# Patient Record
Sex: FEMALE | Race: WHITE | NOT HISPANIC OR LATINO | Employment: PART TIME | URBAN - METROPOLITAN AREA
[De-identification: names, ages, dates, MRNs, and addresses within clinical notes are randomized per-mention and may not be internally consistent; named-entity substitution may affect disease eponyms.]

---

## 2017-01-02 ENCOUNTER — TELEPHONE (OUTPATIENT)
Dept: FAMILY MEDICINE | Facility: CLINIC | Age: 67
End: 2017-01-02

## 2017-01-05 ENCOUNTER — OFFICE VISIT (OUTPATIENT)
Dept: SLEEP MEDICINE | Facility: CLINIC | Age: 67
End: 2017-01-05
Attending: INTERNAL MEDICINE
Payer: COMMERCIAL

## 2017-01-05 VITALS
HEIGHT: 64 IN | BODY MASS INDEX: 42.85 KG/M2 | OXYGEN SATURATION: 96 % | DIASTOLIC BLOOD PRESSURE: 49 MMHG | HEART RATE: 76 BPM | SYSTOLIC BLOOD PRESSURE: 156 MMHG | WEIGHT: 251 LBS

## 2017-01-05 DIAGNOSIS — G47.34 NOCTURNAL HYPOXEMIA: ICD-10-CM

## 2017-01-05 DIAGNOSIS — G47.33 SEVERE OBSTRUCTIVE SLEEP APNEA: Primary | ICD-10-CM

## 2017-01-05 PROCEDURE — 99211 OFF/OP EST MAY X REQ PHY/QHP: CPT | Mod: ZF

## 2017-01-05 NOTE — PROGRESS NOTES
DATE OF SERVICE:  01/05/2017      CHIEF COMPLAINT:  Followup nocturnal hypoxemia, sleep apnea.      HISTORY OF PRESENT ILLNESS:  Pinky Page is a 66-year-old female who is on supplemental oxygen for the treatment of hypoxemia.  She has a history of obstructive sleep apnea that is completely intolerant.  She reports today that she has had no new medical problems.  She sleeps on a flat bed with multiple pillows at the [facility].  She is awoken 2 times at night to get up to go to the bathroom, otherwise she will have nocturia and enuresis.  She has also awoken in the morning for medications and Accu-Chek.  She usually goes to bed around 10:00 to 10:30 and is out of bed by 6:30 or 7.  if she has difficulty falling asleep, she may be tired the next day, may take a nap.  She does note that on the days she takes naps it might be a little harder to fall asleep.  She otherwise does not complain about daytime sleepiness.  [place] is about 8.  She uses humidification for the oxygen, gets the supplies from [facility].  Her weight has been stable.  She has not yet been able to lose weight.  Last overnight oximetry performed 02/04/2015 on supplemental oxygen 2 liters per minute with no significant hypoxemia.  Time below 88% of less than 1 minute.  She does complain of some dry nose despite using humidified oxygen.        She has had some changes in her medications since the last visit.  She is now on Geodon and off quetiapine.  Medications were reviewed.      SOCIAL HISTORY:  She is a nonsmoker.  There is no smoking around her.  No candles are being used and her room is labeled with a significant that says oxygen in use.        PHYSICAL EXAMINATION:   GENERAL:  Pleasant obese female in no distress.   VITAL SIGNS:  Blood pressure 156/49, pulse is 76.  O2 saturation is 96% on room air.  Weight is 251 pounds.  Body mass index of 43.      ASSESSMENT:  A 66-year-old female with history of CPAP intolerance,  hypoxemia, with sleep apnea.  She has been on oxygen now for years.  Most recent oximetry was done on supplemental oxygen at 2 liters per minute and was adequate.  She remains obese and hypertensive.      PLAN:   1.  The patient is going to follow up with her primary regarding hypertension.   2.  I encouraged her to increase exercise as tolerated and continue to watch her diet.   3.  We will do overnight oximetry on room air 1 night to assess oxygen needs.  If she does not have significant hypoxemia will consider a trial of discontinuing the oxygen.       Please see patient instructions for further details of counseling provided.      Fifteen minutes spent with patient, greater than 50% spent in counseling and care.         VIDYA SIMON MD             D: 2017 14:10   T: 2017 15:59   MT: RADHA      Name:     BRUCE TAMEZ   MRN:      [id number]        Account:      [id number]   :      1950           Visit Date:   2017      Document: [id number]

## 2017-01-05 NOTE — PATIENT INSTRUCTIONS
Lets check your oxygen saturation off oxygen one night. Please log the times that you are up that night to go to bathroom, take meds, etc.  Continue oxygen at 2 l /m otherwise until you get results from me-usually a letter.  Keep working with Dr Kelley at [facility] on improving blood pressure control    Your BMI is Body mass index is 43.06 kg/(m^2).  Weight management is a personal decision.  If you are interested in exploring weight loss strategies, the following discussion covers the approaches that may be successful. Body mass index (BMI) is one way to tell whether you are at a healthy weight, overweight, or obese. It measures your weight in relation to your height.  A BMI of 18.5 to 24.9 is in the healthy range. A person with a BMI of 25 to 29.9 is considered overweight, and someone with a BMI of 30 or greater is considered obese. More than two-thirds of American adults are considered overweight or obese.  Being overweight or obese increases the risk for further weight gain. Excess weight may lead to heart disease and diabetes.  Creating and following plans for healthy eating and physical activity may help you improve your health.  Weight control is part of healthy lifestyle and includes exercise, emotional health, and healthy eating habits. Careful eating habits lifelong are the mainstay of weight control. Though there are significant health benefits from weight loss, long-term weight loss with diet alone may be very difficult to achieve- studies show long-term success with dietary management in less than 10% of people. Attaining a healthy weight may be especially difficult to achieve in those with severe obesity. In some cases, medications, devices and surgical management might be considered.  What can you do?  If you are overweight or obese and are interested in methods for weight loss, you should discuss this with your provider.     Consider reducing daily calorie intake by 500 calories.     Keep a food  journal.     Avoiding skipping meals, consider cutting portions instead.    Diet combined with exercise helps maintain muscle while optimizing fat loss. Strength training is particularly important for building and maintaining muscle mass. Exercise helps reduce stress, increase energy, and improves fitness. Increasing exercise without diet control, however, may not burn enough calories to loose weight.       Start walking three days a week 10-20 minutes at a time    Work towards walking thirty minutes five days a week     Eventually, increase the speed of your walking for 1-2 minutes at time    In addition, we recommend that you review healthy lifestyles and methods for weight loss available through the National Institutes of Health patient information sites:  http://win.niddk.nih.gov/publications/index.htm    And look into health and wellness programs that may be available through your health insurance provider, employer, local community center, or wally club.    Weight management plan: Patient was referred to their PCP to discuss a diet and exercise plan.

## 2017-01-10 NOTE — TELEPHONE ENCOUNTER
Called patient to schedule Pharm D appointment. Spoke with residence staff who advised day nurses schedule appointments and they are gone for the day. Left message to have day nurse return call and schedule appointment for patient. Close encounter.    Reason: Comprehensive Medical Review, Polypharmacy >8-10 medications and is trying to lose weight.   Urgency of Appointment: Next Available  Is this for a one time consult or how many independent pharmacy visits should the patient have before having a follow up provider appointment? Number of Independent Pharmacy Visits=1-3

## 2017-01-18 DIAGNOSIS — Z79.4 TYPE 2 DIABETES MELLITUS WITH MICROALBUMINURIA, WITH LONG-TERM CURRENT USE OF INSULIN (H): Primary | ICD-10-CM

## 2017-01-18 DIAGNOSIS — E11.8 TYPE 2 DIABETES MELLITUS WITH COMPLICATION (H): ICD-10-CM

## 2017-01-18 DIAGNOSIS — R80.9 TYPE 2 DIABETES MELLITUS WITH MICROALBUMINURIA, WITH LONG-TERM CURRENT USE OF INSULIN (H): Primary | ICD-10-CM

## 2017-01-18 DIAGNOSIS — E11.29 TYPE 2 DIABETES MELLITUS WITH MICROALBUMINURIA, WITH LONG-TERM CURRENT USE OF INSULIN (H): Primary | ICD-10-CM

## 2017-01-18 RX ORDER — INSULIN GLARGINE 100 [IU]/ML
INJECTION, SOLUTION SUBCUTANEOUS
Qty: 10 ML | Refills: 11 | Status: CANCELLED | OUTPATIENT
Start: 2017-01-18

## 2017-01-23 ENCOUNTER — OFFICE VISIT (OUTPATIENT)
Dept: SLEEP MEDICINE | Facility: CLINIC | Age: 67
End: 2017-01-23
Attending: INTERNAL MEDICINE
Payer: COMMERCIAL

## 2017-01-23 DIAGNOSIS — G47.34 NOCTURNAL HYPOXEMIA: ICD-10-CM

## 2017-01-23 PROCEDURE — 94762 N-INVAS EAR/PLS OXIMTRY CONT: CPT | Mod: ZF

## 2017-01-23 NOTE — PROGRESS NOTES
Patient was mailed an overnight Oximetry. Patient was instructed use over the phone. Patient verbalized understanding and will be returning device after completing the test tonight via postal mail.     Patient was given sleep logs and written instructions for use    JONNATHAN Benedict  Clinic Coordinator   Registered Medical Assistant   [facility]

## 2017-01-25 ENCOUNTER — OFFICE VISIT (OUTPATIENT)
Dept: PSYCHOLOGY | Facility: CLINIC | Age: 67
End: 2017-01-25

## 2017-01-25 VITALS — WEIGHT: 246.8 LBS | BODY MASS INDEX: 42.34 KG/M2

## 2017-01-25 DIAGNOSIS — F33.1 MAJOR DEPRESSIVE DISORDER, RECURRENT EPISODE, MODERATE (H): Primary | ICD-10-CM

## 2017-01-25 DIAGNOSIS — F54 PSYCHOLOGICAL FACTORS AFFECTING MEDICAL CONDITION: ICD-10-CM

## 2017-01-25 DIAGNOSIS — F41.1 GENERALIZED ANXIETY DISORDER: ICD-10-CM

## 2017-01-25 NOTE — PROGRESS NOTES
Health Psychology                  Clinic    Department of Medicine  Madelaine Ingram, Ph.D., L.P. [phone]                          [facility] Miryam Woods, Ph.D.,  L.P. [phone]                 3rd Floor, Clinic 3A  [place] Mail Code 954   Damon Gr, Ph.D., LUCY.[email or web address].[email or web address].P., L.P. [phone]     [address] Kimber Morales, Ph.D., L.P. [phone]  [place] [address]  [place] [address]    Health Psychology Follow-Up Note    Ms. Page is a pleasant 66-year old woman with chronic depressive illness, who returns to clinic for supportive and behavioral psychotherapy for moderate recurrent depression and for help losing weight given her morbid obesity.   Depression is below (3) baseline (4)  mild.  She lost 3 lbs and is reinforced for the change.  She reports some extra walking.  I gave her something to remind her about exercise and eating healthily.   Wt Readings from Last 4 Encounters:   01/25/17 111.948 kg (246 lb 12.8 oz)   01/05/17 113.853 kg (251 lb)   12/28/16 113.581 kg (250 lb 6.4 oz)   12/12/16 113.309 kg (249 lb 12.8 oz)     At [employer], she continues to serve on the [employer].  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  She is working 1 day/week. Her roommate is doing better and she is enjoying her more over time.  She reports that another resident ate leaves of her plants.  She met with a nutritionist who gave her pointers for eating less.  She is doing more social walking.  She is willing to try  The bike.  She is wiling to speak to the staff about exercises before biking.  Pinky participated fully and appeared to derive benefit.  Rapport was excellent. Extended session due to complexity of case and length of interval.    IMPRESSION Distress Disability Risk    Low High Low     Time In: 12:42  Time Out:   1:36  Diagnosis:   Major Depression, recurrent moderate (F33.1)   Psychological Factors Affecting Medical Condition: Morbid Obesity (F54)      Plan: She will return on 2/27 @ 2:00  for behavioral and supportive psychotherapy.   Tx plan due 2/9/17;  Damon Gr, Ph.D., A.B.P.P., L.P.

## 2017-01-27 ENCOUNTER — TELEPHONE (OUTPATIENT)
Dept: SLEEP MEDICINE | Facility: CLINIC | Age: 67
End: 2017-01-27

## 2017-01-27 NOTE — TELEPHONE ENCOUNTER
Order faxed to [facility] for overnight oximeter.  [facility] contacted and told to disregard request.    Pt was sent an overnight oximeter to home by Clinic Coordinator 1/23/17.    JONNATHAN Mark

## 2017-01-31 ENCOUNTER — OFFICE VISIT (OUTPATIENT)
Dept: FAMILY MEDICINE | Facility: CLINIC | Age: 67
End: 2017-01-31

## 2017-01-31 VITALS
DIASTOLIC BLOOD PRESSURE: 76 MMHG | OXYGEN SATURATION: 96 % | RESPIRATION RATE: 20 BRPM | TEMPERATURE: 98.1 F | SYSTOLIC BLOOD PRESSURE: 125 MMHG | BODY MASS INDEX: 42.31 KG/M2 | WEIGHT: 246.6 LBS | HEART RATE: 71 BPM

## 2017-01-31 DIAGNOSIS — I10 HYPERTENSION, ESSENTIAL: Primary | ICD-10-CM

## 2017-01-31 DIAGNOSIS — L60.9 NAIL COMPLAINT: ICD-10-CM

## 2017-01-31 NOTE — MR AVS SNAPSHOT
After Visit Summary   1/31/2017    Pinky Page    MRN: [id number]           Patient Information     Date Of Birth          1950        Visit Information        Provider Department      1/31/2017 1:40 PM Shamika Dia MD [facility]        Today's Diagnoses     Hypertension, essential    -  1       Care Instructions    Here is the plan from today's visit    1. Hypertension, essential  - you are doing great. No changes today  - will check your blood pressure once a week at [facility].     I will look further into your nails    Fungus on skin. Continue nystatin to the skin as needed.     Please call or return to clinic if your symptoms don't go away.    Follow up plan  Please make a clinic appointment for follow up with me (SHAMIKA DIA) in 1  month for labs and BP recheck.    Thank you for coming to [facility] today.  Lab Testing:  **If you had lab testing today and your results are reassuring or normal they will be mailed to you or sent through [employer]rise within 7 days.   **If the lab tests need quick action we will call you with the results.  The phone number we will call with results is # [phone] (home) [phone] (work). If this is not the best number please call our clinic and change the number.  Medication Refills:  If you need any refills please call your pharmacy and they will contact us.   If you need to  your refill at a new pharmacy, please contact the new pharmacy directly. The new pharmacy will help you get your medications transferred faster.   Scheduling:  If you have any concerns about today's visit or wish to schedule another appointment please call our office during normal business hours [phone] (8-5:00 M-F)  If a referral was made to a [facility] and you don't get a call from central scheduling please call [phone].  If a Mammogram was ordered for you at [facility] call  [phone] to schedule or change your appointment.  If you had an XRay/CT/Ultrasound/MRI ordered the number is [phone] to schedule or change your radiology appointment.   Medical Concerns:  If you have urgent medical concerns please call [phone] at any time of the day.  If you have a medical emergency please call 911.          Follow-ups after your visit        Your next 10 appointments already scheduled     Feb 10, 2017  3:00 PM   Adult Med Follow UP with Divine Jack MD   Psychiatry Clinic ([facility])    [place] [id number]  [address] [facility] [address] [phone]            Feb 20, 2017 10:45 AM   (Arrive by 10:30 AM)   Return Visit with Marcos Magdaleno MD   [facility] Medical Weight Management ([facility])    [address] [place] [address]  4th [facility] [address] [phone]            Feb 27, 2017  2:00 PM   (Arrive by 1:45 PM)   Return Visit with Damon Gr, PhD [facility] ([facility])    [address] [place] [address]  3rd [facility] [address] [phone]            Jun 21, 2017 10:30 AM   Lab with  LAB   [facility] Lab ([facility])    [address] [place] [address]  1st [facility] [address]   [phone]            Jun 21, 2017 11:35 AM   (Arrive by 11:20 AM)   Return Visit with Gaby Holliday MD   [facility] Nephrology ([facility])    [address] [place] [address]  3rd [facility] [id number] [phone]            Nov 14, 2017  1:30 PM   NEW GENERAL with Michael Lopez MD   Eye Clinic ([facility])    [facility]  [address]  [address] [address]  [facility] [address] [phone]              Who to contact     Please call your clinic at [phone] to:    Ask questions about your health    Make or cancel appointments    Discuss your  medicines    Learn about your test results    Speak to your doctor   If you have compliments or concerns about an experience at your clinic, or if you wish to file a complaint, please contact [facility] Patient Relations at [phone] or email us at [email or web address]         Additional Information About Your Visit        Limbohart Information     Yododot is an electronic gateway that provides easy, online access to your medical records. With [facility], you can request a clinic appointment, read your test results, renew a prescription or communicate with your care team.     To sign up for Yododot visit the website at [email or web address]   You will be asked to enter the access code listed below, as well as some personal information. Please follow the directions to create your username and password.     Your access code is: [email or web address]  Expires: 2017  2:22 PM     Your access code will  in 90 days. If you need help or a new code, please contact your [facility] or call [phone] for assistance.        Care EveryWhere ID     This is your Care EveryWhere ID. This could be used by other organizations to access your [place] medical records  [id number]        Your Vitals Were     Pulse Temperature Respirations Pulse Oximetry Breastfeeding?       71 98.1  F (36.7  C) (Oral) 20 96% No        Blood Pressure from Last 3 Encounters:   17 125/76   17 156/49   16 149/83    Weight from Last 3 Encounters:   17 246 lb 9.6 oz (111.857 kg)   17 246 lb 12.8 oz (111.948 kg)   17 251 lb (113.853 kg)              Today, you had the following     No orders found for display         Today's Medication Changes          These changes are accurate as of: 17  2:22 PM.  If you have any questions, ask your nurse or doctor.               These medicines have changed or have updated prescriptions.         Dose/Directions    metFORMIN 500 MG 24 hr tablet   Commonly known as:  GLUCOPHAGE-XR   This may have changed:  how much to take   Used for:  Diabetes mellitus, type 2 (H)        Dose:  1000 mg   Take 2 tablets (1,000 mg) by mouth 2 times daily (with meals)   Quantity:  90 tablet   Refills:  3                Primary Care Provider Office Phone # Fax #    Shamika Ileana Kelley [phone]       [facility] [address]  [facility] [address]        Thank you!     Thank you for choosing [facility]  for your care. Our goal is always to provide you with excellent care. Hearing back from our patients is one way we can continue to improve our services. Please take a few minutes to complete the written survey that you may receive in the mail after your visit with us. Thank you!             Your Updated Medication List - Protect others around you: Learn how to safely use, store and throw away your medicines at www.disposemymeds.org.          This list is accurate as of: 1/31/17  2:22 PM.  Always use your most recent med list.                   Brand Name Dispense Instructions for use    amLODIPine 5 MG tablet    NORVASC    30 tablet    Take 2 tablets (10 mg) by mouth daily       ARTIFICIAL TEARS OP      Apply 1 drop to eye 4 times daily.       aspirin 81 MG tablet     100    Take 1 tablet by mouth daily. ENTERIC COATED.       atorvastatin 40 MG tablet    LIPITOR    90 tablet    Take 1 tablet (40 mg) by mouth daily       blood glucose monitoring test strip    no brand specified    100 each    Use to test blood sugar 3 times daily or as directed.       calcium polycarbophil 625 MG tablet    FIBERCON     Take 2 tablets by mouth daily       calcium-vitamin D 250-125 MG-UNIT Tabs      Take 1 tablet by mouth 2 times daily. Calcium 250 mg/Vit D 125 IU       CLARITIN 10 MG tablet   Generic drug:  loratadine     30    1 TAB PO QD (Once per day) as needed for ALLERGY SYMPTOMS        clotrimazole 1 % cream    LOTRIMIN    50 g    Apply topically 2 times daily as needed       eucerin cream      Apply  topically as needed. Apply to thigh PRN dry skin       exenatide 10 MCG/0.04ML injection    BYETTA    1 Syringe    Inject 10 mcg Subcutaneous 2 times daily (before meals)       FLUoxetine 40 MG capsule    PROzac    30 capsule    Take 1 capsule (40 mg) by mouth daily       fluticasone 50 MCG/ACT spray    FLONASE    16 g    Spray 1 spray into both nostrils daily       furosemide 20 MG tablet    LASIX    60 tablet    Take 1 tablet (20 mg) by mouth 2 times daily       GEL-SASHA DENTINBLOC DT      Apply  to affected area. GEL. Apply to 2nd molar on bottom right daily at bedtime.       GLUCAGON EMERGENCY 1 MG kit   Generic drug:  glucagon      Inject  as directed. 1 mL as needed for signs of hypoglycemia. May repeat as needed in 15 minutes.       HYDROcodone-acetaminophen 5-325 MG per tablet    NORCO     Take 1 tablet by mouth every 6 hours as needed 1-2 tabs       Incontinence Brief Large Misc     60 Units    2 Units 2 times daily       insulin glargine 100 UNIT/ML injection    LANTUS    8 mL    Inject 24 Units Subcutaneous At Bedtime       * LACRI-LUBE OP      Apply  to eye. Place 0.5 inches into both eyes at bedtime       * artificial tears Oint ophthalmic ointment      At Bedtime       LACTAID 3000 UNIT tablet   Generic drug:  lactase      Take 4 tabs daily with meals.       levothyroxine 175 MCG tablet    SYNTHROID/LEVOTHROID    30 tablet    Take 1 tablet (175 mcg) by mouth daily Give on empty stomach       LORazepam 1 MG tablet    ATIVAN    35 tablet    Take 1 tablet (1 mg) by mouth At Bedtime .  May take additional 1mg daily PRN for agitation.       losartan 100 MG tablet    COZAAR    90 tablet    Take 1 tablet by mouth daily.       metFORMIN 500 MG 24 hr tablet    GLUCOPHAGE-XR    90 tablet    Take 2 tablets (1,000 mg) by mouth 2 times daily (with meals)       metroNIDAZOLE 500 MG tablet    FLAGYL     14 tablet    Take 1 tablet (500 mg) by mouth 2 times daily       MILK OF MAGNESIA PO      Take  by mouth. Take 30 mL as needed for constipation.       NEURONTIN PO      Take 900 mg by mouth 3 times daily.       nitrofurantoin (macrocrystal-monohydrate) 100 MG capsule    MACROBID    14 capsule    Take 1 capsule (100 mg) by mouth 2 times daily       nystatin [id number] UNIT/GM Powd    MYCOSTATIN    60 g    Apply topically 3 times daily as needed       order for DME     1 each    Continue Nocturnal Oxygen at 4L/NC.       polyethylene glycol powder    MIRALAX/GLYCOLAX     Take 1 capful by mouth 2 times daily. 17 GM PO BID       * PREVIDENT 5000 PLUS 1.1 % Crea   Generic drug:  Sodium Fluoride      Apply  to affected area every evening.       * PREVIDENT MT      Take  by mouth. Use as dental rinse at bedtime.       saline 0.9 % Soln      Spray 2 sprays in nostril as needed.       SENNA S 8.6-50 MG per tablet   Generic drug:  senna-docusate      Take 2 tablets by mouth At Bedtime.       solifenacin 10 MG tablet    VESICARE    30 tablet    Take 1 tablet (10 mg) by mouth daily       * TYLENOL 325 MG tablet   Generic drug:  acetaminophen      Take 1-2 tablets by mouth every 6 hours as needed.       * acetaminophen 500 MG tablet    TYLENOL    1 Bottle    Take 2 tablets (1,000 mg) by mouth every 6 hours as needed       ziprasidone 40 MG capsule    GEODON    60 capsule    Take 1 capsule (40 mg) by mouth 2 times daily (with meals)       * Notice:  This list has 6 medication(s) that are the same as other medications prescribed for you. Read the directions carefully, and ask your doctor or other care provider to review them with you.

## 2017-01-31 NOTE — PROGRESS NOTES
Preceptor Attestation:   Patient seen and discussed with the resident. Assessment and plan reviewed with resident and agreed upon.   Supervising Physician:  Mona Nava MD  [facility]

## 2017-01-31 NOTE — PROGRESS NOTES
HPI:       Pinky Page is a 66 year old who presents for the following  Patient presents with:  RECHECK: bp  Finger: growth under nails has had for a few months non painful     Hypertension Follow-up      Outpatient blood pressures are being checked at home.  Results are 138-174/60-76.    Chest Pain? :No     Low Salt Diet: no added salt    Daily NSAID Use?No     Did patient take their HTN pills today/last night as usual?  Yes       Adherence and Exercise  Medication side effects: no  How often is a medication missed? Never  Are you able to follow the treatment plan? Yes  Exercise:walking  6-7 days/week for an average of 30-45 minutes     Patient reports brittle nails with vertical ridges on her hands and feet for last several months which are not painful.      Problem, Medication and Allergy Lists were   reviewed and are current.     Patient Active Problem List    Diagnosis Date Noted     Solitary kidney, congenital 06/07/2013     Priority: High     CKD (chronic kidney disease) stage 2, GFR 60-89 ml/min 03/01/2013     Priority: High     Lives in group home 02/27/2013     Priority: High     [place] residence, [place] >20 years.  Mantoux done in 9/2013 was negative.        Hypercholesteremia 10/05/2012     Priority: High     ASCVD  Risk Calculator (pooled cohort)   10 CV risk 17.5%   Recommended Therapy:High Intensity Statin (atorvastatin 40*-80 mg or rosuvastatin 20-40mg)               Morbid obesity due to excess calories (H) 10/02/2012     Priority: High     Follows with Dr. Marcos Magdaleno Q 2months for weight management.       Personal history of breast cancer s/p L masectomy 10/02/2012     Priority: High     May 24, 2013 S/p left mastectomy 1996; follows with Dr. Avila, last mammogram 5/2012 was benign. Rec annual mammogram.       Diabetes mellitus type 2, insulin dependent (H) 10/02/2012     Priority: High     July 27, 2013   Insulin dependent since 4/2006  Without history of retinopathy        Hypertension, essential      Priority: High     Hypothyroidism      Priority: High     On replacement.       Severe obstructive sleep apnea      Priority: High     August 28, 2013   Follows with Dr. Mc.   On 4L O2 at night given CPAP intolerant.  PSG (10/21/07): AHI 39 with oxygen saturations to 71%.        Hx of psychiatric care 07/14/2016     Priority: Medium       PAST PSYCH MED TRIALS     sertaline   amitriptyline   lithium   risperidone   olanzapine   trazodone   clonazepam  Quetiapine (weight gain)       Psychological factors affecting medical condition 04/18/2016     Priority: Medium     Major depressive disorder, recurrent episode, moderate (H) 11/16/2015     Priority: Medium     Health Care Home 10/01/2015     Priority: Medium     Schizoaffective disorder, depressive type (H) 06/08/2015     Priority: Medium     Carpal tunnel syndrome of left wrist 05/28/2015     Priority: Medium     Generalized anxiety disorder 12/04/2014     Priority: Medium     Diagnosis updated by automated process. Provider to review and confirm.       Candidiasis of skin 11/04/2014     Priority: Medium     Chronic candidiasis of groin and labia        Hypertriglyceridemia 09/10/2014     Priority: Medium     Impingement syndrome of right shoulder 06/10/2014     Priority: Medium     S/P hysterectomy 04/20/2014     Priority: Medium     Extrapyramidal and movement disorder 02/27/2013     Priority: Medium     Cardiomegaly      Priority: Medium     Chronic constipation      Priority: Medium     Dry eye syndrome      Priority: Medium     Esophageal reflux      Priority: Medium     Exposure keratoconjunctivitis      Priority: Medium     DM ophthalmopathy (H)      Priority: Medium     Senile cataract      Priority: Medium     Postmenopausal atrophic vaginitis      Priority: Medium     Restless leg syndrome      Priority: Medium     Squamous blepharitis      Priority: Medium     Urge incontinence 04/19/2011     Priority: Medium      Allergic rhinitis due to pollen      Priority: Medium     seasonal allergies      ,     Current Outpatient Prescriptions   Medication Sig Dispense Refill     blood glucose monitoring (NO BRAND SPECIFIED) test strip Use to test blood sugar 3 times daily or as directed. 100 each 3     exenatide (BYETTA) 10 MCG/0.04ML injection Inject 10 mcg Subcutaneous 2 times daily (before meals) 1 Syringe 11     insulin glargine (LANTUS) 100 UNIT/ML injection Inject 24 Units Subcutaneous At Bedtime 8 mL 11     FLUoxetine (PROZAC) 40 MG capsule Take 1 capsule (40 mg) by mouth daily 30 capsule 2     ziprasidone (GEODON) 40 MG capsule Take 1 capsule (40 mg) by mouth 2 times daily (with meals) 60 capsule 2     LORazepam (ATIVAN) 1 MG tablet Take 1 tablet (1 mg) by mouth At Bedtime .  May take additional 1mg daily PRN for agitation. 35 tablet 1     metroNIDAZOLE (FLAGYL) 500 MG tablet Take 1 tablet (500 mg) by mouth 2 times daily 14 tablet 0     nitrofurantoin, macrocrystal-monohydrate, (MACROBID) 100 MG capsule Take 1 capsule (100 mg) by mouth 2 times daily 14 capsule 0     amLODIPine (NORVASC) 5 MG tablet Take 2 tablets (10 mg) by mouth daily 30 tablet 3     nystatin (MYCOSTATIN) 834098 UNIT/GM POWD Apply topically 3 times daily as needed 60 g 1     clotrimazole (LOTRIMIN) 1 % cream Apply topically 2 times daily as needed 50 g 0     fluticasone (FLONASE) 50 MCG/ACT nasal spray Spray 1 spray into both nostrils daily 16 g 3     furosemide (LASIX) 20 MG tablet Take 1 tablet (20 mg) by mouth 2 times daily 60 tablet 3     acetaminophen (TYLENOL) 500 MG tablet Take 2 tablets (1,000 mg) by mouth every 6 hours as needed 1 Bottle 2     solifenacin (VESICARE) 10 MG tablet Take 1 tablet (10 mg) by mouth daily 30 tablet 6     HYDROcodone-acetaminophen (NORCO) 5-325 MG per tablet Take 1 tablet by mouth every 6 hours as needed 1-2 tabs       atorvastatin (LIPITOR) 40 MG tablet Take 1 tablet (40 mg) by mouth daily 90 tablet 1     calcium  polycarbophil (FIBERCON) 625 MG tablet Take 2 tablets by mouth daily       levothyroxine (SYNTHROID, LEVOTHROID) 175 MCG tablet Take 1 tablet (175 mcg) by mouth daily Give on empty stomach 30 tablet 4     metFORMIN (GLUCOPHAGE-XR) 500 MG 24 hr tablet Take 2 tablets (1,000 mg) by mouth 2 times daily (with meals) (Patient taking differently: Take 2,000 mg by mouth 2 times daily (with meals) ) 90 tablet 3     Incontinence Supply Disposable (INCONTINENCE BRIEF LARGE) MISC 2 Units 2 times daily 60 Units 11     Artificial Tear Ointment (ARTIFICIAL TEARS) OINT ophthalmic ointment At Bedtime       losartan (COZAAR) 100 MG tablet Take 1 tablet by mouth daily. 90 tablet 1     Sodium Fluoride (PREVIDENT 5000 PLUS) 1.1 % CREA Apply  to affected area every evening.       Hypromellose (ARTIFICIAL TEARS OP) Apply 1 drop to eye 4 times daily.       ORDER FOR DME Continue Nocturnal Oxygen at 4L/NC. 1 each 0     Gabapentin (NEURONTIN PO) Take 900 mg by mouth 3 times daily.       senna-docusate (SENNA S) 8.6-50 MG per tablet Take 2 tablets by mouth At Bedtime.       polyethylene glycol (MIRALAX/GLYCOLAX) powder Take 1 capful by mouth 2 times daily. 17 GM PO BID       acetaminophen (TYLENOL) 325 MG tablet Take 1-2 tablets by mouth every 6 hours as needed.       Artificial Tear Ointment (LACRI-LUBE OP) Apply  to eye. Place 0.5 inches into both eyes at bedtime       Skin Protectants, Misc. (EUCERIN) cream Apply  topically as needed. Apply to thigh PRN dry skin        Saline 0.9 % SOLN Spray 2 sprays in nostril as needed.       Calcium Carbonate-Vitamin D (CALCIUM-VITAMIN D) 250-125 MG-UNIT TABS Take 1 tablet by mouth 2 times daily. Calcium 250 mg/Vit D 125 IU       Sod Fluor-Solo Fluor-Hydrfl Ac (GEL-SASHA DENTINBLOC DT) Apply  to affected area. GEL. Apply to 2nd molar on bottom right daily at bedtime.       lactase (LACTAID) 3000 UNIT tablet Take 4 tabs daily with meals.       Magnesium Hydroxide (MILK OF MAGNESIA PO) Take  by mouth. Take  30 mL as needed for constipation.       Sodium Fluoride, [id number], (PREVIDENT MT) Take  by mouth. Use as dental rinse at bedtime.       CLARITIN 10 MG OR TABS 1 TAB PO QD (Once per day) as needed for ALLERGY SYMPTOMS 30 11     ASPIRIN 81 MG OR TABS Take 1 tablet by mouth daily. ENTERIC COATED. 100 3     GLUCAGON EMERGENCY 1 MG IJ KIT Inject  as directed. 1 mL as needed for signs of hypoglycemia. May repeat as needed in 15 minutes.  0   ,     Allergies   Allergen Reactions     Chlordiazepoxide Hcl      Dimetapp Dm Cold-Cough      Cold/Congetion TABS     Haldol      Ibuprofen      TABS     Lactose Intolerance [Beta-Galactosidase]      CAPS     Milk Products      Propofol      EMUL     Patient is an established patient of this clinic.         Review of Systems:   Review of Systems   Constitutional: Negative for fever, chills, appetite change and fatigue.   HENT: Negative for congestion, sinus pressure and sore throat.    Eyes: Negative for visual disturbance.   Respiratory: Negative for cough, shortness of breath and wheezing.    Cardiovascular: Negative for chest pain and leg swelling.   Gastrointestinal: Negative for nausea, vomiting, abdominal pain, diarrhea, constipation, blood in stool and abdominal distention.   Genitourinary: Negative for dysuria and hematuria.   Musculoskeletal: Negative for myalgias and arthralgias.   Skin: Negative for color change and rash.   Neurological: Negative for weakness and headaches.             Physical Exam:   Patient Vitals for the past 24 hrs:   BP Temp Temp src Pulse Resp SpO2 Weight   01/31/17 1344 125/76 mmHg 98.1  F (36.7  C) Oral 71 20 96 % 246 lb 9.6 oz (111.857 kg)     Body mass index is 42.31 kg/(m^2).  Vitals were reviewed and were normal     Physical Exam   Constitutional: She is oriented to person, place, and time. She appears well-developed and well-nourished. No distress.   HENT:   Head: Normocephalic and atraumatic.   Nose: Nose normal.   Mouth/Throat: Oropharynx  is clear and moist. No oropharyngeal exudate.   Eyes: Conjunctivae are normal. Pupils are equal, round, and reactive to light. Right eye exhibits no discharge. Left eye exhibits no discharge. No scleral icterus.   Neck: Normal range of motion. Neck supple. No thyromegaly present.   Cardiovascular: Normal rate, regular rhythm and normal heart sounds.  Exam reveals no gallop and no friction rub.    No murmur heard.  Pulmonary/Chest: Effort normal and breath sounds normal. No respiratory distress. She has no wheezes. She has no rales.   Abdominal: Soft. Bowel sounds are normal. She exhibits no mass. There is no tenderness.   Musculoskeletal: Normal range of motion. She exhibits no edema or tenderness.   Lymphadenopathy:     She has no cervical adenopathy.   Neurological: She is alert and oriented to person, place, and time.   Skin: She is not diaphoretic.        Pannus skin fold with no erythema, resolved.    Psychiatric: She has a normal mood and affect.         Results:     Results for orders placed or performed in visit on 12/08/16   Hemoglobin A1c ([facility])   Result Value Ref Range    Hemoglobin A1C 7.0 (H) 4.1 - 5.7 %       Assessment and Plan     Pinky Page is a 67 yo female with a PMH of HTN, CKD, DM2, hypothyroid, s/p breast CA with left masectomy 1996, and significant psych history who currently lives in group home who presents to the clinic for HTN follow up.     1. Hypertension, essential, continues to be at goal when in clinic. BP elevated at [facility].   - No changes to medication today  - continue to check blood pressure once a week at [facility] with largest BP cuff.     2. Nail abnormality, vertical nail ridges  - no fungus on exam  - will continue to monitor.      - Patient's yeast infection in the left pannus fold has resolved. Discussed that patient should use the nystatin cream as needed.     There are no discontinued medications.  Options for treatment and follow-up care were  reviewed with the patient. Pinky Page  engaged in the decision making process and verbalized understanding of the options discussed and agreed with the final plan.    Shamika Kelley MD  [facility]  [phone]

## 2017-01-31 NOTE — PATIENT INSTRUCTIONS
Here is the plan from today's visit    1. Hypertension, essential  - you are doing great. No changes today  - will check your blood pressure once a week at [facility].     I will look further into your nails     Fungus on skin. Continue nystatin to the skin as needed.     Please call or return to clinic if your symptoms don't go away.    Follow up plan  Please make a clinic appointment for follow up with me (GALA DIA) in 1  month for labs and BP recheck.    Thank you for coming to [facility] today.  Lab Testing:  **If you had lab testing today and your results are reassuring or normal they will be mailed to you or sent through [employer] within 7 days.   **If the lab tests need quick action we will call you with the results.  The phone number we will call with results is # [phone] (home) [phone] (work). If this is not the best number please call our clinic and change the number.  Medication Refills:  If you need any refills please call your pharmacy and they will contact us.   If you need to  your refill at a new pharmacy, please contact the new pharmacy directly. The new pharmacy will help you get your medications transferred faster.   Scheduling:  If you have any concerns about today's visit or wish to schedule another appointment please call our office during normal business hours [phone] (8-5:00 M-F)  If a referral was made to a [facility] and you don't get a call from central scheduling please call [phone].  If a Mammogram was ordered for you at [facility] call [phone] to schedule or change your appointment.  If you had an XRay/CT/Ultrasound/MRI ordered the number is [phone] to schedule or change your radiology appointment.   Medical Concerns:  If you have urgent medical concerns please call [phone] at any time of the day.  If you have a medical emergency please call [phone].

## 2017-02-03 PROBLEM — L60.9 NAIL COMPLAINT: Status: ACTIVE | Noted: 2017-02-03

## 2017-02-03 ASSESSMENT — ENCOUNTER SYMPTOMS
SHORTNESS OF BREATH: 0
HEADACHES: 0
APPETITE CHANGE: 0
ABDOMINAL DISTENTION: 0
BLOOD IN STOOL: 0
FEVER: 0
COLOR CHANGE: 0
CONSTIPATION: 0
HEMATURIA: 0
NAUSEA: 0
CHILLS: 0
COUGH: 0
SORE THROAT: 0
FATIGUE: 0
MYALGIAS: 0
SINUS PRESSURE: 0
DYSURIA: 0
VOMITING: 0
WEAKNESS: 0
ABDOMINAL PAIN: 0
ARTHRALGIAS: 0
WHEEZING: 0
DIARRHEA: 0

## 2017-02-03 NOTE — PROGRESS NOTES
Results received from [employer] for overnight oximetry that was mailed on 1/23/2017.     Results labeled and scanned into chart.     Copy given to provider for review. Encounter also forwarded to provider.     Recording Date: 02/03/2017    Duration: 7:32:16    Note: Completed on Room Air

## 2017-02-08 NOTE — PROCEDURES
I reviewed the overnight oximetry testing results from 2/3/2017 done on room air and with head of bed elevated.  Analysis time:  7 Hours 32 Minutes.  Time at or below 88% oxygen saturation 50 minutes.  Lowest oxygen sat 82%.  Pattern consistent with REM versus positional hypoxemia.  I recommend weight control, continue sleeping with head of bed elevated, and supplemental oxygen at 2L/m minute with sleep.   Brianda Reddy M.D.  Pulmonary/Critical Care/Sleep Medicine

## 2017-02-10 ENCOUNTER — OFFICE VISIT (OUTPATIENT)
Dept: PSYCHIATRY | Facility: CLINIC | Age: 67
End: 2017-02-10
Attending: PSYCHIATRY & NEUROLOGY
Payer: COMMERCIAL

## 2017-02-10 VITALS
HEART RATE: 76 BPM | BODY MASS INDEX: 42.38 KG/M2 | SYSTOLIC BLOOD PRESSURE: 130 MMHG | DIASTOLIC BLOOD PRESSURE: 75 MMHG | WEIGHT: 247 LBS

## 2017-02-10 DIAGNOSIS — F41.1 GENERALIZED ANXIETY DISORDER: ICD-10-CM

## 2017-02-10 DIAGNOSIS — F25.1 SCHIZOAFFECTIVE DISORDER, DEPRESSIVE TYPE (H): Primary | ICD-10-CM

## 2017-02-10 PROCEDURE — 99212 OFFICE O/P EST SF 10 MIN: CPT | Mod: ZF

## 2017-02-10 RX ORDER — FLUOXETINE 40 MG/1
40 CAPSULE ORAL DAILY
Qty: 30 CAPSULE | Refills: 2 | Status: SHIPPED | OUTPATIENT
Start: 2017-02-10 | End: 2017-04-25

## 2017-02-10 RX ORDER — LORAZEPAM 1 MG/1
1 TABLET ORAL AT BEDTIME
Qty: 35 TABLET | Refills: 1 | Status: SHIPPED | OUTPATIENT
Start: 2017-02-27 | End: 2017-04-25

## 2017-02-10 RX ORDER — ZIPRASIDONE HYDROCHLORIDE 40 MG/1
40 CAPSULE ORAL 2 TIMES DAILY WITH MEALS
Qty: 60 CAPSULE | Refills: 2 | Status: SHIPPED | OUTPATIENT
Start: 2017-02-10 | End: 2017-04-25

## 2017-02-10 NOTE — MR AVS SNAPSHOT
After Visit Summary   2/10/2017    Pinky Page    MRN: [id number]           Patient Information     Date Of Birth          1950        Visit Information        Provider Department      2/10/2017 3:00 PM Divine Jack MD [facility]        Today's Diagnoses     Schizoaffective disorder, depressive type (H)    -  1     Generalized anxiety disorder            Follow-ups after your visit        Your next 10 appointments already scheduled     Feb 20, 2017 10:45 AM   (Arrive by 10:30 AM)   Return Visit with Marcos Magdaleno MD   [facility] ([facility])    [address] [place] [address]  [address] [facility] [address]   [phone]            Feb 27, 2017  2:00 PM   (Arrive by 1:45 PM)   Return Visit with Damon Gr, PhD [facility] ([facility])    [address] [place] [address]  [address] [facility] [address]   [phone]            Mar 10, 2017  3:00 PM   Adult Med Follow UP with Divine Jack MD   Psychiatry Clinic ([facility])    [place] [id number]  [address] [facility] [address]   [phone]            Mar 21, 2017 11:00 AM   Return Visit with Shamika Kelley MD   [facility] ([facility])    [address] [place] [address],  [address]  [facility] [address]   [phone]            Jun 21, 2017 10:30 AM   Lab with  LAB   [facility] ([facility])    [address] [place] [address]  [address] [facility] [address]   [phone]            Jun 21, 2017 11:35 AM   (Arrive by 11:20 AM)   Return Visit with Gaby Holliday MD   [facility] Nephrology ([facility])    [address] [place] [address]  [address] [facility] [address]   [phone]            Nov 14, 2017  1:30 PM   NEW GENERAL with Michael Lopez MD   Eye Clinic ([facility])    [place]  [address]  [address] [facility]  [address] Clin [address]  [facility] [address]   [phone]              Who to contact     Please call your clinic at [phone] to:    Ask questions about your health    Make or cancel appointments    Discuss your medicines    Learn about your test results    Speak to your doctor   If you have compliments or concerns about an experience at your clinic, or if you wish to file a complaint, please contact [facility] Patient Relations at [phone] or email us at [email or web address]         Additional Information About Your Visit        [employer] Information     Image [employer]cher is an electronic gateway that provides easy, online access to your medical records. With Image Searcher, you can request a clinic appointment, read your test results, renew a prescription or communicate with your care team.     To sign up for Image Searcher visit the website at [email or web address]   You will be asked to enter the access code listed below, as well as some personal information. Please follow the directions to create your username and password.     Your access code is: [email or web address]  Expires: 2017  2:22 PM     Your access code will  in 90 days. If you need help or a new code, please contact your [facility] or call [phone] for assistance.        Care EveryWhere ID     This is your Care EveryWhere ID. This could be used by other organizations to access your [place] medical records  [id number]        Your Vitals Were     Pulse                   76            Blood Pressure from Last 3 Encounters:   02/10/17 130/75   17 125/76   17 156/49    Weight from Last 3 Encounters:   02/10/17 112.038 kg (247 lb)   17 111.857 kg (246 lb 9.6 oz)   17 111.948 kg (246 lb 12.8 oz)              Today, you had the following     No orders found for display         Today's Medication Changes          These changes are accurate as of:  2/10/17  3:36 PM.  If you have any questions, ask your nurse or doctor.               These medicines have changed or have updated prescriptions.        Dose/Directions    metFORMIN 500 MG 24 hr tablet   Commonly known as:  GLUCOPHAGE-XR   This may have changed:  how much to take   Used for:  Diabetes mellitus, type 2 (H)        Dose:  1000 mg   Take 2 tablets (1,000 mg) by mouth 2 times daily (with meals)   Quantity:  90 tablet   Refills:  3            Where to get your medicines      These medications were sent to [place], [place] - [address] [place] [address]  [address] [place] [address]     Phone:  [phone]    - FLUoxetine 40 MG capsule  - ziprasidone 40 MG capsule      Some of these will need a paper prescription and others can be bought over the counter.  Ask your nurse if you have questions.     Bring a paper prescription for each of these medications    - LORazepam 1 MG tablet             Primary Care Provider Office Phone # Fax #    Shamika Kelley [phone]       [facility] [address] [facility] [address]        Thank you!     Thank you for choosing PSYCHIATRY CLINIC  for your care. Our goal is always to provide you with excellent care. Hearing back from our patients is one way we can continue to improve our services. Please take a few minutes to complete the written survey that you may receive in the mail after your visit with us. Thank you!             Your Updated Medication List - Protect others around you: Learn how to safely use, store and throw away your medicines at www.disposemymeds.org.          This list is accurate as of: 2/10/17  3:36 PM.  Always use your most recent med list.                   Brand Name Dispense Instructions for use    amLODIPine 5 MG tablet    NORVASC    30 tablet    Take 2 tablets (10 mg) by mouth daily       ARTIFICIAL TEARS OP      Apply 1 drop to eye 4 times daily.       aspirin 81 MG tablet      100    Take 1 tablet by mouth daily. ENTERIC COATED.       atorvastatin 40 MG tablet    LIPITOR    90 tablet    Take 1 tablet (40 mg) by mouth daily       blood glucose monitoring test strip    no brand specified    100 each    Use to test blood sugar 3 times daily or as directed.       calcium polycarbophil 625 MG tablet    FIBERCON     Take 2 tablets by mouth daily       calcium-vitamin D 250-125 MG-UNIT Tabs      Take 1 tablet by mouth 2 times daily. Calcium 250 mg/Vit D 125 IU       CLARITIN 10 MG tablet   Generic drug:  loratadine     30    1 TAB PO QD (Once per day) as needed for ALLERGY SYMPTOMS       clotrimazole 1 % cream    LOTRIMIN    50 g    Apply topically 2 times daily as needed       eucerin cream      Apply  topically as needed. Apply to thigh PRN dry skin       exenatide 10 MCG/0.04ML injection    BYETTA    1 Syringe    Inject 10 mcg Subcutaneous 2 times daily (before meals)       FLUoxetine 40 MG capsule    PROzac    30 capsule    Take 1 capsule (40 mg) by mouth daily       fluticasone 50 MCG/ACT spray    FLONASE    16 g    Spray 1 spray into both nostrils daily       furosemide 20 MG tablet    LASIX    60 tablet    Take 1 tablet (20 mg) by mouth 2 times daily       GEL-SASHA DENTINBLOC DT      Apply  to affected area. GEL. Apply to 2nd molar on bottom right daily at bedtime.       GLUCAGON EMERGENCY 1 MG kit   Generic drug:  glucagon      Inject  as directed. 1 mL as needed for signs of hypoglycemia. May repeat as needed in 15 minutes.       HYDROcodone-acetaminophen 5-325 MG per tablet    NORCO     Take 1 tablet by mouth every 6 hours as needed 1-2 tabs       Incontinence Brief Large Misc     60 Units    2 Units 2 times daily       insulin glargine 100 UNIT/ML injection    LANTUS    8 mL    Inject 24 Units Subcutaneous At Bedtime       * LACRI-LUBE OP      Apply  to eye. Place 0.5 inches into both eyes at bedtime       * artificial tears Oint ophthalmic ointment      At Bedtime       LACTAID  3000 UNIT tablet   Generic drug:  lactase      Take 4 tabs daily with meals.       levothyroxine 175 MCG tablet    SYNTHROID/LEVOTHROID    30 tablet    Take 1 tablet (175 mcg) by mouth daily Give on empty stomach       LORazepam 1 MG tablet   Start taking on:  2/27/2017    ATIVAN    35 tablet    Take 1 tablet (1 mg) by mouth At Bedtime .  May take additional 1mg daily PRN for agitation.       losartan 100 MG tablet    COZAAR    90 tablet    Take 1 tablet by mouth daily.       metFORMIN 500 MG 24 hr tablet    GLUCOPHAGE-XR    90 tablet    Take 2 tablets (1,000 mg) by mouth 2 times daily (with meals)       metroNIDAZOLE 500 MG tablet    FLAGYL    14 tablet    Take 1 tablet (500 mg) by mouth 2 times daily       MILK OF MAGNESIA PO      Take  by mouth. Take 30 mL as needed for constipation.       NEURONTIN PO      Take 900 mg by mouth 3 times daily.       nitrofurantoin (macrocrystal-monohydrate) 100 MG capsule    MACROBID    14 capsule    Take 1 capsule (100 mg) by mouth 2 times daily       nystatin 009246 UNIT/GM Powd    MYCOSTATIN    60 g    Apply topically 3 times daily as needed       order for DME     1 each    Continue Nocturnal Oxygen at 4L/NC.       polyethylene glycol powder    MIRALAX/GLYCOLAX     Take 1 capful by mouth 2 times daily. 17 GM PO BID       * PREVIDENT 5000 PLUS 1.1 % Crea   Generic drug:  Sodium Fluoride      Apply  to affected area every evening.       * PREVIDENT MT      Take  by mouth. Use as dental rinse at bedtime.       saline 0.9 % Soln      Spray 2 sprays in nostril as needed.       SENNA S 8.6-50 MG per tablet   Generic drug:  senna-docusate      Take 2 tablets by mouth At Bedtime.       solifenacin 10 MG tablet    VESICARE    30 tablet    Take 1 tablet (10 mg) by mouth daily       * TYLENOL 325 MG tablet   Generic drug:  acetaminophen      Take 1-2 tablets by mouth every 6 hours as needed.       * acetaminophen 500 MG tablet    TYLENOL    1 Bottle    Take 2 tablets (1,000 mg) by mouth  every 6 hours as needed       ziprasidone 40 MG capsule    GEODON    60 capsule    Take 1 capsule (40 mg) by mouth 2 times daily (with meals)       * Notice:  This list has 6 medication(s) that are the same as other medications prescribed for you. Read the directions carefully, and ask your doctor or other care provider to review them with you.
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
## 2017-02-11 ASSESSMENT — PATIENT HEALTH QUESTIONNAIRE - PHQ9: SUM OF ALL RESPONSES TO PHQ QUESTIONS 1-9: 7

## 2017-02-13 ENCOUNTER — TELEPHONE (OUTPATIENT)
Dept: PSYCHIATRY | Facility: CLINIC | Age: 67
End: 2017-02-13

## 2017-02-13 NOTE — TELEPHONE ENCOUNTER
A prescription ordered by Dr. Jack  and signed by Dr. Rubio for Ativan was successfully faxed to [facility] at [phone] on 2/10/2017  Original placed back in provider's folder (Dr. Jack).Aidee Berger/RYNE

## 2017-02-20 ENCOUNTER — OFFICE VISIT (OUTPATIENT)
Dept: ENDOCRINOLOGY | Facility: CLINIC | Age: 67
End: 2017-02-20

## 2017-02-20 VITALS
BODY MASS INDEX: 41.93 KG/M2 | DIASTOLIC BLOOD PRESSURE: 70 MMHG | WEIGHT: 245.6 LBS | OXYGEN SATURATION: 98 % | HEART RATE: 62 BPM | HEIGHT: 64 IN | SYSTOLIC BLOOD PRESSURE: 131 MMHG

## 2017-02-20 DIAGNOSIS — E66.01 MORBID OBESITY DUE TO EXCESS CALORIES (H): Primary | ICD-10-CM

## 2017-02-20 ASSESSMENT — PAIN SCALES - GENERAL: PAINLEVEL: NO PAIN (0)

## 2017-02-20 NOTE — MR AVS SNAPSHOT
After Visit Summary   2/20/2017    Pinky Page    MRN: [id number]           Patient Information     Date Of Birth          1950        Visit Information        Provider Department      2/20/2017 10:45 AM Marcos Magdaleno MD [facility] Medical Weight Management         Follow-ups after your visit        Your next 10 appointments already scheduled     Feb 27, 2017  2:00 PM CST   (Arrive by 1:45 PM)   Return Visit with Damon Gr, PhD [facility] ([facility])    [address] [place] [address]  3rd [facility] [address]   [phone]            Mar 02, 2017  1:00 PM CST   Return Sleep Patient with Brianda Reddy MD   [facility] [place], Sleep Study ([facility])    [address] [place] [address]   [phone]            Mar 10, 2017  3:00 PM CST   Adult Med Follow UP with Divine Jack MD   Psychiatry Clinic ([facility])    [place] [id number]  [address] [place] [address]   [phone]            Mar 21, 2017 11:00 AM CDT   Return Visit with Shamika Kelley MD   [facility] ([facility])    [address] [place] [address],  [facility] [address]  [facility] [address]   [phone]            May 22, 2017 10:30 AM CDT   (Arrive by 10:15 AM)   Return Visit with Marcos Magdaleno MD   [facility] Medical Weight Management ([facility])    [address] [place] [address]  4th Floor  [facility] [address]   [phone]            Jun 21, 2017 10:30 AM CDT   Lab with  LAB    Health Lab ([facility])    [address] [place] [address]  1st [facility] [address]   [phone]            Jun 21, 2017 11:35 AM CDT   (Arrive by 11:20 AM)   Return Visit with Gaby Holliday MD   [facility] Nephrology ([facility])    [address] [place] [address]  "Floor  [facility] [address] [phone]            2017  1:30 PM [facility] GENERAL with Michael Lopez MD   Eye Clinic ([facility])    [address]  [address] [facility]  9th Fl Clin 9a  [facility] [address] [phone]              Who to contact     Please call your clinic at [phone] to:    Ask questions about your health    Make or cancel appointments    Discuss your medicines    Learn about your test results    Speak to your doctor   If you have compliments or concerns about an experience at your clinic, or if you wish to file a complaint, please contact [facility] Patient Relations at [phone] or email us at [email or web address]         Additional Information About Your Visit        Admazely Information     Admazely is an electronic gateway that provides easy, online access to your medical records. With Admazely, you can request a clinic appointment, read your test results, renew a prescription or communicate with your care team.     To sign up for Admazely visit the website at [email or web address]   You will be asked to enter the access code listed below, as well as some personal information. Please follow the directions to create your username and password.     Your access code is: [email or web address]  Expires: 2017  2:22 PM     Your access code will  in 90 days. If you need help or a new code, please contact your [facility] or call [phone] for assistance.        Care EveryWhere ID     This is your Care EveryWhere ID. This could be used by other organizations to access your [facility] medical records  [id number]        Your Vitals Were     Pulse Height Pulse Oximetry BMI (Body Mass Index)          62 5' 4\" 98% 42.16 kg/m2         Blood Pressure from Last 3 Encounters:   17 131/70   17 125/76   17 156/49    Weight from Last 3 Encounters:   17 245 lb 9.6 oz "   01/31/17 246 lb 9.6 oz   01/05/17 251 lb              Today, you had the following     No orders found for display         Today's Medication Changes          These changes are accurate as of: 2/20/17 11:17 AM.  If you have any questions, ask your nurse or doctor.               These medicines have changed or have updated prescriptions.        Dose/Directions    metFORMIN 500 MG 24 hr tablet   Commonly known as:  GLUCOPHAGE-XR   This may have changed:  how much to take   Used for:  Diabetes mellitus, type 2 (H)        Dose:  1000 mg   Take 2 tablets (1,000 mg) by mouth 2 times daily (with meals)   Quantity:  90 tablet   Refills:  3                Primary Care Provider Office Phone # Fax #    Shamika Kelley [phone]       [facility] [address] [facility] [address]        Thank you!     Thank you for choosing [facility] WEIGHT MANAGEMENT  for your care. Our goal is always to provide you with excellent care. Hearing back from our patients is one way we can continue to improve our services. Please take a few minutes to complete the written survey that you may receive in the mail after your visit with us. Thank you!             Your Updated Medication List - Protect others around you: Learn how to safely use, store and throw away your medicines at www.disposemymeds.org.          This list is accurate as of: 2/20/17 11:17 AM.  Always use your most recent med list.                   Brand Name Dispense Instructions for use    amLODIPine 5 MG tablet    NORVASC    30 tablet    Take 2 tablets (10 mg) by mouth daily       ARTIFICIAL TEARS OP      Apply 1 drop to eye 4 times daily.       aspirin 81 MG tablet     100    Take 1 tablet by mouth daily. ENTERIC COATED.       atorvastatin 40 MG tablet    LIPITOR    90 tablet    Take 1 tablet (40 mg) by mouth daily       blood glucose monitoring test strip    no brand specified    100 each    Use to test blood sugar 3 times daily  or as directed.       calcium polycarbophil 625 MG tablet    FIBERCON     Take 2 tablets by mouth daily       calcium-vitamin D 250-125 MG-UNIT Tabs      Take 1 tablet by mouth 2 times daily. Calcium 250 mg/Vit D 125 IU       CLARITIN 10 MG tablet   Generic drug:  loratadine     30    1 TAB PO QD (Once per day) as needed for ALLERGY SYMPTOMS       clotrimazole 1 % cream    LOTRIMIN    50 g    Apply topically 2 times daily as needed       eucerin cream      Apply  topically as needed. Apply to thigh PRN dry skin       exenatide 10 MCG/0.04ML injection    BYETTA    1 Syringe    Inject 10 mcg Subcutaneous 2 times daily (before meals)       FLUoxetine 40 MG capsule    PROzac    30 capsule    Take 1 capsule (40 mg) by mouth daily       fluticasone 50 MCG/ACT spray    FLONASE    16 g    Spray 1 spray into both nostrils daily       furosemide 20 MG tablet    LASIX    60 tablet    Take 1 tablet (20 mg) by mouth 2 times daily       GEL-SASHA DENTINBLOC DT      Apply  to affected area. GEL. Apply to 2nd molar on bottom right daily at bedtime.       GLUCAGON EMERGENCY 1 MG kit   Generic drug:  glucagon      Inject  as directed. 1 mL as needed for signs of hypoglycemia. May repeat as needed in 15 minutes.       HYDROcodone-acetaminophen 5-325 MG per tablet    NORCO     Take 1 tablet by mouth every 6 hours as needed 1-2 tabs       Incontinence Brief Large Misc     60 Units    2 Units 2 times daily       insulin glargine 100 UNIT/ML injection    LANTUS    8 mL    Inject 24 Units Subcutaneous At Bedtime       * LACRI-LUBE OP      Apply  to eye. Place 0.5 inches into both eyes at bedtime       * artificial tears Oint ophthalmic ointment      At Bedtime       LACTAID 3000 UNIT tablet   Generic drug:  lactase      Take 4 tabs daily with meals.       levothyroxine 175 MCG tablet    SYNTHROID/LEVOTHROID    30 tablet    Take 1 tablet (175 mcg) by mouth daily Give on empty stomach       LORazepam 1 MG tablet   Start taking on:  2/27/2017     ATIVAN    35 tablet    Take 1 tablet (1 mg) by mouth At Bedtime .  May take additional 1mg daily PRN for agitation.       losartan 100 MG tablet    COZAAR    90 tablet    Take 1 tablet by mouth daily.       metFORMIN 500 MG 24 hr tablet    GLUCOPHAGE-XR    90 tablet    Take 2 tablets (1,000 mg) by mouth 2 times daily (with meals)       metroNIDAZOLE 500 MG tablet    FLAGYL    14 tablet    Take 1 tablet (500 mg) by mouth 2 times daily       MILK OF MAGNESIA PO      Take  by mouth. Take 30 mL as needed for constipation.       NEURONTIN PO      Take 900 mg by mouth 3 times daily.       nitrofurantoin (macrocrystal-monohydrate) 100 MG capsule    MACROBID    14 capsule    Take 1 capsule (100 mg) by mouth 2 times daily       nystatin 020558 UNIT/GM Powd    MYCOSTATIN    60 g    Apply topically 3 times daily as needed       order for DME     1 each    Continue Nocturnal Oxygen at 4L/NC.       polyethylene glycol powder    MIRALAX/GLYCOLAX     Take 1 capful by mouth 2 times daily. 17 GM PO BID       * PREVIDENT 5000 PLUS 1.1 % Crea   Generic drug:  Sodium Fluoride      Apply  to affected area every evening.       * PREVIDENT MT      Take  by mouth. Use as dental rinse at bedtime.       saline 0.9 % Soln      Spray 2 sprays in nostril as needed.       SENNA S 8.6-50 MG per tablet   Generic drug:  senna-docusate      Take 2 tablets by mouth At Bedtime.       solifenacin 10 MG tablet    VESICARE    30 tablet    Take 1 tablet (10 mg) by mouth daily       * TYLENOL 325 MG tablet   Generic drug:  acetaminophen      Take 1-2 tablets by mouth every 6 hours as needed.       * acetaminophen 500 MG tablet    TYLENOL    1 Bottle    Take 2 tablets (1,000 mg) by mouth every 6 hours as needed       ziprasidone 40 MG capsule    GEODON    60 capsule    Take 1 capsule (40 mg) by mouth 2 times daily (with meals)       * Notice:  This list has 6 medication(s) that are the same as other medications prescribed for you. Read the directions  carefully, and ask your doctor or other care provider to review them with you.

## 2017-02-20 NOTE — PROGRESS NOTES
"    Return Medical Weight Management Note     Pinky Page  MRN:  [id number]  :  1950  CORINNE:  2016    Dear Dr. Ann,     I had the pleasure of seeing your patient Pinky Page.  She is a 65 year old female who I am continuing to see for treatment of obesity related to: DM-2 and Hypertension.    CURRENT WEIGHT:   245 lbs 9.6 oz    Wt Readings from Last 4 Encounters:   17 111.4 kg (245 lb 9.6 oz)   17 111.9 kg (246 lb 9.6 oz)   17 113.9 kg (251 lb)   16 113.4 kg (250 lb)     Height:  5' 4\"  Body Mass Index:  Body mass index is 42.16 kg/(m^2).  Vitals:  B/P: 163/75, P: 68    Initial consult weight was 283 on .  Weight change since last seen on 2016 is down 2 pounds.   Total loss is 37 pounds.    INTERVAL HISTORY:  Patient has been working on the following dietary changes: Still eating from the diet line at her group home (1500 miguel, no concentrated sweets, some fruit snacks).   She also continues to be on Byetta 10 cg BID for her DM and also to help her lose weight and as a appetite suppressant as well.  Her DM is being managed by us as well as the [facility] ( PCP).  Patient has been working on the following activity changes: Very regular walking at least 40 minutes / day.    Diet and Activity Changes Since Last Visit Reviewed With Patient 2017   I have made the following changes to my diet since my last visit: -   With regards to my diet, I am still struggling with: wi   For breakfast, I typically eat: fui   For lunch, I typically eat: meat veggies salad milk g   For supper, I typically eat: meat veggies salad milk water   For snack(s), I typically eat: fruit crackersdietcoku   I have made the following changes to my activity/exercise since my last visit: walking more   With regards to my activity/exercise, I am still struggling with: doing more everyday       MEDICATIONS:   Current Outpatient Prescriptions   Medication     FLUoxetine (PROZAC) 40 MG capsule "     ziprasidone (GEODON) 40 MG capsule     [START ON 2/27/2017] LORazepam (ATIVAN) 1 MG tablet     blood glucose monitoring (NO BRAND SPECIFIED) test strip     exenatide (BYETTA) 10 MCG/0.04ML injection     insulin glargine (LANTUS) 100 UNIT/ML injection     metroNIDAZOLE (FLAGYL) 500 MG tablet     nitrofurantoin, macrocrystal-monohydrate, (MACROBID) 100 MG capsule     amLODIPine (NORVASC) 5 MG tablet     nystatin (MYCOSTATIN) 225604 UNIT/GM POWD     clotrimazole (LOTRIMIN) 1 % cream     fluticasone (FLONASE) 50 MCG/ACT nasal spray     furosemide (LASIX) 20 MG tablet     acetaminophen (TYLENOL) 500 MG tablet     solifenacin (VESICARE) 10 MG tablet     HYDROcodone-acetaminophen (NORCO) 5-325 MG per tablet     atorvastatin (LIPITOR) 40 MG tablet     calcium polycarbophil (FIBERCON) 625 MG tablet     levothyroxine (SYNTHROID, LEVOTHROID) 175 MCG tablet     metFORMIN (GLUCOPHAGE-XR) 500 MG 24 hr tablet     Incontinence Supply Disposable (INCONTINENCE BRIEF LARGE) MISC     Artificial Tear Ointment (ARTIFICIAL TEARS) OINT ophthalmic ointment     losartan (COZAAR) 100 MG tablet     Sodium Fluoride (PREVIDENT 5000 PLUS) 1.1 % CREA     Hypromellose (ARTIFICIAL TEARS OP)     ORDER FOR DME     Gabapentin (NEURONTIN PO)     senna-docusate (SENNA S) 8.6-50 MG per tablet     polyethylene glycol (MIRALAX/GLYCOLAX) powder     acetaminophen (TYLENOL) 325 MG tablet     Artificial Tear Ointment (LACRI-LUBE OP)     Skin Protectants, Misc. (EUCERIN) cream     Saline 0.9 % SOLN     Calcium Carbonate-Vitamin D (CALCIUM-VITAMIN D) 250-125 MG-UNIT TABS     Sod Fluor-Solo Fluor-Hydrfl Ac (GEL-SASHA DENTINBLOC DT)     lactase (LACTAID) 3000 UNIT tablet     Magnesium Hydroxide (MILK OF MAGNESIA PO)     Sodium Fluoride, [id number], (PREVIDENT MT)     CLARITIN 10 MG OR TABS     ASPIRIN 81 MG OR TABS     GLUCAGON EMERGENCY 1 MG IJ KIT     No current facility-administered medications for this visit.        Weight Loss Medication History Reviewed  With Patient 2/20/2017   Which weight loss medications are you currently taking on a regular basis?  Byetta (exentatide)   Are you having any side effects from the weight loss medication that we have prescribed you? No   If you are having side effects please describe: -     ASSESSMENT:   Continues to do well with weight loss maintenance.    FOLLOW-UP:    12 weeks.  10/15 minutes spent on counseling and education    Sincerely,    Marcos Magdaleno MD

## 2017-02-20 NOTE — NURSING NOTE
"Chief Complaint   Patient presents with     Weight Problem     RMWM       Vitals:    02/20/17 1044   BP: 131/70   BP Location: Right arm   Pulse: 62   SpO2: 98%   Weight: 245 lb 9.6 oz   Height: 5' 4\"       Body mass index is 42.16 kg/(m^2).    Suresh Mccormack CMA                          "

## 2017-02-20 NOTE — LETTER
"2017       RE: Pinky Page  [address]  [facility] [address]     Dear Colleague,    Thank you for referring your patient, Pinky Page, to the [facility] MEDICAL WEIGHT MANAGEMENT at [facility]. Please see a copy of my visit note below.        Return Medical Weight Management Note     Pinky Page  MRN:  [id number]  :  1950  CORINNE:  2016    Dear Dr. Ann,     I had the pleasure of seeing your patient Pinky Page.  She is a 65 year old female who I am continuing to see for treatment of obesity related to: DM-2 and Hypertension.    CURRENT WEIGHT:   245 lbs 9.6 oz    Wt Readings from Last 4 Encounters:   17 111.4 kg (245 lb 9.6 oz)   17 111.9 kg (246 lb 9.6 oz)   17 113.9 kg (251 lb)   16 113.4 kg (250 lb)     Height:  5' 4\"  Body Mass Index:  Body mass index is 42.16 kg/(m^2).  Vitals:  B/P: 163/75, P: 68    Initial consult weight was 283 on .  Weight change since last seen on 2016 is down 2 pounds.   Total loss is 37 pounds.    INTERVAL HISTORY:  Patient has been working on the following dietary changes: Still eating from the diet line at her group home (1500 miguel, no concentrated sweets, some fruit snacks).   She also continues to be on Byetta 10 cg BID for her DM and also to help her lose weight and as a appetite suppressant as well.  Her DM is being managed by us as well as the [facility] ( PCP).  Patient has been working on the following activity changes: Very regular walking at least 40 minutes / day.    Diet and Activity Changes Since Last Visit Reviewed With Patient 2017   I have made the following changes to my diet since my last visit: -   With regards to my diet, I am still struggling with: wi   For breakfast, I typically eat: fui   For lunch, I typically eat: meat veggies salad milk g   For supper, I typically eat: meat veggies salad milk water   For snack(s), I " typically eat: fruit crackersdietcoku   I have made the following changes to my activity/exercise since my last visit: walking more   With regards to my activity/exercise, I am still struggling with: doing more everyday       MEDICATIONS:   Current Outpatient Prescriptions   Medication     FLUoxetine (PROZAC) 40 MG capsule     ziprasidone (GEODON) 40 MG capsule     [START ON 2/27/2017] LORazepam (ATIVAN) 1 MG tablet     blood glucose monitoring (NO BRAND SPECIFIED) test strip     exenatide (BYETTA) 10 MCG/0.04ML injection     insulin glargine (LANTUS) 100 UNIT/ML injection     metroNIDAZOLE (FLAGYL) 500 MG tablet     nitrofurantoin, macrocrystal-monohydrate, (MACROBID) 100 MG capsule     amLODIPine (NORVASC) 5 MG tablet     nystatin (MYCOSTATIN) 380424 UNIT/GM POWD     clotrimazole (LOTRIMIN) 1 % cream     fluticasone (FLONASE) 50 MCG/ACT nasal spray     furosemide (LASIX) 20 MG tablet     acetaminophen (TYLENOL) 500 MG tablet     solifenacin (VESICARE) 10 MG tablet     HYDROcodone-acetaminophen (NORCO) 5-325 MG per tablet     atorvastatin (LIPITOR) 40 MG tablet     calcium polycarbophil (FIBERCON) 625 MG tablet     levothyroxine (SYNTHROID, LEVOTHROID) 175 MCG tablet     metFORMIN (GLUCOPHAGE-XR) 500 MG 24 hr tablet     Incontinence Supply Disposable (INCONTINENCE BRIEF LARGE) MISC     Artificial Tear Ointment (ARTIFICIAL TEARS) OINT ophthalmic ointment     losartan (COZAAR) 100 MG tablet     Sodium Fluoride (PREVIDENT 5000 PLUS) 1.1 % CREA     Hypromellose (ARTIFICIAL TEARS OP)     ORDER FOR DME     Gabapentin (NEURONTIN PO)     senna-docusate (SENNA S) 8.6-50 MG per tablet     polyethylene glycol (MIRALAX/GLYCOLAX) powder     acetaminophen (TYLENOL) 325 MG tablet     Artificial Tear Ointment (LACRI-LUBE OP)     Skin Protectants, Misc. (EUCERIN) cream     Saline 0.9 % SOLN     Calcium Carbonate-Vitamin D (CALCIUM-VITAMIN D) 250-125 MG-UNIT TABS     Sod Fluor-Solo Fluor-Hydrfl Ac (GEL-SASHA DENTINBLOC DT)     lactase  (LACTAID) 3000 UNIT tablet     Magnesium Hydroxide (MILK OF MAGNESIA PO)     Sodium Fluoride, [id number], (PREVIDENT MT)     CLARITIN 10 MG OR TABS     ASPIRIN 81 MG OR TABS     GLUCAGON EMERGENCY 1 MG IJ KIT     No current facility-administered medications for this visit.        Weight Loss Medication History Reviewed With Patient 2/20/2017   Which weight loss medications are you currently taking on a regular basis?  Byetta (exentatide)   Are you having any side effects from the weight loss medication that we have prescribed you? No   If you are having side effects please describe: -     ASSESSMENT:   Continues to do well with weight loss maintenance.    FOLLOW-UP:    12 weeks.  10/15 minutes spent on counseling and education    Sincerely,    Marcos Magdaleno MD

## 2017-02-27 ENCOUNTER — OFFICE VISIT (OUTPATIENT)
Dept: PSYCHOLOGY | Facility: CLINIC | Age: 67
End: 2017-02-27

## 2017-02-27 VITALS — WEIGHT: 248.3 LBS | BODY MASS INDEX: 42.62 KG/M2

## 2017-02-27 DIAGNOSIS — F41.1 GENERALIZED ANXIETY DISORDER: ICD-10-CM

## 2017-02-27 DIAGNOSIS — F33.1 MAJOR DEPRESSIVE DISORDER, RECURRENT EPISODE, MODERATE (H): Primary | ICD-10-CM

## 2017-02-27 DIAGNOSIS — E66.9 OBESITY, UNSPECIFIED OBESITY SEVERITY, UNSPECIFIED OBESITY TYPE: ICD-10-CM

## 2017-02-27 DIAGNOSIS — F54 PSYCHOLOGICAL FACTORS AFFECTING MEDICAL CONDITION: ICD-10-CM

## 2017-02-27 NOTE — TELEPHONE ENCOUNTER
Allie Denis Michelle, RN        Phone Number: [phone] (Call me)                     ALLEN Banks at [facility], is caller. They requested a script for Ativan on 2/22 and have not heard back yet. The pt is down to 9 pills.     Pharmacy: [facility]         Last appt: 2/10/17    Per review of EMR, provider faxed Rx to [facility] on 2/10/17.  Of note, Ativan Rx had a start date of 2/27/17.    Called [facility] and was informed that Rx has been processed and is to be delivered at 5pm with next shipment.     Called [facility] with update.

## 2017-02-27 NOTE — MR AVS SNAPSHOT
After Visit Summary   2/27/2017    Pinky Page    MRN: [id number]           Patient Information     Date Of Birth          1950        Visit Information        Provider Department      2/27/2017 2:00 PM Damon Gr, PhD [facility]        Today's Diagnoses     Major depressive disorder, recurrent episode, moderate (H)    -  1    Generalized anxiety disorder        Psychological factors affecting medical condition        Obesity, unspecified obesity severity, unspecified obesity type           Follow-ups after your visit        Your next 10 appointments already scheduled     Mar 02, 2017  1:00 PM CST   Return Sleep Patient with Brianda Reddy MD   [facility] [place], Sleep Study ([facility], [facility])    [address] [place] [address]   [phone]            Mar 10, 2017  3:00 PM CST   Adult Med Follow UP with Divine Jack MD   Psychiatry Clinic ([facility])    [place] [id number]  [address] [place] [address]   [phone]            Mar 21, 2017 11:00 AM CDT   Return Visit with Shamika Kelley MD   [facility])    [address] [place] [address],  [address]  [facility] [address]   [phone]            Mar 28, 2017  1:00 PM CDT   (Arrive by 12:45 PM)   Return Visit with Damon Gr, PhD [facility] ([facility])    [address]  3rd Floor  [facility] [address] [phone]            May 22, 2017 10:30 AM CDT   (Arrive by 10:15 AM)   Return Visit with Marcos Magdaleno MD   [facility] ([facility])    [address]  4th Floor  [facility] [address] [phone]            Jun 21, 2017 10:30 AM CDT   Lab with  LAB    Health Lab ([facility])    [address] [place] [address]  1st  Floor  [facility] [address]   [phone]            2017 11:35 AM CDT   (Arrive by 11:20 AM)   Return Visit with Gaby Holliday MD   [facility]ph[facility] ([facility])    [address]  3rd [facility] [address]   [phone]            2017  1:30 PM CST   NEW GENERAL with Michael Lopez MD   Eye Clinic ([facility])    [address]  9th Fl Clin 9a  [facility] [address] [phone]              Who to contact     Please call your clinic at [phone] to:    Ask questions about your health    Make or cancel appointments    Discuss your medicines    Learn about your test results    Speak to your doctor   If you have compliments or concerns about an experience at your clinic, or if you wish to file a complaint, please contact [facility] Patient Relations at [phone] or email us at [email or web address]         Additional Information About Your Visit        [employer] Information     [employer] is an electronic gateway that provides easy, online access to your medical records. With [employer]ine, you can request a clinic appointment, read your test results, renew a prescription or communicate with your care team.     To sign up for [employer] visit the website at [email or web address]   You will be asked to enter the access code listed below, as well as some personal information. Please follow the directions to create your username and password.     Your access code is: [email or web address]  Expires: 2017  2:22 PM     Your access code will  in 90 days. If you need help or a new code, please contact your [facility] or call [phone] for assistance.        Care EveryWhere ID     This is your Care EveryWhere ID. This could be used by other organizations to access your [place] medical records  [id number]        Your Vitals Were      BMI (Body Mass Index)                   42.62 kg/m2            Blood Pressure from Last 3 Encounters:   02/20/17 131/70   02/10/17 130/75   01/31/17 125/76    Weight from Last 3 Encounters:   02/27/17 112.6 kg (248 lb 4.8 oz)   02/20/17 111.4 kg (245 lb 9.6 oz)   02/10/17 112 kg (247 lb)              Today, you had the following     No orders found for display         Today's Medication Changes          These changes are accurate as of: 2/27/17  2:57 PM.  If you have any questions, ask your nurse or doctor.               These medicines have changed or have updated prescriptions.        Dose/Directions    metFORMIN 500 MG 24 hr tablet   Commonly known as:  GLUCOPHAGE-XR   This may have changed:  how much to take   Used for:  Diabetes mellitus, type 2 (H)        Dose:  1000 mg   Take 2 tablets (1,000 mg) by mouth 2 times daily (with meals)   Quantity:  90 tablet   Refills:  3                Primary Care Provider Office Phone # Fax #    Shamika Ileana Kelley [phone]       [facility] [address] [facility] [address]        Thank you!     Thank you for choosing [facility]  for your care. Our goal is always to provide you with excellent care. Hearing back from our patients is one way we can continue to improve our services. Please take a few minutes to complete the written survey that you may receive in the mail after your visit with us. Thank you!             Your Updated Medication List - Protect others around you: Learn how to safely use, store and throw away your medicines at www.disposemymeds.org.          This list is accurate as of: 2/27/17  2:57 PM.  Always use your most recent med list.                   Brand Name Dispense Instructions for use    amLODIPine 5 MG tablet    NORVASC    30 tablet    Take 2 tablets (10 mg) by mouth daily       ARTIFICIAL TEARS OP      Apply 1 drop to eye 4 times daily.       aspirin 81 MG tablet     100    Take 1 tablet by  mouth daily. ENTERIC COATED.       atorvastatin 40 MG tablet    LIPITOR    90 tablet    Take 1 tablet (40 mg) by mouth daily       blood glucose monitoring test strip    no brand specified    100 each    Use to test blood sugar 3 times daily or as directed.       calcium polycarbophil 625 MG tablet    FIBERCON     Take 2 tablets by mouth daily       calcium-vitamin D 250-125 MG-UNIT Tabs      Take 1 tablet by mouth 2 times daily. Calcium 250 mg/Vit D 125 IU       CLARITIN 10 MG tablet   Generic drug:  loratadine     30    1 TAB PO QD (Once per day) as needed for ALLERGY SYMPTOMS       clotrimazole 1 % cream    LOTRIMIN    50 g    Apply topically 2 times daily as needed       eucerin cream      Apply  topically as needed. Apply to thigh PRN dry skin       exenatide 10 MCG/0.04ML injection    BYETTA    1 Syringe    Inject 10 mcg Subcutaneous 2 times daily (before meals)       FLUoxetine 40 MG capsule    PROzac    30 capsule    Take 1 capsule (40 mg) by mouth daily       fluticasone 50 MCG/ACT spray    FLONASE    16 g    Spray 1 spray into both nostrils daily       furosemide 20 MG tablet    LASIX    60 tablet    Take 1 tablet (20 mg) by mouth 2 times daily       GEL-SASHA DENTINBLOC DT      Apply  to affected area. GEL. Apply to 2nd molar on bottom right daily at bedtime.       GLUCAGON EMERGENCY 1 MG kit   Generic drug:  glucagon      Inject  as directed. 1 mL as needed for signs of hypoglycemia. May repeat as needed in 15 minutes.       HYDROcodone-acetaminophen 5-325 MG per tablet    NORCO     Take 1 tablet by mouth every 6 hours as needed 1-2 tabs       Incontinence Brief Large Misc     60 Units    2 Units 2 times daily       insulin glargine 100 UNIT/ML injection    LANTUS    8 mL    Inject 24 Units Subcutaneous At Bedtime       * LACRI-LUBE OP      Apply  to eye. Place 0.5 inches into both eyes at bedtime       * artificial tears Oint ophthalmic ointment      At Bedtime       LACTAID 3000 UNIT tablet   Generic  drug:  lactase      Take 4 tabs daily with meals.       levothyroxine 175 MCG tablet    SYNTHROID/LEVOTHROID    30 tablet    Take 1 tablet (175 mcg) by mouth daily Give on empty stomach       LORazepam 1 MG tablet    ATIVAN    35 tablet    Take 1 tablet (1 mg) by mouth At Bedtime .  May take additional 1mg daily PRN for agitation.       losartan 100 MG tablet    COZAAR    90 tablet    Take 1 tablet by mouth daily.       metFORMIN 500 MG 24 hr tablet    GLUCOPHAGE-XR    90 tablet    Take 2 tablets (1,000 mg) by mouth 2 times daily (with meals)       metroNIDAZOLE 500 MG tablet    FLAGYL    14 tablet    Take 1 tablet (500 mg) by mouth 2 times daily       MILK OF MAGNESIA PO      Take  by mouth. Take 30 mL as needed for constipation.       NEURONTIN PO      Take 900 mg by mouth 3 times daily.       nitrofurantoin (macrocrystal-monohydrate) 100 MG capsule    MACROBID    14 capsule    Take 1 capsule (100 mg) by mouth 2 times daily       nystatin 548400 UNIT/GM Powd    MYCOSTATIN    60 g    Apply topically 3 times daily as needed       order for DME     1 each    Continue Nocturnal Oxygen at 4L/NC.       polyethylene glycol powder    MIRALAX/GLYCOLAX     Take 1 capful by mouth 2 times daily. 17 GM PO BID       * PREVIDENT 5000 PLUS 1.1 % Crea   Generic drug:  Sodium Fluoride      Apply  to affected area every evening.       * PREVIDENT MT      Take  by mouth. Use as dental rinse at bedtime.       saline 0.9 % Soln      Spray 2 sprays in nostril as needed.       SENNA S 8.6-50 MG per tablet   Generic drug:  senna-docusate      Take 2 tablets by mouth At Bedtime.       solifenacin 10 MG tablet    VESICARE    30 tablet    Take 1 tablet (10 mg) by mouth daily       * TYLENOL 325 MG tablet   Generic drug:  acetaminophen      Take 1-2 tablets by mouth every 6 hours as needed.       * acetaminophen 500 MG tablet    TYLENOL    1 Bottle    Take 2 tablets (1,000 mg) by mouth every 6 hours as needed       ziprasidone 40 MG capsule     GEODON    60 capsule    Take 1 capsule (40 mg) by mouth 2 times daily (with meals)       * Notice:  This list has 6 medication(s) that are the same as other medications prescribed for you. Read the directions carefully, and ask your doctor or other care provider to review them with you.

## 2017-02-27 NOTE — PROGRESS NOTES
Health Psychology                  Clinic    Department of Medicine  Madelaine Ingram, Ph.D., L.P. [phone]                          [facility] Miryam Woods, Ph.D.,  L.P. [phone]                 3rd Floor, Clinic 3A  [place] Mail Code 780   Damon Gr, Ph.D., A.DAISY.P.P., L.P. [phone]     [address] Kimber Morales, Ph.D., L.P. [phone]  [place] [id number]  [place] [address]    Health Psychology Follow-Up Note    Ms. Page is a pleasant 66-year old woman with chronic depressive illness, who returns to clinic for supportive and behavioral psychotherapy for moderate recurrent depression and for help losing weight given her morbid obesity.   Depression is at baseline (4)  mild.  She gained weight since last seen. She reports some stopping walking because she had been ill.  She heard from her sleep physician that her insurance is discontinuing coverage for her nocturnal oxygen.  She will see her this week and explore options.  Wt Readings from Last 4 Encounters:   02/27/17 112.6 kg (248 lb 4.8 oz)   02/20/17 111.4 kg (245 lb 9.6 oz)   02/10/17 112 kg (247 lb)   01/31/17 111.9 kg (246 lb 9.6 oz)     At [employer], she continues to serve on the [employer].  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  She is working 1 day/week.. Her roommate is doing better and she is enjoying her more over time.  She reports that another resident ate leaves off her plants.  We discussed strategies for addressing it.  She met with a nutritionist who gave her pointers for eating less.  She is doing more social walking.  She is willing to try the bike.  She is wiling to speak to the staff about exercises before biking.  Pinky participated fully and appeared to derive benefit. We walked for about 25 minutes (first time of year).  She  appeared to do well.  Rapport was excellent. Extended session due to complexity of case and length of interval.    IMPRESSION Distress Disability Risk    Low High Low     Time In: 2:00  Time Out: 2:53  Diagnosis:   Major Depression, recurrent moderate (F33.1)   Psychological Factors Affecting Medical Condition: Morbid Obesity (F54)      Plan: She will return on 3/28 @ 1:00  for behavioral and supportive psychotherapy.   Tx plan due 2/9/17;  Damon Gr, Ph.D., A.B.P.P., L.P.

## 2017-03-02 ENCOUNTER — OFFICE VISIT (OUTPATIENT)
Dept: SLEEP MEDICINE | Facility: CLINIC | Age: 67
End: 2017-03-02
Attending: INTERNAL MEDICINE
Payer: COMMERCIAL

## 2017-03-02 VITALS
HEART RATE: 77 BPM | OXYGEN SATURATION: 95 % | BODY MASS INDEX: 42.51 KG/M2 | HEIGHT: 64 IN | RESPIRATION RATE: 19 BRPM | SYSTOLIC BLOOD PRESSURE: 150 MMHG | WEIGHT: 249 LBS | DIASTOLIC BLOOD PRESSURE: 55 MMHG

## 2017-03-02 DIAGNOSIS — E66.01 MORBID OBESITY DUE TO EXCESS CALORIES (H): ICD-10-CM

## 2017-03-02 DIAGNOSIS — G47.33 SEVERE OBSTRUCTIVE SLEEP APNEA: Primary | ICD-10-CM

## 2017-03-02 PROCEDURE — 99211 OFF/OP EST MAY X REQ PHY/QHP: CPT | Mod: ZF

## 2017-03-02 NOTE — PATIENT INSTRUCTIONS
"Your blood pressure was checked while you were in clinic today.  Please read the guidelines below about what these numbers mean and what you should do about them.  Your systolic blood pressure is the top number.  This is the pressure when the heart is pumping.  Your diastolic blood pressure is the bottom number.  This is the pressure in between beats.  If your systolic blood pressure is less than 120 and your diastolic blood pressure is less than 80, then your blood pressure is normal. There is nothing more that you need to do about it  If your systolic blood pressure is 120-139 or your diastolic blood pressure is 80-89, your blood pressure may be higher than it should be.  You should have your blood pressure re-checked within a year by a primary care provider.  If your systolic blood pressure is 140 or greater or your diastolic blood pressure is 90 or greater, you may have high blood pressure.  High blood pressure is treatable, but if left untreated over time it can put you at risk for heart attack, stroke, or kidney failure.  You should have your blood pressure re-checked by a primary care provider within the next four weeks.        MY TREATMENT INFORMATION FOR SLEEP APNEA-  Pinky Page    DOCTOR : Brianda Reddy  SLEEP CENTER :      MY CONTACT NUMBER:       If I haven't had a sleep study yet, what can I expect?  A personal story from [place]  https://[email or web address]ube.com/watch?v=AxPLmlRpnCs    Am I having a home sleep study?  Here is a video in case you get home and want to make sure you have done it correctly  https://[email or web address]ube.com/watch?v=MDF0N2gDin2&feature=youtu.be    Frequently asked questions:  1. What is Obstructive Sleep Apnea (ANUJ)? ANUJ is the most common type of sleep apnea. Apnea literally means, \"without breath.\" It is characterized by repetitive pauses in breathing, despite continued effort to breathe, and is usually associated with a reduction in blood oxygen saturation. Apneas can last 10 to " over 60 seconds. It is caused by narrowing or collapse of the upper airway as muscles relax during sleep. Severity of sleep apnea is determined by frequency of breathing events and their effect on your sleep and oxygen levels determined during sleep testing.   2. What are the consequences of ANUJ? Symptoms include: daytime sleepiness- possibly increasing the risk of falling asleep while driving, unrefreshing/restless sleep, snoring, insomnia, waking frequently to urinate, waking with heartburn or reflux, reduced concentration and memory, and morning headaches. Other health consequences may include development of high blood pressure and other cardiovascular disease in persons who are susceptible. Untreated ANUJ  can contribute to heart disease, stroke and diabetes.   3. What are the treatment options? In most situations, sleep apnea is a lifelong disease that must be managed with daily therapy. Medications are not effective for sleep apnea and surgery is generally not performed until other therapies have been tried. Therapy is usually tailored to the individual patient based on many factors including your wishes as well as severity of sleep apnea and severity of obesity. Continuous Positive Airway (CPAP) is the most reliable treatment. An oral device to hold your jaw forward is usually the next most reliable option. Other options include postioning devices (to keep you off your back), weight loss, and surgery including a tongue pacing device. There is more detail about some of these options below.            1. CPAP-  WHAT DOES IT DO AND HOW CAN I LEARN TO WEAR IT?                               BEFORE I START, CAN I WATCH A MOVIE TO GET A PLAN ON HOW TO USE CPAP?  https://www.[employer].com/watch?n=g4E60so010B      Continuous positive airway pressure, or CPAP, is the most effective treatment for obstructive sleep apnea. It works by blowing room air, through a mask, to hold your throat open. A decision to use CPAP is a  "major step forward in the pursuit of a healthier life. The successful use of CPAP will help you breathe easier, sleep better and live healthier. You can choose CPAP equipment from any durable medical equipment provider that meets your needs.  Using CPAP can be a positive experience if you keep these pan points in mind:  1. Commitment  CPAP is not a quick fix for your problem. It involves a long-term commitment to improve your sleep and your health.    2. Communication  Stay in close communication with both your sleep doctor and your CPAP supplier. Ask lots of questions and seek help when you need it.    3. Consistency  Use CPAP all night, every night and for every nap. You will receive the maximum health benefits from CPAP when you use it every time that you sleep. This will also make it easier for your body to adjust to the treatment.    4. Correction  The first machine and mask that you try may not be the best ones for you. Work with your sleep doctor and your CPAP supplier to make corrections to your equipment selection. Ask about trying a different type of machine or mask if you have ongoing problems. Make sure that your mask is a good fit and learn to use your equipment properly.    5. Challenge  Tell a family member or close friend to ask you each morning if you used your CPAP the previous night. Have someone to challenge you to give it your best effort.    6. Connection   Your adjustment to CPAP will be easier if you are able to connect with others who use the same treatment. Ask your sleep doctor if there is a support group in your area for people who have sleep apnea, or look for one on the Internet.  7. Comfort   Increase your level of comfort by using a saline spray, decongestant or heated humidifier if CPAP irritates your nose, mouth or throat. Use your unit's \"ramp\" setting to slowly get used to the air pressure level. There may be soft pads you can buy that will fit over your mask straps. Look on " www.CPAP.com for accessories that can help make CPAP use more comfortable.  8. Cleaning   Clean your mask, tubing and headgear on a regular basis. Put this time in your schedule so that you don't forget to do it. Check and replace the filters for your CPAP unit and humidifier.    9. Completion   Although you are never finished with CPAP therapy, you should reward yourself by celebrating the completion of your first month of treatment. Expect this first month to be your hardest period of adjustment. It will involve some trial and error as you find the machine, mask and pressure settings that are right for you.    10. Continuation  After your first month of treatment, continue to make a daily commitment to use your CPAP all night, every night and for every nap.    CPAP-Tips to starting with success:  Begin using your CPAP for short periods of time during the day while you watch TV or read.    Use CPAP every night and for every nap. Using it less often reduces the health benefits and makes it harder for your body to get used to it.    Make small adjustments to your mask, tubing, straps and headgear until you get the right fit. Tightening the mask may actually worsen the leak.  If it leaves significant marks on your face or irritates the bridge of your nose, it may not be the best mask for you.  Speak with the person who supplied the mask and consider trying other masks. Insurances will allow you to try different masks during the first month of starting CPAP.  Insurance also covers a new mask, hose and filter about every 6 months.    Use a saline nasal spray to ease mild nasal congestion. Neti-Pot or saline nasal rinses may also help. Nasal gel sprays can help reduce nasal dryness.  Biotene mouthwash can be helpful to protect your teeth if you experience frequent dry mouth.  Dry mouth may be a sign of air escaping out of your mouth or out of the mask in the case of a full face mask.  Speak with your provider if you  expect that is the case.     Take a nasal decongestant to relieve more severe nasal or sinus congestion.  Do not use Afrin (oxymetazoline) nasal spray more than 3 days in a row.  Speak with your sleep doctor if your nasal congestion is chronic.    Use a heated humidifier that fits your CPAP model to enhance your breathing comfort. Adjust the heat setting up if you get a dry nose or throat, down if you get condensation in the hose or mask.  Position the CPAP lower than you so that any condensation in the hose drains back into the machine rather than towards the mask.    Try a system that uses nasal pillows if traditional masks give you problems.    Clean your mask, tubing and headgear once a week. Make sure the equipment dries fully.    Regularly check and replace the filters for your CPAP unit and humidifier.    Work closely with your sleep provider and your CPAP supplier to make sure that you have the machine, mask and air pressure setting that works best for you. It is better to stop using it and call your provider to solve problems than to lay awake all night frustrated with the device.    BESIDES CPAP, WHAT OTHER THERAPIES ARE THERE?      Positioning Device  Positioning devices are generally used when sleep apnea is mild and only occurs on your back.This example shows a pillow that straps around the waist. It may be appropriate for those whose sleep study shows milder sleep apnea that occurs primarily when lying flat on one's back. Preliminary studies have shown benefit but effectiveness at home may need to be verified by a home sleep test. These devices are generally not covered by medical insurance.                      Oral Appliance  What is oral appliance therapy?  An oral appliance is a small acrylic device that fits over the upper and lower teeth or tongue (similar to an orthodontic retainer or a mouth guard). This device slightly advances the lower jaw or tongue, which moves the base of the tongue  forward, opens the airway, improves breathing and can effectively treat snoring and obstructive sleep apnea sleep apnea. The appliance is fabricated and customized by a qualified dentist with experience in treating snoring and sleep apnea. Oral appliances are usually well tolerated and have relatively high compliance by patients1, 2, 3.  When is an oral appliance indicated?  Oral appliance therapy is recommended as a first-line treatment for patients with primary snoring, mild sleep apnea, and for patients with moderate sleep apnea who prefer appliance therapy to use of CPAP4, 5. Severity of sleep apnea is determined by sleep testing and is based on the number of respiratory events per hour of sleep.   How successful is oral appliance therapy?  The success rate of oral appliance therapy in patients with mild sleep apnea is 75-80% while in patients with moderate sleep apnea it is 50-70%. The chance of success in patients with severe sleep apnea is 40-50%. The research also shows that oral appliances have a beneficial effect on the cardiovascular health of ANUJ patients at the same magnitude as CPAP therapy7.  Oral appliances should be a second-line treatment in cases of severe sleep apnea, but if not completely successful then a combination therapy utilizing CPAP plus oral appliance therapy may be effective. Oral appliances tend to be effective in a broad range of patients although studies show that the patients who have the highest success are females, younger patients, those with milder disease, and less severe obesity. 3, 6.   The chances of success are lower in patients who have more severe ANUJ, are older, and those who are morbidly obese.     Example of an oral appliance   Finding a dentist that practices dental sleep medicine  Specific training is available through the American Academy of Dental Sleep Medicine for dentists interested in working in the field of sleep. To find a dentist who is educated in the  field of sleep and the use of oral appliances, near you, visit the Web site of the American Academy of Dental Sleep Medicine; also see   http://www.accpstorage.org/newOrganization/patients/oralAppliances.pdf  To search for a dentist certified in these practices:  Http://aadsm.org/FindADentist.aspx?1  1. Cee, et al. Objectively measured vs self-reported compliance during oral appliance therapy for sleep-disordered breathing. Chest 2013; 144(5): 1564-4083.  2. Evelina et al. Objective measurement of compliance during oral appliance therapy for sleep-disordered breathing. Thorax 2013; 68(1): 91-96.  3. Lissa et al. Mandibular advancement devices in 620 men and women with ANUJ and snoring: tolerability and predictors of treatment success. Chest 2004; 125: 1287-1584.  4. Fermín, et al. Oral appliances for snoring and ANUJ: a review. Sleep 2006; 29: 244-262.  5. Jeanne et al. Oral appliance treatment for ANUJ: an update. J Clin Sleep Med 2014; 10(2): 215-227.  6. Sabino, et al. Predictors of OSAH treatment outcome. J Dent Res 2007; 86: 4610-6168.      Weight Loss:    Weight management is a personal decision.  If you are interested in exploring weight loss strategies, the following discussion covers the impact on weight loss on sleep apnea and the approaches that may be successful.    Weight loss decreases severity of sleep apnea in most people with obesity. For those with mild obesity who have developed snoring with weight gain, even 15-30 pound weight loss can improve and occasionally eliminate sleep apnea.  Structured and life-long dietary and health habits are necessary to lose weight and keep healthier weight levels.     Though there may be significant health benefits from weight loss, long-term weight loss is very difficult to achieve- studies show success with dietary management in less than 10% of people. In addition, substantial weight loss may require years of dietary control and may be  difficult if patients have severe obesity. In these cases, surgical management may be considered.  Finally, older individuals who have tolerated obesity without health complications may be less likely to benefit from weight loss strategies.    Your BMI is Body mass index is 42.74 kg/(m^2).  Body mass index (BMI) is one way to tell whether you are at a healthy weight, overweight, or obese. It measures your weight in relation to your height.  A BMI of 18.5 to 24.9 is in the healthy range. A person with a BMI of 25 to 29.9 is considered overweight, and someone with a BMI of 30 or greater is considered obese. More than two-thirds of American adults are considered overweight or obese.  Being overweight or obese increases the risk for further weight gain. Excess weight may lead to heart disease and diabetes.  Creating and following plans for healthy eating and physical activity may help you improve your health.  Weight control is part of healthy lifestyle and includes exercise, emotional health, and healthy eating habits. Careful eating habits lifelong are the mainstay of weight control. Though there are significant health benefits from weight loss, long-term weight loss with diet alone may be very difficult to achieve- studies show long-term success with dietary management in less than 10% of people. Attaining a healthy weight may be especially difficult to achieve in those with severe obesity. In some cases, medications, devices and surgical management might be considered.  What can you do?  If you are overweight or obese and are interested in methods for weight loss, you should discuss this with your provider.     Consider reducing daily calorie intake by 500 calories.     Keep a food journal.     Avoiding skipping meals, consider cutting portions instead.    Diet combined with exercise helps maintain muscle while optimizing fat loss. Strength training is particularly important for building and maintaining muscle mass.  Exercise helps reduce stress, increase energy, and improves fitness. Increasing exercise without diet control, however, may not burn enough calories to loose weight.       Start walking three days a week 10-20 minutes at a time    Work towards walking thirty minutes five days a week     Eventually, increase the speed of your walking for 1-2 minutes at time    In addition, we recommend that you review healthy lifestyles and methods for weight loss available through the National Institutes of Health patient information sites:  http://win.niddk.nih.gov/publications/index.htm    And look into health and wellness programs that may be available through your health insurance provider, employer, local community center, or wally club.    Weight management plan: Patient was referred to their PCP to discuss a diet and exercise plan.    Surgery:    Upper Airway Surgery for ANUJ  Surgery for ANUJ is a second-line treatment option in the management of sleep apnea.  Surgery should be considered for patients who are having a difficult time tolerating CPAP.    Surgery for ANUJ is directed at areas that are responsible for narrowing or complete obstruction of the airway during sleep.  There are a wide range of procedures available to enlarge and/or stabilize the airway to prevent blockage of breathing in the three major areas where it can occur: the palate, tongue, and nasal regions.  Successful surgical treatment depends on the accurate identification of the factors responsible for obstructive sleep apnea in each person.  A personalized approach is required because there is no single treatment that works well for everyone.  Because of anatomic variation, consultation with an examination by a sleep surgeon is a critical first step in determining what surgical options are best for each patient.  In some cases, examination during sedation may be recommended in order to guide the selection of procedures.  Patients will be counseled about  risks and benefits as well as the typical recovery course after surgery. Surgery is typically not a cure for a person s ANUJ.  However, surgery will often significantly improve one s ANUJ severity (termed  success rate ).  Even in the absence of a cure, surgery will decrease the cardiovascular risk associated with OSA7; improve overall quality of life8 (sleepiness, functionality, sleep quality, etc).          Palate Procedures:  Patients with ANUJ often have narrowing of their airway in the region of their tonsils and uvula.  The goals of palate procedures are to widen the airway in this region as well as to help the tissues resist collapse.  Modern palate procedure techniques focus on tissue conservation and soft tissue rearrangement, rather than tissue removal.  Often the uvula is preserved in this procedure. Residual sleep apnea is common in patient after pharyngoplasty with an average reduction in sleep apnea events of 33%2.      Tongue Procedures:  While patients are awake, the muscles that surround the throat are active and keep this region open for breathing. These muscles relax during sleep, allowing the tongue and other structures to collapse and block breathing.  There are several different tongue procedures available.  Selection of a tongue base procedure depends on characteristics seen on physical exam.  Generally, procedures are aimed at removing bulky tissues in this area or preventing the back of the tongue from falling back during sleep.  Success rates for tongue surgery range from 50-62%3.    Hypoglossal Nerve Stimulation:  Hypoglossal nerve stimulation has recently received approval from the [place] Food and Drug Administration for the treatment of obstructive sleep apnea.  This is based on research showing that the system was safe and effective in treating sleep apnea6.  Results showed that the median AHI score decreased 68%, from 29.3 to 9.0. This therapy uses an implant system that senses  breathing patterns and delivers mild stimulation to airway muscles, which keeps the airway open during sleep.  The system consists of three fully implanted components: a small generator (similar in size to a pacemaker), a breathing sensor, and a stimulation lead.  Using a small handheld remote, a patient turns the therapy on before bed and off upon awakening.    Candidates for this device must be greater than 22 years of age, have moderate to severe ANUJ (AHI between 20-65), BMI less than 32, have tried CPAP/oral appliance without success, and have appropriate upper airway anatomy (determined by a sleep endoscopy performed by Dr. Hoffman).    Hypoglossal Nerve Stimulation Pathway:    The sleep surgeon s office will work with the patient through the insurance prior-authorization process (including communications and appeals).    Nasal Procedures:  Nasal obstruction can interfere with nasal breathing during the day and night.  Studies have shown that relief of nasal obstruction can improve the ability of some patients to tolerate positive airway pressure therapy for obstructive sleep apnea1.  Treatment options include medications such as nasal saline, topical corticosteroid and antihistamine sprays, and oral medications such as antihistamines or decongestants. Non-surgical treatments can include external nasal dilators for selected patients. If these are not successful by themselves, surgery can improve the nasal airway either alone or in combination with these other options.      Combination Procedures:  Combination of surgical procedures and other treatments may be recommended, particularly if patients have more than one area of narrowing or persistent positional disease.  The success rate of combination surgery ranges from 66-80%2,3.      1. Calista MUÑOZ. The Role of the Nose in Snoring and Obstructive Sleep Apnoea: An Update.  Eur Arch Otorhinolaryngol. 2011; 268: 1365-73.  2.  Jocelyn BREWER; Shruthi HOLM; Brett CEDENO;  Pallanch JF; Elisa MB; Debra SG; Junaid ANN. Surgical modifications of the upper airway for obstructive sleep apnea in adults: a systematic review and meta-analysis. SLEEP 2010;33(10):1256-5565. Aryan LEMON. Hypopharyngeal surgery in obstructive sleep apnea: an evidence-based medicine review.  Arch Otolaryngol Head Neck Surg. 2006 Feb;132(2):206-13.  3. Yemi YH1, Jessie Y, Vick CHRISTI. The efficacy of anatomically based multilevel surgery for obstructive sleep apnea. Otolaryngol Head Neck Surg. 2003 Oct;129(4):327-35.  4. Aryan LEMON, Goldberg A. Hypopharyngeal Surgery in Obstructive Sleep Apnea: An Evidence-Based Medicine Review. Arch Otolaryngol Head Neck Surg. 2006 Feb;132(2):206-13.  5. Hannah PJ et al. Upper-Airway Stimulation for Obstructive Sleep Apnea.  N Engl J Med. 2014 Jan 9;370(2):139-49.  6. Chuy Y et al. Increased Incidence of Cardiovascular Disease in Middle-aged Men with Obstructive Sleep Apnea. Am J Respir Crit Care Med; 2002 166: 159-165  7. Patino EM et al. Studying Life Effects and Effectiveness of Palatopharyngoplasty (SLEEP) study: Subjective Outcomes of Isolated Uvulopalatopharyngoplasty. Otolaryngol Head Neck Surg. 2011; 144: 623-631.

## 2017-03-02 NOTE — PROGRESS NOTES
DATE OF SERVICE:  03/02/2017      CHIEF COMPLAINT:  Followup of overnight oximetry.      HISTORY OF PRESENT ILLNESS:  Pinky Page is a 66-year-old female with history of hypertension, diabetes, obesity and severe obstructive sleep apnea.  She was diagnosed in 2007 at a weight of 276 pounds.  She had an apnea-hypopnea index of 39.  Lowest oxygen saturation was to 71%.  CPAP trial was initiated, but she was intolerant to CPAP.  She has been on supplemental oxygen at night. She sleeps with 2 pillows, and overnight oximetry was done on room air to reassess oxygen requirement.  Those results were reviewed with her in detail today:     It was of good quality recording done on room air at her usual head-of-the-bed position.  Analysis time was 7-1/2 hours.  Lowest oxygen saturation was 82%.  She had a discrete area where oxygen saturations were below 88.  Total time was about 50 minutes.  Pattern could be consistent with REM-related versus positional hypoxemia.      Patient is here because she is told that her insurance company will no longer cover supplemental oxygen, likely because of her previous diagnosis of obstructive sleep apnea.  The patient states that she had a rough time sleeping last night due to anticipation of this appointment, but usually she does not have much trouble sleeping.  She lives in a group home.  She requires assistance with administration of her insulin medications in the evening and in the morning.  Otherwise, she does not require a lot of assistance at night.  They do, however, wake her up 2 times a night to go to the bathroom.  She denies dyspnea during the daytime.  She has had a little bit of weight loss since her sleep study, about 20 or so pounds.      PAST MEDICAL HISTORY, ALLERGIES, MEDICATIONS:  Reviewed.      PHYSICAL EXAMINATION:   GENERAL:  Pleasant obese female, no distress.   VITAL SIGNS:  Blood pressure elevated at 150/55, pulse of 19, O2 sat is 95%.  Weight is 249 pounds for a  body mass index of 42.      ASSESSMENT:  A 66-year-old female with severe obstructive sleep apnea, history of CPAP intolerance, had been using supplemental oxygen, but this is no longer covered.  Sleeps with the head of the bed elevated.  There may be some supine predominance to her sleep apnea.      PLAN:  We discussed options including position restriction empirically followed by overnight oximetry testing versus reassessing the degree of her sleep apnea and reconsidering a trial of PAP therapy.  The patient is interested in proceeding with this.  Overnight polysomnography was ordered, split-night protocol.  I did  her that sometimes a 2nd study is needed to find optimal titration settings.  The patient will need assistance from staff at her group home with administration of insulin the night of the sleep study and then again in the morning.  Our staff should be able to help her with middle-of-the-night awakenings to prevent incontinence.  The patient will follow up with me for results of the sleep study, and further recommendations will be made at that time.  She is urged to continue to work efforts at weight control.  She has a followup already with her primary care physician regarding blood pressure control and was urged to keep that appointment.      Over 25 minutes spent with the patient, greater than 50% spent in counseling and coordinating care.         VIDYA SIMON MD             D: 2017 14:58   T: 2017 15:18   MT: [email or web address]      Name:     BRUCE TAMEZ   MRN:      [id number]        Account:      [id number]   :      1950           Visit Date:   2017      Document: [id number]

## 2017-03-02 NOTE — MR AVS SNAPSHOT
After Visit Summary   3/2/2017    Pinky Pgae    MRN: [id number]           Patient Information     Date Of Birth          1950        Visit Information        Provider Department      3/2/2017 1:00 PM Brianda Reddy MD [facility], [place], Sleep Study        Today's Diagnoses     Severe obstructive sleep apnea    -  1    Morbid obesity due to excess calories (H)          Care Instructions    Your blood pressure was checked while you were in clinic today.  Please read the guidelines below about what these numbers mean and what you should do about them.  Your systolic blood pressure is the top number.  This is the pressure when the heart is pumping.  Your diastolic blood pressure is the bottom number.  This is the pressure in between beats.  If your systolic blood pressure is less than 120 and your diastolic blood pressure is less than 80, then your blood pressure is normal. There is nothing more that you need to do about it  If your systolic blood pressure is 120-139 or your diastolic blood pressure is 80-89, your blood pressure may be higher than it should be.  You should have your blood pressure re-checked within a year by a primary care provider.  If your systolic blood pressure is 140 or greater or your diastolic blood pressure is 90 or greater, you may have high blood pressure.  High blood pressure is treatable, but if left untreated over time it can put you at risk for heart attack, stroke, or kidney failure.  You should have your blood pressure re-checked by a primary care provider within the next four weeks.        MY TREATMENT INFORMATION FOR SLEEP APNEA-  Pinky Sanderser    DOCTOR : Brianda Reddy  SLEEP CENTER :      MY CONTACT NUMBER:       If I haven't had a sleep study yet, what can I expect?  A personal story from [employer]  https://www.[employer].com/watch?v=AxPLmlRpnCs    Am I having a home sleep study?  Here is a video in case you get home and want to make sure you have done it  "correctly  https://www.youtpaymio.com/watch?v=UKU0S1lFvb2&feature=youtu.be    Frequently asked questions:  1. What is Obstructive Sleep Apnea (ANUJ)? ANUJ is the most common type of sleep apnea. Apnea literally means, \"without breath.\" It is characterized by repetitive pauses in breathing, despite continued effort to breathe, and is usually associated with a reduction in blood oxygen saturation. Apneas can last 10 to over 60 seconds. It is caused by narrowing or collapse of the upper airway as muscles relax during sleep. Severity of sleep apnea is determined by frequency of breathing events and their effect on your sleep and oxygen levels determined during sleep testing.   2. What are the consequences of ANUJ? Symptoms include: daytime sleepiness- possibly increasing the risk of falling asleep while driving, unrefreshing/restless sleep, snoring, insomnia, waking frequently to urinate, waking with heartburn or reflux, reduced concentration and memory, and morning headaches. Other health consequences may include development of high blood pressure and other cardiovascular disease in persons who are susceptible. Untreated ANUJ  can contribute to heart disease, stroke and diabetes.   3. What are the treatment options? In most situations, sleep apnea is a lifelong disease that must be managed with daily therapy. Medications are not effective for sleep apnea and surgery is generally not performed until other therapies have been tried. Therapy is usually tailored to the individual patient based on many factors including your wishes as well as severity of sleep apnea and severity of obesity. Continuous Positive Airway (CPAP) is the most reliable treatment. An oral device to hold your jaw forward is usually the next most reliable option. Other options include postioning devices (to keep you off your back), weight loss, and surgery including a tongue pacing device. There is more detail about some of these options below.            1. " CPAP-  WHAT DOES IT DO AND HOW CAN I LEARN TO WEAR IT?                               BEFORE I START, CAN I WATCH A MOVIE TO GET A PLAN ON HOW TO USE CPAP?  https://www.[employer].com/watch?z=q9G82vi772Q      Continuous positive airway pressure, or CPAP, is the most effective treatment for obstructive sleep apnea. It works by blowing room air, through a mask, to hold your throat open. A decision to use CPAP is a major step forward in the pursuit of a healthier life. The successful use of CPAP will help you breathe easier, sleep better and live healthier. You can choose CPAP equipment from any durable medical equipment provider that meets your needs.  Using CPAP can be a positive experience if you keep these pan points in mind:  1. Commitment  CPAP is not a quick fix for your problem. It involves a long-term commitment to improve your sleep and your health.    2. Communication  Stay in close communication with both your sleep doctor and your CPAP supplier. Ask lots of questions and seek help when you need it.    3. Consistency  Use CPAP all night, every night and for every nap. You will receive the maximum health benefits from CPAP when you use it every time that you sleep. This will also make it easier for your body to adjust to the treatment.    4. Correction  The first machine and mask that you try may not be the best ones for you. Work with your sleep doctor and your CPAP supplier to make corrections to your equipment selection. Ask about trying a different type of machine or mask if you have ongoing problems. Make sure that your mask is a good fit and learn to use your equipment properly.    5. Challenge  Tell a family member or close friend to ask you each morning if you used your CPAP the previous night. Have someone to challenge you to give it your best effort.    6. Connection   Your adjustment to CPAP will be easier if you are able to connect with others who use the same treatment. Ask your sleep doctor if  "there is a support group in your area for people who have sleep apnea, or look for one on the Internet.  7. Comfort   Increase your level of comfort by using a saline spray, decongestant or heated humidifier if CPAP irritates your nose, mouth or throat. Use your unit's \"ramp\" setting to slowly get used to the air pressure level. There may be soft pads you can buy that will fit over your mask straps. Look on www.CPAP.com for accessories that can help make CPAP use more comfortable.  8. Cleaning   Clean your mask, tubing and headgear on a regular basis. Put this time in your schedule so that you don't forget to do it. Check and replace the filters for your CPAP unit and humidifier.    9. Completion   Although you are never finished with CPAP therapy, you should reward yourself by celebrating the completion of your first month of treatment. Expect this first month to be your hardest period of adjustment. It will involve some trial and error as you find the machine, mask and pressure settings that are right for you.    10. Continuation  After your first month of treatment, continue to make a daily commitment to use your CPAP all night, every night and for every nap.    CPAP-Tips to starting with success:  Begin using your CPAP for short periods of time during the day while you watch TV or read.    Use CPAP every night and for every nap. Using it less often reduces the health benefits and makes it harder for your body to get used to it.    Make small adjustments to your mask, tubing, straps and headgear until you get the right fit. Tightening the mask may actually worsen the leak.  If it leaves significant marks on your face or irritates the bridge of your nose, it may not be the best mask for you.  Speak with the person who supplied the mask and consider trying other masks. Insurances will allow you to try different masks during the first month of starting CPAP.  Insurance also covers a new mask, hose and filter about " every 6 months.    Use a saline nasal spray to ease mild nasal congestion. Neti-Pot or saline nasal rinses may also help. Nasal gel sprays can help reduce nasal dryness.  Biotene mouthwash can be helpful to protect your teeth if you experience frequent dry mouth.  Dry mouth may be a sign of air escaping out of your mouth or out of the mask in the case of a full face mask.  Speak with your provider if you expect that is the case.     Take a nasal decongestant to relieve more severe nasal or sinus congestion.  Do not use Afrin (oxymetazoline) nasal spray more than 3 days in a row.  Speak with your sleep doctor if your nasal congestion is chronic.    Use a heated humidifier that fits your CPAP model to enhance your breathing comfort. Adjust the heat setting up if you get a dry nose or throat, down if you get condensation in the hose or mask.  Position the CPAP lower than you so that any condensation in the hose drains back into the machine rather than towards the mask.    Try a system that uses nasal pillows if traditional masks give you problems.    Clean your mask, tubing and headgear once a week. Make sure the equipment dries fully.    Regularly check and replace the filters for your CPAP unit and humidifier.    Work closely with your sleep provider and your CPAP supplier to make sure that you have the machine, mask and air pressure setting that works best for you. It is better to stop using it and call your provider to solve problems than to lay awake all night frustrated with the device.    BESIDES CPAP, WHAT OTHER THERAPIES ARE THERE?      Positioning Device  Positioning devices are generally used when sleep apnea is mild and only occurs on your back.This example shows a pillow that straps around the waist. It may be appropriate for those whose sleep study shows milder sleep apnea that occurs primarily when lying flat on one's back. Preliminary studies have shown benefit but effectiveness at home may need to be  verified by a home sleep test. These devices are generally not covered by medical insurance.                      Oral Appliance  What is oral appliance therapy?  An oral appliance is a small acrylic device that fits over the upper and lower teeth or tongue (similar to an orthodontic retainer or a mouth guard). This device slightly advances the lower jaw or tongue, which moves the base of the tongue forward, opens the airway, improves breathing and can effectively treat snoring and obstructive sleep apnea sleep apnea. The appliance is fabricated and customized by a qualified dentist with experience in treating snoring and sleep apnea. Oral appliances are usually well tolerated and have relatively high compliance by patients1, 2, 3.  When is an oral appliance indicated?  Oral appliance therapy is recommended as a first-line treatment for patients with primary snoring, mild sleep apnea, and for patients with moderate sleep apnea who prefer appliance therapy to use of CPAP4, 5. Severity of sleep apnea is determined by sleep testing and is based on the number of respiratory events per hour of sleep.   How successful is oral appliance therapy?  The success rate of oral appliance therapy in patients with mild sleep apnea is 75-80% while in patients with moderate sleep apnea it is 50-70%. The chance of success in patients with severe sleep apnea is 40-50%. The research also shows that oral appliances have a beneficial effect on the cardiovascular health of ANUJ patients at the same magnitude as CPAP therapy7.  Oral appliances should be a second-line treatment in cases of severe sleep apnea, but if not completely successful then a combination therapy utilizing CPAP plus oral appliance therapy may be effective. Oral appliances tend to be effective in a broad range of patients although studies show that the patients who have the highest success are females, younger patients, those with milder disease, and less severe obesity.  3, 6.   The chances of success are lower in patients who have more severe ANUJ, are older, and those who are morbidly obese.     Example of an oral appliance   Finding a dentist that practices dental sleep medicine  Specific training is available through the American Academy of Dental Sleep Medicine for dentists interested in working in the field of sleep. To find a dentist who is educated in the field of sleep and the use of oral appliances, near you, visit the Web site of the American Academy of Dental Sleep Medicine; also see   http://www.accpstorage.org/newOrganization/patients/oralAppliances.pdf  To search for a dentist certified in these practices:  Http://aadsm.org/FindADentist.aspx?1  1. Cee et al. Objectively measured vs self-reported compliance during oral appliance therapy for sleep-disordered breathing. Chest 2013; 144(5): 8109-6089.  2. Evelina, et al. Objective measurement of compliance during oral appliance therapy for sleep-disordered breathing. Thorax 2013; 68(1): 91-96.  3. Lissa et al. Mandibular advancement devices in 620 men and women with ANUJ and snoring: tolerability and predictors of treatment success. Chest 2004; 125: 7211-9109.  4. Fermín, et al. Oral appliances for snoring and ANUJ: a review. Sleep 2006; 29: 244-262.  5. Jeanne et al. Oral appliance treatment for ANUJ: an update. J Clin Sleep Med 2014; 10(2): 215-227.  6. Sabino et al. Predictors of OSAH treatment outcome. J Dent Res 2007; 86: 8144-4884.      Weight Loss:    Weight management is a personal decision.  If you are interested in exploring weight loss strategies, the following discussion covers the impact on weight loss on sleep apnea and the approaches that may be successful.    Weight loss decreases severity of sleep apnea in most people with obesity. For those with mild obesity who have developed snoring with weight gain, even 15-30 pound weight loss can improve and occasionally eliminate sleep apnea.   Structured and life-long dietary and health habits are necessary to lose weight and keep healthier weight levels.     Though there may be significant health benefits from weight loss, long-term weight loss is very difficult to achieve- studies show success with dietary management in less than 10% of people. In addition, substantial weight loss may require years of dietary control and may be difficult if patients have severe obesity. In these cases, surgical management may be considered.  Finally, older individuals who have tolerated obesity without health complications may be less likely to benefit from weight loss strategies.    Your BMI is Body mass index is 42.74 kg/(m^2).  Body mass index (BMI) is one way to tell whether you are at a healthy weight, overweight, or obese. It measures your weight in relation to your height.  A BMI of 18.5 to 24.9 is in the healthy range. A person with a BMI of 25 to 29.9 is considered overweight, and someone with a BMI of 30 or greater is considered obese. More than two-thirds of American adults are considered overweight or obese.  Being overweight or obese increases the risk for further weight gain. Excess weight may lead to heart disease and diabetes.  Creating and following plans for healthy eating and physical activity may help you improve your health.  Weight control is part of healthy lifestyle and includes exercise, emotional health, and healthy eating habits. Careful eating habits lifelong are the mainstay of weight control. Though there are significant health benefits from weight loss, long-term weight loss with diet alone may be very difficult to achieve- studies show long-term success with dietary management in less than 10% of people. Attaining a healthy weight may be especially difficult to achieve in those with severe obesity. In some cases, medications, devices and surgical management might be considered.  What can you do?  If you are overweight or obese and are  interested in methods for weight loss, you should discuss this with your provider.     Consider reducing daily calorie intake by 500 calories.     Keep a food journal.     Avoiding skipping meals, consider cutting portions instead.    Diet combined with exercise helps maintain muscle while optimizing fat loss. Strength training is particularly important for building and maintaining muscle mass. Exercise helps reduce stress, increase energy, and improves fitness. Increasing exercise without diet control, however, may not burn enough calories to loose weight.       Start walking three days a week 10-20 minutes at a time    Work towards walking thirty minutes five days a week     Eventually, increase the speed of your walking for 1-2 minutes at time    In addition, we recommend that you review healthy lifestyles and methods for weight loss available through the National Institutes of Health patient information sites:  http://win.niddk.nih.gov/publications/index.htm    And look into health and wellness programs that may be available through your health insurance provider, employer, local community center, or wally club.    Weight management plan: Patient was referred to their PCP to discuss a diet and exercise plan.    Surgery:    Upper Airway Surgery for ANUJ  Surgery for ANUJ is a second-line treatment option in the management of sleep apnea.  Surgery should be considered for patients who are having a difficult time tolerating CPAP.    Surgery for ANUJ is directed at areas that are responsible for narrowing or complete obstruction of the airway during sleep.  There are a wide range of procedures available to enlarge and/or stabilize the airway to prevent blockage of breathing in the three major areas where it can occur: the palate, tongue, and nasal regions.  Successful surgical treatment depends on the accurate identification of the factors responsible for obstructive sleep apnea in each person.  A personalized approach  is required because there is no single treatment that works well for everyone.  Because of anatomic variation, consultation with an examination by a sleep surgeon is a critical first step in determining what surgical options are best for each patient.  In some cases, examination during sedation may be recommended in order to guide the selection of procedures.  Patients will be counseled about risks and benefits as well as the typical recovery course after surgery. Surgery is typically not a cure for a person s ANUJ.  However, surgery will often significantly improve one s ANUJ severity (termed  success rate ).  Even in the absence of a cure, surgery will decrease the cardiovascular risk associated with OSA7; improve overall quality of life8 (sleepiness, functionality, sleep quality, etc).          Palate Procedures:  Patients with ANUJ often have narrowing of their airway in the region of their tonsils and uvula.  The goals of palate procedures are to widen the airway in this region as well as to help the tissues resist collapse.  Modern palate procedure techniques focus on tissue conservation and soft tissue rearrangement, rather than tissue removal.  Often the uvula is preserved in this procedure. Residual sleep apnea is common in patient after pharyngoplasty with an average reduction in sleep apnea events of 33%2.      Tongue Procedures:  While patients are awake, the muscles that surround the throat are active and keep this region open for breathing. These muscles relax during sleep, allowing the tongue and other structures to collapse and block breathing.  There are several different tongue procedures available.  Selection of a tongue base procedure depends on characteristics seen on physical exam.  Generally, procedures are aimed at removing bulky tissues in this area or preventing the back of the tongue from falling back during sleep.  Success rates for tongue surgery range from 50-62%3.    Hypoglossal Nerve  Stimulation:  Hypoglossal nerve stimulation has recently received approval from the [place] Food and Drug Administration for the treatment of obstructive sleep apnea.  This is based on research showing that the system was safe and effective in treating sleep apnea6.  Results showed that the median AHI score decreased 68%, from 29.3 to 9.0. This therapy uses an implant system that senses breathing patterns and delivers mild stimulation to airway muscles, which keeps the airway open during sleep.  The system consists of three fully implanted components: a small generator (similar in size to a pacemaker), a breathing sensor, and a stimulation lead.  Using a small handheld remote, a patient turns the therapy on before bed and off upon awakening.    Candidates for this device must be greater than 22 years of age, have moderate to severe ANUJ (AHI between 20-65), BMI less than 32, have tried CPAP/oral appliance without success, and have appropriate upper airway anatomy (determined by a sleep endoscopy performed by Dr. Hoffman).    Hypoglossal Nerve Stimulation Pathway:    The sleep surgeon s office will work with the patient through the insurance prior-authorization process (including communications and appeals).    Nasal Procedures:  Nasal obstruction can interfere with nasal breathing during the day and night.  Studies have shown that relief of nasal obstruction can improve the ability of some patients to tolerate positive airway pressure therapy for obstructive sleep apnea1.  Treatment options include medications such as nasal saline, topical corticosteroid and antihistamine sprays, and oral medications such as antihistamines or decongestants. Non-surgical treatments can include external nasal dilators for selected patients. If these are not successful by themselves, surgery can improve the nasal airway either alone or in combination with these other options.      Combination Procedures:  Combination of surgical  procedures and other treatments may be recommended, particularly if patients have more than one area of narrowing or persistent positional disease.  The success rate of combination surgery ranges from 66-80%2,3.      1. Calista MUÑOZ. The Role of the Nose in Snoring and Obstructive Sleep Apnoea: An Update.  Eur Arch Otorhinolaryngol. 2011; 268: 1365-73.  2.  Jocelyn SM; Shruthi JA; Brett JR; Pallanch JF; Elisa MB; Debra SG; Junaid ANN. Surgical modifications of the upper airway for obstructive sleep apnea in adults: a systematic review and meta-analysis. SLEEP 2010;33(10):3756-2819. Aryan LEMON. Hypopharyngeal surgery in obstructive sleep apnea: an evidence-based medicine review.  Arch Otolaryngol Head Neck Surg. 2006 Feb;132(2):206-13.  3. Yemi YH1, Jessie Y, Vick CHRISTI. The efficacy of anatomically based multilevel surgery for obstructive sleep apnea. Otolaryngol Head Neck Surg. 2003 Oct;129(4):327-35.  4. Kezirian E, Goldberg A. Hypopharyngeal Surgery in Obstructive Sleep Apnea: An Evidence-Based Medicine Review. Arch Otolaryngol Head Neck Surg. 2006 Feb;132(2):206-13.  5. Hannah BLUNT et al. Upper-Airway Stimulation for Obstructive Sleep Apnea.  N Engl J Med. 2014 Jan 9;370(2):139-49.  6. Petomas Y et al. Increased Incidence of Cardiovascular Disease in Middle-aged Men with Obstructive Sleep Apnea. Am J Respir Crit Care Med; 2002 166: 159-165  7. Patino EM et al. Studying Life Effects and Effectiveness of Palatopharyngoplasty (SLEEP) study: Subjective Outcomes of Isolated Uvulopalatopharyngoplasty. Otolaryngol Head Neck Surg. 2011; 144: 623-631.                      Follow-ups after your visit        Your next 10 appointments already scheduled     Mar 10, 2017  3:00 PM [facility]   Adult Med Follow UP with Divine Jack MD   Psychiatry Clinic ([facility])    [address]  [address] [facility] [id number] [phone]            Mar 13, 2017  8:00 PM CDT   Psg Split W/Tcm with  SLEEP STUDY  4   [facility] [place], Sleep Study ([facility])    [address] [place] [id number]   [phone]            Mar 21, 2017 11:00 AM CDT   Return Visit with Shamika Kelley MD   [facility] ([facility])    [address],  [address]  [facility] [address]   [phone]            Mar 28, 2017  1:00 PM CDT   (Arrive by 12:45 PM)   Return Visit with Damon Gr, PhD [facility] ([facility])    [address]  3rd Floor  [facility] [address] [phone]            Mar 30, 2017 11:00 AM CDT   Return Sleep Patient with Brianda Reddy MD   [facility] [place], Sleep Study ([facility])    [address] [place] [address] [phone]            May 22, 2017 10:30 AM CDT   (Arrive by 10:15 AM)   Return Visit with Marcos Magdaleno MD   [facility] Medical Weight Management ([facility])    [address]  4th Floor  [facility] [id number] [phone]            Jun 21, 2017 10:30 AM CDT   Lab with  LAB    Health Lab ([facility])    [address]  1st Floor  [facility] [address]   [phone]            Jun 21, 2017 11:35 AM CDT   (Arrive by 11:20 AM)   Return Visit with Gaby Holliday MD   [facility] Nephrology ([facility])    [address]  3rd Floor  [facility] [address]   [phone]            Nov 14, 2017  1:30 PM CST   NEW GENERAL with Michael Lopez MD   Eye Clinic ([facility])    [address]  [address]  9th [address] Clin 9a  [facility] [address]   [phone]              Future tests that were ordered for you today     Open Future Orders        Priority Expected Expires Ordered    ABG-Blood Gas Arterial ([facility] /  "[facility]) Routine  2017 3/2/2017    Comprehensive Sleep Study Routine  2017 3/2/2017            Who to contact     If you have questions or need follow up information about today's clinic visit or your schedule please [facility] directly at [phone].  Normal or non-critical lab and imaging results will be communicated to you by MyChart, letter or phone within 4 business days after the clinic has received the results. If you do not hear from us within 7 days, please contact the clinic through [employer] or phone. If you have a critical or abnormal lab result, we will notify you by phone as soon as possible.  Submit refill requests through [employer] or call your pharmacy and they will forward the refill request to us. Please allow 3 business days for your refill to be completed.          Additional Information About Your Visit        XencorharC[employer] Jian Information     [employer] lets you send messages to your doctor, view your test results, renew your prescriptions, schedule appointments and more. To sign up, go to [email or web address] . Click on \"Log in\" on the left side of the screen, which will take you to the Welcome page. Then click on \"Sign up Now\" on the right side of the page.     You will be asked to enter the access code listed below, as well as some personal information. Please follow the directions to create your username and password.     Your access code is: [email or web address]  Expires: 2017  2:22 PM     Your access code will  in 90 days. If you need help or a new code, please call your [place] clinic or [phone].        Care EveryWhere ID     This is your Care EveryWhere ID. This could be used by other organizations to access your [facility] medical records  [id number]        Your Vitals Were     Pulse Respirations Height Pulse Oximetry BMI (Body Mass Index)       77 19 1.626 m (5' 4\") 95% 42.74 kg/m2        Blood Pressure from Last 3 Encounters:   17 " 150/55   02/20/17 131/70   01/31/17 125/76    Weight from Last 3 Encounters:   03/02/17 112.9 kg (249 lb)   02/20/17 111.4 kg (245 lb 9.6 oz)   01/31/17 111.9 kg (246 lb 9.6 oz)                 Today's Medication Changes          These changes are accurate as of: 3/2/17  4:28 PM.  If you have any questions, ask your nurse or doctor.               These medicines have changed or have updated prescriptions.        Dose/Directions    metFORMIN 500 MG 24 hr tablet   Commonly known as:  GLUCOPHAGE-XR   This may have changed:  how much to take   Used for:  Diabetes mellitus, type 2 (H)        Dose:  1000 mg   Take 2 tablets (1,000 mg) by mouth 2 times daily (with meals)   Quantity:  90 tablet   Refills:  3         Stop taking these medicines if you haven't already. Please contact your care team if you have questions.     order for DME   Stopped by:  Brianda Reddy MD                    Primary Care Provider Office Phone # Fax #    Shamika Ileana Kelley [phone]       [facility] [address] [facility] [address]        Thank you!     Thank you for choosing [facility], SLEEP STUDY  for your care. Our goal is always to provide you with excellent care. Hearing back from our patients is one way we can continue to improve our services. Please take a few minutes to complete the written survey that you may receive in the mail after your visit with us. Thank you!             Your Updated Medication List - Protect others around you: Learn how to safely use, store and throw away your medicines at www.disposemymeds.org.          This list is accurate as of: 3/2/17  4:28 PM.  Always use your most recent med list.                   Brand Name Dispense Instructions for use    amLODIPine 5 MG tablet    NORVASC    30 tablet    Take 2 tablets (10 mg) by mouth daily       ARTIFICIAL TEARS OP      Apply 1 drop to eye 4 times daily.       aspirin 81 MG tablet     100    Take 1 tablet by mouth  daily. ENTERIC COATED.       atorvastatin 40 MG tablet    LIPITOR    90 tablet    Take 1 tablet (40 mg) by mouth daily       blood glucose monitoring test strip    no brand specified    100 each    Use to test blood sugar 3 times daily or as directed.       calcium polycarbophil 625 MG tablet    FIBERCON     Take 2 tablets by mouth daily       calcium-vitamin D 250-125 MG-UNIT Tabs      Take 1 tablet by mouth 2 times daily. Calcium 250 mg/Vit D 125 IU       CLARITIN 10 MG tablet   Generic drug:  loratadine     30    1 TAB PO QD (Once per day) as needed for ALLERGY SYMPTOMS       clotrimazole 1 % cream    LOTRIMIN    50 g    Apply topically 2 times daily as needed       eucerin cream      Apply  topically as needed. Apply to thigh PRN dry skin       exenatide 10 MCG/0.04ML injection    BYETTA    1 Syringe    Inject 10 mcg Subcutaneous 2 times daily (before meals)       FLUoxetine 40 MG capsule    PROzac    30 capsule    Take 1 capsule (40 mg) by mouth daily       fluticasone 50 MCG/ACT spray    FLONASE    16 g    Spray 1 spray into both nostrils daily       furosemide 20 MG tablet    LASIX    60 tablet    Take 1 tablet (20 mg) by mouth 2 times daily       GEL-SASHA DENTINBLOC DT      Apply  to affected area. GEL. Apply to 2nd molar on bottom right daily at bedtime.       GLUCAGON EMERGENCY 1 MG kit   Generic drug:  glucagon      Inject  as directed. 1 mL as needed for signs of hypoglycemia. May repeat as needed in 15 minutes.       HYDROcodone-acetaminophen 5-325 MG per tablet    NORCO     Take 1 tablet by mouth every 6 hours as needed 1-2 tabs       Incontinence Brief Large Misc     60 Units    2 Units 2 times daily       insulin glargine 100 UNIT/ML injection    LANTUS    8 mL    Inject 24 Units Subcutaneous At Bedtime       * LACRI-LUBE OP      Apply  to eye. Place 0.5 inches into both eyes at bedtime       * artificial tears Oint ophthalmic ointment      At Bedtime       LACTAID 3000 UNIT tablet   Generic drug:   lactase      Take 4 tabs daily with meals.       levothyroxine 175 MCG tablet    SYNTHROID/LEVOTHROID    30 tablet    Take 1 tablet (175 mcg) by mouth daily Give on empty stomach       LORazepam 1 MG tablet    ATIVAN    35 tablet    Take 1 tablet (1 mg) by mouth At Bedtime .  May take additional 1mg daily PRN for agitation.       losartan 100 MG tablet    COZAAR    90 tablet    Take 1 tablet by mouth daily.       metFORMIN 500 MG 24 hr tablet    GLUCOPHAGE-XR    90 tablet    Take 2 tablets (1,000 mg) by mouth 2 times daily (with meals)       metroNIDAZOLE 500 MG tablet    FLAGYL    14 tablet    Take 1 tablet (500 mg) by mouth 2 times daily       MILK OF MAGNESIA PO      Take  by mouth. Take 30 mL as needed for constipation.       NEURONTIN PO      Take 900 mg by mouth 3 times daily.       nitrofurantoin (macrocrystal-monohydrate) 100 MG capsule    MACROBID    14 capsule    Take 1 capsule (100 mg) by mouth 2 times daily       nystatin 252162 UNIT/GM Powd    MYCOSTATIN    60 g    Apply topically 3 times daily as needed       polyethylene glycol powder    MIRALAX/GLYCOLAX     Take 1 capful by mouth 2 times daily. 17 GM PO BID       * PREVIDENT 5000 PLUS 1.1 % Crea   Generic drug:  Sodium Fluoride      Apply  to affected area every evening.       * PREVIDENT MT      Take  by mouth. Use as dental rinse at bedtime.       saline 0.9 % Soln      Spray 2 sprays in nostril as needed.       SENNA S 8.6-50 MG per tablet   Generic drug:  senna-docusate      Take 2 tablets by mouth At Bedtime.       solifenacin 10 MG tablet    VESICARE    30 tablet    Take 1 tablet (10 mg) by mouth daily       * TYLENOL 325 MG tablet   Generic drug:  acetaminophen      Take 1-2 tablets by mouth every 6 hours as needed.       * acetaminophen 500 MG tablet    TYLENOL    1 Bottle    Take 2 tablets (1,000 mg) by mouth every 6 hours as needed       ziprasidone 40 MG capsule    GEODON    60 capsule    Take 1 capsule (40 mg) by mouth 2 times daily (with  meals)       * Notice:  This list has 6 medication(s) that are the same as other medications prescribed for you. Read the directions carefully, and ask your doctor or other care provider to review them with you.

## 2017-03-10 ENCOUNTER — OFFICE VISIT (OUTPATIENT)
Dept: FAMILY MEDICINE | Facility: CLINIC | Age: 67
End: 2017-03-10

## 2017-03-10 VITALS
TEMPERATURE: 98.2 F | OXYGEN SATURATION: 96 % | SYSTOLIC BLOOD PRESSURE: 139 MMHG | WEIGHT: 242.4 LBS | HEART RATE: 76 BPM | DIASTOLIC BLOOD PRESSURE: 78 MMHG | BODY MASS INDEX: 41.61 KG/M2

## 2017-03-10 DIAGNOSIS — J06.9 VIRAL URI WITH COUGH: Primary | ICD-10-CM

## 2017-03-10 NOTE — MR AVS SNAPSHOT
After Visit Summary   3/10/2017    Pinky Page    MRN: [id number]           Patient Information     Date Of Birth          1950        Visit Information        Provider Department      3/10/2017 11:20 AM Erika Acosta MD [facility]        Today's Diagnoses     Viral URI with cough    -  1       Follow-ups after your visit        Your next 10 appointments already scheduled     Mar 28, 2017  1:00 PM CDT   (Arrive by 12:45 PM)   Return Visit with Damon Gr, PhD [facility] ([facility])    [address]  3rd Floor  [facility] [id number] [phone]            Mar 30, 2017 11:00 AM CDT   Return Sleep Patient with Brianda Reddy MD   [facility] [place], Sleep Study ([facility])    [address] [place] [id number] [phone]            Apr 03, 2017  2:15 PM CDT   MA SCREENING DIGITAL BILATERAL with URBCMA1   [facility] ([facility])    [address] [place] [address], [address]  [facility] [id number] [phone]           Do not use any powder, lotion or deodorant under your arms or on your breast. If you do, we will ask you to remove it before your exam.  Wear comfortable, two-piece clothing.  If you have any allergies, tell your care team.  Bring any previous mammograms from other facilities or have them mailed to the breast center.            Apr 10, 2017  1:40 PM CDT   Return Visit with MD [facility] ([facility])    [address] [place],  [address]  [facility] [address]   [phone]            Apr 17, 2017  9:30 AM CDT   Adult Med Follow UP with Divine Jack MD   Psychiatry Clinic ([facility])    [address]  [address] [facility] [id number] [phone]            May 22, 2017 10:30 AM CDT   (Arrive by 10:15 AM)    Return Visit with Marcos Magdaleno MD   [facility] Medical Weight Management ([facility])    [address]  4th Floor  [facility] [address]   [phone]            Jun 21, 2017 10:30 AM CDT   Lab with  LAB   [facility] Lab ([facility])    [address]  1st Floor  [facility] [address]   [phone]            Jun 21, 2017 11:35 AM CDT   (Arrive by 11:20 AM)   Return Visit with Gaby Holliday MD   [facility] Nephrology ([facility])    [address]  3rd Floor  [facility] [address]   [phone]            Nov 14, 2017  1:30 PM CST   NEW GENERAL with Michael Lopez MD   Eye Clinic ([facility])    [address]  9th Fl Clin 9a  [facility] [address] [phone]              Who to contact     Please call your clinic at [phone] to:    Ask questions about your health    Make or cancel appointments    Discuss your medicines    Learn about your test results    Speak to your doctor   If you have compliments or concerns about an experience at your clinic, or if you wish to file a complaint, please contact [facility] Patient Relations at [phone] or email us at [email or web address]         Additional Information About Your Visit        MyChart Information     Prizeot is an electronic gateway that provides easy, online access to your medical records. With [employer], you can request a clinic appointment, read your test results, renew a prescription or communicate with your care team.     To sign up for Prizeot visit the website at [email or web address]   You will be asked to enter the access code listed below, as well as some personal information. Please follow the directions to create your username and password.     Your access code is: [email or web address]  Expires: 5/1/2017  3:22 PM     Your access code will   in 90 days. If you need help or a new code, please contact your [facility] or call [phone] for assistance.        Care EveryWhere ID     This is your Care EveryWhere ID. This could be used by other organizations to access your [place] medical records  [id number]        Your Vitals Were     Pulse Temperature Pulse Oximetry BMI (Body Mass Index)          76 98.2  F (36.8  C) (Oral) 96% 41.61 kg/m2         Blood Pressure from Last 3 Encounters:   17 131/83   03/10/17 139/78   17 150/55    Weight from Last 3 Encounters:   17 245 lb 12.8 oz (111.5 kg)   03/10/17 242 lb 6.4 oz (110 kg)   17 249 lb (112.9 kg)              Today, you had the following     No orders found for display         Today's Medication Changes          These changes are accurate as of: 3/10/17 11:59 PM.  If you have any questions, ask your nurse or doctor.               These medicines have changed or have updated prescriptions.        Dose/Directions    metFORMIN 500 MG 24 hr tablet   Commonly known as:  GLUCOPHAGE-XR   This may have changed:  how much to take   Used for:  Diabetes mellitus, type 2 (H)        Dose:  1000 mg   Take 2 tablets (1,000 mg) by mouth 2 times daily (with meals)   Quantity:  90 tablet   Refills:  3         Stop taking these medicines if you haven't already. Please contact your care team if you have questions.     nitrofurantoin (macrocrystal-monohydrate) 100 MG capsule   Commonly known as:  MACROBID   Stopped by:  Erika Acosta MD                    Primary Care Provider Office Phone # Fax #    Shamika Kelley [phone]       [facility] [address]  [facility] [address]        Thank you!     Thank you for choosing \[facility]\"" FAMILY MEDICINE [facility]  for your care. Our goal is always to provide you with excellent care. Hearing back from our patients is one way we can continue to improve our services. Please take a  few minutes to complete the written survey that you may receive in the mail after your visit with us. Thank you!             Your Updated Medication List - Protect others around you: Learn how to safely use, store and throw away your medicines at www.disposemymeds.org.          This list is accurate as of: 3/10/17 11:59 PM.  Always use your most recent med list.                   Brand Name Dispense Instructions for use    amLODIPine 5 MG tablet    NORVASC    30 tablet    Take 2 tablets (10 mg) by mouth daily       ARTIFICIAL TEARS OP      Apply 1 drop to eye 4 times daily.       aspirin 81 MG tablet     100    Take 1 tablet by mouth daily. ENTERIC COATED.       atorvastatin 40 MG tablet    LIPITOR    90 tablet    Take 1 tablet (40 mg) by mouth daily       blood glucose monitoring test strip    no brand specified    100 each    Use to test blood sugar 3 times daily or as directed.       calcium polycarbophil 625 MG tablet    FIBERCON     Take 2 tablets by mouth daily       calcium-vitamin D 250-125 MG-UNIT Tabs      Take 1 tablet by mouth 2 times daily. Calcium 250 mg/Vit D 125 IU       CLARITIN 10 MG tablet   Generic drug:  loratadine     30    1 TAB PO QD (Once per day) as needed for ALLERGY SYMPTOMS       clotrimazole 1 % cream    LOTRIMIN    50 g    Apply topically 2 times daily as needed       eucerin cream      Apply  topically as needed. Apply to thigh PRN dry skin       exenatide 10 MCG/0.04ML injection    BYETTA    1 Syringe    Inject 10 mcg Subcutaneous 2 times daily (before meals)       FLUoxetine 40 MG capsule    PROzac    30 capsule    Take 1 capsule (40 mg) by mouth daily       fluticasone 50 MCG/ACT spray    FLONASE    16 g    Spray 1 spray into both nostrils daily       furosemide 20 MG tablet    LASIX    60 tablet    Take 1 tablet (20 mg) by mouth 2 times daily       GEL-SASHA DENTINBLOC DT      Apply  to affected area. GEL. Apply to 2nd molar on bottom right daily at bedtime.       GLUCAGON EMERGENCY  1 MG kit   Generic drug:  glucagon      Inject  as directed. 1 mL as needed for signs of hypoglycemia. May repeat as needed in 15 minutes.       HYDROcodone-acetaminophen 5-325 MG per tablet    NORCO     Take 1 tablet by mouth every 6 hours as needed 1-2 tabs       Incontinence Brief Large Misc     60 Units    2 Units 2 times daily       insulin glargine 100 UNIT/ML injection    LANTUS    8 mL    Inject 24 Units Subcutaneous At Bedtime       * LACRI-LUBE OP      Apply  to eye. Place 0.5 inches into both eyes at bedtime       * artificial tears Oint ophthalmic ointment      At Bedtime       LACTAID 3000 UNIT tablet   Generic drug:  lactase      Take 4 tabs daily with meals.       levothyroxine 175 MCG tablet    SYNTHROID/LEVOTHROID    30 tablet    Take 1 tablet (175 mcg) by mouth daily Give on empty stomach       LORazepam 1 MG tablet    ATIVAN    35 tablet    Take 1 tablet (1 mg) by mouth At Bedtime .  May take additional 1mg daily PRN for agitation.       losartan 100 MG tablet    COZAAR    90 tablet    Take 1 tablet by mouth daily.       metFORMIN 500 MG 24 hr tablet    GLUCOPHAGE-XR    90 tablet    Take 2 tablets (1,000 mg) by mouth 2 times daily (with meals)       metroNIDAZOLE 500 MG tablet    FLAGYL    14 tablet    Take 1 tablet (500 mg) by mouth 2 times daily       MILK OF MAGNESIA PO      Take  by mouth. Take 30 mL as needed for constipation.       NEURONTIN PO      Take 900 mg by mouth 3 times daily.       nystatin [id number] UNIT/GM Powd    MYCOSTATIN    60 g    Apply topically 3 times daily as needed       polyethylene glycol powder    MIRALAX/GLYCOLAX     Take 1 capful by mouth 2 times daily. 17 GM PO BID       * PREVIDENT 5000 PLUS 1.1 % Crea   Generic drug:  Sodium Fluoride      Apply  to affected area every evening.       * PREVIDENT MT      Take  by mouth. Use as dental rinse at bedtime.       saline 0.9 % Soln      Spray 2 sprays in nostril as needed.       SENNA S 8.6-50 MG per tablet   Generic drug:   senna-docusate      Take 2 tablets by mouth At Bedtime.       solifenacin 10 MG tablet    VESICARE    30 tablet    Take 1 tablet (10 mg) by mouth daily       * TYLENOL 325 MG tablet   Generic drug:  acetaminophen      Take 1-2 tablets by mouth every 6 hours as needed.       * acetaminophen 500 MG tablet    TYLENOL    1 Bottle    Take 2 tablets (1,000 mg) by mouth every 6 hours as needed       ziprasidone 40 MG capsule    GEODON    60 capsule    Take 1 capsule (40 mg) by mouth 2 times daily (with meals)       * Notice:  This list has 6 medication(s) that are the same as other medications prescribed for you. Read the directions carefully, and ask your doctor or other care provider to review them with you.

## 2017-03-10 NOTE — PROGRESS NOTES
Preceptor Attestation:   Patient seen and discussed with the resident. Assessment and plan reviewed with resident and agreed upon.   Supervising Physician:  Galo Choe MD  [facility] Medicine

## 2017-03-10 NOTE — PROGRESS NOTES
HPI       Pinky Page is a 66 year old  female  who presents with sore throat, cough, nasal congestion for 3 days. Right ear pain.   No fevers or chills. Tylenol appears to help.           Patient Active Problem List    Diagnosis Date Noted     Solitary kidney, congenital 06/07/2013     Priority: High     CKD (chronic kidney disease) stage 2, GFR 60-89 ml/min 03/01/2013     Priority: High     Lives in group home 02/27/2013     Priority: High     [place] residence, [place] >20 years.  Mantoux done in 9/2013 was negative.        Hypercholesteremia 10/05/2012     Priority: High     ASCVD  Risk Calculator (pooled cohort)   10 CV risk 17.5%   Recommended Therapy:High Intensity Statin (atorvastatin 40*-80 mg or rosuvastatin 20-40mg)               Morbid obesity due to excess calories (H) 10/02/2012     Priority: High     Follows with Dr. Marcos Magdaleno Q 2months for weight management.       Personal history of breast cancer s/p L masectomy 10/02/2012     Priority: High     May 24, 2013 S/p left mastectomy 1996; follows with Dr. Avila, last mammogram 5/2012 was benign. Rec annual mammogram.       Diabetes mellitus type 2, insulin dependent (H) 10/02/2012     Priority: High     July 27, 2013   Insulin dependent since 4/2006  Without history of retinopathy       Hypertension, essential      Priority: High     Hypothyroidism      Priority: High     On replacement.       Severe obstructive sleep apnea      Priority: High     August 28, 2013   Follows with Dr. Mc.   On 4L O2 at night given CPAP intolerant.  PSG (10/21/07): AHI 39 with oxygen saturations to 71%.        Nail complaint 02/03/2017     Priority: Medium     vertical nail ridges       Hx of psychiatric care 07/14/2016     Priority: Medium       PAST PSYCH MED TRIALS     sertaline   amitriptyline   lithium   risperidone   olanzapine   trazodone   clonazepam  Quetiapine (weight gain)       Psychological factors affecting medical condition  04/18/2016     Priority: Medium     Major depressive disorder, recurrent episode, moderate (H) 11/16/2015     Priority: Medium     Health Care Home 10/01/2015     Priority: Medium     Schizoaffective disorder, depressive type (H) 06/08/2015     Priority: Medium     Carpal tunnel syndrome of left wrist 05/28/2015     Priority: Medium     Generalized anxiety disorder 12/04/2014     Priority: Medium     Diagnosis updated by automated process. Provider to review and confirm.       Candidiasis of skin 11/04/2014     Priority: Medium     Chronic candidiasis of groin and labia        Hypertriglyceridemia 09/10/2014     Priority: Medium     Impingement syndrome of right shoulder 06/10/2014     Priority: Medium     S/P hysterectomy 04/20/2014     Priority: Medium     Extrapyramidal and movement disorder 02/27/2013     Priority: Medium     Cardiomegaly      Priority: Medium     Chronic constipation      Priority: Medium     Dry eye syndrome      Priority: Medium     Esophageal reflux      Priority: Medium     Exposure keratoconjunctivitis      Priority: Medium     DM ophthalmopathy (H)      Priority: Medium     Senile cataract      Priority: Medium     Postmenopausal atrophic vaginitis      Priority: Medium     Restless leg syndrome      Priority: Medium     Squamous blepharitis      Priority: Medium     Urge incontinence 04/19/2011     Priority: Medium     Allergic rhinitis due to pollen      Priority: Medium     seasonal allergies          Current Outpatient Prescriptions   Medication Sig Dispense Refill     FLUoxetine (PROZAC) 40 MG capsule Take 1 capsule (40 mg) by mouth daily 30 capsule 2     ziprasidone (GEODON) 40 MG capsule Take 1 capsule (40 mg) by mouth 2 times daily (with meals) 60 capsule 2     LORazepam (ATIVAN) 1 MG tablet Take 1 tablet (1 mg) by mouth At Bedtime .  May take additional 1mg daily PRN for agitation. 35 tablet 1     blood glucose monitoring (NO BRAND SPECIFIED) test strip Use to test blood sugar 3  times daily or as directed. 100 each 3     exenatide (BYETTA) 10 MCG/0.04ML injection Inject 10 mcg Subcutaneous 2 times daily (before meals) 1 Syringe 11     insulin glargine (LANTUS) 100 UNIT/ML injection Inject 24 Units Subcutaneous At Bedtime 8 mL 11     metroNIDAZOLE (FLAGYL) 500 MG tablet Take 1 tablet (500 mg) by mouth 2 times daily 14 tablet 0     nitrofurantoin, macrocrystal-monohydrate, (MACROBID) 100 MG capsule Take 1 capsule (100 mg) by mouth 2 times daily 14 capsule 0     amLODIPine (NORVASC) 5 MG tablet Take 2 tablets (10 mg) by mouth daily 30 tablet 3     nystatin (MYCOSTATIN) 536807 UNIT/GM POWD Apply topically 3 times daily as needed 60 g 1     clotrimazole (LOTRIMIN) 1 % cream Apply topically 2 times daily as needed 50 g 0     fluticasone (FLONASE) 50 MCG/ACT nasal spray Spray 1 spray into both nostrils daily 16 g 3     furosemide (LASIX) 20 MG tablet Take 1 tablet (20 mg) by mouth 2 times daily 60 tablet 3     acetaminophen (TYLENOL) 500 MG tablet Take 2 tablets (1,000 mg) by mouth every 6 hours as needed 1 Bottle 2     solifenacin (VESICARE) 10 MG tablet Take 1 tablet (10 mg) by mouth daily 30 tablet 6     HYDROcodone-acetaminophen (NORCO) 5-325 MG per tablet Take 1 tablet by mouth every 6 hours as needed 1-2 tabs       atorvastatin (LIPITOR) 40 MG tablet Take 1 tablet (40 mg) by mouth daily 90 tablet 1     calcium polycarbophil (FIBERCON) 625 MG tablet Take 2 tablets by mouth daily       levothyroxine (SYNTHROID, LEVOTHROID) 175 MCG tablet Take 1 tablet (175 mcg) by mouth daily Give on empty stomach 30 tablet 4     metFORMIN (GLUCOPHAGE-XR) 500 MG 24 hr tablet Take 2 tablets (1,000 mg) by mouth 2 times daily (with meals) (Patient taking differently: Take 2,000 mg by mouth 2 times daily (with meals) ) 90 tablet 3     Incontinence Supply Disposable (INCONTINENCE BRIEF LARGE) MISC 2 Units 2 times daily 60 Units 11     Artificial Tear Ointment (ARTIFICIAL TEARS) OINT ophthalmic ointment At Bedtime        losartan (COZAAR) 100 MG tablet Take 1 tablet by mouth daily. 90 tablet 1     Sodium Fluoride (PREVIDENT 5000 PLUS) 1.1 % CREA Apply  to affected area every evening.       Hypromellose (ARTIFICIAL TEARS OP) Apply 1 drop to eye 4 times daily.       Gabapentin (NEURONTIN PO) Take 900 mg by mouth 3 times daily.       senna-docusate (SENNA S) 8.6-50 MG per tablet Take 2 tablets by mouth At Bedtime.       polyethylene glycol (MIRALAX/GLYCOLAX) powder Take 1 capful by mouth 2 times daily. 17 GM PO BID       acetaminophen (TYLENOL) 325 MG tablet Take 1-2 tablets by mouth every 6 hours as needed.       Artificial Tear Ointment (LACRI-LUBE OP) Apply  to eye. Place 0.5 inches into both eyes at bedtime       Skin Protectants, Misc. (EUCERIN) cream Apply  topically as needed. Apply to thigh PRN dry skin        Saline 0.9 % SOLN Spray 2 sprays in nostril as needed.       Calcium Carbonate-Vitamin D (CALCIUM-VITAMIN D) 250-125 MG-UNIT TABS Take 1 tablet by mouth 2 times daily. Calcium 250 mg/Vit D 125 IU       Sod Fluor-Solo Fluor-Hydrfl Ac (GEL-SASHA DENTINBLOC DT) Apply  to affected area. GEL. Apply to 2nd molar on bottom right daily at bedtime.       lactase (LACTAID) 3000 UNIT tablet Take 4 tabs daily with meals.       Magnesium Hydroxide (MILK OF MAGNESIA PO) Take  by mouth. Take 30 mL as needed for constipation.       Sodium Fluoride, [id number], (PREVIDENT MT) Take  by mouth. Use as dental rinse at bedtime.       CLARITIN 10 MG OR TABS 1 TAB PO QD (Once per day) as needed for ALLERGY SYMPTOMS 30 11     ASPIRIN 81 MG OR TABS Take 1 tablet by mouth daily. ENTERIC COATED. 100 3     GLUCAGON EMERGENCY 1 MG IJ KIT Inject  as directed. 1 mL as needed for signs of hypoglycemia. May repeat as needed in 15 minutes.  0          Allergies   Allergen Reactions     Chlordiazepoxide Hcl      Dimetapp Dm Cold-Cough      Cold/Congetion TABS     Haldol      Ibuprofen      TABS     Lactose Intolerance [Beta-Galactosidase]      CAPS      Milk Products      Propofol      EMUL             +++++++      Problem, Medication and Allergy Lists were reviewed and updated if needed..    Patient is an established patient of this clinic..         Review of Systems:   General: No distress  HEENT: + sore throat  Pulm: + cough  CVS: No chest pain, no palpitations  GI: No abdominal pain, nausea, vomiting or diarrhea  Skin: No new rashes  Neuro: No headache, no dizziness,                 Physical Exam:     Vitals:    03/10/17 1133   BP: 139/78   Pulse: 76   Temp: 98.2  F (36.8  C)   TempSrc: Oral   SpO2: 96%   Weight: 242 lb 6.4 oz (110 kg)     Body mass index is 41.61 kg/(m^2).    Constitutional: Awake, alert, cooperative, no apparent distress, and appears stated age  HEENT: MMM, no conjunctival pallor  Pulm: CTAB, No rales, No wheeze, no increased effort of breathing.  CVS: RRR, No murmurs   GI: S NT ND BS +ve  Hem/Onc: No cervical LN neymar  Skin : no  rash  Neuro:  No focal neurological deficit  Psych : Good eye contact, well groomed, very interactive and collaborative. Good insight. Thought process is linear and coherent. Associations are tight. Mood is good. Affect euthymic.         No results found for this or any previous visit (from the past 24 hour(s)).    Assessment and Plan      1. Viral URI with cough-  Normal physical exam today, reassured patient.  Tylenol for comfort.  Honey prn. Follow up if worsening or no improvement in 1-2 weeks.       There are no discontinued medications.    Options for treatment and follow-up care were reviewed with the patient. Pinky Page  engaged in the decision making process and verbalized understanding of the options discussed and agreed with the final plan.    Erika Acosta MD G3  [facility]  Family Medicine Resident  Pager [phone]

## 2017-03-16 ENCOUNTER — OFFICE VISIT (OUTPATIENT)
Dept: PSYCHIATRY | Facility: CLINIC | Age: 67
End: 2017-03-16
Attending: PSYCHIATRY & NEUROLOGY
Payer: COMMERCIAL

## 2017-03-16 VITALS
SYSTOLIC BLOOD PRESSURE: 149 MMHG | BODY MASS INDEX: 42.16 KG/M2 | HEART RATE: 81 BPM | WEIGHT: 245.6 LBS | DIASTOLIC BLOOD PRESSURE: 77 MMHG

## 2017-03-16 DIAGNOSIS — Z51.81 ENCOUNTER FOR THERAPEUTIC DRUG MONITORING: Primary | ICD-10-CM

## 2017-03-16 PROCEDURE — 99212 OFFICE O/P EST SF 10 MIN: CPT | Mod: ZF

## 2017-03-16 NOTE — MR AVS SNAPSHOT
After Visit Summary   3/16/2017    Pinky Page    MRN: [id number]           Patient Information     Date Of Birth          1950        Visit Information        Provider Department      3/16/2017 1:30 PM Divine Jack MD [facility]        Today's Diagnoses     Encounter for therapeutic drug monitoring    -  1       Follow-ups after your visit        Follow-up notes from your care team     Return in about 4 weeks (around 4/13/2017).      Your next 10 appointments already scheduled     Mar 21, 2017 11:00 AM CDT   Return Visit with Shamika Kelley MD   [facility]    [address] [place] [address],  [address]  [facility] [address]   [phone]            Mar 21, 2017  8:00 PM CDT   Psg Split W/Tcm with SLEEP STUDY RM 1   [facility], Sleep Study ([facility])    [address] [place] [address] [phone]            Mar 28, 2017  1:00 PM CDT   (Arrive by 12:45 PM)   Return Visit with Damon Gr, PhD [facility] ([facility] [place])    [address] [place] [address]  [address] [facility] [address] [phone]            Mar 30, 2017 11:00 AM CDT   Return Sleep Patient with Brianda Reddy MD   [facility], Sleep Study ([facility])    [address] [place] [address] [phone]            Apr 17, 2017  9:30 AM CDT   Adult Med Follow UP with Divine Jack MD   [facility] ([facility])    [address]  [address] [facility] [address] [phone]            May 22, 2017 10:30 AM CDT   (Arrive by 10:15 AM)   Return Visit with Marcos Magdaleno MD   [facility] Medical Weight Management ([facility])    [address] [place] [address]  [address] [facility] [address] [phone]            Jun 21,   10:30 AM CDT   Lab with  LAB   [facility] ([facility])    [address]  1st Floor  [facility] [id number] [phone]            2017 11:35 AM CDT   (Arrive by 11:20 AM)   Return Visit with Gaby Holliday MD   [facility] Nephrology ([facility])    [address]  3rd Floor  [facility] [id number] [phone]            2017  1:30 PM CST   NEW GENERAL with Michael Lopez MD   [facility] Clinic ([facility])    [address]  [address]  [facility] [address] [phone]              Who to contact     Please call your clinic at [phone] to:    Ask questions about your health    Make or cancel appointments    Discuss your medicines    Learn about your test results    Speak to your doctor   If you have compliments or concerns about an experience at your clinic, or if you wish to file a complaint, please contact [facility] Patient Relations at [phone] or email us at [email or web address]         Additional Information About Your Visit        [email or web address] Information     [employer] is an electronic gateway that provides easy, online access to your medical records. With [employer], you can request a clinic appointment, read your test results, renew a prescription or communicate with your care team.     To sign up for [employer] visit the website at [email or web address]   You will be asked to enter the access code listed below, as well as some personal information. Please follow the directions to create your username and password.     Your access code is: [email or web address]  Expires: 2017  3:22 PM     Your access code will  in 90 days. If you need help or a new code, please contact your [facility] or call [phone] for assistance.        Care EveryWhere ID     This is your Care EveryWhere ID. This  could be used by other organizations to access your [place] medical records  [id number]        Your Vitals Were     Pulse BMI (Body Mass Index)                81 42.16 kg/m2           Blood Pressure from Last 3 Encounters:   03/16/17 149/77   03/10/17 139/78   03/02/17 150/55    Weight from Last 3 Encounters:   03/16/17 111.4 kg (245 lb 9.6 oz)   03/10/17 110 kg (242 lb 6.4 oz)   03/02/17 112.9 kg (249 lb)                 Today's Medication Changes          These changes are accurate as of: 3/16/17 11:59 PM.  If you have any questions, ask your nurse or doctor.               These medicines have changed or have updated prescriptions.        Dose/Directions    metFORMIN 500 MG 24 hr tablet   Commonly known as:  GLUCOPHAGE-XR   This may have changed:  how much to take   Used for:  Diabetes mellitus, type 2 (H)        Dose:  1000 mg   Take 2 tablets (1,000 mg) by mouth 2 times daily (with meals)   Quantity:  90 tablet   Refills:  3                Primary Care Provider Office Phone # Fax #    Shamika Ileana Kelley [phone]       [facility] [address] [facility] [address]        Thank you!     Thank you for choosing PSYCHIATRY CLINIC  for your care. Our goal is always to provide you with excellent care. Hearing back from our patients is one way we can continue to improve our services. Please take a few minutes to complete the written survey that you may receive in the mail after your visit with us. Thank you!             Your Updated Medication List - Protect others around you: Learn how to safely use, store and throw away your medicines at www.disposemymeds.org.          This list is accurate as of: 3/16/17 11:59 PM.  Always use your most recent med list.                   Brand Name Dispense Instructions for use    amLODIPine 5 MG tablet    NORVASC    30 tablet    Take 2 tablets (10 mg) by mouth daily       ARTIFICIAL TEARS OP      Apply 1 drop to eye 4 times daily.        aspirin 81 MG tablet     100    Take 1 tablet by mouth daily. ENTERIC COATED.       atorvastatin 40 MG tablet    LIPITOR    90 tablet    Take 1 tablet (40 mg) by mouth daily       blood glucose monitoring test strip    no brand specified    100 each    Use to test blood sugar 3 times daily or as directed.       calcium polycarbophil 625 MG tablet    FIBERCON     Take 2 tablets by mouth daily       calcium-vitamin D 250-125 MG-UNIT Tabs      Take 1 tablet by mouth 2 times daily. Calcium 250 mg/Vit D 125 IU       CLARITIN 10 MG tablet   Generic drug:  loratadine     30    1 TAB PO QD (Once per day) as needed for ALLERGY SYMPTOMS       clotrimazole 1 % cream    LOTRIMIN    50 g    Apply topically 2 times daily as needed       eucerin cream      Apply  topically as needed. Apply to thigh PRN dry skin       exenatide 10 MCG/0.04ML injection    BYETTA    1 Syringe    Inject 10 mcg Subcutaneous 2 times daily (before meals)       FLUoxetine 40 MG capsule    PROzac    30 capsule    Take 1 capsule (40 mg) by mouth daily       fluticasone 50 MCG/ACT spray    FLONASE    16 g    Spray 1 spray into both nostrils daily       furosemide 20 MG tablet    LASIX    60 tablet    Take 1 tablet (20 mg) by mouth 2 times daily       GEL-SASHA DENTINBLOC DT      Apply  to affected area. GEL. Apply to 2nd molar on bottom right daily at bedtime.       GLUCAGON EMERGENCY 1 MG kit   Generic drug:  glucagon      Inject  as directed. 1 mL as needed for signs of hypoglycemia. May repeat as needed in 15 minutes.       HYDROcodone-acetaminophen 5-325 MG per tablet    NORCO     Take 1 tablet by mouth every 6 hours as needed 1-2 tabs       Incontinence Brief Large Misc     60 Units    2 Units 2 times daily       insulin glargine 100 UNIT/ML injection    LANTUS    8 mL    Inject 24 Units Subcutaneous At Bedtime       * LACRI-LUBE OP      Apply  to eye. Place 0.5 inches into both eyes at bedtime       * artificial tears Oint ophthalmic ointment      At  Bedtime       LACTAID 3000 UNIT tablet   Generic drug:  lactase      Take 4 tabs daily with meals.       levothyroxine 175 MCG tablet    SYNTHROID/LEVOTHROID    30 tablet    Take 1 tablet (175 mcg) by mouth daily Give on empty stomach       LORazepam 1 MG tablet    ATIVAN    35 tablet    Take 1 tablet (1 mg) by mouth At Bedtime .  May take additional 1mg daily PRN for agitation.       losartan 100 MG tablet    COZAAR    90 tablet    Take 1 tablet by mouth daily.       metFORMIN 500 MG 24 hr tablet    GLUCOPHAGE-XR    90 tablet    Take 2 tablets (1,000 mg) by mouth 2 times daily (with meals)       metroNIDAZOLE 500 MG tablet    FLAGYL    14 tablet    Take 1 tablet (500 mg) by mouth 2 times daily       MILK OF MAGNESIA PO      Take  by mouth. Take 30 mL as needed for constipation.       NEURONTIN PO      Take 900 mg by mouth 3 times daily.       nystatin 173566 UNIT/GM Powd    MYCOSTATIN    60 g    Apply topically 3 times daily as needed       polyethylene glycol powder    MIRALAX/GLYCOLAX     Take 1 capful by mouth 2 times daily. 17 GM PO BID       * PREVIDENT 5000 PLUS 1.1 % Crea   Generic drug:  Sodium Fluoride      Apply  to affected area every evening.       * PREVIDENT MT      Take  by mouth. Use as dental rinse at bedtime.       saline 0.9 % Soln      Spray 2 sprays in nostril as needed.       SENNA S 8.6-50 MG per tablet   Generic drug:  senna-docusate      Take 2 tablets by mouth At Bedtime.       solifenacin 10 MG tablet    VESICARE    30 tablet    Take 1 tablet (10 mg) by mouth daily       * TYLENOL 325 MG tablet   Generic drug:  acetaminophen      Take 1-2 tablets by mouth every 6 hours as needed.       * acetaminophen 500 MG tablet    TYLENOL    1 Bottle    Take 2 tablets (1,000 mg) by mouth every 6 hours as needed       ziprasidone 40 MG capsule    GEODON    60 capsule    Take 1 capsule (40 mg) by mouth 2 times daily (with meals)       * Notice:  This list has 6 medication(s) that are the same as other  medications prescribed for you. Read the directions carefully, and ask your doctor or other care provider to review them with you.

## 2017-03-16 NOTE — PROGRESS NOTES
PSYCHIATRY CLINIC PROGRESS NOTE   30 minute medication management     INTERIM HISTORY                                                 PSYCH CRITICAL ITEM HISTORY:  Mental health issues were first experienced in her 20's and she first received mental health care at that time.  Critical items include psychosis [sxs include unclear], ECT and psych hosp (3-5).     Pinky Page is a 66 year old female who was last seen in clinic on 2/10/2017 at which time no changes were made. The patient reports good treatment adherence.  History was provided by the patient who was a good historian.  Since the last visit:  - Has had a cold for over a week, this has made it difficult to do things like exercise, feeling somewhat down about this but is hopeful that she is now recovering  - Insurance company is going to stop her overnight oxygen.  Is following with her sleep medicine provider, will see her on 3/21.  May restart CPAP.  - Co resident is still picking leaves of the plants Pinky cares for.  This bothers her, but she is handling it well.  - Still has trouble falling asleep, has stopped watching TV before bedtime and only one soda per day and one coffee per day before 3 pm.  - Mood good, no evidence of psychosis    RECENT SYMPTOMS:   DEPRESSION:  depressed mood, anhedonia, insomnia , appetite change, low energy, poor concentration /memory and (degree of these symptoms is mild);  DENIES- psychomotor retardation/ agitation observed by others and suicidal ideation   PSYCHOSIS:  none;  DENIES- hallucinations and delusions    SUBSTANCE USE:     ALCOHOL-  never       TOBACCO- none        CAFFEINE- 1-2 sodas/coffees per day                  CANNABIS- none  OTHER ILLICIT DRUGS- none    Financial Support- social security disability     Living Situation- [facility]          Children- none     MEDICAL ROS:  Reports none.    Denies muscle twitches, excessive diaphoresis, restlessness, tremor and shiver and  akathisia, dystonia, apparent TD, muscle stiffness, tremor [action or resting] and polyphagia.    PSYCH and CD Critical Summary Points since July 2016           None    PAST PSYCH MED TRIALS   see EMR Problem List: Hx of psychiatric care    MEDICAL / SURGICAL HISTORY                                   CARE TEAM:          PCP- Dr. Kelley, Ilana                   Therapist- Dr. Gr (monthly)    Patient Active Problem List   Diagnosis     Allergic rhinitis due to pollen     Urge incontinence     Hypertension, essential     Cardiomegaly     Chronic constipation     Dry eye syndrome     Esophageal reflux     Exposure keratoconjunctivitis     DM ophthalmopathy (H)     Hypothyroidism     Senile cataract     Severe obstructive sleep apnea     Postmenopausal atrophic vaginitis     Restless leg syndrome     Squamous blepharitis     Morbid obesity due to excess calories (H)     Personal history of breast cancer s/p L masectomy     Diabetes mellitus type 2, insulin dependent (H)     Hypercholesteremia     Lives in group home     Extrapyramidal and movement disorder     CKD (chronic kidney disease) stage 2, GFR 60-89 ml/min     Solitary kidney, congenital     S/P hysterectomy     Impingement syndrome of right shoulder     Hypertriglyceridemia     Candidiasis of skin     Generalized anxiety disorder     Carpal tunnel syndrome of left wrist     Schizoaffective disorder, depressive type (H)     Health Care Home     Major depressive disorder, recurrent episode, moderate (H)     Psychological factors affecting medical condition     Hx of psychiatric care     Nail complaint       ALLERGY                                Chlordiazepoxide hcl; Dimetapp dm cold-cough; Haldol; Ibuprofen; Lactose intolerance [beta-galactosidase]; Milk products; and Propofol  MEDICATIONS                               Current Outpatient Prescriptions   Medication Sig Dispense Refill     FLUoxetine (PROZAC) 40 MG capsule Take 1 capsule (40 mg) by mouth  daily 30 capsule 2     ziprasidone (GEODON) 40 MG capsule Take 1 capsule (40 mg) by mouth 2 times daily (with meals) 60 capsule 2     LORazepam (ATIVAN) 1 MG tablet Take 1 tablet (1 mg) by mouth At Bedtime .  May take additional 1mg daily PRN for agitation. 35 tablet 1     blood glucose monitoring (NO BRAND SPECIFIED) test strip Use to test blood sugar 3 times daily or as directed. 100 each 3     exenatide (BYETTA) 10 MCG/0.04ML injection Inject 10 mcg Subcutaneous 2 times daily (before meals) 1 Syringe 11     insulin glargine (LANTUS) 100 UNIT/ML injection Inject 24 Units Subcutaneous At Bedtime 8 mL 11     metroNIDAZOLE (FLAGYL) 500 MG tablet Take 1 tablet (500 mg) by mouth 2 times daily 14 tablet 0     amLODIPine (NORVASC) 5 MG tablet Take 2 tablets (10 mg) by mouth daily 30 tablet 3     nystatin (MYCOSTATIN) 245684 UNIT/GM POWD Apply topically 3 times daily as needed 60 g 1     clotrimazole (LOTRIMIN) 1 % cream Apply topically 2 times daily as needed 50 g 0     fluticasone (FLONASE) 50 MCG/ACT nasal spray Spray 1 spray into both nostrils daily 16 g 3     furosemide (LASIX) 20 MG tablet Take 1 tablet (20 mg) by mouth 2 times daily 60 tablet 3     acetaminophen (TYLENOL) 500 MG tablet Take 2 tablets (1,000 mg) by mouth every 6 hours as needed 1 Bottle 2     solifenacin (VESICARE) 10 MG tablet Take 1 tablet (10 mg) by mouth daily 30 tablet 6     HYDROcodone-acetaminophen (NORCO) 5-325 MG per tablet Take 1 tablet by mouth every 6 hours as needed 1-2 tabs       atorvastatin (LIPITOR) 40 MG tablet Take 1 tablet (40 mg) by mouth daily 90 tablet 1     calcium polycarbophil (FIBERCON) 625 MG tablet Take 2 tablets by mouth daily       levothyroxine (SYNTHROID, LEVOTHROID) 175 MCG tablet Take 1 tablet (175 mcg) by mouth daily Give on empty stomach 30 tablet 4     metFORMIN (GLUCOPHAGE-XR) 500 MG 24 hr tablet Take 2 tablets (1,000 mg) by mouth 2 times daily (with meals) (Patient taking differently: Take 2,000 mg by mouth 2  times daily (with meals) ) 90 tablet 3     Incontinence Supply Disposable (INCONTINENCE BRIEF LARGE) MISC 2 Units 2 times daily 60 Units 11     Artificial Tear Ointment (ARTIFICIAL TEARS) OINT ophthalmic ointment At Bedtime       losartan (COZAAR) 100 MG tablet Take 1 tablet by mouth daily. 90 tablet 1     Sodium Fluoride (PREVIDENT 5000 PLUS) 1.1 % CREA Apply  to affected area every evening.       Hypromellose (ARTIFICIAL TEARS OP) Apply 1 drop to eye 4 times daily.       Gabapentin (NEURONTIN PO) Take 900 mg by mouth 3 times daily.       senna-docusate (SENNA S) 8.6-50 MG per tablet Take 2 tablets by mouth At Bedtime.       polyethylene glycol (MIRALAX/GLYCOLAX) powder Take 1 capful by mouth 2 times daily. 17 GM PO BID       acetaminophen (TYLENOL) 325 MG tablet Take 1-2 tablets by mouth every 6 hours as needed.       Artificial Tear Ointment (LACRI-LUBE OP) Apply  to eye. Place 0.5 inches into both eyes at bedtime       Skin Protectants, Misc. (EUCERIN) cream Apply  topically as needed. Apply to thigh PRN dry skin        Saline 0.9 % SOLN Spray 2 sprays in nostril as needed.       Calcium Carbonate-Vitamin D (CALCIUM-VITAMIN D) 250-125 MG-UNIT TABS Take 1 tablet by mouth 2 times daily. Calcium 250 mg/Vit D 125 IU       Sod Fluor-Solo Fluor-Hydrfl Ac (GEL-SASHA DENTINBLOC DT) Apply  to affected area. GEL. Apply to 2nd molar on bottom right daily at bedtime.       lactase (LACTAID) 3000 UNIT tablet Take 4 tabs daily with meals.       Magnesium Hydroxide (MILK OF MAGNESIA PO) Take  by mouth. Take 30 mL as needed for constipation.       Sodium Fluoride, [id number], (PREVIDENT MT) Take  by mouth. Use as dental rinse at bedtime.       CLARITIN 10 MG OR TABS 1 TAB PO QD (Once per day) as needed for ALLERGY SYMPTOMS 30 11     ASPIRIN 81 MG OR TABS Take 1 tablet by mouth daily. ENTERIC COATED. 100 3     GLUCAGON EMERGENCY 1 MG IJ KIT Inject  as directed. 1 mL as needed for signs of hypoglycemia. May repeat as needed in 15  minutes.  0       VITALS   /77  Pulse 81  Wt 111.4 kg (245 lb 9.6 oz)  BMI 42.16 kg/m2   MENTAL STATUS EXAM                                                             Alertness: alert  and oriented  Appearance: well groomed  Behavior/Demeanor: cooperative, pleasant and calm, with good  eye contact  Speech: normal  Language: intact  Psychomotor: normal or unremarkable  Mood:  description consistent with euthymia  Affect: full range; was congruent to mood; was congruent to content  Thought Process/Associations: unremarkable  Thought Content:  Denies suicidal ideation, violent ideation and psychotic thought  Perception:  Denies hallucinations  Insight: adequate  Judgment: good  Cognition:  does appear grossly intact; formal cognitive testing was not done    LABS and DATA     EKG  8/17/16: NSR,   AIMS 12/28/16: 0    ANTIPSYCHOTIC LABS   [glu, A1C, lipids (focus LDL), liver enzymes, WBC, ANEU, Hgb, plts]    q12 mo  Recent Labs   Lab Test  12/08/16   1405  08/16/16   1431  06/22/16   1035   GLC   --   245.9*  164*   A1C  7.0*  6.9*   --      Recent Labs   Lab Test  02/22/16   1028  01/22/15   1600   CHOL  162.0  163   TRIG  151.9*  252*   LDL  95  71   HDL  36.6*  42*     Recent Labs   Lab Test  01/22/15   1600  07/04/14   0705   AST  18  19   ALT  35  27   ALKPHOS  113  96     Recent Labs   Lab Test  06/22/16   1035  03/10/16   1340  07/04/14   0705   03/18/10   0535   WBC  9.9   --   7.7   < >  12.6*   ANEU   --    --   4.8   --   9.8*   HGB  13.5  13.2  13.3   < >  10.7*   PLT  266   --   223   < >  356    < > = values in this interval not displayed.       PHQ9 TODAY =6  PHQ-9 SCORE 11/21/2016 12/28/2016 2/10/2017   Total Score 8 7 7       PSYCHIATRIC DIAGNOSES                                                                                                 Schizoaffective disorder, depressed type, multiple episodes  Generalized anxiety disorder, well-managed    ASSESSMENT                                    Pertinent Background:   See most recent Transfer Evaluation as dated above.  Additionally, historically did have increasing psychotic experiences with AP taper.       TODAY: Pinky reports stability of symptoms with no active mood or psychosis symptoms.  There is some stress at her residence which is is managing well.  No medication change today.  Future considerations may include CBT-I (or discussing this with her current therapist).  Will plan to see one more time prior to my maternity leave, she would also like to be seen by covering resident while I am on leave.    Abnormal labs/vital: [facility] closely [facility] for comorbid medical problems.  Also hx of elevated triglycerides and DM II.  [facility] is following, will defer additional testing and management to them. Have ordered repeat AP labs to have completed prior to next visit.    CONTROLLED SUBSTANCE STATEMENT:  The use of  Lorazepam (Ativan) is indicated and appropriate.  This regimen is both beneficial and well tolerated with no adverse effects or tolerance.  There is no evidence of abuse of this medication or other substances.  Warnings related to abuse potential, street value, adverse effects, abrupt cessation, withdrawal and need for emergent care have been discussed and are understood by the patient.  The patient has verbalized clear understanding of safety issues as well as the need to control use.  Plan to continue use at this time.  Controlled Substance Contract was completed 8/17/16.    PSYCHOTROPIC DRUG INTERACTIONS:   ZIPRASIDONE and FLUOXETINE may result in increased risk of QT-interval prolongation and increased risk of serotonin syndrome.   ZIPRASIDONE, FLUOXETINE and VESICARE may result in increased risk of QT-interval prolongation.  ASPIRIN and SSRI may result in an increased risk of bleeding  LEVOTHYROXINE and FLUOXETINE may result in increased levothyroxine requirements.  MANAGEMENT:  Monitoring for adverse effects,  periodic EKGs and patient is aware of risks      PLAN                                                                                                       1) PSYCHOTROPIC MEDICATIONS:      - continue fluoxetine 40 mg qD      - continue ziprasidone 40 BID with meals       - continue lorazepam 1 mg qHS and 1 mg prn for agitation, no refill needed today    2) THERAPY:  Continue with Dr. Gr    3) LABS NEXT DUE: Repeat HgbA1c, fasting lipids, CMP and CBC, orders have been placed, pt prefers to have these labs done at \[facility]\""       RATING SCALES:    AIMS next in in June/July 2017    4) REFERRALS [CD, medical, other]: None    5) :  none    6) RTC: 4 weeks    7) CRISIS NUMBERS: Provided routinely in AVS      TREATMENT RISK STATEMENT:  The risks, benefits, alternatives and potential adverse effects have been discussed and are understood by the patient/ patient's guardian. The pt understands the risks of using street drugs or alcohol.  There are no medical contraindications, the pt agrees to treatment with the ability to do so.  The patient understands to call 911 or come to the nearest ED if life threatening or urgent symptoms present.     RESIDENT:   Divine Jack MD    Patient staffed in clinic with Dr. Rubio who will sign the note.  Supervisor is Dr. Valero.  I saw the patient with the resident, and participated in key portions of the service, including the mental status examination and developing the plan of care. I reviewed key portions of the history with the resident. I agree with the findings and plan as documented in this note.    Roxy Rubio

## 2017-03-16 NOTE — NURSING NOTE
Chief Complaint   Patient presents with     Recheck Medication     Schizoaffective disorder, depressive type    Reviewed allergies, smoking status, and pharmacy preference  Administered abuse screening questions   Obtained weight, blood pressure and heart rate

## 2017-03-17 ENCOUNTER — TELEPHONE (OUTPATIENT)
Dept: PSYCHIATRY | Facility: CLINIC | Age: 67
End: 2017-03-17

## 2017-03-17 NOTE — LETTER
March 17, 2017      RE: Pinky Page  [address] [address]         To Whom It May Concern:     Patient was seen by Dr. Jack yesterday, 3/16/17.     The following labs were ordered: CBC with platelets and differential                                                       Complete Metabolic Panel                                                       Lipid reflex to direct LDL panel                                                       Hemoglobin A1C    If you have any other questions please feel free to call [phone].                                    Sincerely,      Last Pablo RN with Dr. Jack

## 2017-03-17 NOTE — TELEPHONE ENCOUNTER
Last appt. 3/16/17    Per last office note:    LABS NEXT DUE: Repeat HgbA1c, fasting lipids, CMP and CBC    Letter drafted and faxed to Narendra Truong.

## 2017-03-17 NOTE — TELEPHONE ENCOUNTER
----- Message from Aliza Vargas RN sent at 3/17/2017  3:32 PM CDT -----  Regarding: FW: Labs  Contact: [phone]      ----- Message -----     From: Quiana Evans     Sent: 3/17/2017   2:02 PM       To: Aliza Vargas RN  Subject: Sylvester Abrams/Guero Anaya from [facility] called, she said Pinky was ordered to have some labs that they can run for her they just need to know which labs. She can be reached at [phone], and her fax number is [phone]

## 2017-03-18 ASSESSMENT — PATIENT HEALTH QUESTIONNAIRE - PHQ9: SUM OF ALL RESPONSES TO PHQ QUESTIONS 1-9: 6

## 2017-03-21 ENCOUNTER — THERAPY VISIT (OUTPATIENT)
Dept: SLEEP MEDICINE | Facility: CLINIC | Age: 67
End: 2017-03-21
Attending: INTERNAL MEDICINE
Payer: COMMERCIAL

## 2017-03-21 ENCOUNTER — OFFICE VISIT (OUTPATIENT)
Dept: FAMILY MEDICINE | Facility: CLINIC | Age: 67
End: 2017-03-21

## 2017-03-21 ENCOUNTER — TELEPHONE (OUTPATIENT)
Dept: PSYCHIATRY | Facility: CLINIC | Age: 67
End: 2017-03-21

## 2017-03-21 VITALS
TEMPERATURE: 97.7 F | RESPIRATION RATE: 18 BRPM | DIASTOLIC BLOOD PRESSURE: 83 MMHG | OXYGEN SATURATION: 97 % | WEIGHT: 245.8 LBS | BODY MASS INDEX: 42.19 KG/M2 | HEART RATE: 63 BPM | SYSTOLIC BLOOD PRESSURE: 131 MMHG

## 2017-03-21 DIAGNOSIS — G47.33 SEVERE OBSTRUCTIVE SLEEP APNEA: ICD-10-CM

## 2017-03-21 DIAGNOSIS — R80.9 TYPE 2 DIABETES MELLITUS WITH MICROALBUMINURIA, WITH LONG-TERM CURRENT USE OF INSULIN (H): ICD-10-CM

## 2017-03-21 DIAGNOSIS — E11.29 TYPE 2 DIABETES MELLITUS WITH MICROALBUMINURIA, WITH LONG-TERM CURRENT USE OF INSULIN (H): ICD-10-CM

## 2017-03-21 DIAGNOSIS — I10 HYPERTENSION, ESSENTIAL: Primary | ICD-10-CM

## 2017-03-21 DIAGNOSIS — R80.9 MICROALBUMINURIA DUE TO TYPE 2 DIABETES MELLITUS (H): ICD-10-CM

## 2017-03-21 DIAGNOSIS — E11.9 DIABETES MELLITUS, TYPE II, INSULIN DEPENDENT (H): ICD-10-CM

## 2017-03-21 DIAGNOSIS — F25.1 SCHIZOAFFECTIVE DISORDER, DEPRESSIVE TYPE (H): ICD-10-CM

## 2017-03-21 DIAGNOSIS — Z79.4 TYPE 2 DIABETES MELLITUS WITH MICROALBUMINURIA, WITH LONG-TERM CURRENT USE OF INSULIN (H): ICD-10-CM

## 2017-03-21 DIAGNOSIS — Z79.4 DIABETES MELLITUS, TYPE II, INSULIN DEPENDENT (H): ICD-10-CM

## 2017-03-21 DIAGNOSIS — Z51.81 ENCOUNTER FOR THERAPEUTIC DRUG MONITORING: ICD-10-CM

## 2017-03-21 DIAGNOSIS — E11.29 MICROALBUMINURIA DUE TO TYPE 2 DIABETES MELLITUS (H): ICD-10-CM

## 2017-03-21 LAB
ALBUMIN SERPL-MCNC: 3.4 G/DL (ref 3.4–5)
ALP SERPL-CCNC: 92 U/L (ref 40–150)
ALT SERPL W P-5'-P-CCNC: 20 U/L (ref 0–50)
ANION GAP SERPL CALCULATED.3IONS-SCNC: 9 MMOL/L (ref 3–14)
AST SERPL W P-5'-P-CCNC: 13 U/L (ref 0–45)
BASE EXCESS BLDA CALC-SCNC: 5.8 MMOL/L
BASOPHILS # BLD AUTO: 0 10E9/L (ref 0–0.2)
BASOPHILS NFR BLD AUTO: 0.3 %
BILIRUB SERPL-MCNC: 0.2 MG/DL (ref 0.2–1.3)
BUN SERPL-MCNC: 14 MG/DL (ref 7–30)
CALCIUM SERPL-MCNC: 9.4 MG/DL (ref 8.5–10.1)
CHLORIDE SERPL-SCNC: 101 MMOL/L (ref 94–109)
CHOLEST SERPL-MCNC: 142 MG/DL
CO2 SERPL-SCNC: 28 MMOL/L (ref 20–32)
CREAT SERPL-MCNC: 0.75 MG/DL (ref 0.52–1.04)
CREAT UR-MCNC: 54 MG/DL
DIFFERENTIAL METHOD BLD: ABNORMAL
EOSINOPHIL # BLD AUTO: 0.2 10E9/L (ref 0–0.7)
EOSINOPHIL NFR BLD AUTO: 1.4 %
ERYTHROCYTE [DISTWIDTH] IN BLOOD BY AUTOMATED COUNT: 13.9 % (ref 10–15)
GFR SERPL CREATININE-BSD FRML MDRD: 77 ML/MIN/1.7M2
GLUCOSE SERPL-MCNC: 118 MG/DL (ref 70–99)
HBA1C MFR BLD: 6.6 % (ref 4.3–6)
HCO3 BLD-SCNC: 30 MMOL/L (ref 21–28)
HCT VFR BLD AUTO: 40.2 % (ref 35–47)
HDLC SERPL-MCNC: 40 MG/DL
HGB BLD-MCNC: 13.4 G/DL (ref 11.7–15.7)
IMM GRANULOCYTES # BLD: 0 10E9/L (ref 0–0.4)
IMM GRANULOCYTES NFR BLD: 0.2 %
LDLC SERPL CALC-MCNC: 78 MG/DL
LYMPHOCYTES # BLD AUTO: 2.6 10E9/L (ref 0.8–5.3)
LYMPHOCYTES NFR BLD AUTO: 22.1 %
MCH RBC QN AUTO: 30.5 PG (ref 26.5–33)
MCHC RBC AUTO-ENTMCNC: 33.3 G/DL (ref 31.5–36.5)
MCV RBC AUTO: 92 FL (ref 78–100)
MICROALBUMIN UR-MCNC: 18 MG/L
MICROALBUMIN/CREAT UR: 32.47 MG/G CR (ref 0–25)
MONOCYTES # BLD AUTO: 0.6 10E9/L (ref 0–1.3)
MONOCYTES NFR BLD AUTO: 5 %
NEUTROPHILS # BLD AUTO: 8.3 10E9/L (ref 1.6–8.3)
NEUTROPHILS NFR BLD AUTO: 71 %
NONHDLC SERPL-MCNC: 102 MG/DL
NRBC # BLD AUTO: 0 10*3/UL
NRBC BLD AUTO-RTO: 0 /100
O2/TOTAL GAS SETTING VFR VENT: 21 %
OXYHGB MFR BLD: 91 % (ref 92–100)
PCO2 BLD: 44 MM HG (ref 35–45)
PH BLD: 7.45 PH (ref 7.35–7.45)
PLATELET # BLD AUTO: 266 10E9/L (ref 150–450)
PO2 BLD: 59 MM HG (ref 80–105)
POTASSIUM SERPL-SCNC: 4 MMOL/L (ref 3.4–5.3)
PROT SERPL-MCNC: 7.2 G/DL (ref 6.8–8.8)
RBC # BLD AUTO: 4.39 10E12/L (ref 3.8–5.2)
SODIUM SERPL-SCNC: 138 MMOL/L (ref 133–144)
TRIGL SERPL-MCNC: 121 MG/DL
WBC # BLD AUTO: 11.8 10E9/L (ref 4–11)

## 2017-03-21 PROCEDURE — 40000275 ZZH STATISTIC RCP TIME EA 10 MIN: Mod: ZF

## 2017-03-21 PROCEDURE — 95810 POLYSOM 6/> YRS 4/> PARAM: CPT | Mod: ZF

## 2017-03-21 PROCEDURE — 82805 BLOOD GASES W/O2 SATURATION: CPT | Performed by: INTERNAL MEDICINE

## 2017-03-21 NOTE — PATIENT INSTRUCTIONS
Here is the plan from today's visit    Hypertension  - will change timing of losartan to bedtime rather than morning  - come in 3 weeks to check your blood pressure  - continue to check your blood pressure once a week     Diabetes mellitus, type II, insulin dependent (H)  - Hemoglobin A1c  - Albumin Random Urine Quantitative    Schizoaffective disorder, depressive type (H)  - Lipid Profile    Encounter for therapeutic drug monitoring  - Comprehensive Metabolic Panel  - CBC with Platelets Differential ([facility])    Type 2 diabetes mellitus with microalbuminuria, with long-term current use of insulin (H)  - blood glucose monitoring (NO BRAND SPECIFIED) test strip; Use to test blood sugar 3 times daily or as directed.  Dispense: 100 each; Refill: 3    Follow up plan  Please make a clinic appointment for follow up with me (GALA DIA) in 3  weeks for blood pressure followup.    Thank you for coming to [facility] today.  Lab Testing:  **If you had lab testing today and your results are reassuring or normal they will be mailed to you or sent through [facility] within 7 days.   **If the lab tests need quick action we will call you with the results.  The phone number we will call with results is # [phone] (home) [phone] (work). If this is not the best number please call our clinic and change the number.  Medication Refills:  If you need any refills please call your pharmacy and they will contact us.   If you need to  your refill at a new pharmacy, please contact the new pharmacy directly. The new pharmacy will help you get your medications transferred faster.   Scheduling:  If you have any concerns about today's visit or wish to schedule another appointment please call our office during normal business hours [phone] (8-5:00 M-F)  If a referral was made to a [facility] and you don't get a call from central scheduling please call [phone].  If a Mammogram was  ordered for you at [facility] call [phone] to schedule or change your appointment.  If you had an XRay/CT/Ultrasound/MRI ordered the number is [phone] to schedule or change your radiology appointment.   Medical Concerns:  If you have urgent medical concerns please call [phone] at any time of the day.  If you have a medical emergency please call [phone].

## 2017-03-21 NOTE — PROGRESS NOTES
HPI:       Pinky Page is a 66 year old who presents for the following  Patient presents with:  RECHECK: F/U BP and breast exam before mammogram      Hypertension Follow-up      Outpatient blood pressures are being checked at home.  Results are 130//71.    Chest Pain? :No     Low Salt Diet: low salt    Daily NSAID Use? no    Did patient take their HTN pills today/last night as usual?  Yes       Adherence and Exercise  Medication side effects: no  How often is a medication missed? Never  Are you able to follow the treatment plan? Yes  Exercise:walking  and Walking 4-5 days/week for an average of 30-45 minutes     Diabetes Follow-up    Patient is checking blood sugars: twice daily.    Blood sugar testing frequency justification: blood sugar monitoring on insulin  Results are as follows:         am - low 100s to mid 100s         suppertime - low to mid 100s    Diabetic concerns: None     Symptoms of hypoglycemia (low blood sugar): none     Paresthesias (numbness or burning in feet) or sores: No     Date of last diabetic eye exam:        Amount of exercise or physical activity: 6-7 days/week for an average of 15-30 minutes    Problems taking medications regularly: No    Medication side effects: none    Diet: no added salt     Has mammogram scheduled for 4/3  Sleep study tonight 3/21.     Problem, Medication and Allergy Lists were reviewed and are current.  Patient is an established patient of this clinic.         Review of Systems:   Review of Systems   Constitutional: Negative for appetite change, chills, fatigue and fever.   HENT: Negative for congestion, sinus pressure and sore throat.    Eyes: Negative for visual disturbance.   Respiratory: Negative for cough, shortness of breath and wheezing.    Cardiovascular: Negative for chest pain and leg swelling.   Gastrointestinal: Negative for abdominal distention, abdominal pain, blood in stool, constipation, diarrhea, nausea and vomiting.   Genitourinary:  Negative for dysuria and hematuria.   Musculoskeletal: Negative for arthralgias and myalgias.   Skin: Negative for color change and rash.   Neurological: Negative for weakness and headaches.             Physical Exam:     Patient Vitals for the past 24 hrs:   BP Temp Temp src Pulse Resp SpO2 Weight   03/21/17 1044 131/83 97.7  F (36.5  C) Oral 63 18 97 % 245 lb 12.8 oz (111.5 kg)     Body mass index is 42.19 kg/(m^2).  Vitals were reviewed and were normal     Physical Exam   Constitutional: She is oriented to person, place, and time. She appears well-developed and well-nourished. No distress.   HENT:   Head: Normocephalic and atraumatic.   Nose: Nose normal.   Eyes: Conjunctivae are normal. No scleral icterus.   Neck: Normal range of motion. Neck supple.   Cardiovascular: Normal rate, regular rhythm and normal heart sounds.  Exam reveals no gallop and no friction rub.    No murmur heard.  Pulmonary/Chest: Effort normal and breath sounds normal. No respiratory distress. She has no wheezes. She exhibits no mass. Right breast exhibits no inverted nipple, no mass, no nipple discharge, no skin change and no tenderness.   Left mastectomy incision c/d/i and very well healed.   Abdominal: Soft. Bowel sounds are normal. There is no tenderness.   Musculoskeletal: She exhibits no edema.   Lymphadenopathy:     She has no cervical adenopathy.   Neurological: She is alert and oriented to person, place, and time.   Skin: Skin is warm. No rash noted. She is not diaphoretic.   Psychiatric: She has a normal mood and affect.         Results:     Results for orders placed or performed in visit on 03/21/17   Hemoglobin A1c   Result Value Ref Range    Hemoglobin A1C 6.6 (H) 4.3 - 6.0 %   Lipid Profile   Result Value Ref Range    Cholesterol 142 <200 mg/dL    Triglycerides 121 <150 mg/dL    HDL Cholesterol 40 (L) >49 mg/dL    LDL Cholesterol Calculated 78 <100 mg/dL    Non HDL Cholesterol 102 <130 mg/dL   Comprehensive Metabolic Panel    Result Value Ref Range    Sodium 138 133 - 144 mmol/L    Potassium 4.0 3.4 - 5.3 mmol/L    Chloride 101 94 - 109 mmol/L    Carbon Dioxide 28 20 - 32 mmol/L    Anion Gap 9 3 - 14 mmol/L    Glucose 118 (H) 70 - 99 mg/dL    Urea Nitrogen 14 7 - 30 mg/dL    Creatinine 0.75 0.52 - 1.04 mg/dL    GFR Estimate 77 >60 mL/min/1.7m2    GFR Estimate If Black >90   GFR Calc   >60 mL/min/1.7m2    Calcium 9.4 8.5 - 10.1 mg/dL    Bilirubin Total 0.2 0.2 - 1.3 mg/dL    Albumin 3.4 3.4 - 5.0 g/dL    Protein Total 7.2 6.8 - 8.8 g/dL    Alkaline Phosphatase 92 40 - 150 U/L    ALT 20 0 - 50 U/L    AST 13 0 - 45 U/L   CBC with Platelets Differential ([facility])   Result Value Ref Range    WBC 11.8 (H) 4.0 - 11.0 10e9/L    RBC Count 4.39 3.8 - 5.2 10e12/L    Hemoglobin 13.4 11.7 - 15.7 g/dL    Hematocrit 40.2 35.0 - 47.0 %    MCV 92 78 - 100 fl    MCH 30.5 26.5 - 33.0 pg    MCHC 33.3 31.5 - 36.5 g/dL    RDW 13.9 10.0 - 15.0 %    Platelet Count 266 150 - 450 10e9/L    Diff Method Automated Method     % Neutrophils 71.0 %    % Lymphocytes 22.1 %    % Monocytes 5.0 %    % Eosinophils 1.4 %    % Basophils 0.3 %    % Immature Granulocytes 0.2 %    Nucleated RBCs 0 0 /100    Absolute Neutrophil 8.3 1.6 - 8.3 10e9/L    Absolute Lymphocytes 2.6 0.8 - 5.3 10e9/L    Absolute Monocytes 0.6 0.0 - 1.3 10e9/L    Absolute Eosinophils 0.2 0.0 - 0.7 10e9/L    Absolute Basophils 0.0 0.0 - 0.2 10e9/L    Abs Immature Granulocytes 0.0 0 - 0.4 10e9/L    Absolute Nucleated RBC 0.0    Albumin Random Urine Quantitative   Result Value Ref Range    Creatinine Urine 54 mg/dL    Albumin Urine mg/L 18 mg/L    Albumin Urine mg/g Cr 32.47 (H) 0 - 25 mg/g Cr       Assessment and Plan     Pinky Page is a 67 yo female with a PMH of HTN, CKD, DM2, hypothyroid, s/p breast CA with left masectomy 1996, and significant psych history who currently lives in group home who presents to the clinic for HTN and DM follow up.     Diagnoses and all orders for this  visit:    Hypertension, has been well controlled at her previous office visits. Intermittently hypertensive at home, recorded at time patient is taking AM losartan. Suspect that patient's recorded blood pressure is higher due to timing of medication as she continues to have normal BP during her office visits.    - will change timing of losartan to bedtime rather than morning  - come in 3 weeks to check your blood pressure  - continue to check your blood pressure once a week     Diabetes mellitus, type II, insulin dependent (H)  -     Hemoglobin A1c - 6.6  -     Albumin Random Urine Quantitative - 32.47    Schizoaffective disorder, depressive type (H)  -     Lipid Profile    Encounter for therapeutic drug monitoring  -     Comprehensive Metabolic Panel - wnl  -     CBC with Platelets Differential ([facility]) - mild leukocytosis    Type 2 diabetes mellitus with microalbuminuria, with long-term current use of insulin (H)  -     blood glucose monitoring (NO BRAND SPECIFIED) test strip; Use to test blood sugar 3 times daily or as directed.    Microalbuminuria due to type 2 diabetes mellitus (H)  - will need to continue to have good blood sugar and blood pressure control     There are no discontinued medications.  Options for treatment and follow-up care were reviewed with the patient. Pinky Page  engaged in the decision making process and verbalized understanding of the options discussed and agreed with the final plan.    Shamika Kelley MD  [facility]  [phone]

## 2017-03-21 NOTE — MR AVS SNAPSHOT
After Visit Summary   3/21/2017    Pinky Page    MRN: [id number]           Patient Information     Date Of Birth          1950        Visit Information        Provider Department      3/21/2017 11:00 AM Shamika Dia MD [facility]        Today's Diagnoses     Diabetes mellitus, type II, insulin dependent (H)        Schizoaffective disorder, depressive type (H)        Encounter for therapeutic drug monitoring        Type 2 diabetes mellitus with microalbuminuria, with long-term current use of insulin (H)          Care Instructions    Here is the plan from today's visit    Hypertension  - will change timing of losartan to bedtime rather than morning  - come in 3 weeks to check your blood pressure  - continue to check your blood pressure once a week     Diabetes mellitus, type II, insulin dependent (H)  - Hemoglobin A1c  - Albumin Random Urine Quantitative    Schizoaffective disorder, depressive type (H)  - Lipid Profile    Encounter for therapeutic drug monitoring  - Comprehensive Metabolic Panel  - CBC with Platelets Differential ([facility])    Type 2 diabetes mellitus with microalbuminuria, with long-term current use of insulin (H)  - blood glucose monitoring (NO BRAND SPECIFIED) test strip; Use to test blood sugar 3 times daily or as directed.  Dispense: 100 each; Refill: 3    Follow up plan  Please make a clinic appointment for follow up with me (SHAMIKA DIA) in 3  weeks for blood pressure followup.    Thank you for coming to [facility] today.  Lab Testing:  **If you had lab testing today and your results are reassuring or normal they will be mailed to you or sent through [employer] within 7 days.   **If the lab tests need quick action we will call you with the results.  The phone number we will call with results is # [phone] (home) [phone] (work). If this is not the best number please call our clinic and change the number.  Medication  Refills:  If you need any refills please call your pharmacy and they will contact us.   If you need to  your refill at a new pharmacy, please contact the new pharmacy directly. The new pharmacy will help you get your medications transferred faster.   Scheduling:  If you have any concerns about today's visit or wish to schedule another appointment please call our office during normal business hours [phone] (8-5:00 M-F)  If a referral was made to a [facility] and you don't get a call from central scheduling please call [phone].  If a Mammogram was ordered for you at [facility] call [phone] to schedule or change your appointment.  If you had an XRay/CT/Ultrasound/MRI ordered the number is [phone] to schedule or change your radiology appointment.   Medical Concerns:  If you have urgent medical concerns please call [phone] at any time of the day.  If you have a medical emergency please call 911.          Follow-ups after your visit        Your next 10 appointments already scheduled     Mar 21, 2017  8:00 PM CDT   Psg Split W/Tcm with SLEEP STUDY RM 1   [facility] [place], Sleep Study ([facility])    [address] [place] [id number] [phone]            Mar 28, 2017  1:00 PM CDT   (Arrive by 12:45 PM)   Return Visit with Damon Gr, PhD [facility] ([facility])    [address] [place] [address]  3rd Floor  [facility] [id number] [phone]            Mar 30, 2017 11:00 AM CDT   Return Sleep Patient with Brianda Reddy MD   [facility] [place], Sleep Study ([facility])    [address] [place] [id number] [phone]            Apr 03, 2017  2:15 PM CDT   MA SCREENING DIGITAL BILATERAL with URBCMA1   [facility] ([facility])    [address] [place] [address], [address]  [facility]  [address]   [phone]           Do not use any powder, lotion or deodorant under your arms or on your breast. If you do, we will ask you to remove it before your exam.  Wear comfortable, two-piece clothing.  If you have any allergies, tell your care team.  Bring any previous mammograms from other facilities or have them mailed to the breast center.            Apr 17, 2017  9:30 AM CDT   Adult Med Follow UP with Divine Jack MD   Psychiatry Clinic ([facility])    [place] [id number]  [address] [facility] [address]   [phone]            May 22, 2017 10:30 AM CDT   (Arrive by 10:15 AM)   Return Visit with Marcos Magdaleno MD   [facility] Medical Weight Management ([facility])    [address] [place] [address]  [address] [facility] [id number] [phone]            Jun 21, 2017 10:30 AM CDT   Lab with  LAB   [facility] Lab ([facility])    [address] [place] [address]  [facility] [facility] [address] [phone]            Jun 21, 2017 11:35 AM CDT   (Arrive by 11:20 AM)   Return Visit with Gaby Holliday MD   [facility] Nephrology ([facility])    [address] [place] [address]  [address] [facility] [address] [phone]            Nov 14, 2017  1:30 PM CST   [facility] GENERAL with Michael Lopez MD   Eye Clinic ([facility])    [address] [facility]  [facility] [address] [phone]              Who to contact     Please call your clinic at [phone] to:    Ask questions about your health    Make or cancel appointments    Discuss your medicines    Learn about your test results    Speak to your doctor   If you have compliments or concerns about an experience at your clinic, or if you wish to file a complaint, please contact [facility] Patient Relations at [phone] or email us at  [email or web address]         Additional Information About Your Visit        [employer] Information     [employer] is an electronic gateway that provides easy, online access to your medical records. With [employer], you can request a clinic appointment, read your test results, renew a prescription or communicate with your care team.     To sign up for [employer] visit the website at [email or web address]   You will be asked to enter the access code listed below, as well as some personal information. Please follow the directions to create your username and password.     Your access code is: [email or web address]  Expires: 2017  3:22 PM     Your access code will  in 90 days. If you need help or a new code, please contact your [facility] or call [phone] for assistance.        Care EveryWhere ID     This is your Care EveryWhere ID. This could be used by other organizations to access your [facility] medical records  [id number]        Your Vitals Were     Pulse Temperature Respirations Pulse Oximetry BMI (Body Mass Index)       63 97.7  F (36.5  C) (Oral) 18 97% 42.19 kg/m2        Blood Pressure from Last 3 Encounters:   17 131/83   03/10/17 139/78   17 150/55    Weight from Last 3 Encounters:   17 245 lb 12.8 oz (111.5 kg)   03/10/17 242 lb 6.4 oz (110 kg)   17 249 lb (112.9 kg)              We Performed the Following     Albumin Random Urine Quantitative     CBC with Platelets Differential ([facility])     Comprehensive Metabolic Panel     Hemoglobin A1c     Lipid Profile          Today's Medication Changes          These changes are accurate as of: 3/21/17 11:43 AM.  If you have any questions, ask your nurse or doctor.               These medicines have changed or have updated prescriptions.        Dose/Directions    metFORMIN 500 MG 24 hr tablet   Commonly known as:  GLUCOPHAGE-XR   This may have changed:  how much to take   Used for:  Diabetes mellitus,  type 2 (H)        Dose:  1000 mg   Take 2 tablets (1,000 mg) by mouth 2 times daily (with meals)   Quantity:  90 tablet   Refills:  3                Primary Care Provider Office Phone # Fax #    Shamika Ileana Kelley [phone]       [facility] [address]  [facility] [address]        Thank you!     Thank you for choosing [facility]  for your care. Our goal is always to provide you with excellent care. Hearing back from our patients is one way we can continue to improve our services. Please take a few minutes to complete the written survey that you may receive in the mail after your visit with us. Thank you!             Your Updated Medication List - Protect others around you: Learn how to safely use, store and throw away your medicines at www.disposemymeds.org.          This list is accurate as of: 3/21/17 11:43 AM.  Always use your most recent med list.                   Brand Name Dispense Instructions for use    amLODIPine 5 MG tablet    NORVASC    30 tablet    Take 2 tablets (10 mg) by mouth daily       ARTIFICIAL TEARS OP      Apply 1 drop to eye 4 times daily.       aspirin 81 MG tablet     100    Take 1 tablet by mouth daily. ENTERIC COATED.       atorvastatin 40 MG tablet    LIPITOR    90 tablet    Take 1 tablet (40 mg) by mouth daily       blood glucose monitoring test strip    no brand specified    100 each    Use to test blood sugar 3 times daily or as directed.       calcium polycarbophil 625 MG tablet    FIBERCON     Take 2 tablets by mouth daily       calcium-vitamin D 250-125 MG-UNIT Tabs      Take 1 tablet by mouth 2 times daily. Calcium 250 mg/Vit D 125 IU       CLARITIN 10 MG tablet   Generic drug:  loratadine     30    1 TAB PO QD (Once per day) as needed for ALLERGY SYMPTOMS       clotrimazole 1 % cream    LOTRIMIN    50 g    Apply topically 2 times daily as needed       eucerin cream      Apply  topically as needed. Apply to thigh PRN dry  skin       exenatide 10 MCG/0.04ML injection    BYETTA    1 Syringe    Inject 10 mcg Subcutaneous 2 times daily (before meals)       FLUoxetine 40 MG capsule    PROzac    30 capsule    Take 1 capsule (40 mg) by mouth daily       fluticasone 50 MCG/ACT spray    FLONASE    16 g    Spray 1 spray into both nostrils daily       furosemide 20 MG tablet    LASIX    60 tablet    Take 1 tablet (20 mg) by mouth 2 times daily       GEL-SASHA DENTINBLOC DT      Apply  to affected area. GEL. Apply to 2nd molar on bottom right daily at bedtime.       GLUCAGON EMERGENCY 1 MG kit   Generic drug:  glucagon      Inject  as directed. 1 mL as needed for signs of hypoglycemia. May repeat as needed in 15 minutes.       HYDROcodone-acetaminophen 5-325 MG per tablet    NORCO     Take 1 tablet by mouth every 6 hours as needed 1-2 tabs       Incontinence Brief Large Misc     60 Units    2 Units 2 times daily       insulin glargine 100 UNIT/ML injection    LANTUS    8 mL    Inject 24 Units Subcutaneous At Bedtime       * LACRI-LUBE OP      Apply  to eye. Place 0.5 inches into both eyes at bedtime       * artificial tears Oint ophthalmic ointment      At Bedtime       LACTAID 3000 UNIT tablet   Generic drug:  lactase      Take 4 tabs daily with meals.       levothyroxine 175 MCG tablet    SYNTHROID/LEVOTHROID    30 tablet    Take 1 tablet (175 mcg) by mouth daily Give on empty stomach       LORazepam 1 MG tablet    ATIVAN    35 tablet    Take 1 tablet (1 mg) by mouth At Bedtime .  May take additional 1mg daily PRN for agitation.       losartan 100 MG tablet    COZAAR    90 tablet    Take 1 tablet by mouth daily.       metFORMIN 500 MG 24 hr tablet    GLUCOPHAGE-XR    90 tablet    Take 2 tablets (1,000 mg) by mouth 2 times daily (with meals)       metroNIDAZOLE 500 MG tablet    FLAGYL    14 tablet    Take 1 tablet (500 mg) by mouth 2 times daily       MILK OF MAGNESIA PO      Take  by mouth. Take 30 mL as needed for constipation.       NEURONTIN  PO      Take 900 mg by mouth 3 times daily.       nystatin 088702 UNIT/GM Powd    MYCOSTATIN    60 g    Apply topically 3 times daily as needed       polyethylene glycol powder    MIRALAX/GLYCOLAX     Take 1 capful by mouth 2 times daily. 17 GM PO BID       * PREVIDENT 5000 PLUS 1.1 % Crea   Generic drug:  Sodium Fluoride      Apply  to affected area every evening.       * PREVIDENT MT      Take  by mouth. Use as dental rinse at bedtime.       saline 0.9 % Soln      Spray 2 sprays in nostril as needed.       SENNA S 8.6-50 MG per tablet   Generic drug:  senna-docusate      Take 2 tablets by mouth At Bedtime.       solifenacin 10 MG tablet    VESICARE    30 tablet    Take 1 tablet (10 mg) by mouth daily       * TYLENOL 325 MG tablet   Generic drug:  acetaminophen      Take 1-2 tablets by mouth every 6 hours as needed.       * acetaminophen 500 MG tablet    TYLENOL    1 Bottle    Take 2 tablets (1,000 mg) by mouth every 6 hours as needed       ziprasidone 40 MG capsule    GEODON    60 capsule    Take 1 capsule (40 mg) by mouth 2 times daily (with meals)       * Notice:  This list has 6 medication(s) that are the same as other medications prescribed for you. Read the directions carefully, and ask your doctor or other care provider to review them with you.

## 2017-03-21 NOTE — PROGRESS NOTES
Preceptor Attestation:   Patient seen and discussed with the resident. Assessment and plan reviewed with resident and agreed upon.   Supervising Physician:  Jose Chaudhary MD  [facility]

## 2017-03-21 NOTE — TELEPHONE ENCOUNTER
Allie Denis Michelle, RN      Phone Number: [phone] (Call me)                 Martha from [facility] is calling. States she received an order for labs for this pt and isn't sure if the labs have already been done or not. The number above is her direct line.        Patient had labs drawn at [facility] this a.m. Martha at [facility] notified.

## 2017-03-21 NOTE — MR AVS SNAPSHOT
After Visit Summary   3/21/2017    Pinky Page    MRN: [id number]           Patient Information     Date Of Birth          1950        Visit Information        Provider Department      3/21/2017 8:00 PM SLEEP STUDY RM 1 [facility], Sleep Study        Today's Diagnoses     Severe obstructive sleep apnea          Care Instructions    [facility]    1. Your sleep study will be reviewed by a sleep physician within the next few days.     2. Please follow up in the sleep clinic as scheduled, or, make an appointment with your sleep provider to be seen within two weeks to discuss the results of the sleep study.    3. If you have any questions or problems with your treatment plan, please contact your sleep clinic provider at [phone] to further manage your condition.    4. Please review your attached medication list, and, at your follow-up appointment advise your sleep clinic provider about any changes.    5. Go to [email or web address] for more information about your sleep problems.    YESSENIA Velasco  March 22, 2017              Follow-ups after your visit        Your next 10 appointments already scheduled     Mar 28, 2017  1:00 PM CDT   (Arrive by 12:45 PM)   Return Visit with Damon rG, PhD [facility] ([facility])    [address]  3rd Floor  [facility] [address] [phone]            Mar 30, 2017 11:00 AM CDT   Return Sleep Patient with Brianda Reddy MD   [facility], Sleep Study ([facility])    [address] [place] [id number] [phone]            Apr 03, 2017  2:15 PM CDT   MA SCREENING DIGITAL BILATERAL with URBCMA1   [facility] ([facility])    [address] [place] [address], [address]  [facility] [address] [phone]           Do not use any powder, lotion or deodorant under your arms or on  your breast. If you do, we will ask you to remove it before your exam.  Wear comfortable, two-piece clothing.  If you have any allergies, tell your care team.  Bring any previous mammograms from other facilities or have them mailed to the breast center.            Apr 10, 2017  1:40 PM CDT   Return Visit with MD [facility] ([facility])    [address],  [address]  [facility] [address]   [phone]            Apr 17, 2017  9:30 AM CDT   Adult Med Follow UP with Divine Jack MD   Psychiatry Clinic ([facility])    [address]  [address] [facility] [id number] [phone]            May 22, 2017 10:30 AM CDT   (Arrive by 10:15 AM)   Return Visit with Marcos Magdaleno MD   [facility] Medical Weight Management ([facility])    [address]  4th Floor  [facility] [id number] [phone]            Jun 21, 2017 10:30 AM CDT   Lab with  LAB   [facility] Lab ([facility])    [address]  1st Floor  [facility] [id number] [phone]            Jun 21, 2017 11:35 AM CDT   (Arrive by 11:20 AM)   Return Visit with Gaby Holliday MD   [facility] Nephrology ([facility])    [address]  3rd Floor  [facility] [id number] [phone]            Nov 14, 2017  1:30 PM CST   NEW GENERAL with Michael Lopez MD   Eye Clinic ([facility])    [address]  [address]  9th [address]  [facility] [id number] [phone]              Who to contact     If you have questions or need follow up information about today's clinic visit or your schedule please contact [facility] directly at [phone].  Normal or non-critical lab and imaging results will be communicated to you by MyChart, letter or phone within 4 business days after the clinic has  "received the results. If you do not hear from us within 7 days, please contact the clinic through [employer] or phone. If you have a critical or abnormal lab result, we will notify you by phone as soon as possible.  Submit refill requests through [employer] or call your pharmacy and they will forward the refill request to us. Please allow 3 business days for your refill to be completed.          Additional Information About Your Visit        Lead[employer] Information     [employer] lets you send messages to your doctor, view your test results, renew your prescriptions, schedule appointments and more. To sign up, go to [email or web address] . Click on \"Log in\" on the left side of the screen, which will take you to the Welcome page. Then click on \"Sign up Now\" on the right side of the page.     You will be asked to enter the access code listed below, as well as some personal information. Please follow the directions to create your username and password.     Your access code is: [email or web address]  Expires: 2017  3:22 PM     Your access code will  in 90 days. If you need help or a new code, please call your [facility] clinic or [phone].        Care EveryWhere ID     This is your Care EveryWhere ID. This could be used by other organizations to access your [facility] medical records  [id number]         Blood Pressure from Last 3 Encounters:   17 131/83   03/10/17 139/78   17 150/55    Weight from Last 3 Encounters:   17 111.5 kg (245 lb 12.8 oz)   03/10/17 110 kg (242 lb 6.4 oz)   17 112.9 kg (249 lb)              We Performed the Following     Blood gas arterial and oxyhgb     Comprehensive Sleep Study          Today's Medication Changes          These changes are accurate as of: 3/21/17 11:59 PM.  If you have any questions, ask your nurse or doctor.               These medicines have changed or have updated prescriptions.        Dose/Directions    metFORMIN 500 MG 24 hr tablet   Commonly known as:  " GLUCOPHAGE-XR   This may have changed:  how much to take   Used for:  Diabetes mellitus, type 2 (H)        Dose:  1000 mg   Take 2 tablets (1,000 mg) by mouth 2 times daily (with meals)   Quantity:  90 tablet   Refills:  3                Primary Care Provider Office Phone # Fax Jack    Shamika Ileana Kelley [phone]       [facility] [address]  [facility] [address]        Thank you!     Thank you for choosing [facility], SLEEP STUDY  for your care. Our goal is always to provide you with excellent care. Hearing back from our patients is one way we can continue to improve our services. Please take a few minutes to complete the written survey that you may receive in the mail after your visit with us. Thank you!             Your Updated Medication List - Protect others around you: Learn how to safely use, store and throw away your medicines at www.disposemymeds.org.          This list is accurate as of: 3/21/17 11:59 PM.  Always use your most recent med list.                   Brand Name Dispense Instructions for use    amLODIPine 5 MG tablet    NORVASC    30 tablet    Take 2 tablets (10 mg) by mouth daily       ARTIFICIAL TEARS OP      Apply 1 drop to eye 4 times daily.       aspirin 81 MG tablet     100    Take 1 tablet by mouth daily. ENTERIC COATED.       atorvastatin 40 MG tablet    LIPITOR    90 tablet    Take 1 tablet (40 mg) by mouth daily       blood glucose monitoring test strip    no brand specified    100 each    Use to test blood sugar 3 times daily or as directed.       calcium polycarbophil 625 MG tablet    FIBERCON     Take 2 tablets by mouth daily       calcium-vitamin D 250-125 MG-UNIT Tabs      Take 1 tablet by mouth 2 times daily. Calcium 250 mg/Vit D 125 IU       CLARITIN 10 MG tablet   Generic drug:  loratadine     30    1 TAB PO QD (Once per day) as needed for ALLERGY SYMPTOMS       clotrimazole 1 % cream    LOTRIMIN    50 g    Apply topically 2 times daily  as needed       eucerin cream      Apply  topically as needed. Apply to thigh PRN dry skin       exenatide 10 MCG/0.04ML injection    BYETTA    1 Syringe    Inject 10 mcg Subcutaneous 2 times daily (before meals)       FLUoxetine 40 MG capsule    PROzac    30 capsule    Take 1 capsule (40 mg) by mouth daily       fluticasone 50 MCG/ACT spray    FLONASE    16 g    Spray 1 spray into both nostrils daily       furosemide 20 MG tablet    LASIX    60 tablet    Take 1 tablet (20 mg) by mouth 2 times daily       GEL-SASHA DENTINBLOC DT      Apply  to affected area. GEL. Apply to 2nd molar on bottom right daily at bedtime.       GLUCAGON EMERGENCY 1 MG kit   Generic drug:  glucagon      Inject  as directed. 1 mL as needed for signs of hypoglycemia. May repeat as needed in 15 minutes.       HYDROcodone-acetaminophen 5-325 MG per tablet    NORCO     Take 1 tablet by mouth every 6 hours as needed 1-2 tabs       Incontinence Brief Large Misc     60 Units    2 Units 2 times daily       insulin glargine 100 UNIT/ML injection    LANTUS    8 mL    Inject 24 Units Subcutaneous At Bedtime       * LACRI-LUBE OP      Apply  to eye. Place 0.5 inches into both eyes at bedtime       * artificial tears Oint ophthalmic ointment      At Bedtime       LACTAID 3000 UNIT tablet   Generic drug:  lactase      Take 4 tabs daily with meals.       levothyroxine 175 MCG tablet    SYNTHROID/LEVOTHROID    30 tablet    Take 1 tablet (175 mcg) by mouth daily Give on empty stomach       LORazepam 1 MG tablet    ATIVAN    35 tablet    Take 1 tablet (1 mg) by mouth At Bedtime .  May take additional 1mg daily PRN for agitation.       losartan 100 MG tablet    COZAAR    90 tablet    Take 1 tablet by mouth daily.       metFORMIN 500 MG 24 hr tablet    GLUCOPHAGE-XR    90 tablet    Take 2 tablets (1,000 mg) by mouth 2 times daily (with meals)       metroNIDAZOLE 500 MG tablet    FLAGYL    14 tablet    Take 1 tablet (500 mg) by mouth 2 times daily       MILK OF  MAGNESIA PO      Take  by mouth. Take 30 mL as needed for constipation.       NEURONTIN PO      Take 900 mg by mouth 3 times daily.       nystatin 352036 UNIT/GM Powd    MYCOSTATIN    60 g    Apply topically 3 times daily as needed       polyethylene glycol powder    MIRALAX/GLYCOLAX     Take 1 capful by mouth 2 times daily. 17 GM PO BID       * PREVIDENT 5000 PLUS 1.1 % Crea   Generic drug:  Sodium Fluoride      Apply  to affected area every evening.       * PREVIDENT MT      Take  by mouth. Use as dental rinse at bedtime.       saline 0.9 % Soln      Spray 2 sprays in nostril as needed.       SENNA S 8.6-50 MG per tablet   Generic drug:  senna-docusate      Take 2 tablets by mouth At Bedtime.       solifenacin 10 MG tablet    VESICARE    30 tablet    Take 1 tablet (10 mg) by mouth daily       * TYLENOL 325 MG tablet   Generic drug:  acetaminophen      Take 1-2 tablets by mouth every 6 hours as needed.       * acetaminophen 500 MG tablet    TYLENOL    1 Bottle    Take 2 tablets (1,000 mg) by mouth every 6 hours as needed       ziprasidone 40 MG capsule    GEODON    60 capsule    Take 1 capsule (40 mg) by mouth 2 times daily (with meals)       * Notice:  This list has 6 medication(s) that are the same as other medications prescribed for you. Read the directions carefully, and ask your doctor or other care provider to review them with you.

## 2017-03-21 NOTE — LETTER
March 24, 2017      Pinky VELOZ Kaylee  [address] [address]        Dear Pinky,    Thank you for getting your care at [facility]. Please see below for your test results.    Resulted Orders   Hemoglobin A1c   Result Value Ref Range    Hemoglobin A1C 6.6 (H) 4.3 - 6.0 %   Albumin Random Urine Quantitative   Result Value Ref Range    Creatinine Urine 54 mg/dL    Albumin Urine mg/L 18 mg/L    Albumin Urine mg/g Cr 32.47 (H) 0 - 25 mg/g Cr     Your A1c is at goal, which means your blood sugar is well controlled. Your microalbumin does show that you have some kidney injury due to your diabetes. We will need to continue to control your blood sugars and blood pressure.     If you have any concerns about these results please call and leave a message for me or send a DEQ message to the clinic.    Sincerely,    Shamika Kelley MD

## 2017-03-22 NOTE — PATIENT INSTRUCTIONS
[facility]    1. Your sleep study will be reviewed by a sleep physician within the next few days.     2. Please follow up in the sleep clinic as scheduled, or, make an appointment with your sleep provider to be seen within two weeks to discuss the results of the sleep study.    3. If you have any questions or problems with your treatment plan, please contact your sleep clinic provider at [phone] to further manage your condition.    4. Please review your attached medication list, and, at your follow-up appointment advise your sleep clinic provider about any changes.    5. Go to [email or web address] for more information about your sleep problems.    YESSENIA Velasco  March 22, 2017

## 2017-03-24 PROBLEM — R80.9 TYPE 2 DIABETES MELLITUS WITH MICROALBUMINURIA, WITH LONG-TERM CURRENT USE OF INSULIN (H): Status: ACTIVE | Noted: 2017-03-24

## 2017-03-24 PROBLEM — E11.29 TYPE 2 DIABETES MELLITUS WITH MICROALBUMINURIA, WITH LONG-TERM CURRENT USE OF INSULIN (H): Status: ACTIVE | Noted: 2017-03-24

## 2017-03-24 PROBLEM — Z79.4 TYPE 2 DIABETES MELLITUS WITH MICROALBUMINURIA, WITH LONG-TERM CURRENT USE OF INSULIN (H): Status: ACTIVE | Noted: 2017-03-24

## 2017-03-24 PROBLEM — E11.9 DIABETES MELLITUS, TYPE II, INSULIN DEPENDENT (H): Status: ACTIVE | Noted: 2017-03-24

## 2017-03-24 PROBLEM — Z79.4 DIABETES MELLITUS, TYPE II, INSULIN DEPENDENT (H): Status: ACTIVE | Noted: 2017-03-24

## 2017-03-24 ASSESSMENT — ENCOUNTER SYMPTOMS
HEADACHES: 0
CONSTIPATION: 0
SHORTNESS OF BREATH: 0
APPETITE CHANGE: 0
MYALGIAS: 0
WEAKNESS: 0
CHILLS: 0
COUGH: 0
SINUS PRESSURE: 0
ABDOMINAL PAIN: 0
FATIGUE: 0
BLOOD IN STOOL: 0
ABDOMINAL DISTENTION: 0
NAUSEA: 0
WHEEZING: 0
DIARRHEA: 0
ARTHRALGIAS: 0
VOMITING: 0
DYSURIA: 0
SORE THROAT: 0
COLOR CHANGE: 0
HEMATURIA: 0
FEVER: 0

## 2017-03-29 ENCOUNTER — DOCUMENTATION ONLY (OUTPATIENT)
Dept: SLEEP MEDICINE | Facility: CLINIC | Age: 67
End: 2017-03-29

## 2017-03-29 NOTE — PROCEDURES
"SLEEP STUDY INTERPRETATION  POLYSOMNOGRAPHY REPORT      Patient: Pinky Page  Date of Birth: [birth year]  Study Date: 3/21/17  MRN: [id number]  Referring Provider: Shamika Kelley MD  Ordering Provider: Brianda Reddy MD    Indications for Polysomnography: The patient is a 66 y old Female who is 5' 4\" and weighs 249.0 lbs.  Her BMI is 43.0, Cavalier sleepiness scale 10.0 and neck size is 47cm.  Relevant medical history includes hypertension, diabetes, obesity, severe obstructive sleep apnea. A diagnostic polysomnogram was performed to evaluate for sleep apnea/hypoventilation/hypoxemia.    Polysomnogram Data:  A full night polysomnogram recorded the standard physiologic parameters including EEG, EOG, EMG, ECG, nasal and oral airflow.  Respiratory parameters of chest and abdominal movements were recorded with respiratory inductance plethysmography.  Oxygen saturation was recorded by pulse oximetry.      Sleep Architecture: Decreased sleep efficiency due to  prolonged wake after sleep onset. All stages of sleep observed with significantly increased both stages N3 and REM sleep.    The total recording time of the polysomnogram was 480.9 minutes.  The total sleep time was 360.0 minutes.  Sleep latency was increased at 29.9 minutes without the use of a sleep aid.  REM latency was 105.5 minutes.  Arousal index was normal at 11.5 arousals per hour.  Sleep efficiency was decreased at 74.9%.  Wake after sleep onset was 91.0 minutes.  The patient spent 8.5% of total sleep time in Stage N1, 16.9% in Stage N2, 35.8% in Stages N3, and 38.8% in REM.  Time in REM supine was 139.5 minutes.    Respiration: Moderate obstructive sleep apnea.    Events - The polysomnogram revealed a presence of 5 obstructive, 0 central, and 0 mixed apneas resulting in an apnea index of 0.8 events per hour.  There were 88 hypopneas resulting in a hypopnea index of 14.7 events per hour.  The combined apnea/hypopnea index was 15.5 events per " hour.  The REM AHI was 14.6 events per hour.  The supine AHI was 15.5 events per hour.  The RERA index was 0.2 events per hour.   The RDI was 15.7 events per hour.    Snoring - was reported as soft to loud and intermittent.    Respiratory rate and pattern - was notable for normal respiratory rate and pattern.    Sustained Sleep Associated Hypoventilation - Transcutaneous carbon dioxide monitoring was used; however significant hypoventilation was not present with a range of 41 mmHg to 47 mmHg.    Pre sleep study Arterial Blood Gas - pH 7.45 pCO2 44 pO2 59    Sleep Associated Hypoxemia - (Greater than 5 minutes O2 sat below 89%) was not present.  Baseline oxygen saturation was 93.5%. Lowest oxygen saturation was 83.0%.  Time spent less than or equal to 88% was 1.1 minutes.  Time spent less than or equal to 89% was 2.1 minutes.         Movement Activity: No significant PLMs or other abnormal movement activity.    Periodic Limb Activity - There were 19 PLMs during the entire study. The PLM index was 3.2 movements per hour.  The PLM Arousal Index was 0 per hour.    REM EMG Activity - Excessive transient / sustained muscle activity was not present.    Nocturnal Behavior - Abnormal sleep related behaviors were not noted.    Bruxism - None apparent.    Cardiac Summary: Normal sinus rhythm with frequent PVCs.  The average pulse rate was 64.0 bpm.  The minimum pulse rate was 50.8 bpm while the maximum pulse rate was 84.0 bpm. The rhythm is normal sinus. Frequent PVCs were noted.      Assessment:     Moderate obstructive sleep apnea.    Sleep architecture was remarkable for decreased sleep efficiency due to prolonged wake after sleep onset. All stages of sleep observed with significantly increased both stages N3 and REM sleep.      Normal sinus rhythm was noted with frequent PVCs.    Recommendations:    Suggest repeat polysomnography with full night titration of positive airway pressure therapy for the control of sleep  disordered breathing.    Based on the presence of mild/moderate obstructive sleep apnea and excessive daytime sleepiness, treatment could be empirically initiated with Auto-titrating PAP therapy with a range of 5 - 15 cmH2O. Recommend clinical follow up with sleep management team.    Patient may be a candidate for dental appliance through referral to Sleep Dentistry for the treatment of obstructive sleep apnea and/or socially disruptive snoring.    If devices are not acceptable or effective, patient may benefit from evaluation of possible surgical options.  If she is interested, would recommend referral to specialized ENT-Sleep provider.    Due to frequent PVCs, suggest Cardiology evaluation for further work up and management    Advise regarding the risks of drowsy driving.    Suggest optimizing sleep schedule and avoiding sleep deprivation.    Weight management (BMI > 30).    Diagnostic Codes:     Obstructive Sleep Apnea G47.33       Gautam Coburn DO  Clinical Sleep Medicine Fellow    Attending MD:  PSG was personally reviewed in detail with the Sleep Medicine Fellow.  The above interpretation reflects our joint assessment and recommendations.                                                                                                                           __________________________________   (Spring Pollard MD), 3/28/2017

## 2017-03-30 ENCOUNTER — OFFICE VISIT (OUTPATIENT)
Dept: SLEEP MEDICINE | Facility: CLINIC | Age: 67
End: 2017-03-30
Attending: INTERNAL MEDICINE
Payer: COMMERCIAL

## 2017-03-30 VITALS
WEIGHT: 244 LBS | BODY MASS INDEX: 41.66 KG/M2 | DIASTOLIC BLOOD PRESSURE: 62 MMHG | SYSTOLIC BLOOD PRESSURE: 159 MMHG | OXYGEN SATURATION: 95 % | HEART RATE: 70 BPM | HEIGHT: 64 IN | RESPIRATION RATE: 18 BRPM

## 2017-03-30 DIAGNOSIS — G47.33 SEVERE OBSTRUCTIVE SLEEP APNEA: Primary | ICD-10-CM

## 2017-03-30 PROCEDURE — 99211 OFF/OP EST MAY X REQ PHY/QHP: CPT | Mod: ZF

## 2017-03-30 NOTE — PATIENT INSTRUCTIONS

## 2017-03-30 NOTE — PROGRESS NOTES
SLEEP MEDICINE FOLLOWUP       DATE OF SERVICE:  03/30/2017       CHIEF COMPLAINT:  Followup of sleep study.      HISTORY OF PRESENT ILLNESS:  Pinky Page is a 66-year-old female who lives in a group home, history of severe obstructive sleep apnea, poor CPAP intolerance in the past, has been treated with supplemental oxygen and head of bed elevation.  She recently underwent reevaluation for her oxygen needs and had oximetry done on room air.  Time at or below saturations 88% for about 50 minutes.  Pattern was consistent with REM versus positional hypoxemia and possible sleep apnea.  She underwent reevaluation with all-night polysomnography and she is open to considering treating sleep apnea if indeed still present after weight loss.  Sleep study was performed on 03/21/2017.  The results were reviewed with her in detail today.  She used lorazepam the night of the study.  Total sleep time was 368 minutes.  REM latency was about 106 minutes.  Arousal index was normal.  Sleep efficiency was reduced at about 75%.  Overall, there were moderate obstructive sleep apnea with an AHI of 18 per hour.  She slept entirely supine.  Arterial blood gas testing and transcutaneous CO2 monitoring did not show significant hypoventilation and in fact there was no significant hypoxemia.  Lowest oxygen saturation was 83%.  Time at or below 88% was 1.1 minutes.  There were occasional limb movements, most of which were not associated with arousals.  Heart rates were normal for sleep.  There were some PVCs.      The patient denies the development of any new medical problems.  She is open to repeat trial of CPAP, as she continues to describe some problems with fatigue and sleepiness.  [place] is about 10, also with hypertension again today in clinic.      PHYSICAL EXAMINATION:   GENERAL:  Pleasant female in no distress, obese.   VITAL SIGNS:  Blood pressure 159/62, pulse is 70, respirations 18.  O2 saturation is 95%.  Weight is 244 pounds.   Body mass index 41.8.      ASSESSMENT:  A 66-year-old female with history of moderate obstructive sleep apnea on recent sleep study, improved from previous study.  Also, with obesity, hypertension, major depression.      PLAN:  The patient is open to a trial of CPAP again.  Recommended in-lab titration due to her history of CPAP intolerance.  She should get coaching during the night.  Once the study is finalized and I obtain those results, I will write orders for her to initiate a CPAP trial.  If she does not find the CPAP tolerable despite 3 or 4 month of coaching and support will go ahead and discontinue.  Continue with sleeping with the head of bed elevated.  The patient is agreeable with this plan.      Over 15 minutes spent with the patient, greater than 50% spent in counseling and coordinating care.         VIDYA SIMON MD             D: 2017 12:00   T: 2017 12:22   MT: [id number]      Name:     BRUCE TAMEZ   MRN:      [id number]        Account:      [id number]   :      1950           Visit Date:   2017      Document: [id number]

## 2017-03-30 NOTE — MR AVS SNAPSHOT
After Visit Summary   3/30/2017    Pinky Page    MRN: [id number]           Patient Information     Date Of Birth          1950        Visit Information        Provider Department      3/30/2017 11:00 AM Brianda Reddy MD [facility] [place], Sleep Study        Today's Diagnoses      Moderate obstructive sleep apnea    -  1      Care Instructions      Your BMI is Body mass index is 41.88 kg/(m^2).  Weight management is a personal decision.  If you are interested in exploring weight loss strategies, the following discussion covers the approaches that may be successful. Body mass index (BMI) is one way to tell whether you are at a healthy weight, overweight, or obese. It measures your weight in relation to your height.  A BMI of 18.5 to 24.9 is in the healthy range. A person with a BMI of 25 to 29.9 is considered overweight, and someone with a BMI of 30 or greater is considered obese. More than two-thirds of American adults are considered overweight or obese.  Being overweight or obese increases the risk for further weight gain. Excess weight may lead to heart disease and diabetes.  Creating and following plans for healthy eating and physical activity may help you improve your health.  Weight control is part of healthy lifestyle and includes exercise, emotional health, and healthy eating habits. Careful eating habits lifelong are the mainstay of weight control. Though there are significant health benefits from weight loss, long-term weight loss with diet alone may be very difficult to achieve- studies show long-term success with dietary management in less than 10% of people. Attaining a healthy weight may be especially difficult to achieve in those with severe obesity. In some cases, medications, devices and surgical management might be considered.  What can you do?  If you are overweight or obese and are interested in methods for weight loss, you should discuss this with your provider.      Consider reducing daily calorie intake by 500 calories.     Keep a food journal.     Avoiding skipping meals, consider cutting portions instead.    Diet combined with exercise helps maintain muscle while optimizing fat loss. Strength training is particularly important for building and maintaining muscle mass. Exercise helps reduce stress, increase energy, and improves fitness. Increasing exercise without diet control, however, may not burn enough calories to loose weight.       Start walking three days a week 10-20 minutes at a time    Work towards walking thirty minutes five days a week     Eventually, increase the speed of your walking for 1-2 minutes at time    In addition, we recommend that you review healthy lifestyles and methods for weight loss available through the National Institutes of Health patient information sites:  http://win.niddk.nih.gov/publications/index.htm    And look into health and wellness programs that may be available through your health insurance provider, employer, local community center, or wally club.    Weight management plan: Patient was referred to their PCP to discuss a diet and exercise plan.            Follow-ups after your visit        Your next 10 appointments already scheduled     Apr 05, 2017  8:00 PM CDT   PSG Titration with SLEEP STUDY  4   [facility] [place], Sleep Study ([facility])    [address] [place] [id number] [phone]            Apr 10, 2017  1:40 PM CDT   Return Visit with Shamika Kelley MD   [facility] ([facility])    [address],  [address]  [facility] [address]   [phone]            Apr 11, 2017  2:00 PM CDT   (Arrive by 1:45 PM)   Return Visit with Damon Gr, PhD [facility] ([facility])    [address]  3rd Floor  [facility] [id number] [phone]            Apr 17, 2017  9:30  AM CDT   Adult Med Follow UP with Divine Jack MD   Psychiatry Clinic ([facility])    [address]  [address] [facility] [address] [phone]            May 11, 2017 11:30 AM CDT   Return Sleep Patient with Brianda Reddy MD   [facility] ([facility])    [address] [id number] [phone]            May 22, 2017 10:30 AM CDT   (Arrive by 10:15 AM)   Return Visit with Marcos Magdaleno MD   [facility] Medical Weight Management ([facility])    [address]  4th Floor  [facility] [id number] [phone]            Jun 21, 2017 10:30 AM CDT   Lab with  LAB   [facility] Lab ([facility])    [address]  1st Floor  [facility] [id number] [phone]            Jun 21, 2017 11:35 AM CDT   (Arrive by 11:20 AM)   Return Visit with Gaby Holliday MD   [facility] Nephrology ([facility])    [address]  3rd Floor  [facility] [id number] [phone]            Nov 14, 2017  1:30 PM CST   NEW GENERAL with Michael Lopez MD   Eye Clinic ([facility])    [address] [address]  [facility] [address] [phone]              Who to contact     If you have questions or need follow up information about today's clinic visit or your schedule please contact [facility] directly at [phone].  Normal or non-critical lab and imaging results will be communicated to you by MyChart, letter or phone within 4 business days after the clinic has received the results. If you do not hear from us within 7 days, please contact the clinic through MyChart or phone. If you have a critical or abnormal lab result, we will notify you by phone as soon as possible.  Submit refill requests through [employer] or call  "your pharmacy and they will forward the refill request to us. Please allow 3 business days for your refill to be completed.          Additional Information About Your Visit        MyChart Information     The [employer] lets you send messages to your doctor, view your test results, renew your prescriptions, schedule appointments and more. To sign up, go to [email or web address] . Click on \"Log in\" on the left side of the screen, which will take you to the Welcome page. Then click on \"Sign up Now\" on the right side of the page.     You will be asked to enter the access code listed below, as well as some personal information. Please follow the directions to create your username and password.     Your access code is: [email or web address]  Expires: 2017  3:22 PM     Your access code will  in 90 days. If you need help or a new code, please call your [facility] clinic or [phone].        Care EveryWhere ID     This is your Care EveryWhere ID. This could be used by other organizations to access your [facility] medical records  [id number]        Your Vitals Were     Pulse Respirations Height Pulse Oximetry BMI (Body Mass Index)       70 18 1.626 m (5' 4\") 95% 41.88 kg/m2        Blood Pressure from Last 3 Encounters:   17 159/62   17 131/83   03/10/17 139/78    Weight from Last 3 Encounters:   17 110.7 kg (244 lb)   17 111.5 kg (245 lb 12.8 oz)   03/10/17 110 kg (242 lb 6.4 oz)                 Today's Medication Changes          These changes are accurate as of: 3/30/17 11:59 PM.  If you have any questions, ask your nurse or doctor.               Start taking these medicines.        Dose/Directions    order for DME   Started by:  Brianda Reddy MD        Discontinue  Oxygen ([facility])   Quantity:  1 Device   Refills:  0         These medicines have changed or have updated prescriptions.        Dose/Directions    metFORMIN 500 MG 24 hr tablet   Commonly known as:  GLUCOPHAGE-XR   This may " have changed:  how much to take   Used for:  Diabetes mellitus, type 2 (H)        Dose:  1000 mg   Take 2 tablets (1,000 mg) by mouth 2 times daily (with meals)   Quantity:  90 tablet   Refills:  3            Where to get your medicines      Information about where to get these medications is not yet available     ! Ask your nurse or doctor about these medications     order for DME                Primary Care Provider Office Phone # Fax #    Shamika Ileana Kelley [phone]       [facility] [address]  [facility] [address]        Thank you!     Thank you for choosing [facility] [place], SLEEP STUDY  for your care. Our goal is always to provide you with excellent care. Hearing back from our patients is one way we can continue to improve our services. Please take a few minutes to complete the written survey that you may receive in the mail after your visit with us. Thank you!             Your Updated Medication List - Protect others around you: Learn how to safely use, store and throw away your medicines at www.disposemymeds.org.          This list is accurate as of: 3/30/17 11:59 PM.  Always use your most recent med list.                   Brand Name Dispense Instructions for use    amLODIPine 5 MG tablet    NORVASC    30 tablet    Take 2 tablets (10 mg) by mouth daily       ARTIFICIAL TEARS OP      Apply 1 drop to eye 4 times daily.       aspirin 81 MG tablet     100    Take 1 tablet by mouth daily. ENTERIC COATED.       atorvastatin 40 MG tablet    LIPITOR    90 tablet    Take 1 tablet (40 mg) by mouth daily       blood glucose monitoring test strip    no brand specified    100 each    Use to test blood sugar 3 times daily or as directed.       calcium polycarbophil 625 MG tablet    FIBERCON     Take 2 tablets by mouth daily       calcium-vitamin D 250-125 MG-UNIT Tabs      Take 1 tablet by mouth 2 times daily. Calcium 250 mg/Vit D 125 IU       CLARITIN 10 MG tablet   Generic drug:   loratadine     30    1 TAB PO QD (Once per day) as needed for ALLERGY SYMPTOMS       clotrimazole 1 % cream    LOTRIMIN    50 g    Apply topically 2 times daily as needed       eucerin cream      Apply  topically as needed. Apply to thigh PRN dry skin       exenatide 10 MCG/0.04ML injection    BYETTA    1 Syringe    Inject 10 mcg Subcutaneous 2 times daily (before meals)       FLUoxetine 40 MG capsule    PROzac    30 capsule    Take 1 capsule (40 mg) by mouth daily       fluticasone 50 MCG/ACT spray    FLONASE    16 g    Spray 1 spray into both nostrils daily       furosemide 20 MG tablet    LASIX    60 tablet    Take 1 tablet (20 mg) by mouth 2 times daily       GEL-SASHA DENTINBLOC DT      Apply  to affected area. GEL. Apply to 2nd molar on bottom right daily at bedtime.       GLUCAGON EMERGENCY 1 MG kit   Generic drug:  glucagon      Inject  as directed. 1 mL as needed for signs of hypoglycemia. May repeat as needed in 15 minutes.       HYDROcodone-acetaminophen 5-325 MG per tablet    NORCO     Take 1 tablet by mouth every 6 hours as needed 1-2 tabs       Incontinence Brief Large Misc     60 Units    2 Units 2 times daily       insulin glargine 100 UNIT/ML injection    LANTUS    8 mL    Inject 24 Units Subcutaneous At Bedtime       * LACRI-LUBE OP      Apply  to eye. Place 0.5 inches into both eyes at bedtime       * artificial tears Oint ophthalmic ointment      At Bedtime       LACTAID 3000 UNIT tablet   Generic drug:  lactase      Take 4 tabs daily with meals.       levothyroxine 175 MCG tablet    SYNTHROID/LEVOTHROID    30 tablet    Take 1 tablet (175 mcg) by mouth daily Give on empty stomach       LORazepam 1 MG tablet    ATIVAN    35 tablet    Take 1 tablet (1 mg) by mouth At Bedtime .  May take additional 1mg daily PRN for agitation.       losartan 100 MG tablet    COZAAR    90 tablet    Take 1 tablet by mouth daily.       metFORMIN 500 MG 24 hr tablet    GLUCOPHAGE-XR    90 tablet    Take 2 tablets (1,000  mg) by mouth 2 times daily (with meals)       metroNIDAZOLE 500 MG tablet    FLAGYL    14 tablet    Take 1 tablet (500 mg) by mouth 2 times daily       MILK OF MAGNESIA PO      Take  by mouth. Take 30 mL as needed for constipation.       NEURONTIN PO      Take 900 mg by mouth 3 times daily.       nystatin 074418 UNIT/GM Powd    MYCOSTATIN    60 g    Apply topically 3 times daily as needed       order for DME     1 Device    Discontinue  Oxygen ([facility])       polyethylene glycol powder    MIRALAX/GLYCOLAX     Take 1 capful by mouth 2 times daily. 17 GM PO BID       * PREVIDENT 5000 PLUS 1.1 % Crea   Generic drug:  Sodium Fluoride      Apply  to affected area every evening.       * PREVIDENT MT      Take  by mouth. Use as dental rinse at bedtime.       saline 0.9 % Soln      Spray 2 sprays in nostril as needed.       SENNA S 8.6-50 MG per tablet   Generic drug:  senna-docusate      Take 2 tablets by mouth At Bedtime.       solifenacin 10 MG tablet    VESICARE    30 tablet    Take 1 tablet (10 mg) by mouth daily       * TYLENOL 325 MG tablet   Generic drug:  acetaminophen      Take 1-2 tablets by mouth every 6 hours as needed.       * acetaminophen 500 MG tablet    TYLENOL    1 Bottle    Take 2 tablets (1,000 mg) by mouth every 6 hours as needed       ziprasidone 40 MG capsule    GEODON    60 capsule    Take 1 capsule (40 mg) by mouth 2 times daily (with meals)       * Notice:  This list has 6 medication(s) that are the same as other medications prescribed for you. Read the directions carefully, and ask your doctor or other care provider to review them with you.

## 2017-03-30 NOTE — NURSING NOTE
"Chief Complaint   Patient presents with     RECHECK     Follow up PSG results       Initial Ht 1.626 m (5' 4\")  Wt 110.7 kg (244 lb)  BMI 41.88 kg/m2 Estimated body mass index is 41.88 kg/(m^2) as calculated from the following:    Height as of this encounter: 1.626 m (5' 4\").    Weight as of this encounter: 110.7 kg (244 lb).  Medication Reconciliation: complete     JONNATHAN Mark        "

## 2017-04-03 ENCOUNTER — RADIANT APPOINTMENT (OUTPATIENT)
Dept: MAMMOGRAPHY | Facility: CLINIC | Age: 67
End: 2017-04-03
Attending: FAMILY MEDICINE
Payer: COMMERCIAL

## 2017-04-03 DIAGNOSIS — Z12.31 VISIT FOR SCREENING MAMMOGRAM: ICD-10-CM

## 2017-04-03 PROCEDURE — G0202 SCR MAMMO BI INCL CAD: HCPCS | Mod: 52

## 2017-04-05 ENCOUNTER — THERAPY VISIT (OUTPATIENT)
Dept: SLEEP MEDICINE | Facility: CLINIC | Age: 67
End: 2017-04-05
Attending: INTERNAL MEDICINE
Payer: COMMERCIAL

## 2017-04-05 DIAGNOSIS — G47.33 SEVERE OBSTRUCTIVE SLEEP APNEA: ICD-10-CM

## 2017-04-05 PROCEDURE — 95811 POLYSOM 6/>YRS CPAP 4/> PARM: CPT | Mod: ZF | Performed by: INTERNAL MEDICINE

## 2017-04-05 NOTE — MR AVS SNAPSHOT
After Visit Summary   4/5/2017    Pinky Page    MRN: [id number]           Patient Information     Date Of Birth          1950        Visit Information        Provider Department      4/5/2017 8:00 PM SLEEP STUDY RM 4 [facility], [place], Sleep Study        Today's Diagnoses      Moderate obstructive sleep apnea          Care Instructions    [facility]    1. Your sleep study will be reviewed by a sleep physician within the next few days.     2. Please follow up in the sleep clinic as scheduled, or, make an appointment with your sleep provider to be seen within two weeks to discuss the results of the sleep study.    3. If you have any questions or problems with your treatment plan, please contact your sleep clinic provider at [phone] to further manage your condition.    4. Please review your attached medication list, and, at your follow-up appointment advise your sleep clinic provider about any changes.    5. Go to [email or web address] for more information about your sleep problems.    SUYAPA Langley  April 5, 2017              Follow-ups after your visit        Your next 10 appointments already scheduled     Apr 10, 2017  1:40 PM CDT   Return Visit with Shamika Kelley MD   [facility] ([facility])    [address],  [address]  [facility] [address]   [phone]            Apr 11, 2017  2:00 PM CDT   (Arrive by 1:45 PM)   Return Visit with Damon Gr, PhD [facility] ([facility])    [address]  3rd Floor  [place] [id number] [phone]            Apr 17, 2017  9:30 AM CDT   Adult Med Follow UP with Divine Jack MD   Psychiatry Clinic ([facility])    [address]  [address] [facility] [id number] [phone]            May 11, 2017 11:30 AM CDT   Return Sleep Patient with Brianda Bailey  MD Barry   [facility] ([facility])    [address] [place] [id number] [phone]            May 22, 2017 10:30 AM CDT   (Arrive by 10:15 AM)   Return Visit with Marcos Magdaleno MD   [facility] ([facility])    [address]  4th [address]  [facility] [address]   [phone]            Jun 21, 2017 10:30 AM CDT   Lab with  LAB   [facility] ([facility])    [address]  1st Floor  [facility] [address]   [phone]            Jun 21, 2017 11:35 AM CDT   (Arrive by 11:20 AM)   Return Visit with Gaby Holliday MD   [facility] Nephrology ([facility])    [address] [place] [address]  [address]rd [facility] [address]   [phone]            Nov 14, 2017  1:30 PM CST   [facility] with Michael Lopez MD   Eye Clinic ([facility])    [address]  [address]  [address] [address]  [facility] [address] [phone]              Who to contact     If you have questions or need follow up information about today's clinic visit or your schedule please contact [facility] directly at [phone].  Normal or non-critical lab and imaging results will be communicated to you by [employer], letter or phone within 4 business days after the clinic has received the results. If you do not hear from us within 7 days, please contact the clinic through [employer] or phone. If you have a critical or abnormal lab result, we will notify you by phone as soon as possible.  Submit refill requests through [employer] or call your pharmacy and they will forward the refill request to us. Please allow 3 business days for your refill to be completed.          Additional Information About Your Visit        [employer] Information     [employer] lets you send messages to your doctor, view your test results,  "renew your prescriptions, schedule appointments and more. To sign up, go to [email or web address] . Click on \"Log in\" on the left side of the screen, which will take you to the Welcome page. Then click on \"Sign up Now\" on the right side of the page.     You will be asked to enter the access code listed below, as well as some personal information. Please follow the directions to create your username and password.     Your access code is: [email or web address]  Expires: 2017  3:22 PM     Your access code will  in 90 days. If you need help or a new code, please call your [place] clinic or [phone].        Care EveryWhere ID     This is your Care EveryWhere ID. This could be used by other organizations to access your [place] medical records  [id number]         Blood Pressure from Last 3 Encounters:   17 159/62   17 131/83   03/10/17 139/78    Weight from Last 3 Encounters:   17 110.7 kg (244 lb)   17 111.5 kg (245 lb 12.8 oz)   03/10/17 110 kg (242 lb 6.4 oz)              We Performed the Following     Comprehensive Sleep Study          Today's Medication Changes          These changes are accurate as of: 17  9:20 PM.  If you have any questions, ask your nurse or doctor.               These medicines have changed or have updated prescriptions.        Dose/Directions    metFORMIN 500 MG 24 hr tablet   Commonly known as:  GLUCOPHAGE-XR   This may have changed:  how much to take   Used for:  Diabetes mellitus, type 2 (H)        Dose:  1000 mg   Take 2 tablets (1,000 mg) by mouth 2 times daily (with meals)   Quantity:  90 tablet   Refills:  3                Primary Care Provider Office Phone # Fax #    Shamika Kelley [phone]       [facility] [address]  [facility] [address]        Thank you!     Thank you for choosing [facility], SLEEP STUDY  for your care. Our goal is always to provide you with excellent care. Hearing back from our " patients is one way we can continue to improve our services. Please take a few minutes to complete the written survey that you may receive in the mail after your visit with us. Thank you!             Your Updated Medication List - Protect others around you: Learn how to safely use, store and throw away your medicines at www.disposemymeds.org.          This list is accurate as of: 4/5/17  9:20 PM.  Always use your most recent med list.                   Brand Name Dispense Instructions for use    amLODIPine 5 MG tablet    NORVASC    30 tablet    Take 2 tablets (10 mg) by mouth daily       ARTIFICIAL TEARS OP      Apply 1 drop to eye 4 times daily.       aspirin 81 MG tablet     100    Take 1 tablet by mouth daily. ENTERIC COATED.       atorvastatin 40 MG tablet    LIPITOR    90 tablet    Take 1 tablet (40 mg) by mouth daily       blood glucose monitoring test strip    no brand specified    100 each    Use to test blood sugar 3 times daily or as directed.       calcium polycarbophil 625 MG tablet    FIBERCON     Take 2 tablets by mouth daily       calcium-vitamin D 250-125 MG-UNIT Tabs      Take 1 tablet by mouth 2 times daily. Calcium 250 mg/Vit D 125 IU       CLARITIN 10 MG tablet   Generic drug:  loratadine     30    1 TAB PO QD (Once per day) as needed for ALLERGY SYMPTOMS       clotrimazole 1 % cream    LOTRIMIN    50 g    Apply topically 2 times daily as needed       eucerin cream      Apply  topically as needed. Apply to thigh PRN dry skin       exenatide 10 MCG/0.04ML injection    BYETTA    1 Syringe    Inject 10 mcg Subcutaneous 2 times daily (before meals)       FLUoxetine 40 MG capsule    PROzac    30 capsule    Take 1 capsule (40 mg) by mouth daily       fluticasone 50 MCG/ACT spray    FLONASE    16 g    Spray 1 spray into both nostrils daily       furosemide 20 MG tablet    LASIX    60 tablet    Take 1 tablet (20 mg) by mouth 2 times daily       GEL-SASHA DENTINBLOC DT      Apply  to affected area. GEL.  Apply to 2nd molar on bottom right daily at bedtime.       GLUCAGON EMERGENCY 1 MG kit   Generic drug:  glucagon      Inject  as directed. 1 mL as needed for signs of hypoglycemia. May repeat as needed in 15 minutes.       HYDROcodone-acetaminophen 5-325 MG per tablet    NORCO     Take 1 tablet by mouth every 6 hours as needed 1-2 tabs       Incontinence Brief Large Misc     60 Units    2 Units 2 times daily       insulin glargine 100 UNIT/ML injection    LANTUS    8 mL    Inject 24 Units Subcutaneous At Bedtime       * LACRI-LUBE OP      Apply  to eye. Place 0.5 inches into both eyes at bedtime       * artificial tears Oint ophthalmic ointment      At Bedtime       LACTAID 3000 UNIT tablet   Generic drug:  lactase      Take 4 tabs daily with meals.       levothyroxine 175 MCG tablet    SYNTHROID/LEVOTHROID    30 tablet    Take 1 tablet (175 mcg) by mouth daily Give on empty stomach       LORazepam 1 MG tablet    ATIVAN    35 tablet    Take 1 tablet (1 mg) by mouth At Bedtime .  May take additional 1mg daily PRN for agitation.       losartan 100 MG tablet    COZAAR    90 tablet    Take 1 tablet by mouth daily.       metFORMIN 500 MG 24 hr tablet    GLUCOPHAGE-XR    90 tablet    Take 2 tablets (1,000 mg) by mouth 2 times daily (with meals)       metroNIDAZOLE 500 MG tablet    FLAGYL    14 tablet    Take 1 tablet (500 mg) by mouth 2 times daily       MILK OF MAGNESIA PO      Take  by mouth. Take 30 mL as needed for constipation.       NEURONTIN PO      Take 900 mg by mouth 3 times daily.       nystatin 504511 UNIT/GM Powd    MYCOSTATIN    60 g    Apply topically 3 times daily as needed       order for DME     1 Device    Discontinue  Oxygen ([facility])       polyethylene glycol powder    MIRALAX/GLYCOLAX     Take 1 capful by mouth 2 times daily. 17 GM PO BID       * PREVIDENT 5000 PLUS 1.1 % Crea   Generic drug:  Sodium Fluoride      Apply  to affected area every evening.       * PREVIDENT MT      Take  by mouth.  Use as dental rinse at bedtime.       saline 0.9 % Soln      Spray 2 sprays in nostril as needed.       SENNA S 8.6-50 MG per tablet   Generic drug:  senna-docusate      Take 2 tablets by mouth At Bedtime.       solifenacin 10 MG tablet    VESICARE    30 tablet    Take 1 tablet (10 mg) by mouth daily       * TYLENOL 325 MG tablet   Generic drug:  acetaminophen      Take 1-2 tablets by mouth every 6 hours as needed.       * acetaminophen 500 MG tablet    TYLENOL    1 Bottle    Take 2 tablets (1,000 mg) by mouth every 6 hours as needed       ziprasidone 40 MG capsule    GEODON    60 capsule    Take 1 capsule (40 mg) by mouth 2 times daily (with meals)       * Notice:  This list has 6 medication(s) that are the same as other medications prescribed for you. Read the directions carefully, and ask your doctor or other care provider to review them with you.

## 2017-04-06 NOTE — NURSING NOTE
Completed a all night titration PSG per provider order.    Preliminary AHI 15.  A final therapeutic PAP pressure was achieved.    Supine REM was seen on therapeutic pressure.    Patient reports feeling refreshed in AM.

## 2017-04-06 NOTE — PATIENT INSTRUCTIONS
[facility]    1. Your sleep study will be reviewed by a sleep physician within the next few days.     2. Please follow up in the sleep clinic as scheduled, or, make an appointment with your sleep provider to be seen within two weeks to discuss the results of the sleep study.    3. If you have any questions or problems with your treatment plan, please contact your sleep clinic provider at [phone] to further manage your condition.    4. Please review your attached medication list, and, at your follow-up appointment advise your sleep clinic provider about any changes.    5. Go to [email or web address] for more information about your sleep problems.    Diaz Sanchez, RPSGT  April 5, 2017

## 2017-04-10 ENCOUNTER — OFFICE VISIT (OUTPATIENT)
Dept: FAMILY MEDICINE | Facility: CLINIC | Age: 67
End: 2017-04-10

## 2017-04-10 VITALS
SYSTOLIC BLOOD PRESSURE: 134 MMHG | DIASTOLIC BLOOD PRESSURE: 78 MMHG | OXYGEN SATURATION: 95 % | TEMPERATURE: 98.3 F | RESPIRATION RATE: 18 BRPM | WEIGHT: 243.6 LBS | BODY MASS INDEX: 41.81 KG/M2 | HEART RATE: 69 BPM

## 2017-04-10 DIAGNOSIS — R80.9 TYPE 2 DIABETES MELLITUS WITH MICROALBUMINURIA, WITH LONG-TERM CURRENT USE OF INSULIN (H): ICD-10-CM

## 2017-04-10 DIAGNOSIS — E11.9 DIABETES MELLITUS TYPE 2, INSULIN DEPENDENT (H): ICD-10-CM

## 2017-04-10 DIAGNOSIS — Z79.4 TYPE 2 DIABETES MELLITUS WITH MICROALBUMINURIA, WITH LONG-TERM CURRENT USE OF INSULIN (H): ICD-10-CM

## 2017-04-10 DIAGNOSIS — G47.33 SEVERE OBSTRUCTIVE SLEEP APNEA: ICD-10-CM

## 2017-04-10 DIAGNOSIS — E11.29 TYPE 2 DIABETES MELLITUS WITH MICROALBUMINURIA, WITH LONG-TERM CURRENT USE OF INSULIN (H): ICD-10-CM

## 2017-04-10 DIAGNOSIS — Z79.4 DIABETES MELLITUS TYPE 2, INSULIN DEPENDENT (H): ICD-10-CM

## 2017-04-10 DIAGNOSIS — I10 BENIGN ESSENTIAL HYPERTENSION: Primary | ICD-10-CM

## 2017-04-10 NOTE — PATIENT INSTRUCTIONS
Here is the plan from today's visit    1. Benign essential hypertension  - 24 Hour Blood Pressure Monitor - Adult; Future    2. Severe obstructive sleep apnea  - if you don't get your CPAP machine, please let me know    3. Diabetes mellitus type 2, insulin dependent (H)  4. Type 2 diabetes mellitus with microalbuminuria, with long-term current use of insulin (H)  - we won't change any of your diabetes medications today.     Follow up plan  Please make a clinic appointment for follow up with me (GALA DIA) in 3-4  weeks for BP follow up.    Thank you for coming to [facility] today.  Lab Testing:  **If you had lab testing today and your results are reassuring or normal they will be mailed to you or sent through [employer] within 7 days.   **If the lab tests need quick action we will call you with the results.  The phone number we will call with results is # [phone] (home) [phone] (work). If this is not the best number please call our clinic and change the number.  Medication Refills:  If you need any refills please call your pharmacy and they will contact us.   If you need to  your refill at a new pharmacy, please contact the new pharmacy directly. The new pharmacy will help you get your medications transferred faster.   Scheduling:  If you have any concerns about today's visit or wish to schedule another appointment please call our office during normal business hours [phone] (8-5:00 M-F)  If a referral was made to a [facility] and you don't get a call from central scheduling please call [phone].  If a Mammogram was ordered for you at [facility] call [phone] to schedule or change your appointment.  If you had an XRay/CT/Ultrasound/MRI ordered the number is [phone] to schedule or change your radiology appointment.   Medical Concerns:  If you have urgent medical concerns please call [phone] at any time of the day.  If you have a  medical emergency please call 911.      24 hour BP Monitor    4/19/17 @ 1:30p.m

## 2017-04-10 NOTE — PROGRESS NOTES
Clinical Pharmacy Note     PharmD conducted medication reconciliation with paperwork from [facility]. A few duplicate medications were noted during reconciliation and were updated in the EPIC medication list:    Acetaminophen     Artifical tear ointment    Prevident     Metronidazole was removed from the medication list, as this was a short-term medication in September 2016.    Ben Vasquez.D.

## 2017-04-10 NOTE — PROGRESS NOTES
HPI:       Pinky Page is a 66 year old who presents for the following  Patient presents with:  RECHECK: B/P, foot exam, referral paper work    Diabetes Follow-up    Patient is checking blood sugars: twice daily.    Blood sugar testing frequency justification: Patient modifying lifestyle changes (diet, exercise) with blood sugars  Results are as follows:         am - 88-120s         bedtime - <200         -Last A1C was   Lab Results   Component Value Date    A1C 6.6 03/21/2017    A1C 7.0 12/08/2016    A1C 6.9 08/16/2016    A1C 7.1 05/05/2016    A1C 7.3 01/21/2016        Diabetic concerns: None    Chest Pain or exercise related calf pain (claudication):no     Symptoms of hypoglycemia (low blood sugar): none     Paresthesias (numbness or burning in feet) or sores: No   Diabetic eye exam within the last year?: Yes    Hypertension Follow-up      Outpatient blood pressures are being checked at home.  Results are 150-160s/70-90s.    Chest Pain? :No     Low Salt Diet: no added salt    Daily NSAID Use?No     Did patient take their HTN pills today/last night as usual?  Yes       Adherence and Exercise  Medication side effects: no  How often is a medication missed? Never  Are you able to follow the treatment plan? Yes  Exercise:walking  6-7 days/week for an average of 15-30 minutes      Problem, Medication and Allergy Lists were   reviewed and are current.     Patient Active Problem List    Diagnosis Date Noted     Solitary kidney, congenital 06/07/2013     Priority: High     CKD (chronic kidney disease) stage 2, GFR 60-89 ml/min 03/01/2013     Priority: High     Lives in group home 02/27/2013     Priority: High     [facility]  Mantoux done in 9/2013 was negative.        Hypercholesteremia 10/05/2012     Priority: High     ASCVD  Risk Calculator (pooled cohort)   10 CV risk 17.5%   Recommended Therapy:High Intensity Statin (atorvastatin 40*-80 mg or rosuvastatin 20-40mg)                Morbid obesity due to excess calories (H) 10/02/2012     Priority: High     Follows with Dr. Marcos Magdaleno Q 2months for weight management.       Personal history of breast cancer s/p L masectomy 10/02/2012     Priority: High     May 24, 2013 S/p left mastectomy 1996; follows with Dr. Avila, last mammogram 5/2012 was benign. Rec annual mammogram.       Diabetes mellitus type 2, insulin dependent (H) 10/02/2012     Priority: High     July 27, 2013   Insulin dependent since 4/2006  Without history of retinopathy       Hypertension, essential      Priority: High     Hypothyroidism      Priority: High     On replacement.       Severe obstructive sleep apnea      Priority: High     August 28, 2013   Follows with Dr. Mc.   On 4L O2 at night given CPAP intolerant.  PSG (10/21/07): AHI 39 with oxygen saturations to 71%.        Microalbuminuria due to type 2 diabetes mellitus (H) 03/24/2017     Priority: Medium     3/21/17  Creatinine Urine 54   Albumin Urine mg/L 18   Albumin Urine mg/g Cr 32.47 (H)          Type 2 diabetes mellitus with microalbuminuria, with long-term current use of insulin (H) 03/24/2017     Priority: Medium     Diabetes mellitus, type II, insulin dependent (H) 03/24/2017     Priority: Medium     Nail complaint 02/03/2017     Priority: Medium     vertical nail ridges       Hx of psychiatric care 07/14/2016     Priority: Medium       PAST PSYCH MED TRIALS     sertaline   amitriptyline   lithium   risperidone   olanzapine   trazodone   clonazepam  Quetiapine (weight gain)       Psychological factors affecting medical condition 04/18/2016     Priority: Medium     Major depressive disorder, recurrent episode, moderate (H) 11/16/2015     Priority: Medium     Health Care Home 10/01/2015     Priority: Medium     Schizoaffective disorder, depressive type (H) 06/08/2015     Priority: Medium     Carpal tunnel syndrome of left wrist 05/28/2015     Priority: Medium     Generalized anxiety disorder  12/04/2014     Priority: Medium     Diagnosis updated by automated process. Provider to review and confirm.       Candidiasis of skin 11/04/2014     Priority: Medium     Chronic candidiasis of groin and labia        Hypertriglyceridemia 09/10/2014     Priority: Medium     Impingement syndrome of right shoulder 06/10/2014     Priority: Medium     S/P hysterectomy 04/20/2014     Priority: Medium     Extrapyramidal and movement disorder 02/27/2013     Priority: Medium     Cardiomegaly      Priority: Medium     Chronic constipation      Priority: Medium     Dry eye syndrome      Priority: Medium     Esophageal reflux      Priority: Medium     Exposure keratoconjunctivitis      Priority: Medium     DM ophthalmopathy (H)      Priority: Medium     Senile cataract      Priority: Medium     Postmenopausal atrophic vaginitis      Priority: Medium     Restless leg syndrome      Priority: Medium     Squamous blepharitis      Priority: Medium     Urge incontinence 04/19/2011     Priority: Medium     Allergic rhinitis due to pollen      Priority: Medium     seasonal allergies      ,     Current Outpatient Prescriptions   Medication Sig Dispense Refill     blood glucose monitoring (NO BRAND SPECIFIED) test strip Use to test blood sugar 3 times daily or as directed. 100 each 3     order for DME Discontinue  Oxygen ([facility]) 1 Device 0     FLUoxetine (PROZAC) 40 MG capsule Take 1 capsule (40 mg) by mouth daily 30 capsule 2     ziprasidone (GEODON) 40 MG capsule Take 1 capsule (40 mg) by mouth 2 times daily (with meals) 60 capsule 2     LORazepam (ATIVAN) 1 MG tablet Take 1 tablet (1 mg) by mouth At Bedtime .  May take additional 1mg daily PRN for agitation. 35 tablet 1     exenatide (BYETTA) 10 MCG/0.04ML injection Inject 10 mcg Subcutaneous 2 times daily (before meals) 1 Syringe 11     insulin glargine (LANTUS) 100 UNIT/ML injection Inject 24 Units Subcutaneous At Bedtime 8 mL 11     metroNIDAZOLE (FLAGYL) 500 MG tablet Take  1 tablet (500 mg) by mouth 2 times daily 14 tablet 0     amLODIPine (NORVASC) 5 MG tablet Take 2 tablets (10 mg) by mouth daily 30 tablet 3     nystatin (MYCOSTATIN) 641272 UNIT/GM POWD Apply topically 3 times daily as needed 60 g 1     clotrimazole (LOTRIMIN) 1 % cream Apply topically 2 times daily as needed 50 g 0     fluticasone (FLONASE) 50 MCG/ACT nasal spray Spray 1 spray into both nostrils daily 16 g 3     furosemide (LASIX) 20 MG tablet Take 1 tablet (20 mg) by mouth 2 times daily 60 tablet 3     acetaminophen (TYLENOL) 500 MG tablet Take 2 tablets (1,000 mg) by mouth every 6 hours as needed 1 Bottle 2     solifenacin (VESICARE) 10 MG tablet Take 1 tablet (10 mg) by mouth daily 30 tablet 6     HYDROcodone-acetaminophen (NORCO) 5-325 MG per tablet Take 1 tablet by mouth every 6 hours as needed 1-2 tabs       atorvastatin (LIPITOR) 40 MG tablet Take 1 tablet (40 mg) by mouth daily 90 tablet 1     calcium polycarbophil (FIBERCON) 625 MG tablet Take 2 tablets by mouth daily       levothyroxine (SYNTHROID, LEVOTHROID) 175 MCG tablet Take 1 tablet (175 mcg) by mouth daily Give on empty stomach 30 tablet 4     metFORMIN (GLUCOPHAGE-XR) 500 MG 24 hr tablet Take 2 tablets (1,000 mg) by mouth 2 times daily (with meals) (Patient taking differently: Take 2,000 mg by mouth 2 times daily (with meals) ) 90 tablet 3     Incontinence Supply Disposable (INCONTINENCE BRIEF LARGE) MISC 2 Units 2 times daily 60 Units 11     Artificial Tear Ointment (ARTIFICIAL TEARS) OINT ophthalmic ointment At Bedtime       losartan (COZAAR) 100 MG tablet Take 1 tablet by mouth daily. 90 tablet 1     Sodium Fluoride (PREVIDENT 5000 PLUS) 1.1 % CREA Apply  to affected area every evening.       Hypromellose (ARTIFICIAL TEARS OP) Apply 1 drop to eye 4 times daily.       Gabapentin (NEURONTIN PO) Take 900 mg by mouth 3 times daily.       senna-docusate (SENNA S) 8.6-50 MG per tablet Take 2 tablets by mouth At Bedtime.       polyethylene glycol  (MIRALAX/GLYCOLAX) powder Take 1 capful by mouth 2 times daily. 17 GM PO BID       acetaminophen (TYLENOL) 325 MG tablet Take 1-2 tablets by mouth every 6 hours as needed.       Artificial Tear Ointment (LACRI-LUBE OP) Apply  to eye. Place 0.5 inches into both eyes at bedtime       Skin Protectants, Misc. (EUCERIN) cream Apply  topically as needed. Apply to thigh PRN dry skin        Saline 0.9 % SOLN Spray 2 sprays in nostril as needed.       Calcium Carbonate-Vitamin D (CALCIUM-VITAMIN D) 250-125 MG-UNIT TABS Take 1 tablet by mouth 2 times daily. Calcium 250 mg/Vit D 125 IU       Sod Fluor-Solo Fluor-Hydrfl Ac (GEL-SASHA DENTINBLOC DT) Apply  to affected area. GEL. Apply to 2nd molar on bottom right daily at bedtime.       lactase (LACTAID) 3000 UNIT tablet Take 4 tabs daily with meals.       Magnesium Hydroxide (MILK OF MAGNESIA PO) Take  by mouth. Take 30 mL as needed for constipation.       Sodium Fluoride, [id number], (PREVIDENT MT) Take  by mouth. Use as dental rinse at bedtime.       CLARITIN 10 MG OR TABS 1 TAB PO QD (Once per day) as needed for ALLERGY SYMPTOMS 30 11     ASPIRIN 81 MG OR TABS Take 1 tablet by mouth daily. ENTERIC COATED. 100 3     GLUCAGON EMERGENCY 1 MG IJ KIT Inject  as directed. 1 mL as needed for signs of hypoglycemia. May repeat as needed in 15 minutes.  0   ,     Allergies   Allergen Reactions     Chlordiazepoxide Hcl      Dimetapp Dm Cold-Cough      Cold/Congetion TABS     Haldol      Ibuprofen      TABS     Lactose Intolerance [Beta-Galactosidase]      CAPS     Milk Products      Propofol      EMUL     Patient is an established patient of this clinic.         Review of Systems:   Review of Systems   Constitutional: Negative for appetite change, chills, fatigue and fever.   HENT: Negative for congestion, sinus pressure and sore throat.    Eyes: Negative for visual disturbance.   Respiratory: Negative for cough, shortness of breath and wheezing.    Cardiovascular: Negative for chest  pain and leg swelling.   Gastrointestinal: Negative for abdominal distention, abdominal pain, blood in stool, constipation, diarrhea, nausea and vomiting.   Genitourinary: Negative for dysuria and hematuria.   Musculoskeletal: Negative for arthralgias and myalgias.   Skin: Negative for color change and rash.   Neurological: Negative for weakness and headaches.             Physical Exam:   Patient Vitals for the past 24 hrs:   BP Temp Temp src Pulse Resp SpO2 Weight   04/10/17 1403 134/78 - - 69 - - -   04/10/17 1402 147/79 98.3  F (36.8  C) Oral 72 18 95 % 243 lb 9.6 oz (110.5 kg)     Body mass index is 41.81 kg/(m^2).  Vitals were reviewed and were normal     Physical Exam   Constitutional: She is oriented to person, place, and time. She appears well-developed and well-nourished. No distress.   HENT:   Head: Normocephalic and atraumatic.   Nose: Nose normal.   Mouth/Throat: Oropharynx is clear and moist. No oropharyngeal exudate.   Eyes: Conjunctivae are normal. No scleral icterus.   Neck: Normal range of motion. Neck supple.   Cardiovascular: Normal rate, regular rhythm and normal heart sounds.  Exam reveals no gallop and no friction rub.    No murmur heard.  Pulmonary/Chest: Effort normal and breath sounds normal. No respiratory distress. She has no wheezes. She has no rales.   Abdominal: Soft. Bowel sounds are normal. There is no tenderness.   Musculoskeletal: She exhibits no edema.   Neurological: She is alert and oriented to person, place, and time.   Skin: Skin is warm. No rash noted. She is not diaphoretic.         Results:      Results from the last 24 hours  Results for orders placed or performed in visit on 04/05/17 (from the past 24 hour(s))   Comprehensive Sleep Study    Narrative    Brianda Reddy MD     4/10/2017 11:16 AM  SLEEP STUDY INTERPRETATION  TITRATION STUDY      Patient: Pinky Page  YOB: 1950  Study Date: 4/5/2017  MRN: [id number]  Referring Provider: Shamika Tejeda  "MD Warren  Ordering Provider: Brianda Reddy MD    Indications for Polysomnography: The patient is a 66 y old Female   who is 5' 4\" and weighs 249.0 lbs.  Her BMI is 43.0, [place]   sleepiness scale is 8.0 and neck size is 47.0.  Relevant medical   history includes diabetes, hypertension, and moderate obstructive   sleep apnea on recent PSG. A diagnostic polysomnogram PAP   titration was performed for find optimal CPAP treatment pressures   for sleep apnea.    Polysomnogram Data:  A full night polysomnogram recorded the   standard physiologic parameters including EEG, EOG, EMG, ECG,   nasal and oral airflow.  Respiratory parameters of chest and   abdominal movements were recorded with respiratory inductance   plethysmography.  Oxygen saturation was recorded by pulse   oximetry.      Treatment PSG:  Sleep Architecture: Poor sleep efficiency with normal arousal   index and delay to REM sleep.  The total recording time of the study was 509.2 minutes.  The   total sleep time was 385.5 minutes.  Sleep latency was prolonged   at 61.1 minutes with the use of a sleep aid (lorazepam.)  REM   latency was delayed at 163.5 minutes.  Arousal index was normal   at 13.4 arousals per hour.  Sleep efficiency was decreased at   75.7%.  Wake after sleep onset was 62.0 minutes.   The patient   spent 9.7% of total sleep time in Stage N1, 51.2% in Stage N2,   7.4% in Stage N3 and 31.6% in REM.     Respiration: Obstructive events resolved on final PAP pressure 13   cmH2O including REM supine.  ? The patient was titrated at pressures ranging from 5 cmH2O up   to 13 cmH2O.  The optimal pressure achieved was 13 cmH2O with a   residual AHI of 0 events per hour.  Time in REM supine on final   pressure was 64.5 minutes.   ? This titration was considered optimal (residual AHI < 5 events   per hour and including REM-supine sleep at final pressure).   ? Snoring - was reported as absent.  ? Respiratory rate and pattern - was notable for normal "   respiratory rate and pattern.  ? Sustained Sleep Associated Hypoventilation - Transcutaneous   carbon dioxide monitoring was not used, however significant   hypoventilation was not suggested by oximetry.  ? Sleep Associated Hypoxemia - (Greater than 5 minutes O2 sat   below 89%) was borderline present.  Baseline oxygen saturation   was 93.5%. Lowest oxygen saturation was 80.2%.  Time spent less   than or equal to 88% was 3.3 minutes.  Time spent less than or   equal to 89% was 6.1 minutes.    Movement Activity: Frequent periodic limb movements most of which   were not associated with arousals.  ? Periodic Limb Activity - There were 126 PLMs during the entire   study. The PLM index was 19.6 movements per hour.  The PLM   Arousal Index was 1.7 per hour.  ? REM EMG Activity - Excessive transient / sustained muscle   activity was not present. (PLMs present)  ? Nocturnal Behavior - Abnormal sleep related behaviors were not   noted.  ? Bruxism - None apparent.    Cardiac Summary: Normal sinus rhythm and rates with frequent   early wide complex beats (PVCs.)  The average pulse rate was 63.7 bpm.  The minimum pulse rate was   41.6 bpm while the maximum pulse rate was 81.1 bpm. The rhythm is   normal sinus. Arrhythmias were not noted.    Assessment:   ? Moderately severe obstructive sleep apnea with obstructive   events resolved on final PAP pressure 13 cmH2O including REM   supine.  ? Poor sleep efficiency with normal arousal index and delay to   REM sleep.  ? Frequent periodic limb movements most of which were not   associated with arousals.  ? Normal sinus rhythm and rates with frequent early wide complex   beats (PVCs.)    Recommendations:  ? Treatment of ANUJ with CPAP at 13 cmH2O.    ? Recommend clinical follow up with sleep management team, for   coaching and review of effectiveness and compliance measures.  ? Advise regarding the risks of drowsy driving.  ? Suggest optimizing sleep schedule and avoiding sleep    deprivation.  ? Weight management (BMI > 30).  ? Pharmacologic therapy should be used for management of restless   legs syndrome only if present and clinically indicated and not   based on the presence of periodic limb movements alone.    Diagnostic Codes:  Obstructive Sleep Apnea G47.33        _____________________________________   Brianda Reddy MD 4/10/2017         Table of Oximetry Distribution    Range(%) Time in range (min) Time in range (%) Time in or below   range (min) Time in or below range (%)   0.0 - 88.0 3.3 0.6% 3.3 0.6%   0.0 - 89.0 6.1 1.2% 6.1 1.2%            Assessment and Plan     Pinky Page is a 65 yo female with a PMH of HTN, CKD, DM2, hypothyroid, s/p breast CA with left masectomy 1996, and significant psych history who currently lives in group home who presents to the clinic for HTN and DM follow up.    1. Benign essential hypertension. Continues to have have intermittently higher BP at home. Will get 24 hour BP monitoring to get a better picture of how her BP is controlled throughout the day. Discussed that her BP may improve once she is receives and uses her CPAP machine. She is at risk for polypharmacy and would like to avoid starting new medication at this time.   - 24 Hour Blood Pressure Monitor - Adult; Future  - pending results, will discuss further options.     2. Severe obstructive sleep apnea  - pending CPAP machine delivery.     3. Diabetes mellitus type 2, insulin dependent (H)  4. Type 2 diabetes mellitus with microalbuminuria, with long-term current use of insulin (H), very well controlled.   - no changes to medications today.     There are no discontinued medications.  Options for treatment and follow-up care were reviewed with the patient. Pinky Page  engaged in the decision making process and verbalized understanding of the options discussed and agreed with the final plan.    Shamika Kelley MD  [facility]  [phone]

## 2017-04-10 NOTE — MR AVS SNAPSHOT
After Visit Summary   4/10/2017    Pinky Page    MRN: [id number]           Patient Information     Date Of Birth          1950        Visit Information        Provider Department      4/10/2017 1:40 PM Shamika Dia MD [facility]        Today's Diagnoses     Benign essential hypertension    -  1    Severe obstructive sleep apnea        Diabetes mellitus type 2, insulin dependent (H)        Type 2 diabetes mellitus with microalbuminuria, with long-term current use of insulin (H)          Care Instructions    Here is the plan from today's visit    1. Benign essential hypertension  - 24 Hour Blood Pressure Monitor - Adult; Future    2. Severe obstructive sleep apnea  - if you don't get your CPAP machine, please let me know    3. Diabetes mellitus type 2, insulin dependent (H)  4. Type 2 diabetes mellitus with microalbuminuria, with long-term current use of insulin (H)  - we won't change any of your diabetes medications today.     Follow up plan  Please make a clinic appointment for follow up with me (SHAMIKA DIA) in 3-4  weeks for BP follow up.    Thank you for coming to [facility] today.  Lab Testing:  **If you had lab testing today and your results are reassuring or normal they will be mailed to you or sent through [employer] within 7 days.   **If the lab tests need quick action we will call you with the results.  The phone number we will call with results is # [phone] (home) [phone] (work). If this is not the best number please call our clinic and change the number.  Medication Refills:  If you need any refills please call your pharmacy and they will contact us.   If you need to  your refill at a new pharmacy, please contact the new pharmacy directly. The new pharmacy will help you get your medications transferred faster.   Scheduling:  If you have any concerns about today's visit or wish to schedule another appointment please call our  office during normal business hours [phone] (8-5:00 M-F)  If a referral was made to a [facility] and you don't get a call from central scheduling please call [phone].  If a Mammogram was ordered for you at [facility] call [phone] to schedule or change your appointment.  If you had an XRay/CT/Ultrasound/MRI ordered the number is [phone] to schedule or change your radiology appointment.   Medical Concerns:  If you have urgent medical concerns please call [phone] at any time of the day.  If you have a medical emergency please call 521.          Follow-ups after your visit        Your next 10 appointments already scheduled     Apr 11, 2017  2:00 PM CDT   (Arrive by 1:45 PM)   Return Visit with Damon Gr, PhD [facility] ([facility])    [address] [place] [address]  [address] [facility] [address] [phone]            Apr 17, 2017  9:30 AM CDT   Adult Med Follow UP with Divine Jack MD   Psychiatry Clinic ([facility])    [place] [id number]  [address] [place] [address] [phone]            May 11, 2017 11:30 AM CDT   Return Sleep Patient with Brianda Reddy MD   [facility] [place] Sleep [facility] ([facility])    [address] [place] [id number] [phone]            May 22, 2017 10:30 AM CDT   (Arrive by 10:15 AM)   Return Visit with Marcos Magdaleno MD   [facility] Weight Management ([facility])    [address] [place] [address]  [address] [facility] [address] [phone]            Jun 21, 2017 10:30 AM CDT   Lab with  LAB   [facility] Lab ([facility])    [address] [place] [address]  [address] [facility] [address] [phone]            Jun 21, 2017 11:35 AM CDT   (Arrive by 11:20 AM)   Return Visit with Gaby Holliday MD    [facility] Nephrology ([facility])    [address]  3rd Floor  [facility] [address]   [phone]            2017  1:30 PM CST   CYNDEE WEST with Michael Lopez MD   Eye Clinic ([facility])    [address]  [address]  [address]  [facility] [address]   [phone]              Future tests that were ordered for you today     Open Future Orders        Priority Expected Expires Ordered    24 Hour Blood Pressure Monitor - Adult Routine  2017 4/10/2017            Who to contact     Please call your clinic at [phone] to:    Ask questions about your health    Make or cancel appointments    Discuss your medicines    Learn about your test results    Speak to your doctor   If you have compliments or concerns about an experience at your clinic, or if you wish to file a complaint, please contact [facility] Patient Relations at [phone] or email us at [email or web address]         Additional Information About Your Visit        [employer] Information     [employer] is an electronic gateway that provides easy, online access to your medical records. With [employer], you can request a clinic appointment, read your test results, renew a prescription or communicate with your care team.     To sign up for [employer] visit the website at [email or web address]   You will be asked to enter the access code listed below, as well as some personal information. Please follow the directions to create your username and password.     Your access code is: [email or web address]  Expires: 2017  3:22 PM     Your access code will  in 90 days. If you need help or a new code, please contact your [facility] or call [phone] for assistance.        Care EveryWhere ID     This is your Care EveryWhere ID. This could be used by other organizations to access your [facility]  records  [id number]        Your Vitals Were     Pulse Temperature Respirations Pulse Oximetry BMI (Body Mass Index)       69 98.3  F (36.8  C) (Oral) 18 95% 41.81 kg/m2        Blood Pressure from Last 3 Encounters:   04/10/17 134/78   03/30/17 159/62   03/21/17 131/83    Weight from Last 3 Encounters:   04/10/17 243 lb 9.6 oz (110.5 kg)   03/30/17 244 lb (110.7 kg)   03/21/17 245 lb 12.8 oz (111.5 kg)                 Today's Medication Changes          These changes are accurate as of: 4/10/17  2:48 PM.  If you have any questions, ask your nurse or doctor.               These medicines have changed or have updated prescriptions.        Dose/Directions    metFORMIN 500 MG 24 hr tablet   Commonly known as:  GLUCOPHAGE-XR   This may have changed:  how much to take   Used for:  Diabetes mellitus, type 2 (H)        Dose:  1000 mg   Take 2 tablets (1,000 mg) by mouth 2 times daily (with meals)   Quantity:  90 tablet   Refills:  3                Primary Care Provider Office Phone # Fax #    Shamika Kelley [phone]       [facility] [address] [place] [id number]        Thank you!     Thank you for choosing [facility]  for your care. Our goal is always to provide you with excellent care. Hearing back from our patients is one way we can continue to improve our services. Please take a few minutes to complete the written survey that you may receive in the mail after your visit with us. Thank you!             Your Updated Medication List - Protect others around you: Learn how to safely use, store and throw away your medicines at www.disposemymeds.org.          This list is accurate as of: 4/10/17  2:48 PM.  Always use your most recent med list.                   Brand Name Dispense Instructions for use    acetaminophen 500 MG tablet    TYLENOL    1 Bottle    Take 2 tablets (1,000 mg) by mouth every 6 hours as needed       amLODIPine 5 MG tablet    NORVASC     30 tablet    Take 2 tablets (10 mg) by mouth daily       ARTIFICIAL TEARS OP      Apply 1 drop to eye 4 times daily.       aspirin 81 MG tablet     100    Take 1 tablet by mouth daily. ENTERIC COATED.       atorvastatin 40 MG tablet    LIPITOR    90 tablet    Take 1 tablet (40 mg) by mouth daily       blood glucose monitoring test strip    no brand specified    100 each    Use to test blood sugar 3 times daily or as directed.       calcium polycarbophil 625 MG tablet    FIBERCON     Take 2 tablets by mouth daily       calcium-vitamin D 250-125 MG-UNIT Tabs      Take 1 tablet by mouth 2 times daily. Calcium 250 mg/Vit D 125 IU       CLARITIN 10 MG tablet   Generic drug:  loratadine     30    1 TAB PO QD (Once per day) as needed for ALLERGY SYMPTOMS       clotrimazole 1 % cream    LOTRIMIN    50 g    Apply topically 2 times daily as needed       eucerin cream      Apply  topically as needed. Apply to thigh PRN dry skin       exenatide 10 MCG/0.04ML injection    BYETTA    1 Syringe    Inject 10 mcg Subcutaneous 2 times daily (before meals)       FLUoxetine 40 MG capsule    PROzac    30 capsule    Take 1 capsule (40 mg) by mouth daily       fluticasone 50 MCG/ACT spray    FLONASE    16 g    Spray 1 spray into both nostrils daily       furosemide 20 MG tablet    LASIX    60 tablet    Take 1 tablet (20 mg) by mouth 2 times daily       GEL-SASHA DENTINBLOC DT      Apply  to affected area. GEL. Apply to 2nd molar on bottom right daily at bedtime.       GLUCAGON EMERGENCY 1 MG kit   Generic drug:  glucagon      Inject  as directed. 1 mL as needed for signs of hypoglycemia. May repeat as needed in 15 minutes.       HYDROcodone-acetaminophen 5-325 MG per tablet    NORCO     Take 1 tablet by mouth every 6 hours as needed 1-2 tabs       Incontinence Brief Large Misc     60 Units    2 Units 2 times daily       insulin glargine 100 UNIT/ML injection    LANTUS    8 mL    Inject 24 Units Subcutaneous At Bedtime       LACRI-LUBE OP       Apply  to eye. Place 0.5 inches into both eyes at bedtime       LACTAID 3000 UNIT tablet   Generic drug:  lactase      Take 4 tabs daily with meals.       levothyroxine 175 MCG tablet    SYNTHROID/LEVOTHROID    30 tablet    Take 1 tablet (175 mcg) by mouth daily Give on empty stomach       LORazepam 1 MG tablet    ATIVAN    35 tablet    Take 1 tablet (1 mg) by mouth At Bedtime .  May take additional 1mg daily PRN for agitation.       losartan 100 MG tablet    COZAAR    90 tablet    Take 1 tablet by mouth daily.       metFORMIN 500 MG 24 hr tablet    GLUCOPHAGE-XR    90 tablet    Take 2 tablets (1,000 mg) by mouth 2 times daily (with meals)       MILK OF MAGNESIA PO      Take  by mouth. Take 30 mL as needed for constipation.       NEURONTIN PO      Take 900 mg by mouth 3 times daily.       nystatin 237480 UNIT/GM Powd    MYCOSTATIN    60 g    Apply topically 3 times daily as needed       order for DME     1 Device    Discontinue  Oxygen ([facility])       polyethylene glycol powder    MIRALAX/GLYCOLAX     Take 1 capful by mouth 2 times daily. 17 GM PO BID       PREVIDENT 5000 PLUS 1.1 % Crea   Generic drug:  Sodium Fluoride      Apply  to affected area every evening.       saline 0.9 % Soln      Spray 2 sprays in nostril as needed.       SENNA S 8.6-50 MG per tablet   Generic drug:  senna-docusate      Take 2 tablets by mouth At Bedtime.       solifenacin 10 MG tablet    VESICARE    30 tablet    Take 1 tablet (10 mg) by mouth daily       ziprasidone 40 MG capsule    GEODON    60 capsule    Take 1 capsule (40 mg) by mouth 2 times daily (with meals)

## 2017-04-10 NOTE — PROCEDURES
"SLEEP STUDY INTERPRETATION  TITRATION STUDY      Patient: Pinky Page  YOB: 1950  Study Date: 4/5/2017  MRN: [id number]  Referring Provider: Shamika Kelley MD  Ordering Provider: Brianda Reddy MD    Indications for Polysomnography: The patient is a 66 y old Female who is 5' 4\" and weighs 249.0 lbs.  Her BMI is 43.0, [place] sleepiness scale is 8.0 and neck size is 47.0.  Relevant medical history includes diabetes, hypertension, and moderate obstructive sleep apnea on recent PSG. A diagnostic polysomnogram PAP titration was performed for find optimal CPAP treatment pressures for sleep apnea.    Polysomnogram Data:  A full night polysomnogram recorded the standard physiologic parameters including EEG, EOG, EMG, ECG, nasal and oral airflow.  Respiratory parameters of chest and abdominal movements were recorded with respiratory inductance plethysmography.  Oxygen saturation was recorded by pulse oximetry.      Treatment PSG:  Sleep Architecture: Poor sleep efficiency with normal arousal index and delay to REM sleep.  The total recording time of the study was 509.2 minutes.  The total sleep time was 385.5 minutes.  Sleep latency was prolonged at 61.1 minutes with the use of a sleep aid (lorazepam.)  REM latency was delayed at 163.5 minutes.  Arousal index was normal at 13.4 arousals per hour.  Sleep efficiency was decreased at 75.7%.  Wake after sleep onset was 62.0 minutes.   The patient spent 9.7% of total sleep time in Stage N1, 51.2% in Stage N2, 7.4% in Stage N3 and 31.6% in REM.     Respiration: Obstructive events resolved on final PAP pressure 13 cmH2O including REM supine.    The patient was titrated at pressures ranging from 5 cmH2O up to 13 cmH2O.  The optimal pressure achieved was 13 cmH2O with a residual AHI of 0 events per hour.  Time in REM supine on final pressure was 64.5 minutes.     This titration was considered optimal (residual AHI < 5 events per hour and including REM-supine " sleep at final pressure).     Snoring - was reported as absent.    Respiratory rate and pattern - was notable for normal respiratory rate and pattern.    Sustained Sleep Associated Hypoventilation - Transcutaneous carbon dioxide monitoring was not used, however significant hypoventilation was not suggested by oximetry.    Sleep Associated Hypoxemia - (Greater than 5 minutes O2 sat below 89%) was borderline present.  Baseline oxygen saturation was 93.5%. Lowest oxygen saturation was 80.2%.  Time spent less than or equal to 88% was 3.3 minutes.  Time spent less than or equal to 89% was 6.1 minutes.    Movement Activity: Frequent periodic limb movements most of which were not associated with arousals.    Periodic Limb Activity - There were 126 PLMs during the entire study. The PLM index was 19.6 movements per hour.  The PLM Arousal Index was 1.7 per hour.    REM EMG Activity - Excessive transient / sustained muscle activity was not present. (PLMs present)    Nocturnal Behavior - Abnormal sleep related behaviors were not noted.    Bruxism - None apparent.    Cardiac Summary: Normal sinus rhythm and rates with frequent early wide complex beats (PVCs.)  The average pulse rate was 63.7 bpm.  The minimum pulse rate was 41.6 bpm while the maximum pulse rate was 81.1 bpm. The rhythm is normal sinus. Arrhythmias were not noted.    Assessment:     Moderately severe obstructive sleep apnea with obstructive events resolved on final PAP pressure 13 cmH2O including REM supine.    Poor sleep efficiency with normal arousal index and delay to REM sleep.    Frequent periodic limb movements most of which were not associated with arousals.    Normal sinus rhythm and rates with frequent early wide complex beats (PVCs.)    Recommendations:    Treatment of ANUJ with CPAP at 13 cmH2O.      Recommend clinical follow up with sleep management team, for coaching and review of effectiveness and compliance measures.    Advise regarding the risks of  drowsy driving.    Suggest optimizing sleep schedule and avoiding sleep deprivation.    Weight management (BMI > 30).    Pharmacologic therapy should be used for management of restless legs syndrome only if present and clinically indicated and not based on the presence of periodic limb movements alone.    Diagnostic Codes:  Obstructive Sleep Apnea G47.33        _____________________________________   Brianda Reddy MD 4/10/2017         Table of Oximetry Distribution    Range(%) Time in range (min) Time in range (%) Time in or below range (min) Time in or below range (%)   0.0 - 88.0 3.3 0.6% 3.3 0.6%   0.0 - 89.0 6.1 1.2% 6.1 1.2%

## 2017-04-11 ENCOUNTER — OFFICE VISIT (OUTPATIENT)
Dept: PSYCHOLOGY | Facility: CLINIC | Age: 67
End: 2017-04-11

## 2017-04-11 VITALS — BODY MASS INDEX: 42.09 KG/M2 | WEIGHT: 245.2 LBS

## 2017-04-11 DIAGNOSIS — F33.1 MAJOR DEPRESSIVE DISORDER, RECURRENT EPISODE, MODERATE (H): Primary | ICD-10-CM

## 2017-04-11 DIAGNOSIS — F54 PSYCHOLOGICAL FACTORS AFFECTING MEDICAL CONDITION: ICD-10-CM

## 2017-04-11 ASSESSMENT — ENCOUNTER SYMPTOMS
CONSTIPATION: 0
HEADACHES: 0
BLOOD IN STOOL: 0
CHILLS: 0
VOMITING: 0
SHORTNESS OF BREATH: 0
COUGH: 0
COLOR CHANGE: 0
NAUSEA: 0
MYALGIAS: 0
FEVER: 0
HEMATURIA: 0
WEAKNESS: 0
DIARRHEA: 0
DYSURIA: 0
ARTHRALGIAS: 0
SINUS PRESSURE: 0
ABDOMINAL DISTENTION: 0
FATIGUE: 0
WHEEZING: 0
ABDOMINAL PAIN: 0
SORE THROAT: 0
APPETITE CHANGE: 0

## 2017-04-11 NOTE — PROGRESS NOTES
Health Psychology                  Clinic    Department of Medicine  Madelaine Ingram, Ph.D., L.P. [phone]                          [facility] Miryam Woods, Ph.D.,  L.P. [phone]                 3rd Floor, Clinic 3[address]  [place] Mail Code 747   Damon Gr, Ph.D., A.DAISY.P.P., L.P. [phone]     [address] Kimber Morales, Ph.D., L.P. [phone]  [place] [id number]  [place] [address]    Health Psychology Follow-Up Note    Ms. Page is a pleasant 66-year old woman with chronic depressive illness, who returns to clinic for supportive and behavioral psychotherapy for moderate recurrent depression and for help losing weight given her morbid obesity.   Depression is at baseline (4)  mild.  She gained weight since last seen. She reports some stopping walking because she had been ill.  She heard from her sleep physician that her insurance is discontinuing coverage for her nocturnal oxygen.  She is anticipating starting to use a CPAP.  Wt Readings from Last 4 Encounters:   04/11/17 111.2 kg (245 lb 3.2 oz)   04/10/17 110.5 kg (243 lb 9.6 oz)   03/30/17 110.7 kg (244 lb)   03/21/17 111.5 kg (245 lb 12.8 oz)     At [employer], she continues to serve on the [employer].  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  She is working 1 day/week.. Her roommate is doing better and she is enjoying her more over time.  She reports that another resident ate leaves off her plants.  We discussed strategies for addressing it.  She is doing more social walking.  She is willing to try the bike.  She is wiling to speak to the staff about exercises before biking.  We walked for about half of the session.  We discussed another resident who  Is damaging plants.  She discussed eating less and continuing to exercise more than she had been.    We  dicussed cutting back by half on her snacks.  Pinky participated fully and appeared to derive benefit. We walked for about 25 minutes (first time of year).  She appeared to do well.  Rapport was excellent. Extended session due to complexity of case and length of interval.    IMPRESSION Distress Disability Risk    Low High Low     Time In: 2:10  Time Out: 3:01  Diagnosis:   Major Depression, recurrent moderate (F33.1)   Psychological Factors Affecting Medical Condition: Morbid Obesity (F54)      Plan: She will return on 5/15 @ 1:00  for behavioral and supportive psychotherapy.   Tx plan due 4/11/2018  Damon Gr, Ph.D., A.B.P.P., L.P.

## 2017-04-11 NOTE — MR AVS SNAPSHOT
After Visit Summary   4/11/2017    Pinky Page    MRN: [id number]           Patient Information     Date Of Birth          1950        Visit Information        Provider Department      4/11/2017 2:00 PM Damon Gr, PhD [facility]        Today's Diagnoses     Major depressive disorder, recurrent episode, moderate (H)    -  1    Psychological factors affecting medical condition           Follow-ups after your visit        Your next 10 appointments already scheduled     Apr 17, 2017  9:30 AM CDT   Adult Med Follow UP with Divine Jack MD   Psychiatry Clinic ([facility])    [address]  [address] [place] [address] [phone]            May 11, 2017 11:30 AM CDT   Return Sleep Patient with Brianda Reddy MD   [facility] [place] Sleep Study ([facility])    [address] [place] [id number] [phone]            May 22, 2017 10:30 AM CDT   (Arrive by 10:15 AM)   Return Visit with Marcos Magdaleno MD   [facility] ([facility])    [address] [facility] [address]  [address] [facility] [address] [phone]            Jun 21, 2017 10:30 AM CDT   Lab with  LAB   [facility] ([facility])    [address] [place] [address]  [address] [facility] [address]   [phone]            Jun 21, 2017 11:35 AM CDT   (Arrive by 11:20 AM)   Return Visit with Gaby Holliday MD   [facility] ([facility])    [address]  3rd Floor  [facility] [address]   [phone]            Nov 14, 2017  1:30 PM CST   NEW GENERAL with Michael Lopez MD   Eye Clinic ([facility])    [address] [facility]  9th [address] Clin [address]  [facility] [address]   [phone]              Future tests that were ordered for you today      Open Future Orders        Priority Expected Expires Ordered    24 Hour Blood Pressure Monitor - Adult Routine  2017 4/10/2017            Who to contact     Please call your clinic at [phone] to:    Ask questions about your health    Make or cancel appointments    Discuss your medicines    Learn about your test results    Speak to your doctor   If you have compliments or concerns about an experience at your clinic, or if you wish to file a complaint, please contact [facility] Patient Relations at [phone] or email us at [email or web address]         Additional Information About Your Visit        In[employer] Information     [employer] is an electronic gateway that provides easy, online access to your medical records. With [employer], you can request a clinic appointment, read your test results, renew a prescription or communicate with your care team.     To sign up for [employer] visit the website at [email or web address]   You will be asked to enter the access code listed below, as well as some personal information. Please follow the directions to create your username and password.     Your access code is: [email or web address]  Expires: 2017  3:22 PM     Your access code will  in 90 days. If you need help or a new code, please contact your [facility] or call [phone] for assistance.        Care EveryWhere ID     This is your Care EveryWhere ID. This could be used by other organizations to access your [facility] medical records  [id number]        Your Vitals Were     BMI (Body Mass Index)                   42.09 kg/m2            Blood Pressure from Last 3 Encounters:   04/10/17 134/78   17 159/62   17 131/83    Weight from Last 3 Encounters:   17 111.2 kg (245 lb 3.2 oz)   04/10/17 110.5 kg (243 lb 9.6 oz)   17 110.7 kg (244 lb)              Today, you had the following     No orders found for display          Today's Medication Changes          These changes are accurate as of: 4/11/17  3:03 PM.  If you have any questions, ask your nurse or doctor.               These medicines have changed or have updated prescriptions.        Dose/Directions    metFORMIN 500 MG 24 hr tablet   Commonly known as:  GLUCOPHAGE-XR   This may have changed:  how much to take   Used for:  Diabetes mellitus, type 2 (H)        Dose:  1000 mg   Take 2 tablets (1,000 mg) by mouth 2 times daily (with meals)   Quantity:  90 tablet   Refills:  3                Primary Care Provider Office Phone # Fax #    Shamika Ileana Kelley [phone]       [facility] [address] [facility] [address]        Thank you!     Thank you for choosing [facility]  for your care. Our goal is always to provide you with excellent care. Hearing back from our patients is one way we can continue to improve our services. Please take a few minutes to complete the written survey that you may receive in the mail after your visit with us. Thank you!             Your Updated Medication List - Protect others around you: Learn how to safely use, store and throw away your medicines at www.disposemymeds.org.          This list is accurate as of: 4/11/17  3:03 PM.  Always use your most recent med list.                   Brand Name Dispense Instructions for use    acetaminophen 500 MG tablet    TYLENOL    1 Bottle    Take 2 tablets (1,000 mg) by mouth every 6 hours as needed       amLODIPine 5 MG tablet    NORVASC    30 tablet    Take 2 tablets (10 mg) by mouth daily       ARTIFICIAL TEARS OP      Apply 1 drop to eye 4 times daily.       aspirin 81 MG tablet     100    Take 1 tablet by mouth daily. ENTERIC COATED.       atorvastatin 40 MG tablet    LIPITOR    90 tablet    Take 1 tablet (40 mg) by mouth daily       blood glucose monitoring test strip    no brand specified    100 each    Use to test blood sugar 3 times daily or as  directed.       calcium polycarbophil 625 MG tablet    FIBERCON     Take 2 tablets by mouth daily       calcium-vitamin D 250-125 MG-UNIT Tabs      Take 1 tablet by mouth 2 times daily. Calcium 250 mg/Vit D 125 IU       CLARITIN 10 MG tablet   Generic drug:  loratadine     30    1 TAB PO QD (Once per day) as needed for ALLERGY SYMPTOMS       clotrimazole 1 % cream    LOTRIMIN    50 g    Apply topically 2 times daily as needed       eucerin cream      Apply  topically as needed. Apply to thigh PRN dry skin       exenatide 10 MCG/0.04ML injection    BYETTA    1 Syringe    Inject 10 mcg Subcutaneous 2 times daily (before meals)       FLUoxetine 40 MG capsule    PROzac    30 capsule    Take 1 capsule (40 mg) by mouth daily       fluticasone 50 MCG/ACT spray    FLONASE    16 g    Spray 1 spray into both nostrils daily       furosemide 20 MG tablet    LASIX    60 tablet    Take 1 tablet (20 mg) by mouth 2 times daily       GEL-SASHA DENTINBLOC DT      Apply  to affected area. GEL. Apply to 2nd molar on bottom right daily at bedtime.       GLUCAGON EMERGENCY 1 MG kit   Generic drug:  glucagon      Inject  as directed. 1 mL as needed for signs of hypoglycemia. May repeat as needed in 15 minutes.       HYDROcodone-acetaminophen 5-325 MG per tablet    NORCO     Take 1 tablet by mouth every 6 hours as needed 1-2 tabs       Incontinence Brief Large Misc     60 Units    2 Units 2 times daily       insulin glargine 100 UNIT/ML injection    LANTUS    8 mL    Inject 24 Units Subcutaneous At Bedtime       LACRI-LUBE OP      Apply  to eye. Place 0.5 inches into both eyes at bedtime       LACTAID 3000 UNIT tablet   Generic drug:  lactase      Take 4 tabs daily with meals.       levothyroxine 175 MCG tablet    SYNTHROID/LEVOTHROID    30 tablet    Take 1 tablet (175 mcg) by mouth daily Give on empty stomach       LORazepam 1 MG tablet    ATIVAN    35 tablet    Take 1 tablet (1 mg) by mouth At Bedtime .  May take additional 1mg daily PRN  for agitation.       losartan 100 MG tablet    COZAAR    90 tablet    Take 1 tablet by mouth daily.       metFORMIN 500 MG 24 hr tablet    GLUCOPHAGE-XR    90 tablet    Take 2 tablets (1,000 mg) by mouth 2 times daily (with meals)       MILK OF MAGNESIA PO      Take  by mouth. Take 30 mL as needed for constipation.       NEURONTIN PO      Take 900 mg by mouth 3 times daily.       nystatin 877878 UNIT/GM Powd    MYCOSTATIN    60 g    Apply topically 3 times daily as needed       order for DME     1 Device    Discontinue  Oxygen ([facility])       polyethylene glycol powder    MIRALAX/GLYCOLAX     Take 1 capful by mouth 2 times daily. 17 GM PO BID       PREVIDENT 5000 PLUS 1.1 % Crea   Generic drug:  Sodium Fluoride      Apply  to affected area every evening.       saline 0.9 % Soln      Spray 2 sprays in nostril as needed.       SENNA S 8.6-50 MG per tablet   Generic drug:  senna-docusate      Take 2 tablets by mouth At Bedtime.       solifenacin 10 MG tablet    VESICARE    30 tablet    Take 1 tablet (10 mg) by mouth daily       ziprasidone 40 MG capsule    GEODON    60 capsule    Take 1 capsule (40 mg) by mouth 2 times daily (with meals)

## 2017-04-12 NOTE — PROCEDURES
" SLEEP STUDY INTERPRETATION  POLYSOMNOGRAPHY REPORT      Patient: Pinky Page  YOB: 1950  Study Date: 3/21/2017  MRN: [id number]  Referring Provider: Shamika Kelley MD  Ordering Provider: Brianda Reddy MD    Indications for Polysomnography: The patient is a 66 y old Female who is 5' 4\" and weighs 249.0 lbs.  Her BMI is 43.0, [place] sleepiness scale 10.0 and neck size is 47 cm.  Relevant medical history includes hypertension, diabetes, obesity, severe obstructive sleep apnea. A diagnostic polysomnogram was performed to evaluate for sleep apnea/hypoventilation/hypoxemia.    Polysomnogram Data:  A full night polysomnogram recorded the standard physiologic parameters including EEG, EOG, EMG, ECG, nasal and oral airflow.  Respiratory parameters of chest and abdominal movements were recorded with respiratory inductance plethysmography.  Oxygen saturation was recorded by pulse oximetry.      Sleep Architecture: Decreased sleep efficiency due to prolonged wake after sleep onset. All stages of sleep observed with significantly increased both stages N3 and REM sleep.    The total recording time of the polysomnogram was 481.1 minutes.  The total sleep time was 360.0 minutes.  Sleep latency was increased at 30.0 minutes without the use of a sleep aid.  REM latency was 105.5 minutes.  Arousal index was normal at 18.0 arousals per hour.  Sleep efficiency was decreased at 74.8%.  Wake after sleep onset was 91.0 minutes.  The patient spent 8.5% of total sleep time in Stage N1, 16.9% in Stage N2, 35.8% in Stages N3, and 38.8% in REM.  Time in REM supine was 139.5 minutes.    Respiration: Moderate obstructive sleep apnea.    Events - The polysomnogram revealed a presence of 4 obstructive, 0 central, and 0 mixed apneas resulting in an apnea index of 0.7 events per hour.  There were 104 hypopneas resulting in a hypopnea index of 17.3 events per hour.  The combined apnea/hypopnea index was 18.0 events " per hour.  The REM AHI was 21.1 events per hour.  The supine AHI was 18.0 events per hour.  The RERA index was 5.8 events per hour.   The RDI was 23.8 events per hour.    Snoring - was reported as soft to loud and intermittent.    Respiratory rate and pattern - was notable for normal respiratory rate and pattern.    Sustained Sleep Associated Hypoventilation - Transcutaneous carbon dioxide monitoring was used, however significant hypoventilation was not present with a range of 41 mmHg to 47 mmHg.    Sleep Associated Hypoxemia - (Greater than 5 minutes O2 sat below 89%) was not present.  Baseline oxygen saturation was 93.5%. Lowest oxygen saturation was 83.0%.  Time spent less than or equal to 88% was 1.1 minutes.  Time spent less than or equal to 89% was 2.1 minutes.    Movement Activity: No significant PLMs or other abnormal movement activity.    Periodic Limb Activity - There were 19 PLMs during the entire study. The PLM index was 3.2 movements per hour.  The PLM Arousal Index was 0 per hour.    REM EMG Activity - Excessive transient / sustained muscle activity was not present.    Nocturnal Behavior - Abnormal sleep related behaviors were not noted.    Bruxism - None apparent.    Cardiac Summary: Normal sinus rhythm with frequent PVCs.  The average pulse rate was 64.0 bpm.  The minimum pulse rate was 50.8 bpm while the maximum pulse rate was 84.0 bpm. The rhythm is normal sinus.  Frequent PVCs were noted.      Assessment:     Moderate obstructive sleep apnea.    Sleep architecture was remarkable for decreased sleep efficiency due to prolonged wake after sleep onset. All stages of sleep observed with significantly increased both stages N3 and REM sleep.      Normal sinus rhythm was noted with frequent PVCs.    No significant PLMs or other abnormal movement activity.      Recommendations:    Suggest repeat polysomnography with full night titration of positive airway pressure therapy for the control of sleep  disordered breathing.    Based on the presence of moderate obstructive sleep apnea and excessive daytime sleepiness, treatment could be empirically initiated with Auto-titrating PAP therapy with a range of 5 - 15 cmH2O. Recommend clinical follow up with sleep management team.    Patient may be a candidate for dental appliance through referral to Sleep Dentistry for the treatment of obstructive sleep apnea and/or socially disruptive snoring.    If devices are not acceptable or effective, patient may benefit from evaluation of possible surgical options.  If she is interested, would recommend referral to specialized ENT-Sleep provider.    Due to frequent PVCs, suggest Cardiology evaluation for further work up and management    Advise regarding the risks of drowsy driving.    Suggest optimizing sleep schedule and avoiding sleep deprivation.    Weight management (BMI > 30).    Diagnostic Codes:     Obstructive Sleep Apnea G47.33       Gautam Coburn DO  Clinical Sleep Medicine Fellow  4/7/2017    Attending MD:  PSG was personally reviewed in detail with the Sleep Medicine Fellow.  The above interpretation reflects our joint assessment and recommendations.                                                                                                                                               ________________________________   (Spring Pollard MD), 4/10/2017

## 2017-04-19 ENCOUNTER — HOSPITAL ENCOUNTER (OUTPATIENT)
Dept: CARDIOLOGY | Facility: CLINIC | Age: 67
Discharge: HOME | End: 2017-04-19
Attending: FAMILY MEDICINE | Admitting: FAMILY MEDICINE
Payer: COMMERCIAL

## 2017-04-19 DIAGNOSIS — I10 BENIGN ESSENTIAL HYPERTENSION: ICD-10-CM

## 2017-04-19 PROCEDURE — 93786 AMBL BP MNTR W/SW REC ONLY: CPT

## 2017-04-19 PROCEDURE — 93788 AMBL BP MNTR W/SW A/R: CPT

## 2017-04-25 ENCOUNTER — OFFICE VISIT (OUTPATIENT)
Dept: PSYCHIATRY | Facility: CLINIC | Age: 67
End: 2017-04-25
Attending: PSYCHIATRY & NEUROLOGY
Payer: COMMERCIAL

## 2017-04-25 ENCOUNTER — TELEPHONE (OUTPATIENT)
Dept: PSYCHIATRY | Facility: CLINIC | Age: 67
End: 2017-04-25

## 2017-04-25 VITALS
WEIGHT: 245 LBS | SYSTOLIC BLOOD PRESSURE: 139 MMHG | BODY MASS INDEX: 42.05 KG/M2 | DIASTOLIC BLOOD PRESSURE: 82 MMHG | HEART RATE: 66 BPM

## 2017-04-25 DIAGNOSIS — F41.1 GENERALIZED ANXIETY DISORDER: ICD-10-CM

## 2017-04-25 DIAGNOSIS — F25.1 SCHIZOAFFECTIVE DISORDER, DEPRESSIVE TYPE (H): ICD-10-CM

## 2017-04-25 PROCEDURE — 99212 OFFICE O/P EST SF 10 MIN: CPT | Mod: ZF

## 2017-04-25 RX ORDER — ZIPRASIDONE HYDROCHLORIDE 40 MG/1
40 CAPSULE ORAL 2 TIMES DAILY WITH MEALS
Qty: 60 CAPSULE | Refills: 2 | Status: SHIPPED | OUTPATIENT
Start: 2017-04-25 | End: 2017-05-25

## 2017-04-25 RX ORDER — LORAZEPAM 1 MG/1
1 TABLET ORAL AT BEDTIME
Qty: 30 TABLET | Refills: 2 | Status: SHIPPED | OUTPATIENT
Start: 2017-04-25 | End: 2017-04-26

## 2017-04-25 RX ORDER — FLUOXETINE 40 MG/1
40 CAPSULE ORAL DAILY
Qty: 30 CAPSULE | Refills: 2 | Status: SHIPPED | OUTPATIENT
Start: 2017-04-25 | End: 2017-05-25

## 2017-04-25 NOTE — PROGRESS NOTES
PSYCHIATRY CLINIC PROGRESS NOTE   30 minute medication management     INTERIM HISTORY                                                 PSYCH CRITICAL ITEM HISTORY:  Mental health issues were first experienced in her 20's and she first received mental health care at that time.  Critical items include psychosis [sxs include unclear], ECT and psych hosp (3-5).     Pinky Page is a 66 year old female who was last seen in clinic on 3/16/2017 at which time no changes were made. The patient reports good treatment adherence.  History was provided by the patient who was a good historian.  Since the last visit:  - Had a sore throat, some people in hre building with the flu, she didn't get it.  Is taking tamiflu fr precention for now.  - Recently completed a BP home monitoring protocol, no med changes came out of this yet.  - Had her sleep study completed, will switching from oxygen to CPAP not sure when this is going to happen.  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  - Continues to do her exercise routine, feels motivated to continue to work out, enjoying going outside  - [place] to do coloing which she enjoys  - [place] to work one day per week.  - Note from [facility] supports Pinky's hx that things are going generally well.    RECENT SYMPTOMS:   DEPRESSION:  depressed mood, anhedonia, insomnia , appetite change, low energy, poor concentration /memory and (degree of these symptoms is mild);  DENIES- psychomotor retardation/ agitation observed by others and suicidal ideation   PSYCHOSIS:  none;  DENIES- hallucinations and delusions    SUBSTANCE USE:     ALCOHOL-  never       TOBACCO- none        CAFFEINE- 1-2 sodas/coffees per day                  CANNABIS- none  OTHER ILLICIT DRUGS- none    Financial Support- social security disability     Living Situation- Lives at [facility]           Children- none     MEDICAL ROS:  Reports none.    Denies muscle twitches, excessive diaphoresis, restlessness, tremor and shiver and akathisia, dystonia, apparent TD, muscle stiffness, tremor [action or resting] and polyphagia.    PSYCH and CD Critical Summary Points since July 2016           None    PAST PSYCH MED TRIALS   see EMR Problem List: Hx of psychiatric care    MEDICAL / SURGICAL HISTORY                                   CARE TEAM:          PCP- Dr. Kelley, Ilana                   Therapist- Dr. Gr (monthly)    Patient Active Problem List   Diagnosis     Allergic rhinitis due to pollen     Urge incontinence     Hypertension, essential     Cardiomegaly     Chronic constipation     Dry eye syndrome     Esophageal reflux     Exposure keratoconjunctivitis     DM ophthalmopathy (H)     Hypothyroidism     Senile cataract     Severe obstructive sleep apnea     Postmenopausal atrophic vaginitis     Restless leg syndrome     Squamous blepharitis     Morbid obesity due to excess calories (H)     Personal history of breast cancer s/p L masectomy     Diabetes mellitus type 2, insulin dependent (H)     Hypercholesteremia     Lives in group home     Extrapyramidal and movement disorder     CKD (chronic kidney disease) stage 2, GFR 60-89 ml/min     Solitary kidney, congenital     S/P hysterectomy     Impingement syndrome of right shoulder     Hypertriglyceridemia     Candidiasis of skin     Generalized anxiety disorder     Carpal tunnel syndrome of left wrist     Schizoaffective disorder, depressive type (H)     Health Care Home     Major depressive disorder, recurrent episode, moderate (H)     Psychological factors affecting medical condition     Hx of psychiatric care     Nail complaint     Microalbuminuria due to type 2 diabetes mellitus (H)     Type 2 diabetes mellitus with microalbuminuria, with long-term current use of insulin (H)     Diabetes mellitus, type II, insulin dependent (H)       ALLERGY                                 Chlordiazepoxide hcl; Dimetapp dm cold-cough; Haldol; Ibuprofen; Lactose intolerance [beta-galactosidase]; Milk products; and Propofol  MEDICATIONS                               Current Outpatient Prescriptions   Medication Sig Dispense Refill     blood glucose monitoring (NO BRAND SPECIFIED) test strip Use to test blood sugar 3 times daily or as directed. 100 each 3     order for DME Discontinue  Oxygen ([facility]) 1 Device 0     FLUoxetine (PROZAC) 40 MG capsule Take 1 capsule (40 mg) by mouth daily 30 capsule 2     ziprasidone (GEODON) 40 MG capsule Take 1 capsule (40 mg) by mouth 2 times daily (with meals) 60 capsule 2     LORazepam (ATIVAN) 1 MG tablet Take 1 tablet (1 mg) by mouth At Bedtime .  May take additional 1mg daily PRN for agitation. 35 tablet 1     exenatide (BYETTA) 10 MCG/0.04ML injection Inject 10 mcg Subcutaneous 2 times daily (before meals) 1 Syringe 11     insulin glargine (LANTUS) 100 UNIT/ML injection Inject 24 Units Subcutaneous At Bedtime 8 mL 11     amLODIPine (NORVASC) 5 MG tablet Take 2 tablets (10 mg) by mouth daily 30 tablet 3     nystatin (MYCOSTATIN) 110479 UNIT/GM POWD Apply topically 3 times daily as needed 60 g 1     clotrimazole (LOTRIMIN) 1 % cream Apply topically 2 times daily as needed 50 g 0     fluticasone (FLONASE) 50 MCG/ACT nasal spray Spray 1 spray into both nostrils daily 16 g 3     furosemide (LASIX) 20 MG tablet Take 1 tablet (20 mg) by mouth 2 times daily 60 tablet 3     acetaminophen (TYLENOL) 500 MG tablet Take 2 tablets (1,000 mg) by mouth every 6 hours as needed 1 Bottle 2     solifenacin (VESICARE) 10 MG tablet Take 1 tablet (10 mg) by mouth daily 30 tablet 6     HYDROcodone-acetaminophen (NORCO) 5-325 MG per tablet Take 1 tablet by mouth every 6 hours as needed 1-2 tabs       atorvastatin (LIPITOR) 40 MG tablet Take 1 tablet (40 mg) by mouth daily 90 tablet 1     calcium polycarbophil (FIBERCON) 625 MG tablet Take 2 tablets by  mouth daily       levothyroxine (SYNTHROID, LEVOTHROID) 175 MCG tablet Take 1 tablet (175 mcg) by mouth daily Give on empty stomach 30 tablet 4     metFORMIN (GLUCOPHAGE-XR) 500 MG 24 hr tablet Take 2 tablets (1,000 mg) by mouth 2 times daily (with meals) (Patient taking differently: Take 2,000 mg by mouth 2 times daily (with meals) ) 90 tablet 3     Incontinence Supply Disposable (INCONTINENCE BRIEF LARGE) MISC 2 Units 2 times daily 60 Units 11     losartan (COZAAR) 100 MG tablet Take 1 tablet by mouth daily. 90 tablet 1     Sodium Fluoride (PREVIDENT 5000 PLUS) 1.1 % CREA Apply  to affected area every evening.       Hypromellose (ARTIFICIAL TEARS OP) Apply 1 drop to eye 4 times daily.       Gabapentin (NEURONTIN PO) Take 900 mg by mouth 3 times daily.       senna-docusate (SENNA S) 8.6-50 MG per tablet Take 2 tablets by mouth At Bedtime.       polyethylene glycol (MIRALAX/GLYCOLAX) powder Take 1 capful by mouth 2 times daily. 17 GM PO BID       Artificial Tear Ointment (LACRI-LUBE OP) Apply  to eye. Place 0.5 inches into both eyes at bedtime       Skin Protectants, Misc. (EUCERIN) cream Apply  topically as needed. Apply to thigh PRN dry skin        Saline 0.9 % SOLN Spray 2 sprays in nostril as needed.       Calcium Carbonate-Vitamin D (CALCIUM-VITAMIN D) 250-125 MG-UNIT TABS Take 1 tablet by mouth 2 times daily. Calcium 250 mg/Vit D 125 IU       Sod Fluor-Solo Fluor-Hydrfl Ac (GEL-SASHA DENTINBLOC DT) Apply  to affected area. GEL. Apply to 2nd molar on bottom right daily at bedtime.       lactase (LACTAID) 3000 UNIT tablet Take 4 tabs daily with meals.       Magnesium Hydroxide (MILK OF MAGNESIA PO) Take  by mouth. Take 30 mL as needed for constipation.       CLARITIN 10 MG OR TABS 1 TAB PO QD (Once per day) as needed for ALLERGY SYMPTOMS 30 11     ASPIRIN 81 MG OR TABS Take 1 tablet by mouth daily. ENTERIC COATED. 100 3     GLUCAGON EMERGENCY 1 MG IJ KIT Inject  as directed. 1 mL as needed for signs of  hypoglycemia. May repeat as needed in 15 minutes.  0       VITALS   /82  Pulse 66  Wt 111.1 kg (245 lb)  BMI 42.05 kg/m2   MENTAL STATUS EXAM                                                             Alertness: alert  and oriented  Appearance: well groomed  Behavior/Demeanor: cooperative, pleasant and calm, with good  eye contact  Speech: normal  Language: intact  Psychomotor: normal or unremarkable  Mood:  description consistent with euthymia  Affect: full range; was congruent to mood; was congruent to content  Thought Process/Associations: unremarkable  Thought Content:  Denies suicidal ideation, violent ideation and psychotic thought  Perception:  Denies hallucinations  Insight: adequate  Judgment: good  Cognition:  does appear grossly intact; formal cognitive testing was not done    LABS and DATA     EKG  8/17/16: NSR,   AIMS 12/28/16: 0    ANTIPSYCHOTIC LABS   [glu, A1C, lipids (focus LDL), liver enzymes, WBC, ANEU, Hgb, plts]    q12 mo  Recent Labs   Lab Test  03/21/17   1023  12/08/16   1405  08/16/16   1431   GLC  118*   --   245.9*   A1C  6.6*  7.0*  6.9*     Recent Labs   Lab Test  03/21/17   1023  02/22/16   1028   CHOL  142  162.0   TRIG  121  151.9*   LDL  78  95   HDL  40*  36.6*     Recent Labs   Lab Test  03/21/17   1023  01/22/15   1600   AST  13  18   ALT  20  35   ALKPHOS  92  113     Recent Labs   Lab Test  03/21/17   1023  06/22/16   1035   07/04/14   0705   WBC  11.8*  9.9   --   7.7   ANEU  8.3   --    --   4.8   HGB  13.4  13.5   < >  13.3   PLT  266  266   --   223    < > = values in this interval not displayed.       PHQ9 TODAY =6  PHQ-9 SCORE 12/28/2016 2/10/2017 3/16/2017   Total Score 7 7 6       PSYCHIATRIC DIAGNOSES                                                                                                 Schizoaffective disorder, depressed type, multiple episodes  Generalized anxiety disorder, well-managed    ASSESSMENT                                    Pertinent Background:   See most recent Transfer Evaluation as dated above.  Additionally, historically did have increasing psychotic experiences with AP taper.       TODAY: Pinky reports stability of symptoms with no active mood or psychosis symptoms.  There is some stress at her residence which is is managing well.  No medication change today. Pt will see Dr. Edwards while I am on maternity leave prior to transition of care to new resident.    Abnormal labs/vital: Followed closely by [facility] for comorbid medical problems.  Has completed repeat AP labs with improvements i lipids and HgbA1c, pt follows closely at [facility], will repeat in one year, sooner as determined by PCP.    CONTROLLED SUBSTANCE STATEMENT:  The use of  Lorazepam (Ativan) is indicated and appropriate.  This regimen is both beneficial and well tolerated with no adverse effects or tolerance.  There is no evidence of abuse of this medication or other substances.  Warnings related to abuse potential, street value, adverse effects, abrupt cessation, withdrawal and need for emergent care have been discussed and are understood by the patient.  The patient has verbalized clear understanding of safety issues as well as the need to control use.  Plan to continue use at this time.  Controlled Substance Contract was completed 8/17/16.    PSYCHOTROPIC DRUG INTERACTIONS:   ZIPRASIDONE and FLUOXETINE may result in increased risk of QT-interval prolongation and increased risk of serotonin syndrome.   ZIPRASIDONE, FLUOXETINE and VESICARE may result in increased risk of QT-interval prolongation.  ASPIRIN and SSRI may result in an increased risk of bleeding  LEVOTHYROXINE and FLUOXETINE may result in increased levothyroxine requirements.  MANAGEMENT:  Monitoring for adverse effects, periodic EKGs and patient is aware of risks      PLAN                                                                                                       1) PSYCHOTROPIC  MEDICATIONS:      - continue fluoxetine 40 mg qD      - continue ziprasidone 40 BID with meals       - continue lorazepam 1 mg qHS and 1 mg prn for agitation, 30 day supply with 2 refills faxed to pharmacy today    2) THERAPY:  Continue with Dr. Gr    3) LABS NEXT DUE: AP labs due next in March of 2018       RATING SCALES:    AIMS next in in June/July 2017    4) REFERRALS [CD, medical, other]: None    5) :  none    6) RTC: 4-6 weeks with Dr. Edwards for 30 MFU    7) CRISIS NUMBERS: Provided routinely in AVS      TREATMENT RISK STATEMENT:  The risks, benefits, alternatives and potential adverse effects have been discussed and are understood by the patient/ patient's guardian. The pt understands the risks of using street drugs or alcohol.  There are no medical contraindications, the pt agrees to treatment with the ability to do so.  The patient understands to call 911 or come to the nearest ED if life threatening or urgent symptoms present.     RESIDENT:   Divine Jack MD    Patient staffed in clinic with Dr. Rubio who will sign the note.  Supervisor is Dr. Valero.  I saw the patient with the resident, and participated in key portions of the service, including the mental status examination and developing the plan of care. I reviewed key portions of the history with the resident. I agree with the findings and plan as documented in this note.    Roxy Rubio

## 2017-04-25 NOTE — TELEPHONE ENCOUNTER
A prescription ordered by Dr. Jack  and  signed by Dr. Rubio for Ativan was successfully faxed to [facility] at [phone] on 4/25/2017  Original placed back in provider's folder (Dr. Jack).Aidee Berger/RYNE

## 2017-04-25 NOTE — MR AVS SNAPSHOT
After Visit Summary   4/25/2017    Pinky Page    MRN: [id number]           Patient Information     Date Of Birth          1950        Visit Information        Provider Department      4/25/2017 10:30 AM Divine Jack MD Psychiatry Clinic        Today's Diagnoses     Schizoaffective disorder, depressive type (H)        Generalized anxiety disorder           Follow-ups after your visit        Follow-up notes from your care team     Return in about 6 weeks (around 6/6/2017).      Your next 10 appointments already scheduled     May 11, 2017 11:30 AM CDT   Return Sleep Patient with Brianda Reddy MD   [facility] [place] ([facility])    [address] [place] [address]   [phone]            May 15, 2017  1:00 PM CDT   (Arrive by 12:45 PM)   Return Visit with Damon Gr, PhD [facility] ([facility])    [address] [place] [address]  [address]rd [facility] [address]   [phone]            May 22, 2017 10:30 AM CDT   (Arrive by 10:15 AM)   Return Visit with Marcos Magdaleno MD   [facility] ([facility])    [address] [place] [address]  [address]th [facility] [address]   [phone]            May 25, 2017  2:00 PM CDT   Adult Med Follow UP with Cliff Edwards MD   Psychiatry Clinic ([facility])    [place] [id number]  [address] [facility] [address]   [phone]            Jun 21, 2017 10:30 AM CDT   Lab with  LAB   [facility] ([facility])    [address] [place] [address]  1st [facility] [address]   [phone]            Jun 21, 2017 11:35 AM CDT   (Arrive by 11:20 AM)   Return Visit with Gaby Holliday MD   [facility] ([facility])    [address] [place] [address]  3rd [facility]  [address]   [phone]            2017  1:30 PM [facility] with Michael Lopez MD   Eye Clinic ([facility])    [address]  [address] [facility]  [address]  [facility] [address]   [phone]              Who to contact     Please call your clinic at [phone] to:    Ask questions about your health    Make or cancel appointments    Discuss your medicines    Learn about your test results    Speak to your doctor   If you have compliments or concerns about an experience at your clinic, or if you wish to file a complaint, please contact [facility] Patient Relations at [phone] or email us at [email or web address]         Additional Information About Your Visit        [employer] Information     Insight Plus is an electronic gateway that provides easy, online access to your medical records. With Insight Plus, you can request a clinic appointment, read your test results, renew a prescription or communicate with your care team.     To sign up for Insight Plus visit the website at [email or web address]   You will be asked to enter the access code listed below, as well as some personal information. Please follow the directions to create your username and password.     Your access code is: [email or web address]  Expires: 2017  3:22 PM     Your access code will  in 90 days. If you need help or a new code, please contact your [facility] or call [phone] for assistance.        Care EveryWhere ID     This is your Care EveryWhere ID. This could be used by other organizations to access your [facility] medical records  [id number]        Your Vitals Were     Pulse BMI (Body Mass Index)                66 42.05 kg/m2           Blood Pressure from Last 3 Encounters:   17 139/82   04/10/17 134/78   17 159/62    Weight from Last 3 Encounters:   17 111.1 kg (245 lb)   17 111.2 kg (245 lb 3.2 oz)    04/10/17 110.5 kg (243 lb 9.6 oz)              Today, you had the following     No orders found for display         Today's Medication Changes          These changes are accurate as of: 4/25/17 11:59 PM.  If you have any questions, ask your nurse or doctor.               These medicines have changed or have updated prescriptions.        Dose/Directions    metFORMIN 500 MG 24 hr tablet   Commonly known as:  GLUCOPHAGE-XR   This may have changed:  how much to take   Used for:  Diabetes mellitus, type 2 (H)        Dose:  1000 mg   Take 2 tablets (1,000 mg) by mouth 2 times daily (with meals)   Quantity:  90 tablet   Refills:  3            Where to get your medicines      These medications were sent to [facility] [address] [facility]  [address] [facility] [address]     Phone:  [phone]     FLUoxetine 40 MG capsule    ziprasidone 40 MG capsule         Some of these will need a paper prescription and others can be bought over the counter.  Ask your nurse if you have questions.     Bring a paper prescription for each of these medications     LORazepam 1 MG tablet                Primary Care Provider Office Phone # Fax #    Shamika Kelley [phone]       [facility] [address] [facility] [address]        Thank you!     Thank you for choosing PSYCHIATRY CLINIC  for your care. Our goal is always to provide you with excellent care. Hearing back from our patients is one way we can continue to improve our services. Please take a few minutes to complete the written survey that you may receive in the mail after your visit with us. Thank you!             Your Updated Medication List - Protect others around you: Learn how to safely use, store and throw away your medicines at www.disposemymeds.org.          This list is accurate as of: 4/25/17 11:59 PM.  Always use your most recent med list.                   Brand Name Dispense Instructions  for use    acetaminophen 500 MG tablet    TYLENOL    1 Bottle    Take 2 tablets (1,000 mg) by mouth every 6 hours as needed       amLODIPine 5 MG tablet    NORVASC    30 tablet    Take 2 tablets (10 mg) by mouth daily       ARTIFICIAL TEARS OP      Apply 1 drop to eye 4 times daily.       aspirin 81 MG tablet     100    Take 1 tablet by mouth daily. ENTERIC COATED.       atorvastatin 40 MG tablet    LIPITOR    90 tablet    Take 1 tablet (40 mg) by mouth daily       blood glucose monitoring test strip    no brand specified    100 each    Use to test blood sugar 3 times daily or as directed.       calcium polycarbophil 625 MG tablet    FIBERCON     Take 2 tablets by mouth daily       calcium-vitamin D 250-125 MG-UNIT Tabs      Take 1 tablet by mouth 2 times daily. Calcium 250 mg/Vit D 125 IU       CLARITIN 10 MG tablet   Generic drug:  loratadine     30    1 TAB PO QD (Once per day) as needed for ALLERGY SYMPTOMS       clotrimazole 1 % cream    LOTRIMIN    50 g    Apply topically 2 times daily as needed       eucerin cream      Apply  topically as needed. Apply to thigh PRN dry skin       exenatide 10 MCG/0.04ML injection    BYETTA    1 Syringe    Inject 10 mcg Subcutaneous 2 times daily (before meals)       FLUoxetine 40 MG capsule    PROzac    30 capsule    Take 1 capsule (40 mg) by mouth daily       fluticasone 50 MCG/ACT spray    FLONASE    16 g    Spray 1 spray into both nostrils daily       furosemide 20 MG tablet    LASIX    60 tablet    Take 1 tablet (20 mg) by mouth 2 times daily       GEL-SASHA DENTINBLOC DT      Apply  to affected area. GEL. Apply to 2nd molar on bottom right daily at bedtime.       GLUCAGON EMERGENCY 1 MG kit   Generic drug:  glucagon      Inject  as directed. 1 mL as needed for signs of hypoglycemia. May repeat as needed in 15 minutes.       HYDROcodone-acetaminophen 5-325 MG per tablet    NORCO     Take 1 tablet by mouth every 6 hours as needed 1-2 tabs       Incontinence Brief Large Misc      60 Units    2 Units 2 times daily       insulin glargine 100 UNIT/ML injection    LANTUS    8 mL    Inject 24 Units Subcutaneous At Bedtime       LACRI-LUBE OP      Apply  to eye. Place 0.5 inches into both eyes at bedtime       LACTAID 3000 UNIT tablet   Generic drug:  lactase      Take 4 tabs daily with meals.       levothyroxine 175 MCG tablet    SYNTHROID/LEVOTHROID    30 tablet    Take 1 tablet (175 mcg) by mouth daily Give on empty stomach       LORazepam 1 MG tablet    ATIVAN    30 tablet    Take 1 tablet (1 mg) by mouth At Bedtime .  May take additional 1mg daily PRN for agitation.       losartan 100 MG tablet    COZAAR    90 tablet    Take 1 tablet by mouth daily.       metFORMIN 500 MG 24 hr tablet    GLUCOPHAGE-XR    90 tablet    Take 2 tablets (1,000 mg) by mouth 2 times daily (with meals)       MILK OF MAGNESIA PO      Take  by mouth. Take 30 mL as needed for constipation.       NEURONTIN PO      Take 900 mg by mouth 3 times daily.       nystatin 629006 UNIT/GM Powd    MYCOSTATIN    60 g    Apply topically 3 times daily as needed       order for DME     1 Device    Discontinue  Oxygen ([facility])       polyethylene glycol powder    MIRALAX/GLYCOLAX     Take 1 capful by mouth 2 times daily. 17 GM PO BID       PREVIDENT 5000 PLUS 1.1 % Crea   Generic drug:  Sodium Fluoride      Apply  to affected area every evening.       saline 0.9 % Soln      Spray 2 sprays in nostril as needed.       SENNA S 8.6-50 MG per tablet   Generic drug:  senna-docusate      Take 2 tablets by mouth At Bedtime.       solifenacin 10 MG tablet    VESICARE    30 tablet    Take 1 tablet (10 mg) by mouth daily       ziprasidone 40 MG capsule    GEODON    60 capsule    Take 1 capsule (40 mg) by mouth 2 times daily (with meals)

## 2017-04-26 DIAGNOSIS — F41.1 GENERALIZED ANXIETY DISORDER: ICD-10-CM

## 2017-04-26 ASSESSMENT — PATIENT HEALTH QUESTIONNAIRE - PHQ9: SUM OF ALL RESPONSES TO PHQ QUESTIONS 1-9: 8

## 2017-04-26 NOTE — TELEPHONE ENCOUNTER
Date of last visit at clinic: 4-25-17    Please complete refill and CLOSE ENCOUNTER.  Closing the encounter signifies the refill is complete.

## 2017-05-01 RX ORDER — LORAZEPAM 1 MG/1
1 TABLET ORAL AT BEDTIME
Qty: 30 TABLET | Refills: 2 | Status: SHIPPED | OUTPATIENT
Start: 2017-05-01 | End: 2017-05-25

## 2017-05-01 NOTE — TELEPHONE ENCOUNTER
Script printed for preceptor, Dr. MAYKEL Tejeda, to sign. Script faxed to patient's preferred pharmacy. Fax successful.    Elizabeth Petty RN

## 2017-05-11 ENCOUNTER — OFFICE VISIT (OUTPATIENT)
Dept: SLEEP MEDICINE | Facility: CLINIC | Age: 67
End: 2017-05-11
Attending: INTERNAL MEDICINE
Payer: COMMERCIAL

## 2017-05-11 VITALS
HEART RATE: 67 BPM | DIASTOLIC BLOOD PRESSURE: 63 MMHG | BODY MASS INDEX: 41.83 KG/M2 | OXYGEN SATURATION: 97 % | RESPIRATION RATE: 18 BRPM | SYSTOLIC BLOOD PRESSURE: 160 MMHG | WEIGHT: 245 LBS | HEIGHT: 64 IN

## 2017-05-11 DIAGNOSIS — G47.33 OSA (OBSTRUCTIVE SLEEP APNEA): ICD-10-CM

## 2017-05-11 PROCEDURE — 99211 OFF/OP EST MAY X REQ PHY/QHP: CPT | Mod: ZF

## 2017-05-11 NOTE — MR AVS SNAPSHOT
After Visit Summary   5/11/2017    Pinky Page    MRN: [id number]           Patient Information     Date Of Birth          1950        Visit Information        Provider Department      5/11/2017 11:30 AM Brianda Reddy MD [facility], [place], Sleep Study        Today's Diagnoses     ANUJ (obstructive sleep apnea)          Care Instructions      Your BMI is Body mass index is 42.05 kg/(m^2).  Weight management is a personal decision.  If you are interested in exploring weight loss strategies, the following discussion covers the approaches that may be successful. Body mass index (BMI) is one way to tell whether you are at a healthy weight, overweight, or obese. It measures your weight in relation to your height.  A BMI of 18.5 to 24.9 is in the healthy range. A person with a BMI of 25 to 29.9 is considered overweight, and someone with a BMI of 30 or greater is considered obese. More than two-thirds of American adults are considered overweight or obese.  Being overweight or obese increases the risk for further weight gain. Excess weight may lead to heart disease and diabetes.  Creating and following plans for healthy eating and physical activity may help you improve your health.  Weight control is part of healthy lifestyle and includes exercise, emotional health, and healthy eating habits. Careful eating habits lifelong are the mainstay of weight control. Though there are significant health benefits from weight loss, long-term weight loss with diet alone may be very difficult to achieve- studies show long-term success with dietary management in less than 10% of people. Attaining a healthy weight may be especially difficult to achieve in those with severe obesity. In some cases, medications, devices and surgical management might be considered.  What can you do?  If you are overweight or obese and are interested in methods for weight loss, you should discuss this with your provider.     Consider  reducing daily calorie intake by 500 calories.     Keep a food journal.     Avoiding skipping meals, consider cutting portions instead.    Diet combined with exercise helps maintain muscle while optimizing fat loss. Strength training is particularly important for building and maintaining muscle mass. Exercise helps reduce stress, increase energy, and improves fitness. Increasing exercise without diet control, however, may not burn enough calories to loose weight.       Start walking three days a week 10-20 minutes at a time    Work towards walking thirty minutes five days a week     Eventually, increase the speed of your walking for 1-2 minutes at time    In addition, we recommend that you review healthy lifestyles and methods for weight loss available through the National Institutes of Health patient information sites:  http://win.niddk.nih.gov/publications/index.htm    And look into health and wellness programs that may be available through your health insurance provider, employer, local community center, or wally club.    Weight management plan: Patient was referred to their PCP to discuss a diet and exercise plan.     Your blood pressure was checked while you were in clinic today.  Please read the guidelines below about what these numbers mean and what you should do about them.  Your systolic blood pressure is the top number.  This is the pressure when the heart is pumping.  Your diastolic blood pressure is the bottom number.  This is the pressure in between beats.  If your systolic blood pressure is less than 120 and your diastolic blood pressure is less than 80, then your blood pressure is normal. There is nothing more that you need to do about it  If your systolic blood pressure is 120-139 or your diastolic blood pressure is 80-89, your blood pressure may be higher than it should be.  You should have your blood pressure re-checked within a year by a primary care provider.  If your systolic blood pressure is  140 or greater or your diastolic blood pressure is 90 or greater, you may have high blood pressure.  High blood pressure is treatable, but if left untreated over time it can put you at risk for heart attack, stroke, or kidney failure.  You should have your blood pressure re-checked by a primary care provider within the next four weeks.              Follow-ups after your visit        Follow-up notes from your care team     Return in about 7 weeks (around 6/29/2017).      Your next 10 appointments already scheduled     May 15, 2017  1:00 PM CDT   (Arrive by 12:45 PM)   Return Visit with Damon Gr, PhD [facility] ([facility])    [address] [place] [address]  [address] [facility] [address]   [phone]            May 22, 2017 10:30 AM CDT   (Arrive by 10:15 AM)   Return Visit with Marcos Magdaleno MD   [facility] ([facility])    [address] [place] [address]  4[address] [facility] [address]   [phone]            May 25, 2017  2:00 PM CDT   Adult Med Follow UP with Cliff Edwards MD   Psychiatry Clinic ([facility])    [place] [id number]  [address] [facility] [address]   [phone]            Jun 21, 2017 10:30 AM CDT   Lab with  LAB   [facility] ([facility])    [address] [place] [address]  1[address] [facility] [address]   [phone]            Jun 21, 2017 11:35 AM CDT   (Arrive by 11:20 AM)   Return Visit with Gaby Holliday MD   [facility] ([facility])    [address] [place] [address]  [address] [facility] [address]   [phone]            Jul 13, 2017 11:00 AM CDT   Return Sleep Patient with Brianda Reddy MD   [facility], [place], Sleep Study ([facility])    [address] [place] [address]   [phone]            Nov  "2017  1:30 PM [facility] with Michael Lopez MD   Eye Clinic ([facility])    [address]  [address] [facility]  9th Fl Clin [address] [place] [id number] [phone]              Who to contact     If you have questions or need follow up information about today's clinic visit or your schedule please contact [facility], [facility], SLEEP STUDY directly at [phone].  Normal or non-critical lab and imaging results will be communicated to you by SignNowhart, letter or phone within 4 business days after the clinic has received the results. If you do not hear from us within 7 days, please contact the clinic through SignNowhart or phone. If you have a critical or abnormal lab result, we will notify you by phone as soon as possible.  Submit refill requests through Geofeedia or call your pharmacy and they will forward the refill request to us. Please allow 3 business days for your refill to be completed.          Additional Information About Your Visit        Geofeedia Information     Geofeedia lets you send messages to your doctor, view your test results, renew your prescriptions, schedule appointments and more. To sign up, go to [email or web address] . Click on \"Log in\" on the left side of the screen, which will take you to the Welcome page. Then click on \"Sign up Now\" on the right side of the page.     You will be asked to enter the access code listed below, as well as some personal information. Please follow the directions to create your username and password.     Your access code is: [email or web address]  Expires: 2017  6:30 AM     Your access code will  in 90 days. If you need help or a new code, please call your [facility] clinic or [phone].        Care EveryWhere ID     This is your Care EveryWhere ID. This could be used by other organizations to access your [facility] medical records  [id number]        Your Vitals Were     Pulse Respirations Height Pulse Oximetry BMI (Body Mass Index)       " "67 18 1.626 m (5' 4\") 97% 42.05 kg/m2        Blood Pressure from Last 3 Encounters:   05/11/17 160/63   04/10/17 134/78   03/30/17 159/62    Weight from Last 3 Encounters:   05/11/17 111.1 kg (245 lb)   04/10/17 110.5 kg (243 lb 9.6 oz)   03/30/17 110.7 kg (244 lb)              We Performed the Following     Comprehensive DME          Today's Medication Changes          These changes are accurate as of: 5/11/17 11:55 AM.  If you have any questions, ask your nurse or doctor.               These medicines have changed or have updated prescriptions.        Dose/Directions    metFORMIN 500 MG 24 hr tablet   Commonly known as:  GLUCOPHAGE-XR   This may have changed:  how much to take   Used for:  Diabetes mellitus, type 2 (H)        Dose:  1000 mg   Take 2 tablets (1,000 mg) by mouth 2 times daily (with meals)   Quantity:  90 tablet   Refills:  3                Primary Care Provider Office Phone # Fax #    Shamika Kelley [phone]       [facility] [address] [facility] [address]        Thank you!     Thank you for choosing [facility], [place], SLEEP STUDY  for your care. Our goal is always to provide you with excellent care. Hearing back from our patients is one way we can continue to improve our services. Please take a few minutes to complete the written survey that you may receive in the mail after your visit with us. Thank you!             Your Updated Medication List - Protect others around you: Learn how to safely use, store and throw away your medicines at www.disposemymeds.org.          This list is accurate as of: 5/11/17 11:55 AM.  Always use your most recent med list.                   Brand Name Dispense Instructions for use    acetaminophen 500 MG tablet    TYLENOL    1 Bottle    Take 2 tablets (1,000 mg) by mouth every 6 hours as needed       amLODIPine 5 MG tablet    NORVASC    30 tablet    Take 2 tablets (10 mg) by mouth daily       ARTIFICIAL TEARS OP      Apply 1 " drop to eye 4 times daily.       aspirin 81 MG tablet     100    Take 1 tablet by mouth daily. ENTERIC COATED.       atorvastatin 40 MG tablet    LIPITOR    90 tablet    Take 1 tablet (40 mg) by mouth daily       blood glucose monitoring test strip    no brand specified    100 each    Use to test blood sugar 3 times daily or as directed.       calcium polycarbophil 625 MG tablet    FIBERCON     Take 2 tablets by mouth daily       calcium-vitamin D 250-125 MG-UNIT Tabs      Take 1 tablet by mouth 2 times daily. Calcium 250 mg/Vit D 125 IU       CLARITIN 10 MG tablet   Generic drug:  loratadine     30    1 TAB PO QD (Once per day) as needed for ALLERGY SYMPTOMS       clotrimazole 1 % cream    LOTRIMIN    50 g    Apply topically 2 times daily as needed       eucerin cream      Apply  topically as needed. Apply to thigh PRN dry skin       exenatide 10 MCG/0.04ML injection    BYETTA    1 Syringe    Inject 10 mcg Subcutaneous 2 times daily (before meals)       FLUoxetine 40 MG capsule    PROzac    30 capsule    Take 1 capsule (40 mg) by mouth daily       fluticasone 50 MCG/ACT spray    FLONASE    16 g    Spray 1 spray into both nostrils daily       furosemide 20 MG tablet    LASIX    60 tablet    Take 1 tablet (20 mg) by mouth 2 times daily       GEL-SASHA DENTINBLOC DT      Apply  to affected area. GEL. Apply to 2nd molar on bottom right daily at bedtime.       GLUCAGON EMERGENCY 1 MG kit   Generic drug:  glucagon      Inject  as directed. 1 mL as needed for signs of hypoglycemia. May repeat as needed in 15 minutes.       HYDROcodone-acetaminophen 5-325 MG per tablet    NORCO     Take 1 tablet by mouth every 6 hours as needed 1-2 tabs       Incontinence Brief Large Misc     60 Units    2 Units 2 times daily       insulin glargine 100 UNIT/ML injection    LANTUS    8 mL    Inject 24 Units Subcutaneous At Bedtime       LACRI-LUBE OP      Apply  to eye. Place 0.5 inches into both eyes at bedtime       LACTAID 3000 UNIT tablet    Generic drug:  lactase      Take 4 tabs daily with meals.       levothyroxine 175 MCG tablet    SYNTHROID/LEVOTHROID    30 tablet    Take 1 tablet (175 mcg) by mouth daily Give on empty stomach       LORazepam 1 MG tablet    ATIVAN    30 tablet    Take 1 tablet (1 mg) by mouth At Bedtime .  May take additional 1mg daily PRN for agitation.       losartan 100 MG tablet    COZAAR    90 tablet    Take 1 tablet by mouth daily.       metFORMIN 500 MG 24 hr tablet    GLUCOPHAGE-XR    90 tablet    Take 2 tablets (1,000 mg) by mouth 2 times daily (with meals)       MILK OF MAGNESIA PO      Take  by mouth. Take 30 mL as needed for constipation.       NEURONTIN PO      Take 900 mg by mouth 3 times daily.       nystatin 580035 UNIT/GM Powd    MYCOSTATIN    60 g    Apply topically 3 times daily as needed       polyethylene glycol powder    MIRALAX/GLYCOLAX     Take 1 capful by mouth 2 times daily. 17 GM PO BID       PREVIDENT 5000 PLUS 1.1 % Crea   Generic drug:  Sodium Fluoride      Apply  to affected area every evening.       saline 0.9 % Soln      Spray 2 sprays in nostril as needed.       SENNA S 8.6-50 MG per tablet   Generic drug:  senna-docusate      Take 2 tablets by mouth At Bedtime.       solifenacin 10 MG tablet    VESICARE    30 tablet    Take 1 tablet (10 mg) by mouth daily       ziprasidone 40 MG capsule    GEODON    60 capsule    Take 1 capsule (40 mg) by mouth 2 times daily (with meals)

## 2017-05-11 NOTE — NURSING NOTE
"Chief Complaint   Patient presents with     Results     Follow up to discuss PSG results       Initial /63  Pulse 67  Resp 18  Ht 1.626 m (5' 4\")  Wt 111.1 kg (245 lb)  SpO2 97%  BMI 42.05 kg/m2 Estimated body mass index is 42.05 kg/(m^2) as calculated from the following:    Height as of this encounter: 1.626 m (5' 4\").    Weight as of this encounter: 111.1 kg (245 lb).  Medication Reconciliation: complete     JONNATHAN Mark        "

## 2017-05-11 NOTE — PATIENT INSTRUCTIONS
Your BMI is Body mass index is 42.05 kg/(m^2).  Weight management is a personal decision.  If you are interested in exploring weight loss strategies, the following discussion covers the approaches that may be successful. Body mass index (BMI) is one way to tell whether you are at a healthy weight, overweight, or obese. It measures your weight in relation to your height.  A BMI of 18.5 to 24.9 is in the healthy range. A person with a BMI of 25 to 29.9 is considered overweight, and someone with a BMI of 30 or greater is considered obese. More than two-thirds of American adults are considered overweight or obese.  Being overweight or obese increases the risk for further weight gain. Excess weight may lead to heart disease and diabetes.  Creating and following plans for healthy eating and physical activity may help you improve your health.  Weight control is part of healthy lifestyle and includes exercise, emotional health, and healthy eating habits. Careful eating habits lifelong are the mainstay of weight control. Though there are significant health benefits from weight loss, long-term weight loss with diet alone may be very difficult to achieve- studies show long-term success with dietary management in less than 10% of people. Attaining a healthy weight may be especially difficult to achieve in those with severe obesity. In some cases, medications, devices and surgical management might be considered.  What can you do?  If you are overweight or obese and are interested in methods for weight loss, you should discuss this with your provider.     Consider reducing daily calorie intake by 500 calories.     Keep a food journal.     Avoiding skipping meals, consider cutting portions instead.    Diet combined with exercise helps maintain muscle while optimizing fat loss. Strength training is particularly important for building and maintaining muscle mass. Exercise helps reduce stress, increase energy, and improves fitness.  Increasing exercise without diet control, however, may not burn enough calories to loose weight.       Start walking three days a week 10-20 minutes at a time    Work towards walking thirty minutes five days a week     Eventually, increase the speed of your walking for 1-2 minutes at time    In addition, we recommend that you review healthy lifestyles and methods for weight loss available through the National Institutes of Health patient information sites:  http://win.niddk.nih.gov/publications/index.htm    And look into health and wellness programs that may be available through your health insurance provider, employer, local community center, or wally club.    Weight management plan: Patient was referred to their PCP to discuss a diet and exercise plan.     Your blood pressure was checked while you were in clinic today.  Please read the guidelines below about what these numbers mean and what you should do about them.  Your systolic blood pressure is the top number.  This is the pressure when the heart is pumping.  Your diastolic blood pressure is the bottom number.  This is the pressure in between beats.  If your systolic blood pressure is less than 120 and your diastolic blood pressure is less than 80, then your blood pressure is normal. There is nothing more that you need to do about it  If your systolic blood pressure is 120-139 or your diastolic blood pressure is 80-89, your blood pressure may be higher than it should be.  You should have your blood pressure re-checked within a year by a primary care provider.  If your systolic blood pressure is 140 or greater or your diastolic blood pressure is 90 or greater, you may have high blood pressure.  High blood pressure is treatable, but if left untreated over time it can put you at risk for heart attack, stroke, or kidney failure.  You should have your blood pressure re-checked by a primary care provider within the next four weeks.

## 2017-05-11 NOTE — PROGRESS NOTES
DATE OF SERVICE:  05/11/2017        CHIEF COMPLAINT:  Followup of titration study.      HISTORY OF PRESENT ILLNESS:  Pinky Page is a 66-year-old female who has history of diabetes, hypertension and recent reevaluation of obstructive sleep apnea.  She was found to have moderate obstructive sleep apnea.  Previously she had had a CPAP trial, was found to be CPAP intolerant and was oxygen.  She underwent reevaluation and subsequently a titration study.  We reviewed the results of the CPAP titration today in detail.  She was given a copy of the results and they are summarized as follows:      Study date 04/05/2017.  Total sleep time 385 minutes.  Sleep efficiency reduced at 76% with the use of lorazepam.  Sleep latency prolonged at 61 minutes.  Wake after sleep onset was increased at 62 minutes, REM latency was delayed.  Overall, arousal index was normal, however, at 13.4.  All sleep stages were noted including 31% REM sleep.  She was titrated on CPAP to a final pressure of 13, and 67 minutes of sleep was noted with 64 of that being on REM at that pressure when the AHI was 0.  In fact, that was REM supine.      The patient denies any new medical problems.  She continues to struggle with blood pressure control.  Blood sugar control has been reasonably good.  She typically goes to bed around 10:00 or 10:30 and is awoken around 6:00 for her diabetes medication, often falls back asleep afterwards for little bit.  She wakes up 2 times a night, typically to go the bathroom, once around midnight, once around 4:00 a.m.  She does take some naps during the day.  However, she rates an [place] at 7.  She is paying attention to her caffeine intake, trying to cut down drinking about 2-3 cups of coffee and 1 caffeinated soda.  If when she goes to bed she cannot fall asleep, she will get out of bed and start coloring.  Previously she would just lie in bed.  She does continue to take the lorazepam.      PAST MEDICAL HISTORY, ALLERGIES  AND MEDICATIONS:  Reviewed.      PHYSICAL EXAMINATION:   GENERAL:  Pleasant obese female, alert, in no distress.   VITAL SIGNS:  Blood pressure 160/63, pulse of 67, respirations 18.  Weight is 245 pounds, for a body mass index of 42.  O2 saturation is 97% on room air.      ASSESSMENT:  A 66-year-old female with moderate obstructive sleep apnea, history of CPAP intolerance but willing to try again, has concerns about dry mouth.  Goals of treatment for resuming CPAP trial will be to improve sleepiness and take less naps, possibly less awakenings at night to go to the bathroom; ideally also improved blood pressure control, perhaps less falling asleep after her 6:00 wake up for medications.      PLAN:  Orders generated for CPAP 13.  She will be set up through either [facility] or [facility].   She should get the sleep therapy management program coaching and a followup 7 weeks into therapy for compliance and benefit check.  She is agreeable with this plan.      Twenty-five minutes spent with the patient, greater than 50% spent in counseling and coordinating care.         VIDYA SIMON MD             D: 2017 11:55   T: 2017 12:55   MT: DORA      Name:     BRUCE TAMEZ   MRN:      [id number]        Account:      [id number]   :      1950           Visit Date:   2017      Document: [id number]

## 2017-05-15 ENCOUNTER — OFFICE VISIT (OUTPATIENT)
Dept: PSYCHOLOGY | Facility: CLINIC | Age: 67
End: 2017-05-15

## 2017-05-15 VITALS — BODY MASS INDEX: 41.99 KG/M2 | WEIGHT: 244.6 LBS

## 2017-05-15 DIAGNOSIS — E66.9 OBESITY, UNSPECIFIED OBESITY SEVERITY, UNSPECIFIED OBESITY TYPE: ICD-10-CM

## 2017-05-15 DIAGNOSIS — F41.1 GENERALIZED ANXIETY DISORDER: ICD-10-CM

## 2017-05-15 DIAGNOSIS — F54 PSYCHOLOGICAL FACTORS AFFECTING MEDICAL CONDITION: ICD-10-CM

## 2017-05-15 DIAGNOSIS — F33.1 MAJOR DEPRESSIVE DISORDER, RECURRENT EPISODE, MODERATE (H): Primary | ICD-10-CM

## 2017-05-15 NOTE — PROGRESS NOTES
Health Psychology                  Clinic    Department of Medicine  Madelaine Ingram, Ph.D., L.P. [phone]                          [facility] Miryam Woods, Ph.D.,  L.P. [phone]                 3rd Floor, Clinic [address]  [place] Mail [address]47   Damon Gr, Ph.D., [email or web address]., L.P. [phone]     [address] Kimber Morales, Ph.D., L.P. [phone]  [place] [id number]  [place] [address]    Health Psychology Follow-Up Note    Ms. Page is a pleasant 66-year old woman with chronic depressive illness, who returns to clinic for supportive and behavioral psychotherapy for moderate recurrent depression and for help losing weight given her morbid obesity.   Depression is at baseline (4)  mild.  She gained weight since last seen. She reports some stopping walking because she had been ill.  She heard from her sleep physician that her insurance is discontinuing coverage for her nocturnal oxygen.   She will be starting CPAP this week, hoping to have more energy.  Wt Readings from Last 4 Encounters:   05/15/17 110.9 kg (244 lb 9.6 oz)   05/11/17 111.1 kg (245 lb)   04/25/17 111.1 kg (245 lb)   04/11/17 111.2 kg (245 lb 3.2 oz)     At [employer], she continues to serve on the [employer].  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  She is working 1 day/week. Her roommate is doing better and she is enjoying her more over time.  She reports that another resident ate leaves off her plants.  We discussed strategies for addressing it.  She rates the depression as 4 on 10-point subjective scale.   She is doing more social walking.  She is willing to try the bike.  She is wiling to speak to the staff about exercises before biking.   We discussed another resident who  Is damaging plants. They got leafless plants, which that person is not  attacking.  Goal for exercise is currently 1 hour/day, with plan to go up to 1.5 hours/day as of Aleksandra 15.   She arrived later today, which is quite atypical for her. Pinky participated fully and appeared to derive benefit.  Rapport was excellent.     IMPRESSION Distress Disability Risk    Low High Low     Time In: 1:15  Time Out: 1:55  Diagnosis:   Major Depression, recurrent moderate (F33.1)   Psychological Factors Affecting Medical Condition: Morbid Obesity (F54)      Plan: She will return on 6/19 @ 1:00  for behavioral and supportive psychotherapy.   Tx plan due 4/11/2018  Damon Gr, Ph.D., A.B.P.P., L.P.

## 2017-05-15 NOTE — MR AVS SNAPSHOT
After Visit Summary   5/15/2017    Pinky Page    MRN: [id number]           Patient Information     Date Of Birth          1950        Visit Information        Provider Department      5/15/2017 1:00 PM Damon Gr, PhD [facility]        Today's Diagnoses     Major depressive disorder, recurrent episode, moderate (H)    -  1    Psychological factors affecting medical condition        Generalized anxiety disorder        Obesity, unspecified obesity severity, unspecified obesity type           Follow-ups after your visit        Your next 10 appointments already scheduled     May 22, 2017 10:30 AM CDT   (Arrive by 10:15 AM)   Return Visit with Marcos Magdaleno MD   [facility] Medical Weight Management ([facility])    [address]  4th Floor  [facility] [address]   [phone]            May 25, 2017  2:00 PM CDT   Adult Med Follow UP with Cliff Edwards MD   Psychiatry Clinic ([facility])    [place] [id number]  [address] [facility] [address]   [phone]            Jun 21, 2017 10:30 AM CDT   Lab with  LAB    Health Lab ([facility])    [address]  1st Floor  [facility] [address]   [phone]            Jun 21, 2017 11:35 AM CDT   (Arrive by 11:20 AM)   Return Visit with Gaby Holliday MD   [facility] Nephrology ([facility])    [address]  3rd Floor  [facility] [address]   [phone]            Jul 13, 2017 11:00 AM CDT   Return Sleep Patient with Brianda Reddy MD   [facility], Sleep Study ([facility])    [address] [place] [address]   [phone]            Nov 14, 2017  1:30 PM CST   NEW GENERAL with Michael Lopez MD   Eye Clinic ([facility])    [address] [place] [address]  [address]  [facility] [address]   [phone]              Who to contact     Please call your clinic at [phone] to:    Ask questions about your health    Make or cancel appointments    Discuss your medicines    Learn about your test results    Speak to your doctor   If you have compliments or concerns about an experience at your clinic, or if you wish to file a complaint, please contact [facility] Patient Relations at [phone] or email us at [email or web address]         Additional Information About Your Visit        PeerMe Information     PeerMe is an electronic gateway that provides easy, online access to your medical records. With PeerMe, you can request a clinic appointment, read your test results, renew a prescription or communicate with your care team.     To sign up for PeerMe visit the website at [email or web address]   You will be asked to enter the access code listed below, as well as some personal information. Please follow the directions to create your username and password.     Your access code is: [email or web address]  Expires: 2017  6:30 AM     Your access code will  in 90 days. If you need help or a new code, please contact your [facility] or call [phone] for assistance.        Care EveryWhere ID     This is your Care EveryWhere ID. This could be used by other organizations to access your [place] medical records  [id number]        Your Vitals Were     BMI (Body Mass Index)                   41.99 kg/m2            Blood Pressure from Last 3 Encounters:   17 160/63   17 139/82   04/10/17 134/78    Weight from Last 3 Encounters:   05/15/17 110.9 kg (244 lb 9.6 oz)   17 111.1 kg (245 lb)   17 111.1 kg (245 lb)              Today, you had the following     No orders found for display         Today's Medication Changes          These changes are accurate as of: 5/15/17  2:00 PM.   If you have any questions, ask your nurse or doctor.               These medicines have changed or have updated prescriptions.        Dose/Directions    metFORMIN 500 MG 24 hr tablet   Commonly known as:  GLUCOPHAGE-XR   This may have changed:  how much to take   Used for:  Diabetes mellitus, type 2 (H)        Dose:  1000 mg   Take 2 tablets (1,000 mg) by mouth 2 times daily (with meals)   Quantity:  90 tablet   Refills:  3                Primary Care Provider Office Phone # Fax #    Shamika Ileana Kelley [phone]       [facility] [address] [facility] [address]        Thank you!     Thank you for choosing [facility]  for your care. Our goal is always to provide you with excellent care. Hearing back from our patients is one way we can continue to improve our services. Please take a few minutes to complete the written survey that you may receive in the mail after your visit with us. Thank you!             Your Updated Medication List - Protect others around you: Learn how to safely use, store and throw away your medicines at www.disposemymeds.org.          This list is accurate as of: 5/15/17  2:00 PM.  Always use your most recent med list.                   Brand Name Dispense Instructions for use    acetaminophen 500 MG tablet    TYLENOL    1 Bottle    Take 2 tablets (1,000 mg) by mouth every 6 hours as needed       amLODIPine 5 MG tablet    NORVASC    30 tablet    Take 2 tablets (10 mg) by mouth daily       ARTIFICIAL TEARS OP      Apply 1 drop to eye 4 times daily.       aspirin 81 MG tablet     100    Take 1 tablet by mouth daily. ENTERIC COATED.       atorvastatin 40 MG tablet    LIPITOR    90 tablet    Take 1 tablet (40 mg) by mouth daily       blood glucose monitoring test strip    no brand specified    100 each    Use to test blood sugar 3 times daily or as directed.       calcium polycarbophil 625 MG tablet    FIBERCON     Take 2 tablets by mouth  daily       calcium-vitamin D 250-125 MG-UNIT Tabs      Take 1 tablet by mouth 2 times daily. Calcium 250 mg/Vit D 125 IU       CLARITIN 10 MG tablet   Generic drug:  loratadine     30    1 TAB PO QD (Once per day) as needed for ALLERGY SYMPTOMS       clotrimazole 1 % cream    LOTRIMIN    50 g    Apply topically 2 times daily as needed       eucerin cream      Apply  topically as needed. Apply to thigh PRN dry skin       exenatide 10 MCG/0.04ML injection    BYETTA    1 Syringe    Inject 10 mcg Subcutaneous 2 times daily (before meals)       FLUoxetine 40 MG capsule    PROzac    30 capsule    Take 1 capsule (40 mg) by mouth daily       fluticasone 50 MCG/ACT spray    FLONASE    16 g    Spray 1 spray into both nostrils daily       furosemide 20 MG tablet    LASIX    60 tablet    Take 1 tablet (20 mg) by mouth 2 times daily       GEL-SASHA DENTINBLOC DT      Apply  to affected area. GEL. Apply to 2nd molar on bottom right daily at bedtime.       GLUCAGON EMERGENCY 1 MG kit   Generic drug:  glucagon      Inject  as directed. 1 mL as needed for signs of hypoglycemia. May repeat as needed in 15 minutes.       HYDROcodone-acetaminophen 5-325 MG per tablet    NORCO     Take 1 tablet by mouth every 6 hours as needed 1-2 tabs       Incontinence Brief Large Misc     60 Units    2 Units 2 times daily       insulin glargine 100 UNIT/ML injection    LANTUS    8 mL    Inject 24 Units Subcutaneous At Bedtime       LACRI-LUBE OP      Apply  to eye. Place 0.5 inches into both eyes at bedtime       LACTAID 3000 UNIT tablet   Generic drug:  lactase      Take 4 tabs daily with meals.       levothyroxine 175 MCG tablet    SYNTHROID/LEVOTHROID    30 tablet    Take 1 tablet (175 mcg) by mouth daily Give on empty stomach       LORazepam 1 MG tablet    ATIVAN    30 tablet    Take 1 tablet (1 mg) by mouth At Bedtime .  May take additional 1mg daily PRN for agitation.       losartan 100 MG tablet    COZAAR    90 tablet    Take 1 tablet by mouth  daily.       metFORMIN 500 MG 24 hr tablet    GLUCOPHAGE-XR    90 tablet    Take 2 tablets (1,000 mg) by mouth 2 times daily (with meals)       MILK OF MAGNESIA PO      Take  by mouth. Take 30 mL as needed for constipation.       NEURONTIN PO      Take 900 mg by mouth 3 times daily.       nystatin 827806 UNIT/GM Powd    MYCOSTATIN    60 g    Apply topically 3 times daily as needed       polyethylene glycol powder    MIRALAX/GLYCOLAX     Take 1 capful by mouth 2 times daily. 17 GM PO BID       PREVIDENT 5000 PLUS 1.1 % Crea   Generic drug:  Sodium Fluoride      Apply  to affected area every evening.       saline 0.9 % Soln      Spray 2 sprays in nostril as needed.       SENNA S 8.6-50 MG per tablet   Generic drug:  senna-docusate      Take 2 tablets by mouth At Bedtime.       solifenacin 10 MG tablet    VESICARE    30 tablet    Take 1 tablet (10 mg) by mouth daily       ziprasidone 40 MG capsule    GEODON    60 capsule    Take 1 capsule (40 mg) by mouth 2 times daily (with meals)

## 2017-05-22 ENCOUNTER — OFFICE VISIT (OUTPATIENT)
Dept: ENDOCRINOLOGY | Facility: CLINIC | Age: 67
End: 2017-05-22

## 2017-05-22 VITALS
BODY MASS INDEX: 41.83 KG/M2 | OXYGEN SATURATION: 95 % | HEART RATE: 70 BPM | WEIGHT: 245 LBS | TEMPERATURE: 98.5 F | SYSTOLIC BLOOD PRESSURE: 142 MMHG | HEIGHT: 64 IN | DIASTOLIC BLOOD PRESSURE: 67 MMHG

## 2017-05-22 DIAGNOSIS — E66.01 MORBID OBESITY DUE TO EXCESS CALORIES (H): Primary | ICD-10-CM

## 2017-05-22 ASSESSMENT — PAIN SCALES - GENERAL: PAINLEVEL: NO PAIN (0)

## 2017-05-22 NOTE — PROGRESS NOTES
"    Return Medical Weight Management Note     Pinky Page  MRN:  [id number]  :  1950  CORINNE:  2017    Dear Dr. Ann,     I had the pleasure of seeing your patient Pinky Page.  She is a 65 year old female who I am continuing to see for treatment of obesity related to: DM-2 and Hypertension.    CURRENT WEIGHT:   245 lbs 0 oz    Wt Readings from Last 4 Encounters:   17 111.1 kg (245 lb)   17 111.1 kg (245 lb)   04/10/17 110.5 kg (243 lb 9.6 oz)   17 110.7 kg (244 lb)     Height:  5' 4\"  Body Mass Index:  Body mass index is 42.05 kg/(m^2).  Vitals:  B/P: 163/75, P: 68    Initial consult weight was 283 on .  Weight change since last seen on 2017 is down 0 pounds.   Total loss is 37 pounds.    INTERVAL HISTORY:  Patient has been working on the following dietary changes: Still eating from the diet line at her group home (1500 miguel, no concentrated sweets, some fruit snacks).   She also continues to be on Byetta 10 cg BID for her DM and also to help her lose weight and as a appetite suppressant as well.  Her DM is being managed by us as well as the [place] [facility] ( PCP).  Patient has been working on the following activity changes: Very regular walking at least 40 minutes / day.    Diet and Activity Changes Since Last Visit Reviewed With Patient 2017   I have made the following changes to my diet since my last visit: no   With regards to my diet, I am still struggling with: eating too much   For breakfast, I typically eat: ceefrdEa   For lunch, I typically eat: saal adsandwich   For supper, I typically eat: -   For snack(s), I typically eat: -   I have made the following changes to my activity/exercise since my last visit: -   With regards to my activity/exercise, I am still struggling with: -       MEDICATIONS:   Current Outpatient Prescriptions   Medication     LORazepam (ATIVAN) 1 MG tablet     FLUoxetine (PROZAC) 40 MG capsule     ziprasidone (GEODON) 40 MG capsule     " blood glucose monitoring (NO BRAND SPECIFIED) test strip     exenatide (BYETTA) 10 MCG/0.04ML injection     insulin glargine (LANTUS) 100 UNIT/ML injection     amLODIPine (NORVASC) 5 MG tablet     nystatin (MYCOSTATIN) 919950 UNIT/GM POWD     clotrimazole (LOTRIMIN) 1 % cream     fluticasone (FLONASE) 50 MCG/ACT nasal spray     furosemide (LASIX) 20 MG tablet     acetaminophen (TYLENOL) 500 MG tablet     solifenacin (VESICARE) 10 MG tablet     HYDROcodone-acetaminophen (NORCO) 5-325 MG per tablet     atorvastatin (LIPITOR) 40 MG tablet     calcium polycarbophil (FIBERCON) 625 MG tablet     levothyroxine (SYNTHROID, LEVOTHROID) 175 MCG tablet     metFORMIN (GLUCOPHAGE-XR) 500 MG 24 hr tablet     Incontinence Supply Disposable (INCONTINENCE BRIEF LARGE) MISC     losartan (COZAAR) 100 MG tablet     Sodium Fluoride (PREVIDENT 5000 PLUS) 1.1 % CREA     Hypromellose (ARTIFICIAL TEARS OP)     Gabapentin (NEURONTIN PO)     senna-docusate (SENNA S) 8.6-50 MG per tablet     polyethylene glycol (MIRALAX/GLYCOLAX) powder     Artificial Tear Ointment (LACRI-LUBE OP)     Skin Protectants, Misc. (EUCERIN) cream     Saline 0.9 % SOLN     Calcium Carbonate-Vitamin D (CALCIUM-VITAMIN D) 250-125 MG-UNIT TABS     Sod Fluor-Solo Fluor-Hydrfl Ac (GEL-SASHA DENTINBLOC DT)     lactase (LACTAID) 3000 UNIT tablet     Magnesium Hydroxide (MILK OF MAGNESIA PO)     CLARITIN 10 MG OR TABS     ASPIRIN 81 MG OR TABS     GLUCAGON EMERGENCY 1 MG IJ KIT     No current facility-administered medications for this visit.        Weight Loss Medication History Reviewed With Patient 2/20/2017   Which weight loss medications are you currently taking on a regular basis?  Byetta (exentatide)   Are you having any side effects from the weight loss medication that we have prescribed you? No   If you are having side effects please describe: -     ASSESSMENT:   Continues to do well with weight loss maintenance.    FOLLOW-UP:    12 weeks.  10/15 minutes spent on  counseling and education    Sincerely,    Marcos Magdaleno MD

## 2017-05-22 NOTE — MR AVS SNAPSHOT
After Visit Summary   5/22/2017    Pinky Page    MRN: [id number]           Patient Information     Date Of Birth          1950        Visit Information        Provider Department      5/22/2017 10:30 AM Marcos Magdaleno MD [facility] Medical Weight Management        Today's Diagnoses     Morbid obesity due to excess calories (H)    -  1       Follow-ups after your visit        Follow-up notes from your care team     Return in about 3 months (around 8/22/2017).      Your next 10 appointments already scheduled     May 22, 2017 10:30 AM CDT   (Arrive by 10:15 AM)   Return Visit with Marcos Magdaleno MD   [facility] Medical Weight Management ([facility])    [address]  4th Floor  [facility] [address]   [phone]            May 25, 2017  2:00 PM CDT   Adult Med Follow UP with Cliff Edwards MD   Psychiatry Clinic ([facility])    [place] [id number]  [address] [place] [id number] [phone]            Jun 19, 2017  1:00 PM CDT   (Arrive by 12:45 PM)   Return Visit with Damon Gr, PhD [facility] ([facility])    [address]  [address]rd [facility] [address] [phone]            Jun 21, 2017 10:30 AM CDT   Lab with  LAB   [facility] Lab ([facility])    [address] [place] [address]  [address] [facility] [address]   [phone]            Jun 21, 2017 11:35 AM CDT   (Arrive by 11:20 AM)   Return Visit with Gaby Holliday MD   [facility] Nephrology ([facility])    [address] [place] [address]  3rd Floor  [facility] [address]   [phone]            Jul 13, 2017 11:00 AM CDT   Return Sleep Patient with Brianda Reddy MD   [facility], Sleep Study ([facility])    [address] [place] [address]  "  [phone]            2017  1:30 PM [facility] with Michael Lopez MD   Eye Clinic ([facility])    [address]  9th Fl Clin [address]a  [facility] [address] [phone]              Who to contact     Please call your clinic at [phone] to:    Ask questions about your health    Make or cancel appointments    Discuss your medicines    Learn about your test results    Speak to your doctor   If you have compliments or concerns about an experience at your clinic, or if you wish to file a complaint, please contact [facility] Patient Relations at [phone] or email us at [email or web address]         Additional Information About Your Visit        [employer] Information     iFLYER is an electronic gateway that provides easy, online access to your medical records. With iFLYER, you can request a clinic appointment, read your test results, renew a prescription or communicate with your care team.     To sign up for iFLYER visit the website at [email or web address]   You will be asked to enter the access code listed below, as well as some personal information. Please follow the directions to create your username and password.     Your access code is: [email or web address]  Expires: 2017  6:30 AM     Your access code will  in 90 days. If you need help or a new code, please contact your [facility] or call [phone] for assistance.        Care EveryWhere ID     This is your Care EveryWhere ID. This could be used by other organizations to access your [place] medical records  [id number]        Your Vitals Were     Pulse Temperature Height Pulse Oximetry BMI (Body Mass Index)       70 98.5  F (36.9  C) 1.626 m (5' 4\") 95% 42.05 kg/m2        Blood Pressure from Last 3 Encounters:   17 142/67   17 160/63   04/10/17 134/78    Weight from Last 3 Encounters:   17 111.1 kg " (245 lb)   05/11/17 111.1 kg (245 lb)   04/10/17 110.5 kg (243 lb 9.6 oz)              Today, you had the following     No orders found for display         Today's Medication Changes          These changes are accurate as of: 5/22/17 10:26 AM.  If you have any questions, ask your nurse or doctor.               These medicines have changed or have updated prescriptions.        Dose/Directions    metFORMIN 500 MG 24 hr tablet   Commonly known as:  GLUCOPHAGE-XR   This may have changed:  how much to take   Used for:  Diabetes mellitus, type 2 (H)        Dose:  1000 mg   Take 2 tablets (1,000 mg) by mouth 2 times daily (with meals)   Quantity:  90 tablet   Refills:  3                Primary Care Provider Office Phone # Fax #    Shamika Kelley [phone]       [facility] [address] [facility] [address]        Thank you!     Thank you for choosing [facility] WEIGHT MANAGEMENT  for your care. Our goal is always to provide you with excellent care. Hearing back from our patients is one way we can continue to improve our services. Please take a few minutes to complete the written survey that you may receive in the mail after your visit with us. Thank you!             Your Updated Medication List - Protect others around you: Learn how to safely use, store and throw away your medicines at www.disposemymeds.org.          This list is accurate as of: 5/22/17 10:26 AM.  Always use your most recent med list.                   Brand Name Dispense Instructions for use    acetaminophen 500 MG tablet    TYLENOL    1 Bottle    Take 2 tablets (1,000 mg) by mouth every 6 hours as needed       amLODIPine 5 MG tablet    NORVASC    30 tablet    Take 2 tablets (10 mg) by mouth daily       ARTIFICIAL TEARS OP      Apply 1 drop to eye 4 times daily.       aspirin 81 MG tablet     100    Take 1 tablet by mouth daily. ENTERIC COATED.       atorvastatin 40 MG tablet    LIPITOR    90 tablet    Take  1 tablet (40 mg) by mouth daily       blood glucose monitoring test strip    no brand specified    100 each    Use to test blood sugar 3 times daily or as directed.       calcium polycarbophil 625 MG tablet    FIBERCON     Take 2 tablets by mouth daily       calcium-vitamin D 250-125 MG-UNIT Tabs      Take 1 tablet by mouth 2 times daily. Calcium 250 mg/Vit D 125 IU       CLARITIN 10 MG tablet   Generic drug:  loratadine     30    1 TAB PO QD (Once per day) as needed for ALLERGY SYMPTOMS       clotrimazole 1 % cream    LOTRIMIN    50 g    Apply topically 2 times daily as needed       eucerin cream      Apply  topically as needed. Apply to thigh PRN dry skin       exenatide 10 MCG/0.04ML injection    BYETTA    1 Syringe    Inject 10 mcg Subcutaneous 2 times daily (before meals)       FLUoxetine 40 MG capsule    PROzac    30 capsule    Take 1 capsule (40 mg) by mouth daily       fluticasone 50 MCG/ACT spray    FLONASE    16 g    Spray 1 spray into both nostrils daily       furosemide 20 MG tablet    LASIX    60 tablet    Take 1 tablet (20 mg) by mouth 2 times daily       GEL-SASHA DENTINBLOC DT      Apply  to affected area. GEL. Apply to 2nd molar on bottom right daily at bedtime.       GLUCAGON EMERGENCY 1 MG kit   Generic drug:  glucagon      Inject  as directed. 1 mL as needed for signs of hypoglycemia. May repeat as needed in 15 minutes.       HYDROcodone-acetaminophen 5-325 MG per tablet    NORCO     Take 1 tablet by mouth every 6 hours as needed 1-2 tabs       Incontinence Brief Large Misc     60 Units    2 Units 2 times daily       insulin glargine 100 UNIT/ML injection    LANTUS    8 mL    Inject 24 Units Subcutaneous At Bedtime       LACRI-LUBE OP      Apply  to eye. Place 0.5 inches into both eyes at bedtime       LACTAID 3000 UNIT tablet   Generic drug:  lactase      Take 4 tabs daily with meals.       levothyroxine 175 MCG tablet    SYNTHROID/LEVOTHROID    30 tablet    Take 1 tablet (175 mcg) by mouth daily  Give on empty stomach       LORazepam 1 MG tablet    ATIVAN    30 tablet    Take 1 tablet (1 mg) by mouth At Bedtime .  May take additional 1mg daily PRN for agitation.       losartan 100 MG tablet    COZAAR    90 tablet    Take 1 tablet by mouth daily.       metFORMIN 500 MG 24 hr tablet    GLUCOPHAGE-XR    90 tablet    Take 2 tablets (1,000 mg) by mouth 2 times daily (with meals)       MILK OF MAGNESIA PO      Take  by mouth. Take 30 mL as needed for constipation.       NEURONTIN PO      Take 900 mg by mouth 3 times daily.       nystatin 798319 UNIT/GM Powd    MYCOSTATIN    60 g    Apply topically 3 times daily as needed       polyethylene glycol powder    MIRALAX/GLYCOLAX     Take 1 capful by mouth 2 times daily. 17 GM PO BID       PREVIDENT 5000 PLUS 1.1 % Crea   Generic drug:  Sodium Fluoride      Apply  to affected area every evening.       saline 0.9 % Soln      Spray 2 sprays in nostril as needed.       SENNA S 8.6-50 MG per tablet   Generic drug:  senna-docusate      Take 2 tablets by mouth At Bedtime.       solifenacin 10 MG tablet    VESICARE    30 tablet    Take 1 tablet (10 mg) by mouth daily       ziprasidone 40 MG capsule    GEODON    60 capsule    Take 1 capsule (40 mg) by mouth 2 times daily (with meals)

## 2017-05-22 NOTE — NURSING NOTE
"Chief Complaint   Patient presents with     Weight Problem     RMWM       Vitals:    05/22/17 1007   BP: 142/67   BP Location: Left arm   Patient Position: Chair   Cuff Size: Adult Large   Pulse: 70   Temp: 98.5  F (36.9  C)   SpO2: 95%   Weight: 245 lb   Height: 5' 4\"       Body mass index is 42.05 kg/(m^2).    Sherry Davis                          "

## 2017-05-22 NOTE — LETTER
"2017       RE: Pinky Page  [address]  [facility] [address]     Dear Colleague,    Thank you for referring your patient, Pinky Page, to the [facility] MEDICAL WEIGHT MANAGEMENT at [facility]. Please see a copy of my visit note below.        Return Medical Weight Management Note     Pinky Page  MRN:  [id number]  :  1950  CORINNE:  2017    Dear Dr. Ann,     I had the pleasure of seeing your patient Pinky Page.  She is a 65 year old female who I am continuing to see for treatment of obesity related to: DM-2 and Hypertension.    CURRENT WEIGHT:   245 lbs 0 oz    Wt Readings from Last 4 Encounters:   17 111.1 kg (245 lb)   17 111.1 kg (245 lb)   04/10/17 110.5 kg (243 lb 9.6 oz)   17 110.7 kg (244 lb)     Height:  5' 4\"  Body Mass Index:  Body mass index is 42.05 kg/(m^2).  Vitals:  B/P: 163/75, P: 68    Initial consult weight was 283 on .  Weight change since last seen on 2017 is down 0 pounds.   Total loss is 37 pounds.    INTERVAL HISTORY:  Patient has been working on the following dietary changes: Still eating from the diet line at her group home (1500 miguel, no concentrated sweets, some fruit snacks).   She also continues to be on Byetta 10 cg BID for her DM and also to help her lose weight and as a appetite suppressant as well.  Her DM is being managed by us as well as the [place] [facility] ( PCP).  Patient has been working on the following activity changes: Very regular walking at least 40 minutes / day.    Diet and Activity Changes Since Last Visit Reviewed With Patient 2017   I have made the following changes to my diet since my last visit: no   With regards to my diet, I am still struggling with: eating too much   For breakfast, I typically eat: ceefrdEa   For lunch, I typically eat: saal adsandwich   For supper, I typically eat: -   For snack(s), I typically eat: -   I have made the " following changes to my activity/exercise since my last visit: -   With regards to my activity/exercise, I am still struggling with: -     MEDICATIONS:   Current Outpatient Prescriptions   Medication     LORazepam (ATIVAN) 1 MG tablet     FLUoxetine (PROZAC) 40 MG capsule     ziprasidone (GEODON) 40 MG capsule     blood glucose monitoring (NO BRAND SPECIFIED) test strip     exenatide (BYETTA) 10 MCG/0.04ML injection     insulin glargine (LANTUS) 100 UNIT/ML injection     amLODIPine (NORVASC) 5 MG tablet     nystatin (MYCOSTATIN) 271706 UNIT/GM POWD     clotrimazole (LOTRIMIN) 1 % cream     fluticasone (FLONASE) 50 MCG/ACT nasal spray     furosemide (LASIX) 20 MG tablet     acetaminophen (TYLENOL) 500 MG tablet     solifenacin (VESICARE) 10 MG tablet     HYDROcodone-acetaminophen (NORCO) 5-325 MG per tablet     atorvastatin (LIPITOR) 40 MG tablet     calcium polycarbophil (FIBERCON) 625 MG tablet     levothyroxine (SYNTHROID, LEVOTHROID) 175 MCG tablet     metFORMIN (GLUCOPHAGE-XR) 500 MG 24 hr tablet     Incontinence Supply Disposable (INCONTINENCE BRIEF LARGE) MISC     losartan (COZAAR) 100 MG tablet     Sodium Fluoride (PREVIDENT 5000 PLUS) 1.1 % CREA     Hypromellose (ARTIFICIAL TEARS OP)     Gabapentin (NEURONTIN PO)     senna-docusate (SENNA S) 8.6-50 MG per tablet     polyethylene glycol (MIRALAX/GLYCOLAX) powder     Artificial Tear Ointment (LACRI-LUBE OP)     Skin Protectants, Misc. (EUCERIN) cream     Saline 0.9 % SOLN     Calcium Carbonate-Vitamin D (CALCIUM-VITAMIN D) 250-125 MG-UNIT TABS     Sod Fluor-Solo Fluor-Hydrfl Ac (GEL-SASHA DENTINBLOC DT)     lactase (LACTAID) 3000 UNIT tablet     Magnesium Hydroxide (MILK OF MAGNESIA PO)     CLARITIN 10 MG OR TABS     ASPIRIN 81 MG OR TABS     GLUCAGON EMERGENCY 1 MG IJ KIT     No current facility-administered medications for this visit.        Weight Loss Medication History Reviewed With Patient 2/20/2017   Which weight loss medications are you currently taking  on a regular basis?  Byetta (exentatide)   Are you having any side effects from the weight loss medication that we have prescribed you? No   If you are having side effects please describe: -     ASSESSMENT:   Continues to do well with weight loss maintenance.    FOLLOW-UP:    12 weeks.  10/15 minutes spent on counseling and education    Sincerely,  Marcos Magdaleno MD

## 2017-05-24 ENCOUNTER — MEDICAL CORRESPONDENCE (OUTPATIENT)
Dept: HEALTH INFORMATION MANAGEMENT | Facility: CLINIC | Age: 67
End: 2017-05-24

## 2017-05-25 ENCOUNTER — OFFICE VISIT (OUTPATIENT)
Dept: PSYCHIATRY | Facility: CLINIC | Age: 67
End: 2017-05-25
Attending: PSYCHIATRY & NEUROLOGY
Payer: COMMERCIAL

## 2017-05-25 VITALS
WEIGHT: 246.6 LBS | HEART RATE: 71 BPM | SYSTOLIC BLOOD PRESSURE: 131 MMHG | DIASTOLIC BLOOD PRESSURE: 77 MMHG | BODY MASS INDEX: 42.33 KG/M2

## 2017-05-25 DIAGNOSIS — F41.1 GENERALIZED ANXIETY DISORDER: ICD-10-CM

## 2017-05-25 DIAGNOSIS — F25.1 SCHIZOAFFECTIVE DISORDER, DEPRESSIVE TYPE (H): ICD-10-CM

## 2017-05-25 PROCEDURE — 99212 OFFICE O/P EST SF 10 MIN: CPT | Mod: ZF

## 2017-05-25 RX ORDER — FLUOXETINE 40 MG/1
40 CAPSULE ORAL DAILY
Qty: 30 CAPSULE | Refills: 5 | Status: SHIPPED | OUTPATIENT
Start: 2017-05-25 | End: 2018-02-26

## 2017-05-25 RX ORDER — LORAZEPAM 1 MG/1
1 TABLET ORAL AT BEDTIME
Qty: 30 TABLET | Refills: 5 | Status: SHIPPED | OUTPATIENT
Start: 2017-05-25 | End: 2017-08-10

## 2017-05-25 RX ORDER — ZIPRASIDONE HYDROCHLORIDE 40 MG/1
40 CAPSULE ORAL 2 TIMES DAILY WITH MEALS
Qty: 60 CAPSULE | Refills: 5 | Status: SHIPPED | OUTPATIENT
Start: 2017-05-25 | End: 2018-02-26

## 2017-05-25 NOTE — NURSING NOTE
Chief Complaint   Patient presents with     Recheck Medication           Encounter for therapeutic drug monitoring     Reviewed allergies, smoking status, and pharmacy preferenc  Obtained weight, blood pressure and heart rate

## 2017-05-25 NOTE — MR AVS SNAPSHOT
After Visit Summary   5/25/2017    Pinky Page    MRN: [id number]           Patient Information     Date Of Birth          1950        Visit Information        Provider Department      5/25/2017 1:45 PM Cliff Edwards MD [facility] PSYCHIATRY      Today's Diagnoses     Schizoaffective disorder, depressive type (H)        Generalized anxiety disorder           Follow-ups after your visit        Your next 10 appointments already scheduled     Jun 19, 2017  1:00 PM CDT   (Arrive by 12:45 PM)   Return Visit with Damon Gr, PhD [facility] ([facility])    [address] [place] [address]  [address] [facility] [address]   [phone]            Jun 21, 2017 10:30 AM CDT   Lab with  LAB   [facility] ([facility])    [address] [place] [address]  [address] [facility] [address]   [phone]            Jun 21, 2017 11:35 AM CDT   (Arrive by 11:20 AM)   Return Visit with Gaby Holliday MD   [facility] Nephrology ([facility])    [address] [place] [address]  [address] [facility] [address]   [phone]            Jul 06, 2017  2:15 PM CDT   Adult Med Follow UP with Joana De Leon MD   Psychiatry Clinic ([facility])    [place] [id number]  [address] [facility] [address]   [phone]            Jul 13, 2017 11:00 AM CDT   Return Sleep Patient with Brianda Reddy MD   [facility] [place], Sleep Study ([facility])    [address] [place] [address]   [phone]            Aug 28, 2017 11:30 AM CDT   (Arrive by 11:15 AM)   Return Visit with Marcso Magdaleno MD   [facility] Medical Weight Management ([facility])    [address] [place] [address]  [address] [facility] [address]   [phone]            Nov 14, 2017  1:30 PM CST   NEW GENERAL with  Michael Lopez MD   Eye Clinic ([facility])    [address]  [address] [facility]  9th Fl Clin [address]  [facility] [id number] [phone]              Who to contact     Please call your clinic at [phone] to:    Ask questions about your health    Make or cancel appointments    Discuss your medicines    Learn about your test results    Speak to your doctor   If you have compliments or concerns about an experience at your clinic, or if you wish to file a complaint, please contact [facility] Patient Relations at [phone] or email us at [email or web address]         Additional Information About Your Visit        [employer] Information     [employer] is an electronic gateway that provides easy, online access to your medical records. With [employer], you can request a clinic appointment, read your test results, renew a prescription or communicate with your care team.     To sign up for [employer] visit the website at [email or web address]   You will be asked to enter the access code listed below, as well as some personal information. Please follow the directions to create your username and password.     Your access code is: [email or web address]  Expires: 2017  6:30 AM     Your access code will  in 90 days. If you need help or a new code, please contact your [facility] or call [phone] for assistance.        Care EveryWhere ID     This is your Care EveryWhere ID. This could be used by other organizations to access your [place] medical records  [id number]        Your Vitals Were     Pulse BMI (Body Mass Index)                71 42.33 kg/m2           Blood Pressure from Last 3 Encounters:   17 131/77   17 142/67   17 160/63    Weight from Last 3 Encounters:   17 111.9 kg (246 lb 9.6 oz)   17 111.1 kg (245 lb)   05/15/17 110.9 kg (244 lb 9.6 oz)              Today, you had the following     No  orders found for display         Today's Medication Changes          These changes are accurate as of: 5/25/17 11:59 PM.  If you have any questions, ask your nurse or doctor.               These medicines have changed or have updated prescriptions.        Dose/Directions    metFORMIN 500 MG 24 hr tablet   Commonly known as:  GLUCOPHAGE-XR   This may have changed:  how much to take   Used for:  Diabetes mellitus, type 2 (H)        Dose:  1000 mg   Take 2 tablets (1,000 mg) by mouth 2 times daily (with meals)   Quantity:  90 tablet   Refills:  3            Where to get your medicines      These medications were sent to [place], [place] - [address] [facility]  [address] [facility] [address]     Phone:  [phone]     FLUoxetine 40 MG capsule    ziprasidone 40 MG capsule         Some of these will need a paper prescription and others can be bought over the counter.  Ask your nurse if you have questions.     Bring a paper prescription for each of these medications     LORazepam 1 MG tablet                Primary Care Provider Office Phone # Fax #    Shamika Kelley [phone]       [facility] [address] [facility] [address]        Thank you!     Thank you for choosing PSYCHIATRY CLINIC  for your care. Our goal is always to provide you with excellent care. Hearing back from our patients is one way we can continue to improve our services. Please take a few minutes to complete the written survey that you may receive in the mail after your visit with us. Thank you!             Your Updated Medication List - Protect others around you: Learn how to safely use, store and throw away your medicines at www.disposemymeds.org.          This list is accurate as of: 5/25/17 11:59 PM.  Always use your most recent med list.                   Brand Name Dispense Instructions for use    acetaminophen 500 MG tablet    TYLENOL    1 Bottle    Take 2 tablets (1,000  mg) by mouth every 6 hours as needed       amLODIPine 5 MG tablet    NORVASC    30 tablet    Take 2 tablets (10 mg) by mouth daily       ARTIFICIAL TEARS OP      Apply 1 drop to eye 4 times daily.       aspirin 81 MG tablet     100    Take 1 tablet by mouth daily. ENTERIC COATED.       atorvastatin 40 MG tablet    LIPITOR    90 tablet    Take 1 tablet (40 mg) by mouth daily       blood glucose monitoring test strip    no brand specified    100 each    Use to test blood sugar 3 times daily or as directed.       calcium polycarbophil 625 MG tablet    FIBERCON     Take 2 tablets by mouth daily       calcium-vitamin D 250-125 MG-UNIT Tabs      Take 1 tablet by mouth 2 times daily. Calcium 250 mg/Vit D 125 IU       CLARITIN 10 MG tablet   Generic drug:  loratadine     30    1 TAB PO QD (Once per day) as needed for ALLERGY SYMPTOMS       clotrimazole 1 % cream    LOTRIMIN    50 g    Apply topically 2 times daily as needed       eucerin cream      Apply  topically as needed. Apply to thigh PRN dry skin       exenatide 10 MCG/0.04ML injection    BYETTA    1 Syringe    Inject 10 mcg Subcutaneous 2 times daily (before meals)       FLUoxetine 40 MG capsule    PROzac    30 capsule    Take 1 capsule (40 mg) by mouth daily       fluticasone 50 MCG/ACT spray    FLONASE    16 g    Spray 1 spray into both nostrils daily       furosemide 20 MG tablet    LASIX    60 tablet    Take 1 tablet (20 mg) by mouth 2 times daily       GEL-SASHA DENTINBLOC DT      Apply  to affected area. GEL. Apply to 2nd molar on bottom right daily at bedtime.       GLUCAGON EMERGENCY 1 MG kit   Generic drug:  glucagon      Inject  as directed. 1 mL as needed for signs of hypoglycemia. May repeat as needed in 15 minutes.       HYDROcodone-acetaminophen 5-325 MG per tablet    NORCO     Take 1 tablet by mouth every 6 hours as needed 1-2 tabs       Incontinence Brief Large Misc     60 Units    2 Units 2 times daily       insulin glargine 100 UNIT/ML injection     LANTUS    8 mL    Inject 24 Units Subcutaneous At Bedtime       LACRI-LUBE OP      Apply  to eye. Place 0.5 inches into both eyes at bedtime       LACTAID 3000 UNIT tablet   Generic drug:  lactase      Take 4 tabs daily with meals.       levothyroxine 175 MCG tablet    SYNTHROID/LEVOTHROID    30 tablet    Take 1 tablet (175 mcg) by mouth daily Give on empty stomach       LORazepam 1 MG tablet    ATIVAN    30 tablet    Take 1 tablet (1 mg) by mouth At Bedtime .  May take additional 1mg daily PRN for agitation.       losartan 100 MG tablet    COZAAR    90 tablet    Take 1 tablet by mouth daily.       metFORMIN 500 MG 24 hr tablet    GLUCOPHAGE-XR    90 tablet    Take 2 tablets (1,000 mg) by mouth 2 times daily (with meals)       MILK OF MAGNESIA PO      Take  by mouth. Take 30 mL as needed for constipation.       NEURONTIN PO      Take 900 mg by mouth 3 times daily.       nystatin 598899 UNIT/GM Powd    MYCOSTATIN    60 g    Apply topically 3 times daily as needed       polyethylene glycol powder    MIRALAX/GLYCOLAX     Take 1 capful by mouth 2 times daily. 17 GM PO BID       PREVIDENT 5000 PLUS 1.1 % Crea   Generic drug:  Sodium Fluoride      Apply  to affected area every evening.       saline 0.9 % Soln      Spray 2 sprays in nostril as needed.       SENNA S 8.6-50 MG per tablet   Generic drug:  senna-docusate      Take 2 tablets by mouth At Bedtime.       solifenacin 10 MG tablet    VESICARE    30 tablet    Take 1 tablet (10 mg) by mouth daily       ziprasidone 40 MG capsule    GEODON    60 capsule    Take 1 capsule (40 mg) by mouth 2 times daily (with meals)

## 2017-05-25 NOTE — PROGRESS NOTES
"PSYCHIATRY CLINIC PROGRESS NOTE   30 minute medication management     INTERIM HISTORY                                                 PSYCH CRITICAL ITEM HISTORY:  Mental health issues were first experienced in her 20's and she first received mental health care at that time.  Critical items include psychosis [sxs include unclear], ECT and psych hosp (3-5).     Pinky Page is a 66 year old female who was last seen in clinic on 4/25/2017 at which time no changes were made. The patient reports good treatment adherence.  History was provided by the patient who was a good historian.  Since the last visit:  - she is doing pretty well, got a CPAP machine, adjusting better to it daily.  - Likes living at the , been there 20 years - likes it. Going to more groups. Avoiding conflict with co-residents.  - Continues to do her exercise routine, feels motivated to continue to work out, enjoying going outside and walking.   - Contines to do coloring which she enjoys.  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  - Note from [facility] supports Pinky's hx that things are going generally well.    RECENT SYMPTOMS:   DEPRESSION:  insomnia , appetite change and this is improving with CPAP use;  DENIES- depressed mood, anhedonia, psychomotor retardation/ agitation observed by others, suicidal ideation  and overwhelmed  PSYCHOSIS:  none;  DENIES- percpetual disturbance [hallucinations   ] and delusions    SUBSTANCE USE:     ALCOHOL-  never       TOBACCO- none        CAFFEINE- 1-2 sodas/coffees per day                  CANNABIS- none  OTHER ILLICIT DRUGS- none    Financial Support- social security disability     Living Situation- Lives at [facility]          Children- none     MEDICAL ROS:  Reports none.    Denies muscle twitches, excessive diaphoresis, restlessness, tremor and shiver and akathisia, dystonia, apparent TD, muscle stiffness, tremor [action or resting] and " polyphagia.    PSYCH and CD Critical Summary Points since July 2016           None    PAST PSYCH MED TRIALS   see EMR Problem List: Hx of psychiatric care    MEDICAL / SURGICAL HISTORY                                   CARE TEAM:          PCP- Dr. Kelley, Ilana                   Therapist- Dr. Gr (monthly)    Patient Active Problem List   Diagnosis     Allergic rhinitis due to pollen     Urge incontinence     Hypertension, essential     Cardiomegaly     Chronic constipation     Dry eye syndrome     Esophageal reflux     Exposure keratoconjunctivitis     DM ophthalmopathy (H)     Hypothyroidism     Senile cataract     ANUJ (obstructive sleep apnea)     Postmenopausal atrophic vaginitis     Restless leg syndrome     Squamous blepharitis     Morbid obesity due to excess calories (H)     Personal history of breast cancer s/p L masectomy     Diabetes mellitus type 2, insulin dependent (H)     Hypercholesteremia     Lives in group home     Extrapyramidal and movement disorder     CKD (chronic kidney disease) stage 2, GFR 60-89 ml/min     Solitary kidney, congenital     S/P hysterectomy     Impingement syndrome of right shoulder     Hypertriglyceridemia     Candidiasis of skin     Generalized anxiety disorder     Carpal tunnel syndrome of left wrist     Schizoaffective disorder, depressive type (H)     Health Care Home     Major depressive disorder, recurrent episode, moderate (H)     Psychological factors affecting medical condition     Hx of psychiatric care     Nail complaint     Microalbuminuria due to type 2 diabetes mellitus (H)     Type 2 diabetes mellitus with microalbuminuria, with long-term current use of insulin (H)     Diabetes mellitus, type II, insulin dependent (H)       ALLERGY                                Chlordiazepoxide hcl; Dimetapp dm cold-cough; Haldol; Ibuprofen; Lactose intolerance [beta-galactosidase]; Milk products; and Propofol  MEDICATIONS                               Current Outpatient  Prescriptions   Medication Sig Dispense Refill     LORazepam (ATIVAN) 1 MG tablet Take 1 tablet (1 mg) by mouth At Bedtime .  May take additional 1mg daily PRN for agitation. 30 tablet 2     FLUoxetine (PROZAC) 40 MG capsule Take 1 capsule (40 mg) by mouth daily 30 capsule 2     ziprasidone (GEODON) 40 MG capsule Take 1 capsule (40 mg) by mouth 2 times daily (with meals) 60 capsule 2     blood glucose monitoring (NO BRAND SPECIFIED) test strip Use to test blood sugar 3 times daily or as directed. 100 each 3     exenatide (BYETTA) 10 MCG/0.04ML injection Inject 10 mcg Subcutaneous 2 times daily (before meals) 1 Syringe 11     insulin glargine (LANTUS) 100 UNIT/ML injection Inject 24 Units Subcutaneous At Bedtime 8 mL 11     amLODIPine (NORVASC) 5 MG tablet Take 2 tablets (10 mg) by mouth daily 30 tablet 3     nystatin (MYCOSTATIN) 096988 UNIT/GM POWD Apply topically 3 times daily as needed 60 g 1     clotrimazole (LOTRIMIN) 1 % cream Apply topically 2 times daily as needed 50 g 0     fluticasone (FLONASE) 50 MCG/ACT nasal spray Spray 1 spray into both nostrils daily 16 g 3     furosemide (LASIX) 20 MG tablet Take 1 tablet (20 mg) by mouth 2 times daily 60 tablet 3     acetaminophen (TYLENOL) 500 MG tablet Take 2 tablets (1,000 mg) by mouth every 6 hours as needed 1 Bottle 2     solifenacin (VESICARE) 10 MG tablet Take 1 tablet (10 mg) by mouth daily 30 tablet 6     HYDROcodone-acetaminophen (NORCO) 5-325 MG per tablet Take 1 tablet by mouth every 6 hours as needed 1-2 tabs       atorvastatin (LIPITOR) 40 MG tablet Take 1 tablet (40 mg) by mouth daily 90 tablet 1     calcium polycarbophil (FIBERCON) 625 MG tablet Take 2 tablets by mouth daily       levothyroxine (SYNTHROID, LEVOTHROID) 175 MCG tablet Take 1 tablet (175 mcg) by mouth daily Give on empty stomach 30 tablet 4     metFORMIN (GLUCOPHAGE-XR) 500 MG 24 hr tablet Take 2 tablets (1,000 mg) by mouth 2 times daily (with meals) (Patient taking differently: Take  2,000 mg by mouth 2 times daily (with meals) ) 90 tablet 3     Incontinence Supply Disposable (INCONTINENCE BRIEF LARGE) MISC 2 Units 2 times daily 60 Units 11     losartan (COZAAR) 100 MG tablet Take 1 tablet by mouth daily. 90 tablet 1     Sodium Fluoride (PREVIDENT 5000 PLUS) 1.1 % CREA Apply  to affected area every evening.       Hypromellose (ARTIFICIAL TEARS OP) Apply 1 drop to eye 4 times daily.       Gabapentin (NEURONTIN PO) Take 900 mg by mouth 3 times daily.       senna-docusate (SENNA S) 8.6-50 MG per tablet Take 2 tablets by mouth At Bedtime.       polyethylene glycol (MIRALAX/GLYCOLAX) powder Take 1 capful by mouth 2 times daily. 17 GM PO BID       Artificial Tear Ointment (LACRI-LUBE OP) Apply  to eye. Place 0.5 inches into both eyes at bedtime       Skin Protectants, Misc. (EUCERIN) cream Apply  topically as needed. Apply to thigh PRN dry skin        Saline 0.9 % SOLN Spray 2 sprays in nostril as needed.       Calcium Carbonate-Vitamin D (CALCIUM-VITAMIN D) 250-125 MG-UNIT TABS Take 1 tablet by mouth 2 times daily. Calcium 250 mg/Vit D 125 IU       Sod Fluor-Solo Fluor-Hydrfl Ac (GEL-SASHA DENTINBLOC DT) Apply  to affected area. GEL. Apply to 2nd molar on bottom right daily at bedtime.       lactase (LACTAID) 3000 UNIT tablet Take 4 tabs daily with meals.       Magnesium Hydroxide (MILK OF MAGNESIA PO) Take  by mouth. Take 30 mL as needed for constipation.       CLARITIN 10 MG OR TABS 1 TAB PO QD (Once per day) as needed for ALLERGY SYMPTOMS 30 11     ASPIRIN 81 MG OR TABS Take 1 tablet by mouth daily. ENTERIC COATED. 100 3     GLUCAGON EMERGENCY 1 MG IJ KIT Inject  as directed. 1 mL as needed for signs of hypoglycemia. May repeat as needed in 15 minutes.  0       VITALS   /77  Pulse 71  Wt 111.9 kg (246 lb 9.6 oz)  BMI 42.33 kg/m2   MENTAL STATUS EXAM                                                             Alertness: alert  and oriented  Appearance: well groomed  Behavior/Demeanor:  cooperative, pleasant and calm, with good  eye contact  Speech: normal  Language: intact  Psychomotor: normal or unremarkable  Mood:  description consistent with euthymia  Affect: full range; was congruent to mood; was congruent to content  Thought Process/Associations: unremarkable  Thought Content:  Denies suicidal ideation, violent ideation and psychotic thought  Perception:  Denies hallucinations  Insight: adequate  Judgment: good  Cognition:  does appear grossly intact; formal cognitive testing was not done    LABS and DATA     EKG  8/17/16: NSR,   AIMS 12/28/16: 0    ANTIPSYCHOTIC LABS   [glu, A1C, lipids (focus LDL), liver enzymes, WBC, ANEU, Hgb, plts]    q12 mo  Recent Labs   Lab Test  03/21/17   1023  12/08/16   1405  08/16/16   1431   GLC  118*   --   245.9*   A1C  6.6*  7.0*  6.9*     Recent Labs   Lab Test  03/21/17   1023  02/22/16   1028   CHOL  142  162.0   TRIG  121  151.9*   LDL  78  95   HDL  40*  36.6*     Recent Labs   Lab Test  03/21/17   1023  01/22/15   1600   AST  13  18   ALT  20  35   ALKPHOS  92  113     Recent Labs   Lab Test  03/21/17   1023  06/22/16   1035   07/04/14   0705   WBC  11.8*  9.9   --   7.7   ANEU  8.3   --    --   4.8   HGB  13.4  13.5   < >  13.3   PLT  266  266   --   223    < > = values in this interval not displayed.       PHQ9 TODAY = 4  PHQ-9 SCORE 2/10/2017 3/16/2017 4/25/2017   Total Score 7 6 8       PSYCHIATRIC DIAGNOSES                                                                                                 Schizoaffective disorder, depressed type, multiple episodes  Generalized anxiety disorder, well-managed    ASSESSMENT                                   Pertinent Background:   See most recent Transfer Evaluation as dated above.  Additionally, historically did have increasing psychotic experiences with AP taper.       TODAY: Pinky reports stability of symptoms with no active mood or psychosis symptoms.  She got the sleep study and is now using a  CPAP; believes sleep will improve with time. Avoiding conflict with co-resident at the home, staff is also helpful with this. Medications doing well, no change warranted. Discussed upcoming resident transition and offered the option of transition to a NP due to ongoing stability if desired, which pt declined.     Abnormal labs/vital: Followed closely by [facility] for comorbid medical problems.      CONTROLLED SUBSTANCE STATEMENT:  The use of  Lorazepam (Ativan) is indicated and appropriate.  This regimen is both beneficial and well tolerated with no adverse effects or tolerance.  There is no evidence of abuse of this medication or other substances.  Warnings related to abuse potential, street value, adverse effects, abrupt cessation, withdrawal and need for emergent care have been discussed and are understood by the patient.  The patient has verbalized clear understanding of safety issues as well as the need to control use.  Plan to continue use at this time.  Controlled Substance Contract was completed 8/17/16.    PSYCHOTROPIC DRUG INTERACTIONS:   ZIPRASIDONE and FLUOXETINE may result in increased risk of QT-interval prolongation and increased risk of serotonin syndrome.   ZIPRASIDONE, FLUOXETINE and VESICARE may result in increased risk of QT-interval prolongation.  ASPIRIN and SSRI may result in an increased risk of bleeding  LEVOTHYROXINE and FLUOXETINE may result in increased levothyroxine requirements.  MANAGEMENT:  Monitoring for adverse effects, periodic EKGs and patient is aware of risks      PLAN                                                                                                       1) PSYCHOTROPIC MEDICATIONS:      - continue fluoxetine 40 mg qD      - continue ziprasidone 40 BID with meals       - continue lorazepam 1 mg qHS and 1 mg prn for agitation    2) THERAPY:  Continue with Dr. Gr    3) LABS NEXT DUE: AP labs due next in March of 2018       RATING SCALES:    AIMS next in  in June/July 2017    4) REFERRALS [CD, medical, other]: None    5) :  none    6) RTC: 8 weeks with new resident    7) CRISIS NUMBERS: Provided routinely in AVS      TREATMENT RISK STATEMENT:  The risks, benefits, alternatives and potential adverse effects have been discussed and are understood by the patient/ patient's guardian. The pt understands the risks of using street drugs or alcohol.  There are no medical contraindications, the pt agrees to treatment with the ability to do so.  The patient understands to call 911 or come to the nearest ED if life threatening or urgent symptoms present.     RESIDENT:   Reshma Edwards MD    Patient staffed in clinic with Dr. Rubio who will sign the note.  Supervisor is Dr. Gordon.    I saw the patient with the resident, and participated in key portions of the service, including the mental status examination and developing the plan of care. I reviewed key portions of the history with the resident. I agree with the findings and plan as documented in this note.    Roxy Rubio

## 2017-05-26 ASSESSMENT — PATIENT HEALTH QUESTIONNAIRE - PHQ9: SUM OF ALL RESPONSES TO PHQ QUESTIONS 1-9: 7

## 2017-06-01 ENCOUNTER — DOCUMENTATION ONLY (OUTPATIENT)
Dept: FAMILY MEDICINE | Facility: CLINIC | Age: 67
End: 2017-06-01

## 2017-06-01 NOTE — PROGRESS NOTES
"When opening a documentation only encounter, be sure to enter in \"Chief Complaint\" Forms and in \" Comments\" Title of form, description if needed.    Pinky is a 66 year old  female  Form received via: Fax  Form now resides in: PCS Pending    Elizabeth Bianchi MA       Form has been completed by provider.     Form sent out via: Mailed to [facility]  Patient informed: No, Reason:via mail  Output date: June 1, 2017    Elizabeth Bainchi MA      **Please close the encounter**                      "

## 2017-06-14 ENCOUNTER — TELEPHONE (OUTPATIENT)
Dept: NEPHROLOGY | Facility: CLINIC | Age: 67
End: 2017-06-14

## 2017-06-14 NOTE — TELEPHONE ENCOUNTER
Hello, this is Miya's office from Medicine Specialty on the 3rd floor at [facility] located at [address] [place] [address]. We are reminding you of your upcoming Nephrology appointment on 6/21/2017 at 11:35 am. Please arrive an hour prior to your appointment time for labs. Also, please bring an updated medication list including dose of prescribed and over the counter medications you are currently taking or your labeled medication bottles with you to your appointment. If you have any questions or would like to cancel or reschedule your appointment, please call us at [phone].     If you are having labs draw same day you need a lab appointment scheduled 1 hour prior to your visit.    You are welcome to have your labs done 2-7 days before your appointment at any [facility] facility. If you have your labs completed before your appointment, please still come 30 minutes early for check-in.     Thank-You for allowing us to be a member of your Health Care Team,     Jaclyn Sanabria., CMA

## 2017-06-15 DIAGNOSIS — N18.2 CKD (CHRONIC KIDNEY DISEASE) STAGE 2, GFR 60-89 ML/MIN: Primary | ICD-10-CM

## 2017-06-19 ENCOUNTER — OFFICE VISIT (OUTPATIENT)
Dept: PSYCHOLOGY | Facility: CLINIC | Age: 67
End: 2017-06-19

## 2017-06-19 VITALS — BODY MASS INDEX: 42.33 KG/M2 | WEIGHT: 246.6 LBS

## 2017-06-19 DIAGNOSIS — F54 PSYCHOLOGICAL FACTORS AFFECTING MEDICAL CONDITION: ICD-10-CM

## 2017-06-19 DIAGNOSIS — F41.1 GENERALIZED ANXIETY DISORDER: ICD-10-CM

## 2017-06-19 DIAGNOSIS — E66.9 OBESITY, UNSPECIFIED OBESITY SEVERITY, UNSPECIFIED OBESITY TYPE: ICD-10-CM

## 2017-06-19 DIAGNOSIS — F33.1 MAJOR DEPRESSIVE DISORDER, RECURRENT EPISODE, MODERATE (H): Primary | ICD-10-CM

## 2017-06-19 NOTE — MR AVS SNAPSHOT
After Visit Summary   6/19/2017    Pinky Page    MRN: [id number]           Patient Information     Date Of Birth          1950        Visit Information        Provider Department      6/19/2017 1:00 PM Damon Gr, PhD [facility]        Today's Diagnoses     Major depressive disorder, recurrent episode, moderate (H)    -  1    Psychological factors affecting medical condition        Generalized anxiety disorder        Obesity, unspecified obesity severity, unspecified obesity type           Follow-ups after your visit        Your next 10 appointments already scheduled     Jun 21, 2017 10:30 AM CDT   Lab with  LAB    Health Lab ([facility])    [address] [place] [address]  [address] [facility] [address]   [phone]            Jun 21, 2017 11:35 AM CDT   (Arrive by 11:20 AM)   Return Visit with Gaby Holliday MD   [facility] Nephrology ([facility])    [address] [place] [address]  [address] [facility] [address]   [phone]            Jun 22, 2017  1:20 PM CDT   Return Visit with Shamika Kelley MD   [facility] ([facility])    [address] [place] [address],  [address]  [facility] [address]   [phone]            Jul 06, 2017  2:15 PM CDT   Adult Med Follow UP with Joana De Leon MD   Psychiatry Clinic ([facility])    [place] [id number]  [address] [place] [address]   [phone]            Jul 13, 2017 11:00 AM CDT   Return Sleep Patient with Brianda Reddy MD   [facility] [place], Sleep Study ([facility])    [address] [place] [address]   [phone]            Aug 28, 2017 11:30 AM CDT   (Arrive by 11:15 AM)   Return Visit with Marcos Magdaleno MD   [facility] Medical Weight Management ([facility])    [address] [place] [address]  Floor  [facility] [address]   [phone]            2017  1:30 PM [facility] GENERAL with Michael Lopez MD   Eye Clinic ([facility])    [address] [facility]  9th Fl Clin 9a  [facility] [address]   [phone]              Who to contact     Please call your clinic at [phone] to:    Ask questions about your health    Make or cancel appointments    Discuss your medicines    Learn about your test results    Speak to your doctor   If you have compliments or concerns about an experience at your clinic, or if you wish to file a complaint, please contact [facility] Patient Relations at [phone] or email us at [email or web address]         Additional Information About Your Visit        AppEnsure Information     AppEnsure is an electronic gateway that provides easy, online access to your medical records. With AppEnsure, you can request a clinic appointment, read your test results, renew a prescription or communicate with your care team.     To sign up for AppEnsure visit the website at [email or web address]   You will be asked to enter the access code listed below, as well as some personal information. Please follow the directions to create your username and password.     Your access code is: [email or web address]  Expires: 2017  6:30 AM     Your access code will  in 90 days. If you need help or a new code, please contact your [facility] or call [phone] for assistance.        Care EveryWhere ID     This is your Care EveryWhere ID. This could be used by other organizations to access your [facility] medical records  [id number]        Your Vitals Were     BMI (Body Mass Index)                   42.33 kg/m2            Blood Pressure from Last 3 Encounters:   17 131/77   17 142/67   17 160/63    Weight from Last 3 Encounters:   17 111.9 kg (246 lb 9.6 oz)   17 111.9  kg (246 lb 9.6 oz)   05/22/17 111.1 kg (245 lb)              Today, you had the following     No orders found for display         Today's Medication Changes          These changes are accurate as of: 6/19/17  1:52 PM.  If you have any questions, ask your nurse or doctor.               These medicines have changed or have updated prescriptions.        Dose/Directions    metFORMIN 500 MG 24 hr tablet   Commonly known as:  GLUCOPHAGE-XR   This may have changed:  how much to take   Used for:  Diabetes mellitus, type 2 (H)        Dose:  1000 mg   Take 2 tablets (1,000 mg) by mouth 2 times daily (with meals)   Quantity:  90 tablet   Refills:  3                Primary Care Provider Office Phone # Fax #    Shamika Kelley [phone]       [facility] [address] [facility] [address]        Thank you!     Thank you for choosing [facility]  for your care. Our goal is always to provide you with excellent care. Hearing back from our patients is one way we can continue to improve our services. Please take a few minutes to complete the written survey that you may receive in the mail after your visit with us. Thank you!             Your Updated Medication List - Protect others around you: Learn how to safely use, store and throw away your medicines at www.disposemymeds.org.          This list is accurate as of: 6/19/17  1:52 PM.  Always use your most recent med list.                   Brand Name Dispense Instructions for use    acetaminophen 500 MG tablet    TYLENOL    1 Bottle    Take 2 tablets (1,000 mg) by mouth every 6 hours as needed       amLODIPine 5 MG tablet    NORVASC    30 tablet    Take 2 tablets (10 mg) by mouth daily       ARTIFICIAL TEARS OP      Apply 1 drop to eye 4 times daily.       aspirin 81 MG tablet     100    Take 1 tablet by mouth daily. ENTERIC COATED.       atorvastatin 40 MG tablet    LIPITOR    90 tablet    Take 1 tablet (40 mg) by mouth daily        blood glucose monitoring test strip    no brand specified    100 each    Use to test blood sugar 3 times daily or as directed.       calcium polycarbophil 625 MG tablet    FIBERCON     Take 2 tablets by mouth daily       calcium-vitamin D 250-125 MG-UNIT Tabs      Take 1 tablet by mouth 2 times daily. Calcium 250 mg/Vit D 125 IU       CLARITIN 10 MG tablet   Generic drug:  loratadine     30    1 TAB PO QD (Once per day) as needed for ALLERGY SYMPTOMS       clotrimazole 1 % cream    LOTRIMIN    50 g    Apply topically 2 times daily as needed       eucerin cream      Apply  topically as needed. Apply to thigh PRN dry skin       exenatide 10 MCG/0.04ML injection    BYETTA    1 Syringe    Inject 10 mcg Subcutaneous 2 times daily (before meals)       FLUoxetine 40 MG capsule    PROzac    30 capsule    Take 1 capsule (40 mg) by mouth daily       fluticasone 50 MCG/ACT spray    FLONASE    16 g    Spray 1 spray into both nostrils daily       furosemide 20 MG tablet    LASIX    60 tablet    Take 1 tablet (20 mg) by mouth 2 times daily       GEL-SASHA DENTINBLOC DT      Apply  to affected area. GEL. Apply to 2nd molar on bottom right daily at bedtime.       GLUCAGON EMERGENCY 1 MG kit   Generic drug:  glucagon      Inject  as directed. 1 mL as needed for signs of hypoglycemia. May repeat as needed in 15 minutes.       HYDROcodone-acetaminophen 5-325 MG per tablet    NORCO     Take 1 tablet by mouth every 6 hours as needed 1-2 tabs       Incontinence Brief Large Misc     60 Units    2 Units 2 times daily       insulin glargine 100 UNIT/ML injection    LANTUS    8 mL    Inject 24 Units Subcutaneous At Bedtime       LACRI-LUBE OP      Apply  to eye. Place 0.5 inches into both eyes at bedtime       LACTAID 3000 UNIT tablet   Generic drug:  lactase      Take 4 tabs daily with meals.       levothyroxine 175 MCG tablet    SYNTHROID/LEVOTHROID    30 tablet    Take 1 tablet (175 mcg) by mouth daily Give on empty stomach        LORazepam 1 MG tablet    ATIVAN    30 tablet    Take 1 tablet (1 mg) by mouth At Bedtime .  May take additional 1mg daily PRN for agitation.       losartan 100 MG tablet    COZAAR    90 tablet    Take 1 tablet by mouth daily.       metFORMIN 500 MG 24 hr tablet    GLUCOPHAGE-XR    90 tablet    Take 2 tablets (1,000 mg) by mouth 2 times daily (with meals)       MILK OF MAGNESIA PO      Take  by mouth. Take 30 mL as needed for constipation.       NEURONTIN PO      Take 900 mg by mouth 3 times daily.       nystatin 222982 UNIT/GM Powd    MYCOSTATIN    60 g    Apply topically 3 times daily as needed       polyethylene glycol powder    MIRALAX/GLYCOLAX     Take 1 capful by mouth 2 times daily. 17 GM PO BID       PREVIDENT 5000 PLUS 1.1 % Crea   Generic drug:  Sodium Fluoride      Apply  to affected area every evening.       saline 0.9 % Soln      Spray 2 sprays in nostril as needed.       SENNA S 8.6-50 MG per tablet   Generic drug:  senna-docusate      Take 2 tablets by mouth At Bedtime.       solifenacin 10 MG tablet    VESICARE    30 tablet    Take 1 tablet (10 mg) by mouth daily       ziprasidone 40 MG capsule    GEODON    60 capsule    Take 1 capsule (40 mg) by mouth 2 times daily (with meals)

## 2017-06-19 NOTE — PROGRESS NOTES
Health Psychology                  Clinic    Department of Medicine  Madelaine Ingram, Ph.D., L.P. [phone]                          [facility], Ph.D.,  L.P. [phone]                 3rd Floor, Clinic 3A  [place] Mail [address]   Damon Gr, Ph.D., LUCY.RODOLFO., L.P. [phone]     [address] Kimber Morales, Ph.D., L.P. [phone]  [place] [id number]  [place] [address]    Health Psychology Follow-Up Note    Ms. Page is a pleasant 66-year old woman with chronic depressive illness, who returns to clinic for supportive and behavioral psychotherapy for moderate recurrent depression and for help losing weight given her morbid obesity.   Depression is at or below baseline (4)  mild.  She gained weight since last seen. She reports some stopping walking because she had been ill.   Wt Readings from Last 4 Encounters:   06/19/17 111.9 kg (246 lb 9.6 oz)   05/25/17 111.9 kg (246 lb 9.6 oz)   05/22/17 111.1 kg (245 lb)   05/15/17 110.9 kg (244 lb 9.6 oz)     At [employer], she continues to serve on the [employer].  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  She is working 1 day/week with her boss, [place], Friday mornings for 1.5 hour.  Her roommate is doing better and she is enjoying her more over time.    She rates the depression as 3-4 on 10-point subjective scale.   She is doing more social walking some of the time and had apparently cut back on her fitness center days.  She agrees to increase.  Goal for exercise is currently 1 hour/day, with plan to go up to 1.5 hours/day as of today, June 19.   She arrived early today, which is quite atypical for her. Pinky participated fully and appeared to derive benefit.  Rapport was excellent.     Extended session due to complexity of case and length of  interval.      IMPRESSION Distress Disability Risk    Low High Low     Time In: 12:52  Time Out:  1:49  Diagnosis:   Major Depression, recurrent moderate (F33.1)   Psychological Factors Affecting Medical Condition: Morbid Obesity (F54)      Plan: She will return on 7/24 @ 1:00  for behavioral and supportive psychotherapy.   Tx plan due 4/11/2018  Damon Gr, Ph.D., A.B.P.P., L.P.

## 2017-06-21 ENCOUNTER — OFFICE VISIT (OUTPATIENT)
Dept: NEPHROLOGY | Facility: CLINIC | Age: 67
End: 2017-06-21
Attending: INTERNAL MEDICINE
Payer: COMMERCIAL

## 2017-06-21 VITALS
DIASTOLIC BLOOD PRESSURE: 71 MMHG | SYSTOLIC BLOOD PRESSURE: 119 MMHG | OXYGEN SATURATION: 96 % | HEIGHT: 64 IN | BODY MASS INDEX: 41.28 KG/M2 | HEART RATE: 77 BPM | WEIGHT: 241.8 LBS

## 2017-06-21 DIAGNOSIS — Q60.0 SOLITARY KIDNEY, CONGENITAL: ICD-10-CM

## 2017-06-21 DIAGNOSIS — R80.9 MICROALBUMINURIA DUE TO TYPE 2 DIABETES MELLITUS (H): ICD-10-CM

## 2017-06-21 DIAGNOSIS — N18.1 CKD (CHRONIC KIDNEY DISEASE) STAGE 1, GFR 90 ML/MIN OR GREATER: Primary | ICD-10-CM

## 2017-06-21 DIAGNOSIS — I10 HYPERTENSION, ESSENTIAL: ICD-10-CM

## 2017-06-21 DIAGNOSIS — E11.29 MICROALBUMINURIA DUE TO TYPE 2 DIABETES MELLITUS (H): ICD-10-CM

## 2017-06-21 DIAGNOSIS — N18.2 CKD (CHRONIC KIDNEY DISEASE) STAGE 2, GFR 60-89 ML/MIN: ICD-10-CM

## 2017-06-21 LAB
ALBUMIN SERPL-MCNC: 3.3 G/DL (ref 3.4–5)
ANION GAP SERPL CALCULATED.3IONS-SCNC: 8 MMOL/L (ref 3–14)
BUN SERPL-MCNC: 17 MG/DL (ref 7–30)
CALCIUM SERPL-MCNC: 9.1 MG/DL (ref 8.5–10.1)
CHLORIDE SERPL-SCNC: 102 MMOL/L (ref 94–109)
CO2 SERPL-SCNC: 28 MMOL/L (ref 20–32)
CREAT SERPL-MCNC: 0.75 MG/DL (ref 0.52–1.04)
CREAT UR-MCNC: 25 MG/DL
DEPRECATED CALCIDIOL+CALCIFEROL SERPL-MC: 33 UG/L (ref 20–75)
FERRITIN SERPL-MCNC: 36 NG/ML (ref 8–252)
GFR SERPL CREATININE-BSD FRML MDRD: 77 ML/MIN/1.7M2
GLUCOSE SERPL-MCNC: 134 MG/DL (ref 70–99)
HGB BLD-MCNC: 13.8 G/DL (ref 11.7–15.7)
IRON SATN MFR SERPL: 15 % (ref 15–46)
IRON SERPL-MCNC: 45 UG/DL (ref 35–180)
PHOSPHATE SERPL-MCNC: 3.6 MG/DL (ref 2.5–4.5)
POTASSIUM SERPL-SCNC: 4 MMOL/L (ref 3.4–5.3)
PROT UR-MCNC: <0.05 G/L
PROT/CREAT 24H UR: NORMAL G/G CR (ref 0–0.2)
PTH-INTACT SERPL-MCNC: 34 PG/ML (ref 12–72)
SODIUM SERPL-SCNC: 138 MMOL/L (ref 133–144)
TIBC SERPL-MCNC: 302 UG/DL (ref 240–430)

## 2017-06-21 PROCEDURE — 83550 IRON BINDING TEST: CPT | Performed by: INTERNAL MEDICINE

## 2017-06-21 PROCEDURE — 36415 COLL VENOUS BLD VENIPUNCTURE: CPT | Performed by: INTERNAL MEDICINE

## 2017-06-21 PROCEDURE — 85018 HEMOGLOBIN: CPT | Performed by: INTERNAL MEDICINE

## 2017-06-21 PROCEDURE — 80069 RENAL FUNCTION PANEL: CPT | Performed by: INTERNAL MEDICINE

## 2017-06-21 PROCEDURE — 83970 ASSAY OF PARATHORMONE: CPT | Performed by: INTERNAL MEDICINE

## 2017-06-21 PROCEDURE — 84156 ASSAY OF PROTEIN URINE: CPT | Performed by: INTERNAL MEDICINE

## 2017-06-21 PROCEDURE — 82306 VITAMIN D 25 HYDROXY: CPT | Performed by: INTERNAL MEDICINE

## 2017-06-21 PROCEDURE — 99212 OFFICE O/P EST SF 10 MIN: CPT | Mod: ZF

## 2017-06-21 PROCEDURE — 83540 ASSAY OF IRON: CPT | Performed by: INTERNAL MEDICINE

## 2017-06-21 PROCEDURE — 82728 ASSAY OF FERRITIN: CPT | Performed by: INTERNAL MEDICINE

## 2017-06-21 ASSESSMENT — PAIN SCALES - GENERAL: PAINLEVEL: NO PAIN (0)

## 2017-06-21 NOTE — NURSING NOTE
"Chief Complaint   Patient presents with     RECHECK     Follow up for CKD stage 2        Initial /71  Pulse 77  Ht 1.626 m (5' 4\")  Wt 109.7 kg (241 lb 12.8 oz)  SpO2 96%  BMI 41.5 kg/m2 Estimated body mass index is 41.5 kg/(m^2) as calculated from the following:    Height as of this encounter: 1.626 m (5' 4\").    Weight as of this encounter: 109.7 kg (241 lb 12.8 oz).  Medication Reconciliation: complete   Nadege Alvarez CMA    "

## 2017-06-21 NOTE — LETTER
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 Her baseline Scr is 0.7mg/dL and is non proteinuric. She had creatinine up to 1.2 in setting of being on HCTZ in May 2016..    1. Renal function- appears stable at Scr 0.7mg/dL It was up to 1.2 mg in May in setting of HCTZ, not sure if volume depletion, interstitial nephritis, or other factor caused this. Her FP stopped HCTZ and her SCr returned to 0.75 mg/dL.  She continues to have no proteinuria. The fact that she has congenital solitary kidney does not necessarily increase her risk of ESRD.  Her diabetes, hypertension and weight should be controlled as well as possible to decrease the risk from these chronic diseases.  - continues to try to lose jaylyn, lost a few, would help BP  2. Hypertension- fairly well controlled,continue losartan 100mg, amlodipine 10mg, and on furosemide, which I increased to BID. -130s which is great. No changes today  3. Anemia- not anemic  - mild albuminuria.  4. Bone- check PTH if Scr worsens  5. Electrolytes- stable  6. Diabetes- well controlled, no history of retinopathy    I will see her in 2 years, or prn    Assessment and plan was discussed with patient and she voiced her understanding and agreement.      Reason for Visit:  Pinky Page is a 66 year old year old female with congenital solitary kidney, who presents for evaluation.    HPI:  She has history of diabetes since 1995, hypothyroidism, hypertension, depression since 1982, and morbid obesity. She has solitary (?horseshoe ) kidney that is 13.3 cm and baseline SCr 0.7mg/dL  She states she has off and on diarrhea and constipation, tinnitus, and sleep  apnea and now has CPAP machine.  She has history of breast cancer s/p left masectomy, appendectomy, hysterectomy, cholecystectomy  She has been aware of solitary kidney since the 1980s  There is no family history of kidney disease  She saw me in 2013 and in 2016. She was put on HCTZ but then noted with Scr up to 1.2mg/dL, thus HCTZ stopped. Renal function returned to baseline. For BP we started and increased furosemide and amlodipine and her blood pressure is great. Her SCr today is 0.75 mg/dL  She is walking daily for 45 minutes and has lost a couple pounds. She is well otherwise. She has started using CPAP but otherwise no medical issues      ROS:   A comprehensive review of systems was obtained and negative, except as noted in the HPI or PMH.    Active Medical Problems:  Patient Active Problem List   Diagnosis     Allergic rhinitis due to pollen     Urge incontinence     Hypertension, essential     Cardiomegaly     Chronic constipation     Dry eye syndrome     Esophageal reflux     Exposure keratoconjunctivitis     DM ophthalmopathy (H)     Hypothyroidism     Senile cataract     ANUJ (obstructive sleep apnea)     Postmenopausal atrophic vaginitis     Restless leg syndrome     Squamous blepharitis     Morbid obesity due to excess calories (H)     Personal history of breast cancer s/p L masectomy     Diabetes mellitus type 2, insulin dependent (H)     Hypercholesteremia     Lives in group home     Extrapyramidal and movement disorder     CKD (chronic kidney disease) stage 2, GFR 60-89 ml/min     Solitary kidney, congenital     S/P hysterectomy     Impingement syndrome of right shoulder     Hypertriglyceridemia     Candidiasis of skin     Generalized anxiety disorder     Carpal tunnel syndrome of left wrist     Schizoaffective disorder, depressive type (H)     Health Care Home     Major depressive disorder, recurrent episode, moderate (H)     Psychological factors affecting medical condition     Hx of psychiatric  care     Nail complaint     Microalbuminuria due to type 2 diabetes mellitus (H)     Type 2 diabetes mellitus with microalbuminuria, with long-term current use of insulin (H)     Diabetes mellitus, type II, insulin dependent (H)     PMH:   Medical record was reviewed and PMH was discussed with patient and noted below.  Past Medical History:   Diagnosis Date     Allergic rhinitis due to pollen     seasonal allergies      Anisometropia and aniseikonia      BMI greater than 40      Cardiomegaly      Chronic constipation      Congenital absence of one kidney      Cramp of limb      Dermatophytosis of the body      Dry eye syndrome      Esophageal reflux      Essential hypertension, benign      Gastro-oesophageal reflux disease      Hemorrhoids      Hypermetropia      Hypothyroidism      Incomplete Emptying of Bladder      Lactose intolerance      Major depressive disorder, recurrent episode, moderate (H)      Malignant neoplasm of breast (female), unspecified site     left mastectomy 1996      Mixed incontinence urge and stress (male)(female)      Moderate obstructive sleep apnea      Postmenopausal Atrophic Vaginitis      Presbyopia      Regular astigmatism      Restless leg syndrome      Senile nuclear sclerosis      Thyroid eye disease     mild     Tinnitus      Trigger finger (acquired)     right hand     Type II or unspecified type diabetes mellitus without mention of complication, not stated as uncontrolled     on insulin since April 2006      PSH:   Past Surgical History:   Procedure Laterality Date     APPENDECTOMY       C MASTECTOMY,SIMPLE  1996    Left mastectomy  [facility]. Had normal FU mammo 5/2007. Follow yearly.      C TOTAL ABDOM HYSTERECTOMY  7/2003    Dr. Castanon, for abnormal bleeding. Removal of both tubes with BSO for myoma w - - -     CHOLECYSTECTOMY       COLONOSCOPY  5/2005    Complete Colonoscopy-had one small polyp removed 5/2005.      COLONOSCOPY N/A 3/31/2016    Procedure:  COLONOSCOPY;  Surgeon: Cary Ring MD;  Location: [facility]     COLONOSCOPY N/A 7/7/2016    Procedure: COLONOSCOPY;  Surgeon: Cary Ring MD;  Location: [facility]     DILATION AND CURETTAGE       HYSTERECTOMY       MASTECTOMY      Left breast       Family Hx:   Family History   Problem Relation Age of Onset     HEART DISEASE Father      1968     Melanoma Mother      malignant melanoma     Glaucoma No family hx of      Macular Degeneration No family hx of      Personal Hx:   Social History     Social History     Marital status: Single     Spouse name: N/A     Number of children: N/A     Years of education: N/A     Occupational History     Not on file.     Social History Main Topics     Smoking status: Never Smoker     Smokeless tobacco: Never Used     Alcohol use No     Drug use: No     Sexual activity: No     Other Topics Concern     Not on file     Social History Narrative    Pinky Page is a [facility] at [facility].       Allergies:  Allergies   Allergen Reactions     Chlordiazepoxide Hcl      Dimetapp Dm Cold-Cough      Cold/Congetion TABS     Haldol      Ibuprofen      TABS     Lactose Intolerance [Beta-Galactosidase]      CAPS     Milk Products      Propofol      EMUL       Medications:  Prior to Admission medications    Medication Sig Start Date End Date Taking? Authorizing Provider   losartan (COZAAR) 25 MG tablet Take 3 tablets by mouth daily. 6/7/13  Yes Deacon Birmingham MD   NYSTATIN PO Take  by mouth.   Yes Reported, Patient   Sodium Fluoride (PREVIDENT 5000 PLUS) 1.1 % CREA Apply  to affected area every evening.   Yes Reported, Patient   Hypromellose (ARTIFICIAL TEARS OP) Apply 1 drop to eye 4 times daily.   Yes Reported, Patient   levothyroxine (SYNTHROID) 150 MCG tablet Take 1 tablet by mouth daily.   Yes Reported, Patient   Exenatide (BYETTA 10 MCG PEN) 10 MCG/0.04ML SOLN Inject 10 mcg Subcutaneous 2 times daily (before meals).   Yes Reported, Patient   clobetasol (TEMOVATE)  0.05 % ointment Apply  topically as needed.   Yes Reported, Patient   LORazepam (ATIVAN) 1 MG tablet Take 1 mg by mouth At Bedtime.   Yes Reported, Patient   ORDER FOR DME Continue Nocturnal Oxygen at 4L/NC. 8/21/12  Yes Elham Mc NP   Gabapentin (NEURONTIN PO) Take 900 mg by mouth 3 times daily.   Yes Reported, Patient   amLODIPine (NORVASC) 10 MG tablet Take 10 mg by mouth daily.   Yes Reported, Patient   senna-docusate (SENNA S) 8.6-50 MG per tablet Take 2 tablets by mouth At Bedtime.   Yes Reported, Patient   polyethylene glycol (MIRALAX/GLYCOLAX) powder Take 1 capful by mouth 2 times daily. 17 GM PO BID   Yes Reported, Patient   FLUoxetine (PROZAC) 40 MG capsule Take  by mouth daily.   Yes Reported, Patient   Fiber TABS Take 2 tablets by mouth daily.   Yes Reported, Patient   QUEtiapine (SEROQUEL) 400 MG tablet Take 400 mg by mouth At Bedtime.   Yes Reported, Patient   Insulin Glargine (LANTUS SC) Inject 22 Units Subcutaneous At Bedtime. Lantus Pen(Insulin Glargine, Recombinant) 22 units SQ HS   Yes Reported, Patient   acetaminophen (TYLENOL) 325 MG tablet Take 1-2 tablets by mouth every 6 hours as needed.   Yes Reported, Patient   Artificial Tear Ointment (LACRI-LUBE OP) Apply  to eye. Place 0.5 inches into both eyes at bedtime   Yes Reported, Patient   Skin Protectants, Misc. (EUCERIN) cream Apply  topically as needed. Apply to thigh PRN dry skin    Yes Reported, Patient   Blood Glucose Monitoring Suppl (ACCU-CHEK ACTIVE) Device use as directed   Yes Reported, Patient   solifenacin (VESICARE) 5 MG tablet Take  by mouth daily. Take medication at HS 11/1/11  Yes Gordon Bello NP   simvastatin (ZOCOR) 20 MG tablet Take  by mouth At Bedtime.   Yes Reported, Patient   Saline 0.9 % SOLN Spray 2 sprays in nostril as needed.   Yes Reported, Patient   Calcium Carbonate-Vitamin D (CALCIUM-VITAMIN D) 250-125 MG-UNIT TABS Take 1 tablet by mouth 2 times daily. Calcium 250 mg/Vit D 125 IU   Yes Reported,  "Patient   ORDER FOR DME (CONTINOUS POSITIVE AIRWAY PRESSURE) @@ 10 cm H2O   Yes Reported, Patient   Sod Fluor-Solo Fluor-Hydrfl Ac (GEL-SASHA DENTINBLOC DT) Apply  to affected area. GEL. Apply to 2nd molar on bottom right daily at bedtime.   Yes Reported, Patient   MetFORMIN HCl (GLUCOPHAGE XR PO) Take 2,000 mg by mouth daily. Daily at 6am.    Yes Reported, Patient   lactase (LACTAID) 3000 UNIT tablet Take 4 tabs daily with meals.   Yes Reported, Patient   Magnesium Hydroxide (MILK OF MAGNESIA PO) Take  by mouth. Take 30 mL as needed for constipation.   Yes Reported, Patient   Sodium Fluoride, 8073768235, (PREVIDENT MT) Take  by mouth. Use as dental rinse at bedtime.   Yes Reported, Patient   Vitamins A & D, 5841420507, (SWEEN) CREA Externally apply  topically. Apply to affected area(s) as directed.   Yes Reported, Patient   hydrOXYzine (VISTARIL) 25 MG capsule Take 1 capsule by mouth as needed.   Yes Reported, Patient   CLARITIN 10 MG OR TABS 1 TAB PO QD (Once per day) as needed for ALLERGY SYMPTOMS 7/11/06  Yes Aissatou Higgins MD   ASPIRIN 81 MG OR TABS Take 1 tablet by mouth daily. ENTERIC COATED. 7/11/06  Yes Aissatou Higgins MD   PERIDEX 0.12 % MT SOLN Take  by mouth. Rinse Mouth with 15 mL (1capful) for 30 seconds am and pm after toothbrushing. Expectorate after rinsing, do not swallow. 7/11/06  Yes Aissatou Higgins MD   GLUCAGON EMERGENCY 1 MG IJ KIT Inject  as directed. 1 mL as needed for signs of hypoglycemia. May repeat as needed in 15 minutes. 7/11/06  Yes Aissatou Higgins MD     Vitals:  /71  Pulse 77  Ht 1.626 m (5' 4\")  Wt 109.7 kg (241 lb 12.8 oz)  SpO2 96%  BMI 41.5 kg/m2    Exam:  GENERAL APPEARANCE: alert and no distress  HENT: mouth without ulcers or lesions  LYMPHATICS: no cervical or supraclavicular nodes  RESP: lungs clear to auscultation - no rales, rhonchi or wheezes  CV: regular rhythm, normal rate, no rub, + diastolic murmur at LSB  EDEMA: 1++ LE edema bilaterally  ABDOMEN:  soft, " nontender, no HSM or masses and bowel sounds normal  MS: extremities normal- no gross deformities noted, no evidence of inflammation in joints, no muscle tenderness  SKIN: no rash      Results:  Recent Results (from the past 336 hour(s))   Renal panel    Collection Time: 06/21/17 10:47 AM   Result Value Ref Range    Sodium 138 133 - 144 mmol/L    Potassium 4.0 3.4 - 5.3 mmol/L    Chloride 102 94 - 109 mmol/L    Carbon Dioxide 28 20 - 32 mmol/L    Anion Gap 8 3 - 14 mmol/L    Glucose 134 (H) 70 - 99 mg/dL    Urea Nitrogen 17 7 - 30 mg/dL    Creatinine 0.75 0.52 - 1.04 mg/dL    GFR Estimate 77 >60 mL/min/1.7m2    GFR Estimate If Black >90   GFR Calc   >60 mL/min/1.7m2    Calcium 9.1 8.5 - 10.1 mg/dL    Phosphorus 3.6 2.5 - 4.5 mg/dL    Albumin 3.3 (L) 3.4 - 5.0 g/dL   Hemoglobin    Collection Time: 06/21/17 10:47 AM   Result Value Ref Range    Hemoglobin 13.8 11.7 - 15.7 g/dL   IRON AND IRON BINDING CAPACITY    Collection Time: 06/21/17 10:47 AM   Result Value Ref Range    Iron 45 35 - 180 ug/dL    Iron Binding Cap 302 240 - 430 ug/dL    Iron Saturation Index 15 15 - 46 %   FERRITIN    Collection Time: 06/21/17 10:47 AM   Result Value Ref Range    Ferritin 36 8 - 252 ng/mL   Protein  random urine    Collection Time: 06/21/17 10:51 AM   Result Value Ref Range    Protein Random Urine <0.05 g/L    Protein Total Urine g/gr Creatinine Unable to calculate due to low value 0 - 0.2 g/g Cr   Creatinine urine calculation only    Collection Time: 06/21/17 10:51 AM   Result Value Ref Range    Creatinine Urine 25 mg/dL       Gaby Holliday MD

## 2017-06-21 NOTE — MR AVS SNAPSHOT
After Visit Summary   6/21/2017    Pinky Page    MRN: [id number]           Patient Information     Date Of Birth          1950        Visit Information        Provider Department      6/21/2017 11:35 AM Gaby Holliday MD [facility] Nephrology        Today's Diagnoses     CKD (chronic kidney disease) stage 1, GFR 90 ml/min or greater    -  1    Solitary kidney, congenital        Microalbuminuria due to type 2 diabetes mellitus (H)        Hypertension, essential           Follow-ups after your visit        Follow-up notes from your care team     Return in about 2 years (around 6/21/2019).      Your next 10 appointments already scheduled     Jun 22, 2017  1:20 PM CDT   Return Visit with Shamika Kelley MD   [facility] ([facility])    [address] [place] [address],  [address]  [facility] [address]   [phone]            Jul 06, 2017  2:15 PM CDT   Adult Med Follow UP with Joana De Leon MD   Psychiatry Clinic ([email or web address])    [place] [id number]  [address] [place] [id number] [phone]            Jul 13, 2017 11:00 AM CDT   Return Sleep Patient with Brianda Reddy MD   [facility] [place], Sleep Study ([facility])    [address] [place] [id number] [phone]            Jul 24, 2017  1:00 PM CDT   (Arrive by 12:45 PM)   Return Visit with Damon Gr, PhD [facility] ([facility])    [address] [place] [address]  3rd [facility] [address]   [phone]            Aug 28, 2017 11:30 AM CDT   (Arrive by 11:15 AM)   Return Visit with Marcos Magdaleno MD   [facility] ([facility])    [address] [place] [address]  4th [facility] [address]   [phone]            Nov 14, 2017  1:30 PM CST   NEW GENERAL with Michael Lopez MD   Eye  "Clinic ([facility])    [address] [facility]  [address] [facility]  9th [address] Clin 9a  [facility] [address] [phone]              Who to contact     If you have questions or need follow up information about today's clinic visit or your schedule please contact [facility] NEPHROLOGY directly at [phone].  Normal or non-critical lab and imaging results will be communicated to you by MyChart, letter or phone within 4 business days after the clinic has received the results. If you do not hear from us within 7 days, please contact the clinic through MyChart or phone. If you have a critical or abnormal lab result, we will notify you by phone as soon as possible.  Submit refill requests through Pixia or call your pharmacy and they will forward the refill request to us. Please allow 3 business days for your refill to be completed.          Additional Information About Your Visit        Pixia Information     Pixia lets you send messages to your doctor, view your test results, renew your prescriptions, schedule appointments and more. To sign up, go to [email or web address] . Click on \"Log in\" on the left side of the screen, which will take you to the Welcome page. Then click on \"Sign up Now\" on the right side of the page.     You will be asked to enter the access code listed below, as well as some personal information. Please follow the directions to create your username and password.     Your access code is: [email or web address]  Expires: 2017  6:30 AM     Your access code will  in 90 days. If you need help or a new code, please call your [place] clinic or [phone].        Care EveryWhere ID     This is your Care EveryWhere ID. This could be used by other organizations to access your [facility] medical records  [id number]        Your Vitals Were     Pulse Height Pulse Oximetry BMI (Body Mass Index)          77 1.626 m (5' 4\") 96% 41.5 kg/m2         Blood Pressure from Last 3 Encounters: "   06/21/17 119/71   05/22/17 142/67   05/11/17 160/63    Weight from Last 3 Encounters:   06/21/17 109.7 kg (241 lb 12.8 oz)   05/22/17 111.1 kg (245 lb)   05/11/17 111.1 kg (245 lb)              Today, you had the following     No orders found for display         Today's Medication Changes          These changes are accurate as of: 6/21/17 12:12 PM.  If you have any questions, ask your nurse or doctor.               These medicines have changed or have updated prescriptions.        Dose/Directions    metFORMIN 500 MG 24 hr tablet   Commonly known as:  GLUCOPHAGE-XR   This may have changed:  how much to take   Used for:  Diabetes mellitus, type 2 (H)        Dose:  1000 mg   Take 2 tablets (1,000 mg) by mouth 2 times daily (with meals)   Quantity:  90 tablet   Refills:  3                Primary Care Provider Office Phone # Fax #    Shamika Ileana Kelley [phone]       [facility] [address] [place] [address]        Equal Access to Services     [facility] AH: Hadii james elizalde hadasho Soomaali, waaxda luqadaha, qaybta kaalmada adeegyaursula, autumn caldwell . So [facility] [phone].    ATENCIÓN: Si habla español, tiene a deluca disposición servicios gratuitos de asistencia lingüística. Llame al [phone].    We comply with applicable federal civil rights laws and [place] laws. We do not discriminate on the basis of race, color, national origin, age, disability sex, sexual orientation or gender identity.            Thank you!     Thank you for choosing [facility] NEPHROLOGY  for your care. Our goal is always to provide you with excellent care. Hearing back from our patients is one way we can continue to improve our services. Please take a few minutes to complete the written survey that you may receive in the mail after your visit with us. Thank you!             Your Updated Medication List - Protect others around you: Learn how to safely use, store and throw away  your medicines at www.disposemymeds.org.          This list is accurate as of: 6/21/17 12:12 PM.  Always use your most recent med list.                   Brand Name Dispense Instructions for use Diagnosis    acetaminophen 500 MG tablet    TYLENOL    1 Bottle    Take 2 tablets (1,000 mg) by mouth every 6 hours as needed    Sprain of left ankle, unspecified ligament, initial encounter       amLODIPine 5 MG tablet    NORVASC    30 tablet    Take 2 tablets (10 mg) by mouth daily    Essential hypertension with goal blood pressure less than 130/85       ARTIFICIAL TEARS OP      Apply 1 drop to eye 4 times daily.        aspirin 81 MG tablet     100    Take 1 tablet by mouth daily. ENTERIC COATED.        atorvastatin 40 MG tablet    LIPITOR    90 tablet    Take 1 tablet (40 mg) by mouth daily    Hyperlipidemia LDL goal <100       blood glucose monitoring test strip    no brand specified    100 each    Use to test blood sugar 3 times daily or as directed.    Type 2 diabetes mellitus with microalbuminuria, with long-term current use of insulin (H)       calcium polycarbophil 625 MG tablet    FIBERCON     Take 2 tablets by mouth daily        calcium-vitamin D 250-125 MG-UNIT Tabs      Take 1 tablet by mouth 2 times daily. Calcium 250 mg/Vit D 125 IU        CLARITIN 10 MG tablet   Generic drug:  loratadine     30    1 TAB PO QD (Once per day) as needed for ALLERGY SYMPTOMS        clotrimazole 1 % cream    LOTRIMIN    50 g    Apply topically 2 times daily as needed    Dermatophytosis       eucerin cream      Apply  topically as needed. Apply to thigh PRN dry skin        exenatide 10 MCG/0.04ML injection    BYETTA    1 Syringe    Inject 10 mcg Subcutaneous 2 times daily (before meals)    Type 2 diabetes mellitus with microalbuminuria, with long-term current use of insulin (H)       FLUoxetine 40 MG capsule    PROzac    30 capsule    Take 1 capsule (40 mg) by mouth daily    Schizoaffective disorder, depressive type (H)        fluticasone 50 MCG/ACT spray    FLONASE    16 g    Spray 1 spray into both nostrils daily    Seasonal allergic rhinitis       furosemide 20 MG tablet    LASIX    60 tablet    Take 1 tablet (20 mg) by mouth 2 times daily    Lower leg edema       GEL-SASHA DENTINBLOC DT      Apply  to affected area. GEL. Apply to 2nd molar on bottom right daily at bedtime.        GLUCAGON EMERGENCY 1 MG kit   Generic drug:  glucagon      Inject  as directed. 1 mL as needed for signs of hypoglycemia. May repeat as needed in 15 minutes.        HYDROcodone-acetaminophen 5-325 MG per tablet    NORCO     Take 1 tablet by mouth every 6 hours as needed 1-2 tabs        Incontinence Brief Large Misc     60 Units    2 Units 2 times daily    Urge incontinence, Nocturnal enuresis       insulin glargine 100 UNIT/ML injection    LANTUS    8 mL    Inject 24 Units Subcutaneous At Bedtime    Type 2 diabetes mellitus with microalbuminuria, with long-term current use of insulin (H)       LACRI-LUBE OP      Apply  to eye. Place 0.5 inches into both eyes at bedtime        LACTAID 3000 UNIT tablet   Generic drug:  lactase      Take 4 tabs daily with meals.        levothyroxine 175 MCG tablet    SYNTHROID/LEVOTHROID    30 tablet    Take 1 tablet (175 mcg) by mouth daily Give on empty stomach    Other specified hypothyroidism       LORazepam 1 MG tablet    ATIVAN    30 tablet    Take 1 tablet (1 mg) by mouth At Bedtime .  May take additional 1mg daily PRN for agitation.    Generalized anxiety disorder       losartan 100 MG tablet    COZAAR    90 tablet    Take 1 tablet by mouth daily.    Hypertension goal BP (blood pressure) < 130/80, Diabetes mellitus type 2, insulin dependent (H), CKD (chronic kidney disease) stage 2, GFR 60-89 ml/min       metFORMIN 500 MG 24 hr tablet    GLUCOPHAGE-XR    90 tablet    Take 2 tablets (1,000 mg) by mouth 2 times daily (with meals)    Diabetes mellitus, type 2 (H)       MILK OF MAGNESIA PO      Take  by mouth. Take 30 mL as  needed for constipation.        NEURONTIN PO      Take 900 mg by mouth 3 times daily.        nystatin 605552 UNIT/GM Powd    MYCOSTATIN    60 g    Apply topically 3 times daily as needed    Candidiasis of skin       polyethylene glycol powder    MIRALAX/GLYCOLAX     Take 1 capful by mouth 2 times daily. 17 GM PO BID        PREVIDENT 5000 PLUS 1.1 % Crea   Generic drug:  Sodium Fluoride      Apply  to affected area every evening.        saline 0.9 % Soln      Spray 2 sprays in nostril as needed.        SENNA S 8.6-50 MG per tablet   Generic drug:  senna-docusate      Take 2 tablets by mouth At Bedtime.        solifenacin 10 MG tablet    VESICARE    30 tablet    Take 1 tablet (10 mg) by mouth daily    Urge incontinence       ziprasidone 40 MG capsule    GEODON    60 capsule    Take 1 capsule (40 mg) by mouth 2 times daily (with meals)    Schizoaffective disorder, depressive type (H)

## 2017-06-21 NOTE — PROGRESS NOTES
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 Her baseline Scr is 0.7mg/dL and is non proteinuric. She had creatinine up to 1.2 in setting of being on HCTZ in May 2016..    1. Renal function- appears stable at Scr 0.7mg/dL It was up to 1.2 mg in May in setting of HCTZ, not sure if volume depletion, interstitial nephritis, or other factor caused this. Her FP stopped HCTZ and her SCr returned to 0.75 mg/dL.  She continues to have no proteinuria. The fact that she has congenital solitary kidney does not necessarily increase her risk of ESRD.  Her diabetes, hypertension and weight should be controlled as well as possible to decrease the risk from these chronic diseases.  - continues to try to lose jaylyn, lost a few, would help BP  2. Hypertension- fairly well controlled,continue losartan 100mg, amlodipine 10mg, and on furosemide, which I increased to BID. -130s which is great. No changes today  3. Anemia- not anemic  - mild albuminuria.  4. Bone- check PTH if Scr worsens  5. Electrolytes- stable  6. Diabetes- well controlled, no history of retinopathy    I will see her in 2 years, or prn    Assessment and plan was discussed with patient and she voiced her understanding and agreement.      Reason for Visit:  Pinky Page is a 66 year old year old female with congenital solitary kidney, who presents for evaluation.    HPI:  She has history of diabetes since 1995, hypothyroidism, hypertension, depression since 1982, and morbid obesity. She has solitary (?horseshoe ) kidney that is 13.3 cm and baseline SCr 0.7mg/dL  She states she has off and on diarrhea and constipation, tinnitus, and sleep apnea and now has CPAP machine.  She has history of breast cancer s/p left masectomy, appendectomy,  hysterectomy, cholecystectomy  She has been aware of solitary kidney since the 1980s  There is no family history of kidney disease  She saw me in 2013 and in 2016. She was put on HCTZ but then noted with Scr up to 1.2mg/dL, thus HCTZ stopped. Renal function returned to baseline. For BP we started and increased furosemide and amlodipine and her blood pressure is great. Her SCr today is 0.75 mg/dL  She is walking daily for 45 minutes and has lost a couple pounds. She is well otherwise. She has started using CPAP but otherwise no medical issues      ROS:   A comprehensive review of systems was obtained and negative, except as noted in the HPI or PMH.    Active Medical Problems:  Patient Active Problem List   Diagnosis     Allergic rhinitis due to pollen     Urge incontinence     Hypertension, essential     Cardiomegaly     Chronic constipation     Dry eye syndrome     Esophageal reflux     Exposure keratoconjunctivitis     DM ophthalmopathy (H)     Hypothyroidism     Senile cataract     ANUJ (obstructive sleep apnea)     Postmenopausal atrophic vaginitis     Restless leg syndrome     Squamous blepharitis     Morbid obesity due to excess calories (H)     Personal history of breast cancer s/p L masectomy     Diabetes mellitus type 2, insulin dependent (H)     Hypercholesteremia     Lives in group home     Extrapyramidal and movement disorder     CKD (chronic kidney disease) stage 2, GFR 60-89 ml/min     Solitary kidney, congenital     S/P hysterectomy     Impingement syndrome of right shoulder     Hypertriglyceridemia     Candidiasis of skin     Generalized anxiety disorder     Carpal tunnel syndrome of left wrist     Schizoaffective disorder, depressive type (H)     Health Care Home     Major depressive disorder, recurrent episode, moderate (H)     Psychological factors affecting medical condition     Hx of psychiatric care     Nail complaint     Microalbuminuria due to type 2 diabetes mellitus (H)     Type 2 diabetes  mellitus with microalbuminuria, with long-term current use of insulin (H)     Diabetes mellitus, type II, insulin dependent (H)     PMH:   Medical record was reviewed and PMH was discussed with patient and noted below.  Past Medical History:   Diagnosis Date     Allergic rhinitis due to pollen     seasonal allergies      Anisometropia and aniseikonia      BMI greater than 40      Cardiomegaly      Chronic constipation      Congenital absence of one kidney      Cramp of limb      Dermatophytosis of the body      Dry eye syndrome      Esophageal reflux      Essential hypertension, benign      Gastro-oesophageal reflux disease      Hemorrhoids      Hypermetropia      Hypothyroidism      Incomplete Emptying of Bladder      Lactose intolerance      Major depressive disorder, recurrent episode, moderate (H)      Malignant neoplasm of breast (female), unspecified site     left mastectomy 1996      Mixed incontinence urge and stress (male)(female)      Moderate obstructive sleep apnea      Postmenopausal Atrophic Vaginitis      Presbyopia      Regular astigmatism      Restless leg syndrome      Senile nuclear sclerosis      Thyroid eye disease     mild     Tinnitus      Trigger finger (acquired)     right hand     Type II or unspecified type diabetes mellitus without mention of complication, not stated as uncontrolled     on insulin since April 2006      PSH:   Past Surgical History:   Procedure Laterality Date     APPENDECTOMY       C MASTECTOMY,SIMPLE  1996    Left mastectomy  [facility]. Had normal FU mammo 5/2007. Follow yearly.      C TOTAL ABDOM HYSTERECTOMY  7/2003    Dr. Castanon, for abnormal bleeding. Removal of both tubes with BSO for myoma w - - -     CHOLECYSTECTOMY       COLONOSCOPY  5/2005    Complete Colonoscopy-had one small polyp removed 5/2005.      COLONOSCOPY N/A 3/31/2016    Procedure: COLONOSCOPY;  Surgeon: Cary Ring MD;  Location:  GI     COLONOSCOPY N/A 7/7/2016     Procedure: COLONOSCOPY;  Surgeon: Cary Ring MD;  Location:  GI     DILATION AND CURETTAGE       HYSTERECTOMY       MASTECTOMY      Left breast       Family Hx:   Family History   Problem Relation Age of Onset     HEART DISEASE Father      1968     Melanoma Mother      malignant melanoma     Glaucoma No family hx of      Macular Degeneration No family hx of      Personal Hx:   Social History     Social History     Marital status: Single     Spouse name: N/A     Number of children: N/A     Years of education: N/A     Occupational History     Not on file.     Social History Main Topics     Smoking status: Never Smoker     Smokeless tobacco: Never Used     Alcohol use No     Drug use: No     Sexual activity: No     Other Topics Concern     Not on file     Social History Narrative    Pinky Page is a resident at [facility].       Allergies:  Allergies   Allergen Reactions     Chlordiazepoxide Hcl      Dimetapp Dm Cold-Cough      Cold/Congetion TABS     Haldol      Ibuprofen      TABS     Lactose Intolerance [Beta-Galactosidase]      CAPS     Milk Products      Propofol      EMUL       Medications:  Prior to Admission medications    Medication Sig Start Date End Date Taking? Authorizing Provider   losartan (COZAAR) 25 MG tablet Take 3 tablets by mouth daily. 6/7/13  Yes Deacon Birmingham MD   NYSTATIN PO Take  by mouth.   Yes Reported, Patient   Sodium Fluoride (PREVIDENT 5000 PLUS) 1.1 % CREA Apply  to affected area every evening.   Yes Reported, Patient   Hypromellose (ARTIFICIAL TEARS OP) Apply 1 drop to eye 4 times daily.   Yes Reported, Patient   levothyroxine (SYNTHROID) 150 MCG tablet Take 1 tablet by mouth daily.   Yes Reported, Patient   Exenatide (BYETTA 10 MCG PEN) 10 MCG/0.04ML SOLN Inject 10 mcg Subcutaneous 2 times daily (before meals).   Yes Reported, Patient   clobetasol (TEMOVATE) 0.05 % ointment Apply  topically as needed.   Yes Reported, Patient   LORazepam (ATIVAN) 1 MG tablet Take  1 mg by mouth At Bedtime.   Yes Reported, Patient   ORDER FOR DME Continue Nocturnal Oxygen at 4L/NC. 8/21/12  Yes Elham Mc NP   Gabapentin (NEURONTIN PO) Take 900 mg by mouth 3 times daily.   Yes Reported, Patient   amLODIPine (NORVASC) 10 MG tablet Take 10 mg by mouth daily.   Yes Reported, Patient   senna-docusate (SENNA S) 8.6-50 MG per tablet Take 2 tablets by mouth At Bedtime.   Yes Reported, Patient   polyethylene glycol (MIRALAX/GLYCOLAX) powder Take 1 capful by mouth 2 times daily. 17 GM PO BID   Yes Reported, Patient   FLUoxetine (PROZAC) 40 MG capsule Take  by mouth daily.   Yes Reported, Patient   Fiber TABS Take 2 tablets by mouth daily.   Yes Reported, Patient   QUEtiapine (SEROQUEL) 400 MG tablet Take 400 mg by mouth At Bedtime.   Yes Reported, Patient   Insulin Glargine (LANTUS SC) Inject 22 Units Subcutaneous At Bedtime. Lantus Pen(Insulin Glargine, Recombinant) 22 units SQ HS   Yes Reported, Patient   acetaminophen (TYLENOL) 325 MG tablet Take 1-2 tablets by mouth every 6 hours as needed.   Yes Reported, Patient   Artificial Tear Ointment (LACRI-LUBE OP) Apply  to eye. Place 0.5 inches into both eyes at bedtime   Yes Reported, Patient   Skin Protectants, Misc. (EUCERIN) cream Apply  topically as needed. Apply to thigh PRN dry skin    Yes Reported, Patient   Blood Glucose Monitoring Suppl (ACCU-CHEK ACTIVE) Device use as directed   Yes Reported, Patient   solifenacin (VESICARE) 5 MG tablet Take  by mouth daily. Take medication at HS 11/1/11  Yes Gordon Bello NP   simvastatin (ZOCOR) 20 MG tablet Take  by mouth At Bedtime.   Yes Reported, Patient   Saline 0.9 % SOLN Spray 2 sprays in nostril as needed.   Yes Reported, Patient   Calcium Carbonate-Vitamin D (CALCIUM-VITAMIN D) 250-125 MG-UNIT TABS Take 1 tablet by mouth 2 times daily. Calcium 250 mg/Vit D 125 IU   Yes Reported, Patient   ORDER FOR DME (CONTINOUS POSITIVE AIRWAY PRESSURE) @@ 10 cm H2O   Yes Reported, Patient   Sod  "Fluor-Solo Fluor-Hydrfl Ac (GEL-SASHA DENTINBLOC DT) Apply  to affected area. GEL. Apply to 2nd molar on bottom right daily at bedtime.   Yes Reported, Patient   MetFORMIN HCl (GLUCOPHAGE XR PO) Take 2,000 mg by mouth daily. Daily at 6am.    Yes Reported, Patient   lactase (LACTAID) 3000 UNIT tablet Take 4 tabs daily with meals.   Yes Reported, Patient   Magnesium Hydroxide (MILK OF MAGNESIA PO) Take  by mouth. Take 30 mL as needed for constipation.   Yes Reported, Patient   Sodium Fluoride, 8523480928, (PREVIDENT MT) Take  by mouth. Use as dental rinse at bedtime.   Yes Reported, Patient   Vitamins A & D, 6734113261, (SWEEN) CREA Externally apply  topically. Apply to affected area(s) as directed.   Yes Reported, Patient   hydrOXYzine (VISTARIL) 25 MG capsule Take 1 capsule by mouth as needed.   Yes Reported, Patient   CLARITIN 10 MG OR TABS 1 TAB PO QD (Once per day) as needed for ALLERGY SYMPTOMS 7/11/06  Yes Aissatou Higgins MD   ASPIRIN 81 MG OR TABS Take 1 tablet by mouth daily. ENTERIC COATED. 7/11/06  Yes Aissatou Higgins MD   PERIDEX 0.12 % MT SOLN Take  by mouth. Rinse Mouth with 15 mL (1capful) for 30 seconds am and pm after toothbrushing. Expectorate after rinsing, do not swallow. 7/11/06  Yes Aissatou Higgins MD   GLUCAGON EMERGENCY 1 MG IJ KIT Inject  as directed. 1 mL as needed for signs of hypoglycemia. May repeat as needed in 15 minutes. 7/11/06  Yes Aissatou Higgins MD     Vitals:  /71  Pulse 77  Ht 1.626 m (5' 4\")  Wt 109.7 kg (241 lb 12.8 oz)  SpO2 96%  BMI 41.5 kg/m2    Exam:  GENERAL APPEARANCE: alert and no distress  HENT: mouth without ulcers or lesions  LYMPHATICS: no cervical or supraclavicular nodes  RESP: lungs clear to auscultation - no rales, rhonchi or wheezes  CV: regular rhythm, normal rate, no rub, + diastolic murmur at LSB  EDEMA: 1++ LE edema bilaterally  ABDOMEN:  soft, nontender, no HSM or masses and bowel sounds normal  MS: extremities normal- no gross deformities " noted, no evidence of inflammation in joints, no muscle tenderness  SKIN: no rash      Results:  Recent Results (from the past 336 hour(s))   Renal panel    Collection Time: 06/21/17 10:47 AM   Result Value Ref Range    Sodium 138 133 - 144 mmol/L    Potassium 4.0 3.4 - 5.3 mmol/L    Chloride 102 94 - 109 mmol/L    Carbon Dioxide 28 20 - 32 mmol/L    Anion Gap 8 3 - 14 mmol/L    Glucose 134 (H) 70 - 99 mg/dL    Urea Nitrogen 17 7 - 30 mg/dL    Creatinine 0.75 0.52 - 1.04 mg/dL    GFR Estimate 77 >60 mL/min/1.7m2    GFR Estimate If Black >90   GFR Calc   >60 mL/min/1.7m2    Calcium 9.1 8.5 - 10.1 mg/dL    Phosphorus 3.6 2.5 - 4.5 mg/dL    Albumin 3.3 (L) 3.4 - 5.0 g/dL   Hemoglobin    Collection Time: 06/21/17 10:47 AM   Result Value Ref Range    Hemoglobin 13.8 11.7 - 15.7 g/dL   IRON AND IRON BINDING CAPACITY    Collection Time: 06/21/17 10:47 AM   Result Value Ref Range    Iron 45 35 - 180 ug/dL    Iron Binding Cap 302 240 - 430 ug/dL    Iron Saturation Index 15 15 - 46 %   FERRITIN    Collection Time: 06/21/17 10:47 AM   Result Value Ref Range    Ferritin 36 8 - 252 ng/mL   Protein  random urine    Collection Time: 06/21/17 10:51 AM   Result Value Ref Range    Protein Random Urine <0.05 g/L    Protein Total Urine g/gr Creatinine Unable to calculate due to low value 0 - 0.2 g/g Cr   Creatinine urine calculation only    Collection Time: 06/21/17 10:51 AM   Result Value Ref Range    Creatinine Urine 25 mg/dL

## 2017-06-22 ENCOUNTER — TELEPHONE (OUTPATIENT)
Dept: PSYCHIATRY | Facility: CLINIC | Age: 67
End: 2017-06-22

## 2017-06-22 ENCOUNTER — OFFICE VISIT (OUTPATIENT)
Dept: FAMILY MEDICINE | Facility: CLINIC | Age: 67
End: 2017-06-22

## 2017-06-22 ENCOUNTER — TELEPHONE (OUTPATIENT)
Dept: NEPHROLOGY | Facility: CLINIC | Age: 67
End: 2017-06-22

## 2017-06-22 VITALS
BODY MASS INDEX: 42 KG/M2 | DIASTOLIC BLOOD PRESSURE: 78 MMHG | WEIGHT: 246 LBS | OXYGEN SATURATION: 95 % | HEART RATE: 66 BPM | HEIGHT: 64 IN | SYSTOLIC BLOOD PRESSURE: 150 MMHG | TEMPERATURE: 98.4 F | RESPIRATION RATE: 14 BRPM

## 2017-06-22 DIAGNOSIS — I10 HYPERTENSION, ESSENTIAL: ICD-10-CM

## 2017-06-22 DIAGNOSIS — Z79.4 DIABETES MELLITUS TYPE 2, INSULIN DEPENDENT (H): Primary | ICD-10-CM

## 2017-06-22 DIAGNOSIS — E78.00 HYPERCHOLESTEREMIA: ICD-10-CM

## 2017-06-22 DIAGNOSIS — E03.8 OTHER SPECIFIED HYPOTHYROIDISM: ICD-10-CM

## 2017-06-22 DIAGNOSIS — E11.9 DIABETES MELLITUS TYPE 2, INSULIN DEPENDENT (H): Primary | ICD-10-CM

## 2017-06-22 LAB — HBA1C MFR BLD: 6.9 % (ref 4.1–5.7)

## 2017-06-22 ASSESSMENT — ENCOUNTER SYMPTOMS
ARTHRALGIAS: 0
HEADACHES: 0
CHILLS: 0
WHEEZING: 0
APPETITE CHANGE: 0
COLOR CHANGE: 0
MYALGIAS: 0
DYSURIA: 0
HEMATURIA: 0
SHORTNESS OF BREATH: 0
FATIGUE: 0
FEVER: 0
SINUS PRESSURE: 0
ABDOMINAL PAIN: 0
CONSTIPATION: 0
DIARRHEA: 0
BLOOD IN STOOL: 0
NAUSEA: 0
SORE THROAT: 0
VOMITING: 0
ABDOMINAL DISTENTION: 0
COUGH: 0
WEAKNESS: 0

## 2017-06-22 NOTE — PROGRESS NOTES
Preceptor Attestation:   Patient seen and discussed with the resident. Assessment and plan reviewed with resident and agreed upon.   Supervising Physician:  Madelaine Linares MD  [facility]

## 2017-06-22 NOTE — MR AVS SNAPSHOT
After Visit Summary   6/22/2017    Pinky Page    MRN: [id number]           Patient Information     Date Of Birth          1950        Visit Information        Provider Department      6/22/2017 1:20 PM Shamika Kelley MD [facility]        Today's Diagnoses     Diabetes mellitus type 2, insulin dependent (H)    -  1    Other specified hypothyroidism        Hypertension, essential        Hypercholesteremia          Care Instructions    Here is the plan from today's visit    1. Diabetes mellitus type 2, insulin dependent (H)  - Hemoglobin A1c ([facility]) - your A1c is at goal today (6.9)    2. Other specified hypothyroidism  - no medication changes today    3. Hypertension, essential  - will hold off on starting or changing any medications today.   - will give the CPAP more time and increase your physical activity.   - come back in 2 weeks for BP check   - for the dry mouth, use the biotene three times a day scheduled  - take sips of water when using the cpap (take it off for a moment and then put it back on)  - can suck on sugar free candy or chew gum to increase your saliva in your mouth.   - make sure to discuss possible humidifier piece for the CPAP machine to help with the dry mouth.     4. Hypercholesteremia  - no changes to your medications today.       Follow up plan  Please make a clinic appointment for follow up with one of the Doctors at [facility] in 2  weeks for BP follow up. After that, come back and see me (Dr. Kelley) in August.     Thank you for coming to [facility] today.  Lab Testing:  **If you had lab testing today and your results are reassuring or normal they will be mailed to you or sent through [place] within 7 days.   **If the lab tests need quick action we will call you with the results.  The phone number we will call with results is # [phone] (home) [phone] (work). If this is not the best number please call our clinic and change the  number.  Medication Refills:  If you need any refills please call your pharmacy and they will contact us.   If you need to  your refill at a new pharmacy, please contact the new pharmacy directly. The new pharmacy will help you get your medications transferred faster.   Scheduling:  If you have any concerns about today's visit or wish to schedule another appointment please call our office during normal business hours [phone] (8-5:00 M-F)  If a referral was made to a [facility] and you don't get a call from central scheduling please call [phone].  If a Mammogram was ordered for you at [facility] call [phone] to schedule or change your appointment.  If you had an XRay/CT/Ultrasound/MRI ordered the number is [phone] to schedule or change your radiology appointment.   Medical Concerns:  If you have urgent medical concerns please call [phone] at any time of the day.  If you have a medical emergency please call 911.            Follow-ups after your visit        Your next 10 appointments already scheduled     Jul 06, 2017  2:45 PM CDT   Adult Med Follow UP with Joana De Leon MD   Psychiatry Clinic ([facility])    [place] [id number]  [address] [address] [phone]            Jul 13, 2017 11:00 AM CDT   Return Sleep Patient with Brianda Reddy MD   [facility] [place], Sleep Study ([facility])    [address] [place] [id number] [phone]            Jul 24, 2017  1:00 PM CDT   (Arrive by 12:45 PM)   Return Visit with Damon Gr, PhD [facility] ([facility])    [address]  [address] [facility] [address] [phone]            Aug 28, 2017 11:30 AM CDT   (Arrive by 11:15 AM)   Return Visit with Marcos Magdaleno MD   [facility] ([facility]    [address]  [address] [facility] [address] [phone]            2017  1:30 PM CST   CYNDEE [facility]ST with Michael Lopez MD   Eye Clinic ([facility])    [address] [facility]  [address]  [facility] [id number] [phone]              Who to contact     Please call your clinic at [phone] to:    Ask questions about your health    Make or cancel appointments    Discuss your medicines    Learn about your test results    Speak to your doctor   If you have compliments or concerns about an experience at your clinic, or if you wish to file a complaint, please contact [facility] Patient Relations at [phone] or email us at [email or web address]         Additional Information About Your Visit        [employer] Information     [employer] is an electronic gateway that provides easy, online access to your medical records. With [employer], you can request a clinic appointment, read your test results, renew a prescription or communicate with your care team.     To sign up for [employer] visit the website at [email or web address]   You will be asked to enter the access code listed below, as well as some personal information. Please follow the directions to create your username and password.     Your access code is: [email or web address]  Expires: 2017  6:30 AM     Your access code will  in 90 days. If you need help or a new code, please contact your [facility] or call [phone] for assistance.        Care EveryWhere ID     This is your Care EveryWhere ID. This could be used by other organizations to access your [facility] medical records  [id number]        Your Vitals Were     Pulse Temperature Respirations Height Pulse Oximetry BMI (Body Mass Index)    66 98.4  F (36.9  C) (Oral) 14 5' 4\" (162.6 cm) 95% 42.23 kg/m2       Blood Pressure from Last 3 Encounters: "   06/22/17 150/78   06/21/17 119/71   05/25/17 131/77    Weight from Last 3 Encounters:   06/22/17 246 lb (111.6 kg)   06/21/17 241 lb 12.8 oz (109.7 kg)   06/19/17 246 lb 9.6 oz (111.9 kg)              We Performed the Following     Hemoglobin A1c ([facility])          Today's Medication Changes          These changes are accurate as of: 6/22/17  1:55 PM.  If you have any questions, ask your nurse or doctor.               These medicines have changed or have updated prescriptions.        Dose/Directions    metFORMIN 500 MG 24 hr tablet   Commonly known as:  GLUCOPHAGE-XR   This may have changed:  how much to take   Used for:  Diabetes mellitus, type 2 (H)        Dose:  1000 mg   Take 2 tablets (1,000 mg) by mouth 2 times daily (with meals)   Quantity:  90 tablet   Refills:  3                Primary Care Provider Office Phone # Fax #    Shamika Ileana Kelley [phone]       [facility] [address] [place] [address]        Equal Access to Services     JAYDEN STUART AH: Hadii james Dietrich, waaxda lukayce, qaybta kaalmaursula mittal, autumn caldwell . So [facility] [phone].    ATENCIÓN: Si habla español, tiene a deluca disposición servicios gratuitos de asistencia lingüística. Candidoame al [phone].    We comply with applicable federal civil rights laws and [place] laws. We do not discriminate on the basis of race, color, national origin, age, disability sex, sexual orientation or gender identity.            Thank you!     Thank you for choosing [facility]  for your care. Our goal is always to provide you with excellent care. Hearing back from our patients is one way we can continue to improve our services. Please take a few minutes to complete the written survey that you may receive in the mail after your visit with us. Thank you!             Your Updated Medication List - Protect others around you: Learn how to safely use, store  and throw away your medicines at www.disposemymeds.org.          This list is accurate as of: 6/22/17  1:55 PM.  Always use your most recent med list.                   Brand Name Dispense Instructions for use Diagnosis    acetaminophen 500 MG tablet    TYLENOL    1 Bottle    Take 2 tablets (1,000 mg) by mouth every 6 hours as needed    Sprain of left ankle, unspecified ligament, initial encounter       amLODIPine 5 MG tablet    NORVASC    30 tablet    Take 2 tablets (10 mg) by mouth daily    Essential hypertension with goal blood pressure less than 130/85       ARTIFICIAL TEARS OP      Apply 1 drop to eye 4 times daily.        aspirin 81 MG tablet     100    Take 1 tablet by mouth daily. ENTERIC COATED.        atorvastatin 40 MG tablet    LIPITOR    90 tablet    Take 1 tablet (40 mg) by mouth daily    Hyperlipidemia LDL goal <100       blood glucose monitoring test strip    no brand specified    100 each    Use to test blood sugar 3 times daily or as directed.    Type 2 diabetes mellitus with microalbuminuria, with long-term current use of insulin (H)       calcium polycarbophil 625 MG tablet    FIBERCON     Take 2 tablets by mouth daily        calcium-vitamin D 250-125 MG-UNIT Tabs      Take 1 tablet by mouth 2 times daily. Calcium 250 mg/Vit D 125 IU        CLARITIN 10 MG tablet   Generic drug:  loratadine     30    1 TAB PO QD (Once per day) as needed for ALLERGY SYMPTOMS        clotrimazole 1 % cream    LOTRIMIN    50 g    Apply topically 2 times daily as needed    Dermatophytosis       eucerin cream      Apply  topically as needed. Apply to thigh PRN dry skin        exenatide 10 MCG/0.04ML injection    BYETTA    1 Syringe    Inject 10 mcg Subcutaneous 2 times daily (before meals)    Type 2 diabetes mellitus with microalbuminuria, with long-term current use of insulin (H)       FLUoxetine 40 MG capsule    PROzac    30 capsule    Take 1 capsule (40 mg) by mouth daily    Schizoaffective disorder, depressive type  (H)       fluticasone 50 MCG/ACT spray    FLONASE    16 g    Spray 1 spray into both nostrils daily    Seasonal allergic rhinitis       furosemide 20 MG tablet    LASIX    60 tablet    Take 1 tablet (20 mg) by mouth 2 times daily    Lower leg edema       GEL-SASHA DENTINBLOC DT      Apply  to affected area. GEL. Apply to 2nd molar on bottom right daily at bedtime.        GLUCAGON EMERGENCY 1 MG kit   Generic drug:  glucagon      Inject  as directed. 1 mL as needed for signs of hypoglycemia. May repeat as needed in 15 minutes.        HYDROcodone-acetaminophen 5-325 MG per tablet    NORCO     Take 1 tablet by mouth every 6 hours as needed 1-2 tabs        Incontinence Brief Large Misc     60 Units    2 Units 2 times daily    Urge incontinence, Nocturnal enuresis       insulin glargine 100 UNIT/ML injection    LANTUS    8 mL    Inject 24 Units Subcutaneous At Bedtime    Type 2 diabetes mellitus with microalbuminuria, with long-term current use of insulin (H)       LACRI-LUBE OP      Apply  to eye. Place 0.5 inches into both eyes at bedtime        LACTAID 3000 UNIT tablet   Generic drug:  lactase      Take 4 tabs daily with meals.        levothyroxine 175 MCG tablet    SYNTHROID/LEVOTHROID    30 tablet    Take 1 tablet (175 mcg) by mouth daily Give on empty stomach    Other specified hypothyroidism       LORazepam 1 MG tablet    ATIVAN    30 tablet    Take 1 tablet (1 mg) by mouth At Bedtime .  May take additional 1mg daily PRN for agitation.    Generalized anxiety disorder       losartan 100 MG tablet    COZAAR    90 tablet    Take 1 tablet by mouth daily.    Hypertension goal BP (blood pressure) < 130/80, Diabetes mellitus type 2, insulin dependent (H), CKD (chronic kidney disease) stage 2, GFR 60-89 ml/min       metFORMIN 500 MG 24 hr tablet    GLUCOPHAGE-XR    90 tablet    Take 2 tablets (1,000 mg) by mouth 2 times daily (with meals)    Diabetes mellitus, type 2 (H)       MILK OF MAGNESIA PO      Take  by mouth. Take  30 mL as needed for constipation.        NEURONTIN PO      Take 900 mg by mouth 3 times daily.        nystatin 438361 UNIT/GM Powd    MYCOSTATIN    60 g    Apply topically 3 times daily as needed    Candidiasis of skin       polyethylene glycol powder    MIRALAX/GLYCOLAX     Take 1 capful by mouth 2 times daily. 17 GM PO BID        PREVIDENT 5000 PLUS 1.1 % Crea   Generic drug:  Sodium Fluoride      Apply  to affected area every evening.        saline 0.9 % Soln      Spray 2 sprays in nostril as needed.        SENNA S 8.6-50 MG per tablet   Generic drug:  senna-docusate      Take 2 tablets by mouth At Bedtime.        solifenacin 10 MG tablet    VESICARE    30 tablet    Take 1 tablet (10 mg) by mouth daily    Urge incontinence       ziprasidone 40 MG capsule    GEODON    60 capsule    Take 1 capsule (40 mg) by mouth 2 times daily (with meals)    Schizoaffective disorder, depressive type (H)

## 2017-06-22 NOTE — TELEPHONE ENCOUNTER
Spoke with Jaclyn from [facility]. Requested yesterday's lab results be faxed to them at [phone]. Printed and faxed.    Katarzyna Pineda RN

## 2017-06-22 NOTE — PROGRESS NOTES
HPI:       Pinky Page is a 66 year old who presents for the following  Patient presents with:  Hypertension: follow up   Sleep Apnea: Checking to see how CPAP is doing, feels that she has had dry mouth since starting  the CPAP would like to discuss  Diabetes: sugars this am at 65 took orange juice then went to eat breakfast     Pinky has been using her CPAP machine for last several weeks. Complaining of dry mouth associated with use. Has been seen by Endocrinology for weight, recommended at least 60 minutes of walking a day and work in nutrition. Saw Nephrologist yesterday, no concerns and now going to see them every 2 years since things are very stable. Blood pressures at [facility] have been better controlled. Her BP at the Dentist was in 170s SBP, had a lot of tooth pain as well. She would prefer not starting another medication if possible.     Diabetes Follow-up    Patient is checking blood sugars: twice daily.    Blood sugar testing frequency justification: Uncontrolled diabetes  Results are as follows:         am - as low as 66 this AM, usually 110s         suppertime - 120-140s          -Last A1C was   Lab Results   Component Value Date    A1C 6.6 03/21/2017    A1C 7.0 12/08/2016    A1C 6.9 08/16/2016    A1C 7.1 05/05/2016    A1C 7.3 01/21/2016        Diabetic concerns: low blood sugar several less than 70 in the past few weeks    Chest Pain or exercise related calf pain (claudication):no     Symptoms of hypoglycemia (low blood sugar): none     Paresthesias (numbness or burning in feet) or sores: No   Diabetic eye exam within the last year?: Yes    Hypertension Follow-up      Outpatient blood pressures are being checked at home.  Results are 120-150s/80-90s.    Chest Pain? :No     Low Salt Diet: no added salt    Daily NSAID Use?No     Did patient take their HTN pills today/last night as usual?  Yes     Hypothyroidism Follow-up      Since last visit, patient describes the following symptoms:  Weight stable, no hair loss, no skin changes, no constipation, no loose stools       Hyperlipidemia Follow-Up      Recommended Level of Therapy:High Intensity Statin (atorvastatin 40*-80 mg or rosuvastatin 20-40mg)          Rate your low fat/cholesterol diet?: good    Taking statin?  Yes, no muscle aches from statin    Other lipid medications/supplements?:  none Cholesterol   Date Value Ref Range Status   03/21/2017 142 <200 mg/dL Final   02/22/2016 162.0 0.0 - 200.0 mg/dL Final      HDL Cholesterol   Date Value Ref Range Status   03/21/2017 40 (L) >49 mg/dL Final   02/22/2016 36.6 (L) >40.0 mg/dL Final      LDL Cholesterol Calculated   Date Value Ref Range Status   03/21/2017 78 <100 mg/dL Final     Comment:     Desirable:       <100 mg/dl   02/22/2016 95 0 - 129 mg/dL Final      Triglycerides   Date Value Ref Range Status   03/21/2017 121 <150 mg/dL Final   02/22/2016 151.9 (H) 0.0 - 150.0 mg/dL Final      Cholesterol/HDL Ratio   Date Value Ref Range Status   02/22/2016 4.4 0.0 - 5.0 Final   01/22/2015 3.9 0.0 - 5.0 Final           Adherence and Exercise  Medication side effects: no  How often is a medication missed? Never  Are you able to follow the treatment plan? Yes  Exercise:walking  6-7 days/week for an average of 15-30 minutes     Problem, Medication and Allergy Lists were   reviewed and are current.     Patient Active Problem List    Diagnosis Date Noted     Solitary kidney, congenital 06/07/2013     Priority: High     CKD (chronic kidney disease) stage 2, GFR 60-89 ml/min 03/01/2013     Priority: High     Lives in group home 02/27/2013     Priority: High     [facility]  Mantoux done in 9/2013 was negative.        Hypercholesteremia 10/05/2012     Priority: High     ASCVD  Risk Calculator (pooled cohort)   10 CV risk 17.5%   Recommended Therapy:High Intensity Statin (atorvastatin 40*-80 mg or rosuvastatin 20-40mg)               Morbid obesity due to excess calories (H)  10/02/2012     Priority: High     Follows with Dr. Marcos Magdaleno Q 2months for weight management.       Personal history of breast cancer s/p L masectomy 10/02/2012     Priority: High     May 24, 2013 S/p left mastectomy 1996; follows with Dr. Avila, last mammogram 5/2012 was benign. Rec annual mammogram.       Diabetes mellitus type 2, insulin dependent (H) 10/02/2012     Priority: High     July 27, 2013   Insulin dependent since 4/2006  Without history of retinopathy       Hypertension, essential      Priority: High     Hypothyroidism      Priority: High     On replacement.       ANUJ (obstructive sleep apnea)      Priority: High     Re-eval 2017 Moderate ANUJ, CPAP trial at 13 August 28, 2013   Follows with Dr. Mc.   On 4L O2 at night given CPAP intolerant.  PSG (10/21/07): AHI 39 with oxygen saturations to 71%.        CKD (chronic kidney disease) stage 1, GFR 90 ml/min or greater 06/21/2017     Priority: Medium     Microalbuminuria due to type 2 diabetes mellitus (H) 03/24/2017     Priority: Medium     3/21/17  Creatinine Urine 54   Albumin Urine mg/L 18   Albumin Urine mg/g Cr 32.47 (H)          Type 2 diabetes mellitus with microalbuminuria, with long-term current use of insulin (H) 03/24/2017     Priority: Medium     Diabetes mellitus, type II, insulin dependent (H) 03/24/2017     Priority: Medium     Nail complaint 02/03/2017     Priority: Medium     vertical nail ridges       Hx of psychiatric care 07/14/2016     Priority: Medium       PAST PSYCH MED TRIALS     sertaline   amitriptyline   lithium   risperidone   olanzapine   trazodone   clonazepam  Quetiapine (weight gain)       Psychological factors affecting medical condition 04/18/2016     Priority: Medium     Major depressive disorder, recurrent episode, moderate (H) 11/16/2015     Priority: Medium     Health Care Home 10/01/2015     Priority: Medium     Schizoaffective disorder, depressive type (H) 06/08/2015     Priority: Medium     Carpal  tunnel syndrome of left wrist 05/28/2015     Priority: Medium     Generalized anxiety disorder 12/04/2014     Priority: Medium     Diagnosis updated by automated process. Provider to review and confirm.       Candidiasis of skin 11/04/2014     Priority: Medium     Chronic candidiasis of groin and labia        Hypertriglyceridemia 09/10/2014     Priority: Medium     Impingement syndrome of right shoulder 06/10/2014     Priority: Medium     S/P hysterectomy 04/20/2014     Priority: Medium     Extrapyramidal and movement disorder 02/27/2013     Priority: Medium     Cardiomegaly      Priority: Medium     Chronic constipation      Priority: Medium     Dry eye syndrome      Priority: Medium     Esophageal reflux      Priority: Medium     Exposure keratoconjunctivitis      Priority: Medium     DM ophthalmopathy (H)      Priority: Medium     Senile cataract      Priority: Medium     Postmenopausal atrophic vaginitis      Priority: Medium     Restless leg syndrome      Priority: Medium     Squamous blepharitis      Priority: Medium     Urge incontinence 04/19/2011     Priority: Medium     Allergic rhinitis due to pollen      Priority: Medium     seasonal allergies      ,     Current Outpatient Prescriptions   Medication Sig Dispense Refill     FLUoxetine (PROZAC) 40 MG capsule Take 1 capsule (40 mg) by mouth daily 30 capsule 5     ziprasidone (GEODON) 40 MG capsule Take 1 capsule (40 mg) by mouth 2 times daily (with meals) 60 capsule 5     LORazepam (ATIVAN) 1 MG tablet Take 1 tablet (1 mg) by mouth At Bedtime .  May take additional 1mg daily PRN for agitation. 30 tablet 5     blood glucose monitoring (NO BRAND SPECIFIED) test strip Use to test blood sugar 3 times daily or as directed. 100 each 3     exenatide (BYETTA) 10 MCG/0.04ML injection Inject 10 mcg Subcutaneous 2 times daily (before meals) 1 Syringe 11     insulin glargine (LANTUS) 100 UNIT/ML injection Inject 24 Units Subcutaneous At Bedtime 8 mL 11     amLODIPine  (NORVASC) 5 MG tablet Take 2 tablets (10 mg) by mouth daily 30 tablet 3     nystatin (MYCOSTATIN) 611375 UNIT/GM POWD Apply topically 3 times daily as needed 60 g 1     clotrimazole (LOTRIMIN) 1 % cream Apply topically 2 times daily as needed 50 g 0     fluticasone (FLONASE) 50 MCG/ACT nasal spray Spray 1 spray into both nostrils daily 16 g 3     furosemide (LASIX) 20 MG tablet Take 1 tablet (20 mg) by mouth 2 times daily 60 tablet 3     acetaminophen (TYLENOL) 500 MG tablet Take 2 tablets (1,000 mg) by mouth every 6 hours as needed 1 Bottle 2     solifenacin (VESICARE) 10 MG tablet Take 1 tablet (10 mg) by mouth daily 30 tablet 6     HYDROcodone-acetaminophen (NORCO) 5-325 MG per tablet Take 1 tablet by mouth every 6 hours as needed 1-2 tabs       atorvastatin (LIPITOR) 40 MG tablet Take 1 tablet (40 mg) by mouth daily 90 tablet 1     calcium polycarbophil (FIBERCON) 625 MG tablet Take 2 tablets by mouth daily       levothyroxine (SYNTHROID, LEVOTHROID) 175 MCG tablet Take 1 tablet (175 mcg) by mouth daily Give on empty stomach 30 tablet 4     metFORMIN (GLUCOPHAGE-XR) 500 MG 24 hr tablet Take 2 tablets (1,000 mg) by mouth 2 times daily (with meals) (Patient taking differently: Take 2,000 mg by mouth 2 times daily (with meals) ) 90 tablet 3     Incontinence Supply Disposable (INCONTINENCE BRIEF LARGE) MISC 2 Units 2 times daily 60 Units 11     losartan (COZAAR) 100 MG tablet Take 1 tablet by mouth daily. 90 tablet 1     Sodium Fluoride (PREVIDENT 5000 PLUS) 1.1 % CREA Apply  to affected area every evening.       Hypromellose (ARTIFICIAL TEARS OP) Apply 1 drop to eye 4 times daily.       Gabapentin (NEURONTIN PO) Take 900 mg by mouth 3 times daily.       senna-docusate (SENNA S) 8.6-50 MG per tablet Take 2 tablets by mouth At Bedtime.       polyethylene glycol (MIRALAX/GLYCOLAX) powder Take 1 capful by mouth 2 times daily. 17 GM PO BID       Artificial Tear Ointment (LACRI-LUBE OP) Apply  to eye. Place 0.5 inches  into both eyes at bedtime       Skin Protectants, Misc. (EUCERIN) cream Apply  topically as needed. Apply to thigh PRN dry skin        Saline 0.9 % SOLN Spray 2 sprays in nostril as needed.       Calcium Carbonate-Vitamin D (CALCIUM-VITAMIN D) 250-125 MG-UNIT TABS Take 1 tablet by mouth 2 times daily. Calcium 250 mg/Vit D 125 IU       Sod Fluor-Solo Fluor-Hydrfl Ac (GEL-SASHA DENTINBLOC DT) Apply  to affected area. GEL. Apply to 2nd molar on bottom right daily at bedtime.       lactase (LACTAID) 3000 UNIT tablet Take 4 tabs daily with meals.       Magnesium Hydroxide (MILK OF MAGNESIA PO) Take  by mouth. Take 30 mL as needed for constipation.       CLARITIN 10 MG OR TABS 1 TAB PO QD (Once per day) as needed for ALLERGY SYMPTOMS 30 11     ASPIRIN 81 MG OR TABS Take 1 tablet by mouth daily. ENTERIC COATED. 100 3     GLUCAGON EMERGENCY 1 MG IJ KIT Inject  as directed. 1 mL as needed for signs of hypoglycemia. May repeat as needed in 15 minutes.  0   ,     Allergies   Allergen Reactions     Chlordiazepoxide Hcl      Dimetapp Dm Cold-Cough      Cold/Congetion TABS     Haldol      Ibuprofen      TABS     Lactose Intolerance [Beta-Galactosidase]      CAPS     Milk Products      Propofol      EMUL     Patient is an established patient of this clinic.         Review of Systems:   Review of Systems   Constitutional: Negative for appetite change, chills, fatigue and fever.   HENT: Negative for congestion, sinus pressure and sore throat.    Eyes: Negative for visual disturbance.   Respiratory: Negative for cough, shortness of breath and wheezing.    Cardiovascular: Negative for chest pain and leg swelling.   Gastrointestinal: Negative for abdominal distention, abdominal pain, blood in stool, constipation, diarrhea, nausea and vomiting.   Genitourinary: Negative for dysuria and hematuria.   Musculoskeletal: Negative for arthralgias and myalgias.   Skin: Negative for color change and rash.   Neurological: Negative for weakness and  "headaches.             Physical Exam:     Patient Vitals for the past 24 hrs:   BP Temp Temp src Pulse Resp SpO2 Height Weight   06/22/17 1308 150/78 98.4  F (36.9  C) Oral 66 14 95 % 5' 4\" (162.6 cm) 246 lb (111.6 kg)     Body mass index is 42.23 kg/(m^2).  Vital signs normal except elevated BP     Physical Exam   Constitutional: She is oriented to person, place, and time. She appears well-developed and well-nourished. No distress.   HENT:   Head: Normocephalic and atraumatic.   Nose: Nose normal.   Mouth/Throat: Oropharynx is clear and moist. No oropharyngeal exudate.   Eyes: Conjunctivae are normal. No scleral icterus.   Neck: Normal range of motion. Neck supple.   Cardiovascular: Normal rate, regular rhythm and normal heart sounds.  Exam reveals no gallop and no friction rub.    No murmur heard.  Pulmonary/Chest: Effort normal and breath sounds normal. No respiratory distress. She has no wheezes. She has no rales.   Abdominal: Soft. Bowel sounds are normal. There is no tenderness.   Musculoskeletal: She exhibits no edema.   Neurological: She is alert and oriented to person, place, and time.   Skin: Skin is warm. No rash noted. She is not diaphoretic.       Results:     Results for orders placed or performed in visit on 06/22/17   Hemoglobin A1c ([facility])   Result Value Ref Range    Hemoglobin A1C 6.9 (H) 4.1 - 5.7 %       Assessment and Plan     Pinky Page is a 65 yo female with a PMH of HTN, CKD, DM2, hypothyroid, s/p breast CA with left masectomy 1996, and significant psych history who currently lives in group home who presents to the clinic for HTN and DM follow up.     1. Diabetes mellitus type 2, insulin dependent (H)  - Hemoglobin A1c ([facility]) - your A1c is at goal today (6.9)  - Pinky had episode of hypoglycemia today. It has been many months to years since she can remember the last time it was that low (65). Discussed that will hold off on changing any of her DM meds at this time since this was a " single episode. However, if patient continues to have hypoglycemia episodes, then will need to decrease insulin. Will recheck in 2 weeks at her follow up.     2. Other specified hypothyroidism  - no medication changes today    3. Hypertension, essential. 24 hour BP monitoring showed normal range BP.  Discussed with patient that since she is increasing her exercise to 60 minutes of walking a day, is now using CPAP regularly and continuing to work on her diet, that we can recheck her BP in 2 weeks.   - will hold off on starting or changing any medications today.   - will give the CPAP more time and increase physical activity.   - come back in 2 weeks for BP check   - for the dry mouth, use the biotene three times a day scheduled rather than PRN  - take sips of water when using the cpap (take it off for a moment and then put it back on)  - can suck on sugar free candy or chew gum to increase your saliva  - make sure to discuss possible humidifier piece for the CPAP machine to help with the dry mouth with the Sleep Medicine Team at next follow up visit.     4. Hypercholesteremia  - no changes to medications today.     There are no discontinued medications.  Options for treatment and follow-up care were reviewed with the patient. Pinky Page  engaged in the decision making process and verbalized understanding of the options discussed and agreed with the final plan.    Shamika Kelley MD  [facility]  [phone]

## 2017-06-22 NOTE — TELEPHONE ENCOUNTER
A prescription ordered by Dr. Edwards  and authorized by Dr. Rubio  for Ativan was  faxed to [facility] at [phone] on 5/26/2017 per fax cover sheet.  Original placed back in provider's folder (Dr. Edwards).Aidee Berger/RYNE

## 2017-06-22 NOTE — PATIENT INSTRUCTIONS
Here is the plan from today's visit    1. Diabetes mellitus type 2, insulin dependent (H)  - Hemoglobin A1c ([facility]) - your A1c is at goal today (6.9)    2. Other specified hypothyroidism  - no medication changes today    3. Hypertension, essential  - will hold off on starting or changing any medications today.   - will give the CPAP more time and increase your physical activity.   - come back in 2 weeks for BP check   - for the dry mouth, use the biotene three times a day scheduled  - take sips of water when using the cpap (take it off for a moment and then put it back on)  - can suck on sugar free candy or chew gum to increase your saliva in your mouth.   - make sure to discuss possible humidifier piece for the CPAP machine to help with the dry mouth.     4. Hypercholesteremia  - no changes to your medications today.       Follow up plan  Please make a clinic appointment for follow up with one of the Doctors at [facility] in 2  weeks for BP follow up. After that, come back and see me (Dr. Kelley) in August.     Thank you for coming to [facility] today.  Lab Testing:  **If you had lab testing today and your results are reassuring or normal they will be mailed to you or sent through [employer] within 7 days.   **If the lab tests need quick action we will call you with the results.  The phone number we will call with results is # [phone] (home) [phone] (work). If this is not the best number please call our clinic and change the number.  Medication Refills:  If you need any refills please call your pharmacy and they will contact us.   If you need to  your refill at a new pharmacy, please contact the new pharmacy directly. The new pharmacy will help you get your medications transferred faster.   Scheduling:  If you have any concerns about today's visit or wish to schedule another appointment please call our office during normal business hours [phone] (8-5:00 M-F)  If a referral was made to a  [facility] and you don't get a call from central scheduling please call [phone].  If a Mammogram was ordered for you at [facility] call [phone] to schedule or change your appointment.  If you had an XRay/CT/Ultrasound/MRI ordered the number is [phone] to schedule or change your radiology appointment.   Medical Concerns:  If you have urgent medical concerns please call [phone] at any time of the day.  If you have a medical emergency please call [phone].

## 2017-06-27 ENCOUNTER — TELEPHONE (OUTPATIENT)
Dept: FAMILY MEDICINE | Facility: CLINIC | Age: 67
End: 2017-06-27

## 2017-06-27 NOTE — TELEPHONE ENCOUNTER
Spoke with Jaclyn at [facility] regarding BS. Patient's BS in 80s for last several days, asymptomatic.     Discussed plan:  - if patient's BS consistently <80, decrease Lantus to 22U daily  - if BS continue to be <80 for next week, then further decrease Lantus to 20U daily.     Jaclyn took verbal order and will fax order to be signed to Harsh Kelley MD  [facility]  [phone]

## 2017-06-27 NOTE — TELEPHONE ENCOUNTER
[facility] phone call message-patient reporting a symptom:     Symptom: Blood Sugar Under 100    Same Day Visit Offered: Yes, declined.    Additional comments: Jaclyn calling from [facility].  States patient's blood sugar has been under 100 for a week in the mornings.  Jaclyn requests call to discuss.    OK to leave message on voice mail? Yes    Primary language: English      needed? No    Call taken on June 27, 2017 at 10:22 AM by Linh Gray

## 2017-06-27 NOTE — TELEPHONE ENCOUNTER
Message routed to PCP to advise guidelines patient/staff were given for fasting blood sugars. Please advise if any changes need to be made.    Elizabeth Petty RN

## 2017-07-06 ENCOUNTER — OFFICE VISIT (OUTPATIENT)
Dept: PSYCHIATRY | Facility: CLINIC | Age: 67
End: 2017-07-06
Attending: PSYCHIATRY & NEUROLOGY
Payer: COMMERCIAL

## 2017-07-06 VITALS
DIASTOLIC BLOOD PRESSURE: 79 MMHG | HEART RATE: 73 BPM | SYSTOLIC BLOOD PRESSURE: 154 MMHG | BODY MASS INDEX: 42.12 KG/M2 | WEIGHT: 245.4 LBS

## 2017-07-06 DIAGNOSIS — F25.1 SCHIZOAFFECTIVE DISORDER, DEPRESSIVE TYPE (H): Primary | ICD-10-CM

## 2017-07-06 PROCEDURE — 99212 OFFICE O/P EST SF 10 MIN: CPT | Mod: ZF

## 2017-07-06 RX ORDER — MAGNESIUM HYDROXIDE/ALUMINUM HYDROXICE/SIMETHICONE 120; 1200; 1200 MG/30ML; MG/30ML; MG/30ML
30 SUSPENSION ORAL DAILY PRN
COMMUNITY

## 2017-07-06 NOTE — NURSING NOTE
Chief Complaint   Patient presents with     Recheck Medication     Schizoaffective disorder, depressive type      Reviewed allergies, smoking status, and pharmacy preference  Obtained weight, blood pressure and heart rate

## 2017-07-06 NOTE — PATIENT INSTRUCTIONS
-continue current medications, no changes  -continue making healthy lifestyle modifications (increased exercise, healthy food choices) to help with weight management  -encourage follow up with primary care provider regarding elevated blood pressure  -return to clinic in 1 month

## 2017-07-06 NOTE — MR AVS SNAPSHOT
After Visit Summary   7/6/2017    Pinky Page    MRN: [id number]           Patient Information     Date Of Birth          1950        Visit Information        Provider Department      7/6/2017 2:45 PM Joana De Leon MD [facility]        Today's Diagnoses     Schizoaffective disorder, depressive type (H)    -  1      Care Instructions    -continue current medications, no changes  -continue making healthy lifestyle modifications (increased exercise, healthy food choices) to help with weight management  -encourage follow up with primary care provider regarding elevated blood pressure  -return to clinic in 1 month          Follow-ups after your visit        Follow-up notes from your care team     Return in about 4 weeks (around 8/3/2017) for 60 MFU.      Your next 10 appointments already scheduled     Jul 13, 2017 11:00 AM CDT   Return Sleep Patient with Brianda Reddy MD   [facility] ([facility])    [address] [place] [address]   [phone]            Jul 24, 2017  1:00 PM CDT   (Arrive by 12:45 PM)   Return Visit with Damon Gr, PhD [facility] ([facility])    [address] [facility] [address] [facility]  3rd Floor  [facility] [address] [phone]            Jul 26, 2017  2:40 PM CDT   Return Visit with Shamika Kelley MD   [facility] ([facility])    [address],  [address]  [facility] [address]   [phone]            Aug 03, 2017  1:45 PM CDT   Adult Med Follow UP with Joana De Leon MD   [facility] ([facility])    [address]  [address] [facility] [address]   [phone]            Aug 10, 2017 11:30 AM CDT   Return Sleep Patient with Brianda Reddy MD   [facility] ([facility])     [address] [place] [address]   [phone]            Aug 28, 2017 11:30 AM CDT   (Arrive by 11:15 AM)   Return Visit with Marcos Magdaleno MD   [facility] ([facility])    [address] [facility]  4th [facility] [address]   [phone]            2017  1:30 PM CST   [facility] GENERAL with Michael Lopez MD   Eye Clinic ([facility])    [address]  [address] [facility]  [address] [address]  [facility] [id number] [phone]              Who to contact     Please call your clinic at [phone] to:    Ask questions about your health    Make or cancel appointments    Discuss your medicines    Learn about your test results    Speak to your doctor   If you have compliments or concerns about an experience at your clinic, or if you wish to file a complaint, please contact [facility] Patient Relations at [phone] or email us at [email or web address]         Additional Information About Your Visit        J.G. inkharSNTMNT Information     Shompton is an electronic gateway that provides easy, online access to your medical records. With Shompton, you can request a clinic appointment, read your test results, renew a prescription or communicate with your care team.     To sign up for Shompton visit the website at [email or web address]   You will be asked to enter the access code listed below, as well as some personal information. Please follow the directions to create your username and password.     Your access code is: [email or web address]  Expires: 2017  6:30 AM     Your access code will  in 90 days. If you need help or a new code, please contact your [facility] or call [phone] for assistance.        Care EveryWhere ID     This is your Care EveryWhere ID. This could be used by other organizations to access your [facility] medical records  [id number]         Your Vitals Were     Pulse BMI (Body Mass Index)                73 42.12 kg/m2           Blood Pressure from Last 3 Encounters:   07/06/17 154/79   06/22/17 150/78   06/21/17 119/71    Weight from Last 3 Encounters:   07/06/17 111.3 kg (245 lb 6.4 oz)   06/22/17 111.6 kg (246 lb)   06/21/17 109.7 kg (241 lb 12.8 oz)              Today, you had the following     No orders found for display         Today's Medication Changes          These changes are accurate as of: 7/6/17 11:59 PM.  If you have any questions, ask your nurse or doctor.               These medicines have changed or have updated prescriptions.        Dose/Directions    * METFORMIN HCL PO   This may have changed:  Another medication with the same name was changed. Make sure you understand how and when to take each.   Changed by:  Joana De Leon MD        Dose:  1000 mg   Take 1,000 mg by mouth 2 times daily (with meals)   Refills:  0       * metFORMIN 500 MG 24 hr tablet   Commonly known as:  GLUCOPHAGE-XR   This may have changed:    - how much to take  - when to take this   Used for:  Diabetes mellitus, type 2 (H)   Changed by:  Matilde Ann MD        Dose:  1000 mg   Take 2 tablets (1,000 mg) by mouth 2 times daily (with meals)   Quantity:  90 tablet   Refills:  3       * Notice:  This list has 2 medication(s) that are the same as other medications prescribed for you. Read the directions carefully, and ask your doctor or other care provider to review them with you.             Primary Care Provider Office Phone # Fax #    Shamika Kelley [phone]       [facility] [address] [facility] [address]        Equal Access to Services     JAYDEN STUART AH: Hadsaira Dietrich, gifty del valle, autumn gama. So [facility] [phone].    ATENCIÓN: Si habla español, tiene a deluca disposición servicios gratuitos de asistencia lingüística. Llame al  [phone].    We comply with applicable federal civil rights laws and [place] laws. We do not discriminate on the basis of race, color, national origin, age, disability sex, sexual orientation or gender identity.            Thank you!     Thank you for choosing [facility]  for your care. Our goal is always to provide you with excellent care. Hearing back from our patients is one way we can continue to improve our services. Please take a few minutes to complete the written survey that you may receive in the mail after your visit with us. Thank you!             Your Updated Medication List - Protect others around you: Learn how to safely use, store and throw away your medicines at www.disposemymeds.org.          This list is accurate as of: 7/6/17 11:59 PM.  Always use your most recent med list.                   Brand Name Dispense Instructions for use Diagnosis    acetaminophen 500 MG tablet    TYLENOL    1 Bottle    Take 2 tablets (1,000 mg) by mouth every 6 hours as needed    Sprain of left ankle, unspecified ligament, initial encounter       alum & mag hydroxide-simethicone 200-200-20 MG/5ML Susp suspension    MYLANTA/MAALOX     Take 30 mLs by mouth daily as needed for indigestion        amLODIPine 5 MG tablet    NORVASC    30 tablet    Take 2 tablets (10 mg) by mouth daily    Essential hypertension with goal blood pressure less than 130/85       artificial saliva Aers spray      Take 1 spray by mouth 3 times daily as needed for dry mouth        ARTIFICIAL TEARS OP      Apply 1 drop to eye 4 times daily.        aspirin 81 MG tablet     100    Take 1 tablet by mouth daily. ENTERIC COATED.        atorvastatin 40 MG tablet    LIPITOR    90 tablet    Take 1 tablet (40 mg) by mouth daily    Hyperlipidemia LDL goal <100       blood glucose monitoring test strip    no brand specified    100 each    Use to test blood sugar 3 times daily or as directed.    Type 2 diabetes mellitus with microalbuminuria, with  long-term current use of insulin (H)       calcium polycarbophil 625 MG tablet    FIBERCON     Take 2 tablets by mouth daily        calcium-vitamin D 250-125 MG-UNIT Tabs      Take 1 tablet by mouth 2 times daily. Calcium 250 mg/Vit D 125 IU        CLARITIN 10 MG tablet   Generic drug:  loratadine     30    1 TAB PO QD (Once per day) as needed for ALLERGY SYMPTOMS        clotrimazole 1 % cream    LOTRIMIN    50 g    Apply topically 2 times daily as needed    Dermatophytosis       eucerin cream      Apply  topically as needed. Apply to thigh PRN dry skin        exenatide 10 MCG/0.04ML injection    BYETTA    1 Syringe    Inject 10 mcg Subcutaneous 2 times daily (before meals)    Type 2 diabetes mellitus with microalbuminuria, with long-term current use of insulin (H)       FLUoxetine 40 MG capsule    PROzac    30 capsule    Take 1 capsule (40 mg) by mouth daily    Schizoaffective disorder, depressive type (H)       fluticasone 50 MCG/ACT spray    FLONASE    16 g    Spray 1 spray into both nostrils daily    Seasonal allergic rhinitis       furosemide 20 MG tablet    LASIX    60 tablet    Take 1 tablet (20 mg) by mouth 2 times daily    Lower leg edema       GEL-SASHA DENTINBLOC DT      Apply  to affected area. GEL. Apply to 2nd molar on bottom right daily at bedtime.        GLUCAGON EMERGENCY 1 MG kit   Generic drug:  glucagon      Inject  as directed. 1 mL as needed for signs of hypoglycemia. May repeat as needed in 15 minutes.        HYDROcodone-acetaminophen 5-325 MG per tablet    NORCO     Take 1 tablet by mouth every 6 hours as needed 1-2 tabs        Incontinence Brief Large Misc     60 Units    2 Units 2 times daily    Urge incontinence, Nocturnal enuresis       insulin glargine 100 UNIT/ML injection    LANTUS    8 mL    Inject 24 Units Subcutaneous At Bedtime    Type 2 diabetes mellitus with microalbuminuria, with long-term current use of insulin (H)       LACRI-LUBE OP      Apply  to eye. Place 0.5 inches into  both eyes at bedtime        LACTAID 3000 UNIT tablet   Generic drug:  lactase      Take 4 tabs daily with meals.        levothyroxine 175 MCG tablet    SYNTHROID/LEVOTHROID    30 tablet    Take 1 tablet (175 mcg) by mouth daily Give on empty stomach    Other specified hypothyroidism       LORazepam 1 MG tablet    ATIVAN    30 tablet    Take 1 tablet (1 mg) by mouth At Bedtime .  May take additional 1mg daily PRN for agitation.    Generalized anxiety disorder       losartan 100 MG tablet    COZAAR    90 tablet    Take 1 tablet by mouth daily.    Hypertension goal BP (blood pressure) < 130/80, Diabetes mellitus type 2, insulin dependent (H), CKD (chronic kidney disease) stage 2, GFR 60-89 ml/min       * METFORMIN HCL PO      Take 1,000 mg by mouth 2 times daily (with meals)        * metFORMIN 500 MG 24 hr tablet    GLUCOPHAGE-XR    90 tablet    Take 2 tablets (1,000 mg) by mouth 2 times daily (with meals)    Diabetes mellitus, type 2 (H)       MILK OF MAGNESIA PO      Take  by mouth. Take 30 mL as needed for constipation.        NEURONTIN PO      Take 900 mg by mouth 3 times daily.        nystatin 139307 UNIT/GM Powd    MYCOSTATIN    60 g    Apply topically 3 times daily as needed    Candidiasis of skin       polyethylene glycol powder    MIRALAX/GLYCOLAX     Take 1 capful by mouth 2 times daily. 17 GM PO BID        PREVIDENT 5000 PLUS 1.1 % Crea   Generic drug:  Sodium Fluoride      Apply  to affected area every evening.        saline 0.9 % Soln      Spray 2 sprays in nostril as needed.        SENNA S 8.6-50 MG per tablet   Generic drug:  senna-docusate      Take 2 tablets by mouth At Bedtime.        solifenacin 10 MG tablet    VESICARE    30 tablet    Take 1 tablet (10 mg) by mouth daily    Urge incontinence       ziprasidone 40 MG capsule    GEODON    60 capsule    Take 1 capsule (40 mg) by mouth 2 times daily (with meals)    Schizoaffective disorder, depressive type (H)       * Notice:  This list has 2  medication(s) that are the same as other medications prescribed for you. Read the directions carefully, and ask your doctor or other care provider to review them with you.

## 2017-07-06 NOTE — PROGRESS NOTES
PSYCHIATRY CLINIC TRANSFER NOTE   The initial diagnostic evaluation was on 5/13/13 and the last transfer of care evaluation prior to today was 7/14/16.    Pertinent Background:  This patient first experienced mental health issues in her 20's and obtained care at this time and has received treatment for schizoaffective disorder and anxiety.  See transfer evaluation for detailed history.  Notably, has a history of having increasing psychotic experiences with antipsychotic taper.   Psych critical item history includes psychosis [sxs include disorganization and inability to care for self, ?hallucinations and paranoia], psych hosp (3-5) and ECT    INTERIM HISTORY                                                 Pinky Page is a 67 year old female who was last seen in clinic on 5/25/17 at which time no changes were made.  The patient reports good treatment adherence.  History was provided by patient and written summary of progress from Narendra Truong RN. Patient was a good historian.  Since the last visit:  -Pinky has been doing well in regard to her mental and physical health  -She denies having any safety concerns  -getting along well with roommate now, has had some disagreements in the past  -continues to have difficulty with sleep, she recently started using a CPAP and she is having some problems with initiating sleep, but once able to sleep (usually around 0130) she will sleep until she is woken up for her BG check at 6am. Most days she goes back to sleep until 0830.  -has needed to add biotine for dry mouth and plans to ask her sleep medicine doctor what else she can try, including possibly adjusting the humidifier settings on her machine   -continues to work on physical activity as she is trying to lose weight as encouraged by her therapist, she is still leading a nightly indoor walking group at her residence and will often take walks with staff, she aims to walk ~1hour/day and comes close most days  -has not  noticed large change in weight despite activity and attributes this to the good food they serve at her residence which includes 3 meals plus snacks  -when reviewing vitals she confirmed that her BP has been running high for a few weeks, this is being monitored at [facility] by nursing staff and she has appointment with PCP in near future  -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[employer], often cleaning, wiping tables, etc she has no plans to retire from this duty in the near future   -on reviewing some of her history she expressed some feelings of depression and sadness that 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, however she also expressed great gratitude for the care she has received from [employer] staff and her outpatient providers  -the note from [facility] staff support that things are going well for Pinky, she took a trip to see her sister in [place] which went well, she has not required her PRN lorazepam for almost 2 months    RECENT SYMPTOMS:   DEPRESSION:  reports-some negative thoughts wishing some things turned out differently, depressed mood, low energy and insomnia ;  DENIES- suicidal ideation , anhedonia, excessive guilt, feeling worthless, excessive crying and overwhelmed  MELVIN/HYPOMANIA:  reports-none;  DENIES- increased energy, decreased sleep need, increased activity and grandiosity  PSYCHOSIS:  reports-none;  DENIES- delusions, auditory hallucinations and visual hallucinations  DYSREGULATION:  reports-none;  DENIES- suicidal ideation, violent ideation and SIB  PANIC ATTACK:  none   ANXIETY:  some anxiety around travel and life events   TRAUMA RELATED:  none  COMPULSIVE:  none  SLEEP:  sleeping from 0130 to 0600, wakes up for meds, usually lays back down until 0830    EATING DISORDER:  none      RECENT SUBSTANCE USE:     ALCOHOL- none          TOBACCO- none          CAFFEINE- 1 cup regular coffee per day and 3 decaf; 1 can diet coke per day        OPIOIDS- none             NARCAN KIT- N/A    CANNABIS- none         OTHER ILLICIT DRUGS- none     CURRENT SOCIAL HISTORY:  FINANCIAL SUPPORT- social security disability.     CHILDREN- none.     LIVING SITUATION- Living at [facility].      SOCIAL/ SPIRITUAL SUPPORT- [facility] staff, other providers, other residents at [facility] and does attend [employer] occasionally.     FEELS SAFE AT HOME- Yes.    MEDICAL ROS:  Reports GI [nausea, diarrhea, constipation, GERD] and easy bruising .  Denies weight changes [increase, decrease,  ], headache, sedation, sexual dysfunction [ ], tremor, confusion, dizziness, falls, excessive diaphoresis, muscle twitches, restlessness, bruxism, shiver and short term memory and/or word finding difficulty, akathisia, dystonia, apparent TD, muscle stiffness, Parkinsonian-type symptoms [shuffling gait, masked facies, stooped posture, speech change, writing change] and sialorrhea and slowed reaction time and withdrawal symptoms.    SUBSTANCE USE HISTORY                                                                             Past Use- none  Treatment [#, most recent] - none  Medical Consequences [withdrawal, sz etc] - none  HIV/Hepatitis- none  Legal Consequences- none    PSYCHIATRIC HISTORY     SIB [method, most recent]- none  Suicidal Ideation Hx [passive, active]- none  Suicide Attempt [#, recent, method]:   #- N/A   Most Recent- N/A    Violence/Aggression Hx- none  Psychosis Hx- yes per chart experienced AH and then paranoia in 1982, specifics not known and pt unable to recall  Psych Hosp [ #, most recent, committed]- Hospitalized in [place] in 1986 (thinks it was for depression, but poor memory and as per chart review also psychosis) had ECT around that time.  1987 was hospitalized for the second time, ECT.  1995, hospitalized with depression and ECT, had breast cancer at that time, was hopitalized for 9 months. ECT maintenance 2005 through 2008 here.  ECT [#, most recent]- yes, most recently here in  2008    Eating Disorder: Reports in 1985-86, stopped eating but likely 2/2 depression, not diagnosed with A/N    Outpatient Programs [ DBT, Day Treatment, Eating Disorder Tx etc] : Day Tx in 1987     SOCIAL and FAMILY HISTORY                                          patient reported   Financial Support- social security disability  Living Situation/Family/Relationships- First went to [facility] in [place] in 1989, when hospitalized in 1995 lost bed but gained spot in August 1996;  Children- none  Trauma History (self-report)- none  Legal- none  Cultural/ Social/ Spiritual Support- support system includes [facility] staff (nurses, MH workers, SW, leisure staff), also is Religious and attends [employer] still but not as often as she wants to  Early History/Education-  Grew up in [place], with 7 siblings. Parents remained  and she reports a positive childhood. 4 sisters still living with many nieces and nephews. 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    Family Mental Health History-  None known    PAST PSYCH MED TRIALS   see EMR Problem List: Hx of psychiatric care    MEDICAL / SURGICAL HISTORY                                   CARE TEAM:          PCP- Dr. Kelley, at [facility]                   Therapist- Dr. Gr (monthly)      Patient Active Problem List   Diagnosis     Allergic rhinitis due to pollen     Urge incontinence     Hypertension, essential     Cardiomegaly     Chronic constipation     Dry eye syndrome     Esophageal reflux     Exposure keratoconjunctivitis     DM ophthalmopathy (H)     Hypothyroidism     Senile cataract     ANUJ (obstructive sleep apnea)     Postmenopausal atrophic vaginitis     Restless leg syndrome     Squamous blepharitis     Morbid obesity due to excess calories (H)     Personal history of breast cancer s/p L masectomy     Diabetes mellitus type 2, insulin dependent (H)      Hypercholesteremia     Lives in group home     Extrapyramidal and movement disorder     CKD (chronic kidney disease) stage 2, GFR 60-89 ml/min     Solitary kidney, congenital     S/P hysterectomy     Impingement syndrome of right shoulder     Hypertriglyceridemia     Candidiasis of skin     Generalized anxiety disorder     Carpal tunnel syndrome of left wrist     Schizoaffective disorder, depressive type (H)     Health Care Home     Major depressive disorder, recurrent episode, moderate (H)     Psychological factors affecting medical condition     Hx of psychiatric care     Nail complaint     Microalbuminuria due to type 2 diabetes mellitus (H)     Type 2 diabetes mellitus with microalbuminuria, with long-term current use of insulin (H)     Diabetes mellitus, type II, insulin dependent (H)     CKD (chronic kidney disease) stage 1, GFR 90 ml/min or greater       ALLERGY                                Chlordiazepoxide hcl; Dimetapp dm cold-cough; Haldol; Ibuprofen; Lactose intolerance [beta-galactosidase]; Milk products; and Propofol  MEDICATIONS                               Current Outpatient Prescriptions   Medication Sig Dispense Refill     FLUoxetine (PROZAC) 40 MG capsule Take 1 capsule (40 mg) by mouth daily 30 capsule 5     ziprasidone (GEODON) 40 MG capsule Take 1 capsule (40 mg) by mouth 2 times daily (with meals) 60 capsule 5     LORazepam (ATIVAN) 1 MG tablet Take 1 tablet (1 mg) by mouth At Bedtime .  May take additional 1mg daily PRN for agitation. 30 tablet 5     blood glucose monitoring (NO BRAND SPECIFIED) test strip Use to test blood sugar 3 times daily or as directed. 100 each 3     exenatide (BYETTA) 10 MCG/0.04ML injection Inject 10 mcg Subcutaneous 2 times daily (before meals) 1 Syringe 11     insulin glargine (LANTUS) 100 UNIT/ML injection Inject 24 Units Subcutaneous At Bedtime 8 mL 11     amLODIPine (NORVASC) 5 MG tablet Take 2 tablets (10 mg) by mouth daily 30 tablet 3     nystatin  (MYCOSTATIN) 920142 UNIT/GM POWD Apply topically 3 times daily as needed 60 g 1     clotrimazole (LOTRIMIN) 1 % cream Apply topically 2 times daily as needed 50 g 0     fluticasone (FLONASE) 50 MCG/ACT nasal spray Spray 1 spray into both nostrils daily 16 g 3     furosemide (LASIX) 20 MG tablet Take 1 tablet (20 mg) by mouth 2 times daily 60 tablet 3     acetaminophen (TYLENOL) 500 MG tablet Take 2 tablets (1,000 mg) by mouth every 6 hours as needed 1 Bottle 2     solifenacin (VESICARE) 10 MG tablet Take 1 tablet (10 mg) by mouth daily 30 tablet 6     HYDROcodone-acetaminophen (NORCO) 5-325 MG per tablet Take 1 tablet by mouth every 6 hours as needed 1-2 tabs       atorvastatin (LIPITOR) 40 MG tablet Take 1 tablet (40 mg) by mouth daily 90 tablet 1     calcium polycarbophil (FIBERCON) 625 MG tablet Take 2 tablets by mouth daily       levothyroxine (SYNTHROID, LEVOTHROID) 175 MCG tablet Take 1 tablet (175 mcg) by mouth daily Give on empty stomach 30 tablet 4     metFORMIN (GLUCOPHAGE-XR) 500 MG 24 hr tablet Take 2 tablets (1,000 mg) by mouth 2 times daily (with meals) (Patient taking differently: Take 2,000 mg by mouth 2 times daily (with meals) ) 90 tablet 3     Incontinence Supply Disposable (INCONTINENCE BRIEF LARGE) MISC 2 Units 2 times daily 60 Units 11     losartan (COZAAR) 100 MG tablet Take 1 tablet by mouth daily. 90 tablet 1     Sodium Fluoride (PREVIDENT 5000 PLUS) 1.1 % CREA Apply  to affected area every evening.       Hypromellose (ARTIFICIAL TEARS OP) Apply 1 drop to eye 4 times daily.       Gabapentin (NEURONTIN PO) Take 900 mg by mouth 3 times daily.       senna-docusate (SENNA S) 8.6-50 MG per tablet Take 2 tablets by mouth At Bedtime.       polyethylene glycol (MIRALAX/GLYCOLAX) powder Take 1 capful by mouth 2 times daily. 17 GM PO BID       Artificial Tear Ointment (LACRI-LUBE OP) Apply  to eye. Place 0.5 inches into both eyes at bedtime       Skin Protectants, Misc. (EUCERIN) cream Apply   topically as needed. Apply to thigh PRN dry skin        Saline 0.9 % SOLN Spray 2 sprays in nostril as needed.       Calcium Carbonate-Vitamin D (CALCIUM-VITAMIN D) 250-125 MG-UNIT TABS Take 1 tablet by mouth 2 times daily. Calcium 250 mg/Vit D 125 IU       Sod Fluor-Solo Fluor-Hydrfl Ac (GEL-SASHA DENTINBLOC DT) Apply  to affected area. GEL. Apply to 2nd molar on bottom right daily at bedtime.       lactase (LACTAID) 3000 UNIT tablet Take 4 tabs daily with meals.       Magnesium Hydroxide (MILK OF MAGNESIA PO) Take  by mouth. Take 30 mL as needed for constipation.       CLARITIN 10 MG OR TABS 1 TAB PO QD (Once per day) as needed for ALLERGY SYMPTOMS 30 11     ASPIRIN 81 MG OR TABS Take 1 tablet by mouth daily. ENTERIC COATED. 100 3     GLUCAGON EMERGENCY 1 MG IJ KIT Inject  as directed. 1 mL as needed for signs of hypoglycemia. May repeat as needed in 15 minutes.  0     VITALS   /79  Pulse 73  Wt 111.3 kg (245 lb 6.4 oz)  BMI 42.12 kg/m2   MENTAL STATUS EXAM                                                           Alertness: alert  and oriented  Appearance: well groomed  Behavior/Demeanor: cooperative and pleasant, with good  eye contact   Speech: normal and regular rate and rhythm  Language: intact  Psychomotor: normal or unremarkable  Mood: reports some mild depression when thinking about the past otherwise euthymic  Affect: full range; was congruent to mood; was congruent to content  Thought Process/Associations: unremarkable  Thought Content:  Reports none;  Denies suicidal ideation , violent ideation  and psychotic thoughts   Perception:  Reports none;  Denies auditory hallucinations and visual hallucinations  Insight: adequate  Judgment: good  Cognition: does  appear grossly intact; formal cognitive testing was not done    LABS and DATA     RATING SCALES:  AIMS :1-9-6-8-5-9-0-0-0-0-N/E-3-cx-no-no (see flowsheets)    PHQ9 TODAY = 7  PHQ-9 SCORE 3/16/2017 4/25/2017 5/25/2017   Total Score 6 8 7       PSYCHLABANTIPSYCHOTIC  Recent Labs   Lab Test  03/21/17   1023  02/22/16   1028  01/22/15   1600   CHOL  142  162.0  163   TRIG  121  151.9*  252*   LDL  78  95  71   HDL  40*  36.6*  42*     Recent Labs   Lab Test  06/22/17   1312  06/21/17   1047  03/21/17   1023  12/08/16   1405  08/16/16   1431   GLC   --   134*  118*   --   245.9*   A1C  6.9*   --   6.6*  7.0*  6.9*     Recent Labs   Lab Test  06/21/17   1047  03/21/17   1023  06/22/16   1035   07/04/14   0705   03/18/10   0535   WBC   --   11.8*  9.9   --   7.7   < >  12.6*   ANEU   --   8.3   --    --   4.8   --   9.8*   HGB  13.8  13.4  13.5   < >  13.3   < >  10.7*   PLT   --   266  266   --   223   < >  356    < > = values in this interval not displayed.       DIAGNOSIS     Schizoaffective disorder, depressed type, multiple episodes, in partial remission to mild re: mood, without psychotic features.  Generalized anxiety disorder, well-managed.     ASSESSMENT                                   TODAY Pinky reports continued stablity with her mental health. She has some mild symptoms of depression, which are espcecially prominent when rflecting on the past and the path her life has taken but she often does some positive re-framing with this. No safety concerns. Is future oriented and has a lot of support from her place of residence. Continues to work on adjusting to using her CPAP. She denies having any concerns regarding her medications and is agreeable to not making any changes today.              [place] PRESCRIPTION MONITORING PROGRAM [] was not checked today and revealed: N/A    PSYCHOTROPIC DRUG INTERACTIONS:   ZIPRASIDONE and FLUOXETINE may result in increased risk of QT-interval prolongation and increased risk of serotonin syndrome.   ZIPRASIDONE, FLUOXETINE and VESICARE may result in increased risk of QT-interval prolongation.  ASPIRIN and SSRI may result in an increased risk of bleeding  LEVOTHYROXINE and FLUOXETINE may result in increased levothyroxine  requirements.  MANAGEMENT:  Monitoring for adverse effects, periodic EKGs and patient is aware of risks     PLAN                                                                                                       1) PSYCHOTROPIC MEDICATIONS:   - continue fluoxetine 40mg daily   - continue ziprasidone 40mg BID with meals   - continue lorazepam 1mg QHS and 1mg PRN for agitation    2) THERAPY:  Continue with Dr. Gr monthly    3) LABS NEXT DUE:  AP labs due next in March 2018       RATING SCALES:  AIMS next due Jan 2018    4) REFERRALS [MICD, medical etc.]:  none    5) MTM REFERRAL [PharmD]:  none    6) :  none    7) FAMILY MEETING:  none    8) RTC: 4 weeks    9) CRISIS NUMBERS:   Provided routinely in AVS     TREATMENT RISK STATEMENT:  The risks, benefits, alternatives and potential adverse effects have been discussed and are understood by the patient. The pt understands the risks of using street drugs or alcohol.  There are no medical contraindications, the pt agrees to treatment with the ability to do so.  The patient understands to call 911 or come to the nearest ED if life threatening or urgent symptoms present.    WHODAS 2.0  07/06/17  total score = N/A; [a 12-item WHODAS 2.0 assessment has not been completed by the pt and/or recorded in EPIC].    Joana De Leon,     Patient staffed in clinic with Dr. Estrada who will sign the note.  Supervisor is Dr. Gordon.      Supervisor Attestation:  I saw the patient with the resident, and participated in key portions of the service, including the mental status examination and developing the plan of care. I reviewed key portions of the history with the resident. I agree with the findings and plan as documented in this note.  Cortez Estrada MD

## 2017-07-07 ASSESSMENT — ABNORMAL INVOLUNTARY MOVEMENT SCALE (AIMS)
AIMS_PATIENT_AWARENESS: NO AWARENESS
EDENTIA: NO
PATIENT_WEARS_DENTURES: NO
TONGUE: 0
CURRENT_PROBLEMS_TEETH_DENTURES: NO
AIMS_DISAPPEAR_SLEEPING: YES

## 2017-07-08 ASSESSMENT — PATIENT HEALTH QUESTIONNAIRE - PHQ9: SUM OF ALL RESPONSES TO PHQ QUESTIONS 1-9: 7

## 2017-07-24 ENCOUNTER — OFFICE VISIT (OUTPATIENT)
Dept: PSYCHOLOGY | Facility: CLINIC | Age: 67
End: 2017-07-24

## 2017-07-24 VITALS — WEIGHT: 242.8 LBS | BODY MASS INDEX: 41.68 KG/M2

## 2017-07-24 DIAGNOSIS — F33.1 MAJOR DEPRESSIVE DISORDER, RECURRENT EPISODE, MODERATE (H): Primary | ICD-10-CM

## 2017-07-24 DIAGNOSIS — F54 PSYCHOLOGICAL FACTORS AFFECTING MEDICAL CONDITION: ICD-10-CM

## 2017-07-24 DIAGNOSIS — F41.1 GENERALIZED ANXIETY DISORDER: ICD-10-CM

## 2017-07-24 NOTE — MR AVS SNAPSHOT
After Visit Summary   7/24/2017    Pinky Page    MRN: [id number]           Patient Information     Date Of Birth          1950        Visit Information        Provider Department      7/24/2017 1:00 PM Damon Gr, PhD [facility]        Today's Diagnoses     Major depressive disorder, recurrent episode, moderate (H)    -  1    Psychological factors affecting medical condition        Generalized anxiety disorder           Follow-ups after your visit        Your next 10 appointments already scheduled     Jul 26, 2017  2:40 PM CDT   Return Visit with [facility] ([facility])    [address],  [address]  [facility] [address]   [phone]            Aug 03, 2017  1:45 PM CDT   Adult Med Follow UP with Joana De Leon MD   Psychiatry Clinic ([facility])    [place] [id number]  [address] [facility] [address]   [phone]            Aug 10, 2017 11:30 AM CDT   Return Sleep Patient with Brianda Reddy MD   [facility] [place], Sleep Study ([facility])    [address] [place] [address]   [phone]            Aug 28, 2017 11:30 AM CDT   (Arrive by 11:15 AM)   Return Visit with Marcos Magdaleno MD   [facility] ([facility])    [address] [facility]  [address] [facility] [address] [phone]            Nov 14, 2017  1:30 PM CST   NEW GENERAL with Michael Lopez MD   Eye Clinic ([facility])    [address]  [address] [address]  [facility] [id number] [phone]              Who to contact     Please call your clinic at [phone] to:    Ask questions about your health    Make or cancel appointments    Discuss your medicines    Learn about your test results    Speak to your doctor   If you have  compliments or concerns about an experience at your clinic, or if you wish to file a complaint, please contact [facility] Patient Relations at [phone] or email us at [email or web address]         Additional Information About Your Visit        Spectraseishart Information     [employer] is an electronic gateway that provides easy, online access to your medical records. With [employer], you can request a clinic appointment, read your test results, renew a prescription or communicate with your care team.     To sign up for [employer] visit the website at [email or web address]   You will be asked to enter the access code listed below, as well as some personal information. Please follow the directions to create your username and password.     Your access code is: [email or web address]  Expires: 2017  6:30 AM     Your access code will  in 90 days. If you need help or a new code, please contact your [facility] or call [phone] for assistance.        Care EveryWhere ID     This is your Care EveryWhere ID. This could be used by other organizations to access your [facility] medical records  [id number]        Your Vitals Were     BMI (Body Mass Index)                   41.68 kg/m2            Blood Pressure from Last 3 Encounters:   17 154/79   17 150/78   17 119/71    Weight from Last 3 Encounters:   17 110.1 kg (242 lb 12.8 oz)   17 111.3 kg (245 lb 6.4 oz)   17 111.6 kg (246 lb)              Today, you had the following     No orders found for display         Today's Medication Changes          These changes are accurate as of: 17  2:05 PM.  If you have any questions, ask your nurse or doctor.               These medicines have changed or have updated prescriptions.        Dose/Directions    * METFORMIN HCL PO   This may have changed:  Another medication with the same name was changed. Make sure you understand how and  when to take each.        Dose:  1000 mg   Take 1,000 mg by mouth 2 times daily (with meals)   Refills:  0       * metFORMIN 500 MG 24 hr tablet   Commonly known as:  GLUCOPHAGE-XR   This may have changed:    - how much to take  - when to take this   Used for:  Diabetes mellitus, type 2 (H)        Dose:  1000 mg   Take 2 tablets (1,000 mg) by mouth 2 times daily (with meals)   Quantity:  90 tablet   Refills:  3       * Notice:  This list has 2 medication(s) that are the same as other medications prescribed for you. Read the directions carefully, and ask your doctor or other care provider to review them with you.             Primary Care Provider Office Phone # Fax #    Shamika Ileana Kelley [phone]       [facility] [address] [facility] [address]        Equal Access to Services     JAYDEN STUART : Tatiana Dietrich, waevangelina del valle, jaymie sterlingallavelle mittal, autumn josé. [facility] [phone].    ATENCIÓN: Si habla español, tiene a deluca disposición servicios gratuitos de asistencia lingüística. Link al [phone].    We comply with applicable federal civil rights laws and [place] laws. We do not discriminate on the basis of race, color, national origin, age, disability sex, sexual orientation or gender identity.            Thank you!     Thank you for choosing [facility]  for your care. Our goal is always to provide you with excellent care. Hearing back from our patients is one way we can continue to improve our services. Please take a few minutes to complete the written survey that you may receive in the mail after your visit with us. Thank you!             Your Updated Medication List - Protect others around you: Learn how to safely use, store and throw away your medicines at www.disposemymeds.org.          This list is accurate as of: 7/24/17  2:05 PM.  Always use your most recent med list.                    Brand Name Dispense Instructions for use Diagnosis    acetaminophen 500 MG tablet    TYLENOL    1 Bottle    Take 2 tablets (1,000 mg) by mouth every 6 hours as needed    Sprain of left ankle, unspecified ligament, initial encounter       alum & mag hydroxide-simethicone 200-200-20 MG/5ML Susp suspension    MYLANTA/MAALOX     Take 30 mLs by mouth daily as needed for indigestion        amLODIPine 5 MG tablet    NORVASC    30 tablet    Take 2 tablets (10 mg) by mouth daily    Essential hypertension with goal blood pressure less than 130/85       artificial saliva Aers spray      Take 1 spray by mouth 3 times daily as needed for dry mouth        ARTIFICIAL TEARS OP      Apply 1 drop to eye 4 times daily.        aspirin 81 MG tablet     100    Take 1 tablet by mouth daily. ENTERIC COATED.        atorvastatin 40 MG tablet    LIPITOR    90 tablet    Take 1 tablet (40 mg) by mouth daily    Hyperlipidemia LDL goal <100       blood glucose monitoring test strip    no brand specified    100 each    Use to test blood sugar 3 times daily or as directed.    Type 2 diabetes mellitus with microalbuminuria, with long-term current use of insulin (H)       calcium polycarbophil 625 MG tablet    FIBERCON     Take 2 tablets by mouth daily        calcium-vitamin D 250-125 MG-UNIT Tabs      Take 1 tablet by mouth 2 times daily. Calcium 250 mg/Vit D 125 IU        CLARITIN 10 MG tablet   Generic drug:  loratadine     30    1 TAB PO QD (Once per day) as needed for ALLERGY SYMPTOMS        clotrimazole 1 % cream    LOTRIMIN    50 g    Apply topically 2 times daily as needed    Dermatophytosis       eucerin cream      Apply  topically as needed. Apply to thigh PRN dry skin        exenatide 10 MCG/0.04ML injection    BYETTA    1 Syringe    Inject 10 mcg Subcutaneous 2 times daily (before meals)    Type 2 diabetes mellitus with microalbuminuria, with long-term current use of insulin (H)       FLUoxetine 40 MG capsule    PROzac    30 capsule     Take 1 capsule (40 mg) by mouth daily    Schizoaffective disorder, depressive type (H)       fluticasone 50 MCG/ACT spray    FLONASE    16 g    Spray 1 spray into both nostrils daily    Seasonal allergic rhinitis       furosemide 20 MG tablet    LASIX    60 tablet    Take 1 tablet (20 mg) by mouth 2 times daily    Lower leg edema       GEL-SASHA DENTINBLOC DT      Apply  to affected area. GEL. Apply to 2nd molar on bottom right daily at bedtime.        GLUCAGON EMERGENCY 1 MG kit   Generic drug:  glucagon      Inject  as directed. 1 mL as needed for signs of hypoglycemia. May repeat as needed in 15 minutes.        HYDROcodone-acetaminophen 5-325 MG per tablet    NORCO     Take 1 tablet by mouth every 6 hours as needed 1-2 tabs        Incontinence Brief Large Misc     60 Units    2 Units 2 times daily    Urge incontinence, Nocturnal enuresis       insulin glargine 100 UNIT/ML injection    LANTUS    8 mL    Inject 24 Units Subcutaneous At Bedtime    Type 2 diabetes mellitus with microalbuminuria, with long-term current use of insulin (H)       LACRI-LUBE OP      Apply  to eye. Place 0.5 inches into both eyes at bedtime        LACTAID 3000 UNIT tablet   Generic drug:  lactase      Take 4 tabs daily with meals.        levothyroxine 175 MCG tablet    SYNTHROID/LEVOTHROID    30 tablet    Take 1 tablet (175 mcg) by mouth daily Give on empty stomach    Other specified hypothyroidism       LORazepam 1 MG tablet    ATIVAN    30 tablet    Take 1 tablet (1 mg) by mouth At Bedtime .  May take additional 1mg daily PRN for agitation.    Generalized anxiety disorder       losartan 100 MG tablet    COZAAR    90 tablet    Take 1 tablet by mouth daily.    Hypertension goal BP (blood pressure) < 130/80, Diabetes mellitus type 2, insulin dependent (H), CKD (chronic kidney disease) stage 2, GFR 60-89 ml/min       * METFORMIN HCL PO      Take 1,000 mg by mouth 2 times daily (with meals)        * metFORMIN 500 MG 24 hr tablet     GLUCOPHAGE-XR    90 tablet    Take 2 tablets (1,000 mg) by mouth 2 times daily (with meals)    Diabetes mellitus, type 2 (H)       MILK OF MAGNESIA PO      Take  by mouth. Take 30 mL as needed for constipation.        NEURONTIN PO      Take 900 mg by mouth 3 times daily.        nystatin 343727 UNIT/GM Powd    MYCOSTATIN    60 g    Apply topically 3 times daily as needed    Candidiasis of skin       polyethylene glycol powder    MIRALAX/GLYCOLAX     Take 1 capful by mouth 2 times daily. 17 GM PO BID        PREVIDENT 5000 PLUS 1.1 % Crea   Generic drug:  Sodium Fluoride      Apply  to affected area every evening.        saline 0.9 % Soln      Spray 2 sprays in nostril as needed.        SENNA S 8.6-50 MG per tablet   Generic drug:  senna-docusate      Take 2 tablets by mouth At Bedtime.        solifenacin 10 MG tablet    VESICARE    30 tablet    Take 1 tablet (10 mg) by mouth daily    Urge incontinence       ziprasidone 40 MG capsule    GEODON    60 capsule    Take 1 capsule (40 mg) by mouth 2 times daily (with meals)    Schizoaffective disorder, depressive type (H)       * Notice:  This list has 2 medication(s) that are the same as other medications prescribed for you. Read the directions carefully, and ask your doctor or other care provider to review them with you.

## 2017-07-24 NOTE — PROGRESS NOTES
Health Psychology                  Clinic    Department of Medicine  Madelaine Ingram, Ph.D., L.P. [phone]                          [facility], Ph.D.,  L.P. [phone]                 3rd Floor, Clinic 3[address]  [place] Mail Code 005   Damon Gr, Ph.D., [email or web address]., L.P. [phone]     [address] Kimber Morales, Ph.D., L.P. [phone]  [place] [address]  [place] [address]    Health Psychology Follow-Up Note    Ms. Page is a pleasant 67-year old woman with chronic depressive illness, who returns to clinic for supportive and behavioral psychotherapy for moderate recurrent depression and for help losing weight given her morbid obesity.   Depression is at or below baseline (3-4)  mild.    She is 242.8  Today, down nearly 4 lbs since the last session.  Wt Readings from Last 4 Encounters:   07/24/17 110.1 kg (242 lb 12.8 oz)   07/06/17 111.3 kg (245 lb 6.4 oz)   06/22/17 111.6 kg (246 lb)   06/21/17 109.7 kg (241 lb 12.8 oz)     At [employer], she continues to serve on the [employer].  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  She is working 1 day/week with her boss, [place], Friday mornings for 1.5 hour.  Her roommate is doing better and she is enjoying her more over time.    She is upset today discussing a new, angry resident, who  Is profane, demands things be done his way (e.g., television; lights on).  She has appropriately discussed it with staff, but finds it very irritating.  She rates the depression as 3-4 on 10-point subjective scale.   She is doing more social walking some of the time and had apparently is gradually ramping up on her fitness center days.  She agrees to increase.  She is tolerating using the [employer] bicycle, and is up to 11 minutes.  Goal for exercise has been 1.5 hours/day as of Aleksandra  19.   She arrived early today, which is quite typical for her. Pinky participated fully and appeared to derive benefit.  Rapport was excellent.     Extended session due to complexity of case and length of interval.      IMPRESSION Distress Disability Risk    Low High Low     Time In: 1:08  Time Out:  2:01  Diagnosis:   Major Depression, recurrent moderate (F33.1)   Psychological Factors Affecting Medical Condition: Morbid Obesity (F54)      Plan: She will return on /24 @ 1:00  for behavioral and supportive psychotherapy.   Tx plan due 4/11/2018  Damon Gr, Ph.D., A.B.P.P., L.P.

## 2017-07-26 ENCOUNTER — OFFICE VISIT (OUTPATIENT)
Dept: FAMILY MEDICINE | Facility: CLINIC | Age: 67
End: 2017-07-26

## 2017-07-26 VITALS
RESPIRATION RATE: 18 BRPM | OXYGEN SATURATION: 95 % | WEIGHT: 239.6 LBS | DIASTOLIC BLOOD PRESSURE: 81 MMHG | HEART RATE: 71 BPM | SYSTOLIC BLOOD PRESSURE: 133 MMHG | BODY MASS INDEX: 41.13 KG/M2 | TEMPERATURE: 98.1 F

## 2017-07-26 DIAGNOSIS — N18.2 CKD (CHRONIC KIDNEY DISEASE) STAGE 2, GFR 60-89 ML/MIN: ICD-10-CM

## 2017-07-26 DIAGNOSIS — I10 HYPERTENSION, ESSENTIAL: ICD-10-CM

## 2017-07-26 DIAGNOSIS — G47.33 OSA (OBSTRUCTIVE SLEEP APNEA): Primary | ICD-10-CM

## 2017-07-26 DIAGNOSIS — E11.9 DIABETES MELLITUS TYPE 2, INSULIN DEPENDENT (H): ICD-10-CM

## 2017-07-26 DIAGNOSIS — E03.8 OTHER SPECIFIED HYPOTHYROIDISM: ICD-10-CM

## 2017-07-26 DIAGNOSIS — Z79.4 DIABETES MELLITUS TYPE 2, INSULIN DEPENDENT (H): ICD-10-CM

## 2017-07-26 NOTE — PROGRESS NOTES
Preceptor Attestation:   Patient seen and discussed with the resident. Assessment and plan reviewed with resident and agreed upon.   Supervising Physician:  Deacon Birmingham MD  [facility]

## 2017-07-26 NOTE — PROGRESS NOTES
HPI:       Pinky Page is a 67 year old who presents for the following  Patient presents with:  RECHECK: b/p  Forms: Rx    CKD Follow-up    CKD Stage 3 : associated with the following complications  None    Outpatient blood pressures are being checked at home.  Results are 130s/80s.    Low Salt Diet: no added salt    NSAID Use?no     Did patient take their HTN pills today?  Yes    Last Basic Metabolic Panel:  Lab Results   Component Value Date     06/21/2017      Lab Results   Component Value Date    POTASSIUM 4.0 06/21/2017   ,   Lab Results   Component Value Date    ASHLEY 9.1 06/21/2017     Lab Results   Component Value Date    CO2 28 06/21/2017   ,   Lab Results   Component Value Date    BUN 17 06/21/2017   ,   Lab Results   Component Value Date    CR 0.75 06/21/2017     Lab Results   Component Value Date     06/21/2017         Diabetes Follow-up    Patient is checking blood sugars: twice daily.    Blood sugar testing frequency justification: Risk of hypoglycemia with medication(s)  Results are as follows:       am -        suppertime -              -Last A1C was   Lab Results   Component Value Date    A1C 6.9 06/22/2017    A1C 6.6 03/21/2017    A1C 7.0 12/08/2016    A1C 6.9 08/16/2016    A1C 7.1 05/05/2016        Diabetic concerns: None    Chest Pain or exercise related calf pain (claudication):no     Symptoms of hypoglycemia (low blood sugar): none     Paresthesias (numbness or burning in feet) or sores: No   Diabetic eye exam within the last year?: Yes      Hypertension Follow-up      Outpatient blood pressures are being checked at home.  Results are 130-140/70-90.    Chest Pain? :No     Low Salt Diet: no added salt    Daily NSAID Use?No     Did patient take their HTN pills today/last night as usual?  Yes     She has been following the exercise and nutrition plan from her weight loss physicians. Feels like she has more energy and is able to walk more since starting the CPAP.  Overall, things are going well. No acute issues today. She brings request for [facility] that they would like an order for oxygen as needd with CPAP at night.     Adherence and Exercise  Medication side effects: no  How often is a medication missed? Never  Are you able to follow the treatment plan? Yes  Exercise:walking  6-7 days/week for an average of 45-60 minutes     Problem, Medication and Allergy Lists were   reviewed and are current.     Patient Active Problem List    Diagnosis Date Noted     Solitary kidney, congenital 06/07/2013     Priority: High     CKD (chronic kidney disease) stage 2, GFR 60-89 ml/min 03/01/2013     Priority: High     Lives in group home 02/27/2013     Priority: High     Critical access hospital, [place] >20 years.  Mantoux done in 9/2013 was negative.        Hypercholesteremia 10/05/2012     Priority: High     ASCVD  Risk Calculator (pooled cohort)   10 CV risk 17.5%   Recommended Therapy:High Intensity Statin (atorvastatin 40*-80 mg or rosuvastatin 20-40mg)               Morbid obesity due to excess calories (H) 10/02/2012     Priority: High     Follows with Dr. Marcos Magdaleno Q 2months for weight management.       Personal history of breast cancer s/p L masectomy 10/02/2012     Priority: High     May 24, 2013 S/p left mastectomy 1996; follows with Dr. Avila, last mammogram 5/2012 was benign. Rec annual mammogram.       Diabetes mellitus type 2, insulin dependent (H) 10/02/2012     Priority: High     July 27, 2013   Insulin dependent since 4/2006  Without history of retinopathy       Hypertension, essential      Priority: High     Hypothyroidism      Priority: High     On replacement.       ANUJ (obstructive sleep apnea)      Priority: High     Re-eval 2017 Moderate ANUJ, CPAP trial at 13 August 28, 2013   Follows with Dr. Mc.   On 4L O2 at night given CPAP intolerant.  PSG (10/21/07): AHI 39 with oxygen saturations to 71%.        CKD (chronic kidney disease) stage 1, GFR  90 ml/min or greater 06/21/2017     Priority: Medium     Microalbuminuria due to type 2 diabetes mellitus (H) 03/24/2017     Priority: Medium     3/21/17  Creatinine Urine 54   Albumin Urine mg/L 18   Albumin Urine mg/g Cr 32.47 (H)          Type 2 diabetes mellitus with microalbuminuria, with long-term current use of insulin (H) 03/24/2017     Priority: Medium     Diabetes mellitus, type II, insulin dependent (H) 03/24/2017     Priority: Medium     Nail complaint 02/03/2017     Priority: Medium     vertical nail ridges       Hx of psychiatric care 07/14/2016     Priority: Medium       PAST PSYCH MED TRIALS     sertaline   amitriptyline   lithium   risperidone   olanzapine   trazodone   clonazepam  Quetiapine (weight gain)       Psychological factors affecting medical condition 04/18/2016     Priority: Medium     Major depressive disorder, recurrent episode, moderate (H) 11/16/2015     Priority: Medium     Health Care Home 10/01/2015     Priority: Medium     Schizoaffective disorder, depressive type (H) 06/08/2015     Priority: Medium     Carpal tunnel syndrome of left wrist 05/28/2015     Priority: Medium     Generalized anxiety disorder 12/04/2014     Priority: Medium     Diagnosis updated by automated process. Provider to review and confirm.       Candidiasis of skin 11/04/2014     Priority: Medium     Chronic candidiasis of groin and labia        Hypertriglyceridemia 09/10/2014     Priority: Medium     Impingement syndrome of right shoulder 06/10/2014     Priority: Medium     S/P hysterectomy 04/20/2014     Priority: Medium     Extrapyramidal and movement disorder 02/27/2013     Priority: Medium     Cardiomegaly      Priority: Medium     Chronic constipation      Priority: Medium     Dry eye syndrome      Priority: Medium     Esophageal reflux      Priority: Medium     Exposure keratoconjunctivitis      Priority: Medium     DM ophthalmopathy (H)      Priority: Medium     Senile cataract      Priority: Medium      Postmenopausal atrophic vaginitis      Priority: Medium     Restless leg syndrome      Priority: Medium     Squamous blepharitis      Priority: Medium     Urge incontinence 04/19/2011     Priority: Medium     Allergic rhinitis due to pollen      Priority: Medium     seasonal allergies      ,     Current Outpatient Prescriptions   Medication Sig Dispense Refill     alum & mag hydroxide-simethicone (MYLANTA/MAALOX) 200-200-20 MG/5ML SUSP suspension Take 30 mLs by mouth daily as needed for indigestion       artificial saliva (BIOTENE MT) AERS spray Take 1 spray by mouth 3 times daily as needed for dry mouth       METFORMIN HCL PO Take 1,000 mg by mouth 2 times daily (with meals)       FLUoxetine (PROZAC) 40 MG capsule Take 1 capsule (40 mg) by mouth daily 30 capsule 5     ziprasidone (GEODON) 40 MG capsule Take 1 capsule (40 mg) by mouth 2 times daily (with meals) 60 capsule 5     LORazepam (ATIVAN) 1 MG tablet Take 1 tablet (1 mg) by mouth At Bedtime .  May take additional 1mg daily PRN for agitation. 30 tablet 5     blood glucose monitoring (NO BRAND SPECIFIED) test strip Use to test blood sugar 3 times daily or as directed. 100 each 3     exenatide (BYETTA) 10 MCG/0.04ML injection Inject 10 mcg Subcutaneous 2 times daily (before meals) 1 Syringe 11     insulin glargine (LANTUS) 100 UNIT/ML injection Inject 24 Units Subcutaneous At Bedtime 8 mL 11     amLODIPine (NORVASC) 5 MG tablet Take 2 tablets (10 mg) by mouth daily 30 tablet 3     nystatin (MYCOSTATIN) 358075 UNIT/GM POWD Apply topically 3 times daily as needed 60 g 1     clotrimazole (LOTRIMIN) 1 % cream Apply topically 2 times daily as needed 50 g 0     fluticasone (FLONASE) 50 MCG/ACT nasal spray Spray 1 spray into both nostrils daily 16 g 3     furosemide (LASIX) 20 MG tablet Take 1 tablet (20 mg) by mouth 2 times daily 60 tablet 3     acetaminophen (TYLENOL) 500 MG tablet Take 2 tablets (1,000 mg) by mouth every 6 hours as needed 1 Bottle 2     solifenacin  (VESICARE) 10 MG tablet Take 1 tablet (10 mg) by mouth daily 30 tablet 6     HYDROcodone-acetaminophen (NORCO) 5-325 MG per tablet Take 1 tablet by mouth every 6 hours as needed 1-2 tabs       atorvastatin (LIPITOR) 40 MG tablet Take 1 tablet (40 mg) by mouth daily 90 tablet 1     calcium polycarbophil (FIBERCON) 625 MG tablet Take 2 tablets by mouth daily       levothyroxine (SYNTHROID, LEVOTHROID) 175 MCG tablet Take 1 tablet (175 mcg) by mouth daily Give on empty stomach 30 tablet 4     metFORMIN (GLUCOPHAGE-XR) 500 MG 24 hr tablet Take 2 tablets (1,000 mg) by mouth 2 times daily (with meals) (Patient taking differently: Take 2,000 mg by mouth daily ) 90 tablet 3     Incontinence Supply Disposable (INCONTINENCE BRIEF LARGE) MISC 2 Units 2 times daily 60 Units 11     losartan (COZAAR) 100 MG tablet Take 1 tablet by mouth daily. 90 tablet 1     Sodium Fluoride (PREVIDENT 5000 PLUS) 1.1 % CREA Apply  to affected area every evening.       Hypromellose (ARTIFICIAL TEARS OP) Apply 1 drop to eye 4 times daily.       Gabapentin (NEURONTIN PO) Take 900 mg by mouth 3 times daily.       senna-docusate (SENNA S) 8.6-50 MG per tablet Take 2 tablets by mouth At Bedtime.       polyethylene glycol (MIRALAX/GLYCOLAX) powder Take 1 capful by mouth 2 times daily. 17 GM PO BID       Artificial Tear Ointment (LACRI-LUBE OP) Apply  to eye. Place 0.5 inches into both eyes at bedtime       Skin Protectants, Misc. (EUCERIN) cream Apply  topically as needed. Apply to thigh PRN dry skin        Saline 0.9 % SOLN Spray 2 sprays in nostril as needed.       Calcium Carbonate-Vitamin D (CALCIUM-VITAMIN D) 250-125 MG-UNIT TABS Take 1 tablet by mouth 2 times daily. Calcium 250 mg/Vit D 125 IU       Sod Fluor-Solo Fluor-Hydrfl Ac (GEL-SASHA DENTINBLOC DT) Apply  to affected area. GEL. Apply to 2nd molar on bottom right daily at bedtime.       lactase (LACTAID) 3000 UNIT tablet Take 4 tabs daily with meals.       Magnesium Hydroxide (MILK OF MAGNESIA  PO) Take  by mouth. Take 30 mL as needed for constipation.       CLARITIN 10 MG OR TABS 1 TAB PO QD (Once per day) as needed for ALLERGY SYMPTOMS 30 11     ASPIRIN 81 MG OR TABS Take 1 tablet by mouth daily. ENTERIC COATED. 100 3     GLUCAGON EMERGENCY 1 MG IJ KIT Inject  as directed. 1 mL as needed for signs of hypoglycemia. May repeat as needed in 15 minutes.  0   ,     Allergies   Allergen Reactions     Chlordiazepoxide Hcl      Dimetapp Dm Cold-Cough      Cold/Congetion TABS     Haldol      Ibuprofen      TABS     Lactose Intolerance [Beta-Galactosidase]      CAPS     Milk Products      Propofol      EMUL     Patient is an established patient of this clinic.         Review of Systems:   Review of Systems   Constitutional: Negative for appetite change, chills, fatigue and fever.   HENT: Negative for congestion, sinus pressure and sore throat.    Eyes: Negative for visual disturbance.   Respiratory: Negative for cough, shortness of breath and wheezing.    Cardiovascular: Negative for chest pain and leg swelling.   Gastrointestinal: Negative for abdominal distention, abdominal pain, blood in stool, constipation, diarrhea, nausea and vomiting.   Genitourinary: Negative for dysuria and hematuria.   Musculoskeletal: Negative for arthralgias and myalgias.   Skin: Negative for color change and rash.   Neurological: Negative for weakness and headaches.             Physical Exam:   Patient Vitals for the past 24 hrs:   BP Temp Temp src Pulse Resp SpO2 Weight   07/26/17 1447 133/81 - - - - - -   07/26/17 1444 145/75 98.1  F (36.7  C) Oral 71 18 95 % 239 lb 9.6 oz (108.7 kg)     Body mass index is 41.13 kg/(m^2).  Vitals were reviewed and were normal     Physical Exam   Constitutional: She is oriented to person, place, and time. She appears well-developed and well-nourished. No distress.   HENT:   Head: Normocephalic and atraumatic.   Nose: Nose normal.   Mouth/Throat: Oropharynx is clear and moist. No oropharyngeal  exudate.   Eyes: Conjunctivae are normal. No scleral icterus.   Neck: Normal range of motion. Neck supple.   Cardiovascular: Normal rate, regular rhythm and normal heart sounds.  Exam reveals no gallop and no friction rub.    No murmur heard.  Pulmonary/Chest: Effort normal and breath sounds normal. No respiratory distress. She has no wheezes. She has no rales.   Abdominal: Soft. Bowel sounds are normal. There is no tenderness.   Musculoskeletal: She exhibits no edema.   Neurological: She is alert and oriented to person, place, and time.   Skin: Skin is warm. No rash noted. She is not diaphoretic.         Results:     Results for orders placed or performed in visit on 06/22/17   Hemoglobin A1c ([facility])   Result Value Ref Range    Hemoglobin A1C 6.9 (H) 4.1 - 5.7 %       Assessment and Plan     Pinky Page is a 67 yo female with a PMH of HTN, CKD, DM2, hypothyroid, s/p breast CA with left masectomy 1996, and significant psych history who currently lives in group home who presents to the clinic for HTN and DM follow up.     1. ANUJ (obstructive sleep apnea)  - continue using the CPAP  - continue using the biotene for the dry mouth.   - discussed that the oxygen request should be discussed with her Sleep Physician as they may have to adjust the settings.     2. Other specified hypothyroidism  - TSH on 7/25/17 was 2.45  - No medication changes today.     3. Diabetes mellitus type 2, insulin dependent (H)  - Discussed with Pinky that if she continues to have lows in the 60s, then decrease  Lantus to 22U at bedtime. If she continues to have lows then advised to decrease further by 2U, and return to the clinic to review blood sugars and insulin regimen.     4. Hypertension, essential  - very well controlled. No changes to medications needed this visit.     5. CKD (chronic kidney disease) stage 2, GFR 60-89 ml/min  - we will continue to monitor this with labs in the next year.     Return in 6-8 weeks for routine follow up  or sooner if blood sugars continue to be low.     There are no discontinued medications.  Options for treatment and follow-up care were reviewed with the patient. Pinky Page  engaged in the decision making process and verbalized understanding of the options discussed and agreed with the final plan.    Shamika Kelley MD  [facility]  [phone]

## 2017-07-26 NOTE — MR AVS SNAPSHOT
After Visit Summary   7/26/2017    Pinky Page    MRN: [id number]           Patient Information     Date Of Birth          1950        Visit Information        Provider Department      7/26/2017 2:40 PM Shamika Dia MD [facility]        Today's Diagnoses     ANUJ (obstructive sleep apnea)    -  1    Other specified hypothyroidism        Diabetes mellitus type 2, insulin dependent (H)        Hypertension, essential        CKD (chronic kidney disease) stage 2, GFR 60-89 ml/min          Care Instructions    Here is the plan from today's visit    1. ANUJ (obstructive sleep apnea)  - continue using the CPAP  - continue using the biotene for the dry mouth.     2. Other specified hypothyroidism  - your TSH on 7/25/17 was normal. (2.45)    3. Diabetes mellitus type 2, insulin dependent (H)  - if you continue to have lows in the 60s, then we can decrease your Lantus to 22U at bedtime.     4. Hypertension, essential  - very well controlled. No changes to your medications this visit.     5. CKD (chronic kidney disease) stage 2, GFR 60-89 ml/min  - we will continue to monitor this with labs in the next year.     Please call or return to clinic if your symptoms don't go away.    Follow up plan  Please make a clinic appointment for follow up with me (SHAMIKA DIA) in 6-8  weeks for Routine follow up.    Thank you for coming to [facility] today.  Lab Testing:  **If you had lab testing today and your results are reassuring or normal they will be mailed to you or sent through [employer] within 7 days.   **If the lab tests need quick action we will call you with the results.  The phone number we will call with results is # [phone] (home) [phone] (work). If this is not the best number please call our clinic and change the number.  Medication Refills:  If you need any refills please call your pharmacy and they will contact us.   If you need to  your refill at  a new pharmacy, please contact the new pharmacy directly. The new pharmacy will help you get your medications transferred faster.   Scheduling:  If you have any concerns about today's visit or wish to schedule another appointment please call our office during normal business hours [phone] (8-5:00 M-F)  If a referral was made to a [facility] and you don't get a call from central scheduling please call [phone].  If a Mammogram was ordered for you at [facility] call [phone] to schedule or change your appointment.  If you had an XRay/CT/Ultrasound/MRI ordered the number is [phone] to schedule or change your radiology appointment.   Medical Concerns:  If you have urgent medical concerns please call [phone] at any time of the day.  If you have a medical emergency please call [phone].            Follow-ups after your visit        Your next 10 appointments already scheduled     Aug 03, 2017  1:45 PM CDT   Adult Med Follow UP with Joana De Leon MD   Psychiatry Clinic ([facility])    [place] [id number]  [address] [place] [id number] [phone]            Aug 10, 2017 11:30 AM CDT   Return Sleep Patient with Brianda Reddy MD   [facility] [place], Sleep Study ([facility])    [address] [place] [id number]   [phone]            Aug 28, 2017 11:30 AM CDT   (Arrive by 11:15 AM)   Return Visit with Macros Magdaleno MD   [facility] Weight Management ([facility])    [address] [place] [address]  4th [facility] [address]   [phone]            Aug 28, 2017  1:00 PM CDT   (Arrive by 12:45 PM)   Return Visit with Damon Gr, PhD [facility] ([facility])    [address] [place] [address]   [phone]            Nov 14, 2017  1:30 PM CST   NEW  GENERAL with Michael Lopez MD   Eye Clinic ([facility])    [facility]ingo [facility]  [address] [facility]  [address] Clin [address]  [facility] [id number] [phone]              Who to contact     Please call your clinic at [phone] to:    Ask questions about your health    Make or cancel appointments    Discuss your medicines    Learn about your test results    Speak to your doctor   If you have compliments or concerns about an experience at your clinic, or if you wish to file a complaint, please contact [facility] Patient Relations at [phone] or email us at [email or web address]         Additional Information About Your Visit        [employer] Information     [employer] is an electronic gateway that provides easy, online access to your medical records. With [employer], you can request a clinic appointment, read your test results, renew a prescription or communicate with your care team.     To sign up for [employer] visit the website at [email or web address]   You will be asked to enter the access code listed below, as well as some personal information. Please follow the directions to create your username and password.     Your access code is: [email or web address]  Expires: 2017  6:30 AM     Your access code will  in 90 days. If you need help or a new code, please contact your [facility] or call [phone] for assistance.        Care EveryWhere ID     This is your Care EveryWhere ID. This could be used by other organizations to access your [place] medical records  [id number]        Your Vitals Were     Pulse Temperature Respirations Pulse Oximetry BMI (Body Mass Index)       71 98.1  F (36.7  C) (Oral) 18 95% 41.13 kg/m2        Blood Pressure from Last 3 Encounters:   17 133/81   17 154/79   17 150/78    Weight from Last 3 Encounters:   17 239 lb 9.6 oz (108.7 kg)   17 242 lb 12.8 oz (110.1 kg)    07/06/17 245 lb 6.4 oz (111.3 kg)              Today, you had the following     No orders found for display         Today's Medication Changes          These changes are accurate as of: 7/26/17  3:25 PM.  If you have any questions, ask your nurse or doctor.               These medicines have changed or have updated prescriptions.        Dose/Directions    * METFORMIN HCL PO   This may have changed:  Another medication with the same name was changed. Make sure you understand how and when to take each.   Changed by:  Joana De Leon MD        Dose:  1000 mg   Take 1,000 mg by mouth 2 times daily (with meals)   Refills:  0       * metFORMIN 500 MG 24 hr tablet   Commonly known as:  GLUCOPHAGE-XR   This may have changed:    - how much to take  - when to take this   Used for:  Diabetes mellitus, type 2 (H)   Changed by:  Matilde Ann MD        Dose:  1000 mg   Take 2 tablets (1,000 mg) by mouth 2 times daily (with meals)   Quantity:  90 tablet   Refills:  3       * Notice:  This list has 2 medication(s) that are the same as other medications prescribed for you. Read the directions carefully, and ask your doctor or other care provider to review them with you.             Primary Care Provider Office Phone # Fax #    Shamika Ileana Kelley [phone]       [facility] [address] [place] [address]        Equal Access to Services     JAYDEN STUART AH: Hadii james elizalde hadasho Soemre, waaxda luqadaha, qaybta kaalmada adeegyada, autumn caldwell . So [facility] [phone].    ATENCIÓN: Si habla español, tiene a deluca disposición servicios gratuitos de asistencia lingüística. Llame al [phone].    We comply with applicable federal civil rights laws and [place] laws. We do not discriminate on the basis of race, color, national origin, age, disability sex, sexual orientation or gender identity.            Thank you!     Thank you for choosing [facility]  [facility]ICINE CLINIC  for your care. Our goal is always to provide you with excellent care. Hearing back from our patients is one way we can continue to improve our services. Please take a few minutes to complete the written survey that you may receive in the mail after your visit with us. Thank you!             Your Updated Medication List - Protect others around you: Learn how to safely use, store and throw away your medicines at www.disposemymeds.org.          This list is accurate as of: 7/26/17  3:25 PM.  Always use your most recent med list.                   Brand Name Dispense Instructions for use Diagnosis    acetaminophen 500 MG tablet    TYLENOL    1 Bottle    Take 2 tablets (1,000 mg) by mouth every 6 hours as needed    Sprain of left ankle, unspecified ligament, initial encounter       alum & mag hydroxide-simethicone 200-200-20 MG/5ML Susp suspension    MYLANTA/MAALOX     Take 30 mLs by mouth daily as needed for indigestion        amLODIPine 5 MG tablet    NORVASC    30 tablet    Take 2 tablets (10 mg) by mouth daily    Essential hypertension with goal blood pressure less than 130/85       artificial saliva Aers spray      Take 1 spray by mouth 3 times daily as needed for dry mouth        ARTIFICIAL TEARS OP      Apply 1 drop to eye 4 times daily.        aspirin 81 MG tablet     100    Take 1 tablet by mouth daily. ENTERIC COATED.        atorvastatin 40 MG tablet    LIPITOR    90 tablet    Take 1 tablet (40 mg) by mouth daily    Hyperlipidemia LDL goal <100       blood glucose monitoring test strip    no brand specified    100 each    Use to test blood sugar 3 times daily or as directed.    Type 2 diabetes mellitus with microalbuminuria, with long-term current use of insulin (H)       calcium polycarbophil 625 MG tablet    FIBERCON     Take 2 tablets by mouth daily        calcium-vitamin D 250-125 MG-UNIT Tabs      Take 1 tablet by mouth 2 times daily. Calcium 250 mg/Vit D 125 IU        CLARITIN 10 MG  tablet   Generic drug:  loratadine     30    1 TAB PO QD (Once per day) as needed for ALLERGY SYMPTOMS        clotrimazole 1 % cream    LOTRIMIN    50 g    Apply topically 2 times daily as needed    Dermatophytosis       eucerin cream      Apply  topically as needed. Apply to thigh PRN dry skin        exenatide 10 MCG/0.04ML injection    BYETTA    1 Syringe    Inject 10 mcg Subcutaneous 2 times daily (before meals)    Type 2 diabetes mellitus with microalbuminuria, with long-term current use of insulin (H)       FLUoxetine 40 MG capsule    PROzac    30 capsule    Take 1 capsule (40 mg) by mouth daily    Schizoaffective disorder, depressive type (H)       fluticasone 50 MCG/ACT spray    FLONASE    16 g    Spray 1 spray into both nostrils daily    Seasonal allergic rhinitis       furosemide 20 MG tablet    LASIX    60 tablet    Take 1 tablet (20 mg) by mouth 2 times daily    Lower leg edema       GEL-SASHA DENTINBLOC DT      Apply  to affected area. GEL. Apply to 2nd molar on bottom right daily at bedtime.        GLUCAGON EMERGENCY 1 MG kit   Generic drug:  glucagon      Inject  as directed. 1 mL as needed for signs of hypoglycemia. May repeat as needed in 15 minutes.        HYDROcodone-acetaminophen 5-325 MG per tablet    NORCO     Take 1 tablet by mouth every 6 hours as needed 1-2 tabs        Incontinence Brief Large Misc     60 Units    2 Units 2 times daily    Urge incontinence, Nocturnal enuresis       insulin glargine 100 UNIT/ML injection    LANTUS    8 mL    Inject 24 Units Subcutaneous At Bedtime    Type 2 diabetes mellitus with microalbuminuria, with long-term current use of insulin (H)       LACRI-LUBE OP      Apply  to eye. Place 0.5 inches into both eyes at bedtime        LACTAID 3000 UNIT tablet   Generic drug:  lactase      Take 4 tabs daily with meals.        levothyroxine 175 MCG tablet    SYNTHROID/LEVOTHROID    30 tablet    Take 1 tablet (175 mcg) by mouth daily Give on empty stomach    Other  specified hypothyroidism       LORazepam 1 MG tablet    ATIVAN    30 tablet    Take 1 tablet (1 mg) by mouth At Bedtime .  May take additional 1mg daily PRN for agitation.    Generalized anxiety disorder       losartan 100 MG tablet    COZAAR    90 tablet    Take 1 tablet by mouth daily.    Hypertension goal BP (blood pressure) < 130/80, Diabetes mellitus type 2, insulin dependent (H), CKD (chronic kidney disease) stage 2, GFR 60-89 ml/min       * METFORMIN HCL PO      Take 1,000 mg by mouth 2 times daily (with meals)        * metFORMIN 500 MG 24 hr tablet    GLUCOPHAGE-XR    90 tablet    Take 2 tablets (1,000 mg) by mouth 2 times daily (with meals)    Diabetes mellitus, type 2 (H)       MILK OF MAGNESIA PO      Take  by mouth. Take 30 mL as needed for constipation.        NEURONTIN PO      Take 900 mg by mouth 3 times daily.        nystatin 932906 UNIT/GM Powd    MYCOSTATIN    60 g    Apply topically 3 times daily as needed    Candidiasis of skin       polyethylene glycol powder    MIRALAX/GLYCOLAX     Take 1 capful by mouth 2 times daily. 17 GM PO BID        PREVIDENT 5000 PLUS 1.1 % Crea   Generic drug:  Sodium Fluoride      Apply  to affected area every evening.        saline 0.9 % Soln      Spray 2 sprays in nostril as needed.        SENNA S 8.6-50 MG per tablet   Generic drug:  senna-docusate      Take 2 tablets by mouth At Bedtime.        solifenacin 10 MG tablet    VESICARE    30 tablet    Take 1 tablet (10 mg) by mouth daily    Urge incontinence       ziprasidone 40 MG capsule    GEODON    60 capsule    Take 1 capsule (40 mg) by mouth 2 times daily (with meals)    Schizoaffective disorder, depressive type (H)       * Notice:  This list has 2 medication(s) that are the same as other medications prescribed for you. Read the directions carefully, and ask your doctor or other care provider to review them with you.

## 2017-07-26 NOTE — PATIENT INSTRUCTIONS
Here is the plan from today's visit    1. ANUJ (obstructive sleep apnea)  - continue using the CPAP  - continue using the biotene for the dry mouth.     2. Other specified hypothyroidism  - your TSH on 7/25/17 was normal. (2.45)    3. Diabetes mellitus type 2, insulin dependent (H)  - if you continue to have lows in the 60s, then we can decrease your Lantus to 22U at bedtime.     4. Hypertension, essential  - very well controlled. No changes to your medications this visit.     5. CKD (chronic kidney disease) stage 2, GFR 60-89 ml/min  - we will continue to monitor this with labs in the next year.     Please call or return to clinic if your symptoms don't go away.    Follow up plan  Please make a clinic appointment for follow up with me (GALA DIA) in 6-8  weeks for Routine follow up.    Thank you for coming to [facility] today.  Lab Testing:  **If you had lab testing today and your results are reassuring or normal they will be mailed to you or sent through [facility] within 7 days.   **If the lab tests need quick action we will call you with the results.  The phone number we will call with results is # [phone] (home) [phone] (work). If this is not the best number please call our clinic and change the number.  Medication Refills:  If you need any refills please call your pharmacy and they will contact us.   If you need to  your refill at a new pharmacy, please contact the new pharmacy directly. The new pharmacy will help you get your medications transferred faster.   Scheduling:  If you have any concerns about today's visit or wish to schedule another appointment please call our office during normal business hours [phone] (8-5:00 M-F)  If a referral was made to a [facility] and you don't get a call from central scheduling please call [phone].  If a Mammogram was ordered for you at [facility] call [phone] to schedule or change your  appointment.  If you had an XRay/CT/Ultrasound/MRI ordered the number is [phone] to schedule or change your radiology appointment.   Medical Concerns:  If you have urgent medical concerns please call [phone] at any time of the day.  If you have a medical emergency please call [phone].

## 2017-08-03 ENCOUNTER — OFFICE VISIT (OUTPATIENT)
Dept: PSYCHIATRY | Facility: CLINIC | Age: 67
End: 2017-08-03
Attending: PSYCHIATRY & NEUROLOGY
Payer: COMMERCIAL

## 2017-08-03 VITALS
SYSTOLIC BLOOD PRESSURE: 160 MMHG | HEART RATE: 64 BPM | BODY MASS INDEX: 41.33 KG/M2 | DIASTOLIC BLOOD PRESSURE: 65 MMHG | WEIGHT: 240.8 LBS

## 2017-08-03 DIAGNOSIS — F25.1 SCHIZOAFFECTIVE DISORDER, DEPRESSIVE TYPE (H): Primary | ICD-10-CM

## 2017-08-03 PROCEDURE — 99212 OFFICE O/P EST SF 10 MIN: CPT | Mod: ZF

## 2017-08-03 NOTE — NURSING NOTE
Chief Complaint   Patient presents with     Recheck Medication     MDD     Reviewed allergies, smoking status, and pharmacy preference  Obtained weight, blood pressure and heart rate

## 2017-08-03 NOTE — MR AVS SNAPSHOT
After Visit Summary   8/3/2017    Pinky Page    MRN: [id number]           Patient Information     Date Of Birth          1950        Visit Information        Provider Department      8/3/2017 1:45 PM Joana De Leon MD [facility]        Today's Diagnoses     Schizoaffective disorder, depressive type (H)    -  1      Care Instructions    -continue current medications, no changes  -return to clinic in 6 weeks or sooner if needed          Follow-ups after your visit        Follow-up notes from your care team     Return in about 6 weeks (around 9/14/2017) for 30 MFU.      Your next 10 appointments already scheduled     Aug 10, 2017 11:30 AM CDT   Return Sleep Patient with Brianda Reddy MD   [facility], [place], Sleep Study ([facility])    [address] [place] [id number]   [phone]            Aug 28, 2017 11:30 AM CDT   (Arrive by 11:15 AM)   Return Visit with Marcos Magdaleno MD   [facility] ([facility])    [address] [place] [address]  [address] [facility] [address]   [phone]            Aug 28, 2017  1:00 PM CDT   (Arrive by 12:45 PM)   Return Visit with Damon Gr, PhD [facility] ([facility])    [address] [place] [address]  [address] [facility] [address]   [phone]            Sep 12, 2017  1:40 PM CDT   Return Visit with [facility] ([facility])    [address] [place] [address],  [address]  [facility] [address]   [phone]            Sep 14, 2017  2:15 PM CDT   Adult Med Follow UP with Joana De Leon MD   Psychiatry Clinic ([facility])    [place] [id number]  [address] [place] [address]   [phone]            Nov 14, 2017  1:30 PM CST   NEW GENERAL with Michael Lopez MD   Eye Clinic ([facility])     [facility]  [address] [facility]  [address] Clin [address]  [facility] [address] [phone]              Who to contact     Please call your clinic at [phone] to:    Ask questions about your health    Make or cancel appointments    Discuss your medicines    Learn about your test results    Speak to your doctor   If you have compliments or concerns about an experience at your clinic, or if you wish to file a complaint, please contact [facility] Patient Relations at [phone] or email us at [email or web address]         Additional Information About Your Visit        [employer] Information     [employer] is an electronic gateway that provides easy, online access to your medical records. With [employer], you can request a clinic appointment, read your test results, renew a prescription or communicate with your care team.     To sign up for [employer] visit the website at [email or web address]   You will be asked to enter the access code listed below, as well as some personal information. Please follow the directions to create your username and password.     Your access code is: [email or web address]  Expires: 2017  7:15 PM     Your access code will  in 90 days. If you need help or a new code, please contact your [facility] or call [phone] for assistance.        Care EveryWhere ID     This is your Care EveryWhere ID. This could be used by other organizations to access your [place] medical records  [id number]        Your Vitals Were     Pulse BMI (Body Mass Index)                64 41.33 kg/m2           Blood Pressure from Last 3 Encounters:   17 160/65   17 133/81   17 154/79    Weight from Last 3 Encounters:   17 109.2 kg (240 lb 12.8 oz)   17 108.7 kg (239 lb 9.6 oz)   17 110.1 kg (242 lb 12.8 oz)              Today, you had the following     No orders found for display         Today's  Medication Changes          These changes are accurate as of: 8/3/17 11:59 PM.  If you have any questions, ask your nurse or doctor.               These medicines have changed or have updated prescriptions.        Dose/Directions    * METFORMIN HCL PO   This may have changed:  Another medication with the same name was changed. Make sure you understand how and when to take each.   Changed by:  Joana De Leon MD        Dose:  1000 mg   Take 1,000 mg by mouth 2 times daily (with meals)   Refills:  0       * metFORMIN 500 MG 24 hr tablet   Commonly known as:  GLUCOPHAGE-XR   This may have changed:    - how much to take  - when to take this   Used for:  Diabetes mellitus, type 2 (H)   Changed by:  Matilde Ann MD        Dose:  1000 mg   Take 2 tablets (1,000 mg) by mouth 2 times daily (with meals)   Quantity:  90 tablet   Refills:  3       * Notice:  This list has 2 medication(s) that are the same as other medications prescribed for you. Read the directions carefully, and ask your doctor or other care provider to review them with you.             Primary Care Provider Office Phone # Fax #    Shamika Ileana Kelley [phone]       [facility] [address] [place] [address]        Equal Access to Services     JAYDEN STUART AH: Tatiana Dietrich, wamelissada eligio, qaybta kaalmada daxa, autumn josé. So [facility] [phone].    ATENCIÓN: Si habla español, tiene a deluca disposición servicios gratuitos de asistencia lingüística. Link al [phone].    We comply with applicable federal civil rights laws and [place] laws. We do not discriminate on the basis of race, color, national origin, age, disability sex, sexual orientation or gender identity.            Thank you!     Thank you for choosing [facility]  for your care. Our goal is always to provide you with excellent care. Hearing back from our patients is one way we can  continue to improve our services. Please take a few minutes to complete the written survey that you may receive in the mail after your visit with us. Thank you!             Your Updated Medication List - Protect others around you: Learn how to safely use, store and throw away your medicines at www.disposemymeds.org.          This list is accurate as of: 8/3/17 11:59 PM.  Always use your most recent med list.                   Brand Name Dispense Instructions for use Diagnosis    acetaminophen 500 MG tablet    TYLENOL    1 Bottle    Take 2 tablets (1,000 mg) by mouth every 6 hours as needed    Sprain of left ankle, unspecified ligament, initial encounter       alum & mag hydroxide-simethicone 200-200-20 MG/5ML Susp suspension    MYLANTA/MAALOX     Take 30 mLs by mouth daily as needed for indigestion        amLODIPine 5 MG tablet    NORVASC    30 tablet    Take 2 tablets (10 mg) by mouth daily    Essential hypertension with goal blood pressure less than 130/85       artificial saliva Aers spray      Take 1 spray by mouth 3 times daily as needed for dry mouth        ARTIFICIAL TEARS OP      Apply 1 drop to eye 4 times daily.        aspirin 81 MG tablet     100    Take 1 tablet by mouth daily. ENTERIC COATED.        atorvastatin 40 MG tablet    LIPITOR    90 tablet    Take 1 tablet (40 mg) by mouth daily    Hyperlipidemia LDL goal <100       blood glucose monitoring test strip    no brand specified    100 each    Use to test blood sugar 3 times daily or as directed.    Type 2 diabetes mellitus with microalbuminuria, with long-term current use of insulin (H)       calcium polycarbophil 625 MG tablet    FIBERCON     Take 2 tablets by mouth daily        calcium-vitamin D 250-125 MG-UNIT Tabs      Take 1 tablet by mouth 2 times daily. Calcium 250 mg/Vit D 125 IU        CLARITIN 10 MG tablet   Generic drug:  loratadine     30    1 TAB PO QD (Once per day) as needed for ALLERGY SYMPTOMS        clotrimazole 1 % cream     LOTRIMIN    50 g    Apply topically 2 times daily as needed    Dermatophytosis       eucerin cream      Apply  topically as needed. Apply to thigh PRN dry skin        exenatide 10 MCG/0.04ML injection    BYETTA    1 Syringe    Inject 10 mcg Subcutaneous 2 times daily (before meals)    Type 2 diabetes mellitus with microalbuminuria, with long-term current use of insulin (H)       FLUoxetine 40 MG capsule    PROzac    30 capsule    Take 1 capsule (40 mg) by mouth daily    Schizoaffective disorder, depressive type (H)       fluticasone 50 MCG/ACT spray    FLONASE    16 g    Spray 1 spray into both nostrils daily    Seasonal allergic rhinitis       furosemide 20 MG tablet    LASIX    60 tablet    Take 1 tablet (20 mg) by mouth 2 times daily    Lower leg edema       GEL-SASHA DENTINBLOC DT      Apply  to affected area. GEL. Apply to 2nd molar on bottom right daily at bedtime.        GLUCAGON EMERGENCY 1 MG kit   Generic drug:  glucagon      Inject  as directed. 1 mL as needed for signs of hypoglycemia. May repeat as needed in 15 minutes.        HYDROcodone-acetaminophen 5-325 MG per tablet    NORCO     Take 1 tablet by mouth every 6 hours as needed 1-2 tabs        Incontinence Brief Large Misc     60 Units    2 Units 2 times daily    Urge incontinence, Nocturnal enuresis       insulin glargine 100 UNIT/ML injection    LANTUS    8 mL    Inject 24 Units Subcutaneous At Bedtime    Type 2 diabetes mellitus with microalbuminuria, with long-term current use of insulin (H)       LACRI-LUBE OP      Apply  to eye. Place 0.5 inches into both eyes at bedtime        LACTAID 3000 UNIT tablet   Generic drug:  lactase      Take 4 tabs daily with meals.        levothyroxine 175 MCG tablet    SYNTHROID/LEVOTHROID    30 tablet    Take 1 tablet (175 mcg) by mouth daily Give on empty stomach    Other specified hypothyroidism       LORazepam 1 MG tablet    ATIVAN    30 tablet    Take 1 tablet (1 mg) by mouth At Bedtime .  May take additional  1mg daily PRN for agitation.    Generalized anxiety disorder       losartan 100 MG tablet    COZAAR    90 tablet    Take 1 tablet by mouth daily.    Hypertension goal BP (blood pressure) < 130/80, Diabetes mellitus type 2, insulin dependent (H), CKD (chronic kidney disease) stage 2, GFR 60-89 ml/min       * METFORMIN HCL PO      Take 1,000 mg by mouth 2 times daily (with meals)        * metFORMIN 500 MG 24 hr tablet    GLUCOPHAGE-XR    90 tablet    Take 2 tablets (1,000 mg) by mouth 2 times daily (with meals)    Diabetes mellitus, type 2 (H)       MILK OF MAGNESIA PO      Take  by mouth. Take 30 mL as needed for constipation.        NEURONTIN PO      Take 900 mg by mouth 3 times daily.        nystatin 263766 UNIT/GM Powd    MYCOSTATIN    60 g    Apply topically 3 times daily as needed    Candidiasis of skin       polyethylene glycol powder    MIRALAX/GLYCOLAX     Take 1 capful by mouth 2 times daily. 17 GM PO BID        PREVIDENT 5000 PLUS 1.1 % Crea   Generic drug:  Sodium Fluoride      Apply  to affected area every evening.        saline 0.9 % Soln      Spray 2 sprays in nostril as needed.        SENNA S 8.6-50 MG per tablet   Generic drug:  senna-docusate      Take 2 tablets by mouth At Bedtime.        solifenacin 10 MG tablet    VESICARE    30 tablet    Take 1 tablet (10 mg) by mouth daily    Urge incontinence       ziprasidone 40 MG capsule    GEODON    60 capsule    Take 1 capsule (40 mg) by mouth 2 times daily (with meals)    Schizoaffective disorder, depressive type (H)       * Notice:  This list has 2 medication(s) that are the same as other medications prescribed for you. Read the directions carefully, and ask your doctor or other care provider to review them with you.

## 2017-08-03 NOTE — PROGRESS NOTES
"PSYCHIATRY CLINIC PROGRESS NOTE   The initial diagnostic evaluation was on 5/13/13 and the last transfer of care evaluation prior to today was 7/14/16.    Pertinent Background:  This patient first experienced mental health issues in her 20's and obtained care at this time and has received treatment for schizoaffective disorder and anxiety.  See transfer evaluation for detailed history.  Notably, has a history of having increasing psychotic experiences with antipsychotic taper.   Psych critical item history includes psychosis [sxs include disorganization and inability to care for self, ?hallucinations and paranoia], psych hosp (3-5) and ECT    INTERIM HISTORY                                                 Pinky Page is a 67 year old female who was last seen in clinic on 5/25/17 at which time no changes were made.  The patient reports good treatment adherence.  History was provided by patient and written summary of progress from Narendra Truong RN. Patient was a good historian.  Since the last visit:  -has been doing pretty well overall  -reports mood as \"okay\"  -has been having some episodic depressed moods in context of stressors   -exercise has been increasing, now riding stationary bike twice a week, still doing walking and walking group  -sleep continues to be difficult some nights likely due to her CPAP machine, has noticed if she stays up a little later and allows self to get more tired she can tolerate it better and fall asleep more quickly, therefore overall having some improvement in sleep (2330 to 0600)  -also practicing sleep hygiene techniques such as not watching tv a few hours before bed, has been coloring instead  -things are going well with roommate, they continue to get along better  -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 but she enjoys it  -volunteers working on the bulletin board at [place], really enjoys this  -went to [place] with her sister to go to mass " and dinner and a bazarre  -continues to have some anxiety around upcoming events, states she sometimes gets anxious for work, has not used any of her Ativan PRN this past month    RECENT SYMPTOMS:   DEPRESSION:  reports-some low moods in context of stressors, depressed mood, low energy and insomnia ;  DENIES- suicidal ideation  and psychomotor change observed by others (slowing)  MELVIN/HYPOMANIA:  reports-none;  DENIES- increased energy, decreased sleep need, increased activity and grandiosity  PSYCHOSIS:  reports-none;  DENIES- delusions, auditory hallucinations and visual hallucinations  DYSREGULATION:  reports-none;  DENIES- suicidal ideation, violent ideation and SIB  PANIC ATTACK:  none   ANXIETY:  some nervousness and excitement around events  TRAUMA RELATED:  none  COMPULSIVE:  none  SLEEP:  improving, as above    EATING DISORDER:  none      RECENT SUBSTANCE USE:     ALCOHOL- none          TOBACCO- none          CAFFEINE- 1 cup regular coffee per day and 3 decaf; 1 can diet coke per day        OPIOIDS- none            NARCAN KIT- N/A    CANNABIS- none         OTHER ILLICIT DRUGS- none     CURRENT SOCIAL HISTORY:  FINANCIAL SUPPORT- social security disability.     CHILDREN- none.     LIVING SITUATION- Living at [facility].      SOCIAL/ SPIRITUAL SUPPORT- [facility] staff, other providers, other residents at [facility] and does attend [employer] occasionally.     FEELS SAFE AT HOME- Yes.    MEDICAL ROS:  Reports GI sxs [GERD, constipation] and easy bruising .  Denies weight changes [increase, decrease,  ], headache, sedation, sexual dysfunction [ ], tremor, confusion, dizziness, falls, excessive diaphoresis, muscle twitches, restlessness, bruxism, shiver and short term memory and/or word finding difficulty, akathisia, dystonia, apparent TD, muscle stiffness, Parkinsonian-type symptoms [shuffling gait, masked facies, stooped posture, speech change, writing change] and sialorrhea and slowed reaction time and withdrawal  symptoms.    PSYCH and CD Critical Summary Points since July 2017           None    PAST PSYCH MED TRIALS   see EMR Problem List: Hx of psychiatric care    MEDICAL / SURGICAL HISTORY                                   CARE TEAM:          PCP- Dr. Kelley, at [facility]                   Therapist- Dr. Gr (monthly)      Patient Active Problem List   Diagnosis     Allergic rhinitis due to pollen     Urge incontinence     Hypertension, essential     Cardiomegaly     Chronic constipation     Dry eye syndrome     Esophageal reflux     Exposure keratoconjunctivitis     DM ophthalmopathy (H)     Hypothyroidism     Senile cataract     ANUJ (obstructive sleep apnea)     Postmenopausal atrophic vaginitis     Restless leg syndrome     Squamous blepharitis     Morbid obesity due to excess calories (H)     Personal history of breast cancer s/p L masectomy     Diabetes mellitus type 2, insulin dependent (H)     Hypercholesteremia     Lives in group home     Extrapyramidal and movement disorder     CKD (chronic kidney disease) stage 2, GFR 60-89 ml/min     Solitary kidney, congenital     S/P hysterectomy     Impingement syndrome of right shoulder     Hypertriglyceridemia     Candidiasis of skin     Generalized anxiety disorder     Carpal tunnel syndrome of left wrist     Schizoaffective disorder, depressive type (H)     Health Care Home     Major depressive disorder, recurrent episode, moderate (H)     Psychological factors affecting medical condition     Hx of psychiatric care     Nail complaint     Microalbuminuria due to type 2 diabetes mellitus (H)     Type 2 diabetes mellitus with microalbuminuria, with long-term current use of insulin (H)     Diabetes mellitus, type II, insulin dependent (H)     CKD (chronic kidney disease) stage 1, GFR 90 ml/min or greater       ALLERGY                                Chlordiazepoxide hcl; Dimetapp dm cold-cough; Haldol; Ibuprofen; Lactose intolerance [beta-galactosidase]; Milk  products; and Propofol  MEDICATIONS                               Current Outpatient Prescriptions   Medication Sig Dispense Refill     alum & mag hydroxide-simethicone (MYLANTA/MAALOX) 200-200-20 MG/5ML SUSP suspension Take 30 mLs by mouth daily as needed for indigestion       artificial saliva (BIOTENE MT) AERS spray Take 1 spray by mouth 3 times daily as needed for dry mouth       METFORMIN HCL PO Take 1,000 mg by mouth 2 times daily (with meals)       FLUoxetine (PROZAC) 40 MG capsule Take 1 capsule (40 mg) by mouth daily 30 capsule 5     ziprasidone (GEODON) 40 MG capsule Take 1 capsule (40 mg) by mouth 2 times daily (with meals) 60 capsule 5     LORazepam (ATIVAN) 1 MG tablet Take 1 tablet (1 mg) by mouth At Bedtime .  May take additional 1mg daily PRN for agitation. 30 tablet 5     blood glucose monitoring (NO BRAND SPECIFIED) test strip Use to test blood sugar 3 times daily or as directed. 100 each 3     exenatide (BYETTA) 10 MCG/0.04ML injection Inject 10 mcg Subcutaneous 2 times daily (before meals) 1 Syringe 11     insulin glargine (LANTUS) 100 UNIT/ML injection Inject 24 Units Subcutaneous At Bedtime 8 mL 11     amLODIPine (NORVASC) 5 MG tablet Take 2 tablets (10 mg) by mouth daily 30 tablet 3     nystatin (MYCOSTATIN) 095862 UNIT/GM POWD Apply topically 3 times daily as needed 60 g 1     clotrimazole (LOTRIMIN) 1 % cream Apply topically 2 times daily as needed 50 g 0     fluticasone (FLONASE) 50 MCG/ACT nasal spray Spray 1 spray into both nostrils daily 16 g 3     furosemide (LASIX) 20 MG tablet Take 1 tablet (20 mg) by mouth 2 times daily 60 tablet 3     acetaminophen (TYLENOL) 500 MG tablet Take 2 tablets (1,000 mg) by mouth every 6 hours as needed 1 Bottle 2     solifenacin (VESICARE) 10 MG tablet Take 1 tablet (10 mg) by mouth daily 30 tablet 6     HYDROcodone-acetaminophen (NORCO) 5-325 MG per tablet Take 1 tablet by mouth every 6 hours as needed 1-2 tabs       atorvastatin (LIPITOR) 40 MG tablet  Take 1 tablet (40 mg) by mouth daily 90 tablet 1     calcium polycarbophil (FIBERCON) 625 MG tablet Take 2 tablets by mouth daily       levothyroxine (SYNTHROID, LEVOTHROID) 175 MCG tablet Take 1 tablet (175 mcg) by mouth daily Give on empty stomach 30 tablet 4     metFORMIN (GLUCOPHAGE-XR) 500 MG 24 hr tablet Take 2 tablets (1,000 mg) by mouth 2 times daily (with meals) (Patient taking differently: Take 2,000 mg by mouth daily ) 90 tablet 3     Incontinence Supply Disposable (INCONTINENCE BRIEF LARGE) MISC 2 Units 2 times daily 60 Units 11     losartan (COZAAR) 100 MG tablet Take 1 tablet by mouth daily. 90 tablet 1     Sodium Fluoride (PREVIDENT 5000 PLUS) 1.1 % CREA Apply  to affected area every evening.       Hypromellose (ARTIFICIAL TEARS OP) Apply 1 drop to eye 4 times daily.       Gabapentin (NEURONTIN PO) Take 900 mg by mouth 3 times daily.       senna-docusate (SENNA S) 8.6-50 MG per tablet Take 2 tablets by mouth At Bedtime.       polyethylene glycol (MIRALAX/GLYCOLAX) powder Take 1 capful by mouth 2 times daily. 17 GM PO BID       Artificial Tear Ointment (LACRI-LUBE OP) Apply  to eye. Place 0.5 inches into both eyes at bedtime       Skin Protectants, Misc. (EUCERIN) cream Apply  topically as needed. Apply to thigh PRN dry skin        Saline 0.9 % SOLN Spray 2 sprays in nostril as needed.       Calcium Carbonate-Vitamin D (CALCIUM-VITAMIN D) 250-125 MG-UNIT TABS Take 1 tablet by mouth 2 times daily. Calcium 250 mg/Vit D 125 IU       Sod Fluor-Solo Fluor-Hydrfl Ac (GEL-SASHA DENTINBLOC DT) Apply  to affected area. GEL. Apply to 2nd molar on bottom right daily at bedtime.       lactase (LACTAID) 3000 UNIT tablet Take 4 tabs daily with meals.       Magnesium Hydroxide (MILK OF MAGNESIA PO) Take  by mouth. Take 30 mL as needed for constipation.       CLARITIN 10 MG OR TABS 1 TAB PO QD (Once per day) as needed for ALLERGY SYMPTOMS 30 11     ASPIRIN 81 MG OR TABS Take 1 tablet by mouth daily. ENTERIC COATED. 100  3     GLUCAGON EMERGENCY 1 MG IJ KIT Inject  as directed. 1 mL as needed for signs of hypoglycemia. May repeat as needed in 15 minutes.  0     VITALS   /65  Pulse 64  Wt 109.2 kg (240 lb 12.8 oz)  BMI 41.33 kg/m2   MENTAL STATUS EXAM                                                           Alertness: alert  and oriented  Appearance: well groomed  Behavior/Demeanor: cooperative and pleasant, with good  eye contact   Speech: normal  Language: intact  Psychomotor: normal or unremarkable  Mood: description consistent with euthymia  Affect: full range; was congruent to mood; was congruent to content  Thought Process/Associations: unremarkable  Thought Content:  Reports none;  Denies suicidal ideation , violent ideation  and psychotic thoughts   Perception:  Reports none;  Denies auditory hallucinations and visual hallucinations  Insight: good  Judgment: good  Cognition: does  appear grossly intact; formal cognitive testing was not done    LABS and DATA     RATING SCALES:  AIMS: done 7/6/17 with total score of 0  (see flowsheets)    PHQ9 TODAY = 7  PHQ-9 SCORE 4/25/2017 5/25/2017 7/6/2017   Total Score - - -   Total Score 8 7 7      PSYCHLABANTIPSYCHOTIC  Recent Labs   Lab Test  03/21/17   1023  02/22/16   1028  01/22/15   1600   CHOL  142  162.0  163   TRIG  121  151.9*  252*   LDL  78  95  71   HDL  40*  36.6*  42*     Recent Labs   Lab Test  06/22/17   1312  06/21/17   1047  03/21/17   1023  12/08/16   1405  08/16/16   1431   GLC   --   134*  118*   --   245.9*   A1C  6.9*   --   6.6*  7.0*  6.9*     Recent Labs   Lab Test  06/21/17   1047  03/21/17   1023  06/22/16   1035   07/04/14   0705   03/18/10   0535   WBC   --   11.8*  9.9   --   7.7   < >  12.6*   ANEU   --   8.3   --    --   4.8   --   9.8*   HGB  13.8  13.4  13.5   < >  13.3   < >  10.7*   PLT   --   266  266   --   223   < >  356    < > = values in this interval not displayed.       DIAGNOSIS     Schizoaffective disorder, depressed type, multiple  episodes, in partial remission to mild re: mood, without psychotic features.  Generalized anxiety disorder, well-managed     ASSESSMENT                                   TODAY Pinky continues to report stability with her mental health and mood appears to be euthymic with expected fluctuations in mood and anxiety in context of stressors. No safety concerns. Continues to work on adjusting to using her CPAP, will follow up with sleep medicine next week. She denies having any concerns regarding her medications and is agreeable to not making any changes today, may consider trial of reduced doses given long standing stability at future appointments with goal of simplifying medication regimen along with helping with any possible metabolic side effects given patient is working hard to lose weight and improve physical health. Also, her BP was elevated today, she is asymptomatic, this is being monitored by her PCP along with RN's at [facility].              [place] PRESCRIPTION MONITORING PROGRAM [] was checked today and revealed: no indications of abuse/misuse of medications, has had controlled substance prescribed by outside providers but has not filled multiple prescriptions for same medication within 30 day window    PSYCHOTROPIC DRUG INTERACTIONS:   ZIPRASIDONE and FLUOXETINE may result in increased risk of QT-interval prolongation and increased risk of serotonin syndrome.   ZIPRASIDONE, FLUOXETINE and VESICARE may result in increased risk of QT-interval prolongation.  ASPIRIN and SSRI may result in an increased risk of bleeding  LEVOTHYROXINE and FLUOXETINE may result in increased levothyroxine requirements.  MANAGEMENT:  Monitoring for adverse effects, periodic EKGs and patient is aware of risks     PLAN                                                                                                       1) PSYCHOTROPIC MEDICATIONS:   - continue fluoxetine 40mg daily   - continue ziprasidone 40mg BID with meals   -  continue lorazepam 1mg QHS and 1mg PRN for agitation     2) THERAPY:  Continue with Dr. Gr monthly  TREATMENT PLAN   Date revised  -  8/3/17  Date next due- 11/3/17    3) LABS NEXT DUE:  AP labs due next in March 2018       RATING SCALES:  AIMS next due Jan 2018    4) REFERRALS [MICD, medical etc.]:  none    5) MTM REFERRAL [PharmD]:  none    6) :  none    7) FAMILY MEETING:  none    8) RTC: 6 weeks    9) CRISIS NUMBERS:   Provided routinely in AVS   Especially emphasized:  [facility] [phone] (clinic)    [phone] (after hours)      PSYCHIATRY CLINIC INDIVIDUAL PSYCHOTHERAPY NOTE                                    16   Start time: 1400  End time: 1430  Subjective: This supportive psychotherapy session addressed issues related to goals of therapy.  Patient's reaction: Action in the context of mental status appropriate for ambulatory setting.  Progress: fair    Plan: RTC 6 weeks  Psychotherapy services during this visit included  myself and Pinky.   TREATMENT  PLAN          SYMPTOMS;PROBLEMS   MEASURABLE GOALS;    FUNCTIONAL IMPROVEMENT INTERVENTIONS;    GAINS MADE DISCHARGE CRITERIA   Depression: depressed mood   use physical activity to boost mood, wants goal of 45 mins of activity per day -go to fitness center appointments marked symptom improvement   Depression: insomnia    get 7-8 hours of restful sleep every night -practicing sleep hygiene, calming activities (coloring avoiding tv) marked symptom improvement   TREATMENT RISK STATEMENT:  The risks, benefits, alternatives and potential adverse effects have been discussed and are understood by the pt. The pt understands the risks of using street drugs or alcohol. There are no medical contraindications, the pt agrees to treatment with the ability to do so. The pt knows to call the clinic for any problems or to access emergency care if needed.  Medical and substance use concerns are documented above.  Psychotropic drug interaction  check was done, including changes made today.    RESIDENT:   Joana De Leon, DO    Patient staffed in clinic with Dr. Estrada who will sign the note.  Supervisor is Dr. Gordon.      Supervisor Attestation:  I saw the patient with the resident, and participated in key portions of the service, including the mental status examination and developing the plan of care. I reviewed key portions of the history with the resident. I agree with the findings and plan as documented in this note.  Cortez Estrada MD

## 2017-08-04 ASSESSMENT — PATIENT HEALTH QUESTIONNAIRE - PHQ9: SUM OF ALL RESPONSES TO PHQ QUESTIONS 1-9: 7

## 2017-08-06 ASSESSMENT — ENCOUNTER SYMPTOMS
SINUS PRESSURE: 0
HEMATURIA: 0
MYALGIAS: 0
DIARRHEA: 0
WEAKNESS: 0
HEADACHES: 0
ARTHRALGIAS: 0
COUGH: 0
WHEEZING: 0
COLOR CHANGE: 0
FATIGUE: 0
ABDOMINAL DISTENTION: 0
SORE THROAT: 0
VOMITING: 0
CONSTIPATION: 0
APPETITE CHANGE: 0
NAUSEA: 0
BLOOD IN STOOL: 0
DYSURIA: 0
SHORTNESS OF BREATH: 0
CHILLS: 0
ABDOMINAL PAIN: 0
FEVER: 0

## 2017-08-07 ENCOUNTER — TELEPHONE (OUTPATIENT)
Dept: PSYCHIATRY | Facility: CLINIC | Age: 67
End: 2017-08-07

## 2017-08-07 DIAGNOSIS — F41.1 GENERALIZED ANXIETY DISORDER: ICD-10-CM

## 2017-08-07 NOTE — TELEPHONE ENCOUNTER
Received RF request from staff at patient's LTC for:    LORazepam (ATIVAN) 1 MG tablet 30 tablet 5 5/25/2017  No   Sig: Take 1 tablet (1 mg) by mouth At Bedtime .  May take additional 1mg daily PRN for agitation.       Last RF:  Per [place] :  7/12, 6/10 for 15 d/s both times    Due for RF:  yes    Appointment History:  Last appt: 8/3/17  RTC: 6 weeks  Cancel: none  No-show: none  Next appt: 9/14/17    Plan from most recent clinical note:    PSYCHOTROPIC MEDICATIONS:                         - continue fluoxetine 40mg daily                         - continue ziprasidone 40mg BID with meals                         - continue lorazepam 1mg QHS and 1mg PRN for agitation       Will route to Dr. Poole for authorization of refill for a controlled medication.

## 2017-08-07 NOTE — TELEPHONE ENCOUNTER
----- Message from Naina Romo sent at 8/7/2017 10:46 AM CDT -----  Regarding: Refill Request  Contact: [phone]  Caller:  magda Anaya from [facility]  Medication:  Ativan 1 mg  Pharmacy and location:  [facility]  Have you contacted the pharmacy: Yes  How much of medication do you have left:  6 tabs   Pending appt: Yes  Okay to leave VM:  Yes    Thanks!

## 2017-08-07 NOTE — TELEPHONE ENCOUNTER
She last received 30 tablet prescription for 5 refills on 5/25/17. She told me she is not taking her PRN dose, so unless this is false information that should be clarified with [facility] nursing staff, then she should not need a refill until October. Can you please clarify and then we can decide how to refill? If she is using the PRN at times then would want to adjust the script to more than 30 tablets.   Thanks!  Joe

## 2017-08-08 NOTE — TELEPHONE ENCOUNTER
Called Narendra Truong to obtain more information regarding how the patient is using Ativan to determine the discrepancy between her report regarding how she uses it and the timing of the refill request.  Left  for nurse asking for a return call.

## 2017-08-08 NOTE — TELEPHONE ENCOUNTER
[facility] calling from [facility]rendra man to request written orders regarding Lantus (see Dr Kelley's note from 6/27/17). [facility] advised that they received a verbal order regarding the Lantus, but would like it in writing as well. Please fax Lantus orders to [phone].

## 2017-08-08 NOTE — TELEPHONE ENCOUNTER
Note from PCP printed, physically signed by PCP and faxed to facility at number provided.    Elizabeth Petty RN

## 2017-08-10 ENCOUNTER — OFFICE VISIT (OUTPATIENT)
Dept: SLEEP MEDICINE | Facility: CLINIC | Age: 67
End: 2017-08-10
Attending: INTERNAL MEDICINE
Payer: COMMERCIAL

## 2017-08-10 VITALS
OXYGEN SATURATION: 97 % | BODY MASS INDEX: 41.15 KG/M2 | WEIGHT: 241 LBS | HEIGHT: 64 IN | DIASTOLIC BLOOD PRESSURE: 60 MMHG | SYSTOLIC BLOOD PRESSURE: 169 MMHG | HEART RATE: 73 BPM | RESPIRATION RATE: 18 BRPM

## 2017-08-10 DIAGNOSIS — G47.33 OSA (OBSTRUCTIVE SLEEP APNEA): Primary | ICD-10-CM

## 2017-08-10 PROCEDURE — 99211 OFF/OP EST MAY X REQ PHY/QHP: CPT | Mod: ZF

## 2017-08-10 RX ORDER — LORAZEPAM 1 MG/1
1 TABLET ORAL AT BEDTIME
Qty: 30 TABLET | Refills: 2 | Status: CANCELLED | OUTPATIENT
Start: 2017-08-10

## 2017-08-10 RX ORDER — LORAZEPAM 1 MG/1
1 TABLET ORAL AT BEDTIME
Qty: 30 TABLET | Refills: 2 | Status: SHIPPED | OUTPATIENT
Start: 2017-08-10 | End: 2017-10-26

## 2017-08-10 NOTE — PROGRESS NOTES
"Chief complaint: Follow-up of obstructive sleep apnea, reinitiating treatment with CPAP    History of present illness: Pinky Page is a 67-year-old female with history of obstructive sleep apnea, diabetes, hypertension. In the past she had had a treatment trial and was CPAP intolerant . She was then treated with supplemental oxygen with sleep. She recently underwent repeat sleep study to evaluate obstructive sleep apnea after weight changes and was found to have persistent sleep apnea.(AHI 2017 of 15.2) She we decided to re-consider treatment therapy. She underwent CPAP titration and has resumed CPAP in the last couple months.    She uses CPAP nightly. She does state that since using it she's had a few more nights where she's had difficulty initiating sleep. She is not sure why. She denies pain or discomfort associated with the mask or pressures. She finds herself awake and thinking. She had tried some distracting activities which have been helpful. She also notes that the frequency of this is been decreasing. Once she falls asleep she feels her sleep quality is better than it had been before. She's waking up less to go to the bathroom. She continues to wake up regularly in the morning for medications at 6 AM. There has been a trend in her blood sugars downward in the morning. Jackson is normal at 7. She does occasionally fall asleep while resting with her legs up.    Download of data from her CPAP machine from June 12, 2017 to July 11, 2017 shows that she is meeting compliance with percent of days used greater than 4 hours of 73%  Average use 5 hours and 6 minutes  Current settings are for min pressure of 13, max pressure of 15  90th percentile for pressure delivered is 14.2  AHI is optimally treated at 3.3  There does appear to be significant leak issues however.    Past medical history, medications, allergies reviewed    Exam: Pleasant alert female, no distress  /60  Pulse 73  Resp 18  Ht 1.626 m (5' 4\") "  Wt 109.3 kg (241 lb)  SpO2 97%  BMI 41.37 kg/m2     Assessment: 67-year-old female with diabetes, hypertension, obstructive sleep apnea resuming treatment with CPAP. She is getting good clinical benefit and meeting compliance cold. She's had some sleep initiation insomnia that is slowly improving. No adjustments of her settings are required. She does need to address leak with her DME provider.    Plan: Patient was urged to try to minimize daytime naps, set an alarm for instance to make sure she she doesn't fall asleep at its for a short period of time less than 15 minutes. We'll ask her DME provider [facility] to work with her to minimize a leak. We also discussed some ways to improve sleep initiation. She is urged to not go to bed until sleepy, avoid watching TV before/in bed, she should do some relaxing activities such as coloring or reading. Patient is agreeable with this plan. She is recommended to follow-up with her primary care regarding blood pressure as well as her blood sugars. She'll follow up in this clinic in 9 months.    Please see patient's actions for further due to for counseling.  Twenty-five minutes spent with patient, >50% spent counseling and coordinating care.    Brianda Reddy M.D.  Pulmonary/Critical Care/Sleep Medicine    The above note was dictated using voice recognition software and may include typographical errors. Please contact the author for any clarifications.

## 2017-08-10 NOTE — PATIENT INSTRUCTIONS
Relaxing bedtime routing with coloring, reading, avoid TV  Don't go to bed until sleepy-11-11:30, keep regular wake up time.  Work with [facility] to fix mask leak fit  Avoid falling asleep during the day-use alarm to keep naps short  Your BMI is Body mass index is 41.37 kg/(m^2).  Weight management is a personal decision.  If you are interested in exploring weight loss strategies, the following discussion covers the approaches that may be successful. Body mass index (BMI) is one way to tell whether you are at a healthy weight, overweight, or obese. It measures your weight in relation to your height.  A BMI of 18.5 to 24.9 is in the healthy range. A person with a BMI of 25 to 29.9 is considered overweight, and someone with a BMI of 30 or greater is considered obese. More than two-thirds of American adults are considered overweight or obese.  Being overweight or obese increases the risk for further weight gain. Excess weight may lead to heart disease and diabetes.  Creating and following plans for healthy eating and physical activity may help you improve your health.  Weight control is part of healthy lifestyle and includes exercise, emotional health, and healthy eating habits. Careful eating habits lifelong are the mainstay of weight control. Though there are significant health benefits from weight loss, long-term weight loss with diet alone may be very difficult to achieve- studies show long-term success with dietary management in less than 10% of people. Attaining a healthy weight may be especially difficult to achieve in those with severe obesity. In some cases, medications, devices and surgical management might be considered.  What can you do?  If you are overweight or obese and are interested in methods for weight loss, you should discuss this with your provider.     Consider reducing daily calorie intake by 500 calories.     Keep a food journal.     Avoiding skipping meals, consider cutting portions  instead.    Diet combined with exercise helps maintain muscle while optimizing fat loss. Strength training is particularly important for building and maintaining muscle mass. Exercise helps reduce stress, increase energy, and improves fitness. Increasing exercise without diet control, however, may not burn enough calories to loose weight.       Start walking three days a week 10-20 minutes at a time    Work towards walking thirty minutes five days a week     Eventually, increase the speed of your walking for 1-2 minutes at time    In addition, we recommend that you review healthy lifestyles and methods for weight loss available through the National Institutes of Health patient information sites:  http://win.niddk.nih.gov/publications/index.htm    And look into health and wellness programs that may be available through your health insurance provider, employer, local community center, or wally club.    Weight management plan: Patient was referred to their PCP to discuss a diet and exercise plan.     Your blood pressure was checked while you were in clinic today.  Please read the guidelines below about what these numbers mean and what you should do about them.  Your systolic blood pressure is the top number.  This is the pressure when the heart is pumping.  Your diastolic blood pressure is the bottom number.  This is the pressure in between beats.  If your systolic blood pressure is less than 120 and your diastolic blood pressure is less than 80, then your blood pressure is normal. There is nothing more that you need to do about it  If your systolic blood pressure is 120-139 or your diastolic blood pressure is 80-89, your blood pressure may be higher than it should be.  You should have your blood pressure re-checked within a year by a primary care provider.  If your systolic blood pressure is 140 or greater or your diastolic blood pressure is 90 or greater, you may have high blood pressure.  High blood pressure is  treatable, but if left untreated over time it can put you at risk for heart attack, stroke, or kidney failure.  You should have your blood pressure re-checked by a primary care provider within the next four weeks.

## 2017-08-10 NOTE — TELEPHONE ENCOUNTER
Spoke with Jaclyn regarding patient's usage of Ativan.  She reported that patient takes it every night, but takes PRN very infrequently.  She has only taken it once this month.  Last refill received was 30 tablets on 7/12, and when Jaclyn called [facility], they told her that there are no refills remaining on file.   indicated that original order was dated 5/1, which is a discrepancy from med tab, which indicates 30 tabs with 5 refills ordered on 5/25.  That order was printed per med tab, and assumed to have been given to patient or her staff during the 5/25 appointment.      Called the pharmacy and spoke with Sunil, who reported that they did not receive a script from 5/25/17.    Will route to Dr. De Leon for recommendation.

## 2017-08-10 NOTE — TELEPHONE ENCOUNTER
Caller:  Jaclyn from [facility]   Medication:  Ativan 1mg at HS, PRN 1mg   Pharmacy and location:  [facility] (delivery pharmacy), [facility], fax: [phone]   Have you contacted the pharmacy:   How much of medication do you have left:  1 pill   Pending appt: 9/14   Okay to leave VM:  yes     Jaclyn was returning a phone call from you yesterday.

## 2017-08-10 NOTE — NURSING NOTE
"Chief Complaint   Patient presents with     CPAP Follow Up       Initial /60  Pulse 73  Resp 18  Ht 1.626 m (5' 4\")  Wt 109.3 kg (241 lb)  SpO2 97%  BMI 41.37 kg/m2 Estimated body mass index is 41.37 kg/(m^2) as calculated from the following:    Height as of this encounter: 1.626 m (5' 4\").    Weight as of this encounter: 109.3 kg (241 lb).  Medication Reconciliation: complete     JONNATHAN Mark        "

## 2017-08-11 NOTE — TELEPHONE ENCOUNTER
Prescription faxed on 8/10/17.  Called the pharmacy to confirm receipt.  Jenna reported that they received the faxed prescription and have filled it. She said that they don't need an actual hard copy for Ativan.    Marked the hard copy void and placed in the shredder bin.

## 2017-08-28 ENCOUNTER — OFFICE VISIT (OUTPATIENT)
Dept: ENDOCRINOLOGY | Facility: CLINIC | Age: 67
End: 2017-08-28

## 2017-08-28 ENCOUNTER — OFFICE VISIT (OUTPATIENT)
Dept: PSYCHOLOGY | Facility: CLINIC | Age: 67
End: 2017-08-28

## 2017-08-28 VITALS — WEIGHT: 241.3 LBS | BODY MASS INDEX: 41.42 KG/M2

## 2017-08-28 VITALS
HEART RATE: 65 BPM | WEIGHT: 242 LBS | BODY MASS INDEX: 41.32 KG/M2 | OXYGEN SATURATION: 97 % | DIASTOLIC BLOOD PRESSURE: 78 MMHG | SYSTOLIC BLOOD PRESSURE: 160 MMHG | HEIGHT: 64 IN

## 2017-08-28 DIAGNOSIS — F54 PSYCHOLOGICAL FACTORS AFFECTING MORBID OBESITY (H): ICD-10-CM

## 2017-08-28 DIAGNOSIS — F33.0 MAJOR DEPRESSIVE DISORDER, RECURRENT EPISODE, MILD (H): Primary | ICD-10-CM

## 2017-08-28 DIAGNOSIS — E66.01 MORBID OBESITY (H): Primary | ICD-10-CM

## 2017-08-28 DIAGNOSIS — E66.01 PSYCHOLOGICAL FACTORS AFFECTING MORBID OBESITY (H): ICD-10-CM

## 2017-08-28 ASSESSMENT — PAIN SCALES - GENERAL: PAINLEVEL: NO PAIN (0)

## 2017-08-28 ASSESSMENT — ENCOUNTER SYMPTOMS
POOR WOUND HEALING: 0
NAIL CHANGES: 0
SKIN CHANGES: 0

## 2017-08-28 NOTE — LETTER
"2017       RE: Pinky Page  [address]  [facility] [address]     Dear Colleague,    Thank you for referring your patient, Pinky Page, to the [facility] MEDICAL WEIGHT MANAGEMENT at [facility]. Please see a copy of my visit note below.        Return Medical Weight Management Note     Pinky Page  MRN:  [id number]  :  1950  CORINNE:  2017    Dear Dr. Ann,     I had the pleasure of seeing your patient Pinky Page.  She is a 65 year old female who I am continuing to see for treatment of obesity related to: DM-2 and Hypertension.    CURRENT WEIGHT:   242 lbs 0 oz    Wt Readings from Last 4 Encounters:   17 109.8 kg (242 lb)   08/10/17 109.3 kg (241 lb)   17 108.7 kg (239 lb 9.6 oz)   17 111.6 kg (246 lb)     Height:  5' 4\"  Body Mass Index:  Body mass index is 41.54 kg/(m^2).  Vitals:  B/P: 163/75, P: 68    Initial consult weight was 283 on .  Weight change since last seen on 2017 is down 3 pounds.   Total loss is 41 pounds.    INTERVAL HISTORY:  Patient has been working on the following dietary changes: Still eating from the diet line at her group home (1500 miguel, no concentrated sweets, some fruit snacks).   She also continues to be on Byetta 10 cg BID for her DM and also to help her lose weight and as a appetite suppressant as well.  Her DM is being managed by us as well as the [facility] ( PCP).  Patient has been working on the following activity changes: Very regular walking at least 40 minutes / day.    Diet and Activity Changes Since Last Visit Reviewed With Patient 2017   I have made the following changes to my diet since my last visit: drink more water   With regards to my diet, I am still struggling with: eating to much   For breakfast, I typically eat: cereal and milk   For lunch, I typically eat: salad or sandwich   For supper, I typically eat: meat and potatoes nd vegetablea "   For snack(s), I typically eat: pretzels and fruit   I have made the following changes to my activity/exercise since my last visit: walk more   With regards to my activity/exercise, I am still struggling with: doing it       MEDICATIONS:   Current Outpatient Prescriptions   Medication     LORazepam (ATIVAN) 1 MG tablet     alum & mag hydroxide-simethicone (MYLANTA/MAALOX) 200-200-20 MG/5ML SUSP suspension     artificial saliva (BIOTENE MT) AERS spray     FLUoxetine (PROZAC) 40 MG capsule     ziprasidone (GEODON) 40 MG capsule     blood glucose monitoring (NO BRAND SPECIFIED) test strip     exenatide (BYETTA) 10 MCG/0.04ML injection     insulin glargine (LANTUS) 100 UNIT/ML injection     nystatin (MYCOSTATIN) 307221 UNIT/GM POWD     fluticasone (FLONASE) 50 MCG/ACT nasal spray     furosemide (LASIX) 20 MG tablet     acetaminophen (TYLENOL) 500 MG tablet     solifenacin (VESICARE) 10 MG tablet     HYDROcodone-acetaminophen (NORCO) 5-325 MG per tablet     atorvastatin (LIPITOR) 40 MG tablet     calcium polycarbophil (FIBERCON) 625 MG tablet     levothyroxine (SYNTHROID, LEVOTHROID) 175 MCG tablet     metFORMIN (GLUCOPHAGE-XR) 500 MG 24 hr tablet     Incontinence Supply Disposable (INCONTINENCE BRIEF LARGE) MISC     losartan (COZAAR) 100 MG tablet     Sodium Fluoride (PREVIDENT 5000 PLUS) 1.1 % CREA     Hypromellose (ARTIFICIAL TEARS OP)     Gabapentin (NEURONTIN PO)     senna-docusate (SENNA S) 8.6-50 MG per tablet     polyethylene glycol (MIRALAX/GLYCOLAX) powder     Artificial Tear Ointment (LACRI-LUBE OP)     Skin Protectants, Misc. (EUCERIN) cream     Saline 0.9 % SOLN     Calcium Carbonate-Vitamin D (CALCIUM-VITAMIN D) 250-125 MG-UNIT TABS     Sod Fluor-Solo Fluor-Hydrfl Ac (GEL-SASHA DENTINBLOC DT)     lactase (LACTAID) 3000 UNIT tablet     Magnesium Hydroxide (MILK OF MAGNESIA PO)     CLARITIN 10 MG OR TABS     GLUCAGON EMERGENCY 1 MG IJ KIT     amLODIPine (NORVASC) 5 MG tablet     clotrimazole (LOTRIMIN) 1 %  cream     ASPIRIN 81 MG OR TABS     No current facility-administered medications for this visit.        Weight Loss Medication History Reviewed With Patient 8/28/2017   Which weight loss medications are you currently taking on a regular basis?  Byetta (exentatide)   Are you having any side effects from the weight loss medication that we have prescribed you? No   If you are having side effects please describe: -     ASSESSMENT:   Continues to do well with weight loss maintenance. HbA1c 6.9 on 6/22/17.    FOLLOW-UP:    12 weeks.  10/15 minutes spent on counseling and education    Sincerely,    Marcos Magdaleno MD

## 2017-08-28 NOTE — MR AVS SNAPSHOT
After Visit Summary   8/28/2017    Pinky Page    MRN: [id number]           Patient Information     Date Of Birth          1950        Visit Information        Provider Department      8/28/2017 11:30 AM Marcos Magdaleno MD [facility] Weight Management        Today's Diagnoses     Morbid obesity (H)    -  1       Follow-ups after your visit        Follow-up notes from your care team     Return in about 3 months (around 11/28/2017).      Your next 10 appointments already scheduled     Oct 10, 2017  1:00 PM CDT   (Arrive by 12:45 PM)   Return Visit with Damon Gr, PhD [facility] ([facility] [place])    [address]  3rd Floor  [facility] [address]   [phone]            Oct 16, 2017  2:20 PM CDT   PHYSICAL with Shamika Kelley MD   [facility] ([facility])    [facility] [address],  [address]  [facility] [address]   [phone]            Nov 13, 2017  2:15 PM CST   Adult Med Follow UP with Joana De Leon MD   Psychiatry Clinic ([facility])    [address]  [address] [place] [address] [phone]            Nov 14, 2017  1:30 PM CST   NEW GENERAL with Michael Lopez MD   Eye Clinic ([facility])    [address]  [address] [address]  [facility] [address]   [phone]            Jan 08, 2018 11:30 AM CST   (Arrive by 11:15 AM)   Return Visit with Marcos Magdaleno MD   [facility] Weight Management ([facility] [place])    [address]  4th Floor  [facility] [address]   [phone]            May 10, 2018 11:30 AM CDT   Return Sleep Patient with Brianda Reddy MD   [facility] [place] Sleep Study ([facility])    [address] [place] [id number] [phone]              Who to contact   "   Please call your clinic at [phone] to:    Ask questions about your health    Make or cancel appointments    Discuss your medicines    Learn about your test results    Speak to your doctor   If you have compliments or concerns about an experience at your clinic, or if you wish to file a complaint, please contact [facility] Patient Relations at [phone] or email us at [email or web address]         Additional Information About Your Visit        [employer] Information     WellRight is an electronic gateway that provides easy, online access to your medical records. With WellRight, you can request a clinic appointment, read your test results, renew a prescription or communicate with your care team.     To sign up for WellRight visit the website at [email or web address]   You will be asked to enter the access code listed below, as well as some personal information. Please follow the directions to create your username and password.     Your access code is: [email or web address]  Expires: 2017  7:15 PM     Your access code will  in 90 days. If you need help or a new code, please contact your [facility] or call [phone] for assistance.        Care EveryWhere ID     This is your Care EveryWhere ID. This could be used by other organizations to access your [place] medical records  [id number]        Your Vitals Were     Pulse Height Pulse Oximetry BMI (Body Mass Index)          65 1.626 m (5' 4\") 97% 41.54 kg/m2         Blood Pressure from Last 3 Encounters:   17 126/82   17 160/78   08/10/17 169/60    Weight from Last 3 Encounters:   17 111 kg (244 lb 12.8 oz)   17 109.8 kg (242 lb)   08/10/17 109.3 kg (241 lb)              Today, you had the following     No orders found for display         Today's Medication Changes          These changes are accurate as of: 17 11:59 PM.  If you have any questions, ask your nurse " or doctor.               These medicines have changed or have updated prescriptions.        Dose/Directions    metFORMIN 500 MG 24 hr tablet   Commonly known as:  GLUCOPHAGE-XR   This may have changed:    - how much to take  - when to take this   Used for:  Diabetes mellitus, type 2 (H)        Dose:  1000 mg   Take 2 tablets (1,000 mg) by mouth 2 times daily (with meals)   Quantity:  90 tablet   Refills:  3                Primary Care Provider Office Phone # Fax #    Shamika Ileana Kelley [phone]       [facility] [address] [facility] [address]        Equal Access to Services     [facility] AH: Hadii james elizalde hadasho Soomaali, waaxda luqadaha, qaybta kaalmada daxa, autumn caldwell . So [facility] [phone].    ATENCIÓN: Si habla español, tiene a deluca disposición servicios gratuitos de asistencia lingüística. [facility] [phone].    We comply with applicable federal civil rights laws and [place] laws. We do not discriminate on the basis of race, color, national origin, age, disability, sex, sexual orientation, or gender identity.            Thank you!     Thank you for choosing [facility] MEDICAL WEIGHT MANAGEMENT  for your care. Our goal is always to provide you with excellent care. Hearing back from our patients is one way we can continue to improve our services. Please take a few minutes to complete the written survey that you may receive in the mail after your visit with us. Thank you!             Your Updated Medication List - Protect others around you: Learn how to safely use, store and throw away your medicines at www.disposemymeds.org.          This list is accurate as of: 8/28/17 11:59 PM.  Always use your most recent med list.                   Brand Name Dispense Instructions for use Diagnosis    acetaminophen 500 MG tablet    TYLENOL    1 Bottle    Take 2 tablets (1,000 mg) by mouth every 6 hours as needed    Sprain of left ankle,  unspecified ligament, initial encounter       alum & mag hydroxide-simethicone 200-200-20 MG/5ML Susp suspension    MYLANTA/MAALOX     Take 30 mLs by mouth daily as needed for indigestion        amLODIPine 5 MG tablet    NORVASC    30 tablet    Take 2 tablets (10 mg) by mouth daily    Essential hypertension with goal blood pressure less than 130/85       artificial saliva Aers spray      Take 1 spray by mouth 3 times daily as needed for dry mouth        ARTIFICIAL TEARS OP      Apply 1 drop to eye 4 times daily.        aspirin 81 MG tablet     100    Take 1 tablet by mouth daily. ENTERIC COATED.        atorvastatin 40 MG tablet    LIPITOR    90 tablet    Take 1 tablet (40 mg) by mouth daily    Hyperlipidemia LDL goal <100       blood glucose monitoring test strip    no brand specified    100 each    Use to test blood sugar 3 times daily or as directed.    Type 2 diabetes mellitus with microalbuminuria, with long-term current use of insulin (H)       calcium polycarbophil 625 MG tablet    FIBERCON     Take 2 tablets by mouth daily        calcium-vitamin D 250-125 MG-UNIT Tabs      Take 1 tablet by mouth 2 times daily. Calcium 250 mg/Vit D 125 IU        CLARITIN 10 MG tablet   Generic drug:  loratadine     30    1 TAB PO QD (Once per day) as needed for ALLERGY SYMPTOMS        clotrimazole 1 % cream    LOTRIMIN    50 g    Apply topically 2 times daily as needed    Dermatophytosis       eucerin cream      Apply  topically as needed. Apply to thigh PRN dry skin        exenatide 10 MCG/0.04ML injection    BYETTA    1 Syringe    Inject 10 mcg Subcutaneous 2 times daily (before meals)    Type 2 diabetes mellitus with microalbuminuria, with long-term current use of insulin (H)       FLUoxetine 40 MG capsule    PROzac    30 capsule    Take 1 capsule (40 mg) by mouth daily    Schizoaffective disorder, depressive type (H)       fluticasone 50 MCG/ACT spray    FLONASE    16 g    Spray 1 spray into both nostrils daily    Seasonal  allergic rhinitis       furosemide 20 MG tablet    LASIX    60 tablet    Take 1 tablet (20 mg) by mouth 2 times daily    Lower leg edema       GEL-SASHA DENTINBLOC DT      Apply  to affected area. GEL. Apply to 2nd molar on bottom right daily at bedtime.        GLUCAGON EMERGENCY 1 MG kit   Generic drug:  glucagon      Inject  as directed. 1 mL as needed for signs of hypoglycemia. May repeat as needed in 15 minutes.        HYDROcodone-acetaminophen 5-325 MG per tablet    NORCO     Take 1 tablet by mouth every 6 hours as needed 1-2 tabs        Incontinence Brief Large Misc     60 Units    2 Units 2 times daily    Urge incontinence, Nocturnal enuresis       insulin glargine 100 UNIT/ML injection    LANTUS    8 mL    Inject 24 Units Subcutaneous At Bedtime    Type 2 diabetes mellitus with microalbuminuria, with long-term current use of insulin (H)       LACRI-LUBE OP      Apply  to eye. Place 0.5 inches into both eyes at bedtime        LACTAID 3000 UNIT tablet   Generic drug:  lactase      Take 4 tabs daily with meals.        levothyroxine 175 MCG tablet    SYNTHROID/LEVOTHROID    30 tablet    Take 1 tablet (175 mcg) by mouth daily Give on empty stomach    Other specified hypothyroidism       LORazepam 1 MG tablet    ATIVAN    30 tablet    Take 1 tablet (1 mg) by mouth At Bedtime .  May take additional 1mg daily PRN for agitation.    Generalized anxiety disorder       losartan 100 MG tablet    COZAAR    90 tablet    Take 1 tablet by mouth daily.    Hypertension goal BP (blood pressure) < 130/80, Diabetes mellitus type 2, insulin dependent (H), CKD (chronic kidney disease) stage 2, GFR 60-89 ml/min       metFORMIN 500 MG 24 hr tablet    GLUCOPHAGE-XR    90 tablet    Take 2 tablets (1,000 mg) by mouth 2 times daily (with meals)    Diabetes mellitus, type 2 (H)       MILK OF MAGNESIA PO      Take  by mouth. Take 30 mL as needed for constipation.        NEURONTIN PO      Take 900 mg by mouth 3 times daily.        nystatin  234578 UNIT/GM Powd    MYCOSTATIN    60 g    Apply topically 3 times daily as needed    Candidiasis of skin       polyethylene glycol powder    MIRALAX/GLYCOLAX     Take 1 capful by mouth 2 times daily. 17 GM PO BID        PREVIDENT 5000 PLUS 1.1 % Crea   Generic drug:  Sodium Fluoride      Apply  to affected area every evening.        saline 0.9 % Soln      Spray 2 sprays in nostril as needed.        SENNA S 8.6-50 MG per tablet   Generic drug:  senna-docusate      Take 2 tablets by mouth At Bedtime.        solifenacin 10 MG tablet    VESICARE    30 tablet    Take 1 tablet (10 mg) by mouth daily    Urge incontinence       ziprasidone 40 MG capsule    GEODON    60 capsule    Take 1 capsule (40 mg) by mouth 2 times daily (with meals)    Schizoaffective disorder, depressive type (H)

## 2017-08-28 NOTE — NURSING NOTE
"No chief complaint on file.    Weight: 109.8 kg (242 lb)  Height: 162.6 cm (5' 4\")  BMI (Calculated): 41.63  BP: 160/78  Pulse: 65  SpO2: 97 %      Patient Active Problem List   Diagnosis     Allergic rhinitis due to pollen     Urge incontinence     Hypertension, essential     Cardiomegaly     Chronic constipation     Dry eye syndrome     Esophageal reflux     Exposure keratoconjunctivitis     DM ophthalmopathy (H)     Hypothyroidism     Senile cataract     ANUJ (obstructive sleep apnea)     Postmenopausal atrophic vaginitis     Restless leg syndrome     Squamous blepharitis     Morbid obesity due to excess calories (H)     Personal history of breast cancer s/p L masectomy     Diabetes mellitus type 2, insulin dependent (H)     Hypercholesteremia     Lives in group home     Extrapyramidal and movement disorder     CKD (chronic kidney disease) stage 2, GFR 60-89 ml/min     Solitary kidney, congenital     S/P hysterectomy     Impingement syndrome of right shoulder     Hypertriglyceridemia     Candidiasis of skin     Generalized anxiety disorder     Carpal tunnel syndrome of left wrist     Schizoaffective disorder, depressive type (H)     Health Care Home     Major depressive disorder, recurrent episode, moderate (H)     Psychological factors affecting medical condition     Hx of psychiatric care     Nail complaint     Microalbuminuria due to type 2 diabetes mellitus (H)     Type 2 diabetes mellitus with microalbuminuria, with long-term current use of insulin (H)     Diabetes mellitus, type II, insulin dependent (H)     CKD (chronic kidney disease) stage 1, GFR 90 ml/min or greater     Current Outpatient Prescriptions   Medication Sig Dispense Refill     LORazepam (ATIVAN) 1 MG tablet Take 1 tablet (1 mg) by mouth At Bedtime .  May take additional 1mg daily PRN for agitation. 30 tablet 2     alum & mag hydroxide-simethicone (MYLANTA/MAALOX) 200-200-20 MG/5ML SUSP suspension Take 30 mLs by mouth daily as needed for " indigestion       artificial saliva (BIOTENE MT) AERS spray Take 1 spray by mouth 3 times daily as needed for dry mouth       FLUoxetine (PROZAC) 40 MG capsule Take 1 capsule (40 mg) by mouth daily 30 capsule 5     ziprasidone (GEODON) 40 MG capsule Take 1 capsule (40 mg) by mouth 2 times daily (with meals) 60 capsule 5     blood glucose monitoring (NO BRAND SPECIFIED) test strip Use to test blood sugar 3 times daily or as directed. 100 each 3     exenatide (BYETTA) 10 MCG/0.04ML injection Inject 10 mcg Subcutaneous 2 times daily (before meals) 1 Syringe 11     insulin glargine (LANTUS) 100 UNIT/ML injection Inject 24 Units Subcutaneous At Bedtime 8 mL 11     amLODIPine (NORVASC) 5 MG tablet Take 2 tablets (10 mg) by mouth daily 30 tablet 3     nystatin (MYCOSTATIN) 108456 UNIT/GM POWD Apply topically 3 times daily as needed 60 g 1     clotrimazole (LOTRIMIN) 1 % cream Apply topically 2 times daily as needed 50 g 0     fluticasone (FLONASE) 50 MCG/ACT nasal spray Spray 1 spray into both nostrils daily 16 g 3     furosemide (LASIX) 20 MG tablet Take 1 tablet (20 mg) by mouth 2 times daily 60 tablet 3     acetaminophen (TYLENOL) 500 MG tablet Take 2 tablets (1,000 mg) by mouth every 6 hours as needed 1 Bottle 2     solifenacin (VESICARE) 10 MG tablet Take 1 tablet (10 mg) by mouth daily 30 tablet 6     HYDROcodone-acetaminophen (NORCO) 5-325 MG per tablet Take 1 tablet by mouth every 6 hours as needed 1-2 tabs       atorvastatin (LIPITOR) 40 MG tablet Take 1 tablet (40 mg) by mouth daily 90 tablet 1     calcium polycarbophil (FIBERCON) 625 MG tablet Take 2 tablets by mouth daily       levothyroxine (SYNTHROID, LEVOTHROID) 175 MCG tablet Take 1 tablet (175 mcg) by mouth daily Give on empty stomach 30 tablet 4     metFORMIN (GLUCOPHAGE-XR) 500 MG 24 hr tablet Take 2 tablets (1,000 mg) by mouth 2 times daily (with meals) (Patient taking differently: Take 2,000 mg by mouth daily ) 90 tablet 3     Incontinence Supply  Disposable (INCONTINENCE BRIEF LARGE) MISC 2 Units 2 times daily 60 Units 11     losartan (COZAAR) 100 MG tablet Take 1 tablet by mouth daily. 90 tablet 1     Sodium Fluoride (PREVIDENT 5000 PLUS) 1.1 % CREA Apply  to affected area every evening.       Hypromellose (ARTIFICIAL TEARS OP) Apply 1 drop to eye 4 times daily.       Gabapentin (NEURONTIN PO) Take 900 mg by mouth 3 times daily.       senna-docusate (SENNA S) 8.6-50 MG per tablet Take 2 tablets by mouth At Bedtime.       polyethylene glycol (MIRALAX/GLYCOLAX) powder Take 1 capful by mouth 2 times daily. 17 GM PO BID       Artificial Tear Ointment (LACRI-LUBE OP) Apply  to eye. Place 0.5 inches into both eyes at bedtime       Skin Protectants, Misc. (EUCERIN) cream Apply  topically as needed. Apply to thigh PRN dry skin        Saline 0.9 % SOLN Spray 2 sprays in nostril as needed.       Calcium Carbonate-Vitamin D (CALCIUM-VITAMIN D) 250-125 MG-UNIT TABS Take 1 tablet by mouth 2 times daily. Calcium 250 mg/Vit D 125 IU       Sod Fluor-Solo Fluor-Hydrfl Ac (GEL-SASHA DENTINBLOC DT) Apply  to affected area. GEL. Apply to 2nd molar on bottom right daily at bedtime.       lactase (LACTAID) 3000 UNIT tablet Take 4 tabs daily with meals.       Magnesium Hydroxide (MILK OF MAGNESIA PO) Take  by mouth. Take 30 mL as needed for constipation.       CLARITIN 10 MG OR TABS 1 TAB PO QD (Once per day) as needed for ALLERGY SYMPTOMS 30 11     ASPIRIN 81 MG OR TABS Take 1 tablet by mouth daily. ENTERIC COATED. 100 3     GLUCAGON EMERGENCY 1 MG IJ KIT Inject  as directed. 1 mL as needed for signs of hypoglycemia. May repeat as needed in 15 minutes.  0     Diabetes Eval:  Pain Eval:  No Pain (0)           History   Smoking Status     Never Smoker   Smokeless Tobacco     Never Used       Signed By:  Elena Thomas; August 28, 2017; 11:28 AM

## 2017-08-28 NOTE — PROGRESS NOTES
"    Return Medical Weight Management Note     Pinky Page  MRN:  [id number]  :  1950  CORINNE:  2017    Dear Dr. Ann,     I had the pleasure of seeing your patient Pinky Page.  She is a 65 year old female who I am continuing to see for treatment of obesity related to: DM-2 and Hypertension.    CURRENT WEIGHT:   242 lbs 0 oz    Wt Readings from Last 4 Encounters:   17 109.8 kg (242 lb)   08/10/17 109.3 kg (241 lb)   17 108.7 kg (239 lb 9.6 oz)   17 111.6 kg (246 lb)     Height:  5' 4\"  Body Mass Index:  Body mass index is 41.54 kg/(m^2).  Vitals:  B/P: 163/75, P: 68    Initial consult weight was 283 on .  Weight change since last seen on 2017 is down 3 pounds.   Total loss is 41 pounds.    INTERVAL HISTORY:  Patient has been working on the following dietary changes: Still eating from the diet line at her group home (1500 miguel, no concentrated sweets, some fruit snacks).   She also continues to be on Byetta 10 cg BID for her DM and also to help her lose weight and as a appetite suppressant as well.  Her DM is being managed by us as well as the [place] [facility] ( PCP).  Patient has been working on the following activity changes: Very regular walking at least 40 minutes / day.    Diet and Activity Changes Since Last Visit Reviewed With Patient 2017   I have made the following changes to my diet since my last visit: drink more water   With regards to my diet, I am still struggling with: eating to much   For breakfast, I typically eat: cereal and milk   For lunch, I typically eat: salad or sandwich   For supper, I typically eat: meat and potatoes nd vegetablea   For snack(s), I typically eat: pretzels and fruit   I have made the following changes to my activity/exercise since my last visit: walk more   With regards to my activity/exercise, I am still struggling with: doing it       MEDICATIONS:   Current Outpatient Prescriptions   Medication     LORazepam (ATIVAN) 1 MG tablet "     alum & mag hydroxide-simethicone (MYLANTA/MAALOX) 200-200-20 MG/5ML SUSP suspension     artificial saliva (BIOTENE MT) AERS spray     FLUoxetine (PROZAC) 40 MG capsule     ziprasidone (GEODON) 40 MG capsule     blood glucose monitoring (NO BRAND SPECIFIED) test strip     exenatide (BYETTA) 10 MCG/0.04ML injection     insulin glargine (LANTUS) 100 UNIT/ML injection     nystatin (MYCOSTATIN) 296128 UNIT/GM POWD     fluticasone (FLONASE) 50 MCG/ACT nasal spray     furosemide (LASIX) 20 MG tablet     acetaminophen (TYLENOL) 500 MG tablet     solifenacin (VESICARE) 10 MG tablet     HYDROcodone-acetaminophen (NORCO) 5-325 MG per tablet     atorvastatin (LIPITOR) 40 MG tablet     calcium polycarbophil (FIBERCON) 625 MG tablet     levothyroxine (SYNTHROID, LEVOTHROID) 175 MCG tablet     metFORMIN (GLUCOPHAGE-XR) 500 MG 24 hr tablet     Incontinence Supply Disposable (INCONTINENCE BRIEF LARGE) MISC     losartan (COZAAR) 100 MG tablet     Sodium Fluoride (PREVIDENT 5000 PLUS) 1.1 % CREA     Hypromellose (ARTIFICIAL TEARS OP)     Gabapentin (NEURONTIN PO)     senna-docusate (SENNA S) 8.6-50 MG per tablet     polyethylene glycol (MIRALAX/GLYCOLAX) powder     Artificial Tear Ointment (LACRI-LUBE OP)     Skin Protectants, Misc. (EUCERIN) cream     Saline 0.9 % SOLN     Calcium Carbonate-Vitamin D (CALCIUM-VITAMIN D) 250-125 MG-UNIT TABS     Sod Fluor-Solo Fluor-Hydrfl Ac (GEL-SASHA DENTINBLOC DT)     lactase (LACTAID) 3000 UNIT tablet     Magnesium Hydroxide (MILK OF MAGNESIA PO)     CLARITIN 10 MG OR TABS     GLUCAGON EMERGENCY 1 MG IJ KIT     amLODIPine (NORVASC) 5 MG tablet     clotrimazole (LOTRIMIN) 1 % cream     ASPIRIN 81 MG OR TABS     No current facility-administered medications for this visit.        Weight Loss Medication History Reviewed With Patient 8/28/2017   Which weight loss medications are you currently taking on a regular basis?  Byetta (exentatide)   Are you having any side effects from the weight loss  medication that we have prescribed you? No   If you are having side effects please describe: -     ASSESSMENT:   Continues to do well with weight loss maintenance. HbA1c 6.9 on 6/22/17.    FOLLOW-UP:    12 weeks.  10/15 minutes spent on counseling and education    Sincerely,    Marcos Magdaleno MD

## 2017-08-28 NOTE — PROGRESS NOTES
Health Psychology                  Clinic    Department of Medicine  Madelaine Ingram, Ph.D., L.P. [phone]                          [facility] Miryam Woods, Ph.D.,  L.P. [phone]                 3rd Floor, Clinic [address]  [place] Mail Code 740   Damon Gr, Ph.D., LUCY.RODOLFO., L.P. [phone]     [address] Kimber Morales, Ph.D., L.P. [phone]  [place] [id number]  [place] [address]    Health Psychology Follow-Up Note    Ms. Page is a pleasant 67-year old woman with chronic depressive illness, who returns to clinic for supportive and behavioral psychotherapy for moderate recurrent depression and for help losing weight given her morbid obesity.     She is 241.3 today, down nearly 1.5  lbs since the last session.  She continues to lose weight and is reinforced for her efforts.  Wt Readings from Last 4 Encounters:   08/28/17 109.5 kg (241 lb 4.8 oz)   08/28/17 109.8 kg (242 lb)   08/10/17 109.3 kg (241 lb)   08/03/17 109.2 kg (240 lb 12.8 oz)     At [place], she continues to serve on the [employer].  She has been on the [employer] for 20 years, plus decorating the bulletin boards.  She is working 1 day/week with her boss, [employer], Friday mornings for 1.5 hour.  Her roommate is doing better and she is enjoying her more over time.    She is pleased that an antisocial resident left.  She is in a better mood than last time as a result.  She rates the depression as 3 on 10-point subjective scale.   She is doing more social walking some of the time and had apparently is gradually ramping up on her fitness center days.  She agrees to increase.  She is tolerating using the stationery bicycle, and is up to 12 minutes and willing to increase to 15.   We walked for the better part of the session.  Goal for exercise has been 1.5 hours/day as of June 19.   She arrived early today, which is quite typical for her. Pinky  participated fully and appeared to derive benefit.  Rapport was excellent.     Extended session due to complexity of case and length of interval.      IMPRESSION Distress Disability Risk    Low High Low     Time In: 1:05  Time Out:  2:00  Diagnosis:   Major Depression, recurrent mild (F33.0)   Psychological Factors Affecting Morbid Obesity (F54)      Plan: She will return on 10/10 @ 1:00  for behavioral and supportive psychotherapy.   Plan to be able to bicycle for 15 minutes by next session.  She will decrease clutter in 1 drawer.  Tx plan due 4/11/2018  Damon Gr, Ph.D., A.B.P.P., L.P.

## 2017-09-12 ENCOUNTER — OFFICE VISIT (OUTPATIENT)
Dept: FAMILY MEDICINE | Facility: CLINIC | Age: 67
End: 2017-09-12

## 2017-09-12 VITALS
HEART RATE: 70 BPM | WEIGHT: 244.8 LBS | BODY MASS INDEX: 41.79 KG/M2 | SYSTOLIC BLOOD PRESSURE: 126 MMHG | OXYGEN SATURATION: 98 % | HEIGHT: 64 IN | DIASTOLIC BLOOD PRESSURE: 82 MMHG | TEMPERATURE: 98 F

## 2017-09-12 DIAGNOSIS — E03.8 OTHER SPECIFIED HYPOTHYROIDISM: ICD-10-CM

## 2017-09-12 DIAGNOSIS — I10 HYPERTENSION, ESSENTIAL: Primary | ICD-10-CM

## 2017-09-12 DIAGNOSIS — Z79.4 DIABETES MELLITUS TYPE 2, INSULIN DEPENDENT (H): ICD-10-CM

## 2017-09-12 DIAGNOSIS — E11.9 DIABETES MELLITUS TYPE 2, INSULIN DEPENDENT (H): ICD-10-CM

## 2017-09-12 LAB
HBA1C MFR BLD: 6.3 % (ref 4.1–5.7)
TSH SERPL DL<=0.005 MIU/L-ACNC: 1.31 MU/L (ref 0.4–4)

## 2017-09-12 ASSESSMENT — ENCOUNTER SYMPTOMS
WHEEZING: 0
SORE THROAT: 0
NAUSEA: 0
DYSURIA: 0
CONSTIPATION: 0
ABDOMINAL PAIN: 0
SINUS PRESSURE: 0
VOMITING: 0
COUGH: 0
HEADACHES: 0
COLOR CHANGE: 0
ABDOMINAL DISTENTION: 0
APPETITE CHANGE: 0
ARTHRALGIAS: 0
CHILLS: 0
DIARRHEA: 0
BLOOD IN STOOL: 0
WEAKNESS: 0
MYALGIAS: 0
FATIGUE: 0
HEMATURIA: 0
SHORTNESS OF BREATH: 0
FEVER: 0

## 2017-09-12 NOTE — MR AVS SNAPSHOT
After Visit Summary   9/12/2017    Pinky Page    MRN: [id number]           Patient Information     Date Of Birth          1950        Visit Information        Provider Department      9/12/2017 1:40 PM Shamika Dia MD [facility]        Today's Diagnoses     Hypertension, essential    -  1    Other specified hypothyroidism        Diabetes mellitus type 2, insulin dependent (H)          Care Instructions    Here is the plan from today's visit    1. Hypertension, essential  - your blood pressure is at goal today and we will not change any of your blood pressure medications today.     2. Other specified hypothyroidism  - TSH with free T4 reflex    3. Diabetes mellitus type 2, insulin dependent (H)  - Hemoglobin A1c ([place])    - Come back to see me at the Clinic in 3 months for diabetes and hypertension follow up. You can see me sooner if there are any concerns.   - Continue to work on weight loss. You are doing a great job increasing your exercise!     Please call or return to clinic if your symptoms don't go away.    Follow up plan  Please make a clinic appointment for follow up with me (SHAMIKA DIA) in 3  months for HTN and DM follow up.    Thank you for coming to [facility] today.  Lab Testing:  **If you had lab testing today and your results are reassuring or normal they will be mailed to you or sent through [employer] within 7 days.   **If the lab tests need quick action we will call you with the results.  The phone number we will call with results is # [phone] (home) [phone] (work). If this is not the best number please call our clinic and change the number.  Medication Refills:  If you need any refills please call your pharmacy and they will contact us.   If you need to  your refill at a new pharmacy, please contact the new pharmacy directly. The new pharmacy will help you get your medications transferred faster.    Scheduling:  If you have any concerns about today's visit or wish to schedule another appointment please call our office during normal business hours [phone] (8-5:00 M-F)  If a referral was made to a [facility] and you don't get a call from central scheduling please call [phone].  If a Mammogram was ordered for you at [facility] call [phone] to schedule or change your appointment.  If you had an XRay/CT/Ultrasound/MRI ordered the number is [phone] to schedule or change your radiology appointment.   Medical Concerns:  If you have urgent medical concerns please call [phone] at any time of the day.  If you have a medical emergency please call [phone].          Follow-ups after your visit        Your next 10 appointments already scheduled     Sep 14, 2017  2:15 PM CDT   Adult Med Follow UP with Joana De Leon MD   Psychiatry Clinic ([facility])    [place] [id number]  [address] [facility] [id number] [phone]            Oct 10, 2017  1:00 PM CDT   (Arrive by 12:45 PM)   Return Visit with Damon Gr, PhD [facility] ([facility])    [address]  3rd [facility] [id number] [phone]            Nov 14, 2017  1:30 PM CST   NEW GENERAL with Michael Lopez MD   Eye Clinic ([facility])    [address]  [address]  [address] [address]  [facility] [address] [phone]            Jan 08, 2018 11:30 AM CST   (Arrive by 11:15 AM)   Return Visit with Marcos Magdaleno MD   [facility] ([facility])    [address]  4th Floor  [facility] [id number] [phone]            May 10, 2018 11:30 AM CDT   Return Sleep Patient with Brianda Reddy MD   [facility] [place] Sleep [facility] ([facility])    [address] [place] [address]  "[facility] [id number] [phone]              Who to contact     Please call your clinic at [phone] to:    Ask questions about your health    Make or cancel appointments    Discuss your medicines    Learn about your test results    Speak to your doctor   If you have compliments or concerns about an experience at your clinic, or if you wish to file a complaint, please contact [facility] Patient Relations at [phone] or email us at [email or web address]         Additional Information About Your Visit        amcure Information     [employer] is an electronic gateway that provides easy, online access to your medical records. With amcure, you can request a clinic appointment, read your test results, renew a prescription or communicate with your care team.     To sign up for amcure visit the website at [email or web address]   You will be asked to enter the access code listed below, as well as some personal information. Please follow the directions to create your username and password.     Your access code is: [email or web address]  Expires: 2017  7:15 PM     Your access code will  in 90 days. If you need help or a new code, please contact your [facility] or call [phone] for assistance.        Care EveryWhere ID     This is your Care EveryWhere ID. This could be used by other organizations to access your [place] medical records  [id number]        Your Vitals Were     Pulse Temperature Height Pulse Oximetry BMI (Body Mass Index)       70 98  F (36.7  C) (Oral) 5' 4\" (162.6 cm) 98% 42.02 kg/m2        Blood Pressure from Last 3 Encounters:   17 126/82   17 160/78   08/10/17 169/60    Weight from Last 3 Encounters:   17 244 lb 12.8 oz (111 kg)   17 241 lb 4.8 oz (109.5 kg)   17 242 lb (109.8 kg)              We Performed the Following     Hemoglobin A1c ([facility])     TSH with free T4 reflex        "   Today's Medication Changes          These changes are accurate as of: 9/12/17  1:59 PM.  If you have any questions, ask your nurse or doctor.               These medicines have changed or have updated prescriptions.        Dose/Directions    metFORMIN 500 MG 24 hr tablet   Commonly known as:  GLUCOPHAGE-XR   This may have changed:    - how much to take  - when to take this   Used for:  Diabetes mellitus, type 2 (H)        Dose:  1000 mg   Take 2 tablets (1,000 mg) by mouth 2 times daily (with meals)   Quantity:  90 tablet   Refills:  3                Primary Care Provider Office Phone # Fax #    Shamika Ileana Kelley [phone]       [facility] [address] [facility] [address]        Equal Access to Services     JAYDEN STUART : Tatiana Dietrich, waevangelina del valle, qaellen kaalmaursula mittal, autumn josé. So [facility] [phone].    ATENCIÓN: Si habla español, tiene a deluca disposición servicios gratuitos de asistencia lingüística. Ll[facility] [phone].    We comply with applicable federal civil rights laws and [place] laws. We do not discriminate on the basis of race, color, national origin, age, disability sex, sexual orientation or gender identity.            Thank you!     Thank you for choosing [facility]  for your care. Our goal is always to provide you with excellent care. Hearing back from our patients is one way we can continue to improve our services. Please take a few minutes to complete the written survey that you may receive in the mail after your visit with us. Thank you!             Your Updated Medication List - Protect others around you: Learn how to safely use, store and throw away your medicines at www.disposemymeds.org.          This list is accurate as of: 9/12/17  1:59 PM.  Always use your most recent med list.                   Brand Name Dispense Instructions for use Diagnosis    acetaminophen 500  MG tablet    TYLENOL    1 Bottle    Take 2 tablets (1,000 mg) by mouth every 6 hours as needed    Sprain of left ankle, unspecified ligament, initial encounter       alum & mag hydroxide-simethicone 200-200-20 MG/5ML Susp suspension    MYLANTA/MAALOX     Take 30 mLs by mouth daily as needed for indigestion        amLODIPine 5 MG tablet    NORVASC    30 tablet    Take 2 tablets (10 mg) by mouth daily    Essential hypertension with goal blood pressure less than 130/85       artificial saliva Aers spray      Take 1 spray by mouth 3 times daily as needed for dry mouth        ARTIFICIAL TEARS OP      Apply 1 drop to eye 4 times daily.        aspirin 81 MG tablet     100    Take 1 tablet by mouth daily. ENTERIC COATED.        atorvastatin 40 MG tablet    LIPITOR    90 tablet    Take 1 tablet (40 mg) by mouth daily    Hyperlipidemia LDL goal <100       blood glucose monitoring test strip    no brand specified    100 each    Use to test blood sugar 3 times daily or as directed.    Type 2 diabetes mellitus with microalbuminuria, with long-term current use of insulin (H)       calcium polycarbophil 625 MG tablet    FIBERCON     Take 2 tablets by mouth daily        calcium-vitamin D 250-125 MG-UNIT Tabs      Take 1 tablet by mouth 2 times daily. Calcium 250 mg/Vit D 125 IU        CLARITIN 10 MG tablet   Generic drug:  loratadine     30    1 TAB PO QD (Once per day) as needed for ALLERGY SYMPTOMS        clotrimazole 1 % cream    LOTRIMIN    50 g    Apply topically 2 times daily as needed    Dermatophytosis       eucerin cream      Apply  topically as needed. Apply to thigh PRN dry skin        exenatide 10 MCG/0.04ML injection    BYETTA    1 Syringe    Inject 10 mcg Subcutaneous 2 times daily (before meals)    Type 2 diabetes mellitus with microalbuminuria, with long-term current use of insulin (H)       FLUoxetine 40 MG capsule    PROzac    30 capsule    Take 1 capsule (40 mg) by mouth daily    Schizoaffective disorder,  depressive type (H)       fluticasone 50 MCG/ACT spray    FLONASE    16 g    Spray 1 spray into both nostrils daily    Seasonal allergic rhinitis       furosemide 20 MG tablet    LASIX    60 tablet    Take 1 tablet (20 mg) by mouth 2 times daily    Lower leg edema       GEL-SASHA DENTINBLOC DT      Apply  to affected area. GEL. Apply to 2nd molar on bottom right daily at bedtime.        GLUCAGON EMERGENCY 1 MG kit   Generic drug:  glucagon      Inject  as directed. 1 mL as needed for signs of hypoglycemia. May repeat as needed in 15 minutes.        HYDROcodone-acetaminophen 5-325 MG per tablet    NORCO     Take 1 tablet by mouth every 6 hours as needed 1-2 tabs        Incontinence Brief Large Misc     60 Units    2 Units 2 times daily    Urge incontinence, Nocturnal enuresis       insulin glargine 100 UNIT/ML injection    LANTUS    8 mL    Inject 24 Units Subcutaneous At Bedtime    Type 2 diabetes mellitus with microalbuminuria, with long-term current use of insulin (H)       LACRI-LUBE OP      Apply  to eye. Place 0.5 inches into both eyes at bedtime        LACTAID 3000 UNIT tablet   Generic drug:  lactase      Take 4 tabs daily with meals.        levothyroxine 175 MCG tablet    SYNTHROID/LEVOTHROID    30 tablet    Take 1 tablet (175 mcg) by mouth daily Give on empty stomach    Other specified hypothyroidism       LORazepam 1 MG tablet    ATIVAN    30 tablet    Take 1 tablet (1 mg) by mouth At Bedtime .  May take additional 1mg daily PRN for agitation.    Generalized anxiety disorder       losartan 100 MG tablet    COZAAR    90 tablet    Take 1 tablet by mouth daily.    Hypertension goal BP (blood pressure) < 130/80, Diabetes mellitus type 2, insulin dependent (H), CKD (chronic kidney disease) stage 2, GFR 60-89 ml/min       metFORMIN 500 MG 24 hr tablet    GLUCOPHAGE-XR    90 tablet    Take 2 tablets (1,000 mg) by mouth 2 times daily (with meals)    Diabetes mellitus, type 2 (H)       MILK OF MAGNESIA PO      Take   by mouth. Take 30 mL as needed for constipation.        NEURONTIN PO      Take 900 mg by mouth 3 times daily.        nystatin 309837 UNIT/GM Powd    MYCOSTATIN    60 g    Apply topically 3 times daily as needed    Candidiasis of skin       polyethylene glycol powder    MIRALAX/GLYCOLAX     Take 1 capful by mouth 2 times daily. 17 GM PO BID        PREVIDENT 5000 PLUS 1.1 % Crea   Generic drug:  Sodium Fluoride      Apply  to affected area every evening.        saline 0.9 % Soln      Spray 2 sprays in nostril as needed.        SENNA S 8.6-50 MG per tablet   Generic drug:  senna-docusate      Take 2 tablets by mouth At Bedtime.        solifenacin 10 MG tablet    VESICARE    30 tablet    Take 1 tablet (10 mg) by mouth daily    Urge incontinence       ziprasidone 40 MG capsule    GEODON    60 capsule    Take 1 capsule (40 mg) by mouth 2 times daily (with meals)    Schizoaffective disorder, depressive type (H)

## 2017-09-12 NOTE — LETTER
September 15, 2017      Pinky VELOZ Kaylee  [address] [address]        Dear Pinky,    Thank you for getting your care at [facility]. Please see below for your test results.    Resulted Orders   TSH with free T4 reflex   Result Value Ref Range    TSH 1.31 0.40 - 4.00 mU/L   Hemoglobin A1c ([facility])   Result Value Ref Range    Hemoglobin A1C 6.3 (H) 4.1 - 5.7 %     Your thyroid lab test was normal and your A1c is below goal!    If you have any concerns about these results please call and leave a message for me or send a Memorandom message to the clinic.    Sincerely,    Shamika eKlley MD

## 2017-09-12 NOTE — PATIENT INSTRUCTIONS
Here is the plan from today's visit    1. Hypertension, essential  - your blood pressure is at goal today and we will not change any of your blood pressure medications today.     2. Other specified hypothyroidism  - TSH with free T4 reflex    3. Diabetes mellitus type 2, insulin dependent (H)  - Hemoglobin A1c ([place])    - Come back to see me at the Clinic in 3 months for diabetes and hypertension follow up. You can see me sooner if there are any concerns.   - Continue to work on weight loss. You are doing a great job increasing your exercise!     Please call or return to clinic if your symptoms don't go away.    Follow up plan  Please make a clinic appointment for follow up with me (GALA DIA) in 3  months for HTN and DM follow up.    [facility]  Lab Testing:  **If you had lab testing today and your results are reassuring or normal they will be mailed to you or sent through [employer] within 7 days.   **If the lab tests need quick action we will call you with the results.  The phone number we will call with results is # [phone] (home) [phone] (work). If this is not the best number please call our clinic and change the number.  Medication Refills:  If you need any refills please call your pharmacy and they will contact us.   If you need to  your refill at a new pharmacy, please contact the new pharmacy directly. The new pharmacy will help you get your medications transferred faster.   Scheduling:  If you have any concerns about today's visit or wish to schedule another appointment please call our office during normal business hours [phone] (8-5:00 M-F)  If a referral was made to a [facility] and you don't get a call from central scheduling please call [phone].  If a Mammogram was ordered for you at [facility] call [phone] to schedule or change your appointment.  If you had an XRay/CT/Ultrasound/MRI ordered  the number is [phone] to schedule or change your radiology appointment.   Medical Concerns:  If you have urgent medical concerns please call [phone] at any time of the day.  If you have a medical emergency please call 100.

## 2017-09-12 NOTE — PROGRESS NOTES
HPI:       Pinky Page is a 67 year old who presents for the following  Patient presents with:  Hypertension: follow-up    Diabetes Follow-up    Patient is checking blood sugars: twice daily.    Blood sugar testing frequency justification: Patient modifying lifestyle changes (diet, exercise) with blood sugars  Results are as follows:       am -        bedtime - 100-220             -Last A1C was   Lab Results   Component Value Date    A1C 6.9 06/22/2017    A1C 6.6 03/21/2017    A1C 7.0 12/08/2016    A1C 6.9 08/16/2016    A1C 7.1 05/05/2016        Diabetic concerns: None    Chest Pain or exercise related calf pain (claudication):no     Symptoms of hypoglycemia (low blood sugar): none     Paresthesias (numbness or burning in feet) or sores: No    Diabetic eye exam within the last year?: Yes,     Hypertension Follow-up      Outpatient blood pressures are being checked at home.  Results are 120s-160s/70s-80s.    Chest Pain? :No     Low Salt Diet: no added salt    Daily NSAID Use?No     Did patient take their HTN pills today/last night as usual?  Yes      Was sent to PCP for elevated BP noted on check at the [facility]. (160s/90s). No headaches, chest pain, SOB, or new lower extremity swelling.     Adherence and Exercise  Medication side effects: no  How often is a medication missed? Never  Exercise:walking  and biking 6-7 days/week for an average of 45-60 minutes     Problem, Medication and Allergy Lists were reviewed and are current.     Patient Active Problem List    Diagnosis Date Noted     Solitary kidney, congenital 06/07/2013     Priority: High     CKD (chronic kidney disease) stage 2, GFR 60-89 ml/min 03/01/2013     Priority: High     Lives in group home 02/27/2013     Priority: High     [facility] >[facility] years.  Mantoux done in 9/2013 was negative.        Hypercholesteremia 10/05/2012     Priority: High     ASCVD  Risk Calculator (pooled cohort)   10 CV risk 17.5%   Recommended  Therapy:High Intensity Statin (atorvastatin 40*-80 mg or rosuvastatin 20-40mg)               Morbid obesity due to excess calories (H) 10/02/2012     Priority: High     Follows with Dr. Marcos Magdaleno Q 2months for weight management.       Personal history of breast cancer s/p L masectomy 10/02/2012     Priority: High     May 24, 2013 S/p left mastectomy 1996; follows with Dr. Avila, last mammogram 5/2012 was benign. Rec annual mammogram.       Diabetes mellitus type 2, insulin dependent (H) 10/02/2012     Priority: High     July 27, 2013   Insulin dependent since 4/2006  Without history of retinopathy       Hypertension, essential      Priority: High     Hypothyroidism      Priority: High     On replacement.       ANUJ (obstructive sleep apnea)      Priority: High     Re-eval 2017 Moderate ANUJ, CPAP trial at 13 August 28, 2013   Follows with Dr. Mc.   On 4L O2 at night given CPAP intolerant.  PSG (10/21/07): AHI 39 with oxygen saturations to 71%.        CKD (chronic kidney disease) stage 1, GFR 90 ml/min or greater 06/21/2017     Priority: Medium     Microalbuminuria due to type 2 diabetes mellitus (H) 03/24/2017     Priority: Medium     3/21/17  Creatinine Urine 54   Albumin Urine mg/L 18   Albumin Urine mg/g Cr 32.47 (H)          Type 2 diabetes mellitus with microalbuminuria, with long-term current use of insulin (H) 03/24/2017     Priority: Medium     Diabetes mellitus, type II, insulin dependent (H) 03/24/2017     Priority: Medium     Nail complaint 02/03/2017     Priority: Medium     vertical nail ridges       Hx of psychiatric care 07/14/2016     Priority: Medium       PAST PSYCH MED TRIALS     sertaline   amitriptyline   lithium   risperidone   olanzapine   trazodone   clonazepam  Quetiapine (weight gain)       Psychological factors affecting medical condition 04/18/2016     Priority: Medium     Major depressive disorder, recurrent episode, moderate (H) 11/16/2015     Priority: Medium     Health  Care Home 10/01/2015     Priority: Medium     Schizoaffective disorder, depressive type (H) 06/08/2015     Priority: Medium     Carpal tunnel syndrome of left wrist 05/28/2015     Priority: Medium     Generalized anxiety disorder 12/04/2014     Priority: Medium     Diagnosis updated by automated process. Provider to review and confirm.       Candidiasis of skin 11/04/2014     Priority: Medium     Chronic candidiasis of groin and labia        Hypertriglyceridemia 09/10/2014     Priority: Medium     Impingement syndrome of right shoulder 06/10/2014     Priority: Medium     S/P hysterectomy 04/20/2014     Priority: Medium     Extrapyramidal and movement disorder 02/27/2013     Priority: Medium     Cardiomegaly      Priority: Medium     Chronic constipation      Priority: Medium     Dry eye syndrome      Priority: Medium     Esophageal reflux      Priority: Medium     Exposure keratoconjunctivitis      Priority: Medium     DM ophthalmopathy (H)      Priority: Medium     Senile cataract      Priority: Medium     Postmenopausal atrophic vaginitis      Priority: Medium     Restless leg syndrome      Priority: Medium     Squamous blepharitis      Priority: Medium     Urge incontinence 04/19/2011     Priority: Medium     Allergic rhinitis due to pollen      Priority: Medium     seasonal allergies      ,     Current Outpatient Prescriptions   Medication Sig Dispense Refill     LORazepam (ATIVAN) 1 MG tablet Take 1 tablet (1 mg) by mouth At Bedtime .  May take additional 1mg daily PRN for agitation. 30 tablet 2     alum & mag hydroxide-simethicone (MYLANTA/MAALOX) 200-200-20 MG/5ML SUSP suspension Take 30 mLs by mouth daily as needed for indigestion       artificial saliva (BIOTENE MT) AERS spray Take 1 spray by mouth 3 times daily as needed for dry mouth       FLUoxetine (PROZAC) 40 MG capsule Take 1 capsule (40 mg) by mouth daily 30 capsule 5     ziprasidone (GEODON) 40 MG capsule Take 1 capsule (40 mg) by mouth 2 times  daily (with meals) 60 capsule 5     blood glucose monitoring (NO BRAND SPECIFIED) test strip Use to test blood sugar 3 times daily or as directed. 100 each 3     exenatide (BYETTA) 10 MCG/0.04ML injection Inject 10 mcg Subcutaneous 2 times daily (before meals) 1 Syringe 11     insulin glargine (LANTUS) 100 UNIT/ML injection Inject 24 Units Subcutaneous At Bedtime 8 mL 11     amLODIPine (NORVASC) 5 MG tablet Take 2 tablets (10 mg) by mouth daily 30 tablet 3     nystatin (MYCOSTATIN) 463605 UNIT/GM POWD Apply topically 3 times daily as needed 60 g 1     clotrimazole (LOTRIMIN) 1 % cream Apply topically 2 times daily as needed 50 g 0     fluticasone (FLONASE) 50 MCG/ACT nasal spray Spray 1 spray into both nostrils daily 16 g 3     furosemide (LASIX) 20 MG tablet Take 1 tablet (20 mg) by mouth 2 times daily 60 tablet 3     acetaminophen (TYLENOL) 500 MG tablet Take 2 tablets (1,000 mg) by mouth every 6 hours as needed 1 Bottle 2     solifenacin (VESICARE) 10 MG tablet Take 1 tablet (10 mg) by mouth daily 30 tablet 6     HYDROcodone-acetaminophen (NORCO) 5-325 MG per tablet Take 1 tablet by mouth every 6 hours as needed 1-2 tabs       atorvastatin (LIPITOR) 40 MG tablet Take 1 tablet (40 mg) by mouth daily 90 tablet 1     calcium polycarbophil (FIBERCON) 625 MG tablet Take 2 tablets by mouth daily       levothyroxine (SYNTHROID, LEVOTHROID) 175 MCG tablet Take 1 tablet (175 mcg) by mouth daily Give on empty stomach 30 tablet 4     metFORMIN (GLUCOPHAGE-XR) 500 MG 24 hr tablet Take 2 tablets (1,000 mg) by mouth 2 times daily (with meals) (Patient taking differently: Take 2,000 mg by mouth daily ) 90 tablet 3     Incontinence Supply Disposable (INCONTINENCE BRIEF LARGE) MISC 2 Units 2 times daily 60 Units 11     losartan (COZAAR) 100 MG tablet Take 1 tablet by mouth daily. 90 tablet 1     Sodium Fluoride (PREVIDENT 5000 PLUS) 1.1 % CREA Apply  to affected area every evening.       Hypromellose (ARTIFICIAL TEARS OP) Apply 1  drop to eye 4 times daily.       Gabapentin (NEURONTIN PO) Take 900 mg by mouth 3 times daily.       senna-docusate (SENNA S) 8.6-50 MG per tablet Take 2 tablets by mouth At Bedtime.       polyethylene glycol (MIRALAX/GLYCOLAX) powder Take 1 capful by mouth 2 times daily. 17 GM PO BID       Artificial Tear Ointment (LACRI-LUBE OP) Apply  to eye. Place 0.5 inches into both eyes at bedtime       Skin Protectants, Misc. (EUCERIN) cream Apply  topically as needed. Apply to thigh PRN dry skin        Saline 0.9 % SOLN Spray 2 sprays in nostril as needed.       Calcium Carbonate-Vitamin D (CALCIUM-VITAMIN D) 250-125 MG-UNIT TABS Take 1 tablet by mouth 2 times daily. Calcium 250 mg/Vit D 125 IU       Sod Fluor-Solo Fluor-Hydrfl Ac (GEL-SASHA DENTINBLOC DT) Apply  to affected area. GEL. Apply to 2nd molar on bottom right daily at bedtime.       lactase (LACTAID) 3000 UNIT tablet Take 4 tabs daily with meals.       Magnesium Hydroxide (MILK OF MAGNESIA PO) Take  by mouth. Take 30 mL as needed for constipation.       CLARITIN 10 MG OR TABS 1 TAB PO QD (Once per day) as needed for ALLERGY SYMPTOMS 30 11     ASPIRIN 81 MG OR TABS Take 1 tablet by mouth daily. ENTERIC COATED. 100 3     GLUCAGON EMERGENCY 1 MG IJ KIT Inject  as directed. 1 mL as needed for signs of hypoglycemia. May repeat as needed in 15 minutes.  0   ,     Allergies   Allergen Reactions     Chlordiazepoxide Hcl      Dimetapp Dm Cold-Cough      Cold/Congetion TABS     Haldol      Ibuprofen      TABS     Lactose Intolerance [Beta-Galactosidase]      CAPS     Milk Products      Propofol      EMUL     Patient is an established patient of this clinic.         Review of Systems:   Review of Systems   Constitutional: Negative for appetite change, chills, fatigue and fever.   HENT: Negative for congestion, sinus pressure and sore throat.    Eyes: Negative for visual disturbance.   Respiratory: Negative for cough, shortness of breath and wheezing.    Cardiovascular:  "Negative for chest pain and leg swelling.   Gastrointestinal: Negative for abdominal distention, abdominal pain, blood in stool, constipation, diarrhea, nausea and vomiting.   Genitourinary: Negative for dysuria and hematuria.   Musculoskeletal: Negative for arthralgias and myalgias.   Skin: Negative for color change and rash.   Neurological: Negative for weakness and headaches.             Physical Exam:   Patient Vitals for the past 24 hrs:   BP Temp Temp src Pulse SpO2 Height Weight   09/12/17 1338 126/82 98  F (36.7  C) Oral 70 98 % 5' 4\" (162.6 cm) 244 lb 12.8 oz (111 kg)     Body mass index is 42.02 kg/(m^2).  Vitals were reviewed and were normal     Physical Exam   Constitutional: She is oriented to person, place, and time. She appears well-developed and well-nourished. No distress.   HENT:   Head: Normocephalic and atraumatic.   Nose: Nose normal.   Mouth/Throat: Oropharynx is clear and moist. No oropharyngeal exudate.   Eyes: Conjunctivae are normal. No scleral icterus.   Neck: Normal range of motion. Neck supple.   Cardiovascular: Normal rate, regular rhythm and normal heart sounds.  Exam reveals no gallop and no friction rub.    No murmur heard.  Pulmonary/Chest: Effort normal and breath sounds normal. No respiratory distress. She has no wheezes. She has no rales.   Abdominal: Soft. Bowel sounds are normal. There is no tenderness.   Musculoskeletal: She exhibits no edema.   Neurological: She is alert and oriented to person, place, and time.   Skin: Skin is warm. No rash noted. She is not diaphoretic.         Results:     Results for orders placed or performed in visit on 09/12/17   Hemoglobin A1c ([facility]'s)   Result Value Ref Range    Hemoglobin A1C 6.3 (H) 4.1 - 5.7 %       Assessment and Plan     Pinky Page is a 65 yo female with a PMH of HTN, CKD, DM2, hypothyroid, s/p breast CA with left masectomy 1996, and significant psych history who currently lives in group home who presents to the clinic for HTN " and DM follow up.     1. Hypertension, essential  - blood pressure is at goal today. Will not change any meds at this time. Patient continues to have BPs at goal with intermittent elevated BPs. I advised patient to make sure she has her BP rechecked about 5-10 minutes after an elevated BP reading as she continues to have BP at goal here in the clinic.     2. Other specified hypothyroidism  - TSH with free T4 reflex    3. Diabetes mellitus type 2, insulin dependent (H)  - Hemoglobin A1c ([facility]) - 6.3    - Patient to return to clinic in next month for Medicare Wellness Visit.   - Advised patient to continue to work on weight loss. She is doing a great job increasing her exercise and working on her diet.     There are no discontinued medications.  Options for treatment and follow-up care were reviewed with the patient. Pinky Page  engaged in the decision making process and verbalized understanding of the options discussed and agreed with the final plan.    Shamika Kelley MD  [facility]  [phone]

## 2017-09-12 NOTE — PROGRESS NOTES
Preceptor Attestation:   Patient seen and discussed with the resident. Assessment and plan reviewed with resident and agreed upon.   Supervising Physician:  Mona Nava MD  [facility]

## 2017-09-14 ENCOUNTER — OFFICE VISIT (OUTPATIENT)
Dept: PSYCHIATRY | Facility: CLINIC | Age: 67
End: 2017-09-14
Attending: PSYCHIATRY & NEUROLOGY
Payer: COMMERCIAL

## 2017-09-14 VITALS
BODY MASS INDEX: 41.99 KG/M2 | WEIGHT: 244.6 LBS | HEART RATE: 78 BPM | DIASTOLIC BLOOD PRESSURE: 80 MMHG | SYSTOLIC BLOOD PRESSURE: 138 MMHG

## 2017-09-14 DIAGNOSIS — F25.1 SCHIZOAFFECTIVE DISORDER, DEPRESSIVE TYPE (H): Primary | ICD-10-CM

## 2017-09-14 PROCEDURE — 99212 OFFICE O/P EST SF 10 MIN: CPT | Mod: ZF

## 2017-09-14 ASSESSMENT — PATIENT HEALTH QUESTIONNAIRE - PHQ9: SUM OF ALL RESPONSES TO PHQ QUESTIONS 1-9: 6

## 2017-09-14 NOTE — PROGRESS NOTES
"PSYCHIATRY CLINIC PROGRESS NOTE   The initial diagnostic evaluation was on 5/13/13 and the last transfer of care evaluation prior to today was 7/14/16.    Pertinent Background:  This patient first experienced mental health issues in her 20's and obtained care at this time and has received treatment for schizoaffective disorder and anxiety.  See transfer evaluation for detailed history.  Notably, has a history of having increasing psychotic experiences with antipsychotic taper.   Psych critical item history includes psychosis [sxs include disorganization and inability to care for self, ?hallucinations and paranoia], psych hosp (3-5) and ECT    INTERIM HISTORY                                                 Pinky Page is a 67 year old female who was last seen in clinic on 8/3/17 at which time no changes were made.  The patient reports good treatment adherence.  History was provided by patient and written summary of progress from [facility] RN. Patient was a good historian.  Since the last visit:  -has been enjoying the summer, [place] with the staff at [facility], had a good time  -states she has been doing well, only has 1 problem, at times will feel that someone is behind her like someone is walking or standing there but then turns around and nobody is there, she has had this problem before and has found ways to manipulate her environment so nobody is behind her (sits with back to walls, etc.), this has come and gone in the past, has not required medication change, is not scared by this is able to recognize this happens time to time  -otherwise mood has been \"okay in general\" is sad in the context of regular emotional fluctuations, still has some sadness with the passing of her family member  -still continues to be a struggle, even with CPAP, did get a new mask because other mask is leaking air, this mask is better   -continues to practice sleep hygiene with using coloring when she " can't sleep instead of watching tv  -exercise as much as she can everyday, doing a lot of walking, continues to ride stationary bike, now up to 15minutes  -is trying to eat less but is finding this difficult   -continues to feel that medications are going well and are helpful, no noted side effects  -has not needed any PRN doses of ativan since last appointment, but is wanting to keep this medication available as it would increase her anxiety to not have a medication available should her anxiety increase  -continues to attend therapy with Dr. Gr, finds this very helpful     RECENT SYMPTOMS:   DEPRESSION:  reports-being hard on self, depressed mood, anhedonia, low energy and insomnia ;  DENIES- suicidal ideation , psychomotor change observed by others (slowing or fidgeting) and poor concentration /memory  MELVIN/HYPOMANIA:  reports-none;  DENIES- increased energy, decreased sleep need, increased activity and grandiosity  PSYCHOSIS:  reports-see HPI and none;  DENIES- delusions, auditory hallucinations and visual hallucinations  DYSREGULATION:  reports-none;  DENIES- suicidal ideation, violent ideation and SIB  PANIC ATTACK:  none   ANXIETY:  some nervousness and excitement around events  TRAUMA RELATED:  none  COMPULSIVE:  none  SLEEP:  as above    EATING DISORDER:  none    RECENT SUBSTANCE USE:     ALCOHOL- none          TOBACCO- none          CAFFEINE- 1 cup regular coffee per day and 3 decaf; 1 can diet coke per day        OPIOIDS- none            NARCAN KIT- N/A    CANNABIS- none         OTHER ILLICIT DRUGS- none     CURRENT SOCIAL HISTORY:  FINANCIAL SUPPORT- social security disability.     CHILDREN- none.     LIVING SITUATION- Living at [facility].      SOCIAL/ SPIRITUAL SUPPORT- [facility] staff, other providers, other residents at [facility] and does attend [employer] occasionally.     FEELS SAFE AT HOME- Yes.    MEDICAL ROS:  Reports GI sxs [GERD, constipation] and easy bruising .  Denies weight changes  [increase, decrease,  ], headache, sedation, sexual dysfunction [ ], tremor, confusion, dizziness, falls, excessive diaphoresis, muscle twitches, restlessness, bruxism, shiver and short term memory and/or word finding difficulty, akathisia, dystonia, apparent TD, muscle stiffness, Parkinsonian-type symptoms [shuffling gait, masked facies, stooped posture, speech change, writing change] and sialorrhea and slowed reaction time and withdrawal symptoms.    PSYCH and CD Critical Summary Points since July 2017           None    PAST PSYCH MED TRIALS   see EMR Problem List: Hx of psychiatric care    MEDICAL / SURGICAL HISTORY                                   CARE TEAM:          PCP- Dr. Kelley, at [facility]                   Therapist- Dr. Gr (monthly)      Patient Active Problem List   Diagnosis     Allergic rhinitis due to pollen     Urge incontinence     Hypertension, essential     Cardiomegaly     Chronic constipation     Dry eye syndrome     Esophageal reflux     Exposure keratoconjunctivitis     DM ophthalmopathy (H)     Hypothyroidism     Senile cataract     ANUJ (obstructive sleep apnea)     Postmenopausal atrophic vaginitis     Restless leg syndrome     Squamous blepharitis     Morbid obesity due to excess calories (H)     Personal history of breast cancer s/p L masectomy     Diabetes mellitus type 2, insulin dependent (H)     Hypercholesteremia     Lives in group home     Extrapyramidal and movement disorder     CKD (chronic kidney disease) stage 2, GFR 60-89 ml/min     Solitary kidney, congenital     S/P hysterectomy     Impingement syndrome of right shoulder     Hypertriglyceridemia     Candidiasis of skin     Generalized anxiety disorder     Carpal tunnel syndrome of left wrist     Schizoaffective disorder, depressive type (H)     Health Care Home     Major depressive disorder, recurrent episode, moderate (H)     Psychological factors affecting medical condition     Hx of psychiatric care     Nail  complaint     Microalbuminuria due to type 2 diabetes mellitus (H)     Type 2 diabetes mellitus with microalbuminuria, with long-term current use of insulin (H)     Diabetes mellitus, type II, insulin dependent (H)     CKD (chronic kidney disease) stage 1, GFR 90 ml/min or greater       ALLERGY                                Chlordiazepoxide hcl; Dimetapp dm cold-cough; Haldol; Ibuprofen; Lactose intolerance [beta-galactosidase]; Milk products; and Propofol  MEDICATIONS                               Current Outpatient Prescriptions   Medication Sig Dispense Refill     LORazepam (ATIVAN) 1 MG tablet Take 1 tablet (1 mg) by mouth At Bedtime .  May take additional 1mg daily PRN for agitation. 30 tablet 2     alum & mag hydroxide-simethicone (MYLANTA/MAALOX) 200-200-20 MG/5ML SUSP suspension Take 30 mLs by mouth daily as needed for indigestion       artificial saliva (BIOTENE MT) AERS spray Take 1 spray by mouth 3 times daily as needed for dry mouth       FLUoxetine (PROZAC) 40 MG capsule Take 1 capsule (40 mg) by mouth daily 30 capsule 5     ziprasidone (GEODON) 40 MG capsule Take 1 capsule (40 mg) by mouth 2 times daily (with meals) 60 capsule 5     blood glucose monitoring (NO BRAND SPECIFIED) test strip Use to test blood sugar 3 times daily or as directed. 100 each 3     exenatide (BYETTA) 10 MCG/0.04ML injection Inject 10 mcg Subcutaneous 2 times daily (before meals) 1 Syringe 11     insulin glargine (LANTUS) 100 UNIT/ML injection Inject 24 Units Subcutaneous At Bedtime 8 mL 11     amLODIPine (NORVASC) 5 MG tablet Take 2 tablets (10 mg) by mouth daily 30 tablet 3     nystatin (MYCOSTATIN) 589839 UNIT/GM POWD Apply topically 3 times daily as needed 60 g 1     clotrimazole (LOTRIMIN) 1 % cream Apply topically 2 times daily as needed 50 g 0     fluticasone (FLONASE) 50 MCG/ACT nasal spray Spray 1 spray into both nostrils daily 16 g 3     furosemide (LASIX) 20 MG tablet Take 1 tablet (20 mg) by mouth 2 times daily 60  tablet 3     acetaminophen (TYLENOL) 500 MG tablet Take 2 tablets (1,000 mg) by mouth every 6 hours as needed 1 Bottle 2     solifenacin (VESICARE) 10 MG tablet Take 1 tablet (10 mg) by mouth daily 30 tablet 6     HYDROcodone-acetaminophen (NORCO) 5-325 MG per tablet Take 1 tablet by mouth every 6 hours as needed 1-2 tabs       atorvastatin (LIPITOR) 40 MG tablet Take 1 tablet (40 mg) by mouth daily 90 tablet 1     calcium polycarbophil (FIBERCON) 625 MG tablet Take 2 tablets by mouth daily       levothyroxine (SYNTHROID, LEVOTHROID) 175 MCG tablet Take 1 tablet (175 mcg) by mouth daily Give on empty stomach 30 tablet 4     metFORMIN (GLUCOPHAGE-XR) 500 MG 24 hr tablet Take 2 tablets (1,000 mg) by mouth 2 times daily (with meals) (Patient taking differently: Take 2,000 mg by mouth daily ) 90 tablet 3     Incontinence Supply Disposable (INCONTINENCE BRIEF LARGE) MISC 2 Units 2 times daily 60 Units 11     losartan (COZAAR) 100 MG tablet Take 1 tablet by mouth daily. 90 tablet 1     Sodium Fluoride (PREVIDENT 5000 PLUS) 1.1 % CREA Apply  to affected area every evening.       Hypromellose (ARTIFICIAL TEARS OP) Apply 1 drop to eye 4 times daily.       Gabapentin (NEURONTIN PO) Take 900 mg by mouth 3 times daily.       senna-docusate (SENNA S) 8.6-50 MG per tablet Take 2 tablets by mouth At Bedtime.       polyethylene glycol (MIRALAX/GLYCOLAX) powder Take 1 capful by mouth 2 times daily. 17 GM PO BID       Artificial Tear Ointment (LACRI-LUBE OP) Apply  to eye. Place 0.5 inches into both eyes at bedtime       Skin Protectants, Misc. (EUCERIN) cream Apply  topically as needed. Apply to thigh PRN dry skin        Saline 0.9 % SOLN Spray 2 sprays in nostril as needed.       Calcium Carbonate-Vitamin D (CALCIUM-VITAMIN D) 250-125 MG-UNIT TABS Take 1 tablet by mouth 2 times daily. Calcium 250 mg/Vit D 125 IU       Sod Fluor-Solo Fluor-Hydrfl Ac (GEL-SASHA DENTINBLOC DT) Apply  to affected area. GEL. Apply to 2nd molar on bottom  right daily at bedtime.       lactase (LACTAID) 3000 UNIT tablet Take 4 tabs daily with meals.       Magnesium Hydroxide (MILK OF MAGNESIA PO) Take  by mouth. Take 30 mL as needed for constipation.       CLARITIN 10 MG OR TABS 1 TAB PO QD (Once per day) as needed for ALLERGY SYMPTOMS 30 11     ASPIRIN 81 MG OR TABS Take 1 tablet by mouth daily. ENTERIC COATED. 100 3     GLUCAGON EMERGENCY 1 MG IJ KIT Inject  as directed. 1 mL as needed for signs of hypoglycemia. May repeat as needed in 15 minutes.  0     VITALS   /80  Pulse 78  Wt 110.9 kg (244 lb 9.6 oz)  BMI 41.99 kg/m2   MENTAL STATUS EXAM                                                           Alertness: alert  and oriented  Appearance: well groomed  Behavior/Demeanor: cooperative and pleasant, with good  eye contact   Speech: normal  Language: intact  Psychomotor: normal or unremarkable  Mood: description consistent with euthymia  Affect: full range; was congruent to mood; was congruent to content  Thought Process/Associations: unremarkable  Thought Content:  Reports none;  Denies suicidal ideation , violent ideation  and psychotic thoughts   Perception:  Reports none;  Denies auditory hallucinations and visual hallucinations  Insight: good  Judgment: good  Cognition: does  appear grossly intact; formal cognitive testing was not done    LABS and DATA     RATING SCALES:  AIMS: done 7/6/17 with total score of 0  (see flowsheets)    PHQ9 TODAY = 6  PHQ-9 SCORE 5/25/2017 7/6/2017 8/3/2017   Total Score - - -   Total Score 7 7 7       ANTIPSYCHOTIC LABS   [glu, A1C, lipids (focus LDL), liver enzymes, WBC, ANEU, Hgb, plts]    q12 mo  Recent Labs   Lab Test  09/12/17   1404  06/22/17   1312  06/21/17   1047  03/21/17   1023   GLC   --    --   134*  118*   A1C  6.3*  6.9*   --   6.6*     Recent Labs   Lab Test  03/21/17   1023  02/22/16   1028   CHOL  142  162.0   TRIG  121  151.9*   LDL  78  95   HDL  40*  36.6*     Recent Labs   Lab Test  03/21/17   1023   01/22/15   1600   AST  13  18   ALT  20  35   ALKPHOS  92  113     Recent Labs   Lab Test  06/21/17   1047  03/21/17   1023  06/22/16   1035   07/04/14   0705   WBC   --   11.8*  9.9   --   7.7   ANEU   --   8.3   --    --   4.8   HGB  13.8  13.4  13.5   < >  13.3   PLT   --   266  266   --   223    < > = values in this interval not displayed.       DIAGNOSIS     Schizoaffective disorder, depressed type, multiple episodes, in partial remission to mild re: mood, without psychotic features.  Generalized anxiety disorder, well-managed     ASSESSMENT                                   TODAY Pinky continues to report stability with her mental health and mood appears to be euthymic with expected fluctuations in mood and anxiety in context of stressors. No safety concerns. Continues to work on adjusting to using her CPAP, recently this has improved with use of new mask. Discussed possibility of reducing medications, including PRN dose of lorazepam since she has not been requiring this medication on very rare occasions, however, given that she has experienced some changes with possible increase in paranoia (feeling there is somebody standing behind her) will not make any medication changes today. Pinky also requested that no medication adjustments be made today. As previously mentioned, will continue to consider trial of reduced doses given long standing stability at future appointments with goal of simplifying medication regimen along with helping with any possible metabolic side effects given patient is working hard to lose weight and improve physical health. Also, her BP continues to be slightly elevated, although improved from previous visits, she is asymptomatic, this is being monitored by her PCP along with RN's at [facility].              [place] PRESCRIPTION MONITORING PROGRAM [] was not checked today: controlled substances monitored by [facility] staff,  has been previously checked with no indications of  abuse/misuse of medications.    PSYCHOTROPIC DRUG INTERACTIONS:   ZIPRASIDONE and FLUOXETINE may result in increased risk of QT-interval prolongation and increased risk of serotonin syndrome.   ZIPRASIDONE, FLUOXETINE and VESICARE may result in increased risk of QT-interval prolongation.  ASPIRIN and SSRI may result in an increased risk of bleeding  LEVOTHYROXINE and FLUOXETINE may result in increased levothyroxine requirements.  MANAGEMENT:  Monitoring for adverse effects, periodic EKGs and patient is aware of risks     PLAN                                                                                                       1) PSYCHOTROPIC MEDICATIONS:   - continue fluoxetine 40mg daily   - continue ziprasidone 40mg BID with meals   - continue lorazepam 1mg QHS and 1mg PRN for agitation     2) THERAPY:  Continue with Dr. Gr monthly  TREATMENT PLAN   Date revised  -  8/3/17  Date next due- 11/3/17    3) LABS NEXT DUE:  AP labs due next in March 2018       RATING SCALES:  AIMS next due Jan 2018    4) REFERRALS [MICD, medical etc.]:  none    5) MTM REFERRAL [PharmD]:  none    6) :  none    7) FAMILY MEETING:  none    8) RTC: 6-8 weeks weeks    9) CRISIS NUMBERS:   Provided routinely in AVS   Especially emphasized:  Theresa Hernandez [phone] (clinic)    [phone] (after hours)      PSYCHIATRY CLINIC INDIVIDUAL PSYCHOTHERAPY NOTE                                    16   Start time: 1400  End time: 1420  Subjective: This supportive psychotherapy session addressed issues related to goals of therapy.  Patient's reaction: Action in the context of mental status appropriate for ambulatory setting.  Progress: fair    Plan: RTC 6-8 weeks  Psychotherapy services during this visit included myself and Pinky.   TREATMENT  PLAN          SYMPTOMS;PROBLEMS   MEASURABLE GOALS;    FUNCTIONAL IMPROVEMENT INTERVENTIONS;    GAINS MADE DISCHARGE CRITERIA   Depression: depressed mood   use physical activity to boost  mood, wants goal of 45 mins of activity per day -go to fitness center appointments, has not been making these consistently (60% attendance currently) marked symptom improvement   Depression: insomnia    get 7-8 hours of restful sleep every night -continues to work on practicing sleep hygiene, calming activities (coloring avoiding tv), now averaging about 6-7 hours per night marked symptom improvement     TREATMENT RISK STATEMENT:  The risks, benefits, alternatives and potential adverse effects have been discussed and are understood by the pt. The pt understands the risks of using street drugs or alcohol. There are no medical contraindications, the pt agrees to treatment with the ability to do so. The pt knows to call the clinic for any problems or to access emergency care if needed.  Medical and substance use concerns are documented above.  Psychotropic drug interaction check was done, including changes made today.    RESIDENT:   Joana De Leon, DO    Patient not staffed in clinic.  Note will be reviewed and signed by supervisor Dr. Gordon.    I did not see this patient directly. I have reviewed the documentation and I agree with the resident's plan of care.     Annie Gordon MD

## 2017-09-14 NOTE — NURSING NOTE
Chief Complaint   Patient presents with     Recheck Medication     Schizoaffective disorder, depressive type      Reviewed allergies, smoking status, and pharmacy preference  Administered abuse screening question  Obtained weight, blood pressure and heart rate

## 2017-09-14 NOTE — MR AVS SNAPSHOT
After Visit Summary   9/14/2017    Pinky Page    MRN: [id number]           Patient Information     Date Of Birth          1950        Visit Information        Provider Department      9/14/2017 2:15 PM Joana De Leon MD [facility]        Today's Diagnoses     Schizoaffective disorder, depressive type (H)    -  1      Care Instructions    -continue current medications, no changes  -return to clinic in 6-8 weeks or sooner if needed          Follow-ups after your visit        Follow-up notes from your care team     Return in about 2 months (around 11/14/2017) for 30 MFU.      Your next 10 appointments already scheduled     Oct 10, 2017  1:00 PM CDT   (Arrive by 12:45 PM)   Return Visit with Damon Gr, PhD [facility] ([facility])    [address]  3rd [facility] [address] [phone]            Oct 16, 2017  2:20 PM CDT   PHYSICAL with MD [facility] ([facility])    [facility] [address],  [address]  [facility] [address]   [phone]            Nov 13, 2017  2:15 PM CST   Adult Med Follow UP with Joana De Leon MD   Psychiatry Clinic ([facility])    [place] [id number]  [address] [facility] [address]   [phone]            Nov 14, 2017  1:30 PM CST   NEW GENERAL with Michael Lopez MD   Eye Clinic ([facility])    [address]  [address] [facility]  [address] [address]  [facility] [address]   [phone]            Jan 08, 2018 11:30 AM CST   (Arrive by 11:15 AM)   Return Visit with Marcos Magdaleno MD   [facility] ([facility])    [address]  4th Floor  [facility] [address] [phone]            May 10, 2018 11:30 AM CDT   Return Sleep Patient with Brianda Reddy MD   [facility] [place], Sleep Study ([place]  [facility]    [address] [place] [id number] [phone]              Who to contact     Please call your clinic at [phone] to:    Ask questions about your health    Make or cancel appointments    Discuss your medicines    Learn about your test results    Speak to your doctor   If you have compliments or concerns about an experience at your clinic, or if you wish to file a complaint, please contact [facility] Patient Relations at [phone] or email us at [email or web address]         Additional Information About Your Visit        Ship [employer] DuckharGuestMetrics Information     QuesComt is an electronic gateway that provides easy, online access to your medical records. With Loop App, you can request a clinic appointment, read your test results, renew a prescription or communicate with your care team.     To sign up for Loop App visit the website at [email or web address]   You will be asked to enter the access code listed below, as well as some personal information. Please follow the directions to create your username and password.     Your access code is: [email or web address]  Expires: 2017  7:15 PM     Your access code will  in 90 days. If you need help or a new code, please contact your [facility] or call [phone] for assistance.        Care EveryWhere ID     This is your Care EveryWhere ID. This could be used by other organizations to access your [place] medical records  [id number]        Your Vitals Were     Pulse BMI (Body Mass Index)                78 41.99 kg/m2           Blood Pressure from Last 3 Encounters:   17 138/80   17 126/82   17 160/78    Weight from Last 3 Encounters:   17 110.9 kg (244 lb 9.6 oz)   17 111 kg (244 lb 12.8 oz)   17 109.5 kg (241 lb 4.8 oz)              Today, you had the following     No orders found for display         Today's  Medication Changes          These changes are accurate as of: 9/14/17 11:59 PM.  If you have any questions, ask your nurse or doctor.               These medicines have changed or have updated prescriptions.        Dose/Directions    metFORMIN 500 MG 24 hr tablet   Commonly known as:  GLUCOPHAGE-XR   This may have changed:    - how much to take  - when to take this   Used for:  Diabetes mellitus, type 2 (H)        Dose:  1000 mg   Take 2 tablets (1,000 mg) by mouth 2 times daily (with meals)   Quantity:  90 tablet   Refills:  3                Primary Care Provider Office Phone # Fax #    Shamika Ileana Kelley [phone]       [facility] [address] [facility] [address]        Equal Access to Services     JAYDEN STUART : Tatiana Dietrich, gifty del valle, jaymie sterlingalmaursula mittal, autumn josé. So [facility] [phone].    ATENCIÓN: Si habla español, tiene a deluca disposición servicios gratuitos de asistencia lingüística. Candidoame al [phone].    We comply with applicable federal civil rights laws and [place] laws. We do not discriminate on the basis of race, color, national origin, age, disability sex, sexual orientation or gender identity.            Thank you!     Thank you for choosing PSYCHIATRY CLINIC  for your care. Our goal is always to provide you with excellent care. Hearing back from our patients is one way we can continue to improve our services. Please take a few minutes to complete the written survey that you may receive in the mail after your visit with us. Thank you!             Your Updated Medication List - Protect others around you: Learn how to safely use, store and throw away your medicines at www.disposemymeds.org.          This list is accurate as of: 9/14/17 11:59 PM.  Always use your most recent med list.                   Brand Name Dispense Instructions for use Diagnosis    acetaminophen 500 MG tablet    TYLENOL    1  Bottle    Take 2 tablets (1,000 mg) by mouth every 6 hours as needed    Sprain of left ankle, unspecified ligament, initial encounter       alum & mag hydroxide-simethicone 200-200-20 MG/5ML Susp suspension    MYLANTA/MAALOX     Take 30 mLs by mouth daily as needed for indigestion        amLODIPine 5 MG tablet    NORVASC    30 tablet    Take 2 tablets (10 mg) by mouth daily    Essential hypertension with goal blood pressure less than 130/85       artificial saliva Aers spray      Take 1 spray by mouth 3 times daily as needed for dry mouth        ARTIFICIAL TEARS OP      Apply 1 drop to eye 4 times daily.        aspirin 81 MG tablet     100    Take 1 tablet by mouth daily. ENTERIC COATED.        atorvastatin 40 MG tablet    LIPITOR    90 tablet    Take 1 tablet (40 mg) by mouth daily    Hyperlipidemia LDL goal <100       blood glucose monitoring test strip    no brand specified    100 each    Use to test blood sugar 3 times daily or as directed.    Type 2 diabetes mellitus with microalbuminuria, with long-term current use of insulin (H)       calcium polycarbophil 625 MG tablet    FIBERCON     Take 2 tablets by mouth daily        calcium-vitamin D 250-125 MG-UNIT Tabs      Take 1 tablet by mouth 2 times daily. Calcium 250 mg/Vit D 125 IU        CLARITIN 10 MG tablet   Generic drug:  loratadine     30    1 TAB PO QD (Once per day) as needed for ALLERGY SYMPTOMS        clotrimazole 1 % cream    LOTRIMIN    50 g    Apply topically 2 times daily as needed    Dermatophytosis       eucerin cream      Apply  topically as needed. Apply to thigh PRN dry skin        exenatide 10 MCG/0.04ML injection    BYETTA    1 Syringe    Inject 10 mcg Subcutaneous 2 times daily (before meals)    Type 2 diabetes mellitus with microalbuminuria, with long-term current use of insulin (H)       FLUoxetine 40 MG capsule    PROzac    30 capsule    Take 1 capsule (40 mg) by mouth daily    Schizoaffective disorder, depressive type (H)        fluticasone 50 MCG/ACT spray    FLONASE    16 g    Spray 1 spray into both nostrils daily    Seasonal allergic rhinitis       furosemide 20 MG tablet    LASIX    60 tablet    Take 1 tablet (20 mg) by mouth 2 times daily    Lower leg edema       GEL-SASHA DENTINBLOC DT      Apply  to affected area. GEL. Apply to 2nd molar on bottom right daily at bedtime.        GLUCAGON EMERGENCY 1 MG kit   Generic drug:  glucagon      Inject  as directed. 1 mL as needed for signs of hypoglycemia. May repeat as needed in 15 minutes.        HYDROcodone-acetaminophen 5-325 MG per tablet    NORCO     Take 1 tablet by mouth every 6 hours as needed 1-2 tabs        Incontinence Brief Large Misc     60 Units    2 Units 2 times daily    Urge incontinence, Nocturnal enuresis       insulin glargine 100 UNIT/ML injection    LANTUS    8 mL    Inject 24 Units Subcutaneous At Bedtime    Type 2 diabetes mellitus with microalbuminuria, with long-term current use of insulin (H)       LACRI-LUBE OP      Apply  to eye. Place 0.5 inches into both eyes at bedtime        LACTAID 3000 UNIT tablet   Generic drug:  lactase      Take 4 tabs daily with meals.        levothyroxine 175 MCG tablet    SYNTHROID/LEVOTHROID    30 tablet    Take 1 tablet (175 mcg) by mouth daily Give on empty stomach    Other specified hypothyroidism       LORazepam 1 MG tablet    ATIVAN    30 tablet    Take 1 tablet (1 mg) by mouth At Bedtime .  May take additional 1mg daily PRN for agitation.    Generalized anxiety disorder       losartan 100 MG tablet    COZAAR    90 tablet    Take 1 tablet by mouth daily.    Hypertension goal BP (blood pressure) < 130/80, Diabetes mellitus type 2, insulin dependent (H), CKD (chronic kidney disease) stage 2, GFR 60-89 ml/min       metFORMIN 500 MG 24 hr tablet    GLUCOPHAGE-XR    90 tablet    Take 2 tablets (1,000 mg) by mouth 2 times daily (with meals)    Diabetes mellitus, type 2 (H)       MILK OF MAGNESIA PO      Take  by mouth. Take 30 mL as  needed for constipation.        NEURONTIN PO      Take 900 mg by mouth 3 times daily.        nystatin 666371 UNIT/GM Powd    MYCOSTATIN    60 g    Apply topically 3 times daily as needed    Candidiasis of skin       polyethylene glycol powder    MIRALAX/GLYCOLAX     Take 1 capful by mouth 2 times daily. 17 GM PO BID        PREVIDENT 5000 PLUS 1.1 % Crea   Generic drug:  Sodium Fluoride      Apply  to affected area every evening.        saline 0.9 % Soln      Spray 2 sprays in nostril as needed.        SENNA S 8.6-50 MG per tablet   Generic drug:  senna-docusate      Take 2 tablets by mouth At Bedtime.        solifenacin 10 MG tablet    VESICARE    30 tablet    Take 1 tablet (10 mg) by mouth daily    Urge incontinence       ziprasidone 40 MG capsule    GEODON    60 capsule    Take 1 capsule (40 mg) by mouth 2 times daily (with meals)    Schizoaffective disorder, depressive type (H)

## 2017-10-10 ENCOUNTER — OFFICE VISIT (OUTPATIENT)
Dept: PSYCHOLOGY | Facility: CLINIC | Age: 67
End: 2017-10-10

## 2017-10-10 VITALS — BODY MASS INDEX: 42 KG/M2 | WEIGHT: 244.7 LBS

## 2017-10-10 DIAGNOSIS — E66.01 PSYCHOLOGICAL FACTORS AFFECTING MORBID OBESITY (H): ICD-10-CM

## 2017-10-10 DIAGNOSIS — F54 PSYCHOLOGICAL FACTORS AFFECTING MORBID OBESITY (H): ICD-10-CM

## 2017-10-10 DIAGNOSIS — F41.1 GENERALIZED ANXIETY DISORDER: ICD-10-CM

## 2017-10-10 DIAGNOSIS — F33.0 MAJOR DEPRESSIVE DISORDER, RECURRENT EPISODE, MILD (H): Primary | ICD-10-CM

## 2017-10-10 DIAGNOSIS — E66.9 OBESITY, UNSPECIFIED OBESITY SEVERITY, UNSPECIFIED OBESITY TYPE: ICD-10-CM

## 2017-10-10 NOTE — MR AVS SNAPSHOT
After Visit Summary   10/10/2017    Pinky Page    MRN: [id number]           Patient Information     Date Of Birth          1950        Visit Information        Provider Department      10/10/2017 1:00 PM Damon Gr, PhD [facility]        Today's Diagnoses     Major depressive disorder, recurrent episode, mild (H)    -  1    Psychological factors affecting morbid obesity (H)        Obesity, unspecified obesity severity, unspecified obesity type        Generalized anxiety disorder           Follow-ups after your visit        Your next 10 appointments already scheduled     Oct 16, 2017  2:20 PM CDT   PHYSICAL with Shamika Kelley MD   [facility] ([facility])    [address],  [address]  [facility] [address]   [phone]            Nov 13, 2017  2:15 PM CST   Adult Med Follow UP with Joana De Leon MD   Psychiatry Clinic ([facility])    [place] [id number]  [address] [facility] [phone]            Nov 14, 2017  1:30 PM CST   NEW GENERAL with Michael Lopez MD   Eye Clinic ([facility])    [address]  [address] [facility]  9th Fl Clin 9a  [facility] [address] [phone]            Jan 08, 2018 11:30 AM CST   (Arrive by 11:15 AM)   Return Visit with Marcos Magdaleno MD   [facility] ([facility])    [address]  4th Floor  [facility] [id number] [phone]            May 10, 2018 11:30 AM CDT   Return Sleep Patient with Brianda Reddy MD   [facility] [place], Sleep Study ([facility])    [address] [place] [id number] [phone]              Who to contact     Please call your clinic at [phone] to:    Ask questions about your health    Make or cancel appointments    Discuss your medicines    Learn  about your test results    Speak to your doctor   If you have compliments or concerns about an experience at your clinic, or if you wish to file a complaint, please contact [facility] Patient Relations at [phone] or email us at [email or web address]         Additional Information About Your Visit        Savvy ServicesharMoblyng Information     Careerflot is an electronic gateway that provides easy, online access to your medical records. With Mark Forged, you can request a clinic appointment, read your test results, renew a prescription or communicate with your care team.     To sign up for Mark Forged visit the website at [email or web address]   You will be asked to enter the access code listed below, as well as some personal information. Please follow the directions to create your username and password.     Your access code is: [email or web address]  Expires: 2017  7:15 PM     Your access code will  in 90 days. If you need help or a new code, please contact your [facility] or call [phone] for assistance.        Care EveryWhere ID     This is your Care EveryWhere ID. This could be used by other organizations to access your [place] medical records  [id number]        Your Vitals Were     BMI (Body Mass Index)                   42 kg/m2            Blood Pressure from Last 3 Encounters:   17 138/80   17 126/82   17 160/78    Weight from Last 3 Encounters:   10/10/17 111 kg (244 lb 11.2 oz)   17 110.9 kg (244 lb 9.6 oz)   17 111 kg (244 lb 12.8 oz)              Today, you had the following     No orders found for display         Today's Medication Changes          These changes are accurate as of: 10/10/17  2:01 PM.  If you have any questions, ask your nurse or doctor.               These medicines have changed or have updated prescriptions.        Dose/Directions    metFORMIN 500 MG 24 hr tablet   Commonly known as:  GLUCOPHAGE-XR    This may have changed:    - how much to take  - when to take this   Used for:  Diabetes mellitus, type 2 (H)        Dose:  1000 mg   Take 2 tablets (1,000 mg) by mouth 2 times daily (with meals)   Quantity:  90 tablet   Refills:  3                Primary Care Provider Office Phone # Fax #    Shamika Ileana Kelley [phone]       [facility] [address] [facility] [place] [address]        Equal Access to Services     JAYDEN STUART AH: Hadii aad ku hadasho Soomaali, waaxda luqadaha, qaybta kaalmada adeegyada, waxay idiin hayaan adeeg brigidaromankathy laanisa . So [facility] [phone].    ATENCIÓN: Si habla español, tiene a deluca disposición servicios gratuitos de asistencia lingüística. [facility] [phone].    We comply with applicable federal civil rights laws and [place] laws. We do not discriminate on the basis of race, color, national origin, age, disability, sex, sexual orientation, or gender identity.            Thank you!     Thank you for choosing [facility]IMARY [facility]ARE CLINIC  for your care. Our goal is always to provide you with excellent care. Hearing back from our patients is one way we can continue to improve our services. Please take a few minutes to complete the written survey that you may receive in the mail after your visit with us. Thank you!             Your Updated Medication List - Protect others around you: Learn how to safely use, store and throw away your medicines at www.disposemymeds.org.          This list is accurate as of: 10/10/17  2:01 PM.  Always use your most recent med list.                   Brand Name Dispense Instructions for use Diagnosis    acetaminophen 500 MG tablet    TYLENOL    1 Bottle    Take 2 tablets (1,000 mg) by mouth every 6 hours as needed    Sprain of left ankle, unspecified ligament, initial encounter       alum & mag hydroxide-simethicone 200-200-20 MG/5ML Susp suspension    MYLANTA/MAALOX     Take 30 mLs by mouth daily as needed for indigestion         amLODIPine 5 MG tablet    NORVASC    30 tablet    Take 2 tablets (10 mg) by mouth daily    Essential hypertension with goal blood pressure less than 130/85       artificial saliva Aers spray      Take 1 spray by mouth 3 times daily as needed for dry mouth        ARTIFICIAL TEARS OP      Apply 1 drop to eye 4 times daily.        aspirin 81 MG tablet     100    Take 1 tablet by mouth daily. ENTERIC COATED.        atorvastatin 40 MG tablet    LIPITOR    90 tablet    Take 1 tablet (40 mg) by mouth daily    Hyperlipidemia LDL goal <100       blood glucose monitoring test strip    no brand specified    100 each    Use to test blood sugar 3 times daily or as directed.    Type 2 diabetes mellitus with microalbuminuria, with long-term current use of insulin (H)       calcium polycarbophil 625 MG tablet    FIBERCON     Take 2 tablets by mouth daily        calcium-vitamin D 250-125 MG-UNIT Tabs      Take 1 tablet by mouth 2 times daily. Calcium 250 mg/Vit D 125 IU        CLARITIN 10 MG tablet   Generic drug:  loratadine     30    1 TAB PO QD (Once per day) as needed for ALLERGY SYMPTOMS        clotrimazole 1 % cream    LOTRIMIN    50 g    Apply topically 2 times daily as needed    Dermatophytosis       eucerin cream      Apply  topically as needed. Apply to thigh PRN dry skin        exenatide 10 MCG/0.04ML injection    BYETTA    1 Syringe    Inject 10 mcg Subcutaneous 2 times daily (before meals)    Type 2 diabetes mellitus with microalbuminuria, with long-term current use of insulin (H)       FLUoxetine 40 MG capsule    PROzac    30 capsule    Take 1 capsule (40 mg) by mouth daily    Schizoaffective disorder, depressive type (H)       fluticasone 50 MCG/ACT spray    FLONASE    16 g    Spray 1 spray into both nostrils daily    Seasonal allergic rhinitis       furosemide 20 MG tablet    LASIX    60 tablet    Take 1 tablet (20 mg) by mouth 2 times daily    Lower leg edema       GEL-SASHA DENTINBLOC DT      Apply  to affected  area. GEL. Apply to 2nd molar on bottom right daily at bedtime.        GLUCAGON EMERGENCY 1 MG kit   Generic drug:  glucagon      Inject  as directed. 1 mL as needed for signs of hypoglycemia. May repeat as needed in 15 minutes.        HYDROcodone-acetaminophen 5-325 MG per tablet    NORCO     Take 1 tablet by mouth every 6 hours as needed 1-2 tabs        Incontinence Brief Large Misc     60 Units    2 Units 2 times daily    Urge incontinence, Nocturnal enuresis       insulin glargine 100 UNIT/ML injection    LANTUS    8 mL    Inject 24 Units Subcutaneous At Bedtime    Type 2 diabetes mellitus with microalbuminuria, with long-term current use of insulin (H)       LACRI-LUBE OP      Apply  to eye. Place 0.5 inches into both eyes at bedtime        LACTAID 3000 UNIT tablet   Generic drug:  lactase      Take 4 tabs daily with meals.        levothyroxine 175 MCG tablet    SYNTHROID/LEVOTHROID    30 tablet    Take 1 tablet (175 mcg) by mouth daily Give on empty stomach    Other specified hypothyroidism       LORazepam 1 MG tablet    ATIVAN    30 tablet    Take 1 tablet (1 mg) by mouth At Bedtime .  May take additional 1mg daily PRN for agitation.    Generalized anxiety disorder       losartan 100 MG tablet    COZAAR    90 tablet    Take 1 tablet by mouth daily.    Hypertension goal BP (blood pressure) < 130/80, Diabetes mellitus type 2, insulin dependent (H), CKD (chronic kidney disease) stage 2, GFR 60-89 ml/min       metFORMIN 500 MG 24 hr tablet    GLUCOPHAGE-XR    90 tablet    Take 2 tablets (1,000 mg) by mouth 2 times daily (with meals)    Diabetes mellitus, type 2 (H)       MILK OF MAGNESIA PO      Take  by mouth. Take 30 mL as needed for constipation.        NEURONTIN PO      Take 900 mg by mouth 3 times daily.        nystatin 886507 UNIT/GM Powd    MYCOSTATIN    60 g    Apply topically 3 times daily as needed    Candidiasis of skin       polyethylene glycol powder    MIRALAX/GLYCOLAX     Take 1 capful by mouth 2  times daily. 17 GM PO BID        PREVIDENT 5000 PLUS 1.1 % Crea   Generic drug:  Sodium Fluoride      Apply  to affected area every evening.        saline 0.9 % Soln      Spray 2 sprays in nostril as needed.        SENNA S 8.6-50 MG per tablet   Generic drug:  senna-docusate      Take 2 tablets by mouth At Bedtime.        solifenacin 10 MG tablet    VESICARE    30 tablet    Take 1 tablet (10 mg) by mouth daily    Urge incontinence       ziprasidone 40 MG capsule    GEODON    60 capsule    Take 1 capsule (40 mg) by mouth 2 times daily (with meals)    Schizoaffective disorder, depressive type (H)

## 2017-10-10 NOTE — PROGRESS NOTES
Health Psychology                  Clinic    Department of Medicine  Madelaine Ingram, Ph.D., L.P. [phone]                          [facility] Miryam Woods, Ph.D.,  L.P. [phone]                 3rd Floor, Clinic 3A  [place] Mail Code 748   Damon Gr, Ph.D., A.DAISY.EWA.P., L.P. [phone]     [address] [facility]  [address] [facility] Kimber Morales, Ph.D., L.P. [phone]  [id number]  [place] [address]    Health Psychology Follow-Up Note    Ms. Page is a pleasant 67-year old woman with chronic depressive illness, who returns to clinic for supportive and behavioral psychotherapy for moderate recurrent depression and for help losing weight given her morbid obesity.     She is 244.# today, up from 241.3  last session.    Wt Readings from Last 4 Encounters:   10/10/17 111 kg (244 lb 11.2 oz)   09/14/17 110.9 kg (244 lb 9.6 oz)   09/12/17 111 kg (244 lb 12.8 oz)   08/28/17 109.5 kg (241 lb 4.8 oz)     At [employer], she continues to serve on the [employer].  She has been on the [employer] for 20 years, plus decorating the bulletin boards.  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    Pinky has been calling [employer].  There was an incident of another person taking over the calling.  It upset her because it was was not planned or discussed.  We  Problem solved about it most of the session.  She rates the depression as 3 on 10-point subjective scale.   She is doing more social walking some of the time and had apparently is gradually ramping up on her fitness center days.  She agrees to increase.  She had been  tolerating using the stationery bicycle  up to 12 minutes and was willing to increase to 15.   However, she is having some pain at 15 minutes.  WE discussed more frequent use of it, sometimes at lower lengths of time.  She is dong it x3/week.  We walked for the better part of  the session.  Goal for exercise has been 1.5 hours/day as of June 19.   She arrived early today.  Pinky participated fully and appeared to derive benefit.  Rapport was excellent.     Extended session due to complexity of case and length of interval.      IMPRESSION Distress Disability Risk    Low High Low     Time In: 1:00  Time Out:  1:00  Diagnosis:   Major Depression, recurrent mild (F33.0)   Psychological Factors Affecting Morbid Obesity (F54)      Plan: She will return on 11/21 @ 1:00 for behavioral and supportive psychotherapy.   Plan to to bicycle for 15 minutes some days, and less other days.  Tx plan due 4/11/2018  Damon Gr, Ph.D., A.B.P.P., L.P.

## 2017-10-16 ENCOUNTER — OFFICE VISIT (OUTPATIENT)
Dept: FAMILY MEDICINE | Facility: CLINIC | Age: 67
End: 2017-10-16

## 2017-10-16 VITALS
TEMPERATURE: 98.2 F | HEART RATE: 69 BPM | SYSTOLIC BLOOD PRESSURE: 132 MMHG | HEIGHT: 64 IN | DIASTOLIC BLOOD PRESSURE: 75 MMHG | RESPIRATION RATE: 18 BRPM | BODY MASS INDEX: 40.9 KG/M2 | OXYGEN SATURATION: 94 % | WEIGHT: 239.6 LBS

## 2017-10-16 DIAGNOSIS — G47.09 OTHER INSOMNIA: ICD-10-CM

## 2017-10-16 DIAGNOSIS — Z00.00 ROUTINE HEALTH MAINTENANCE: Primary | ICD-10-CM

## 2017-10-16 DIAGNOSIS — Z00.00 MEDICARE ANNUAL WELLNESS VISIT, INITIAL: ICD-10-CM

## 2017-10-16 RX ORDER — LANOLIN ALCOHOL/MO/W.PET/CERES
3 CREAM (GRAM) TOPICAL
Qty: 60 TABLET | Refills: 1 | Status: SHIPPED | OUTPATIENT
Start: 2017-10-16 | End: 2019-12-11

## 2017-10-16 ASSESSMENT — ANXIETY QUESTIONNAIRES
2. NOT BEING ABLE TO STOP OR CONTROL WORRYING: SEVERAL DAYS
7. FEELING AFRAID AS IF SOMETHING AWFUL MIGHT HAPPEN: SEVERAL DAYS
6. BECOMING EASILY ANNOYED OR IRRITABLE: SEVERAL DAYS
GAD7 TOTAL SCORE: 7
5. BEING SO RESTLESS THAT IT IS HARD TO SIT STILL: SEVERAL DAYS
3. WORRYING TOO MUCH ABOUT DIFFERENT THINGS: SEVERAL DAYS
1. FEELING NERVOUS, ANXIOUS, OR ON EDGE: SEVERAL DAYS
IF YOU CHECKED OFF ANY PROBLEMS ON THIS QUESTIONNAIRE, HOW DIFFICULT HAVE THESE PROBLEMS MADE IT FOR YOU TO DO YOUR WORK, TAKE CARE OF THINGS AT HOME, OR GET ALONG WITH OTHER PEOPLE: SOMEWHAT DIFFICULT

## 2017-10-16 ASSESSMENT — PATIENT HEALTH QUESTIONNAIRE - PHQ9
SUM OF ALL RESPONSES TO PHQ QUESTIONS 1-9: 8
5. POOR APPETITE OR OVEREATING: SEVERAL DAYS

## 2017-10-16 NOTE — PROGRESS NOTES
Preceptor Attestation:   Patient seen and discussed with the resident. Assessment and plan reviewed with resident and agreed upon.   Supervising Physician:  Gautam Tejeda MD  [facility] Medicine

## 2017-10-16 NOTE — NURSING NOTE
Diabetic Foot Screen:  Any complaints of increased pain or numbness ? No   Is there a foot ulcer now or a history of foot ulcer? No   Does the foot have an abnormal shape? No   Are the nails thick, too long or ingrown? No   Are there any redness or open areas? No            Sensation Testing done at all points on the diagram with monofilament     Right Foot: Sensation Normal at all points  Left Foot: Sensation Normal at all points     Risk Category: 0- No loss of protective sensation  Performed by Anaid Wood CMA

## 2017-10-16 NOTE — PROGRESS NOTES
Medicare Annual Wellness Visit         HPI     This 67 year old female presents as an established patient  Shamika Kelley who presents for an subsequent Medicare Wellness Exam.    Patient also reports Rash on left side of her pannus that has been there in the past. Currently using nystatin powder with ABD pads to keep area dry.     Patient Active Problem List   Diagnosis     Allergic rhinitis due to pollen     Urge incontinence     Hypertension, essential     Cardiomegaly     Chronic constipation     Dry eye syndrome     Esophageal reflux     Exposure keratoconjunctivitis     DM ophthalmopathy (H)     Hypothyroidism     Senile cataract     ANUJ (obstructive sleep apnea)     Postmenopausal atrophic vaginitis     Restless leg syndrome     Squamous blepharitis     Morbid obesity due to excess calories (H)     Personal history of breast cancer s/p L masectomy     Diabetes mellitus type 2, insulin dependent (H)     Hypercholesteremia     Lives in group home     Extrapyramidal and movement disorder     CKD (chronic kidney disease) stage 2, GFR 60-89 ml/min     Solitary kidney, congenital     S/P hysterectomy     Impingement syndrome of right shoulder     Hypertriglyceridemia     Candidiasis of skin     Generalized anxiety disorder     Carpal tunnel syndrome of left wrist     Schizoaffective disorder, depressive type (H)     Health Care Home     Major depressive disorder, recurrent episode, moderate (H)     Psychological factors affecting medical condition     Hx of psychiatric care     Nail complaint     Microalbuminuria due to type 2 diabetes mellitus (H)     Type 2 diabetes mellitus with microalbuminuria, with long-term current use of insulin (H)     Diabetes mellitus, type II, insulin dependent (H)     CKD (chronic kidney disease) stage 1, GFR 90 ml/min or greater       Past Medical History:   Diagnosis Date     Allergic rhinitis due to pollen     seasonal allergies      Anisometropia and aniseikonia      BMI  greater than 40      Cardiomegaly      Chronic constipation      Congenital absence of one kidney      Cramp of limb      Dermatophytosis of the body      Dry eye syndrome      Esophageal reflux      Essential hypertension, benign      Gastro-oesophageal reflux disease      Hemorrhoids      Hypermetropia      Hypothyroidism      Incomplete Emptying of Bladder      Lactose intolerance      Major depressive disorder, recurrent episode, moderate (H)      Malignant neoplasm of breast (female), unspecified site     left mastectomy 1996      Mixed incontinence urge and stress (male)(female)      Moderate obstructive sleep apnea      Postmenopausal Atrophic Vaginitis      Presbyopia      Regular astigmatism      Restless leg syndrome      Senile nuclear sclerosis      Thyroid eye disease     mild     Tinnitus      Trigger finger (acquired)     right hand     Type II or unspecified type diabetes mellitus without mention of complication, not stated as uncontrolled     on insulin since April 2006         Family History   Problem Relation Age of Onset     HEART DISEASE Father      1968     Melanoma Mother      malignant melanoma     Glaucoma No family hx of      Macular Degeneration No family hx of          Past Surgical History:   Procedure Laterality Date     APPENDECTOMY       C MASTECTOMY,SIMPLE  1996    Left mastectomy  [facility]. Had normal FU mammo 5/2007. Follow yearly.      C TOTAL ABDOM HYSTERECTOMY  7/2003    Dr. Castanon, for abnormal bleeding. Removal of both tubes with BSO for myoma w - - -     CHOLECYSTECTOMY       COLONOSCOPY  5/2005    Complete Colonoscopy-had one small polyp removed 5/2005.      COLONOSCOPY N/A 3/31/2016    Procedure: COLONOSCOPY;  Surgeon: Cary Ring MD;  Location: [facility]     COLONOSCOPY N/A 7/7/2016    Procedure: COLONOSCOPY;  Surgeon: Cary Ring MD;  Location: [facility]     DILATION AND CURETTAGE       HYSTERECTOMY       MASTECTOMY      Left  breast     Reviewed no other significant FH    Family History and past Medical History reviewed and it is unchanged/updated.       Review of Systems       Constitutional:   fevers, night sweats or unintentional weight change ?  NO      Eyes:   vision change, diplopia or red eyes?  NO      Ears, Nose, Mouth, Throat:   tinnitus or hearing change,  epistaxis or nasal discharge,  oral lesions, throat pain ?  NO      Neck:   stiffness?  NO           Cardiovascular:   chest pain, palpitations, or pain with walking, orthopnea or PND?  NO   Breasts:  Any bumps or unusual discharge?     NO         Respiratory:   dyspnea, cough, shortness of breath or wheezing?  NO         GI:   nausea, vomiting, diarrhea or constipation,  abdominal pain ?  NO         :   change in urine,  dysuria or hematuria,  sexual dysfunction ?  NO        Musculoskeletal:   joint or muscle pain or swelling?  NO            Skin:   concerning lesions or moles?  NO, chronic rash in left pannus area (unchanged).            Nervous System:   loss of strength or sensation,  numbness or tingling,  tremor,  dizziness,  headache?  NO   Endocrine/Homone:   polyuria or polydipsia,  temperature intolerance?  NO            Blood and Lymphnodes:   concerning bumps,  bleeding problems?  NO            Allergy:   environmental allergies?  NO            Mental Health:   depression or anxiety,  sleep problems?  NO               Medical Care     Have you been to an ER or a hospital in the last year? No  What other specialists or organizations are involved in your medical care?  Psychology, Psychiatry, Endocrinology (Weight Management), Nephrology, Sleep Medicine,   Current providers sharing in care for this patient include:  Patient Care Team:  Shamika Kelley MD as PCP - General (Student in organized health care education/training program)  Dr [facility] as Damon Abad, PhD  (Psychology)  Dedra Dior MD as MD (Urology)  Eleazar  ALLEN Beckford as Clinic Care Coordinator (Urology)  Matilde Ann MD as Referring Physician  Residence, Narendra Brown, Nany Rosenthal MD as MD (Ophthalmology)  Gaby Holliday MD as MD (Nephrology)  Marizol Valero MD as MD (Psychiatry)  Joana De Leon MD as Resident (Student in organized health care education/training program)         Social History     Social History   Substance Use Topics     Smoking status: Never Smoker     Smokeless tobacco: Never Used     Alcohol use No     Marital Status:Single  Who lives in your household? Lives in assisted living  Does your home have any of the following safety concerns? Loose rugs in the hallway, no grab bars in the bathroom, no handrails on the stairs or have poorly lit areas? No  Do you feel threatened or controlled by a partner, ex-partner or anyone in your life? No  Has anyone hurt you physically, for example by pushing, hitting, slapping or kicking you   or forcing you to have sex? No  Do you need help with the phone, transportation, shopping, preparing meals, housework, laundry, medications or managing money? No   Have you noticed any hearing difficulties? No      Risk Behaviors and Healthy Habits     How many servings of fruits and vegetables do you eat a day? 5  How often do you exercise and what do you do? 35 minutes 7 days a week, walks daily and rides the bike at least 15 minutes twice week.   Do you frequently ride without a seatbelt? No  Do you use tobacco?  No  Do you use any other drugs? No         Do you use alcohol?No    Today's PHQ-2 Score: 2    Sexual Health     Are you sexually active?  No   If yes, with men, women, or both? None  If yes, do you more than one current partner?No  If yes, are you using condoms?  No  Have you had any sexually transmitted infections? No   Any sexual concerns? No     FOR WOMEN  What year did you stop having periods? 2003  Any vaginal bleeding in the last year? No  Have you ever had an abnormal Pap  smear? No    FUNCTIONAL ABILITY/SAFETY SCREENING     Was the patient's timed Up & Go test (Get up from chair walk, 10 feet turn, return to chair and sit down) unsteady or longer than 30 seconds? Yes per provider, no concerns.     Hearing evaluation if done: No    EVALUATION OF COGNITIVE FUNCTION     Mood/affect:Normal  Appearance:Normal  Family member/caregiver input: Normal  Mini Cog Scoring   3 points   Clock Draw Test result:  Abnormal    SCREENING FOR PREVENTION and EARLY DETECTION     ECG (if done)not performed    Corrected Visual acuity: L 20/20   R 20/20  The 10-year ASCVD risk score (James EATON Jr, et al., 2013) is: 19.1%    Values used to calculate the score:      Age: 67 years      Sex: Female      Is Non- : No      Diabetic: Yes      Tobacco smoker: No      Systolic Blood Pressure: 132 mmHg      Is BP treated: Yes      HDL Cholesterol: 40 mg/dL      Total Cholesterol: 142 mg/dL  Breast CA Screenin-2 years 50-74years:  Testing not indicated , Dexa Scan (>65 yrs) (covered every 2 years):  Testing not indicated  and US for AAA (65-76 yo+ever smoked):  Testing not indicated     CV Risk based on Pooled Cohort Risk:  The 10-year ASCVD risk score (James EATON Jr, et al., 2013) is: 19.1%    Values used to calculate the score:      Age: 67 years      Sex: Female      Is Non- : No      Diabetic: Yes      Tobacco smoker: No      Systolic Blood Pressure: 132 mmHg      Is BP treated: Yes      HDL Cholesterol: 40 mg/dL      Total Cholesterol: 142 mg/dL    Advanced Directives: Discussed and patient desires to be full code.      Immunization History   Administered Date(s) Administered     Influenza (IIV3) 11/10/2004, 2006, 2007, 10/22/2008, 2009, 10/05/2010, 2012     Mantoux 2002, 2002, 2003, 2003, 2004, 2004, 2005, 2005, 2011, 2012, 2014, 2015     Pneumococcal 23 valent 10/14/2005,  "05/28/2015     TD (ADULT, 7+) 06/15/2004     TDAP Vaccine (Boostrix) 06/10/2014       Reviewed Immunization Record Today  Pneumoccocal Vaccine: Yes  Varicella Vaccine: Yes  TDaP:Yes         Physical Exam     Vitals: /75  Pulse 69  Temp 98.2  F (36.8  C) (Oral)  Resp 18  Ht 5' 4\" (162.6 cm)  Wt 239 lb 9.6 oz (108.7 kg)  SpO2 94%  Breastfeeding? No  BMI 41.13 kg/m2  BMI= Body mass index is 41.13 kg/(m^2).  GENERAL APPEARANCE: healthy, alert and no distress  EYES: Eyes grossly normal to inspection, PERRL and conjunctivae and sclerae normal  HENT: ear canals and TM's normal, nose and mouth without ulcers or lesions, oropharynx clear and oral mucous membranes moist  NECK: no adenopathy, no asymmetry, masses, or scars and thyroid normal to palpation  RESP: lungs clear to auscultation - no rales, rhonchi or wheezes  CV: regular rate and rhythm, normal S1 S2, no S3 or S4, no murmur, click or rub, no peripheral edema and peripheral pulses strong  ABDOMEN: soft, nontender, no hepatosplenomegaly, no masses and bowel sounds normal  MS: no musculoskeletal defects are noted and gait is age appropriate without ataxia  SKIN: no suspicious lesions. Has erythematous rash in pannus fold of the left lower abdomen, does not have odor, no discharge, non-tender.   NEURO: Normal strength and tone, sensory exam grossly normal, mentation intact and speech normal  PSYCH: mentation appears normal and affect normal/bright        Assessment and Plan   subsequent   Medicare Wellness Exam  1. Health Care Maintenance: Normal Physical Exam    Has not completed medical directive, but would like to name her sister as her health care proxy if she is unable to voice medical care needs. At this time would like to name sister to be health care proxy (Fariba Vincent [phone])    PLAN:  1. Reviewed [employer]'s Preventive Services form with patient and preventive service plan is as below. and Routine follow up in one year.   2. She is up to date " on all preventative health maintenance.   3. Will place El today, RN at [facility] to read test in 2 days. 10/2017 Quant was negative previously.   4. Completed POLST today, placed in bin to be scanned into chart.     Pinky was seen today for physical.    Diagnoses and all orders for this visit:    Routine health maintenance  -     ADMIN VACCINE, INITIAL  -     FLU VACCINE, INCREASED ANTIGEN, PRESV FREE      Options for treatment and follow-up care were reviewed with the Pinky Page and/or guardian engaged in the decision making process and verbalized understanding of the options discussed and agreed with the final plan.    Shamika Kelley MD  [facility]  [phone]

## 2017-10-16 NOTE — MR AVS SNAPSHOT
After Visit Summary   10/16/2017    Pinky Page    MRN: [id number]           Patient Information     Date Of Birth          1950        Visit Information        Provider Department      10/16/2017 2:20 PM Shamika Kelley MD [facility]        Today's Diagnoses     Routine health maintenance    -  1    Medicare annual wellness visit, initial        Other insomnia          Care Instructions      Preventive Health Recommendations    Female Ages 65 +    Yearly exam:     See your health care provider every year in order to  o Review health changes.   o Discuss preventive care.    o Review your medicines if your doctor has prescribed any.      You no longer need a yearly Pap test unless you've had an abnormal Pap test in the past 10 years. If you have vaginal symptoms, such as bleeding or discharge, be sure to talk with your provider about a Pap test.      Every 1 to 2 years, have a mammogram.  If you are over 69, talk with your health care provider about whether or not you want to continue having screening mammograms.      Every 10 years, have a colonoscopy. Or, have a yearly FIT test (stool test). These exams will check for colon cancer.       Have a cholesterol test every 5 years, or more often if your doctor advises it.       Have a diabetes test (fasting glucose) every three years. If you are at risk for diabetes, you should have this test more often.       At age 65, have a bone density scan (DEXA) to check for osteoporosis (brittle bone disease).    Shots:    Get a flu shot each year.    Get a tetanus shot every 10 years.    Talk to your doctor about your pneumonia vaccines. There are now two you should receive - Pneumovax (PPSV 23) and Prevnar (PCV 13).    Talk to your doctor about the shingles vaccine.    Talk to your doctor about the hepatitis B vaccine.    Nutrition:     Eat at least 5 servings of fruits and vegetables each day.      Eat whole-grain bread,  whole-wheat pasta and brown rice instead of white grains and rice.      Talk to your provider about Calcium and Vitamin D.     Lifestyle    Exercise at least 150 minutes a week (30 minutes a day, 5 days a week). This will help you control your weight and prevent disease.      Limit alcohol to one drink per day.      No smoking.       Wear sunscreen to prevent skin cancer.       See your dentist twice a year for an exam and cleaning.      See your eye doctor every 1 to 2 years to screen for conditions such as glaucoma, macular degeneration and cataracts.          Follow-ups after your visit        Your next 10 appointments already scheduled     Nov 13, 2017  2:15 PM CST   Adult Med Follow UP with Joana De Leon MD   Psychiatry Clinic ([facility])    [place] [id number]  [address] [facility] [id number] [phone]            Nov 14, 2017  1:30 PM CST   NEW GENERAL with Michael Lopez MD   Eye Clinic ([facility])    [address] [facility]  [address] [address]  [facility] [address] [phone]            Nov 21, 2017  1:00 PM CST   (Arrive by 12:45 PM)   Return Visit with Damon Gr, PhD [facility] ([facility])    [address]  [address] [facility] [id number] [phone]            Jan 08, 2018 11:30 AM CST   (Arrive by 11:15 AM)   Return Visit with Marcos Magdaleno MD   [facility] ([facility])    [address]  [address] [facility] [id number] [phone]            May 10, 2018 11:30 AM CDT   Return Sleep Patient with Brianda Reddy MD   [facility] [place] Sleep Study ([facility])    [address] [place] [id number] [phone]              Who to contact     Please call your clinic at [phone] to:    Ask questions about your  "health    Make or cancel appointments    Discuss your medicines    Learn about your test results    Speak to your doctor   If you have compliments or concerns about an experience at your clinic, or if you wish to file a complaint, please contact [facility] Patient Relations at [phone] or email us at [email or web address]         Additional Information About Your Visit        [employer] Information     [employer] is an electronic gateway that provides easy, online access to your medical records. With JobFlash, you can request a clinic appointment, read your test results, renew a prescription or communicate with your care team.     To sign up for JobFlash visit the website at [email or web address]   You will be asked to enter the access code listed below, as well as some personal information. Please follow the directions to create your username and password.     Your access code is: [email or web address]  Expires: 2017  7:15 PM     Your access code will  in 90 days. If you need help or a new code, please contact your [facility] or call [phone] for assistance.        Care EveryWhere ID     This is your Care EveryWhere ID. This could be used by other organizations to access your [facility] medical records  [id number]        Your Vitals Were     Pulse Temperature Respirations Height Pulse Oximetry Breastfeeding?    69 98.2  F (36.8  C) (Oral) 18 5' 4\" (162.6 cm) 94% No    BMI (Body Mass Index)                   41.13 kg/m2            Blood Pressure from Last 3 Encounters:   10/16/17 132/75   17 138/80   17 126/82    Weight from Last 3 Encounters:   10/16/17 239 lb 9.6 oz (108.7 kg)   10/10/17 244 lb 11.2 oz (111 kg)   17 244 lb 9.6 oz (110.9 kg)              We Performed the Following     ADMIN VACCINE, INITIAL     FLU VACCINE, INCREASED ANTIGEN, PRESV FREE     INJECTION INTRAMUSCULAR OR SUB-Q     tuberculin (Mantoux, PPD) 5 " UNIT/0.1ML ID injection (Charge)          Today's Medication Changes          These changes are accurate as of: 10/16/17  3:41 PM.  If you have any questions, ask your nurse or doctor.               Start taking these medicines.        Dose/Directions    melatonin 3 MG tablet   Used for:  Other insomnia   Started by:  Shamika Kelley MD        Dose:  3 mg   Take 1 tablet (3 mg) by mouth nightly as needed for sleep   Quantity:  60 tablet   Refills:  1         These medicines have changed or have updated prescriptions.        Dose/Directions    metFORMIN 500 MG 24 hr tablet   Commonly known as:  GLUCOPHAGE-XR   This may have changed:    - how much to take  - when to take this   Used for:  Diabetes mellitus, type 2 (H)        Dose:  1000 mg   Take 2 tablets (1,000 mg) by mouth 2 times daily (with meals)   Quantity:  90 tablet   Refills:  3            Where to get your medicines      These medications were sent to [place] [id number] [place] [address]  [address] [place] [address]     Phone:  [phone]     melatonin 3 MG tablet                Primary Care Provider Office Phone # Fax #    Shamika Kelley [phone]       [facility] [address] [facility] [address]        Equal Access to Services     JAYDEN STUART AH: Tatiana baugho Soemre, waaxda luqadaha, qaybta kaalmada adeegyada, autumn josé. So [facility] [phone].    ATENCIÓN: Si habla español, tiene a deluca disposición servicios gratuitos de asistencia lingüística. Llame al [phone].    We comply with applicable federal civil rights laws and [place] laws. We do not discriminate on the basis of race, color, national origin, age, disability, sex, sexual orientation, or gender identity.            Thank you!     Thank you for choosing [facility]  for your care. Our goal is always to provide you with excellent care. Hearing  back from our patients is one way we can continue to improve our services. Please take a few minutes to complete the written survey that you may receive in the mail after your visit with us. Thank you!             Your Updated Medication List - Protect others around you: Learn how to safely use, store and throw away your medicines at www.disposemymeds.org.          This list is accurate as of: 10/16/17  3:41 PM.  Always use your most recent med list.                   Brand Name Dispense Instructions for use Diagnosis    acetaminophen 500 MG tablet    TYLENOL    1 Bottle    Take 2 tablets (1,000 mg) by mouth every 6 hours as needed    Sprain of left ankle, unspecified ligament, initial encounter       alum & mag hydroxide-simethicone 200-200-20 MG/5ML Susp suspension    MYLANTA/MAALOX     Take 30 mLs by mouth daily as needed for indigestion        amLODIPine 5 MG tablet    NORVASC    30 tablet    Take 2 tablets (10 mg) by mouth daily    Essential hypertension with goal blood pressure less than 130/85       artificial saliva Aers spray      Take 1 spray by mouth 3 times daily as needed for dry mouth        ARTIFICIAL TEARS OP      Apply 1 drop to eye 4 times daily.        aspirin 81 MG tablet     100    Take 1 tablet by mouth daily. ENTERIC COATED.        atorvastatin 40 MG tablet    LIPITOR    90 tablet    Take 1 tablet (40 mg) by mouth daily    Hyperlipidemia LDL goal <100       blood glucose monitoring test strip    no brand specified    100 each    Use to test blood sugar 3 times daily or as directed.    Type 2 diabetes mellitus with microalbuminuria, with long-term current use of insulin (H)       calcium polycarbophil 625 MG tablet    FIBERCON     Take 2 tablets by mouth daily        calcium-vitamin D 250-125 MG-UNIT Tabs      Take 1 tablet by mouth 2 times daily. Calcium 250 mg/Vit D 125 IU        CLARITIN 10 MG tablet   Generic drug:  loratadine     30    1 TAB PO QD (Once per day) as needed for ALLERGY  SYMPTOMS        clotrimazole 1 % cream    LOTRIMIN    50 g    Apply topically 2 times daily as needed    Dermatophytosis       eucerin cream      Apply  topically as needed. Apply to thigh PRN dry skin        exenatide 10 MCG/0.04ML injection    BYETTA    1 Syringe    Inject 10 mcg Subcutaneous 2 times daily (before meals)    Type 2 diabetes mellitus with microalbuminuria, with long-term current use of insulin (H)       FLUoxetine 40 MG capsule    PROzac    30 capsule    Take 1 capsule (40 mg) by mouth daily    Schizoaffective disorder, depressive type (H)       fluticasone 50 MCG/ACT spray    FLONASE    16 g    Spray 1 spray into both nostrils daily    Seasonal allergic rhinitis       furosemide 20 MG tablet    LASIX    60 tablet    Take 1 tablet (20 mg) by mouth 2 times daily    Lower leg edema       GEL-SASHA DENTINBLOC DT      Apply  to affected area. GEL. Apply to 2nd molar on bottom right daily at bedtime.        GLUCAGON EMERGENCY 1 MG kit   Generic drug:  glucagon      Inject  as directed. 1 mL as needed for signs of hypoglycemia. May repeat as needed in 15 minutes.        HYDROcodone-acetaminophen 5-325 MG per tablet    NORCO     Take 1 tablet by mouth every 6 hours as needed 1-2 tabs        Incontinence Brief Large Misc     60 Units    2 Units 2 times daily    Urge incontinence, Nocturnal enuresis       insulin glargine 100 UNIT/ML injection    LANTUS    8 mL    Inject 24 Units Subcutaneous At Bedtime    Type 2 diabetes mellitus with microalbuminuria, with long-term current use of insulin (H)       LACRI-LUBE OP      Apply  to eye. Place 0.5 inches into both eyes at bedtime        LACTAID 3000 UNIT tablet   Generic drug:  lactase      Take 4 tabs daily with meals.        levothyroxine 175 MCG tablet    SYNTHROID/LEVOTHROID    30 tablet    Take 1 tablet (175 mcg) by mouth daily Give on empty stomach    Other specified hypothyroidism       LORazepam 1 MG tablet    ATIVAN    30 tablet    Take 1 tablet (1 mg) by  mouth At Bedtime .  May take additional 1mg daily PRN for agitation.    Generalized anxiety disorder       losartan 100 MG tablet    COZAAR    90 tablet    Take 1 tablet by mouth daily.    Hypertension goal BP (blood pressure) < 130/80, Diabetes mellitus type 2, insulin dependent (H), CKD (chronic kidney disease) stage 2, GFR 60-89 ml/min       melatonin 3 MG tablet     60 tablet    Take 1 tablet (3 mg) by mouth nightly as needed for sleep    Other insomnia       metFORMIN 500 MG 24 hr tablet    GLUCOPHAGE-XR    90 tablet    Take 2 tablets (1,000 mg) by mouth 2 times daily (with meals)    Diabetes mellitus, type 2 (H)       MILK OF MAGNESIA PO      Take  by mouth. Take 30 mL as needed for constipation.        NEURONTIN PO      Take 900 mg by mouth 3 times daily.        nystatin 883179 UNIT/GM Powd    MYCOSTATIN    60 g    Apply topically 3 times daily as needed    Candidiasis of skin       polyethylene glycol powder    MIRALAX/GLYCOLAX     Take 1 capful by mouth 2 times daily. 17 GM PO BID        PREVIDENT 5000 PLUS 1.1 % Crea   Generic drug:  Sodium Fluoride      Apply  to affected area every evening.        saline 0.9 % Soln      Spray 2 sprays in nostril as needed.        SENNA S 8.6-50 MG per tablet   Generic drug:  senna-docusate      Take 2 tablets by mouth At Bedtime.        solifenacin 10 MG tablet    VESICARE    30 tablet    Take 1 tablet (10 mg) by mouth daily    Urge incontinence       ziprasidone 40 MG capsule    GEODON    60 capsule    Take 1 capsule (40 mg) by mouth 2 times daily (with meals)    Schizoaffective disorder, depressive type (H)

## 2017-10-16 NOTE — NURSING NOTE
Mantoux/PPD screening questions    1. Have you had recent contact with a person with active Tuberculosis (TB)? NO  2. Have you had this type of test before? Yes. Have you ever had a skin reaction to the test? NO  Have you ever been told this test was positive? NO  3. Have you had a live vaccine (Smallpox, Flumist, MMR, Varicella, Oral Polio, or Yellow Fever) in the past 4 weeks? NO  4. Have you ever received the Bacillus Calmette-Nitin (BCG) vaccine for Tuberculosis? NO  5. Have you ever been treated for Tuberculosis before? NO    Patient is eligible for a PPD test.  I placed the PPD on the left forearm.  I advised patient to avoid scratching the area and to return to the clinic in 48-72 hours for interpretation. Dr. Tejeda was onsite at the time of placement.    Anaid Wood CMA

## 2017-10-17 ASSESSMENT — ANXIETY QUESTIONNAIRES: GAD7 TOTAL SCORE: 7

## 2017-10-19 ENCOUNTER — TELEPHONE (OUTPATIENT)
Dept: FAMILY MEDICINE | Facility: CLINIC | Age: 67
End: 2017-10-19

## 2017-10-19 DIAGNOSIS — G47.09 OTHER INSOMNIA: Primary | ICD-10-CM

## 2017-10-19 RX ORDER — ZOLPIDEM TARTRATE 6.25 MG/1
6.25 TABLET, FILM COATED, EXTENDED RELEASE ORAL
Qty: 30 TABLET | Refills: 0 | Status: CANCELLED | OUTPATIENT
Start: 2017-10-19

## 2017-10-19 NOTE — TELEPHONE ENCOUNTER
Dr. Kelley,    Zolpidem is on the BEERs list as a potentially inappropriate medication for older adults.  Zolpidem acts on the benzodiazepine receptor and has adverse events similar to those of benzodiazepines in older adults (delirium, falls, fractures) with minimal improvement in sleep latency and duration.  Consider the following as other potential causes of sleep disturbance:  What time of day is the patient taking fluoxetine? This SSRI can be CNS stimulating, recommend taking this medication in the AM if not already as this could be contributing to sleep disturbance.  What time of day is furosemide? Is the patient awakening in the night to urinate disturbing sleep?  Consider earlier in the day dosing.    I would recommend non-pharmacologic therapies (i.e. No electronic devices in the bedroom, low lighting, regular bedtime routine, not participating in stimulating activity such as reading or writing in the bedroom, etc.) and melatonin before ambien.  Please try for a prior-authorization process, the pharmacy will need to send the information to the clinic.  Otherwise, melatonin is available over the counter at relatively low cost ~$5 for 120 tablets.     Cris Chiu, BenD

## 2017-10-19 NOTE — TELEPHONE ENCOUNTER
[facility] phone call message- medication clarification/question:    Full Medication Name: melatonin 3 MG tablet       Question: Alta(nurse) called to let pcp know that Rx above is not covered by pt's insurance. She states if PCP can switch. Nurse doesn't know what is covered.      Pharmacy confirmed as Data Unavailable: Yes    OK to leave a message on voice mail? Yes    Primary language: English      needed? No    Call taken on October 19, 2017 at 10:52 AM by Amber Dodd

## 2017-10-19 NOTE — TELEPHONE ENCOUNTER
Will try a trial of ambien since melatonin is not covered. Please print and send script to patient's pharmacy.     Thank you,   Shamika Kelley MD  [facility]  [phone]

## 2017-10-20 NOTE — TELEPHONE ENCOUNTER
Will not prescribe Ambien per discussion with PharmD at this time. Will need to further discuss with patient using melatonin and possibly getting it OTC vs starting a PA for this.     Shamika Kelley MD  [facility]  [phone]

## 2017-10-26 ENCOUNTER — TELEPHONE (OUTPATIENT)
Dept: PSYCHIATRY | Facility: CLINIC | Age: 67
End: 2017-10-26

## 2017-10-26 DIAGNOSIS — F41.1 GENERALIZED ANXIETY DISORDER: ICD-10-CM

## 2017-10-26 RX ORDER — LORAZEPAM 1 MG/1
1 TABLET ORAL AT BEDTIME
Qty: 35 TABLET | Refills: 1
Start: 2017-10-26 | End: 2018-01-18

## 2017-10-26 NOTE — TELEPHONE ENCOUNTER
Received RF request from [facility] for:    LORazepam (ATIVAN) 1 MG tablet (Discontinued) 35 tablet 1 2/27/2017 4/25/2017 No   Sig: Take 1 tablet (1 mg) by mouth At Bedtime .  May take additional 1mg daily PRN for agitation.     Last RF:  Per pharmacy:  10/06/17  Per [place] :  10/06, 9/07, 8/10, 7/12    Due for RF:  yes    Appointment History:  Last appt: 9/14/17  RTC: 6 - 8 weeks  Cancel: none  No-show: none  Next appt: 11/13/17    Will route to Dr. De Leon for authorization to refill a controlled medication.

## 2017-10-31 NOTE — TELEPHONE ENCOUNTER
Will you please contact patient and inquire how her sleep has been? The melatonin is not covered by her insurance. She would be able to get this fairly inexpensively OTC. I would recommend 3-5mg about 1-2 hours prior to bedtime. If she is not able to afford this, please let me know.     Once this is done, please sign encounter.     Shamika Kelley MD  [facility]  [phone]

## 2017-11-08 ENCOUNTER — TELEPHONE (OUTPATIENT)
Dept: FAMILY MEDICINE | Facility: CLINIC | Age: 67
End: 2017-11-08

## 2017-11-08 DIAGNOSIS — H04.123 INSUFFICIENCY OF TEAR FILM OF BOTH EYES: Primary | ICD-10-CM

## 2017-11-08 NOTE — TELEPHONE ENCOUNTER
[facility] phone call message- medication clarification/question:    Full Medication Name: Refresh PM ointment for eye   Dose: did not have    Question: patient insurance no longer pays for Refresh PM eye ointment, would like an alternative.     Pharmacy confirmed as No Pharmacies Listed: Yes    OK to leave a message on voice mail? Yes    Call taken on November 8, 2017 at 10:58 AM by Alta Elizondo

## 2017-11-10 NOTE — TELEPHONE ENCOUNTER
Substitute drug sent to pharmacy via verbal orders.    Shamika Fischer  was the authorizing prescriber for this visit through the pharmacist collaborative practice agreement.    Hong Pérez Pharm.D.

## 2017-11-13 ENCOUNTER — OFFICE VISIT (OUTPATIENT)
Dept: PSYCHIATRY | Facility: CLINIC | Age: 67
End: 2017-11-13
Attending: PSYCHIATRY & NEUROLOGY
Payer: COMMERCIAL

## 2017-11-13 VITALS
HEART RATE: 80 BPM | WEIGHT: 241.4 LBS | BODY MASS INDEX: 41.44 KG/M2 | SYSTOLIC BLOOD PRESSURE: 133 MMHG | DIASTOLIC BLOOD PRESSURE: 74 MMHG

## 2017-11-13 DIAGNOSIS — F25.1 SCHIZOAFFECTIVE DISORDER, DEPRESSIVE TYPE (H): Primary | ICD-10-CM

## 2017-11-13 DIAGNOSIS — H25.9: Primary | ICD-10-CM

## 2017-11-13 PROCEDURE — 99212 OFFICE O/P EST SF 10 MIN: CPT | Mod: ZF

## 2017-11-13 ASSESSMENT — PATIENT HEALTH QUESTIONNAIRE - PHQ9: SUM OF ALL RESPONSES TO PHQ QUESTIONS 1-9: 7

## 2017-11-13 NOTE — PROGRESS NOTES
PSYCHIATRY CLINIC PROGRESS NOTE   The initial diagnostic evaluation was on 5/13/13 and the last transfer of care evaluation prior to today was 7/14/16.    Pertinent Background:  This patient first experienced mental health issues in her 20's and obtained care at this time and has received treatment for schizoaffective disorder and anxiety.  See transfer evaluation for detailed history.  Notably, has a history of having increasing psychotic experiences with antipsychotic taper.   Psych critical item history includes psychosis [sxs include disorganization and inability to care for self, ?hallucinations and paranoia], psych hosp (3-5) and ECT    INTERIM HISTORY                                                 Pinky Page is a 67 year old female who was last seen in clinic on 9/14/17 at which time no changes were made.  The patient reports good treatment adherence.  History was provided by patient and written summary of progress from [facility]. Patient was a good historian.  Since the last visit:  -has been having some dental problems, has been having work done and in some pain, planned to have extraction in the near future managing pain with acetaminophen   -this has been impacting her mood, feeling more irritable   -this is a tough time of the year for her, many anniversaries of deaths around this time of year  -coping by taking medications, not isolating in her room, going to groups and participating in groups   -spending holidays with family, thanksgiving at sister's in [place]  -sleep has been on and off due to continued difficulty with the CPAP and Dr. Kelley started her on melatonin PRN which helped her sleep so now this was scheduled starting on 10/31/17  -continues to have the sensation that people are behind her at times, will sit in the back of hte TV room with the wall behind her in order to help with this; this has not gotten worse or changed in many years  -has not needed any PRN Ativan  since last appointment   -therapy with Dr. Gr has been going well, going 1x per month finds this effective    RECENT SYMPTOMS:   DEPRESSION:  reports-depressed mood, anhedonia, low energy, insomnia , weight/ appetite change (increased overall, but has been relatively stable ), excessive guilt and poor concentration /memory;  DENIES- suicidal ideation  and psychomotor change observed by others (slowing or fidgeting)  MELVIN/HYPOMANIA:  reports-none;  DENIES- increased energy, decreased sleep need, increased activity and grandiosity  PSYCHOSIS:  reports-see HPI and none;  DENIES- delusions, auditory hallucinations and visual hallucinations  DYSREGULATION:  reports-none;  DENIES- suicidal ideation, violent ideation and SIB  PANIC ATTACK:  none   ANXIETY:  some nervousness and excitement around events  TRAUMA RELATED:  none  COMPULSIVE:  none  SLEEP:  as above    EATING DISORDER:  none    RECENT SUBSTANCE USE:     ALCOHOL- none          TOBACCO- none          CAFFEINE- 1 cup regular coffee per day and 3 decaf; 1 can diet coke per day        OPIOIDS- none            NARCAN KIT- N/A    CANNABIS- none         OTHER ILLICIT DRUGS- none     CURRENT SOCIAL HISTORY:  FINANCIAL SUPPORT- social security disability.     CHILDREN- none.     LIVING SITUATION- Living at [facility].      SOCIAL/ SPIRITUAL SUPPORT- [facility] staff, other providers, other residents at [facility] and does attend [employer] occasionally.     FEELS SAFE AT HOME- Yes.    MEDICAL ROS:  Reports GI sxs [GERD, constipation--chronic] and easy bruising .  Denies weight changes [increase, decrease,  ], headache, sedation, sexual dysfunction [ ], tremor, confusion, dizziness, falls, excessive diaphoresis, muscle twitches, restlessness, bruxism, shiver and short term memory and/or word finding difficulty, akathisia, dystonia, apparent TD, muscle stiffness, Parkinsonian-type symptoms [shuffling gait, masked facies, stooped posture, speech change, writing change] and  sialorrhea and slowed reaction time and withdrawal symptoms.    PSYCH and CD Critical Summary Points since July 2017           None    PAST PSYCH MED TRIALS   see EMR Problem List: Hx of psychiatric care    MEDICAL / SURGICAL HISTORY                                   CARE TEAM:          PCP- Dr. Kelley, at [facility]                   Therapist- Dr. Gr (monthly)    Patient Active Problem List   Diagnosis     Allergic rhinitis due to pollen     Urge incontinence     Hypertension, essential     Cardiomegaly     Chronic constipation     Dry eye syndrome     Esophageal reflux     Exposure keratoconjunctivitis     DM ophthalmopathy (H)     Hypothyroidism     Senile cataract     ANUJ (obstructive sleep apnea)     Postmenopausal atrophic vaginitis     Restless leg syndrome     Squamous blepharitis     Morbid obesity due to excess calories (H)     Personal history of breast cancer s/p L masectomy     Diabetes mellitus type 2, insulin dependent (H)     Hypercholesteremia     Lives in group home     Extrapyramidal and movement disorder     CKD (chronic kidney disease) stage 2, GFR 60-89 ml/min     Solitary kidney, congenital     S/P hysterectomy     Impingement syndrome of right shoulder     Hypertriglyceridemia     Candidiasis of skin     Generalized anxiety disorder     Carpal tunnel syndrome of left wrist     Schizoaffective disorder, depressive type (H)     Health Care Home     Major depressive disorder, recurrent episode, moderate (H)     Psychological factors affecting medical condition     Hx of psychiatric care     Nail complaint     Microalbuminuria due to type 2 diabetes mellitus (H)     Type 2 diabetes mellitus with microalbuminuria, with long-term current use of insulin (H)     Diabetes mellitus, type II, insulin dependent (H)     CKD (chronic kidney disease) stage 1, GFR 90 ml/min or greater       ALLERGY                                Chlordiazepoxide hcl; Dimetapp dm cold-cough; Haldol; Ibuprofen;  Lactose intolerance [beta-galactosidase]; Milk products; and Propofol  MEDICATIONS                               Current Outpatient Prescriptions   Medication Sig Dispense Refill     artificial tears (AKWA TEARS) OINT ophthalmic ointment Apply  to eye. Place 0.5 inches into both eyes at bedtime 1 Tube 11     LORazepam (ATIVAN) 1 MG tablet Take 1 tablet (1 mg) by mouth At Bedtime .  May take additional 1mg daily PRN for agitation. 35 tablet 1     melatonin 3 MG tablet Take 1 tablet (3 mg) by mouth nightly as needed for sleep 60 tablet 1     alum & mag hydroxide-simethicone (MYLANTA/MAALOX) 200-200-20 MG/5ML SUSP suspension Take 30 mLs by mouth daily as needed for indigestion       artificial saliva (BIOTENE MT) AERS spray Take 1 spray by mouth 3 times daily as needed for dry mouth       FLUoxetine (PROZAC) 40 MG capsule Take 1 capsule (40 mg) by mouth daily 30 capsule 5     ziprasidone (GEODON) 40 MG capsule Take 1 capsule (40 mg) by mouth 2 times daily (with meals) 60 capsule 5     blood glucose monitoring (NO BRAND SPECIFIED) test strip Use to test blood sugar 3 times daily or as directed. 100 each 3     exenatide (BYETTA) 10 MCG/0.04ML injection Inject 10 mcg Subcutaneous 2 times daily (before meals) 1 Syringe 11     insulin glargine (LANTUS) 100 UNIT/ML injection Inject 24 Units Subcutaneous At Bedtime 8 mL 11     amLODIPine (NORVASC) 5 MG tablet Take 2 tablets (10 mg) by mouth daily 30 tablet 3     nystatin (MYCOSTATIN) 467942 UNIT/GM POWD Apply topically 3 times daily as needed 60 g 1     clotrimazole (LOTRIMIN) 1 % cream Apply topically 2 times daily as needed 50 g 0     fluticasone (FLONASE) 50 MCG/ACT nasal spray Spray 1 spray into both nostrils daily 16 g 3     furosemide (LASIX) 20 MG tablet Take 1 tablet (20 mg) by mouth 2 times daily 60 tablet 3     acetaminophen (TYLENOL) 500 MG tablet Take 2 tablets (1,000 mg) by mouth every 6 hours as needed 1 Bottle 2     solifenacin (VESICARE) 10 MG tablet Take 1  tablet (10 mg) by mouth daily 30 tablet 6     HYDROcodone-acetaminophen (NORCO) 5-325 MG per tablet Take 1 tablet by mouth every 6 hours as needed 1-2 tabs       atorvastatin (LIPITOR) 40 MG tablet Take 1 tablet (40 mg) by mouth daily 90 tablet 1     calcium polycarbophil (FIBERCON) 625 MG tablet Take 2 tablets by mouth daily       levothyroxine (SYNTHROID, LEVOTHROID) 175 MCG tablet Take 1 tablet (175 mcg) by mouth daily Give on empty stomach 30 tablet 4     metFORMIN (GLUCOPHAGE-XR) 500 MG 24 hr tablet Take 2 tablets (1,000 mg) by mouth 2 times daily (with meals) (Patient taking differently: Take 2,000 mg by mouth daily ) 90 tablet 3     Incontinence Supply Disposable (INCONTINENCE BRIEF LARGE) MISC 2 Units 2 times daily 60 Units 11     losartan (COZAAR) 100 MG tablet Take 1 tablet by mouth daily. 90 tablet 1     Sodium Fluoride (PREVIDENT 5000 PLUS) 1.1 % CREA Apply  to affected area every evening.       Hypromellose (ARTIFICIAL TEARS OP) Apply 1 drop to eye 4 times daily.       Gabapentin (NEURONTIN PO) Take 900 mg by mouth 3 times daily.       senna-docusate (SENNA S) 8.6-50 MG per tablet Take 2 tablets by mouth At Bedtime.       polyethylene glycol (MIRALAX/GLYCOLAX) powder Take 1 capful by mouth 2 times daily. 17 GM PO BID       Skin Protectants, Misc. (EUCERIN) cream Apply  topically as needed. Apply to thigh PRN dry skin        Saline 0.9 % SOLN Spray 2 sprays in nostril as needed.       Calcium Carbonate-Vitamin D (CALCIUM-VITAMIN D) 250-125 MG-UNIT TABS Take 1 tablet by mouth 2 times daily. Calcium 250 mg/Vit D 125 IU       Sod Fluor-Solo Fluor-Hydrfl Ac (GEL-SASHA DENTINBLOC DT) Apply  to affected area. GEL. Apply to 2nd molar on bottom right daily at bedtime.       lactase (LACTAID) 3000 UNIT tablet Take 4 tabs daily with meals.       Magnesium Hydroxide (MILK OF MAGNESIA PO) Take  by mouth. Take 30 mL as needed for constipation.       CLARITIN 10 MG OR TABS 1 TAB PO QD (Once per day) as needed for  ALLERGY SYMPTOMS 30 11     ASPIRIN 81 MG OR TABS Take 1 tablet by mouth daily. ENTERIC COATED. 100 3     GLUCAGON EMERGENCY 1 MG IJ KIT Inject  as directed. 1 mL as needed for signs of hypoglycemia. May repeat as needed in 15 minutes.  0     VITALS   /74  Pulse 80  Wt 109.5 kg (241 lb 6.4 oz)  BMI 41.44 kg/m2   MENTAL STATUS EXAM                                                           Alertness: alert  and oriented  Appearance: well groomed  Behavior/Demeanor: cooperative and pleasant, with good  eye contact   Speech: normal  Language: intact  Psychomotor: normal or unremarkable  Mood: description consistent with euthymia  Affect: full range; was congruent to mood; was congruent to content  Thought Process/Associations: unremarkable  Thought Content:  Reports none;  Denies suicidal ideation , violent ideation  and psychotic thoughts   Perception:  Reports none;  Denies auditory hallucinations and visual hallucinations  Insight: good  Judgment: good  Cognition: does  appear grossly intact; formal cognitive testing was not done    LABS and DATA     RATING SCALES:  AIMS: done 7/6/17 with total score of 0  (see flowsheets)    PHQ9 TODAY = 7  PHQ-9 SCORE 8/3/2017 9/14/2017 10/16/2017   Total Score - - -   Total Score 7 6 8       ANTIPSYCHOTIC LABS   [glu, A1C, lipids (focus LDL), liver enzymes, WBC, ANEU, Hgb, plts]    q12 mo  Recent Labs   Lab Test  09/12/17   1404  06/22/17   1312  06/21/17   1047  03/21/17   1023   GLC   --    --   134*  118*   A1C  6.3*  6.9*   --   6.6*     Recent Labs   Lab Test  03/21/17   1023  02/22/16   1028   CHOL  142  162.0   TRIG  121  151.9*   LDL  78  95   HDL  40*  36.6*     Recent Labs   Lab Test  03/21/17   1023  01/22/15   1600   AST  13  18   ALT  20  35   ALKPHOS  92  113     Recent Labs   Lab Test  06/21/17   1047  03/21/17   1023  06/22/16   1035   07/04/14   0705   WBC   --   11.8*  9.9   --   7.7   ANEU   --   8.3   --    --   4.8   HGB  13.8  13.4  13.5   < >  13.3    PLT   --   266  266   --   223    < > = values in this interval not displayed.       DIAGNOSIS     Schizoaffective disorder, depressed type, multiple episodes, in partial remission to mild re: mood, with very minimal psychotic features.  Generalized anxiety disorder, well-managed     ASSESSMENT                                   TODAY Pinky continues to report stability with her mental health and mood appears to be euthymic with expected fluctuations in mood and anxiety in context of stressors and this time of year tends to be difficult for her due to anniversary of many losses. She easily identifies coping strategies to utilize which have been effective in previous years. No safety concerns. Continues to work on adjusting to using her CPAP, recently this has improved with use of new mask and addition of melatonin. Again discussed possibility of reducing medications, including PRN dose of lorazepam since she has not been requiring this medication on very rare occasions, however, given that this is usually a difficult time of year for her she advocates for no medication changes today, which is reasonable. As previously mentioned, will continue to consider trial of reduced doses given long standing stability at future appointments with goal of simplifying medication regimen along with helping with any possible metabolic side effects given patient is working hard to lose weight and improve physical health. She will return in 6-8 weeks or sooner if needed.              [place] PRESCRIPTION MONITORING PROGRAM [] was not checked today: controlled substances monitored by [facility] staff,  has been previously checked with no indications of abuse/misuse of medications.    PSYCHOTROPIC DRUG INTERACTIONS:   ZIPRASIDONE and FLUOXETINE may result in increased risk of QT-interval prolongation and increased risk of serotonin syndrome.   ZIPRASIDONE, FLUOXETINE and VESICARE may result in increased risk of QT-interval  prolongation.  ASPIRIN and SSRI may result in an increased risk of bleeding  LEVOTHYROXINE and FLUOXETINE may result in increased levothyroxine requirements.  MANAGEMENT:  Monitoring for adverse effects, periodic EKGs and patient is aware of risks     PLAN                                                                                                       1) PSYCHOTROPIC MEDICATIONS:   - continue fluoxetine 40mg daily   - continue ziprasidone 40mg BID with meals   - continue lorazepam 1mg QHS and 1mg PRN for agitation     2) THERAPY:  Continue with Dr. Gr monthly  TREATMENT PLAN   Date revised  -  11/13/17  Date next due- 2/13/17    3) LABS NEXT DUE:  AP labs due next in March 2018       RATING SCALES:  AIMS next due Jan 2018    4) REFERRALS [MICD, medical etc.]:  none    5) MTM REFERRAL [PharmD]:  none    6) :  none    7) FAMILY MEETING:  none    8) RTC: 6-8 weeks weeks    9) CRISIS NUMBERS:   Provided routinely in AVS   Especially emphasized:  [facility] [phone] (clinic)    [phone] (after hours)      PSYCHIATRY CLINIC INDIVIDUAL PSYCHOTHERAPY NOTE                                    16   Start time: 1425  End time: 1445  Subjective: This supportive psychotherapy session addressed issues related to goals of therapy.  Patient's reaction: Action in the context of mental status appropriate for ambulatory setting.  Progress: fair, but ongoing as goals not yet met and treatment plan renewed today  Plan: RTC 6-8 weeks  Psychotherapy services during this visit included myself and Pinky.   TREATMENT  PLAN          SYMPTOMS;PROBLEMS   MEASURABLE GOALS;    FUNCTIONAL IMPROVEMENT INTERVENTIONS;    GAINS MADE DISCHARGE CRITERIA   Depression: depressed mood   use physical activity to boost mood, wants goal of 45 mins of activity per day -go to fitness center appointments, has not been making these consistently (60% attendance currently)  Has been going about 30 minutes 2x/wk; also leading  walkers group 20-25 minutes marked symptom improvement   Depression: insomnia    get 7-8 hours of restful sleep every night -continues to work on practicing sleep hygiene, calming activities (coloring avoiding tv), now averaging about 6-7 hours per night but this has been improving, goal to continue  marked symptom improvement     TREATMENT RISK STATEMENT:  The risks, benefits, alternatives and potential adverse effects have been discussed and are understood by the pt. The pt understands the risks of using street drugs or alcohol. There are no medical contraindications, the pt agrees to treatment with the ability to do so. The pt knows to call the clinic for any problems or to access emergency care if needed.  Medical and substance use concerns are documented above.  Psychotropic drug interaction check was done, including changes made today.    RESIDENT:   Joana De Leon,     Patient staffed in clinic with Dr. Estrada who will sign the note.  Supervisor is Dr. Gordon.    Supervisor Attestation:  I saw the patient with the resident, and participated in key portions of the service, including the mental status examination and developing the plan of care. I reviewed key portions of the history with the resident. I agree with the findings and plan as documented in this note.  Cortez Estrada MD

## 2017-11-13 NOTE — NURSING NOTE
Chief Complaint   Patient presents with     Recheck Medication     Schizoaffective disorder     Reviewed allergies, smoking status, and pharmacy preference  Administered abuse screening questions   Obtained weight, blood pressure and heart rate

## 2017-11-13 NOTE — PATIENT INSTRUCTIONS
-continue current medications, no changes  -work on goals of physical activity and good sleep  -return to clinic in 6-8 weeks or sooner if needed

## 2017-11-13 NOTE — MR AVS SNAPSHOT
After Visit Summary   11/13/2017    Pinky Page    MRN: [id number]           Patient Information     Date Of Birth          1950        Visit Information        Provider Department      11/13/2017 2:15 PM Joana De Leon MD Psychiatry Clinic        Today's Diagnoses     Schizoaffective disorder, depressive type (H)    -  1      Care Instructions    -continue current medications, no changes  -work on goals of physical activity and good sleep  -return to clinic in 6-8 weeks or sooner if needed            Follow-ups after your visit        Follow-up notes from your care team     Return for 30 MFU.      Your next 10 appointments already scheduled     Jan 02, 2018  1:00 PM CST   (Arrive by 12:45 PM)   Return Visit with Damon Gr, PhD [facility] ([facility])    [address] [place] [address]  3rd [facility] [id number] [phone]            Jan 08, 2018 11:30 AM CST   (Arrive by 11:15 AM)   Return Visit with Marcos Magdaleno MD   [facility] Medical Weight Management ([facility])    [address] [place] [address]  4th [facility] [id number] [phone]            Jan 08, 2018  1:15 PM CST   Adult Med Follow UP with Joana De Leon MD   Psychiatry Clinic ([facility])    [place] [id number]  [address] [facility] [id number] [phone]            May 10, 2018 11:30 AM CDT   Return Sleep Patient with Brianda Reddy MD   [facility] [place], Sleep Study ([facility])    [address] [place] [id number] [phone]            Nov 15, 2018  1:30 PM CST   RETURN GENERAL with Michael Lopez MD   Eye Clinic ([facility])    [address] [facility]  [address] [id number] [phone]              Who to contact     Please call your clinic at [phone]  to:    Ask questions about your health    Make or cancel appointments    Discuss your medicines    Learn about your test results    Speak to your doctor   If you have compliments or concerns about an experience at your clinic, or if you wish to file a complaint, please contact [facility] Patient Relations at [phone] or email us at [email or web address]         Additional Information About Your Visit        365Scoreshart Information     Dining Secretaryt is an electronic gateway that provides easy, online access to your medical records. With Drillinginfo, you can request a clinic appointment, read your test results, renew a prescription or communicate with your care team.     To sign up for Drillinginfo visit the website at [email or web address]   You will be asked to enter the access code listed below, as well as some personal information. Please follow the directions to create your username and password.     Your access code is: [email or web address]  Expires: 2018  6:30 AM     Your access code will  in 90 days. If you need help or a new code, please contact your [facility] Clinic or call [phone] for assistance.        Care EveryWhere ID     This is your Care EveryWhere ID. This could be used by other organizations to access your [place] medical records  [id number]        Your Vitals Were     Pulse BMI (Body Mass Index)                80 41.44 kg/m2           Blood Pressure from Last 3 Encounters:   17 133/74   10/16/17 132/75   17 138/80    Weight from Last 3 Encounters:   17 109.5 kg (241 lb 6.4 oz)   17 109.5 kg (241 lb 6.4 oz)   10/16/17 108.7 kg (239 lb 9.6 oz)              Today, you had the following     No orders found for display         Today's Medication Changes          These changes are accurate as of: 17 11:59 PM.  If you have any questions, ask your nurse or doctor.               These medicines have changed or have  updated prescriptions.        Dose/Directions    metFORMIN 500 MG 24 hr tablet   Commonly known as:  GLUCOPHAGE-XR   This may have changed:    - how much to take  - when to take this   Used for:  Diabetes mellitus, type 2 (H)        Dose:  1000 mg   Take 2 tablets (1,000 mg) by mouth 2 times daily (with meals)   Quantity:  90 tablet   Refills:  3                Primary Care Provider Office Phone # Fax #    Shamika Ileana Kelley [phone]       [facility] [address] [facility] [address]        Equal Access to Services     [facility] : Hadii james elizalde hadasho Soomaali, waaxda luqadaha, qaybta kaalmada adeegyaursula, autumn caldwell . So [facility] [phone].    ATENCIÓN: Si habla español, tiene a deluca disposición servicios gratuitos de asistencia lingüística. [facility] [phone].    We comply with applicable federal civil rights laws and [place] laws. We do not discriminate on the basis of race, color, national origin, age, disability, sex, sexual orientation, or gender identity.            Thank you!     Thank you for choosing PSYCHIATRY CLINIC  for your care. Our goal is always to provide you with excellent care. Hearing back from our patients is one way we can continue to improve our services. Please take a few minutes to complete the written survey that you may receive in the mail after your visit with us. Thank you!             Your Updated Medication List - Protect others around you: Learn how to safely use, store and throw away your medicines at www.disposemymeds.org.          This list is accurate as of: 11/13/17 11:59 PM.  Always use your most recent med list.                   Brand Name Dispense Instructions for use Diagnosis    acetaminophen 500 MG tablet    TYLENOL    1 Bottle    Take 2 tablets (1,000 mg) by mouth every 6 hours as needed    Sprain of left ankle, unspecified ligament, initial encounter       alum & mag hydroxide-simethicone  200-200-20 MG/5ML Susp suspension    MYLANTA/MAALOX     Take 30 mLs by mouth daily as needed for indigestion        amLODIPine 5 MG tablet    NORVASC    30 tablet    Take 2 tablets (10 mg) by mouth daily    Essential hypertension with goal blood pressure less than 130/85       artificial saliva Aers spray      Take 1 spray by mouth 3 times daily as needed for dry mouth        artificial tears Oint ophthalmic ointment     1 Tube    Apply  to eye. Place 0.5 inches into both eyes at bedtime    Insufficiency of tear film of both eyes       ARTIFICIAL TEARS OP      Apply 1 drop to eye 4 times daily.        aspirin 81 MG tablet     100    Take 1 tablet by mouth daily. ENTERIC COATED.        atorvastatin 40 MG tablet    LIPITOR    90 tablet    Take 1 tablet (40 mg) by mouth daily    Hyperlipidemia LDL goal <100       blood glucose monitoring test strip    no brand specified    100 each    Use to test blood sugar 3 times daily or as directed.    Type 2 diabetes mellitus with microalbuminuria, with long-term current use of insulin (H)       calcium polycarbophil 625 MG tablet    FIBERCON     Take 2 tablets by mouth daily        calcium-vitamin D 250-125 MG-UNIT Tabs      Take 1 tablet by mouth 2 times daily. Calcium 250 mg/Vit D 125 IU        CLARITIN 10 MG tablet   Generic drug:  loratadine     30    1 TAB PO QD (Once per day) as needed for ALLERGY SYMPTOMS        clotrimazole 1 % cream    LOTRIMIN    50 g    Apply topically 2 times daily as needed    Dermatophytosis       eucerin cream      Apply  topically as needed. Apply to thigh PRN dry skin        exenatide 10 MCG/0.04ML injection    BYETTA    1 Syringe    Inject 10 mcg Subcutaneous 2 times daily (before meals)    Type 2 diabetes mellitus with microalbuminuria, with long-term current use of insulin (H)       FLUoxetine 40 MG capsule    PROzac    30 capsule    Take 1 capsule (40 mg) by mouth daily    Schizoaffective disorder, depressive type (H)       fluticasone 50  MCG/ACT spray    FLONASE    16 g    Spray 1 spray into both nostrils daily    Seasonal allergic rhinitis       furosemide 20 MG tablet    LASIX    60 tablet    Take 1 tablet (20 mg) by mouth 2 times daily    Lower leg edema       GEL-SASHA DENTINBLOC DT      Apply  to affected area. GEL. Apply to 2nd molar on bottom right daily at bedtime.        GLUCAGON EMERGENCY 1 MG kit   Generic drug:  glucagon      Inject  as directed. 1 mL as needed for signs of hypoglycemia. May repeat as needed in 15 minutes.        HYDROcodone-acetaminophen 5-325 MG per tablet    NORCO     Take 1 tablet by mouth every 6 hours as needed 1-2 tabs        Incontinence Brief Large Misc     60 Units    2 Units 2 times daily    Urge incontinence, Nocturnal enuresis       insulin glargine 100 UNIT/ML injection    LANTUS    8 mL    Inject 24 Units Subcutaneous At Bedtime    Type 2 diabetes mellitus with microalbuminuria, with long-term current use of insulin (H)       LACTAID 3000 UNIT tablet   Generic drug:  lactase      Take 4 tabs daily with meals.        levothyroxine 175 MCG tablet    SYNTHROID/LEVOTHROID    30 tablet    Take 1 tablet (175 mcg) by mouth daily Give on empty stomach    Other specified hypothyroidism       LORazepam 1 MG tablet    ATIVAN    35 tablet    Take 1 tablet (1 mg) by mouth At Bedtime .  May take additional 1mg daily PRN for agitation.    Generalized anxiety disorder       losartan 100 MG tablet    COZAAR    90 tablet    Take 1 tablet by mouth daily.    Hypertension goal BP (blood pressure) < 130/80, Diabetes mellitus type 2, insulin dependent (H), CKD (chronic kidney disease) stage 2, GFR 60-89 ml/min       melatonin 3 MG tablet     60 tablet    Take 1 tablet (3 mg) by mouth nightly as needed for sleep    Other insomnia       metFORMIN 500 MG 24 hr tablet    GLUCOPHAGE-XR    90 tablet    Take 2 tablets (1,000 mg) by mouth 2 times daily (with meals)    Diabetes mellitus, type 2 (H)       MILK OF MAGNESIA PO      Take  by  mouth. Take 30 mL as needed for constipation.        NEURONTIN PO      Take 900 mg by mouth 3 times daily.        nystatin 861562 UNIT/GM Powd    MYCOSTATIN    60 g    Apply topically 3 times daily as needed    Candidiasis of skin       polyethylene glycol powder    MIRALAX/GLYCOLAX     Take 1 capful by mouth 2 times daily. 17 GM PO BID        PREVIDENT 5000 PLUS 1.1 % Crea   Generic drug:  Sodium Fluoride      Apply  to affected area every evening.        saline 0.9 % Soln      Spray 2 sprays in nostril as needed.        SENNA S 8.6-50 MG per tablet   Generic drug:  senna-docusate      Take 2 tablets by mouth At Bedtime.        solifenacin 10 MG tablet    VESICARE    30 tablet    Take 1 tablet (10 mg) by mouth daily    Urge incontinence       ziprasidone 40 MG capsule    GEODON    60 capsule    Take 1 capsule (40 mg) by mouth 2 times daily (with meals)    Schizoaffective disorder, depressive type (H)

## 2017-11-14 ENCOUNTER — OFFICE VISIT (OUTPATIENT)
Dept: OPHTHALMOLOGY | Facility: CLINIC | Age: 67
End: 2017-11-14
Attending: OPHTHALMOLOGY
Payer: COMMERCIAL

## 2017-11-14 DIAGNOSIS — H43.813 PVD (POSTERIOR VITREOUS DETACHMENT), BILATERAL: ICD-10-CM

## 2017-11-14 DIAGNOSIS — H04.123 INSUFFICIENCY OF TEAR FILM OF BOTH EYES: ICD-10-CM

## 2017-11-14 DIAGNOSIS — E11.9 DIABETES TYPE 2, NO OCULAR INVOLVEMENT (H): Primary | ICD-10-CM

## 2017-11-14 DIAGNOSIS — H25.13 AGE-RELATED NUCLEAR CATARACT OF BOTH EYES: ICD-10-CM

## 2017-11-14 PROCEDURE — 99213 OFFICE O/P EST LOW 20 MIN: CPT | Mod: ZF

## 2017-11-14 ASSESSMENT — REFRACTION_MANIFEST
OS_AXIS: 060
OS_SPHERE: +1.75
OD_ADD: +2.25
OD_SPHERE: +1.50
OS_CYLINDER: +0.25
OD_CYLINDER: +0.50
OS_ADD: +2.25
OD_AXIS: 070

## 2017-11-14 ASSESSMENT — EXTERNAL EXAM - LEFT EYE: OS_EXAM: NORMAL

## 2017-11-14 ASSESSMENT — VISUAL ACUITY
METHOD: SNELLEN - LINEAR
OS_CC: 20/20
OD_CC+: -2
CORRECTION_TYPE: GLASSES
OD_CC: 20/20
OS_CC+: -2

## 2017-11-14 ASSESSMENT — CONF VISUAL FIELD
OS_NORMAL: 1
OD_NORMAL: 1

## 2017-11-14 ASSESSMENT — REFRACTION_WEARINGRX
OD_ADD: +2.75
SPECS_TYPE: BIFOCAL
OS_ADD: +2.75
OS_CYLINDER: +0.25
OD_AXIS: 100
OD_SPHERE: +1.50
OS_SPHERE: +1.50
OS_AXIS: 059
OD_CYLINDER: +0.25

## 2017-11-14 ASSESSMENT — TONOMETRY
OD_IOP_MMHG: 20
IOP_METHOD: ICARE
OS_IOP_MMHG: 17

## 2017-11-14 ASSESSMENT — SLIT LAMP EXAM - LIDS
COMMENTS: NORMAL
COMMENTS: NORMAL

## 2017-11-14 ASSESSMENT — CUP TO DISC RATIO
OS_RATIO: 0.2
OD_RATIO: 0.2

## 2017-11-14 ASSESSMENT — EXTERNAL EXAM - RIGHT EYE: OD_EXAM: NORMAL

## 2017-11-14 NOTE — NURSING NOTE
Chief Complaints and History of Present Illnesses   Patient presents with     Eye Problem     f/u      HPI    Symptoms:              Comments:  1.  DMII with no retinopathy                        - A1C/BP/Lipid control   2.  PVD, both eyes     3.  Cataract, both eyes     4.  JOLANTA, both eyes                        - using artifical tears qid. No compresses.     Wendi RETANA 1:20 PM November 14, 2017

## 2017-11-14 NOTE — PROGRESS NOTES
Subjective:  Pinky Page is a 67 year old female who presents today     Past Medical History: DMII dx ~20 years ago, on insulin, last Hb A1C 6.3, HTN  Social History: non smoker  Fam History: none  Past Ocular History: refractive error, PVD, NS   Ocular Medications: AT QID, AT ointment at night  Assessment & Plan      Pinky Page is a 67 year old female with the following diagnoses:   1. 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 Insufficiency of tear film of both eyes    3. Age-related nuclear cataract of both eyes    4. PVD (posterior vitreous detachment), bilateral         No retinopathy  Stressed good glycemic and hypertensive control  Cataracts not visually significant at this time  Rx given with option to fill  Artificial tears four times a day  And artificial tear ointment at bedtime recommended    Patient disposition:   Return in about 1 year (around 11/14/2018) for DFE.          Laya Steen MD  PGY-2 Ophthalmology  [phone]    Attending Physician Attestation:  Complete documentation of historical and exam elements from today's encounter can be found in the full encounter summary report (not reduplicated in this progress note).  I personally obtained the chief complaint(s) and history of present illness.  I confirmed and edited as necessary the review of systems, past medical/surgical history, family history, social history, and examination findings as documented by others; and I examined the patient myself.  I personally reviewed the relevant tests, images, and reports as documented above.  I formulated and edited as necessary the assessment and plan and discussed the findings and management plan with the patient and family. . - Michael Lopez MD

## 2017-11-14 NOTE — MR AVS SNAPSHOT
After Visit Summary   11/14/2017    Pinky Page    MRN: [id number]           Patient Information     Date Of Birth          1950        Visit Information        Provider Department      11/14/2017 1:30 PM Michael Lopez MD Eye Clinic        Today's Diagnoses     Diabetes type 2, no ocular involvement (H)    -  1    Insufficiency of tear film of both eyes        Age-related nuclear cataract of both eyes        PVD (posterior vitreous detachment), bilateral           Follow-ups after your visit        Follow-up notes from your care team     Return in about 1 year (around 11/14/2018) for DFE.      Your next 10 appointments already scheduled     Nov 21, 2017  1:00 PM CST   (Arrive by 12:45 PM)   Return Visit with Damon Gr, PhD [facility] ([place] [facility])    [address] [place] [address]  3rd [facility] [id number] [phone]            Jan 08, 2018 11:30 AM CST   (Arrive by 11:15 AM)   Return Visit with Marcos Magdaleno MD   [facility] Medical Weight Management ([place] [facility])    [address] [place] [address]  4th [facility] [id number] [phone]            Jan 08, 2018  1:15 PM CST   Adult Med Follow UP with Joana De Leon MD   Psychiatry Clinic ([facility])    [place] [id number]  [address] [place] [id number] [phone]            May 10, 2018 11:30 AM CDT   Return Sleep Patient with Brianda Reddy MD   [facility] [place], Sleep Study ([facility])    [address] [place] [id number] [phone]              Future tests that were ordered for you today     Open Future Orders        Priority Expected Expires Ordered    IOL Biometry w/ IOL calc OU (both eye) Routine  5/13/2019 11/13/2017            Who to contact     Please call your clinic at [phone] to:    Ask questions about your  health    Make or cancel appointments    Discuss your medicines    Learn about your test results    Speak to your doctor   If you have compliments or concerns about an experience at your clinic, or if you wish to file a complaint, please contact [facility] Patient Relations at [phone] or email us at [email or web address]         Additional Information About Your Visit        Hospicelinkhart Information     E-Car Clubt is an electronic gateway that provides easy, online access to your medical records. With Acronis, you can request a clinic appointment, read your test results, renew a prescription or communicate with your care team.     To sign up for E-Car Clubt visit the website at [email or web address]   You will be asked to enter the access code listed below, as well as some personal information. Please follow the directions to create your username and password.     Your access code is: [email or web address]  Expires: 2018  6:30 AM     Your access code will  in 90 days. If you need help or a new code, please contact your [facility] or call [phone] for assistance.        Care EveryWhere ID     This is your Care EveryWhere ID. This could be used by other organizations to access your [place] medical records  [id number]         Blood Pressure from Last 3 Encounters:   10/16/17 132/75   17 126/82   17 160/78    Weight from Last 3 Encounters:   10/16/17 108.7 kg (239 lb 9.6 oz)   17 111 kg (244 lb 12.8 oz)   17 109.8 kg (242 lb)              Today, you had the following     No orders found for display         Today's Medication Changes          These changes are accurate as of: 17  2:35 PM.  If you have any questions, ask your nurse or doctor.               These medicines have changed or have updated prescriptions.        Dose/Directions    metFORMIN 500 MG 24 hr tablet   Commonly known as:  GLUCOPHAGE-XR   This may  have changed:    - how much to take  - when to take this   Used for:  Diabetes mellitus, type 2 (H)        Dose:  1000 mg   Take 2 tablets (1,000 mg) by mouth 2 times daily (with meals)   Quantity:  90 tablet   Refills:  3         Stop taking these medicines if you haven't already. Please contact your care team if you have questions.     Incontinence Brief Large Misc   Stopped by:  Michael Lopez MD           PREVIDENT 5000 PLUS 1.1 % Crea   Generic drug:  Sodium Fluoride   Stopped by:  Michael Lopez MD                    Primary Care Provider Office Phone # Fax #    Shamika Ileana Kelley [phone]       [facility] [address] [facility] [address]        Equal Access to Services     JAYDEN STUART AH: Hadii james elizalde hadasho Soemre, waaxda luqadaha, qaybta kaalmada adeegyada, waxay lisa josé. So [facility] [phone].    ATENCIÓN: Si habla español, tiene a deluca disposición servicios gratuitos de asistencia lingüística. [facility] [phone].    We comply with applicable federal civil rights laws and [place] laws. We do not discriminate on the basis of race, color, national origin, age, disability, sex, sexual orientation, or gender identity.            Thank you!     Thank you for choosing EYE CLINIC  for your care. Our goal is always to provide you with excellent care. Hearing back from our patients is one way we can continue to improve our services. Please take a few minutes to complete the written survey that you may receive in the mail after your visit with us. Thank you!             Your Updated Medication List - Protect others around you: Learn how to safely use, store and throw away your medicines at www.disposemymeds.org.          This list is accurate as of: 11/14/17  2:35 PM.  Always use your most recent med list.                   Brand Name Dispense Instructions for use Diagnosis    acetaminophen 500 MG tablet    TYLENOL    1 Bottle     Take 2 tablets (1,000 mg) by mouth every 6 hours as needed    Sprain of left ankle, unspecified ligament, initial encounter       alum & mag hydroxide-simethicone 200-200-20 MG/5ML Susp suspension    MYLANTA/MAALOX     Take 30 mLs by mouth daily as needed for indigestion        amLODIPine 5 MG tablet    NORVASC    30 tablet    Take 2 tablets (10 mg) by mouth daily    Essential hypertension with goal blood pressure less than 130/85       artificial saliva Aers spray      Take 1 spray by mouth 3 times daily as needed for dry mouth        artificial tears Oint ophthalmic ointment     1 Tube    Apply  to eye. Place 0.5 inches into both eyes at bedtime    Insufficiency of tear film of both eyes       ARTIFICIAL TEARS OP      Apply 1 drop to eye 4 times daily.        aspirin 81 MG tablet     100    Take 1 tablet by mouth daily. ENTERIC COATED.        atorvastatin 40 MG tablet    LIPITOR    90 tablet    Take 1 tablet (40 mg) by mouth daily    Hyperlipidemia LDL goal <100       blood glucose monitoring test strip    no brand specified    100 each    Use to test blood sugar 3 times daily or as directed.    Type 2 diabetes mellitus with microalbuminuria, with long-term current use of insulin (H)       calcium polycarbophil 625 MG tablet    FIBERCON     Take 2 tablets by mouth daily        calcium-vitamin D 250-125 MG-UNIT Tabs      Take 1 tablet by mouth 2 times daily. Calcium 250 mg/Vit D 125 IU        CLARITIN 10 MG tablet   Generic drug:  loratadine     30    1 TAB PO QD (Once per day) as needed for ALLERGY SYMPTOMS        clotrimazole 1 % cream    LOTRIMIN    50 g    Apply topically 2 times daily as needed    Dermatophytosis       eucerin cream      Apply  topically as needed. Apply to thigh PRN dry skin        exenatide 10 MCG/0.04ML injection    BYETTA    1 Syringe    Inject 10 mcg Subcutaneous 2 times daily (before meals)    Type 2 diabetes mellitus with microalbuminuria, with long-term current use of insulin (H)        FLUoxetine 40 MG capsule    PROzac    30 capsule    Take 1 capsule (40 mg) by mouth daily    Schizoaffective disorder, depressive type (H)       fluticasone 50 MCG/ACT spray    FLONASE    16 g    Spray 1 spray into both nostrils daily    Seasonal allergic rhinitis       furosemide 20 MG tablet    LASIX    60 tablet    Take 1 tablet (20 mg) by mouth 2 times daily    Lower leg edema       GEL-SASHA DENTINBLOC DT      Apply  to affected area. GEL. Apply to 2nd molar on bottom right daily at bedtime.        GLUCAGON EMERGENCY 1 MG kit   Generic drug:  glucagon      Inject  as directed. 1 mL as needed for signs of hypoglycemia. May repeat as needed in 15 minutes.        HYDROcodone-acetaminophen 5-325 MG per tablet    NORCO     Take 1 tablet by mouth every 6 hours as needed 1-2 tabs        insulin glargine 100 UNIT/ML injection    LANTUS    8 mL    Inject 24 Units Subcutaneous At Bedtime    Type 2 diabetes mellitus with microalbuminuria, with long-term current use of insulin (H)       LACTAID 3000 UNIT tablet   Generic drug:  lactase      Take 4 tabs daily with meals.        levothyroxine 175 MCG tablet    SYNTHROID/LEVOTHROID    30 tablet    Take 1 tablet (175 mcg) by mouth daily Give on empty stomach    Other specified hypothyroidism       LORazepam 1 MG tablet    ATIVAN    35 tablet    Take 1 tablet (1 mg) by mouth At Bedtime .  May take additional 1mg daily PRN for agitation.    Generalized anxiety disorder       losartan 100 MG tablet    COZAAR    90 tablet    Take 1 tablet by mouth daily.    Hypertension goal BP (blood pressure) < 130/80, Diabetes mellitus type 2, insulin dependent (H), CKD (chronic kidney disease) stage 2, GFR 60-89 ml/min       melatonin 3 MG tablet     60 tablet    Take 1 tablet (3 mg) by mouth nightly as needed for sleep    Other insomnia       metFORMIN 500 MG 24 hr tablet    GLUCOPHAGE-XR    90 tablet    Take 2 tablets (1,000 mg) by mouth 2 times daily (with meals)    Diabetes mellitus, type 2  (H)       MILK OF MAGNESIA PO      Take  by mouth. Take 30 mL as needed for constipation.        NEURONTIN PO      Take 900 mg by mouth 3 times daily.        nystatin [id number] UNIT/GM Powd    MYCOSTATIN    60 g    Apply topically 3 times daily as needed    Candidiasis of skin       polyethylene glycol powder    MIRALAX/GLYCOLAX     Take 1 capful by mouth 2 times daily. 17 GM PO BID        saline 0.9 % Soln      Spray 2 sprays in nostril as needed.        SENNA S 8.6-50 MG per tablet   Generic drug:  senna-docusate      Take 2 tablets by mouth At Bedtime.        solifenacin 10 MG tablet    VESICARE    30 tablet    Take 1 tablet (10 mg) by mouth daily    Urge incontinence       ziprasidone 40 MG capsule    GEODON    60 capsule    Take 1 capsule (40 mg) by mouth 2 times daily (with meals)    Schizoaffective disorder, depressive type (H)

## 2017-11-21 ENCOUNTER — OFFICE VISIT (OUTPATIENT)
Dept: PSYCHOLOGY | Facility: CLINIC | Age: 67
End: 2017-11-21

## 2017-11-21 VITALS — WEIGHT: 241.4 LBS | BODY MASS INDEX: 41.44 KG/M2

## 2017-11-21 DIAGNOSIS — F54 PSYCHOLOGICAL FACTORS AFFECTING MORBID OBESITY (H): ICD-10-CM

## 2017-11-21 DIAGNOSIS — E66.01 PSYCHOLOGICAL FACTORS AFFECTING MORBID OBESITY (H): ICD-10-CM

## 2017-11-21 DIAGNOSIS — F33.0 MAJOR DEPRESSIVE DISORDER, RECURRENT EPISODE, MILD (H): Primary | ICD-10-CM

## 2017-11-21 DIAGNOSIS — E66.9 OBESITY, UNSPECIFIED OBESITY SEVERITY, UNSPECIFIED OBESITY TYPE: ICD-10-CM

## 2017-11-21 NOTE — MR AVS SNAPSHOT
After Visit Summary   11/21/2017    Pinky Page    MRN: [id number]           Patient Information     Date Of Birth          1950        Visit Information        Provider Department      11/21/2017 1:00 PM Damon Gr, PhD [facility]        Today's Diagnoses     Major depressive disorder, recurrent episode, mild (H)    -  1    Psychological factors affecting morbid obesity (H)        Obesity, unspecified obesity severity, unspecified obesity type           Follow-ups after your visit        Your next 10 appointments already scheduled     Jan 08, 2018 11:30 AM CST   (Arrive by 11:15 AM)   Return Visit with Marcos Magdaleno MD   [facility] ([facility])    [address]  4th Floor  [facility] [id number] [phone]            Jan 08, 2018  1:15 PM CST   Adult Med Follow UP with Joana De Leon MD   Psychiatry Clinic ([facility])    [place] [id number]  [address] [address] [phone]            May 10, 2018 11:30 AM CDT   Return Sleep Patient with Brianda Reddy MD   [facility] [place], Sleep Study ([facility])    [address] [place] [address] [phone]            Nov 15, 2018  1:30 PM CST   RETURN GENERAL with Michael Lopez MD   Eye Clinic ([facility])    [address]  [address]  9th [address] Clin 9a  [facility] [id number] [phone]              Who to contact     Please call your clinic at [phone] to:    Ask questions about your health    Make or cancel appointments    Discuss your medicines    Learn about your test results    Speak to your doctor   If you have compliments or concerns about an experience at your clinic, or if you wish to file a complaint, please contact [facility] Patient Relations at [phone]  or email us at [email or web address]         Additional Information About Your Visit        PS DEPT. Information     PS DEPT. is an electronic gateway that provides easy, online access to your medical records. With PS DEPT., you can request a clinic appointment, read your test results, renew a prescription or communicate with your care team.     To sign up for PS DEPT. visit the website at [email or web address]   You will be asked to enter the access code listed below, as well as some personal information. Please follow the directions to create your username and password.     Your access code is: [email or web address]  Expires: 2018  6:30 AM     Your access code will  in 90 days. If you need help or a new code, please contact your [facility] or call [phone] for assistance.        Care EveryWhere ID     This is your Care EveryWhere ID. This could be used by other organizations to access your [facility] medical records  [id number]        Your Vitals Were     BMI (Body Mass Index)                   41.44 kg/m2            Blood Pressure from Last 3 Encounters:   17 133/74   10/16/17 132/75   17 138/80    Weight from Last 3 Encounters:   17 109.5 kg (241 lb 6.4 oz)   17 109.5 kg (241 lb 6.4 oz)   10/16/17 108.7 kg (239 lb 9.6 oz)              Today, you had the following     No orders found for display         Today's Medication Changes          These changes are accurate as of: 17  2:05 PM.  If you have any questions, ask your nurse or doctor.               These medicines have changed or have updated prescriptions.        Dose/Directions    metFORMIN 500 MG 24 hr tablet   Commonly known as:  GLUCOPHAGE-XR   This may have changed:    - how much to take  - when to take this   Used for:  Diabetes mellitus, type 2 (H)        Dose:  1000 mg   Take 2 tablets (1,000 mg) by mouth 2 times daily (with meals)   Quantity:  90 tablet   Refills:  3                 Primary Care Provider Office Phone # Fax #    Shamika Ileana Kelley [phone]       [facility] [address] [facility] [address]        Equal Access to Services     JAYDEN STUART : Hadii james ku amaurio Soneriali, waaxda luqadaha, qaybta kaalmada adeegyada, autumn lee laYunidioni josé. So [facility] [phone].    ATENCIÓN: Si habla español, tiene a deluca disposición servicios gratuitos de asistencia lingüística. Llame al [phone].    We comply with applicable federal civil rights laws and [place] laws. We do not discriminate on the basis of race, color, national origin, age, disability, sex, sexual orientation, or gender identity.            Thank you!     Thank you for choosing [facility]  for your care. Our goal is always to provide you with excellent care. Hearing back from our patients is one way we can continue to improve our services. Please take a few minutes to complete the written survey that you may receive in the mail after your visit with us. Thank you!             Your Updated Medication List - Protect others around you: Learn how to safely use, store and throw away your medicines at www.disposemymeds.org.          This list is accurate as of: 11/21/17  2:05 PM.  Always use your most recent med list.                   Brand Name Dispense Instructions for use Diagnosis    acetaminophen 500 MG tablet    TYLENOL    1 Bottle    Take 2 tablets (1,000 mg) by mouth every 6 hours as needed    Sprain of left ankle, unspecified ligament, initial encounter       alum & mag hydroxide-simethicone 200-200-20 MG/5ML Susp suspension    MYLANTA/MAALOX     Take 30 mLs by mouth daily as needed for indigestion        amLODIPine 5 MG tablet    NORVASC    30 tablet    Take 2 tablets (10 mg) by mouth daily    Essential hypertension with goal blood pressure less than 130/85       artificial saliva Aers spray      Take 1 spray by mouth 3 times daily  as needed for dry mouth        artificial tears Oint ophthalmic ointment     1 Tube    Apply  to eye. Place 0.5 inches into both eyes at bedtime    Insufficiency of tear film of both eyes       ARTIFICIAL TEARS OP      Apply 1 drop to eye 4 times daily.        aspirin 81 MG tablet     100    Take 1 tablet by mouth daily. ENTERIC COATED.        atorvastatin 40 MG tablet    LIPITOR    90 tablet    Take 1 tablet (40 mg) by mouth daily    Hyperlipidemia LDL goal <100       blood glucose monitoring test strip    no brand specified    100 each    Use to test blood sugar 3 times daily or as directed.    Type 2 diabetes mellitus with microalbuminuria, with long-term current use of insulin (H)       calcium polycarbophil 625 MG tablet    FIBERCON     Take 2 tablets by mouth daily        calcium-vitamin D 250-125 MG-UNIT Tabs      Take 1 tablet by mouth 2 times daily. Calcium 250 mg/Vit D 125 IU        CLARITIN 10 MG tablet   Generic drug:  loratadine     30    1 TAB PO QD (Once per day) as needed for ALLERGY SYMPTOMS        clotrimazole 1 % cream    LOTRIMIN    50 g    Apply topically 2 times daily as needed    Dermatophytosis       eucerin cream      Apply  topically as needed. Apply to thigh PRN dry skin        exenatide 10 MCG/0.04ML injection    BYETTA    1 Syringe    Inject 10 mcg Subcutaneous 2 times daily (before meals)    Type 2 diabetes mellitus with microalbuminuria, with long-term current use of insulin (H)       FLUoxetine 40 MG capsule    PROzac    30 capsule    Take 1 capsule (40 mg) by mouth daily    Schizoaffective disorder, depressive type (H)       fluticasone 50 MCG/ACT spray    FLONASE    16 g    Spray 1 spray into both nostrils daily    Seasonal allergic rhinitis       furosemide 20 MG tablet    LASIX    60 tablet    Take 1 tablet (20 mg) by mouth 2 times daily    Lower leg edema       GEL-SASHA DENTINBLOC DT      Apply  to affected area. GEL. Apply to 2nd molar on bottom right daily at bedtime.         GLUCAGON EMERGENCY 1 MG kit   Generic drug:  glucagon      Inject  as directed. 1 mL as needed for signs of hypoglycemia. May repeat as needed in 15 minutes.        HYDROcodone-acetaminophen 5-325 MG per tablet    NORCO     Take 1 tablet by mouth every 6 hours as needed 1-2 tabs        insulin glargine 100 UNIT/ML injection    LANTUS    8 mL    Inject 24 Units Subcutaneous At Bedtime    Type 2 diabetes mellitus with microalbuminuria, with long-term current use of insulin (H)       LACTAID 3000 UNIT tablet   Generic drug:  lactase      Take 4 tabs daily with meals.        levothyroxine 175 MCG tablet    SYNTHROID/LEVOTHROID    30 tablet    Take 1 tablet (175 mcg) by mouth daily Give on empty stomach    Other specified hypothyroidism       LORazepam 1 MG tablet    ATIVAN    35 tablet    Take 1 tablet (1 mg) by mouth At Bedtime .  May take additional 1mg daily PRN for agitation.    Generalized anxiety disorder       losartan 100 MG tablet    COZAAR    90 tablet    Take 1 tablet by mouth daily.    Hypertension goal BP (blood pressure) < 130/80, Diabetes mellitus type 2, insulin dependent (H), CKD (chronic kidney disease) stage 2, GFR 60-89 ml/min       melatonin 3 MG tablet     60 tablet    Take 1 tablet (3 mg) by mouth nightly as needed for sleep    Other insomnia       metFORMIN 500 MG 24 hr tablet    GLUCOPHAGE-XR    90 tablet    Take 2 tablets (1,000 mg) by mouth 2 times daily (with meals)    Diabetes mellitus, type 2 (H)       MILK OF MAGNESIA PO      Take  by mouth. Take 30 mL as needed for constipation.        NEURONTIN PO      Take 900 mg by mouth 3 times daily.        nystatin 299576 UNIT/GM Powd    MYCOSTATIN    60 g    Apply topically 3 times daily as needed    Candidiasis of skin       polyethylene glycol powder    MIRALAX/GLYCOLAX     Take 1 capful by mouth 2 times daily. 17 GM PO BID        saline 0.9 % Soln      Spray 2 sprays in nostril as needed.        SENNA S 8.6-50 MG per tablet   Generic drug:   senna-docusate      Take 2 tablets by mouth At Bedtime.        solifenacin 10 MG tablet    VESICARE    30 tablet    Take 1 tablet (10 mg) by mouth daily    Urge incontinence       ziprasidone 40 MG capsule    GEODON    60 capsule    Take 1 capsule (40 mg) by mouth 2 times daily (with meals)    Schizoaffective disorder, depressive type (H)

## 2017-11-21 NOTE — PROGRESS NOTES
Health Psychology                  Clinic    Department of Medicine  Madelaine Ingram, Ph.D., L.P. [phone]                          [facility] Miryam Woods, Ph.D.,  L.P. [phone]                 3rd Floor, Clinic 3A  [place] Mail Code 742   Damon Gr, Ph.D., LUCY.RODOLFO., L.P. [phone]     [address] [facility]  [address] [facility] Kimber Morales, Ph.D., L.P. [phone]  [place] [id number]  [place] [address]    Health Psychology Follow-Up Note    Ms. Page is a pleasant 67-year old woman with chronic depressive illness, who returns to clinic for supportive and behavioral psychotherapy for moderate recurrent depression and for help losing weight given her morbid obesity.     She is 241.4# down from  244.#   last session.    Wt Readings from Last 4 Encounters:   11/13/17 109.5 kg (241 lb 6.4 oz)   10/16/17 108.7 kg (239 lb 9.6 oz)   10/10/17 111 kg (244 lb 11.2 oz)   09/14/17 110.9 kg (244 lb 9.6 oz)     At [employer], she continues to serve on the [employer].  She has been on the [employer] for 20 years, plus decorating the bulletin boards.  She is working 1 day/week with her boss, [employer], Friday mornings for 1.5 hour.    She rates the depression as 4 on 10-point subjective scale.   We discussed her plans for the holidays.  She is doing well, though cognizant of December being a difficult month due to the deaths of her father and brother.   She is doing more social walking some of the time and had apparently is gradually ramping up on her fitness center days.  She agrees to increase.  She had been  tolerating using the stationery bicycle  up to 20 minutes without pain and was willing to increase gradually.  She is having dental pain, so is eating less.  The consultatin with the oral surgeon is in December.  Goal for exercise has been 1.5 hours/day as of June 19 and 2 lbs. / month.   She arrived early today.  Pinky  participated fully and appeared to derive benefit.  Rapport was excellent.     Extended session due to complexity of case and length of interval.      IMPRESSION Distress Disability Risk    Low High Low     Time In: 1:05  Time Out:  1:58  Diagnosis:   Major Depression, recurrent mild (F33.0)   Psychological Factors Affecting Morbid Obesity (F54)      Plan: She will return on 1/2 @ 1:00 for behavioral and supportive psychotherapy.   Plan to to bicycle for > 20 minutes some days and eat less.  Tx plan due 4/11/2018  Damon Gr, Ph.D., A.B.P.P., L.P.

## 2017-12-14 ENCOUNTER — TELEPHONE (OUTPATIENT)
Dept: UROLOGY | Facility: CLINIC | Age: 67
End: 2017-12-14

## 2017-12-14 ENCOUNTER — OFFICE VISIT (OUTPATIENT)
Dept: FAMILY MEDICINE | Facility: CLINIC | Age: 67
End: 2017-12-14
Payer: COMMERCIAL

## 2017-12-14 VITALS
OXYGEN SATURATION: 99 % | SYSTOLIC BLOOD PRESSURE: 146 MMHG | TEMPERATURE: 97.7 F | HEART RATE: 64 BPM | DIASTOLIC BLOOD PRESSURE: 82 MMHG | WEIGHT: 238 LBS | BODY MASS INDEX: 40.85 KG/M2 | RESPIRATION RATE: 18 BRPM

## 2017-12-14 DIAGNOSIS — R80.9 TYPE 2 DIABETES MELLITUS WITH MICROALBUMINURIA, WITH LONG-TERM CURRENT USE OF INSULIN (H): Primary | ICD-10-CM

## 2017-12-14 DIAGNOSIS — I10 HYPERTENSION, ESSENTIAL: ICD-10-CM

## 2017-12-14 DIAGNOSIS — Z79.4 TYPE 2 DIABETES MELLITUS WITH MICROALBUMINURIA, WITH LONG-TERM CURRENT USE OF INSULIN (H): Primary | ICD-10-CM

## 2017-12-14 DIAGNOSIS — E11.29 TYPE 2 DIABETES MELLITUS WITH MICROALBUMINURIA, WITH LONG-TERM CURRENT USE OF INSULIN (H): Primary | ICD-10-CM

## 2017-12-14 DIAGNOSIS — E78.00 HYPERCHOLESTEREMIA: ICD-10-CM

## 2017-12-14 LAB — HBA1C MFR BLD: 6.4 % (ref 4.1–5.7)

## 2017-12-14 ASSESSMENT — PATIENT HEALTH QUESTIONNAIRE - PHQ9: SUM OF ALL RESPONSES TO PHQ QUESTIONS 1-9: 7

## 2017-12-14 NOTE — PATIENT INSTRUCTIONS
Here is the plan from today's visit    1. Diabetes mellitus type 2, insulin dependent (H)  - Hemoglobin A1c (LabDAQ) - your A1c is 6.4 today! You are very well controlled.   - glucagon (GLUCAGON EMERGENCY) 1 MG kit; Inject 1 mg into the muscle once for 1 dose  Dispense: 2 mg; Refill: 3    2. Type 2 diabetes mellitus with microalbuminuria, with long-term current use of insulin (H)  - insulin glargine (LANTUS) 100 UNIT/ML injection; Inject 22 Units Subcutaneous At Bedtime  Dispense: 8 mL; Refill: 11  - will decrease your Lantus to 22U at bedtime. If your blood sugars continue to be low in the mornings, we will decrease it even further to 20U at bedtime if needed.     You are doing great at your weight loss and increasing exercise! Keep up the great hard work you are doing!    Follow up plan  Please make a clinic appointment for follow up with me (GALA DIA) in 3  months for follow up.    Thank you for coming to [facility] today.  Lab Testing:  **If you had lab testing today and your results are reassuring or normal they will be mailed to you or sent through MAZ within 7 days.   **If the lab tests need quick action we will call you with the results.  The phone number we will call with results is # [phone] (home) [phone] (work). If this is not the best number please call our clinic and change the number.  Medication Refills:  If you need any refills please call your pharmacy and they will contact us.   If you need to  your refill at a new pharmacy, please contact the new pharmacy directly. The new pharmacy will help you get your medications transferred faster.   Scheduling:  If you have any concerns about today's visit or wish to schedule another appointment please call our office during normal business hours [phone] (8-5:00 M-F)  If a referral was made to a [facility] and you don't get a call from central scheduling please call [phone].  If a  Mammogram was ordered for you at [facility] call [phone] to schedule or change your appointment.  If you had an XRay/CT/Ultrasound/MRI ordered the number is [phone] to schedule or change your radiology appointment.   Medical Concerns:  If you have urgent medical concerns please call [phone] at any time of the day.

## 2017-12-14 NOTE — TELEPHONE ENCOUNTER
Central Prior Authorization Team   Phone: [phone]      PA Initiation    Medication: vesicare 10MG  Insurance Company: [employer] - Phone [phone] Fax [phone]  Pharmacy Filling the Rx: [facility] - [place], [place] - [address] [place] [address]  Filling Pharmacy Phone: [phone]  Filling Pharmacy Fax:    Start Date: 12/14/2017

## 2017-12-14 NOTE — PROGRESS NOTES
Preceptor Attestation:   Patient seen and discussed with the resident.   Assessment and plan reviewed with resident and agreed upon.   Supervising Physician:  Daniel Villalpando MD  [facility]

## 2017-12-14 NOTE — PROGRESS NOTES
HPI:       Pinky Page is a 67 year old who presents for the following  Patient presents with:  Diabetes: F/u    Diabetes Follow-up    Patient is checking blood sugars: twice daily.    Blood sugar testing frequency justification: Uncontrolled diabetes  Results are as follows:       am - some in the 60s       bedtime - unknown.              -Last A1C was   Lab Results   Component Value Date    A1C 6.4 12/14/2017    A1C 6.3 09/12/2017    A1C 6.9 06/22/2017    A1C 6.6 03/21/2017    A1C 7.0 12/08/2016        Diabetic concerns: low blood sugar several less than 70 in the past few weeks    Chest Pain or exercise related calf pain (claudication):no     Symptoms of hypoglycemia (low blood sugar): none     Paresthesias (numbness or burning in feet) or sores: No     Diabetic eye exam within the last year?: Yes     Hyperlipidemia Follow-Up      Recommended Level of Therapy:High Intensity Statin (atorvastatin 40*-80 mg or rosuvastatin 20-40mg)       The 10-year ASCVD risk score (Jamesmilli EATON Jr, et al., 2013) is: 22.9%    Values used to calculate the score:      Age: 67 years      Sex: Female      Is Non- : No      Diabetic: Yes      Tobacco smoker: No      Systolic Blood Pressure: 146 mmHg      Is BP treated: Yes      HDL Cholesterol: 40 mg/dL      Total Cholesterol: 142 mg/dL      Rate your low fat/cholesterol diet?: good    Taking statin?  Yes, no muscle aches from statin    Other lipid medications/supplements?:  none     Hypertension Follow-up      Outpatient blood pressures are being checked at home, Tuesday and Fridays.  Results are at goal.    Low Salt Diet: no added salt    Daily NSAID Use?no     Did patient take their HTN pills today?  No         Adherence and Exercise  Medication side effects: no  How often is a medication missed? Never  Exercise:walking  6-7 days/week for an average of 45-60 minutes     Problem, Medication and Allergy Lists were   reviewed and are current.     Patient  Active Problem List    Diagnosis Date Noted     Solitary kidney, congenital 06/07/2013     Priority: High     CKD (chronic kidney disease) stage 2, GFR 60-89 ml/min 03/01/2013     Priority: High     Lives in group home 02/27/2013     Priority: High     [place] residence, [place] >20 years.  Mantoux done in 9/2013 was negative.        Hypercholesteremia 10/05/2012     Priority: High     ASCVD  Risk Calculator (pooled cohort)   10 CV risk 17.5%   Recommended Therapy:High Intensity Statin (atorvastatin 40*-80 mg or rosuvastatin 20-40mg)               Morbid obesity due to excess calories (H) 10/02/2012     Priority: High     Follows with Dr. Marcos Magdaleno Q 2months for weight management.       Personal history of breast cancer s/p L masectomy 10/02/2012     Priority: High     May 24, 2013 S/p left mastectomy 1996; follows with Dr. Avila, last mammogram 5/2012 was benign. Rec annual mammogram.       Diabetes mellitus type 2, insulin dependent (H) 10/02/2012     Priority: High     July 27, 2013   Insulin dependent since 4/2006  Without history of retinopathy       Hypertension, essential      Priority: High     Hypothyroidism      Priority: High     On replacement.       ANUJ (obstructive sleep apnea)      Priority: High     Re-eval 2017 Moderate ANUJ, CPAP trial at 13 August 28, 2013   Follows with Dr. Mc.   On 4L O2 at night given CPAP intolerant.  PSG (10/21/07): AHI 39 with oxygen saturations to 71%.        CKD (chronic kidney disease) stage 1, GFR 90 ml/min or greater 06/21/2017     Priority: Medium     Microalbuminuria due to type 2 diabetes mellitus (H) 03/24/2017     Priority: Medium     3/21/17  Creatinine Urine 54   Albumin Urine mg/L 18   Albumin Urine mg/g Cr 32.47 (H)          Type 2 diabetes mellitus with microalbuminuria, with long-term current use of insulin (H) 03/24/2017     Priority: Medium     Diabetes mellitus, type II, insulin dependent (H) 03/24/2017     Priority: Medium     Nail  complaint 02/03/2017     Priority: Medium     vertical nail ridges       Hx of psychiatric care 07/14/2016     Priority: Medium       PAST PSYCH MED TRIALS     sertaline   amitriptyline   lithium   risperidone   olanzapine   trazodone   clonazepam  Quetiapine (weight gain)       Psychological factors affecting medical condition 04/18/2016     Priority: Medium     Major depressive disorder, recurrent episode, moderate (H) 11/16/2015     Priority: Medium     Health Care Home 10/01/2015     Priority: Medium     Schizoaffective disorder, depressive type (H) 06/08/2015     Priority: Medium     Carpal tunnel syndrome of left wrist 05/28/2015     Priority: Medium     Generalized anxiety disorder 12/04/2014     Priority: Medium     Diagnosis updated by automated process. Provider to review and confirm.       Candidiasis of skin 11/04/2014     Priority: Medium     Chronic candidiasis of groin and labia        Hypertriglyceridemia 09/10/2014     Priority: Medium     Impingement syndrome of right shoulder 06/10/2014     Priority: Medium     S/P hysterectomy 04/20/2014     Priority: Medium     Extrapyramidal and movement disorder 02/27/2013     Priority: Medium     Cardiomegaly      Priority: Medium     Chronic constipation      Priority: Medium     Dry eye syndrome      Priority: Medium     Esophageal reflux      Priority: Medium     Exposure keratoconjunctivitis      Priority: Medium     DM ophthalmopathy (H)      Priority: Medium     Senile cataract      Priority: Medium     Postmenopausal atrophic vaginitis      Priority: Medium     Restless leg syndrome      Priority: Medium     Squamous blepharitis      Priority: Medium     Urge incontinence 04/19/2011     Priority: Medium     Allergic rhinitis due to pollen      Priority: Medium     seasonal allergies      ,     Current Outpatient Prescriptions   Medication Sig Dispense Refill     artificial tears (AKWA TEARS) OINT ophthalmic ointment Apply  to eye. Place 0.5 inches into  both eyes at bedtime 1 Tube 11     LORazepam (ATIVAN) 1 MG tablet Take 1 tablet (1 mg) by mouth At Bedtime .  May take additional 1mg daily PRN for agitation. 35 tablet 1     melatonin 3 MG tablet Take 1 tablet (3 mg) by mouth nightly as needed for sleep 60 tablet 1     alum & mag hydroxide-simethicone (MYLANTA/MAALOX) 200-200-20 MG/5ML SUSP suspension Take 30 mLs by mouth daily as needed for indigestion       artificial saliva (BIOTENE MT) AERS spray Take 1 spray by mouth 3 times daily as needed for dry mouth       FLUoxetine (PROZAC) 40 MG capsule Take 1 capsule (40 mg) by mouth daily 30 capsule 5     ziprasidone (GEODON) 40 MG capsule Take 1 capsule (40 mg) by mouth 2 times daily (with meals) 60 capsule 5     blood glucose monitoring (NO BRAND SPECIFIED) test strip Use to test blood sugar 3 times daily or as directed. 100 each 3     exenatide (BYETTA) 10 MCG/0.04ML injection Inject 10 mcg Subcutaneous 2 times daily (before meals) 1 Syringe 11     insulin glargine (LANTUS) 100 UNIT/ML injection Inject 24 Units Subcutaneous At Bedtime 8 mL 11     amLODIPine (NORVASC) 5 MG tablet Take 2 tablets (10 mg) by mouth daily 30 tablet 3     nystatin (MYCOSTATIN) 244533 UNIT/GM POWD Apply topically 3 times daily as needed 60 g 1     clotrimazole (LOTRIMIN) 1 % cream Apply topically 2 times daily as needed 50 g 0     fluticasone (FLONASE) 50 MCG/ACT nasal spray Spray 1 spray into both nostrils daily 16 g 3     furosemide (LASIX) 20 MG tablet Take 1 tablet (20 mg) by mouth 2 times daily 60 tablet 3     acetaminophen (TYLENOL) 500 MG tablet Take 2 tablets (1,000 mg) by mouth every 6 hours as needed 1 Bottle 2     solifenacin (VESICARE) 10 MG tablet Take 1 tablet (10 mg) by mouth daily 30 tablet 6     HYDROcodone-acetaminophen (NORCO) 5-325 MG per tablet Take 1 tablet by mouth every 6 hours as needed 1-2 tabs       atorvastatin (LIPITOR) 40 MG tablet Take 1 tablet (40 mg) by mouth daily 90 tablet 1     calcium polycarbophil  (FIBERCON) 625 MG tablet Take 2 tablets by mouth daily       levothyroxine (SYNTHROID, LEVOTHROID) 175 MCG tablet Take 1 tablet (175 mcg) by mouth daily Give on empty stomach 30 tablet 4     metFORMIN (GLUCOPHAGE-XR) 500 MG 24 hr tablet Take 2 tablets (1,000 mg) by mouth 2 times daily (with meals) (Patient taking differently: Take 2,000 mg by mouth daily ) 90 tablet 3     losartan (COZAAR) 100 MG tablet Take 1 tablet by mouth daily. 90 tablet 1     Hypromellose (ARTIFICIAL TEARS OP) Apply 1 drop to eye 4 times daily.       Gabapentin (NEURONTIN PO) Take 900 mg by mouth 3 times daily.       senna-docusate (SENNA S) 8.6-50 MG per tablet Take 2 tablets by mouth At Bedtime.       polyethylene glycol (MIRALAX/GLYCOLAX) powder Take 1 capful by mouth 2 times daily. 17 GM PO BID       Skin Protectants, Misc. (EUCERIN) cream Apply  topically as needed. Apply to thigh PRN dry skin        Saline 0.9 % SOLN Spray 2 sprays in nostril as needed.       Calcium Carbonate-Vitamin D (CALCIUM-VITAMIN D) 250-125 MG-UNIT TABS Take 1 tablet by mouth 2 times daily. Calcium 250 mg/Vit D 125 IU       Sod Fluor-Solo Fluor-Hydrfl Ac (GEL-SASHA DENTINBLOC DT) Apply  to affected area. GEL. Apply to 2nd molar on bottom right daily at bedtime.       lactase (LACTAID) 3000 UNIT tablet Take 4 tabs daily with meals.       Magnesium Hydroxide (MILK OF MAGNESIA PO) Take  by mouth. Take 30 mL as needed for constipation.       CLARITIN 10 MG OR TABS 1 TAB PO QD (Once per day) as needed for ALLERGY SYMPTOMS 30 11     ASPIRIN 81 MG OR TABS Take 1 tablet by mouth daily. ENTERIC COATED. 100 3     GLUCAGON EMERGENCY 1 MG IJ KIT Inject  as directed. 1 mL as needed for signs of hypoglycemia. May repeat as needed in 15 minutes.  0   ,     Allergies   Allergen Reactions     Chlordiazepoxide Hcl      Dimetapp Dm Cold-Cough      Cold/Congetion TABS     Haldol      Ibuprofen      TABS     Lactose Intolerance [Beta-Galactosidase]      CAPS     Milk Products       Propofol      EMUL     Patient is an established patient of this clinic.         Review of Systems:   Review of Systems   Constitutional: Negative for appetite change, chills, fatigue and fever.   HENT: Negative for congestion, sinus pressure and sore throat.    Eyes: Negative for visual disturbance.   Respiratory: Negative for cough, shortness of breath and wheezing.    Cardiovascular: Negative for chest pain and leg swelling.   Gastrointestinal: Negative for abdominal distention, abdominal pain, blood in stool, constipation, diarrhea, nausea and vomiting.   Genitourinary: Negative for dysuria and hematuria.   Musculoskeletal: Negative for arthralgias and myalgias.   Skin: Negative for color change and rash.   Neurological: Negative for weakness and headaches.             Physical Exam:   Patient Vitals for the past 24 hrs:   BP Temp Temp src Pulse Resp SpO2 Weight   12/14/17 1013 146/82 - - - - - -   12/14/17 1009 167/72 97.7  F (36.5  C) Oral 64 18 99 % 238 lb (108 kg)     Body mass index is 40.85 kg/(m^2).  Vital signs normal except mildly elevated blood pressure today.      Physical Exam   Constitutional: She is oriented to person, place, and time. She appears well-developed and well-nourished. No distress.   HENT:   Head: Normocephalic and atraumatic.   Nose: Nose normal.   Mouth/Throat: Oropharynx is clear and moist.   Eyes: Conjunctivae are normal. Right eye exhibits no discharge. Left eye exhibits no discharge. No scleral icterus.   Neck: Normal range of motion. Neck supple. No thyromegaly present.   Cardiovascular: Normal rate, regular rhythm and normal heart sounds.  Exam reveals no gallop and no friction rub.    No murmur heard.  Pulmonary/Chest: Effort normal and breath sounds normal. No respiratory distress. She has no wheezes. She has no rales.   Abdominal: Soft. Bowel sounds are normal. There is no tenderness.   Musculoskeletal: Normal range of motion. She exhibits no edema or tenderness.    Lymphadenopathy:     She has no cervical adenopathy.   Neurological: She is alert and oriented to person, place, and time.   Skin: Skin is warm. No rash noted. She is not diaphoretic.         Results:     Results for orders placed or performed in visit on 12/14/17   Hemoglobin A1c ([facility])   Result Value Ref Range    Hemoglobin A1C 6.4 (H) 4.1 - 5.7 %     *Note: Due to a large number of results and/or encounters for the requested time period, some results have not been displayed. A complete set of results can be found in Results Review.         Assessment and Plan      Pinky Page is a 67 yo female with a PMH of HTN, CKD, DM2, hypothyroid, s/p breast CA with left masectomy 1996, and significant psych history who currently lives in group home who presents to the clinic for HTN and DM follow up.     Diabetes mellitus type 2, insulin dependent (H)  Type 2 diabetes mellitus with microalbuminuria, with long-term current use of insulin (H)  - Hemoglobin A1c ([facility]) - well below goal (<8)  - glucagon (GLUCAGON EMERGENCY) 1 MG kit; Inject 1 mg into the muscle once for 1 dose  Dispense: 2 mg; Refill: 3  - insulin glargine (LANTUS) 100 UNIT/ML injection; Inject 22 Units Subcutaneous At Bedtime  Dispense: 8 mL; Refill: 11  - discussed that we will decrease to 22U at bedtime. Previously had conditional changes if needed depending on low AM sugars (decrease to 22U if blood sugars low, otherwise 24U at bedtime)  - discussed that if blood sugars continue to be low (patient is asymptomatic), then will further decrease to 20U.     Benign Hypertension  - no changes to medications today.   - will continue to check blood pressures twice a week.     Hypercholesterolemia  - no changes to medications today  - discussed to continue diet modifications and exercise    There are no discontinued medications.  Options for treatment and follow-up care were reviewed with the patient. Pinky Page  engaged in the decision making process and  verbalized understanding of the options discussed and agreed with the final plan.    Shamika Kelley MD  [facility]  [phone]

## 2017-12-14 NOTE — MR AVS SNAPSHOT
After Visit Summary   12/14/2017    Pinky Page    MRN: [id number]           Patient Information     Date Of Birth          1950        Visit Information        Provider Department      12/14/2017 10:20 AM Shamika Dia MD [facility]        Today's Diagnoses     Diabetes mellitus type 2, insulin dependent (H)    -  1    Type 2 diabetes mellitus with microalbuminuria, with long-term current use of insulin (H)          Care Instructions    Here is the plan from today's visit    1. Diabetes mellitus type 2, insulin dependent (H)  - Hemoglobin A1c (LabDAQ) - your A1c is 6.4 today! You are very well controlled.   - glucagon (GLUCAGON EMERGENCY) 1 MG kit; Inject 1 mg into the muscle once for 1 dose  Dispense: 2 mg; Refill: 3    2. Type 2 diabetes mellitus with microalbuminuria, with long-term current use of insulin (H)  - insulin glargine (LANTUS) 100 UNIT/ML injection; Inject 22 Units Subcutaneous At Bedtime  Dispense: 8 mL; Refill: 11  - will decrease your Lantus to 22U at bedtime. If your blood sugars continue to be low in the mornings, we will decrease it even further to 20U at bedtime if needed.     You are doing great at your weight loss and increasing exercise! Keep up the great hard work you are doing!    Follow up plan  Please make a clinic appointment for follow up with me (SHAMIKA DIA) in 3  months for follow up.    Thank you for coming to [facility] today.  Lab Testing:  **If you had lab testing today and your results are reassuring or normal they will be mailed to you or sent through [employer] within 7 days.   **If the lab tests need quick action we will call you with the results.  The phone number we will call with results is # [phone] (home) [phone] (work). If this is not the best number please call our clinic and change the number.  Medication Refills:  If you need any refills please call your pharmacy and they will contact us.   If  you need to  your refill at a new pharmacy, please contact the new pharmacy directly. The new pharmacy will help you get your medications transferred faster.   Scheduling:  If you have any concerns about today's visit or wish to schedule another appointment please call our office during normal business hours [phone] (8-5:00 M-F)  If a referral was made to a [facility] and you don't get a call from central scheduling please call [phone].  If a Mammogram was ordered for you at [facility] call [phone] to schedule or change your appointment.  If you had an XRay/CT/Ultrasound/MRI ordered the number is [phone] to schedule or change your radiology appointment.   Medical Concerns:  If you have urgent medical concerns please call [phone] at any time of the day.            Follow-ups after your visit        Your next 10 appointments already scheduled     Jan 02, 2018  1:00 PM CST   (Arrive by 12:45 PM)   Return Visit with Damon Gr, PhD [facility] ([facility] [place])    [address]  [address] [facility] [id number] [phone]            Jan 08, 2018 11:30 AM CST   (Arrive by 11:15 AM)   Return Visit with Marcos Magdaleno MD   [facility] ([facility])    [address] [place] [address]  [address]th [facility] [address]   [phone]            Jan 08, 2018  1:15 PM CST   Adult Med Follow UP with Joana De Leon MD   Psychiatry Clinic ([facility])    [place] [id number]  [address] [facility] [address] [phone]            May 10, 2018 11:30 AM CDT   Return Sleep Patient with Brianda Reddy MD   [facility] [place] ([facility])    [address] [place] [id number] [phone]            Nov 15, 2018  1:30 PM CST   RETURN GENERAL with  Michael Lopez MD   Eye Clinic ([facility])    [address]  [address] [facility]  9th Fl Clin [address]  [facility] [id number] [phone]              Who to contact     Please call your clinic at [phone] to:    Ask questions about your health    Make or cancel appointments    Discuss your medicines    Learn about your test results    Speak to your doctor   If you have compliments or concerns about an experience at your clinic, or if you wish to file a complaint, please contact [facility] Patient Relations at [phone] or email us at [email or web address]         Additional Information About Your Visit        [employer] Information     [employer] is an electronic gateway that provides easy, online access to your medical records. With [employer], you can request a clinic appointment, read your test results, renew a prescription or communicate with your care team.     To sign up for [employer] visit the website at [email or web address]   You will be asked to enter the access code listed below, as well as some personal information. Please follow the directions to create your username and password.     Your access code is: [email or web address]  Expires: 2018  6:30 AM     Your access code will  in 90 days. If you need help or a new code, please contact your [facility] or call [phone] for assistance.        Care EveryWhere ID     This is your Care EveryWhere ID. This could be used by other organizations to access your [place] medical records  [id number]        Your Vitals Were     Pulse Temperature Respirations Pulse Oximetry Breastfeeding? BMI (Body Mass Index)    64 97.7  F (36.5  C) (Oral) 18 99% No 40.85 kg/m2       Blood Pressure from Last 3 Encounters:   17 146/82   17 133/74   10/16/17 132/75    Weight from Last 3 Encounters:   17 238 lb (108 kg)   17 241 lb 6.4 oz (109.5 kg)   17 241  lb 6.4 oz (109.5 kg)              We Performed the Following     Hemoglobin A1c (LabDAQ)          Today's Medication Changes          These changes are accurate as of: 12/14/17 10:42 AM.  If you have any questions, ask your nurse or doctor.               These medicines have changed or have updated prescriptions.        Dose/Directions    glucagon 1 MG kit   Commonly known as:  GLUCAGON EMERGENCY   This may have changed:  See the new instructions.   Used for:  Diabetes mellitus type 2, insulin dependent (H)   Changed by:  Shamika Kelley MD        Dose:  1 mg   Inject 1 mg into the muscle once for 1 dose   Quantity:  2 mg   Refills:  3       insulin glargine 100 UNIT/ML injection   Commonly known as:  LANTUS   This may have changed:  how much to take   Used for:  Type 2 diabetes mellitus with microalbuminuria, with long-term current use of insulin (H)   Changed by:  Shamika Kelley MD        Dose:  22 Units   Inject 22 Units Subcutaneous At Bedtime   Quantity:  8 mL   Refills:  11       metFORMIN 500 MG 24 hr tablet   Commonly known as:  GLUCOPHAGE-XR   This may have changed:    - how much to take  - when to take this   Used for:  Diabetes mellitus, type 2 (H)        Dose:  1000 mg   Take 2 tablets (1,000 mg) by mouth 2 times daily (with meals)   Quantity:  90 tablet   Refills:  3            Where to get your medicines      These medications were sent to [place], [place] - [facility] [place], [place] [address] [address] [place] [address]     Phone:  [phone]     glucagon 1 MG kit    insulin glargine 100 UNIT/ML injection                Primary Care Provider Office Phone # Fax #    Shamika Kelley [phone]       [facility] [address] [facility] [address]        Equal Access to Services     JAYDEN STUART AH: Tatiana Dietrich, gifty del valle, autumn gama. So [facility]  [phone].    ATENCIÓN: Si caden hurtado, tiene a deluca disposición servicios gratuitos de asistencia lingüística. Link maradiaga [phone].    We comply with applicable federal civil rights laws and [place] laws. We do not discriminate on the basis of race, color, national origin, age, disability, sex, sexual orientation, or gender identity.            Thank you!     Thank you for choosing [facility]  for your care. Our goal is always to provide you with excellent care. Hearing back from our patients is one way we can continue to improve our services. Please take a few minutes to complete the written survey that you may receive in the mail after your visit with us. Thank you!             Your Updated Medication List - Protect others around you: Learn how to safely use, store and throw away your medicines at www.disposemymeds.org.          This list is accurate as of: 12/14/17 10:42 AM.  Always use your most recent med list.                   Brand Name Dispense Instructions for use Diagnosis    acetaminophen 500 MG tablet    TYLENOL    1 Bottle    Take 2 tablets (1,000 mg) by mouth every 6 hours as needed    Sprain of left ankle, unspecified ligament, initial encounter       alum & mag hydroxide-simethicone 200-200-20 MG/5ML Susp suspension    MYLANTA/MAALOX     Take 30 mLs by mouth daily as needed for indigestion        amLODIPine 5 MG tablet    NORVASC    30 tablet    Take 2 tablets (10 mg) by mouth daily    Essential hypertension with goal blood pressure less than 130/85       artificial saliva Aers spray      Take 1 spray by mouth 3 times daily as needed for dry mouth        artificial tears Oint ophthalmic ointment     1 Tube    Apply  to eye. Place 0.5 inches into both eyes at bedtime    Insufficiency of tear film of both eyes       ARTIFICIAL TEARS OP      Apply 1 drop to eye 4 times daily.        aspirin 81 MG tablet     100    Take 1 tablet by mouth daily. ENTERIC COATED.         atorvastatin 40 MG tablet    LIPITOR    90 tablet    Take 1 tablet (40 mg) by mouth daily    Hyperlipidemia LDL goal <100       blood glucose monitoring test strip    no brand specified    100 each    Use to test blood sugar 3 times daily or as directed.    Type 2 diabetes mellitus with microalbuminuria, with long-term current use of insulin (H)       calcium polycarbophil 625 MG tablet    FIBERCON     Take 2 tablets by mouth daily        calcium-vitamin D 250-125 MG-UNIT Tabs      Take 1 tablet by mouth 2 times daily. Calcium 250 mg/Vit D 125 IU        CLARITIN 10 MG tablet   Generic drug:  loratadine     30    1 TAB PO QD (Once per day) as needed for ALLERGY SYMPTOMS        clotrimazole 1 % cream    LOTRIMIN    50 g    Apply topically 2 times daily as needed    Dermatophytosis       eucerin cream      Apply  topically as needed. Apply to thigh PRN dry skin        exenatide 10 MCG/0.04ML injection    BYETTA    1 Syringe    Inject 10 mcg Subcutaneous 2 times daily (before meals)    Type 2 diabetes mellitus with microalbuminuria, with long-term current use of insulin (H)       FLUoxetine 40 MG capsule    PROzac    30 capsule    Take 1 capsule (40 mg) by mouth daily    Schizoaffective disorder, depressive type (H)       fluticasone 50 MCG/ACT spray    FLONASE    16 g    Spray 1 spray into both nostrils daily    Seasonal allergic rhinitis       furosemide 20 MG tablet    LASIX    60 tablet    Take 1 tablet (20 mg) by mouth 2 times daily    Lower leg edema       GEL-SASHA DENTINBLOC DT      Apply  to affected area. GEL. Apply to 2nd molar on bottom right daily at bedtime.        glucagon 1 MG kit    GLUCAGON EMERGENCY    2 mg    Inject 1 mg into the muscle once for 1 dose    Diabetes mellitus type 2, insulin dependent (H)       HYDROcodone-acetaminophen 5-325 MG per tablet    NORCO     Take 1 tablet by mouth every 6 hours as needed 1-2 tabs        insulin glargine 100 UNIT/ML injection    LANTUS    8 mL    Inject 22 Units  Subcutaneous At Bedtime    Type 2 diabetes mellitus with microalbuminuria, with long-term current use of insulin (H)       LACTAID 3000 UNIT tablet   Generic drug:  lactase      Take 4 tabs daily with meals.        levothyroxine 175 MCG tablet    SYNTHROID/LEVOTHROID    30 tablet    Take 1 tablet (175 mcg) by mouth daily Give on empty stomach    Other specified hypothyroidism       LORazepam 1 MG tablet    ATIVAN    35 tablet    Take 1 tablet (1 mg) by mouth At Bedtime .  May take additional 1mg daily PRN for agitation.    Generalized anxiety disorder       losartan 100 MG tablet    COZAAR    90 tablet    Take 1 tablet by mouth daily.    Hypertension goal BP (blood pressure) < 130/80, Diabetes mellitus type 2, insulin dependent (H), CKD (chronic kidney disease) stage 2, GFR 60-89 ml/min       melatonin 3 MG tablet     60 tablet    Take 1 tablet (3 mg) by mouth nightly as needed for sleep    Other insomnia       metFORMIN 500 MG 24 hr tablet    GLUCOPHAGE-XR    90 tablet    Take 2 tablets (1,000 mg) by mouth 2 times daily (with meals)    Diabetes mellitus, type 2 (H)       MILK OF MAGNESIA PO      Take  by mouth. Take 30 mL as needed for constipation.        NEURONTIN PO      Take 900 mg by mouth 3 times daily.        nystatin 698758 UNIT/GM Powd    MYCOSTATIN    60 g    Apply topically 3 times daily as needed    Candidiasis of skin       polyethylene glycol powder    MIRALAX/GLYCOLAX     Take 1 capful by mouth 2 times daily. 17 GM PO BID        saline 0.9 % Soln      Spray 2 sprays in nostril as needed.        SENNA S 8.6-50 MG per tablet   Generic drug:  senna-docusate      Take 2 tablets by mouth At Bedtime.        solifenacin 10 MG tablet    VESICARE    30 tablet    Take 1 tablet (10 mg) by mouth daily    Urge incontinence       ziprasidone 40 MG capsule    GEODON    60 capsule    Take 1 capsule (40 mg) by mouth 2 times daily (with meals)    Schizoaffective disorder, depressive type (H)

## 2017-12-15 NOTE — TELEPHONE ENCOUNTER
Prior Authorization Approval    Authorization Effective Date: 11/14/2017  Authorization Expiration Date: 12/15/2018  Medication: vesicare 10MG  Approved Dose/Quantity:   Reference #:     Insurance Company: [employer] - Phone [phone] Fax [phone]  Expected CoPay: $0.00     CoPay Card Available:      Foundation Assistance Needed:    Which Pharmacy is filling the prescription (Not needed for infusion/clinic administered): [place], [place] - [address] [place] [address]  Pharmacy Notified: Yes  Patient Notified: Yes

## 2017-12-17 ASSESSMENT — ENCOUNTER SYMPTOMS
WHEEZING: 0
COLOR CHANGE: 0
ABDOMINAL DISTENTION: 0
ABDOMINAL PAIN: 0
WEAKNESS: 0
APPETITE CHANGE: 0
FEVER: 0
HEMATURIA: 0
COUGH: 0
SORE THROAT: 0
CHILLS: 0
HEADACHES: 0
SHORTNESS OF BREATH: 0
MYALGIAS: 0
BLOOD IN STOOL: 0
NAUSEA: 0
SINUS PRESSURE: 0
DIARRHEA: 0
CONSTIPATION: 0
FATIGUE: 0
VOMITING: 0
ARTHRALGIAS: 0
DYSURIA: 0

## 2017-12-21 ENCOUNTER — TELEPHONE (OUTPATIENT)
Dept: PSYCHIATRY | Facility: CLINIC | Age: 67
End: 2017-12-21

## 2017-12-21 NOTE — TELEPHONE ENCOUNTER
----- Message from Quiana Jaimes sent at 12/20/2017 11:11 AM CST -----  Regarding: AIMES Assessment FYI  Contact: [phone]  Mona Truong - RN  [phone] direct line and can lvm    Pt RN is calling bc they want to add an AIMES Assessment to the appt on 1/8/18.      We can call back the RN with the details.    Thanks

## 2017-12-21 NOTE — TELEPHONE ENCOUNTER
Discussed the RN's request for an AIMS assessment at patient's upcoming appointment on 1/08/18 with Dr. De Leon.  She agreed to do this at that appointment.      Called ALLEN Dunn ([phone]) and left VM confirming that the assessment will be done at the next appointment.

## 2018-01-02 ENCOUNTER — OFFICE VISIT (OUTPATIENT)
Dept: PSYCHOLOGY | Facility: CLINIC | Age: 68
End: 2018-01-02
Payer: COMMERCIAL

## 2018-01-02 VITALS — BODY MASS INDEX: 41.44 KG/M2 | WEIGHT: 241.4 LBS

## 2018-01-02 DIAGNOSIS — E66.01 PSYCHOLOGICAL FACTORS AFFECTING MORBID OBESITY (H): ICD-10-CM

## 2018-01-02 DIAGNOSIS — F33.0 MAJOR DEPRESSIVE DISORDER, RECURRENT EPISODE, MILD (H): Primary | ICD-10-CM

## 2018-01-02 DIAGNOSIS — F41.1 GENERALIZED ANXIETY DISORDER: ICD-10-CM

## 2018-01-02 DIAGNOSIS — F54 PSYCHOLOGICAL FACTORS AFFECTING MORBID OBESITY (H): ICD-10-CM

## 2018-01-02 NOTE — MR AVS SNAPSHOT
After Visit Summary   1/2/2018    Pinky Page    MRN: [id number]           Patient Information     Date Of Birth          1950        Visit Information        Provider Department      1/2/2018 1:00 PM Damon Gr, PhD [facility]        Today's Diagnoses     Major depressive disorder, recurrent episode, mild (H)    -  1    Psychological factors affecting morbid obesity (H)        Generalized anxiety disorder           Follow-ups after your visit        Your next 10 appointments already scheduled     Jan 08, 2018 11:30 AM CST   (Arrive by 11:15 AM)   Return Visit with Marcos Magdaleno MD   [facility] ([facility])    [address]  4th Floor  [facility] [address] [phone]            Jan 08, 2018  1:15 PM CST   Adult Med Follow UP with Joana De Leon MD   Psychiatry Clinic ([facility])    [place] [id number]  [address] [facility] [id number] [phone]            May 10, 2018 11:30 AM CDT   Return Sleep Patient with Brianda Reddy MD   [facility] [place], Sleep Study ([facility])    [address] [place] [id number] [phone]            Nov 15, 2018  1:30 PM CST   RETURN GENERAL with Michael Lopez MD   Eye Clinic ([facility])    [facility]  [address] [place] [address]  9th Fl Clin 9a  [facility] [address] [phone]              Who to contact     Please call your clinic at [phone] to:    Ask questions about your health    Make or cancel appointments    Discuss your medicines    Learn about your test results    Speak to your doctor   If you have compliments or concerns about an experience at your clinic, or if you wish to file a complaint, please contact [facility] Patient Relations at [phone] or email us at  [email or web address]         Additional Information About Your Visit        Cardbackhart Information     Oportunistat is an electronic gateway that provides easy, online access to your medical records. With Bearch, you can request a clinic appointment, read your test results, renew a prescription or communicate with your care team.     To sign up for Bearch visit the website at [email or web address]   You will be asked to enter the access code listed below, as well as some personal information. Please follow the directions to create your username and password.     Your access code is: [email or web address]  Expires: 2018  6:30 AM     Your access code will  in 90 days. If you need help or a new code, please contact your [facility] or call [phone] for assistance.        Care EveryWhere ID     This is your Care EveryWhere ID. This could be used by other organizations to access your [place] medical records  [id number]        Your Vitals Were     BMI (Body Mass Index)                   41.44 kg/m2            Blood Pressure from Last 3 Encounters:   17 146/82   17 133/74   10/16/17 132/75    Weight from Last 3 Encounters:   18 109.5 kg (241 lb 6.4 oz)   17 108 kg (238 lb)   17 109.5 kg (241 lb 6.4 oz)              Today, you had the following     No orders found for display         Today's Medication Changes          These changes are accurate as of: 18  1:57 PM.  If you have any questions, ask your nurse or doctor.               These medicines have changed or have updated prescriptions.        Dose/Directions    metFORMIN 500 MG 24 hr tablet   Commonly known as:  GLUCOPHAGE-XR   This may have changed:    - how much to take  - when to take this   Used for:  Diabetes mellitus, type 2 (H)        Dose:  1000 mg   Take 2 tablets (1,000 mg) by mouth 2 times daily (with meals)   Quantity:  90 tablet   Refills:  3                Primary Care  Provider Office Phone # Fax #    Shamika Ileana Kelley [phone]       [facility] [address] [facility] [address]        Equal Access to Services     JAYDEN STUART : Hadii james ku hadmarelyo Soomaali, waaxda luqadaha, qaybta kaalmada adeegyada, autumn lee laYunidioni josé. So [facility] [phone].    ATENCIÓN: Si habla español, tiene a deluca disposición servicios gratuitos de asistencia lingüística. Candidoame al [phone].    We comply with applicable federal civil rights laws and [place] laws. We do not discriminate on the basis of race, color, national origin, age, disability, sex, sexual orientation, or gender identity.            Thank you!     Thank you for choosing [facility]  for your care. Our goal is always to provide you with excellent care. Hearing back from our patients is one way we can continue to improve our services. Please take a few minutes to complete the written survey that you may receive in the mail after your visit with us. Thank you!             Your Updated Medication List - Protect others around you: Learn how to safely use, store and throw away your medicines at www.disposemymeds.org.          This list is accurate as of: 1/2/18  1:57 PM.  Always use your most recent med list.                   Brand Name Dispense Instructions for use Diagnosis    acetaminophen 500 MG tablet    TYLENOL    1 Bottle    Take 2 tablets (1,000 mg) by mouth every 6 hours as needed    Sprain of left ankle, unspecified ligament, initial encounter       alum & mag hydroxide-simethicone 200-200-20 MG/5ML Susp suspension    MYLANTA/MAALOX     Take 30 mLs by mouth daily as needed for indigestion        amLODIPine 5 MG tablet    NORVASC    30 tablet    Take 2 tablets (10 mg) by mouth daily    Essential hypertension with goal blood pressure less than 130/85       artificial saliva Aers spray      Take 1 spray by mouth 3 times daily as needed for dry mouth         artificial tears Oint ophthalmic ointment     1 Tube    Apply  to eye. Place 0.5 inches into both eyes at bedtime    Insufficiency of tear film of both eyes       ARTIFICIAL TEARS OP      Apply 1 drop to eye 4 times daily.        aspirin 81 MG tablet     100    Take 1 tablet by mouth daily. ENTERIC COATED.        atorvastatin 40 MG tablet    LIPITOR    90 tablet    Take 1 tablet (40 mg) by mouth daily    Hyperlipidemia LDL goal <100       blood glucose monitoring test strip    no brand specified    100 each    Use to test blood sugar 3 times daily or as directed.    Type 2 diabetes mellitus with microalbuminuria, with long-term current use of insulin (H)       calcium polycarbophil 625 MG tablet    FIBERCON     Take 2 tablets by mouth daily        calcium-vitamin D 250-125 MG-UNIT Tabs      Take 1 tablet by mouth 2 times daily. Calcium 250 mg/Vit D 125 IU        CLARITIN 10 MG tablet   Generic drug:  loratadine     30    1 TAB PO QD (Once per day) as needed for ALLERGY SYMPTOMS        clotrimazole 1 % cream    LOTRIMIN    50 g    Apply topically 2 times daily as needed    Dermatophytosis       eucerin cream      Apply  topically as needed. Apply to thigh PRN dry skin        exenatide 10 MCG/0.04ML injection    BYETTA    1 Syringe    Inject 10 mcg Subcutaneous 2 times daily (before meals)    Type 2 diabetes mellitus with microalbuminuria, with long-term current use of insulin (H)       FLUoxetine 40 MG capsule    PROzac    30 capsule    Take 1 capsule (40 mg) by mouth daily    Schizoaffective disorder, depressive type (H)       fluticasone 50 MCG/ACT spray    FLONASE    16 g    Spray 1 spray into both nostrils daily    Seasonal allergic rhinitis       furosemide 20 MG tablet    LASIX    60 tablet    Take 1 tablet (20 mg) by mouth 2 times daily    Lower leg edema       GEL-SASHA DENTINBLOC DT      Apply  to affected area. GEL. Apply to 2nd molar on bottom right daily at bedtime.        glucagon 1 MG kit    GLUCAGON  EMERGENCY    2 mg    Inject 1 mg into the muscle once for 1 dose    Type 2 diabetes mellitus with microalbuminuria, with long-term current use of insulin (H)       insulin glargine 100 UNIT/ML injection    LANTUS    8 mL    Inject 22 Units Subcutaneous At Bedtime    Type 2 diabetes mellitus with microalbuminuria, with long-term current use of insulin (H)       LACTAID 3000 UNIT tablet   Generic drug:  lactase      Take 4 tabs daily with meals.        levothyroxine 175 MCG tablet    SYNTHROID/LEVOTHROID    30 tablet    Take 1 tablet (175 mcg) by mouth daily Give on empty stomach    Other specified hypothyroidism       LORazepam 1 MG tablet    ATIVAN    35 tablet    Take 1 tablet (1 mg) by mouth At Bedtime .  May take additional 1mg daily PRN for agitation.    Generalized anxiety disorder       losartan 100 MG tablet    COZAAR    90 tablet    Take 1 tablet by mouth daily.    Hypertension goal BP (blood pressure) < 130/80, Diabetes mellitus type 2, insulin dependent (H), CKD (chronic kidney disease) stage 2, GFR 60-89 ml/min       melatonin 3 MG tablet     60 tablet    Take 1 tablet (3 mg) by mouth nightly as needed for sleep    Other insomnia       metFORMIN 500 MG 24 hr tablet    GLUCOPHAGE-XR    90 tablet    Take 2 tablets (1,000 mg) by mouth 2 times daily (with meals)    Diabetes mellitus, type 2 (H)       MILK OF MAGNESIA PO      Take  by mouth. Take 30 mL as needed for constipation.        NEURONTIN PO      Take 900 mg by mouth 3 times daily.        nystatin 227024 UNIT/GM Powd    MYCOSTATIN    60 g    Apply topically 3 times daily as needed    Candidiasis of skin       polyethylene glycol powder    MIRALAX/GLYCOLAX     Take 1 capful by mouth 2 times daily. 17 GM PO BID        saline 0.9 % Soln      Spray 2 sprays in nostril as needed.        SENNA S 8.6-50 MG per tablet   Generic drug:  senna-docusate      Take 2 tablets by mouth At Bedtime.        solifenacin 10 MG tablet    VESICARE    30 tablet    Take 1  tablet (10 mg) by mouth daily    Urge incontinence       ziprasidone 40 MG capsule    GEODON    60 capsule    Take 1 capsule (40 mg) by mouth 2 times daily (with meals)    Schizoaffective disorder, depressive type (H)

## 2018-01-02 NOTE — PROGRESS NOTES
Health Psychology                  Clinic    Department of Medicine  Madelaine Ingram, Ph.D., L.P. [phone]                          [facility] Miryam Woods, Ph.D.,  L.P. [phone]                 3rd Floor, Clinic 3A  [place] Mail Code 744   Damon Gr, Ph.D., [email or web address]., L.P. [phone]     [address] [facility]  [address] [facility] Kimber Morales, Ph.D., L.P. [phone]  [place] [id number]  [place] [address]    Health Psychology Follow-Up Note    Ms. Page is a pleasant 67-year old woman with chronic depressive illness, who returns to clinic for supportive and behavioral psychotherapy for moderate recurrent depression and for help losing weight given her morbid obesity.     She is 241.4# same as last session.    Wt Readings from Last 4 Encounters:   01/02/18 109.5 kg (241 lb 6.4 oz)   12/14/17 108 kg (238 lb)   11/21/17 109.5 kg (241 lb 6.4 oz)   11/13/17 109.5 kg (241 lb 6.4 oz)   At [employer] [facility] [employer] [facility], she continues to serve on the [employer].  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  She is working 1 day/week with her boss, [employer], Friday mornings for 1.5 hour.    She rates the depression as 3 on 10-point subjective scale, which is below baseline.   We discussed her plans for the holidays.  She is doing well.  She had excellent holidays with relatives.  She participates in activities at [place].  She anticipates having a tooth pulled 1/11.  She is doing more social walking some of the time and had apparently is gradually ramping up on her fitness center days.  She agrees to increase.  She had been  tolerating using the [employer] bicycle  up to 20 minutes without pain and was willing to increase gradually.  She is having dental pain, so is eating less.  Goal for exercise has been 1.5 hours/day as of June 19, 2018 and 2 lbs. / month.   We discussed increasing bicycling to 6 days per  week and increasing gradually from 20 minutes.    She arrived early today and was greeted in the waiting room.  Pinky participated fully and appeared to derive benefit.  Rapport was excellent.      Extended session due to complexity of case and length of interval.      IMPRESSION Distress Disability Risk    Low High Low     Time In: 12:58  Time Out:  1:58  Diagnosis:   Major Depression, recurrent mild (F33.0)   Psychological Factors Affecting Morbid Obesity (F54)      Plan: She will return on 2/13 @ 1:00 for behavioral and supportive psychotherapy.   Plan to to bicycle for > 20 minutes some days and eat less.  Tx plan due 4/11/2018  Damon Gr, Ph.D., A.B.P.P., L.P.

## 2018-01-08 ENCOUNTER — OFFICE VISIT (OUTPATIENT)
Dept: ENDOCRINOLOGY | Facility: CLINIC | Age: 68
End: 2018-01-08
Payer: COMMERCIAL

## 2018-01-08 ENCOUNTER — OFFICE VISIT (OUTPATIENT)
Dept: PSYCHIATRY | Facility: CLINIC | Age: 68
End: 2018-01-08
Attending: PSYCHIATRY & NEUROLOGY
Payer: COMMERCIAL

## 2018-01-08 VITALS
WEIGHT: 241 LBS | HEART RATE: 66 BPM | BODY MASS INDEX: 41.15 KG/M2 | OXYGEN SATURATION: 100 % | HEIGHT: 64 IN | SYSTOLIC BLOOD PRESSURE: 159 MMHG | DIASTOLIC BLOOD PRESSURE: 68 MMHG

## 2018-01-08 VITALS
HEART RATE: 66 BPM | DIASTOLIC BLOOD PRESSURE: 84 MMHG | SYSTOLIC BLOOD PRESSURE: 149 MMHG | WEIGHT: 240.8 LBS | BODY MASS INDEX: 41.33 KG/M2

## 2018-01-08 DIAGNOSIS — E66.01 MORBID OBESITY (H): Primary | ICD-10-CM

## 2018-01-08 DIAGNOSIS — F25.1 SCHIZOAFFECTIVE DISORDER, DEPRESSIVE TYPE (H): Primary | ICD-10-CM

## 2018-01-08 PROCEDURE — G0463 HOSPITAL OUTPT CLINIC VISIT: HCPCS | Mod: ZF

## 2018-01-08 ASSESSMENT — PAIN SCALES - GENERAL
PAINLEVEL: NO PAIN (0)
PAINLEVEL: NO PAIN (0)

## 2018-01-08 NOTE — NURSING NOTE
"(   Chief Complaint   Patient presents with     RECHECK     f/u     )    ( Weight: 241 lb )  ( Height: 5' 4\" )  ( BMI (Calculated): 41.45 )  (   )  (   )  (   )  (   )  (   )  (   )    ( BP: 159/68 )  (   )  (   )  (   )  ( Pulse: 66 )  (   )  ( SpO2: 100 % )    (   Patient Active Problem List   Diagnosis     Allergic rhinitis due to pollen     Urge incontinence     Hypertension, essential     Cardiomegaly     Chronic constipation     Dry eye syndrome     Esophageal reflux     Exposure keratoconjunctivitis     DM ophthalmopathy (H)     Hypothyroidism     Senile cataract     ANUJ (obstructive sleep apnea)     Postmenopausal atrophic vaginitis     Restless leg syndrome     Squamous blepharitis     Morbid obesity due to excess calories (H)     Personal history of breast cancer s/p L masectomy     Diabetes mellitus type 2, insulin dependent (H)     Hypercholesteremia     Lives in group home     Extrapyramidal and movement disorder     CKD (chronic kidney disease) stage 2, GFR 60-89 ml/min     Solitary kidney, congenital     S/P hysterectomy     Impingement syndrome of right shoulder     Hypertriglyceridemia     Candidiasis of skin     Generalized anxiety disorder     Carpal tunnel syndrome of left wrist     Schizoaffective disorder, depressive type (H)     Health Care Home     Major depressive disorder, recurrent episode, moderate (H)     Psychological factors affecting medical condition     Hx of psychiatric care     Nail complaint     Microalbuminuria due to type 2 diabetes mellitus (H)     Type 2 diabetes mellitus with microalbuminuria, with long-term current use of insulin (H)     Diabetes mellitus, type II, insulin dependent (H)     CKD (chronic kidney disease) stage 1, GFR 90 ml/min or greater    )  (   Current Outpatient Prescriptions   Medication Sig Dispense Refill     insulin glargine (LANTUS) 100 UNIT/ML injection Inject 22 Units Subcutaneous At Bedtime 8 mL 11     artificial tears (AKWA TEARS) OINT ophthalmic " ointment Apply  to eye. Place 0.5 inches into both eyes at bedtime 1 Tube 11     LORazepam (ATIVAN) 1 MG tablet Take 1 tablet (1 mg) by mouth At Bedtime .  May take additional 1mg daily PRN for agitation. 35 tablet 1     melatonin 3 MG tablet Take 1 tablet (3 mg) by mouth nightly as needed for sleep 60 tablet 1     alum & mag hydroxide-simethicone (MYLANTA/MAALOX) 200-200-20 MG/5ML SUSP suspension Take 30 mLs by mouth daily as needed for indigestion       artificial saliva (BIOTENE MT) AERS spray Take 1 spray by mouth 3 times daily as needed for dry mouth       FLUoxetine (PROZAC) 40 MG capsule Take 1 capsule (40 mg) by mouth daily 30 capsule 5     ziprasidone (GEODON) 40 MG capsule Take 1 capsule (40 mg) by mouth 2 times daily (with meals) 60 capsule 5     blood glucose monitoring (NO BRAND SPECIFIED) test strip Use to test blood sugar 3 times daily or as directed. 100 each 3     exenatide (BYETTA) 10 MCG/0.04ML injection Inject 10 mcg Subcutaneous 2 times daily (before meals) 1 Syringe 11     amLODIPine (NORVASC) 5 MG tablet Take 2 tablets (10 mg) by mouth daily 30 tablet 3     nystatin (MYCOSTATIN) 031963 UNIT/GM POWD Apply topically 3 times daily as needed 60 g 1     clotrimazole (LOTRIMIN) 1 % cream Apply topically 2 times daily as needed 50 g 0     fluticasone (FLONASE) 50 MCG/ACT nasal spray Spray 1 spray into both nostrils daily 16 g 3     furosemide (LASIX) 20 MG tablet Take 1 tablet (20 mg) by mouth 2 times daily 60 tablet 3     acetaminophen (TYLENOL) 500 MG tablet Take 2 tablets (1,000 mg) by mouth every 6 hours as needed 1 Bottle 2     solifenacin (VESICARE) 10 MG tablet Take 1 tablet (10 mg) by mouth daily 30 tablet 6     atorvastatin (LIPITOR) 40 MG tablet Take 1 tablet (40 mg) by mouth daily 90 tablet 1     calcium polycarbophil (FIBERCON) 625 MG tablet Take 2 tablets by mouth daily       levothyroxine (SYNTHROID, LEVOTHROID) 175 MCG tablet Take 1 tablet (175 mcg) by mouth daily Give on empty stomach  30 tablet 4     metFORMIN (GLUCOPHAGE-XR) 500 MG 24 hr tablet Take 2 tablets (1,000 mg) by mouth 2 times daily (with meals) (Patient taking differently: Take 2,000 mg by mouth daily ) 90 tablet 3     losartan (COZAAR) 100 MG tablet Take 1 tablet by mouth daily. 90 tablet 1     Hypromellose (ARTIFICIAL TEARS OP) Apply 1 drop to eye 4 times daily.       Gabapentin (NEURONTIN PO) Take 900 mg by mouth 3 times daily.       senna-docusate (SENNA S) 8.6-50 MG per tablet Take 2 tablets by mouth At Bedtime.       polyethylene glycol (MIRALAX/GLYCOLAX) powder Take 1 capful by mouth 2 times daily. 17 GM PO BID       Skin Protectants, Misc. (EUCERIN) cream Apply  topically as needed. Apply to thigh PRN dry skin        Saline 0.9 % SOLN Spray 2 sprays in nostril as needed.       Calcium Carbonate-Vitamin D (CALCIUM-VITAMIN D) 250-125 MG-UNIT TABS Take 1 tablet by mouth 2 times daily. Calcium 250 mg/Vit D 125 IU       Sod Fluor-Solo Fluor-Hydrfl Ac (GEL-SASHA DENTINBLOC DT) Apply  to affected area. GEL. Apply to 2nd molar on bottom right daily at bedtime.       lactase (LACTAID) 3000 UNIT tablet Take 4 tabs daily with meals.       Magnesium Hydroxide (MILK OF MAGNESIA PO) Take  by mouth. Take 30 mL as needed for constipation.       CLARITIN 10 MG OR TABS 1 TAB PO QD (Once per day) as needed for ALLERGY SYMPTOMS 30 11     ASPIRIN 81 MG OR TABS Take 1 tablet by mouth daily. ENTERIC COATED. 100 3     glucagon (GLUCAGON EMERGENCY) 1 MG kit Inject 1 mg into the muscle once for 1 dose 2 mg 3    )  ( Diabetes Eval:    )    ( Pain Eval:  No Pain (0) )    ( Wound Eval:       )    (   History   Smoking Status     Never Smoker   Smokeless Tobacco     Never Used    )    ( Signed By:  Roe Pedersen; January 8, 2018; 11:05 AM )

## 2018-01-08 NOTE — PROGRESS NOTES
"    Return Medical Weight Management Note     Pinky Page  MRN:  [id number]  :  1950  CORINNE:  2017    Dear Dr. Ann,     I had the pleasure of seeing your patient Pinky Page.  She is a 65 year old female who I am continuing to see for treatment of obesity related to: DM-2 and Hypertension.    CURRENT WEIGHT:   241 lbs 0 oz    Wt Readings from Last 4 Encounters:   18 109.3 kg (241 lb)   17 108 kg (238 lb)   10/16/17 108.7 kg (239 lb 9.6 oz)   17 111 kg (244 lb 12.8 oz)     Height:  5' 4\"  Body Mass Index:  Body mass index is 41.37 kg/(m^2).  Vitals:  B/P: 163/75, P: 68    Initial consult weight was 283 on .  Weight change since last seen on 2017 is down 1 pounds.   Total loss is 42 pounds.    INTERVAL HISTORY:  Patient has been working on the following dietary changes: Still eating from the diet line at her group home (1500 miguel, no concentrated sweets, some fruit snacks).   She also continues to be on Byetta 10 cg BID for her DM and also to help her lose weight and as a appetite suppressant as well.  Her DM is being managed by us as well as the [place] [facility] ( PCP).  Patient has been working on the following activity changes: Very regular walking at least 40 minutes / day.    Diet and Activity Changes Since Last Visit Reviewed With Patient 2018   I have made the following changes to my diet since my last visit: eat more fruit   With regards to my diet, I am still struggling with: oeating  to much   For breakfast, I typically eat: -   For lunch, I typically eat: -   For supper, I typically eat: -   For snack(s), I typically eat: -   I have made the following changes to my activity/exercise since my last visit: -   With regards to my activity/exercise, I am still struggling with: -       MEDICATIONS:   Current Outpatient Prescriptions   Medication     insulin glargine (LANTUS) 100 UNIT/ML injection     artificial tears (AKWA TEARS) OINT ophthalmic ointment     " LORazepam (ATIVAN) 1 MG tablet     melatonin 3 MG tablet     alum & mag hydroxide-simethicone (MYLANTA/MAALOX) 200-200-20 MG/5ML SUSP suspension     artificial saliva (BIOTENE MT) AERS spray     FLUoxetine (PROZAC) 40 MG capsule     ziprasidone (GEODON) 40 MG capsule     blood glucose monitoring (NO BRAND SPECIFIED) test strip     exenatide (BYETTA) 10 MCG/0.04ML injection     amLODIPine (NORVASC) 5 MG tablet     nystatin (MYCOSTATIN) 529237 UNIT/GM POWD     clotrimazole (LOTRIMIN) 1 % cream     fluticasone (FLONASE) 50 MCG/ACT nasal spray     furosemide (LASIX) 20 MG tablet     acetaminophen (TYLENOL) 500 MG tablet     solifenacin (VESICARE) 10 MG tablet     atorvastatin (LIPITOR) 40 MG tablet     calcium polycarbophil (FIBERCON) 625 MG tablet     levothyroxine (SYNTHROID, LEVOTHROID) 175 MCG tablet     metFORMIN (GLUCOPHAGE-XR) 500 MG 24 hr tablet     losartan (COZAAR) 100 MG tablet     Hypromellose (ARTIFICIAL TEARS OP)     Gabapentin (NEURONTIN PO)     senna-docusate (SENNA S) 8.6-50 MG per tablet     polyethylene glycol (MIRALAX/GLYCOLAX) powder     Skin Protectants, Misc. (EUCERIN) cream     Saline 0.9 % SOLN     Calcium Carbonate-Vitamin D (CALCIUM-VITAMIN D) 250-125 MG-UNIT TABS     Sod Fluor-Solo Fluor-Hydrfl Ac (GEL-SASHA DENTINBLOC DT)     lactase (LACTAID) 3000 UNIT tablet     Magnesium Hydroxide (MILK OF MAGNESIA PO)     CLARITIN 10 MG OR TABS     ASPIRIN 81 MG OR TABS     glucagon (GLUCAGON EMERGENCY) 1 MG kit     No current facility-administered medications for this visit.        Weight Loss Medication History Reviewed With Patient 8/28/2017   Which weight loss medications are you currently taking on a regular basis?  Byetta (exentatide)   Are you having any side effects from the weight loss medication that we have prescribed you? No   If you are having side effects please describe: -     ASSESSMENT:   Continues to do well with weight loss maintenance. HbA1c 6.4 on 12/14/17.    FOLLOW-UP:    12  weeks.  10/15 minutes spent on counseling and education    Sincerely,    Marcos Magdaleno MD

## 2018-01-08 NOTE — PROGRESS NOTES
"PSYCHIATRY CLINIC PROGRESS NOTE   The initial diagnostic evaluation was on 5/13/13 and the last transfer of care evaluation prior to today was 7/14/16.    Pertinent Background:  This patient first experienced mental health issues in her 20's and obtained care at this time and has received treatment for schizoaffective disorder and anxiety.  See transfer evaluation for detailed history.  Notably, has a history of having increasing psychotic experiences with antipsychotic taper.   Psych critical item history includes psychosis [sxs include disorganization and inability to care for self, ?hallucinations and paranoia], psych hosp (3-5) and ECT    INTERIM HISTORY                                                 Pinky Page is a 67 year old female who was last seen in clinic on 11/13/17 at which time no changes were made.  The patient reports good treatment adherence.  History was provided by patient and written [facility]. Patient was a good historian.  Since the last visit:  -enjoyed the holidays, spent it with family, this has been a difficult time for her historically given the amount of losses that have occurred around this time but she was still able to enjoy her holiday overall  -medications have been going well, no new side effects or changes since last visit  -mood has been \"pretty good\"  -no safety concerns  -continues to have the sensation that somebody is behind her but continues to cope with this by sitting with her back near walls, etc. This has not changed in many years  -going to the gym Tuesdays and Thursday for 30mins, 20mins of biking and 10mins for PT exercises  -continues to run a walking group every day   -has been sleeping well, still having some difficulty tolerating CPAP, will try it most night but has problems tolerating it throughout the whole night  -has not used any PRN Ativan, still declines discontinuing this medication   -scheduled to have more dental work " done which she is not looking forward to, otherwise she is feeling well physically   -illness has been going around her residence but she has managed to stay healthy   -continues to engage in therapy 1x per month which is helpful     RECENT SYMPTOMS:   DEPRESSION:  reports-depressed mood, anhedonia, low energy, insomnia , weight/ appetite change (increased overall, but has been relatively stable ), excessive guilt and poor concentration /memory;  DENIES- suicidal ideation  and psychomotor change observed by others (slowing or fidgeting)  MELVIN/HYPOMANIA:  reports-none;  DENIES- increased energy, decreased sleep need, increased activity and grandiosity  PSYCHOSIS:  reports-see HPI and none;  DENIES- delusions, auditory hallucinations and visual hallucinations  DYSREGULATION:  reports-none;  DENIES- suicidal ideation, violent ideation and SIB  PANIC ATTACK:  none   ANXIETY:  some nervousness and excitement around events  TRAUMA RELATED:  none  COMPULSIVE:  none  SLEEP:  as above    EATING DISORDER:  none    RECENT SUBSTANCE USE:     ALCOHOL- none          TOBACCO- none          CAFFEINE- 1 cup regular coffee per day and 3 decaf; 1 can diet coke per day        OPIOIDS- none            NARCAN KIT- N/A    CANNABIS- none         OTHER ILLICIT DRUGS- none     CURRENT SOCIAL HISTORY:  FINANCIAL SUPPORT- social security disability.     CHILDREN- none.     LIVING SITUATION- Living at [facility].      SOCIAL/ SPIRITUAL SUPPORT- [facility] staff, other providers, other residents at [facility] and does attend [employer] occasionally.     FEELS SAFE AT HOME- Yes.    MEDICAL ROS:  Reports GI sxs [GERD, constipation--chronic] and easy bruising .  Denies weight changes [increase, decrease,  ], headache, sedation, sexual dysfunction [ ], tremor, confusion, dizziness, falls, excessive diaphoresis, muscle twitches, restlessness, bruxism, shiver and short term memory and/or word finding difficulty, akathisia, dystonia, apparent TD, muscle  stiffness, Parkinsonian-type symptoms [shuffling gait, masked facies, stooped posture, speech change, writing change] and sialorrhea and slowed reaction time and withdrawal symptoms.    PSYCH and CD Critical Summary Points since July 2017           None    PAST PSYCH MED TRIALS   see EMR Problem List: Hx of psychiatric care    MEDICAL / SURGICAL HISTORY                                   CARE TEAM:          PCP- Dr. Kelley, at [facility]                   Therapist- Dr. Gr (monthly)    Patient Active Problem List   Diagnosis     Allergic rhinitis due to pollen     Urge incontinence     Hypertension, essential     Cardiomegaly     Chronic constipation     Dry eye syndrome     Esophageal reflux     Exposure keratoconjunctivitis     DM ophthalmopathy (H)     Hypothyroidism     Senile cataract     ANUJ (obstructive sleep apnea)     Postmenopausal atrophic vaginitis     Restless leg syndrome     Squamous blepharitis     Morbid obesity due to excess calories (H)     Personal history of breast cancer s/p L masectomy     Diabetes mellitus type 2, insulin dependent (H)     Hypercholesteremia     Lives in group home     Extrapyramidal and movement disorder     CKD (chronic kidney disease) stage 2, GFR 60-89 ml/min     Solitary kidney, congenital     S/P hysterectomy     Impingement syndrome of right shoulder     Hypertriglyceridemia     Candidiasis of skin     Generalized anxiety disorder     Carpal tunnel syndrome of left wrist     Schizoaffective disorder, depressive type (H)     Health Care Home     Major depressive disorder, recurrent episode, moderate (H)     Psychological factors affecting medical condition     Hx of psychiatric care     Nail complaint     Microalbuminuria due to type 2 diabetes mellitus (H)     Type 2 diabetes mellitus with microalbuminuria, with long-term current use of insulin (H)     Diabetes mellitus, type II, insulin dependent (H)     CKD (chronic kidney disease) stage 1, GFR 90 ml/min or  greater       ALLERGY                                Chlordiazepoxide hcl; Dimetapp dm cold-cough; Haldol; Ibuprofen; Lactose intolerance [beta-galactosidase]; Milk products; and Propofol  MEDICATIONS                               Current Outpatient Prescriptions   Medication Sig Dispense Refill     insulin glargine (LANTUS) 100 UNIT/ML injection Inject 22 Units Subcutaneous At Bedtime 8 mL 11     artificial tears (AKWA TEARS) OINT ophthalmic ointment Apply  to eye. Place 0.5 inches into both eyes at bedtime 1 Tube 11     LORazepam (ATIVAN) 1 MG tablet Take 1 tablet (1 mg) by mouth At Bedtime .  May take additional 1mg daily PRN for agitation. 35 tablet 1     melatonin 3 MG tablet Take 1 tablet (3 mg) by mouth nightly as needed for sleep 60 tablet 1     alum & mag hydroxide-simethicone (MYLANTA/MAALOX) 200-200-20 MG/5ML SUSP suspension Take 30 mLs by mouth daily as needed for indigestion       artificial saliva (BIOTENE MT) AERS spray Take 1 spray by mouth 3 times daily as needed for dry mouth       FLUoxetine (PROZAC) 40 MG capsule Take 1 capsule (40 mg) by mouth daily 30 capsule 5     blood glucose monitoring (NO BRAND SPECIFIED) test strip Use to test blood sugar 3 times daily or as directed. 100 each 3     exenatide (BYETTA) 10 MCG/0.04ML injection Inject 10 mcg Subcutaneous 2 times daily (before meals) 1 Syringe 11     amLODIPine (NORVASC) 5 MG tablet Take 2 tablets (10 mg) by mouth daily 30 tablet 3     nystatin (MYCOSTATIN) 901430 UNIT/GM POWD Apply topically 3 times daily as needed 60 g 1     clotrimazole (LOTRIMIN) 1 % cream Apply topically 2 times daily as needed 50 g 0     fluticasone (FLONASE) 50 MCG/ACT nasal spray Spray 1 spray into both nostrils daily 16 g 3     furosemide (LASIX) 20 MG tablet Take 1 tablet (20 mg) by mouth 2 times daily 60 tablet 3     acetaminophen (TYLENOL) 500 MG tablet Take 2 tablets (1,000 mg) by mouth every 6 hours as needed 1 Bottle 2     solifenacin (VESICARE) 10 MG tablet  Take 1 tablet (10 mg) by mouth daily 30 tablet 6     atorvastatin (LIPITOR) 40 MG tablet Take 1 tablet (40 mg) by mouth daily 90 tablet 1     calcium polycarbophil (FIBERCON) 625 MG tablet Take 2 tablets by mouth daily       levothyroxine (SYNTHROID, LEVOTHROID) 175 MCG tablet Take 1 tablet (175 mcg) by mouth daily Give on empty stomach 30 tablet 4     metFORMIN (GLUCOPHAGE-XR) 500 MG 24 hr tablet Take 2 tablets (1,000 mg) by mouth 2 times daily (with meals) (Patient taking differently: Take 2,000 mg by mouth daily ) 90 tablet 3     losartan (COZAAR) 100 MG tablet Take 1 tablet by mouth daily. 90 tablet 1     Hypromellose (ARTIFICIAL TEARS OP) Apply 1 drop to eye 4 times daily.       Gabapentin (NEURONTIN PO) Take 900 mg by mouth 3 times daily.       senna-docusate (SENNA S) 8.6-50 MG per tablet Take 2 tablets by mouth At Bedtime.       polyethylene glycol (MIRALAX/GLYCOLAX) powder Take 1 capful by mouth 2 times daily. 17 GM PO BID       Skin Protectants, Misc. (EUCERIN) cream Apply  topically as needed. Apply to thigh PRN dry skin        Saline 0.9 % SOLN Spray 2 sprays in nostril as needed.       Calcium Carbonate-Vitamin D (CALCIUM-VITAMIN D) 250-125 MG-UNIT TABS Take 1 tablet by mouth 2 times daily. Calcium 250 mg/Vit D 125 IU       Sod Fluor-Solo Fluor-Hydrfl Ac (GEL-SASHA DENTINBLOC DT) Apply  to affected area. GEL. Apply to 2nd molar on bottom right daily at bedtime.       lactase (LACTAID) 3000 UNIT tablet Take 4 tabs daily with meals.       Magnesium Hydroxide (MILK OF MAGNESIA PO) Take  by mouth. Take 30 mL as needed for constipation.       CLARITIN 10 MG OR TABS 1 TAB PO QD (Once per day) as needed for ALLERGY SYMPTOMS 30 11     ASPIRIN 81 MG OR TABS Take 1 tablet by mouth daily. ENTERIC COATED. 100 3     glucagon (GLUCAGON EMERGENCY) 1 MG kit Inject 1 mg into the muscle once for 1 dose 2 mg 3     ziprasidone (GEODON) 40 MG capsule Take 1 capsule (40 mg) by mouth 2 times daily (with meals) 60 capsule 5      VITALS   /84  Pulse 66  Wt 109.2 kg (240 lb 12.8 oz)  BMI 41.33 kg/m2   MENTAL STATUS EXAM                                                           Alertness: alert  and oriented  Appearance: well groomed  Behavior/Demeanor: cooperative and pleasant, with good  eye contact   Speech: normal  Language: intact  Psychomotor: some abnormal movements noted very intermittently in tongue going to cheek, also some involuntary movement of toes   Mood: description consistent with euthymia  Affect: full range; was congruent to mood; was congruent to content  Thought Process/Associations: unremarkable  Thought Content:  Reports none;  Denies suicidal ideation , violent ideation  and psychotic thoughts   Perception:  Reports none;  Denies auditory hallucinations and visual hallucinations  Insight: good  Judgment: good  Cognition: does  appear grossly intact; formal cognitive testing was not done    LABS and DATA     RATING SCALES:  AIMS: done 7/6/17 with total score of 0  (see flowsheets); 1/8/18 DISCUS done with score of 5: abnormal movements noted in tongue going to cheek and movement in bilateral big toes--movements have been present for years and not changing/worsening in intensity     PHQ9 TODAY = pt did not complete  PHQ-9 SCORE 10/16/2017 11/13/2017 12/14/2017   Total Score - - -   Total Score 8 7 7       ANTIPSYCHOTIC LABS   [glu, A1C, lipids (focus LDL), liver enzymes, WBC, ANEU, Hgb, plts]    q12 mo  Recent Labs   Lab Test  12/14/17   1000  09/12/17   1404   06/21/17   1047  03/21/17   1023   GLC   --    --    --   134*  118*   A1C  6.4*  6.3*   < >   --   6.6*    < > = values in this interval not displayed.     Recent Labs   Lab Test  03/21/17   1023  02/22/16   1028   CHOL  142  162.0   TRIG  121  151.9*   LDL  78  95   HDL  40*  36.6*     Recent Labs   Lab Test  03/21/17   1023  01/22/15   1600   AST  13  18   ALT  20  35   ALKPHOS  92  113     Recent Labs   Lab Test  06/21/17   1047  03/21/17   1023   06/22/16   1035   07/04/14   0705   WBC   --   11.8*  9.9   --   7.7   ANEU   --   8.3   --    --   4.8   HGB  13.8  13.4  13.5   < >  13.3   PLT   --   266  266   --   223    < > = values in this interval not displayed.       DIAGNOSIS     Schizoaffective disorder, depressed type, multiple episodes, in partial remission to mild re: mood, with very minimal psychotic features.  Generalized anxiety disorder, well-managed  Persistent TD- stable and unchanging     ASSESSMENT                                   TODAY Pinky continues to report stability with her mental health and mood appears to be euthymic with expected fluctuations in mood and anxiety in context of stressors and this time of year tends to be difficult for her due to anniversary of many losses and some recent physical health issues. She easily identifies coping strategies to utilize. No safety concerns. Continues to work on adjusting to using her CPAP, addition of melatonin and mask change has been helpful. Again discussed possibility of reducing medications, including PRN dose of lorazepam since she has not been requiring this medication on very rare occasions, however, given that this is usually a difficult time of year for her she advocates for no medication changes today, which is reasonable. Did perform DISCUS today at request of her residence, which showed some abnormal movements, rated at a 5 which is persistent TD. Movements not noted during previous AIMS exam but per pts report they have been evident, intermittent and unchanging for many years and she is not interested in adjustment medications due to these movements and is aware of the risks associated with her current medications and will alert provider should she desire a change or movements worsen. As previously mentioned, will continue to consider trial of reduced doses given long standing stability at future appointments with goal of simplifying medication regimen along with helping with any  possible metabolic side effects given patient is working hard to lose weight and improve physical health. She will return in 6 weeks or sooner if needed.              [place] PRESCRIPTION MONITORING PROGRAM [] was not checked today: controlled substances monitored by [facility] staff,  has been previously checked with no indications of abuse/misuse of medications.    PSYCHOTROPIC DRUG INTERACTIONS:   ZIPRASIDONE and FLUOXETINE may result in increased risk of QT-interval prolongation and increased risk of serotonin syndrome.   ZIPRASIDONE, FLUOXETINE and VESICARE may result in increased risk of QT-interval prolongation.  ASPIRIN and SSRI may result in an increased risk of bleeding  LEVOTHYROXINE and FLUOXETINE may result in increased levothyroxine requirements.  MANAGEMENT:  Monitoring for adverse effects, periodic EKGs and patient is aware of risks     PLAN                                                                                                       1) PSYCHOTROPIC MEDICATIONS:   - continue fluoxetine 40mg daily   - continue ziprasidone 40mg BID with meals   - continue lorazepam 1mg QHS and 1mg PRN for agitation     2) THERAPY:  Continue with Dr. Gr monthly  TREATMENT PLAN   Date revised  -  11/13/17  Date next due- 2/13/17    3) LABS NEXT DUE:  AP labs due next in March 2018       RATING SCALES:  DISCUS done 1/8/18 with score of 5    4) REFERRALS [MICD, medical etc.]:  none    5) MTM REFERRAL [PharmD]:  none    6) :  none    7) FAMILY MEETING:  none    8) RTC: 6 weeks    9) CRISIS NUMBERS:   Provided routinely in AVS   Especially emphasized:  Theresa Hernandez [phone] (clinic)    [phone] (after hours)      PSYCHIATRY CLINIC INDIVIDUAL PSYCHOTHERAPY NOTE                                    16   Start time: 1330  End time: 1350  Subjective: This supportive psychotherapy session addressed issues related to goals of therapy and recent stressors including some difficulty over  the holidays as well as some physical health concerns and stress of needing to have a tooth pulled in the near future.  Patient's reaction: Action in the context of mental status appropriate for ambulatory setting.  Progress: fair, but ongoing as goals not yet met   Plan: RTC 6 weeks  Psychotherapy services during this visit included myself and Pinky.   TREATMENT  PLAN          SYMPTOMS;PROBLEMS   MEASURABLE GOALS;    FUNCTIONAL IMPROVEMENT INTERVENTIONS;    GAINS MADE DISCHARGE CRITERIA   Depression: depressed mood   use physical activity to boost mood, wants goal of 45 mins of activity per day -go to fitness center appointments, has not been making these consistently (60% attendance currently)  Has been going about 30 minutes 2x/wk; also leading walkers group 20-25 minutes marked symptom improvement   Depression: insomnia    get 7-8 hours of restful sleep every night -continues to work on practicing sleep hygiene, calming activities (coloring avoiding tv), now averaging about 6-7 hours per night but this has been improving, goal to continue  marked symptom improvement     TREATMENT RISK STATEMENT:  The risks, benefits, alternatives and potential adverse effects have been discussed and are understood by the pt. The pt understands the risks of using street drugs or alcohol. There are no medical contraindications, the pt agrees to treatment with the ability to do so. The pt knows to call the clinic for any problems or to access emergency care if needed.  Medical and substance use concerns are documented above.  Psychotropic drug interaction check was done, including changes made today.    RESIDENT:   Joana De Leon,     Patient staffed in clinic with Dr. Estrada who will sign the note.  Supervisor is Dr. Gordon.    Supervisor Attestation:  I saw the patient with the resident, and participated in key portions of the service, including the mental status examination and developing the plan of care. I reviewed key  portions of the history with the resident. I agree with the findings and plan as documented in this note.  Cortez Estrada MD

## 2018-01-08 NOTE — MR AVS SNAPSHOT
After Visit Summary   1/8/2018    Pinky Page    MRN: [id number]           Patient Information     Date Of Birth          1950        Visit Information        Provider Department      1/8/2018 1:15 PM Joana De Leon MD [facility]        Today's Diagnoses     Schizoaffective disorder, depressive type (H)    -  1      Care Instructions    -continue current medications, no changes  -return to clinic in 6 weeks or sooner if needed          Follow-ups after your visit        Follow-up notes from your care team     Return in about 6 weeks (around 2/19/2018) for 30 MFU.      Your next 10 appointments already scheduled     Feb 13, 2018  1:00 PM CST   (Arrive by 12:45 PM)   Return Visit with Damon Gr, PhD [facility] ([facility])    [address] [facility]  3rd [facility] [id number] [phone]            Feb 26, 2018 12:45 PM CST   Adult Med Follow UP with Joana De Leon MD   Psychiatry Clinic ([facility])    [place] [address]  [address] [facility] [id number] [phone]            May 10, 2018 11:30 AM CDT   Return Sleep Patient with Brianda Reddy MD   [facility] [place], Sleep Study ([facility])    [address] [place] [id number] [phone]            Jul 09, 2018 11:30 AM CDT   (Arrive by 11:15 AM)   Return Visit with Marcos Magdaleno MD   [facility] Management ([facility])    [address] [place] [address]  4th [facility] [id number] [phone]            Nov 15, 2018  1:30 PM CST   RETURN GENERAL with Michael Lopez MD   Eye Clinic ([facility])    [address]  [address] [facility]  [address] [phone] [phone]              Who to contact     Please call your clinic at [phone] to:    Ask questions about your  health    Make or cancel appointments    Discuss your medicines    Learn about your test results    Speak to your doctor   If you have compliments or concerns about an experience at your clinic, or if you wish to file a complaint, please contact [facility] Patient Relations at [phone] or email us at [email or web address]         Additional Information About Your Visit        TandemLaunchhart Information     GetAFivet is an electronic gateway that provides easy, online access to your medical records. With Hyperfair, you can request a clinic appointment, read your test results, renew a prescription or communicate with your care team.     To sign up for GetAFivet visit the website at [email or web address]   You will be asked to enter the access code listed below, as well as some personal information. Please follow the directions to create your username and password.     Your access code is: [email or web address]  Expires: 2018  6:30 AM     Your access code will  in 90 days. If you need help or a new code, please contact your [facility] or call [phone] for assistance.        Care EveryWhere ID     This is your Care EveryWhere ID. This could be used by other organizations to access your [facility] medical records  [id number]        Your Vitals Were     Pulse BMI (Body Mass Index)                66 41.33 kg/m2           Blood Pressure from Last 3 Encounters:   18 149/84   18 159/68   17 146/82    Weight from Last 3 Encounters:   18 109.2 kg (240 lb 12.8 oz)   18 109.3 kg (241 lb)   18 109.5 kg (241 lb 6.4 oz)              Today, you had the following     No orders found for display         Today's Medication Changes          These changes are accurate as of: 18 11:59 PM.  If you have any questions, ask your nurse or doctor.               These medicines have changed or have updated prescriptions.         Dose/Directions    metFORMIN 500 MG 24 hr tablet   Commonly known as:  GLUCOPHAGE-XR   This may have changed:    - how much to take  - when to take this   Used for:  Diabetes mellitus, type 2 (H)        Dose:  1000 mg   Take 2 tablets (1,000 mg) by mouth 2 times daily (with meals)   Quantity:  90 tablet   Refills:  3                Primary Care Provider Office Phone # Fax #    Shamika Ileana Kelley [phone]       [facility] [address] [facility] [address]        Equal Access to Services     JAYDEN STUART : Hadii james elizalde hadasho Soomaali, waaxda luqadaha, qaybta kaalmada adeegyaursula, autumn caldwell . So [facility] [phone].    ATENCIÓN: Si habla español, tiene a deluca disposición servicios gratuitos de asistencia lingüística. Link al [phone].    We comply with applicable federal civil rights laws and [place] laws. We do not discriminate on the basis of race, color, national origin, age, disability, sex, sexual orientation, or gender identity.            Thank you!     Thank you for choosing PSY[facility]IATRY CLINIC  for your care. Our goal is always to provide you with excellent care. Hearing back from our patients is one way we can continue to improve our services. Please take a few minutes to complete the written survey that you may receive in the mail after your visit with us. Thank you!             Your Updated Medication List - Protect others around you: Learn how to safely use, store and throw away your medicines at www.disposemymeds.org.          This list is accurate as of: 1/8/18 11:59 PM.  Always use your most recent med list.                   Brand Name Dispense Instructions for use Diagnosis    acetaminophen 500 MG tablet    TYLENOL    1 Bottle    Take 2 tablets (1,000 mg) by mouth every 6 hours as needed    Sprain of left ankle, unspecified ligament, initial encounter       alum & mag hydroxide-simethicone 200-200-20 MG/5ML Susp suspension     MYLANTA/MAALOX     Take 30 mLs by mouth daily as needed for indigestion        amLODIPine 5 MG tablet    NORVASC    30 tablet    Take 2 tablets (10 mg) by mouth daily    Essential hypertension with goal blood pressure less than 130/85       artificial saliva Aers spray      Take 1 spray by mouth 3 times daily as needed for dry mouth        artificial tears Oint ophthalmic ointment     1 Tube    Apply  to eye. Place 0.5 inches into both eyes at bedtime    Insufficiency of tear film of both eyes       ARTIFICIAL TEARS OP      Apply 1 drop to eye 4 times daily.        aspirin 81 MG tablet     100    Take 1 tablet by mouth daily. ENTERIC COATED.        atorvastatin 40 MG tablet    LIPITOR    90 tablet    Take 1 tablet (40 mg) by mouth daily    Hyperlipidemia LDL goal <100       blood glucose monitoring test strip    no brand specified    100 each    Use to test blood sugar 3 times daily or as directed.    Type 2 diabetes mellitus with microalbuminuria, with long-term current use of insulin (H)       calcium polycarbophil 625 MG tablet    FIBERCON     Take 2 tablets by mouth daily        calcium-vitamin D 250-125 MG-UNIT Tabs      Take 1 tablet by mouth 2 times daily. Calcium 250 mg/Vit D 125 IU        CLARITIN 10 MG tablet   Generic drug:  loratadine     30    1 TAB PO QD (Once per day) as needed for ALLERGY SYMPTOMS        clotrimazole 1 % cream    LOTRIMIN    50 g    Apply topically 2 times daily as needed    Dermatophytosis       eucerin cream      Apply  topically as needed. Apply to thigh PRN dry skin        exenatide 10 MCG/0.04ML injection    BYETTA    1 Syringe    Inject 10 mcg Subcutaneous 2 times daily (before meals)    Type 2 diabetes mellitus with microalbuminuria, with long-term current use of insulin (H)       FLUoxetine 40 MG capsule    PROzac    30 capsule    Take 1 capsule (40 mg) by mouth daily    Schizoaffective disorder, depressive type (H)       fluticasone 50 MCG/ACT spray    FLONASE    16 g     Spray 1 spray into both nostrils daily    Seasonal allergic rhinitis       furosemide 20 MG tablet    LASIX    60 tablet    Take 1 tablet (20 mg) by mouth 2 times daily    Lower leg edema       GEL-SASHA DENTINBLOC DT      Apply  to affected area. GEL. Apply to 2nd molar on bottom right daily at bedtime.        glucagon 1 MG kit    GLUCAGON EMERGENCY    2 mg    Inject 1 mg into the muscle once for 1 dose    Type 2 diabetes mellitus with microalbuminuria, with long-term current use of insulin (H)       insulin glargine 100 UNIT/ML injection    LANTUS    8 mL    Inject 22 Units Subcutaneous At Bedtime    Type 2 diabetes mellitus with microalbuminuria, with long-term current use of insulin (H)       LACTAID 3000 UNIT tablet   Generic drug:  lactase      Take 4 tabs daily with meals.        levothyroxine 175 MCG tablet    SYNTHROID/LEVOTHROID    30 tablet    Take 1 tablet (175 mcg) by mouth daily Give on empty stomach    Other specified hypothyroidism       LORazepam 1 MG tablet    ATIVAN    35 tablet    Take 1 tablet (1 mg) by mouth At Bedtime .  May take additional 1mg daily PRN for agitation.    Generalized anxiety disorder       losartan 100 MG tablet    COZAAR    90 tablet    Take 1 tablet by mouth daily.    Hypertension goal BP (blood pressure) < 130/80, Diabetes mellitus type 2, insulin dependent (H), CKD (chronic kidney disease) stage 2, GFR 60-89 ml/min       melatonin 3 MG tablet     60 tablet    Take 1 tablet (3 mg) by mouth nightly as needed for sleep    Other insomnia       metFORMIN 500 MG 24 hr tablet    GLUCOPHAGE-XR    90 tablet    Take 2 tablets (1,000 mg) by mouth 2 times daily (with meals)    Diabetes mellitus, type 2 (H)       MILK OF MAGNESIA PO      Take  by mouth. Take 30 mL as needed for constipation.        NEURONTIN PO      Take 900 mg by mouth 3 times daily.        nystatin 460655 UNIT/GM Powd    MYCOSTATIN    60 g    Apply topically 3 times daily as needed    Candidiasis of skin        polyethylene glycol powder    MIRALAX/GLYCOLAX     Take 1 capful by mouth 2 times daily. 17 GM PO BID        saline 0.9 % Soln      Spray 2 sprays in nostril as needed.        SENNA S 8.6-50 MG per tablet   Generic drug:  senna-docusate      Take 2 tablets by mouth At Bedtime.        solifenacin 10 MG tablet    VESICARE    30 tablet    Take 1 tablet (10 mg) by mouth daily    Urge incontinence       ziprasidone 40 MG capsule    GEODON    60 capsule    Take 1 capsule (40 mg) by mouth 2 times daily (with meals)    Schizoaffective disorder, depressive type (H)

## 2018-01-08 NOTE — MR AVS SNAPSHOT
After Visit Summary   1/8/2018    Pinky Page    MRN: [id number]           Patient Information     Date Of Birth          1950        Visit Information        Provider Department      1/8/2018 11:30 AM Marcos Magdaleno MD [facility] Medical Weight Management        Today's Diagnoses     Morbid obesity (H)    -  1       Follow-ups after your visit        Follow-up notes from your care team     Return in about 6 months (around 7/8/2018).      Your next 10 appointments already scheduled     Jan 08, 2018 11:30 AM CST   (Arrive by 11:15 AM)   Return Visit with Marcos Magdaleno MD   [facility] Weight Management ([facility])    [address] [facility]  4th Floor  [facility] [id number] [phone]            Jan 08, 2018  1:15 PM CST   Adult Med Follow UP with Joana De Leon MD   Psychiatry Clinic ([facility])    [place] [address] [facility] [phone]            Feb 13, 2018  1:00 PM CST   (Arrive by 12:45 PM)   Return Visit with Damon Gr, PhD [facility] ([facility])    [address] [facility] [address] [facility]  3rd Floor  [facility] [phone] [phone]            May 10, 2018 11:30 AM CDT   Return Sleep Patient with Brianda Reddy MD   [facility] [place], Sleep Study ([facility])    [address] [place] [id number] [phone]            Nov 15, 2018  1:30 PM CST   RETURN GENERAL with Michael Lopez MD   Eye Clinic ([facility])    [address]  [address] [facility]  9th Fl Clin [address]a  [facility] [id number] [phone]              Who to contact     Please call your clinic at [phone] to:    Ask questions about your health    Make or cancel appointments    Discuss your medicines    Learn about your test results    Speak to your doctor   If you have  "compliments or concerns about an experience at your clinic, or if you wish to file a complaint, please contact [facility] Patient Relations at [phone] or email us at [email or web address]         Additional Information About Your Visit        Brootahart Information     Aconext is an electronic gateway that provides easy, online access to your medical records. With SMR SITE, you can request a clinic appointment, read your test results, renew a prescription or communicate with your care team.     To sign up for SMR SITE visit the website at [email or web address]   You will be asked to enter the access code listed below, as well as some personal information. Please follow the directions to create your username and password.     Your access code is: [email or web address]  Expires: 2018  6:30 AM     Your access code will  in 90 days. If you need help or a new code, please contact your [facility] or call [phone] for assistance.        Care EveryWhere ID     This is your Care EveryWhere ID. This could be used by other organizations to access your [facility] medical records  [id number]        Your Vitals Were     Pulse Height Pulse Oximetry BMI (Body Mass Index)          66 1.626 m (5' 4\") 100% 41.37 kg/m2         Blood Pressure from Last 3 Encounters:   18 159/68   17 146/82   10/16/17 132/75    Weight from Last 3 Encounters:   18 109.3 kg (241 lb)   17 108 kg (238 lb)   10/16/17 108.7 kg (239 lb 9.6 oz)              Today, you had the following     No orders found for display         Today's Medication Changes          These changes are accurate as of: 18 11:14 AM.  If you have any questions, ask your nurse or doctor.               These medicines have changed or have updated prescriptions.        Dose/Directions    metFORMIN 500 MG 24 hr tablet   Commonly known as:  GLUCOPHAGE-XR   This may have changed:    - how " much to take  - when to take this   Used for:  Diabetes mellitus, type 2 (H)        Dose:  1000 mg   Take 2 tablets (1,000 mg) by mouth 2 times daily (with meals)   Quantity:  90 tablet   Refills:  3                Primary Care Provider Office Phone # Fax #    Shamika Ileana Kelley [phone]       [facility] [address] [facility] [address]        Equal Access to Services     JAYDEN STUART : Hadii aad ku hadasho Soomaali, waaxda luqadaha, qaybta kaalmada adeegyada, waxay idiin hayaan adeeg jesus laanisa . So [facility] [phone].    ATENCIÓN: Si habla español, tiene a deluca disposición servicios gratuitos de asistencia lingüística. Link al [phone].    We comply with applicable federal civil rights laws and [place] laws. We do not discriminate on the basis of race, color, national origin, age, disability, sex, sexual orientation, or gender identity.            Thank you!     Thank you for choosing [facility] MEDICAL WEIGHT MANAGEMENT  for your care. Our goal is always to provide you with excellent care. Hearing back from our patients is one way we can continue to improve our services. Please take a few minutes to complete the written survey that you may receive in the mail after your visit with us. Thank you!             Your Updated Medication List - Protect others around you: Learn how to safely use, store and throw away your medicines at www.disposemymeds.org.          This list is accurate as of: 1/8/18 11:14 AM.  Always use your most recent med list.                   Brand Name Dispense Instructions for use Diagnosis    acetaminophen 500 MG tablet    TYLENOL    1 Bottle    Take 2 tablets (1,000 mg) by mouth every 6 hours as needed    Sprain of left ankle, unspecified ligament, initial encounter       alum & mag hydroxide-simethicone 200-200-20 MG/5ML Susp suspension    MYLANTA/MAALOX     Take 30 mLs by mouth daily as needed for indigestion        amLODIPine 5 MG tablet     NORVASC    30 tablet    Take 2 tablets (10 mg) by mouth daily    Essential hypertension with goal blood pressure less than 130/85       artificial saliva Aers spray      Take 1 spray by mouth 3 times daily as needed for dry mouth        artificial tears Oint ophthalmic ointment     1 Tube    Apply  to eye. Place 0.5 inches into both eyes at bedtime    Insufficiency of tear film of both eyes       ARTIFICIAL TEARS OP      Apply 1 drop to eye 4 times daily.        aspirin 81 MG tablet     100    Take 1 tablet by mouth daily. ENTERIC COATED.        atorvastatin 40 MG tablet    LIPITOR    90 tablet    Take 1 tablet (40 mg) by mouth daily    Hyperlipidemia LDL goal <100       blood glucose monitoring test strip    no brand specified    100 each    Use to test blood sugar 3 times daily or as directed.    Type 2 diabetes mellitus with microalbuminuria, with long-term current use of insulin (H)       calcium polycarbophil 625 MG tablet    FIBERCON     Take 2 tablets by mouth daily        calcium-vitamin D 250-125 MG-UNIT Tabs      Take 1 tablet by mouth 2 times daily. Calcium 250 mg/Vit D 125 IU        CLARITIN 10 MG tablet   Generic drug:  loratadine     30    1 TAB PO QD (Once per day) as needed for ALLERGY SYMPTOMS        clotrimazole 1 % cream    LOTRIMIN    50 g    Apply topically 2 times daily as needed    Dermatophytosis       eucerin cream      Apply  topically as needed. Apply to thigh PRN dry skin        exenatide 10 MCG/0.04ML injection    BYETTA    1 Syringe    Inject 10 mcg Subcutaneous 2 times daily (before meals)    Type 2 diabetes mellitus with microalbuminuria, with long-term current use of insulin (H)       FLUoxetine 40 MG capsule    PROzac    30 capsule    Take 1 capsule (40 mg) by mouth daily    Schizoaffective disorder, depressive type (H)       fluticasone 50 MCG/ACT spray    FLONASE    16 g    Spray 1 spray into both nostrils daily    Seasonal allergic rhinitis       furosemide 20 MG tablet    LASIX     60 tablet    Take 1 tablet (20 mg) by mouth 2 times daily    Lower leg edema       GEL-SASHA DENTINBLOC DT      Apply  to affected area. GEL. Apply to 2nd molar on bottom right daily at bedtime.        glucagon 1 MG kit    GLUCAGON EMERGENCY    2 mg    Inject 1 mg into the muscle once for 1 dose    Type 2 diabetes mellitus with microalbuminuria, with long-term current use of insulin (H)       insulin glargine 100 UNIT/ML injection    LANTUS    8 mL    Inject 22 Units Subcutaneous At Bedtime    Type 2 diabetes mellitus with microalbuminuria, with long-term current use of insulin (H)       LACTAID 3000 UNIT tablet   Generic drug:  lactase      Take 4 tabs daily with meals.        levothyroxine 175 MCG tablet    SYNTHROID/LEVOTHROID    30 tablet    Take 1 tablet (175 mcg) by mouth daily Give on empty stomach    Other specified hypothyroidism       LORazepam 1 MG tablet    ATIVAN    35 tablet    Take 1 tablet (1 mg) by mouth At Bedtime .  May take additional 1mg daily PRN for agitation.    Generalized anxiety disorder       losartan 100 MG tablet    COZAAR    90 tablet    Take 1 tablet by mouth daily.    Hypertension goal BP (blood pressure) < 130/80, Diabetes mellitus type 2, insulin dependent (H), CKD (chronic kidney disease) stage 2, GFR 60-89 ml/min       melatonin 3 MG tablet     60 tablet    Take 1 tablet (3 mg) by mouth nightly as needed for sleep    Other insomnia       metFORMIN 500 MG 24 hr tablet    GLUCOPHAGE-XR    90 tablet    Take 2 tablets (1,000 mg) by mouth 2 times daily (with meals)    Diabetes mellitus, type 2 (H)       MILK OF MAGNESIA PO      Take  by mouth. Take 30 mL as needed for constipation.        NEURONTIN PO      Take 900 mg by mouth 3 times daily.        nystatin 530457 UNIT/GM Powd    MYCOSTATIN    60 g    Apply topically 3 times daily as needed    Candidiasis of skin       polyethylene glycol powder    MIRALAX/GLYCOLAX     Take 1 capful by mouth 2 times daily. 17 GM PO BID        saline  0.9 % Soln      Spray 2 sprays in nostril as needed.        SENNA S 8.6-50 MG per tablet   Generic drug:  senna-docusate      Take 2 tablets by mouth At Bedtime.        solifenacin 10 MG tablet    VESICARE    30 tablet    Take 1 tablet (10 mg) by mouth daily    Urge incontinence       ziprasidone 40 MG capsule    GEODON    60 capsule    Take 1 capsule (40 mg) by mouth 2 times daily (with meals)    Schizoaffective disorder, depressive type (H)

## 2018-01-08 NOTE — LETTER
"2018       RE: Pinky Page  [address]  [facility] [address]     Dear Colleague,    Thank you for referring your patient, Pinky Page, to the [facility] MEDICAL WEIGHT MANAGEMENT at [facility]. Please see a copy of my visit note below.        Return Medical Weight Management Note     Pinky Page  MRN:  [id number]  :  1950  CORINNE:  2017    Dear Dr. Ann,     I had the pleasure of seeing your patient Pinky Page.  She is a 65 year old female who I am continuing to see for treatment of obesity related to: DM-2 and Hypertension.    CURRENT WEIGHT:   241 lbs 0 oz    Wt Readings from Last 4 Encounters:   18 109.3 kg (241 lb)   17 108 kg (238 lb)   10/16/17 108.7 kg (239 lb 9.6 oz)   17 111 kg (244 lb 12.8 oz)     Height:  5' 4\"  Body Mass Index:  Body mass index is 41.37 kg/(m^2).  Vitals:  B/P: 163/75, P: 68    Initial consult weight was 283 on .  Weight change since last seen on 2017 is down 1 pounds.   Total loss is 42 pounds.    INTERVAL HISTORY:  Patient has been working on the following dietary changes: Still eating from the diet line at her group home (1500 miguel, no concentrated sweets, some fruit snacks).   She also continues to be on Byetta 10 cg BID for her DM and also to help her lose weight and as a appetite suppressant as well.  Her DM is being managed by us as well as the [facility] ( PCP).  Patient has been working on the following activity changes: Very regular walking at least 40 minutes / day.    Diet and Activity Changes Since Last Visit Reviewed With Patient 2018   I have made the following changes to my diet since my last visit: eat more fruit   With regards to my diet, I am still struggling with: oeating  to much   For breakfast, I typically eat: -   For lunch, I typically eat: -   For supper, I typically eat: -   For snack(s), I typically eat: -   I have made the following " changes to my activity/exercise since my last visit: -   With regards to my activity/exercise, I am still struggling with: -       MEDICATIONS:   Current Outpatient Prescriptions   Medication     insulin glargine (LANTUS) 100 UNIT/ML injection     artificial tears (AKWA TEARS) OINT ophthalmic ointment     LORazepam (ATIVAN) 1 MG tablet     melatonin 3 MG tablet     alum & mag hydroxide-simethicone (MYLANTA/MAALOX) 200-200-20 MG/5ML SUSP suspension     artificial saliva (BIOTENE MT) AERS spray     FLUoxetine (PROZAC) 40 MG capsule     ziprasidone (GEODON) 40 MG capsule     blood glucose monitoring (NO BRAND SPECIFIED) test strip     exenatide (BYETTA) 10 MCG/0.04ML injection     amLODIPine (NORVASC) 5 MG tablet     nystatin (MYCOSTATIN) 303771 UNIT/GM POWD     clotrimazole (LOTRIMIN) 1 % cream     fluticasone (FLONASE) 50 MCG/ACT nasal spray     furosemide (LASIX) 20 MG tablet     acetaminophen (TYLENOL) 500 MG tablet     solifenacin (VESICARE) 10 MG tablet     atorvastatin (LIPITOR) 40 MG tablet     calcium polycarbophil (FIBERCON) 625 MG tablet     levothyroxine (SYNTHROID, LEVOTHROID) 175 MCG tablet     metFORMIN (GLUCOPHAGE-XR) 500 MG 24 hr tablet     losartan (COZAAR) 100 MG tablet     Hypromellose (ARTIFICIAL TEARS OP)     Gabapentin (NEURONTIN PO)     senna-docusate (SENNA S) 8.6-50 MG per tablet     polyethylene glycol (MIRALAX/GLYCOLAX) powder     Skin Protectants, Misc. (EUCERIN) cream     Saline 0.9 % SOLN     Calcium Carbonate-Vitamin D (CALCIUM-VITAMIN D) 250-125 MG-UNIT TABS     Sod Fluor-Solo Fluor-Hydrfl Ac (GEL-SASHA DENTINBLOC DT)     lactase (LACTAID) 3000 UNIT tablet     Magnesium Hydroxide (MILK OF MAGNESIA PO)     CLARITIN 10 MG OR TABS     ASPIRIN 81 MG OR TABS     glucagon (GLUCAGON EMERGENCY) 1 MG kit     No current facility-administered medications for this visit.        Weight Loss Medication History Reviewed With Patient 8/28/2017   Which weight loss medications are you currently taking on a  regular basis?  Byetta (exentatide)   Are you having any side effects from the weight loss medication that we have prescribed you? No   If you are having side effects please describe: -     ASSESSMENT:   Continues to do well with weight loss maintenance. HbA1c 6.4 on 12/14/17.  FOLLOW-UP:    12 weeks.  10/15 minutes spent on counseling and education    Marcos Magdaleno MD

## 2018-01-18 ENCOUNTER — TELEPHONE (OUTPATIENT)
Dept: PSYCHIATRY | Facility: CLINIC | Age: 68
End: 2018-01-18

## 2018-01-18 DIAGNOSIS — F41.1 GENERALIZED ANXIETY DISORDER: ICD-10-CM

## 2018-01-18 RX ORDER — LORAZEPAM 1 MG/1
1 TABLET ORAL AT BEDTIME
Qty: 35 TABLET | Refills: 1 | Status: SHIPPED | OUTPATIENT
Start: 2018-01-18 | End: 2018-03-29

## 2018-01-18 NOTE — TELEPHONE ENCOUNTER
Faxed Rx to [facility] at [phone].    Called Narendra Truong and left VM for the RN at [phone] advising that the refill has been sent to the pharmacy.

## 2018-01-18 NOTE — TELEPHONE ENCOUNTER
----- Message from Keri Sanchez sent at 1/18/2018  8:19 AM CST -----  Regarding: Refill Request  Contact: [phone]  Caller:  Chitra, staff member Narendra Truong  Medication:  Ativan 1 mg  Pharmacy and location:  [facility]  Have you contacted the pharmacy: yes - pharmacy says they have sent daily refill requests since Saturday  How much of medication do you have left:  Less than 3  Pending appt: 2/26/18  Okay to leave VM:  yes    Patient takes 1 mg scheduled + 1 mg PRN (takes most days).  Patient was last seen 1/8/18.

## 2018-01-18 NOTE — TELEPHONE ENCOUNTER
Received RF request from  staff for:    LORazepam (ATIVAN) 1 MG tablet 35 tablet 1 10/26/2017  No   Sig: Take 1 tablet (1 mg) by mouth At Bedtime .  May take additional 1mg daily PRN for agitation.       Last RF:  Per med tab:  10/26/17  Per [place] :  1/03 (#10, 5), 12/05 (# 30, 15), 10/26 (# 30, 15)    Due for RF:  yes    Appointment History:  Last appt: 1/08/18  RTC: 6 weeks  Cancel: none  No-show: none  Next appt: 2/26/18    Will refill per protocol.

## 2018-02-09 ENCOUNTER — TELEPHONE (OUTPATIENT)
Dept: PSYCHIATRY | Facility: CLINIC | Age: 68
End: 2018-02-09

## 2018-02-09 NOTE — TELEPHONE ENCOUNTER
Received a request for physician orders from [facility] via fax.  The orders are for:    Fluoxetine capsule 40 mg daily  Ziprasidone 40 mg capsule BID with meals    Lab orders:  Creatinine, Hgb A1C, TSH Q 12 months done at clinic due in August  FBS annually due in September    Fax back to [facility] [place]  [phone]    Will route to Dr. De Leon for signature.

## 2018-02-12 ENCOUNTER — TELEPHONE (OUTPATIENT)
Dept: PSYCHIATRY | Facility: CLINIC | Age: 68
End: 2018-02-12

## 2018-02-12 NOTE — TELEPHONE ENCOUNTER
"----- Message from Aliza Vargas RN sent at 2/12/2018  1:42 PM CST -----  Regarding: FW: Physicians Order Annual Renewal   Contact: [phone]  Haley pt    ----- Message -----     From: Nidia Strange     Sent: 2/12/2018   1:27 PM       To: Aliza Vargas RN  Subject: Physicians Order Annual Renewal                  Tia Anthony from [facility] is the caller. She said that a \"physicians order annual renewal,\" which they have been trying to obtain since March, still needs to be sent to them in order for the pt to continue to be able to receive her meds. They need this at the latest by next Monday. Please fax it to Attn: Yani Day at [phone].  Physicians order annual renewal. Needs to be returned in the next week. The attached number is Tia's, okay to leave a detailed message.      "

## 2018-02-13 ENCOUNTER — MEDICAL CORRESPONDENCE (OUTPATIENT)
Dept: HEALTH INFORMATION MANAGEMENT | Facility: CLINIC | Age: 68
End: 2018-02-13

## 2018-02-13 ENCOUNTER — OFFICE VISIT (OUTPATIENT)
Dept: PSYCHOLOGY | Facility: CLINIC | Age: 68
End: 2018-02-13
Payer: COMMERCIAL

## 2018-02-13 VITALS — WEIGHT: 233.2 LBS | BODY MASS INDEX: 40.03 KG/M2

## 2018-02-13 DIAGNOSIS — F41.1 GENERALIZED ANXIETY DISORDER: ICD-10-CM

## 2018-02-13 DIAGNOSIS — F54 PSYCHOLOGICAL FACTORS AFFECTING MORBID OBESITY (H): ICD-10-CM

## 2018-02-13 DIAGNOSIS — E66.01 PSYCHOLOGICAL FACTORS AFFECTING MORBID OBESITY (H): ICD-10-CM

## 2018-02-13 DIAGNOSIS — E66.9 OBESITY, UNSPECIFIED OBESITY SEVERITY, UNSPECIFIED OBESITY TYPE: ICD-10-CM

## 2018-02-13 DIAGNOSIS — F33.0 MAJOR DEPRESSIVE DISORDER, RECURRENT EPISODE, MILD (H): Primary | ICD-10-CM

## 2018-02-13 NOTE — PROGRESS NOTES
Health Psychology                  Clinic    Department of Medicine  Madelaine Ingram, Ph.D., L.P. [phone]                          [facility] Miryam Woods, Ph.D.,  L.P. [phone]                 3rd Floor, Clinic 3A  [place] Mail Code 747   Damon Gr, Ph.D., LUCY.RODOLFO., L.P. [phone]     [address] [facility]  [address] [facility] Kimber Morales, Ph.D., L.P. [phone]  [place] [address]  [place] [address]    Health Psychology Follow-Up Note    Ms. Page is a pleasant 67-year old woman with chronic depressive illness, who returns to clinic for supportive and behavioral psychotherapy for moderate recurrent depression and for help losing weight given her morbid obesity.     She is 233.2# down from  241.4# same as last session.   This is the lowest she has been since recorded in the EMR.  Wt Readings from Last 4 Encounters:   02/13/18 105.8 kg (233 lb 3.2 oz)   01/08/18 109.2 kg (240 lb 12.8 oz)   01/08/18 109.3 kg (241 lb)   01/02/18 109.5 kg (241 lb 6.4 oz)     At [facility] [employer], she continues to serve on the [email or web address].  She has been on the [employer] for 20 years, plus decorating the bulletin boards.  She is working 1 day/week with her boss, [employer], Friday mornings for 1.5 hour.    She rates the depression as 3 on 10-point subjective scale, which is below baseline.   We discussed her plans for the holidays.  She is doing well.  She participates in activities at [employer].  She tolerated her tooth pulled 1/11.  She is doing okay.  She is doing more social walking some of the time and had apparently is gradually ramping up on her fitness center days.  She agrees to increase.  She had been  tolerating using the [facility] bicycle  up to 22 minutes without pain and was willing to increase gradually to 30 minutes.  Goal for exercise has been 1.5 hours/day as of June 19, 2017 and 2 lbs. / month.   We discussed increasing bicycling  to 6 days per week and increasing gradually from 20 minutes to 30 minutes.    She arrived early today and was greeted in the waiting room.  Pinky participated fully and appeared to derive benefit.  Rapport was excellent.      Extended session due to complexity of case and length of interval.      IMPRESSION Distress Disability Risk    Low High Low     Time In: 1:06  Time Out:  1:58  Diagnosis:   Major Depression, recurrent mild (F33.0)   Psychological Factors Affecting Morbid Obesity (F54)      Plan: She will return on 3/19 @ 1:00 for behavioral and supportive psychotherapy.   Plan to to bicycle for > 20 minutes some days and eat less.  Tx plan due 4/11/2018  Damon Gr, Ph.D., A.B.P.P., L.P.

## 2018-02-13 NOTE — MR AVS SNAPSHOT
After Visit Summary   2/13/2018    Pinky Page    MRN: [id number]           Patient Information     Date Of Birth          1950        Visit Information        Provider Department      2/13/2018 1:00 PM Damon Gr, PhD [facility]        Today's Diagnoses     Major depressive disorder, recurrent episode, mild (H)    -  1    Psychological factors affecting morbid obesity (H)        Generalized anxiety disorder        Obesity, unspecified obesity severity, unspecified obesity type           Follow-ups after your visit        Your next 10 appointments already scheduled     Feb 14, 2018 10:20 AM CST   Return Visit with Shamika Kelley MD   [facility] ([facility])    [address],  [address]  [facility] [address]   [phone]            Feb 26, 2018 12:45 PM CST   Adult Med Follow UP with Joana De Leon MD   Psychiatry Clinic ([facility])    [place] [id number] [facility] [address] [phone]            May 10, 2018 11:30 AM CDT   Return Sleep Patient with Brianda Reddy MD   [facility] [place], Sleep Study ([facility])    [address] [place] [address] [phone]            Jul 09, 2018 11:30 AM CDT   (Arrive by 11:15 AM)   Return Visit with Marcos Magdaleno MD   [facility] Medical Weight Management ([facility])    [address] [facility]  4th Floor  [facility] [id number] [phone]            Nov 15, 2018  1:30 PM CST   RETURN GENERAL with Michael Lopez MD   Eye Clinic ([facility])    [address]  9[address] Clin [address]a  [facility] [id number] [phone]              Who to contact     Please call your clinic at [phone] to:    Ask questions about your health    Make or cancel appointments    Discuss your  medicines    Learn about your test results    Speak to your doctor            Additional Information About Your Visit        MyChart Information     [employer] is an electronic gateway that provides easy, online access to your medical records. With [employer]ine, you can request a clinic appointment, read your test results, renew a prescription or communicate with your care team.     To sign up for [employer]ine visit the website at [email or web address]   You will be asked to enter the access code listed below, as well as some personal information. Please follow the directions to create your username and password.     Your access code is: [email or web address]  Expires: 2018  2:00 PM     Your access code will  in 90 days. If you need help or a new code, please contact your [facility] or call [phone] for assistance.        Care EveryWhere ID     This is your Care EveryWhere ID. This could be used by other organizations to access your [facility] medical records  [id number]        Your Vitals Were     BMI (Body Mass Index)                   40.03 kg/m2            Blood Pressure from Last 3 Encounters:   18 149/84   18 159/68   17 146/82    Weight from Last 3 Encounters:   18 105.8 kg (233 lb 3.2 oz)   18 109.2 kg (240 lb 12.8 oz)   18 109.3 kg (241 lb)              Today, you had the following     No orders found for display         Today's Medication Changes          These changes are accurate as of 18  2:00 PM.  If you have any questions, ask your nurse or doctor.               These medicines have changed or have updated prescriptions.        Dose/Directions    metFORMIN 500 MG 24 hr tablet   Commonly known as:  GLUCOPHAGE-XR   This may have changed:    - how much to take  - when to take this   Used for:  Diabetes mellitus, type 2 (H)        Dose:  1000 mg   Take 2 tablets (1,000 mg) by mouth 2 times daily (with meals)   Quantity:  90 tablet    Refills:  3                Primary Care Provider Office Phone # Fax #    Shamika Ileana Kelley [phone]       [facility] [address] [facility] [address]        Equal Access to Services     JAYDEN STUART : Hadii james elizalde amaurio Soneriali, waaxda luqadaha, qaybta kaalmada adeegyada, waxjeff alvarezn shaquille lee paulette josé. So [facility] [phone].    ATENCIÓN: Si habla español, tiene a deluca disposición servicios gratuitos de asistencia lingüística. Llame al [phone].    We comply with applicable federal civil rights laws and [place] laws. We do not discriminate on the basis of race, color, national origin, age, disability, sex, sexual orientation, or gender identity.            Thank you!     Thank you for choosing [facility]  for your care. Our goal is always to provide you with excellent care. Hearing back from our patients is one way we can continue to improve our services. Please take a few minutes to complete the written survey that you may receive in the mail after your visit with us. Thank you!             Your Updated Medication List - Protect others around you: Learn how to safely use, store and throw away your medicines at www.disposemymeds.org.          This list is accurate as of 2/13/18  2:00 PM.  Always use your most recent med list.                   Brand Name Dispense Instructions for use Diagnosis    acetaminophen 500 MG tablet    TYLENOL    1 Bottle    Take 2 tablets (1,000 mg) by mouth every 6 hours as needed    Sprain of left ankle, unspecified ligament, initial encounter       alum & mag hydroxide-simethicone 200-200-20 MG/5ML Susp suspension    MYLANTA/MAALOX     Take 30 mLs by mouth daily as needed for indigestion        amLODIPine 5 MG tablet    NORVASC    30 tablet    Take 2 tablets (10 mg) by mouth daily    Essential hypertension with goal blood pressure less than 130/85       artificial saliva Aers spray      Take 1 spray by mouth  3 times daily as needed for dry mouth        artificial tears Oint ophthalmic ointment     1 Tube    Apply  to eye. Place 0.5 inches into both eyes at bedtime    Insufficiency of tear film of both eyes       ARTIFICIAL TEARS OP      Apply 1 drop to eye 4 times daily.        aspirin 81 MG tablet     100    Take 1 tablet by mouth daily. ENTERIC COATED.        atorvastatin 40 MG tablet    LIPITOR    90 tablet    Take 1 tablet (40 mg) by mouth daily    Hyperlipidemia LDL goal <100       blood glucose monitoring test strip    no brand specified    100 each    Use to test blood sugar 3 times daily or as directed.    Type 2 diabetes mellitus with microalbuminuria, with long-term current use of insulin (H)       calcium polycarbophil 625 MG tablet    FIBERCON     Take 2 tablets by mouth daily        calcium-vitamin D 250-125 MG-UNIT Tabs      Take 1 tablet by mouth 2 times daily. Calcium 250 mg/Vit D 125 IU        CLARITIN 10 MG tablet   Generic drug:  loratadine     30    1 TAB PO QD (Once per day) as needed for ALLERGY SYMPTOMS        clotrimazole 1 % cream    LOTRIMIN    50 g    Apply topically 2 times daily as needed    Dermatophytosis       eucerin cream      Apply  topically as needed. Apply to thigh PRN dry skin        exenatide 10 MCG/0.04ML injection    BYETTA    1 Syringe    Inject 10 mcg Subcutaneous 2 times daily (before meals)    Type 2 diabetes mellitus with microalbuminuria, with long-term current use of insulin (H)       FLUoxetine 40 MG capsule    PROzac    30 capsule    Take 1 capsule (40 mg) by mouth daily    Schizoaffective disorder, depressive type (H)       fluticasone 50 MCG/ACT spray    FLONASE    16 g    Spray 1 spray into both nostrils daily    Seasonal allergic rhinitis       furosemide 20 MG tablet    LASIX    60 tablet    Take 1 tablet (20 mg) by mouth 2 times daily    Lower leg edema       GEL-SASHA DENTINBLOC DT      Apply  to affected area. GEL. Apply to 2nd molar on bottom right daily at  bedtime.        glucagon 1 MG kit    GLUCAGON EMERGENCY    2 mg    Inject 1 mg into the muscle once for 1 dose    Type 2 diabetes mellitus with microalbuminuria, with long-term current use of insulin (H)       insulin glargine 100 UNIT/ML injection    LANTUS    8 mL    Inject 22 Units Subcutaneous At Bedtime    Type 2 diabetes mellitus with microalbuminuria, with long-term current use of insulin (H)       LACTAID 3000 UNIT tablet   Generic drug:  lactase      Take 4 tabs daily with meals.        levothyroxine 175 MCG tablet    SYNTHROID/LEVOTHROID    30 tablet    Take 1 tablet (175 mcg) by mouth daily Give on empty stomach    Other specified hypothyroidism       LORazepam 1 MG tablet    ATIVAN    35 tablet    Take 1 tablet (1 mg) by mouth At Bedtime .  May take additional 1mg daily PRN for agitation.    Generalized anxiety disorder       losartan 100 MG tablet    COZAAR    90 tablet    Take 1 tablet by mouth daily.    Hypertension goal BP (blood pressure) < 130/80, Diabetes mellitus type 2, insulin dependent (H), CKD (chronic kidney disease) stage 2, GFR 60-89 ml/min       melatonin 3 MG tablet     60 tablet    Take 1 tablet (3 mg) by mouth nightly as needed for sleep    Other insomnia       metFORMIN 500 MG 24 hr tablet    GLUCOPHAGE-XR    90 tablet    Take 2 tablets (1,000 mg) by mouth 2 times daily (with meals)    Diabetes mellitus, type 2 (H)       MILK OF MAGNESIA PO      Take  by mouth. Take 30 mL as needed for constipation.        NEURONTIN PO      Take 900 mg by mouth 3 times daily.        nystatin 111262 UNIT/GM Powd    MYCOSTATIN    60 g    Apply topically 3 times daily as needed    Candidiasis of skin       polyethylene glycol powder    MIRALAX/GLYCOLAX     Take 1 capful by mouth 2 times daily. 17 GM PO BID        saline 0.9 % Soln      Spray 2 sprays in nostril as needed.        SENNA S 8.6-50 MG per tablet   Generic drug:  senna-docusate      Take 2 tablets by mouth At Bedtime.        solifenacin 10 MG  tablet    VESICARE    30 tablet    Take 1 tablet (10 mg) by mouth daily    Urge incontinence       ziprasidone 40 MG capsule    GEODON    60 capsule    Take 1 capsule (40 mg) by mouth 2 times daily (with meals)    Schizoaffective disorder, depressive type (H)

## 2018-02-14 ENCOUNTER — OFFICE VISIT (OUTPATIENT)
Dept: FAMILY MEDICINE | Facility: CLINIC | Age: 68
End: 2018-02-14
Payer: COMMERCIAL

## 2018-02-14 ENCOUNTER — TELEPHONE (OUTPATIENT)
Dept: FAMILY MEDICINE | Facility: CLINIC | Age: 68
End: 2018-02-14

## 2018-02-14 VITALS
SYSTOLIC BLOOD PRESSURE: 151 MMHG | RESPIRATION RATE: 16 BRPM | OXYGEN SATURATION: 96 % | DIASTOLIC BLOOD PRESSURE: 78 MMHG | WEIGHT: 236 LBS | HEART RATE: 74 BPM | BODY MASS INDEX: 40.51 KG/M2 | TEMPERATURE: 97.8 F

## 2018-02-14 DIAGNOSIS — E66.01 MORBID OBESITY DUE TO EXCESS CALORIES (H): Primary | ICD-10-CM

## 2018-02-14 DIAGNOSIS — Z79.4 TYPE 2 DIABETES MELLITUS WITH MICROALBUMINURIA, WITH LONG-TERM CURRENT USE OF INSULIN (H): ICD-10-CM

## 2018-02-14 DIAGNOSIS — E03.8 OTHER SPECIFIED HYPOTHYROIDISM: ICD-10-CM

## 2018-02-14 DIAGNOSIS — R80.9 TYPE 2 DIABETES MELLITUS WITH MICROALBUMINURIA, WITH LONG-TERM CURRENT USE OF INSULIN (H): ICD-10-CM

## 2018-02-14 DIAGNOSIS — E11.29 TYPE 2 DIABETES MELLITUS WITH MICROALBUMINURIA, WITH LONG-TERM CURRENT USE OF INSULIN (H): ICD-10-CM

## 2018-02-14 DIAGNOSIS — I10 HYPERTENSION, ESSENTIAL: ICD-10-CM

## 2018-02-14 ASSESSMENT — ENCOUNTER SYMPTOMS
COUGH: 0
VOMITING: 0
FATIGUE: 0
DIARRHEA: 0
CHILLS: 0
FEVER: 0
CONSTIPATION: 0
HEMATURIA: 0
DYSURIA: 0
HEADACHES: 0
COLOR CHANGE: 0
MYALGIAS: 0
SINUS PRESSURE: 0
WHEEZING: 0
ABDOMINAL DISTENTION: 0
ABDOMINAL PAIN: 0
ARTHRALGIAS: 0
NAUSEA: 0
APPETITE CHANGE: 0
SHORTNESS OF BREATH: 0
SORE THROAT: 0
WEAKNESS: 0
BLOOD IN STOOL: 0

## 2018-02-14 ASSESSMENT — ANXIETY QUESTIONNAIRES
5. BEING SO RESTLESS THAT IT IS HARD TO SIT STILL: NOT AT ALL
IF YOU CHECKED OFF ANY PROBLEMS ON THIS QUESTIONNAIRE, HOW DIFFICULT HAVE THESE PROBLEMS MADE IT FOR YOU TO DO YOUR WORK, TAKE CARE OF THINGS AT HOME, OR GET ALONG WITH OTHER PEOPLE: SOMEWHAT DIFFICULT
1. FEELING NERVOUS, ANXIOUS, OR ON EDGE: SEVERAL DAYS
2. NOT BEING ABLE TO STOP OR CONTROL WORRYING: SEVERAL DAYS
6. BECOMING EASILY ANNOYED OR IRRITABLE: SEVERAL DAYS
3. WORRYING TOO MUCH ABOUT DIFFERENT THINGS: SEVERAL DAYS
7. FEELING AFRAID AS IF SOMETHING AWFUL MIGHT HAPPEN: SEVERAL DAYS
GAD7 TOTAL SCORE: 6

## 2018-02-14 ASSESSMENT — PATIENT HEALTH QUESTIONNAIRE - PHQ9: 5. POOR APPETITE OR OVEREATING: SEVERAL DAYS

## 2018-02-14 NOTE — PROGRESS NOTES
Preceptor Attestation:   Patient seen and discussed with the resident. Assessment and plan reviewed with resident and agreed upon.  Supervising Physician:  Madelaine Linares MD  [facility]

## 2018-02-14 NOTE — PROGRESS NOTES
HPI:       Pniky Page is a 67 year old who presents for the following  Patient presents with:  RECHECK    Had tooth pulled recently.     Diabetes Follow-up    Patient is checking blood sugars: twice daily.    Blood sugar testing frequency justification: On insulin, frequency appropriate   Results are as follows:         am -          suppertime - 110-193         -Last A1C was   Lab Results   Component Value Date    A1C 6.4 12/14/2017    A1C 6.3 09/12/2017    A1C 6.9 06/22/2017    A1C 6.6 03/21/2017    A1C 7.0 12/08/2016        Diabetic concerns: other - lower BS in AM as low as 78.     Chest Pain or exercise related calf pain (claudication):no     Symptoms of hypoglycemia (low blood sugar): none     Paresthesias (numbness or burning in feet) or sores: No     Diabetic eye exam within the last year?: Yes  ,   Hypertension Follow-up      Outpatient blood pressures are being checked at home.  Results are SBP 130s.    Chest Pain? :No     Low Salt Diet: no added salt    Daily NSAID Use?No     Did patient take their HTN pills today/last night as usual?  Yes     Hypothyroidism Follow-up      Since last visit, patient describes the following symptoms: Weight stable, no hair loss, no skin changes, no constipation, no loose stools      Adherence and Exercise  Medication side effects: no  How often is a medication missed? Never  Exercise:walking  and biking 6-7 days/week for an average of 45-60 minutes        Problem, Medication and Allergy Lists were   reviewed and are current.     Patient Active Problem List    Diagnosis Date Noted     Solitary kidney, congenital 06/07/2013     Priority: High     CKD (chronic kidney disease) stage 2, GFR 60-89 ml/min 03/01/2013     Priority: High     Lives in group home 02/27/2013     Priority: High     [place] [facility]  Mantoux done in 9/2013 was negative.        Hypercholesteremia 10/05/2012     Priority: High     ASCVD  Risk Calculator (pooled cohort)    10 CV risk 17.5%   Recommended Therapy:High Intensity Statin (atorvastatin 40*-80 mg or rosuvastatin 20-40mg)               Morbid obesity due to excess calories (H) 10/02/2012     Priority: High     Follows with Dr. Marcos Magdaleno Q 2months for weight management.       Personal history of breast cancer s/p L masectomy 10/02/2012     Priority: High     May 24, 2013 S/p left mastectomy 1996; follows with Dr. Avila, last mammogram 5/2012 was benign. Rec annual mammogram.       Diabetes mellitus type 2, insulin dependent (H) 10/02/2012     Priority: High     July 27, 2013   Insulin dependent since 4/2006  Without history of retinopathy       Hypertension, essential      Priority: High     Hypothyroidism      Priority: High     On replacement.       ANUJ (obstructive sleep apnea)      Priority: High     Re-eval 2017 Moderate ANUJ, CPAP trial at 13 August 28, 2013   Follows with Dr. Mc.   On 4L O2 at night given CPAP intolerant.  PSG (10/21/07): AHI 39 with oxygen saturations to 71%.        CKD (chronic kidney disease) stage 1, GFR 90 ml/min or greater 06/21/2017     Priority: Medium     Microalbuminuria due to type 2 diabetes mellitus (H) 03/24/2017     Priority: Medium     3/21/17  Creatinine Urine 54   Albumin Urine mg/L 18   Albumin Urine mg/g Cr 32.47 (H)          Type 2 diabetes mellitus with microalbuminuria, with long-term current use of insulin (H) 03/24/2017     Priority: Medium     Diabetes mellitus, type II, insulin dependent (H) 03/24/2017     Priority: Medium     Nail complaint 02/03/2017     Priority: Medium     vertical nail ridges       Hx of psychiatric care 07/14/2016     Priority: Medium       PAST PSYCH MED TRIALS     sertaline   amitriptyline   lithium   risperidone   olanzapine   trazodone   clonazepam  Quetiapine (weight gain)       Psychological factors affecting medical condition 04/18/2016     Priority: Medium     Major depressive disorder, recurrent episode, moderate (H)  11/16/2015     Priority: Medium     Health Care Home 10/01/2015     Priority: Medium     Schizoaffective disorder, depressive type (H) 06/08/2015     Priority: Medium     Carpal tunnel syndrome of left wrist 05/28/2015     Priority: Medium     Generalized anxiety disorder 12/04/2014     Priority: Medium     Diagnosis updated by automated process. Provider to review and confirm.       Candidiasis of skin 11/04/2014     Priority: Medium     Chronic candidiasis of groin and labia        Hypertriglyceridemia 09/10/2014     Priority: Medium     Impingement syndrome of right shoulder 06/10/2014     Priority: Medium     S/P hysterectomy 04/20/2014     Priority: Medium     Extrapyramidal and movement disorder 02/27/2013     Priority: Medium     Cardiomegaly      Priority: Medium     Chronic constipation      Priority: Medium     Dry eye syndrome      Priority: Medium     Esophageal reflux      Priority: Medium     Exposure keratoconjunctivitis      Priority: Medium     DM ophthalmopathy (H)      Priority: Medium     Senile cataract      Priority: Medium     Postmenopausal atrophic vaginitis      Priority: Medium     Restless leg syndrome      Priority: Medium     Squamous blepharitis      Priority: Medium     Urge incontinence 04/19/2011     Priority: Medium     Allergic rhinitis due to pollen      Priority: Medium     seasonal allergies      ,     Current Outpatient Prescriptions   Medication Sig Dispense Refill     insulin glargine (LANTUS) 100 UNIT/ML injection Inject 20 Units Subcutaneous At Bedtime 8 mL 11     LORazepam (ATIVAN) 1 MG tablet Take 1 tablet (1 mg) by mouth At Bedtime .  May take additional 1mg daily PRN for agitation. 35 tablet 1     artificial tears (AKWA TEARS) OINT ophthalmic ointment Apply  to eye. Place 0.5 inches into both eyes at bedtime 1 Tube 11     melatonin 3 MG tablet Take 1 tablet (3 mg) by mouth nightly as needed for sleep 60 tablet 1     alum & mag hydroxide-simethicone (MYLANTA/MAALOX)  200-200-20 MG/5ML SUSP suspension Take 30 mLs by mouth daily as needed for indigestion       artificial saliva (BIOTENE MT) AERS spray Take 1 spray by mouth 3 times daily as needed for dry mouth       FLUoxetine (PROZAC) 40 MG capsule Take 1 capsule (40 mg) by mouth daily 30 capsule 5     ziprasidone (GEODON) 40 MG capsule Take 1 capsule (40 mg) by mouth 2 times daily (with meals) 60 capsule 5     exenatide (BYETTA) 10 MCG/0.04ML injection Inject 10 mcg Subcutaneous 2 times daily (before meals) 1 Syringe 11     amLODIPine (NORVASC) 5 MG tablet Take 2 tablets (10 mg) by mouth daily 30 tablet 3     nystatin (MYCOSTATIN) 719046 UNIT/GM POWD Apply topically 3 times daily as needed 60 g 1     clotrimazole (LOTRIMIN) 1 % cream Apply topically 2 times daily as needed 50 g 0     fluticasone (FLONASE) 50 MCG/ACT nasal spray Spray 1 spray into both nostrils daily 16 g 3     furosemide (LASIX) 20 MG tablet Take 1 tablet (20 mg) by mouth 2 times daily 60 tablet 3     acetaminophen (TYLENOL) 500 MG tablet Take 2 tablets (1,000 mg) by mouth every 6 hours as needed 1 Bottle 2     solifenacin (VESICARE) 10 MG tablet Take 1 tablet (10 mg) by mouth daily 30 tablet 6     atorvastatin (LIPITOR) 40 MG tablet Take 1 tablet (40 mg) by mouth daily 90 tablet 1     calcium polycarbophil (FIBERCON) 625 MG tablet Take 2 tablets by mouth daily       levothyroxine (SYNTHROID, LEVOTHROID) 175 MCG tablet Take 1 tablet (175 mcg) by mouth daily Give on empty stomach 30 tablet 4     metFORMIN (GLUCOPHAGE-XR) 500 MG 24 hr tablet Take 2 tablets (1,000 mg) by mouth 2 times daily (with meals) (Patient taking differently: Take 2,000 mg by mouth daily ) 90 tablet 3     losartan (COZAAR) 100 MG tablet Take 1 tablet by mouth daily. 90 tablet 1     Hypromellose (ARTIFICIAL TEARS OP) Apply 1 drop to eye 4 times daily.       Gabapentin (NEURONTIN PO) Take 900 mg by mouth 3 times daily.       senna-docusate (SENNA S) 8.6-50 MG per tablet Take 2 tablets by mouth  At Bedtime.       polyethylene glycol (MIRALAX/GLYCOLAX) powder Take 1 capful by mouth 2 times daily. 17 GM PO BID       Skin Protectants, Misc. (EUCERIN) cream Apply  topically as needed. Apply to thigh PRN dry skin        Saline 0.9 % SOLN Spray 2 sprays in nostril as needed.       Calcium Carbonate-Vitamin D (CALCIUM-VITAMIN D) 250-125 MG-UNIT TABS Take 1 tablet by mouth 2 times daily. Calcium 250 mg/Vit D 125 IU       Sod Fluor-Solo Fluor-Hydrfl Ac (GEL-SASHA DENTINBLOC DT) Apply  to affected area. GEL. Apply to 2nd molar on bottom right daily at bedtime.       lactase (LACTAID) 3000 UNIT tablet Take 4 tabs daily with meals.       Magnesium Hydroxide (MILK OF MAGNESIA PO) Take  by mouth. Take 30 mL as needed for constipation.       CLARITIN 10 MG OR TABS 1 TAB PO QD (Once per day) as needed for ALLERGY SYMPTOMS 30 11     ASPIRIN 81 MG OR TABS Take 1 tablet by mouth daily. ENTERIC COATED. 100 3     glucagon (GLUCAGON EMERGENCY) 1 MG kit Inject 1 mg into the muscle once for 1 dose 2 mg 3     [DISCONTINUED] insulin glargine (LANTUS) 100 UNIT/ML injection Inject 22 Units Subcutaneous At Bedtime 8 mL 11     blood glucose monitoring (NO BRAND SPECIFIED) test strip Use to test blood sugar 3 times daily or as directed. 100 each 3   ,     Allergies   Allergen Reactions     Chlordiazepoxide Hcl      Dimetapp Dm Cold-Cough      Cold/Congetion TABS     Haldol      Ibuprofen      TABS     Lactose Intolerance [Beta-Galactosidase]      CAPS     Milk Products      Propofol      EMUL     Patient is an established patient of this clinic.         Review of Systems:   Review of Systems   Constitutional: Negative for appetite change, chills, fatigue and fever.   HENT: Negative for congestion, sinus pressure and sore throat.    Eyes: Negative for visual disturbance.   Respiratory: Negative for cough, shortness of breath and wheezing.    Cardiovascular: Negative for chest pain and leg swelling.   Gastrointestinal: Negative for abdominal  distention, abdominal pain, blood in stool, constipation, diarrhea, nausea and vomiting.   Genitourinary: Negative for dysuria and hematuria.   Musculoskeletal: Negative for arthralgias and myalgias.   Skin: Negative for color change and rash.   Neurological: Negative for weakness and headaches.             Physical Exam:     Patient Vitals for the past 24 hrs:   BP Temp Temp src Pulse Resp SpO2 Weight   02/14/18 0946 157/76 97.8  F (36.6  C) Oral 74 16 96 % 236 lb (107 kg)     Body mass index is 40.51 kg/(m^2).  Vital signs normal except elevated, including on repeat.     Physical Exam   Constitutional: She is oriented to person, place, and time. She appears well-developed and well-nourished. No distress.   HENT:   Head: Normocephalic and atraumatic.   Nose: Nose normal.   Mouth/Throat: Oropharynx is clear and moist.   Eyes: Conjunctivae are normal. Right eye exhibits no discharge. Left eye exhibits no discharge. No scleral icterus.   Neck: Normal range of motion. Neck supple. No thyromegaly present.   Cardiovascular: Normal rate, regular rhythm and normal heart sounds.    No murmur heard.  Pulmonary/Chest: Effort normal and breath sounds normal. No respiratory distress. She has no wheezes. She has no rales.   Musculoskeletal: She exhibits edema (trace bilateral).   Lymphadenopathy:     She has no cervical adenopathy.   Neurological: She is alert and oriented to person, place, and time.   Skin: Skin is warm. No rash noted. She is not diaphoretic.         Results:     No investigations this encounter    Assessment and Plan     Pinky Page is a 65 yo female with a PMH of HTN, CKD, DM2, hypothyroid, s/p breast CA with left masectomy 1996, and significant psych history who currently lives in group home who presents to the clinic for HTN, hypothyroid and DM follow up. She has been having some lower blood sugars in the morning. Is increasing her activity for weight loss and has lost ~7 pounds since 1/8/2018. She  continues to have elevated blood pressures in the clinic with normal blood pressures at home. Discussed that since she is continuing to loss weight, increasing her cardiovascular activity, and changing her diet, will not change her medications at this time. Will recheck her blood pressure in 3 months and review home blood pressures at next visit. At that time, will see if there it is necessary to change her BP meds. She is currently as max dose for amlodipine and losartan, also taking lasix 20mg BID. Could consider starting chlorthalidone 25mg daily and stopping lasix in the future.     1. Type 2 diabetes mellitus with microalbuminuria, with long-term current use of insulin (H)  - insulin glargine (LANTUS) 100 UNIT/ML injection; Inject 20 Units Subcutaneous At Bedtime  Dispense: 8 mL; Refill: 11  - will decrease lantus to 20U at bedtime and continue to check BS twice a day.   - advised [facility] staff to contact me if she continues to have AM lows.   - likely changing due to her weight loss and increasing activity.     2. Morbid obesity due to excess calories (H)  - continues to follow the weight loss clinic. Doing well.    3. Hypertension, essential  - continues to have elevated BP in the clinic  - will not change any medications at this time.   - follow up in 3 months, review home BP and then possibly change meds    4. Other specified hypothyroidism  - no acute issues  - recheck TSH in 9/2018.       Medications Discontinued During This Encounter   Medication Reason     insulin glargine (LANTUS) 100 UNIT/ML injection Reorder     Options for treatment and follow-up care were reviewed with the patient. Pinky Page  engaged in the decision making process and verbalized understanding of the options discussed and agreed with the final plan.    Shamika Kelley MD  [facility]  [phone]

## 2018-02-14 NOTE — TELEPHONE ENCOUNTER
The nurse called back from [facility] with a BP reading of 162/84 for the patient around 1230pm today.  This is just an FYI only need a call back if something needs to be done for the patient.

## 2018-02-14 NOTE — MR AVS SNAPSHOT
After Visit Summary   2/14/2018    Pinky Page    MRN: [id number]           Patient Information     Date Of Birth          1950        Visit Information        Provider Department      2/14/2018 10:20 AM Shamika Dia MD [facility]        Today's Diagnoses     Morbid obesity due to excess calories (H)    -  1    Type 2 diabetes mellitus with microalbuminuria, with long-term current use of insulin (H)        Hypertriglyceridemia        Hypertension, essential          Care Instructions    Here is the plan from today's visit    1. Type 2 diabetes mellitus with microalbuminuria, with long-term current use of insulin (H)  - insulin glargine (LANTUS) 100 UNIT/ML injection; Inject 20 Units Subcutaneous At Bedtime  Dispense: 8 mL; Refill: 11  - We will decrease your Lantus from 22U to 20U    2. Morbid obesity due to excess calories (H)  - You are doing a great job! You are down 7 pounds since 1/8/2018    3. Hypothyroidism  - no changes today. The next time we need to check your thyroid is 09/2018.     4. Hypertension, essential  - your repeat blood pressure is slightly elevated. Have the staff recheck your blood pressure today. If it continues to be >150/90, have the staff contact me. I will not change any medications at this time. As you increase your activity, your blood pressure should go down. Just remember to bring in the recorded blood pressures at your next visit.   - continue to have your blood pressure checked twice a week.     Please call or return to clinic if your symptoms don't go away.    Follow up plan  Please make a clinic appointment for follow up with me (SHAMIKA DIA) in 3  months for Blood pressure, diabetes follow up.    Thank you for coming to [facility] today.  Lab Testing:  **If you had lab testing today and your results are reassuring or normal they will be mailed to you or sent through [facility] within 7 days.   **If the lab tests need  quick action we will call you with the results.  The phone number we will call with results is # [phone] (home) [phone] (work). If this is not the best number please call our clinic and change the number.  Medication Refills:  If you need any refills please call your pharmacy and they will contact us.   If you need to  your refill at a new pharmacy, please contact the new pharmacy directly. The new pharmacy will help you get your medications transferred faster.   Scheduling:  If you have any concerns about today's visit or wish to schedule another appointment please call our office during normal business hours [phone] (8-5:00 M-F)  If a referral was made to a [facility] and you don't get a call from central scheduling please call [phone].  If a Mammogram was ordered for you at [facility] call [phone] to schedule or change your appointment.  If you had an XRay/CT/Ultrasound/MRI ordered the number is [phone] to schedule or change your radiology appointment.   Medical Concerns:  If you have urgent medical concerns please call [phone] at any time of the day.            Follow-ups after your visit        Follow-up notes from your care team     Return in about 3 months (around 5/14/2018).      Your next 10 appointments already scheduled     Feb 26, 2018 12:45 PM CST   Adult Med Follow UP with Joana De Leon MD   Psychiatry Clinic ([facility])    [address]  [address] [place] [id number] [phone]            Mar 19, 2018  1:00 PM CDT   (Arrive by 12:45 PM)   Return Visit with Damon Gr, PhD [facility] ([facility])    [address]  [address] [facility] [id number] [phone]            May 10, 2018 11:30 AM CDT   Return Sleep Patient with Brianda Reddy MD   [facility] Sleep Study ([facility])    [address] [place] [address]   [phone]            2018 11:30 AM CDT   (Arrive by 11:15 AM)   Return Visit with Marcos Magdaleno MD   [facility] ([facility])    [address]  [address] [facility] [address]   [phone]            Nov 15, 2018  1:30 PM CST   RETURN GENERAL with Michael Lopez MD   Eye Clinic ([facility])    [address]  [facility] [address] [phone]              Who to contact     Please call your clinic at [phone] to:    Ask questions about your health    Make or cancel appointments    Discuss your medicines    Learn about your test results    Speak to your doctor            Additional Information About Your Visit        MyChart Information     [employer] is an electronic gateway that provides easy, online access to your medical records. With [facility], you can request a clinic appointment, read your test results, renew a prescription or communicate with your care team.     To sign up for [employer] visit the website at [email or web address]   You will be asked to enter the access code listed below, as well as some personal information. Please follow the directions to create your username and password.     Your access code is: [email or web address]  Expires: 2018  2:00 PM     Your access code will  in 90 days. If you need help or a new code, please contact your [facility] or call [phone] for assistance.        Care EveryWhere ID     This is your Care EveryWhere ID. This could be used by other organizations to access your [facility] medical records  [id number]        Your Vitals Were     Pulse Temperature Respirations Pulse Oximetry BMI (Body Mass Index)       74 97.8  F (36.6  C) (Oral) 16 96% 40.51 kg/m2        Blood Pressure from Last 3 Encounters:    02/14/18 157/76   01/08/18 149/84   01/08/18 159/68    Weight from Last 3 Encounters:   02/14/18 236 lb (107 kg)   02/13/18 233 lb 3.2 oz (105.8 kg)   01/08/18 240 lb 12.8 oz (109.2 kg)              Today, you had the following     No orders found for display         Today's Medication Changes          These changes are accurate as of 2/14/18 10:28 AM.  If you have any questions, ask your nurse or doctor.               These medicines have changed or have updated prescriptions.        Dose/Directions    insulin glargine 100 UNIT/ML injection   Commonly known as:  LANTUS   This may have changed:  how much to take   Used for:  Type 2 diabetes mellitus with microalbuminuria, with long-term current use of insulin (H)   Changed by:  Shamika Kelley MD        Dose:  20 Units   Inject 20 Units Subcutaneous At Bedtime   Quantity:  8 mL   Refills:  11       metFORMIN 500 MG 24 hr tablet   Commonly known as:  GLUCOPHAGE-XR   This may have changed:    - how much to take  - when to take this   Used for:  Diabetes mellitus, type 2 (H)        Dose:  1000 mg   Take 2 tablets (1,000 mg) by mouth 2 times daily (with meals)   Quantity:  90 tablet   Refills:  3                Primary Care Provider Office Phone # Fax #    Shamika Kelley [phone]       [facility] [address] [facility] [address]        Equal Access to Services     JAYDEN STUART AH: Hadii james bermudez Soemre, waaxda luqadaha, qaybta kaalmada sungda, autumn caldwell . So [facility] [phone].    ATENCIÓN: Si habla español, tiene a deluca disposición servicios gratuitos de asistencia lingüística. Llame al [phone].    We comply with applicable federal civil rights laws and [place] laws. We do not discriminate on the basis of race, color, national origin, age, disability, sex, sexual orientation, or gender identity.            Thank you!     Thank you for choosing [facility]  [facility]  for your care. Our goal is always to provide you with excellent care. Hearing back from our patients is one way we can continue to improve our services. Please take a few minutes to complete the written survey that you may receive in the mail after your visit with us. Thank you!             Your Updated Medication List - Protect others around you: Learn how to safely use, store and throw away your medicines at www.disposemymeds.org.          This list is accurate as of 2/14/18 10:28 AM.  Always use your most recent med list.                   Brand Name Dispense Instructions for use Diagnosis    acetaminophen 500 MG tablet    TYLENOL    1 Bottle    Take 2 tablets (1,000 mg) by mouth every 6 hours as needed    Sprain of left ankle, unspecified ligament, initial encounter       alum & mag hydroxide-simethicone 200-200-20 MG/5ML Susp suspension    MYLANTA/MAALOX     Take 30 mLs by mouth daily as needed for indigestion        amLODIPine 5 MG tablet    NORVASC    30 tablet    Take 2 tablets (10 mg) by mouth daily    Essential hypertension with goal blood pressure less than 130/85       artificial saliva Aers spray      Take 1 spray by mouth 3 times daily as needed for dry mouth        artificial tears Oint ophthalmic ointment     1 Tube    Apply  to eye. Place 0.5 inches into both eyes at bedtime    Insufficiency of tear film of both eyes       ARTIFICIAL TEARS OP      Apply 1 drop to eye 4 times daily.        aspirin 81 MG tablet     100    Take 1 tablet by mouth daily. ENTERIC COATED.        atorvastatin 40 MG tablet    LIPITOR    90 tablet    Take 1 tablet (40 mg) by mouth daily    Hyperlipidemia LDL goal <100       blood glucose monitoring test strip    no brand specified    100 each    Use to test blood sugar 3 times daily or as directed.    Type 2 diabetes mellitus with microalbuminuria, with long-term current use of insulin (H)       calcium polycarbophil 625 MG tablet    FIBERCON     Take 2 tablets by  mouth daily        calcium-vitamin D 250-125 MG-UNIT Tabs      Take 1 tablet by mouth 2 times daily. Calcium 250 mg/Vit D 125 IU        CLARITIN 10 MG tablet   Generic drug:  loratadine     30    1 TAB PO QD (Once per day) as needed for ALLERGY SYMPTOMS        clotrimazole 1 % cream    LOTRIMIN    50 g    Apply topically 2 times daily as needed    Dermatophytosis       eucerin cream      Apply  topically as needed. Apply to thigh PRN dry skin        exenatide 10 MCG/0.04ML injection    BYETTA    1 Syringe    Inject 10 mcg Subcutaneous 2 times daily (before meals)    Type 2 diabetes mellitus with microalbuminuria, with long-term current use of insulin (H)       FLUoxetine 40 MG capsule    PROzac    30 capsule    Take 1 capsule (40 mg) by mouth daily    Schizoaffective disorder, depressive type (H)       fluticasone 50 MCG/ACT spray    FLONASE    16 g    Spray 1 spray into both nostrils daily    Seasonal allergic rhinitis       furosemide 20 MG tablet    LASIX    60 tablet    Take 1 tablet (20 mg) by mouth 2 times daily    Lower leg edema       GEL-SASHA DENTINBLOC DT      Apply  to affected area. GEL. Apply to 2nd molar on bottom right daily at bedtime.        glucagon 1 MG kit    GLUCAGON EMERGENCY    2 mg    Inject 1 mg into the muscle once for 1 dose    Type 2 diabetes mellitus with microalbuminuria, with long-term current use of insulin (H)       insulin glargine 100 UNIT/ML injection    LANTUS    8 mL    Inject 20 Units Subcutaneous At Bedtime    Type 2 diabetes mellitus with microalbuminuria, with long-term current use of insulin (H)       LACTAID 3000 UNIT tablet   Generic drug:  lactase      Take 4 tabs daily with meals.        levothyroxine 175 MCG tablet    SYNTHROID/LEVOTHROID    30 tablet    Take 1 tablet (175 mcg) by mouth daily Give on empty stomach    Other specified hypothyroidism       LORazepam 1 MG tablet    ATIVAN    35 tablet    Take 1 tablet (1 mg) by mouth At Bedtime .  May take additional 1mg  daily PRN for agitation.    Generalized anxiety disorder       losartan 100 MG tablet    COZAAR    90 tablet    Take 1 tablet by mouth daily.    Hypertension goal BP (blood pressure) < 130/80, Diabetes mellitus type 2, insulin dependent (H), CKD (chronic kidney disease) stage 2, GFR 60-89 ml/min       melatonin 3 MG tablet     60 tablet    Take 1 tablet (3 mg) by mouth nightly as needed for sleep    Other insomnia       metFORMIN 500 MG 24 hr tablet    GLUCOPHAGE-XR    90 tablet    Take 2 tablets (1,000 mg) by mouth 2 times daily (with meals)    Diabetes mellitus, type 2 (H)       MILK OF MAGNESIA PO      Take  by mouth. Take 30 mL as needed for constipation.        NEURONTIN PO      Take 900 mg by mouth 3 times daily.        nystatin 139250 UNIT/GM Powd    MYCOSTATIN    60 g    Apply topically 3 times daily as needed    Candidiasis of skin       polyethylene glycol powder    MIRALAX/GLYCOLAX     Take 1 capful by mouth 2 times daily. 17 GM PO BID        saline 0.9 % Soln      Spray 2 sprays in nostril as needed.        SENNA S 8.6-50 MG per tablet   Generic drug:  senna-docusate      Take 2 tablets by mouth At Bedtime.        solifenacin 10 MG tablet    VESICARE    30 tablet    Take 1 tablet (10 mg) by mouth daily    Urge incontinence       ziprasidone 40 MG capsule    GEODON    60 capsule    Take 1 capsule (40 mg) by mouth 2 times daily (with meals)    Schizoaffective disorder, depressive type (H)

## 2018-02-15 ASSESSMENT — ANXIETY QUESTIONNAIRES: GAD7 TOTAL SCORE: 6

## 2018-02-15 ASSESSMENT — PATIENT HEALTH QUESTIONNAIRE - PHQ9: SUM OF ALL RESPONSES TO PHQ QUESTIONS 1-9: 7

## 2018-02-16 NOTE — TELEPHONE ENCOUNTER
Received a call from Tia Anthony following up on the status of the physicians order form.  Advised her that it has been faxed.  Verified that the form that was faxed was what she needed, which she said it is.  She reported that this form was faxed back in March 2017 but has not been received.  They will be sending another form for this year sometime in the next two weeks.  Provided writer's direct line if she needs to call.

## 2018-02-16 NOTE — TELEPHONE ENCOUNTER
Late entry for 2/13/18:  Faxed physician orders form to [facility] pharmacy at [phone].    Sent to scanning.

## 2018-02-22 ENCOUNTER — CARE COORDINATION (OUTPATIENT)
Dept: PSYCHIATRY | Facility: CLINIC | Age: 68
End: 2018-02-22

## 2018-02-22 ENCOUNTER — MEDICAL CORRESPONDENCE (OUTPATIENT)
Dept: HEALTH INFORMATION MANAGEMENT | Facility: CLINIC | Age: 68
End: 2018-02-22

## 2018-02-26 ENCOUNTER — OFFICE VISIT (OUTPATIENT)
Dept: PSYCHIATRY | Facility: CLINIC | Age: 68
End: 2018-02-26
Attending: PSYCHIATRY & NEUROLOGY
Payer: COMMERCIAL

## 2018-02-26 VITALS
WEIGHT: 237 LBS | DIASTOLIC BLOOD PRESSURE: 78 MMHG | SYSTOLIC BLOOD PRESSURE: 144 MMHG | HEART RATE: 78 BPM | BODY MASS INDEX: 40.68 KG/M2

## 2018-02-26 DIAGNOSIS — F25.1 SCHIZOAFFECTIVE DISORDER, DEPRESSIVE TYPE (H): ICD-10-CM

## 2018-02-26 DIAGNOSIS — Z79.899 ENCOUNTER FOR LONG-TERM (CURRENT) USE OF MEDICATIONS: Primary | ICD-10-CM

## 2018-02-26 PROCEDURE — G0463 HOSPITAL OUTPT CLINIC VISIT: HCPCS | Mod: ZF

## 2018-02-26 RX ORDER — FLUOXETINE 40 MG/1
40 CAPSULE ORAL DAILY
Qty: 30 CAPSULE | Refills: 2 | Status: SHIPPED | OUTPATIENT
Start: 2018-02-26 | End: 2018-05-30

## 2018-02-26 RX ORDER — ZIPRASIDONE HYDROCHLORIDE 40 MG/1
40 CAPSULE ORAL 2 TIMES DAILY WITH MEALS
Qty: 60 CAPSULE | Refills: 2 | Status: SHIPPED | OUTPATIENT
Start: 2018-02-26 | End: 2018-05-30

## 2018-02-26 ASSESSMENT — PAIN SCALES - GENERAL: PAINLEVEL: NO PAIN (0)

## 2018-02-26 NOTE — PATIENT INSTRUCTIONS
-continue current medications, no changes  -complete lab work before next visit, this includes fasting lipid panel  -return to clinic in 6 weeks or sooner if needed

## 2018-02-26 NOTE — NURSING NOTE
Chief Complaint   Patient presents with     Recheck Medication     Schizoaffective disorder, depressive type      Reviewed allergies, medications, pharmacy and smoking status.  Administered abuse screening questions   Administered fall assessment  Obtained weight, pain level, blood pressure and heart rate

## 2018-02-26 NOTE — PROGRESS NOTES
"PSYCHIATRY CLINIC PROGRESS NOTE   The initial diagnostic evaluation was on 5/13/13 and the last transfer of care evaluation prior to today was 7/14/16.    Pertinent Background:  This patient first experienced mental health issues in her 20's and obtained care at this time and has received treatment for schizoaffective disorder and anxiety.  See transfer evaluation for detailed history.  Notably, has a history of having increasing psychotic experiences with antipsychotic taper.   Psych critical item history includes psychosis [sxs include disorganization and inability to care for self, ?hallucinations and paranoia], psych hosp (3-5) and ECT    INTERIM HISTORY                                                 Pinky Page is a 67 year old female who was last seen in clinic on 1/8/18 at which time no changes were made.  The patient reports good treatment adherence.  History was provided by patient and written summary of progress from [facility]. Patient was a good historian.  Since the last visit:  -has been tolerating the winter, no falls on the ice, happy she does not have to shovel the snow  -has been doing \"alright\" overall in regards to mood; will at times be crabby in the morning with other residents when they are making messes around medication time and not throwing away their waste but is working on this   -sleep continues to be difficult at times with her CPAP, 3-4 nights out of the week she will wake up and be awake for about 1 hour before able to fall back asleep and feels the next day is \"ruined\" due to being so sleepy and will often take longer naps which interferes with her schedule; did get a new nasal mask that she is hoping will be helpful as she is slowly getting adjusted to this   -getting ready to have her room be \"deep cleaned\" with shampooing her carpet, is working on organizing her room and is looking forward to this opportunity to get more organized  -going to the gym and biking 25 minutes " 2x per week and hoping to add another day to get up to 3 days per week, also doing PT exercises either in the gym or in her room; has not walked outside in the awhile due to the weather; reached her lowest weight in 12 years a few days ago, also working on making healthier food choices  -joined the choir at [facility] a few months ago, gave first concert on Feb 14th at a 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 And this has not changed in many years  -continues with monthly therapy that is helpful     RECENT SYMPTOMS:   DEPRESSION:  reports-depressed mood, anhedonia, low energy, insomnia , weight/ appetite change (increased overall, but has been relatively stable ), excessive guilt and poor concentration /memory;  DENIES- suicidal ideation  and psychomotor change observed by others (slowing or fidgeting)  MELVIN/HYPOMANIA:  reports-none;  DENIES- increased energy, decreased sleep need, increased activity and grandiosity  PSYCHOSIS:  reports-see HPI and none;  DENIES- delusions, auditory hallucinations and visual hallucinations  DYSREGULATION:  reports-none;  DENIES- suicidal ideation, violent ideation and SIB  PANIC ATTACK:  none   ANXIETY:  some nervousness and excitement around events  TRAUMA RELATED:  none  COMPULSIVE:  none  SLEEP:  as above    EATING DISORDER:  none    RECENT SUBSTANCE USE:     ALCOHOL- none          TOBACCO- none          CAFFEINE- 1 cup regular coffee per day and 3 decaf; 1 can diet coke per day        OPIOIDS- none            NARCAN KIT- N/A    CANNABIS- none         OTHER ILLICIT DRUGS- none     CURRENT SOCIAL HISTORY:  FINANCIAL SUPPORT- social security disability.     CHILDREN- none.     LIVING SITUATION- Living at [facility].      SOCIAL/ SPIRITUAL  SUPPORT- [facility] staff, other providers, other residents at [facility] and does attend [employer] occasionally.     FEELS SAFE AT HOME- Yes.    MEDICAL ROS:  Reports GI sxs [GERD, constipation--chronic] and easy bruising .  Denies weight changes [increase, decrease,  ], headache, sedation, sexual dysfunction [ ], tremor, confusion, dizziness, falls, excessive diaphoresis, muscle twitches, restlessness, bruxism, shiver and short term memory and/or word finding difficulty, akathisia, dystonia, apparent TD, muscle stiffness, Parkinsonian-type symptoms [shuffling gait, masked facies, stooped posture, speech change, writing change] and sialorrhea and slowed reaction time and withdrawal symptoms.    PSYCH and CD Critical Summary Points since July 2017           None    PAST PSYCH MED TRIALS   see EMR Problem List: Hx of psychiatric care    MEDICAL / SURGICAL HISTORY                                   CARE TEAM:          PCP- Dr. Kelley, at [facility]                   Therapist- Dr. Gr (monthly)    Patient Active Problem List   Diagnosis     Allergic rhinitis due to pollen     Urge incontinence     Hypertension, essential     Cardiomegaly     Chronic constipation     Dry eye syndrome     Esophageal reflux     Exposure keratoconjunctivitis     DM ophthalmopathy (H)     Hypothyroidism     Senile cataract     ANUJ (obstructive sleep apnea)     Postmenopausal atrophic vaginitis     Restless leg syndrome     Squamous blepharitis     Morbid obesity due to excess calories (H)     Personal history of breast cancer s/p L masectomy     Diabetes mellitus type 2, insulin dependent (H)     Hypercholesteremia     Lives in group home     Extrapyramidal and movement disorder     CKD (chronic kidney disease) stage 2, GFR 60-89 ml/min     Solitary kidney, congenital     S/P hysterectomy     Impingement syndrome of right shoulder     Hypertriglyceridemia     Candidiasis of skin     Generalized anxiety disorder     Carpal tunnel syndrome of  left wrist     Schizoaffective disorder, depressive type (H)     Health Care Home     Major depressive disorder, recurrent episode, moderate (H)     Psychological factors affecting medical condition     Hx of psychiatric care     Nail complaint     Microalbuminuria due to type 2 diabetes mellitus (H)     Type 2 diabetes mellitus with microalbuminuria, with long-term current use of insulin (H)     Diabetes mellitus, type II, insulin dependent (H)     CKD (chronic kidney disease) stage 1, GFR 90 ml/min or greater       ALLERGY                                Chlordiazepoxide hcl; Dimetapp dm cold-cough; Haldol; Ibuprofen; Lactose intolerance [beta-galactosidase]; Milk products; and Propofol  MEDICATIONS                               Current Outpatient Prescriptions   Medication Sig Dispense Refill     insulin glargine (LANTUS) 100 UNIT/ML injection Inject 20 Units Subcutaneous At Bedtime 8 mL 11     LORazepam (ATIVAN) 1 MG tablet Take 1 tablet (1 mg) by mouth At Bedtime .  May take additional 1mg daily PRN for agitation. 35 tablet 1     artificial tears (AKWA TEARS) OINT ophthalmic ointment Apply  to eye. Place 0.5 inches into both eyes at bedtime 1 Tube 11     melatonin 3 MG tablet Take 1 tablet (3 mg) by mouth nightly as needed for sleep 60 tablet 1     alum & mag hydroxide-simethicone (MYLANTA/MAALOX) 200-200-20 MG/5ML SUSP suspension Take 30 mLs by mouth daily as needed for indigestion       artificial saliva (BIOTENE MT) AERS spray Take 1 spray by mouth 3 times daily as needed for dry mouth       FLUoxetine (PROZAC) 40 MG capsule Take 1 capsule (40 mg) by mouth daily 30 capsule 5     ziprasidone (GEODON) 40 MG capsule Take 1 capsule (40 mg) by mouth 2 times daily (with meals) 60 capsule 5     blood glucose monitoring (NO BRAND SPECIFIED) test strip Use to test blood sugar 3 times daily or as directed. 100 each 3     exenatide (BYETTA) 10 MCG/0.04ML injection Inject 10 mcg Subcutaneous 2 times daily (before meals)  1 Syringe 11     amLODIPine (NORVASC) 5 MG tablet Take 2 tablets (10 mg) by mouth daily 30 tablet 3     nystatin (MYCOSTATIN) 281502 UNIT/GM POWD Apply topically 3 times daily as needed 60 g 1     clotrimazole (LOTRIMIN) 1 % cream Apply topically 2 times daily as needed 50 g 0     fluticasone (FLONASE) 50 MCG/ACT nasal spray Spray 1 spray into both nostrils daily 16 g 3     furosemide (LASIX) 20 MG tablet Take 1 tablet (20 mg) by mouth 2 times daily 60 tablet 3     acetaminophen (TYLENOL) 500 MG tablet Take 2 tablets (1,000 mg) by mouth every 6 hours as needed 1 Bottle 2     solifenacin (VESICARE) 10 MG tablet Take 1 tablet (10 mg) by mouth daily 30 tablet 6     atorvastatin (LIPITOR) 40 MG tablet Take 1 tablet (40 mg) by mouth daily 90 tablet 1     calcium polycarbophil (FIBERCON) 625 MG tablet Take 2 tablets by mouth daily       levothyroxine (SYNTHROID, LEVOTHROID) 175 MCG tablet Take 1 tablet (175 mcg) by mouth daily Give on empty stomach 30 tablet 4     metFORMIN (GLUCOPHAGE-XR) 500 MG 24 hr tablet Take 2 tablets (1,000 mg) by mouth 2 times daily (with meals) (Patient taking differently: Take 2,000 mg by mouth daily ) 90 tablet 3     losartan (COZAAR) 100 MG tablet Take 1 tablet by mouth daily. 90 tablet 1     Hypromellose (ARTIFICIAL TEARS OP) Apply 1 drop to eye 4 times daily.       Gabapentin (NEURONTIN PO) Take 900 mg by mouth 3 times daily.       senna-docusate (SENNA S) 8.6-50 MG per tablet Take 2 tablets by mouth At Bedtime.       polyethylene glycol (MIRALAX/GLYCOLAX) powder Take 1 capful by mouth 2 times daily. 17 GM PO BID       Skin Protectants, Misc. (EUCERIN) cream Apply  topically as needed. Apply to thigh PRN dry skin        Saline 0.9 % SOLN Spray 2 sprays in nostril as needed.       Calcium Carbonate-Vitamin D (CALCIUM-VITAMIN D) 250-125 MG-UNIT TABS Take 1 tablet by mouth 2 times daily. Calcium 250 mg/Vit D 125 IU       Sod Fluor-Solo Fluor-Hydrfl Ac (GEL-SASHA DENTINBLOC DT) Apply  to affected  area. GEL. Apply to 2nd molar on bottom right daily at bedtime.       lactase (LACTAID) 3000 UNIT tablet Take 4 tabs daily with meals.       Magnesium Hydroxide (MILK OF MAGNESIA PO) Take  by mouth. Take 30 mL as needed for constipation.       CLARITIN 10 MG OR TABS 1 TAB PO QD (Once per day) as needed for ALLERGY SYMPTOMS 30 11     ASPIRIN 81 MG OR TABS Take 1 tablet by mouth daily. ENTERIC COATED. 100 3     glucagon (GLUCAGON EMERGENCY) 1 MG kit Inject 1 mg into the muscle once for 1 dose 2 mg 3     VITALS   /78  Pulse 78  Wt 107.5 kg (237 lb)  BMI 40.68 kg/m2   MENTAL STATUS EXAM                                                           Alertness: alert  and oriented  Appearance: well groomed  Behavior/Demeanor: cooperative and pleasant, with good  eye contact   Speech: normal  Language: intact  Psychomotor: some abnormal movements noted very intermittently in tongue going to cheek, also some involuntary movement of toes   Mood: description consistent with euthymia  Affect: full range; was congruent to mood; was congruent to content  Thought Process/Associations: unremarkable  Thought Content:  Reports none;  Denies suicidal ideation , violent ideation  and psychotic thoughts   Perception:  Reports none;  Denies auditory hallucinations and visual hallucinations  Insight: good  Judgment: good  Cognition: does  appear grossly intact; formal cognitive testing was not done    LABS and DATA     RATING SCALES:  AIMS: done 7/6/17 with total score of 0  (see flowsheets); 1/8/18 DISCUS done with score of 5: abnormal movements noted in tongue going to cheek and movement in bilateral big toes--movements have been present for years and not changing/worsening in intensity     PHQ9 TODAY = 8  PHQ-9 SCORE 11/13/2017 12/14/2017 2/14/2018   Total Score - - -   Total Score 7 7 7       ANTIPSYCHOTIC LABS   [glu, A1C, lipids (focus LDL), liver enzymes, WBC, ANEU, Hgb, plts]    q12 mo  Recent Labs   Lab Test  12/14/17   1000   09/12/17   1404   06/21/17   1047  03/21/17   1023   GLC   --    --    --   134*  118*   A1C  6.4*  6.3*   < >   --   6.6*    < > = values in this interval not displayed.     Recent Labs   Lab Test  03/21/17   1023  02/22/16   1028   CHOL  142  162.0   TRIG  121  151.9*   LDL  78  95   HDL  40*  36.6*     Recent Labs   Lab Test  03/21/17   1023  01/22/15   1600   AST  13  18   ALT  20  35   ALKPHOS  92  113     Recent Labs   Lab Test  06/21/17   1047  03/21/17   1023  06/22/16   1035   07/04/14   0705   WBC   --   11.8*  9.9   --   7.7   ANEU   --   8.3   --    --   4.8   HGB  13.8  13.4  13.5   < >  13.3   PLT   --   266  266   --   223    < > = values in this interval not displayed.       DIAGNOSIS     Schizoaffective disorder, depressed type, multiple episodes, in partial remission to mild re: mood, with very minimal psychotic features.  Generalized anxiety disorder, well-managed  Persistent TD- stable and unchanging     ASSESSMENT                                   TODAY Pinky continues to report stability with her mental health and mood appears to be euthymic with expected fluctuations in mood and anxiety in context of stressors and she continues to easily identify coping strategies to utilize. No safety concerns. Continues to work on adjusting to using her CPAP, addition of melatonin and another mask change has been helpful. Again discussed possibility of reducing medications, including PRN dose of lorazepam since she has not been requiring this medication on very rare occasions, however, given that this is usually a difficult time of year for her she advocates for no medication changes today, which is reasonable. As previously mentioned, will continue to consider trial of reduced doses given long standing stability at future appointments with goal of simplifying medication regimen along with helping with any possible metabolic side effects given patient is working hard to lose weight and improve physical  health. Will order lab work to be completed in March. She will return in 6 weeks or sooner if needed.              [place] PRESCRIPTION MONITORING PROGRAM [] was not checked today: controlled substances monitored by [facility] staff,  has been previously checked with no indications of abuse/misuse of medications.    PSYCHOTROPIC DRUG INTERACTIONS:   ZIPRASIDONE and FLUOXETINE may result in increased risk of QT-interval prolongation and increased risk of serotonin syndrome.   ZIPRASIDONE, FLUOXETINE and VESICARE may result in increased risk of QT-interval prolongation.  ASPIRIN and SSRI may result in an increased risk of bleeding  LEVOTHYROXINE and FLUOXETINE may result in increased levothyroxine requirements.  MANAGEMENT:  Monitoring for adverse effects, periodic EKGs and patient is aware of risks     PLAN                                                                                                       1) PSYCHOTROPIC MEDICATIONS:   - continue fluoxetine 40mg daily   - continue ziprasidone 40mg BID with meals   - continue lorazepam 1mg QHS and 1mg PRN for agitation     2) THERAPY:  Continue with Dr. Gr monthly  TREATMENT PLAN   Date revised  -  11/13/17  Date next due- 11/13/18    3) LABS NEXT DUE:  AP labs due next in March 2018       RATING SCALES:  none needed    4) REFERRALS [MICD, medical etc.]:  none    5) MTM REFERRAL [PharmD]:  none    6) :  none    7) FAMILY MEETING:  none    8) RTC: 6 weeks    9) CRISIS NUMBERS:   Provided routinely in AVS   Especially emphasized:  [facility] [phone] (clinic)    [phone] (after hours)      PSYCHIATRY CLINIC INDIVIDUAL PSYCHOTHERAPY NOTE                                    16   Start time: 1250  End time: 1310  Subjective: This supportive psychotherapy session addressed issues related to goals of therapy and recent stressors including some recent conflicts with other residents. Patient's reaction: Action in the context of mental  status appropriate for ambulatory setting.  Progress: fair, but ongoing as goals not yet met   Plan: RTC 6 weeks  Psychotherapy services during this visit included myself and Pinky.   TREATMENT  PLAN          SYMPTOMS;PROBLEMS   MEASURABLE GOALS;    FUNCTIONAL IMPROVEMENT INTERVENTIONS;    GAINS MADE DISCHARGE CRITERIA   Depression: depressed mood   use physical activity to boost mood, wants goal of 45 mins of activity per day -go to fitness center appointments, has not been making these consistently (60% attendance currently)  Has been going about 30 minutes 2x/wk; also leading walkers group 20-25 minutes marked symptom improvement   Depression: insomnia    get 7-8 hours of restful sleep every night -continues to work on practicing sleep hygiene, calming activities (coloring avoiding tv), now averaging about 6-7 hours per night but this has been improving, goal to continue  marked symptom improvement     TREATMENT RISK STATEMENT:  The risks, benefits, alternatives and potential adverse effects have been discussed and are understood by the pt. The pt understands the risks of using street drugs or alcohol. There are no medical contraindications, the pt agrees to treatment with the ability to do so. The pt knows to call the clinic for any problems or to access emergency care if needed.  Medical and substance use concerns are documented above.  Psychotropic drug interaction check was done, including changes made today.    RESIDENT:   Joana De Leon DO    Patient staffed in clinic with Dr. Estrada who will sign the note.  Supervisor is Dr. Gordon.    Supervisor Attestation:  I saw the patient with the resident, and participated in key portions of the service, including the mental status examination and developing the plan of care. I reviewed key portions of the history with the resident. I agree with the findings and plan as documented in this note.  Cortez Estrada MD

## 2018-02-27 ASSESSMENT — PATIENT HEALTH QUESTIONNAIRE - PHQ9: SUM OF ALL RESPONSES TO PHQ QUESTIONS 1-9: 8

## 2018-03-01 ENCOUNTER — TELEPHONE (OUTPATIENT)
Dept: PSYCHIATRY | Facility: CLINIC | Age: 68
End: 2018-03-01

## 2018-03-01 NOTE — TELEPHONE ENCOUNTER
Joana DeL eon MD [place], Victoria, RN       Caller: Unspecified (Today,  2:46 PM)                     Yes, they can draw the labs at [facility]. I just ordered a fasting lipid panel as well. If they can take a verbal order for CBC with diff, CMP, HbA1c and fasting lipid then that is fine. If they need signed and faxed orders we can print those off tomorrow.   Thanks        Called Tia RN, to let her know that they can draw the labs.  She said that they do not need orders because they have a form that the patient takes to her visits which listed the following labs as having been ordered and the form is signed:    Fasting lipid panel  CBC with diff  CMP  HgbA1C    They will fax the results to [phone].    Will route to Dr. De Leon for FYI.

## 2018-03-01 NOTE — TELEPHONE ENCOUNTER
Labs ordered:  Hgb A1C  CBC with differential  CMP    Will route to Dr. De Leon for recommendation.

## 2018-03-19 ENCOUNTER — OFFICE VISIT (OUTPATIENT)
Dept: PSYCHOLOGY | Facility: CLINIC | Age: 68
End: 2018-03-19
Payer: COMMERCIAL

## 2018-03-19 VITALS — WEIGHT: 236 LBS | BODY MASS INDEX: 40.51 KG/M2

## 2018-03-19 DIAGNOSIS — F54 PSYCHOLOGICAL FACTORS AFFECTING MORBID OBESITY (H): ICD-10-CM

## 2018-03-19 DIAGNOSIS — E66.01 PSYCHOLOGICAL FACTORS AFFECTING MORBID OBESITY (H): ICD-10-CM

## 2018-03-19 DIAGNOSIS — F33.0 MAJOR DEPRESSIVE DISORDER, RECURRENT EPISODE, MILD (H): Primary | ICD-10-CM

## 2018-03-19 NOTE — PROGRESS NOTES
Health Psychology                  Clinic    Department of Medicine  Madelaine Ingram, Ph.D., L.P. [phone]                          [facility] Miryam Woods, Ph.D.,  L.P. [phone]                 3rd Floor, Clinic 3A  [place] Mail Code 744   Damon Gr, Ph.D., [email or web address]., L.P. [phone]     [address] Kimber Morales, Ph.D., L.P. [phone]  [place] [id number]  [place] [address]    Health Psychology Follow-Up Note    Ms. Page is a pleasant 67-year old woman with chronic depressive illness, who returns to clinic for supportive and behavioral psychotherapy for moderate recurrent depression and for help losing weight given her morbid obesity.     She is 236#, up from 233.2#  last session.    Wt Readings from Last 4 Encounters:   03/19/18 107 kg (236 lb)   02/26/18 107.5 kg (237 lb)   02/14/18 107 kg (236 lb)   02/13/18 105.8 kg (233 lb 3.2 oz)     At [facility], she continues to serve on the [employer].  She has been on the [employer] for 20 years, plus decorating the bulletin boards.  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    We discussed seeing if she could increase her shifts to 2/week.  She rates the depression as 4 on 10-point subjective scale, which is below baseline.  She is doing well.  She participates in activities at [place].  She is up to biking 28 minutes x2/week.  We discussed increasing to x3/week and gradulally increasing beyog a half hour..  She is doing more social walking some of the time and had apparently is gradually ramping up on her fitness center days.  She agrees to increase.  She is getting along with her roommate.  She will see her family on [facility].  Goal for exercise has been 1.5 hours/day as of June 19, 2017 and 2 lbs. / month.   We discussed increasing bicycling to 6 days per week and increasing gradually from 28 minutes to  30 minutes.    She arrived early today and was greeted in the waiting room.  Pinky participated fully and appeared to derive benefit.  Rapport was excellent.       IMPRESSION Distress Disability Risk    Low High Low     Time In: 1:04  Time Out:  1:46  Diagnosis:   Major Depression, recurrent mild (F33.0)   Psychological Factors Affecting Morbid Obesity (F54)      Plan: She will return on 4/10 @ 1:00 for behavioral and supportive psychotherapy.   Plan to to bicycle for > 20 minutes some days and eat less.  Tx plan due 4/11/2018  Damon Gr, Ph.D., A.B.P.P., L.P.

## 2018-03-19 NOTE — MR AVS SNAPSHOT
After Visit Summary   3/19/2018    Pinky Page    MRN: [id number]           Patient Information     Date Of Birth          1950        Visit Information        Provider Department      3/19/2018 1:00 PM Damon Gr, PhD [facility]        Today's Diagnoses     Major depressive disorder, recurrent episode, mild (H)    -  1    Psychological factors affecting morbid obesity (H)           Follow-ups after your visit        Your next 10 appointments already scheduled     Apr 05, 2018 10:30 AM CDT   (Arrive by 10:15 AM)   MA SCREENING DIGITAL BILATERAL with [id number]   [facility] ([facility])    [address] [place] [address], [address]  [facility] [id number] [phone]           Do not use any powder, lotion or deodorant under your arms or on your breast. If you do, we will ask you to remove it before your exam.  Wear comfortable, two-piece clothing.  If you have any allergies, tell your care team.  Bring any previous mammograms from other facilities or have them mailed to the breast center.            Apr 09, 2018 12:45 PM CDT   Adult Med Follow UP with Joana De Leon MD   Psych[facility]ry Clinic ([facility])    [address]  [address] [id number] [phone]            May 10, 2018 11:30 AM CDT   Return Sleep Patient with Brianda Reddy MD   [facility] [place], Sleep Study ([facility])    [address] [facility] [id number] [phone]            Jul 09, 2018 11:30 AM CDT   (Arrive by 11:15 AM)   Return Visit with Marcos Magdaleno MD   [facility] ([facility])    [address]  4th [address]  [facility] [id number] [phone]            Nov 15, 2018  1:30 PM CST   RETURN GENERAL with Michael Lopez MD   Eye Clinic ([facility])    Domingo Iqbal  [address]  [address]  [facility] [address]   [phone]              Who to contact     Please call your clinic at [phone] to:    Ask questions about your health    Make or cancel appointments    Discuss your medicines    Learn about your test results    Speak to your doctor            Additional Information About Your Visit        MyChart Information     [employer] is an electronic gateway that provides easy, online access to your medical records. With [employer], you can request a clinic appointment, read your test results, renew a prescription or communicate with your care team.     To sign up for [employer] visit the website at [email or web address]   You will be asked to enter the access code listed below, as well as some personal information. Please follow the directions to create your username and password.     Your access code is: [email or web address]  Expires: 2018  3:00 PM     Your access code will  in 90 days. If you need help or a new code, please contact your [facility] or call [phone] for assistance.        Care EveryWhere ID     This is your Care EveryWhere ID. This could be used by other organizations to access your [facility] medical records  [id number]        Your Vitals Were     BMI (Body Mass Index)                   40.51 kg/m2            Blood Pressure from Last 3 Encounters:   18 144/78   18 151/78   18 149/84    Weight from Last 3 Encounters:   18 107 kg (236 lb)   18 107.5 kg (237 lb)   18 107 kg (236 lb)              Today, you had the following     No orders found for display         Today's Medication Changes          These changes are accurate as of 3/19/18  1:47 PM.  If you have any questions, ask your nurse or doctor.               These medicines have changed or have updated prescriptions.        Dose/Directions    metFORMIN 500 MG 24 hr tablet   Commonly known as:   GLUCOPHAGE-XR   This may have changed:    - how much to take  - when to take this   Used for:  Diabetes mellitus, type 2 (H)        Dose:  1000 mg   Take 2 tablets (1,000 mg) by mouth 2 times daily (with meals)   Quantity:  90 tablet   Refills:  3                Primary Care Provider Office Phone # Fax #    Shamika Ileana Kelley [phone]       [facility] [address] [facility] [address]        Equal Access to Services     JAYDEN STUART AH: Hadii aad ku hadasho Soomaali, waaxda luqadaha, qaybta kaalmada adeegyada, waxay idiin hayaan adeeg brigidaarash laanisa . So [facility] [phone].    ATENCIÓN: Si habla español, tiene a deluca disposición servicios gratuitos de asistencia lingüística. [facility] [phone].    We comply with applicable federal civil rights laws and [place] laws. We do not discriminate on the basis of race, color, national origin, age, disability, sex, sexual orientation, or gender identity.            Thank you!     Thank you for choosing [facility]  for your care. Our goal is always to provide you with excellent care. Hearing back from our patients is one way we can continue to improve our services. Please take a few minutes to complete the written survey that you may receive in the mail after your visit with us. Thank you!             Your Updated Medication List - Protect others around you: Learn how to safely use, store and throw away your medicines at www.disposemymeds.org.          This list is accurate as of 3/19/18  1:47 PM.  Always use your most recent med list.                   Brand Name Dispense Instructions for use Diagnosis    acetaminophen 500 MG tablet    TYLENOL    1 Bottle    Take 2 tablets (1,000 mg) by mouth every 6 hours as needed    Sprain of left ankle, unspecified ligament, initial encounter       alum & mag hydroxide-simethicone 200-200-20 MG/5ML Susp suspension    MYLANTA/MAALOX     Take 30 mLs by mouth daily as needed for  indigestion        amLODIPine 5 MG tablet    NORVASC    30 tablet    Take 2 tablets (10 mg) by mouth daily    Essential hypertension with goal blood pressure less than 130/85       artificial saliva Aers spray      Take 1 spray by mouth 3 times daily as needed for dry mouth        artificial tears Oint ophthalmic ointment     1 Tube    Apply  to eye. Place 0.5 inches into both eyes at bedtime    Insufficiency of tear film of both eyes       ARTIFICIAL TEARS OP      Apply 1 drop to eye 4 times daily.        aspirin 81 MG tablet     100    Take 1 tablet by mouth daily. ENTERIC COATED.        atorvastatin 40 MG tablet    LIPITOR    90 tablet    Take 1 tablet (40 mg) by mouth daily    Hyperlipidemia LDL goal <100       blood glucose monitoring test strip    no brand specified    100 each    Use to test blood sugar 3 times daily or as directed.    Type 2 diabetes mellitus with microalbuminuria, with long-term current use of insulin (H)       calcium polycarbophil 625 MG tablet    FIBERCON     Take 2 tablets by mouth daily        calcium-vitamin D 250-125 MG-UNIT Tabs      Take 1 tablet by mouth 2 times daily. Calcium 250 mg/Vit D 125 IU        CLARITIN 10 MG tablet   Generic drug:  loratadine     30    1 TAB PO QD (Once per day) as needed for ALLERGY SYMPTOMS        clotrimazole 1 % cream    LOTRIMIN    50 g    Apply topically 2 times daily as needed    Dermatophytosis       eucerin cream      Apply  topically as needed. Apply to thigh PRN dry skin        exenatide 10 MCG/0.04ML injection    BYETTA    1 Syringe    Inject 10 mcg Subcutaneous 2 times daily (before meals)    Type 2 diabetes mellitus with microalbuminuria, with long-term current use of insulin (H)       FLUoxetine 40 MG capsule    PROzac    30 capsule    Take 1 capsule (40 mg) by mouth daily    Schizoaffective disorder, depressive type (H)       fluticasone 50 MCG/ACT spray    FLONASE    16 g    Spray 1 spray into both nostrils daily    Seasonal allergic  rhinitis       furosemide 20 MG tablet    LASIX    60 tablet    Take 1 tablet (20 mg) by mouth 2 times daily    Lower leg edema       GEL-SASHA DENTINBLOC DT      Apply  to affected area. GEL. Apply to 2nd molar on bottom right daily at bedtime.        glucagon 1 MG kit    GLUCAGON EMERGENCY    2 mg    Inject 1 mg into the muscle once for 1 dose    Type 2 diabetes mellitus with microalbuminuria, with long-term current use of insulin (H)       insulin glargine 100 UNIT/ML injection    LANTUS    8 mL    Inject 20 Units Subcutaneous At Bedtime    Type 2 diabetes mellitus with microalbuminuria, with long-term current use of insulin (H)       LACTAID 3000 UNIT tablet   Generic drug:  lactase      Take 4 tabs daily with meals.        levothyroxine 175 MCG tablet    SYNTHROID/LEVOTHROID    30 tablet    Take 1 tablet (175 mcg) by mouth daily Give on empty stomach    Other specified hypothyroidism       LORazepam 1 MG tablet    ATIVAN    35 tablet    Take 1 tablet (1 mg) by mouth At Bedtime .  May take additional 1mg daily PRN for agitation.    Generalized anxiety disorder       losartan 100 MG tablet    COZAAR    90 tablet    Take 1 tablet by mouth daily.    Hypertension goal BP (blood pressure) < 130/80, Diabetes mellitus type 2, insulin dependent (H), CKD (chronic kidney disease) stage 2, GFR 60-89 ml/min       melatonin 3 MG tablet     60 tablet    Take 1 tablet (3 mg) by mouth nightly as needed for sleep    Other insomnia       metFORMIN 500 MG 24 hr tablet    GLUCOPHAGE-XR    90 tablet    Take 2 tablets (1,000 mg) by mouth 2 times daily (with meals)    Diabetes mellitus, type 2 (H)       MILK OF MAGNESIA PO      Take  by mouth. Take 30 mL as needed for constipation.        NEURONTIN PO      Take 900 mg by mouth 3 times daily.        nystatin 745819 UNIT/GM Powd    MYCOSTATIN    60 g    Apply topically 3 times daily as needed    Candidiasis of skin       polyethylene glycol powder    MIRALAX/GLYCOLAX     Take 1 capful by  mouth 2 times daily. 17 GM PO BID        saline 0.9 % Soln      Spray 2 sprays in nostril as needed.        SENNA S 8.6-50 MG per tablet   Generic drug:  senna-docusate      Take 2 tablets by mouth At Bedtime.        solifenacin 10 MG tablet    VESICARE    30 tablet    Take 1 tablet (10 mg) by mouth daily    Urge incontinence       ziprasidone 40 MG capsule    GEODON    60 capsule    Take 1 capsule (40 mg) by mouth 2 times daily (with meals)    Schizoaffective disorder, depressive type (H)

## 2018-03-29 DIAGNOSIS — F41.1 GENERALIZED ANXIETY DISORDER: ICD-10-CM

## 2018-03-29 NOTE — PROGRESS NOTES
Received a medical communication fax from Mona Anthony RN (Director of Nursing Services) requesting a rationale for continued administration of PRN Ativan per CMS guidelines.  Form was completed by Dr. De Leon and faxed back to    Tia Anthony RN  F: [phone].    Sent to scanning.

## 2018-03-29 NOTE — TELEPHONE ENCOUNTER
Request for medication refill: Lorezapam 1mg    Date of last visit at clinic: 02/14/18    Please complete refill if appropriate and CLOSE ENCOUNTER.    Closing the encounter signifies the refill is complete.    If refill has been denied, please complete the smart phrase .smirefuse and route it to the [facility] RN TRIAGE pool to inform the patient and the pharmacy.    Mariam Petty, [facility]

## 2018-03-30 RX ORDER — LORAZEPAM 1 MG/1
1 TABLET ORAL AT BEDTIME
Qty: 35 TABLET | Refills: 1 | Status: SHIPPED | OUTPATIENT
Start: 2018-03-30 | End: 2018-05-25

## 2018-04-05 ENCOUNTER — RADIANT APPOINTMENT (OUTPATIENT)
Dept: MAMMOGRAPHY | Facility: CLINIC | Age: 68
End: 2018-04-05
Payer: COMMERCIAL

## 2018-04-05 DIAGNOSIS — Z12.31 VISIT FOR SCREENING MAMMOGRAM: ICD-10-CM

## 2018-04-05 PROCEDURE — 77067 SCR MAMMO BI INCL CAD: CPT | Mod: 52

## 2018-04-09 ENCOUNTER — OFFICE VISIT (OUTPATIENT)
Dept: PSYCHIATRY | Facility: CLINIC | Age: 68
End: 2018-04-09
Attending: PSYCHIATRY & NEUROLOGY
Payer: COMMERCIAL

## 2018-04-09 VITALS
DIASTOLIC BLOOD PRESSURE: 70 MMHG | SYSTOLIC BLOOD PRESSURE: 158 MMHG | BODY MASS INDEX: 40.72 KG/M2 | HEART RATE: 77 BPM | WEIGHT: 237.2 LBS

## 2018-04-09 DIAGNOSIS — F25.1 SCHIZOAFFECTIVE DISORDER, DEPRESSIVE TYPE (H): Primary | ICD-10-CM

## 2018-04-09 PROCEDURE — G0463 HOSPITAL OUTPT CLINIC VISIT: HCPCS | Mod: ZF

## 2018-04-09 ASSESSMENT — PAIN SCALES - GENERAL: PAINLEVEL: NO PAIN (0)

## 2018-04-09 NOTE — PROGRESS NOTES
PSYCHIATRY CLINIC PROGRESS NOTE   The initial diagnostic evaluation was on 5/13/13 and the last transfer of care evaluation prior to today was 7/14/16.    Pertinent Background:  This patient first experienced mental health issues in her 20's and obtained care at this time and has received treatment for schizoaffective disorder and anxiety.  See transfer evaluation for detailed history.  Notably, has a history of having increasing psychotic experiences with antipsychotic taper.   Psych critical item history includes psychosis [sxs include disorganization and inability to care for self, ?hallucinations and paranoia], psych hosp (3-5) and ECT    INTERIM HISTORY                                                 Pinky Page is a 67 year old female who was last seen in clinic on 2/26/18 at which time no changes were made.  The patient reports good treatment adherence.  History was provided by patient and written summary of progress from [facility]. Patient was a good historian.  Since the last visit:  -doing okay, struggling with the weather   -having some difficulty with sleeping (tries to go to bed around 10:30pm) and if not sleeping within an hour will get up and do some sort of activity, also will use the bathroom, then will try to go back to sleep after about 30 minutes, wakes up several times throughout the night to use the restroom, does get up at 6am for diabetes management and then goes back to sleep until about 8-9am  -has been using a charting system to help address sleep hygiene and caffeine use; identifies that caffeine may be a struggle for her to adjust, does have a diet coke around 1-2pm and does not have it if it is later than this; was also having decaf coffee at supper which she now realizes does have some caffeine in it  -she did use one dose of PRN lorazepam in the past few weeks due to being up at 4am and not having fallen asleep yet, she reports this was helpful but she has not required any  "additional doses  -continues to try and use her CPAP regularly   -may start a sleep group at her residence to help  -mood has been \"kind of down\" due to some staff changes at [facility], had a staff member leave after 30 years this has been an adjustment  -no safety concerns, including no suicidal thoughts  -continues to exercise, now biking 30 minutes 2x per week and is going to try and increase to 3x per week; also does walking group and has been doing some walks outside weather permitting  -weight on steady decline, staying stable, no big gains  -did have lab work completed at lab work, results have not been sent to clinic for review yet  -continues to have the sensation that somebody is behind her but continues to cope with this by sitting with her back near walls, etc. And this has not changed in many years  -continues to be engaged in therapy monthly which is helpful     RECENT SYMPTOMS:   DEPRESSION:  reports-depressed mood, anhedonia, low energy, insomnia , weight/ appetite change (increased overall, but has been relatively stable ), excessive guilt and poor concentration /memory;  DENIES- suicidal ideation  and psychomotor change observed by others (slowing or fidgeting)  MELVIN/HYPOMANIA:  reports-none;  DENIES- increased energy, decreased sleep need, increased activity and grandiosity  PSYCHOSIS:  reports-see HPI and none;  DENIES- delusions, auditory hallucinations and visual hallucinations  DYSREGULATION:  reports-none;  DENIES- suicidal ideation, violent ideation and SIB  PANIC ATTACK:  none   ANXIETY:  some nervousness and excitement around events  TRAUMA RELATED:  none  COMPULSIVE:  none  SLEEP:  as above    EATING DISORDER:  none    RECENT SUBSTANCE USE:     ALCOHOL- none          TOBACCO- none          CAFFEINE- 1 cup regular coffee per day and 3 decaf; 1 can diet coke per day        OPIOIDS- none            NARCAN KIT- N/A    CANNABIS- none         OTHER ILLICIT DRUGS- none     CURRENT SOCIAL " HISTORY:  FINANCIAL SUPPORT- social security disability.     CHILDREN- none.     LIVING SITUATION- Living at [facility].      SOCIAL/ SPIRITUAL SUPPORT- [facility] staff, other providers, other residents at [facility] and does attend [employer] occasionally.     FEELS SAFE AT HOME- Yes.    MEDICAL ROS:  Reports GI sxs [GERD, constipation--chronic] and easy bruising .  Denies weight changes [increase, decrease,  ], headache, sedation, sexual dysfunction [ ], tremor, confusion, dizziness, falls, excessive diaphoresis, muscle twitches, restlessness, bruxism, shiver and short term memory and/or word finding difficulty, akathisia, dystonia, apparent TD, muscle stiffness, Parkinsonian-type symptoms [shuffling gait, masked facies, stooped posture, speech change, writing change] and sialorrhea and slowed reaction time and withdrawal symptoms.    PSYCH and CD Critical Summary Points since July 2017           None    PAST PSYCH MED TRIALS   see EMR Problem List: Hx of psychiatric care    MEDICAL / SURGICAL HISTORY                                   CARE TEAM:          PCP- Dr. Kelley, at [facility]                   Therapist- Dr. Gr (monthly)    Patient Active Problem List   Diagnosis     Allergic rhinitis due to pollen     Urge incontinence     Hypertension, essential     Cardiomegaly     Chronic constipation     Dry eye syndrome     Esophageal reflux     Exposure keratoconjunctivitis     DM ophthalmopathy (H)     Hypothyroidism     Senile cataract     ANUJ (obstructive sleep apnea)     Postmenopausal atrophic vaginitis     Restless leg syndrome     Squamous blepharitis     Morbid obesity due to excess calories (H)     Personal history of breast cancer s/p L masectomy     Diabetes mellitus type 2, insulin dependent (H)     Hypercholesteremia     Lives in group home     Extrapyramidal and movement disorder     CKD (chronic kidney disease) stage 2, GFR 60-89 ml/min     Solitary kidney, congenital     S/P hysterectomy      Impingement syndrome of right shoulder     Hypertriglyceridemia     Candidiasis of skin     Generalized anxiety disorder     Carpal tunnel syndrome of left wrist     Schizoaffective disorder, depressive type (H)     Health Care Home     Major depressive disorder, recurrent episode, moderate (H)     Psychological factors affecting medical condition     Hx of psychiatric care     Nail complaint     Microalbuminuria due to type 2 diabetes mellitus (H)     Type 2 diabetes mellitus with microalbuminuria, with long-term current use of insulin (H)     Diabetes mellitus, type II, insulin dependent (H)     CKD (chronic kidney disease) stage 1, GFR 90 ml/min or greater       ALLERGY                                Chlordiazepoxide hcl; Dimetapp dm cold-cough; Haldol; Ibuprofen; Lactose intolerance [beta-galactosidase]; Milk products; and Propofol  MEDICATIONS                               Current Outpatient Prescriptions   Medication Sig Dispense Refill     LORazepam (ATIVAN) 1 MG tablet Take 1 tablet (1 mg) by mouth At Bedtime .  May take additional 1mg daily PRN for agitation. 35 tablet 1     FLUoxetine (PROZAC) 40 MG capsule Take 1 capsule (40 mg) by mouth daily 30 capsule 2     ziprasidone (GEODON) 40 MG capsule Take 1 capsule (40 mg) by mouth 2 times daily (with meals) 60 capsule 2     insulin glargine (LANTUS) 100 UNIT/ML injection Inject 20 Units Subcutaneous At Bedtime 8 mL 11     artificial tears (AKWA TEARS) OINT ophthalmic ointment Apply  to eye. Place 0.5 inches into both eyes at bedtime 1 Tube 11     melatonin 3 MG tablet Take 1 tablet (3 mg) by mouth nightly as needed for sleep 60 tablet 1     alum & mag hydroxide-simethicone (MYLANTA/MAALOX) 200-200-20 MG/5ML SUSP suspension Take 30 mLs by mouth daily as needed for indigestion       artificial saliva (BIOTENE MT) AERS spray Take 1 spray by mouth 3 times daily as needed for dry mouth       blood glucose monitoring (NO BRAND SPECIFIED) test strip Use to test blood  sugar 3 times daily or as directed. 100 each 3     exenatide (BYETTA) 10 MCG/0.04ML injection Inject 10 mcg Subcutaneous 2 times daily (before meals) 1 Syringe 11     amLODIPine (NORVASC) 5 MG tablet Take 2 tablets (10 mg) by mouth daily 30 tablet 3     nystatin (MYCOSTATIN) 915534 UNIT/GM POWD Apply topically 3 times daily as needed 60 g 1     clotrimazole (LOTRIMIN) 1 % cream Apply topically 2 times daily as needed 50 g 0     fluticasone (FLONASE) 50 MCG/ACT nasal spray Spray 1 spray into both nostrils daily 16 g 3     furosemide (LASIX) 20 MG tablet Take 1 tablet (20 mg) by mouth 2 times daily 60 tablet 3     acetaminophen (TYLENOL) 500 MG tablet Take 2 tablets (1,000 mg) by mouth every 6 hours as needed 1 Bottle 2     solifenacin (VESICARE) 10 MG tablet Take 1 tablet (10 mg) by mouth daily 30 tablet 6     atorvastatin (LIPITOR) 40 MG tablet Take 1 tablet (40 mg) by mouth daily 90 tablet 1     calcium polycarbophil (FIBERCON) 625 MG tablet Take 2 tablets by mouth daily       levothyroxine (SYNTHROID, LEVOTHROID) 175 MCG tablet Take 1 tablet (175 mcg) by mouth daily Give on empty stomach 30 tablet 4     metFORMIN (GLUCOPHAGE-XR) 500 MG 24 hr tablet Take 2 tablets (1,000 mg) by mouth 2 times daily (with meals) (Patient taking differently: Take 2,000 mg by mouth daily ) 90 tablet 3     losartan (COZAAR) 100 MG tablet Take 1 tablet by mouth daily. 90 tablet 1     Hypromellose (ARTIFICIAL TEARS OP) Apply 1 drop to eye 4 times daily.       Gabapentin (NEURONTIN PO) Take 900 mg by mouth 3 times daily.       senna-docusate (SENNA S) 8.6-50 MG per tablet Take 2 tablets by mouth At Bedtime.       polyethylene glycol (MIRALAX/GLYCOLAX) powder Take 1 capful by mouth 2 times daily. 17 GM PO BID       Skin Protectants, Misc. (EUCERIN) cream Apply  topically as needed. Apply to thigh PRN dry skin        Saline 0.9 % SOLN Spray 2 sprays in nostril as needed.       Calcium Carbonate-Vitamin D (CALCIUM-VITAMIN D) 250-125 MG-UNIT  TABS Take 1 tablet by mouth 2 times daily. Calcium 250 mg/Vit D 125 IU       Sod Fluor-Solo Fluor-Hydrfl Ac (GEL-SASHA DENTINBLOC DT) Apply  to affected area. GEL. Apply to 2nd molar on bottom right daily at bedtime.       lactase (LACTAID) 3000 UNIT tablet Take 4 tabs daily with meals.       Magnesium Hydroxide (MILK OF MAGNESIA PO) Take  by mouth. Take 30 mL as needed for constipation.       CLARITIN 10 MG OR TABS 1 TAB PO QD (Once per day) as needed for ALLERGY SYMPTOMS 30 11     ASPIRIN 81 MG OR TABS Take 1 tablet by mouth daily. ENTERIC COATED. 100 3     glucagon (GLUCAGON EMERGENCY) 1 MG kit Inject 1 mg into the muscle once for 1 dose 2 mg 3     VITALS   /70  Pulse 77  Wt 107.6 kg (237 lb 3.2 oz)  BMI 40.72 kg/m2   MENTAL STATUS EXAM                                                           Alertness: alert  and oriented  Appearance: well groomed  Behavior/Demeanor: cooperative and pleasant, with good  eye contact   Speech: normal  Language: intact  Psychomotor: some abnormal movements noted very intermittently in tongue going to cheek, also some involuntary movement of toes   Mood: description consistent with euthymia  Affect: full range; was congruent to mood; was congruent to content  Thought Process/Associations: unremarkable  Thought Content:  Reports none;  Denies suicidal ideation , violent ideation  and psychotic thoughts   Perception:  Reports none;  Denies auditory hallucinations and visual hallucinations  Insight: good  Judgment: good  Cognition: does  appear grossly intact; formal cognitive testing was not done    LABS and DATA     RATING SCALES:  1/8/18 DISCUS done with score of 5: abnormal movements noted in tongue going to cheek and movement in bilateral big toes--movements have been present for years and not changing/worsening in intensity     PHQ9 TODAY = 8  PHQ-9 SCORE 12/14/2017 2/14/2018 2/26/2018   Total Score - - -   Total Score 7 7 8       ANTIPSYCHOTIC LABS   [glu, A1C, lipids  (focus LDL), liver enzymes, WBC, ANEU, Hgb, plts]    q12 mo  Recent Labs   Lab Test  12/14/17   1000  09/12/17   1404   06/21/17   1047  03/21/17   1023   GLC   --    --    --   134*  118*   A1C  6.4*  6.3*   < >   --   6.6*    < > = values in this interval not displayed.     Recent Labs   Lab Test  03/21/17   1023  02/22/16   1028   CHOL  142  162.0   TRIG  121  151.9*   LDL  78  95   HDL  40*  36.6*     Recent Labs   Lab Test  03/21/17   1023  01/22/15   1600   AST  13  18   ALT  20  35   ALKPHOS  92  113     Recent Labs   Lab Test  06/21/17   1047  03/21/17   1023  06/22/16   1035   07/04/14   0705   WBC   --   11.8*  9.9   --   7.7   ANEU   --   8.3   --    --   4.8   HGB  13.8  13.4  13.5   < >  13.3   PLT   --   266  266   --   223    < > = values in this interval not displayed.       DIAGNOSIS     Schizoaffective disorder, depressed type, multiple episodes, in partial remission to mild re: mood, with very minimal psychotic features.  Generalized anxiety disorder, well-managed  Persistent TD- stable and unchanging     ASSESSMENT                                     TODAY Pinky continues to report stability with her mental health and mood appears to be euthymic with expected fluctuations in mood and anxiety in context of stressors and she continues to easily identify coping strategies to utilize. No safety concerns. Continues to work on adjusting to using her CPAP, addition of melatonin and another mask change have been helpful. Also started to keep a sleep chart to address sleep hygiene issues. Again discussed possibility of reducing medications, including PRN dose of lorazepam since she has not been requiring this medication on very rare occasions, however, given long standing stability and recent use of PRN dose lorazepam she requests no changes today. Will continue to consider trial of reduced doses given long standing stability at future appointments with goal of simplifying medication regimen along with  helping with any possible metabolic side effects given patient is working hard to lose weight and improve physical health. Lab work was completed in March but results have not been received for review, will request today. She will return in 6-8 weeks or sooner if needed.     Of note, BP slightly elevated today. She is on medications for this and is monitored at her residence. She denies having any symptoms of concern including chest pain, shortness of breath, palpitations, headache or vision changes.             [place] PRESCRIPTION MONITORING PROGRAM [] was not checked today: controlled substances monitored by [facility] staff,  has been previously checked with no indications of abuse/misuse of medications.    PSYCHOTROPIC DRUG INTERACTIONS:   ZIPRASIDONE and FLUOXETINE may result in increased risk of QT-interval prolongation and increased risk of serotonin syndrome.   ZIPRASIDONE, FLUOXETINE and VESICARE may result in increased risk of QT-interval prolongation.  ASPIRIN and SSRI may result in an increased risk of bleeding  LEVOTHYROXINE and FLUOXETINE may result in increased levothyroxine requirements.  MANAGEMENT:  Monitoring for adverse effects, periodic EKGs and patient is aware of risks     PLAN                                                                                                       1) PSYCHOTROPIC MEDICATIONS:   - continue fluoxetine 40mg daily   - continue ziprasidone 40mg BID with meals   - continue lorazepam 1mg QHS and 1mg PRN for agitation     2) THERAPY:  Continue with Dr. Gr monthly  TREATMENT PLAN   Date revised  -  11/13/17  Date next due- 11/13/18    3) LABS NEXT DUE:  AP labs were completed, results not yet received, will request today       RATING SCALES:  none needed    4) REFERRALS [MICD, medical etc.]:  none    5) MTM REFERRAL [PharmD]:  none    6) :  none    7) FAMILY MEETING:  none    8) RTC: 6-8 weeks    9) CRISIS NUMBERS:   Provided routinely in S    Especially emphasized:  [facility] [phone] (clinic)    [phone] (after hours)      PSYCHIATRY CLINIC INDIVIDUAL PSYCHOTHERAPY NOTE                                    16   Start time: 1240  End time: 1250  Subjective: This supportive psychotherapy session addressed issues related to goals of therapy and recent stressors including some long time workers at her residence leaving.  Patient's reaction: Action in the context of mental status appropriate for ambulatory setting.  Progress: fair, but ongoing as goals not yet met   Plan: RTC 6-8 weeks  Psychotherapy services during this visit included myself and Pinky.   TREATMENT  PLAN          SYMPTOMS;PROBLEMS   MEASURABLE GOALS;    FUNCTIONAL IMPROVEMENT INTERVENTIONS;    GAINS MADE DISCHARGE CRITERIA   Depression: depressed mood   use physical activity to boost mood, wants goal of 45 mins of activity per day -go to fitness center appointments, has not been making these consistently (60% attendance currently)  Has been going about 30-35 minutes 2x/wk; also leading walkers group 20-25 minutes marked symptom improvement   Depression: insomnia    get 7-8 hours of restful sleep every night -continues to work on practicing sleep hygiene, calming activities (coloring avoiding tv), now averaging about 6-7 hours per night improvement has stalled, now keeping sleep chart marked symptom improvement     TREATMENT RISK STATEMENT:  The risks, benefits, alternatives and potential adverse effects have been discussed and are understood by the pt. The pt understands the risks of using street drugs or alcohol. There are no medical contraindications, the pt agrees to treatment with the ability to do so. The pt knows to call the clinic for any problems or to access emergency care if needed.  Medical and substance use concerns are documented above.  Psychotropic drug interaction check was done, including changes made today.    RESIDENT:   Joana De Leon, DO    Patient  staffed in clinic with Dr. Estrada who will sign the note.  Supervisor is Dr. Gordon.    Supervisor Attestation:  I saw the patient with the resident, and participated in key portions of the service, including the mental status examination and developing the plan of care. I reviewed key portions of the history with the resident. I agree with the findings and plan as documented in this note.  Cortez Estrada MD

## 2018-04-09 NOTE — MR AVS SNAPSHOT
After Visit Summary   4/9/2018    Pinky Page    MRN: [id number]           Patient Information     Date Of Birth          1950        Visit Information        Provider Department      4/9/2018 12:45 PM Joana De Leon MD [facility]        Today's Diagnoses     Schizoaffective disorder, depressive type (H)    -  1      Care Instructions    -continue current medications, no changes  -will ask Narendra to fax lab results back to clinic to attn: of Dr. eD Leon  -return to clinic in 6-8 weeks or sooner if needed          Follow-ups after your visit        Follow-up notes from your care team     Return in about 6 weeks (around 5/21/2018) for 30 MFU.      Your next 10 appointments already scheduled     May 07, 2018  1:00 PM CDT   (Arrive by 12:45 PM)   Return Visit with Damon Gr, PhD [facility] ([facility])    [address] [facility]  3rd [facility] [address] [phone]            May 10, 2018 11:30 AM CDT   Return Sleep Patient with Brianda Reddy MD   [facility] [place], Sleep Study ([facility])    [address] [place] [address] [phone]            May 30, 2018  2:45 PM CDT   Adult Med Follow UP with Joana De Leon MD   Psychiatry Clinic ([facility])    [address]  [address] [address] [phone]            Jul 09, 2018 11:30 AM CDT   (Arrive by 11:15 AM)   Return Visit with Marcos Magdaleno MD   [facility] ([facility])    [address] [place] [address]  4th [facility] [address] [phone]            Nov 15, 2018  1:30 PM CST   RETURN GENERAL with Michael Lopez MD   Eye Clinic ([facility])    [address]  [address] [address]   [phone]               Who to contact     Please call your clinic at [phone] to:    Ask questions about your health    Make or cancel appointments    Discuss your medicines    Learn about your test results    Speak to your doctor            Additional Information About Your Visit        MyChart Information     [employer] is an electronic gateway that provides easy, online access to your medical records. With [employer], you can request a clinic appointment, read your test results, renew a prescription or communicate with your care team.     To sign up for [employer] visit the website at [email or web address]   You will be asked to enter the access code listed below, as well as some personal information. Please follow the directions to create your username and password.     Your access code is: [email or web address]  Expires: 2018  3:00 PM     Your access code will  in 90 days. If you need help or a new code, please contact your [facility] or call [phone] for assistance.        Care EveryWhere ID     This is your Care EveryWhere ID. This could be used by other organizations to access your [place] medical records  [id number]        Your Vitals Were     Pulse BMI (Body Mass Index)                77 40.72 kg/m2           Blood Pressure from Last 3 Encounters:   18 158/70   18 144/78   18 151/78    Weight from Last 3 Encounters:   04/10/18 107.4 kg (236 lb 11.2 oz)   18 107.6 kg (237 lb 3.2 oz)   18 107 kg (236 lb)              Today, you had the following     No orders found for display         Today's Medication Changes          These changes are accurate as of 18 11:59 PM.  If you have any questions, ask your nurse or doctor.               These medicines have changed or have updated prescriptions.        Dose/Directions    metFORMIN 500 MG 24 hr tablet   Commonly known as:  GLUCOPHAGE-XR   This may have changed:    - how much to take  - when to take this   Used  for:  Diabetes mellitus, type 2 (H)        Dose:  1000 mg   Take 2 tablets (1,000 mg) by mouth 2 times daily (with meals)   Quantity:  90 tablet   Refills:  3                Primary Care Provider Office Phone # Fax #    Shamika Kelley [phone]       [facility] [address] [facility] [address]        Equal Access to Services     JAYDEN STUART : Hadii aad ku hadasho Soomaali, waaxda luqadaha, qaybta kaalmada adeegyada, waxay idiin hayaan adeeg khromansh la'aan ah. So [facility] [phone].    ATENCIÓN: Si habla español, tiene a deluca disposición servicios gratuitos de asistencia lingüística. Link al [phone].    We comply with applicable federal civil rights laws and [place] laws. We do not discriminate on the basis of race, color, national origin, age, disability, sex, sexual orientation, or gender identity.            Thank you!     Thank you for choosing PSYCHIATRY CLINIC  for your care. Our goal is always to provide you with excellent care. Hearing back from our patients is one way we can continue to improve our services. Please take a few minutes to complete the written survey that you may receive in the mail after your visit with us. Thank you!             Your Updated Medication List - Protect others around you: Learn how to safely use, store and throw away your medicines at www.disposemymeds.org.          This list is accurate as of 4/9/18 11:59 PM.  Always use your most recent med list.                   Brand Name Dispense Instructions for use Diagnosis    acetaminophen 500 MG tablet    TYLENOL    1 Bottle    Take 2 tablets (1,000 mg) by mouth every 6 hours as needed    Sprain of left ankle, unspecified ligament, initial encounter       alum & mag hydroxide-simethicone 200-200-20 MG/5ML Susp suspension    MYLANTA/MAALOX     Take 30 mLs by mouth daily as needed for indigestion        amLODIPine 5 MG tablet    NORVASC    30 tablet    Take 2 tablets (10 mg) by mouth  daily    Essential hypertension with goal blood pressure less than 130/85       artificial saliva Aers spray      Take 1 spray by mouth 3 times daily as needed for dry mouth        artificial tears Oint ophthalmic ointment     1 Tube    Apply  to eye. Place 0.5 inches into both eyes at bedtime    Insufficiency of tear film of both eyes       ARTIFICIAL TEARS OP      Apply 1 drop to eye 4 times daily.        aspirin 81 MG tablet     100    Take 1 tablet by mouth daily. ENTERIC COATED.        atorvastatin 40 MG tablet    LIPITOR    90 tablet    Take 1 tablet (40 mg) by mouth daily    Hyperlipidemia LDL goal <100       blood glucose monitoring test strip    no brand specified    100 each    Use to test blood sugar 3 times daily or as directed.    Type 2 diabetes mellitus with microalbuminuria, with long-term current use of insulin (H)       calcium polycarbophil 625 MG tablet    FIBERCON     Take 2 tablets by mouth daily        calcium-vitamin D 250-125 MG-UNIT Tabs      Take 1 tablet by mouth 2 times daily. Calcium 250 mg/Vit D 125 IU        CLARITIN 10 MG tablet   Generic drug:  loratadine     30    1 TAB PO QD (Once per day) as needed for ALLERGY SYMPTOMS        clotrimazole 1 % cream    LOTRIMIN    50 g    Apply topically 2 times daily as needed    Dermatophytosis       eucerin cream      Apply  topically as needed. Apply to thigh PRN dry skin        exenatide 10 MCG/0.04ML injection    BYETTA    1 Syringe    Inject 10 mcg Subcutaneous 2 times daily (before meals)    Type 2 diabetes mellitus with microalbuminuria, with long-term current use of insulin (H)       FLUoxetine 40 MG capsule    PROzac    30 capsule    Take 1 capsule (40 mg) by mouth daily    Schizoaffective disorder, depressive type (H)       fluticasone 50 MCG/ACT spray    FLONASE    16 g    Spray 1 spray into both nostrils daily    Seasonal allergic rhinitis       furosemide 20 MG tablet    LASIX    60 tablet    Take 1 tablet (20 mg) by mouth 2 times  daily    Lower leg edema       GEL-SASHA DENTINBLOC DT      Apply  to affected area. GEL. Apply to 2nd molar on bottom right daily at bedtime.        glucagon 1 MG kit    GLUCAGON EMERGENCY    2 mg    Inject 1 mg into the muscle once for 1 dose    Type 2 diabetes mellitus with microalbuminuria, with long-term current use of insulin (H)       insulin glargine 100 UNIT/ML injection    LANTUS    8 mL    Inject 20 Units Subcutaneous At Bedtime    Type 2 diabetes mellitus with microalbuminuria, with long-term current use of insulin (H)       LACTAID 3000 UNIT tablet   Generic drug:  lactase      Take 4 tabs daily with meals.        levothyroxine 175 MCG tablet    SYNTHROID/LEVOTHROID    30 tablet    Take 1 tablet (175 mcg) by mouth daily Give on empty stomach    Other specified hypothyroidism       LORazepam 1 MG tablet    ATIVAN    35 tablet    Take 1 tablet (1 mg) by mouth At Bedtime .  May take additional 1mg daily PRN for agitation.    Generalized anxiety disorder       losartan 100 MG tablet    COZAAR    90 tablet    Take 1 tablet by mouth daily.    Hypertension goal BP (blood pressure) < 130/80, Diabetes mellitus type 2, insulin dependent (H), CKD (chronic kidney disease) stage 2, GFR 60-89 ml/min       melatonin 3 MG tablet     60 tablet    Take 1 tablet (3 mg) by mouth nightly as needed for sleep    Other insomnia       metFORMIN 500 MG 24 hr tablet    GLUCOPHAGE-XR    90 tablet    Take 2 tablets (1,000 mg) by mouth 2 times daily (with meals)    Diabetes mellitus, type 2 (H)       MILK OF MAGNESIA PO      Take  by mouth. Take 30 mL as needed for constipation.        NEURONTIN PO      Take 900 mg by mouth 3 times daily.        nystatin 123801 UNIT/GM Powd    MYCOSTATIN    60 g    Apply topically 3 times daily as needed    Candidiasis of skin       polyethylene glycol powder    MIRALAX/GLYCOLAX     Take 1 capful by mouth 2 times daily. 17 GM PO BID        saline 0.9 % Soln      Spray 2 sprays in nostril as needed.         SENNA S 8.6-50 MG per tablet   Generic drug:  senna-docusate      Take 2 tablets by mouth At Bedtime.        solifenacin 10 MG tablet    VESICARE    30 tablet    Take 1 tablet (10 mg) by mouth daily    Urge incontinence       ziprasidone 40 MG capsule    GEODON    60 capsule    Take 1 capsule (40 mg) by mouth 2 times daily (with meals)    Schizoaffective disorder, depressive type (H)

## 2018-04-09 NOTE — PATIENT INSTRUCTIONS
-continue current medications, no changes  -will ask Narendra to fax lab results back to clinic to attn: of Dr. De Leon  -return to clinic in 6-8 weeks or sooner if needed

## 2018-04-09 NOTE — NURSING NOTE
Chief Complaint   Patient presents with     Recheck Medication     Schizoaffective disorder, depressive type

## 2018-04-10 ENCOUNTER — OFFICE VISIT (OUTPATIENT)
Dept: PSYCHOLOGY | Facility: CLINIC | Age: 68
End: 2018-04-10
Payer: COMMERCIAL

## 2018-04-10 VITALS — WEIGHT: 236.7 LBS | BODY MASS INDEX: 40.63 KG/M2

## 2018-04-10 DIAGNOSIS — F33.0 MAJOR DEPRESSIVE DISORDER, RECURRENT EPISODE, MILD (H): Primary | ICD-10-CM

## 2018-04-10 DIAGNOSIS — F54 PSYCHOLOGICAL FACTORS AFFECTING MORBID OBESITY (H): ICD-10-CM

## 2018-04-10 DIAGNOSIS — F41.1 GENERALIZED ANXIETY DISORDER: ICD-10-CM

## 2018-04-10 DIAGNOSIS — E66.9 OBESITY, UNSPECIFIED OBESITY SEVERITY, UNSPECIFIED OBESITY TYPE: ICD-10-CM

## 2018-04-10 DIAGNOSIS — E66.01 PSYCHOLOGICAL FACTORS AFFECTING MORBID OBESITY (H): ICD-10-CM

## 2018-04-10 ASSESSMENT — PATIENT HEALTH QUESTIONNAIRE - PHQ9: SUM OF ALL RESPONSES TO PHQ QUESTIONS 1-9: 8

## 2018-04-10 NOTE — PROGRESS NOTES
Health Psychology                  Clinic    Department of Medicine  Madelaine Ingram, Ph.D., L.P. [phone]                          [facility] Miryam Woods, Ph.D.,  L.P. [phone]                 3rd Floor, Clinic 3A  [place] Mail Code [address]42   Damon Gr, Ph.D., A.B.EWA.P., L.P. [phone]     [address] [facility]  [address] [facility] Kimber Morales, Ph.D., L.P. [phone]  [place] [address]  [place] [address]    Health Psychology Follow-Up Note    Ms. Page is a pleasant 67-year old woman with chronic depressive illness, who returns to clinic for supportive and behavioral psychotherapy for moderate recurrent depression and for help losing weight given her morbid obesity.     She is 236.7#, up from 236#  last session.    Wt Readings from Last 4 Encounters:   04/10/18 107.4 kg (236 lb 11.2 oz)   04/09/18 107.6 kg (237 lb 3.2 oz)   03/19/18 107 kg (236 lb)   02/26/18 107.5 kg (237 lb)     At [facility], she continues to serve on the [employer].  She has been on the [employer] for 20 years, plus decorating the bulletin boards.  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    We discussed seeing if she could increase her shifts to 2/week.  We discussed her difficulty falling asleep.  We addressed cutting back on caffeine, currently 2-3 cups of coffee in am, 1 diet  Cola in the afternoon.  She is agreeable.   She rates the depression as 3- 4 on 10-point subjective scale, which is below baseline.  She is doing well.  She participates in activities at [place].  She is up to biking 28 minutes x2/week.  We discussed increasing to x3-5/week and gradulally increasing beyog a half hour.  She is doing more social walking some of the time and had apparently is gradually ramping up on her fitness center days.  She agrees to increase.  She is getting along with her roommate.  She saw her family on  [place].  Goal for exercise has been 1.5 hours/day as of June 19, 2017 and 2 lbs. / month.   We discussed increasing bicycling to 6 days per week and increasing gradually from 28 minutes to 30 minutes.    She arrived early today and was greeted in the waiting room.  Pinky participated fully and appeared to derive benefit.  Rapport was excellent.     IMPRESSION Distress Disability Risk    Low High Low     Time In: 1:12  Time Out:  1:53  Diagnosis:   Major Depression, recurrent mild (F33.0)   Psychological Factors Affecting Morbid Obesity (F54)      Plan: She will return on 5/7 @ 1:00 for behavioral and supportive psychotherapy.   Plan to to bicycle for > 20 minutes some days and eat less.  Tx plan due 4/11/2018  Damon Gr, Ph.D., A.B.P.P., L.P.

## 2018-04-10 NOTE — MR AVS SNAPSHOT
After Visit Summary   4/10/2018    Pinky Page    MRN: [id number]           Patient Information     Date Of Birth          1950        Visit Information        Provider Department      4/10/2018 1:00 PM Damon Gr, PhD [facility]        Today's Diagnoses     Major depressive disorder, recurrent episode, mild (H)    -  1    Psychological factors affecting morbid obesity (H)        Generalized anxiety disorder        Obesity, unspecified obesity severity, unspecified obesity type           Follow-ups after your visit        Your next 10 appointments already scheduled     May 10, 2018 11:30 AM CDT   Return Sleep Patient with Brianda Reddy MD   [facility] [place], Sleep Study ([facility])    [address] [id number] [phone]            May 30, 2018  2:45 PM CDT   Adult Med Follow UP with Joana De Leon MD   Psychiatry Clinic ([facility])    [place] [address]  [address] [address] [phone]            Jul 09, 2018 11:30 AM CDT   (Arrive by 11:15 AM)   Return Visit with Marcos Magdaleno MD   [facility] ([facility])    [address] [facility]  4th Floor  [facility] [address] [phone]            Nov 15, 2018  1:30 PM CST   RETURN GENERAL with Michael Lopez MD   Eye Clinic ([facility])    [address]  [address]  [facility] [id number] [phone]              Who to contact     Please call your clinic at [phone] to:    Ask questions about your health    Make or cancel appointments    Discuss your medicines    Learn about your test results    Speak to your doctor            Additional Information About Your Visit        MyChart Information     [employer] is an electronic gateway that provides easy, online access to  your medical records. With [employer], you can request a clinic appointment, read your test results, renew a prescription or communicate with your care team.     To sign up for [employer] visit the website at [email or web address]   You will be asked to enter the access code listed below, as well as some personal information. Please follow the directions to create your username and password.     Your access code is: [email or web address]  Expires: 2018  3:00 PM     Your access code will  in 90 days. If you need help or a new code, please contact your [facility] or call [phone] for assistance.        Care EveryWhere ID     This is your Care EveryWhere ID. This could be used by other organizations to access your [place] medical records  [id number]        Your Vitals Were     BMI (Body Mass Index)                   40.63 kg/m2            Blood Pressure from Last 3 Encounters:   18 158/70   18 144/78   18 151/78    Weight from Last 3 Encounters:   04/10/18 107.4 kg (236 lb 11.2 oz)   18 107.6 kg (237 lb 3.2 oz)   18 107 kg (236 lb)              Today, you had the following     No orders found for display         Today's Medication Changes          These changes are accurate as of 4/10/18  1:53 PM.  If you have any questions, ask your nurse or doctor.               These medicines have changed or have updated prescriptions.        Dose/Directions    metFORMIN 500 MG 24 hr tablet   Commonly known as:  GLUCOPHAGE-XR   This may have changed:    - how much to take  - when to take this   Used for:  Diabetes mellitus, type 2 (H)        Dose:  1000 mg   Take 2 tablets (1,000 mg) by mouth 2 times daily (with meals)   Quantity:  90 tablet   Refills:  3                Primary Care Provider Office Phone # Fax #    Shamika Kelley [phone]       [facility] [address] [facility] [address]        Equal Access to  Services     [facility]: Hadii james elizalde aidan Catalanali, waaxda luqadaha, qaybta kaalmada adeegvivianaursula, autumn caldwell . So [facility] [phone].    ATENCIÓN: Si habla bryant, tiene a deluca disposición servicios gratuitos de asistencia lingüística. Llame al [phone].    We comply with applicable federal civil rights laws and [place] laws. We do not discriminate on the basis of race, color, national origin, age, disability, sex, sexual orientation, or gender identity.            Thank you!     Thank you for choosing [facility]  for your care. Our goal is always to provide you with excellent care. Hearing back from our patients is one way we can continue to improve our services. Please take a few minutes to complete the written survey that you may receive in the mail after your visit with us. Thank you!             Your Updated Medication List - Protect others around you: Learn how to safely use, store and throw away your medicines at www.disposemymeds.org.          This list is accurate as of 4/10/18  1:53 PM.  Always use your most recent med list.                   Brand Name Dispense Instructions for use Diagnosis    acetaminophen 500 MG tablet    TYLENOL    1 Bottle    Take 2 tablets (1,000 mg) by mouth every 6 hours as needed    Sprain of left ankle, unspecified ligament, initial encounter       alum & mag hydroxide-simethicone 200-200-20 MG/5ML Susp suspension    MYLANTA/MAALOX     Take 30 mLs by mouth daily as needed for indigestion        amLODIPine 5 MG tablet    NORVASC    30 tablet    Take 2 tablets (10 mg) by mouth daily    Essential hypertension with goal blood pressure less than 130/85       artificial saliva Aers spray      Take 1 spray by mouth 3 times daily as needed for dry mouth        artificial tears Oint ophthalmic ointment     1 Tube    Apply  to eye. Place 0.5 inches into both eyes at bedtime    Insufficiency of tear film of both eyes       ARTIFICIAL  TEARS OP      Apply 1 drop to eye 4 times daily.        aspirin 81 MG tablet     100    Take 1 tablet by mouth daily. ENTERIC COATED.        atorvastatin 40 MG tablet    LIPITOR    90 tablet    Take 1 tablet (40 mg) by mouth daily    Hyperlipidemia LDL goal <100       blood glucose monitoring test strip    no brand specified    100 each    Use to test blood sugar 3 times daily or as directed.    Type 2 diabetes mellitus with microalbuminuria, with long-term current use of insulin (H)       calcium polycarbophil 625 MG tablet    FIBERCON     Take 2 tablets by mouth daily        calcium-vitamin D 250-125 MG-UNIT Tabs      Take 1 tablet by mouth 2 times daily. Calcium 250 mg/Vit D 125 IU        CLARITIN 10 MG tablet   Generic drug:  loratadine     30    1 TAB PO QD (Once per day) as needed for ALLERGY SYMPTOMS        clotrimazole 1 % cream    LOTRIMIN    50 g    Apply topically 2 times daily as needed    Dermatophytosis       eucerin cream      Apply  topically as needed. Apply to thigh PRN dry skin        exenatide 10 MCG/0.04ML injection    BYETTA    1 Syringe    Inject 10 mcg Subcutaneous 2 times daily (before meals)    Type 2 diabetes mellitus with microalbuminuria, with long-term current use of insulin (H)       FLUoxetine 40 MG capsule    PROzac    30 capsule    Take 1 capsule (40 mg) by mouth daily    Schizoaffective disorder, depressive type (H)       fluticasone 50 MCG/ACT spray    FLONASE    16 g    Spray 1 spray into both nostrils daily    Seasonal allergic rhinitis       furosemide 20 MG tablet    LASIX    60 tablet    Take 1 tablet (20 mg) by mouth 2 times daily    Lower leg edema       GEL-SASHA DENTINBLOC DT      Apply  to affected area. GEL. Apply to 2nd molar on bottom right daily at bedtime.        glucagon 1 MG kit    GLUCAGON EMERGENCY    2 mg    Inject 1 mg into the muscle once for 1 dose    Type 2 diabetes mellitus with microalbuminuria, with long-term current use of insulin (H)       insulin  glargine 100 UNIT/ML injection    LANTUS    8 mL    Inject 20 Units Subcutaneous At Bedtime    Type 2 diabetes mellitus with microalbuminuria, with long-term current use of insulin (H)       LACTAID 3000 UNIT tablet   Generic drug:  lactase      Take 4 tabs daily with meals.        levothyroxine 175 MCG tablet    SYNTHROID/LEVOTHROID    30 tablet    Take 1 tablet (175 mcg) by mouth daily Give on empty stomach    Other specified hypothyroidism       LORazepam 1 MG tablet    ATIVAN    35 tablet    Take 1 tablet (1 mg) by mouth At Bedtime .  May take additional 1mg daily PRN for agitation.    Generalized anxiety disorder       losartan 100 MG tablet    COZAAR    90 tablet    Take 1 tablet by mouth daily.    Hypertension goal BP (blood pressure) < 130/80, Diabetes mellitus type 2, insulin dependent (H), CKD (chronic kidney disease) stage 2, GFR 60-89 ml/min       melatonin 3 MG tablet     60 tablet    Take 1 tablet (3 mg) by mouth nightly as needed for sleep    Other insomnia       metFORMIN 500 MG 24 hr tablet    GLUCOPHAGE-XR    90 tablet    Take 2 tablets (1,000 mg) by mouth 2 times daily (with meals)    Diabetes mellitus, type 2 (H)       MILK OF MAGNESIA PO      Take  by mouth. Take 30 mL as needed for constipation.        NEURONTIN PO      Take 900 mg by mouth 3 times daily.        nystatin 209258 UNIT/GM Powd    MYCOSTATIN    60 g    Apply topically 3 times daily as needed    Candidiasis of skin       polyethylene glycol powder    MIRALAX/GLYCOLAX     Take 1 capful by mouth 2 times daily. 17 GM PO BID        saline 0.9 % Soln      Spray 2 sprays in nostril as needed.        SENNA S 8.6-50 MG per tablet   Generic drug:  senna-docusate      Take 2 tablets by mouth At Bedtime.        solifenacin 10 MG tablet    VESICARE    30 tablet    Take 1 tablet (10 mg) by mouth daily    Urge incontinence       ziprasidone 40 MG capsule    GEODON    60 capsule    Take 1 capsule (40 mg) by mouth 2 times daily (with meals)     Schizoaffective disorder, depressive type (H)

## 2018-05-03 ENCOUNTER — OFFICE VISIT (OUTPATIENT)
Dept: FAMILY MEDICINE | Facility: CLINIC | Age: 68
End: 2018-05-03
Payer: COMMERCIAL

## 2018-05-03 VITALS
OXYGEN SATURATION: 97 % | HEART RATE: 61 BPM | BODY MASS INDEX: 41.4 KG/M2 | TEMPERATURE: 98.2 F | SYSTOLIC BLOOD PRESSURE: 135 MMHG | DIASTOLIC BLOOD PRESSURE: 76 MMHG | WEIGHT: 241.2 LBS

## 2018-05-03 DIAGNOSIS — E78.00 HYPERCHOLESTEREMIA: ICD-10-CM

## 2018-05-03 DIAGNOSIS — R80.9 TYPE 2 DIABETES MELLITUS WITH MICROALBUMINURIA, WITH LONG-TERM CURRENT USE OF INSULIN (H): ICD-10-CM

## 2018-05-03 DIAGNOSIS — E11.9 DIABETES MELLITUS TYPE 2, INSULIN DEPENDENT (H): ICD-10-CM

## 2018-05-03 DIAGNOSIS — Z79.4 TYPE 2 DIABETES MELLITUS WITH MICROALBUMINURIA, WITH LONG-TERM CURRENT USE OF INSULIN (H): ICD-10-CM

## 2018-05-03 DIAGNOSIS — Z79.4 DIABETES MELLITUS TYPE 2, INSULIN DEPENDENT (H): ICD-10-CM

## 2018-05-03 DIAGNOSIS — I10 HYPERTENSION, ESSENTIAL: Primary | ICD-10-CM

## 2018-05-03 DIAGNOSIS — E11.29 TYPE 2 DIABETES MELLITUS WITH MICROALBUMINURIA, WITH LONG-TERM CURRENT USE OF INSULIN (H): ICD-10-CM

## 2018-05-03 LAB
BUN SERPL-MCNC: 17.4 MG/DL (ref 7–19)
CALCIUM SERPL-MCNC: 9.1 MG/DL (ref 8.5–10.1)
CHLORIDE SERPLBLD-SCNC: 96.7 MMOL/L (ref 98–110)
CHOLEST SERPL-MCNC: 126 MG/DL
CO2 SERPL-SCNC: 28.7 MMOL/L (ref 20–32)
CREAT SERPL-MCNC: 0.9 MG/DL (ref 0.5–1)
GFR SERPL CREATININE-BSD FRML MDRD: 66.2 ML/MIN/1.7 M2
GLUCOSE SERPL-MCNC: 192.6 MG'DL (ref 70–99)
HBA1C MFR BLD: 6.7 % (ref 4.1–5.7)
HDLC SERPL-MCNC: 37 MG/DL
LDLC SERPL CALC-MCNC: 60 MG/DL
NONHDLC SERPL-MCNC: 90 MG/DL
POTASSIUM SERPL-SCNC: 3.7 MMOL/DL (ref 3.3–4.5)
SODIUM SERPL-SCNC: 133.6 MMOL/L (ref 132.6–141.4)
TRIGL SERPL-MCNC: 148 MG/DL

## 2018-05-03 RX ORDER — BLOOD-GLUCOSE CONTROL, NORMAL
EACH MISCELLANEOUS
Qty: 1 EACH | Refills: 3 | Status: SHIPPED | OUTPATIENT
Start: 2018-05-03

## 2018-05-03 NOTE — MR AVS SNAPSHOT
After Visit Summary   5/3/2018    Pinky Page    MRN: [id number]           Patient Information     Date Of Birth          1950        Visit Information        Provider Department      5/3/2018 2:40 PM Shamika Dia MD [facility]        Today's Diagnoses     Hypertension, essential    -  1    Diabetes mellitus type 2, insulin dependent (H)        Hypercholesteremia        Type 2 diabetes mellitus with microalbuminuria, with long-term current use of insulin (H)          Care Instructions    Here is the plan from today's visit    1. Hypertension, essential  - Basic Metabolic Panel ([facility])    2. Diabetes mellitus type 2, insulin dependent (H)  - Hemoglobin A1c ([facility])    3. Hypercholesteremia  - Lipid panel reflex to direct LDL Fasting    4. Type 2 diabetes mellitus with microalbuminuria, with long-term current use of insulin (H)  - blood glucose monitoring (NO BRAND SPECIFIED) test strip; Use to test blood sugar 3 times daily or as directed.  Dispense: 100 each; Refill: 3  - blood glucose (NO BRAND SPECIFIED) lancets standard; Use to test blood sugar 2 times daily or as directed.  Dispense: 100 each; Refill: 11  - blood glucose monitoring (NO BRAND SPECIFIED) meter device kit; Check Blood sugars twice a day.  Dispense: 1 kit; Refill: 0      Please call or return to clinic if your symptoms don't go away.    Follow up plan  Please make a clinic appointment for follow up with me (SHAMIKA DIA) in 3  months for follow up.    Thank you for coming to [facility] today.  Lab Testing:  **If you had lab testing today and your results are reassuring or normal they will be mailed to you or sent through [employer] within 7 days.   **If the lab tests need quick action we will call you with the results.  The phone number we will call with results is # [phone] (home) [phone] (work). If this is not the best number please call our clinic and change the  number.  Medication Refills:  If you need any refills please call your pharmacy and they will contact us.   If you need to  your refill at a new pharmacy, please contact the new pharmacy directly. The new pharmacy will help you get your medications transferred faster.   Scheduling:  If you have any concerns about today's visit or wish to schedule another appointment please call our office during normal business hours [phone] (8-5:00 M-F)  If a referral was made to a [facility] and you don't get a call from central scheduling please call [phone].  If a Mammogram was ordered for you at [facility] call [phone] to schedule or change your appointment.  If you had an XRay/CT/Ultrasound/MRI ordered the number is [phone] to schedule or change your radiology appointment.   Medical Concerns:  If you have urgent medical concerns please call [phone] at any time of the day.    Shamika Kelley MD            Follow-ups after your visit        Your next 10 appointments already scheduled     May 07, 2018  1:00 PM CDT   (Arrive by 12:45 PM)   Return Visit with Damon Gr, PhD [facility] ([facility])    [address] [id number] [phone]            May 10, 2018 11:30 AM CDT   Return Sleep Patient with Brianda Reddy MD   [facility], Sleep Study ([facility])    [address] [id number] [phone]            May 30, 2018  2:45 PM CDT   Adult Med Follow UP with Joana De Leon MD   Psychiatry Clinic ([facility])    [address]  [address] [address] [phone]            Jul 09, 2018 11:30 AM CDT   (Arrive by 11:15 AM)   Return Visit with Marcos Magdaleno MD   [facility] ([facility]  [place])    [address]  4th Floor  [facility] [address] [phone]            Nov 15, 2018  1:30 PM CST   RETURN GENERAL with Michael Lopze MD   Eye Clinic ([facility])    [place] [address]  9[address] Clin [address]  [facility] [address] [phone]              Who to contact     Please call your clinic at [phone] to:    Ask questions about your health    Make or cancel appointments    Discuss your medicines    Learn about your test results    Speak to your doctor            Additional Information About Your Visit        MyChart Information     [email or web address] is an electronic gateway that provides easy, online access to your medical records. With [employer], you can request a clinic appointment, read your test results, renew a prescription or communicate with your care team.     To sign up for [email or web address] visit the website at [email or web address]   You will be asked to enter the access code listed below, as well as some personal information. Please follow the directions to create your username and password.     Your access code is: [email or web address]  Expires: 2018  3:00 PM     Your access code will  in 90 days. If you need help or a new code, please contact your [facility] or call [phone] for assistance.        Care EveryWhere ID     This is your Care EveryWhere ID. This could be used by other organizations to access your [place] medical records  [id number]        Your Vitals Were     Pulse Temperature Pulse Oximetry Breastfeeding? BMI (Body Mass Index)       61 98.2  F (36.8  C) (Oral) 97% No 41.4 kg/m2        Blood Pressure from Last 3 Encounters:   18 135/76   18 158/70   18 144/78    Weight from Last 3 Encounters:   18 241 lb 3.2 oz (109.4 kg)   04/10/18 236 lb 11.2 oz (107.4 kg)   18 237 lb 3.2 oz (107.6 kg)              We Performed the Following     Basic Metabolic Panel  ([facility])     Hemoglobin A1c ([facility])     Lipid panel reflex to direct LDL Fasting          Today's Medication Changes          These changes are accurate as of 5/3/18  3:11 PM.  If you have any questions, ask your nurse or doctor.               Start taking these medicines.        Dose/Directions    blood glucose lancets standard   Commonly known as:  no brand specified   Used for:  Type 2 diabetes mellitus with microalbuminuria, with long-term current use of insulin (H)        Use to test blood sugar 2 times daily or as directed.   Quantity:  100 each   Refills:  11       blood glucose monitoring meter device kit   Commonly known as:  no brand specified   Used for:  Type 2 diabetes mellitus with microalbuminuria, with long-term current use of insulin (H)        Check Blood sugars twice a day.   Quantity:  1 kit   Refills:  0         These medicines have changed or have updated prescriptions.        Dose/Directions    metFORMIN 500 MG 24 hr tablet   Commonly known as:  GLUCOPHAGE-XR   This may have changed:    - how much to take  - when to take this   Used for:  Diabetes mellitus, type 2 (H)        Dose:  1000 mg   Take 2 tablets (1,000 mg) by mouth 2 times daily (with meals)   Quantity:  90 tablet   Refills:  3            Where to get your medicines      These medications were sent to [facility] - [place], [place] - [address]  [address], [facility] [address]     Phone:  [phone]     blood glucose lancets standard    blood glucose monitoring meter device kit    blood glucose monitoring test strip                Primary Care Provider Office Phone # Fax #    Shamika Ileana Kelley [phone]       [facility] [address] [facility] [address]        Equal Access to Services     JAYDEN STUART AH: Tatiana Dietrich, gifty del valle, qaybautumn arroyo. So [facility] [phone].    ATENCIÓN: Mague negrete  español, tiene a deluca disposición servicios gratuitos de asistencia lingüística. Link maradiaga [phone].    We comply with applicable federal civil rights laws and [place] laws. We do not discriminate on the basis of race, color, national origin, age, disability, sex, sexual orientation, or gender identity.            Thank you!     Thank you for choosing [facility]  for your care. Our goal is always to provide you with excellent care. Hearing back from our patients is one way we can continue to improve our services. Please take a few minutes to complete the written survey that you may receive in the mail after your visit with us. Thank you!             Your Updated Medication List - Protect others around you: Learn how to safely use, store and throw away your medicines at www.disposemymeds.org.          This list is accurate as of 5/3/18  3:11 PM.  Always use your most recent med list.                   Brand Name Dispense Instructions for use Diagnosis    acetaminophen 500 MG tablet    TYLENOL    1 Bottle    Take 2 tablets (1,000 mg) by mouth every 6 hours as needed    Sprain of left ankle, unspecified ligament, initial encounter       alum & mag hydroxide-simethicone 200-200-20 MG/5ML Susp suspension    MYLANTA/MAALOX     Take 30 mLs by mouth daily as needed for indigestion        amLODIPine 5 MG tablet    NORVASC    30 tablet    Take 2 tablets (10 mg) by mouth daily    Essential hypertension with goal blood pressure less than 130/85       artificial saliva Aers spray      Take 1 spray by mouth 3 times daily as needed for dry mouth        artificial tears Oint ophthalmic ointment     1 Tube    Apply  to eye. Place 0.5 inches into both eyes at bedtime    Insufficiency of tear film of both eyes       ARTIFICIAL TEARS OP      Apply 1 drop to eye 4 times daily.        aspirin 81 MG tablet     100    Take 1 tablet by mouth daily. ENTERIC COATED.        atorvastatin 40 MG tablet    LIPITOR    90  tablet    Take 1 tablet (40 mg) by mouth daily    Hyperlipidemia LDL goal <100       blood glucose lancets standard    no brand specified    100 each    Use to test blood sugar 2 times daily or as directed.    Type 2 diabetes mellitus with microalbuminuria, with long-term current use of insulin (H)       blood glucose monitoring meter device kit    no brand specified    1 kit    Check Blood sugars twice a day.    Type 2 diabetes mellitus with microalbuminuria, with long-term current use of insulin (H)       blood glucose monitoring test strip    no brand specified    100 each    Use to test blood sugar 3 times daily or as directed.    Type 2 diabetes mellitus with microalbuminuria, with long-term current use of insulin (H)       calcium polycarbophil 625 MG tablet    FIBERCON     Take 2 tablets by mouth daily        calcium-vitamin D 250-125 MG-UNIT Tabs      Take 1 tablet by mouth 2 times daily. Calcium 250 mg/Vit D 125 IU        CLARITIN 10 MG tablet   Generic drug:  loratadine     30    1 TAB PO QD (Once per day) as needed for ALLERGY SYMPTOMS        clotrimazole 1 % cream    LOTRIMIN    50 g    Apply topically 2 times daily as needed    Dermatophytosis       eucerin cream      Apply  topically as needed. Apply to thigh PRN dry skin        exenatide 10 MCG/0.04ML injection    BYETTA    1 Syringe    Inject 10 mcg Subcutaneous 2 times daily (before meals)    Type 2 diabetes mellitus with microalbuminuria, with long-term current use of insulin (H)       FLUoxetine 40 MG capsule    PROzac    30 capsule    Take 1 capsule (40 mg) by mouth daily    Schizoaffective disorder, depressive type (H)       fluticasone 50 MCG/ACT spray    FLONASE    16 g    Spray 1 spray into both nostrils daily    Seasonal allergic rhinitis       furosemide 20 MG tablet    LASIX    60 tablet    Take 1 tablet (20 mg) by mouth 2 times daily    Lower leg edema       GEL-SASHA DENTINBLOC DT      Apply  to affected area. GEL. Apply to 2nd molar on  bottom right daily at bedtime.        glucagon 1 MG kit    GLUCAGON EMERGENCY    2 mg    Inject 1 mg into the muscle once for 1 dose    Type 2 diabetes mellitus with microalbuminuria, with long-term current use of insulin (H)       insulin glargine 100 UNIT/ML injection    LANTUS    8 mL    Inject 20 Units Subcutaneous At Bedtime    Type 2 diabetes mellitus with microalbuminuria, with long-term current use of insulin (H)       LACTAID 3000 UNIT tablet   Generic drug:  lactase      Take 4 tabs daily with meals.        levothyroxine 175 MCG tablet    SYNTHROID/LEVOTHROID    30 tablet    Take 1 tablet (175 mcg) by mouth daily Give on empty stomach    Other specified hypothyroidism       LORazepam 1 MG tablet    ATIVAN    35 tablet    Take 1 tablet (1 mg) by mouth At Bedtime .  May take additional 1mg daily PRN for agitation.    Generalized anxiety disorder       losartan 100 MG tablet    COZAAR    90 tablet    Take 1 tablet by mouth daily.    Hypertension goal BP (blood pressure) < 130/80, Diabetes mellitus type 2, insulin dependent (H), CKD (chronic kidney disease) stage 2, GFR 60-89 ml/min       melatonin 3 MG tablet     60 tablet    Take 1 tablet (3 mg) by mouth nightly as needed for sleep    Other insomnia       metFORMIN 500 MG 24 hr tablet    GLUCOPHAGE-XR    90 tablet    Take 2 tablets (1,000 mg) by mouth 2 times daily (with meals)    Diabetes mellitus, type 2 (H)       MILK OF MAGNESIA PO      Take  by mouth. Take 30 mL as needed for constipation.        NEURONTIN PO      Take 900 mg by mouth 3 times daily.        nystatin 457963 UNIT/GM Powd    MYCOSTATIN    60 g    Apply topically 3 times daily as needed    Candidiasis of skin       polyethylene glycol powder    MIRALAX/GLYCOLAX     Take 1 capful by mouth 2 times daily. 17 GM PO BID        saline 0.9 % Soln      Spray 2 sprays in nostril as needed.        SENNA S 8.6-50 MG per tablet   Generic drug:  senna-docusate      Take 2 tablets by mouth At Bedtime.         solifenacin 10 MG tablet    VESICARE    30 tablet    Take 1 tablet (10 mg) by mouth daily    Urge incontinence       ziprasidone 40 MG capsule    GEODON    60 capsule    Take 1 capsule (40 mg) by mouth 2 times daily (with meals)    Schizoaffective disorder, depressive type (H)

## 2018-05-03 NOTE — PROGRESS NOTES
Preceptor Attestation:  I discussed the patient with the resident. I have verified the content of the note, which accurately reflects my assessment of the patient and the plan of care.   Supervising Physician:  Madelaine Linares MD

## 2018-05-03 NOTE — PATIENT INSTRUCTIONS
Here is the plan from today's visit    1. Hypertension, essential  - Basic Metabolic Panel ([facility])    2. Diabetes mellitus type 2, insulin dependent (H)  - Hemoglobin A1c ([facility])    3. Hypercholesteremia  - Lipid panel reflex to direct LDL Fasting    4. Type 2 diabetes mellitus with microalbuminuria, with long-term current use of insulin (H)  - blood glucose monitoring (NO BRAND SPECIFIED) test strip; Use to test blood sugar 3 times daily or as directed.  Dispense: 100 each; Refill: 3  - blood glucose (NO BRAND SPECIFIED) lancets standard; Use to test blood sugar 2 times daily or as directed.  Dispense: 100 each; Refill: 11  - blood glucose monitoring (NO BRAND SPECIFIED) meter device kit; Check Blood sugars twice a day.  Dispense: 1 kit; Refill: 0      Please call or return to clinic if your symptoms don't go away.    Follow up plan  Please make a clinic appointment for follow up with me (GALA DIA) in 3  months for follow up.    Thank you for coming to [facility] today.  Lab Testing:  **If you had lab testing today and your results are reassuring or normal they will be mailed to you or sent through [employer] within 7 days.   **If the lab tests need quick action we will call you with the results.  The phone number we will call with results is # [phone] (home) [phone] (work). If this is not the best number please call our clinic and change the number.  Medication Refills:  If you need any refills please call your pharmacy and they will contact us.   If you need to  your refill at a new pharmacy, please contact the new pharmacy directly. The new pharmacy will help you get your medications transferred faster.   Scheduling:  If you have any concerns about today's visit or wish to schedule another appointment please call our office during normal business hours [phone] (8-5:00 M-F)  If a referral was made to a [facility] and you don't get a call from  central scheduling please call [phone].  If a Mammogram was ordered for you at [facility] call [phone] to schedule or change your appointment.  If you had an XRay/CT/Ultrasound/MRI ordered the number is [phone] to schedule or change your radiology appointment.   Medical Concerns:  If you have urgent medical concerns please call [phone] at any time of the day.    Shamika Kelley MD

## 2018-05-03 NOTE — LETTER
May 7, 2018      Pinky Page  [address] [address]    Dear Pinky,    Thank you for getting your care at [facility]. Please see below for your test results.    Resulted Orders   Basic Metabolic Panel (hospitals)   Result Value Ref Range    Urea Nitrogen 17.4 7.0 - 19.0 mg/dL    Calcium 9.1 8.5 - 10.1 mg/dL    Chloride 96.7 (L) 98.0 - 110.0 mmol/L    Carbon Dioxide 28.7 20.0 - 32.0 mmol/L    Creatinine 0.9 0.5 - 1.0 mg/dL    Glucose 192.6 (H) 70.0 - 99.0 mg'dL    Potassium 3.7 3.3 - 4.5 mmol/dL    Sodium 133.6 132.6 - 141.4 mmol/L    GFR Estimate 66.2 >60.0 mL/min/1.7 m2    GFR Estimate If Black 80.1 >60.0 mL/min/1.7 m2   Hemoglobin A1c (hospitals)   Result Value Ref Range    Hemoglobin A1C 6.7 (H) 4.1 - 5.7 %   Lipid panel reflex to direct LDL Fasting   Result Value Ref Range    Cholesterol 126 <200 mg/dL    Triglycerides 148 <150 mg/dL    HDL Cholesterol 37 (L) >49 mg/dL    LDL Cholesterol Calculated 60 <100 mg/dL      Comment:      Desirable:       <100 mg/dl    Non HDL Cholesterol 90 <130 mg/dL     Your A1c is 6.7 and at goal! Great job. Your cholesterol looks very good as well. Your kidney function is stable, but creatinine is slightly higher than your previous labs. We should check this again in 3-6 months.     If you have any concerns about these results please call and leave a message for me or send a [employer] message to the clinic.    Sincerely,    Shamika Kelley MD

## 2018-05-03 NOTE — PROGRESS NOTES
HPI:       Pinky Page is a 67 year old who presents for the following  Patient presents with:  Forms: Referral form  Refill Request: DM medications/supplies  RECHECK: DM and HTN f/u    Diabetes Follow-up    Patient is checking blood sugars: twice daily.    Blood sugar testing frequency justification: Patient modifying lifestyle changes (diet, exercise) with blood sugars  Results are as follows:         am - 100s- 180s         Suppertime - 100s-200s         -Last A1C was   Lab Results   Component Value Date    A1C 6.4 12/14/2017    A1C 6.3 09/12/2017    A1C 6.9 06/22/2017    A1C 6.6 03/21/2017    A1C 7.0 12/08/2016        Diabetic concerns: None    Chest Pain or exercise related calf pain (claudication):no     Symptoms of hypoglycemia (low blood sugar): none     Paresthesias (numbness or burning in feet) or sores: No     Diabetic eye exam within the last year?: Yes     Hyperlipidemia Follow-Up      Recommended Level of Therapy:High Intensity Statin (atorvastatin 40*-80 mg or rosuvastatin 20-40mg)       Rate your low fat/cholesterol diet?: good    Taking statin?  Yes, no muscle aches from statin    Other lipid medications/supplements?:  none     Hypertension Follow-up      Outpatient blood pressures are being checked at home.  Results are 130-150s/80-90s.    Low Salt Diet: no added salt    Daily NSAID Use?no     Did patient take their HTN pills today?  Yes       Adherence and Exercise  Medication side effects: no  How often is a medication missed? Never  Exercise:walking  and biking 6-7 days/week for an average of 30-45 minutes      Problem, Medication and Allergy Lists were   reviewed and are current.     Patient Active Problem List    Diagnosis Date Noted     Solitary kidney, congenital 06/07/2013     Priority: High     CKD (chronic kidney disease) stage 2, GFR 60-89 ml/min 03/01/2013     Priority: High     Lives in group home 02/27/2013     Priority: High     [facility] >[facility]  years.  Mantoux done in 9/2013 was negative.        Hypercholesteremia 10/05/2012     Priority: High     ASCVD  Risk Calculator (pooled cohort)   10 CV risk 17.5%   Recommended Therapy:High Intensity Statin (atorvastatin 40*-80 mg or rosuvastatin 20-40mg)               Morbid obesity due to excess calories (H) 10/02/2012     Priority: High     Follows with Dr. Marcos Magdaleno Q 2months for weight management.       Personal history of breast cancer s/p L masectomy 10/02/2012     Priority: High     May 24, 2013 S/p left mastectomy 1996; follows with Dr. Avila, last mammogram 5/2012 was benign. Rec annual mammogram.       Diabetes mellitus type 2, insulin dependent (H) 10/02/2012     Priority: High     July 27, 2013   Insulin dependent since 4/2006  Without history of retinopathy       Hypertension, essential      Priority: High     Hypothyroidism      Priority: High     On replacement.       ANUJ (obstructive sleep apnea)      Priority: High     Re-eval 2017 Moderate ANUJ, CPAP trial at 13 August 28, 2013   Follows with Dr. Mc.   On 4L O2 at night given CPAP intolerant.  PSG (10/21/07): AHI 39 with oxygen saturations to 71%.        CKD (chronic kidney disease) stage 1, GFR 90 ml/min or greater 06/21/2017     Priority: Medium     Microalbuminuria due to type 2 diabetes mellitus (H) 03/24/2017     Priority: Medium     3/21/17  Creatinine Urine 54   Albumin Urine mg/L 18   Albumin Urine mg/g Cr 32.47 (H)          Type 2 diabetes mellitus with microalbuminuria, with long-term current use of insulin (H) 03/24/2017     Priority: Medium     Diabetes mellitus, type II, insulin dependent (H) 03/24/2017     Priority: Medium     Nail complaint 02/03/2017     Priority: Medium     vertical nail ridges       Hx of psychiatric care 07/14/2016     Priority: Medium       PAST PSYCH MED TRIALS     sertaline   amitriptyline   lithium   risperidone   olanzapine   trazodone   clonazepam  Quetiapine (weight gain)        Psychological factors affecting medical condition 04/18/2016     Priority: Medium     Major depressive disorder, recurrent episode, moderate (H) 11/16/2015     Priority: Medium     Health Care Home 10/01/2015     Priority: Medium     Schizoaffective disorder, depressive type (H) 06/08/2015     Priority: Medium     Carpal tunnel syndrome of left wrist 05/28/2015     Priority: Medium     Generalized anxiety disorder 12/04/2014     Priority: Medium     Diagnosis updated by automated process. Provider to review and confirm.       Candidiasis of skin 11/04/2014     Priority: Medium     Chronic candidiasis of groin and labia        Hypertriglyceridemia 09/10/2014     Priority: Medium     Impingement syndrome of right shoulder 06/10/2014     Priority: Medium     S/P hysterectomy 04/20/2014     Priority: Medium     Extrapyramidal and movement disorder 02/27/2013     Priority: Medium     Cardiomegaly      Priority: Medium     Chronic constipation      Priority: Medium     Dry eye syndrome      Priority: Medium     Esophageal reflux      Priority: Medium     Exposure keratoconjunctivitis      Priority: Medium     DM ophthalmopathy (H)      Priority: Medium     Senile cataract      Priority: Medium     Postmenopausal atrophic vaginitis      Priority: Medium     Restless leg syndrome      Priority: Medium     Squamous blepharitis      Priority: Medium     Urge incontinence 04/19/2011     Priority: Medium     Allergic rhinitis due to pollen      Priority: Medium     seasonal allergies      ,     Current Outpatient Prescriptions   Medication Sig Dispense Refill     acetaminophen (TYLENOL) 500 MG tablet Take 2 tablets (1,000 mg) by mouth every 6 hours as needed 1 Bottle 2     alum & mag hydroxide-simethicone (MYLANTA/MAALOX) 200-200-20 MG/5ML SUSP suspension Take 30 mLs by mouth daily as needed for indigestion       amLODIPine (NORVASC) 5 MG tablet Take 2 tablets (10 mg) by mouth daily 30 tablet 3     artificial saliva (BIOTENE  MT) AERS spray Take 1 spray by mouth 3 times daily as needed for dry mouth       artificial tears (AKWA TEARS) OINT ophthalmic ointment Apply  to eye. Place 0.5 inches into both eyes at bedtime 1 Tube 11     ASPIRIN 81 MG OR TABS Take 1 tablet by mouth daily. ENTERIC COATED. 100 3     atorvastatin (LIPITOR) 40 MG tablet Take 1 tablet (40 mg) by mouth daily 90 tablet 1     blood glucose monitoring (NO BRAND SPECIFIED) test strip Use to test blood sugar 3 times daily or as directed. 100 each 3     Calcium Carbonate-Vitamin D (CALCIUM-VITAMIN D) 250-125 MG-UNIT TABS Take 1 tablet by mouth 2 times daily. Calcium 250 mg/Vit D 125 IU       calcium polycarbophil (FIBERCON) 625 MG tablet Take 2 tablets by mouth daily       CLARITIN 10 MG OR TABS 1 TAB PO QD (Once per day) as needed for ALLERGY SYMPTOMS 30 11     clotrimazole (LOTRIMIN) 1 % cream Apply topically 2 times daily as needed 50 g 0     exenatide (BYETTA) 10 MCG/0.04ML injection Inject 10 mcg Subcutaneous 2 times daily (before meals) 1 Syringe 11     FLUoxetine (PROZAC) 40 MG capsule Take 1 capsule (40 mg) by mouth daily 30 capsule 2     fluticasone (FLONASE) 50 MCG/ACT nasal spray Spray 1 spray into both nostrils daily 16 g 3     furosemide (LASIX) 20 MG tablet Take 1 tablet (20 mg) by mouth 2 times daily 60 tablet 3     Gabapentin (NEURONTIN PO) Take 900 mg by mouth 3 times daily.       glucagon (GLUCAGON EMERGENCY) 1 MG kit Inject 1 mg into the muscle once for 1 dose 2 mg 3     Hypromellose (ARTIFICIAL TEARS OP) Apply 1 drop to eye 4 times daily.       insulin glargine (LANTUS) 100 UNIT/ML injection Inject 20 Units Subcutaneous At Bedtime 8 mL 11     lactase (LACTAID) 3000 UNIT tablet Take 4 tabs daily with meals.       levothyroxine (SYNTHROID, LEVOTHROID) 175 MCG tablet Take 1 tablet (175 mcg) by mouth daily Give on empty stomach 30 tablet 4     LORazepam (ATIVAN) 1 MG tablet Take 1 tablet (1 mg) by mouth At Bedtime .  May take additional 1mg daily PRN for  agitation. 35 tablet 1     losartan (COZAAR) 100 MG tablet Take 1 tablet by mouth daily. 90 tablet 1     Magnesium Hydroxide (MILK OF MAGNESIA PO) Take  by mouth. Take 30 mL as needed for constipation.       melatonin 3 MG tablet Take 1 tablet (3 mg) by mouth nightly as needed for sleep 60 tablet 1     metFORMIN (GLUCOPHAGE-XR) 500 MG 24 hr tablet Take 2 tablets (1,000 mg) by mouth 2 times daily (with meals) (Patient taking differently: Take 2,000 mg by mouth daily ) 90 tablet 3     nystatin (MYCOSTATIN) 997379 UNIT/GM POWD Apply topically 3 times daily as needed 60 g 1     polyethylene glycol (MIRALAX/GLYCOLAX) powder Take 1 capful by mouth 2 times daily. 17 GM PO BID       Saline 0.9 % SOLN Spray 2 sprays in nostril as needed.       senna-docusate (SENNA S) 8.6-50 MG per tablet Take 2 tablets by mouth At Bedtime.       Skin Protectants, Misc. (EUCERIN) cream Apply  topically as needed. Apply to thigh PRN dry skin        Sod Fluor-Solo Fluor-Hydrfl Ac (GEL-SASHA DENTINBLOC DT) Apply  to affected area. GEL. Apply to 2nd molar on bottom right daily at bedtime.       solifenacin (VESICARE) 10 MG tablet Take 1 tablet (10 mg) by mouth daily 30 tablet 6     ziprasidone (GEODON) 40 MG capsule Take 1 capsule (40 mg) by mouth 2 times daily (with meals) 60 capsule 2   ,     Allergies   Allergen Reactions     Chlordiazepoxide Hcl      Dimetapp Dm Cold-Cough      Cold/Congetion TABS     Haldol      Ibuprofen      TABS     Lactose Intolerance [Beta-Galactosidase]      CAPS     Milk Products      Propofol      EMUL     Patient is an established patient of this clinic.         Review of Systems:   Review of Systems   Constitutional: Negative for appetite change, chills, fatigue and fever.   HENT: Negative for congestion, sinus pressure and sore throat.    Eyes: Negative for visual disturbance.   Respiratory: Negative for cough, shortness of breath and wheezing.    Cardiovascular: Negative for chest pain and leg swelling.    Gastrointestinal: Negative for abdominal distention, abdominal pain, blood in stool, constipation, diarrhea, nausea and vomiting.   Genitourinary: Negative for dysuria and hematuria.   Musculoskeletal: Negative for arthralgias and myalgias.   Skin: Negative for color change and rash.   Neurological: Negative for weakness and headaches.             Physical Exam:     Patient Vitals for the past 24 hrs:   BP Temp Temp src Pulse SpO2 Weight   05/03/18 1410 135/76 98.2  F (36.8  C) Oral 61 97 % 241 lb 3.2 oz (109.4 kg)     Body mass index is 41.4 kg/(m^2).     Vitals were reviewed and were normal     Physical Exam   Constitutional: She is oriented to person, place, and time. She appears well-developed and well-nourished. No distress.   Neck: Normal range of motion. Neck supple. No thyromegaly present.   Cardiovascular: Normal rate, regular rhythm and normal heart sounds.    No murmur heard.  Pulmonary/Chest: Effort normal and breath sounds normal. No respiratory distress. She has no wheezes.   Musculoskeletal: She exhibits edema (trace bilateral).   Lymphadenopathy:     She has no cervical adenopathy.   Neurological: She is alert and oriented to person, place, and time.   Skin: Skin is warm. No rash noted. She is not diaphoretic.         Results:     Results for orders placed or performed in visit on 05/03/18   Hemoglobin A1c ([facility])   Result Value Ref Range    Hemoglobin A1C 6.7 (H) 4.1 - 5.7 %     *Note: Due to a large number of results and/or encounters for the requested time period, some results have not been displayed. A complete set of results can be found in Results Review.       Assessment and Plan     Pinky Page is a 65 yo female with a PMH of HTN, CKD, DM2, hypothyroid, s/p breast CA with left masectomy 1996, and significant psych history who currently lives in group home who presents to the clinic for HTN, hypothyroid and DM follow up.     1. Hypertension, essential  - Basic Metabolic Panel  ([facility])    2. Diabetes mellitus type 2, insulin dependent (H)  - Hemoglobin A1c ([facility])    3. Hypercholesteremia  - Lipid panel reflex to direct LDL Fasting    4. Type 2 diabetes mellitus with microalbuminuria, with long-term current use of insulin (H)  - blood glucose monitoring (NO BRAND SPECIFIED) test strip; Use to test blood sugar 3 times daily or as directed.  Dispense: 100 each; Refill: 3  - blood glucose (NO BRAND SPECIFIED) lancets standard; Use to test blood sugar 2 times daily or as directed.  Dispense: 100 each; Refill: 11  - blood glucose monitoring (NO BRAND SPECIFIED) meter device kit; Check Blood sugars twice a day.  Dispense: 1 kit; Refill: 0  - blood glucose calibration (NO BRAND SPECIFIED) solution; Use to calibrate blood glucose monitor as directed.  Dispense: 1 each; Refill: 3    There are no discontinued medications.     Options for treatment and follow-up care were reviewed with the patient. Pinky Page  engaged in the decision making process and verbalized understanding of the options discussed and agreed with the final plan.    Shamika Kelley MD  [facility]  [phone]

## 2018-05-07 ENCOUNTER — OFFICE VISIT (OUTPATIENT)
Dept: PSYCHOLOGY | Facility: CLINIC | Age: 68
End: 2018-05-07
Payer: COMMERCIAL

## 2018-05-07 VITALS — WEIGHT: 238.8 LBS | BODY MASS INDEX: 40.99 KG/M2

## 2018-05-07 DIAGNOSIS — F41.1 GENERALIZED ANXIETY DISORDER: ICD-10-CM

## 2018-05-07 DIAGNOSIS — F54 PSYCHOLOGICAL FACTORS AFFECTING MORBID OBESITY (H): ICD-10-CM

## 2018-05-07 DIAGNOSIS — F33.0 MAJOR DEPRESSIVE DISORDER, RECURRENT EPISODE, MILD (H): Primary | ICD-10-CM

## 2018-05-07 DIAGNOSIS — E66.9 OBESITY, UNSPECIFIED OBESITY SEVERITY, UNSPECIFIED OBESITY TYPE: ICD-10-CM

## 2018-05-07 DIAGNOSIS — E66.01 PSYCHOLOGICAL FACTORS AFFECTING MORBID OBESITY (H): ICD-10-CM

## 2018-05-07 NOTE — PROGRESS NOTES
Health Psychology                  Clinic    Department of Medicine  Madelaine Ingram, Ph.D., L.P. [phone]                          [facility] Miryam Woods, Ph.D.,  L.P. [phone]                 3rd Floor, Clinic [address]  [place] [address]   Damon Gr, Ph.D., [email or web address]., L.P. [phone]     [address] [facility] [place]  [address] [facility] [place] Kimber Morales, Ph.D., L.P. [phone]  [id number]  [place] [address]    Health Psychology Follow-Up Note    Ms. Page is a pleasant 67-year old woman with chronic depressive illness, who returns to clinic for supportive and behavioral psychotherapy for moderate recurrent depression and for help losing weight given her morbid obesity.     She is 238.8, up from 236.7#  last session.    Wt Readings from Last 4 Encounters:   05/07/18 108.3 kg (238 lb 12.8 oz)   05/03/18 109.4 kg (241 lb 3.2 oz)   04/10/18 107.4 kg (236 lb 11.2 oz)   04/09/18 107.6 kg (237 lb 3.2 oz)     [facility], she continues to serve on the [employer].  She has been on the [employer] for 20 years, plus decorating the bulletin boards.  She is working 1 day/week with her boss, [place], Friday mornings for 1.5 hour.    We discussed seeing if she could increase her shifts to 2/week.  We discussed her difficulty falling asleep.  We addressed cutting back on caffeine, currently 2-3 cups of coffee in am, 1 diet  Cola in the early afternoon most days.  She is agreeable. She is down to 1 coffee/day at 10 AM plus a decaf at dinner.  She rates the depression as 3- 4 on 10-point subjective scale, which is below baseline.  She is doing well.  She participates in activities at [place].  She is up to biking 28 minutes x2/week.  We discussed increasing to x3-5/week and gradulally increasing beyog a half hour.  She is doing more social walking some of the time and had apparently is gradually ramping up on her fitness center days.   She agrees to increase.  She is getting along with her roommate.    Goal for exercise has been 1.5 hours/day as of June 19, 2017 and 2 lbs. / month.   We discussed increasing bicycling to 6 days per week and increasing gradually from 28 minutes to 30 minutes.    She arrived early today and was greeted in the waiting room.  Pinky participated fully and appeared to derive benefit.  Rapport was excellent.       A treatment plan was completed.  Extended session due to complexity of case and length of interval.    IMPRESSION Distress Disability Risk    Low High Low     Time In: 12:47  Time Out:  1:42  Diagnosis:   Major Depression, recurrent mild (F33.0)   Psychological Factors Affecting Morbid Obesity (F54)      Plan: She will return on 6/5 @ 1:00 for behavioral and supportive psychotherapy.   Plan to to bicycle for > 30-35 minutes 3-4 days/week and eat less.  Tx plan due 5/7/2019  Damon Gr, Ph.D., A.B.P.P., L.P.

## 2018-05-07 NOTE — MR AVS SNAPSHOT
After Visit Summary   5/7/2018    Pinky Page    MRN: [id number]           Patient Information     Date Of Birth          1950        Visit Information        Provider Department      5/7/2018 1:00 PM Damon Gr, PhD [facility]        Today's Diagnoses     Major depressive disorder, recurrent episode, mild (H)    -  1    Psychological factors affecting morbid obesity (H)        Generalized anxiety disorder        Obesity, unspecified obesity severity, unspecified obesity type           Follow-ups after your visit        Your next 10 appointments already scheduled     May 10, 2018 11:30 AM CDT   Return Sleep Patient with Brianda Reddy MD   [facility] [place], Sleep Study ([facility])    [address] [id number] [phone]            May 30, 2018  2:45 PM CDT   Adult Med Follow UP with Joana De Leon MD   Psychiatry Clinic ([facility])    [place] [address]  [address] [id number] [phone]            Jul 09, 2018 11:30 AM CDT   (Arrive by 11:15 AM)   Return Visit with Marcos Magdaleno MD   [facility] ([facility])    [address]  4th Floor  [facility] [id number] [phone]            Nov 15, 2018  1:30 PM CST   RETURN GENERAL with Michael Lopez MD   Eye Clinic ([facility])    [address]  [address] [address]  [facility] [id number] [phone]              Who to contact     Please call your clinic at [phone] to:    Ask questions about your health    Make or cancel appointments    Discuss your medicines    Learn about your test results    Speak to your doctor            Additional Information About Your Visit        MyChart Information     [employer] is an electronic gateway that provides easy, online access to  your medical records. With [employer], you can request a clinic appointment, read your test results, renew a prescription or communicate with your care team.     To sign up for [employer] visit the website at [email or web address]   You will be asked to enter the access code listed below, as well as some personal information. Please follow the directions to create your username and password.     Your access code is: [email or web address]  Expires: 2018  3:00 PM     Your access code will  in 90 days. If you need help or a new code, please contact your [facility] or call [phone] for assistance.        Care EveryWhere ID     This is your Care EveryWhere ID. This could be used by other organizations to access your [facility] medical records  [id number]        Your Vitals Were     BMI (Body Mass Index)                   40.99 kg/m2            Blood Pressure from Last 3 Encounters:   18 135/76   18 158/70   18 144/78    Weight from Last 3 Encounters:   18 108.3 kg (238 lb 12.8 oz)   18 109.4 kg (241 lb 3.2 oz)   04/10/18 107.4 kg (236 lb 11.2 oz)              Today, you had the following     No orders found for display         Today's Medication Changes          These changes are accurate as of 18  1:51 PM.  If you have any questions, ask your nurse or doctor.               These medicines have changed or have updated prescriptions.        Dose/Directions    metFORMIN 500 MG 24 hr tablet   Commonly known as:  GLUCOPHAGE-XR   This may have changed:    - how much to take  - when to take this   Used for:  Diabetes mellitus, type 2 (H)        Dose:  1000 mg   Take 2 tablets (1,000 mg) by mouth 2 times daily (with meals)   Quantity:  90 tablet   Refills:  3                Primary Care Provider Office Phone # Fax #    Shamika Kelley [phone]       [facility] [address] [address]        Equal Access  to Services     JAYDEN STUART : Tatiana Dietrich, wamelissada luqadaha, qaellen kaalmaursula mittal, autumn josé. So [facility] [phone].    ATENCIÓN: Si maryla bryant, tiene a deluca disposición servicios gratuitos de asistencia lingüística. Llame al [phone].    We comply with applicable federal civil rights laws and [place] laws. We do not discriminate on the basis of race, color, national origin, age, disability, sex, sexual orientation, or gender identity.            Thank you!     Thank you for choosing [facility]  for your care. Our goal is always to provide you with excellent care. Hearing back from our patients is one way we can continue to improve our services. Please take a few minutes to complete the written survey that you may receive in the mail after your visit with us. Thank you!             Your Updated Medication List - Protect others around you: Learn how to safely use, store and throw away your medicines at www.disposemymeds.org.          This list is accurate as of 5/7/18  1:51 PM.  Always use your most recent med list.                   Brand Name Dispense Instructions for use Diagnosis    acetaminophen 500 MG tablet    TYLENOL    1 Bottle    Take 2 tablets (1,000 mg) by mouth every 6 hours as needed    Sprain of left ankle, unspecified ligament, initial encounter       alum & mag hydroxide-simethicone 200-200-20 MG/5ML Susp suspension    MYLANTA/MAALOX     Take 30 mLs by mouth daily as needed for indigestion        amLODIPine 5 MG tablet    NORVASC    30 tablet    Take 2 tablets (10 mg) by mouth daily    Essential hypertension with goal blood pressure less than 130/85       artificial saliva Aers spray      Take 1 spray by mouth 3 times daily as needed for dry mouth        artificial tears Oint ophthalmic ointment     1 Tube    Apply  to eye. Place 0.5 inches into both eyes at bedtime    Insufficiency of tear film of both eyes        ARTIFICIAL TEARS OP      Apply 1 drop to eye 4 times daily.        aspirin 81 MG tablet     100    Take 1 tablet by mouth daily. ENTERIC COATED.        atorvastatin 40 MG tablet    LIPITOR    90 tablet    Take 1 tablet (40 mg) by mouth daily    Hyperlipidemia LDL goal <100       blood glucose calibration solution    no brand specified    1 each    Use to calibrate blood glucose monitor as directed.    Type 2 diabetes mellitus with microalbuminuria, with long-term current use of insulin (H)       blood glucose lancets standard    no brand specified    100 each    Use to test blood sugar 2 times daily or as directed.    Type 2 diabetes mellitus with microalbuminuria, with long-term current use of insulin (H)       blood glucose monitoring meter device kit    no brand specified    1 kit    Check Blood sugars twice a day.    Type 2 diabetes mellitus with microalbuminuria, with long-term current use of insulin (H)       blood glucose monitoring test strip    no brand specified    100 each    Use to test blood sugar 3 times daily or as directed.    Type 2 diabetes mellitus with microalbuminuria, with long-term current use of insulin (H)       calcium polycarbophil 625 MG tablet    FIBERCON     Take 2 tablets by mouth daily        calcium-vitamin D 250-125 MG-UNIT Tabs      Take 1 tablet by mouth 2 times daily. Calcium 250 mg/Vit D 125 IU        CLARITIN 10 MG tablet   Generic drug:  loratadine     30    1 TAB PO QD (Once per day) as needed for ALLERGY SYMPTOMS        clotrimazole 1 % cream    LOTRIMIN    50 g    Apply topically 2 times daily as needed    Dermatophytosis       eucerin cream      Apply  topically as needed. Apply to thigh PRN dry skin        exenatide 10 MCG/0.04ML injection    BYETTA    1 Syringe    Inject 10 mcg Subcutaneous 2 times daily (before meals)    Type 2 diabetes mellitus with microalbuminuria, with long-term current use of insulin (H)       FLUoxetine 40 MG capsule    PROzac    30 capsule     Take 1 capsule (40 mg) by mouth daily    Schizoaffective disorder, depressive type (H)       fluticasone 50 MCG/ACT spray    FLONASE    16 g    Spray 1 spray into both nostrils daily    Seasonal allergic rhinitis       furosemide 20 MG tablet    LASIX    60 tablet    Take 1 tablet (20 mg) by mouth 2 times daily    Lower leg edema       GEL-SASHA DENTINBLOC DT      Apply  to affected area. GEL. Apply to 2nd molar on bottom right daily at bedtime.        glucagon 1 MG kit    GLUCAGON EMERGENCY    2 mg    Inject 1 mg into the muscle once for 1 dose    Type 2 diabetes mellitus with microalbuminuria, with long-term current use of insulin (H)       insulin glargine 100 UNIT/ML injection    LANTUS    8 mL    Inject 20 Units Subcutaneous At Bedtime    Type 2 diabetes mellitus with microalbuminuria, with long-term current use of insulin (H)       LACTAID 3000 UNIT tablet   Generic drug:  lactase      Take 4 tabs daily with meals.        levothyroxine 175 MCG tablet    SYNTHROID/LEVOTHROID    30 tablet    Take 1 tablet (175 mcg) by mouth daily Give on empty stomach    Other specified hypothyroidism       LORazepam 1 MG tablet    ATIVAN    35 tablet    Take 1 tablet (1 mg) by mouth At Bedtime .  May take additional 1mg daily PRN for agitation.    Generalized anxiety disorder       losartan 100 MG tablet    COZAAR    90 tablet    Take 1 tablet by mouth daily.    Hypertension goal BP (blood pressure) < 130/80, Diabetes mellitus type 2, insulin dependent (H), CKD (chronic kidney disease) stage 2, GFR 60-89 ml/min       melatonin 3 MG tablet     60 tablet    Take 1 tablet (3 mg) by mouth nightly as needed for sleep    Other insomnia       metFORMIN 500 MG 24 hr tablet    GLUCOPHAGE-XR    90 tablet    Take 2 tablets (1,000 mg) by mouth 2 times daily (with meals)    Diabetes mellitus, type 2 (H)       MILK OF MAGNESIA PO      Take  by mouth. Take 30 mL as needed for constipation.        NEURONTIN PO      Take 900 mg by mouth 3 times  daily.        nystatin 574157 UNIT/GM Powd    MYCOSTATIN    60 g    Apply topically 3 times daily as needed    Candidiasis of skin       polyethylene glycol powder    MIRALAX/GLYCOLAX     Take 1 capful by mouth 2 times daily. 17 GM PO BID        saline 0.9 % Soln      Spray 2 sprays in nostril as needed.        SENNA S 8.6-50 MG per tablet   Generic drug:  senna-docusate      Take 2 tablets by mouth At Bedtime.        solifenacin 10 MG tablet    VESICARE    30 tablet    Take 1 tablet (10 mg) by mouth daily    Urge incontinence       ziprasidone 40 MG capsule    GEODON    60 capsule    Take 1 capsule (40 mg) by mouth 2 times daily (with meals)    Schizoaffective disorder, depressive type (H)

## 2018-05-09 ENCOUNTER — DOCUMENTATION ONLY (OUTPATIENT)
Dept: FAMILY MEDICINE | Facility: CLINIC | Age: 68
End: 2018-05-09

## 2018-05-09 NOTE — PROGRESS NOTES
"When opening a documentation only encounter, be sure to enter in \"Chief Complaint\" Forms and in \" Comments\" Title of form, description if needed.    Pinky is a 67 year old  female  Form received via: Fax  Form now resides in: Provider Ready    Sharlene Bates CMA                Form has been completed by provider.     Form sent out via: Mailed to [address] [id number]  Patient informed: No, Reason:They will receive the form in the mail  Output date: May 11, 2018    Sharlene Bates CMA      **Please close the encounter**      "

## 2018-05-10 ENCOUNTER — OFFICE VISIT (OUTPATIENT)
Dept: SLEEP MEDICINE | Facility: CLINIC | Age: 68
End: 2018-05-10
Payer: COMMERCIAL

## 2018-05-10 VITALS
WEIGHT: 236 LBS | OXYGEN SATURATION: 97 % | HEART RATE: 67 BPM | BODY MASS INDEX: 40.29 KG/M2 | DIASTOLIC BLOOD PRESSURE: 70 MMHG | RESPIRATION RATE: 18 BRPM | SYSTOLIC BLOOD PRESSURE: 149 MMHG | HEIGHT: 64 IN

## 2018-05-10 DIAGNOSIS — G47.33 OSA (OBSTRUCTIVE SLEEP APNEA): Primary | ICD-10-CM

## 2018-05-10 PROCEDURE — 99214 OFFICE O/P EST MOD 30 MIN: CPT | Performed by: INTERNAL MEDICINE

## 2018-05-10 NOTE — MR AVS SNAPSHOT
After Visit Summary   5/10/2018    Pinky Page    MRN: [id number]           Patient Information     Date Of Birth          1950        Visit Information        Provider Department      5/10/2018 11:30 AM Brianda Reddy MD [facility], [place], Sleep Study        Today's Diagnoses     ANUJ (obstructive sleep apnea)    -  1      Care Instructions    Try not to fall asleep during the day when you are resting/elevated legs.    Consider coloring/reading just before bedtime.    I you struggle with insomnia issue mention them to Dr Gr    No caffeine after 3 PM    1.  Continue CPAP every night for all hours that you are sleeping.  If you nap use CPAP.  As always, try to get at least 8 hours of sleep or more each day, and avoid sleep deprivation. Avoid alcohol.    2.  Reasons that you might need a change to your pressure therapy would be weight gain or loss, waking having inadvertently removed your CPAP overnight, having previously felt refreshed by sleep with CPAP use and now waking un-refreshed, and return of daytime sleepiness. Also, the development of new medical problems can sometimes affect breathing at night-heart failure, stroke, medications such as narcotics.    3.  Please bring CPAP with you if you are hospitalized.  If anticipating surgery be sure to discuss with your surgeon that you have sleep apnea and use PAP therapy.      4.  Maintain your equipment as recommended which includes routine cleaning and replacement of supplies.  Call DME for any questions regarding supplies or maintenance.      5.  Do not drive on engage in potentially dangerous activities if feeling sleepy.    6.  Please see me again in 12 months and bring your machine and card to your follow-up visit.          Tips for your CPAP and BIPAP use-    Mask fitting tips  Mask fitting exercise:    To improve your mask seal and your mobility at night, put mask on and secure in place.  Lie down in bed with full pressure and  roll to one side, adjust headgear while in that position to eliminate any leaks. Repeat process rolling to other side.     The mask seal does not have to be perfect:   CPAP machines are designed to make up for small leaks. However, you will not tolerate leaks blowing in your eyes so you will need to adjust.   Any leak should only be near or at the bottom of the mask.  We expect your mask to leak slightly at night.    Do not over-tighten the headgear straps, tighter IS NOT better, we expect minimal leak.    First try re-positioning the mask or headgear before tightening the headgear straps.  Mask leaks are expected due to changing sleeping positions. Try pulling the mask away from your skin allowing the cushion to re-inflate will minimize the leak.  If you struggle for a good fit, try turning the CPAP off and then readjust the mask by pulling it away from your face and then turning back on the CPAP.        Humidifier tips  Humidifiers can be adjusted to increase or decrease the amount of moisture according to your comfort level. You may need to adjust this frequently at first, but then might only change it with seasonal weather changes.     Try INCREASING the humidity if:  You experience a dry, irritated nasal passage or throat.  You have a runny, drippy nose or sneezing fits after using CPAP.  You experience nasal congestion during or after CPAP use.    Try DECREASING the humidity if:  You have excessive condensation or  rain out  in the tubing or mask.  Otherwise keep the tubing warm during the night by running it underneath the blankets or pillow.      Clinic visit after initial CPAP and BIPAP set-up   Bring your equipment with you to your 4 week follow up clinic visit.  We will be extracting your data from the machine.        Travel  Always take your equipment with you.  If you fly with your equipment bring it on with you as a carry on.  Medical equipment does not count as a carry on.    If you travel  international the machines take 110-240.  The only adapter needed is the adapter that will fit into the receptacle (outlet).    You may also want to bring an extension cord as many hotel rooms have limited outlets at the bedside.  Do not travel with water in your humidifier chamber.     Cleaning and Maintenance Guidelines    Equipment Frequency Cleaning Method   Mask First Day    Daily      Weekly Soak mask in hot soapy water for 30 minutes, rinse and air dry.  Wipe nasal cushion with a hot soapy (Ivory, baby shampoo) cloth and rinse.  Baby wipes may also be used.  Do not use anti-bacterial soaps,Jessy  liquid soap, rubbing alcohol, bleach or ammonia.  Wash frame in hot soapy water (Ivory, baby shampoo) rinse and let air dry   Headgear Biweekly Wash in hot soapy water, rinse and air dry   Reusable Gray Filter Weekly Wash in hot soapy water, rinse, put in towel squeeze moisture out, let air dry   Disposable White Filter Check Weekly Replace when brown or gray in color; at least every 2 to 3 months   Humidifier Chamber Daily    Weekly Empty distilled water from humidifier and let air dry    Hand wash in hot soapy water, rinse and air dry   Tubing Weekly Wash in hot soapy water, rinse and let air dry   Mask, Tubing and Humidifier Chamber As needed Disinfect: Soak in 1 part vinegar to 3 parts hot water for 30 minutes, rinse well and air dry  Not the material headgear        MASK AND SUPPLY REORDERING  Reminder: Most insurance companies will allow for a new mask, headgear, tubing, and reusable gray filter every six months.  Disposable white ultra-fine filters are covered monthly.    EQUIPMENT NEEDS  Please call our CPAP specialist with any equipment problems, questions or needs.    HOME AND SAFETY INSTRUCTIONS    Do not use frayed or cracked electrical cords, multi plug adaptors, or switched receptacles    Do not immerse electrical equipment into water    Assure that electrical cords do not become a tripping  hazard              Follow-ups after your visit        Your next 10 appointments already scheduled     May 30, 2018  2:45 PM CDT   Adult Med Follow UP with Joana De Leon MD   Psychiatry Clinic ([facility])    [facility]  [address]nd [facility] [address]  [address] [address] [phone]            Jun 05, 2018  1:00 PM CDT   (Arrive by 12:45 PM)   Return Visit with Damon Gr, PhD [facility] ([facility] [place])    [address] [facility]  3rd [facility] [address] [phone]            Jul 09, 2018 11:30 AM CDT   (Arrive by 11:15 AM)   Return Visit with Marcos Magdaleno MD   [facility] ([facility])    [address] [place] [address]  4th [facility] [address] [phone]            Nov 15, 2018  1:30 PM CST   RETURN GENERAL with Michael Lopez MD   Eye Clinic ([facility])    [address]  [address]  [facility] [address]   [phone]            May 09, 2019  1:30 PM CDT   Return Sleep Patient with Brianda Reddy MD   [facility])    [address] [place] [id number] [phone]              Who to contact     If you have questions or need follow up information about today's clinic visit or your schedule please contact [facility] directly at [phone].  Normal or non-critical lab and imaging results will be communicated to you by MyChart, letter or phone within 4 business days after the clinic has received the results. If you do not hear from us within 7 days, please contact the clinic through MyChart or phone. If you have a critical or abnormal lab result, we will notify you by phone as soon as possible.  Submit refill requests through [employer] or call your pharmacy and they will forward the  "refill request to us. Please allow 3 business days for your refill to be completed.          Additional Information About Your Visit        [employer] Information     [employer] lets you send messages to your doctor, view your test results, renew your prescriptions, schedule appointments and more. To sign up, go to [email or web address] . Click on \"Log in\" on the left side of the screen, which will take you to the Welcome page. Then click on \"Sign up Now\" on the right side of the page.     You will be asked to enter the access code listed below, as well as some personal information. Please follow the directions to create your username and password.     Your access code is: [email or web address]  Expires: 2018  3:00 PM     Your access code will  in 90 days. If you need help or a new code, please call your [place] clinic or [phone].        Care EveryWhere ID     This is your Care EveryWhere ID. This could be used by other organizations to access your [place] medical records  [id number]        Your Vitals Were     Pulse Respirations Height Pulse Oximetry BMI (Body Mass Index)       67 18 1.626 m (5' 4\") 97% 40.51 kg/m2        Blood Pressure from Last 3 Encounters:   05/10/18 149/70   18 135/76   18 151/78    Weight from Last 3 Encounters:   05/10/18 107 kg (236 lb)   18 109.4 kg (241 lb 3.2 oz)   18 107 kg (236 lb)              We Performed the Following     Comprehensive DME          Today's Medication Changes          These changes are accurate as of 5/10/18 11:49 AM.  If you have any questions, ask your nurse or doctor.               These medicines have changed or have updated prescriptions.        Dose/Directions    metFORMIN 500 MG 24 hr tablet   Commonly known as:  GLUCOPHAGE-XR   This may have changed:    - how much to take  - when to take this   Used for:  Diabetes mellitus, type 2 (H)        Dose:  1000 mg   Take 2 tablets (1,000 mg) by mouth 2 times daily (with meals) "   Quantity:  90 tablet   Refills:  3                Primary Care Provider Office Phone # Fax #    Shamika Ileana Kelley [phone]       [facility] [address] [facility] [address]        Equal Access to Services     ABNAYLA NARCISO AH: Hadii james ku hadmarelyo Soomaali, waaxda luqadaha, qaybta kaalmada adeegyada, autumn alvarezn hernandezrupinder lee paulette josé. So [facility] [phone].    ATENCIÓN: Si habla español, tiene a deluca disposición servicios gratuitos de asistencia lingüística. Llame al [phone].    We comply with applicable federal civil rights laws and [place] laws. We do not discriminate on the basis of race, color, national origin, age, disability, sex, sexual orientation, or gender identity.            Thank you!     Thank you for choosing [facility], SLEEP STUDY  for your care. Our goal is always to provide you with excellent care. Hearing back from our patients is one way we can continue to improve our services. Please take a few minutes to complete the written survey that you may receive in the mail after your visit with us. Thank you!             Your Updated Medication List - Protect others around you: Learn how to safely use, store and throw away your medicines at www.disposemymeds.org.          This list is accurate as of 5/10/18 11:49 AM.  Always use your most recent med list.                   Brand Name Dispense Instructions for use Diagnosis    acetaminophen 500 MG tablet    TYLENOL    1 Bottle    Take 2 tablets (1,000 mg) by mouth every 6 hours as needed    Sprain of left ankle, unspecified ligament, initial encounter       alum & mag hydroxide-simethicone 200-200-20 MG/5ML Susp suspension    MYLANTA/MAALOX     Take 30 mLs by mouth daily as needed for indigestion        amLODIPine 5 MG tablet    NORVASC    30 tablet    Take 2 tablets (10 mg) by mouth daily    Essential hypertension with goal blood pressure less than 130/85       artificial saliva Aers spray       Take 1 spray by mouth 3 times daily as needed for dry mouth        artificial tears Oint ophthalmic ointment     1 Tube    Apply  to eye. Place 0.5 inches into both eyes at bedtime    Insufficiency of tear film of both eyes       ARTIFICIAL TEARS OP      Apply 1 drop to eye 4 times daily.        aspirin 81 MG tablet     100    Take 1 tablet by mouth daily. ENTERIC COATED.        atorvastatin 40 MG tablet    LIPITOR    90 tablet    Take 1 tablet (40 mg) by mouth daily    Hyperlipidemia LDL goal <100       blood glucose calibration solution    no brand specified    1 each    Use to calibrate blood glucose monitor as directed.    Type 2 diabetes mellitus with microalbuminuria, with long-term current use of insulin (H)       blood glucose lancets standard    no brand specified    100 each    Use to test blood sugar 2 times daily or as directed.    Type 2 diabetes mellitus with microalbuminuria, with long-term current use of insulin (H)       blood glucose monitoring meter device kit    no brand specified    1 kit    Check Blood sugars twice a day.    Type 2 diabetes mellitus with microalbuminuria, with long-term current use of insulin (H)       blood glucose monitoring test strip    no brand specified    100 each    Use to test blood sugar 3 times daily or as directed.    Type 2 diabetes mellitus with microalbuminuria, with long-term current use of insulin (H)       calcium polycarbophil 625 MG tablet    FIBERCON     Take 2 tablets by mouth daily        calcium-vitamin D 250-125 MG-UNIT Tabs      Take 1 tablet by mouth 2 times daily. Calcium 250 mg/Vit D 125 IU        CLARITIN 10 MG tablet   Generic drug:  loratadine     30    1 TAB PO QD (Once per day) as needed for ALLERGY SYMPTOMS        clotrimazole 1 % cream    LOTRIMIN    50 g    Apply topically 2 times daily as needed    Dermatophytosis       eucerin cream      Apply  topically as needed. Apply to thigh PRN dry skin        exenatide 10 MCG/0.04ML injection     BYETTA    1 Syringe    Inject 10 mcg Subcutaneous 2 times daily (before meals)    Type 2 diabetes mellitus with microalbuminuria, with long-term current use of insulin (H)       FLUoxetine 40 MG capsule    PROzac    30 capsule    Take 1 capsule (40 mg) by mouth daily    Schizoaffective disorder, depressive type (H)       fluticasone 50 MCG/ACT spray    FLONASE    16 g    Spray 1 spray into both nostrils daily    Seasonal allergic rhinitis       furosemide 20 MG tablet    LASIX    60 tablet    Take 1 tablet (20 mg) by mouth 2 times daily    Lower leg edema       GEL-SASHA DENTINBLOC DT      Apply  to affected area. GEL. Apply to 2nd molar on bottom right daily at bedtime.        glucagon 1 MG kit    GLUCAGON EMERGENCY    2 mg    Inject 1 mg into the muscle once for 1 dose    Type 2 diabetes mellitus with microalbuminuria, with long-term current use of insulin (H)       insulin glargine 100 UNIT/ML injection    LANTUS    8 mL    Inject 20 Units Subcutaneous At Bedtime    Type 2 diabetes mellitus with microalbuminuria, with long-term current use of insulin (H)       LACTAID 3000 UNIT tablet   Generic drug:  lactase      Take 4 tabs daily with meals.        levothyroxine 175 MCG tablet    SYNTHROID/LEVOTHROID    30 tablet    Take 1 tablet (175 mcg) by mouth daily Give on empty stomach    Other specified hypothyroidism       LORazepam 1 MG tablet    ATIVAN    35 tablet    Take 1 tablet (1 mg) by mouth At Bedtime .  May take additional 1mg daily PRN for agitation.    Generalized anxiety disorder       losartan 100 MG tablet    COZAAR    90 tablet    Take 1 tablet by mouth daily.    Hypertension goal BP (blood pressure) < 130/80, Diabetes mellitus type 2, insulin dependent (H), CKD (chronic kidney disease) stage 2, GFR 60-89 ml/min       melatonin 3 MG tablet     60 tablet    Take 1 tablet (3 mg) by mouth nightly as needed for sleep    Other insomnia       metFORMIN 500 MG 24 hr tablet    GLUCOPHAGE-XR    90 tablet    Take 2  tablets (1,000 mg) by mouth 2 times daily (with meals)    Diabetes mellitus, type 2 (H)       MILK OF MAGNESIA PO      Take  by mouth. Take 30 mL as needed for constipation.        NEURONTIN PO      Take 900 mg by mouth 3 times daily.        nystatin 233504 UNIT/GM Powd    MYCOSTATIN    60 g    Apply topically 3 times daily as needed    Candidiasis of skin       polyethylene glycol powder    MIRALAX/GLYCOLAX     Take 1 capful by mouth 2 times daily. 17 GM PO BID        saline 0.9 % Soln      Spray 2 sprays in nostril as needed.        SENNA S 8.6-50 MG per tablet   Generic drug:  senna-docusate      Take 2 tablets by mouth At Bedtime.        solifenacin 10 MG tablet    VESICARE    30 tablet    Take 1 tablet (10 mg) by mouth daily    Urge incontinence       ziprasidone 40 MG capsule    GEODON    60 capsule    Take 1 capsule (40 mg) by mouth 2 times daily (with meals)    Schizoaffective disorder, depressive type (H)

## 2018-05-10 NOTE — PROGRESS NOTES
Chief complaint: Follow-up of obstructive sleep apnea    History of Present Illness: 67-year-old female with history of diabetes and depression and moderate obstructive sleep apnea.  She is here today for routine follow-up.  He has a previous history of CPAP intolerance but has been doing very well over the last year.  She reports today that sometimes she has difficulty falling asleep.  If she cannot fall asleep she will get up she may color for a while to look at books.  Once she falls asleep she is able to remain asleep or fall back asleep after middle the night awakenings.  She typically takes her evening medications around 9.  She usually gets out of bed in the morning between 6 and 7.  She does lay down during the day in order to elevate her legs.  Sometimes she does fall asleep for up to half an hour at that time.  She drinks 1-2 caffeinated beverages a day.  Usually a coffee in the morning, sometimes a caffeinated soda or second couple coffee in the early afternoon.  She does not smoke nor use medical or recreational marijuana.  No alcohol.  [place] is normal.  She denies symptoms of restlessness that is bothering her.  She has not developed any problems with nightmares, night terrors, sleepwalking, sleep talking, or dream enactment behavior.    She is tried some different masks since her last visit and is currently using a nasal mask.  She feels this one fits the best.  She uses the humidifier.  She gets her supplies from [employer], we have not yet received a download from her machine today.    Evening medications include a dose of gabapentin, 1 mg of lorazepam, 3 mg melatonin.  Patient has been walking and using a stationary bike for exercise.  She has been making lifestyle modifications and is having some success with losing weight.    [place] Seepiness Scale:4 (Less than 10 normal)    Past Medical History:   Diagnosis Date     Allergic rhinitis due to pollen     seasonal allergies      Anisometropia  and aniseikonia      BMI greater than 40      Cardiomegaly      Chronic constipation      Congenital absence of one kidney      Cramp of limb      Dermatophytosis of the body      Dry eye syndrome      Esophageal reflux      Essential hypertension, benign      Gastro-oesophageal reflux disease      Hemorrhoids      Hypermetropia      Hypothyroidism      Incomplete Emptying of Bladder      Lactose intolerance      Major depressive disorder, recurrent episode, moderate (H)      Malignant neoplasm of breast (female), unspecified site     left mastectomy 1996      Mixed incontinence urge and stress (male)(female)      Moderate obstructive sleep apnea      Postmenopausal Atrophic Vaginitis      Presbyopia      Regular astigmatism      Restless leg syndrome      Senile nuclear sclerosis      Thyroid eye disease     mild     Tinnitus      Trigger finger (acquired)     right hand     Type II or unspecified type diabetes mellitus without mention of complication, not stated as uncontrolled     on insulin since April 2006        Allergies   Allergen Reactions     Chlordiazepoxide Hcl      Dimetapp Dm Cold-Cough      Cold/Congetion TABS     Haldol      Ibuprofen      TABS     Lactose Intolerance [Beta-Galactosidase]      CAPS     Milk Products      Propofol      EMUL       Current Outpatient Prescriptions   Medication     acetaminophen (TYLENOL) 500 MG tablet     alum & mag hydroxide-simethicone (MYLANTA/MAALOX) 200-200-20 MG/5ML SUSP suspension     amLODIPine (NORVASC) 5 MG tablet     artificial saliva (BIOTENE MT) AERS spray     artificial tears (AKWA TEARS) OINT ophthalmic ointment     ASPIRIN 81 MG OR TABS     atorvastatin (LIPITOR) 40 MG tablet     blood glucose (NO BRAND SPECIFIED) lancets standard     blood glucose calibration (NO BRAND SPECIFIED) solution     blood glucose monitoring (NO BRAND SPECIFIED) meter device kit     blood glucose monitoring (NO BRAND SPECIFIED) test strip     Calcium Carbonate-Vitamin D  (CALCIUM-VITAMIN D) 250-125 MG-UNIT TABS     calcium polycarbophil (FIBERCON) 625 MG tablet     CLARITIN 10 MG OR TABS     clotrimazole (LOTRIMIN) 1 % cream     exenatide (BYETTA) 10 MCG/0.04ML injection     FLUoxetine (PROZAC) 40 MG capsule     fluticasone (FLONASE) 50 MCG/ACT nasal spray     furosemide (LASIX) 20 MG tablet     Gabapentin (NEURONTIN PO)     Hypromellose (ARTIFICIAL TEARS OP)     insulin glargine (LANTUS) 100 UNIT/ML injection     lactase (LACTAID) 3000 UNIT tablet     levothyroxine (SYNTHROID, LEVOTHROID) 175 MCG tablet     LORazepam (ATIVAN) 1 MG tablet     losartan (COZAAR) 100 MG tablet     Magnesium Hydroxide (MILK OF MAGNESIA PO)     melatonin 3 MG tablet     metFORMIN (GLUCOPHAGE-XR) 500 MG 24 hr tablet     nystatin (MYCOSTATIN) 531106 UNIT/GM POWD     polyethylene glycol (MIRALAX/GLYCOLAX) powder     Saline 0.9 % SOLN     senna-docusate (SENNA S) 8.6-50 MG per tablet     Skin Protectants, Misc. (EUCERIN) cream     Sod Fluor-Solo Fluor-Hydrfl Ac (GEL-SASHA DENTINBLOC DT)     solifenacin (VESICARE) 10 MG tablet     ziprasidone (GEODON) 40 MG capsule     glucagon (GLUCAGON EMERGENCY) 1 MG kit     No current facility-administered medications for this visit.        Social History     Social History     Marital status: Single     Spouse name: N/A     Number of children: N/A     Years of education: N/A     Occupational History     Not on file.     Social History Main Topics     Smoking status: Never Smoker     Smokeless tobacco: Never Used     Alcohol use No     Drug use: No     Sexual activity: No     Other Topics Concern     Not on file     Social History Narrative    Pinky Page is a resident at [facility].       Family History   Problem Relation Age of Onset     HEART DISEASE Father      1968     Melanoma Mother      malignant melanoma     Glaucoma No family hx of      Macular Degeneration No family hx of          EXAM: Pleasant alert female no distress  /70  Pulse 67  Resp 18  Ht 1.626  "m (5' 4\")  Wt 107 kg (236 lb)  SpO2 97%  BMI 40.51 kg/m2  Psychiatric: Mood and affect appear normal    PSG date:3/21/2017  Wt:249 lbs  AHI: 15.5    ASSESSMENT: 67-year-old female with moderate obstructive sleep apnea no download available yet for review.  However reports good clinical benefit and improved mask fit.  Some issues with sleep initiation however these are mild and do not appear to have daytime consequences.  No new sleep disorders evident.    PLAN: Prescription generated keep her supplies up-to-date.  Will try to get the download from [facility] to review I will contact her if any changes to her settings are recommended.  Otherwise patient is educated about the importance of keeping a bedtime routine.  Consider doing some coloring or reading before bed.  Try to avoid falling asleep while resting with her legs up.  If she develops more concerns about the potential of insomnia consider discussing this with her health psychologist.  Does not does not appear to be restless leg syndrome.  Patient is can continue to work on diet and exercise in an effort to continue her slow and sustained weight loss.  Follow-up with me in 1 year.  He is reminded to take her device with her should she go through any procedures requiring sedation.  See patient instructions for further details of counseling provided.    Twenty-five minutes spent with patient, >50% spent counseling and coordinating care.      Brianda Reddy M.D.  Pulmonary/Critical Care/Sleep Medicine    The above note was dictated using voice recognition software and may include typographical errors. Please contact the author for any clarifications.    Addendum:    PAP download from 4/10/2018 to 5/9/2018 reviewed:  Per cent of days used greater than 4 Hours 53 % (minimum goal greater than 70%)  Average use on days used: 4 hours 1 min  Settings: Min EPAP 13 cmH2O    Max EPAP 15 cmH2O  Pressures delivered 90/95th percentile for pressure 13.9 " cmH2O  Average AHI 0.9 events per hour (goal less than 5)  Leak 50 minutes  Sending letter to patient.

## 2018-05-10 NOTE — LETTER
May 10, 2018    Dear Pinky Page:    I received a copy of the CPAP download from [facility].    PAP download from 4/10/2018 to 5/9/2018 reviewed:  Per cent of days used greater than 4 Hours 53 % (minimum goal greater than 70%)  Average use on days used: 4 hours 1 min  Settings: Min EPAP 13 cmH2O    Max EPAP 15 cmH2O  Pressures delivered 90/95th percentile for pressure 13.9 cmH2O  Average AHI 0.9 events per hour (goal less than 5)  Leak 50 minutes     Looks like the pressures are good. Recommend you keep an eye on the leak and make sure to wear it all night--put it back on if you get up in the missle of the night.      Thank You,     Brianda Reddy MD        If you have any other questions please contact the clinic at [phone].

## 2018-05-10 NOTE — PATIENT INSTRUCTIONS
Try not to fall asleep during the day when you are resting/elevated legs.    Consider coloring/reading just before bedtime.    I you struggle with insomnia issue mention them to Dr Gr    No caffeine after 3 PM    1.  Continue CPAP every night for all hours that you are sleeping.  If you nap use CPAP.  As always, try to get at least 8 hours of sleep or more each day, and avoid sleep deprivation. Avoid alcohol.    2.  Reasons that you might need a change to your pressure therapy would be weight gain or loss, waking having inadvertently removed your CPAP overnight, having previously felt refreshed by sleep with CPAP use and now waking un-refreshed, and return of daytime sleepiness. Also, the development of new medical problems can sometimes affect breathing at night-heart failure, stroke, medications such as narcotics.    3.  Please bring CPAP with you if you are hospitalized.  If anticipating surgery be sure to discuss with your surgeon that you have sleep apnea and use PAP therapy.      4.  Maintain your equipment as recommended which includes routine cleaning and replacement of supplies.  Call DME for any questions regarding supplies or maintenance.      5.  Do not drive on engage in potentially dangerous activities if feeling sleepy.    6.  Please see me again in 12 months and bring your machine and card to your follow-up visit.          Tips for your CPAP and BIPAP use-    Mask fitting tips  Mask fitting exercise:    To improve your mask seal and your mobility at night, put mask on and secure in place.  Lie down in bed with full pressure and roll to one side, adjust headgear while in that position to eliminate any leaks. Repeat process rolling to other side.     The mask seal does not have to be perfect:   CPAP machines are designed to make up for small leaks. However, you will not tolerate leaks blowing in your eyes so you will need to adjust.   Any leak should only be near or at the bottom of the mask.  We  expect your mask to leak slightly at night.    Do not over-tighten the headgear straps, tighter IS NOT better, we expect minimal leak.    First try re-positioning the mask or headgear before tightening the headgear straps.  Mask leaks are expected due to changing sleeping positions. Try pulling the mask away from your skin allowing the cushion to re-inflate will minimize the leak.  If you struggle for a good fit, try turning the CPAP off and then readjust the mask by pulling it away from your face and then turning back on the CPAP.        Humidifier tips  Humidifiers can be adjusted to increase or decrease the amount of moisture according to your comfort level. You may need to adjust this frequently at first, but then might only change it with seasonal weather changes.     Try INCREASING the humidity if:  You experience a dry, irritated nasal passage or throat.  You have a runny, drippy nose or sneezing fits after using CPAP.  You experience nasal congestion during or after CPAP use.    Try DECREASING the humidity if:  You have excessive condensation or  rain out  in the tubing or mask.  Otherwise keep the tubing warm during the night by running it underneath the blankets or pillow.      Clinic visit after initial CPAP and BIPAP set-up   Bring your equipment with you to your 4 week follow up clinic visit.  We will be extracting your data from the machine.        Travel  Always take your equipment with you.  If you fly with your equipment bring it on with you as a carry on.  Medical equipment does not count as a carry on.    If you travel international the machines take 110-240.  The only adapter needed is the adapter that will fit into the receptacle (outlet).    You may also want to bring an extension cord as many hotel rooms have limited outlets at the bedside.  Do not travel with water in your humidifier chamber.     Cleaning and Maintenance Guidelines    Equipment Frequency Cleaning Method   Mask First  Day    Daily      Weekly Soak mask in hot soapy water for 30 minutes, rinse and air dry.  Wipe nasal cushion with a hot soapy (Ivory, baby shampoo) cloth and rinse.  Baby wipes may also be used.  Do not use anti-bacterial soaps,Jessy  liquid soap, rubbing alcohol, bleach or ammonia.  Wash frame in hot soapy water (Ivory, baby shampoo) rinse and let air dry   Headgear Biweekly Wash in hot soapy water, rinse and air dry   Reusable Gray Filter Weekly Wash in hot soapy water, rinse, put in towel squeeze moisture out, let air dry   Disposable White Filter Check Weekly Replace when brown or gray in color; at least every 2 to 3 months   Humidifier Chamber Daily    Weekly Empty distilled water from humidifier and let air dry    Hand wash in hot soapy water, rinse and air dry   Tubing Weekly Wash in hot soapy water, rinse and let air dry   Mask, Tubing and Humidifier Chamber As needed Disinfect: Soak in 1 part vinegar to 3 parts hot water for 30 minutes, rinse well and air dry  Not the material headgear        MASK AND SUPPLY REORDERING  Reminder: Most insurance companies will allow for a new mask, headgear, tubing, and reusable gray filter every six months.  Disposable white ultra-fine filters are covered monthly.    EQUIPMENT NEEDS  Please call our CPAP specialist with any equipment problems, questions or needs.    HOME AND SAFETY INSTRUCTIONS    Do not use frayed or cracked electrical cords, multi plug adaptors, or switched receptacles    Do not immerse electrical equipment into water    Assure that electrical cords do not become a tripping hazard

## 2018-05-12 ASSESSMENT — ENCOUNTER SYMPTOMS
SINUS PRESSURE: 0
ARTHRALGIAS: 0
DYSURIA: 0
WEAKNESS: 0
HEADACHES: 0
WHEEZING: 0
NAUSEA: 0
FATIGUE: 0
VOMITING: 0
HEMATURIA: 0
CHILLS: 0
BLOOD IN STOOL: 0
ABDOMINAL DISTENTION: 0
MYALGIAS: 0
CONSTIPATION: 0
FEVER: 0
ABDOMINAL PAIN: 0
SORE THROAT: 0
COUGH: 0
COLOR CHANGE: 0
APPETITE CHANGE: 0
DIARRHEA: 0
SHORTNESS OF BREATH: 0

## 2018-05-21 ENCOUNTER — TELEPHONE (OUTPATIENT)
Dept: FAMILY MEDICINE | Facility: CLINIC | Age: 68
End: 2018-05-21

## 2018-05-21 NOTE — TELEPHONE ENCOUNTER
[facility] phone call message - order or referral request for patient:     Order or referral being requested: Insurance no longer cover's the current meter and supplies. Must send new order One touch Test strips and One touch meter (brand specific) for insurance coverage.    Additional Comments:   Pharmacy   [facility]   [address] [facility] [address]  [phone]   fax [phone]      OK to leave a message on voice mail? Yes    Primary language: English      needed? No    Call taken on May 21, 2018 at 10:50 AM by Pooja Jones

## 2018-05-25 DIAGNOSIS — F41.1 GENERALIZED ANXIETY DISORDER: ICD-10-CM

## 2018-05-25 RX ORDER — LORAZEPAM 1 MG/1
1 TABLET ORAL AT BEDTIME
Qty: 35 TABLET | Refills: 1 | Status: SHIPPED | OUTPATIENT
Start: 2018-05-25 | End: 2018-07-24

## 2018-05-25 NOTE — TELEPHONE ENCOUNTER
Will refill medication. Please have print and have preceptor sign script, then fax to [facility].     If any questions, please contact me.     Shamika Kelley MD  [facility]  [phone]

## 2018-05-25 NOTE — TELEPHONE ENCOUNTER
Request for medication refill:    Date of last visit at clinic: 05/03/2018    Please complete refill if appropriate and CLOSE ENCOUNTER.    Closing the encounter signifies the refill is complete.    If refill has been denied, please complete the smart phrase .smirefuse and route it to the [facility] RN TRIAGE pool to inform the patient and the pharmacy.    John Coleman, CMA

## 2018-05-30 ENCOUNTER — OFFICE VISIT (OUTPATIENT)
Dept: PSYCHIATRY | Facility: CLINIC | Age: 68
End: 2018-05-30
Attending: PSYCHIATRY & NEUROLOGY
Payer: COMMERCIAL

## 2018-05-30 VITALS
SYSTOLIC BLOOD PRESSURE: 146 MMHG | BODY MASS INDEX: 41.37 KG/M2 | DIASTOLIC BLOOD PRESSURE: 80 MMHG | HEART RATE: 69 BPM | WEIGHT: 241 LBS

## 2018-05-30 DIAGNOSIS — F25.1 SCHIZOAFFECTIVE DISORDER, DEPRESSIVE TYPE (H): Primary | ICD-10-CM

## 2018-05-30 PROCEDURE — G0463 HOSPITAL OUTPT CLINIC VISIT: HCPCS | Mod: ZF

## 2018-05-30 RX ORDER — ZIPRASIDONE HYDROCHLORIDE 40 MG/1
40 CAPSULE ORAL 2 TIMES DAILY WITH MEALS
Qty: 60 CAPSULE | Refills: 2 | Status: SHIPPED | OUTPATIENT
Start: 2018-05-30 | End: 2018-07-30

## 2018-05-30 RX ORDER — FLUOXETINE 40 MG/1
40 CAPSULE ORAL DAILY
Qty: 30 CAPSULE | Refills: 2 | Status: SHIPPED | OUTPATIENT
Start: 2018-05-30 | End: 2018-07-30

## 2018-05-30 ASSESSMENT — PAIN SCALES - GENERAL: PAINLEVEL: NO PAIN (0)

## 2018-05-30 NOTE — PATIENT INSTRUCTIONS
-continue current medications, no changes  -please complete some missing lab work: CBC with differential and hepatic panel (AST/ALT/AlkPhos/TBili)  -return to clinic in 6-8 weeks with Dr. Maulik Kohler

## 2018-05-30 NOTE — PROGRESS NOTES
"PSYCHIATRY CLINIC PROGRESS NOTE   The initial diagnostic evaluation was on 5/13/13 and the last transfer of care evaluation prior to today was 7/14/16.    Pertinent Background:  This patient first experienced mental health issues in her 20's and obtained care at this time and has received treatment for schizoaffective disorder and anxiety.  See transfer evaluation for detailed history.  Notably, has a history of having increasing psychotic experiences with antipsychotic taper.   Psych critical item history includes psychosis [sxs include disorganization and inability to care for self, ?hallucinations and paranoia], psych hosp (3-5) and ECT    INTERIM HISTORY                                                 Pinky Page is a 67 year old female who was last seen in clinic on 4/9/18 at which time no changes were made.  The patient reports good treatment adherence.  History was provided by patient and written summary of progress from [facility]. Patient was a good historian.  Since the last visit:  -doing \"pretty good\" in regards to mood, reflected on many things she is thankful for including her housing, food on the table and being able to tolerate her CPAP better  -no safety concerns including thoughts of harming self or others  -continues to work on sleep hygiene and doing some relaxing activities before bed  -tolerating medications, no concerns, feels they are managing her mood and psychosis symptoms adequately, feels the sense that somebody is behind her less intense over the past few months  -has not been requiring any PRN lorazepam since last visit, using other coping skills such as walking and coloring to help with this  -continues to be physically active, doing walking group and riding bike for 35 minutes 2-3x per week; trying to lose weight feels it fluctuates and she is trying to make healthy food choices but finds this difficult; does meet with a dietician at [facility] about 1x per " month    RECENT SYMPTOMS:   DEPRESSION:  reports-depressed mood, anhedonia, low energy, insomnia , weight/ appetite change (increased overall, but has been relatively stable ), excessive guilt and poor concentration /memory;  DENIES- suicidal ideation  and psychomotor change observed by others (slowing or fidgeting)  MELVIN/HYPOMANIA:  reports-none;  DENIES- increased energy, decreased sleep need, increased activity and grandiosity  PSYCHOSIS:  reports-see HPI and none;  DENIES- delusions, auditory hallucinations and visual hallucinations  DYSREGULATION:  reports-none;  DENIES- suicidal ideation, violent ideation and SIB  PANIC ATTACK:  none   ANXIETY:  some nervousness and excitement around events  TRAUMA RELATED:  none  COMPULSIVE:  none  SLEEP:  as above    EATING DISORDER:  none    RECENT SUBSTANCE USE:     ALCOHOL- none          TOBACCO- none          CAFFEINE- 1 cup regular coffee per day and 3 decaf; 1 can diet coke per day        OPIOIDS- none            NARCAN KIT- N/A    CANNABIS- none         OTHER ILLICIT DRUGS- none     CURRENT SOCIAL HISTORY:  FINANCIAL SUPPORT- social security disability.     CHILDREN- none.     LIVING SITUATION- Living at [facility].      SOCIAL/ SPIRITUAL SUPPORT- [facility] staff, other providers, other residents at [facility] and does attend Worship occasionally.     FEELS SAFE AT HOME- Yes.    MEDICAL ROS:  Reports GI sxs [GERD, constipation--chronic] and easy bruising .  Denies weight changes [increase, decrease,  ], headache, sedation, sexual dysfunction [ ], tremor, confusion, dizziness, falls, excessive diaphoresis, muscle twitches, restlessness, bruxism, shiver and short term memory and/or word finding difficulty, akathisia, dystonia, apparent TD, muscle stiffness, Parkinsonian-type symptoms [shuffling gait, masked facies, stooped posture, speech change, writing change] and sialorrhea and slowed reaction time and withdrawal symptoms.    PSYCH and CD Critical Summary Points since  July 2017           None    PAST PSYCH MED TRIALS   see EMR Problem List: Hx of psychiatric care    MEDICAL / SURGICAL HISTORY                                   CARE TEAM:          PCP- Dr. Kelley, at [facility]                   Therapist- Dr. Gr (monthly)    Patient Active Problem List   Diagnosis     Allergic rhinitis due to pollen     Urge incontinence     Hypertension, essential     Cardiomegaly     Chronic constipation     Dry eye syndrome     Esophageal reflux     Exposure keratoconjunctivitis     DM ophthalmopathy (H)     Hypothyroidism     Senile cataract     ANUJ (obstructive sleep apnea)     Postmenopausal atrophic vaginitis     Restless leg syndrome     Squamous blepharitis     Morbid obesity due to excess calories (H)     Personal history of breast cancer s/p L masectomy     Diabetes mellitus type 2, insulin dependent (H)     Hypercholesteremia     Lives in group home     Extrapyramidal and movement disorder     CKD (chronic kidney disease) stage 2, GFR 60-89 ml/min     Solitary kidney, congenital     S/P hysterectomy     Impingement syndrome of right shoulder     Hypertriglyceridemia     Candidiasis of skin     Generalized anxiety disorder     Carpal tunnel syndrome of left wrist     Schizoaffective disorder, depressive type (H)     Health Care Home     Major depressive disorder, recurrent episode, moderate (H)     Psychological factors affecting medical condition     Hx of psychiatric care     Nail complaint     Microalbuminuria due to type 2 diabetes mellitus (H)     Type 2 diabetes mellitus with microalbuminuria, with long-term current use of insulin (H)     Diabetes mellitus, type II, insulin dependent (H)     CKD (chronic kidney disease) stage 1, GFR 90 ml/min or greater       ALLERGY                                Chlordiazepoxide hcl; Dimetapp dm cold-cough; Haldol; Ibuprofen; Lactose intolerance [beta-galactosidase]; Milk products; and Propofol  MEDICATIONS                                Current Outpatient Prescriptions   Medication Sig Dispense Refill     acetaminophen (TYLENOL) 500 MG tablet Take 2 tablets (1,000 mg) by mouth every 6 hours as needed 1 Bottle 2     alum & mag hydroxide-simethicone (MYLANTA/MAALOX) 200-200-20 MG/5ML SUSP suspension Take 30 mLs by mouth daily as needed for indigestion       amLODIPine (NORVASC) 5 MG tablet Take 2 tablets (10 mg) by mouth daily 30 tablet 3     artificial saliva (BIOTENE MT) AERS spray Take 1 spray by mouth 3 times daily as needed for dry mouth       artificial tears (AKWA TEARS) OINT ophthalmic ointment Apply  to eye. Place 0.5 inches into both eyes at bedtime 1 Tube 11     ASPIRIN 81 MG OR TABS Take 1 tablet by mouth daily. ENTERIC COATED. 100 3     atorvastatin (LIPITOR) 40 MG tablet Take 1 tablet (40 mg) by mouth daily 90 tablet 1     blood glucose (NO BRAND SPECIFIED) lancets standard Use to test blood sugar 2 times daily or as directed. 100 each 11     blood glucose calibration (NO BRAND SPECIFIED) solution Use to calibrate blood glucose monitor as directed. 1 each 3     blood glucose monitoring (NO BRAND SPECIFIED) meter device kit Check Blood sugars twice a day. 1 kit 0     blood glucose monitoring (NO BRAND SPECIFIED) test strip Use to test blood sugar 3 times daily or as directed. 100 each 3     Calcium Carbonate-Vitamin D (CALCIUM-VITAMIN D) 250-125 MG-UNIT TABS Take 1 tablet by mouth 2 times daily. Calcium 250 mg/Vit D 125 IU       calcium polycarbophil (FIBERCON) 625 MG tablet Take 2 tablets by mouth daily       CLARITIN 10 MG OR TABS 1 TAB PO QD (Once per day) as needed for ALLERGY SYMPTOMS 30 11     clotrimazole (LOTRIMIN) 1 % cream Apply topically 2 times daily as needed 50 g 0     exenatide (BYETTA) 10 MCG/0.04ML injection Inject 10 mcg Subcutaneous 2 times daily (before meals) 1 Syringe 11     FLUoxetine (PROZAC) 40 MG capsule Take 1 capsule (40 mg) by mouth daily 30 capsule 2     fluticasone (FLONASE) 50 MCG/ACT nasal spray Spray  1 spray into both nostrils daily 16 g 3     furosemide (LASIX) 20 MG tablet Take 1 tablet (20 mg) by mouth 2 times daily 60 tablet 3     Gabapentin (NEURONTIN PO) Take 900 mg by mouth 3 times daily.       Hypromellose (ARTIFICIAL TEARS OP) Apply 1 drop to eye 4 times daily.       insulin glargine (LANTUS) 100 UNIT/ML injection Inject 20 Units Subcutaneous At Bedtime 8 mL 11     lactase (LACTAID) 3000 UNIT tablet Take 4 tabs daily with meals.       levothyroxine (SYNTHROID, LEVOTHROID) 175 MCG tablet Take 1 tablet (175 mcg) by mouth daily Give on empty stomach 30 tablet 4     LORazepam (ATIVAN) 1 MG tablet Take 1 tablet (1 mg) by mouth At Bedtime .  May take additional 1mg daily PRN for agitation. 35 tablet 1     losartan (COZAAR) 100 MG tablet Take 1 tablet by mouth daily. 90 tablet 1     Magnesium Hydroxide (MILK OF MAGNESIA PO) Take  by mouth. Take 30 mL as needed for constipation.       melatonin 3 MG tablet Take 1 tablet (3 mg) by mouth nightly as needed for sleep 60 tablet 1     metFORMIN (GLUCOPHAGE-XR) 500 MG 24 hr tablet Take 2 tablets (1,000 mg) by mouth 2 times daily (with meals) (Patient taking differently: Take 2,000 mg by mouth daily ) 90 tablet 3     nystatin (MYCOSTATIN) 847208 UNIT/GM POWD Apply topically 3 times daily as needed 60 g 1     polyethylene glycol (MIRALAX/GLYCOLAX) powder Take 1 capful by mouth 2 times daily. 17 GM PO BID       Saline 0.9 % SOLN Spray 2 sprays in nostril as needed.       senna-docusate (SENNA S) 8.6-50 MG per tablet Take 2 tablets by mouth At Bedtime.       Skin Protectants, Misc. (EUCERIN) cream Apply  topically as needed. Apply to thigh PRN dry skin        Sod Fluor-Solo Fluor-Hydrfl Ac (GEL-SASHA DENTINBLOC DT) Apply  to affected area. GEL. Apply to 2nd molar on bottom right daily at bedtime.       solifenacin (VESICARE) 10 MG tablet Take 1 tablet (10 mg) by mouth daily 30 tablet 6     ziprasidone (GEODON) 40 MG capsule Take 1 capsule (40 mg) by mouth 2 times daily  (with meals) 60 capsule 2     glucagon (GLUCAGON EMERGENCY) 1 MG kit Inject 1 mg into the muscle once for 1 dose 2 mg 3     VITALS   /80  Pulse 69  Wt 109.3 kg (241 lb)  BMI 41.37 kg/m2   MENTAL STATUS EXAM                                                           Alertness: alert  and oriented  Appearance: well groomed  Behavior/Demeanor: cooperative and pleasant, with good  eye contact   Speech: normal  Language: intact  Psychomotor: some abnormal movements noted very intermittently in tongue going to cheek, also some involuntary movement of toes   Mood: description consistent with euthymia  Affect: full range; was congruent to mood; was congruent to content  Thought Process/Associations: unremarkable  Thought Content:  Reports none;  Denies suicidal ideation , violent ideation  and psychotic thoughts   Perception:  Reports none;  Denies auditory hallucinations and visual hallucinations  Insight: good  Judgment: good  Cognition: does  appear grossly intact; formal cognitive testing was not done    LABS and DATA     RATING SCALES:  1/8/18 DISCUS done with score of 5: abnormal movements noted in tongue going to cheek and movement in bilateral big toes--movements have been present for years and not changing/worsening in intensity     PHQ9 TODAY = 8  PHQ-9 SCORE 2/14/2018 2/26/2018 4/9/2018   Total Score - - -   Total Score 7 8 8       ANTIPSYCHOTIC LABS   [glu, A1C, lipids (focus LDL), liver enzymes, WBC, ANEU, Hgb, plts]    q12 mo  Recent Labs   Lab Test  05/03/18   1414  12/14/17   1000   06/21/17   1047   GLC  192.6*   --    --   134*   A1C  6.7*  6.4*   < >   --     < > = values in this interval not displayed.     Recent Labs   Lab Test  05/03/18   1414  03/21/17   1023   CHOL  126  142   TRIG  148  121   LDL  60  78   HDL  37*  40*     Recent Labs   Lab Test  03/21/17   1023  01/22/15   1600   AST  13  18   ALT  20  35   ALKPHOS  92  113     Recent Labs   Lab Test  06/21/17   1047  03/21/17   1023   06/22/16   1035   07/04/14   0705   WBC   --   11.8*  9.9   --   7.7   ANEU   --   8.3   --    --   4.8   HGB  13.8  13.4  13.5   < >  13.3   PLT   --   266  266   --   223    < > = values in this interval not displayed.       DIAGNOSIS     Schizoaffective disorder, depressed type, multiple episodes, in partial remission to mild re: mood, with very minimal psychotic features.  Generalized anxiety disorder, well-managed  Persistent TD- stable and unchanging     ASSESSMENT                                     TODAY Pinky continues to report stability with her mental health and mood appears to be euthymic with expected fluctuations in mood and anxiety in context of stressors and she continues to easily identify coping strategies to utilize. No safety concerns. Continues to work on adjusting to using her CPAP, addition of melatonin and another mask change have been helpful. Also started to keep a sleep chart to address sleep hygiene issues. Again discussed possibility of reducing medications, including PRN dose of lorazepam since she has not been requiring this medication on very rare occasions, however, given long standing stability and recent use of PRN dose lorazepam she requests no changes today. Will continue to consider trial of reduced doses given long standing stability at future appointments with new resident with goal of simplifying medication regimen along with helping with any possible metabolic side effects given patient is working hard to lose weight and improve physical health. She will return in 6-8 weeks with Dr. Kohler or sooner if needed.     Of note, BP slightly elevated today. She is on medications for this and is monitored at her residence. She denies having any symptoms of concern including chest pain, shortness of breath, palpitations, headache or vision changes.             [place] PRESCRIPTION MONITORING PROGRAM [] was not checked today: controlled substances monitored by [facility] staff,   has been previously checked with no indications of abuse/misuse of medications.    PSYCHOTROPIC DRUG INTERACTIONS:   ZIPRASIDONE and FLUOXETINE may result in increased risk of QT-interval prolongation and increased risk of serotonin syndrome.   ZIPRASIDONE, FLUOXETINE and VESICARE may result in increased risk of QT-interval prolongation.  ASPIRIN and SSRI may result in an increased risk of bleeding  LEVOTHYROXINE and FLUOXETINE may result in increased levothyroxine requirements.  MANAGEMENT:  Monitoring for adverse effects, periodic EKGs and patient is aware of risks     PLAN                                                                                                       1) PSYCHOTROPIC MEDICATIONS:   - continue fluoxetine 40mg daily   - continue ziprasidone 40mg BID with meals   - continue lorazepam 1mg QHS and 1mg PRN for agitation     2) THERAPY:  Continue with Dr. Gr monthly  TREATMENT PLAN   Date revised  -  11/13/17  Date next due- 11/13/18    3) LABS NEXT DUE:  AP labs next due November, with exception of hepatic panel overdue and ordered today       RATING SCALES:  none needed    4) REFERRALS [MICD, medical etc.]:  none    5) MTM REFERRAL [PharmD]:  none    6) :  none    7) FAMILY MEETING:  none    8) RTC: 6-8 weeks with Dr. Kohler    9) CRISIS NUMBERS:   Provided routinely in AVS   Especially emphasized:  [facility] [phone] (clinic)    [phone] (after hours)    PSYCHIATRY CLINIC INDIVIDUAL PSYCHOTHERAPY NOTE                                    16   Start time: 1445  End time: 1505  Subjective: This supportive psychotherapy session addressed issues related to goals of therapy and recent stressors   Patient's reaction: Action in the context of mental status appropriate for ambulatory setting.  Progress: fair, but ongoing as goals not yet met   Plan: RTC 6-8 weeks with Dr. Kohler  Psychotherapy services during this visit included myself and Pinky.   TREATMENT  PLAN           SYMPTOMS;PROBLEMS   MEASURABLE GOALS;    FUNCTIONAL IMPROVEMENT INTERVENTIONS;    GAINS MADE DISCHARGE CRITERIA   Depression: depressed mood   use physical activity to boost mood, wants goal of 45 mins of activity per day -go to fitness center appointments, has not been making these consistently (60% attendance currently)  Has been going about 30-35 minutes 2x/wk; also leading walkers group 20-25 minutes marked symptom improvement   Depression: insomnia    get 7-8 hours of restful sleep every night -continues to work on practicing sleep hygiene, calming activities (coloring avoiding tv), now averaging about 6-7 hours per night improvement has stalled, now keeping sleep chart marked symptom improvement     TREATMENT RISK STATEMENT:  The risks, benefits, alternatives and potential adverse effects have been discussed and are understood by the pt. The pt understands the risks of using street drugs or alcohol. There are no medical contraindications, the pt agrees to treatment with the ability to do so. The pt knows to call the clinic for any problems or to access emergency care if needed.  Medical and substance use concerns are documented above.  Psychotropic drug interaction check was done, including changes made today.    RESIDENT:   Joana De Leon,     Patient staffed in clinic with Dr. Gordon who will sign the note.  Supervisor is Dr. Gordon.    I saw the patient with the resident, and participated in key portions of the service, including the mental status examination and developing the plan of care. I reviewed key portions of the history with the resident. I agree with the findings and plan as documented in this note.    Annie Gordon MD

## 2018-05-30 NOTE — MR AVS SNAPSHOT
After Visit Summary   5/30/2018    Pinky Page    MRN: [id number]           Patient Information     Date Of Birth          1950        Visit Information        Provider Department      5/30/2018 2:45 PM Joana De Leon MD [facility]        Today's Diagnoses     Schizoaffective disorder, depressive type (H)    -  1      Care Instructions    -continue current medications, no changes  -please complete some missing lab work: CBC with differential and hepatic panel (AST/ALT/AlkPhos/TBili)  -return to clinic in 6-8 weeks with Dr. Maulik Kohler          Follow-ups after your visit        Follow-up notes from your care team     Return in about 6 weeks (around 7/11/2018) for 60 MFU.      Your next 10 appointments already scheduled     Jul 09, 2018 11:30 AM CDT   (Arrive by 11:15 AM)   Return Visit with Marcos Magdaleno MD   [facility] ([facility])    [address] [place] [address]  4th Floor  [facility] [address]   [phone]            Jul 09, 2018  1:00 PM CDT   (Arrive by 12:45 PM)   Return Visit with Damon Gr, PhD [facility] ([facility])    [address] [facility]  3rd [facility] [place] [facility] [address]   [phone]            Jul 12, 2018  1:10 PM CDT   Adult Med Follow UP with Maulik Kohler MD   Psychiatry Clinic ([facility])    [place] [id number] [address]   [phone]            Nov 15, 2018  1:30 PM CST   RETURN GENERAL with [place] Lopez MD   Eye Clinic ([facility])    [address]  [address] [address]  [facility] [address]   [phone]            May 09, 2019  1:30 PM CDT   Return Sleep Patient with Brianda Reddy MD   [facility], [place], Sleep Study ([facility])    [address] [place] [address]  [address] [phone]              Who to contact     Please call your clinic at [phone] to:    Ask questions about your health    Make or cancel appointments    Discuss your medicines    Learn about your test results    Speak to your doctor            Additional Information About Your Visit        MyChart Information     Vouch is an electronic gateway that provides easy, online access to your medical records. With Vouch, you can request a clinic appointment, read your test results, renew a prescription or communicate with your care team.     To sign up for Vouch visit the website at [email or web address]   You will be asked to enter the access code listed below, as well as some personal information. Please follow the directions to create your username and password.     Your access code is: [email or web address]  Expires: 2018  6:31 AM     Your access code will  in 90 days. If you need help or a new code, please contact your [facility] or call [phone] for assistance.        Care EveryWhere ID     This is your Care EveryWhere ID. This could be used by other organizations to access your [place] medical records  [id number]        Your Vitals Were     Pulse BMI (Body Mass Index)                69 41.37 kg/m2           Blood Pressure from Last 3 Encounters:   18 146/80   05/10/18 149/70   18 135/76    Weight from Last 3 Encounters:   18 108.6 kg (239 lb 6.4 oz)   18 109.3 kg (241 lb)   05/10/18 107 kg (236 lb)              Today, you had the following     No orders found for display         Today's Medication Changes          These changes are accurate as of 18 11:59 PM.  If you have any questions, ask your nurse or doctor.               These medicines have changed or have updated prescriptions.        Dose/Directions    metFORMIN 500 MG 24 hr tablet   Commonly known as:  GLUCOPHAGE-XR   This may have changed:    - how much to  take  - when to take this   Used for:  Diabetes mellitus, type 2 (H)        Dose:  1000 mg   Take 2 tablets (1,000 mg) by mouth 2 times daily (with meals)   Quantity:  90 tablet   Refills:  3            Where to get your medicines      These medications were sent to [facility] - [place], [place] - [address]  [address], [place] [address]     Phone:  [phone]     FLUoxetine 40 MG capsule    ziprasidone 40 MG capsule                Primary Care Provider Office Phone # Fax #    Shamika Ileana Kelley [phone]       [facility] [address] [facility] [address]        Equal Access to Services     JAYDEN STUART : Tatiana Dietrich, waevangelina del valle, qaconnieta kaalmada daxa, autumn josé. So [facility] [phone].    ATENCIÓN: Si habla español, tiene a deluca disposición servicios gratuitos de asistencia lingüística. Llame al [phone].    We comply with applicable federal civil rights laws and [place] laws. We do not discriminate on the basis of race, color, national origin, age, disability, sex, sexual orientation, or gender identity.            Thank you!     Thank you for choosing PSYCHIATRY CLINIC  for your care. Our goal is always to provide you with excellent care. Hearing back from our patients is one way we can continue to improve our services. Please take a few minutes to complete the written survey that you may receive in the mail after your visit with us. Thank you!             Your Updated Medication List - Protect others around you: Learn how to safely use, store and throw away your medicines at www.disposemymeds.org.          This list is accurate as of 5/30/18 11:59 PM.  Always use your most recent med list.                   Brand Name Dispense Instructions for use Diagnosis    acetaminophen 500 MG tablet    TYLENOL    1 Bottle    Take 2 tablets (1,000 mg) by mouth every 6 hours as needed    Sprain of left  ankle, unspecified ligament, initial encounter       alum & mag hydroxide-simethicone 200-200-20 MG/5ML Susp suspension    MYLANTA/MAALOX     Take 30 mLs by mouth daily as needed for indigestion        amLODIPine 5 MG tablet    NORVASC    30 tablet    Take 2 tablets (10 mg) by mouth daily    Essential hypertension with goal blood pressure less than 130/85       artificial saliva Aers spray      Take 1 spray by mouth 3 times daily as needed for dry mouth        artificial tears Oint ophthalmic ointment     1 Tube    Apply  to eye. Place 0.5 inches into both eyes at bedtime    Insufficiency of tear film of both eyes       ARTIFICIAL TEARS OP      Apply 1 drop to eye 4 times daily.        aspirin 81 MG tablet     100    Take 1 tablet by mouth daily. ENTERIC COATED.        atorvastatin 40 MG tablet    LIPITOR    90 tablet    Take 1 tablet (40 mg) by mouth daily    Hyperlipidemia LDL goal <100       blood glucose calibration solution    no brand specified    1 each    Use to calibrate blood glucose monitor as directed.    Type 2 diabetes mellitus with microalbuminuria, with long-term current use of insulin (H)       blood glucose lancets standard    no brand specified    100 each    Use to test blood sugar 2 times daily or as directed.    Type 2 diabetes mellitus with microalbuminuria, with long-term current use of insulin (H)       blood glucose monitoring meter device kit    no brand specified    1 kit    Check Blood sugars twice a day.    Type 2 diabetes mellitus with microalbuminuria, with long-term current use of insulin (H)       blood glucose monitoring test strip    no brand specified    100 each    Use to test blood sugar 3 times daily or as directed.    Type 2 diabetes mellitus with microalbuminuria, with long-term current use of insulin (H)       calcium polycarbophil 625 MG tablet    FIBERCON     Take 2 tablets by mouth daily        calcium-vitamin D 250-125 MG-UNIT Tabs      Take 1 tablet by mouth 2 times  daily. Calcium 250 mg/Vit D 125 IU        CLARITIN 10 MG tablet   Generic drug:  loratadine     30    1 TAB PO QD (Once per day) as needed for ALLERGY SYMPTOMS        clotrimazole 1 % cream    LOTRIMIN    50 g    Apply topically 2 times daily as needed    Dermatophytosis       eucerin cream      Apply  topically as needed. Apply to thigh PRN dry skin        exenatide 10 MCG/0.04ML injection    BYETTA    1 Syringe    Inject 10 mcg Subcutaneous 2 times daily (before meals)    Type 2 diabetes mellitus with microalbuminuria, with long-term current use of insulin (H)       FLUoxetine 40 MG capsule    PROzac    30 capsule    Take 1 capsule (40 mg) by mouth daily    Schizoaffective disorder, depressive type (H)       fluticasone 50 MCG/ACT spray    FLONASE    16 g    Spray 1 spray into both nostrils daily    Seasonal allergic rhinitis       furosemide 20 MG tablet    LASIX    60 tablet    Take 1 tablet (20 mg) by mouth 2 times daily    Lower leg edema       GEL-SASHA DENTINBLOC DT      Apply  to affected area. GEL. Apply to 2nd molar on bottom right daily at bedtime.        glucagon 1 MG kit    GLUCAGON EMERGENCY    2 mg    Inject 1 mg into the muscle once for 1 dose    Type 2 diabetes mellitus with microalbuminuria, with long-term current use of insulin (H)       insulin glargine 100 UNIT/ML injection    LANTUS    8 mL    Inject 20 Units Subcutaneous At Bedtime    Type 2 diabetes mellitus with microalbuminuria, with long-term current use of insulin (H)       LACTAID 3000 UNIT tablet   Generic drug:  lactase      Take 4 tabs daily with meals.        levothyroxine 175 MCG tablet    SYNTHROID/LEVOTHROID    30 tablet    Take 1 tablet (175 mcg) by mouth daily Give on empty stomach    Other specified hypothyroidism       LORazepam 1 MG tablet    ATIVAN    35 tablet    Take 1 tablet (1 mg) by mouth At Bedtime .  May take additional 1mg daily PRN for agitation.    Generalized anxiety disorder       losartan 100 MG tablet    COZAAR     90 tablet    Take 1 tablet by mouth daily.    Hypertension goal BP (blood pressure) < 130/80, Diabetes mellitus type 2, insulin dependent (H), CKD (chronic kidney disease) stage 2, GFR 60-89 ml/min       melatonin 3 MG tablet     60 tablet    Take 1 tablet (3 mg) by mouth nightly as needed for sleep    Other insomnia       metFORMIN 500 MG 24 hr tablet    GLUCOPHAGE-XR    90 tablet    Take 2 tablets (1,000 mg) by mouth 2 times daily (with meals)    Diabetes mellitus, type 2 (H)       MILK OF MAGNESIA PO      Take  by mouth. Take 30 mL as needed for constipation.        NEURONTIN PO      Take 900 mg by mouth 3 times daily.        nystatin 943501 UNIT/GM Powd    MYCOSTATIN    60 g    Apply topically 3 times daily as needed    Candidiasis of skin       polyethylene glycol powder    MIRALAX/GLYCOLAX     Take 1 capful by mouth 2 times daily. 17 GM PO BID        saline 0.9 % Soln      Spray 2 sprays in nostril as needed.        SENNA S 8.6-50 MG per tablet   Generic drug:  senna-docusate      Take 2 tablets by mouth At Bedtime.        solifenacin 10 MG tablet    VESICARE    30 tablet    Take 1 tablet (10 mg) by mouth daily    Urge incontinence       ziprasidone 40 MG capsule    GEODON    60 capsule    Take 1 capsule (40 mg) by mouth 2 times daily (with meals)    Schizoaffective disorder, depressive type (H)

## 2018-05-31 ASSESSMENT — PATIENT HEALTH QUESTIONNAIRE - PHQ9: SUM OF ALL RESPONSES TO PHQ QUESTIONS 1-9: 8

## 2018-06-05 ENCOUNTER — OFFICE VISIT (OUTPATIENT)
Dept: PSYCHOLOGY | Facility: CLINIC | Age: 68
End: 2018-06-05
Payer: COMMERCIAL

## 2018-06-05 VITALS — BODY MASS INDEX: 41.09 KG/M2 | WEIGHT: 239.4 LBS

## 2018-06-05 DIAGNOSIS — E66.9 OBESITY, UNSPECIFIED OBESITY SEVERITY, UNSPECIFIED OBESITY TYPE: ICD-10-CM

## 2018-06-05 DIAGNOSIS — F54 PSYCHOLOGICAL FACTORS AFFECTING MORBID OBESITY (H): ICD-10-CM

## 2018-06-05 DIAGNOSIS — F41.1 GENERALIZED ANXIETY DISORDER: ICD-10-CM

## 2018-06-05 DIAGNOSIS — E66.01 PSYCHOLOGICAL FACTORS AFFECTING MORBID OBESITY (H): ICD-10-CM

## 2018-06-05 DIAGNOSIS — F33.0 MAJOR DEPRESSIVE DISORDER, RECURRENT EPISODE, MILD (H): Primary | ICD-10-CM

## 2018-06-05 NOTE — MR AVS SNAPSHOT
After Visit Summary   6/5/2018    Pinky Page    MRN: [id number]           Patient Information     Date Of Birth          1950        Visit Information        Provider Department      6/5/2018 1:00 PM Damon Gr, PhD [facility]        Today's Diagnoses     Major depressive disorder, recurrent episode, mild (H)    -  1    Psychological factors affecting morbid obesity (H)        Generalized anxiety disorder        Obesity, unspecified obesity severity, unspecified obesity type           Follow-ups after your visit        Your next 10 appointments already scheduled     Jul 09, 2018 11:30 AM CDT   (Arrive by 11:15 AM)   Return Visit with Marcos Magdaleno MD   [facility] Medical Weight Management ([facility])    [address] [facility] [address]  4th Floor  [facility] [address] [phone]            Jul 09, 2018  1:00 PM CDT   (Arrive by 12:45 PM)   Return Visit with Damon Gr, PhD [facility] ([facility])    [address]  3rd Floor  [facility] [address] [phone]            Jul 12, 2018  1:10 PM CDT   Adult Med Follow UP with Maulik Kohler MD   Psychiatry Clinic ([facility])    [facility]  2nd [facility] [address]  [address] [address] [phone]            Nov 15, 2018  1:30 PM CST   RETURN GENERAL with [place] Lopez MD   Eye Clinic ([facility])    [address]  9th Fl Clin 9a  [facility] [address] [phone]            May 09, 2019  1:30 PM CDT   Return Sleep Patient with Brianda Reddy MD   [facility], [place], Sleep Study ([facility])    [address] [facility] [address] [phone]              Who to contact     Please call your clinic at [phone] to:    Ask questions about your health    Make or  cancel appointments    Discuss your medicines    Learn about your test results    Speak to your doctor            Additional Information About Your Visit        MyChart Information     [employer] is an electronic gateway that provides easy, online access to your medical records. With [employer], you can request a clinic appointment, read your test results, renew a prescription or communicate with your care team.     To sign up for [employer] visit the website at [email or web address]   You will be asked to enter the access code listed below, as well as some personal information. Please follow the directions to create your username and password.     Your access code is: [email or web address]  Expires: 2018  6:31 AM     Your access code will  in 90 days. If you need help or a new code, please contact your [facility] or call [phone] for assistance.        Care EveryWhere ID     This is your Care EveryWhere ID. This could be used by other organizations to access your [place] medical records  [id number]        Your Vitals Were     BMI (Body Mass Index)                   41.09 kg/m2            Blood Pressure from Last 3 Encounters:   18 146/80   05/10/18 149/70   18 135/76    Weight from Last 3 Encounters:   18 108.6 kg (239 lb 6.4 oz)   18 109.3 kg (241 lb)   05/10/18 107 kg (236 lb)              Today, you had the following     No orders found for display         Today's Medication Changes          These changes are accurate as of 18  1:57 PM.  If you have any questions, ask your nurse or doctor.               These medicines have changed or have updated prescriptions.        Dose/Directions    metFORMIN 500 MG 24 hr tablet   Commonly known as:  GLUCOPHAGE-XR   This may have changed:    - how much to take  - when to take this   Used for:  Diabetes mellitus, type 2 (H)        Dose:  1000 mg   Take 2 tablets (1,000 mg) by mouth 2 times daily (with meals)    Quantity:  90 tablet   Refills:  3                Primary Care Provider Office Phone # Fax #    Shamika Ileana Kelley [phone]       [facility] [address] [facility] [address]        Equal Access to Services     JAYDEN STUART AH: Hadii james ku hadmarelyo Soomaali, waaxda luqadaha, qaybta kaalmada adeegyada, autumn alvarezn hernandezrupinder lee paulette josé. So [facility] [phone].    ATENCIÓN: Si habla español, tiene a deluca disposición servicios gratuitos de asistencia lingüística. Llame al [phone].    We comply with applicable federal civil rights laws and [place] laws. We do not discriminate on the basis of race, color, national origin, age, disability, sex, sexual orientation, or gender identity.            Thank you!     Thank you for choosing [facility] PRIMARY CARE CLINIC  for your care. Our goal is always to provide you with excellent care. Hearing back from our patients is one way we can continue to improve our services. Please take a few minutes to complete the written survey that you may receive in the mail after your visit with us. Thank you!             Your Updated Medication List - Protect others around you: Learn how to safely use, store and throw away your medicines at www.disposemymeds.org.          This list is accurate as of 6/5/18  1:57 PM.  Always use your most recent med list.                   Brand Name Dispense Instructions for use Diagnosis    acetaminophen 500 MG tablet    TYLENOL    1 Bottle    Take 2 tablets (1,000 mg) by mouth every 6 hours as needed    Sprain of left ankle, unspecified ligament, initial encounter       alum & mag hydroxide-simethicone 200-200-20 MG/5ML Susp suspension    MYLANTA/MAALOX     Take 30 mLs by mouth daily as needed for indigestion        amLODIPine 5 MG tablet    NORVASC    30 tablet    Take 2 tablets (10 mg) by mouth daily    Essential hypertension with goal blood pressure less than 130/85       artificial saliva Aers spray       Take 1 spray by mouth 3 times daily as needed for dry mouth        artificial tears Oint ophthalmic ointment     1 Tube    Apply  to eye. Place 0.5 inches into both eyes at bedtime    Insufficiency of tear film of both eyes       ARTIFICIAL TEARS OP      Apply 1 drop to eye 4 times daily.        aspirin 81 MG tablet     100    Take 1 tablet by mouth daily. ENTERIC COATED.        atorvastatin 40 MG tablet    LIPITOR    90 tablet    Take 1 tablet (40 mg) by mouth daily    Hyperlipidemia LDL goal <100       blood glucose calibration solution    no brand specified    1 each    Use to calibrate blood glucose monitor as directed.    Type 2 diabetes mellitus with microalbuminuria, with long-term current use of insulin (H)       blood glucose lancets standard    no brand specified    100 each    Use to test blood sugar 2 times daily or as directed.    Type 2 diabetes mellitus with microalbuminuria, with long-term current use of insulin (H)       blood glucose monitoring meter device kit    no brand specified    1 kit    Check Blood sugars twice a day.    Type 2 diabetes mellitus with microalbuminuria, with long-term current use of insulin (H)       blood glucose monitoring test strip    no brand specified    100 each    Use to test blood sugar 3 times daily or as directed.    Type 2 diabetes mellitus with microalbuminuria, with long-term current use of insulin (H)       calcium polycarbophil 625 MG tablet    FIBERCON     Take 2 tablets by mouth daily        calcium-vitamin D 250-125 MG-UNIT Tabs      Take 1 tablet by mouth 2 times daily. Calcium 250 mg/Vit D 125 IU        CLARITIN 10 MG tablet   Generic drug:  loratadine     30    1 TAB PO QD (Once per day) as needed for ALLERGY SYMPTOMS        clotrimazole 1 % cream    LOTRIMIN    50 g    Apply topically 2 times daily as needed    Dermatophytosis       eucerin cream      Apply  topically as needed. Apply to thigh PRN dry skin        exenatide 10 MCG/0.04ML injection     BYETTA    1 Syringe    Inject 10 mcg Subcutaneous 2 times daily (before meals)    Type 2 diabetes mellitus with microalbuminuria, with long-term current use of insulin (H)       FLUoxetine 40 MG capsule    PROzac    30 capsule    Take 1 capsule (40 mg) by mouth daily    Schizoaffective disorder, depressive type (H)       fluticasone 50 MCG/ACT spray    FLONASE    16 g    Spray 1 spray into both nostrils daily    Seasonal allergic rhinitis       furosemide 20 MG tablet    LASIX    60 tablet    Take 1 tablet (20 mg) by mouth 2 times daily    Lower leg edema       GEL-SASHA DENTINBLOC DT      Apply  to affected area. GEL. Apply to 2nd molar on bottom right daily at bedtime.        glucagon 1 MG kit    GLUCAGON EMERGENCY    2 mg    Inject 1 mg into the muscle once for 1 dose    Type 2 diabetes mellitus with microalbuminuria, with long-term current use of insulin (H)       insulin glargine 100 UNIT/ML injection    LANTUS    8 mL    Inject 20 Units Subcutaneous At Bedtime    Type 2 diabetes mellitus with microalbuminuria, with long-term current use of insulin (H)       LACTAID 3000 UNIT tablet   Generic drug:  lactase      Take 4 tabs daily with meals.        levothyroxine 175 MCG tablet    SYNTHROID/LEVOTHROID    30 tablet    Take 1 tablet (175 mcg) by mouth daily Give on empty stomach    Other specified hypothyroidism       LORazepam 1 MG tablet    ATIVAN    35 tablet    Take 1 tablet (1 mg) by mouth At Bedtime .  May take additional 1mg daily PRN for agitation.    Generalized anxiety disorder       losartan 100 MG tablet    COZAAR    90 tablet    Take 1 tablet by mouth daily.    Hypertension goal BP (blood pressure) < 130/80, Diabetes mellitus type 2, insulin dependent (H), CKD (chronic kidney disease) stage 2, GFR 60-89 ml/min       melatonin 3 MG tablet     60 tablet    Take 1 tablet (3 mg) by mouth nightly as needed for sleep    Other insomnia       metFORMIN 500 MG 24 hr tablet    GLUCOPHAGE-XR    90 tablet    Take 2  tablets (1,000 mg) by mouth 2 times daily (with meals)    Diabetes mellitus, type 2 (H)       MILK OF MAGNESIA PO      Take  by mouth. Take 30 mL as needed for constipation.        NEURONTIN PO      Take 900 mg by mouth 3 times daily.        nystatin 332003 UNIT/GM Powd    MYCOSTATIN    60 g    Apply topically 3 times daily as needed    Candidiasis of skin       polyethylene glycol powder    MIRALAX/GLYCOLAX     Take 1 capful by mouth 2 times daily. 17 GM PO BID        saline 0.9 % Soln      Spray 2 sprays in nostril as needed.        SENNA S 8.6-50 MG per tablet   Generic drug:  senna-docusate      Take 2 tablets by mouth At Bedtime.        solifenacin 10 MG tablet    VESICARE    30 tablet    Take 1 tablet (10 mg) by mouth daily    Urge incontinence       ziprasidone 40 MG capsule    GEODON    60 capsule    Take 1 capsule (40 mg) by mouth 2 times daily (with meals)    Schizoaffective disorder, depressive type (H)

## 2018-06-05 NOTE — PROGRESS NOTES
Health Psychology                  Clinic    Department of Medicine  Madelaine Ingram, Ph.D., L.P. [phone]                          [facility] Miryam Woods, Ph.D.,  L.P. [phone]                 3rd Floor, Clinic 3A  [place] Mail Code 747   Damon Gr, Ph.D., LUCY.DAISY.NOAM., L.P. [phone]     [address] [facility]  [address] [facility] Kimber Morales, Ph.D., L.P. [phone]  [id number]  [place] [address]    Health Psychology Follow-Up Note    Ms. Page is a pleasant 67-year old woman with chronic depressive illness, who returns to clinic for supportive and behavioral psychotherapy for moderate recurrent depression and for help losing weight given her morbid obesity.     She is 239.4 up from  238.8 last session here.    Wt Readings from Last 4 Encounters:   06/05/18 108.6 kg (239 lb 6.4 oz)   05/30/18 109.3 kg (241 lb)   05/10/18 107 kg (236 lb)   05/07/18 108.3 kg (238 lb 12.8 oz)     At [facility] [employer], she continues to serve on the [employer].  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  She is working 1 day/week with her boss, [employer], Friday mornings for 1.5 hour.   She is satisfied with this level of effort and is getting along well with [employer].   We discussed her difficulty falling asleep.  We addressed cutting back on caffeine. She is down to 1 coffee/day at 10 AM plus a decaf at dinner.  She is cutting out diet melecio.   She thinks she is sleeping a bit better.  We also discussed going to bed earlier.   She rates the depression as 3- 4 on 10-point subjective scale, which is below baseline.  She is doing well.  She participates in activities at [place].  She is up to biking 35 minutes x3/week.  We discussed increasing to40 minutes  x4 /week and gradulally increasing beyond anhour.  She is doing more social walking some of the time and had apparently is gradually ramping up on her  fitness center days.  She agrees to increase.  She is getting along with her roommate.    Goal for exercise has been 1.5 hours/day as of June 19, 2017 and 2 lbs. / month.   We discussed increasing bicycling to 4 days per weekshort term and 5-6 long-term  and increasing gradually from 35-40 minutes.    She arrived early today and was greeted in the waiting room.  Pinky participated fully and appeared to derive benefit.  Rapport was excellent.     Extended session due to complexity of case and length of interval.    IMPRESSION Distress Disability Risk    Low High Low     Time In: 1:02  Time Out:  1:55  Diagnosis:   Major Depression, recurrent mild (F33.0)   Psychological Factors Affecting Morbid Obesity (F54)      Plan: She will return on 7/9 @ 1:00 for behavioral and supportive psychotherapy.   Plan to to bicycle for > 30-35 minutes 3-4 days/week and eat less.  Tx plan due 5/7/2019  Damon Gr, Ph.D., A.B.P.P., L.P.

## 2018-06-26 ENCOUNTER — TRANSFERRED RECORDS (OUTPATIENT)
Dept: HEALTH INFORMATION MANAGEMENT | Facility: CLINIC | Age: 68
End: 2018-06-26

## 2018-06-26 LAB
ALBUMIN SERPL-MCNC: 3.8 G/DL
ALP SERPL-CCNC: 111 U/L
ALT SERPL-CCNC: 21 U/L
AST SERPL-CCNC: 16 U/L
BILIRUB SERPL-MCNC: 0.2 MG/DL
BILIRUBIN DIRECT: NORMAL MG/DL
PROT SERPL-MCNC: 6.4 G/DL

## 2018-06-28 ENCOUNTER — TELEPHONE (OUTPATIENT)
Dept: PSYCHIATRY | Facility: CLINIC | Age: 68
End: 2018-06-28

## 2018-06-28 LAB
ABSOLUTE BASOPHILS (EXTERNAL): 0.07
ALBUMIN SERPL-MCNC: 3.8 G/DL
ALP SERPL-CCNC: 111 U/L
ALT SERPL-CCNC: 21 U/L
AST SERPL-CCNC: 16 U/L
BASOPHILS NFR BLD AUTO: NORMAL %
BILIRUB SERPL-MCNC: 0.2 MG/DL
BILIRUBIN DIRECT: <0.2 MG/DL
EOSINOPHIL # BLD AUTO: 0.12 10*3/UL
EOSINOPHIL NFR BLD AUTO: NORMAL %
ERYTHROCYTE [DISTWIDTH] IN BLOOD BY AUTOMATED COUNT: 13.6 %
HCT VFR BLD AUTO: 39.1 %
HEMOGLOBIN: 12.6 G/DL (ref 11.7–15.7)
IGA (EXTERNAL): 0.02
LYMPHOCYTES # BLD AUTO: 3.57 10*3/UL
LYMPHOCYTES NFR BLD AUTO: NORMAL %
MCH RBC QN AUTO: 29.3 PG
MCHC RBC AUTO-ENTMCNC: 32.2 G/DL
MCV RBC AUTO: 90.9 FL
MONOCYTES # BLD AUTO: 0.63 10*3/UL
MONOCYTES NFR BLD AUTO: NORMAL %
NEUTROPHILS # BLD AUTO: 5.31 10*3/UL
NEUTROPHILS NFR BLD AUTO: NORMAL %
NRBC: 0 %
PLATELET COUNT - QUEST: 247 10^9/L (ref 150–450)
PMV BLD: 12.3 FL
PROT SERPL-MCNC: 6.4 G/DL
RBC # BLD AUTO: 4.3 10^12/L
WBC # BLD AUTO: 9.72 10^9/L

## 2018-06-28 NOTE — TELEPHONE ENCOUNTER
Received results of CBC W/ DIFF and Hepatic Function Panel from [facility] [place] on 6/26/2018 at 06:59 AM  Results entered into EMR by Josefa Lopez MA on 6/28/2018  Routed to Dr. De Leon and CORINNE Connelly for review  Document placed in scanning and a copy held in Psychiatry until scanning complete/confirmed.  Josefa Lopez MA

## 2018-07-09 ENCOUNTER — OFFICE VISIT (OUTPATIENT)
Dept: PSYCHOLOGY | Facility: CLINIC | Age: 68
End: 2018-07-09
Payer: COMMERCIAL

## 2018-07-09 ENCOUNTER — OFFICE VISIT (OUTPATIENT)
Dept: ENDOCRINOLOGY | Facility: CLINIC | Age: 68
End: 2018-07-09
Payer: COMMERCIAL

## 2018-07-09 VITALS
WEIGHT: 239.2 LBS | OXYGEN SATURATION: 96 % | DIASTOLIC BLOOD PRESSURE: 68 MMHG | HEIGHT: 64 IN | BODY MASS INDEX: 40.84 KG/M2 | SYSTOLIC BLOOD PRESSURE: 150 MMHG | HEART RATE: 60 BPM

## 2018-07-09 VITALS — BODY MASS INDEX: 40.89 KG/M2 | WEIGHT: 238.2 LBS

## 2018-07-09 DIAGNOSIS — E66.01 MORBID OBESITY (H): Primary | ICD-10-CM

## 2018-07-09 DIAGNOSIS — E66.01 PSYCHOLOGICAL FACTORS AFFECTING MORBID OBESITY (H): ICD-10-CM

## 2018-07-09 DIAGNOSIS — E66.9 OBESITY, UNSPECIFIED OBESITY SEVERITY, UNSPECIFIED OBESITY TYPE: ICD-10-CM

## 2018-07-09 DIAGNOSIS — F41.1 GENERALIZED ANXIETY DISORDER: ICD-10-CM

## 2018-07-09 DIAGNOSIS — F54 PSYCHOLOGICAL FACTORS AFFECTING MORBID OBESITY (H): ICD-10-CM

## 2018-07-09 DIAGNOSIS — F33.0 MAJOR DEPRESSIVE DISORDER, RECURRENT EPISODE, MILD (H): Primary | ICD-10-CM

## 2018-07-09 RX ORDER — CALCIUM POLYCARBOPHIL 625 MG 625 MG/1
2 TABLET ORAL DAILY
COMMUNITY
End: 2022-06-13

## 2018-07-09 ASSESSMENT — PAIN SCALES - GENERAL: PAINLEVEL: NO PAIN (0)

## 2018-07-09 NOTE — NURSING NOTE
"Chief Complaint   Patient presents with     Weight Loss     4 month follow up weight loss.        Vitals:    07/09/18 1134   BP: 150/68   BP Location: Left arm   Patient Position: Chair   Cuff Size: Adult Large   Pulse: 60   SpO2: 96%   Weight: 239 lb 3.2 oz   Height: 5' 4\"       Body mass index is 41.06 kg/(m^2).    Jere PALMER LPN                     "

## 2018-07-09 NOTE — MR AVS SNAPSHOT
After Visit Summary   7/9/2018    Pinky Page    MRN: [id number]           Patient Information     Date Of Birth          1950        Visit Information        Provider Department      7/9/2018 11:30 AM Marcos Magdaleno MD [facility] Medical Weight Management        Today's Diagnoses     Morbid obesity (H)    -  1       Follow-ups after your visit        Follow-up notes from your care team     Return in about 4 months (around 11/9/2018).      Your next 10 appointments already scheduled     Jul 09, 2018  1:00 PM CDT   (Arrive by 12:45 PM)   Return Visit with Damon Gr, PhD [facility] ([facility])    [address]  3rd Floor  [facility] [address] [phone]            Jul 12, 2018  1:10 PM CDT   Adult Med Follow UP with Maulik Kohler MD   Psychiatry Clinic ([facility])    [place] [id number]  [address] [address] [phone]            Nov 15, 2018  1:30 PM CST   RETURN GENERAL with Michael Lopez MD   Eye Clinic ([facility])    [address]  9th Fl Clin 9a  [facility] [id number] [phone]            May 09, 2019  1:30 PM CDT   Return Sleep Patient with Brianda Reddy MD   [facility] [place], Sleep Study ([facility])    [address] [place] [address] [phone]              Who to contact     Please call your clinic at [phone] to:    Ask questions about your health    Make or cancel appointments    Discuss your medicines    Learn about your test results    Speak to your doctor            Additional Information About Your Visit        Care EveryWhere ID     This is your Care EveryWhere ID. This could be used by other organizations to access your [place] medical records  [id number]        Your Vitals Were     Pulse Height Pulse  "Oximetry BMI (Body Mass Index)          60 1.626 m (5' 4\") 96% 41.06 kg/m2         Blood Pressure from Last 3 Encounters:   07/09/18 150/68   05/10/18 149/70   05/03/18 135/76    Weight from Last 3 Encounters:   07/09/18 108.5 kg (239 lb 3.2 oz)   05/10/18 107 kg (236 lb)   05/03/18 109.4 kg (241 lb 3.2 oz)              Today, you had the following     No orders found for display         Today's Medication Changes          These changes are accurate as of 7/9/18 11:46 AM.  If you have any questions, ask your nurse or doctor.               These medicines have changed or have updated prescriptions.        Dose/Directions    metFORMIN 500 MG 24 hr tablet   Commonly known as:  GLUCOPHAGE-XR   This may have changed:    - how much to take  - when to take this   Used for:  Diabetes mellitus, type 2 (H)        Dose:  1000 mg   Take 2 tablets (1,000 mg) by mouth 2 times daily (with meals)   Quantity:  90 tablet   Refills:  3                Primary Care Provider Office Phone # Fax #    Cary Brownlee [phone]       [place] [address]        Equal Access to Services     JAYDEN STUART AH: Tatiana Dietrich, waevangelina del valle, qaconnieta kaalmada adenilo, autumn josé. So [facility] [phone].    ATENCIÓN: Si habla español, tiene a deluca disposición servicios gratuitos de asistencia lingüística. Llame al [phone].    We comply with applicable federal civil rights laws and [place] laws. We do not discriminate on the basis of race, color, national origin, age, disability, sex, sexual orientation, or gender identity.            Thank you!     Thank you for choosing [facility] MEDICAL WEIGHT MANAGEMENT  for your care. Our goal is always to provide you with excellent care. Hearing back from our patients is one way we can continue to improve our services. Please take a few minutes to complete the written survey that you may receive in the mail " after your visit with us. Thank you!             Your Updated Medication List - Protect others around you: Learn how to safely use, store and throw away your medicines at www.disposemymeds.org.          This list is accurate as of 7/9/18 11:46 AM.  Always use your most recent med list.                   Brand Name Dispense Instructions for use Diagnosis    acetaminophen 500 MG tablet    TYLENOL    1 Bottle    Take 2 tablets (1,000 mg) by mouth every 6 hours as needed    Sprain of left ankle, unspecified ligament, initial encounter       alum & mag hydroxide-simethicone 200-200-20 MG/5ML Susp suspension    MYLANTA/MAALOX     Take 30 mLs by mouth daily as needed for indigestion        amLODIPine 5 MG tablet    NORVASC    30 tablet    Take 2 tablets (10 mg) by mouth daily    Essential hypertension with goal blood pressure less than 130/85       artificial saliva Aers spray      Take 1 spray by mouth 3 times daily as needed for dry mouth        artificial tears Oint ophthalmic ointment     1 Tube    Apply  to eye. Place 0.5 inches into both eyes at bedtime    Insufficiency of tear film of both eyes       ARTIFICIAL TEARS OP      Apply 1 drop to eye 4 times daily.        aspirin 81 MG tablet     100    Take 1 tablet by mouth daily. ENTERIC COATED.        atorvastatin 40 MG tablet    LIPITOR    90 tablet    Take 1 tablet (40 mg) by mouth daily    Hyperlipidemia LDL goal <100       blood glucose calibration solution    no brand specified    1 each    Use to calibrate blood glucose monitor as directed.    Type 2 diabetes mellitus with microalbuminuria, with long-term current use of insulin (H)       blood glucose lancets standard    no brand specified    100 each    Use to test blood sugar 2 times daily or as directed.    Type 2 diabetes mellitus with microalbuminuria, with long-term current use of insulin (H)       blood glucose monitoring meter device kit    no brand specified    1 kit    Check Blood sugars twice a day.     Type 2 diabetes mellitus with microalbuminuria, with long-term current use of insulin (H)       blood glucose monitoring test strip    no brand specified    100 each    Use to test blood sugar 3 times daily or as directed.    Type 2 diabetes mellitus with microalbuminuria, with long-term current use of insulin (H)       calcium polycarbophil 625 MG tablet    FIBERCON     Take 2 tablets by mouth daily        calcium-vitamin D 250-125 MG-UNIT Tabs      Take 1 tablet by mouth 2 times daily. Calcium 250 mg/Vit D 125 IU        CLARITIN 10 MG tablet   Generic drug:  loratadine     30    1 TAB PO QD (Once per day) as needed for ALLERGY SYMPTOMS        clotrimazole 1 % cream    LOTRIMIN    50 g    Apply topically 2 times daily as needed    Dermatophytosis       eucerin cream      Apply  topically as needed. Apply to thigh PRN dry skin        exenatide 10 MCG/0.04ML injection    BYETTA    1 Syringe    Inject 10 mcg Subcutaneous 2 times daily (before meals)    Type 2 diabetes mellitus with microalbuminuria, with long-term current use of insulin (H)       FLUoxetine 40 MG capsule    PROzac    30 capsule    Take 1 capsule (40 mg) by mouth daily    Schizoaffective disorder, depressive type (H)       fluticasone 50 MCG/ACT spray    FLONASE    16 g    Spray 1 spray into both nostrils daily    Seasonal allergic rhinitis       furosemide 20 MG tablet    LASIX    60 tablet    Take 1 tablet (20 mg) by mouth 2 times daily    Lower leg edema       GEL-SASHA DENTINBLOC DT      Apply  to affected area. GEL. Apply to 2nd molar on bottom right daily at bedtime.        glucagon 1 MG kit    GLUCAGON EMERGENCY    2 mg    Inject 1 mg into the muscle once for 1 dose    Type 2 diabetes mellitus with microalbuminuria, with long-term current use of insulin (H)       insulin glargine 100 UNIT/ML injection    LANTUS    8 mL    Inject 20 Units Subcutaneous At Bedtime    Type 2 diabetes mellitus with microalbuminuria, with long-term current use of  insulin (H)       LACTAID 3000 UNIT tablet   Generic drug:  lactase      Take 4 tabs daily with meals.        levothyroxine 175 MCG tablet    SYNTHROID/LEVOTHROID    30 tablet    Take 1 tablet (175 mcg) by mouth daily Give on empty stomach    Other specified hypothyroidism       LORazepam 1 MG tablet    ATIVAN    35 tablet    Take 1 tablet (1 mg) by mouth At Bedtime .  May take additional 1mg daily PRN for agitation.    Generalized anxiety disorder       losartan 100 MG tablet    COZAAR    90 tablet    Take 1 tablet by mouth daily.    Hypertension goal BP (blood pressure) < 130/80, Diabetes mellitus type 2, insulin dependent (H), CKD (chronic kidney disease) stage 2, GFR 60-89 ml/min       melatonin 3 MG tablet     60 tablet    Take 1 tablet (3 mg) by mouth nightly as needed for sleep    Other insomnia       metFORMIN 500 MG 24 hr tablet    GLUCOPHAGE-XR    90 tablet    Take 2 tablets (1,000 mg) by mouth 2 times daily (with meals)    Diabetes mellitus, type 2 (H)       MILK OF MAGNESIA PO      Take  by mouth. Take 30 mL as needed for constipation.        NEURONTIN PO      Take 900 mg by mouth 3 times daily.        nystatin 622424 UNIT/GM Powd    MYCOSTATIN    60 g    Apply topically 3 times daily as needed    Candidiasis of skin       polyethylene glycol powder    MIRALAX/GLYCOLAX     Take 1 capful by mouth 2 times daily. 17 GM PO BID        saline 0.9 % Soln      Spray 2 sprays in nostril as needed.        SENNA S 8.6-50 MG per tablet   Generic drug:  senna-docusate      Take 2 tablets by mouth At Bedtime.        solifenacin 10 MG tablet    VESICARE    30 tablet    Take 1 tablet (10 mg) by mouth daily    Urge incontinence       ziprasidone 40 MG capsule    GEODON    60 capsule    Take 1 capsule (40 mg) by mouth 2 times daily (with meals)    Schizoaffective disorder, depressive type (H)

## 2018-07-09 NOTE — PROGRESS NOTES
Health Psychology                  Clinic    Department of Medicine  Madelaine Ingram, Ph.D., L.P. [phone]                          Clinics and Surgery Center  [facility] Miryam Woods, Ph.D.,  L.P. [phone]                 3rd Floor  [place] Mail Code 741   Pinky Panda, Ph.D., L.P. [phone]    [address] Rae Eugene, Ph.D., L.P. [phone]            [place] [id number]  [place] [address]           Damon Gr, Ph.D., A.B.P.P., L.P. [phone]      Kimber Morales, Ph.D., L.P. [phone]    Health Psychology Follow-Up Note    Ms. Page is a pleasant 67-year old woman with chronic depressive illness, who returns to clinic for supportive and behavioral psychotherapy for moderate recurrent depression and for help losing weight given her morbid obesity.     She is 238.2 down from  239.4.  Wt Readings from Last 4 Encounters:   07/09/18 108.5 kg (239 lb 3.2 oz)   06/05/18 108.6 kg (239 lb 6.4 oz)   05/30/18 109.3 kg (241 lb)   05/10/18 107 kg (236 lb)   There is no height or weight on file to calculate BMI.    At [facility], she continues to serve on the [employer].  She has been on the [email or web address] for 20 years, plus decorating the bulletin boards.  She is working 1 day/week with her boss, [employer], Friday mornings for 1.5 hour.   She is satisfied with this level of effort and is getting along well with [employer].   She thinks she is sleeping a bit better.    She rates the depression as 3- 4 on 10-point subjective scale, which is below baseline.  She is doing well.  She participates in activities at [place].  She is up to biking 40 minutes x3/week.  We discussed increasing to 45 minutes  x4 /week and gradually increasing beyond an hour, however, will not change expectation this month.  Will address next month.  Discussed involvement in the walkers 1/week in AM.  She is doing evening walkers group 7 days/week after dinner  (about a half hour)  She is doing more social walking some of the time and had apparently is gradually ramping up on her fitness center days.  She agrees to increase.  She is getting along better with her roommate.    Goal for exercise has been 1.5 hours/day as of June 19, 2017 and 2 lbs. / month.   We discussed increasing bicycling to 4 days per week short term and 5-6 long-term and increasing gradually from 35-40 minutes (achieved).    She had a negative interaction re: her lantis with a new nurse.  As a result, she will start working toward some self-administration.  She arrived early today and was greeted in the waiting room.  Pinky participated fully and appeared to derive benefit.  We walked for part of the session.  Rapport was excellent.     Extended session due to complexity of case and length of interval.    IMPRESSION Distress Disability Risk    Low High Low     Time In: 1:02  Time Out:  1:50  Diagnosis:   Major Depression, recurrent mild (F33.0)   Psychological Factors Affecting Morbid Obesity (F54)      Plan: She will return on 8/20 @ 1:00 for behavioral and supportive psychotherapy.   Plan to to bicycle for > 30-35 minutes 3-4 days/week and eat less.  Tx plan due 5/7/2019  Damon Gr, Ph.D., A.B.P.P., L.P.

## 2018-07-09 NOTE — LETTER
"2018       RE: Pinky Page  [address]  [place] [address]     Dear Colleague,    Thank you for referring your patient, Pinky Page, to the [facility] MEDICAL WEIGHT MANAGEMENT at [facility]. Please see a copy of my visit note below.        Return Medical Weight Management Note     Pinky Page  MRN:  [id number]  :  1950  CORINNE:  18    Dear Dr. Ann,     I had the pleasure of seeing your patient Pinky Page.  She is a 65 year old female who I am continuing to see for treatment of obesity related to: DM-2 and Hypertension.    CURRENT WEIGHT:   239 lbs 3.2 oz    Wt Readings from Last 4 Encounters:   18 108.5 kg (239 lb 3.2 oz)   05/10/18 107 kg (236 lb)   18 109.4 kg (241 lb 3.2 oz)   18 107 kg (236 lb)     Height:  5' 4\"  Body Mass Index:  Body mass index is 41.06 kg/(m^2).  Vitals:  B/P: 163/75, P: 68    Initial consult weight was 283 on .  Weight change since last seen on 2018 is down 2 pounds.   Total loss is 44 pounds.    INTERVAL HISTORY:  Patient has been working on the following dietary changes: Still eating from the diet line at her group home (1500 miguel, no concentrated sweets, some fruit snacks).   She also continues to be on Byetta 10 cg BID for her DM and also to help her lose weight and as a appetite suppressant as well.  Her DM is being managed by us as well as the [place] [facility] ( PCP).  Patient has been working on the following activity changes: Very regular walking at least 40 minutes / day.    Diet and Activity Changes Since Last Visit Reviewed With Patient 2018   I have made the following changes to my diet since my last visit: included hot c   With regards to my diet, I am still struggling with: -   For breakfast, I typically eat: -   For lunch, I typically eat: -   For supper, I typically eat: -   For snack(s), I typically eat: -   I have made the following changes to my " activity/exercise since my last visit: -   With regards to my activity/exercise, I am still struggling with: -       MEDICATIONS:   Current Outpatient Prescriptions   Medication     acetaminophen (TYLENOL) 500 MG tablet     alum & mag hydroxide-simethicone (MYLANTA/MAALOX) 200-200-20 MG/5ML SUSP suspension     amLODIPine (NORVASC) 5 MG tablet     artificial saliva (BIOTENE MT) AERS spray     artificial tears (AKWA TEARS) OINT ophthalmic ointment     ASPIRIN 81 MG OR TABS     atorvastatin (LIPITOR) 40 MG tablet     blood glucose (NO BRAND SPECIFIED) lancets standard     blood glucose calibration (NO BRAND SPECIFIED) solution     blood glucose monitoring (NO BRAND SPECIFIED) meter device kit     blood glucose monitoring (NO BRAND SPECIFIED) test strip     Calcium Carbonate-Vitamin D (CALCIUM-VITAMIN D) 250-125 MG-UNIT TABS     CLARITIN 10 MG OR TABS     clotrimazole (LOTRIMIN) 1 % cream     exenatide (BYETTA) 10 MCG/0.04ML injection     FLUoxetine (PROZAC) 40 MG capsule     fluticasone (FLONASE) 50 MCG/ACT nasal spray     furosemide (LASIX) 20 MG tablet     Gabapentin (NEURONTIN PO)     glucagon (GLUCAGON EMERGENCY) 1 MG kit     Hypromellose (ARTIFICIAL TEARS OP)     insulin glargine (LANTUS) 100 UNIT/ML injection     lactase (LACTAID) 3000 UNIT tablet     levothyroxine (SYNTHROID, LEVOTHROID) 175 MCG tablet     LORazepam (ATIVAN) 1 MG tablet     losartan (COZAAR) 100 MG tablet     Magnesium Hydroxide (MILK OF MAGNESIA PO)     melatonin 3 MG tablet     metFORMIN (GLUCOPHAGE-XR) 500 MG 24 hr tablet     nystatin (MYCOSTATIN) 207903 UNIT/GM POWD     polyethylene glycol (MIRALAX/GLYCOLAX) powder     Saline 0.9 % SOLN     senna-docusate (SENNA S) 8.6-50 MG per tablet     Skin Protectants, Misc. (EUCERIN) cream     Sod Fluor-Solo Fluor-Hydrfl Ac (GEL-SASHA DENTINBLOC DT)     solifenacin (VESICARE) 10 MG tablet     ziprasidone (GEODON) 40 MG capsule     No current facility-administered medications for this visit.        Weight  Loss Medication History Reviewed With Patient 8/28/2017   Which weight loss medications are you currently taking on a regular basis?  Byetta (exentatide)   Are you having any side effects from the weight loss medication that we have prescribed you? No   If you are having side effects please describe: -     ASSESSMENT:   Continues to do well with weight loss maintenance. HbA1c 6.4 on 12/14/17.    FOLLOW-UP:    12 weeks.  10/15 minutes spent on counseling and education    Sincerely,    Marcos Magdaleno MD

## 2018-07-09 NOTE — PROGRESS NOTES
"    Return Medical Weight Management Note     Pinky Page  MRN:  [id number]  :  1950  CORINNE:  18    Dear Dr. Ann,     I had the pleasure of seeing your patient Pinky Page.  She is a 65 year old female who I am continuing to see for treatment of obesity related to: DM-2 and Hypertension.    CURRENT WEIGHT:   239 lbs 3.2 oz    Wt Readings from Last 4 Encounters:   18 108.5 kg (239 lb 3.2 oz)   05/10/18 107 kg (236 lb)   18 109.4 kg (241 lb 3.2 oz)   18 107 kg (236 lb)     Height:  5' 4\"  Body Mass Index:  Body mass index is 41.06 kg/(m^2).  Vitals:  B/P: 163/75, P: 68    Initial consult weight was 283 on .  Weight change since last seen on 2018 is down 2 pounds.   Total loss is 44 pounds.    INTERVAL HISTORY:  Patient has been working on the following dietary changes: Still eating from the diet line at her group home (1500 miguel, no concentrated sweets, some fruit snacks).   She also continues to be on Byetta 10 cg BID for her DM and also to help her lose weight and as a appetite suppressant as well.  Her DM is being managed by us as well as the [facility] ( PCP).  Patient has been working on the following activity changes: Very regular walking at least 40 minutes / day.    Diet and Activity Changes Since Last Visit Reviewed With Patient 2018   I have made the following changes to my diet since my last visit: included hot c   With regards to my diet, I am still struggling with: -   For breakfast, I typically eat: -   For lunch, I typically eat: -   For supper, I typically eat: -   For snack(s), I typically eat: -   I have made the following changes to my activity/exercise since my last visit: -   With regards to my activity/exercise, I am still struggling with: -       MEDICATIONS:   Current Outpatient Prescriptions   Medication     acetaminophen (TYLENOL) 500 MG tablet     alum & mag hydroxide-simethicone (MYLANTA/MAALOX) 200-200-20 MG/5ML SUSP suspension     " amLODIPine (NORVASC) 5 MG tablet     artificial saliva (BIOTENE MT) AERS spray     artificial tears (AKWA TEARS) OINT ophthalmic ointment     ASPIRIN 81 MG OR TABS     atorvastatin (LIPITOR) 40 MG tablet     blood glucose (NO BRAND SPECIFIED) lancets standard     blood glucose calibration (NO BRAND SPECIFIED) solution     blood glucose monitoring (NO BRAND SPECIFIED) meter device kit     blood glucose monitoring (NO BRAND SPECIFIED) test strip     Calcium Carbonate-Vitamin D (CALCIUM-VITAMIN D) 250-125 MG-UNIT TABS     CLARITIN 10 MG OR TABS     clotrimazole (LOTRIMIN) 1 % cream     exenatide (BYETTA) 10 MCG/0.04ML injection     FLUoxetine (PROZAC) 40 MG capsule     fluticasone (FLONASE) 50 MCG/ACT nasal spray     furosemide (LASIX) 20 MG tablet     Gabapentin (NEURONTIN PO)     glucagon (GLUCAGON EMERGENCY) 1 MG kit     Hypromellose (ARTIFICIAL TEARS OP)     insulin glargine (LANTUS) 100 UNIT/ML injection     lactase (LACTAID) 3000 UNIT tablet     levothyroxine (SYNTHROID, LEVOTHROID) 175 MCG tablet     LORazepam (ATIVAN) 1 MG tablet     losartan (COZAAR) 100 MG tablet     Magnesium Hydroxide (MILK OF MAGNESIA PO)     melatonin 3 MG tablet     metFORMIN (GLUCOPHAGE-XR) 500 MG 24 hr tablet     nystatin (MYCOSTATIN) 072127 UNIT/GM POWD     polyethylene glycol (MIRALAX/GLYCOLAX) powder     Saline 0.9 % SOLN     senna-docusate (SENNA S) 8.6-50 MG per tablet     Skin Protectants, Misc. (EUCERIN) cream     Sod Fluor-Solo Fluor-Hydrfl Ac (GEL-SASHA DENTINBLOC DT)     solifenacin (VESICARE) 10 MG tablet     ziprasidone (GEODON) 40 MG capsule     No current facility-administered medications for this visit.        Weight Loss Medication History Reviewed With Patient 8/28/2017   Which weight loss medications are you currently taking on a regular basis?  Byetta (exentatide)   Are you having any side effects from the weight loss medication that we have prescribed you? No   If you are having side effects please describe: -      ASSESSMENT:   Continues to do well with weight loss maintenance. HbA1c 6.4 on 12/14/17.    FOLLOW-UP:    12 weeks.  10/15 minutes spent on counseling and education    Sincerely,    Marcos Magdaleno MD

## 2018-07-09 NOTE — MR AVS SNAPSHOT
After Visit Summary   7/9/2018    Pinky Page    MRN: [id number]           Patient Information     Date Of Birth          1950        Visit Information        Provider Department      7/9/2018 1:00 PM Damon Gr, PhD [facility]        Today's Diagnoses     Major depressive disorder, recurrent episode, mild (H)    -  1    Psychological factors affecting morbid obesity (H)        Generalized anxiety disorder        Obesity, unspecified obesity severity, unspecified obesity type           Follow-ups after your visit        Your next 10 appointments already scheduled     Jul 12, 2018  1:10 PM CDT   Adult Med Follow UP with Maulik Kohler MD   Psychiatry Clinic ([facility])    [facility]  [address] [facility] [address]  [address] [place] [id number] [phone]            Nov 15, 2018  1:30 PM CST   RETURN GENERAL with Michael Lopez MD   Eye Clinic ([facility])    [address]  [address] [address]  [facility] [id number] [phone]            Nov 26, 2018 11:30 AM CST   (Arrive by 11:15 AM)   Return Visit with Marcos Magdaleno MD   [facility] Weight Management ([facility])    [address] [facility]  4th Floor  [facility] [id number] [phone]            May 09, 2019  1:30 PM CDT   Return Sleep Patient with Brianda Reddy MD   [facility] [place], Sleep Study ([facility])    [address] [place] [id number] [phone]              Who to contact     Please call your clinic at [phone] to:    Ask questions about your health    Make or cancel appointments    Discuss your medicines    Learn about your test results    Speak to your doctor            Additional Information About Your Visit        Care EveryWhere ID     This is your Care EveryWhere ID. This could be used by other  organizations to access your [place] medical records  [id number]        Your Vitals Were     BMI (Body Mass Index)                   40.89 kg/m2            Blood Pressure from Last 3 Encounters:   07/09/18 150/68   05/30/18 146/80   05/10/18 149/70    Weight from Last 3 Encounters:   07/09/18 108 kg (238 lb 3.2 oz)   07/09/18 108.5 kg (239 lb 3.2 oz)   06/05/18 108.6 kg (239 lb 6.4 oz)              Today, you had the following     No orders found for display         Today's Medication Changes          These changes are accurate as of 7/9/18  1:54 PM.  If you have any questions, ask your nurse or doctor.               These medicines have changed or have updated prescriptions.        Dose/Directions    metFORMIN 500 MG 24 hr tablet   Commonly known as:  GLUCOPHAGE-XR   This may have changed:    - how much to take  - when to take this   Used for:  Diabetes mellitus, type 2 (H)        Dose:  1000 mg   Take 2 tablets (1,000 mg) by mouth 2 times daily (with meals)   Quantity:  90 tablet   Refills:  3                Primary Care Provider Office Phone # Fax #    Cary Brownlee [phone]       [address] [address]        Equal Access to Services     JAYDEN STUART AH: Tatiana baugho Soemre, waaxda luqadaha, qaybta kaalmada aderupinderyada, autumn josé. So [facility] [phone].    ATENCIÓN: Si habla español, tiene a deluca disposición servicios gratuitos de asistencia lingüística. Llame al [phone].    We comply with applicable federal civil rights laws and [place] laws. We do not discriminate on the basis of race, color, national origin, age, disability, sex, sexual orientation, or gender identity.            Thank you!     Thank you for choosing [facility]  for your care. Our goal is always to provide you with excellent care. Hearing back from our patients is one way we can continue to improve our services. Please take a  few minutes to complete the written survey that you may receive in the mail after your visit with us. Thank you!             Your Updated Medication List - Protect others around you: Learn how to safely use, store and throw away your medicines at www.disposemymeds.org.          This list is accurate as of 7/9/18  1:54 PM.  Always use your most recent med list.                   Brand Name Dispense Instructions for use Diagnosis    acetaminophen 500 MG tablet    TYLENOL    1 Bottle    Take 2 tablets (1,000 mg) by mouth every 6 hours as needed    Sprain of left ankle, unspecified ligament, initial encounter       alum & mag hydroxide-simethicone 200-200-20 MG/5ML Susp suspension    MYLANTA/MAALOX     Take 30 mLs by mouth daily as needed for indigestion        amLODIPine 5 MG tablet    NORVASC    30 tablet    Take 2 tablets (10 mg) by mouth daily    Essential hypertension with goal blood pressure less than 130/85       artificial saliva Aers spray      Take 1 spray by mouth 3 times daily as needed for dry mouth        artificial tears Oint ophthalmic ointment     1 Tube    Apply  to eye. Place 0.5 inches into both eyes at bedtime    Insufficiency of tear film of both eyes       ARTIFICIAL TEARS OP      Apply 1 drop to eye 4 times daily.        aspirin 81 MG tablet     100    Take 1 tablet by mouth daily. ENTERIC COATED.        atorvastatin 40 MG tablet    LIPITOR    90 tablet    Take 1 tablet (40 mg) by mouth daily    Hyperlipidemia LDL goal <100       blood glucose calibration solution    no brand specified    1 each    Use to calibrate blood glucose monitor as directed.    Type 2 diabetes mellitus with microalbuminuria, with long-term current use of insulin (H)       blood glucose lancets standard    no brand specified    100 each    Use to test blood sugar 2 times daily or as directed.    Type 2 diabetes mellitus with microalbuminuria, with long-term current use of insulin (H)       blood glucose monitoring meter  device kit    no brand specified    1 kit    Check Blood sugars twice a day.    Type 2 diabetes mellitus with microalbuminuria, with long-term current use of insulin (H)       blood glucose monitoring test strip    no brand specified    100 each    Use to test blood sugar 3 times daily or as directed.    Type 2 diabetes mellitus with microalbuminuria, with long-term current use of insulin (H)       calcium polycarbophil 625 MG tablet    FIBERCON     Take 2 tablets by mouth daily        calcium-vitamin D 250-125 MG-UNIT Tabs      Take 1 tablet by mouth 2 times daily. Calcium 250 mg/Vit D 125 IU        CLARITIN 10 MG tablet   Generic drug:  loratadine     30    1 TAB PO QD (Once per day) as needed for ALLERGY SYMPTOMS        clotrimazole 1 % cream    LOTRIMIN    50 g    Apply topically 2 times daily as needed    Dermatophytosis       eucerin cream      Apply  topically as needed. Apply to thigh PRN dry skin        exenatide 10 MCG/0.04ML injection    BYETTA    1 Syringe    Inject 10 mcg Subcutaneous 2 times daily (before meals)    Type 2 diabetes mellitus with microalbuminuria, with long-term current use of insulin (H)       FLUoxetine 40 MG capsule    PROzac    30 capsule    Take 1 capsule (40 mg) by mouth daily    Schizoaffective disorder, depressive type (H)       fluticasone 50 MCG/ACT spray    FLONASE    16 g    Spray 1 spray into both nostrils daily    Seasonal allergic rhinitis       furosemide 20 MG tablet    LASIX    60 tablet    Take 1 tablet (20 mg) by mouth 2 times daily    Lower leg edema       GEL-SASHA DENTINBLOC DT      Apply  to affected area. GEL. Apply to 2nd molar on bottom right daily at bedtime.        glucagon 1 MG kit    GLUCAGON EMERGENCY    2 mg    Inject 1 mg into the muscle once for 1 dose    Type 2 diabetes mellitus with microalbuminuria, with long-term current use of insulin (H)       insulin glargine 100 UNIT/ML injection    LANTUS    8 mL    Inject 20 Units Subcutaneous At Bedtime    Type  2 diabetes mellitus with microalbuminuria, with long-term current use of insulin (H)       LACTAID 3000 UNIT tablet   Generic drug:  lactase      Take 4 tabs daily with meals.        levothyroxine 175 MCG tablet    SYNTHROID/LEVOTHROID    30 tablet    Take 1 tablet (175 mcg) by mouth daily Give on empty stomach    Other specified hypothyroidism       LORazepam 1 MG tablet    ATIVAN    35 tablet    Take 1 tablet (1 mg) by mouth At Bedtime .  May take additional 1mg daily PRN for agitation.    Generalized anxiety disorder       losartan 100 MG tablet    COZAAR    90 tablet    Take 1 tablet by mouth daily.    Hypertension goal BP (blood pressure) < 130/80, Diabetes mellitus type 2, insulin dependent (H), CKD (chronic kidney disease) stage 2, GFR 60-89 ml/min       melatonin 3 MG tablet     60 tablet    Take 1 tablet (3 mg) by mouth nightly as needed for sleep    Other insomnia       metFORMIN 500 MG 24 hr tablet    GLUCOPHAGE-XR    90 tablet    Take 2 tablets (1,000 mg) by mouth 2 times daily (with meals)    Diabetes mellitus, type 2 (H)       MILK OF MAGNESIA PO      Take  by mouth. Take 30 mL as needed for constipation.        NEURONTIN PO      Take 900 mg by mouth 3 times daily.        nystatin 080170 UNIT/GM Powd    MYCOSTATIN    60 g    Apply topically 3 times daily as needed    Candidiasis of skin       polyethylene glycol powder    MIRALAX/GLYCOLAX     Take 1 capful by mouth 2 times daily. 17 GM PO BID        saline 0.9 % Soln      Spray 2 sprays in nostril as needed.        SENNA S 8.6-50 MG per tablet   Generic drug:  senna-docusate      Take 2 tablets by mouth At Bedtime.        solifenacin 10 MG tablet    VESICARE    30 tablet    Take 1 tablet (10 mg) by mouth daily    Urge incontinence       ziprasidone 40 MG capsule    GEODON    60 capsule    Take 1 capsule (40 mg) by mouth 2 times daily (with meals)    Schizoaffective disorder, depressive type (H)

## 2018-07-24 ENCOUNTER — OFFICE VISIT (OUTPATIENT)
Dept: FAMILY MEDICINE | Facility: CLINIC | Age: 68
End: 2018-07-24
Payer: COMMERCIAL

## 2018-07-24 VITALS
WEIGHT: 239.8 LBS | OXYGEN SATURATION: 94 % | HEART RATE: 64 BPM | RESPIRATION RATE: 16 BRPM | SYSTOLIC BLOOD PRESSURE: 169 MMHG | TEMPERATURE: 97.5 F | DIASTOLIC BLOOD PRESSURE: 79 MMHG | BODY MASS INDEX: 41.16 KG/M2

## 2018-07-24 DIAGNOSIS — M54.9 MUSCULOSKELETAL BACK PAIN: Primary | ICD-10-CM

## 2018-07-24 DIAGNOSIS — F41.1 GENERALIZED ANXIETY DISORDER: ICD-10-CM

## 2018-07-24 RX ORDER — 1.1% SODIUM FLUORIDE PRESCRIPTION DENTAL CREAM 5 MG/G
CREAM DENTAL
COMMUNITY
Start: 2018-07-20 | End: 2018-11-07

## 2018-07-24 RX ORDER — ASPIRIN 81 MG/1
TABLET, DELAYED RELEASE ORAL
COMMUNITY
Start: 2018-07-17 | End: 2018-11-07

## 2018-07-24 RX ORDER — PEN NEEDLE, DIABETIC, SAFETY 30 GX3/16"
NEEDLE, DISPOSABLE MISCELLANEOUS
COMMUNITY
Start: 2018-07-15 | End: 2021-03-23

## 2018-07-24 ASSESSMENT — ENCOUNTER SYMPTOMS
SHORTNESS OF BREATH: 0
COUGH: 0
CHILLS: 0
HEADACHES: 0
DIFFICULTY URINATING: 0
ABDOMINAL PAIN: 0
LIGHT-HEADEDNESS: 0
ARTHRALGIAS: 1
JOINT SWELLING: 0
BACK PAIN: 1
FEVER: 0
NERVOUS/ANXIOUS: 0

## 2018-07-24 NOTE — PROGRESS NOTES
Preceptor Attestation:   Patient seen, evaluated and discussed with the resident. I have verified the content of the note, which accurately reflects my assessment of the patient and the plan of care.   Supervising Physician:  Madelaine Linares MD

## 2018-07-24 NOTE — PATIENT INSTRUCTIONS
Here is the plan from today's visit    1. Acute right-sided low back pain without sciatica  You do not have any focal tenderness, so I think a fracture is very unlikely.  Due to your medications that you are currently on, I would recommend tylenol for pain.  You can take 1000 mg three times a day  mg every 6 hours.  If your bruising spreads or your pain gets worse, return to clinic.     Follow up plan  Please make a clinic appointment for follow up with your primary physician Cary Brownlee MD as scheduled or sooner if you continue to have pain.     Thank you for coming to [facility] today.  Lab Testing:  **If you had lab testing today and your results are reassuring or normal they will be mailed to you or sent through [employer] within 7 days.   **If the lab tests need quick action we will call you with the results.  The phone number we will call with results is # [phone] (home) [phone] (work). If this is not the best number please call our clinic and change the number.  Medication Refills:  If you need any refills please call your pharmacy and they will contact us.   If you need to  your refill at a new pharmacy, please contact the new pharmacy directly. The new pharmacy will help you get your medications transferred faster.   Scheduling:  If you have any concerns about today's visit or wish to schedule another appointment please call our office during normal business hours [phone] (8-5:00 M-F)  If a referral was made to a [facility] and you don't get a call from central scheduling please call [phone].  If a Mammogram was ordered for you at [facility] call [phone] to schedule or change your appointment.  If you had an XRay/CT/Ultrasound/MRI ordered the number is [phone] to schedule or change your radiology appointment.   Medical Concerns:  If you have urgent medical concerns please call [phone] at any time of the day.    Mona  Venus Maria MD

## 2018-07-24 NOTE — MR AVS SNAPSHOT
After Visit Summary   7/24/2018    Pinky Page    MRN: [id number]           Patient Information     Date Of Birth          1950        Visit Information        Provider Department      7/24/2018 10:40 AM Mona Maria MD [facility]        Today's Diagnoses     Acute right-sided low back pain without sciatica    -  1      Care Instructions    Here is the plan from today's visit    1. Acute right-sided low back pain without sciatica  You do not have any focal tenderness, so I think a fracture is very unlikely.  Due to your medications that you are currently on, I would recommend tylenol for pain.  You can take 1000 mg three times a day  mg every 6 hours.  If your bruising spreads or your pain gets worse, return to clinic.     Follow up plan  Please make a clinic appointment for follow up with your primary physician Cary Brownlee MD as scheduled or sooner if you continue to have pain.     Thank you for coming to [facility] today.  Lab Testing:  **If you had lab testing today and your results are reassuring or normal they will be mailed to you or sent through [employer] within 7 days.   **If the lab tests need quick action we will call you with the results.  The phone number we will call with results is # [phone] (home) [phone] (work). If this is not the best number please call our clinic and change the number.  Medication Refills:  If you need any refills please call your pharmacy and they will contact us.   If you need to  your refill at a new pharmacy, please contact the new pharmacy directly. The new pharmacy will help you get your medications transferred faster.   Scheduling:  If you have any concerns about today's visit or wish to schedule another appointment please call our office during normal business hours [phone] (8-5:00 M-F)  If a referral was made to a [facility] and you don't get a call from [facility]  scheduling please call [phone].  If a Mammogram was ordered for you at [facility] call [phone] to schedule or change your appointment.  If you had an XRay/CT/Ultrasound/MRI ordered the number is [phone] to schedule or change your radiology appointment.   Medical Concerns:  If you have urgent medical concerns please call [phone] at any time of the day.    Mona Maria MD            Follow-ups after your visit        Your next 10 appointments already scheduled     Jul 30, 2018  2:10 PM CDT   Adult Med Follow UP with Maulik Kohler MD   Psychiatry Clinic ([facility])    [place] [id number]  [address] [address] [phone]            Aug 20, 2018  1:00 PM CDT   (Arrive by 12:45 PM)   Return Visit with Damon Gr, PhD [facility] ([facility])    [address]  [address] [facility] [id number] [phone]            Nov 15, 2018  1:30 PM CST   RETURN GENERAL with Michael Lopez MD   Eye Clinic ([facility])    [address]  [facility] [address] [phone]            Nov 26, 2018 11:30 AM CST   (Arrive by 11:15 AM)   Return Visit with Marcos Magdaleno MD   [facility] ([facility])    [address]  [address] [facility] [id number] [phone]            May 09, 2019  1:30 PM CDT   Return Sleep Patient with Brianda Reddy MD   [facility] [place] Sleep Study ([facility])    [address] [place] [address] [phone]              Who to contact     Please call your clinic at [phone] to:    Ask questions about your health    Make or cancel appointments    Discuss your medicines    Learn about your test results    Speak to your doctor            Additional  Information About Your Visit        Care EveryWhere ID     This is your Care EveryWhere ID. This could be used by other organizations to access your [facility] medical records  [id number]        Your Vitals Were     Pulse Temperature Respirations Pulse Oximetry BMI (Body Mass Index)       64 97.5  F (36.4  C) (Oral) 16 94% 41.16 kg/m2        Blood Pressure from Last 3 Encounters:   07/24/18 169/79   07/09/18 150/68   05/30/18 146/80    Weight from Last 3 Encounters:   07/24/18 239 lb 12.8 oz (108.8 kg)   07/09/18 238 lb 3.2 oz (108 kg)   07/09/18 239 lb 3.2 oz (108.5 kg)              Today, you had the following     No orders found for display         Today's Medication Changes          These changes are accurate as of 7/24/18 11:04 AM.  If you have any questions, ask your nurse or doctor.               These medicines have changed or have updated prescriptions.        Dose/Directions    metFORMIN 500 MG 24 hr tablet   Commonly known as:  GLUCOPHAGE-XR   This may have changed:    - how much to take  - when to take this   Used for:  Diabetes mellitus, type 2 (H)        Dose:  1000 mg   Take 2 tablets (1,000 mg) by mouth 2 times daily (with meals)   Quantity:  90 tablet   Refills:  3                Primary Care Provider Office Phone # Fax #    Cary Brownlee [phone]       [address] [address]        Equal Access to Services     JAYDEN STUART AH: Hadii james baugho Soemre, waaxda luqadaha, qaybta kaalmada adeglennada, waxjeff lisa josé. So [facility] [phone].    ATENCIÓN: Si habla español, tiene a deluca disposición servicios gratuitos de asistencia lingüística. Link maradiaga [phone].    We comply with applicable federal civil rights laws and [place] laws. We do not discriminate on the basis of race, color, national origin, age, disability, sex, sexual orientation, or gender identity.            Thank you!     Thank you for choosing [facility]  for your care. Our goal is always to provide you with excellent care. Hearing back from our patients is one way we can continue to improve our services. Please take a few minutes to complete the written survey that you may receive in the mail after your visit with us. Thank you!             Your Updated Medication List - Protect others around you: Learn how to safely use, store and throw away your medicines at www.disposemymeds.org.          This list is accurate as of 7/24/18 11:04 AM.  Always use your most recent med list.                   Brand Name Dispense Instructions for use Diagnosis    acetaminophen 500 MG tablet    TYLENOL    1 Bottle    Take 2 tablets (1,000 mg) by mouth every 6 hours as needed    Sprain of left ankle, unspecified ligament, initial encounter       alum & mag hydroxide-simethicone 200-200-20 MG/5ML Susp suspension    MYLANTA/MAALOX     Take 30 mLs by mouth daily as needed for indigestion        amLODIPine 5 MG tablet    NORVASC    30 tablet    Take 2 tablets (10 mg) by mouth daily    Essential hypertension with goal blood pressure less than 130/85       artificial saliva Aers spray      Take 1 spray by mouth 3 times daily as needed for dry mouth        artificial tears Oint ophthalmic ointment     1 Tube    Apply  to eye. Place 0.5 inches into both eyes at bedtime    Insufficiency of tear film of both eyes       ARTIFICIAL TEARS OP      Apply 1 drop to eye 4 times daily.        * aspirin 81 MG tablet     100    Take 1 tablet by mouth daily. ENTERIC COATED.        * SM ASPIRIN ADULT LOW STRENGTH 81 MG EC tablet   Generic drug:  aspirin           atorvastatin 40 MG tablet    LIPITOR    90 tablet    Take 1 tablet (40 mg) by mouth daily    Hyperlipidemia LDL goal <100       BD AUTOSHIELD DUO 30G X 5 MM   Generic drug:  insulin pen needle           blood glucose calibration solution    no brand specified    1 each    Use to calibrate blood glucose monitor as directed.     Type 2 diabetes mellitus with microalbuminuria, with long-term current use of insulin (H)       blood glucose lancets standard    no brand specified    100 each    Use to test blood sugar 2 times daily or as directed.    Type 2 diabetes mellitus with microalbuminuria, with long-term current use of insulin (H)       blood glucose monitoring meter device kit    no brand specified    1 kit    Check Blood sugars twice a day.    Type 2 diabetes mellitus with microalbuminuria, with long-term current use of insulin (H)       blood glucose monitoring test strip    no brand specified    100 each    Use to test blood sugar 3 times daily or as directed.    Type 2 diabetes mellitus with microalbuminuria, with long-term current use of insulin (H)       calcium polycarbophil 625 MG tablet    FIBERCON     Take 2 tablets by mouth daily        calcium-vitamin D 250-125 MG-UNIT Tabs      Take 1 tablet by mouth 2 times daily. Calcium 250 mg/Vit D 125 IU        Calcium-Vitamin D3 250-125 MG-UNIT Tabs           CLARITIN 10 MG tablet   Generic drug:  loratadine     30    1 TAB PO QD (Once per day) as needed for ALLERGY SYMPTOMS        clotrimazole 1 % cream    LOTRIMIN    50 g    Apply topically 2 times daily as needed    Dermatophytosis       eucerin cream      Apply  topically as needed. Apply to thigh PRN dry skin        exenatide 10 MCG/0.04ML injection    BYETTA    1 Syringe    Inject 10 mcg Subcutaneous 2 times daily (before meals)    Type 2 diabetes mellitus with microalbuminuria, with long-term current use of insulin (H)       FLUoxetine 40 MG capsule    PROzac    30 capsule    Take 1 capsule (40 mg) by mouth daily    Schizoaffective disorder, depressive type (H)       fluticasone 50 MCG/ACT spray    FLONASE    16 g    Spray 1 spray into both nostrils daily    Seasonal allergic rhinitis       furosemide 20 MG tablet    LASIX    60 tablet    Take 1 tablet (20 mg) by mouth 2 times daily    Lower leg edema       GEL-SASHA  DENTINBLOC DT      Apply  to affected area. GEL. Apply to 2nd molar on bottom right daily at bedtime.        glucagon 1 MG kit    GLUCAGON EMERGENCY    2 mg    Inject 1 mg into the muscle once for 1 dose    Type 2 diabetes mellitus with microalbuminuria, with long-term current use of insulin (H)       insulin glargine 100 UNIT/ML injection    LANTUS    8 mL    Inject 20 Units Subcutaneous At Bedtime    Type 2 diabetes mellitus with microalbuminuria, with long-term current use of insulin (H)       LACTAID 3000 UNIT tablet   Generic drug:  lactase      Take 4 tabs daily with meals.        levothyroxine 175 MCG tablet    SYNTHROID/LEVOTHROID    30 tablet    Take 1 tablet (175 mcg) by mouth daily Give on empty stomach    Other specified hypothyroidism       LORazepam 1 MG tablet    ATIVAN    35 tablet    Take 1 tablet (1 mg) by mouth At Bedtime .  May take additional 1mg daily PRN for agitation.    Generalized anxiety disorder       losartan 100 MG tablet    COZAAR    90 tablet    Take 1 tablet by mouth daily.    Hypertension goal BP (blood pressure) < 130/80, Diabetes mellitus type 2, insulin dependent (H), CKD (chronic kidney disease) stage 2, GFR 60-89 ml/min       melatonin 3 MG tablet     60 tablet    Take 1 tablet (3 mg) by mouth nightly as needed for sleep    Other insomnia       metFORMIN 500 MG 24 hr tablet    GLUCOPHAGE-XR    90 tablet    Take 2 tablets (1,000 mg) by mouth 2 times daily (with meals)    Diabetes mellitus, type 2 (H)       MILK OF MAGNESIA PO      Take  by mouth. Take 30 mL as needed for constipation.        NEURONTIN PO      Take 900 mg by mouth 3 times daily.        nystatin 007608 UNIT/GM Powd    MYCOSTATIN    60 g    Apply topically 3 times daily as needed    Candidiasis of skin       polyethylene glycol powder    MIRALAX/GLYCOLAX     Take 1 capful by mouth 2 times daily. 17 GM PO BID        saline 0.9 % Soln      Spray 2 sprays in nostril as needed.        SENNA S 8.6-50 MG per tablet    Generic drug:  senna-docusate      Take 2 tablets by mouth At Bedtime.        SF 5000 PLUS 1.1 % Crea   Generic drug:  Sodium Fluoride           solifenacin 10 MG tablet    VESICARE    30 tablet    Take 1 tablet (10 mg) by mouth daily    Urge incontinence       ziprasidone 40 MG capsule    GEODON    60 capsule    Take 1 capsule (40 mg) by mouth 2 times daily (with meals)    Schizoaffective disorder, depressive type (H)       * Notice:  This list has 2 medication(s) that are the same as other medications prescribed for you. Read the directions carefully, and ask your doctor or other care provider to review them with you.

## 2018-07-24 NOTE — TELEPHONE ENCOUNTER

## 2018-07-24 NOTE — PROGRESS NOTES
HPI       Pinky Page is a 68 year old  who presents for   Chief Complaint   Patient presents with     Musculoskeletal Problem     R hip pain from fall 7/22     Back Pain     R side from fall 7/22 w/ bruising         Concern: s/p Fall 7/22    Patient is a 68 year old female with multiple medical problems that presents after a fall 2 days prior.  She was putting on pajamas and then fell, not sure why she fell.  No light headedness, no dizziness.  No LOC.  No head injury.  Patient fell on floor to right side.  Patient is now having low back pain, right hip pain and bruising over right lower abdomen (she is not taking blood thinners).  Patient is still ambulating okay.  Denies any focal pain over bones.  Patient can sleep through night, has occasional pain.  Patient is taking Tylenol for pain.     Problem, Medication and Allergy Lists were reviewed and updated if needed..    Patient is an established patient of this clinic..         Review of Systems:   Review of Systems   Constitutional: Negative for chills and fever.   Respiratory: Negative for cough and shortness of breath.    Cardiovascular: Negative for chest pain.   Gastrointestinal: Negative for abdominal pain.   Genitourinary: Negative for difficulty urinating and pelvic pain.   Musculoskeletal: Positive for arthralgias (muscle pain over right flank) and back pain. Negative for gait problem and joint swelling.   Neurological: Negative for light-headedness and headaches.   Psychiatric/Behavioral: The patient is not nervous/anxious.             Physical Exam:     Vitals:    07/24/18 1024   BP: 169/79   Pulse: 64   Resp: 16   Temp: 97.5  F (36.4  C)   TempSrc: Oral   SpO2: 94%   Weight: 239 lb 12.8 oz (108.8 kg)     Body mass index is 41.16 kg/(m^2).  Vital signs normal except elevated blood pressure     Physical Exam   Constitutional: She is oriented to person, place, and time. She appears well-developed and well-nourished.   HENT:   Head: Normocephalic.    Neck: Normal range of motion. Neck supple.   Cardiovascular: Normal rate.    Pulmonary/Chest: Effort normal.   Musculoskeletal: Normal range of motion. She exhibits no edema or tenderness.   No tenderness to palpation over cervical, thoracic, lumbar spinous processes or sacrum.  No tenderness to palpation over iliac crests.  Tenderness to palpation over right flank (paraspinous muscles, latissimus dorsi).  Normal gait.  Normal hip flexion, extension, internal and external rotation bilaterally.   Neurological: She is alert and oriented to person, place, and time.   Skin: No rash noted. No erythema. No pallor.        Yellow/purple bruising over right lower abdomen, oval shaped with central clearing, approximately 10x12 cm   Psychiatric: She has a normal mood and affect. Her behavior is normal. Judgment and thought content normal.   Vitals reviewed.      Results:   No labs ordered    Assessment and Plan      Pinky was seen today for musculoskeletal problem and back pain.    Patient presents for follow up after recent fall 2 days prior.  She has no focal points tender to palpation, is ambulating well, and does not have significant pain.  Hip exam was normal.  It seems very unlikely that she sustained a fracture.  She does have tenderness to palpation of right flank and moderate bruising of right lower abdomen.  She most likely has musculoskeletal strain over the right lower back.  Bruising has stabilized.  Advised patient to continue using tylenol for pain.  Considered muscle relaxant, but patient is on multiple medications, so will hold off.  If pain does not improve over the next week or gets significantly worse, she should return to clinic.     Diagnoses and all orders for this visit:    Musculoskeletal back pain      Patient Instructions   Here is the plan from today's visit    1. Musculoskeletal pain  You do not have any focal tenderness, so I think a fracture is very unlikely.  Due to your medications that you  are currently on, I would recommend tylenol for pain.  You can take 1000 mg three times a day  mg every 6 hours.  If your bruising spreads or your pain gets worse, return to clinic.        There are no discontinued medications.    Options for treatment and follow-up care were reviewed with the patient. Pinky Page  engaged in the decision making process and verbalized understanding of the options discussed and agreed with the final plan.    Mona Maria M.D.  PGY-3, Family Medicine

## 2018-07-25 RX ORDER — LORAZEPAM 1 MG/1
1 TABLET ORAL AT BEDTIME
Qty: 35 TABLET | Refills: 1 | Status: SHIPPED | OUTPATIENT
Start: 2018-07-25 | End: 2018-07-30

## 2018-07-25 NOTE — TELEPHONE ENCOUNTER
RN printed rx and had preceptor Dr. Sullivan sign. Faxed to pharmacy. Fax successful    Kim Hill RN

## 2018-07-26 ENCOUNTER — TELEPHONE (OUTPATIENT)
Dept: FAMILY MEDICINE | Facility: CLINIC | Age: 68
End: 2018-07-26

## 2018-07-26 NOTE — TELEPHONE ENCOUNTER
This sounds good. Can results be sent here and entered? (MERI released). Please call and relay info and ask if further EPIC orders are needed.     -PAULA

## 2018-07-26 NOTE — TELEPHONE ENCOUNTER
[facility] phone call message - order or referral request for patient:     Order or referral being requested: LABS      Additional Comments: There are orders for labs and Home care nurse is asking if they can just do these at the [facility]? (Chip, TSH, A1C) She is going to do them for July and is asking if going forward she can continue to do them there.  Please return call.    OK to leave a message on voice mail? Yes    Primary language: English      needed? No    Call taken on July 26, 2018 at 8:53 AM by Audelia Whaley

## 2018-07-27 ENCOUNTER — OFFICE VISIT (OUTPATIENT)
Dept: FAMILY MEDICINE | Facility: CLINIC | Age: 68
End: 2018-07-27
Payer: COMMERCIAL

## 2018-07-27 VITALS
SYSTOLIC BLOOD PRESSURE: 188 MMHG | BODY MASS INDEX: 40.51 KG/M2 | TEMPERATURE: 98.2 F | WEIGHT: 236 LBS | OXYGEN SATURATION: 100 % | HEART RATE: 75 BPM | DIASTOLIC BLOOD PRESSURE: 82 MMHG
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
## 2018-07-27 DIAGNOSIS — R10.31 ABDOMINAL PAIN, RIGHT LOWER QUADRANT: ICD-10-CM

## 2018-07-27 RX ORDER — POLYPROPYLENE GLYCOL 400, PROPYLENE GLYCOL .4; .3 G/100ML; G/100ML
LIQUID OPHTHALMIC
COMMUNITY
Start: 2018-07-26 | End: 2023-12-21

## 2018-07-27 ASSESSMENT — ANXIETY QUESTIONNAIRES
IF YOU CHECKED OFF ANY PROBLEMS ON THIS QUESTIONNAIRE, HOW DIFFICULT HAVE THESE PROBLEMS MADE IT FOR YOU TO DO YOUR WORK, TAKE CARE OF THINGS AT HOME, OR GET ALONG WITH OTHER PEOPLE: SOMEWHAT DIFFICULT
3. WORRYING TOO MUCH ABOUT DIFFERENT THINGS: SEVERAL DAYS
GAD7 TOTAL SCORE: 7
7. FEELING AFRAID AS IF SOMETHING AWFUL MIGHT HAPPEN: SEVERAL DAYS
1. FEELING NERVOUS, ANXIOUS, OR ON EDGE: SEVERAL DAYS
2. NOT BEING ABLE TO STOP OR CONTROL WORRYING: SEVERAL DAYS
6. BECOMING EASILY ANNOYED OR IRRITABLE: SEVERAL DAYS
5. BEING SO RESTLESS THAT IT IS HARD TO SIT STILL: SEVERAL DAYS

## 2018-07-27 ASSESSMENT — PATIENT HEALTH QUESTIONNAIRE - PHQ9: 5. POOR APPETITE OR OVEREATING: SEVERAL DAYS

## 2018-07-27 NOTE — TELEPHONE ENCOUNTER
RN called [facility] to relay. RN there stated they would fax results. Requested a same day appt for follow up back pain (see on 7/24) transferred to     Kim Hill RN

## 2018-07-27 NOTE — PROGRESS NOTES
Preceptor Attestation:   Patient seen, evaluated and discussed with the resident.   I have verified the content of the note, which accurately reflects my assessment of the patient and the plan of care.   Supervising Physician:  Daniel Villalpando MD

## 2018-07-27 NOTE — MR AVS SNAPSHOT
After Visit Summary   7/27/2018    Pinky Page    MRN: [id number]           Patient Information     Date Of Birth          1950        Visit Information        Provider Department      7/27/2018 11:20 AM Karyna Meier DO [facility]        Today's Diagnoses     Fall, subsequent encounter    -  1    Abdominal pain, right lower quadrant           Follow-ups after your visit        Your next 10 appointments already scheduled     Aug 15, 2018  3:00 PM CDT   Return Visit with MD [facility] ([facility])    [address],  [address]  [facility] [address]   [phone]            Aug 20, 2018  1:00 PM CDT   (Arrive by 12:45 PM)   Return Visit with Damon Gr, PhD [facility] ([facility] [place])    [address]  [address] [facility] [id number] [phone]            Sep 10, 2018  1:40 PM CDT   Adult Med Follow UP with Maulik Kohler MD   Psychiatry Clinic ([facility])    [place] [address]  [address] [id number] [phone]            Nov 15, 2018  1:30 PM CST   RETURN GENERAL with Michael Lopez MD   Eye Clinic ([facility])    [address]  [facility] [address] [phone]            Nov 26, 2018 11:30 AM CST   (Arrive by 11:15 AM)   Return Visit with Marcos Magdaleno MD   [facility] ([facility])    [address]  [address] [facility] [id number] [phone]            May 09, 2019  1:30 PM CDT   Return Sleep Patient with Brianda Rdedy MD   [facility] [place] Sleep Study ([facility])    [address] [facility] [id number] [phone]              Who to contact     Please call your clinic at [phone]  to:    Ask questions about your health    Make or cancel appointments    Discuss your medicines    Learn about your test results    Speak to your doctor            Additional Information About Your Visit        Care EveryWhere ID     This is your Care EveryWhere ID. This could be used by other organizations to access your [facility] medical records  [id number]        Your Vitals Were     Pulse Temperature Pulse Oximetry BMI (Body Mass Index)          75 98.2  F (36.8  C) (Oral) 100% 40.51 kg/m2         Blood Pressure from Last 3 Encounters:   07/27/18 188/82   07/24/18 169/79   07/09/18 150/68    Weight from Last 3 Encounters:   07/27/18 236 lb (107 kg)   07/24/18 239 lb 12.8 oz (108.8 kg)   07/09/18 239 lb 3.2 oz (108.5 kg)              Today, you had the following     No orders found for display         Today's Medication Changes          These changes are accurate as of 7/27/18 11:59 PM.  If you have any questions, ask your nurse or doctor.               These medicines have changed or have updated prescriptions.        Dose/Directions    metFORMIN 500 MG 24 hr tablet   Commonly known as:  GLUCOPHAGE-XR   This may have changed:    - how much to take  - when to take this   Used for:  Diabetes mellitus, type 2 (H)        Dose:  1000 mg   Take 2 tablets (1,000 mg) by mouth 2 times daily (with meals)   Quantity:  90 tablet   Refills:  3                Primary Care Provider Office Phone # Fax #    Cary Brownlee [phone]       [address] [address]        Equal Access to Services     JAYDEN STUART AH: Hadii james baugho Soemre, waaxda luqadaha, qaybta kaalmada daxa, autumn josé. So [facility] [phone].    ATENCIÓN: Si habla español, tiene a deluca disposición servicios gratuitos de asistencia lingüística. Llame al [phone].    We comply with applicable federal civil rights laws and [place] laws. We do not discriminate on the basis  of race, color, national origin, age, disability, sex, sexual orientation, or gender identity.            Thank you!     Thank you for choosing [facility]  for your care. Our goal is always to provide you with excellent care. Hearing back from our patients is one way we can continue to improve our services. Please take a few minutes to complete the written survey that you may receive in the mail after your visit with us. Thank you!             Your Updated Medication List - Protect others around you: Learn how to safely use, store and throw away your medicines at www.disposemymeds.org.          This list is accurate as of 7/27/18 11:59 PM.  Always use your most recent med list.                   Brand Name Dispense Instructions for use Diagnosis    acetaminophen 500 MG tablet    TYLENOL    1 Bottle    Take 2 tablets (1,000 mg) by mouth every 6 hours as needed    Sprain of left ankle, unspecified ligament, initial encounter       alum & mag hydroxide-simethicone 200-200-20 MG/5ML Susp suspension    MYLANTA/MAALOX     Take 30 mLs by mouth daily as needed for indigestion        amLODIPine 5 MG tablet    NORVASC    30 tablet    Take 2 tablets (10 mg) by mouth daily    Essential hypertension with goal blood pressure less than 130/85       artificial saliva Aers spray      Take 1 spray by mouth 3 times daily as needed for dry mouth        artificial tears Oint ophthalmic ointment     1 Tube    Apply  to eye. Place 0.5 inches into both eyes at bedtime    Insufficiency of tear film of both eyes       ARTIFICIAL TEARS OP      Apply 1 drop to eye 4 times daily.        * aspirin 81 MG tablet     100    Take 1 tablet by mouth daily. ENTERIC COATED.        * SM ASPIRIN ADULT LOW STRENGTH 81 MG EC tablet   Generic drug:  aspirin           atorvastatin 40 MG tablet    LIPITOR    90 tablet    Take 1 tablet (40 mg) by mouth daily    Hyperlipidemia LDL goal <100       BD AUTOSHIELD DUO 30G X 5 MM   Generic drug:   insulin pen needle           blood glucose calibration solution    no brand specified    1 each    Use to calibrate blood glucose monitor as directed.    Type 2 diabetes mellitus with microalbuminuria, with long-term current use of insulin (H)       blood glucose lancets standard    no brand specified    100 each    Use to test blood sugar 2 times daily or as directed.    Type 2 diabetes mellitus with microalbuminuria, with long-term current use of insulin (H)       blood glucose monitoring meter device kit    no brand specified    1 kit    Check Blood sugars twice a day.    Type 2 diabetes mellitus with microalbuminuria, with long-term current use of insulin (H)       blood glucose monitoring test strip    no brand specified    100 each    Use to test blood sugar 3 times daily or as directed.    Type 2 diabetes mellitus with microalbuminuria, with long-term current use of insulin (H)       calcium polycarbophil 625 MG tablet    FIBERCON     Take 2 tablets by mouth daily        calcium-vitamin D 250-125 MG-UNIT Tabs      Take 1 tablet by mouth 2 times daily. Calcium 250 mg/Vit D 125 IU        Calcium-Vitamin D3 250-125 MG-UNIT Tabs           CLARITIN 10 MG tablet   Generic drug:  loratadine     30    1 TAB PO QD (Once per day) as needed for ALLERGY SYMPTOMS        clotrimazole 1 % cream    LOTRIMIN    50 g    Apply topically 2 times daily as needed    Dermatophytosis       eucerin cream      Apply  topically as needed. Apply to thigh PRN dry skin        exenatide 10 MCG/0.04ML injection    BYETTA    1 Syringe    Inject 10 mcg Subcutaneous 2 times daily (before meals)    Type 2 diabetes mellitus with microalbuminuria, with long-term current use of insulin (H)       fluticasone 50 MCG/ACT spray    FLONASE    16 g    Spray 1 spray into both nostrils daily    Seasonal allergic rhinitis       furosemide 20 MG tablet    LASIX    60 tablet    Take 1 tablet (20 mg) by mouth 2 times daily    Lower leg edema       GEL-SASHA  DENTINBLOC DT      Apply  to affected area. GEL. Apply to 2nd molar on bottom right daily at bedtime.        glucagon 1 MG kit    GLUCAGON EMERGENCY    2 mg    Inject 1 mg into the muscle once for 1 dose    Type 2 diabetes mellitus with microalbuminuria, with long-term current use of insulin (H)       insulin glargine 100 UNIT/ML injection    LANTUS    8 mL    Inject 20 Units Subcutaneous At Bedtime    Type 2 diabetes mellitus with microalbuminuria, with long-term current use of insulin (H)       LACTAID 3000 UNIT tablet   Generic drug:  lactase      Take 4 tabs daily with meals.        levothyroxine 175 MCG tablet    SYNTHROID/LEVOTHROID    30 tablet    Take 1 tablet (175 mcg) by mouth daily Give on empty stomach    Other specified hypothyroidism       losartan 100 MG tablet    COZAAR    90 tablet    Take 1 tablet by mouth daily.    Hypertension goal BP (blood pressure) < 130/80, Diabetes mellitus type 2, insulin dependent (H), CKD (chronic kidney disease) stage 2, GFR 60-89 ml/min       melatonin 3 MG tablet     60 tablet    Take 1 tablet (3 mg) by mouth nightly as needed for sleep    Other insomnia       metFORMIN 500 MG 24 hr tablet    GLUCOPHAGE-XR    90 tablet    Take 2 tablets (1,000 mg) by mouth 2 times daily (with meals)    Diabetes mellitus, type 2 (H)       MILK OF MAGNESIA PO      Take  by mouth. Take 30 mL as needed for constipation.        NEURONTIN PO      Take 900 mg by mouth 3 times daily.        nystatin 267864 UNIT/GM Powd    MYCOSTATIN    60 g    Apply topically 3 times daily as needed    Candidiasis of skin       polyethylene glycol powder    MIRALAX/GLYCOLAX     Take 1 capful by mouth 2 times daily. 17 GM PO BID        saline 0.9 % Soln      Spray 2 sprays in nostril as needed.        SENNA S 8.6-50 MG per tablet   Generic drug:  senna-docusate      Take 2 tablets by mouth At Bedtime.        SF 5000 PLUS 1.1 % Crea   Generic drug:  Sodium Fluoride           SM LUBRICANT EYE DROPS 0.4-0.3 % Soln  ophthalmic solution   Generic drug:  polyethylene glycol 0.4%- propylene glycol 0.3%           solifenacin 10 MG tablet    VESICARE    30 tablet    Take 1 tablet (10 mg) by mouth daily    Urge incontinence       * Notice:  This list has 2 medication(s) that are the same as other medications prescribed for you. Read the directions carefully, and ask your doctor or other care provider to review them with you.

## 2018-07-27 NOTE — PROGRESS NOTES
HPI       Pinky Page is a 68 year old  who presents for   Chief Complaint   Patient presents with     RECHECK     fall on 06/22 injuring right side, pain is not controlled by tylenol     Patient fell on July 22: She was putting on pajamas, then fell on her right side.  She did not have any lightheadedness, no dizziness, no loss of consciousness, no head injury.  She was seen on July 24 by Dr. Maria and was noted to have yellow/purple bruising over right lower abdomen, oval-shaped with central clearing approximately 10 x 12 cm.  At that time she was experiencing back pain, which she states has improved.  She is taking 650 mg 4 times a day of Tylenol, which is helping the pain.  However patient is concerned because she has new right abdominal pain that started yesterday.    ABDOMINAL Pain     Onset: 2 days ago    Description:   Character: Dull ache  Location: right lower quadrant  Radiation: None    Intensity: mild, was severe last night    Progression of Symptoms:  improving    Accompanying Signs & Symptoms:  Fever/Chills?: No   Gas/Bloating: No   Dysuria:No   Hematuria: No   Nausea: No   Vomiting: No   Diarrhea:No   Constipation: YES, last couple of weeks. Has 3-4 stools in a week, medium firmness    History:     Had right side abdominal pain in past, had appendix and galbladder out              Trauma:  YES see above  Previous similar pain:  YES with appendicitis, but she had appendectomy   Previous tests done: none    Precipitating factors:   Does the pain change with:     Food?: no     BM?: no     Urination:no     What makes it better?:  Laying down    Therapies Tried and outcome: tylenol, Details: helping    LMP:  not applicable     +++++++    Problem, Medication and Allergy Lists were reviewed and updated if needed..    Patient is an established patient of this clinic..         Review of Systems:   Review of Systems   Constitutional: Negative for activity change, appetite change and fever.    Respiratory: Negative.    Cardiovascular: Negative.    Gastrointestinal: Positive for abdominal pain and constipation. Negative for abdominal distention, blood in stool, diarrhea, nausea and vomiting.   Genitourinary: Negative.    Musculoskeletal: Positive for back pain. Negative for gait problem.   Skin: Positive for color change. Negative for wound.   Neurological: Negative for dizziness and headaches.          Physical Exam:     Vitals:    07/27/18 1106 07/27/18 1107   BP: 188/76 188/82   Pulse: 75    Temp: 98.2  F (36.8  C)    TempSrc: Oral    SpO2: 100%    Weight: 236 lb (107 kg)      Body mass index is 40.51 kg/(m^2).  Vitals were reviewed and were normal     Physical Exam   Constitutional: She appears well-developed and well-nourished. No distress.   Abdominal: Soft. Bowel sounds are normal. She exhibits no distension. There is tenderness (RLQ over area of ecchymosis). There is no rebound and no guarding.       Obese abdomen   Neurological: She is alert.   Skin: She is not diaphoretic.   Psychiatric: Her behavior is normal. Judgment and thought content normal. Her affect is blunt. Her speech is delayed. Cognition and memory are normal.   Nursing note and vitals reviewed.      Results:   No testing ordered today    Assessment and Plan        1. Fall, subsequent encounter  2. Abdominal pain, right lower quadrant  Patient's RLQ pain most likely secondary to fall on 7/22. Patient has history of appendectomy, and does not have any signs of infection to suggest intrabdominal infection. Patient does have tenderness in area of ecchymosis, and on exam appears to have some collections of fluid in adipose tissues in area of ecchymosis, most consistent with hematomas. Discussed with patient usual course of healing, and suggested continue using tylenol for pain, and trying heat to the area. Patient will follow-up if pain continues in 1 week.     There are no discontinued medications.    Options for treatment and  follow-up care were reviewed with the patient. Pinky Page  engaged in the decision making process and verbalized understanding of the options discussed and agreed with the final plan.    Karyna Meier, DO

## 2018-07-28 ASSESSMENT — ANXIETY QUESTIONNAIRES: GAD7 TOTAL SCORE: 7

## 2018-07-28 ASSESSMENT — PATIENT HEALTH QUESTIONNAIRE - PHQ9: SUM OF ALL RESPONSES TO PHQ QUESTIONS 1-9: 7

## 2018-07-30 ENCOUNTER — OFFICE VISIT (OUTPATIENT)
Dept: PSYCHIATRY | Facility: CLINIC | Age: 68
End: 2018-07-30
Attending: PSYCHIATRY & NEUROLOGY
Payer: COMMERCIAL

## 2018-07-30 VITALS
DIASTOLIC BLOOD PRESSURE: 82 MMHG | SYSTOLIC BLOOD PRESSURE: 148 MMHG | BODY MASS INDEX: 41.13 KG/M2 | WEIGHT: 239.6 LBS | HEART RATE: 72 BPM

## 2018-07-30 DIAGNOSIS — F41.1 GENERALIZED ANXIETY DISORDER: ICD-10-CM

## 2018-07-30 DIAGNOSIS — F25.1 SCHIZOAFFECTIVE DISORDER, DEPRESSIVE TYPE (H): ICD-10-CM

## 2018-07-30 PROCEDURE — G0463 HOSPITAL OUTPT CLINIC VISIT: HCPCS | Mod: ZF

## 2018-07-30 RX ORDER — LORAZEPAM 1 MG/1
1 TABLET ORAL AT BEDTIME
Qty: 35 TABLET | Refills: 1 | Status: SHIPPED | OUTPATIENT
Start: 2018-08-20 | End: 2018-08-08

## 2018-07-30 RX ORDER — FLUOXETINE 40 MG/1
40 CAPSULE ORAL DAILY
Qty: 30 CAPSULE | Refills: 2 | Status: SHIPPED | OUTPATIENT
Start: 2018-08-20 | End: 2018-10-25

## 2018-07-30 RX ORDER — ZIPRASIDONE HYDROCHLORIDE 40 MG/1
40 CAPSULE ORAL 2 TIMES DAILY WITH MEALS
Qty: 60 CAPSULE | Refills: 2 | Status: SHIPPED | OUTPATIENT
Start: 2018-08-20 | End: 2018-11-19

## 2018-07-30 RX ORDER — LORAZEPAM 1 MG/1
1 TABLET ORAL AT BEDTIME
Qty: 35 TABLET | Refills: 1 | Status: SHIPPED | OUTPATIENT
Start: 2018-08-20 | End: 2018-07-30

## 2018-07-30 ASSESSMENT — PAIN SCALES - GENERAL: PAINLEVEL: MODERATE PAIN (4)

## 2018-07-30 NOTE — MR AVS SNAPSHOT
After Visit Summary   7/30/2018    Pinky Page    MRN: [id number]           Patient Information     Date Of Birth          1950        Visit Information        Provider Department      7/30/2018 2:10 PM Maulik Kohler MD [facility]        Today's Diagnoses     Schizoaffective disorder, depressive type (H)        Generalized anxiety disorder          Care Instructions    Thank you for coming to the PSYCHIATRY CLINIC.    Lab Testing:  If you had lab testing today and your results are reassuring or normal they will be mailed to you or sent through [facility]) within 7 days.   If the lab tests need quick action we will call you with the results.  The phone number we will call with results is # [phone] (home) [phone] (work). If this is not the best number please call our clinic and change the number.    Medication Refills:  If you need any refills please call your pharmacy and they will contact us. Our fax number for refills is [phone]. Please allow three business for refill processing.   If you need to  your refill at a new pharmacy, please contact the new pharmacy directly. The new pharmacy will help you get your medications transferred.     Scheduling:  If you have any concerns about today's visit or wish to schedule another appointment please call our office during normal business hours [phone] (8-5:00 M-F)    Contact Us:  Please call [phone] during business hours (8-5:00 M-F).  If after clinic hours, or on the weekend, please call  [phone].    Financial Assistance [phone]  [employer] Billing [phone]  [place] Billing [phone]  Medical Records [phone]      MENTAL HEALTH CRISIS NUMBERS:  [facility]:   [facility] - [phone]   Crisis Residence [facility] - [phone]   Walk-In Counseling Center [facility] - [phone]   COPE 24/7 [place] Mobile Team for Adults - [[phone]]; Child -  [[phone]]     Crisis Connection - [phone]     [facility]:   [facility] - [phone]   Walk-in counseling [facility] - [phone]   Walk-in counseling [facility] - [phone]   Crisis Residence [facility] Burk[facility] - [phone]   Urgent Care Adult Mental Health:   --Drop-in, 24/7 crisis line, and [place] Mobile Team [[phone]]    CRISIS TEXT LINE: Text [phone] from anywhere, anytime, any crisis 24/7;    OR SEE www.crisistextline.org     Poison Control Center - [phone]    CHILD: [facility] needs assessment team - [phone]     [facility] Lifeline - [phone]; or "[employer] Lifeline - [phone]    If you have a medical emergency please call 911or go to the nearest ER.                    _____________________________________________    Again thank you for choosing PSYCHIATRY CLINIC and please let us know how we can best partner with you to improve you and your family's health.  You may be receiving a survey in the mail regarding this appointment. We would love to have your feedback, both positive and negative, so please fill out the survey and return it using the provided envelope. The survey is done by an external company, so your answers are anonymous.             Follow-ups after your visit        Your next 10 appointments already scheduled     Aug 20, 2018  1:00 PM CDT   (Arrive by 12:45 PM)   Return Visit with Damon Gr, PhD [facility] ([facility])    [address]  3rd [facility] [address] [phone]            Sep 10, 2018  1:40 PM CDT   Adult Med Follow UP with Maulik Kohler MD   Psychiatry Clinic ([facility])    [address]  [address] [address] [phone]            Nov 15, 2018  1:30 PM CST   RETURN GENERAL with Michael Lopez MD   Eye Clinic (P  [facility])    [address]  9th [address] Clin 9a  [facility] [address]   [phone]            Nov 26, 2018 11:30 AM CST   (Arrive by 11:15 AM)   Return Visit with Marcos Magdaleno MD   [facility] Weight Management ([facility])    [address]  4th Floor  [facility] [address]   [phone]            May 09, 2019  1:30 PM CDT   Return Sleep Patient with Brianda Reddy MD   [facility] [place], Sleep Study ([facility])    [address] [place] [id number] [phone]              Who to contact     Please call your clinic at [phone] to:    Ask questions about your health    Make or cancel appointments    Discuss your medicines    Learn about your test results    Speak to your doctor            Additional Information About Your Visit        Care EveryWhere ID     This is your Care EveryWhere ID. This could be used by other organizations to access your [place] medical records  [id number]        Your Vitals Were     Pulse BMI (Body Mass Index)                72 41.13 kg/m2           Blood Pressure from Last 3 Encounters:   07/30/18 148/82   07/27/18 188/82   07/24/18 169/79    Weight from Last 3 Encounters:   07/30/18 108.7 kg (239 lb 9.6 oz)   07/27/18 107 kg (236 lb)   07/24/18 108.8 kg (239 lb 12.8 oz)              Today, you had the following     No orders found for display         Today's Medication Changes          These changes are accurate as of 7/30/18  3:21 PM.  If you have any questions, ask your nurse or doctor.               These medicines have changed or have updated prescriptions.        Dose/Directions    FLUoxetine 40 MG capsule   Commonly known as:  PROzac   This may have changed:  These instructions start on 8/20/2018. If you are unsure what to do until then, ask your doctor or other care provider.   Used for:  Schizoaffective disorder,  depressive type (H)   Changed by:  Maulik Kohler MD        Dose:  40 mg   Start taking on:  8/20/2018   Take 1 capsule (40 mg) by mouth daily   Quantity:  30 capsule   Refills:  2       LORazepam 1 MG tablet   Commonly known as:  ATIVAN   This may have changed:  These instructions start on 8/20/2018. If you are unsure what to do until then, ask your doctor or other care provider.   Used for:  Generalized anxiety disorder   Changed by:  Maulik Kohler MD        Dose:  1 mg   Start taking on:  8/20/2018   Take 1 tablet (1 mg) by mouth At Bedtime .  May take additional 1mg daily PRN for agitation.   Quantity:  35 tablet   Refills:  1       metFORMIN 500 MG 24 hr tablet   Commonly known as:  GLUCOPHAGE-XR   This may have changed:    - how much to take  - when to take this   Used for:  Diabetes mellitus, type 2 (H)        Dose:  1000 mg   Take 2 tablets (1,000 mg) by mouth 2 times daily (with meals)   Quantity:  90 tablet   Refills:  3       ziprasidone 40 MG capsule   Commonly known as:  GEODON   This may have changed:  These instructions start on 8/20/2018. If you are unsure what to do until then, ask your doctor or other care provider.   Used for:  Schizoaffective disorder, depressive type (H)   Changed by:  Maulik Kohler MD        Dose:  40 mg   Start taking on:  8/20/2018   Take 1 capsule (40 mg) by mouth 2 times daily (with meals)   Quantity:  60 capsule   Refills:  2            Where to get your medicines      These medications were sent to [facility] - [place], [place] - [address], [facility] [address]     Phone:  [phone]     FLUoxetine 40 MG capsule    ziprasidone 40 MG capsule         Some of these will need a paper prescription and others can be bought over the counter.  Ask your nurse if you have questions.     Bring a paper prescription for each of these medications     LORazepam 1 MG tablet                Primary Care Provider Office Phone # Fax #     Cary Brownlee [phone]       [address] [address]        Equal Access to Services     JAYDEN STUART : Tatiana james elizalde aidan Dietrich, gifty matfrancha, jaymie kaaurelioda daxa, autumn rutledge hernandezrupinder lee paulette josé. So [facility] [phone].    ATENCIÓN: Si habla español, tiene a deluca disposición servicios gratuitos de asistencia lingüística. Candidoame al [phone].    We comply with applicable federal civil rights laws and [place] laws. We do not discriminate on the basis of race, color, national origin, age, disability, sex, sexual orientation, or gender identity.            Thank you!     Thank you for choosing [facility]ATRY [facility]INIC  for your care. Our goal is always to provide you with excellent care. Hearing back from our patients is one way we can continue to improve our services. Please take a few minutes to complete the written survey that you may receive in the mail after your visit with us. Thank you!             Your Updated Medication List - Protect others around you: Learn how to safely use, store and throw away your medicines at www.disposemymeds.org.          This list is accurate as of 7/30/18  3:21 PM.  Always use your most recent med list.                   Brand Name Dispense Instructions for use Diagnosis    acetaminophen 500 MG tablet    TYLENOL    1 Bottle    Take 2 tablets (1,000 mg) by mouth every 6 hours as needed    Sprain of left ankle, unspecified ligament, initial encounter       alum & mag hydroxide-simethicone 200-200-20 MG/5ML Susp suspension    MYLANTA/MAALOX     Take 30 mLs by mouth daily as needed for indigestion        amLODIPine 5 MG tablet    NORVASC    30 tablet    Take 2 tablets (10 mg) by mouth daily    Essential hypertension with goal blood pressure less than 130/85       artificial saliva Aers spray      Take 1 spray by mouth 3 times daily as needed for dry mouth        artificial tears Oint ophthalmic ointment     1 Tube     Apply  to eye. Place 0.5 inches into both eyes at bedtime    Insufficiency of tear film of both eyes       ARTIFICIAL TEARS OP      Apply 1 drop to eye 4 times daily.        * aspirin 81 MG tablet     100    Take 1 tablet by mouth daily. ENTERIC COATED.        * SM ASPIRIN ADULT LOW STRENGTH 81 MG EC tablet   Generic drug:  aspirin           atorvastatin 40 MG tablet    LIPITOR    90 tablet    Take 1 tablet (40 mg) by mouth daily    Hyperlipidemia LDL goal <100       BD AUTOSHIELD DUO 30G X 5 MM   Generic drug:  insulin pen needle           blood glucose calibration solution    no brand specified    1 each    Use to calibrate blood glucose monitor as directed.    Type 2 diabetes mellitus with microalbuminuria, with long-term current use of insulin (H)       blood glucose lancets standard    no brand specified    100 each    Use to test blood sugar 2 times daily or as directed.    Type 2 diabetes mellitus with microalbuminuria, with long-term current use of insulin (H)       blood glucose monitoring meter device kit    no brand specified    1 kit    Check Blood sugars twice a day.    Type 2 diabetes mellitus with microalbuminuria, with long-term current use of insulin (H)       blood glucose monitoring test strip    no brand specified    100 each    Use to test blood sugar 3 times daily or as directed.    Type 2 diabetes mellitus with microalbuminuria, with long-term current use of insulin (H)       calcium polycarbophil 625 MG tablet    FIBERCON     Take 2 tablets by mouth daily        calcium-vitamin D 250-125 MG-UNIT Tabs      Take 1 tablet by mouth 2 times daily. Calcium 250 mg/Vit D 125 IU        Calcium-Vitamin D3 250-125 MG-UNIT Tabs           CLARITIN 10 MG tablet   Generic drug:  loratadine     30    1 TAB PO QD (Once per day) as needed for ALLERGY SYMPTOMS        clotrimazole 1 % cream    LOTRIMIN    50 g    Apply topically 2 times daily as needed    Dermatophytosis       eucerin cream      Apply  topically  as needed. Apply to thigh PRN dry skin        exenatide 10 MCG/0.04ML injection    BYETTA    1 Syringe    Inject 10 mcg Subcutaneous 2 times daily (before meals)    Type 2 diabetes mellitus with microalbuminuria, with long-term current use of insulin (H)       FLUoxetine 40 MG capsule   Start taking on:  8/20/2018    PROzac    30 capsule    Take 1 capsule (40 mg) by mouth daily    Schizoaffective disorder, depressive type (H)       fluticasone 50 MCG/ACT spray    FLONASE    16 g    Spray 1 spray into both nostrils daily    Seasonal allergic rhinitis       furosemide 20 MG tablet    LASIX    60 tablet    Take 1 tablet (20 mg) by mouth 2 times daily    Lower leg edema       GEL-SASHA DENTINBLOC DT      Apply  to affected area. GEL. Apply to 2nd molar on bottom right daily at bedtime.        glucagon 1 MG kit    GLUCAGON EMERGENCY    2 mg    Inject 1 mg into the muscle once for 1 dose    Type 2 diabetes mellitus with microalbuminuria, with long-term current use of insulin (H)       insulin glargine 100 UNIT/ML injection    LANTUS    8 mL    Inject 20 Units Subcutaneous At Bedtime    Type 2 diabetes mellitus with microalbuminuria, with long-term current use of insulin (H)       LACTAID 3000 UNIT tablet   Generic drug:  lactase      Take 4 tabs daily with meals.        levothyroxine 175 MCG tablet    SYNTHROID/LEVOTHROID    30 tablet    Take 1 tablet (175 mcg) by mouth daily Give on empty stomach    Other specified hypothyroidism       LORazepam 1 MG tablet   Start taking on:  8/20/2018    ATIVAN    35 tablet    Take 1 tablet (1 mg) by mouth At Bedtime .  May take additional 1mg daily PRN for agitation.    Generalized anxiety disorder       losartan 100 MG tablet    COZAAR    90 tablet    Take 1 tablet by mouth daily.    Hypertension goal BP (blood pressure) < 130/80, Diabetes mellitus type 2, insulin dependent (H), CKD (chronic kidney disease) stage 2, GFR 60-89 ml/min       melatonin 3 MG tablet     60 tablet    Take 1  tablet (3 mg) by mouth nightly as needed for sleep    Other insomnia       metFORMIN 500 MG 24 hr tablet    GLUCOPHAGE-XR    90 tablet    Take 2 tablets (1,000 mg) by mouth 2 times daily (with meals)    Diabetes mellitus, type 2 (H)       MILK OF MAGNESIA PO      Take  by mouth. Take 30 mL as needed for constipation.        NEURONTIN PO      Take 900 mg by mouth 3 times daily.        nystatin 628316 UNIT/GM Powd    MYCOSTATIN    60 g    Apply topically 3 times daily as needed    Candidiasis of skin       polyethylene glycol powder    MIRALAX/GLYCOLAX     Take 1 capful by mouth 2 times daily. 17 GM PO BID        saline 0.9 % Soln      Spray 2 sprays in nostril as needed.        SENNA S 8.6-50 MG per tablet   Generic drug:  senna-docusate      Take 2 tablets by mouth At Bedtime.        SF 5000 PLUS 1.1 % Crea   Generic drug:  Sodium Fluoride           SM LUBRICANT EYE DROPS 0.4-0.3 % Soln ophthalmic solution   Generic drug:  polyethylene glycol 0.4%- propylene glycol 0.3%           solifenacin 10 MG tablet    VESICARE    30 tablet    Take 1 tablet (10 mg) by mouth daily    Urge incontinence       ziprasidone 40 MG capsule   Start taking on:  8/20/2018    GEODON    60 capsule    Take 1 capsule (40 mg) by mouth 2 times daily (with meals)    Schizoaffective disorder, depressive type (H)       * Notice:  This list has 2 medication(s) that are the same as other medications prescribed for you. Read the directions carefully, and ask your doctor or other care provider to review them with you.

## 2018-07-30 NOTE — NURSING NOTE
Chief Complaint   Patient presents with     RECHECK     Schizoaffective disorder, depressive type

## 2018-07-30 NOTE — PATIENT INSTRUCTIONS
Thank you for coming to the PSYCHIATRY CLINIC.    Lab Testing:  If you had lab testing today and your results are reassuring or normal they will be mailed to you or sent through [facility] within 7 days.   If the lab tests need quick action we will call you with the results.  The phone number we will call with results is # [phone] (home) [phone] (work). If this is not the best number please call our clinic and change the number.    Medication Refills:  If you need any refills please call your pharmacy and they will contact us. Our fax number for refills is [phone]. Please allow three business for refill processing.   If you need to  your refill at a new pharmacy, please contact the new pharmacy directly. The new pharmacy will help you get your medications transferred.     Scheduling:  If you have any concerns about today's visit or wish to schedule another appointment please call our office during normal business hours [phone] (8-5:00 M-F)    Contact Us:  Please call [phone] during business hours (8-5:00 M-F).  If after clinic hours, or on the weekend, please call  [phone].    Financial Assistance [phone]  [place] Billing [phone]  [place] Billing [phone]  Medical Records [phone]      MENTAL HEALTH CRISIS NUMBERS:  [facility]:   [facility] - [phone]   Crisis Residence [facility] - [phone]   Walk-In Counseling [facility] [phone]   COPE 24/7 [place] Mobile Team for Adults - [[phone]]; Child - [[phone]]     Crisis Connection - [phone]     [facility] - [phone]   Walk-in counseling [facility] - [phone]   Walk-in counseling [facility] - [phone]   Crisis Residence [facility] - [phone]   Urgent Care Adult Mental Health:   --Drop-in, 24/7 crisis line, and [place] Mobile Team  [[phone]]    CRISIS TEXT LINE: Text [phone] from anywhere, anytime, any crisis 24/7;    OR SEE www.crisistextline.org     Poison Control Center - [phone]    CHILD: [facility] needs assessment team - [phone]     [facility] - [phone]; or [facility] - [phone]    If you have a medical emergency please call 911or go to the nearest ER.                    _____________________________________________    Again thank you for choosing PSYCHIATRY CLINIC and please let us know how we can best partner with you to improve you and your family's health.  You may be receiving a survey in the mail regarding this appointment. We would love to have your feedback, both positive and negative, so please fill out the survey and return it using the provided envelope. The survey is done by an external company, so your answers are anonymous.

## 2018-07-30 NOTE — PROGRESS NOTES
[facility] PSYCHIATRY CLINIC TRANSFER DIAGNOSTIC ASSESSMENT       CARE TEAM:  PCP- Cary Brownlee    Specialty Providers- no    Therapist- Dr. Gr    [facility] Team- no    Pinky Page is a 68 year old female who prefers the name Pinky & pronouns she, her, hers. Date of initial diagnostic assessment is 5/13/13.  Date of most recent transfer of care assessment is 07/30/18.     Pertinent Background:  This patient first experienced mental health issues in her twenties and has received treatment for schizoaffective disorder and generalized anxiety.  History details in last diagnostic assessment.  Notably, Pinky's symptoms of depression and psychosis have responded well to a combination of ECT and medication management. Pinky has a history of experiencing increasingly frequent psychotic symptoms w/ previously attempted antipsychotic tapers.        Psych critical item history includes psychosis [sxs include disorganization, hallucinations, paranoia], mutiple psychotropic trials, psych hosp (3-5) and ECT.    INTERIM HISTORY                                                                                              4, 4   The patient reports good treatment adherence.  History was provided by the patient who was a good historian.  The last visit ended with no change to the med regimen.   Since the last visit:     - Pinky continues to live at [facility]. She appreciates the support she receives there from the various support staff.  - She reports no recurrence of psychosis symptoms, including hallucinations and paranoia.  - She reports no change in baseline depression symptoms - she does report experiencing fluctuations in mood, including intermittent low mood, but has developed coping strategies for dealing with this, including going to support staff at [place]  - She denies any suicidal thoughts, thoughts of self-harm, or plan or intent to harm herself  - Has been working to lose weight, making changes to diet and  exercising at the fitness center, both walking and getting on exercise bike  - Meets with Dr. Magdaleno at the weight loss clinic every 4-5 months to evaluate progress  - Frequently is able to go visit with sister who lives in [place], recently travelled with this sister and niece to go visit a second sister  - She fell last week and hurt her side - went to her PCP for urgent appointment, was evaluated and determined not to have broken anything  - She continues to be sore, but is using Tylenol PRN and pain has been improving with time  - She does have difficulty falling asleep sometimes, despite use of CPAP-  when this happens will try to read or use coloring book to help fall asleep  - She has been decreasing amount of coffee and diet Pepsi she has been drinking in attempt to improve sleep problems - she will not have any caffeine after 3 PM  - When she worries, she typically worries about her health issues (diabetes, high blood pressure)  - Uses lorazepam PRN very infrequently    RECENT SYMPTOMS:   DEPRESSION:  reports-depressed mood, anhedonia, low energy, insomnia and appetite changes;  DENIES- suicidal ideation and self-destructive thoughts  MELVIN/HYPOMANIA:  reports-none;  DENIES- increased energy, decreased sleep need, increased activity and grandiosity  PSYCHOSIS:  reports-none;  DENIES- delusions, auditory hallucinations and visual hallucinations  DYSREGULATION:  reports-none;  DENIES- suicidal ideation, violent ideation and SIB  PANIC ATTACK:  none   ANXIETY:  excessive worry  TRAUMA RELATED:  none  COMPULSIVE:  none   SLEEP: sometimes has difficulty falling asleep  EATING DISORDER: no     RECENT SUBSTANCE USE:     ALCOHOL- no      TOBACCO- no     CAFFEINE- coffee/ tea [coffee, diet Pepsi]  OPIOIDS- no         NARCAN KIT- no        CANNABIS- no            OTHER ILLICIT DRUGS- none      CURRENT SOCIAL HISTORY:  FINANCIAL SUPPORT- social security disability       CHILDREN- no      LIVING SITUATION-  Lives at [facility].      SOCIAL/ SPIRITUAL SUPPORT- sisters, support staff at [facility], other residents at [place], other providers     FEELS SAFE AT HOME- yes     MEDICAL ROS (2,10):  Reports none, none, none, difficulty getting to bed somtimes    Denies headaches, sexual function problems [none], short term memory and/or word finding difficulty, wt gain, tremor, akathisia, spasms, bruxism, easy bruising , GI sxs [N/V], akathisia, muscle problems [stiffness], unusual movements, wt gain, sexual function problems [none], polydipsia, polyphagia and Parkinsonian-type symptoms [shuffling gait, masked facies, stooped posture, speech change, writing change], rash, hair loss , menstrual abnormality, skin/eye discoloration and bleeding or freq bruising    SUBSTANCE USE HISTORY                                                                             Past Use- none reported  Treatment- #, most recent- no  Medical Consequences- no  HIV/Hepatitis- no  Legal Consequences- no    PSYCHIATRIC HISTORY     SIB- no  Suicidal Ideation Hx- no  Suicide Attempt- #- no, most recent- N/A      Violence/Aggression Hx- no  Psychosis Hx- Chart review indicates distant history of auditory hallucinations and paranoia.  Psych Hosp- #- She was hospitalized in [address] in 1986 where she received ECT(probably for depression, patient doesn't know for sure). She was hospitalizaed again in 1987, again received ECT. Was hospitalized for depression in 1995, around the same time she was diagnosed with breast cancer. She was hospitalized for about nine months this time. She received ECT maintence here from [id number]., most recent- N/A   ECT- Yes, as described above.    Eating Disorder: She reports stopping eating in 1985-86; likely secondary to depression. She does not have history of anorexia diagnosis.    Outpatient Programs [ DBT, Day Treatment, Eating Disorder etc] : Day treatment in 1987.     SOCIAL and FAMILY HISTORY                            patient reported                          1ea, 1ea   Financial Support- documented above  Living Situation/Family/Relationships- documented above;  Children- documented above                                 Trauma History (self-report)- no  Legal- no  Cultural/ Social/ Spiritual Support- Support staff at [facility]. She also has four sisters and is closest with one who lives in [place].  Early History/Education-  Grew up in [place], with 7 siblings. Parents remained  and she reports a positive childhood. 4 sisters still living with many nieces and nephews. 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    FAMILY MENTAL HEALTH HX- none known    PAST PSYCH MED TRIALS   see EMR Problem List: Hx of psychiatric care    MEDICAL / SURGICAL HISTORY                                   Pregnant or breastfeeding- no      Contraception- None    Neurologic Hx- no   Patient Active Problem List   Diagnosis     Allergic rhinitis due to pollen     Urge incontinence     Hypertension, essential     Cardiomegaly     Chronic constipation     Dry eye syndrome     Esophageal reflux     Exposure keratoconjunctivitis     DM ophthalmopathy (H)     Hypothyroidism     Senile cataract     ANUJ (obstructive sleep apnea)     Postmenopausal atrophic vaginitis     Restless leg syndrome     Squamous blepharitis     Morbid obesity due to excess calories (H)     Personal history of breast cancer s/p L masectomy     Diabetes mellitus type 2, insulin dependent (H)     Hypercholesteremia     Lives in group home     Extrapyramidal and movement disorder     CKD (chronic kidney disease) stage 2, GFR 60-89 ml/min     Solitary kidney, congenital     S/P hysterectomy     Impingement syndrome of right shoulder     Hypertriglyceridemia     Candidiasis of skin     Generalized anxiety disorder     Carpal tunnel syndrome of left wrist     Schizoaffective disorder, depressive type  (H)     Health Care Home     Major depressive disorder, recurrent episode, moderate (H)     Psychological factors affecting medical condition     Hx of psychiatric care     Nail complaint     Microalbuminuria due to type 2 diabetes mellitus (H)     Type 2 diabetes mellitus with microalbuminuria, with long-term current use of insulin (H)     Diabetes mellitus, type II, insulin dependent (H)     CKD (chronic kidney disease) stage 1, GFR 90 ml/min or greater       Major Surgery- not discussed    ALLERGY                                Chlordiazepoxide hcl; Dimetapp dm cold-cough; Haldol; Ibuprofen; Lactose intolerance [beta-galactosidase]; Milk products; and Propofol  MEDICATIONS                               Current Outpatient Prescriptions   Medication Sig Dispense Refill     acetaminophen (TYLENOL) 500 MG tablet Take 2 tablets (1,000 mg) by mouth every 6 hours as needed 1 Bottle 2     alum & mag hydroxide-simethicone (MYLANTA/MAALOX) 200-200-20 MG/5ML SUSP suspension Take 30 mLs by mouth daily as needed for indigestion       amLODIPine (NORVASC) 5 MG tablet Take 2 tablets (10 mg) by mouth daily 30 tablet 3     artificial saliva (BIOTENE MT) AERS spray Take 1 spray by mouth 3 times daily as needed for dry mouth       artificial tears (AKWA TEARS) OINT ophthalmic ointment Apply  to eye. Place 0.5 inches into both eyes at bedtime 1 Tube 11     ASPIRIN 81 MG OR TABS Take 1 tablet by mouth daily. ENTERIC COATED. 100 3     atorvastatin (LIPITOR) 40 MG tablet Take 1 tablet (40 mg) by mouth daily 90 tablet 1     BD AUTOSHIELD DUO 30G X 5 MM        blood glucose (NO BRAND SPECIFIED) lancets standard Use to test blood sugar 2 times daily or as directed. 100 each 11     blood glucose calibration (NO BRAND SPECIFIED) solution Use to calibrate blood glucose monitor as directed. 1 each 3     blood glucose monitoring (NO BRAND SPECIFIED) meter device kit Check Blood sugars twice a day. 1 kit 0     blood glucose monitoring (NO BRAND  SPECIFIED) test strip Use to test blood sugar 3 times daily or as directed. 100 each 3     Calcium Carb-Cholecalciferol (CALCIUM-VITAMIN D3) 250-125 MG-UNIT TABS        Calcium Carbonate-Vitamin D (CALCIUM-VITAMIN D) 250-125 MG-UNIT TABS Take 1 tablet by mouth 2 times daily. Calcium 250 mg/Vit D 125 IU       calcium polycarbophil (FIBERCON) 625 MG tablet Take 2 tablets by mouth daily       CLARITIN 10 MG OR TABS 1 TAB PO QD (Once per day) as needed for ALLERGY SYMPTOMS 30 11     clotrimazole (LOTRIMIN) 1 % cream Apply topically 2 times daily as needed 50 g 0     exenatide (BYETTA) 10 MCG/0.04ML injection Inject 10 mcg Subcutaneous 2 times daily (before meals) 1 Syringe 11     FLUoxetine (PROZAC) 40 MG capsule Take 1 capsule (40 mg) by mouth daily 30 capsule 2     fluticasone (FLONASE) 50 MCG/ACT nasal spray Spray 1 spray into both nostrils daily 16 g 3     furosemide (LASIX) 20 MG tablet Take 1 tablet (20 mg) by mouth 2 times daily 60 tablet 3     Gabapentin (NEURONTIN PO) Take 900 mg by mouth 3 times daily.       Hypromellose (ARTIFICIAL TEARS OP) Apply 1 drop to eye 4 times daily.       insulin glargine (LANTUS) 100 UNIT/ML injection Inject 20 Units Subcutaneous At Bedtime 8 mL 11     lactase (LACTAID) 3000 UNIT tablet Take 4 tabs daily with meals.       levothyroxine (SYNTHROID, LEVOTHROID) 175 MCG tablet Take 1 tablet (175 mcg) by mouth daily Give on empty stomach 30 tablet 4     LORazepam (ATIVAN) 1 MG tablet Take 1 tablet (1 mg) by mouth At Bedtime .  May take additional 1mg daily PRN for agitation. 35 tablet 1     losartan (COZAAR) 100 MG tablet Take 1 tablet by mouth daily. 90 tablet 1     Magnesium Hydroxide (MILK OF MAGNESIA PO) Take  by mouth. Take 30 mL as needed for constipation.       melatonin 3 MG tablet Take 1 tablet (3 mg) by mouth nightly as needed for sleep 60 tablet 1     metFORMIN (GLUCOPHAGE-XR) 500 MG 24 hr tablet Take 2 tablets (1,000 mg) by mouth 2 times daily (with meals) (Patient taking  differently: Take 2,000 mg by mouth daily ) 90 tablet 3     nystatin (MYCOSTATIN) 186824 UNIT/GM POWD Apply topically 3 times daily as needed 60 g 1     polyethylene glycol (MIRALAX/GLYCOLAX) powder Take 1 capful by mouth 2 times daily. 17 GM PO BID       Saline 0.9 % SOLN Spray 2 sprays in nostril as needed.       senna-docusate (SENNA S) 8.6-50 MG per tablet Take 2 tablets by mouth At Bedtime.       SF 5000 PLUS 1.1 % CREA        Skin Protectants, Misc. (EUCERIN) cream Apply  topically as needed. Apply to thigh PRN dry skin        SM ASPIRIN ADULT LOW STRENGTH 81 MG EC tablet        SM LUBRICANT EYE DROPS 0.4-0.3 % SOLN ophthalmic solution        Sod Fluor-Solo Fluor-Hydrfl Ac (GEL-SASHA DENTINBLOC DT) Apply  to affected area. GEL. Apply to 2nd molar on bottom right daily at bedtime.       solifenacin (VESICARE) 10 MG tablet Take 1 tablet (10 mg) by mouth daily 30 tablet 6     ziprasidone (GEODON) 40 MG capsule Take 1 capsule (40 mg) by mouth 2 times daily (with meals) 60 capsule 2     glucagon (GLUCAGON EMERGENCY) 1 MG kit Inject 1 mg into the muscle once for 1 dose 2 mg 3     VITALS                                                                                                                                  3, 3   /82  Pulse 72  Wt 108.7 kg (239 lb 9.6 oz)  BMI 41.13 kg/m2   MENTAL STATUS EXAM                                                                         9, 14 cog gs     Alertness: alert  and oriented  Appearance: well groomed - casual clothing, white hair  Behavior/Demeanor: cooperative, pleasant and calm, with good  eye contact   Speech: normal and regular rate and rhythm  Language: intact and no problems  Psychomotor: tremor noted in right hand, infrequent movements noted in tongue  Mood: description consistent with euthymia  Affect: full range; was congruent to mood; was congruent to content  Thought Process/Associations: unremarkable  Thought Content:  Reports none;  Denies suicidal  ideation, violent ideation and delusions  Perception:  Reports none;  Denies auditory hallucinations and visual hallucinations  Insight: good  Judgment: good  Cognition: (6) does  appear grossly intact; formal cognitive testing was not done  Gait and Station: unremarkable    LABS and DATA     AIMS:  Last done 7/6/17 with a score of 0    PHQ9 TODAY = 7  PHQ-9 SCORE 4/9/2018 5/30/2018 7/27/2018   Total Score - - -   Total Score 8 8 7     ANTIPSYCHOTIC LABS  [glu, A1C, lipids (danika LDL), liver enzymes, WBC, ANEU, Hgb, plts]  q12 mo  Recent Labs   Lab Test  05/03/18   1414  12/14/17   1000   06/21/17   1047   GLC  192.6*   --    --   134*   A1C  6.7*  6.4*   < >   --     < > = values in this interval not displayed.     Recent Labs   Lab Test  05/03/18   1414  03/21/17   1023   CHOL  126  142   TRIG  148  121   LDL  60  78   HDL  37*  40*     Recent Labs   Lab Test  06/26/18   0659  03/21/17   1023   AST  16  13   ALT  21  20   ALKPHOS  111  92     Recent Labs   Lab Test  06/26/18   0659  06/21/17   1047  03/21/17   1023   WBC  9.72   --   11.8*   ANEU  5.31   --   8.3   HGB  12.6  13.8  13.4   PLT  247   --   266       DIAGNOSIS     Schizoaffective Disorder, depressed type  Generalized Anxiety Disorder  R/o Tardive Dyskinesia    ASSESSMENT                                                                                                          m2, h3     TODAY:  Pinky reports continued stability with her depressive and psychosis symptoms. She denies recurrence of psychosis. Per both Pinky herself and report from [facility], mood appears to be euthymic w/ appropriate fluctuations in mood and anxiety within context of stressors. She is able to identify appropriate coping mechanisms to utilize in these situations. There are no safety concerns - she continues to deny any thoughts and/or plans for suicide or self-harm. She continues to experience some difficulties falling asleep, even with bedtime dose of lorazepam and  CPAP mask, but has identified appropriate remedies for improvement, including nighttime sleep hygiene strategies and reducing amount of caffeine consumption during the day. She requires use of the PRN lorazepam dose very infrequently, only using it in situations of heightened anxiety or difficulty sleeping. I did not discuss this with patient today, but future goals may include reducing does of lorazepam PRN dose or even discontinuing altogether considering risks associated w/ benzodiazepine use in the geriatric population (she did report today experiencing a fall a few weeks ago). Other considerations might include reducing dosage of Geodon, considering patient's long stretch of stability and her concern for metabolic side effects and desire to lose weight. She will return to clinic in 6-8 weeks for reevaluation.    Patient's blood pressure was slightly elevated today (140s), but review of chart and patient report indicates it has been significantly higher in the past (180s). She is working with new PCP to address this. She denies any symptoms of concern including chest pain, shortness of breath, palpitations, headache, or vision changes.    [place] PRESCRIPTION MONITORING PROGRAM [] was checked today:  lorazepam last dispensed 7/9.    PSYCHOTROPIC DRUG INTERACTIONS:   Ziprasidone and fluoxetine may result in increased risk for QTc prolongation and serotonin syndrome.  Aspirin and fluoxetine may result in increased risk for bleeding.  MANAGEMENT:  Monitoring for adverse effects, periodic EKGs and patient is aware of risks     PLAN                                                                                                                       m2, h3     1) PSYCHOTROPIC MEDICATIONS:  - Continue ziprasidone 40 mg BID  - Continue fluoxetine 40 mg daily  - Continue lorazepam 1 mg at bedtime w/ additional 1 mg daily PRN    2) THERAPY:    Yes, continue w/ Dr. Gr    3) NEXT DUE:    Labs- AP labs due May 2019  EKG-  Last EKG 8/17/16 - QTc 432. Will updated as needed.  Rating Scales- AIMS needs updating at next visit.    4) REFERRALS:    No Referrals needed     5) RTC: 6-8 weeks    6) CRISIS NUMBERS:   Provided routinely in AVS.   [facility] [phone] (clinic)    [phone] (after hours)  ONLY if a [facility] PT: [facility] [phone] (clinic), [phone] (after hours)     TREATMENT RISK STATEMENT:  The risks, benefits, alternatives and potential adverse effects have been discussed and are understood by the pt. The pt understands the risks of using street drugs or alcohol. There are no medical contraindications, the pt agrees to treatment with the ability to do so. The pt knows to call the clinic for any problems or to access emergency care if needed.  Medical and substance use concerns are documented above.  Psychotropic drug interaction check was done, including changes made today.    PROVIDER: Maulik Kohler MD    Patient staffed in clinic with Dr. Rubio who will sign the note.  Supervisor is Dr. Estrada.    I saw the patient with the resident, and participated in key portions of the service, including the mental status examination and developing the plan of care. I reviewed key portions of the history with the resident. I agree with the findings and plan as documented in this note.    Roxy Rubio

## 2018-07-31 ENCOUNTER — DOCUMENTATION ONLY (OUTPATIENT)
Dept: FAMILY MEDICINE | Facility: CLINIC | Age: 68
End: 2018-07-31

## 2018-07-31 NOTE — PROGRESS NOTES
Form has been completed by provider.     Form sent out via: Fax to Enrico at Fax Number: [phone]  Patient informed: no  Output date: July 31, 2018    Pooja Jones      **Please close the encounter**

## 2018-08-01 ASSESSMENT — PATIENT HEALTH QUESTIONNAIRE - PHQ9: SUM OF ALL RESPONSES TO PHQ QUESTIONS 1-9: 7

## 2018-08-02 DIAGNOSIS — F41.1 GENERALIZED ANXIETY DISORDER: ICD-10-CM

## 2018-08-02 RX ORDER — LORAZEPAM 1 MG/1
1 TABLET ORAL AT BEDTIME
Qty: 35 TABLET | Refills: 1 | Status: CANCELLED | OUTPATIENT
Start: 2018-08-20

## 2018-08-02 NOTE — TELEPHONE ENCOUNTER

## 2018-08-07 ENCOUNTER — TELEPHONE (OUTPATIENT)
Dept: PSYCHIATRY | Facility: CLINIC | Age: 68
End: 2018-08-07

## 2018-08-07 NOTE — TELEPHONE ENCOUNTER
- Received a fax from [facility] requesting a refill for Lorazepam 1 mg tab   - Post reviewing chart, local scrip printed on 7/30/18 to with start date of 8/20/18  - Will route to provider

## 2018-08-08 DIAGNOSIS — F41.1 GENERALIZED ANXIETY DISORDER: ICD-10-CM

## 2018-08-08 RX ORDER — LORAZEPAM 1 MG/1
1 TABLET ORAL AT BEDTIME
Qty: 35 TABLET | Refills: 0 | Status: SHIPPED | OUTPATIENT
Start: 2018-08-17 | End: 2018-09-10

## 2018-08-08 NOTE — TELEPHONE ENCOUNTER
Refill Request (Lorazepam )            Maulik Kohler MD   You 18 minutes ago (1:44 PM)                 I printed out a new script and faxed it to the pharmacy. You shouldn't have to do anything else.     David (Routing comment)

## 2018-08-08 NOTE — TELEPHONE ENCOUNTER
- Pharmacy called to confirm if received a faxed script   - Per pharmacist, no refills on file   - Will route to provider for further recommendation

## 2018-08-08 NOTE — TELEPHONE ENCOUNTER
Refill Request (Lorazepam )            Maulik Kohler MD   You 39 minutes ago (8:14 AM)                 I don't remember exactly off the top of my head - what I have been doing more frequently though is faxing the prescriptions.     David (Routing comment)

## 2018-08-10 NOTE — TELEPHONE ENCOUNTER
Called [facility] and left a message about Lorazapam refill.  Just faxed over a script on 7/24/18 and patient was not due for a refill. Asked for a call back to discuss this issue and have not received one. Noticed refill request from psychiatry and they faxed in a script on 8/8/18.    Cary Brownlee MD  [facility] Family Medicine PGY - 2

## 2018-08-13 ASSESSMENT — ENCOUNTER SYMPTOMS
ACTIVITY CHANGE: 0
ABDOMINAL DISTENTION: 0
WOUND: 0
APPETITE CHANGE: 0
CARDIOVASCULAR NEGATIVE: 1
BACK PAIN: 1
VOMITING: 0
DIARRHEA: 0
COLOR CHANGE: 1
HEADACHES: 0
NAUSEA: 0
FEVER: 0
ABDOMINAL PAIN: 1
RESPIRATORY NEGATIVE: 1
BLOOD IN STOOL: 0
DIZZINESS: 0
CONSTIPATION: 1

## 2018-08-15 ENCOUNTER — OFFICE VISIT (OUTPATIENT)
Dept: FAMILY MEDICINE | Facility: CLINIC | Age: 68
End: 2018-08-15
Payer: COMMERCIAL

## 2018-08-15 VITALS
TEMPERATURE: 98.5 F | DIASTOLIC BLOOD PRESSURE: 80 MMHG | HEART RATE: 68 BPM | BODY MASS INDEX: 41.47 KG/M2 | RESPIRATION RATE: 16 BRPM | WEIGHT: 241.6 LBS | OXYGEN SATURATION: 96 % | SYSTOLIC BLOOD PRESSURE: 146 MMHG

## 2018-08-15 DIAGNOSIS — Z79.4 DIABETES MELLITUS TYPE 2, INSULIN DEPENDENT (H): ICD-10-CM

## 2018-08-15 DIAGNOSIS — I10 HYPERTENSION, ESSENTIAL: Primary | ICD-10-CM

## 2018-08-15 DIAGNOSIS — E66.01 MORBID OBESITY DUE TO EXCESS CALORIES (H): ICD-10-CM

## 2018-08-15 DIAGNOSIS — E11.9 DIABETES MELLITUS TYPE 2, INSULIN DEPENDENT (H): ICD-10-CM

## 2018-08-15 RX ORDER — SODIUM CHLORIDE 0.65 %
AEROSOL, SPRAY (ML) NASAL
COMMUNITY
Start: 2018-08-08 | End: 2018-11-07

## 2018-08-15 RX ORDER — LANCETS 33 GAUGE
EACH MISCELLANEOUS
COMMUNITY
Start: 2018-08-13 | End: 2023-07-13

## 2018-08-15 NOTE — PROGRESS NOTES
HPI       Pinky Page is a 68 year old  who presents for   Chief Complaint   Patient presents with     RECHECK     Blood Pressure Folow Up/ Brought in referral; has a rash under her breast; has Nystatin powder on it         Hypertension Follow-up  <140/90    Outpatient blood pressures are being checked at her residence Tuesday and Friday.  Results are 150/72, 158/70, 140/72    Chest Pain? :No     Low Salt Diet: low salt    Daily NSAID Use? No     Did patient take their HTN pills today/last night as usual?  Yes    Last Basic Metabolic Panel:  Lab Results   Component Value Date    .6 05/03/2018      Lab Results   Component Value Date    POTASSIUM 3.7 05/03/2018     Lab Results   Component Value Date    CHLORIDE 96.7 05/03/2018     Lab Results   Component Value Date    ASHLEY 9.1 05/03/2018     Lab Results   Component Value Date    CO2 28.7 05/03/2018     Lab Results   Component Value Date    BUN 17.4 05/03/2018     Lab Results   Component Value Date    CR 0.9 05/03/2018     Lab Results   Component Value Date    .6 05/03/2018       Adherence and Exercise  Medication side effects: no  How often is a medication missed? Never, given by her residence staff daily  Exercise:walking and biking 6-7 days/week for an average of 30-45 minutes     Has lost 40 lbs., but hit a plateau   +++++++  Diabetes Follow-up    Patient is checking blood sugars: twice daily.  6am and 5pm  Blood sugar testing frequency justification: On insulin, frequency appropriate  and Patient modifying lifestyle changes (diet, exercise) with blood sugars  Results are as follows:         am - 140         suppertime - 160         -Last A1C was   Lab Results   Component Value Date    A1C 6.7 05/03/2018    A1C 6.4 12/14/2017    A1C 6.3 09/12/2017    A1C 6.9 06/22/2017    A1C 6.6 03/21/2017        Diabetic concerns: None    Chest Pain or exercise related calf pain (claudication):no     Symptoms of hypoglycemia (low blood sugar): none      Paresthesias (numbness or burning in feet) or sores: No     Diabetic eye exam within the last year?: Yes, last Nov      Adherence and Exercise  Medication side effects: no  How often is a medication missed? Never  Exercise:walking and biking 6-7 days/week for an average of 30-45 minutes     Problem, Medication and Allergy Lists were reviewed and updated if needed..    Patient is an established patient of this clinic..         Review of Systems:   Review of Systems   Constitutional: Negative for chills and fever.   HENT: Negative for congestion.    Eyes: Negative for visual disturbance.   Respiratory: Negative for shortness of breath.    Cardiovascular: Negative for chest pain.   Gastrointestinal: Negative for abdominal pain.   Genitourinary: Negative for dysuria.   Skin: Positive for rash (underneath breasts that she keeps nystatin powder on).   Neurological: Negative for weakness.   Psychiatric/Behavioral: Negative for dysphoric mood.            Physical Exam:     Vitals:    08/15/18 1503 08/15/18 1506   BP: (!) 157/91 146/80   Pulse: 68    Resp: 16    Temp: 98.5  F (36.9  C)    TempSrc: Oral    SpO2: 96%    Weight: 241 lb 9.6 oz (109.6 kg)      Body mass index is 41.47 kg/(m^2).  Vital signs normal except BMI elevated and blood pressure was mildly elevated, but better on repeat 146/80     Physical Exam   Constitutional: She is oriented to person, place, and time. No distress.   Obese   HENT:   Head: Normocephalic and atraumatic.   Eyes: Conjunctivae are normal.   Cardiovascular: Normal rate, regular rhythm and normal heart sounds.  Exam reveals no gallop and no friction rub.    No murmur heard.  Pulmonary/Chest: Effort normal and breath sounds normal. No respiratory distress. She has no wheezes. She has no rales.   Musculoskeletal: She exhibits no edema.   Neurological: She is alert and oriented to person, place, and time.   Skin: Skin is warm. She is not diaphoretic.   Psychiatric: She has a normal mood and  affect. Her behavior is normal. Judgment and thought content normal.         Results:   No testing ordered today    Assessment and Plan        Pinky was seen today for recheck.    Diagnoses and all orders for this visit:    Hypertension, essential - Repeat blood pressure much improved after resting. Recommended to group home that BP should be taken after sitting quietly for 10 minutes. Do not feel the need to add another antihypertensive agent at this time. Continue to monitor BP at group home twice weekly after resting. Would like to see her back in 3 months for a recheck.    Diabetes mellitus type 2, insulin dependent (H) A1c stable at 6.7 from May 2018. No signs or symptoms of hypoglycemia. No change in insulin regimen at this time. Would like to see her back in 3 months for another A1c.    Morbid obesity due to excess calories (H) - She is trying to exercise more (walking or biking at least once a day for at least 30 minutes).  Is also trying to cut back calories. Lost 40 lbs, but has hit a plateau. Encourage her to keep up the good work.           There are no discontinued medications.    Options for treatment and follow-up care were reviewed with the patient. Pinky Page  engaged in the decision making process and verbalized understanding of the options discussed and agreed with the final plan.    Cary Brownlee MD  PGY-2

## 2018-08-15 NOTE — MR AVS SNAPSHOT
After Visit Summary   8/15/2018    Pinky Page    MRN: [id number]           Patient Information     Date Of Birth          1950        Visit Information        Provider Department      8/15/2018 3:00 PM Edis Brownlee MD [facility]        Today's Diagnoses     Hypertension, essential    -  1    Diabetes mellitus type 2, insulin dependent (H)        Morbid obesity due to excess calories (H)          Care Instructions    Here is the plan from today's visit    1. Hypertension, essential  Recheck in 3 months    2. Diabetes mellitus type 2, insulin dependent (H)  A1c in 3 months    3. Morbid obesity due to excess calories (H)  Continue with daily exercise goals and watching calories.    Please call or return to clinic if your symptoms don't go away.    Follow up plan  Please make a clinic appointment for follow up with me (EDIS BROWNLEE) in 3  months for reevaluation.    Thank you for coming to [facility] today.  Lab Testing:  **If you had lab testing today and your results are reassuring or normal they will be mailed to you or sent through [facility] within 7 days.   **If the lab tests need quick action we will call you with the results.  The phone number we will call with results is # [phone] (home) [phone] (work). If this is not the best number please call our clinic and change the number.  Medication Refills:  If you need any refills please call your pharmacy and they will contact us.   If you need to  your refill at a new pharmacy, please contact the new pharmacy directly. The new pharmacy will help you get your medications transferred faster.   Scheduling:  If you have any concerns about today's visit or wish to schedule another appointment please call our office during normal business hours [phone] (8-5:00 M-F)  If a referral was made to a [facility] and you don't get a call from central scheduling please call  [phone].  If a Mammogram was ordered for you at [facility] call [phone] to schedule or change your appointment.  If you had an XRay/CT/Ultrasound/MRI ordered the number is [phone] to schedule or change your radiology appointment.   Medical Concerns:  If you have urgent medical concerns please call [phone] at any time of the day.    Cary Brownlee MD          Follow-ups after your visit        Follow-up notes from your care team     Return in about 3 months (around 11/15/2018) for Physical Exam.      Your next 10 appointments already scheduled     Aug 20, 2018  1:00 PM CDT   (Arrive by 12:45 PM)   Return Visit with Damon Gr, PhD [facility] ([facility])    [address] [place] [address]  3rd [facility] [id number] [phone]            Sep 10, 2018  1:40 PM CDT   Adult Med Follow UP with Maulik Kohler MD   Psychiatry Clinic ([facility])    [place] [id number] [address] [phone]            Nov 15, 2018  1:30 PM CST   RETURN GENERAL with Michael Lopez MD   Eye Clinic ([facility])    [address] [facility] [address] [place] [id number] [phone]            Nov 26, 2018 11:30 AM CST   (Arrive by 11:15 AM)   Return Visit with Marcos Magdaleno MD   [facility] ([facility])    [address] [place] [address]  4th [facility] [id number] [phone]            May 09, 2019  1:30 PM CDT   Return Sleep Patient with Brianda Reddy MD   [facility] [place] Sleep Study ([facility])    [address] [place] [address] [phone]              Who to contact     Please call your clinic at [phone] to:    Ask questions about your health    Make or cancel appointments    Discuss your  medicines    Learn about your test results    Speak to your doctor            Additional Information About Your Visit        Care EveryWhere ID     This is your Care EveryWhere ID. This could be used by other organizations to access your [facility] medical records  [id number]        Your Vitals Were     Pulse Temperature Respirations Pulse Oximetry BMI (Body Mass Index)       68 98.5  F (36.9  C) (Oral) 16 96% 41.47 kg/m2        Blood Pressure from Last 3 Encounters:   08/15/18 146/80   07/27/18 188/82   07/24/18 169/79    Weight from Last 3 Encounters:   08/15/18 241 lb 9.6 oz (109.6 kg)   07/27/18 236 lb (107 kg)   07/24/18 239 lb 12.8 oz (108.8 kg)              Today, you had the following     No orders found for display         Today's Medication Changes          These changes are accurate as of 8/15/18 11:59 PM.  If you have any questions, ask your nurse or doctor.               These medicines have changed or have updated prescriptions.        Dose/Directions    metFORMIN 500 MG 24 hr tablet   Commonly known as:  GLUCOPHAGE-XR   This may have changed:    - how much to take  - when to take this   Used for:  Diabetes mellitus, type 2 (H)        Dose:  1000 mg   Take 2 tablets (1,000 mg) by mouth 2 times daily (with meals)   Quantity:  90 tablet   Refills:  3                Primary Care Provider Office Phone # Fax #    Cary Brownlee [phone]       [place] [phone]        Equal Access to Services     JAYDEN STUART AH: Tatiana Dietrich, waevangelina del valle, qaellen kaalmaautumn genao aderupinder josé. So [facility] [phone].    ATENCIÓN: Si habla español, tiene a deluca disposición servicios gratuitos de asistencia lingüística. Llame al [phone].    We comply with applicable federal civil rights laws and [place] laws. We do not discriminate on the basis of race, color, national origin, age, disability, sex, sexual orientation,  or gender identity.            Thank you!     Thank you for choosing [facility]  for your care. Our goal is always to provide you with excellent care. Hearing back from our patients is one way we can continue to improve our services. Please take a few minutes to complete the written survey that you may receive in the mail after your visit with us. Thank you!             Your Updated Medication List - Protect others around you: Learn how to safely use, store and throw away your medicines at www.disposemymeds.org.          This list is accurate as of 8/15/18 11:59 PM.  Always use your most recent med list.                   Brand Name Dispense Instructions for use Diagnosis    acetaminophen 500 MG tablet    TYLENOL    1 Bottle    Take 2 tablets (1,000 mg) by mouth every 6 hours as needed    Sprain of left ankle, unspecified ligament, initial encounter       alum & mag hydroxide-simethicone 200-200-20 MG/5ML Susp suspension    MYLANTA/MAALOX     Take 30 mLs by mouth daily as needed for indigestion        amLODIPine 5 MG tablet    NORVASC    30 tablet    Take 2 tablets (10 mg) by mouth daily    Essential hypertension with goal blood pressure less than 130/85       artificial saliva Aers spray      Take 1 spray by mouth 3 times daily as needed for dry mouth        artificial tears Oint ophthalmic ointment     1 Tube    Apply  to eye. Place 0.5 inches into both eyes at bedtime    Insufficiency of tear film of both eyes       ARTIFICIAL TEARS OP      Apply 1 drop to eye 4 times daily.        * aspirin 81 MG tablet     100    Take 1 tablet by mouth daily. ENTERIC COATED.        * SM ASPIRIN ADULT LOW STRENGTH 81 MG EC tablet   Generic drug:  aspirin           atorvastatin 40 MG tablet    LIPITOR    90 tablet    Take 1 tablet (40 mg) by mouth daily    Hyperlipidemia LDL goal <100       BD AUTOSHIELD DUO 30G X 5 MM   Generic drug:  insulin pen needle           blood glucose calibration solution    no  brand specified    1 each    Use to calibrate blood glucose monitor as directed.    Type 2 diabetes mellitus with microalbuminuria, with long-term current use of insulin (H)       blood glucose lancets standard    no brand specified    100 each    Use to test blood sugar 2 times daily or as directed.    Type 2 diabetes mellitus with microalbuminuria, with long-term current use of insulin (H)       blood glucose monitoring meter device kit    no brand specified    1 kit    Check Blood sugars twice a day.    Type 2 diabetes mellitus with microalbuminuria, with long-term current use of insulin (H)       blood glucose monitoring test strip    no brand specified    100 each    Use to test blood sugar 3 times daily or as directed.    Type 2 diabetes mellitus with microalbuminuria, with long-term current use of insulin (H)       calcium polycarbophil 625 MG tablet    FIBERCON     Take 2 tablets by mouth daily        calcium-vitamin D 250-125 MG-UNIT Tabs      Take 1 tablet by mouth 2 times daily. Calcium 250 mg/Vit D 125 IU        Calcium-Vitamin D3 250-125 MG-UNIT Tabs           CLARITIN 10 MG tablet   Generic drug:  loratadine     30    1 TAB PO QD (Once per day) as needed for ALLERGY SYMPTOMS        clotrimazole 1 % cream    LOTRIMIN    50 g    Apply topically 2 times daily as needed    Dermatophytosis       eucerin cream      Apply  topically as needed. Apply to thigh PRN dry skin        exenatide 10 MCG/0.04ML injection    BYETTA    1 Syringe    Inject 10 mcg Subcutaneous 2 times daily (before meals)    Type 2 diabetes mellitus with microalbuminuria, with long-term current use of insulin (H)       FLUoxetine 40 MG capsule    PROzac    30 capsule    Take 1 capsule (40 mg) by mouth daily    Schizoaffective disorder, depressive type (H)       fluticasone 50 MCG/ACT spray    FLONASE    16 g    Spray 1 spray into both nostrils daily    Seasonal allergic rhinitis       furosemide 20 MG tablet    LASIX    60 tablet    Take 1  tablet (20 mg) by mouth 2 times daily    Lower leg edema       GEL-SASHA DENTINBLOC DT      Apply  to affected area. GEL. Apply to 2nd molar on bottom right daily at bedtime.        glucagon 1 MG kit    GLUCAGON EMERGENCY    2 mg    Inject 1 mg into the muscle once for 1 dose    Type 2 diabetes mellitus with microalbuminuria, with long-term current use of insulin (H)       insulin glargine 100 UNIT/ML injection    LANTUS    8 mL    Inject 20 Units Subcutaneous At Bedtime    Type 2 diabetes mellitus with microalbuminuria, with long-term current use of insulin (H)       LACTAID 3000 UNIT tablet   Generic drug:  lactase      Take 4 tabs daily with meals.        levothyroxine 175 MCG tablet    SYNTHROID/LEVOTHROID    30 tablet    Take 1 tablet (175 mcg) by mouth daily Give on empty stomach    Other specified hypothyroidism       LORazepam 1 MG tablet    ATIVAN    35 tablet    Take 1 tablet (1 mg) by mouth At Bedtime .  May take additional 1mg daily PRN for agitation.    Generalized anxiety disorder       losartan 100 MG tablet    COZAAR    90 tablet    Take 1 tablet by mouth daily.    Hypertension goal BP (blood pressure) < 130/80, Diabetes mellitus type 2, insulin dependent (H), CKD (chronic kidney disease) stage 2, GFR 60-89 ml/min       melatonin 3 MG tablet     60 tablet    Take 1 tablet (3 mg) by mouth nightly as needed for sleep    Other insomnia       metFORMIN 500 MG 24 hr tablet    GLUCOPHAGE-XR    90 tablet    Take 2 tablets (1,000 mg) by mouth 2 times daily (with meals)    Diabetes mellitus, type 2 (H)       MILK OF MAGNESIA PO      Take  by mouth. Take 30 mL as needed for constipation.        NEURONTIN PO      Take 900 mg by mouth 3 times daily.        nystatin 655233 UNIT/GM Powd    MYCOSTATIN    60 g    Apply topically 3 times daily as needed    Candidiasis of skin       ONETOUCH DELICA LANCETS 33G Misc           polyethylene glycol powder    MIRALAX/GLYCOLAX     Take 1 capful by mouth 2 times daily. 17 GM  PO BID        * SALINE MIST 0.65 % nasal spray   Generic drug:  sodium chloride           * saline 0.9 % Soln      Spray 2 sprays in nostril as needed.        SENNA S 8.6-50 MG per tablet   Generic drug:  senna-docusate      Take 2 tablets by mouth At Bedtime.        SF 5000 PLUS 1.1 % Crea   Generic drug:  Sodium Fluoride           SM LUBRICANT EYE DROPS 0.4-0.3 % Soln ophthalmic solution   Generic drug:  polyethylene glycol 0.4%- propylene glycol 0.3%           solifenacin 10 MG tablet    VESICARE    30 tablet    Take 1 tablet (10 mg) by mouth daily    Urge incontinence       ziprasidone 40 MG capsule    GEODON    60 capsule    Take 1 capsule (40 mg) by mouth 2 times daily (with meals)    Schizoaffective disorder, depressive type (H)       * Notice:  This list has 4 medication(s) that are the same as other medications prescribed for you. Read the directions carefully, and ask your doctor or other care provider to review them with you.

## 2018-08-19 ASSESSMENT — ENCOUNTER SYMPTOMS
CHILLS: 0
DYSURIA: 0
WEAKNESS: 0
FEVER: 0
ABDOMINAL PAIN: 0
SHORTNESS OF BREATH: 0
DYSPHORIC MOOD: 0

## 2018-08-19 NOTE — PATIENT INSTRUCTIONS
Here is the plan from today's visit    1. Hypertension, essential  Recheck in 3 months    2. Diabetes mellitus type 2, insulin dependent (H)  A1c in 3 months    3. Morbid obesity due to excess calories (H)  Continue with daily exercise goals and watching calories.    Please call or return to clinic if your symptoms don't go away.    Follow up plan  Please make a clinic appointment for follow up with me (EDIS BROWNLEE) in 3  months for reevaluation.    Thank you for coming to [facility] today.  Lab Testing:  **If you had lab testing today and your results are reassuring or normal they will be mailed to you or sent through [facility] within 7 days.   **If the lab tests need quick action we will call you with the results.  The phone number we will call with results is # [phone] (home) [phone] (work). If this is not the best number please call our clinic and change the number.  Medication Refills:  If you need any refills please call your pharmacy and they will contact us.   If you need to  your refill at a new pharmacy, please contact the new pharmacy directly. The new pharmacy will help you get your medications transferred faster.   Scheduling:  If you have any concerns about today's visit or wish to schedule another appointment please call our office during normal business hours [phone] (8-5:00 M-F)  If a referral was made to a [facility] and you don't get a call from central scheduling please call [phone].  If a Mammogram was ordered for you at [facility] call [phone] to schedule or change your appointment.  If you had an XRay/CT/Ultrasound/MRI ordered the number is [phone] to schedule or change your radiology appointment.   Medical Concerns:  If you have urgent medical concerns please call [phone] at any time of the day.    Eids Brownlee MD

## 2018-08-20 ENCOUNTER — OFFICE VISIT (OUTPATIENT)
Dept: PSYCHOLOGY | Facility: CLINIC | Age: 68
End: 2018-08-20
Payer: COMMERCIAL

## 2018-08-20 VITALS — BODY MASS INDEX: 40.42 KG/M2 | WEIGHT: 235.5 LBS

## 2018-08-20 DIAGNOSIS — E66.01 PSYCHOLOGICAL FACTORS AFFECTING MORBID OBESITY (H): ICD-10-CM

## 2018-08-20 DIAGNOSIS — F54 PSYCHOLOGICAL FACTORS AFFECTING MORBID OBESITY (H): ICD-10-CM

## 2018-08-20 DIAGNOSIS — F33.0 MAJOR DEPRESSIVE DISORDER, RECURRENT EPISODE, MILD (H): Primary | ICD-10-CM

## 2018-08-20 ASSESSMENT — PATIENT HEALTH QUESTIONNAIRE - PHQ9: SUM OF ALL RESPONSES TO PHQ QUESTIONS 1-9: 7

## 2018-08-20 NOTE — MR AVS SNAPSHOT
After Visit Summary   8/20/2018    Pinky Page    MRN: [id number]           Patient Information     Date Of Birth          1950        Visit Information        Provider Department      8/20/2018 1:00 PM Damon Gr, PhD [facility]        Today's Diagnoses     Major depressive disorder, recurrent episode, mild (H)    -  1    Psychological factors affecting morbid obesity (H)           Follow-ups after your visit        Your next 10 appointments already scheduled     Sep 10, 2018  1:40 PM CDT   Adult Med Follow UP with Maulik Kohler MD   Psychiatry Clinic ([facility])    [address]  [address] [id number] [phone]            Nov 15, 2018  1:30 PM CST   RETURN GENERAL with Michael Lopez MD   Eye Clinic ([facility])    [address]  [facility] [address] [phone]            Nov 26, 2018 11:30 AM CST   (Arrive by 11:15 AM)   Return Visit with Marcos Magdaleno MD   [facility] ([facility])    [address] [facility]  4th [facility] [address] [phone]            May 09, 2019  1:30 PM CDT   Return Sleep Patient with Brianda Reddy MD   [facility] ([facility])    [address] [place] [id number] [phone]              Who to contact     Please call your clinic at [phone] to:    Ask questions about your health    Make or cancel appointments    Discuss your medicines    Learn about your test results    Speak to your doctor            Additional Information About Your Visit        Care EveryWhere ID     This is your Care EveryWhere ID. This could be used by other organizations to access your [place] medical records  [id number]        Your Vitals Were     BMI (Body  Mass Index)                   40.42 kg/m2            Blood Pressure from Last 3 Encounters:   08/15/18 146/80   07/30/18 148/82   07/27/18 188/82    Weight from Last 3 Encounters:   08/20/18 106.8 kg (235 lb 8 oz)   08/15/18 109.6 kg (241 lb 9.6 oz)   07/30/18 108.7 kg (239 lb 9.6 oz)              Today, you had the following     No orders found for display         Today's Medication Changes          These changes are accurate as of 8/20/18  2:04 PM.  If you have any questions, ask your nurse or doctor.               These medicines have changed or have updated prescriptions.        Dose/Directions    metFORMIN 500 MG 24 hr tablet   Commonly known as:  GLUCOPHAGE-XR   This may have changed:    - how much to take  - when to take this   Used for:  Diabetes mellitus, type 2 (H)        Dose:  1000 mg   Take 2 tablets (1,000 mg) by mouth 2 times daily (with meals)   Quantity:  90 tablet   Refills:  3                Primary Care Provider Office Phone # Fax #    Cary Brownlee [phone]       [address] [address]        Equal Access to Services     [facility]: Hadsaira Dietrich, gifty del valle, ajymie mittal, autumn caldwell . So [facility] [phone].    ATENCIÓN: Si habla español, tiene a deluca disposición servicios gratuitos de asistencia lingüística. Llame al [phone].    We comply with applicable federal civil rights laws and [place] laws. We do not discriminate on the basis of race, color, national origin, age, disability, sex, sexual orientation, or gender identity.            Thank you!     Thank you for choosing [facility]  for your care. Our goal is always to provide you with excellent care. Hearing back from our patients is one way we can continue to improve our services. Please take a few minutes to complete the written survey that you may receive in the mail after your visit with us. Thank  you!             Your Updated Medication List - Protect others around you: Learn how to safely use, store and throw away your medicines at www.disposemymeds.org.          This list is accurate as of 8/20/18  2:04 PM.  Always use your most recent med list.                   Brand Name Dispense Instructions for use Diagnosis    acetaminophen 500 MG tablet    TYLENOL    1 Bottle    Take 2 tablets (1,000 mg) by mouth every 6 hours as needed    Sprain of left ankle, unspecified ligament, initial encounter       alum & mag hydroxide-simethicone 200-200-20 MG/5ML Susp suspension    MYLANTA/MAALOX     Take 30 mLs by mouth daily as needed for indigestion        amLODIPine 5 MG tablet    NORVASC    30 tablet    Take 2 tablets (10 mg) by mouth daily    Essential hypertension with goal blood pressure less than 130/85       artificial saliva Aers spray      Take 1 spray by mouth 3 times daily as needed for dry mouth        artificial tears Oint ophthalmic ointment     1 Tube    Apply  to eye. Place 0.5 inches into both eyes at bedtime    Insufficiency of tear film of both eyes       ARTIFICIAL TEARS OP      Apply 1 drop to eye 4 times daily.        * aspirin 81 MG tablet     100    Take 1 tablet by mouth daily. ENTERIC COATED.        * SM ASPIRIN ADULT LOW STRENGTH 81 MG EC tablet   Generic drug:  aspirin           atorvastatin 40 MG tablet    LIPITOR    90 tablet    Take 1 tablet (40 mg) by mouth daily    Hyperlipidemia LDL goal <100       BD AUTOSHIELD DUO 30G X 5 MM   Generic drug:  insulin pen needle           blood glucose calibration solution    no brand specified    1 each    Use to calibrate blood glucose monitor as directed.    Type 2 diabetes mellitus with microalbuminuria, with long-term current use of insulin (H)       blood glucose lancets standard    no brand specified    100 each    Use to test blood sugar 2 times daily or as directed.    Type 2 diabetes mellitus with microalbuminuria, with long-term current use  of insulin (H)       blood glucose monitoring meter device kit    no brand specified    1 kit    Check Blood sugars twice a day.    Type 2 diabetes mellitus with microalbuminuria, with long-term current use of insulin (H)       blood glucose monitoring test strip    no brand specified    100 each    Use to test blood sugar 3 times daily or as directed.    Type 2 diabetes mellitus with microalbuminuria, with long-term current use of insulin (H)       calcium polycarbophil 625 MG tablet    FIBERCON     Take 2 tablets by mouth daily        calcium-vitamin D 250-125 MG-UNIT Tabs      Take 1 tablet by mouth 2 times daily. Calcium 250 mg/Vit D 125 IU        Calcium-Vitamin D3 250-125 MG-UNIT Tabs           CLARITIN 10 MG tablet   Generic drug:  loratadine     30    1 TAB PO QD (Once per day) as needed for ALLERGY SYMPTOMS        clotrimazole 1 % cream    LOTRIMIN    50 g    Apply topically 2 times daily as needed    Dermatophytosis       eucerin cream      Apply  topically as needed. Apply to thigh PRN dry skin        exenatide 10 MCG/0.04ML injection    BYETTA    1 Syringe    Inject 10 mcg Subcutaneous 2 times daily (before meals)    Type 2 diabetes mellitus with microalbuminuria, with long-term current use of insulin (H)       FLUoxetine 40 MG capsule    PROzac    30 capsule    Take 1 capsule (40 mg) by mouth daily    Schizoaffective disorder, depressive type (H)       fluticasone 50 MCG/ACT spray    FLONASE    16 g    Spray 1 spray into both nostrils daily    Seasonal allergic rhinitis       furosemide 20 MG tablet    LASIX    60 tablet    Take 1 tablet (20 mg) by mouth 2 times daily    Lower leg edema       GEL-SASHA DENTINBLOC DT      Apply  to affected area. GEL. Apply to 2nd molar on bottom right daily at bedtime.        glucagon 1 MG kit    GLUCAGON EMERGENCY    2 mg    Inject 1 mg into the muscle once for 1 dose    Type 2 diabetes mellitus with microalbuminuria, with long-term current use of insulin (H)        insulin glargine 100 UNIT/ML injection    LANTUS    8 mL    Inject 20 Units Subcutaneous At Bedtime    Type 2 diabetes mellitus with microalbuminuria, with long-term current use of insulin (H)       LACTAID 3000 UNIT tablet   Generic drug:  lactase      Take 4 tabs daily with meals.        levothyroxine 175 MCG tablet    SYNTHROID/LEVOTHROID    30 tablet    Take 1 tablet (175 mcg) by mouth daily Give on empty stomach    Other specified hypothyroidism       LORazepam 1 MG tablet    ATIVAN    35 tablet    Take 1 tablet (1 mg) by mouth At Bedtime .  May take additional 1mg daily PRN for agitation.    Generalized anxiety disorder       losartan 100 MG tablet    COZAAR    90 tablet    Take 1 tablet by mouth daily.    Hypertension goal BP (blood pressure) < 130/80, Diabetes mellitus type 2, insulin dependent (H), CKD (chronic kidney disease) stage 2, GFR 60-89 ml/min       melatonin 3 MG tablet     60 tablet    Take 1 tablet (3 mg) by mouth nightly as needed for sleep    Other insomnia       metFORMIN 500 MG 24 hr tablet    GLUCOPHAGE-XR    90 tablet    Take 2 tablets (1,000 mg) by mouth 2 times daily (with meals)    Diabetes mellitus, type 2 (H)       MILK OF MAGNESIA PO      Take  by mouth. Take 30 mL as needed for constipation.        NEURONTIN PO      Take 900 mg by mouth 3 times daily.        nystatin 162571 UNIT/GM Powd    MYCOSTATIN    60 g    Apply topically 3 times daily as needed    Candidiasis of skin       ONETOUCH DELICA LANCETS 33G Misc           polyethylene glycol powder    MIRALAX/GLYCOLAX     Take 1 capful by mouth 2 times daily. 17 GM PO BID        * SALINE MIST 0.65 % nasal spray   Generic drug:  sodium chloride           * saline 0.9 % Soln      Spray 2 sprays in nostril as needed.        SENNA S 8.6-50 MG per tablet   Generic drug:  senna-docusate      Take 2 tablets by mouth At Bedtime.        SF 5000 PLUS 1.1 % Crea   Generic drug:  Sodium Fluoride           SM LUBRICANT EYE DROPS 0.4-0.3 % Soln  ophthalmic solution   Generic drug:  polyethylene glycol 0.4%- propylene glycol 0.3%           solifenacin 10 MG tablet    VESICARE    30 tablet    Take 1 tablet (10 mg) by mouth daily    Urge incontinence       ziprasidone 40 MG capsule    GEODON    60 capsule    Take 1 capsule (40 mg) by mouth 2 times daily (with meals)    Schizoaffective disorder, depressive type (H)       * Notice:  This list has 4 medication(s) that are the same as other medications prescribed for you. Read the directions carefully, and ask your doctor or other care provider to review them with you.

## 2018-08-20 NOTE — PROGRESS NOTES
Health Psychology                  Clinic    Department of Medicine  Madelaine Ingram, Ph.D., L.P. [phone]                          Clinics and Surgery Center  [facility] Miryam Woods, Ph.D.,  L.P. [phone]                 3rd Floor  [place] Mail Code 746   Pinky Panda, Ph.D., L.P. [phone]    [address] Rae Eugene, Ph.D., L.P. [phone]            [id number]  [place] [address]           Damon Gr, Ph.D., A.B.P.P., L.P. [phone]      Kimber Morales, Ph.D., L.P. [phone]    Health Psychology Follow-Up Note    Ms. Page is a pleasant 67-year old woman with chronic depressive illness, who returns to clinic for supportive and behavioral psychotherapy for moderate recurrent depression and for help losing weight given her morbid obesity.     She is 235.5 down from 238.2 down from  239.4.  Wt Readings from Last 4 Encounters:   08/20/18 106.8 kg (235 lb 8 oz)   08/15/18 109.6 kg (241 lb 9.6 oz)   07/30/18 108.7 kg (239 lb 9.6 oz)   07/27/18 107 kg (236 lb)   Body mass index is 40.42 kg/(m^2).    At [employer], she continues to serve on the [employer].  She has been on the [employer] for 20 years, plus decorating the bulletin boards.  She is working 1 day/week with her boss, [employer], Friday mornings for 1.5 hour.   She is satisfied with this level of effort and is getting along well with [place]. She fell since she was last seen, sustained some bruisimg, but feels she is healing well.  She reports seeing physicians x2 after the fall.   She contnues to rate the depression as 3-4 on 10-point subjective scale, which is below baseline.  She is doing well.  She participates in activities at [place].  She is up to biking 40 minutes x2-3/week.  We discussed increasing to 45 minutes  x4 /week and gradually increasing beyond an hour, however, will not change expectation this month. Discussed involvement in the  walkers 1/week in AM.  She is doing evening walkers group 7 days/week after dinner (about a half hour).  She is doing more social walking some of the time and had apparently is gradually ramping up on her fitness center days.  She agrees to increase.  She is getting along better with her roommate.    Goal for exercise has been 1.5 hours/day as of June 19, 2017 and 2 lbs. / month.   We discussed increasing bicycling to 4 days per week short term and 5-6 long-term and increasing gradually from 35-40 minutes (achieved).    She had a negative interaction re: her lantis with a new nurse.  As a result, she will start working toward some self-administration. It didn't get started yet due to the fall, but is still in the works.  We discussed de-accessioning.  She had a clean-up.  We discussed it in broader terms that allow her to be less frustrated by the pressure she feels.  She arrived early today and was greeted in the waiting room.  Pinky participated fully and appeared to derive benefit.  We walked for part of the session.  Rapport was excellent.     Extended session due to complexity of case and length of interval.    IMPRESSION Distress Disability Risk    Low High Low     Time In: 1:05  Time Out:  2:00  Diagnosis:   Major Depression, recurrent mild (F33.0)   Psychological Factors Affecting Morbid Obesity (F54)      Plan: She will return on 9/18 @ 1:00 for behavioral and supportive psychotherapy.  Tx plan due 5/7/2019  Damon Gr, Ph.D., A.B.P.P., L.P.

## 2018-09-10 ENCOUNTER — OFFICE VISIT (OUTPATIENT)
Dept: PSYCHIATRY | Facility: CLINIC | Age: 68
End: 2018-09-10
Attending: PSYCHIATRY & NEUROLOGY
Payer: COMMERCIAL

## 2018-09-10 VITALS
BODY MASS INDEX: 40.82 KG/M2 | DIASTOLIC BLOOD PRESSURE: 78 MMHG | HEART RATE: 73 BPM | SYSTOLIC BLOOD PRESSURE: 142 MMHG | WEIGHT: 237.8 LBS

## 2018-09-10 DIAGNOSIS — F41.1 GENERALIZED ANXIETY DISORDER: ICD-10-CM

## 2018-09-10 PROCEDURE — G0463 HOSPITAL OUTPT CLINIC VISIT: HCPCS | Mod: ZF

## 2018-09-10 RX ORDER — LORAZEPAM 1 MG/1
1 TABLET ORAL AT BEDTIME
Qty: 35 TABLET | Refills: 0 | Status: SHIPPED | OUTPATIENT
Start: 2018-09-13 | End: 2018-10-18

## 2018-09-10 ASSESSMENT — PAIN SCALES - GENERAL: PAINLEVEL: NO PAIN (0)

## 2018-09-10 NOTE — MR AVS SNAPSHOT
After Visit Summary   9/10/2018    Pinky Page    MRN: [id number]           Patient Information     Date Of Birth          1950        Visit Information        Provider Department      9/10/2018 1:40 PM Maulik Kohler MD [facility]        Care Instructions    Pinky     - Continue w/ same medication doses.  - Keep doing what you're doing!    Dr. Kohler    Thank you for coming to the [facility].    Lab Testing:  If you had lab testing today and your results are reassuring or normal they will be mailed to you or sent through [facility] within 7 days.   If the lab tests need quick action we will call you with the results.  The phone number we will call with results is # [phone] (home) [phone] (work). If this is not the best number please call our clinic and change the number.    Medication Refills:  If you need any refills please call your pharmacy and they will contact us. Our fax number for refills is [phone]. Please allow three business for refill processing.   If you need to  your refill at a new pharmacy, please contact the new pharmacy directly. The new pharmacy will help you get your medications transferred.     Scheduling:  If you have any concerns about today's visit or wish to schedule another appointment please call our office during normal business hours [phone] (8-5:00 M-F)    Contact Us:  Please call [phone] during business hours (8-5:00 M-F).  If after clinic hours, or on the weekend, please call  [phone].    Financial Assistance [phone]  [facility] [phone]  [facility] [phone]  Medical Records [phone]      MENTAL HEALTH CRISIS NUMBERS:  [facility]:   [facility] - [phone]   Crisis Residence [facility] - [phone]   Walk-In Counseling Center [facility] - [phone]   COPE 24/7 [place] Mobile Team for Adults - [[phone]]; Child - [[phone]]     Crisis  [facility] - [phone]     [facility] - [phone]   Walk-in counseling [facility] - [phone]   Walk-in counseling [facility] - [phone]   Crisis Residence [facility] - [phone]   Urgent Care Adult Mental Health:   --Drop-in, 24/7 crisis line, and [place] Mobile Team [[phone]]    CRISIS TEXT LINE: Text [phone] from anywhere, anytime, any crisis 24/7;    OR SEE www.crisistextline.org     Poison Control Center - [phone]    CHILD: [facility] needs assessment team - [phone]     [facility] - [phone]; or [facility] - [phone]    If you have a medical emergency please call 911or go to the nearest ER.                    _____________________________________________    Again thank you for choosing [facility] and please let us know how we can best partner with you to improve you and your family's health.  You may be receiving a survey in the mail regarding this appointment. We would love to have your feedback, both positive and negative, so please fill out the survey and return it using the provided envelope. The survey is done by an external company, so your answers are anonymous.             Follow-ups after your visit        Follow-up notes from your care team     Return in about 2 months (around 11/10/2018).      Your next 10 appointments already scheduled     Sep 18, 2018  1:00 PM CDT   (Arrive by 12:45 PM)   Return Visit with Damon Gr, PhD [facility] ([facility])    [address] [address] [phone]            Oct 25, 2018  1:30 PM CDT   Adult Med Follow UP with Maulik Kohler MD   Psychiatry Clinic ([facility])    [address] [address] [phone]            Nov 15, 2018  1:30 PM CST    RETURN GENERAL with Michael Lopez MD   Eye Clinic ([facility])    [facility]  [address] [place] [address]  9th Fl Clin 9a  [facility] [address] [phone]            Nov 26, 2018 11:30 AM CST   (Arrive by 11:15 AM)   Return Visit with Marcos Magdaleno MD   [facility] ([facility])    [address] [facility]  4th Floor  [facility] [id number] [phone]            May 09, 2019  1:30 PM CDT   Return Sleep Patient with Brianda Reddy MD   [facility] ([facility])    [address] [place] [address] [phone]              Who to contact     Please call your clinic at [phone] to:    Ask questions about your health    Make or cancel appointments    Discuss your medicines    Learn about your test results    Speak to your doctor            Additional Information About Your Visit        Care EveryWhere ID     This is your Care EveryWhere ID. This could be used by other organizations to access your [facility] medical records  [id number]        Your Vitals Were     Pulse BMI (Body Mass Index)                73 40.82 kg/m2           Blood Pressure from Last 3 Encounters:   09/10/18 142/78   08/15/18 146/80   07/30/18 148/82    Weight from Last 3 Encounters:   09/10/18 107.9 kg (237 lb 12.8 oz)   08/20/18 106.8 kg (235 lb 8 oz)   08/15/18 109.6 kg (241 lb 9.6 oz)              Today, you had the following     No orders found for display         Today's Medication Changes          These changes are accurate as of 9/10/18  2:45 PM.  If you have any questions, ask your nurse or doctor.               These medicines have changed or have updated prescriptions.        Dose/Directions    metFORMIN 500 MG 24 hr tablet   Commonly known as:  GLUCOPHAGE-XR   This may have changed:    - how much to take  - when to take this   Used for:  Diabetes mellitus,  type 2 (H)        Dose:  1000 mg   Take 2 tablets (1,000 mg) by mouth 2 times daily (with meals)   Quantity:  90 tablet   Refills:  3                Primary Care Provider Office Phone # Fax #    Cary Brownlee [phone]       [address] [address]        Equal Access to Services     ABNAYLA NARCISO : Hadii aad ku hadasho Soomaali, waaxda luqadaha, qaybta kaalmada adeegyada, waxay idiin hayaan adeeg khromankathy laanisa . So [facility] [phone].    ATENCIÓN: Si habla español, tiene a deluca disposición servicios gratuitos de asistencia lingüística. Link al [phone].    We comply with applicable federal civil rights laws and [place] laws. We do not discriminate on the basis of race, color, national origin, age, disability, sex, sexual orientation, or gender identity.            Thank you!     Thank you for choosing [facility]  for your care. Our goal is always to provide you with excellent care. Hearing back from our patients is one way we can continue to improve our services. Please take a few minutes to complete the written survey that you may receive in the mail after your visit with us. Thank you!             Your Updated Medication List - Protect others around you: Learn how to safely use, store and throw away your medicines at www.disposemymeds.org.          This list is accurate as of 9/10/18  2:45 PM.  Always use your most recent med list.                   Brand Name Dispense Instructions for use Diagnosis    acetaminophen 500 MG tablet    TYLENOL    1 Bottle    Take 2 tablets (1,000 mg) by mouth every 6 hours as needed    Sprain of left ankle, unspecified ligament, initial encounter       alum & mag hydroxide-simethicone 200-200-20 MG/5ML Susp suspension    MYLANTA/MAALOX     Take 30 mLs by mouth daily as needed for indigestion        amLODIPine 5 MG tablet    NORVASC    30 tablet    Take 2 tablets (10 mg) by mouth daily    Essential hypertension with goal  blood pressure less than 130/85       artificial saliva Aers spray      Take 1 spray by mouth 3 times daily as needed for dry mouth        artificial tears Oint ophthalmic ointment     1 Tube    Apply  to eye. Place 0.5 inches into both eyes at bedtime    Insufficiency of tear film of both eyes       ARTIFICIAL TEARS OP      Apply 1 drop to eye 4 times daily.        * aspirin 81 MG tablet     100    Take 1 tablet by mouth daily. ENTERIC COATED.        * SM ASPIRIN ADULT LOW STRENGTH 81 MG EC tablet   Generic drug:  aspirin           atorvastatin 40 MG tablet    LIPITOR    90 tablet    Take 1 tablet (40 mg) by mouth daily    Hyperlipidemia LDL goal <100       BD AUTOSHIELD DUO 30G X 5 MM   Generic drug:  insulin pen needle           blood glucose calibration solution    no brand specified    1 each    Use to calibrate blood glucose monitor as directed.    Type 2 diabetes mellitus with microalbuminuria, with long-term current use of insulin (H)       blood glucose lancets standard    no brand specified    100 each    Use to test blood sugar 2 times daily or as directed.    Type 2 diabetes mellitus with microalbuminuria, with long-term current use of insulin (H)       blood glucose monitoring meter device kit    no brand specified    1 kit    Check Blood sugars twice a day.    Type 2 diabetes mellitus with microalbuminuria, with long-term current use of insulin (H)       blood glucose monitoring test strip    no brand specified    100 each    Use to test blood sugar 3 times daily or as directed.    Type 2 diabetes mellitus with microalbuminuria, with long-term current use of insulin (H)       calcium polycarbophil 625 MG tablet    FIBERCON     Take 2 tablets by mouth daily        calcium-vitamin D 250-125 MG-UNIT Tabs      Take 1 tablet by mouth 2 times daily. Calcium 250 mg/Vit D 125 IU        Calcium-Vitamin D3 250-125 MG-UNIT Tabs           CLARITIN 10 MG tablet   Generic drug:  loratadine     30    1 TAB PO QD  (Once per day) as needed for ALLERGY SYMPTOMS        clotrimazole 1 % cream    LOTRIMIN    50 g    Apply topically 2 times daily as needed    Dermatophytosis       eucerin cream      Apply  topically as needed. Apply to thigh PRN dry skin        exenatide 10 MCG/0.04ML injection    BYETTA    1 Syringe    Inject 10 mcg Subcutaneous 2 times daily (before meals)    Type 2 diabetes mellitus with microalbuminuria, with long-term current use of insulin (H)       FLUoxetine 40 MG capsule    PROzac    30 capsule    Take 1 capsule (40 mg) by mouth daily    Schizoaffective disorder, depressive type (H)       fluticasone 50 MCG/ACT spray    FLONASE    16 g    Spray 1 spray into both nostrils daily    Seasonal allergic rhinitis       furosemide 20 MG tablet    LASIX    60 tablet    Take 1 tablet (20 mg) by mouth 2 times daily    Lower leg edema       GEL-SASHA DENTINBLOC DT      Apply  to affected area. GEL. Apply to 2nd molar on bottom right daily at bedtime.        glucagon 1 MG kit    GLUCAGON EMERGENCY    2 mg    Inject 1 mg into the muscle once for 1 dose    Type 2 diabetes mellitus with microalbuminuria, with long-term current use of insulin (H)       insulin glargine 100 UNIT/ML injection    LANTUS    8 mL    Inject 20 Units Subcutaneous At Bedtime    Type 2 diabetes mellitus with microalbuminuria, with long-term current use of insulin (H)       LACTAID 3000 UNIT tablet   Generic drug:  lactase      Take 4 tabs daily with meals.        levothyroxine 175 MCG tablet    SYNTHROID/LEVOTHROID    30 tablet    Take 1 tablet (175 mcg) by mouth daily Give on empty stomach    Other specified hypothyroidism       LORazepam 1 MG tablet    ATIVAN    35 tablet    Take 1 tablet (1 mg) by mouth At Bedtime .  May take additional 1mg daily PRN for agitation.    Generalized anxiety disorder       losartan 100 MG tablet    COZAAR    90 tablet    Take 1 tablet by mouth daily.    Hypertension goal BP (blood pressure) < 130/80, Diabetes mellitus  type 2, insulin dependent (H), CKD (chronic kidney disease) stage 2, GFR 60-89 ml/min       melatonin 3 MG tablet     60 tablet    Take 1 tablet (3 mg) by mouth nightly as needed for sleep    Other insomnia       metFORMIN 500 MG 24 hr tablet    GLUCOPHAGE-XR    90 tablet    Take 2 tablets (1,000 mg) by mouth 2 times daily (with meals)    Diabetes mellitus, type 2 (H)       MILK OF MAGNESIA PO      Take  by mouth. Take 30 mL as needed for constipation.        NEURONTIN PO      Take 900 mg by mouth 3 times daily.        nystatin 944417 UNIT/GM Powd    MYCOSTATIN    60 g    Apply topically 3 times daily as needed    Candidiasis of skin       ONETOUCH DELICA LANCETS 33G Misc           polyethylene glycol powder    MIRALAX/GLYCOLAX     Take 1 capful by mouth 2 times daily. 17 GM PO BID        * SALINE MIST 0.65 % nasal spray   Generic drug:  sodium chloride           * saline 0.9 % Soln      Spray 2 sprays in nostril as needed.        SENNA S 8.6-50 MG per tablet   Generic drug:  senna-docusate      Take 2 tablets by mouth At Bedtime.        SF 5000 PLUS 1.1 % Crea   Generic drug:  Sodium Fluoride           SM LUBRICANT EYE DROPS 0.4-0.3 % Soln ophthalmic solution   Generic drug:  polyethylene glycol 0.4%- propylene glycol 0.3%           solifenacin 10 MG tablet    VESICARE    30 tablet    Take 1 tablet (10 mg) by mouth daily    Urge incontinence       ziprasidone 40 MG capsule    GEODON    60 capsule    Take 1 capsule (40 mg) by mouth 2 times daily (with meals)    Schizoaffective disorder, depressive type (H)       * Notice:  This list has 4 medication(s) that are the same as other medications prescribed for you. Read the directions carefully, and ask your doctor or other care provider to review them with you.

## 2018-09-10 NOTE — PROGRESS NOTES
[facility] PSYCHIATRY CLINIC PROGRESS NOTE     CARE TEAM:  PCP- Cary Brownlee    Specialty Providers- no    Therapist- Dr. Gr    [facility] Team- no    Pinky Page is a 68 year old female who prefers the name Pinky & pronouns she, her, hers. Date of initial diagnostic assessment is 5/13/13.  Date of most recent transfer of care assessment is 07/30/18.     Pertinent Background:  This patient first experienced mental health issues in her twenties and has received treatment for schizoaffective disorder and generalized anxiety.  History details in last diagnostic assessment.  Notably, Pinky's symptoms of depression and psychosis have responded well to a combination of ECT and medication management. Pinky has a history of experiencing increasingly frequent psychotic symptoms w/ previously attempted antipsychotic tapers.        Psych critical item history includes psychosis [sxs include disorganization, hallucinations, paranoia], mutiple psychotropic trials, psych hosp (3-5) and ECT.    INTERIM HISTORY                                                                                              4, 4   The patient reports good treatment adherence.  History was provided by the patient who was a good historian.  The last visit ended with no change to the med regimen.   Since the last visit:     - Overall doing well, depressive symptoms at baseline  - No recurrence of psychosis symptoms  - Tolerating medications well - no new side effects  - Continuing to make efforts to address weight - doing exercise bike 2-3 times per week, meeting w/ weight management, going through diet line  - Working on sleep hygiene techniques  - Limiting caffeine intake during the day    RECENT SYMPTOMS:   DEPRESSION:  reports-depressed mood, anhedonia, low energy, insomnia and appetite changes;  DENIES- suicidal ideation and self-destructive thoughts  MELVIN/HYPOMANIA:  reports-none;  DENIES- increased energy, decreased sleep need, increased activity  and grandiosity  PSYCHOSIS:  reports-none;  DENIES- delusions, auditory hallucinations and visual hallucinations  DYSREGULATION:  reports-none;  DENIES- suicidal ideation, violent ideation and SIB  PANIC ATTACK:  none   ANXIETY:  none  TRAUMA RELATED:  none  COMPULSIVE:  none   SLEEP: sometimes has difficulty falling asleep  EATING DISORDER: no     RECENT SUBSTANCE USE:     ALCOHOL- no      TOBACCO- no     CAFFEINE- coffee/ tea [coffee, diet Pepsi]  OPIOIDS- no         NARCAN KIT- no        CANNABIS- no            OTHER ILLICIT DRUGS- none      CURRENT SOCIAL HISTORY:  FINANCIAL SUPPORT- social security disability       CHILDREN- no      LIVING SITUATION- Lives at [facility].      SOCIAL/ SPIRITUAL SUPPORT- sisters, support staff at [facility], other residents at [place], other providers     FEELS SAFE AT HOME- yes     MEDICAL ROS (2,10):  Reports none, none, none, difficulty getting to bed somtimes    Denies headaches, sexual function problems [none], short term memory and/or word finding difficulty, wt gain, tremor, akathisia, spasms, bruxism, easy bruising , GI sxs [N/V], akathisia, muscle problems [stiffness], unusual movements, wt gain, sexual function problems [none], polydipsia, polyphagia and Parkinsonian-type symptoms [shuffling gait, masked facies, stooped posture, speech change, writing change], rash, hair loss , menstrual abnormality, skin/eye discoloration and bleeding or freq bruising    PSYCH and CD Critical Summary Points since July 2018 July 2018 - resident transition, no changes  September 2018 - no changes    PAST PSYCH MED TRIALS   see EMR Problem List: Hx of psychiatric care    MEDICAL / SURGICAL HISTORY                                   Pregnant or breastfeeding- no      Contraception- None    Neurologic Hx- no   Patient Active Problem List   Diagnosis     Allergic rhinitis due to pollen     Urge incontinence     Hypertension, essential     Cardiomegaly     Chronic  constipation     Dry eye syndrome     Esophageal reflux     Exposure keratoconjunctivitis     DM ophthalmopathy (H)     Hypothyroidism     Senile cataract     ANUJ (obstructive sleep apnea)     Postmenopausal atrophic vaginitis     Restless leg syndrome     Squamous blepharitis     Morbid obesity due to excess calories (H)     Personal history of breast cancer s/p L masectomy     Diabetes mellitus type 2, insulin dependent (H)     Hypercholesteremia     Lives in group home     Extrapyramidal and movement disorder     CKD (chronic kidney disease) stage 2, GFR 60-89 ml/min     Solitary kidney, congenital     S/P hysterectomy     Impingement syndrome of right shoulder     Hypertriglyceridemia     Candidiasis of skin     Generalized anxiety disorder     Carpal tunnel syndrome of left wrist     Schizoaffective disorder, depressive type (H)     Health Care Home     Major depressive disorder, recurrent episode, moderate (H)     Psychological factors affecting medical condition     Hx of psychiatric care     Nail complaint     Microalbuminuria due to type 2 diabetes mellitus (H)     Type 2 diabetes mellitus with microalbuminuria, with long-term current use of insulin (H)     Diabetes mellitus, type II, insulin dependent (H)     CKD (chronic kidney disease) stage 1, GFR 90 ml/min or greater       Major Surgery- not discussed    ALLERGY                                Chlordiazepoxide hcl; Dimetapp dm cold-cough; Haldol; Ibuprofen; Lactose intolerance [beta-galactosidase]; Milk products; and Propofol  MEDICATIONS                               Current Outpatient Prescriptions   Medication Sig Dispense Refill     acetaminophen (TYLENOL) 500 MG tablet Take 2 tablets (1,000 mg) by mouth every 6 hours as needed 1 Bottle 2     alum & mag hydroxide-simethicone (MYLANTA/MAALOX) 200-200-20 MG/5ML SUSP suspension Take 30 mLs by mouth daily as needed for indigestion       amLODIPine (NORVASC) 5 MG tablet Take 2 tablets (10 mg) by mouth  daily 30 tablet 3     artificial saliva (BIOTENE MT) AERS spray Take 1 spray by mouth 3 times daily as needed for dry mouth       artificial tears (AKWA TEARS) OINT ophthalmic ointment Apply  to eye. Place 0.5 inches into both eyes at bedtime 1 Tube 11     ASPIRIN 81 MG OR TABS Take 1 tablet by mouth daily. ENTERIC COATED. 100 3     atorvastatin (LIPITOR) 40 MG tablet Take 1 tablet (40 mg) by mouth daily 90 tablet 1     BD AUTOSHIELD DUO 30G X 5 MM        blood glucose (NO BRAND SPECIFIED) lancets standard Use to test blood sugar 2 times daily or as directed. 100 each 11     blood glucose calibration (NO BRAND SPECIFIED) solution Use to calibrate blood glucose monitor as directed. 1 each 3     blood glucose monitoring (NO BRAND SPECIFIED) meter device kit Check Blood sugars twice a day. 1 kit 0     blood glucose monitoring (NO BRAND SPECIFIED) test strip Use to test blood sugar 3 times daily or as directed. 100 each 3     Calcium Carb-Cholecalciferol (CALCIUM-VITAMIN D3) 250-125 MG-UNIT TABS        Calcium Carbonate-Vitamin D (CALCIUM-VITAMIN D) 250-125 MG-UNIT TABS Take 1 tablet by mouth 2 times daily. Calcium 250 mg/Vit D 125 IU       calcium polycarbophil (FIBERCON) 625 MG tablet Take 2 tablets by mouth daily       CLARITIN 10 MG OR TABS 1 TAB PO QD (Once per day) as needed for ALLERGY SYMPTOMS 30 11     clotrimazole (LOTRIMIN) 1 % cream Apply topically 2 times daily as needed 50 g 0     exenatide (BYETTA) 10 MCG/0.04ML injection Inject 10 mcg Subcutaneous 2 times daily (before meals) 1 Syringe 11     FLUoxetine (PROZAC) 40 MG capsule Take 1 capsule (40 mg) by mouth daily 30 capsule 2     fluticasone (FLONASE) 50 MCG/ACT nasal spray Spray 1 spray into both nostrils daily 16 g 3     furosemide (LASIX) 20 MG tablet Take 1 tablet (20 mg) by mouth 2 times daily 60 tablet 3     Gabapentin (NEURONTIN PO) Take 900 mg by mouth 3 times daily.       Hypromellose (ARTIFICIAL TEARS OP) Apply 1 drop to eye 4 times daily.        insulin glargine (LANTUS) 100 UNIT/ML injection Inject 20 Units Subcutaneous At Bedtime 8 mL 11     lactase (LACTAID) 3000 UNIT tablet Take 4 tabs daily with meals.       levothyroxine (SYNTHROID, LEVOTHROID) 175 MCG tablet Take 1 tablet (175 mcg) by mouth daily Give on empty stomach 30 tablet 4     LORazepam (ATIVAN) 1 MG tablet Take 1 tablet (1 mg) by mouth At Bedtime .  May take additional 1mg daily PRN for agitation. 35 tablet 0     losartan (COZAAR) 100 MG tablet Take 1 tablet by mouth daily. 90 tablet 1     Magnesium Hydroxide (MILK OF MAGNESIA PO) Take  by mouth. Take 30 mL as needed for constipation.       melatonin 3 MG tablet Take 1 tablet (3 mg) by mouth nightly as needed for sleep 60 tablet 1     metFORMIN (GLUCOPHAGE-XR) 500 MG 24 hr tablet Take 2 tablets (1,000 mg) by mouth 2 times daily (with meals) (Patient taking differently: Take 2,000 mg by mouth daily ) 90 tablet 3     nystatin (MYCOSTATIN) 367346 UNIT/GM POWD Apply topically 3 times daily as needed 60 g 1     ONETOUCH DELICA LANCETS 33G MISC        polyethylene glycol (MIRALAX/GLYCOLAX) powder Take 1 capful by mouth 2 times daily. 17 GM PO BID       Saline 0.9 % SOLN Spray 2 sprays in nostril as needed.       SALINE MIST 0.65 % nasal spray        senna-docusate (SENNA S) 8.6-50 MG per tablet Take 2 tablets by mouth At Bedtime.       SF 5000 PLUS 1.1 % CREA        Skin Protectants, Misc. (EUCERIN) cream Apply  topically as needed. Apply to thigh PRN dry skin        SM ASPIRIN ADULT LOW STRENGTH 81 MG EC tablet        SM LUBRICANT EYE DROPS 0.4-0.3 % SOLN ophthalmic solution        Sod Fluor-Solo Fluor-Hydrfl Ac (GEL-SASHA DENTINBLOC DT) Apply  to affected area. GEL. Apply to 2nd molar on bottom right daily at bedtime.       solifenacin (VESICARE) 10 MG tablet Take 1 tablet (10 mg) by mouth daily 30 tablet 6     ziprasidone (GEODON) 40 MG capsule Take 1 capsule (40 mg) by mouth 2 times daily (with meals) 60 capsule 2     glucagon (GLUCAGON  EMERGENCY) 1 MG kit Inject 1 mg into the muscle once for 1 dose 2 mg 3     VITALS                                                                                                                                  3, 3   /78  Pulse 73  Wt 107.9 kg (237 lb 12.8 oz)  BMI 40.82 kg/m2     MENTAL STATUS EXAM                                                                         9, 14 cog gs     Alertness: alert  and oriented  Appearance: well groomed - casual clothing, white hair  Behavior/Demeanor: cooperative, pleasant and calm, with good  eye contact   Speech: normal and regular rate and rhythm  Language: intact and no problems  Psychomotor: tremor noted in right hand, no movements noted in tongue today  Mood: description consistent with euthymia  Affect: full range; was congruent to mood; was congruent to content  Thought Process/Associations: unremarkable  Thought Content:  Reports none;  Denies suicidal ideation, violent ideation and delusions  Perception:  Reports none;  Denies auditory hallucinations and visual hallucinations  Insight: good  Judgment: good  Cognition: (6) does  appear grossly intact; formal cognitive testing was not done  Gait and Station: unremarkable    LABS and DATA     AIMS:  last done 9/10/18 with score(s): 0;  next due September 2019    PHQ9 TODAY = 7  PHQ-9 SCORE 7/27/2018 7/30/2018 8/19/2018   Total Score - - -   Total Score 7 7 7     ANTIPSYCHOTIC LABS  [glu, A1C, lipids (danika LDL), liver enzymes, WBC, ANEU, Hgb, plts]  q12 mo  Recent Labs   Lab Test  05/03/18   1414  12/14/17   1000   06/21/17   1047   GLC  192.6*   --    --   134*   A1C  6.7*  6.4*   < >   --     < > = values in this interval not displayed.     Recent Labs   Lab Test  05/03/18   1414  03/21/17   1023   CHOL  126  142   TRIG  148  121   LDL  60  78   HDL  37*  40*     Recent Labs   Lab Test  06/26/18   0659  03/21/17   1023   AST  16  13   ALT  21  20   ALKPHOS  111  92     Recent Labs   Lab Test  06/26/18   0659   06/21/17   1047  03/21/17   1023   WBC  9.72   --   11.8*   ANEU  5.31   --   8.3   HGB  12.6  13.8  13.4   PLT  247   --   266       DIAGNOSIS     Schizoaffective Disorder, depressed type  Generalized Anxiety Disorder  R/o Tardive Dyskinesia    ASSESSMENT                                                                                                          m2, h3     TODAY:  Pinky's symptoms remain at baseline. She has not experienced recurrence in depressive or psychotic symptoms. She does endorse some anxiety related to chronic health concerns, but they seem well-controlled and proportionate. She has been making efforts to lose weight and improve metabolic profile, which have included increasing exercise, eating a healthier diet, and limiting caffeine intake. She is also working on sleep hygiene as a way to improve sleep patterns. There has been no change in baseline symptoms and therefore no indication to make medication changes. She will return to clinic in 8 weeks or sooner if needed for reevaluation.    [place] PRESCRIPTION MONITORING PROGRAM [] was checked today:  lorazepam last dispensed 8/8.    PSYCHOTROPIC DRUG INTERACTIONS:   Ziprasidone and fluoxetine may result in increased risk for QTc prolongation and serotonin syndrome.  Aspirin and fluoxetine may result in increased risk for bleeding.  MANAGEMENT:  Monitoring for adverse effects, periodic EKGs and patient is aware of risks     PLAN                                                                                                                       m2, h3     1) PSYCHOTROPIC MEDICATIONS:  - Continue ziprasidone 40 mg BID  - Continue fluoxetine 40 mg daily  - Continue lorazepam 1 mg at bedtime w/ additional 1 mg daily PRN (uses very infrequently)    2) THERAPY:    Yes, continue w/ Dr. Gr    3) NEXT DUE:    Labs- AP labs due May 2019  EKG- Last EKG 8/17/16 - QTc 432. Will updated as needed.  Rating Scales- AIMS due September 2019    4) REFERRALS:     No Referrals needed     5) RTC: 6-8 weeks    6) CRISIS NUMBERS:   Provided routinely in AVS.   [facility] [phone] (clinic)    [phone] (after hours)  ONLY if a [facility] PT: [facility] [phone] (clinic), [phone] (after hours)     TREATMENT RISK STATEMENT:  The risks, benefits, alternatives and potential adverse effects have been discussed and are understood by the pt. The pt understands the risks of using street drugs or alcohol. There are no medical contraindications, the pt agrees to treatment with the ability to do so. The pt knows to call the clinic for any problems or to access emergency care if needed.  Medical and substance use concerns are documented above.  Psychotropic drug interaction check was done, including changes made today.    PSYCHIATRY CLINIC INDIVIDUAL PSYCHOTHERAPY NOTE                                                  [16]   Start time - 1420           End time - 1445  Date last reviewed - 09/10/18       Date next due - 12/9/18 (or 12 months if Medicare)    Subjective: This supportive psychotherapy session addressed issues related to goals of therapy .  Patient's reaction: Maintenance in the context of mental status appropriate for ambulatory setting.  Progress: good  Plan: RTC 6-8 weeks  Psychotherapy services during this visit included  myself and the patient.   TREATMENT  PLAN          SYMPTOMS; PROBLEMS   MEASURABLE GOALS;    FUNCTIONAL IMPROVEMENT INTERVENTIONS;   GAINS MADE DISCHARGE CRITERIA   Depression: depressed mood   reduce depressive symptoms and reduce depressive episodes meds/therapy marked symptom improvement   Anxiety: excessive worry   reduce anxiety meds/therapy marked symptom improvement         PROVIDER: Maulik Kohler MD    Patient staffed in clinic with Dr. Estrada who will sign the note.  Supervisor is Dr. Estrada.    Supervisor Attestation:  I met with Pinky Page along with the resident, and participated in key portions of the service,  including the mental status examination and developing the plan of care. I reviewed key portions of the history with the resident. I agree with the findings and plan as documented in this note.  Cortez Estrada MD

## 2018-09-10 NOTE — PATIENT INSTRUCTIONS
Pinky     - Continue w/ same medication doses.  - Keep doing what you're doing!    Dr. Kohler    Thank you for coming to the PSYCHIATRY CLINIC.    Lab Testing:  If you had lab testing today and your results are reassuring or normal they will be mailed to you or sent through [employer] within 7 days.   If the lab tests need quick action we will call you with the results.  The phone number we will call with results is # [phone] (home) [phone] (work). If this is not the best number please call our clinic and change the number.    Medication Refills:  If you need any refills please call your pharmacy and they will contact us. Our fax number for refills is [phone]. Please allow three business for refill processing.   If you need to  your refill at a new pharmacy, please contact the new pharmacy directly. The new pharmacy will help you get your medications transferred.     Scheduling:  If you have any concerns about today's visit or wish to schedule another appointment please call our office during normal business hours [phone] (8-5:00 M-F)    Contact Us:  Please call [phone] during business hours (8-5:00 M-F).  If after clinic hours, or on the weekend, please call  [phone].    Financial Assistance [phone]  [facility] [phone]  [facility] [phone]  Medical Records [phone]      MENTAL HEALTH CRISIS NUMBERS:  [facility]:   [facility] - [phone]   Crisis Residence [facility] - [phone]   Walk-In Counseling [facility] - [phone]   COPE 24/7 [facility] Mobile Team for Adults - [[phone]]; Child - [[phone]]     Crisis Connection - [phone]     [facility]:   [facility] - [phone]   Walk-in counseling [facility] - [phone]   Walk-in counseling [facility] - [phone]   Crisis Residence [facility] - [phone]   Urgent  Care Adult Mental Health:   --Drop-in, 24/7 crisis line, and [facility] Mobile Team [[phone]]    CRISIS TEXT LINE: Text [phone] from anywhere, anytime, any crisis 24/7;    OR SEE [email or web address].org     Poison Control Center - [phone]    CHILD: [facility] needs assessment team - [phone]     [facility] Lifeline - [phone]; or [facility] - [phone]    If you have a medical emergency please call 911or go to the nearest ER.                    _____________________________________________    Again thank you for choosing PSYCHIATRY CLINIC and please let us know how we can best partner with you to improve you and your family's health.  You may be receiving a survey in the mail regarding this appointment. We would love to have your feedback, both positive and negative, so please fill out the survey and return it using the provided envelope. The survey is done by an external company, so your answers are anonymous.

## 2018-09-11 ASSESSMENT — PATIENT HEALTH QUESTIONNAIRE - PHQ9: SUM OF ALL RESPONSES TO PHQ QUESTIONS 1-9: 7

## 2018-09-17 NOTE — TELEPHONE ENCOUNTER
- Received fax from [facility] ([phone]), requesting new prescription for pt's lorazepam. Writer telephoned pharmacy. Technician reported that the latest script they have on file for pt is 8/8.  - Found script in outgoing fax file. Sent to [phone].

## 2018-09-18 ENCOUNTER — OFFICE VISIT (OUTPATIENT)
Dept: PSYCHOLOGY | Facility: CLINIC | Age: 68
End: 2018-09-18
Payer: COMMERCIAL

## 2018-09-18 VITALS — BODY MASS INDEX: 41.02 KG/M2 | WEIGHT: 239 LBS

## 2018-09-18 DIAGNOSIS — E66.01 PSYCHOLOGICAL FACTORS AFFECTING MORBID OBESITY (H): ICD-10-CM

## 2018-09-18 DIAGNOSIS — F41.1 GENERALIZED ANXIETY DISORDER: ICD-10-CM

## 2018-09-18 DIAGNOSIS — E66.9 OBESITY, UNSPECIFIED OBESITY SEVERITY, UNSPECIFIED OBESITY TYPE: ICD-10-CM

## 2018-09-18 DIAGNOSIS — F54 PSYCHOLOGICAL FACTORS AFFECTING MORBID OBESITY (H): ICD-10-CM

## 2018-09-18 DIAGNOSIS — F33.0 MAJOR DEPRESSIVE DISORDER, RECURRENT EPISODE, MILD (H): Primary | ICD-10-CM

## 2018-09-18 NOTE — MR AVS SNAPSHOT
After Visit Summary   9/18/2018    Pinky Page    MRN: [id number]           Patient Information     Date Of Birth          1950        Visit Information        Provider Department      9/18/2018 1:00 PM Damon Gr, PhD [facility]        Today's Diagnoses     Major depressive disorder, recurrent episode, mild (H)    -  1    Psychological factors affecting morbid obesity (H)        Generalized anxiety disorder        Obesity, unspecified obesity severity, unspecified obesity type           Follow-ups after your visit        Your next 10 appointments already scheduled     Oct 25, 2018  1:30 PM CDT   Adult Med Follow UP with Maulik Kohler MD   Psychiatry Clinic ([facility])    [facility]  2nd [facility] [address]  [address] [id number] [phone]            Nov 15, 2018  1:30 PM CST   RETURN GENERAL with Michael Lopez MD   Eye Clinic ([facility])    [address]  [address] [address]  [facility] [id number] [phone]            Nov 26, 2018 11:30 AM CST   (Arrive by 11:15 AM)   Return Visit with Marcos Magdaleno MD   [facility] ([facility])    [address]  4th Floor  [facility] [address] [phone]            May 09, 2019  1:30 PM CDT   Return Sleep Patient with Brianda Reddy MD   [facility] ([facility])    [address] [id number] [phone]              Who to contact     Please call your clinic at [phone] to:    Ask questions about your health    Make or cancel appointments    Discuss your medicines    Learn about your test results    Speak to your doctor            Additional Information About Your Visit        Care EveryWhere ID     This is your Care EveryWhere ID. This could be used by other  organizations to access your [facility] medical records  [id number]        Your Vitals Were     BMI (Body Mass Index)                   41.02 kg/m2            Blood Pressure from Last 3 Encounters:   09/10/18 142/78   08/15/18 146/80   07/30/18 148/82    Weight from Last 3 Encounters:   09/18/18 108.4 kg (239 lb)   09/10/18 107.9 kg (237 lb 12.8 oz)   08/20/18 106.8 kg (235 lb 8 oz)              Today, you had the following     No orders found for display         Today's Medication Changes          These changes are accurate as of 9/18/18  1:49 PM.  If you have any questions, ask your nurse or doctor.               These medicines have changed or have updated prescriptions.        Dose/Directions    metFORMIN 500 MG 24 hr tablet   Commonly known as:  GLUCOPHAGE-XR   This may have changed:    - how much to take  - when to take this   Used for:  Diabetes mellitus, type 2 (H)        Dose:  1000 mg   Take 2 tablets (1,000 mg) by mouth 2 times daily (with meals)   Quantity:  90 tablet   Refills:  3                Primary Care Provider Office Phone # Fax #    Cary Brownlee [phone]       [place] [address]        Equal Access to Services     JAYDEN STUART AH: Tatiana baugho Soemre, wamelissada luqadaha, qaybta kaalmada adeegvivianada, autumn josé. So [facility] [phone].    ATENCIÓN: Si habla español, tiene a deluca disposición servicios gratuitos de asistencia lingüística. Llame al [phone].    We comply with applicable federal civil rights laws and [place] laws. We do not discriminate on the basis of race, color, national origin, age, disability, sex, sexual orientation, or gender identity.            Thank you!     Thank you for choosing [facility]  for your care. Our goal is always to provide you with excellent care. Hearing back from our patients is one way we can continue to improve our services. Please take a few  minutes to complete the written survey that you may receive in the mail after your visit with us. Thank you!             Your Updated Medication List - Protect others around you: Learn how to safely use, store and throw away your medicines at www.disposemymeds.org.          This list is accurate as of 9/18/18  1:49 PM.  Always use your most recent med list.                   Brand Name Dispense Instructions for use Diagnosis    acetaminophen 500 MG tablet    TYLENOL    1 Bottle    Take 2 tablets (1,000 mg) by mouth every 6 hours as needed    Sprain of left ankle, unspecified ligament, initial encounter       alum & mag hydroxide-simethicone 200-200-20 MG/5ML Susp suspension    MYLANTA/MAALOX     Take 30 mLs by mouth daily as needed for indigestion        amLODIPine 5 MG tablet    NORVASC    30 tablet    Take 2 tablets (10 mg) by mouth daily    Essential hypertension with goal blood pressure less than 130/85       artificial saliva Aers spray      Take 1 spray by mouth 3 times daily as needed for dry mouth        artificial tears Oint ophthalmic ointment     1 Tube    Apply  to eye. Place 0.5 inches into both eyes at bedtime    Insufficiency of tear film of both eyes       ARTIFICIAL TEARS OP      Apply 1 drop to eye 4 times daily.        * aspirin 81 MG tablet     100    Take 1 tablet by mouth daily. ENTERIC COATED.        * SM ASPIRIN ADULT LOW STRENGTH 81 MG EC tablet   Generic drug:  aspirin           atorvastatin 40 MG tablet    LIPITOR    90 tablet    Take 1 tablet (40 mg) by mouth daily    Hyperlipidemia LDL goal <100       BD AUTOSHIELD DUO 30G X 5 MM   Generic drug:  insulin pen needle           blood glucose calibration solution    no brand specified    1 each    Use to calibrate blood glucose monitor as directed.    Type 2 diabetes mellitus with microalbuminuria, with long-term current use of insulin (H)       blood glucose lancets standard    no brand specified    100 each    Use to test blood sugar 2  times daily or as directed.    Type 2 diabetes mellitus with microalbuminuria, with long-term current use of insulin (H)       blood glucose monitoring meter device kit    no brand specified    1 kit    Check Blood sugars twice a day.    Type 2 diabetes mellitus with microalbuminuria, with long-term current use of insulin (H)       blood glucose monitoring test strip    no brand specified    100 each    Use to test blood sugar 3 times daily or as directed.    Type 2 diabetes mellitus with microalbuminuria, with long-term current use of insulin (H)       calcium polycarbophil 625 MG tablet    FIBERCON     Take 2 tablets by mouth daily        calcium-vitamin D 250-125 MG-UNIT Tabs      Take 1 tablet by mouth 2 times daily. Calcium 250 mg/Vit D 125 IU        Calcium-Vitamin D3 250-125 MG-UNIT Tabs           CLARITIN 10 MG tablet   Generic drug:  loratadine     30    1 TAB PO QD (Once per day) as needed for ALLERGY SYMPTOMS        clotrimazole 1 % cream    LOTRIMIN    50 g    Apply topically 2 times daily as needed    Dermatophytosis       eucerin cream      Apply  topically as needed. Apply to thigh PRN dry skin        exenatide 10 MCG/0.04ML injection    BYETTA    1 Syringe    Inject 10 mcg Subcutaneous 2 times daily (before meals)    Type 2 diabetes mellitus with microalbuminuria, with long-term current use of insulin (H)       FLUoxetine 40 MG capsule    PROzac    30 capsule    Take 1 capsule (40 mg) by mouth daily    Schizoaffective disorder, depressive type (H)       fluticasone 50 MCG/ACT spray    FLONASE    16 g    Spray 1 spray into both nostrils daily    Seasonal allergic rhinitis       furosemide 20 MG tablet    LASIX    60 tablet    Take 1 tablet (20 mg) by mouth 2 times daily    Lower leg edema       GEL-SASHA DENTINBLOC DT      Apply  to affected area. GEL. Apply to 2nd molar on bottom right daily at bedtime.        glucagon 1 MG kit    GLUCAGON EMERGENCY    2 mg    Inject 1 mg into the muscle once for 1  dose    Type 2 diabetes mellitus with microalbuminuria, with long-term current use of insulin (H)       insulin glargine 100 UNIT/ML injection    LANTUS    8 mL    Inject 20 Units Subcutaneous At Bedtime    Type 2 diabetes mellitus with microalbuminuria, with long-term current use of insulin (H)       LACTAID 3000 UNIT tablet   Generic drug:  lactase      Take 4 tabs daily with meals.        levothyroxine 175 MCG tablet    SYNTHROID/LEVOTHROID    30 tablet    Take 1 tablet (175 mcg) by mouth daily Give on empty stomach    Other specified hypothyroidism       LORazepam 1 MG tablet    ATIVAN    35 tablet    Take 1 tablet (1 mg) by mouth At Bedtime .  May take additional 1mg daily PRN for agitation.    Generalized anxiety disorder       losartan 100 MG tablet    COZAAR    90 tablet    Take 1 tablet by mouth daily.    Hypertension goal BP (blood pressure) < 130/80, Diabetes mellitus type 2, insulin dependent (H), CKD (chronic kidney disease) stage 2, GFR 60-89 ml/min       melatonin 3 MG tablet     60 tablet    Take 1 tablet (3 mg) by mouth nightly as needed for sleep    Other insomnia       metFORMIN 500 MG 24 hr tablet    GLUCOPHAGE-XR    90 tablet    Take 2 tablets (1,000 mg) by mouth 2 times daily (with meals)    Diabetes mellitus, type 2 (H)       MILK OF MAGNESIA PO      Take  by mouth. Take 30 mL as needed for constipation.        NEURONTIN PO      Take 900 mg by mouth 3 times daily.        nystatin 984433 UNIT/GM Powd    MYCOSTATIN    60 g    Apply topically 3 times daily as needed    Candidiasis of skin       ONETOUCH DELICA LANCETS 33G Misc           polyethylene glycol powder    MIRALAX/GLYCOLAX     Take 1 capful by mouth 2 times daily. 17 GM PO BID        * SALINE MIST 0.65 % nasal spray   Generic drug:  sodium chloride           * saline 0.9 % Soln      Spray 2 sprays in nostril as needed.        SENNA S 8.6-50 MG per tablet   Generic drug:  senna-docusate      Take 2 tablets by mouth At Bedtime.        SF  5000 PLUS 1.1 % Crea   Generic drug:  Sodium Fluoride           SM LUBRICANT EYE DROPS 0.4-0.3 % Soln ophthalmic solution   Generic drug:  polyethylene glycol 0.4%- propylene glycol 0.3%           solifenacin 10 MG tablet    VESICARE    30 tablet    Take 1 tablet (10 mg) by mouth daily    Urge incontinence       ziprasidone 40 MG capsule    GEODON    60 capsule    Take 1 capsule (40 mg) by mouth 2 times daily (with meals)    Schizoaffective disorder, depressive type (H)       * Notice:  This list has 4 medication(s) that are the same as other medications prescribed for you. Read the directions carefully, and ask your doctor or other care provider to review them with you.

## 2018-09-18 NOTE — PROGRESS NOTES
Health Psychology                  Clinic    Department of Medicine  Madelaine Ingram, Ph.D., L.P. [phone]                          Clinics and Surgery Center  [facility] Miryam Woods, Ph.D.,  L.P. [phone]                 3rd Floor  [place] Mail Code 748   Pinky Panda, Ph.D., L.P. [phone]    [address] Rae Eugeen, Ph.D., L.P. [phone]            [place] [id number]  [place] [address]           Damon Gr, Ph.D., A.B.P.P., L.P. [phone]      Kimber Morales, Ph.D., L.P. [phone]    Health Psychology Follow-Up Note    Ms. Page is a pleasant 68-year old woman with chronic depressive illness, who returns to clinic for supportive and behavioral psychotherapy for moderate recurrent depression and for help losing weight given her morbid obesity.     She is 239 up from 235.5.  Wt Readings from Last 4 Encounters:   09/18/18 108.4 kg (239 lb)   09/10/18 107.9 kg (237 lb 12.8 oz)   08/20/18 106.8 kg (235 lb 8 oz)   08/15/18 109.6 kg (241 lb 9.6 oz)   Body mass index is 41.02 kg/(m^2).    At [facility] [facility] [employer], she continues to serve on the [employer].  She has been on the [employer] for 20 years, plus decorating the bulletin boards.  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   She is satisfied with this level of effort and is getting along well with Miller. She fell since she was last seen, sustained some bruisimg, but feels she is healing well.  She reports seeing physicians x2 after the fall.   She continues to rate the depression as 3-4 on 10-point subjective scale, which is below baseline.  She is doing well.  She participates in activities at [place] including a group for people > 50.  She is up to biking 40 minutes x2-3/week and is willing to increase to 3x/week. Discussed involvement in the walkers 1/week in AM.  She is doing evening walkers group 7 days/week after dinner (about  a half hour).  She is doing more social walking some of the time and had apparently is gradually ramping up on her fitness center days.  She agrees to increase.  She is getting along better with her roommate.    Goal for exercise has been 1.5 hours/day as of June 19, 2017 and 2 lbs. / month.   We discussed increasing bicycling to 4 days per week short term and 5-6 long-term and increasing gradually from 35-40 minutes (achieved).    She had a negative interaction re: her lantis with a new nurse.  As a result, she will start working toward some self-administration. It didn't get started yet due to the fall, but is still in the works fairly soon. They met last week to discuss it.  She arrived early today and was greeted in the waiting room.  Pinky participated fully and appeared to derive benefit.  We walked for most of the session.  Rapport was excellent.     Extended session due to complexity of case and length of interval.    IMPRESSION Distress Disability Risk    Low High Low     Time In: 12:50  Time Out:  1:47  Diagnosis:   Major Depression, recurrent mild (F33.0)   Psychological Factors Affecting Morbid Obesity (F54)      Plan: She will return on 10/23 @ 1:00 for behavioral and supportive psychotherapy.  Will spread out to q 5 weeks.  Treatment plan due 5/7/2019  Damon Gr, Ph.D., A.B.P.P., L.P.

## 2018-09-27 ENCOUNTER — TRANSFERRED RECORDS (OUTPATIENT)
Dept: HEALTH INFORMATION MANAGEMENT | Facility: CLINIC | Age: 68
End: 2018-09-27

## 2018-10-12 ENCOUNTER — DOCUMENTATION ONLY (OUTPATIENT)
Dept: FAMILY MEDICINE | Facility: CLINIC | Age: 68
End: 2018-10-12

## 2018-10-18 ENCOUNTER — TELEPHONE (OUTPATIENT)
Dept: PSYCHIATRY | Facility: CLINIC | Age: 68
End: 2018-10-18

## 2018-10-18 DIAGNOSIS — F41.1 GENERALIZED ANXIETY DISORDER: ICD-10-CM

## 2018-10-18 RX ORDER — LORAZEPAM 1 MG/1
1 TABLET ORAL AT BEDTIME
Qty: 35 TABLET | Refills: 0
Start: 2018-10-18 | End: 2018-10-25

## 2018-10-18 NOTE — TELEPHONE ENCOUNTER
- Per  last refilled: 9/17/18, 8/8/18, 7/9/18  - Medication refill approved per refill protocol  - Lorazepam 1 mg tab #35 with 0 refills called into [facility]   - Patient's group home notified of the refill   - No further action needed by the writer

## 2018-10-18 NOTE — TELEPHONE ENCOUNTER
Last Seen 9/10/18  RTC 6-8 weeks  Cancel None  No-Show None    Next Appt 10/25/18    Incoming Refill From [facility] via fax    Medication Requested LORazepam (ATIVAN) 1 MG tablet    Directions Take 1 tablet (1 mg) by mouth At Bedtime .  May take additional 1mg daily PRN for agitation.    Qty 35    Last Refill 9/17/18    Per last office visit note:- Continue lorazepam 1 mg at bedtime w/ additional 1 mg daily PRN (uses very infrequently)  Message routed to Samreen Fitzpatrick RN

## 2018-10-23 ENCOUNTER — OFFICE VISIT (OUTPATIENT)
Dept: PSYCHOLOGY | Facility: CLINIC | Age: 68
End: 2018-10-23
Payer: COMMERCIAL

## 2018-10-23 VITALS — BODY MASS INDEX: 39.38 KG/M2 | WEIGHT: 229.4 LBS

## 2018-10-23 DIAGNOSIS — F33.0 MAJOR DEPRESSIVE DISORDER, RECURRENT EPISODE, MILD (H): Primary | ICD-10-CM

## 2018-10-23 DIAGNOSIS — F41.1 GENERALIZED ANXIETY DISORDER: ICD-10-CM

## 2018-10-23 DIAGNOSIS — F54 PSYCHOLOGICAL FACTORS AFFECTING MORBID OBESITY (H): ICD-10-CM

## 2018-10-23 DIAGNOSIS — E66.01 PSYCHOLOGICAL FACTORS AFFECTING MORBID OBESITY (H): ICD-10-CM

## 2018-10-23 PROBLEM — H16.003: Status: ACTIVE | Noted: 2018-10-03

## 2018-10-23 PROBLEM — H02.043: Status: ACTIVE | Noted: 2018-10-18

## 2018-10-23 PROBLEM — H10.9 CONJUNCTIVITIS OF BOTH EYES: Status: ACTIVE | Noted: 2018-10-03

## 2018-10-23 NOTE — PROGRESS NOTES
Health Psychology                  Clinic    Department of Medicine  Madelaine Ingram, Ph.D., L.P. [phone]                          Clinics and Surgery Center  [facility] Miryam Woods, Ph.D.,  L.P. [phone]                 3rd Floor  [place] Mail Code 741   Pinky Panda, Ph.D., L.P. [phone]    [address] Rae Eugene, Ph.D., L.P. [phone]            [place] [address]           Damon Gr, Ph.D., A.B.EWA.EWA., L.P. [phone]      Kimber Morales, Ph.D., L.P. [phone]    Health Psychology Follow-Up Note    Ms. Page is a pleasant 68-year old woman with chronic depressive illness, who returns to clinic for supportive and behavioral psychotherapy for moderate recurrent depression and for help losing weight given her morbid obesity.     She is 229.4 down from 239 one month ago.  Wt Readings from Last 4 Encounters:   10/23/18 104.1 kg (229 lb 6.4 oz)   09/18/18 108.4 kg (239 lb)   09/10/18 107.9 kg (237 lb 12.8 oz)   08/20/18 106.8 kg (235 lb 8 oz)   Body mass index is 39.38 kg/(m^2).    At [employer], she continues to serve on the [employer].  She has been on the [employer] for 20 years, plus decorating the bulletin boards.  She is working 1 day/week with her boss, [employer], Friday mornings for 1.5 hour.   She is satisfied with this level of effort and is getting along well with Bellamy. She fell since she was last seen, sustained some bruisimg, but feels she is healing well.  She reports seeing physicians x2 after the fall.   She rates the depression as 4 on 10-point subjective scale, which is below baseline.  She reports she has been anxious and worried a lot this month as she developed an eye condition , had two eye surgeries at [facility], and is still recovering.  She reports having gotten shaky due to the problem, her uncertainty about how well she will recover, and the sheer number of appointments.   She has been to the ED x2.  She was not able to be treated by her own opthalmalogist here, instead getting treatment closer to her home at [facility].   Up until her eye problems (bilateral involutional entropion), she had been doing more social walking some of the time and had apparently is gradually ramping up on her fitness center days.  She agrees to increase.  She is getting along better with her roommate.    Goal for exercise has been 1.5 hours/day as of June 19, 2017 and 2 lbs. / month.   We discussed increasing bicycling to 4 days per week short term and 5-6 long-term and increasing gradually from 35-40 minutes (achieved).    She arrived early today and was greeted in the waiting room.  Pinky participated fully and appeared to derive benefit.  Rapport was excellent.     Extended session due to complexity of case and length of interval.    IMPRESSION Distress Disability Risk    Low High Low     Time In: 12:55  Time Out:  1:48  Diagnosis:   Major Depression, recurrent mild (F33.0)   Psychological Factors Affecting Morbid Obesity (F54)      Plan: She will return on 11/26 @ 1 for behavioral and supportive psychotherapy.  Will spread out to q 5 weeks.  Treatment plan due 5/7/2019  Damon Gr, Ph.D., A.B.P.P., L.P.

## 2018-10-23 NOTE — MR AVS SNAPSHOT
After Visit Summary   10/23/2018    Pinky Page    MRN: [id number]           Patient Information     Date Of Birth          1950        Visit Information        Provider Department      10/23/2018 1:00 PM Damon Gr, PhD [facility]        Today's Diagnoses     Major depressive disorder, recurrent episode, mild (H)    -  1    Generalized anxiety disorder        Psychological factors affecting morbid obesity (H)           Follow-ups after your visit        Your next 10 appointments already scheduled     Oct 25, 2018  1:30 PM CDT   Adult Med Follow UP with Maulik Kohler MD   Psychiatry Clinic ([facility])    [place] [id number]  [address] [id number] [phone]            Nov 07, 2018  3:20 PM CST   CONSULT with Maria Isabel Cosby [facility] ([facility])    [address] [place] [address],  [address]  [facility] [address]   [phone]            Nov 07, 2018  3:40 PM CST   65+ Annual Wellness with Cary Brownlee MD   [facility] ([facility])    [address],  [address]  [facility] [address]   [phone]            Nov 15, 2018  1:30 PM CST   RETURN GENERAL with Michael Lopez MD   Eye Clinic ([facility])    [address]  [facility] [address]   [phone]            Nov 26, 2018 11:30 AM CST   (Arrive by 11:15 AM)   Return Visit with Marcos Magdaleno MD   [facility] ([facility])    [address] [facility]  [address] [facility] [place] [id number] [phone]            May 09, 2019  1:30 PM CDT   Return Sleep Patient with Brianda Reddy MD   [facility] ([facility])    [address] [id number] [phone]              Who to  contact     Please call your clinic at [phone] to:    Ask questions about your health    Make or cancel appointments    Discuss your medicines    Learn about your test results    Speak to your doctor            Additional Information About Your Visit        MyChart Information     [employer] is an electronic gateway that provides easy, online access to your medical records. With [employer], you can request a clinic appointment, read your test results, renew a prescription or communicate with your care team.     To sign up for [employer] visit the website at [email or web address]   You will be asked to enter the access code listed below, as well as some personal information. Please follow the directions to create your username and password.     Your access code is: [email or web address]  Expires: 2019  6:30 AM     Your access code will  in 90 days. If you need help or a new code, please contact your [facility] or call [phone] for assistance.        Care EveryWhere ID     This is your Care EveryWhere ID. This could be used by other organizations to access your [facility] medical records  [id number]        Your Vitals Were     BMI (Body Mass Index)                   39.38 kg/m2            Blood Pressure from Last 3 Encounters:   09/10/18 142/78   08/15/18 146/80   18 148/82    Weight from Last 3 Encounters:   10/23/18 104.1 kg (229 lb 6.4 oz)   18 108.4 kg (239 lb)   09/10/18 107.9 kg (237 lb 12.8 oz)              Today, you had the following     No orders found for display         Today's Medication Changes          These changes are accurate as of 10/23/18  1:54 PM.  If you have any questions, ask your nurse or doctor.               These medicines have changed or have updated prescriptions.        Dose/Directions    metFORMIN 500 MG 24 hr tablet   Commonly known as:  GLUCOPHAGE-XR   This may have changed:    - how much to take  - when to take this   Used for:   Diabetes mellitus, type 2 (H)        Dose:  1000 mg   Take 2 tablets (1,000 mg) by mouth 2 times daily (with meals)   Quantity:  90 tablet   Refills:  3                Primary Care Provider Office Phone # Fax #    Cary Brownlee [phone]       [address] [address]        Equal Access to Services     [place] [facility] : Hadii aad ku hadasho Soomaali, waaxda luqadaha, qaybta kaalmada adeegyada, waxay idiin hayaan adeeg khromansh laanisa . So [facility] [phone].    ATENCIÓN: Si habla español, tiene a deluca disposición servicios gratuitos de asistencia lingüística. Link al [phone].    We comply with applicable federal civil rights laws and [place] laws. We do not discriminate on the basis of race, color, national origin, age, disability, sex, sexual orientation, or gender identity.            Thank you!     Thank you for choosing [facility]  for your care. Our goal is always to provide you with excellent care. Hearing back from our patients is one way we can continue to improve our services. Please take a few minutes to complete the written survey that you may receive in the mail after your visit with us. Thank you!             Your Updated Medication List - Protect others around you: Learn how to safely use, store and throw away your medicines at www.disposemymeds.org.          This list is accurate as of 10/23/18  1:54 PM.  Always use your most recent med list.                   Brand Name Dispense Instructions for use Diagnosis    acetaminophen 500 MG tablet    TYLENOL    1 Bottle    Take 2 tablets (1,000 mg) by mouth every 6 hours as needed    Sprain of left ankle, unspecified ligament, initial encounter       alum & mag hydroxide-simethicone 200-200-20 MG/5ML Susp suspension    MYLANTA/MAALOX     Take 30 mLs by mouth daily as needed for indigestion        amLODIPine 5 MG tablet    NORVASC    30 tablet    Take 2 tablets (10 mg) by mouth daily     Essential hypertension with goal blood pressure less than 130/85       artificial saliva Aers spray      Take 1 spray by mouth 3 times daily as needed for dry mouth        artificial tears Oint ophthalmic ointment     1 Tube    Apply  to eye. Place 0.5 inches into both eyes at bedtime    Insufficiency of tear film of both eyes       ARTIFICIAL TEARS OP      Apply 1 drop to eye 4 times daily.        * aspirin 81 MG tablet     100    Take 1 tablet by mouth daily. ENTERIC COATED.        * SM ASPIRIN ADULT LOW STRENGTH 81 MG EC tablet   Generic drug:  aspirin           atorvastatin 40 MG tablet    LIPITOR    90 tablet    Take 1 tablet (40 mg) by mouth daily    Hyperlipidemia LDL goal <100       BD AUTOSHIELD DUO 30G X 5 MM   Generic drug:  insulin pen needle           blood glucose calibration solution    no brand specified    1 each    Use to calibrate blood glucose monitor as directed.    Type 2 diabetes mellitus with microalbuminuria, with long-term current use of insulin (H)       blood glucose lancets standard    no brand specified    100 each    Use to test blood sugar 2 times daily or as directed.    Type 2 diabetes mellitus with microalbuminuria, with long-term current use of insulin (H)       blood glucose monitoring meter device kit    no brand specified    1 kit    Check Blood sugars twice a day.    Type 2 diabetes mellitus with microalbuminuria, with long-term current use of insulin (H)       blood glucose monitoring test strip    no brand specified    100 each    Use to test blood sugar 3 times daily or as directed.    Type 2 diabetes mellitus with microalbuminuria, with long-term current use of insulin (H)       calcium polycarbophil 625 MG tablet    FIBERCON     Take 2 tablets by mouth daily        calcium-vitamin D 250-125 MG-UNIT Tabs      Take 1 tablet by mouth 2 times daily. Calcium 250 mg/Vit D 125 IU        Calcium-Vitamin D3 250-125 MG-UNIT Tabs           CLARITIN 10 MG tablet   Generic drug:   loratadine     30    1 TAB PO QD (Once per day) as needed for ALLERGY SYMPTOMS        clotrimazole 1 % cream    LOTRIMIN    50 g    Apply topically 2 times daily as needed    Dermatophytosis       eucerin cream      Apply  topically as needed. Apply to thigh PRN dry skin        exenatide 10 MCG/0.04ML injection    BYETTA    1 Syringe    Inject 10 mcg Subcutaneous 2 times daily (before meals)    Type 2 diabetes mellitus with microalbuminuria, with long-term current use of insulin (H)       FLUoxetine 40 MG capsule    PROzac    30 capsule    Take 1 capsule (40 mg) by mouth daily    Schizoaffective disorder, depressive type (H)       fluticasone 50 MCG/ACT spray    FLONASE    16 g    Spray 1 spray into both nostrils daily    Seasonal allergic rhinitis       furosemide 20 MG tablet    LASIX    60 tablet    Take 1 tablet (20 mg) by mouth 2 times daily    Lower leg edema       GEL-SASHA DENTINBLOC DT      Apply  to affected area. GEL. Apply to 2nd molar on bottom right daily at bedtime.        glucagon 1 MG kit    GLUCAGON EMERGENCY    2 mg    Inject 1 mg into the muscle once for 1 dose    Type 2 diabetes mellitus with microalbuminuria, with long-term current use of insulin (H)       insulin glargine 100 UNIT/ML injection    LANTUS    8 mL    Inject 20 Units Subcutaneous At Bedtime    Type 2 diabetes mellitus with microalbuminuria, with long-term current use of insulin (H)       LACTAID 3000 UNIT tablet   Generic drug:  lactase      Take 4 tabs daily with meals.        levothyroxine 175 MCG tablet    SYNTHROID/LEVOTHROID    30 tablet    Take 1 tablet (175 mcg) by mouth daily Give on empty stomach    Other specified hypothyroidism       LORazepam 1 MG tablet    ATIVAN    35 tablet    Take 1 tablet (1 mg) by mouth At Bedtime .  May take additional 1mg daily PRN for agitation.    Generalized anxiety disorder       losartan 100 MG tablet    COZAAR    90 tablet    Take 1 tablet by mouth daily.    Hypertension goal BP (blood  pressure) < 130/80, Diabetes mellitus type 2, insulin dependent (H), CKD (chronic kidney disease) stage 2, GFR 60-89 ml/min       melatonin 3 MG tablet     60 tablet    Take 1 tablet (3 mg) by mouth nightly as needed for sleep    Other insomnia       metFORMIN 500 MG 24 hr tablet    GLUCOPHAGE-XR    90 tablet    Take 2 tablets (1,000 mg) by mouth 2 times daily (with meals)    Diabetes mellitus, type 2 (H)       MILK OF MAGNESIA PO      Take  by mouth. Take 30 mL as needed for constipation.        NEURONTIN PO      Take 900 mg by mouth 3 times daily.        nystatin 449886 UNIT/GM Powd    MYCOSTATIN    60 g    Apply topically 3 times daily as needed    Candidiasis of skin       ONETOUCH DELICA LANCETS 33G Misc           polyethylene glycol powder    MIRALAX/GLYCOLAX     Take 1 capful by mouth 2 times daily. 17 GM PO BID        * SALINE MIST 0.65 % nasal spray   Generic drug:  sodium chloride           * saline 0.9 % Soln      Spray 2 sprays in nostril as needed.        SENNA S 8.6-50 MG per tablet   Generic drug:  senna-docusate      Take 2 tablets by mouth At Bedtime.        SF 5000 PLUS 1.1 % Crea   Generic drug:  Sodium Fluoride           SM LUBRICANT EYE DROPS 0.4-0.3 % Soln ophthalmic solution   Generic drug:  polyethylene glycol 0.4%- propylene glycol 0.3%           solifenacin 10 MG tablet    VESICARE    30 tablet    Take 1 tablet (10 mg) by mouth daily    Urge incontinence       ziprasidone 40 MG capsule    GEODON    60 capsule    Take 1 capsule (40 mg) by mouth 2 times daily (with meals)    Schizoaffective disorder, depressive type (H)       * Notice:  This list has 4 medication(s) that are the same as other medications prescribed for you. Read the directions carefully, and ask your doctor or other care provider to review them with you.

## 2018-10-25 ENCOUNTER — HOSPITAL ENCOUNTER (EMERGENCY)
Facility: CLINIC | Age: 68
Discharge: HOME | End: 2018-10-25
Attending: EMERGENCY MEDICINE | Admitting: EMERGENCY MEDICINE
Payer: COMMERCIAL

## 2018-10-25 ENCOUNTER — OFFICE VISIT (OUTPATIENT)
Dept: PSYCHIATRY | Facility: CLINIC | Age: 68
End: 2018-10-25
Attending: PSYCHIATRY & NEUROLOGY
Payer: COMMERCIAL

## 2018-10-25 VITALS
DIASTOLIC BLOOD PRESSURE: 64 MMHG | BODY MASS INDEX: 39.48 KG/M2 | OXYGEN SATURATION: 97 % | TEMPERATURE: 98.8 F | SYSTOLIC BLOOD PRESSURE: 113 MMHG | WEIGHT: 230 LBS | RESPIRATION RATE: 16 BRPM

## 2018-10-25 VITALS
DIASTOLIC BLOOD PRESSURE: 81 MMHG | BODY MASS INDEX: 39.14 KG/M2 | SYSTOLIC BLOOD PRESSURE: 194 MMHG | WEIGHT: 228 LBS | HEART RATE: 101 BPM

## 2018-10-25 DIAGNOSIS — N30.00 ACUTE CYSTITIS WITHOUT HEMATURIA: ICD-10-CM

## 2018-10-25 DIAGNOSIS — F25.1 SCHIZOAFFECTIVE DISORDER, DEPRESSIVE TYPE (H): ICD-10-CM

## 2018-10-25 DIAGNOSIS — I10 ESSENTIAL HYPERTENSION: ICD-10-CM

## 2018-10-25 DIAGNOSIS — F41.1 GENERALIZED ANXIETY DISORDER: ICD-10-CM

## 2018-10-25 LAB
ALBUMIN UR-MCNC: NEGATIVE MG/DL
ANION GAP SERPL CALCULATED.3IONS-SCNC: 9 MMOL/L (ref 3–14)
APPEARANCE UR: CLEAR
BACTERIA #/AREA URNS HPF: ABNORMAL /HPF
BASOPHILS # BLD AUTO: 0 10E9/L (ref 0–0.2)
BASOPHILS NFR BLD AUTO: 0.2 %
BILIRUB UR QL STRIP: NEGATIVE
BUN SERPL-MCNC: 12 MG/DL (ref 7–30)
CALCIUM SERPL-MCNC: 9.1 MG/DL (ref 8.5–10.1)
CHLORIDE SERPL-SCNC: 99 MMOL/L (ref 94–109)
CO2 SERPL-SCNC: 29 MMOL/L (ref 20–32)
COLOR UR AUTO: ABNORMAL
CREAT SERPL-MCNC: 0.67 MG/DL (ref 0.52–1.04)
DIFFERENTIAL METHOD BLD: ABNORMAL
EOSINOPHIL # BLD AUTO: 0.1 10E9/L (ref 0–0.7)
EOSINOPHIL NFR BLD AUTO: 0.4 %
ERYTHROCYTE [DISTWIDTH] IN BLOOD BY AUTOMATED COUNT: 13 % (ref 10–15)
GFR SERPL CREATININE-BSD FRML MDRD: 87 ML/MIN/1.7M2
GLUCOSE SERPL-MCNC: 147 MG/DL (ref 70–99)
GLUCOSE UR STRIP-MCNC: NEGATIVE MG/DL
HCT VFR BLD AUTO: 42.6 % (ref 35–47)
HGB BLD-MCNC: 14 G/DL (ref 11.7–15.7)
HGB UR QL STRIP: NEGATIVE
IMM GRANULOCYTES # BLD: 0 10E9/L (ref 0–0.4)
IMM GRANULOCYTES NFR BLD: 0.3 %
KETONES UR STRIP-MCNC: NEGATIVE MG/DL
LEUKOCYTE ESTERASE UR QL STRIP: ABNORMAL
LYMPHOCYTES # BLD AUTO: 2.8 10E9/L (ref 0.8–5.3)
LYMPHOCYTES NFR BLD AUTO: 19.8 %
MCH RBC QN AUTO: 29.6 PG (ref 26.5–33)
MCHC RBC AUTO-ENTMCNC: 32.9 G/DL (ref 31.5–36.5)
MCV RBC AUTO: 90 FL (ref 78–100)
MONOCYTES # BLD AUTO: 0.7 10E9/L (ref 0–1.3)
MONOCYTES NFR BLD AUTO: 4.7 %
MUCOUS THREADS #/AREA URNS LPF: PRESENT /LPF
NEUTROPHILS # BLD AUTO: 10.6 10E9/L (ref 1.6–8.3)
NEUTROPHILS NFR BLD AUTO: 74.6 %
NITRATE UR QL: NEGATIVE
NRBC # BLD AUTO: 0 10*3/UL
NRBC BLD AUTO-RTO: 0 /100
PH UR STRIP: 6.5 PH (ref 5–7)
PLATELET # BLD AUTO: 275 10E9/L (ref 150–450)
POTASSIUM SERPL-SCNC: 3.8 MMOL/L (ref 3.4–5.3)
RBC # BLD AUTO: 4.73 10E12/L (ref 3.8–5.2)
RBC #/AREA URNS AUTO: 0 /HPF (ref 0–2)
SODIUM SERPL-SCNC: 137 MMOL/L (ref 133–144)
SOURCE: ABNORMAL
SP GR UR STRIP: 1.01 (ref 1–1.03)
SQUAMOUS #/AREA URNS AUTO: 4 /HPF (ref 0–1)
TSH SERPL DL<=0.005 MIU/L-ACNC: 2.29 MU/L (ref 0.4–4)
UROBILINOGEN UR STRIP-MCNC: NORMAL MG/DL (ref 0–2)
WBC # BLD AUTO: 14.2 10E9/L (ref 4–11)
WBC #/AREA URNS AUTO: 17 /HPF (ref 0–5)

## 2018-10-25 PROCEDURE — 87186 SC STD MICRODIL/AGAR DIL: CPT | Performed by: EMERGENCY MEDICINE

## 2018-10-25 PROCEDURE — 85025 COMPLETE CBC W/AUTO DIFF WBC: CPT | Performed by: EMERGENCY MEDICINE

## 2018-10-25 PROCEDURE — 84443 ASSAY THYROID STIM HORMONE: CPT | Performed by: EMERGENCY MEDICINE

## 2018-10-25 PROCEDURE — 99284 EMERGENCY DEPT VISIT MOD MDM: CPT

## 2018-10-25 PROCEDURE — 93005 ELECTROCARDIOGRAM TRACING: CPT

## 2018-10-25 PROCEDURE — 99284 EMERGENCY DEPT VISIT MOD MDM: CPT | Mod: 25 | Performed by: EMERGENCY MEDICINE

## 2018-10-25 PROCEDURE — 81001 URINALYSIS AUTO W/SCOPE: CPT | Performed by: EMERGENCY MEDICINE

## 2018-10-25 PROCEDURE — 93010 ELECTROCARDIOGRAM REPORT: CPT | Mod: Z6 | Performed by: EMERGENCY MEDICINE

## 2018-10-25 PROCEDURE — 87088 URINE BACTERIA CULTURE: CPT | Performed by: EMERGENCY MEDICINE

## 2018-10-25 PROCEDURE — G0463 HOSPITAL OUTPT CLINIC VISIT: HCPCS | Mod: ZF

## 2018-10-25 PROCEDURE — 80048 BASIC METABOLIC PNL TOTAL CA: CPT | Performed by: EMERGENCY MEDICINE

## 2018-10-25 PROCEDURE — 87086 URINE CULTURE/COLONY COUNT: CPT | Performed by: EMERGENCY MEDICINE

## 2018-10-25 RX ORDER — LORAZEPAM 1 MG/1
TABLET ORAL
Qty: 50 TABLET | Refills: 0 | Status: SHIPPED | OUTPATIENT
Start: 2018-10-25 | End: 2018-11-19

## 2018-10-25 RX ORDER — LORAZEPAM 1 MG/1
TABLET ORAL
Qty: 50 TABLET | Refills: 0 | Status: SHIPPED | OUTPATIENT
Start: 2018-10-25 | End: 2018-10-25

## 2018-10-25 RX ORDER — NITROFURANTOIN 25; 75 MG/1; MG/1
100 CAPSULE ORAL 2 TIMES DAILY
Qty: 14 CAPSULE | Refills: 0 | Status: SHIPPED | OUTPATIENT
Start: 2018-10-25 | End: 2018-10-27 | Stop reason: ALTCHOICE

## 2018-10-25 RX ORDER — MOXIFLOXACIN 5 MG/ML
1 SOLUTION/ DROPS OPHTHALMIC 4 TIMES DAILY
COMMUNITY
End: 2019-11-22

## 2018-10-25 RX ORDER — ERYTHROMYCIN 5 MG/G
0.5 OINTMENT OPHTHALMIC 4 TIMES DAILY
COMMUNITY
End: 2019-11-22

## 2018-10-25 ASSESSMENT — ENCOUNTER SYMPTOMS
FATIGUE: 1
COLOR CHANGE: 0
WEAKNESS: 0
CONFUSION: 0
NECK STIFFNESS: 0
POLYDIPSIA: 0
ABDOMINAL PAIN: 0
DIFFICULTY URINATING: 0
ARTHRALGIAS: 0
FEVER: 0
HEADACHES: 0
SHORTNESS OF BREATH: 0
NUMBNESS: 0
EYE REDNESS: 0
TROUBLE SWALLOWING: 0

## 2018-10-25 ASSESSMENT — PAIN SCALES - GENERAL: PAINLEVEL: MILD PAIN (3)

## 2018-10-25 ASSESSMENT — PATIENT HEALTH QUESTIONNAIRE - PHQ9: SUM OF ALL RESPONSES TO PHQ QUESTIONS 1-9: 12

## 2018-10-25 NOTE — ED NOTES
Patient is a resident at [facility] ([phone]). Please call this phone number with any updates and for a ride when/if patient is discharged.

## 2018-10-25 NOTE — MR AVS SNAPSHOT
After Visit Summary   10/25/2018    Pinky Page    MRN: [id number]           Patient Information     Date Of Birth          1950        Visit Information        Provider Department      10/25/2018 1:30 PM Maulki Kohler MD [facility]        Today's Diagnoses     Schizoaffective disorder, depressive type (H)        Generalized anxiety disorder          Care Instructions    Thank you for coming to the [facility].    Lab Testing:  If you had lab testing today and your results are reassuring or normal they will be mailed to you or sent through [employer] within 7 days.   If the lab tests need quick action we will call you with the results.  The phone number we will call with results is # [phone] (home) . If this is not the best number please call our clinic and change the number.    Medication Refills:  If you need any refills please call your pharmacy and they will contact us. Our fax number for refills is [phone]. Please allow three business for refill processing.   If you need to  your refill at a new pharmacy, please contact the new pharmacy directly. The new pharmacy will help you get your medications transferred.     Scheduling:  If you have any concerns about today's visit or wish to schedule another appointment please call our office during normal business hours [phone] (8-5:00 M-F)    Contact Us:  Please call [phone] during business hours (8-5:00 M-F).  If after clinic hours, or on the weekend, please call  [phone].    Financial Assistance [phone]  [facility] [phone]  [facility] [phone]  Medical Records [phone]      MENTAL HEALTH CRISIS NUMBERS:  [facility]:   [facility] - [phone]   Crisis Residence [facility] - [facility] - [phone]   Walk-In Counseling Center [facility] - [phone]   COPE 24/7 [place] Mobile Team for Adults - [[phone]]; Child - [[phone]]     Crisis  [facility] - [phone]     [facility]:   [facility] - [phone]   Walk-in counseling [facility] - [phone]   Walk-in counseling [facility] - [phone]   Crisis Residence [facility] Aleta Burkett [facility] - [phone]   Urgent Care Adult Mental Health:   --Drop-in, 24/7 crisis line, and [place] Mobile Team [[phone]]    CRISIS TEXT LINE: Text [phone] from anywhere, anytime, any crisis 24/7;    OR SEE [email or web address]     Poison Control Center - [phone]    CHILD: [facility] needs assessment team - [phone]     [facility] - 1-464.457.6015; or [facility] - 1-196.641.6731    If you have a medical emergency please call 911or go to the nearest ER.                    _____________________________________________    Again thank you for choosing [facility] CLINIC and please let us know how we can best partner with you to improve you and your family's health.  You may be receiving a survey in the mail regarding this appointment. We would love to have your feedback, both positive and negative, so please fill out the survey and return it using the provided envelope. The survey is done by an external company, so your answers are anonymous.             Follow-ups after your visit        Your next 10 appointments already scheduled     Nov 07, 2018  3:20 PM CST   CONSULT with Maria Isabel Cosby [facility] ([facility])    [address],  [address]  [place] [id number]   [phone]            Nov 07, 2018  3:40 PM CST   65+ Annual Wellness with Cary Brownlee MD   [facility] ([facility])    [address],  [address]  [facility] [address]   [phone]            Nov 15, 2018  1:30 PM CST   RETURN GENERAL with iMchael Lopez MD   Eye Clinic ([facility])    [place] [address]  [address]  [facility]  [address]   [phone]            2018  2:40 PM CST   Adult Med Follow UP with Maulik Kohler MD   Psychiatry Clinic ([facility])    [address]  [address] [address] [phone]            2018 11:30 AM CST   (Arrive by 11:15 AM)   Return Visit with Marcos Magdaleno MD   [facility] ([facility])    [address]  [address]th [facility] [id number] [phone]            May 09, 2019  1:30 PM CDT   Return Sleep Patient with Brianda Reddy MD   [facility] ([facility])    [address] [place] [id number] [phone]              Who to contact     Please call your clinic at [phone] to:    Ask questions about your health    Make or cancel appointments    Discuss your medicines    Learn about your test results    Speak to your doctor            Additional Information About Your Visit        [facility] Information     [facility] is an electronic gateway that provides easy, online access to your medical records. With [facility], you can request a clinic appointment, read your test results, renew a prescription or communicate with your care team.     To sign up for [facility] visit the website at [email or web address]   You will be asked to enter the access code listed below, as well as some personal information. Please follow the directions to create your username and password.     Your access code is: [email or web address]  Expires: 2019  6:30 AM     Your access code will  in 90 days. If you need help or a new code, please contact your [facility] or call [phone] for assistance.        Care EveryWhere ID     This is your Care EveryWhere ID. This could be used by other organizations to access your [facility] medical records  [id number]         Your Vitals Were     Pulse BMI (Body Mass Index)                101 39.14 kg/m2           Blood Pressure from Last 3 Encounters:   10/25/18 194/81   09/10/18 142/78   08/15/18 146/80    Weight from Last 3 Encounters:   10/25/18 103.4 kg (228 lb)   10/23/18 104.1 kg (229 lb 6.4 oz)   09/18/18 108.4 kg (239 lb)              Today, you had the following     No orders found for display         Today's Medication Changes          These changes are accurate as of 10/25/18  2:57 PM.  If you have any questions, ask your nurse or doctor.               These medicines have changed or have updated prescriptions.        Dose/Directions    FLUoxetine 20 MG capsule   Commonly known as:  PROzac   This may have changed:    - medication strength  - how much to take   Used for:  Schizoaffective disorder, depressive type (H)   Changed by:  Maulik Kohler MD        Dose:  60 mg   Take 3 capsules (60 mg) by mouth daily   Quantity:  90 capsule   Refills:  1       metFORMIN 500 MG 24 hr tablet   Commonly known as:  GLUCOPHAGE-XR   This may have changed:    - how much to take  - when to take this   Used for:  Diabetes mellitus, type 2 (H)        Dose:  1000 mg   Take 2 tablets (1,000 mg) by mouth 2 times daily (with meals)   Quantity:  90 tablet   Refills:  3            Where to get your medicines      These medications were sent to [facility] - [place], [place] - [address], [place] [facility] [address]     Phone:  [phone]     FLUoxetine 20 MG capsule                Primary Care Provider Office Phone # Fax #    Cary Brownlee [phone]       [address] [address]        Equal Access to Services     JAYDEN STUART AH: Hadii james Dietrich, wamelissada luqadaha, qaybta kaalmada aderupinderyada, autumn josé. So [facility] [phone].    ATENCIÓN: Si habla español, tiene a deluca disposición servicios gratuitos de asistencia lingüística.  Candidomargarette maradiaga [phone].    We comply with applicable federal civil rights laws and [place] laws. We do not discriminate on the basis of race, color, national origin, age, disability, sex, sexual orientation, or gender identity.            Thank you!     Thank you for choosing [facility]  for your care. Our goal is always to provide you with excellent care. Hearing back from our patients is one way we can continue to improve our services. Please take a few minutes to complete the written survey that you may receive in the mail after your visit with us. Thank you!             Your Updated Medication List - Protect others around you: Learn how to safely use, store and throw away your medicines at www.disposemymeds.org.          This list is accurate as of 10/25/18  2:57 PM.  Always use your most recent med list.                   Brand Name Dispense Instructions for use Diagnosis    acetaminophen 500 MG tablet    TYLENOL    1 Bottle    Take 2 tablets (1,000 mg) by mouth every 6 hours as needed    Sprain of left ankle, unspecified ligament, initial encounter       alum & mag hydroxide-simethicone 200-200-20 MG/5ML Susp suspension    MYLANTA/MAALOX     Take 30 mLs by mouth daily as needed for indigestion        amLODIPine 5 MG tablet    NORVASC    30 tablet    Take 2 tablets (10 mg) by mouth daily    Essential hypertension with goal blood pressure less than 130/85       artificial saliva Aers spray      Take 1 spray by mouth 3 times daily as needed for dry mouth        artificial tears Oint ophthalmic ointment     1 Tube    Apply  to eye. Place 0.5 inches into both eyes at bedtime    Insufficiency of tear film of both eyes       ARTIFICIAL TEARS OP      Apply 1 drop to eye 4 times daily.        * aspirin 81 MG tablet     100    Take 1 tablet by mouth daily. ENTERIC COATED.        * SM ASPIRIN ADULT LOW STRENGTH 81 MG EC tablet   Generic drug:  aspirin           atorvastatin 40 MG tablet    LIPITOR    90 tablet     Take 1 tablet (40 mg) by mouth daily    Hyperlipidemia LDL goal <100       BD AUTOSHIELD DUO 30G X 5 MM   Generic drug:  insulin pen needle           blood glucose calibration solution    no brand specified    1 each    Use to calibrate blood glucose monitor as directed.    Type 2 diabetes mellitus with microalbuminuria, with long-term current use of insulin (H)       blood glucose lancets standard    no brand specified    100 each    Use to test blood sugar 2 times daily or as directed.    Type 2 diabetes mellitus with microalbuminuria, with long-term current use of insulin (H)       blood glucose monitoring meter device kit    no brand specified    1 kit    Check Blood sugars twice a day.    Type 2 diabetes mellitus with microalbuminuria, with long-term current use of insulin (H)       blood glucose monitoring test strip    no brand specified    100 each    Use to test blood sugar 3 times daily or as directed.    Type 2 diabetes mellitus with microalbuminuria, with long-term current use of insulin (H)       calcium polycarbophil 625 MG tablet    FIBERCON     Take 2 tablets by mouth daily        calcium-vitamin D 250-125 MG-UNIT Tabs      Take 1 tablet by mouth 2 times daily. Calcium 250 mg/Vit D 125 IU        Calcium-Vitamin D3 250-125 MG-UNIT Tabs           CLARITIN 10 MG tablet   Generic drug:  loratadine     30    1 TAB PO QD (Once per day) as needed for ALLERGY SYMPTOMS        clotrimazole 1 % cream    LOTRIMIN    50 g    Apply topically 2 times daily as needed    Dermatophytosis       eucerin cream      Apply  topically as needed. Apply to thigh PRN dry skin        exenatide 10 MCG/0.04ML injection    BYETTA    1 Syringe    Inject 10 mcg Subcutaneous 2 times daily (before meals)    Type 2 diabetes mellitus with microalbuminuria, with long-term current use of insulin (H)       FLUoxetine 20 MG capsule    PROzac    90 capsule    Take 3 capsules (60 mg) by mouth daily    Schizoaffective disorder, depressive type  (H)       fluticasone 50 MCG/ACT spray    FLONASE    16 g    Spray 1 spray into both nostrils daily    Seasonal allergic rhinitis       furosemide 20 MG tablet    LASIX    60 tablet    Take 1 tablet (20 mg) by mouth 2 times daily    Lower leg edema       GEL-SASHA DENTINBLOC DT      Apply  to affected area. GEL. Apply to 2nd molar on bottom right daily at bedtime.        glucagon 1 MG kit    GLUCAGON EMERGENCY    2 mg    Inject 1 mg into the muscle once for 1 dose    Type 2 diabetes mellitus with microalbuminuria, with long-term current use of insulin (H)       insulin glargine 100 UNIT/ML injection    LANTUS    8 mL    Inject 20 Units Subcutaneous At Bedtime    Type 2 diabetes mellitus with microalbuminuria, with long-term current use of insulin (H)       LACTAID 3000 UNIT tablet   Generic drug:  lactase      Take 4 tabs daily with meals.        levothyroxine 175 MCG tablet    SYNTHROID/LEVOTHROID    30 tablet    Take 1 tablet (175 mcg) by mouth daily Give on empty stomach    Other specified hypothyroidism       LORazepam 1 MG tablet    ATIVAN    35 tablet    Take 1 tablet (1 mg) by mouth At Bedtime .  May take additional 1mg daily PRN for agitation.    Generalized anxiety disorder       losartan 100 MG tablet    COZAAR    90 tablet    Take 1 tablet by mouth daily.    Hypertension goal BP (blood pressure) < 130/80, Diabetes mellitus type 2, insulin dependent (H), CKD (chronic kidney disease) stage 2, GFR 60-89 ml/min       melatonin 3 MG tablet     60 tablet    Take 1 tablet (3 mg) by mouth nightly as needed for sleep    Other insomnia       metFORMIN 500 MG 24 hr tablet    GLUCOPHAGE-XR    90 tablet    Take 2 tablets (1,000 mg) by mouth 2 times daily (with meals)    Diabetes mellitus, type 2 (H)       MILK OF MAGNESIA PO      Take  by mouth. Take 30 mL as needed for constipation.        NEURONTIN PO      Take 900 mg by mouth 3 times daily.        nystatin 325291 UNIT/GM Powd    MYCOSTATIN    60 g    Apply topically  3 times daily as needed    Candidiasis of skin       ONETOUCH DELICA LANCETS 33G Misc           polyethylene glycol powder    MIRALAX/GLYCOLAX     Take 1 capful by mouth 2 times daily. 17 GM PO BID        * SALINE MIST 0.65 % nasal spray   Generic drug:  sodium chloride           * saline 0.9 % Soln      Spray 2 sprays in nostril as needed.        SENNA S 8.6-50 MG per tablet   Generic drug:  senna-docusate      Take 2 tablets by mouth At Bedtime.        SF 5000 PLUS 1.1 % Crea   Generic drug:  Sodium Fluoride           SM LUBRICANT EYE DROPS 0.4-0.3 % Soln ophthalmic solution   Generic drug:  polyethylene glycol 0.4%- propylene glycol 0.3%           solifenacin 10 MG tablet    VESICARE    30 tablet    Take 1 tablet (10 mg) by mouth daily    Urge incontinence       ziprasidone 40 MG capsule    GEODON    60 capsule    Take 1 capsule (40 mg) by mouth 2 times daily (with meals)    Schizoaffective disorder, depressive type (H)       * Notice:  This list has 4 medication(s) that are the same as other medications prescribed for you. Read the directions carefully, and ask your doctor or other care provider to review them with you.

## 2018-10-25 NOTE — ED AVS SNAPSHOT
[facility], [place], Emergency Department    [address]    [facility] [address]    Phone:  [phone]    Fax:  [phone]                                       Pinky Page   MRN: [id number]    Department:  [facility], Emergency Department   Date of Visit:  10/25/2018           After Visit Summary Signature Page     I have received my discharge instructions, and my questions have been answered. I have discussed any challenges I see with this plan with the nurse or doctor.    ..........................................................................................................................................  Patient/Patient Representative Signature      ..........................................................................................................................................  Patient Representative Print Name and Relationship to Patient    ..................................................               ................................................  Date                                   Time    ..........................................................................................................................................  Reviewed by Signature/Title    ...................................................              ..............................................  Date                                               Time          22EPIC Rev 08/18

## 2018-10-25 NOTE — ED AVS SNAPSHOT
[facility], Emergency Department    [address]    [facility] [address]    Phone:  [phone]    Fax:  [phone]                                       Pinky Page   MRN: [id number]    Department:  [facility], Emergency Department   Date of Visit:  10/25/2018           Patient Information     Date Of Birth          1950        Your diagnoses for this visit were:     Essential hypertension     Acute cystitis without hematuria        You were seen by Brayden Osei MD.        Discharge Instructions       Take complete course of Macrobid.  Return to the emergency department if fever, vomiting, worse, or other concerns.    Follow-up with your primary care provider in 1 week for recheck.    Discharge References/Attachments     BLADDER INFECTION, FEMALE (ADULT) (ENGLISH)      Your next 10 appointments already scheduled     Nov 07, 2018  3:20 PM CST   CONSULT with [facility] ([facility])    [address],  [address]  [facility] [address]   [phone]            Nov 07, 2018  3:40 PM CST   65+ Annual Wellness with Cary Brownlee MD   [facility] ([facility])    [address],  [address]  [facility] [address]   [phone]            Nov 15, 2018  1:30 PM CST   RETURN GENERAL with Michael Lopez MD   Eye Clinic ([facility])    [address]  [facility] [address]   [phone]            Nov 19, 2018  2:40 PM CST   Adult Med Follow UP with Maulik Kohler MD   Psychiatry Clinic ([facility])    [address]  [address] [address] [phone]            Nov 26, 2018 11:30 AM CST   (Arrive by 11:15 AM)   Return Visit with Marcos Magdaleno MD   [facility] ([facility])    [address] [place] [address]  [address]   [phone]            May 09, 2019  1:30 PM CDT   Return Sleep Patient with Brianda Reddy MD   [facility] ([facility])    [address] [place] [address] [phone]              24 Hour Appointment Hotline       To make an appointment at any [facility], call [phone] ([phone]). If you don't have a family doctor or clinic, we will help you find one. [facility] clinics are conveniently located to serve the needs of you and your family.             Review of your medicines      START taking        Dose / Directions Last dose taken    nitroFURantoin (macrocrystal-monohydrate) 100 MG capsule   Commonly known as:  MACROBID   Dose:  100 mg   Quantity:  14 capsule        Take 1 capsule (100 mg) by mouth 2 times daily   Refills:  0          CONTINUE these medicines which may have CHANGED, or have new prescriptions. If we are uncertain of the size of tablets/capsules you have at home, strength may be listed as something that might have changed.        Dose / Directions Last dose taken    LORazepam 1 MG tablet   Commonly known as:  ATIVAN   What changed:  additional instructions   Quantity:  50 tablet        Take 1/2 tablet in the morning and 1 tablet at bedtime. May take 1 additional tab daily PRN for anxiety.   Refills:  0          Our records show that you are taking the medicines listed below. If these are incorrect, please call your family doctor or clinic.        Dose / Directions Last dose taken    acetaminophen 500 MG tablet   Commonly known as:  TYLENOL   Dose:  1000 mg   Quantity:  1 Bottle        Take 2 tablets (1,000 mg) by mouth every 6 hours as needed   Refills:  2        alum & mag hydroxide-simethicone 200-200-20 MG/5ML Susp suspension   Commonly known as:  MYLANTA/MAALOX   Dose:  30 mL        Take 30 mLs by mouth daily as needed for indigestion   Refills:  0        amLODIPine 5 MG tablet    Commonly known as:  NORVASC   Dose:  10 mg   Quantity:  30 tablet        Take 2 tablets (10 mg) by mouth daily   Refills:  3        artificial saliva Aers spray   Dose:  1 spray        Take 1 spray by mouth 3 times daily as needed for dry mouth   Refills:  0        artificial tears Oint ophthalmic ointment   Quantity:  1 Tube        Apply  to eye. Place 0.5 inches into both eyes at bedtime   Refills:  11        ARTIFICIAL TEARS OP   Dose:  1 drop        Apply 1 drop to eye 4 times daily.   Refills:  0        * aspirin 81 MG tablet   Dose:  1 tablet   Quantity:  100        Take 1 tablet by mouth daily. ENTERIC COATED.   Refills:  3        * SM ASPIRIN ADULT LOW STRENGTH 81 MG EC tablet   Generic drug:  aspirin        Refills:  0        atorvastatin 40 MG tablet   Commonly known as:  LIPITOR   Dose:  40 mg   Quantity:  90 tablet        Take 1 tablet (40 mg) by mouth daily   Refills:  1        BD AUTOSHIELD DUO 30G X 5 MM   Generic drug:  insulin pen needle        Refills:  0        blood glucose calibration solution   Commonly known as:  no brand specified   Quantity:  1 each        Use to calibrate blood glucose monitor as directed.   Refills:  3        blood glucose lancets standard   Commonly known as:  no brand specified   Quantity:  100 each        Use to test blood sugar 2 times daily or as directed.   Refills:  11        blood glucose monitoring meter device kit   Commonly known as:  no brand specified   Quantity:  1 kit        Check Blood sugars twice a day.   Refills:  0        blood glucose monitoring test strip   Commonly known as:  no brand specified   Quantity:  100 each        Use to test blood sugar 3 times daily or as directed.   Refills:  3        calcium polycarbophil 625 MG tablet   Commonly known as:  FIBERCON   Dose:  2 tablet        Take 2 tablets by mouth daily   Refills:  0        calcium-vitamin D 250-125 MG-UNIT Tabs   Dose:  1 tablet        Take 1 tablet by mouth 2 times daily. Calcium 250  mg/Vit D 125 IU   Refills:  0        Calcium-Vitamin D3 250-125 MG-UNIT Tabs        Refills:  0        CLARITIN 10 MG tablet   Quantity:  30   Generic drug:  loratadine        1 TAB PO QD (Once per day) as needed for ALLERGY SYMPTOMS   Refills:  11        clotrimazole 1 % cream   Commonly known as:  LOTRIMIN   Quantity:  50 g        Apply topically 2 times daily as needed   Refills:  0        erythromycin ophthalmic ointment   Commonly known as:  ROMYCIN   Dose:  0.5 inch        Place 0.5 inches into both eyes 4 times daily   Refills:  0        eucerin cream        Apply  topically as needed. Apply to thigh PRN dry skin   Refills:  0        exenatide 10 MCG/0.04ML injection   Commonly known as:  BYETTA   Dose:  10 mcg   Quantity:  1 Syringe        Inject 10 mcg Subcutaneous 2 times daily (before meals)   Refills:  11        FLUoxetine 20 MG capsule   Commonly known as:  PROzac   Dose:  60 mg   Quantity:  90 capsule        Take 3 capsules (60 mg) by mouth daily   Refills:  1        fluticasone 50 MCG/ACT spray   Commonly known as:  FLONASE   Dose:  1 spray   Quantity:  16 g        Spray 1 spray into both nostrils daily   Refills:  3        furosemide 20 MG tablet   Commonly known as:  LASIX   Dose:  20 mg   Quantity:  60 tablet        Take 1 tablet (20 mg) by mouth 2 times daily   Refills:  3        GEL-SASHA DENTINBLOC DT        Apply  to affected area. GEL. Apply to 2nd molar on bottom right daily at bedtime.   Refills:  0        glucagon 1 MG kit   Commonly known as:  GLUCAGON EMERGENCY   Dose:  1 mg   Quantity:  2 mg        Inject 1 mg into the muscle once for 1 dose   Refills:  3        insulin glargine 100 UNIT/ML injection   Commonly known as:  LANTUS   Dose:  20 Units   Quantity:  8 mL        Inject 20 Units Subcutaneous At Bedtime   Refills:  11        LACTAID 3000 UNIT tablet   Generic drug:  lactase        Take 4 tabs daily with meals.   Refills:  0        levothyroxine 175 MCG tablet   Commonly known as:   SYNTHROID/LEVOTHROID   Dose:  175 mcg   Quantity:  30 tablet        Take 1 tablet (175 mcg) by mouth daily Give on empty stomach   Refills:  4        losartan 100 MG tablet   Commonly known as:  COZAAR   Dose:  100 mg   Quantity:  90 tablet        Take 1 tablet by mouth daily.   Refills:  1        melatonin 3 MG tablet   Dose:  3 mg   Quantity:  60 tablet        Take 1 tablet (3 mg) by mouth nightly as needed for sleep   Refills:  1        metFORMIN 500 MG 24 hr tablet   Commonly known as:  GLUCOPHAGE-XR   Dose:  1000 mg   Quantity:  90 tablet        Take 2 tablets (1,000 mg) by mouth 2 times daily (with meals)   Refills:  3        MILK OF MAGNESIA PO        Take  by mouth. Take 30 mL as needed for constipation.   Refills:  0        moxifloxacin 0.5 % ophthalmic solution   Commonly known as:  VIGAMOX   Dose:  1 drop        Place 1 drop into both eyes 4 times daily   Refills:  0        NEURONTIN PO   Dose:  900 mg        Take 900 mg by mouth 3 times daily.   Refills:  0        nystatin 768644 UNIT/GM Powd   Commonly known as:  MYCOSTATIN   Quantity:  60 g        Apply topically 3 times daily as needed   Refills:  1        ONETOUCH DELICA LANCETS 33G Misc        Refills:  0        polyethylene glycol powder   Commonly known as:  MIRALAX/GLYCOLAX   Dose:  1 capful        Take 1 capful by mouth 2 times daily. 17 GM PO BID   Refills:  0        * SALINE MIST 0.65 % nasal spray   Generic drug:  sodium chloride        Refills:  0        * saline 0.9 % Soln   Dose:  2 spray        Spray 2 sprays in nostril as needed.   Refills:  0        SENNA S 8.6-50 MG per tablet   Dose:  2 tablet   Generic drug:  senna-docusate        Take 2 tablets by mouth At Bedtime.   Refills:  0        SF 5000 PLUS 1.1 % Crea   Generic drug:  Sodium Fluoride        Refills:  0        SM LUBRICANT EYE DROPS 0.4-0.3 % Soln ophthalmic solution   Generic drug:  polyethylene glycol 0.4%- propylene glycol 0.3%        Refills:  0        solifenacin 10 MG  tablet   Commonly known as:  VESICARE   Dose:  10 mg   Quantity:  30 tablet        Take 1 tablet (10 mg) by mouth daily   Refills:  6        ziprasidone 40 MG capsule   Commonly known as:  GEODON   Dose:  40 mg   Quantity:  60 capsule        Take 1 capsule (40 mg) by mouth 2 times daily (with meals)   Refills:  2        * Notice:  This list has 4 medication(s) that are the same as other medications prescribed for you. Read the directions carefully, and ask your doctor or other care provider to review them with you.            Prescriptions were sent or printed at these locations (1 Prescription)                   Other Prescriptions                Printed at Department/Unit printer (1 of 1)         nitroFURantoin, macrocrystal-monohydrate, (MACROBID) 100 MG capsule                Procedures and tests performed during your visit     Basic metabolic panel    CBC with platelets differential    EKG 12 lead    TSH with free T4 reflex    UA with Microscopic reflex to Culture    Urine Culture Aerobic Bacterial      Orders Needing Specimen Collection     None      Pending Results     Date and Time Order Name Status Description    10/25/2018 1820 Urine Culture Aerobic Bacterial In process             Pending Culture Results     Date and Time Order Name Status Description    10/25/2018 1820 Urine Culture Aerobic Bacterial In process             Pending Results Instructions     If you had any lab results that were not finalized at the time of your Discharge, you can call the ED Lab Result RN at [phone]. You will be contacted by this team for any positive Lab results or changes in treatment. The nurses are available 7 days a week from 10A to 6:30P.  You can leave a message 24 hours per day and they will return your call.        Thank you for choosing [facility]       Thank you for choosing [facility] for your care. Our goal is always to provide you with excellent care. Hearing back from our patients is one way we can continue  "to improve our services. Please take a few minutes to complete the written survey that you may receive in the mail after you visit with us. Thank you!        [employer] Information     [employer] lets you send messages to your doctor, view your test results, renew your prescriptions, schedule appointments and more. To sign up, go to [email or web address] . Click on \"Log in\" on the left side of the screen, which will take you to the Welcome page. Then click on \"Sign up Now\" on the right side of the page.     You will be asked to enter the access code listed below, as well as some personal information. Please follow the directions to create your username and password.     Your access code is: [email or web address]  Expires: 2019  6:30 AM     Your access code will  in 90 days. If you need help or a new code, please call your [facility] clinic or [phone].        Care EveryWhere ID     This is your Care EveryWhere ID. This could be used by other organizations to access your [facility] medical records  [id number]        Equal Access to Services     [facility] : Hadsaira Dietrich, waevangelina del valle, jaymie mittal, autumn josé. So [facility] [phone].    ATENCIÓN: Si habla español, tiene a deluca disposición servicios gratuitos de asistencia lingüística. Link al [phone].    We comply with applicable federal civil rights laws and [place] laws. We do not discriminate on the basis of race, color, national origin, age, disability, sex, sexual orientation, or gender identity.            After Visit Summary       This is your record. Keep this with you and show to your community pharmacist(s) and doctor(s) at your next visit.                  "

## 2018-10-25 NOTE — NURSING NOTE
Chief Complaint   Patient presents with     Recheck Medication     NATTY     Provider alerted to high BP x2

## 2018-10-25 NOTE — PROGRESS NOTES
[facility] PSYCHIATRY CLINIC PROGRESS NOTE     CARE TEAM:  PCP- Cary Brownlee    Specialty Providers- no    Therapist- Dr. Gr    [facility]ly [facility], later [facility] Team- no    Pinky ROSELIA Page is a 68 year old female who prefers the name Pinky & pronouns she, her, hers. Date of initial diagnostic assessment is 5/13/13.  Date of most recent transfer of care assessment is 07/30/18.     Pertinent Background:  This patient first experienced mental health issues in her twenties and has received treatment for schizoaffective disorder and generalized anxiety.  History details in last diagnostic assessment.  Notably, Pinky's symptoms of depression and psychosis have responded well to a combination of ECT and medication management. Pinky has a history of experiencing increasingly frequent psychotic symptoms w/ previously attempted antipsychotic tapers.        Psych critical item history includes psychosis [sxs include disorganization, hallucinations, paranoia], mutiple psychotropic trials, psych hosp (3-5) and ECT.    INTERIM HISTORY                                                                                              4, 4   The patient reports good treatment adherence.  History was provided by the patient who was a good historian.  The last visit ended with no change to the med regimen.     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 3-4 weeks ago  - Re[place] [facility] [place] performed  - Reports that she has experienced significant increase in anxiety along with eye problems  - Constant worrying about eyes; has withdrawn from normal activities at her group home  - Has been using Ativan PRN infrequently; does note that it makes her more sedated  - Not experiencing panic attacks    - Blood pressure significantly elevated on multiple readings in clinic - she denies chest pain, SOB, headaches, blurred vision, radiating pain currently or over past few weeks    RECENT  SYMPTOMS:   DEPRESSION:  reports-depressed mood, anhedonia, low energy, insomnia and appetite changes;  DENIES- suicidal ideation and self-destructive thoughts  MELVIN/HYPOMANIA:  reports-none;  DENIES- increased energy, decreased sleep need, increased activity and grandiosity  PSYCHOSIS:  reports-none;  DENIES- delusions, auditory hallucinations and visual hallucinations  DYSREGULATION:  reports-none;  DENIES- suicidal ideation, violent ideation and SIB  PANIC ATTACK:  none   ANXIETY:  excessive worry and nervous/overwhelmed  TRAUMA RELATED:  none  COMPULSIVE:  none   SLEEP: increased difficulty falling asleep  EATING DISORDER: no     RECENT SUBSTANCE USE:     ALCOHOL- no      TOBACCO- no     CAFFEINE- coffee/ tea [coffee, diet Pepsi]  OPIOIDS- no         NARCAN KIT- no        CANNABIS- no            OTHER ILLICIT DRUGS- none      CURRENT SOCIAL HISTORY:  FINANCIAL SUPPORT- social security disability       CHILDREN- no      LIVING SITUATION- Lives at [facility].      SOCIAL/ SPIRITUAL SUPPORT- sisters, support staff at [facility], other residents at [facility], other providers     FEELS SAFE AT HOME- yes     MEDICAL ROS (2,10):  Reports none, none, none    Denies headaches, sexual function problems [none], short term memory and/or word finding difficulty, wt gain, tremor, akathisia, spasms, bruxism, easy bruising , GI sxs [N/V], akathisia, muscle problems [stiffness], unusual movements, wt gain, sexual function problems [none], polydipsia, polyphagia and Parkinsonian-type symptoms [shuffling gait, masked facies, stooped posture, speech change, writing change], rash, hair loss , menstrual abnormality, skin/eye discoloration and bleeding or freq bruising    PSYCH and CD Critical Summary Points since July 2018 7/30 - resident transition, no changes  9/10 - no changes  10/25 - increased anxiety, increased lorazepam, fluoxetine    PAST PSYCH MED TRIALS   see EMR Problem List: Hx of psychiatric  care    MEDICAL / SURGICAL HISTORY                                   Pregnant or breastfeeding- no      Contraception- None    Neurologic Hx- no   Patient Active Problem List   Diagnosis     Allergic rhinitis due to pollen     Urge incontinence     Hypertension, essential     Cardiomegaly     Chronic constipation     Dry eye syndrome     Esophageal reflux     Exposure keratoconjunctivitis     DM ophthalmopathy (H)     Hypothyroidism     Senile cataract     ANUJ (obstructive sleep apnea)     Postmenopausal atrophic vaginitis     Restless leg syndrome     Squamous blepharitis     Morbid obesity due to excess calories (H)     Personal history of breast cancer s/p L masectomy     Diabetes mellitus type 2, insulin dependent (H)     Hypercholesteremia     Lives in group home     Extrapyramidal and movement disorder     CKD (chronic kidney disease) stage 2, GFR 60-89 ml/min     Solitary kidney, congenital     S/P hysterectomy     Impingement syndrome of right shoulder     Hypertriglyceridemia     Candidiasis of skin     Generalized anxiety disorder     Carpal tunnel syndrome of left wrist     Schizoaffective disorder, depressive type (H)     Major depressive disorder, recurrent episode, moderate (H)     Psychological factors affecting medical condition     Hx of psychiatric care     Nail complaint     Microalbuminuria due to type 2 diabetes mellitus (H)     Type 2 diabetes mellitus with microalbuminuria, with long-term current use of insulin (H)     Diabetes mellitus, type II, insulin dependent (H)     CKD (chronic kidney disease) stage 1, GFR 90 ml/min or greater     Conjunctivitis of both eyes     Corneal erosion of both eyes     Spastic entropion, right       Major Surgery- not discussed    ALLERGY                                Chlordiazepoxide hcl; Dimetapp dm cold-cough; Haldol; Ibuprofen; Lactose intolerance [beta-galactosidase]; Milk products; and Propofol  MEDICATIONS                               Current  Outpatient Prescriptions   Medication Sig Dispense Refill     acetaminophen (TYLENOL) 500 MG tablet Take 2 tablets (1,000 mg) by mouth every 6 hours as needed 1 Bottle 2     alum & mag hydroxide-simethicone (MYLANTA/MAALOX) 200-200-20 MG/5ML SUSP suspension Take 30 mLs by mouth daily as needed for indigestion       amLODIPine (NORVASC) 5 MG tablet Take 2 tablets (10 mg) by mouth daily 30 tablet 3     artificial saliva (BIOTENE MT) AERS spray Take 1 spray by mouth 3 times daily as needed for dry mouth       artificial tears (AKWA TEARS) OINT ophthalmic ointment Apply  to eye. Place 0.5 inches into both eyes at bedtime 1 Tube 11     ASPIRIN 81 MG OR TABS Take 1 tablet by mouth daily. ENTERIC COATED. 100 3     atorvastatin (LIPITOR) 40 MG tablet Take 1 tablet (40 mg) by mouth daily 90 tablet 1     BD AUTOSHIELD DUO 30G X 5 MM        blood glucose (NO BRAND SPECIFIED) lancets standard Use to test blood sugar 2 times daily or as directed. 100 each 11     blood glucose calibration (NO BRAND SPECIFIED) solution Use to calibrate blood glucose monitor as directed. 1 each 3     blood glucose monitoring (NO BRAND SPECIFIED) meter device kit Check Blood sugars twice a day. 1 kit 0     blood glucose monitoring (NO BRAND SPECIFIED) test strip Use to test blood sugar 3 times daily or as directed. 100 each 3     Calcium Carb-Cholecalciferol (CALCIUM-VITAMIN D3) 250-125 MG-UNIT TABS        Calcium Carbonate-Vitamin D (CALCIUM-VITAMIN D) 250-125 MG-UNIT TABS Take 1 tablet by mouth 2 times daily. Calcium 250 mg/Vit D 125 IU       calcium polycarbophil (FIBERCON) 625 MG tablet Take 2 tablets by mouth daily       CLARITIN 10 MG OR TABS 1 TAB PO QD (Once per day) as needed for ALLERGY SYMPTOMS 30 11     clotrimazole (LOTRIMIN) 1 % cream Apply topically 2 times daily as needed 50 g 0     exenatide (BYETTA) 10 MCG/0.04ML injection Inject 10 mcg Subcutaneous 2 times daily (before meals) 1 Syringe 11     FLUoxetine (PROZAC) 40 MG capsule  Take 1 capsule (40 mg) by mouth daily 30 capsule 2     fluticasone (FLONASE) 50 MCG/ACT nasal spray Spray 1 spray into both nostrils daily 16 g 3     furosemide (LASIX) 20 MG tablet Take 1 tablet (20 mg) by mouth 2 times daily 60 tablet 3     Gabapentin (NEURONTIN PO) Take 900 mg by mouth 3 times daily.       Hypromellose (ARTIFICIAL TEARS OP) Apply 1 drop to eye 4 times daily.       insulin glargine (LANTUS) 100 UNIT/ML injection Inject 20 Units Subcutaneous At Bedtime 8 mL 11     lactase (LACTAID) 3000 UNIT tablet Take 4 tabs daily with meals.       levothyroxine (SYNTHROID, LEVOTHROID) 175 MCG tablet Take 1 tablet (175 mcg) by mouth daily Give on empty stomach 30 tablet 4     LORazepam (ATIVAN) 1 MG tablet Take 1 tablet (1 mg) by mouth At Bedtime .  May take additional 1mg daily PRN for agitation. 35 tablet 0     losartan (COZAAR) 100 MG tablet Take 1 tablet by mouth daily. 90 tablet 1     Magnesium Hydroxide (MILK OF MAGNESIA PO) Take  by mouth. Take 30 mL as needed for constipation.       melatonin 3 MG tablet Take 1 tablet (3 mg) by mouth nightly as needed for sleep 60 tablet 1     metFORMIN (GLUCOPHAGE-XR) 500 MG 24 hr tablet Take 2 tablets (1,000 mg) by mouth 2 times daily (with meals) (Patient taking differently: Take 2,000 mg by mouth daily ) 90 tablet 3     nystatin (MYCOSTATIN) 477872 UNIT/GM POWD Apply topically 3 times daily as needed 60 g 1     ONETOUCH DELICA LANCETS 33G MISC        polyethylene glycol (MIRALAX/GLYCOLAX) powder Take 1 capful by mouth 2 times daily. 17 GM PO BID       Saline 0.9 % SOLN Spray 2 sprays in nostril as needed.       SALINE MIST 0.65 % nasal spray        senna-docusate (SENNA S) 8.6-50 MG per tablet Take 2 tablets by mouth At Bedtime.       SF 5000 PLUS 1.1 % CREA        Skin Protectants, Misc. (EUCERIN) cream Apply  topically as needed. Apply to thigh PRN dry skin        SM ASPIRIN ADULT LOW STRENGTH 81 MG EC tablet        SM LUBRICANT EYE DROPS 0.4-0.3 % SOLN ophthalmic  solution        Sod Fluor-Solo Fluor-Hydrfl Ac (GEL-SASHA DENTINBLOC DT) Apply  to affected area. GEL. Apply to 2nd molar on bottom right daily at bedtime.       solifenacin (VESICARE) 10 MG tablet Take 1 tablet (10 mg) by mouth daily 30 tablet 6     ziprasidone (GEODON) 40 MG capsule Take 1 capsule (40 mg) by mouth 2 times daily (with meals) 60 capsule 2     glucagon (GLUCAGON EMERGENCY) 1 MG kit Inject 1 mg into the muscle once for 1 dose 2 mg 3     VITALS                                                                                                                                  3, 3   /81  Pulse 101  Wt 103.4 kg (228 lb)  BMI 39.14 kg/m2     MENTAL STATUS EXAM                                                                         9, 14 cog gs     Alertness: alert  and oriented  Appearance: well groomed - casual clothing, white hair  Behavior/Demeanor: cooperative, with poor eye contact   Speech: slowed  Language: intact and no problems  Psychomotor: tremor noted in right hand, no movements noted in tongue today  Mood: depressed, anxious, worried and fearful  Affect: restricted; was congruent to mood; was congruent to content  Thought Process/Associations: unremarkable  Thought Content:  Reports none;  Denies suicidal ideation, violent ideation and delusions  Perception:  Reports none;  Denies auditory hallucinations and visual hallucinations  Insight: good  Judgment: good  Cognition: (6) does  appear grossly intact; formal cognitive testing was not done  Gait and Station: unremarkable    LABS and DATA     AIMS:  last done 9/10/18 with score(s): 0;  next due September 2019    PHQ9 TODAY = 12  PHQ-9 SCORE 7/30/2018 8/19/2018 9/10/2018   Total Score - - -   Total Score 7 7 7     ANTIPSYCHOTIC LABS  [glu, A1C, lipids (danika LDL), liver enzymes, WBC, ANEU, Hgb, plts]  q12 mo  Recent Labs   Lab Test  05/03/18   1414  12/14/17   1000   06/21/17   1047   GLC  192.6*   --    --   134*   A1C  6.7*  6.4*   < >    --     < > = values in this interval not displayed.     Recent Labs   Lab Test  05/03/18   1414  03/21/17   1023   CHOL  126  142   TRIG  148  121   LDL  60  78   HDL  37*  40*     Recent Labs   Lab Test  06/26/18   0659 06/26/18   AST  16  16   ALT  21  21   ALKPHOS  111  111     Recent Labs   Lab Test  06/26/18   0659  06/21/17   1047  03/21/17   1023   WBC  9.72   --   11.8*   ANEU  5.31   --   8.3   HGB  12.6  13.8  13.4   PLT  247   --   266       DIAGNOSIS     Schizoaffective Disorder, depressed type, moderate depression, without current psychotic features  Generalized Anxiety Disorder  R/o Tardive Dyskinesia    ASSESSMENT                                                                                                          m2, h3     TODAY:  Pinky returns to clinic today reporting significantly increased anxiety over the past 3-4 weeks, roughly since she was diagnosed w/ corneal erosion that has required ongoing [place] [facility] [place]. She reports constant worry, depressed mood, and withdrawal from group activities at her group home. She has not been experiencing panic attacks. She does report some improvement over the past week but still feels significantly more anxious compared to her baseline. Lorazepam PRN has been somewhat helpful during the day but has also been sedating. After discussion w/ Pinky, I will schedule a half dose of lorazepam in the morning for the anxiety while keeping PRN available. I will also increase the fluoxetine to address anxiety. She will return to clinic in 4-6 weeks for reevaluation.    Medical: Pinky's systolic blood pressure was elevated to the 180s upon two initial readings. She denied symptoms associated w/ hypertensive crisis including chest pain, SOB, blurred vision, headache. Upon a third reading, systolic pressure was elevated to 194. She was referred to the emergency department for further evaluation.    [place] PRESCRIPTION MONITORING PROGRAM [] was checked today:  lorazepam  last dispensed 10/18.    PSYCHOTROPIC DRUG INTERACTIONS:   Ziprasidone and fluoxetine may result in increased risk for QTc prolongation and serotonin syndrome.  Aspirin and fluoxetine may result in increased risk for bleeding.  MANAGEMENT:  Monitoring for adverse effects, periodic EKGs and patient is aware of risks     PLAN                                                                                                                       m2, h3     1) PSYCHOTROPIC MEDICATIONS:  - Continue ziprasidone 40 mg BID  - Increase fluoxetine to 60 mg daily  - Increase lorazepam to 0.5 mg qAM and 1 mg at bedtime w/ additional 1 mg daily PRN    2) THERAPY:    Yes, continue w/ Dr. Gr    3) NEXT DUE:    Labs- AP labs due May 2019  EKG- Last EKG 8/17/16 - QTc 432. Will updated as needed.  Rating Scales- AIMS due September 2019    4) REFERRALS:    No Referrals needed     5) RTC: 6 weeks    6) CRISIS NUMBERS:   Provided routinely in AVS.   [facility] [phone] (clinic)    [phone] (after hours)  ONLY if a [facility] PT: [facility] [phone] (clinic), [phone] (after hours)     TREATMENT RISK STATEMENT:  The risks, benefits, alternatives and potential adverse effects have been discussed and are understood by the pt. The pt understands the risks of using street drugs or alcohol. There are no medical contraindications, the pt agrees to treatment with the ability to do so. The pt knows to call the clinic for any problems or to access emergency care if needed.  Medical and substance use concerns are documented above.  Psychotropic drug interaction check was done, including changes made today.    PSYCHIATRY CLINIC INDIVIDUAL PSYCHOTHERAPY NOTE                                                  [16]   Start time - 1350           End time - 1421  Date last reviewed - 09/10/18       Date next due - 12/9/18 (or 12 months if Medicare)    Subjective: This supportive psychotherapy session addressed issues related to  goals of therapy .  Patient's reaction: Maintenance in the context of mental status appropriate for ambulatory setting.  Progress: good  Plan: RTC 6-8 weeks  Psychotherapy services during this visit included  myself and the patient.   TREATMENT  PLAN          SYMPTOMS; PROBLEMS   MEASURABLE GOALS;    FUNCTIONAL IMPROVEMENT INTERVENTIONS;   GAINS MADE DISCHARGE CRITERIA   Depression: depressed mood   reduce depressive symptoms and reduce depressive episodes meds/therapy marked symptom improvement   Anxiety: excessive worry   reduce anxiety meds/therapy marked symptom improvement         PROVIDER: Maulik Kohler MD    Patient staffed in clinic with Dr. Estrada who will sign the note.  Supervisor is Dr. Estrada.    Supervisor Attestation:  I met with Pinky Page along with the resident, and participated in key portions of the service, including the mental status examination and developing the plan of care. I reviewed key portions of the history with the resident. I agree with the findings and plan as documented in this note.  Cortez Estrada MD

## 2018-10-25 NOTE — ED PROVIDER NOTES
History     Chief Complaint   Patient presents with     Hypertension     HPI  Pinky Page is a 68 year old female who was sent to the emergency department from the psychiatric clinic for evaluation of her blood pressure.  The patient was hypertensive in the psychiatric clinic with systolic blood pressures in the 180s-190s.  Patient denies any associated symptoms.  She states that she was nervous and anxious about her clinic appointment today.  She denies any weakness, numbness, speech difficulty, or gait difficulty.  Patient denies any chest pain or dyspnea.  She denies any abdominal pain.  No nausea vomiting or diarrhea.  She reports normal urine output and denies dysuria or frequency.  She states that she has been fatigued over the past 2-3 weeks but otherwise denies any symptoms.  She states that she is not feeling anxious anymore since she left the psychiatric clinic.  Patient reports compliance with her 2 antihypertensive medications including her doses this morning.    I have reviewed the Medications, Allergies, Past Medical and Surgical History, and Social History in the Epic system.    Review of Systems   Constitutional: Positive for fatigue. Negative for fever.   HENT: Negative for congestion and trouble swallowing.    Eyes: Negative for redness.   Respiratory: Negative for shortness of breath.    Cardiovascular: Negative for chest pain.   Gastrointestinal: Negative for abdominal pain.   Endocrine: Negative for polydipsia and polyuria.   Genitourinary: Negative for difficulty urinating.   Musculoskeletal: Negative for arthralgias and neck stiffness.   Skin: Negative for color change.   Neurological: Negative for weakness, numbness and headaches.   Psychiatric/Behavioral: Negative for confusion.   All other systems reviewed and are negative.      Physical Exam   BP: 186/80  Heart Rate: 90  Temp: 98.8  F (37.1  C)  Resp: 16  Weight: 104.3 kg (230 lb)  SpO2: 96 %      Physical Exam   Constitutional: She is  oriented to person, place, and time. She appears well-developed and well-nourished. No distress.   HENT:   Head: Normocephalic and atraumatic.   Mouth/Throat: Oropharynx is clear and moist. No oropharyngeal exudate.   Eyes: Pupils are equal, round, and reactive to light. No scleral icterus.   Neck: Normal range of motion.   Cardiovascular: Normal rate, regular rhythm, normal heart sounds and intact distal pulses.    Pulmonary/Chest: Effort normal and breath sounds normal. No respiratory distress.   Abdominal: Soft. Bowel sounds are normal. There is no tenderness.   Musculoskeletal: Normal range of motion. She exhibits no edema or tenderness.   Neurological: She is alert and oriented to person, place, and time. She has normal strength. No cranial nerve deficit. Coordination and gait normal.   Skin: Skin is warm. No rash noted. She is not diaphoretic.   Nursing note and vitals reviewed.      ED Course     ED Course     Procedures         Patient initially with isolated systolic hypertension with a systolic blood pressure of 180 upon check-in in triage.  Normotensive thereafter.         EKG Interpretation:      Interpreted by WILLIAM ORTIZ MD  Time reviewed: 1727  Symptoms at time of EKG: None   Rhythm: normal sinus   Rate: 76  Axis: Normal  Ectopy: premature atrial contraction  Conduction: normal  ST Segments/ T Waves: No acute ischemic changes  Q Waves: III  Comparison to prior: Resolved poor R wave progression but otherwise unchanged from 8/17/16    Clinical Impression: no acute changes    Results for orders placed or performed during the hospital encounter of 10/25/18 (from the past 24 hour(s))   EKG 12 lead   Result Value Ref Range    Interpretation ECG Click View Image link to view waveform and result    CBC with platelets differential   Result Value Ref Range    WBC 14.2 (H) 4.0 - 11.0 10e9/L    RBC Count 4.73 3.8 - 5.2 10e12/L    Hemoglobin 14.0 11.7 - 15.7 g/dL    Hematocrit 42.6 35.0 - 47.0 %    MCV 90 78 - 100  fl    MCH 29.6 26.5 - 33.0 pg    MCHC 32.9 31.5 - 36.5 g/dL    RDW 13.0 10.0 - 15.0 %    Platelet Count 275 150 - 450 10e9/L    Diff Method Automated Method     % Neutrophils 74.6 %    % Lymphocytes 19.8 %    % Monocytes 4.7 %    % Eosinophils 0.4 %    % Basophils 0.2 %    % Immature Granulocytes 0.3 %    Nucleated RBCs 0 0 /100    Absolute Neutrophil 10.6 (H) 1.6 - 8.3 10e9/L    Absolute Lymphocytes 2.8 0.8 - 5.3 10e9/L    Absolute Monocytes 0.7 0.0 - 1.3 10e9/L    Absolute Eosinophils 0.1 0.0 - 0.7 10e9/L    Absolute Basophils 0.0 0.0 - 0.2 10e9/L    Abs Immature Granulocytes 0.0 0 - 0.4 10e9/L    Absolute Nucleated RBC 0.0    Basic metabolic panel   Result Value Ref Range    Sodium 137 133 - 144 mmol/L    Potassium 3.8 3.4 - 5.3 mmol/L    Chloride 99 94 - 109 mmol/L    Carbon Dioxide 29 20 - 32 mmol/L    Anion Gap 9 3 - 14 mmol/L    Glucose 147 (H) 70 - 99 mg/dL    Urea Nitrogen 12 7 - 30 mg/dL    Creatinine 0.67 0.52 - 1.04 mg/dL    GFR Estimate 87 >60 mL/min/1.7m2    GFR Estimate If Black >90 >60 mL/min/1.7m2    Calcium 9.1 8.5 - 10.1 mg/dL   TSH with free T4 reflex   Result Value Ref Range    TSH 2.29 0.40 - 4.00 mU/L   UA with Microscopic reflex to Culture   Result Value Ref Range    Color Urine Straw     Appearance Urine Clear     Glucose Urine Negative NEG^Negative mg/dL    Bilirubin Urine Negative NEG^Negative    Ketones Urine Negative NEG^Negative mg/dL    Specific Gravity Urine 1.006 1.003 - 1.035    Blood Urine Negative NEG^Negative    pH Urine 6.5 5.0 - 7.0 pH    Protein Albumin Urine Negative NEG^Negative mg/dL    Urobilinogen mg/dL Normal 0.0 - 2.0 mg/dL    Nitrite Urine Negative NEG^Negative    Leukocyte Esterase Urine Small (A) NEG^Negative    Source Midstream Urine     WBC Urine 17 (H) 0 - 5 /HPF    RBC Urine 0 0 - 2 /HPF    Bacteria Urine Few (A) NEG^Negative /HPF    Squamous Epithelial /HPF Urine 4 (H) 0 - 1 /HPF    Mucous Urine Present (A) NEG^Negative /LPF   Urine Culture Aerobic Bacterial    Result Value Ref Range    Specimen Description Midstream Urine     Special Requests Specimen received in preservative     Culture Micro PENDING      *Note: Due to a large number of results and/or encounters for the requested time period, some results have not been displayed. A complete set of results can be found in Results Review.                Critical Care time:           Assessments & Plan (with Medical Decision Making)   68 year old female sent from the psychiatry clinic due to elevated blood pressure readings.  In the emergency department, the patient is normotensive after mild, isolated systolic hypertension on her triage blood pressure.  The patient had labs performed which revealed a normal metabolic panel.  She has a mild leukocytosis and evidence for urinary tract infection on her urinalysis.  Urine culture will be performed.  Macrobid will be prescribed.  Patient remained asymptomatic in the emergency department through her evaluation.  Primary care follow-up recommended.    I have reviewed the nursing notes.    I have reviewed the findings, diagnosis, plan and need for follow up with the patient.    New Prescriptions    NITROFURANTOIN, MACROCRYSTAL-MONOHYDRATE, (MACROBID) 100 MG CAPSULE    Take 1 capsule (100 mg) by mouth 2 times daily       Final diagnoses:   Essential hypertension   Acute cystitis without hematuria       10/25/2018   [facility], [place], EMERGENCY DEPARTMENT     Brayden Osei MD  10/25/18 2040

## 2018-10-25 NOTE — PATIENT INSTRUCTIONS
Thank you for coming to the PSYCHIATRY CLINIC.    Lab Testing:  If you had lab testing today and your results are reassuring or normal they will be mailed to you or sent through [email or web address] within 7 days.   If the lab tests need quick action we will call you with the results.  The phone number we will call with results is # [phone] (home) . If this is not the best number please call our clinic and change the number.    Medication Refills:  If you need any refills please call your pharmacy and they will contact us. Our fax number for refills is [phone]. Please allow three business for refill processing.   If you need to  your refill at a new pharmacy, please contact the new pharmacy directly. The new pharmacy will help you get your medications transferred.     Scheduling:  If you have any concerns about today's visit or wish to schedule another appointment please call our office during normal business hours [phone] (8-5:00 M-F)    Contact Us:  Please call [phone] during business hours (8-5:00 M-F).  If after clinic hours, or on the weekend, please call  [phone].    Financial Assistance [phone]  [facility] Billing [phone]  [employer] Billing [phone]  Medical Records [phone]      MENTAL HEALTH CRISIS NUMBERS:  [facility]:   [facility] - [phone]   Crisis Residence [facility] - [phone]   Walk-In Counseling [facility] [phone]   COPE 24/7 [place] Mobile Team for Adults - [[phone]]; Child - [[phone]]     Crisis Connection - [phone]     [facility]:   [facility] - [phone]   Walk-in counseling [facility] - [phone]   Walk-in counseling [facility] - [phone]   Crisis Residence [facility] - [phone]   Urgent Care Adult Mental Health:   --Drop-in, 24/7 crisis line, and [place] Mobile Team [[phone]]    CRISIS TEXT  LINE: Text [phone] from anywhere, anytime, any crisis 24/7;    OR SEE www.crisistextline.org     Poison Control Center - [phone]    CHILD: [facility] needs assessment team - [phone]     [facility] - [phone]; or [facility] [employer] [facility] - [phone]    If you have a medical emergency please call 911or go to the nearest ER.                    _____________________________________________    Again thank you for choosing [facility]IN[facility] and please let us know how we can best partner with you to improve you and your family's health.  You may be receiving a survey in the mail regarding this appointment. We would love to have your feedback, both positive and negative, so please fill out the survey and return it using the provided envelope. The survey is done by an external company, so your answers are anonymous.

## 2018-10-26 LAB — INTERPRETATION ECG - MUSE: NORMAL

## 2018-10-26 NOTE — DISCHARGE INSTRUCTIONS
Take complete course of Macrobid.  Return to the emergency department if fever, vomiting, worse, or other concerns.    Follow-up with your primary care provider in 1 week for recheck.

## 2018-10-26 NOTE — ED NOTES
Report called to Pinky Meza at [facility]. They are sending transport to  patient.

## 2018-10-27 ENCOUNTER — TELEPHONE (OUTPATIENT)
Dept: EMERGENCY MEDICINE | Facility: CLINIC | Age: 68
End: 2018-10-27

## 2018-10-27 DIAGNOSIS — N39.0 URINARY TRACT INFECTION: ICD-10-CM

## 2018-10-27 LAB
BACTERIA SPEC CULT: ABNORMAL
BACTERIA SPEC CULT: ABNORMAL
Lab: ABNORMAL
SPECIMEN SOURCE: ABNORMAL

## 2018-10-27 RX ORDER — SULFAMETHOXAZOLE/TRIMETHOPRIM 800-160 MG
1 TABLET ORAL 2 TIMES DAILY
Qty: 6 TABLET | Refills: 0 | Status: SHIPPED | OUTPATIENT
Start: 2018-10-27 | End: 2018-10-30

## 2018-10-27 NOTE — TELEPHONE ENCOUNTER
[facility]  Emergency Department Lab result notification [Adult-Female]    [facility] ED lab result protocol used  Urine Culture    Reason for call  Notify of lab results, assess symptoms,  review ED providers recommendations/discharge instructions (if necessary) and advise per ED lab result f/u protocol    Lab Result (including Rx patient on, if applicable)  Final Urine Culture Report on 10/27/18  Emergency Dept discharge antibiotic prescribed: Nitrofurantoin Macrocrystal-Monohydrate (Macrobid) 100 mg PO capsule, 1 capsule (100 mg) by mouth 2 times daily for 7 days  #1. Bacteria, >100,000 colonies/mL Citrobacter koseri,  is [INTERMEDIATE] to antibiotic.   Change in treatment as per [facility] ED Lab result protocol.    Information table from ED Provider visit on 10/25/18  Symptoms reported at ED visit (Chief complaint, HPI) Pinky Page is a 68 year old female who was sent to the emergency department from the psychiatric clinic for evaluation of her blood pressure.  The patient was hypertensive in the psychiatric clinic with systolic blood pressures in the 180s-190s.  Patient denies any associated symptoms.  She states that she was nervous and anxious about her clinic appointment today.  She denies any weakness, numbness, speech difficulty, or gait difficulty.  Patient denies any chest pain or dyspnea.  She denies any abdominal pain.  No nausea vomiting or diarrhea.  She reports normal urine output and denies dysuria or frequency.  She states that she has been fatigued over the past 2-3 weeks but otherwise denies any symptoms.  She states that she is not feeling anxious anymore since she left the psychiatric clinic.  Patient reports compliance with her 2 antihypertensive medications including her doses this morning.      Significant Medical hx, if applicable (i.e. CKD, diabetes) CKD, DM   Allergies Allergies   Allergen Reactions     Chlordiazepoxide Hcl      Dimetapp Dm Cold-Cough      Cold/Congetion TABS      Haldol      Ibuprofen      TABS     Lactose Intolerance [Beta-Galactosidase]      CAPS     Milk Products      Propofol      EMUL      Weight, if applicable Wt Readings from Last 2 Encounters:   10/25/18 104.3 kg (230 lb)   08/15/18 109.6 kg (241 lb 9.6 oz)      Coumadin/Warfarin [Yes /No] No   Creatinine Level (mg/dl) Creatinine   Date Value Ref Range Status   10/25/2018 0.67 0.52 - 1.04 mg/dL Final      Creatinine clearance (ml/min), if applicable CREATININE: 0.67 mg/dL (10/25/18 1734)  Estimated creatinine clearance: 93.2 mL/min   Pregnant (Yes/No/NA) No   Breastfeeding (Yes/No/NA) No   ED providers Impression and Plan (applicable information) 68 year old female sent from the psychiatry clinic due to elevated blood pressure readings.  In the emergency department, the patient is normotensive after mild, isolated systolic hypertension on her triage blood pressure.  The patient had labs performed which revealed a normal metabolic panel.  She has a mild leukocytosis and evidence for urinary tract infection on her urinalysis.  Urine culture will be performed.  Macrobid will be prescribed.  Patient remained asymptomatic in the emergency department through her evaluation.  Primary care follow-up recommended.   ED diagnosis Acute cystitis without hematuria   ED provider Brayden Osei MD      RN Assessment (Patient s current Symptoms), include time called.  [Insert Left message here if message left]  Did not assess, spoke with RN at [facility].   Faxed final UC to [phone].    RN Recommendations/Instructions per [facility] ED lab result protocol  Patient notified of lab result and treatment recommendations.  Rx for Bactrim sent to [Pharmacy - [facility] pharmacy].  RN reviewed information about stopping Macrobid once Bactrim obtained.      PCP follow-up Questions asked: YES       China Collins RN    [facility] RN  Lung Nodule and ED Lab Results F/U RN  Epic pool (ED late result f/u RN) : [id number]  Ph  # [phone]    Copy of Lab result   Order   Urine Culture Aerobic Bacterial [[id number]] (Order [id number])   Exam Information   Exam Date Exam Time Accession # Results    10/25/18  6:20 PM [id number]    Component Results   Component Collected Lab   Specimen Description 10/25/2018  6:20    Midstream Urine   Special Requests 10/25/2018  6:20 PM 75   Specimen received in preservative   Culture Micro (Abnormal) 10/25/2018  6:20    >100,000 colonies/mL   Citrobacter koseri      Culture Micro 10/25/2018  6:20    <10,000 colonies/mL   mixed urogenital samanta   Susceptibility testing not routinely done      Culture & Susceptibility   CITROBACTER KOSERI   Antibiotic Interpretation Sensitivity Unit Method Status   CEFAZOLIN Sensitive <=4 ug/mL LIZA Final   Comment: Cefazolin LIZA breakpoints are for the treatment of uncomplicated urinary tract   infections.  For the treatment of systemic infections, please contact the   laboratory for additional testing.   CEFEPIME Sensitive <=1 ug/mL LIZA Final   CEFOXITIN Intermediate 16 ug/mL LIZA Final   CEFTAZIDIME Sensitive <=1 ug/mL LIZA Final   CEFTRIAXONE Sensitive <=1 ug/mL LIZA Final   CIPROFLOXACIN Sensitive <=0.25 ug/mL LIZA Final   GENTAMICIN Sensitive <=1 ug/mL LIZA Final   LEVOFLOXACIN Sensitive <=0.12 ug/mL LIZA Final   NITROFURANTOIN Intermediate 64 ug/mL LIZA Final   Piperacillin/Tazo Sensitive <=4 ug/mL LIZA Final   TOBRAMYCIN Sensitive <=1 ug/mL LIZA Final   Trimethoprim/Sulfa Sensitive <=1/19 ug/mL LIZA Final

## 2018-11-07 ENCOUNTER — OFFICE VISIT (OUTPATIENT)
Dept: FAMILY MEDICINE | Facility: CLINIC | Age: 68
End: 2018-11-07
Payer: COMMERCIAL

## 2018-11-07 ENCOUNTER — OFFICE VISIT (OUTPATIENT)
Dept: PHARMACY | Facility: CLINIC | Age: 68
End: 2018-11-07
Payer: COMMERCIAL

## 2018-11-07 VITALS
DIASTOLIC BLOOD PRESSURE: 76 MMHG | HEIGHT: 65 IN | TEMPERATURE: 98.9 F | WEIGHT: 234.2 LBS | BODY MASS INDEX: 39.02 KG/M2 | SYSTOLIC BLOOD PRESSURE: 136 MMHG | HEART RATE: 73 BPM | OXYGEN SATURATION: 96 %

## 2018-11-07 DIAGNOSIS — Z00.00 MEDICARE ANNUAL WELLNESS VISIT, SUBSEQUENT: Primary | ICD-10-CM

## 2018-11-07 DIAGNOSIS — I10 HYPERTENSION, ESSENTIAL: ICD-10-CM

## 2018-11-07 DIAGNOSIS — K59.04 CHRONIC IDIOPATHIC CONSTIPATION: ICD-10-CM

## 2018-11-07 DIAGNOSIS — Z79.4 DIABETES MELLITUS TYPE 2, INSULIN DEPENDENT (H): ICD-10-CM

## 2018-11-07 DIAGNOSIS — E11.29 TYPE 2 DIABETES MELLITUS WITH MICROALBUMINURIA, WITH LONG-TERM CURRENT USE OF INSULIN (H): Primary | ICD-10-CM

## 2018-11-07 DIAGNOSIS — Z79.4 TYPE 2 DIABETES MELLITUS WITH MICROALBUMINURIA, WITH LONG-TERM CURRENT USE OF INSULIN (H): Primary | ICD-10-CM

## 2018-11-07 DIAGNOSIS — F41.1 GENERALIZED ANXIETY DISORDER: ICD-10-CM

## 2018-11-07 DIAGNOSIS — Z00.00 HEALTH CARE MAINTENANCE: ICD-10-CM

## 2018-11-07 DIAGNOSIS — E11.9 DIABETES MELLITUS TYPE 2, INSULIN DEPENDENT (H): ICD-10-CM

## 2018-11-07 DIAGNOSIS — R80.9 TYPE 2 DIABETES MELLITUS WITH MICROALBUMINURIA, WITH LONG-TERM CURRENT USE OF INSULIN (H): Primary | ICD-10-CM

## 2018-11-07 LAB
CREAT UR-MCNC: 13 MG/DL
HBA1C MFR BLD: 7.4 % (ref 4.1–5.7)
MICROALBUMIN UR-MCNC: <5 MG/L
MICROALBUMIN/CREAT UR: NORMAL MG/G CR (ref 0–25)

## 2018-11-07 ASSESSMENT — ANXIETY QUESTIONNAIRES
5. BEING SO RESTLESS THAT IT IS HARD TO SIT STILL: SEVERAL DAYS
7. FEELING AFRAID AS IF SOMETHING AWFUL MIGHT HAPPEN: SEVERAL DAYS
IF YOU CHECKED OFF ANY PROBLEMS ON THIS QUESTIONNAIRE, HOW DIFFICULT HAVE THESE PROBLEMS MADE IT FOR YOU TO DO YOUR WORK, TAKE CARE OF THINGS AT HOME, OR GET ALONG WITH OTHER PEOPLE: SOMEWHAT DIFFICULT
1. FEELING NERVOUS, ANXIOUS, OR ON EDGE: SEVERAL DAYS
2. NOT BEING ABLE TO STOP OR CONTROL WORRYING: SEVERAL DAYS
GAD7 TOTAL SCORE: 7
3. WORRYING TOO MUCH ABOUT DIFFERENT THINGS: SEVERAL DAYS
6. BECOMING EASILY ANNOYED OR IRRITABLE: SEVERAL DAYS

## 2018-11-07 ASSESSMENT — PATIENT HEALTH QUESTIONNAIRE - PHQ9: 5. POOR APPETITE OR OVEREATING: SEVERAL DAYS

## 2018-11-07 NOTE — LETTER
November 9, 2018      Pinky VELOZ Kaylee  [address] [facility] [address]        Dear Pinky,    Thank you for getting your care at [facility]. Please see below for your test results.    Resulted Orders   Hemoglobin A1c ([facility])   Result Value Ref Range    Hemoglobin A1C 7.4 (H) 4.1 - 5.7 %   Albumin Random Urine Quantitative with Creat Ratio   Result Value Ref Range    Creatinine Urine 13 mg/dL    Albumin Urine mg/L <5 mg/L    Albumin Urine mg/g Cr Unable to calculate due to low value 0 - 25 mg/g Cr       Your A1c is higher than it was 6 months ago, but still below our goal of 8. We can discuss these results at your next visit in 2 months.  Sincerely,    Cary Brownlee MD

## 2018-11-07 NOTE — PROGRESS NOTES
>64 year old Wellness Visit ( Medicare etc)           HPI       This 68 year old female presents as an established patient  Cary Brownlee who presents for a  Annual Wellness Exam.    Other issues patient wants to address today:    none        Visual Acuity: :  Testing not done; patient has seen eye doctor in the past 12 months.    Patient Active Problem List   Diagnosis     Allergic rhinitis due to pollen     Urge incontinence     Hypertension, essential     Cardiomegaly     Chronic constipation     Dry eye syndrome     Esophageal reflux     Exposure keratoconjunctivitis     DM ophthalmopathy (H)     Hypothyroidism     Senile cataract     ANUJ (obstructive sleep apnea)     Postmenopausal atrophic vaginitis     Restless leg syndrome     Squamous blepharitis     Morbid obesity due to excess calories (H)     Personal history of breast cancer s/p L masectomy     Diabetes mellitus type 2, insulin dependent (H)     Hypercholesteremia     Lives in group home     Extrapyramidal and movement disorder     CKD (chronic kidney disease) stage 2, GFR 60-89 ml/min     Solitary kidney, congenital     S/P hysterectomy     Impingement syndrome of right shoulder     Hypertriglyceridemia     Candidiasis of skin     Generalized anxiety disorder     Carpal tunnel syndrome of left wrist     Schizoaffective disorder, depressive type (H)     Major depressive disorder, recurrent episode, moderate (H)     Psychological factors affecting medical condition     Hx of psychiatric care     Nail complaint     Microalbuminuria due to type 2 diabetes mellitus (H)     Type 2 diabetes mellitus with microalbuminuria, with long-term current use of insulin (H)     Diabetes mellitus, type II, insulin dependent (H)     CKD (chronic kidney disease) stage 1, GFR 90 ml/min or greater     Conjunctivitis of both eyes     Corneal erosion of both eyes     Spastic entropion, right       Past Medical History:   Diagnosis Date     Allergic rhinitis due to pollen      seasonal allergies      Anisometropia and aniseikonia      BMI greater than 40      Cardiomegaly      Chronic constipation      Congenital absence of one kidney      Cramp of limb      Dermatophytosis of the body      Dry eye syndrome      Esophageal reflux      Essential hypertension, benign      Gastro-oesophageal reflux disease      Hemorrhoids      Hypermetropia      Hypothyroidism      Incomplete Emptying of Bladder      Lactose intolerance      Major depressive disorder, recurrent episode, moderate (H)      Malignant neoplasm of breast (female), unspecified site     left mastectomy 1996      Mixed incontinence urge and stress (male)(female)      Moderate obstructive sleep apnea      Postmenopausal Atrophic Vaginitis      Presbyopia      Regular astigmatism      Restless leg syndrome      Senile nuclear sclerosis      Thyroid eye disease     mild     Tinnitus      Trigger finger (acquired)     right hand     Type II or unspecified type diabetes mellitus without mention of complication, not stated as uncontrolled     on insulin since April 2006         Family History   Problem Relation Age of Onset     HEART DISEASE Father      1968     Melanoma Mother      malignant melanoma     Glaucoma No family hx of      Macular Degeneration No family hx of          Problem List, Family History and past Medical History reviewed and unchanged/updated.       Health risk Assessment/ Review of Systems:         Constitutional:   Fevers or night sweats?  NO      Eyes:   Vision problems?  NO            Hearing:  Do you feel you have hearing loss?  NO  Cardiovascular:  Chest pain, palpitations, or pain with walking?  NO        Respiratory:   Breathing problems or cough?  NO    :   Difficulty controlling urination?  NO        Musculoskeletal:   Stiffness or sore joints?  NO            Skin:   concerning lesions or moles?  NO           Nervous System:   loss of strength or sensation,  numbness or tingling,  tremor,  dizziness,   headache?  NO         Mental Health:   depression or anxiety,  sleep problems?  NO   Cognition:  Memory problems?  NO       Weight Loss: Have you lost 10 or more pounds unintentionally in the previous year?  NO  Energy: How much of the time during the past 3 weeks did you feel tired?   (check one of the following):   ?  All of the time  ? Most of the time  ? Some of the time  ? A little of the time  ? None of the Time    PHQ-2 Score:   PHQ-2 ( 1999 [employer]) 11/7/2018 8/15/2018   Q1: Little interest or pleasure in doing things 1 1   Q2: Feeling down, depressed or hopeless 1 1   PHQ-2 Score 2 2       PHQ-9 Score:   No flowsheet data found.  Medical Care:     What other specialists or organizations are involved in your medical care?     Patient Care Team       Relationship Specialty Notifications Start End    Cary Brownlee MD PCP - General Student in organized health care education/training program  7/3/18     Phone: [phone] Fax: [phone]         [facility] [address] [facility] [address]    Dr Narendra Truong MD   5/31/12     Phone: [phone] Fax: [phone]        [address] [place] [address]    Damon Gr, PhD LP   Psychology  4/30/15     Phone: [phone] Fax: [phone]         [address] [place] [address] [address] [facility] [address]    Dedra Dior MD MD Urology  7/20/15     Phone: [phone] Fax: [phone]         [address] [place] [address] [address] [facility] [address]    Analy Bush, RN Clinic Care Coordinator Urology  7/20/15     Phone: [phone] Pager: [phone]        Matilde Ann MD Referring Physician   8/20/15     Phone: [phone] Fax: [phone]         [facility] [address] [place] [address] [address] [address]    Narendra Truong     8/24/15     Phone: [phone] Fax: [phone]         [address] [place] [address]    Nany Brown MD MD Ophthalmology  9/3/15     Phone: [phone] Fax:  [phone]         [address] [facility] [facility] [address]    Gaby Holliday MD MD Nephrology  5/18/16     Phone: [phone] Fax: [phone]         [address] [facility] [address] [facility] [address]    Allie Bauer, RN Registered Nurse  Admissions 2/26/18     Comment:  [facility] Community Care Coordinator [phone]    Michael Lopez MD MD Ophthalmology  10/1/18     Phone: [phone] Fax: [phone]         [address] [facility] [address]    Juventino Sweeney MD Resident Student in organized health care education/training program  10/1/18     Phone: [phone] Fax: [phone]         [facility] [address] [facility]  [facility] [address]              Social History / Home Safety     Social History   Substance Use Topics     Smoking status: Never Smoker     Smokeless tobacco: Never Used     Alcohol use No     Marital Status:Single  Who lives in your household? self  Does your home have any of the following safety concerns? Loose rugs in the hallway, no grab bars in the bathroom, no handrails on the stairs or have poorly lit areas?  No   Do you feel threatened or controlled by a partner, ex-partner or anyone in your life?  No   Has anyone hurt you physically, for example by pushing, hitting, slapping or kicking you   or forcing you to have sex? No       Functional Status     Do you need help with dressing yourself, bathing, or walking?No   Do you need help with the phone, transportation, shopping, preparing meals, housework, laundry, medications or managing money?No   By yourself and not using aids, do you have any difficulty walking up 10 steps  without resting? No   By yourself and not using aids, do you have any difficulty walking several hundred yards? No              Risk Behaviors and Healthy Habits     History   Smoking Status     Never Smoker   Smokeless Tobacco     Never Used     How many servings of fruits and vegetables do you eat a day? 6 or  7  Exercise:walking  and biking 6-7 days/week for an average of 15-30 minutes  Do you frequently drive without a seatbelt? No   History   Smoking Status     Never Smoker   Smokeless Tobacco     Never Used     Do you use any other drugs? No       Do you use alcohol?No        Functional Ability   Was the patient's timed Up & Go test (Get up from chair walk, 10 feet turn, return to chair and sit down) unsteady or longer than 30 seconds?  YES 5 seconds    Fall risk:     1. Have you fallen two or more times in the past year? No   2. Have you fallen and had an injury in the past year?  No         Evaulation of Cognitive Function     Family member/caregiver input: Normal  COGNITIVE SCREEN  1) Repeat 3 items (Leader, Season, Table)    2) Clock draw: NORMAL  3) 3 item recall: Recalls 3 objects  Results: 3 items recalled: COGNITIVE IMPAIRMENT LESS LIKELY    Mini-CogTM Copyright S Paula. Licensed by the author for use in [facility]; reprinted with permission (gabriel@[facility]). All rights reserved.          Other Assessments:     CV Risk based on Pooled Cohort Risk (consider assessing every 4-6 years; consider statin in patients with 10-yr risk > 7.5%): patient already on a statin      History   Smoking Status     Never Smoker   Smokeless Tobacco     Never Used     BP Readings from Last 3 Encounters:   11/07/18 136/76   10/25/18 113/64   10/25/18 194/81     Lab Results   Component Value Date    CHOL 126 05/03/2018     Lab Results   Component Value Date    HDL 37 05/03/2018     Lab Results   Component Value Date    LDL 60 05/03/2018     Lab Results   Component Value Date    TRIG 148 05/03/2018     Lab Results   Component Value Date    CHOLHDLRATIO 4.4 02/22/2016     Current Outpatient Prescriptions   Medication     acetaminophen (TYLENOL) 500 MG tablet     alum & mag hydroxide-simethicone (MYLANTA/MAALOX) 200-200-20 MG/5ML SUSP suspension     amLODIPine (NORVASC) 5 MG tablet     artificial saliva (BIOTENE MT) AERS  spray     artificial tears (AKWA TEARS) OINT ophthalmic ointment     ASPIRIN 81 MG OR TABS     atorvastatin (LIPITOR) 40 MG tablet     BD AUTOSHIELD DUO 30G X 5 MM     blood glucose (NO BRAND SPECIFIED) lancets standard     blood glucose calibration (NO BRAND SPECIFIED) solution     blood glucose monitoring (NO BRAND SPECIFIED) meter device kit     blood glucose monitoring (NO BRAND SPECIFIED) test strip     Calcium Carbonate-Vitamin D (CALCIUM-VITAMIN D) 250-125 MG-UNIT TABS     calcium polycarbophil (FIBERCON) 625 MG tablet     CLARITIN 10 MG OR TABS     clotrimazole (LOTRIMIN) 1 % cream     erythromycin (ROMYCIN) ophthalmic ointment     exenatide (BYETTA) 10 MCG/0.04ML injection     FLUoxetine (PROZAC) 20 MG capsule     fluticasone (FLONASE) 50 MCG/ACT nasal spray     furosemide (LASIX) 20 MG tablet     Gabapentin (NEURONTIN PO)     Hypromellose (ARTIFICIAL TEARS OP)     insulin glargine (LANTUS) 100 UNIT/ML injection     lactase (LACTAID) 3000 UNIT tablet     levothyroxine (SYNTHROID, LEVOTHROID) 175 MCG tablet     LORazepam (ATIVAN) 1 MG tablet     losartan (COZAAR) 100 MG tablet     Magnesium Hydroxide (MILK OF MAGNESIA PO)     melatonin 3 MG tablet     metFORMIN (GLUCOPHAGE-XR) 500 MG 24 hr tablet     moxifloxacin (VIGAMOX) 0.5 % ophthalmic solution     nystatin (MYCOSTATIN) 339215 UNIT/GM POWD     ONETOUCH DELICA LANCETS 33G MISC     polyethylene glycol (MIRALAX/GLYCOLAX) powder     Saline 0.9 % SOLN     senna-docusate (SENNA S) 8.6-50 MG per tablet     Skin Protectants, Misc. (EUCERIN) cream     SM LUBRICANT EYE DROPS 0.4-0.3 % SOLN ophthalmic solution     Sod Fluor-Solo Fluor-Hydrfl Ac (GEL-SASHA DENTINBLOC DT)     solifenacin (VESICARE) 10 MG tablet     ziprasidone (GEODON) 40 MG capsule     glucagon (GLUCAGON EMERGENCY) 1 MG kit     No current facility-administered medications for this visit.      The ASCVD Risk score (James EATON Jr, et al., 2013) failed to calculate for the following reasons:    The valid  "total cholesterol range is 130 to 320 mg/dL   Patient already on a statin    Advance Directives: Discussed with patient and family as appropriate.  Has patient completed advance directives and/or a living will?  no     Immunization History   Administered Date(s) Administered     Influenza (High Dose) 3 valent vaccine 10/16/2017     Influenza (IIV3) PF 11/10/2004, 11/02/2006, 11/01/2007, 10/22/2008, 09/01/2009, 10/05/2010, 09/18/2012     Mantoux Tuberculin Skin Test 08/02/2002, 08/02/2002, 07/22/2003, 07/22/2003, 07/16/2004, 07/16/2004, 07/28/2005, 07/28/2005, 09/18/2011, 09/18/2012, 08/21/2014, 09/29/2015, 10/16/2017     Pneumococcal 23 valent 10/14/2005, 05/28/2015     TD (ADULT, 7+) 06/15/2004     TDAP Vaccine (Boostrix) 06/10/2014     Reviewed Immunization Record Today. Received flu at her residence in September.    Physical Exam     Vitals: /76  Pulse 73  Temp 98.9  F (37.2  C) (Oral)  Ht 5' 4.5\" (163.8 cm)  Wt 234 lb 3.2 oz (106.2 kg)  SpO2 96%  BMI 39.58 kg/m2  BMI= Body mass index is 39.58 kg/(m^2).  GENERAL APPEARANCE: alert and no distress  HENT: ear canals patent and TM's normal; mouth without ulcers or lesions; and oral mucous membranes moist  Hearing loss screen:  Normal   DENTITION:  Good repair?  yes  RESP: lungs clear to auscultation - no rales, rhonchi or wheezes  CV: regular rates and rhythm, no murmur, click or rub, no peripheral edema and peripheral pulses strong  SKIN: no suspicious lesions or rashes  NEURO: Normal strength and tone  MENTAL STATUS EXAM  Appearance: appropriate  Attitude: cooperative  Behavior: normal  Eye Contact: normal  Speech: normal  Orientation: oreinted to person , place, time and situation  Mood:  Good, less anxious now that eye surgery is finished  Affect: Mood Congruent  Thought Process: clear      Assessment and Plan     Welcome to Medicare Preventive Visit / Initial Medicare Annual Wellness Exam - reviewed Preventive Services and Plan form with patient as " specified in Patient Instructions.    Pinky was seen today for physical.    Diagnoses and all orders for this visit:    Diabetes mellitus type 2, insulin dependent (H) - Pharmacist met with Pinky to discuss her diabetes medications and blood sugars.  Feel as if she is in good control and I have no changes to her medications today. Her foot exam is nml and she has seen an ophthalmologist in the last year. Her A1C is 7.4 which is below our goal of 8. Will follow up her microalbumin testing.  -     Hemoglobin A1c ([facility])  -     Albumin Random Urine Quantitative with Creat Ratio    Generalized anxiety disorder - Her psychiatrist manages her behavioral health medications. They increased her ativan to 0.5mg in AM due to her anxiety over her eye procedures.  She had surgery and no longer is as anxious.  GAD7 score: 7, mild. Would recommend removing that morning dose. She sees her psychiatrist again on 11/9/18.     Hypertension Blood pressure nml at today's visit and I have no changes to any mediations    Health Care Maintenance Patient has had her flu vaccine this season and is up to date on all other vaccinations.  Is also up to date on her mammograms and colonoscopy and is no longer getting screening for cervical cancer. No further screening test recommend today.    Options for treatment and follow-up care were reviewed with the Pinky Sanderser and/or guardian engaged in the decision making process and verbalized understanding of the options discussed and agreed with the final plan.    Cary Brownlee MD  PGY-2

## 2018-11-07 NOTE — PATIENT INSTRUCTIONS
[facility] MEDICARE PERSONAL PREVENTIVE SERVICES PLAN - SERVICES     Review these tests with your doctor then decide which ones you want and take this page home for your reference                          Immunization History   Administered Date(s) Administered     Influenza (High Dose) 3 valent vaccine 10/16/2017     Influenza (IIV3) PF 11/10/2004, 11/02/2006, 11/01/2007, 10/22/2008, 09/01/2009, 10/05/2010, 09/18/2012     Mantoux Tuberculin Skin Test 08/02/2002, 08/02/2002, 07/22/2003, 07/22/2003, 07/16/2004, 07/16/2004, 07/28/2005, 07/28/2005, 09/18/2011, 09/18/2012, 08/21/2014, 09/29/2015, 10/16/2017     Pneumococcal 23 valent 10/14/2005, 05/28/2015     TD (ADULT, 7+) 06/15/2004     TDAP Vaccine (Boostrix) 06/10/2014    }                         IMMUNIZATIONS Description Recommend today?     Influenza (flu shot) Helps to prevent flu; you should get it every year No; is up to date.   PCV 13 Prevents pneumonia; given once No; is up to date.   PPSV 23 Prevents pneumonia; given once No; is up to date.   Tetanus Prevents tetanus; need every 10 years No; is up to date.   Herpes Zoster (shingles) Prevents or lessens symptoms from shingles; given once No; is up to date.  If you want this vaccine please go to a pharmacy to receive Shinrix   Hepatitis B  If you have any of the following risk factors you should be immunized for hepatitis B: severe kidney disease, people who live in the same house as a carrier of Hepatitis B virus, people who live in  institutions (e.g. nursing homes or group homes), homosexual men, patients with hemophilia who received Factor VIII or IX concentrates, abusers of illicit injectable drugs No: is not indicated today.       Health Maintenance   Topic Date Due     DEPRESSION ACTION PLAN Q1 YR  06/26/1968     MICROALBUMIN Q1 YEAR  03/21/2018     FOOT EXAM Q1 YEAR  10/16/2018     WELLNESS VISIT Q1 YR  10/16/2018     A1C Q6 MO  11/03/2018     EYE EXAM Q1 YEAR  11/14/2018     PHQ-9 Q1 MONTH  11/25/2018      FALL RISK ASSESSMENT  04/09/2019     LIPID MONITORING Q1 YEAR  05/03/2019     TSH Q1 YEAR  10/25/2019     BMP Q1 YR  10/25/2019     MAMMO SCREEN Q2 YR (SYSTEM ASSIGNED)  04/05/2020     TSH W/ FREE T4 REFLEX Q2 YEAR  10/25/2020     COLONOSCOPY Q5 YR  07/07/2021     ADVANCE DIRECTIVE PLANNING Q5 YRS  10/16/2022     TETANUS IMMUNIZATION (SYSTEM ASSIGNED)  06/10/2024     DEXA SCAN SCREENING (SYSTEM ASSIGNED)  Completed     INFLUENZA VACCINE  Completed     HEPATITIS C SCREENING  Completed         SCREENING TESTS     Description   Year of Last Screening   Recommended Today?   Heart disease screening blood tests    Cholesterol level Reducing cholesterol can reduce your risk of heart attacks by 25%.  Screening is recommended yearly if you are at risk of heart disease otherwise every 4-5 years  No; is up to date.   Diabetes screening tests    Hemoglobin A1c blood test   Finding and treating diabetes early can reduce complications.  Screening recommended/covered yearly if you have high blood pressure, high cholesterol, obesity (BMI >30), or a history of high blood glucose tests; or 2 of the following: family history of diabetes, overweight (BMI >25 but <30), age 65 years or older, and a history of diabetes of pregnancy or gave birth to baby weighing more than 9 lbs.  Yes; Recommended and ordered.   Hepatitis B screening Finding hepatitis B early can reduce complications.  Screening is recommended for persons with selected risk factors.  No: is not indicated today.   Hepatitis C screening Finding hepatitis C early can reduce complications.  Screening is recommended for all persons born from 1945 through 1965 and for those with selected other risk factors.   No: is not indicated today.   HIV screening Finding HIV early can reduce complications.  Screening is recommended for persons with risk factors for HIV infection.  No: is not indicated today.   Glaucoma screening Early detection of glaucoma can prevent blindness.    Please talk to your eye doctor about this.       SCREENING TESTS     Description   Year of Last Screening   Recommended Today?   Colorectal cancer screening    Fecal occult blood test     Screening colonoscopy Screening for colon cancer has been shown to reduce death from colon cancer by 25-30%. Screening recommended to start at 50 years and continuing until age 75 years.    No; is up to date.   Breast Cancer Screening (women)    Mammogram Mammogram screening for breast cancer has been shown to reduce the risk of dying from breast cancer and prolong life. Screening is recommended every 1-2 years for women aged 50 to 74 years.   No; is up to date.   Cervical Cancer screening (women)    Pap Cervical pap smears can reduce cervical cancer. Screening is recommended annually if high risk (history of abnormal pap smears) otherwise every 2-3 years, stop screening at 65 years of age if history of normal paps.  No: is not indicated today.   Screening for Osteoporosis:  Bone mass measurements (women)    Dexa Scan Screening and treating Osteoporosis can reduce the risk of hip and spine fractures. Screening is recommended in women 65 years or older and in women and men at risk of osteoporosis.  No; is up to date.   Screening for Lung Cancer     Low-dose CT scanning Screening can reduce mortality in persons aged 55-80 who have smoked at least 30 pack-years and who are either still smoking or have quit in the past 15 years.  No: is not indicated today.   Abdominal Aortic Aneurysm (AAA) screening    Ultrasound (US)   An aneurysm treated before rupture is very safe -a ruptured aneurysm can be fatal.  Screening  by US for AAA is limited to patients who meet one of the following criteria:    Men who are 65-75 years old and have smoked more than 100 cigarettes in their lifetime    Anyone with a family history of abdominal aortic aneurysm  No: is not indicated today.       MEDICARE WELLNESS EXAM PATIENT INSTRUCTIONS    Yearly exam:      See your health care provider every year in order to review changes in your health, review medicines that you take, and discuss preventive care needs such as immunizations and cancer screening.    Get a flu shot each year.     Advance Directives:    If you have not done so, you are encouraged to complete advance directives and/or a living will.   More information about advance directives can be found at: http://www.[facility]med.org/advocacy/Key-Issues/Advance-Directives    Nutrition:     Eat at least 5 servings of fruits and vegetables each day.     Eat whole-grain bread, whole-wheat pasta and brown rice instead of white grains and rice.     Talk to your doctor about Calcium and Vitamin D.     Lifestyle:    Exercise for at least 150 minutes a week (30 minutes a day, 5 days a week). This will help you control your weight and prevent disease.     Limit alcohol to one drink per day.     If you smoke, try to quit - your doctor will be happy to help.     Wear sunscreen to prevent skin cancer.     See your dentist every six months for an exam and cleaning.     See your eye doctor every 1 to 2 years to screen for conditions such as glaucoma, macular degeneration and cataracts.

## 2018-11-07 NOTE — PROGRESS NOTES
"  Clinical Pharmacy Wellness Note     Pinky was referred by Dr. Brownlee for an annual wellness encounter       MEDICATION REVIEW:  Discussed all medication indications, dosage and effectiveness, adverse effects, and adherence with patient/caregiver.    Pt had meds with them: no  Pt had med list with them: yes  Pt was knowledgeable about meds: yes  Medications set up by: nurse at [facility]   Medications administered by someone else (e.g., LTCF): Yes: nurse at home care \"pill pusher\"  Pt uses a medication box or automated dispenser: no  Called pharmacy to obtain or clarify med list:  no  Called HHN or LTCF to obtain or clarify med list:  no    Medication Discrepancies  Medications on EMR med list that pt is NOT taking:  none  Medications pt IS taking that are NOT on EMR med list (e.g., from specialist, hospital): none  OTC meds/ dietary supplements pt taking on own that are NOT on EMR med list:  none  Dosage listed differently than how patient is taking: none  Frequency listed differently than how patient is taking: none  Duplicate medication on list (two occurrences of the same medication):  none  TOTAL NUMBER OF MEDICATION DISCREPANCIES:  0  ______________________________________________________________________      Subjective:  Pinky is here today for an annual wellness assessment by Dr. Brwonlee.  She brings a list of medications from the home she is staying at, which she is able to speak on most if not all of her medications. When asked if she felt she took too many medications, she stated no because she had people help make sure she was able to take all of the medications at the right time.       1)  Constipation     Pinky is currently taking multiple (6) medications for constipation. Four of the constipation medications are scheduled and two others are used PRN.  When asked if she was still constipated she stated no and when asked if she had diarrhea she stated no as well.     She had no concerns or complaints with " these medications.      2)   Diabetes     Pinky states that she injects her own insulin and is able to explain the correct dosing and frequency.  She is using lantus as well as Byetta.    In addition, she takes 2000mg of metformin daily     She states that her blood sugar was 141 mg/dL before breakfast today.  The highest she remembers her blood sugar reading was 280 mg/dL a few weeks ago when she had an infection.      Patient reported being compliant all of the time   Patient reports no side effects.      Objective:    There were no vitals taken for this visit.  GFR Estimate   Date Value Ref Range Status   10/25/2018 87 >60 mL/min/1.7m2 Final     Comment:     Non  GFR Calc   05/03/2018 66.2 >60.0 mL/min/1.7 m2 Final   06/21/2017 77 >60 mL/min/1.7m2 Final     Comment:     Non  GFR Calc     GFR Estimate If Black   Date Value Ref Range Status   10/25/2018 >90 >60 mL/min/1.7m2 Final     Comment:      GFR Calc   05/03/2018 80.1 >60.0 mL/min/1.7 m2 Final   06/21/2017 >90   GFR Calc   >60 mL/min/1.7m2 Final       Patient Active Problem List   Diagnosis     Allergic rhinitis due to pollen     Urge incontinence     Hypertension, essential     Cardiomegaly     Chronic constipation     Dry eye syndrome     Esophageal reflux     Exposure keratoconjunctivitis     DM ophthalmopathy (H)     Hypothyroidism     Senile cataract     ANUJ (obstructive sleep apnea)     Postmenopausal atrophic vaginitis     Restless leg syndrome     Squamous blepharitis     Morbid obesity due to excess calories (H)     Personal history of breast cancer s/p L masectomy     Diabetes mellitus type 2, insulin dependent (H)     Hypercholesteremia     Lives in group home     Extrapyramidal and movement disorder     CKD (chronic kidney disease) stage 2, GFR 60-89 ml/min     Solitary kidney, congenital     S/P hysterectomy     Impingement syndrome of right shoulder     Hypertriglyceridemia      Candidiasis of skin     Generalized anxiety disorder     Carpal tunnel syndrome of left wrist     Schizoaffective disorder, depressive type (H)     Major depressive disorder, recurrent episode, moderate (H)     Psychological factors affecting medical condition     Hx of psychiatric care     Nail complaint     Microalbuminuria due to type 2 diabetes mellitus (H)     Type 2 diabetes mellitus with microalbuminuria, with long-term current use of insulin (H)     Diabetes mellitus, type II, insulin dependent (H)     CKD (chronic kidney disease) stage 1, GFR 90 ml/min or greater     Conjunctivitis of both eyes     Corneal erosion of both eyes     Spastic entropion, right     SOCIAL HISTORY:   reports that she has never smoked. She has never used smokeless tobacco. She reports that she does not drink alcohol or use illicit drugs.    Current Outpatient Prescriptions   Medication Sig Dispense Refill     acetaminophen (TYLENOL) 500 MG tablet Take 2 tablets (1,000 mg) by mouth every 6 hours as needed 1 Bottle 2     alum & mag hydroxide-simethicone (MYLANTA/MAALOX) 200-200-20 MG/5ML SUSP suspension Take 30 mLs by mouth daily as needed for indigestion       amLODIPine (NORVASC) 5 MG tablet Take 2 tablets (10 mg) by mouth daily 30 tablet 3     artificial saliva (BIOTENE MT) AERS spray Take 1 spray by mouth 3 times daily as needed for dry mouth       artificial tears (AKWA TEARS) OINT ophthalmic ointment Apply  to eye. Place 0.5 inches into both eyes at bedtime 1 Tube 11     ASPIRIN 81 MG OR TABS Take 1 tablet by mouth daily. ENTERIC COATED. 100 3     atorvastatin (LIPITOR) 40 MG tablet Take 1 tablet (40 mg) by mouth daily 90 tablet 1     Calcium Carbonate-Vitamin D (CALCIUM-VITAMIN D) 250-125 MG-UNIT TABS Take 1 tablet by mouth 2 times daily. Calcium 250 mg/Vit D 125 IU       calcium polycarbophil (FIBERCON) 625 MG tablet Take 2 tablets by mouth daily       CLARITIN 10 MG OR TABS 1 TAB PO QD (Once per day) as needed for ALLERGY  SYMPTOMS 30 11     clotrimazole (LOTRIMIN) 1 % cream Apply topically 2 times daily as needed 50 g 0     erythromycin (ROMYCIN) ophthalmic ointment Place 0.5 inches into both eyes 4 times daily       exenatide (BYETTA) 10 MCG/0.04ML injection Inject 10 mcg Subcutaneous 2 times daily (before meals) 1 Syringe 11     FLUoxetine (PROZAC) 20 MG capsule Take 3 capsules (60 mg) by mouth daily 90 capsule 1     fluticasone (FLONASE) 50 MCG/ACT nasal spray Spray 1 spray into both nostrils daily 16 g 3     furosemide (LASIX) 20 MG tablet Take 1 tablet (20 mg) by mouth 2 times daily 60 tablet 3     Gabapentin (NEURONTIN PO) Take 900 mg by mouth 3 times daily.       Hypromellose (ARTIFICIAL TEARS OP) Apply 1 drop to eye 4 times daily.       insulin glargine (LANTUS) 100 UNIT/ML injection Inject 20 Units Subcutaneous At Bedtime 8 mL 11     lactase (LACTAID) 3000 UNIT tablet Take 4 tabs daily with meals.       levothyroxine (SYNTHROID, LEVOTHROID) 175 MCG tablet Take 1 tablet (175 mcg) by mouth daily Give on empty stomach 30 tablet 4     LORazepam (ATIVAN) 1 MG tablet Take 1/2 tablet in the morning and 1 tablet at bedtime. May take 1 additional tab daily PRN for anxiety. 50 tablet 0     losartan (COZAAR) 100 MG tablet Take 1 tablet by mouth daily. 90 tablet 1     Magnesium Hydroxide (MILK OF MAGNESIA PO) Take  by mouth. Take 30 mL as needed for constipation.       melatonin 3 MG tablet Take 1 tablet (3 mg) by mouth nightly as needed for sleep 60 tablet 1     metFORMIN (GLUCOPHAGE-XR) 500 MG 24 hr tablet Take 2 tablets (1,000 mg) by mouth 2 times daily (with meals) (Patient taking differently: Take 2,000 mg by mouth daily ) 90 tablet 3     nystatin (MYCOSTATIN) 906650 UNIT/GM POWD Apply topically 3 times daily as needed 60 g 1     polyethylene glycol (MIRALAX/GLYCOLAX) powder Take 1 capful by mouth 2 times daily. 17 GM PO BID       Saline 0.9 % SOLN Spray 2 sprays in nostril as needed.       senna-docusate (SENNA S) 8.6-50 MG per  tablet Take 2 tablets by mouth At Bedtime.       Skin Protectants, Misc. (EUCERIN) cream Apply  topically as needed. Apply to thigh PRN dry skin        solifenacin (VESICARE) 10 MG tablet Take 1 tablet (10 mg) by mouth daily 30 tablet 6     ziprasidone (GEODON) 40 MG capsule Take 1 capsule (40 mg) by mouth 2 times daily (with meals) 60 capsule 2     BD AUTOSHIELD DUO 30G X 5 MM        blood glucose (NO BRAND SPECIFIED) lancets standard Use to test blood sugar 2 times daily or as directed. 100 each 11     blood glucose calibration (NO BRAND SPECIFIED) solution Use to calibrate blood glucose monitor as directed. 1 each 3     blood glucose monitoring (NO BRAND SPECIFIED) meter device kit Check Blood sugars twice a day. 1 kit 0     blood glucose monitoring (NO BRAND SPECIFIED) test strip Use to test blood sugar 3 times daily or as directed. 100 each 3     glucagon (GLUCAGON EMERGENCY) 1 MG kit Inject 1 mg into the muscle once for 1 dose 2 mg 3     moxifloxacin (VIGAMOX) 0.5 % ophthalmic solution Place 1 drop into both eyes 4 times daily       ONETOUCH DELICA LANCETS 33G MISC        SM LUBRICANT EYE DROPS 0.4-0.3 % SOLN ophthalmic solution        Sod Fluor-Solo Fluor-Hydrfl Ac (GEL-SASHA DENTINBLOC DT) Apply  to affected area. GEL. Apply to 2nd molar on bottom right daily at bedtime.       Allergies   Allergen Reactions     Chlordiazepoxide Hcl      Dimetapp Dm Cold-Cough      Cold/Congetion TABS     Haldol      Ibuprofen      TABS     Lactose Intolerance [Beta-Galactosidase]      CAPS     Milk Products      Propofol      EMUL       Environmental factors that may impact patient: none.    There were no vitals taken for this visit.    Drug Therapy Assessment:  Drug therapy problems identified:     I have reviewed All medications taken by the Pinky.  Medications were reviewed and assessed for indicated, effective, safe and convenient. Drug therapy problem identified today listed described below:      1) Constipation         Status:  controlled       DTP:  Unnecessary Drug Therapy: duplicate therapy  , DTP degree:2            Pinky is a 68 year old female presenting to clinic today for an annual wellness visit.  She is currently taking 6 medications for constipation, which seemed to be excessive.  However, when asked about both constipation and diarrhea, she stated she was controlled and happy with her medications.  Could consider discontinuing the stimulant (senna) at future appointments, if deemed necessary.  I did not make a change today due to her being controlled and did not want to alter her current controlled state.        2) Diabetes       Status:  controlled       DTP:  None           A1c Goal <8% per ADA due to age     Pinky's A1c is 7.4%, which is controlled based on her age.  She is managed with insulin, GLP-1, and metformin.  At this time, I recommend she continue therapy and monitoring blood sugars at home.  If blood sugars become uncontrolled, can manage by increasing or decreasing insulin.     Drug Therapy Plan and Follow up:    Plan  Medication list was updated today to reflect the medication reconciliation performed.     1)  Continue medications as prescribed  2) At follow-up appointment consider discontinuing or tapering one or more of the constipation medications.         Follow up: with PCP as directed   Patient was provided with written instructions/medication list via AVS        Options for treatment and/or follow-up care were reviewed with the patient. Patient was engaged and actively involved in the decision making process.  Pinky Page verbalized understanding of the options discussed and was satisfied with the final plan.      Dr. Brownlee was provided my action today in clinic and Dr. Villalpando was available for supervision during this visit and is the authorizing prescriber for this visit through the pharmacist collaborative practice agreement.      Maria Isabel Cosby, Pharm.D           Total Time: 20  # DTPs  Identified: 1    Medical Condition 1: Constipation, Goals of therapy: At Goal, Drug Class: GI - stimulant, Indication: Unnecessary drug therapy,  ,  ,  , Intervention: Discontinue drug, Verification: Recommendation to Provider, Deferred to Future Visit   ,  ,  ,  ,  ,  ,  ,  ,     ,  ,  ,  ,  ,  ,  ,  ,     ,  ,  ,

## 2018-11-07 NOTE — MR AVS SNAPSHOT
After Visit Summary   11/7/2018    Pinky Page    MRN: [id number]           Patient Information     Date Of Birth          1950        Visit Information        Provider Department      11/7/2018 3:20 PM Maria Isabel Cosby, [facility]        Today's Diagnoses     Type 2 diabetes mellitus with microalbuminuria, with long-term current use of insulin (H)    -  1    Chronic idiopathic constipation           Follow-ups after your visit        Your next 10 appointments already scheduled     Nov 19, 2018  2:40 PM CST   Adult Med Follow UP with Maulik Kohler MD   Psychiatry Clinic ([facility])    [address]  [address] [id number] [phone]            Nov 26, 2018 11:30 AM CST   (Arrive by 11:15 AM)   Return Weight Management Visit with Marcos Magdaleno MD   [facility] ([facility])    [address]  4th Floor  [facility] [id number] [phone]            Nov 26, 2018  1:00 PM CST   (Arrive by 12:45 PM)   Return Visit with Damon Gr, PhD [facility] ([facility])    [address]  3rd Floor  [facility] [id number] [phone]            Jan 02, 2019  1:00 PM CST   Return Visit with MD [facility] ([facility])    [address],  [address]  [facility] [address]   [phone]            May 09, 2019  1:30 PM CDT   Return Sleep Patient with Brianda Reddy MD   [facility] ([facility])    [address] [place] [id number] [phone]              Who to contact     Please call your clinic at [phone] to:    Ask questions about your health    Make or cancel appointments    Discuss your medicines    Learn about your test results    Speak to  your doctor            Additional Information About Your Visit        Condition Onehart Information     MyFit is an electronic gateway that provides easy, online access to your medical records. With MyFit, you can request a clinic appointment, read your test results, renew a prescription or communicate with your care team.     To sign up for MyFit visit the website at [email or web address]   You will be asked to enter the access code listed below, as well as some personal information. Please follow the directions to create your username and password.     Your access code is: [email or web address]  Expires: 2019  5:30 AM     Your access code will  in 90 days. If you need help or a new code, please contact your [facility] or call [phone] for assistance.        Care EveryWhere ID     This is your Care EveryWhere ID. This could be used by other organizations to access your [place] medical records  [id number]         Blood Pressure from Last 3 Encounters:   18 136/76   10/25/18 113/64   08/15/18 146/80    Weight from Last 3 Encounters:   18 234 lb 3.2 oz (106.2 kg)   10/25/18 230 lb (104.3 kg)   08/15/18 241 lb 9.6 oz (109.6 kg)              Today, you had the following     No orders found for display         Today's Medication Changes          These changes are accurate as of 18 11:59 PM.  If you have any questions, ask your nurse or doctor.               These medicines have changed or have updated prescriptions.        Dose/Directions    metFORMIN 500 MG 24 hr tablet   Commonly known as:  GLUCOPHAGE-XR   This may have changed:    - how much to take  - when to take this   Used for:  Diabetes mellitus, type 2 (H)        Dose:  1000 mg   Take 2 tablets (1,000 mg) by mouth 2 times daily (with meals)   Quantity:  90 tablet   Refills:  3         Stop taking these medicines if you haven't already. Please contact your care team if you have questions.     SF 5000 PLUS  1.1 % Crea   Generic drug:  Sodium Fluoride   Stopped by:  Maria Isabel Cosby, [facility]                    Primary Care Provider Office Phone # Fax #    Cary Brownlee [phone]       [address] [address]        Equal Access to Services     JAYDEN STUART : Hadii aad ku hadasho Soomaali, waaxda luqadaha, qaybta kaalmada adeegyada, waxay idiin hayaan aderupinder khromankathy laanisa josé. So [facility] [phone].    ATENCIÓN: Si habla español, tiene a deluca disposición servicios gratuitos de asistencia lingüística. Candidomargarette al [phone].    We comply with applicable federal civil rights laws and [place] laws. We do not discriminate on the basis of race, color, national origin, age, disability, sex, sexual orientation, or gender identity.            Thank you!     Thank you for choosing [facility]  for your care. Our goal is always to provide you with excellent care. Hearing back from our patients is one way we can continue to improve our services. Please take a few minutes to complete the written survey that you may receive in the mail after your visit with us. Thank you!             Your Updated Medication List - Protect others around you: Learn how to safely use, store and throw away your medicines at www.disposemymeds.org.          This list is accurate as of 11/7/18 11:59 PM.  Always use your most recent med list.                   Brand Name Dispense Instructions for use Diagnosis    acetaminophen 500 MG tablet    TYLENOL    1 Bottle    Take 2 tablets (1,000 mg) by mouth every 6 hours as needed    Sprain of left ankle, unspecified ligament, initial encounter       alum & mag hydroxide-simethicone 200-200-20 MG/5ML Susp suspension    MYLANTA/MAALOX     Take 30 mLs by mouth daily as needed for indigestion        amLODIPine 5 MG tablet    NORVASC    30 tablet    Take 2 tablets (10 mg) by mouth daily    Essential hypertension with goal blood pressure less than 130/85        artificial saliva Aers spray      Take 1 spray by mouth 3 times daily as needed for dry mouth        artificial tears Oint ophthalmic ointment     1 Tube    Apply  to eye. Place 0.5 inches into both eyes at bedtime    Insufficiency of tear film of both eyes       ARTIFICIAL TEARS OP      Apply 1 drop to eye 4 times daily.        aspirin 81 MG tablet     100    Take 1 tablet by mouth daily. ENTERIC COATED.        atorvastatin 40 MG tablet    LIPITOR    90 tablet    Take 1 tablet (40 mg) by mouth daily    Hyperlipidemia LDL goal <100       BD AUTOSHIELD DUO 30G X 5 MM   Generic drug:  insulin pen needle           blood glucose calibration solution    no brand specified    1 each    Use to calibrate blood glucose monitor as directed.    Type 2 diabetes mellitus with microalbuminuria, with long-term current use of insulin (H)       blood glucose lancets standard    no brand specified    100 each    Use to test blood sugar 2 times daily or as directed.    Type 2 diabetes mellitus with microalbuminuria, with long-term current use of insulin (H)       blood glucose monitoring meter device kit    no brand specified    1 kit    Check Blood sugars twice a day.    Type 2 diabetes mellitus with microalbuminuria, with long-term current use of insulin (H)       blood glucose monitoring test strip    no brand specified    100 each    Use to test blood sugar 3 times daily or as directed.    Type 2 diabetes mellitus with microalbuminuria, with long-term current use of insulin (H)       calcium polycarbophil 625 MG tablet    FIBERCON     Take 2 tablets by mouth daily        calcium-vitamin D 250-125 MG-UNIT Tabs      Take 1 tablet by mouth 2 times daily. Calcium 250 mg/Vit D 125 IU        CLARITIN 10 MG tablet   Generic drug:  loratadine     30    1 TAB PO QD (Once per day) as needed for ALLERGY SYMPTOMS        clotrimazole 1 % cream    LOTRIMIN    50 g    Apply topically 2 times daily as needed    Dermatophytosis       erythromycin  ophthalmic ointment    ROMYCIN     Place 0.5 inches into both eyes 4 times daily        eucerin cream      Apply  topically as needed. Apply to thigh PRN dry skin        exenatide 10 MCG/0.04ML injection    BYETTA    1 Syringe    Inject 10 mcg Subcutaneous 2 times daily (before meals)    Type 2 diabetes mellitus with microalbuminuria, with long-term current use of insulin (H)       FLUoxetine 20 MG capsule    PROzac    90 capsule    Take 3 capsules (60 mg) by mouth daily    Schizoaffective disorder, depressive type (H)       fluticasone 50 MCG/ACT spray    FLONASE    16 g    Spray 1 spray into both nostrils daily    Seasonal allergic rhinitis       furosemide 20 MG tablet    LASIX    60 tablet    Take 1 tablet (20 mg) by mouth 2 times daily    Lower leg edema       GEL-SASHA DENTINBLOC DT      Apply  to affected area. GEL. Apply to 2nd molar on bottom right daily at bedtime.        glucagon 1 MG kit    GLUCAGON EMERGENCY    2 mg    Inject 1 mg into the muscle once for 1 dose    Type 2 diabetes mellitus with microalbuminuria, with long-term current use of insulin (H)       insulin glargine 100 UNIT/ML injection    LANTUS    8 mL    Inject 20 Units Subcutaneous At Bedtime    Type 2 diabetes mellitus with microalbuminuria, with long-term current use of insulin (H)       LACTAID 3000 UNIT tablet   Generic drug:  lactase      Take 4 tabs daily with meals.        levothyroxine 175 MCG tablet    SYNTHROID/LEVOTHROID    30 tablet    Take 1 tablet (175 mcg) by mouth daily Give on empty stomach    Other specified hypothyroidism       LORazepam 1 MG tablet    ATIVAN    50 tablet    Take 1/2 tablet in the morning and 1 tablet at bedtime. May take 1 additional tab daily PRN for anxiety.    Generalized anxiety disorder       losartan 100 MG tablet    COZAAR    90 tablet    Take 1 tablet by mouth daily.    Hypertension goal BP (blood pressure) < 130/80, Diabetes mellitus type 2, insulin dependent (H), CKD (chronic kidney disease)  stage 2, GFR 60-89 ml/min       melatonin 3 MG tablet     60 tablet    Take 1 tablet (3 mg) by mouth nightly as needed for sleep    Other insomnia       metFORMIN 500 MG 24 hr tablet    GLUCOPHAGE-XR    90 tablet    Take 2 tablets (1,000 mg) by mouth 2 times daily (with meals)    Diabetes mellitus, type 2 (H)       MILK OF MAGNESIA PO      Take  by mouth. Take 30 mL as needed for constipation.        moxifloxacin 0.5 % ophthalmic solution    VIGAMOX     Place 1 drop into both eyes 4 times daily        NEURONTIN PO      Take 900 mg by mouth 3 times daily.        nystatin 139877 UNIT/GM Powd    MYCOSTATIN    60 g    Apply topically 3 times daily as needed    Candidiasis of skin       ONETOUCH DELICA LANCETS 33G Misc           polyethylene glycol powder    MIRALAX/GLYCOLAX     Take 1 capful by mouth 2 times daily. 17 GM PO BID        saline 0.9 % Soln      Spray 2 sprays in nostril as needed.        SENNA S 8.6-50 MG per tablet   Generic drug:  senna-docusate      Take 2 tablets by mouth At Bedtime.        SM LUBRICANT EYE DROPS 0.4-0.3 % Soln ophthalmic solution   Generic drug:  polyethylene glycol 0.4%- propylene glycol 0.3%           solifenacin 10 MG tablet    VESICARE    30 tablet    Take 1 tablet (10 mg) by mouth daily    Urge incontinence       ziprasidone 40 MG capsule    GEODON    60 capsule    Take 1 capsule (40 mg) by mouth 2 times daily (with meals)    Schizoaffective disorder, depressive type (H)

## 2018-11-08 ASSESSMENT — ANXIETY QUESTIONNAIRES
2. NOT BEING ABLE TO STOP OR CONTROL WORRYING: SEVERAL DAYS
6. BECOMING EASILY ANNOYED OR IRRITABLE: SEVERAL DAYS
GAD7 TOTAL SCORE: 7
IF YOU CHECKED OFF ANY PROBLEMS ON THIS QUESTIONNAIRE, HOW DIFFICULT HAVE THESE PROBLEMS MADE IT FOR YOU TO DO YOUR WORK, TAKE CARE OF THINGS AT HOME, OR GET ALONG WITH OTHER PEOPLE: SOMEWHAT DIFFICULT
5. BEING SO RESTLESS THAT IT IS HARD TO SIT STILL: SEVERAL DAYS
7. FEELING AFRAID AS IF SOMETHING AWFUL MIGHT HAPPEN: SEVERAL DAYS
1. FEELING NERVOUS, ANXIOUS, OR ON EDGE: SEVERAL DAYS
3. WORRYING TOO MUCH ABOUT DIFFERENT THINGS: SEVERAL DAYS

## 2018-11-08 ASSESSMENT — PATIENT HEALTH QUESTIONNAIRE - PHQ9: 5. POOR APPETITE OR OVEREATING: SEVERAL DAYS

## 2018-11-09 ASSESSMENT — ANXIETY QUESTIONNAIRES: GAD7 TOTAL SCORE: 7

## 2018-11-15 ENCOUNTER — OFFICE VISIT (OUTPATIENT)
Dept: OPHTHALMOLOGY | Facility: CLINIC | Age: 68
End: 2018-11-15
Attending: OPHTHALMOLOGY
Payer: COMMERCIAL

## 2018-11-15 DIAGNOSIS — E11.9 DIABETES TYPE 2, NO OCULAR INVOLVEMENT (H): Primary | ICD-10-CM

## 2018-11-15 DIAGNOSIS — H40.033 ANATOMICAL NARROW ANGLE BORDERLINE GLAUCOMA OF BOTH EYES: ICD-10-CM

## 2018-11-15 DIAGNOSIS — H04.123 INSUFFICIENCY OF TEAR FILM OF BOTH EYES: ICD-10-CM

## 2018-11-15 DIAGNOSIS — H25.13 AGE-RELATED NUCLEAR CATARACT OF BOTH EYES: ICD-10-CM

## 2018-11-15 PROCEDURE — G0463 HOSPITAL OUTPT CLINIC VISIT: HCPCS | Mod: ZF | Performed by: TECHNICIAN/TECHNOLOGIST

## 2018-11-15 ASSESSMENT — REFRACTION_WEARINGRX
OS_SPHERE: +1.75
SPECS_TYPE: BIFOCAL
OS_ADD: +2.25
OD_ADD: +2.25
OS_CYLINDER: +0.25
OD_AXIS: 070
OD_SPHERE: +1.50
OD_CYLINDER: +0.50
OS_AXIS: 060

## 2018-11-15 ASSESSMENT — TONOMETRY
OS_IOP_MMHG: 17
OD_IOP_MMHG: 18
IOP_METHOD: ICARE

## 2018-11-15 ASSESSMENT — EXTERNAL EXAM - LEFT EYE: OS_EXAM: NORMAL

## 2018-11-15 ASSESSMENT — EXTERNAL EXAM - RIGHT EYE: OD_EXAM: NORMAL

## 2018-11-15 ASSESSMENT — CUP TO DISC RATIO
OS_RATIO: 0.2
OD_RATIO: 0.2

## 2018-11-15 ASSESSMENT — VISUAL ACUITY
OS_CC+: -1
METHOD: SNELLEN - LINEAR
CORRECTION_TYPE: GLASSES
OD_CC+: -2
OD_CC: 20/20
OS_CC: 20/20

## 2018-11-15 ASSESSMENT — SLIT LAMP EXAM - LIDS
COMMENTS: NORMAL
COMMENTS: NORMAL

## 2018-11-15 ASSESSMENT — CONF VISUAL FIELD
OD_NORMAL: 1
METHOD: COUNTING FINGERS
OS_NORMAL: 1

## 2018-11-15 NOTE — MR AVS SNAPSHOT
After Visit Summary   11/15/2018    Pinky Page    MRN: [id number]           Patient Information     Date Of Birth          1950        Visit Information        Provider Department      11/15/2018 1:30 PM [facility] Lopez MD [facility]        Today's Diagnoses     Diabetes type 2, no ocular involvement (H)    -  1    Age-related nuclear cataract of both eyes        Insufficiency of tear film of both eyes        Anatomical narrow angle borderline glaucoma of both eyes           Follow-ups after your visit        Follow-up notes from your care team     Return in about 1 year (around 11/15/2019).      Your next 10 appointments already scheduled     Nov 19, 2018  2:40 PM CST   Adult Med Follow UP with Maulik Kohler MD   [facility] ([facility])    [place] [id number]  [address] [place] [id number] [phone]            Nov 26, 2018 11:30 AM CST   (Arrive by 11:15 AM)   Return Weight Management Visit with Marcos Magdaleno MD   [facility] ([facility])    [address] [place] [address]  [address] [facility] [id number] [phone]            Nov 26, 2018  1:00 PM CST   (Arrive by 12:45 PM)   Return Visit with Damon Gr, PhD [facility] ([facility])    [address] [place] [address]  [address] [facility] [address] [phone]            Jan 02, 2019  1:00 PM CST   Return Visit with Cary Brownlee MD   [facility] ([facility])    [address],  [address]  [facility] [id number] [phone]            May 09, 2019  1:30 PM CDT   Return Sleep Patient with Brianda Reddy MD   [facility] [place] ([facility])    [address] [id number] [phone]              Who to contact     Please call your clinic at  [phone] to:    Ask questions about your health    Make or cancel appointments    Discuss your medicines    Learn about your test results    Speak to your doctor            Additional Information About Your Visit        [employer] Information     DrAvailable is an electronic gateway that provides easy, online access to your medical records. With DrAvailable, you can request a clinic appointment, read your test results, renew a prescription or communicate with your care team.     To sign up for DrAvailable visit the website at [email or web address]   You will be asked to enter the access code listed below, as well as some personal information. Please follow the directions to create your username and password.     Your access code is: [email or web address]  Expires: 2019  5:30 AM     Your access code will  in 90 days. If you need help or a new code, please contact your [facility] or call [phone] for assistance.        Care EveryWhere ID     This is your Care EveryWhere ID. This could be used by other organizations to access your [facility] medical records  [id number]         Blood Pressure from Last 3 Encounters:   18 136/76   10/25/18 113/64   08/15/18 146/80    Weight from Last 3 Encounters:   18 106.2 kg (234 lb 3.2 oz)   10/25/18 104.3 kg (230 lb)   08/15/18 109.6 kg (241 lb 9.6 oz)              Today, you had the following     No orders found for display         Today's Medication Changes          These changes are accurate as of 11/15/18  2:54 PM.  If you have any questions, ask your nurse or doctor.               These medicines have changed or have updated prescriptions.        Dose/Directions    metFORMIN 500 MG 24 hr tablet   Commonly known as:  GLUCOPHAGE-XR   This may have changed:    - how much to take  - when to take this   Used for:  Diabetes mellitus, type 2 (H)        Dose:  1000 mg   Take 2 tablets (1,000 mg) by mouth 2 times daily (with meals)   Quantity:   90 tablet   Refills:  3                Primary Care Provider Office Phone # Fax #    Cary Brownlee [phone]       [address] [address]        Equal Access to Services     JAYDEN STUART : Hadsaira ramirez ku hadmarelyo Soneriali, waaxda luqadaha, qaybta kaalmada adeegyada, waxjeff alvarezn shaquille lee paulette josé. So [facility] [phone].    ATENCIÓN: Si habla español, tiene a deluca disposición servicios gratuitos de asistencia lingüística. Link al [phone].    We comply with applicable federal civil rights laws and [place] laws. We do not discriminate on the basis of race, color, national origin, age, disability, sex, sexual orientation, or gender identity.            Thank you!     Thank you for choosing [facility]  for your care. Our goal is always to provide you with excellent care. Hearing back from our patients is one way we can continue to improve our services. Please take a few minutes to complete the written survey that you may receive in the mail after your visit with us. Thank you!             Your Updated Medication List - Protect others around you: Learn how to safely use, store and throw away your medicines at www.disposemymeds.org.          This list is accurate as of 11/15/18  2:54 PM.  Always use your most recent med list.                   Brand Name Dispense Instructions for use Diagnosis    acetaminophen 500 MG tablet    TYLENOL    1 Bottle    Take 2 tablets (1,000 mg) by mouth every 6 hours as needed    Sprain of left ankle, unspecified ligament, initial encounter       alum & mag hydroxide-simethicone 200-200-20 MG/5ML Susp suspension    MYLANTA/MAALOX     Take 30 mLs by mouth daily as needed for indigestion        amLODIPine 5 MG tablet    NORVASC    30 tablet    Take 2 tablets (10 mg) by mouth daily    Essential hypertension with goal blood pressure less than 130/85       artificial saliva Aers spray      Take 1 spray by mouth 3 times daily as  needed for dry mouth        artificial tears Oint ophthalmic ointment     1 Tube    Apply  to eye. Place 0.5 inches into both eyes at bedtime    Insufficiency of tear film of both eyes       ARTIFICIAL TEARS OP      Apply 1 drop to eye 4 times daily.        aspirin 81 MG tablet     100    Take 1 tablet by mouth daily. ENTERIC COATED.        atorvastatin 40 MG tablet    LIPITOR    90 tablet    Take 1 tablet (40 mg) by mouth daily    Hyperlipidemia LDL goal <100       BD AUTOSHIELD DUO 30G X 5 MM   Generic drug:  insulin pen needle           blood glucose calibration solution    no brand specified    1 each    Use to calibrate blood glucose monitor as directed.    Type 2 diabetes mellitus with microalbuminuria, with long-term current use of insulin (H)       blood glucose lancets standard    no brand specified    100 each    Use to test blood sugar 2 times daily or as directed.    Type 2 diabetes mellitus with microalbuminuria, with long-term current use of insulin (H)       blood glucose monitoring meter device kit    no brand specified    1 kit    Check Blood sugars twice a day.    Type 2 diabetes mellitus with microalbuminuria, with long-term current use of insulin (H)       blood glucose monitoring test strip    no brand specified    100 each    Use to test blood sugar 3 times daily or as directed.    Type 2 diabetes mellitus with microalbuminuria, with long-term current use of insulin (H)       calcium polycarbophil 625 MG tablet    FIBERCON     Take 2 tablets by mouth daily        calcium-vitamin D 250-125 MG-UNIT Tabs      Take 1 tablet by mouth 2 times daily. Calcium 250 mg/Vit D 125 IU        CLARITIN 10 MG tablet   Generic drug:  loratadine     30    1 TAB PO QD (Once per day) as needed for ALLERGY SYMPTOMS        clotrimazole 1 % cream    LOTRIMIN    50 g    Apply topically 2 times daily as needed    Dermatophytosis       erythromycin ophthalmic ointment    ROMYCIN     Place 0.5 inches into both eyes 4  times daily        eucerin cream      Apply  topically as needed. Apply to thigh PRN dry skin        exenatide 10 MCG/0.04ML injection    BYETTA    1 Syringe    Inject 10 mcg Subcutaneous 2 times daily (before meals)    Type 2 diabetes mellitus with microalbuminuria, with long-term current use of insulin (H)       FLUoxetine 20 MG capsule    PROzac    90 capsule    Take 3 capsules (60 mg) by mouth daily    Schizoaffective disorder, depressive type (H)       fluticasone 50 MCG/ACT spray    FLONASE    16 g    Spray 1 spray into both nostrils daily    Seasonal allergic rhinitis       furosemide 20 MG tablet    LASIX    60 tablet    Take 1 tablet (20 mg) by mouth 2 times daily    Lower leg edema       GEL-SASHA DENTINBLOC DT      Apply  to affected area. GEL. Apply to 2nd molar on bottom right daily at bedtime.        glucagon 1 MG kit    GLUCAGON EMERGENCY    2 mg    Inject 1 mg into the muscle once for 1 dose    Type 2 diabetes mellitus with microalbuminuria, with long-term current use of insulin (H)       insulin glargine 100 UNIT/ML injection    LANTUS    8 mL    Inject 20 Units Subcutaneous At Bedtime    Type 2 diabetes mellitus with microalbuminuria, with long-term current use of insulin (H)       LACTAID 3000 UNIT tablet   Generic drug:  lactase      Take 4 tabs daily with meals.        levothyroxine 175 MCG tablet    SYNTHROID/LEVOTHROID    30 tablet    Take 1 tablet (175 mcg) by mouth daily Give on empty stomach    Other specified hypothyroidism       LORazepam 1 MG tablet    ATIVAN    50 tablet    Take 1/2 tablet in the morning and 1 tablet at bedtime. May take 1 additional tab daily PRN for anxiety.    Generalized anxiety disorder       losartan 100 MG tablet    COZAAR    90 tablet    Take 1 tablet by mouth daily.    Hypertension goal BP (blood pressure) < 130/80, Diabetes mellitus type 2, insulin dependent (H), CKD (chronic kidney disease) stage 2, GFR 60-89 ml/min       melatonin 3 MG tablet     60 tablet     Take 1 tablet (3 mg) by mouth nightly as needed for sleep    Other insomnia       metFORMIN 500 MG 24 hr tablet    GLUCOPHAGE-XR    90 tablet    Take 2 tablets (1,000 mg) by mouth 2 times daily (with meals)    Diabetes mellitus, type 2 (H)       MILK OF MAGNESIA PO      Take  by mouth. Take 30 mL as needed for constipation.        moxifloxacin 0.5 % ophthalmic solution    VIGAMOX     Place 1 drop into both eyes 4 times daily        NEURONTIN PO      Take 900 mg by mouth 3 times daily.        nystatin [id number] UNIT/GM Powd    MYCOSTATIN    60 g    Apply topically 3 times daily as needed    Candidiasis of skin       ONETOUCH DELICA LANCETS 33G Misc           polyethylene glycol powder    MIRALAX/GLYCOLAX     Take 1 capful by mouth 2 times daily. 17 GM PO BID        saline 0.9 % Soln      Spray 2 sprays in nostril as needed.        SENNA S 8.6-50 MG per tablet   Generic drug:  senna-docusate      Take 2 tablets by mouth At Bedtime.        SM LUBRICANT EYE DROPS 0.4-0.3 % Soln ophthalmic solution   Generic drug:  polyethylene glycol 0.4%- propylene glycol 0.3%           solifenacin 10 MG tablet    VESICARE    30 tablet    Take 1 tablet (10 mg) by mouth daily    Urge incontinence       ziprasidone 40 MG capsule    GEODON    60 capsule    Take 1 capsule (40 mg) by mouth 2 times daily (with meals)    Schizoaffective disorder, depressive type (H)

## 2018-11-15 NOTE — NURSING NOTE
Chief Complaints and History of Present Illnesses   Patient presents with     Follow Up For     Diabetes type 2, no ocular involvement      HPI    Symptoms:              Comments:  Patient states infection in both eyes. Treated at [facility] in October 2018. Did eye drops and ointment per patient, now resolved.   Glasses are a year old, does not want an updated MRx. Current one still works.     Lab Results       Component                Value               Date                       A1C                      7.4                 11/07/2018                 A1C                      6.7                 05/03/2018                 A1C                      6.4                 12/14/2017                 A1C                      6.3                 09/12/2017                 A1C                      6.9                 06/22/2017              Ocular meds: Refresh eye drops 4x day, erythromycin once per day.     MAZIN Freeman 11/15/2018 1:44 PM

## 2018-11-15 NOTE — PROGRESS NOTES
Subjective:  Pinky Page is a 68 year old female who presents today for dilated fundus exam.  Happy with vision    Past Medical History: DMII dx ~20 years ago, on insulin, last Hb A1C 7, HTN  Social History: non smoker  Fam History: none  Past Ocular History: refractive error, PVD, NS   Ocular Medications: AT QID, AT ointment at night    Assessment & Plan      Pinky Page is a 68 year old female with the following diagnoses:   1. 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 Age-related nuclear cataract of both eyes    3. Insufficiency of tear film of both eyes    4. Anatomical narrow angle borderline glaucoma of both eyes         No retinopathy  Stressed good glycemic and hypertensive control  Cataracts not visually significant at this time  Early narrowing, no occlusion - monitor     Cont artificial tears four times a day  And artificial tear ointment at bedtime recommended    Patient disposition:   Return in about 1 year (around 11/15/2019).        Attending Physician Attestation:  Complete documentation of historical and exam elements from today's encounter can be found in the full encounter summary report (not reduplicated in this progress note).  I personally obtained the chief complaint(s) and history of present illness.  I confirmed and edited as necessary the review of systems, past medical/surgical history, family history, social history, and examination findings as documented by others; and I examined the patient myself.  I personally reviewed the relevant tests, images, and reports as documented above.  I formulated and edited as necessary the assessment and plan and discussed the findings and management plan with the patient and family. . - Michael Lopez MD

## 2018-11-18 NOTE — PROGRESS NOTES
I have verified the content of the note, which accurately reflects my assessment of the patient and the plan of care.   Hong Pérez, PharmD

## 2018-11-19 ENCOUNTER — TELEPHONE (OUTPATIENT)
Dept: OPHTHALMOLOGY | Facility: CLINIC | Age: 68
End: 2018-11-19

## 2018-11-19 ENCOUNTER — OFFICE VISIT (OUTPATIENT)
Dept: PSYCHIATRY | Facility: CLINIC | Age: 68
End: 2018-11-19
Attending: PSYCHIATRY & NEUROLOGY
Payer: COMMERCIAL

## 2018-11-19 VITALS
BODY MASS INDEX: 38.84 KG/M2 | DIASTOLIC BLOOD PRESSURE: 82 MMHG | WEIGHT: 229.8 LBS | HEART RATE: 75 BPM | SYSTOLIC BLOOD PRESSURE: 162 MMHG

## 2018-11-19 DIAGNOSIS — F41.1 GENERALIZED ANXIETY DISORDER: ICD-10-CM

## 2018-11-19 DIAGNOSIS — F25.1 SCHIZOAFFECTIVE DISORDER, DEPRESSIVE TYPE (H): ICD-10-CM

## 2018-11-19 PROCEDURE — G0463 HOSPITAL OUTPT CLINIC VISIT: HCPCS | Mod: ZF

## 2018-11-19 RX ORDER — LORAZEPAM 1 MG/1
TABLET ORAL
Qty: 35 TABLET | Refills: 0 | Status: SHIPPED | OUTPATIENT
Start: 2018-11-19 | End: 2018-12-12

## 2018-11-19 RX ORDER — ZIPRASIDONE HYDROCHLORIDE 40 MG/1
40 CAPSULE ORAL 2 TIMES DAILY WITH MEALS
Qty: 60 CAPSULE | Refills: 2 | Status: SHIPPED | OUTPATIENT
Start: 2018-11-19 | End: 2019-01-04

## 2018-11-19 ASSESSMENT — PATIENT HEALTH QUESTIONNAIRE - PHQ9: SUM OF ALL RESPONSES TO PHQ QUESTIONS 1-9: 6

## 2018-11-19 ASSESSMENT — PAIN SCALES - GENERAL: PAINLEVEL: NO PAIN (0)

## 2018-11-19 NOTE — PROGRESS NOTES
[facility] PSYCHIATRY CLINIC PROGRESS NOTE     CARE TEAM:  PCP- Cary Brownlee    Specialty Providers- no    Therapist- Dr. Gr    [facility] Team- no    Pinky Page is a 68 year old female who prefers the name Pinky & pronouns she, her, hers. Date of initial diagnostic assessment is 5/13/13.  Date of most recent transfer of care assessment is 07/30/18.     Pertinent Background:  This patient first experienced mental health issues in her twenties and has received treatment for schizoaffective disorder and generalized anxiety.  History details in last diagnostic assessment.  Notably, Pinky's symptoms of depression and psychosis have responded well to a combination of ECT and medication management. Pinky has a history of experiencing increasingly frequent psychotic symptoms w/ previously attempted antipsychotic tapers.        Psych critical item history includes psychosis [sxs include disorganization, hallucinations, paranoia], mutiple psychotropic trials, psych hosp (3-5) and ECT.    INTERIM HISTORY                                                                                              4, 4   The patient reports good treatment adherence.  History was provided by the patient who was a good historian.  The last visit ended with the following med change: increase in fluoxetine, increase in lorazepam.     Since the last visit:     - Reports significant improvement in anxiety; re-engaging in group activities in [facility]  - Extra morning dose of lorazepam has been helpful  - Re-engaging in exercise everyday  - Had procedure done on eyes; still using drops but no longer experiencing discomfort  - Hand tremor still present but improved  - Physically feeling well; denies symptoms of chest pain, shortness of breath, blurred vision, headaches      RECENT SYMPTOMS:   DEPRESSION:  reports-insomnia;  DENIES- suicidal ideation and self-destructive thoughts  MELVIN/HYPOMANIA:  reports-none;  DENIES- increased energy,  decreased sleep need, increased activity and grandiosity  PSYCHOSIS:  reports-none;  DENIES- delusions, auditory hallucinations and visual hallucinations  DYSREGULATION:  reports-none;  DENIES- suicidal ideation, violent ideation and SIB  PANIC ATTACK:  none   ANXIETY:  excessive worry  TRAUMA RELATED:  none  COMPULSIVE:  none   SLEEP: increased difficulty falling asleep  EATING DISORDER: no     RECENT SUBSTANCE USE:     ALCOHOL- no      TOBACCO- no     CAFFEINE- coffee/ tea [coffee, diet Pepsi]  OPIOIDS- no         NARCAN KIT- no        CANNABIS- no            OTHER ILLICIT DRUGS- none      CURRENT SOCIAL HISTORY:  FINANCIAL SUPPORT- social security disability       CHILDREN- no      LIVING SITUATION- Lives at [facility].      SOCIAL/ SPIRITUAL SUPPORT- sisters, support staff at [facility], other residents at [facility], other providers     FEELS SAFE AT HOME- yes     MEDICAL ROS (2,10):  Reports none, none, none    Denies headaches, sexual function problems [none], short term memory and/or word finding difficulty, wt gain, tremor, akathisia, spasms, bruxism, easy bruising , GI sxs [N/V], akathisia, muscle problems [stiffness], unusual movements, wt gain, sexual function problems [none], polydipsia, polyphagia and Parkinsonian-type symptoms [shuffling gait, masked facies, stooped posture, speech change, writing change], rash, hair loss , menstrual abnormality, skin/eye discoloration and bleeding or freq bruising    PSYCH and CD Critical Summary Points since July 2018 7/30 - resident transition, no changes  9/10 - no changes  10/25 - increased anxiety, increased lorazepam, fluoxetine  11/19 - decreased lorazepam    PAST PSYCH MED TRIALS   see EMR Problem List: Hx of psychiatric care    MEDICAL / SURGICAL HISTORY                                   Pregnant or breastfeeding- no      Contraception- None    Neurologic Hx- no   Patient Active Problem List   Diagnosis     Allergic rhinitis due to pollen      Urge incontinence     Hypertension, essential     Cardiomegaly     Chronic constipation     Dry eye syndrome     Esophageal reflux     Exposure keratoconjunctivitis     DM ophthalmopathy (H)     Hypothyroidism     Senile cataract     ANUJ (obstructive sleep apnea)     Postmenopausal atrophic vaginitis     Restless leg syndrome     Squamous blepharitis     Morbid obesity due to excess calories (H)     Personal history of breast cancer s/p L masectomy     Diabetes mellitus type 2, insulin dependent (H)     Hypercholesteremia     Lives in group home     Extrapyramidal and movement disorder     CKD (chronic kidney disease) stage 2, GFR 60-89 ml/min     Solitary kidney, congenital     S/P hysterectomy     Impingement syndrome of right shoulder     Hypertriglyceridemia     Candidiasis of skin     Generalized anxiety disorder     Carpal tunnel syndrome of left wrist     Schizoaffective disorder, depressive type (H)     Major depressive disorder, recurrent episode, moderate (H)     Psychological factors affecting medical condition     Hx of psychiatric care     Nail complaint     Microalbuminuria due to type 2 diabetes mellitus (H)     Type 2 diabetes mellitus with microalbuminuria, with long-term current use of insulin (H)     Diabetes mellitus, type II, insulin dependent (H)     CKD (chronic kidney disease) stage 1, GFR 90 ml/min or greater     Conjunctivitis of both eyes     Corneal erosion of both eyes     Spastic entropion, right       Major Surgery- not discussed    ALLERGY                                Chlordiazepoxide hcl; Dimetapp dm cold-cough; Haldol; Ibuprofen; Lactose intolerance [beta-galactosidase]; Milk products; and Propofol  MEDICATIONS                               Current Outpatient Prescriptions   Medication Sig Dispense Refill     acetaminophen (TYLENOL) 500 MG tablet Take 2 tablets (1,000 mg) by mouth every 6 hours as needed 1 Bottle 2     alum & mag hydroxide-simethicone (MYLANTA/MAALOX)  200-200-20 MG/5ML SUSP suspension Take 30 mLs by mouth daily as needed for indigestion       amLODIPine (NORVASC) 5 MG tablet Take 2 tablets (10 mg) by mouth daily 30 tablet 3     artificial saliva (BIOTENE MT) AERS spray Take 1 spray by mouth 3 times daily as needed for dry mouth       artificial tears (AKWA TEARS) OINT ophthalmic ointment Apply  to eye. Place 0.5 inches into both eyes at bedtime 1 Tube 11     ASPIRIN 81 MG OR TABS Take 1 tablet by mouth daily. ENTERIC COATED. 100 3     atorvastatin (LIPITOR) 40 MG tablet Take 1 tablet (40 mg) by mouth daily 90 tablet 1     BD AUTOSHIELD DUO 30G X 5 MM        blood glucose (NO BRAND SPECIFIED) lancets standard Use to test blood sugar 2 times daily or as directed. 100 each 11     blood glucose calibration (NO BRAND SPECIFIED) solution Use to calibrate blood glucose monitor as directed. 1 each 3     blood glucose monitoring (NO BRAND SPECIFIED) meter device kit Check Blood sugars twice a day. 1 kit 0     blood glucose monitoring (NO BRAND SPECIFIED) test strip Use to test blood sugar 3 times daily or as directed. 100 each 3     Calcium Carbonate-Vitamin D (CALCIUM-VITAMIN D) 250-125 MG-UNIT TABS Take 1 tablet by mouth 2 times daily. Calcium 250 mg/Vit D 125 IU       calcium polycarbophil (FIBERCON) 625 MG tablet Take 2 tablets by mouth daily       CLARITIN 10 MG OR TABS 1 TAB PO QD (Once per day) as needed for ALLERGY SYMPTOMS 30 11     clotrimazole (LOTRIMIN) 1 % cream Apply topically 2 times daily as needed 50 g 0     erythromycin (ROMYCIN) ophthalmic ointment Place 0.5 inches into both eyes 4 times daily       exenatide (BYETTA) 10 MCG/0.04ML injection Inject 10 mcg Subcutaneous 2 times daily (before meals) 1 Syringe 11     FLUoxetine (PROZAC) 20 MG capsule Take 3 capsules (60 mg) by mouth daily 90 capsule 1     fluticasone (FLONASE) 50 MCG/ACT nasal spray Spray 1 spray into both nostrils daily 16 g 3     furosemide (LASIX) 20 MG tablet Take 1 tablet (20 mg) by  mouth 2 times daily 60 tablet 3     Gabapentin (NEURONTIN PO) Take 900 mg by mouth 3 times daily.       Hypromellose (ARTIFICIAL TEARS OP) Apply 1 drop to eye 4 times daily.       insulin glargine (LANTUS) 100 UNIT/ML injection Inject 20 Units Subcutaneous At Bedtime 8 mL 11     lactase (LACTAID) 3000 UNIT tablet Take 4 tabs daily with meals.       levothyroxine (SYNTHROID, LEVOTHROID) 175 MCG tablet Take 1 tablet (175 mcg) by mouth daily Give on empty stomach 30 tablet 4     LORazepam (ATIVAN) 1 MG tablet Take 1/2 tablet in the morning and 1 tablet at bedtime. May take 1 additional tab daily PRN for anxiety. 50 tablet 0     losartan (COZAAR) 100 MG tablet Take 1 tablet by mouth daily. 90 tablet 1     Magnesium Hydroxide (MILK OF MAGNESIA PO) Take  by mouth. Take 30 mL as needed for constipation.       melatonin 3 MG tablet Take 1 tablet (3 mg) by mouth nightly as needed for sleep 60 tablet 1     metFORMIN (GLUCOPHAGE-XR) 500 MG 24 hr tablet Take 2 tablets (1,000 mg) by mouth 2 times daily (with meals) (Patient taking differently: Take 2,000 mg by mouth daily ) 90 tablet 3     moxifloxacin (VIGAMOX) 0.5 % ophthalmic solution Place 1 drop into both eyes 4 times daily       nystatin (MYCOSTATIN) 866344 UNIT/GM POWD Apply topically 3 times daily as needed 60 g 1     ONETOUCH DELICA LANCETS 33G MISC        polyethylene glycol (MIRALAX/GLYCOLAX) powder Take 1 capful by mouth 2 times daily. 17 GM PO BID       Saline 0.9 % SOLN Spray 2 sprays in nostril as needed.       senna-docusate (SENNA S) 8.6-50 MG per tablet Take 2 tablets by mouth At Bedtime.       Skin Protectants, Misc. (EUCERIN) cream Apply  topically as needed. Apply to thigh PRN dry skin        SM LUBRICANT EYE DROPS 0.4-0.3 % SOLN ophthalmic solution        Sod Fluor-Solo Fluor-Hydrfl Ac (GEL-SASHA DENTINBLOC DT) Apply  to affected area. GEL. Apply to 2nd molar on bottom right daily at bedtime.       solifenacin (VESICARE) 10 MG tablet Take 1 tablet (10 mg) by  mouth daily 30 tablet 6     ziprasidone (GEODON) 40 MG capsule Take 1 capsule (40 mg) by mouth 2 times daily (with meals) 60 capsule 2     glucagon (GLUCAGON EMERGENCY) 1 MG kit Inject 1 mg into the muscle once for 1 dose 2 mg 3     VITALS                                                                                                                                  3, 3   /82  Pulse 75  Wt 104.2 kg (229 lb 12.8 oz)  BMI 38.84 kg/m2     MENTAL STATUS EXAM                                                                         9, 14 cog gs     Alertness: alert  and oriented  Appearance: well groomed - casual clothing, white hair  Behavior/Demeanor: cooperative, with poor eye contact   Speech: slowed  Language: intact and no problems  Psychomotor: tremor in right hand improved  Mood: description consistent with euthymia  Affect: restricted; was congruent to mood; was congruent to content  Thought Process/Associations: unremarkable  Thought Content:  Reports none;  Denies suicidal ideation, violent ideation and delusions  Perception:  Reports none;  Denies auditory hallucinations and visual hallucinations  Insight: good  Judgment: good  Cognition: (6) does  appear grossly intact; formal cognitive testing was not done  Gait and Station: unremarkable    LABS and DATA     AIMS:  last done 9/10/18 with score(s): 0;  next due September 2019    PHQ9 TODAY = 12  PHQ-9 SCORE 9/10/2018 10/25/2018 11/8/2018   Total Score - - -   Total Score 7 12 6     ANTIPSYCHOTIC LABS  [glu, A1C, lipids (danika LDL), liver enzymes, WBC, ANEU, Hgb, plts]  q12 mo  Recent Labs   Lab Test  11/07/18   1531  10/25/18   1734  05/03/18   1414   GLC   --   147*  192.6*   A1C  7.4*   --   6.7*     Recent Labs   Lab Test  05/03/18   1414  03/21/17   1023   CHOL  126  142   TRIG  148  121   LDL  60  78   HDL  37*  40*     Recent Labs   Lab Test  06/26/18   0659 06/26/18   AST  16  16   ALT  21  21   ALKPHOS  111  111     Recent Labs   Lab Test   10/25/18   1734  06/26/18   0659   WBC  14.2*  9.72   ANEU  10.6*  5.31   HGB  14.0  12.6   PLT  275  247       DIAGNOSIS     Schizoaffective Disorder, depressed type, moderate depression, without current psychotic features  Generalized Anxiety Disorder  R/o Tardive Dyskinesia    ASSESSMENT                                                                                                          m2, h3     TODAY:      Pinky returns to clinic reporting significant improvement in anxiety symptoms since last appointment. She is continuing to receive medicated drop treatments for her eyes but discomfort has resolved and she has no future procedures scheduled. She has re-engaged in group activities and exercise at her group home. She has found the additional lorazepam to be helpful in the morning. She has tolerated the increase in fluoxetine well. Considering the significant improvement in her symptoms, I will stop the morning dose of lorazepam. We will keep the fluoxetine at the increased dose. She will return to clinic in 6 weeks for reevaluation.  She did not appear to be a safety risk to herself or others.    [place] PRESCRIPTION MONITORING PROGRAM [] was not checked today:  lorazepam last dispensed 10/18 per last appointment.    PSYCHOTROPIC DRUG INTERACTIONS:   Ziprasidone and fluoxetine may result in increased risk for QTc prolongation and serotonin syndrome.  Aspirin and fluoxetine may result in increased risk for bleeding.  MANAGEMENT:  Monitoring for adverse effects, periodic EKGs and patient is aware of risks     PLAN                                                                                                                       m2, h3     1) PSYCHOTROPIC MEDICATIONS:    - Continue ziprasidone 40 mg BID  - Continue fluoxetine to 60 mg daily  - Decrease lorazepam to 1 mg at bedtime w/ additional 1 mg daily PRN    2) THERAPY:    Yes, continue w/ Dr. Gr    3) NEXT DUE:    Labs- AP labs due May 2019  EKG-  Last EKG 8/17/16 - QTc 432. Will updated as needed.  Rating Scales- AIMS due September 2019    4) REFERRALS:    No Referrals needed     5) RTC: 6 weeks    6) CRISIS NUMBERS:   Provided routinely in AVS.   [facility] [phone] (clinic)    [phone] (after hours)  ONLY if a [facility] PT: [facility] [phone] (clinic), [phone] (after hours)     TREATMENT RISK STATEMENT:  The risks, benefits, alternatives and potential adverse effects have been discussed and are understood by the pt. The pt understands the risks of using street drugs or alcohol. There are no medical contraindications, the pt agrees to treatment with the ability to do so. The pt knows to call the clinic for any problems or to access emergency care if needed.  Medical and substance use concerns are documented above.  Psychotropic drug interaction check was done, including changes made today.    PSYCHIATRY CLINIC INDIVIDUAL PSYCHOTHERAPY NOTE                                                  [16]   Start time - 1500           End time - 1521  Date last reviewed - 09/10/18       Date next due - 12/9/18 (or 12 months if Medicare)    Subjective: This supportive psychotherapy session addressed issues related to goals of therapy .  Patient's reaction: Maintenance in the context of mental status appropriate for ambulatory setting.  Progress: good  Plan: RTC 6-8 weeks  Psychotherapy services during this visit included  myself and the patient.   TREATMENT  PLAN          SYMPTOMS; PROBLEMS   MEASURABLE GOALS;    FUNCTIONAL IMPROVEMENT INTERVENTIONS;   GAINS MADE DISCHARGE CRITERIA   Depression: depressed mood   reduce depressive symptoms and reduce depressive episodes meds/therapy marked symptom improvement   Anxiety: excessive worry   reduce anxiety meds/therapy marked symptom improvement         PROVIDER: Maulik Kohler MD    Patient staffed in clinic with Dr. Estrada who will sign the note.  Supervisor is Dr. Estrada.    Supervisor  Attestation:  I met with Pinky Page along with the resident, and participated in key portions of the service, including the mental status examination and developing the plan of care. I reviewed key portions of the history with the resident. I agree with the findings and plan as documented in this note.  Cortez Estrada MD

## 2018-11-19 NOTE — TELEPHONE ENCOUNTER
[facility] Call Center    Phone Message    May a detailed message be left on voicemail: yes    Reason for Call: Other: woodrow delatorre from [facility]- nurse- would like to know if pt should continue the anti biotic medication- please call her back, thanks     Action Taken: Message routed to:  [facility] ([facility] eye clinic

## 2018-11-19 NOTE — TELEPHONE ENCOUNTER
[facility] has order for lubricating eye ointment    Needs order for discontinuing EES ointment    [phone] fax number   Will place letter for MD to sign to fax   Dez Vega RN 1:04 PM 11/19/18

## 2018-11-19 NOTE — PATIENT INSTRUCTIONS
Pinky -     We will discontinue the morning dose of the Ativan. Please let us know if you have any questions or concerns prior to your next appointment.      Dr. Kohler                  Thank you for coming to the PSYCHIATRY CLINIC.    Lab Testing:  If you had lab testing today and your results are reassuring or normal they will be mailed to you or sent through [employer] within 7 days.   If the lab tests need quick action we will call you with the results.  The phone number we will call with results is # [phone] (home) . If this is not the best number please call our clinic and change the number.    Medication Refills:  If you need any refills please call your pharmacy and they will contact us. Our fax number for refills is [phone]. Please allow three business for refill processing.   If you need to  your refill at a new pharmacy, please contact the new pharmacy directly. The new pharmacy will help you get your medications transferred.     Scheduling:  If you have any concerns about today's visit or wish to schedule another appointment please call our office during normal business hours [phone] (8-5:00 M-F)    Contact Us:  Please call [phone] during business hours (8-5:00 M-F).  If after clinic hours, or on the weekend, please call  [phone].    Financial Assistance [phone]  [email or web address] Billing [phone]  [facility] B[facility] [phone]  Medical Records [phone]      MENTAL HEALTH CRISIS NUMBERS:  [facility]:   [facility] - [phone]   Crisis Residence [facility] - [phone]   Walk-In Counseling Center [facility] - [phone]   COPE 24/7 [facility] Mobile Team for Adults - [[phone]]; Child - [[phone]]     Crisis Connection - [phone]     [facility]:   [facility] - [phone]   Walk-in counseling [facility] - [phone]   Walk-in counseling [facility] - [phone]   Crisis  Residence [facility] - [phone]   Urgent Care Adult Mental Health:   --Drop-in, 24/7 crisis line, and [facility] Mobile Team [[phone]]    CRISIS TEXT LINE: Text [phone] from anywhere, anytime, any crisis 24/7;    OR SEE [email or web address]     Poison Control Center - [phone]    CHILD: [facility] needs assessment team - [phone]     [facility] Lifeline - [phone]; or [employer] Lifeline - [phone]    If you have a medical emergency please call 911or go to the nearest ER.                    _____________________________________________    Again thank you for choosing PSYCHIATRY CLINIC and please let us know how we can best partner with you to improve you and your family's health.  You may be receiving a survey in the mail regarding this appointment. We would love to have your feedback, both positive and negative, so please fill out the survey and return it using the provided envelope. The survey is done by an external company, so your answers are anonymous.

## 2018-11-19 NOTE — MR AVS SNAPSHOT
After Visit Summary   11/19/2018    Pinky Page    MRN: [id number]           Patient Information     Date Of Birth          1950        Visit Information        Provider Department      11/19/2018 2:40 PM Maulik Kohler MD [facility]        Today's Diagnoses     Generalized anxiety disorder        Schizoaffective disorder, depressive type (H)          Care Instructions    Pinky -     We will discontinue the morning dose of the Ativan. Please let us know if you have any questions or concerns prior to your next appointment.      Dr. Kohler                  Thank you for coming to the [facility].    Lab Testing:  If you had lab testing today and your results are reassuring or normal they will be mailed to you or sent through [facility] within 7 days.   If the lab tests need quick action we will call you with the results.  The phone number we will call with results is # [phone] (home) . If this is not the best number please call our clinic and change the number.    Medication Refills:  If you need any refills please call your pharmacy and they will contact us. Our fax number for refills is [phone]. Please allow three business for refill processing.   If you need to  your refill at a new pharmacy, please contact the new pharmacy directly. The new pharmacy will help you get your medications transferred.     Scheduling:  If you have any concerns about today's visit or wish to schedule another appointment please call our office during normal business hours [phone] (8-5:00 M-F)    Contact Us:  Please call [phone] during business hours (8-5:00 M-F).  If after clinic hours, or on the weekend, please call  [phone].    Financial Assistance [phone]  [facility] [phone]  [facility] [phone]  Medical Records [phone]      MENTAL HEALTH CRISIS NUMBERS:  [facility]:   [facility] - [phone]   Crisis Residence  [facility] - [phone]   Walk[facility]In [facility] - [phone]   COPE 24/7 [facility] Mobile Team for Adults - [[phone]]; Child - [[phone]]     Crisis Connection - [phone]     [facility]:   [facility] - [phone]   Walk-in counseling [facility] - [phone]   Walk-in counseling [facility] - [phone]   Crisis Residence [facility] - [phone]   Urgent Care Adult Mental Health:   --Drop-in, 24/7 crisis line, and [place] Mobile Team [[phone]]    CRISIS TEXT LINE: Text [phone] from anywhere, anytime, any crisis 24/7;    OR SEE www.crisistextline.org     Poison Control Center - [phone]    CHILD: [facility] needs assessment team - [phone]     [facility] - [phone]; or [facility] - [phone]    If you have a medical emergency please call 911or go to the nearest ER.                    _____________________________________________    Again thank you for choosing [facility] and please let us know how we can best partner with you to improve you and your family's health.  You may be receiving a survey in the mail regarding this appointment. We would love to have your feedback, both positive and negative, so please fill out the survey and return it using the provided envelope. The survey is done by an external company, so your answers are anonymous.           Follow-ups after your visit        Follow-up notes from your care team     Return in about 4 weeks (around 12/17/2018).      Your next 10 appointments already scheduled     Nov 26, 2018 11:30 AM CST   (Arrive by 11:15 AM)   Return Weight Management Visit with Marcos Magdaleno MD   [facility] ([facility])    [address] [place] [address]   [phone]            Nov 26, 2018  1:00 PM CST   (Arrive by 12:45 PM)    Return Visit with Damon Gr, PhD [facility] ([facility])    [address]  3rd Floor  [facility] [address] [phone]            Jan 02, 2019  1:00 PM CST   Return Visit with MD [facility] ([facility])    [address],  [address]  [facility] [address]   [phone]            Jan 04, 2019  2:10 PM CST   Adult Med Follow UP with Maulik Kohler MD   Psychiatry Clinic ([facility])    [facility]  2nd [address]  [address] [place] [id number] [phone]            May 09, 2019  1:30 PM CDT   Return Sleep Patient with Brianda Reddy MD   [facility] ([facility])    [address] [facility] [id number] [phone]            Nov 19, 2019  1:30 PM CST   RETURN GENERAL with Michael Lopez MD   Eye Clinic ([facility])    [address]  9th [address] Clin [address]  [facility] [address] [phone]              Who to contact     Please call your clinic at [phone] to:    Ask questions about your health    Make or cancel appointments    Discuss your medicines    Learn about your test results    Speak to your doctor            Additional Information About Your Visit        Care EveryWhere ID     This is your Care EveryWhere ID. This could be used by other organizations to access your [facility] medical records  [id number]        Your Vitals Were     Pulse BMI (Body Mass Index)                75 38.84 kg/m2           Blood Pressure from Last 3 Encounters:   11/19/18 162/82   11/07/18 136/76   10/25/18 113/64    Weight from Last 3 Encounters:   11/19/18 104.2 kg (229 lb 12.8 oz)   11/07/18 106.2 kg (234 lb 3.2 oz)   10/25/18 104.3 kg (230 lb)              Today, you had the following     No orders found for display          Today's Medication Changes          These changes are accurate as of 11/19/18  3:16 PM.  If you have any questions, ask your nurse or doctor.               These medicines have changed or have updated prescriptions.        Dose/Directions    LORazepam 1 MG tablet   Commonly known as:  ATIVAN   This may have changed:  additional instructions   Used for:  Generalized anxiety disorder   Changed by:  Maulik Kohler MD        Take 1 tablet at bedtime. May take 1 additional tab daily PRN for anxiety.   Quantity:  35 tablet   Refills:  0       metFORMIN 500 MG 24 hr tablet   Commonly known as:  GLUCOPHAGE-XR   This may have changed:    - how much to take  - when to take this   Used for:  Diabetes mellitus, type 2 (H)        Dose:  1000 mg   Take 2 tablets (1,000 mg) by mouth 2 times daily (with meals)   Quantity:  90 tablet   Refills:  3            Where to get your medicines      These medications were sent to [facility] - [place], [place] - [address]  [address], [facility] [address]     Phone:  [phone]     FLUoxetine 20 MG capsule    ziprasidone 40 MG capsule         Some of these will need a paper prescription and others can be bought over the counter.  Ask your nurse if you have questions.     Bring a paper prescription for each of these medications     LORazepam 1 MG tablet                Primary Care Provider Office Phone # Fax #    Cary Brownlee -[phone]       [address] [address]        Equal Access to Services     JAYDEN STUART AH: Hadsaira Dietrich, waaxda luqfavian, qaybta kaalmada adeglennada, autumn caldwell . So [facility] [phone].    ATENCIÓN: Si habla español, tiene a deluca disposición servicios gratuitos de asistencia lingüística. Link maradiaga [phone].    We comply with applicable federal civil rights laws and [place] laws. We do not discriminate on the basis of race, color, national  origin, age, disability, sex, sexual orientation, or gender identity.            Thank you!     Thank you for choosing [facility]  for your care. Our goal is always to provide you with excellent care. Hearing back from our patients is one way we can continue to improve our services. Please take a few minutes to complete the written survey that you may receive in the mail after your visit with us. Thank you!             Your Updated Medication List - Protect others around you: Learn how to safely use, store and throw away your medicines at www.disposemymeds.org.          This list is accurate as of 11/19/18  3:16 PM.  Always use your most recent med list.                   Brand Name Dispense Instructions for use Diagnosis    acetaminophen 500 MG tablet    TYLENOL    1 Bottle    Take 2 tablets (1,000 mg) by mouth every 6 hours as needed    Sprain of left ankle, unspecified ligament, initial encounter       alum & mag hydroxide-simethicone 200-200-20 MG/5ML Susp suspension    MYLANTA/MAALOX     Take 30 mLs by mouth daily as needed for indigestion        amLODIPine 5 MG tablet    NORVASC    30 tablet    Take 2 tablets (10 mg) by mouth daily    Essential hypertension with goal blood pressure less than 130/85       artificial saliva Aers spray      Take 1 spray by mouth 3 times daily as needed for dry mouth        artificial tears Oint ophthalmic ointment     1 Tube    Apply  to eye. Place 0.5 inches into both eyes at bedtime    Insufficiency of tear film of both eyes       ARTIFICIAL TEARS OP      Apply 1 drop to eye 4 times daily.        aspirin 81 MG tablet     100    Take 1 tablet by mouth daily. ENTERIC COATED.        atorvastatin 40 MG tablet    LIPITOR    90 tablet    Take 1 tablet (40 mg) by mouth daily    Hyperlipidemia LDL goal <100       BD AUTOSHIELD DUO 30G X 5 MM   Generic drug:  insulin pen needle           blood glucose calibration solution    no brand specified    1 each    Use to calibrate blood  glucose monitor as directed.    Type 2 diabetes mellitus with microalbuminuria, with long-term current use of insulin (H)       blood glucose lancets standard    no brand specified    100 each    Use to test blood sugar 2 times daily or as directed.    Type 2 diabetes mellitus with microalbuminuria, with long-term current use of insulin (H)       blood glucose monitoring meter device kit    no brand specified    1 kit    Check Blood sugars twice a day.    Type 2 diabetes mellitus with microalbuminuria, with long-term current use of insulin (H)       blood glucose monitoring test strip    no brand specified    100 each    Use to test blood sugar 3 times daily or as directed.    Type 2 diabetes mellitus with microalbuminuria, with long-term current use of insulin (H)       calcium polycarbophil 625 MG tablet    FIBERCON     Take 2 tablets by mouth daily        calcium-vitamin D 250-125 MG-UNIT Tabs      Take 1 tablet by mouth 2 times daily. Calcium 250 mg/Vit D 125 IU        CLARITIN 10 MG tablet   Generic drug:  loratadine     30    1 TAB PO QD (Once per day) as needed for ALLERGY SYMPTOMS        clotrimazole 1 % cream    LOTRIMIN    50 g    Apply topically 2 times daily as needed    Dermatophytosis       erythromycin ophthalmic ointment    ROMYCIN     Place 0.5 inches into both eyes 4 times daily        eucerin cream      Apply  topically as needed. Apply to thigh PRN dry skin        exenatide 10 MCG/0.04ML injection    BYETTA    1 Syringe    Inject 10 mcg Subcutaneous 2 times daily (before meals)    Type 2 diabetes mellitus with microalbuminuria, with long-term current use of insulin (H)       FLUoxetine 20 MG capsule    PROzac    90 capsule    Take 3 capsules (60 mg) by mouth daily    Schizoaffective disorder, depressive type (H)       fluticasone 50 MCG/ACT spray    FLONASE    16 g    Spray 1 spray into both nostrils daily    Seasonal allergic rhinitis       furosemide 20 MG tablet    LASIX    60 tablet    Take 1  tablet (20 mg) by mouth 2 times daily    Lower leg edema       GEL-SASHA DENTINBLOC DT      Apply  to affected area. GEL. Apply to 2nd molar on bottom right daily at bedtime.        glucagon 1 MG kit    GLUCAGON EMERGENCY    2 mg    Inject 1 mg into the muscle once for 1 dose    Type 2 diabetes mellitus with microalbuminuria, with long-term current use of insulin (H)       insulin glargine 100 UNIT/ML injection    LANTUS    8 mL    Inject 20 Units Subcutaneous At Bedtime    Type 2 diabetes mellitus with microalbuminuria, with long-term current use of insulin (H)       LACTAID 3000 UNIT tablet   Generic drug:  lactase      Take 4 tabs daily with meals.        levothyroxine 175 MCG tablet    SYNTHROID/LEVOTHROID    30 tablet    Take 1 tablet (175 mcg) by mouth daily Give on empty stomach    Other specified hypothyroidism       LORazepam 1 MG tablet    ATIVAN    35 tablet    Take 1 tablet at bedtime. May take 1 additional tab daily PRN for anxiety.    Generalized anxiety disorder       losartan 100 MG tablet    COZAAR    90 tablet    Take 1 tablet by mouth daily.    Hypertension goal BP (blood pressure) < 130/80, Diabetes mellitus type 2, insulin dependent (H), CKD (chronic kidney disease) stage 2, GFR 60-89 ml/min       melatonin 3 MG tablet     60 tablet    Take 1 tablet (3 mg) by mouth nightly as needed for sleep    Other insomnia       metFORMIN 500 MG 24 hr tablet    GLUCOPHAGE-XR    90 tablet    Take 2 tablets (1,000 mg) by mouth 2 times daily (with meals)    Diabetes mellitus, type 2 (H)       MILK OF MAGNESIA PO      Take  by mouth. Take 30 mL as needed for constipation.        moxifloxacin 0.5 % ophthalmic solution    VIGAMOX     Place 1 drop into both eyes 4 times daily        NEURONTIN PO      Take 900 mg by mouth 3 times daily.        nystatin 731730 UNIT/GM Powd    MYCOSTATIN    60 g    Apply topically 3 times daily as needed    Candidiasis of skin       ONETOUCH DELICA LANCETS 33G Misc            polyethylene glycol powder    MIRALAX/GLYCOLAX     Take 1 capful by mouth 2 times daily. 17 GM PO BID        saline 0.9 % Soln      Spray 2 sprays in nostril as needed.        SENNA S 8.6-50 MG per tablet   Generic drug:  senna-docusate      Take 2 tablets by mouth At Bedtime.        SM LUBRICANT EYE DROPS 0.4-0.3 % Soln ophthalmic solution   Generic drug:  polyethylene glycol 0.4%- propylene glycol 0.3%           solifenacin 10 MG tablet    VESICARE    30 tablet    Take 1 tablet (10 mg) by mouth daily    Urge incontinence       ziprasidone 40 MG capsule    GEODON    60 capsule    Take 1 capsule (40 mg) by mouth 2 times daily (with meals)    Schizoaffective disorder, depressive type (H)

## 2018-11-26 ENCOUNTER — OFFICE VISIT (OUTPATIENT)
Dept: ENDOCRINOLOGY | Facility: CLINIC | Age: 68
End: 2018-11-26
Payer: COMMERCIAL

## 2018-11-26 ENCOUNTER — OFFICE VISIT (OUTPATIENT)
Dept: PSYCHOLOGY | Facility: CLINIC | Age: 68
End: 2018-11-26
Payer: COMMERCIAL

## 2018-11-26 VITALS
OXYGEN SATURATION: 97 % | HEART RATE: 74 BPM | TEMPERATURE: 98.6 F | WEIGHT: 229.5 LBS | BODY MASS INDEX: 38.24 KG/M2 | SYSTOLIC BLOOD PRESSURE: 146 MMHG | HEIGHT: 65 IN | RESPIRATION RATE: 18 BRPM | DIASTOLIC BLOOD PRESSURE: 59 MMHG

## 2018-11-26 VITALS — WEIGHT: 229 LBS | BODY MASS INDEX: 38.7 KG/M2

## 2018-11-26 DIAGNOSIS — F54 PSYCHOLOGICAL FACTORS AFFECTING MORBID OBESITY (H): ICD-10-CM

## 2018-11-26 DIAGNOSIS — F41.1 GENERALIZED ANXIETY DISORDER: ICD-10-CM

## 2018-11-26 DIAGNOSIS — Z56.9 OCCUPATIONAL PROBLEM: Primary | ICD-10-CM

## 2018-11-26 DIAGNOSIS — F33.0 MAJOR DEPRESSIVE DISORDER, RECURRENT EPISODE, MILD (H): ICD-10-CM

## 2018-11-26 DIAGNOSIS — E66.01 PSYCHOLOGICAL FACTORS AFFECTING MORBID OBESITY (H): ICD-10-CM

## 2018-11-26 DIAGNOSIS — E66.01 MORBID OBESITY (H): Primary | ICD-10-CM

## 2018-11-26 SDOH — ECONOMIC STABILITY - INCOME SECURITY: UNSPECIFIED PROBLEMS RELATED TO EMPLOYMENT: Z56.9

## 2018-11-26 ASSESSMENT — PAIN SCALES - GENERAL: PAINLEVEL: NO PAIN (0)

## 2018-11-26 NOTE — MR AVS SNAPSHOT
After Visit Summary   11/26/2018    Pinky Page    MRN: [id number]           Patient Information     Date Of Birth          1950        Visit Information        Provider Department      11/26/2018 11:30 AM Marcos Magdaleno MD [facility] Medical Weight Management        Today's Diagnoses     Morbid obesity (H)    -  1       Follow-ups after your visit        Follow-up notes from your care team     Return in about 4 months (around 3/26/2019).      Your next 10 appointments already scheduled     Nov 26, 2018  1:00 PM CST   (Arrive by 12:45 PM)   Return Visit with Damon Gr, PhD [facility] ([facility])    [address]  3rd Floor  [facility] [id number] [phone]            Jan 02, 2019  1:00 PM CST   Return Visit with Cary Brownlee MD   [facility] ([facility])    [address],  [address]  [facility] [address]   [phone]            Jan 04, 2019  2:10 PM CST   Adult Med Follow UP with Maulik Kohler MD   Psychiatry Clinic ([facility])    [place] [id number]  [address] [id number] [phone]            May 09, 2019  1:30 PM CDT   Return Sleep Patient with Brianda Reddy MD   [facility] ([facility])    [address] [place] [id number] [phone]            Nov 19, 2019  1:30 PM CST   RETURN GENERAL with Michael Lpoez MD   Eye Clinic ([facility])    [address]  [facility] [id number] [phone]              Who to contact     Please call your clinic at [phone] to:    Ask questions about your health    Make or cancel appointments    Discuss your medicines    Learn about your test results    Speak to your doctor            Additional Information About Your  "Visit        Care EveryWhere ID     This is your Care EveryWhere ID. This could be used by other organizations to access your [place] medical records  [id number]        Your Vitals Were     Pulse Temperature Respirations Height Pulse Oximetry BMI (Body Mass Index)    74 98.6  F (37  C) (Oral) 18 1.638 m (5' 4.5\") 97% 38.79 kg/m2       Blood Pressure from Last 3 Encounters:   11/26/18 146/59   11/07/18 136/76   10/25/18 113/64    Weight from Last 3 Encounters:   11/26/18 104.1 kg (229 lb 8 oz)   11/07/18 106.2 kg (234 lb 3.2 oz)   10/25/18 104.3 kg (230 lb)              Today, you had the following     No orders found for display         Today's Medication Changes          These changes are accurate as of 11/26/18 11:41 AM.  If you have any questions, ask your nurse or doctor.               These medicines have changed or have updated prescriptions.        Dose/Directions    metFORMIN 500 MG 24 hr tablet   Commonly known as:  GLUCOPHAGE-XR   This may have changed:    - how much to take  - when to take this   Used for:  Diabetes mellitus, type 2 (H)        Dose:  1000 mg   Take 2 tablets (1,000 mg) by mouth 2 times daily (with meals)   Quantity:  90 tablet   Refills:  3                Primary Care Provider Office Phone # Fax #    Cary Brownlee [phone]       [place] [id number]        Equal Access to Services     JAYDEN STUART AH: Tatiana Dietrich, waaxda lukayce, qaybta kaalmada daxa, waxay lisa rutledge aderupinder josé. So [facility] [phone].    ATENCIÓN: Si habla español, tiene a deluca disposición servicios gratuitos de asistencia lingüística. Llame al [phone].    We comply with applicable federal civil rights laws and [place] laws. We do not discriminate on the basis of race, color, national origin, age, disability, sex, sexual orientation, or gender identity.            Thank you!     Thank you for choosing [facility] MEDICAL WEIGHT " MANAGEMENT  for your care. Our goal is always to provide you with excellent care. Hearing back from our patients is one way we can continue to improve our services. Please take a few minutes to complete the written survey that you may receive in the mail after your visit with us. Thank you!             Your Updated Medication List - Protect others around you: Learn how to safely use, store and throw away your medicines at www.disposemymeds.org.          This list is accurate as of 11/26/18 11:41 AM.  Always use your most recent med list.                   Brand Name Dispense Instructions for use Diagnosis    acetaminophen 500 MG tablet    TYLENOL    1 Bottle    Take 2 tablets (1,000 mg) by mouth every 6 hours as needed    Sprain of left ankle, unspecified ligament, initial encounter       alum & mag hydroxide-simethicone 200-200-20 MG/5ML Susp suspension    MYLANTA/MAALOX     Take 30 mLs by mouth daily as needed for indigestion        amLODIPine 5 MG tablet    NORVASC    30 tablet    Take 2 tablets (10 mg) by mouth daily    Essential hypertension with goal blood pressure less than 130/85       artificial saliva Aers spray      Take 1 spray by mouth 3 times daily as needed for dry mouth        artificial tears Oint ophthalmic ointment     1 Tube    Apply  to eye. Place 0.5 inches into both eyes at bedtime    Insufficiency of tear film of both eyes       ARTIFICIAL TEARS OP      Apply 1 drop to eye 4 times daily.        * aspirin 81 MG tablet    ASA    100    Take 1 tablet by mouth daily. ENTERIC COATED.        * aspirin 81 MG EC tablet    ASA          atorvastatin 40 MG tablet    LIPITOR    90 tablet    Take 1 tablet (40 mg) by mouth daily    Hyperlipidemia LDL goal <100       BD AUTOSHIELD DUO 30G X 5 MM   Generic drug:  insulin pen needle           blood glucose calibration solution    no brand specified    1 each    Use to calibrate blood glucose monitor as directed.    Type 2 diabetes mellitus with  microalbuminuria, with long-term current use of insulin (H)       blood glucose lancets standard    no brand specified    100 each    Use to test blood sugar 2 times daily or as directed.    Type 2 diabetes mellitus with microalbuminuria, with long-term current use of insulin (H)       blood glucose monitoring meter device kit    no brand specified    1 kit    Check Blood sugars twice a day.    Type 2 diabetes mellitus with microalbuminuria, with long-term current use of insulin (H)       blood glucose monitoring test strip    no brand specified    100 each    Use to test blood sugar 3 times daily or as directed.    Type 2 diabetes mellitus with microalbuminuria, with long-term current use of insulin (H)       calcium polycarbophil 625 MG tablet    FIBERCON     Take 2 tablets by mouth daily        calcium-vitamin D 250-125 MG-UNIT Tabs      Take 1 tablet by mouth 2 times daily. Calcium 250 mg/Vit D 125 IU        CLARITIN 10 MG tablet   Generic drug:  loratadine     30    1 TAB PO QD (Once per day) as needed for ALLERGY SYMPTOMS        clotrimazole 1 % cream    LOTRIMIN    50 g    Apply topically 2 times daily as needed    Dermatophytosis       erythromycin ophthalmic ointment    ROMYCIN     Place 0.5 inches into both eyes 4 times daily        eucerin cream      Apply  topically as needed. Apply to thigh PRN dry skin        exenatide 10 MCG/0.04ML pen    BYETTA    1 Syringe    Inject 10 mcg Subcutaneous 2 times daily (before meals)    Type 2 diabetes mellitus with microalbuminuria, with long-term current use of insulin (H)       FLUoxetine 20 MG capsule    PROzac    90 capsule    Take 3 capsules (60 mg) by mouth daily    Schizoaffective disorder, depressive type (H)       fluticasone 50 MCG/ACT spray    FLONASE    16 g    Spray 1 spray into both nostrils daily    Seasonal allergic rhinitis       furosemide 20 MG tablet    LASIX    60 tablet    Take 1 tablet (20 mg) by mouth 2 times daily    Lower leg edema        GEL-SASHA DENTINBLOC DT      Apply  to affected area. GEL. Apply to 2nd molar on bottom right daily at bedtime.        glucagon 1 MG kit    GLUCAGON EMERGENCY    2 mg    Inject 1 mg into the muscle once for 1 dose    Type 2 diabetes mellitus with microalbuminuria, with long-term current use of insulin (H)       insulin glargine 100 UNIT/ML pen    LANTUS PEN    8 mL    Inject 20 Units Subcutaneous At Bedtime    Type 2 diabetes mellitus with microalbuminuria, with long-term current use of insulin (H)       LACTAID 3000 UNIT tablet   Generic drug:  lactase      Take 4 tabs daily with meals.        levothyroxine 175 MCG tablet    SYNTHROID/LEVOTHROID    30 tablet    Take 1 tablet (175 mcg) by mouth daily Give on empty stomach    Other specified hypothyroidism       LORazepam 1 MG tablet    ATIVAN    35 tablet    Take 1 tablet at bedtime. May take 1 additional tab daily PRN for anxiety.    Generalized anxiety disorder       losartan 100 MG tablet    COZAAR    90 tablet    Take 1 tablet by mouth daily.    Hypertension goal BP (blood pressure) < 130/80, Diabetes mellitus type 2, insulin dependent (H), CKD (chronic kidney disease) stage 2, GFR 60-89 ml/min       melatonin 3 MG tablet     60 tablet    Take 1 tablet (3 mg) by mouth nightly as needed for sleep    Other insomnia       metFORMIN 500 MG 24 hr tablet    GLUCOPHAGE-XR    90 tablet    Take 2 tablets (1,000 mg) by mouth 2 times daily (with meals)    Diabetes mellitus, type 2 (H)       MILK OF MAGNESIA PO      Take  by mouth. Take 30 mL as needed for constipation.        moxifloxacin 0.5 % ophthalmic solution    VIGAMOX     Place 1 drop into both eyes 4 times daily        NEURONTIN PO      Take 900 mg by mouth 3 times daily.        nystatin 175349 UNIT/GM Powd    MYCOSTATIN    60 g    Apply topically 3 times daily as needed    Candidiasis of skin       ONETOUCH DELICA LANCETS 33G Misc           polyethylene glycol powder    MIRALAX/GLYCOLAX     Take 1 capful by mouth 2  times daily. 17 GM PO BID        saline 0.9 % Soln      Spray 2 sprays in nostril as needed.        SENNA S 8.6-50 MG per tablet   Generic drug:  senna-docusate      Take 2 tablets by mouth At Bedtime.        SM LUBRICANT EYE DROPS 0.4-0.3 % Soln ophthalmic solution   Generic drug:  polyethylene glycol 0.4%- propylene glycol 0.3%           solifenacin 10 MG tablet    VESICARE    30 tablet    Take 1 tablet (10 mg) by mouth daily    Urge incontinence       ziprasidone 40 MG capsule    GEODON    60 capsule    Take 1 capsule (40 mg) by mouth 2 times daily (with meals)    Schizoaffective disorder, depressive type (H)       * Notice:  This list has 2 medication(s) that are the same as other medications prescribed for you. Read the directions carefully, and ask your doctor or other care provider to review them with you.

## 2018-11-26 NOTE — PROGRESS NOTES
.    Health Psychology                  Clinic    Department of Medicine  Madelaine Ingram, Ph.D., L.P. [phone]                          Clinics and Surgery Center  [facility] Miryam Woods, Ph.D.,  L.P. [phone]                 3rd Floor  [place] Mail Code 747   Pinky Panda, Ph.D., L.P. [phone]    [address] Rae Eugene, Ph.D., L.P. [phone]            [place] [address]  [place] [address]           Damon Gr, Ph.D., A.B.P.P., L.P. [phone]      Kimber Morales, Ph.D., L.P. [phone]    Health Psychology Follow-Up Note    Ms. Page is a pleasant 68-year old woman with chronic depressive illness, who returns to clinic for supportive and behavioral psychotherapy for moderate recurrent depression and for help losing weight given her morbid obesity.     She is 229 down from 229.4 one month ago.  Wt Readings from Last 4 Encounters:   11/26/18 103.9 kg (229 lb)   11/26/18 104.1 kg (229 lb 8 oz)   11/19/18 104.2 kg (229 lb 12.8 oz)   11/07/18 106.2 kg (234 lb 3.2 oz)   Body mass index is 38.7 kg/(m^2).    Past Medical History:   Diagnosis Date     Allergic rhinitis due to pollen     seasonal allergies      Anisometropia and aniseikonia      BMI greater than 40      Cardiomegaly      Chronic constipation      Congenital absence of one kidney      Cramp of limb      Dermatophytosis of the body      Dry eye syndrome      Esophageal reflux      Essential hypertension, benign      Gastro-oesophageal reflux disease      Hemorrhoids      Hypermetropia      Hypothyroidism      Incomplete Emptying of Bladder      Lactose intolerance      Major depressive disorder, recurrent episode, moderate (H)      Malignant neoplasm of breast (female), unspecified site     left mastectomy 1996      Mixed incontinence urge and stress (male)(female)      Moderate obstructive sleep apnea      Postmenopausal Atrophic Vaginitis      Presbyopia       Regular astigmatism      Restless leg syndrome      Senile nuclear sclerosis      Thyroid eye disease     mild     Tinnitus      Trigger finger (acquired)     right hand     Type II or unspecified type diabetes mellitus without mention of complication, not stated as uncontrolled     on insulin since April 2006      Past Surgical History:   Procedure Laterality Date     APPENDECTOMY       C MASTECTOMY,SIMPLE  1996    Left mastectomy  [facility]. Had normal FU mammo 5/2007. Follow yearly.      C TOTAL ABDOM HYSTERECTOMY  7/2003    Dr. Castanon, for abnormal bleeding. Removal of both tubes with BSO for myoma w - - -     CHOLECYSTECTOMY       COLONOSCOPY  5/2005    Complete Colonoscopy-had one small polyp removed 5/2005.      COLONOSCOPY N/A 3/31/2016    Procedure: COLONOSCOPY;  Surgeon: Cary Ring MD;  Location: [facility]     COLONOSCOPY N/A 7/7/2016    Procedure: COLONOSCOPY;  Surgeon: Cary Ring MD;  Location: [facility]     DILATION AND CURETTAGE       HYSTERECTOMY       MASTECTOMY      Left breast     Current Outpatient Prescriptions   Medication     acetaminophen (TYLENOL) 500 MG tablet     alum & mag hydroxide-simethicone (MYLANTA/MAALOX) 200-200-20 MG/5ML SUSP suspension     amLODIPine (NORVASC) 5 MG tablet     artificial saliva (BIOTENE MT) AERS spray     artificial tears (AKWA TEARS) OINT ophthalmic ointment     aspirin (ASA) 81 MG EC tablet     ASPIRIN 81 MG OR TABS     atorvastatin (LIPITOR) 40 MG tablet     BD AUTOSHIELD DUO 30G X 5 MM     blood glucose (NO BRAND SPECIFIED) lancets standard     blood glucose calibration (NO BRAND SPECIFIED) solution     blood glucose monitoring (NO BRAND SPECIFIED) meter device kit     blood glucose monitoring (NO BRAND SPECIFIED) test strip     Calcium Carbonate-Vitamin D (CALCIUM-VITAMIN D) 250-125 MG-UNIT TABS     calcium polycarbophil (FIBERCON) 625 MG tablet     CLARITIN 10 MG OR TABS     clotrimazole (LOTRIMIN) 1 % cream      erythromycin (ROMYCIN) ophthalmic ointment     exenatide (BYETTA) 10 MCG/0.04ML injection     FLUoxetine (PROZAC) 20 MG capsule     fluticasone (FLONASE) 50 MCG/ACT nasal spray     furosemide (LASIX) 20 MG tablet     Gabapentin (NEURONTIN PO)     glucagon (GLUCAGON EMERGENCY) 1 MG kit     Hypromellose (ARTIFICIAL TEARS OP)     insulin glargine (LANTUS) 100 UNIT/ML injection     lactase (LACTAID) 3000 UNIT tablet     levothyroxine (SYNTHROID, LEVOTHROID) 175 MCG tablet     LORazepam (ATIVAN) 1 MG tablet     losartan (COZAAR) 100 MG tablet     Magnesium Hydroxide (MILK OF MAGNESIA PO)     melatonin 3 MG tablet     metFORMIN (GLUCOPHAGE-XR) 500 MG 24 hr tablet     moxifloxacin (VIGAMOX) 0.5 % ophthalmic solution     nystatin (MYCOSTATIN) 759519 UNIT/GM POWD     ONETOUCH DELICA LANCETS 33G MISC     polyethylene glycol (MIRALAX/GLYCOLAX) powder     Saline 0.9 % SOLN     senna-docusate (SENNA S) 8.6-50 MG per tablet     Skin Protectants, Misc. (EUCERIN) cream     SM LUBRICANT EYE DROPS 0.4-0.3 % SOLN ophthalmic solution     Sod Fluor-Solo Fluor-Hydrfl Ac (GEL-SASHA DENTINBLOC DT)     solifenacin (VESICARE) 10 MG tablet     ziprasidone (GEODON) 40 MG capsule     No current facility-administered medications for this visit.         At [employer] [facility] [employer], she continues to serve on the [employer].  She has been on the [employer] for 20 years, plus decorating the bulletin boards.  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   She is satisfied with this level of effort and is getting along well with Josephine.  She had a good thanksgiving, and plans to be with  Her sister's family for [place].  She is sad about a brother-in-law in [place] who is now in hospice.  December is always a bittersweet month for her as she will note the 50 year anniversary of her father's death, and the death of one of her brothers.  She rates the depression as 4 on 10-point subjective scale, which is below baseline.  She has  recovered from eye surgery.  Up until her eye problems (bilateral involutional entropion), she had been doing more social walking some of the time and had apparently is gradually ramping up on her fitness center days.  She agrees to increase.  She is getting along better with her roommate.    Goal for exercise has been 1.5 hours/day as of June 19, 2017 and 2 lbs. / month.   We discussed increasing bicycling to 4 days per week short term and 5-6 long-term and is now at 40 minutes each time.    She arrived early today and was greeted in the waiting room.  Pinky participated fully and appeared to derive benefit.  Rapport was excellent.     Extended session due to complexity of case and length of interval.    IMPRESSION Distress Disability Risk    Low High Low     Time  In: 1:02  Time Out:  1:48  Diagnosis:   Major Depression, recurrent mild (F33.0)   Psychological Factors Affecting Morbid Obesity (F54)      Plan: She will return on 1/7 @ 1 for behavioral and supportive psychotherapy.  Will spread out to q 5 weeks.  Treatment plan due 5/7/2019  Damon Gr, Ph.D., A.B.P.P., L.P.

## 2018-11-26 NOTE — LETTER
"2018       RE: Pinky Page  [address] [facility]  [place] [address]     Dear Colleague,    Thank you for referring your patient, Pinky Page, to the [facility] MEDICAL WEIGHT MANAGEMENT at [facility]. Please see a copy of my visit note below.        Return Medical Weight Management Note     Pinky Page  MRN:  [id number]  :  1950  CORINNE:  18    Dear Dr. Ann,     I had the pleasure of seeing your patient Pinky Page.  She is a 65 year old female who I am continuing to see for treatment of obesity related to: DM-2 and Hypertension.    CURRENT WEIGHT:   229 lbs 8 oz    Wt Readings from Last 4 Encounters:   18 104.1 kg (229 lb 8 oz)   18 106.2 kg (234 lb 3.2 oz)   10/25/18 104.3 kg (230 lb)   08/15/18 109.6 kg (241 lb 9.6 oz)     Height:  5' 4.5\"  Body Mass Index:  Body mass index is 38.79 kg/(m^2).  Vitals:  B/P: 163/75, P: 68    Initial consult weight was 283 on .  Weight change since last seen on 18 is down 10 pounds.   Total loss is 54 pounds.    INTERVAL HISTORY:  Patient has been working on the following dietary changes: Still eating from the diet line at her group home (1500 miguel, no concentrated sweets, some fruit snacks).   She also continues to be on Byetta 10 cg BID for her DM and also to help her lose weight and as a appetite suppressant as well.  Her DM is being managed by us as well as the [facility] ( PCP). Glargine dose now down to 20/d with HbA1c in 7s.  Patient has been working on the following activity changes: Very regular walking at least 40 minutes / day.    Diet and Activity Changes Since Last Visit Reviewed With Patient 2018   I have made the following changes to my diet since my last visit: hotcereal   With regards to my diet, I am still struggling with: calories   For breakfast, I typically eat: -   For lunch, I typically eat: -   For supper, I typically eat: -   For snack(s), I " typically eat: -   I have made the following changes to my activity/exercise since my last visit: ridestatiioonery bike   With regards to my activity/exercise, I am still struggling with: otime       MEDICATIONS:   Current Outpatient Prescriptions   Medication     acetaminophen (TYLENOL) 500 MG tablet     alum & mag hydroxide-simethicone (MYLANTA/MAALOX) 200-200-20 MG/5ML SUSP suspension     amLODIPine (NORVASC) 5 MG tablet     artificial saliva (BIOTENE MT) AERS spray     artificial tears (AKWA TEARS) OINT ophthalmic ointment     aspirin (ASA) 81 MG EC tablet     ASPIRIN 81 MG OR TABS     atorvastatin (LIPITOR) 40 MG tablet     BD AUTOSHIELD DUO 30G X 5 MM     blood glucose (NO BRAND SPECIFIED) lancets standard     blood glucose calibration (NO BRAND SPECIFIED) solution     blood glucose monitoring (NO BRAND SPECIFIED) meter device kit     blood glucose monitoring (NO BRAND SPECIFIED) test strip     Calcium Carbonate-Vitamin D (CALCIUM-VITAMIN D) 250-125 MG-UNIT TABS     calcium polycarbophil (FIBERCON) 625 MG tablet     CLARITIN 10 MG OR TABS     clotrimazole (LOTRIMIN) 1 % cream     erythromycin (ROMYCIN) ophthalmic ointment     exenatide (BYETTA) 10 MCG/0.04ML injection     FLUoxetine (PROZAC) 20 MG capsule     fluticasone (FLONASE) 50 MCG/ACT nasal spray     furosemide (LASIX) 20 MG tablet     Gabapentin (NEURONTIN PO)     Hypromellose (ARTIFICIAL TEARS OP)     insulin glargine (LANTUS) 100 UNIT/ML injection     lactase (LACTAID) 3000 UNIT tablet     levothyroxine (SYNTHROID, LEVOTHROID) 175 MCG tablet     LORazepam (ATIVAN) 1 MG tablet     losartan (COZAAR) 100 MG tablet     Magnesium Hydroxide (MILK OF MAGNESIA PO)     melatonin 3 MG tablet     metFORMIN (GLUCOPHAGE-XR) 500 MG 24 hr tablet     moxifloxacin (VIGAMOX) 0.5 % ophthalmic solution     nystatin (MYCOSTATIN) 496763 UNIT/GM POWD     ONETOUCH DELICA LANCETS 33G MISC     polyethylene glycol (MIRALAX/GLYCOLAX) powder     Saline 0.9 % SOLN      senna-docusate (SENNA S) 8.6-50 MG per tablet     Skin Protectants, Misc. (EUCERIN) cream     SM LUBRICANT EYE DROPS 0.4-0.3 % SOLN ophthalmic solution     Sod Fluor-Solo Fluor-Hydrfl Ac (GEL-SASHA DENTINBLOC DT)     solifenacin (VESICARE) 10 MG tablet     ziprasidone (GEODON) 40 MG capsule     glucagon (GLUCAGON EMERGENCY) 1 MG kit     No current facility-administered medications for this visit.        Weight Loss Medication History Reviewed With Patient 11/26/2018   Which weight loss medications are you currently taking on a regular basis?  Byetta (exentatide)   Are you having any side effects from the weight loss medication that we have prescribed you? No   If you are having side effects please describe: -     ASSESSMENT:   Continues to do well with weight loss maintenance.     FOLLOW-UP:    12 weeks.  10/15 minutes spent on counseling and education      Marcos Magdaleno MD

## 2018-11-26 NOTE — NURSING NOTE
"No chief complaint on file.      Vitals:    11/26/18 1125   BP: 146/59   BP Location: Right arm   Patient Position: Sitting   Cuff Size: Adult Large   Pulse: 74   Resp: 18   Temp: 98.6  F (37  C)   TempSrc: Oral   SpO2: 97%   Weight: 229 lb 8 oz   Height: 5' 4.5\"       Body mass index is 38.79 kg/(m^2).      ARIAN Whyte, EMT                      "

## 2018-11-26 NOTE — PROGRESS NOTES
"    Return Medical Weight Management Note     Pinky Page  MRN:  [id number]  :  1950  CORINNE:  18    Dear Dr. Ann,     I had the pleasure of seeing your patient Pinky Page.  She is a 65 year old female who I am continuing to see for treatment of obesity related to: DM-2 and Hypertension.    CURRENT WEIGHT:   229 lbs 8 oz    Wt Readings from Last 4 Encounters:   18 104.1 kg (229 lb 8 oz)   18 106.2 kg (234 lb 3.2 oz)   10/25/18 104.3 kg (230 lb)   08/15/18 109.6 kg (241 lb 9.6 oz)     Height:  5' 4.5\"  Body Mass Index:  Body mass index is 38.79 kg/(m^2).  Vitals:  B/P: 163/75, P: 68    Initial consult weight was 283 on .  Weight change since last seen on 18 is down 10 pounds.   Total loss is 54 pounds.    INTERVAL HISTORY:  Patient has been working on the following dietary changes: Still eating from the diet line at her group home (1500 miguel, no concentrated sweets, some fruit snacks).   She also continues to be on Byetta 10 cg BID for her DM and also to help her lose weight and as a appetite suppressant as well.  Her DM is being managed by us as well as the [facility] ( PCP). Glargine dose now down to 20/d with HbA1c in 7s.  Patient has been working on the following activity changes: Very regular walking at least 40 minutes / day.    Diet and Activity Changes Since Last Visit Reviewed With Patient 2018   I have made the following changes to my diet since my last visit: hotcereal   With regards to my diet, I am still struggling with: calories   For breakfast, I typically eat: -   For lunch, I typically eat: -   For supper, I typically eat: -   For snack(s), I typically eat: -   I have made the following changes to my activity/exercise since my last visit: ridestatiioonery bike   With regards to my activity/exercise, I am still struggling with: otime       MEDICATIONS:   Current Outpatient Prescriptions   Medication     acetaminophen (TYLENOL) 500 MG tablet     alum & " mag hydroxide-simethicone (MYLANTA/MAALOX) 200-200-20 MG/5ML SUSP suspension     amLODIPine (NORVASC) 5 MG tablet     artificial saliva (BIOTENE MT) AERS spray     artificial tears (AKWA TEARS) OINT ophthalmic ointment     aspirin (ASA) 81 MG EC tablet     ASPIRIN 81 MG OR TABS     atorvastatin (LIPITOR) 40 MG tablet     BD AUTOSHIELD DUO 30G X 5 MM     blood glucose (NO BRAND SPECIFIED) lancets standard     blood glucose calibration (NO BRAND SPECIFIED) solution     blood glucose monitoring (NO BRAND SPECIFIED) meter device kit     blood glucose monitoring (NO BRAND SPECIFIED) test strip     Calcium Carbonate-Vitamin D (CALCIUM-VITAMIN D) 250-125 MG-UNIT TABS     calcium polycarbophil (FIBERCON) 625 MG tablet     CLARITIN 10 MG OR TABS     clotrimazole (LOTRIMIN) 1 % cream     erythromycin (ROMYCIN) ophthalmic ointment     exenatide (BYETTA) 10 MCG/0.04ML injection     FLUoxetine (PROZAC) 20 MG capsule     fluticasone (FLONASE) 50 MCG/ACT nasal spray     furosemide (LASIX) 20 MG tablet     Gabapentin (NEURONTIN PO)     Hypromellose (ARTIFICIAL TEARS OP)     insulin glargine (LANTUS) 100 UNIT/ML injection     lactase (LACTAID) 3000 UNIT tablet     levothyroxine (SYNTHROID, LEVOTHROID) 175 MCG tablet     LORazepam (ATIVAN) 1 MG tablet     losartan (COZAAR) 100 MG tablet     Magnesium Hydroxide (MILK OF MAGNESIA PO)     melatonin 3 MG tablet     metFORMIN (GLUCOPHAGE-XR) 500 MG 24 hr tablet     moxifloxacin (VIGAMOX) 0.5 % ophthalmic solution     nystatin (MYCOSTATIN) 762656 UNIT/GM POWD     ONETOUCH DELICA LANCETS 33G MISC     polyethylene glycol (MIRALAX/GLYCOLAX) powder     Saline 0.9 % SOLN     senna-docusate (SENNA S) 8.6-50 MG per tablet     Skin Protectants, Misc. (EUCERIN) cream     SM LUBRICANT EYE DROPS 0.4-0.3 % SOLN ophthalmic solution     Sod Fluor-Solo Fluor-Hydrfl Ac (GEL-SASHA DENTINBLOC DT)     solifenacin (VESICARE) 10 MG tablet     ziprasidone (GEODON) 40 MG capsule     glucagon (GLUCAGON EMERGENCY) 1  MG kit     No current facility-administered medications for this visit.        Weight Loss Medication History Reviewed With Patient 11/26/2018   Which weight loss medications are you currently taking on a regular basis?  Byetta (exentatide)   Are you having any side effects from the weight loss medication that we have prescribed you? No   If you are having side effects please describe: -     ASSESSMENT:   Continues to do well with weight loss maintenance.     FOLLOW-UP:    12 weeks.  10/15 minutes spent on counseling and education    Sincerely,    Marcos Magdaleno MD

## 2018-11-26 NOTE — MR AVS SNAPSHOT
After Visit Summary   11/26/2018    Pinky Page    MRN: [id number]           Patient Information     Date Of Birth          1950        Visit Information        Provider Department      11/26/2018 1:00 PM Damon Gr, PhD [facility]        Today's Diagnoses     Occupational problem    -  1    Major depressive disorder, recurrent episode, mild (H)        Generalized anxiety disorder        Psychological factors affecting morbid obesity (H)           Follow-ups after your visit        Your next 10 appointments already scheduled     Jan 02, 2019  1:00 PM CST   Return Visit with MD [facility] ([facility])    [address],  [address]  [facility] [address]   [phone]            Jan 04, 2019  2:10 PM CST   Adult Med Follow UP with Maulik Kohler MD   Psychiatry Clinic ([facility])    [place] [id number] [id number] [phone]            Apr 01, 2019 11:30 AM CDT   (Arrive by 11:15 AM)   Return Weight Management Visit with Marcos Magdaleno MD   [facility] ([facility])    [address]  [address] [id number] [phone]            May 09, 2019  1:30 PM CDT   Return Sleep Patient with Brianda Reddy MD   [facility] ([facility])    [address] [place] [id number] [phone]            Nov 19, 2019  1:30 PM CST   RETURN GENERAL with Michael Lopez MD   Eye Clinic ([facility])    [address]  [facility] [id number] [phone]              Who to contact     Please call your clinic at [phone] to:    Ask questions about your health    Make or cancel appointments    Discuss your medicines    Learn about your  test results    Speak to your doctor            Additional Information About Your Visit        Care EveryWhere ID     This is your Care EveryWhere ID. This could be used by other organizations to access your [facility] medical records  [id number]        Your Vitals Were     BMI (Body Mass Index)                   38.7 kg/m2            Blood Pressure from Last 3 Encounters:   11/26/18 146/59   11/19/18 162/82   11/07/18 136/76    Weight from Last 3 Encounters:   11/26/18 103.9 kg (229 lb)   11/26/18 104.1 kg (229 lb 8 oz)   11/19/18 104.2 kg (229 lb 12.8 oz)              Today, you had the following     No orders found for display         Today's Medication Changes          These changes are accurate as of 11/26/18  1:54 PM.  If you have any questions, ask your nurse or doctor.               These medicines have changed or have updated prescriptions.        Dose/Directions    metFORMIN 500 MG 24 hr tablet   Commonly known as:  GLUCOPHAGE-XR   This may have changed:    - how much to take  - when to take this   Used for:  Diabetes mellitus, type 2 (H)        Dose:  1000 mg   Take 2 tablets (1,000 mg) by mouth 2 times daily (with meals)   Quantity:  90 tablet   Refills:  3                Primary Care Provider Office Phone # Fax #    Cary Brownlee [phone]       [address] [address]        Equal Access to Services     JAYDEN STUART AH: Tatiana baugho Soemre, waaxda luqadaha, qaybta kaalmada daxa, autumn josé. So [facility] [phone].    ATENCIÓN: Si habla español, tiene a deluca disposición servicios gratuitos de asistencia lingüística. Link maradiaga [phone].    We comply with applicable federal civil rights laws and [place] laws. We do not discriminate on the basis of race, color, national origin, age, disability, sex, sexual orientation, or gender identity.            Thank you!     Thank you for choosing [facility]  for your care. Our goal is always to provide you with excellent care. Hearing back from our patients is one way we can continue to improve our services. Please take a few minutes to complete the written survey that you may receive in the mail after your visit with us. Thank you!             Your Updated Medication List - Protect others around you: Learn how to safely use, store and throw away your medicines at www.disposemymeds.org.          This list is accurate as of 11/26/18  1:54 PM.  Always use your most recent med list.                   Brand Name Dispense Instructions for use Diagnosis    acetaminophen 500 MG tablet    TYLENOL    1 Bottle    Take 2 tablets (1,000 mg) by mouth every 6 hours as needed    Sprain of left ankle, unspecified ligament, initial encounter       alum & mag hydroxide-simethicone 200-200-20 MG/5ML Susp suspension    MYLANTA/MAALOX     Take 30 mLs by mouth daily as needed for indigestion        amLODIPine 5 MG tablet    NORVASC    30 tablet    Take 2 tablets (10 mg) by mouth daily    Essential hypertension with goal blood pressure less than 130/85       artificial saliva Aers spray      Take 1 spray by mouth 3 times daily as needed for dry mouth        artificial tears Oint ophthalmic ointment     1 Tube    Apply  to eye. Place 0.5 inches into both eyes at bedtime    Insufficiency of tear film of both eyes       ARTIFICIAL TEARS OP      Apply 1 drop to eye 4 times daily.        * aspirin 81 MG tablet    ASA    100    Take 1 tablet by mouth daily. ENTERIC COATED.        * aspirin 81 MG EC tablet    ASA          atorvastatin 40 MG tablet    LIPITOR    90 tablet    Take 1 tablet (40 mg) by mouth daily    Hyperlipidemia LDL goal <100       BD AUTOSHIELD DUO 30G X 5 MM   Generic drug:  insulin pen needle           blood glucose calibration solution    no brand specified    1 each    Use to calibrate blood glucose monitor as directed.    Type 2 diabetes mellitus with microalbuminuria,  with long-term current use of insulin (H)       blood glucose lancets standard    no brand specified    100 each    Use to test blood sugar 2 times daily or as directed.    Type 2 diabetes mellitus with microalbuminuria, with long-term current use of insulin (H)       blood glucose monitoring meter device kit    no brand specified    1 kit    Check Blood sugars twice a day.    Type 2 diabetes mellitus with microalbuminuria, with long-term current use of insulin (H)       blood glucose monitoring test strip    no brand specified    100 each    Use to test blood sugar 3 times daily or as directed.    Type 2 diabetes mellitus with microalbuminuria, with long-term current use of insulin (H)       calcium polycarbophil 625 MG tablet    FIBERCON     Take 2 tablets by mouth daily        calcium-vitamin D 250-125 MG-UNIT Tabs      Take 1 tablet by mouth 2 times daily. Calcium 250 mg/Vit D 125 IU        CLARITIN 10 MG tablet   Generic drug:  loratadine     30    1 TAB PO QD (Once per day) as needed for ALLERGY SYMPTOMS        clotrimazole 1 % cream    LOTRIMIN    50 g    Apply topically 2 times daily as needed    Dermatophytosis       erythromycin ophthalmic ointment    ROMYCIN     Place 0.5 inches into both eyes 4 times daily        eucerin cream      Apply  topically as needed. Apply to thigh PRN dry skin        exenatide 10 MCG/0.04ML pen    BYETTA    1 Syringe    Inject 10 mcg Subcutaneous 2 times daily (before meals)    Type 2 diabetes mellitus with microalbuminuria, with long-term current use of insulin (H)       FLUoxetine 20 MG capsule    PROzac    90 capsule    Take 3 capsules (60 mg) by mouth daily    Schizoaffective disorder, depressive type (H)       fluticasone 50 MCG/ACT spray    FLONASE    16 g    Spray 1 spray into both nostrils daily    Seasonal allergic rhinitis       furosemide 20 MG tablet    LASIX    60 tablet    Take 1 tablet (20 mg) by mouth 2 times daily    Lower leg edema       GEL-SASHA DENTINBLOC DT       Apply  to affected area. GEL. Apply to 2nd molar on bottom right daily at bedtime.        glucagon 1 MG kit    GLUCAGON EMERGENCY    2 mg    Inject 1 mg into the muscle once for 1 dose    Type 2 diabetes mellitus with microalbuminuria, with long-term current use of insulin (H)       insulin glargine 100 UNIT/ML pen    LANTUS PEN    8 mL    Inject 20 Units Subcutaneous At Bedtime    Type 2 diabetes mellitus with microalbuminuria, with long-term current use of insulin (H)       LACTAID 3000 UNIT tablet   Generic drug:  lactase      Take 4 tabs daily with meals.        levothyroxine 175 MCG tablet    SYNTHROID/LEVOTHROID    30 tablet    Take 1 tablet (175 mcg) by mouth daily Give on empty stomach    Other specified hypothyroidism       LORazepam 1 MG tablet    ATIVAN    35 tablet    Take 1 tablet at bedtime. May take 1 additional tab daily PRN for anxiety.    Generalized anxiety disorder       losartan 100 MG tablet    COZAAR    90 tablet    Take 1 tablet by mouth daily.    Hypertension goal BP (blood pressure) < 130/80, Diabetes mellitus type 2, insulin dependent (H), CKD (chronic kidney disease) stage 2, GFR 60-89 ml/min       melatonin 3 MG tablet     60 tablet    Take 1 tablet (3 mg) by mouth nightly as needed for sleep    Other insomnia       metFORMIN 500 MG 24 hr tablet    GLUCOPHAGE-XR    90 tablet    Take 2 tablets (1,000 mg) by mouth 2 times daily (with meals)    Diabetes mellitus, type 2 (H)       MILK OF MAGNESIA PO      Take  by mouth. Take 30 mL as needed for constipation.        moxifloxacin 0.5 % ophthalmic solution    VIGAMOX     Place 1 drop into both eyes 4 times daily        NEURONTIN PO      Take 900 mg by mouth 3 times daily.        nystatin 827201 UNIT/GM Powd    MYCOSTATIN    60 g    Apply topically 3 times daily as needed    Candidiasis of skin       ONETOUCH DELICA LANCETS 33G Misc           polyethylene glycol powder    MIRALAX/GLYCOLAX     Take 1 capful by mouth 2 times daily. 17 GM PO  BID        saline 0.9 % Soln      Spray 2 sprays in nostril as needed.        SENNA S 8.6-50 MG per tablet   Generic drug:  senna-docusate      Take 2 tablets by mouth At Bedtime.        SM LUBRICANT EYE DROPS 0.4-0.3 % Soln ophthalmic solution   Generic drug:  polyethylene glycol 0.4%- propylene glycol 0.3%           solifenacin 10 MG tablet    VESICARE    30 tablet    Take 1 tablet (10 mg) by mouth daily    Urge incontinence       ziprasidone 40 MG capsule    GEODON    60 capsule    Take 1 capsule (40 mg) by mouth 2 times daily (with meals)    Schizoaffective disorder, depressive type (H)       * Notice:  This list has 2 medication(s) that are the same as other medications prescribed for you. Read the directions carefully, and ask your doctor or other care provider to review them with you.

## 2018-12-11 DIAGNOSIS — F41.1 GENERALIZED ANXIETY DISORDER: ICD-10-CM

## 2018-12-11 NOTE — TELEPHONE ENCOUNTER
Last Seen 11/19/18  RTC 6 weeks  Cancel None  No-Show None  Next Appt 1/4/19    Incoming Refill From [facility] via fax    Medication Requested LORazepam (ATIVAN) 1 MG tablet    Directions Take 1 tablet at bedtime. May take 1 additional tab daily PRN for anxiety.    Qty 35    Last Refill Per [place] :  11/19/18- #18  10/27- #20  10/18- #18    Routed to provider for approval

## 2018-12-12 NOTE — TELEPHONE ENCOUNTER
Maulik Kohler MD   You 2 hours ago (7:52 AM)      Yes that is fine to refill. (Routing comment)       You   Maulik Kohler MD 17 hours ago (5:41 PM)      Hi Dr. Kohler,     Pharmacy is requesting a refill for their fill cycle, pt has follow-up on 1/4/19. Do you approve a refill to last through f/u appt? If so I can call in.     Thanks,   Cyndi (Routing comment)      Pharmacy: [facility] - [place], [place] - [address]    Writer called in refill- quantity of 35 tabs to pt's preferred pharmacy, [facility] [phone]. Verbal orders given to pharmacistNarendra.     Writer currently unable to update medication tab, order pended and routed to provider for approval.

## 2018-12-13 RX ORDER — LORAZEPAM 1 MG/1
TABLET ORAL
Qty: 35 TABLET | Refills: 0
Start: 2018-12-13 | End: 2019-01-04

## 2019-01-02 ENCOUNTER — OFFICE VISIT (OUTPATIENT)
Dept: FAMILY MEDICINE | Facility: CLINIC | Age: 69
End: 2019-01-02
Payer: COMMERCIAL

## 2019-01-02 VITALS
BODY MASS INDEX: 38.87 KG/M2 | WEIGHT: 230 LBS | HEART RATE: 70 BPM | SYSTOLIC BLOOD PRESSURE: 163 MMHG | TEMPERATURE: 98.6 F | OXYGEN SATURATION: 96 % | DIASTOLIC BLOOD PRESSURE: 79 MMHG

## 2019-01-02 DIAGNOSIS — I10 HYPERTENSION, ESSENTIAL: ICD-10-CM

## 2019-01-02 DIAGNOSIS — E66.01 MORBID OBESITY DUE TO EXCESS CALORIES (H): ICD-10-CM

## 2019-01-02 DIAGNOSIS — B49 FUNGAL INFECTION: Primary | ICD-10-CM

## 2019-01-02 DIAGNOSIS — Z12.31 ENCOUNTER FOR SCREENING MAMMOGRAM FOR BREAST CANCER: ICD-10-CM

## 2019-01-02 DIAGNOSIS — F33.1 MAJOR DEPRESSIVE DISORDER, RECURRENT EPISODE, MODERATE (H): ICD-10-CM

## 2019-01-02 DIAGNOSIS — B35.9 DERMATOPHYTOSIS: ICD-10-CM

## 2019-01-02 DIAGNOSIS — Z90.710 S/P HYSTERECTOMY: ICD-10-CM

## 2019-01-02 RX ORDER — PRENATAL VIT 91/IRON/FOLIC/DHA 28-975-200
COMBINATION PACKAGE (EA) ORAL 2 TIMES DAILY
COMMUNITY
Start: 2019-01-02 | End: 2021-03-23

## 2019-01-02 ASSESSMENT — ENCOUNTER SYMPTOMS
CHILLS: 0
FEVER: 0
COUGH: 0
ACTIVITY CHANGE: 1
SHORTNESS OF BREATH: 0
ABDOMINAL PAIN: 0
TROUBLE SWALLOWING: 0
DYSPHORIC MOOD: 0

## 2019-01-02 NOTE — PATIENT INSTRUCTIONS
Here is the plan from today's visit    1. Fungal infection  Continue to use clotrimazole    2. Encounter for screening mammogram for breast cancer  Sean after April 5, 2019  - Screening Mammogram Digital Bilateral; Future    Please call or return to clinic if your symptoms don't go away.    Follow up plan  Please make a clinic appointment for follow up with me (EDIS BROWNLEE) in 6  months for follow up.    Thank you for coming to [facility] today.  Lab Testing:  **If you had lab testing today and your results are reassuring or normal they will be mailed to you or sent through [employer] within 7 days.   **If the lab tests need quick action we will call you with the results.  The phone number we will call with results is # [phone] (home) . If this is not the best number please call our clinic and change the number.  Medication Refills:  If you need any refills please call your pharmacy and they will contact us.   If you need to  your refill at a new pharmacy, please contact the new pharmacy directly. The new pharmacy will help you get your medications transferred faster.   Scheduling:  If you have any concerns about today's visit or wish to schedule another appointment please call our office during normal business hours [phone] (8-5:00 M-F)  If a referral was made to a [facility] and you don't get a call from central scheduling please call [phone].  If a Mammogram was ordered for you at [facility] call [phone] to schedule or change your appointment.  If you had an XRay/CT/Ultrasound/MRI ordered the number is [phone] to schedule or change your radiology appointment.   Medical Concerns:  If you have urgent medical concerns please call [phone] at any time of the day.    Edis Brownlee MD

## 2019-01-02 NOTE — PROGRESS NOTES
HPI       Pinky Page is a 68 year old  who presents for   Chief Complaint   Patient presents with     RECHECK     Derm Problem     left flank         Concern: rash   Description of the problem :Patient has long standing fungal rash underneath panus on left side. Has been using clotrimazole 1% cream as needed. [facility] is requesting a change to the house order for terbinafine 1% cream. Rash is on and off depending on the season and stress. Came back after brother in law's death over the holidays and has been using clotrimazole since mid December.         Adherence and Exercise  Medication side effects: no  How often is a medication missed? Never  Exercise:biking 6-7 days/week for an average of 15-30 minutes       +++++++  [facility] is also concerned that she is past due for a cervical cancer screening. Patient had a IRASEMA/BSO in . Pap not indicated.    Had a death in the family over the holidays (her brother in law) after a long standing casas with cancer. Got to go the the  and spend time with her family over the holidays. Feels as if she is coping well.    Problem, Medication and Allergy Lists were reviewed and updated if needed..    Patient is an established patient of this clinic..         Review of Systems:   Review of Systems   Constitutional: Positive for activity change (less exercise over the holidays). Negative for chills and fever.   HENT: Negative for trouble swallowing.    Respiratory: Negative for cough and shortness of breath.    Cardiovascular: Negative for chest pain.   Gastrointestinal: Negative for abdominal pain.   Skin: Positive for rash (underneath pannus on left side).   Psychiatric/Behavioral: Negative for dysphoric mood.            Physical Exam:     Vitals:    19 1302   BP: 163/79   Pulse: 70   Temp: 98.6  F (37  C)   SpO2: 96%   Weight: 104.3 kg (230 lb)     Body mass index is 38.87 kg/m .  Vital signs normal except BP high     Physical Exam    Constitutional: She is oriented to person, place, and time. No distress.   HENT:   Head: Normocephalic and atraumatic.   Cardiovascular: Normal rate.   Pulmonary/Chest: Effort normal.   Neurological: She is alert and oriented to person, place, and time.   Skin:   Redness with yeasty smell noted underneath left pannus with cream applied.   Psychiatric: Her speech is normal and behavior is normal. Judgment and thought content normal. Her affect is blunt.         Results:   No testing ordered today    Assessment and Plan        Pinky was seen today for recheck and derm problem.    Diagnoses and all orders for this visit:    Fungal infection - Terbinafine not active against yeast. Will fax over RX for continued clotrimazole to use as needed underneath pannus.  -     clotrimazole (LOTRIMIN) 1 % external cream; Apply topically 2 times daily as needed (apply to area underneath pannus as needed for itching)    Encounter for screening mammogram for breast cancer - Hx of L mastectomy. Mammogram due after 19. Patient requested referral today. Will have her schedule after19.  -     Screening Mammogram Digital Bilateral; Future    Morbid obesity due to excess calories (H) - Has lost 4 lbs over the holidays since last visit on 18. Congratulated her on her hard work. Continue with exercise daily and limiting calories.    Hypertension, essential - Patient worried about her blood pressure readings today 163/79 and 152/79 on repeat. Repeat BP under goal 150/90. No change to medications at this point and patient did not nee     S/P hysterectomy - [facility] concerned about need for pap. Records reviewed and patient had IRASEMA/BSO and no longer needs cervical cancer screening.    Major depressive disorder, recurrent episode, moderate (H) - Death in the family (brother in law) over the holidays after a long casas with cancer. Patient was able to go to the  and spend time with family over the holidays.  Feels as  if she is coping well. Psychiatry still managing meds and sees therapist regularly.        Options for treatment and follow-up care were reviewed with the patient. Pinky Page  engaged in the decision making process and verbalized understanding of the options discussed and agreed with the final plan.    Cary Brownlee MD  PGY-2

## 2019-01-03 RX ORDER — CLOTRIMAZOLE 1 %
CREAM (GRAM) TOPICAL 2 TIMES DAILY PRN
Qty: 50 G | Refills: 1 | Status: SHIPPED | OUTPATIENT
Start: 2019-01-03 | End: 2019-11-07

## 2019-01-04 ENCOUNTER — OFFICE VISIT (OUTPATIENT)
Dept: PSYCHIATRY | Facility: CLINIC | Age: 69
End: 2019-01-04
Attending: PSYCHIATRY & NEUROLOGY
Payer: COMMERCIAL

## 2019-01-04 VITALS
WEIGHT: 233.4 LBS | HEART RATE: 81 BPM | SYSTOLIC BLOOD PRESSURE: 164 MMHG | DIASTOLIC BLOOD PRESSURE: 84 MMHG | BODY MASS INDEX: 39.44 KG/M2

## 2019-01-04 DIAGNOSIS — F25.1 SCHIZOAFFECTIVE DISORDER, DEPRESSIVE TYPE (H): ICD-10-CM

## 2019-01-04 DIAGNOSIS — F41.1 GENERALIZED ANXIETY DISORDER: ICD-10-CM

## 2019-01-04 PROCEDURE — G0463 HOSPITAL OUTPT CLINIC VISIT: HCPCS | Mod: ZF

## 2019-01-04 RX ORDER — ZIPRASIDONE HYDROCHLORIDE 40 MG/1
40 CAPSULE ORAL 2 TIMES DAILY WITH MEALS
Qty: 60 CAPSULE | Refills: 2 | Status: SHIPPED | OUTPATIENT
Start: 2019-01-04 | End: 2019-05-01

## 2019-01-04 RX ORDER — LORAZEPAM 1 MG/1
TABLET ORAL
Qty: 35 TABLET | Refills: 0 | Status: SHIPPED | OUTPATIENT
Start: 2019-01-04 | End: 2019-01-04

## 2019-01-04 RX ORDER — LORAZEPAM 1 MG/1
TABLET ORAL
Qty: 35 TABLET | Refills: 0 | Status: SHIPPED | OUTPATIENT
Start: 2019-02-01 | End: 2019-03-06

## 2019-01-04 RX ORDER — LORAZEPAM 1 MG/1
TABLET ORAL
Qty: 35 TABLET | Refills: 0 | Status: SHIPPED | OUTPATIENT
Start: 2019-02-01 | End: 2019-01-04

## 2019-01-04 ASSESSMENT — PAIN SCALES - GENERAL: PAINLEVEL: NO PAIN (0)

## 2019-01-04 NOTE — NURSING NOTE
Chief Complaint   Patient presents with     Recheck Medication     Generalized anxiety disorder

## 2019-01-04 NOTE — PATIENT INSTRUCTIONS
[place] I have sent your new prescriptions to the pharmacy. Please let us know if you have any questions or concerns prior to your next visit.    Dr. Kohler    Thank you for coming to the PSYCHIATRY CLINIC.    Lab Testing:  If you had lab testing today and your results are reassuring or normal they will be mailed to you or sent through [employer] within 7 days.   If the lab tests need quick action we will call you with the results.  The phone number we will call with results is # [phone] (home) . If this is not the best number please call our clinic and change the number.    Medication Refills:  If you need any refills please call your pharmacy and they will contact us. Our fax number for refills is [phone]. Please allow three business for refill processing.   If you need to  your refill at a new pharmacy, please contact the new pharmacy directly. The new pharmacy will help you get your medications transferred.     Scheduling:  If you have any concerns about today's visit or wish to schedule another appointment please call our office during normal business hours [phone] (8-5:00 M-F)    Contact Us:  Please call [phone] during business hours (8-5:00 M-F).  If after clinic hours, or on the weekend, please call  [phone].    Financial Assistance [phone]  [facility] Billing [phone]  [place] [facility]illing [phone]  Medical Records [phone]      MENTAL HEALTH CRISIS NUMBERS:  [facility]:   [facility] - [phone]   Crisis Residence [facility] - [phone]   Walk-In Counseling [facility] - [phone]   COPE 24/7 [facility] Mobile Team for Adults - [[phone]]; Child - [[phone]]     Crisis Connection - [phone]     [facility]:   [facility] - [phone]   Walk-in counseling [facility] - [phone]   Walk-in counseling [facility] - [phone]   Crisis Residence [facility]  [facility] - [phone]   Urgent Care Adult Mental Health:   --Drop-in, 24/7 crisis line, and [facility] Mobile Team [[phone]]    CRISIS TEXT LINE: Text [phone] from anywhere, anytime, any crisis 24/7;    OR SEE [email or web address]     Poison Control Center - [phone]    CHILD: [facility] needs assessment team - [phone]     [facility] - [phone]; or [facility] - [phone]    If you have a medical emergency please call 911or go to the nearest ER.                    _____________________________________________    Again thank you for choosing PSYCHIATRY CLINIC and please let us know how we can best partner with you to improve you and your family's health.  You may be receiving a survey in the mail regarding this appointment. We would love to have your feedback, both positive and negative, so please fill out the survey and return it using the provided envelope. The survey is done by an external company, so your answers are anonymous.

## 2019-01-04 NOTE — PROGRESS NOTES
[facility] PSYCHIATRY CLINIC PROGRESS NOTE     CARE TEAM:  PCP- Cary Brownlee    Specialty Providers- no    Therapist- Dr. Gr    [facility] Team- no    Pinky ROSELIA Page is a 68 year old female who prefers the name Pinky & pronouns she, her, hers. Date of initial diagnostic assessment is 5/13/13.  Date of most recent transfer of care assessment is 07/30/18.     Pertinent Background:  This patient first experienced mental health issues in her twenties and has received treatment for schizoaffective disorder and generalized anxiety.  History details in last diagnostic assessment.  Notably, Pinky's symptoms of depression and psychosis have responded well to a combination of ECT and medication management. Pinky has a history of experiencing increasingly frequent psychotic symptoms w/ previously attempted antipsychotic tapers.        Psych critical item history includes psychosis [sxs include disorganization, hallucinations, paranoia], mutiple psychotropic trials, psych hosp (3-5) and ECT.    INTERIM HISTORY                                                                                              4, 4   The patient reports good treatment adherence.  History was provided by the patient who was a good historian.  The last visit ended with the following med change: decrease in lorazepam.     Since the last visit:     - Anxiety symptoms remain well-controlled  - Brother-in-law passed away in December; has been grieving but is managing well  - Continuing to engage in facility activities at [facility]  - Working to exercise regularly  - Working with PCP to manage her blood pressure better; reading this morning at [facility] was 140s systolic  - Tolerating medications well; using PRN lorazepam very infrequently    RECENT SYMPTOMS:   DEPRESSION:  reports-insomnia;  DENIES- suicidal ideation and self-destructive thoughts  MELVIN/HYPOMANIA:  reports-none;  DENIES- increased energy, decreased sleep need, increased activity  and grandiosity  PSYCHOSIS:  reports-none;  DENIES- delusions, auditory hallucinations and visual hallucinations  DYSREGULATION:  reports-none;  DENIES- suicidal ideation, violent ideation and SIB  PANIC ATTACK:  none   ANXIETY:  none  TRAUMA RELATED:  none  COMPULSIVE:  none   SLEEP: No problems  EATING DISORDER: no     RECENT SUBSTANCE USE:     ALCOHOL- no      TOBACCO- no     CAFFEINE- coffee/ tea [coffee, diet Pepsi]  OPIOIDS- no         NARCAN KIT- no        CANNABIS- no            OTHER ILLICIT DRUGS- none      CURRENT SOCIAL HISTORY:  FINANCIAL SUPPORT- social security disability       CHILDREN- no      LIVING SITUATION- Lives at [facility].      SOCIAL/ SPIRITUAL SUPPORT- sisters, support staff at [facility], other residents at [facility], other providers     FEELS SAFE AT HOME- yes     MEDICAL ROS (2,10):  Reports none, none, none    Denies headaches, sexual function problems [none], short term memory and/or word finding difficulty, wt gain, tremor, akathisia, spasms, bruxism, easy bruising , GI sxs [N/V], akathisia, muscle problems [stiffness], unusual movements, wt gain, sexual function problems [none], polydipsia, polyphagia and Parkinsonian-type symptoms [shuffling gait, masked facies, stooped posture, speech change, writing change], rash, hair loss , menstrual abnormality, skin/eye discoloration and bleeding or freq bruising    PSYCH and CD Critical Summary Points since July 2018 7/30 - resident transition, no changes  9/10 - no changes  10/25 - increased anxiety, increased lorazepam, fluoxetine  11/19 - decreased lorazepam  1/4 - decreased fluoxetine    PAST PSYCH MED TRIALS   see EMR Problem List: Hx of psychiatric care    MEDICAL / SURGICAL HISTORY                                   Pregnant or breastfeeding- no      Contraception- None    Neurologic Hx- no   Patient Active Problem List   Diagnosis     Allergic rhinitis due to pollen     Urge incontinence     Hypertension,  essential     Cardiomegaly     Chronic constipation     Dry eye syndrome     Esophageal reflux     Exposure keratoconjunctivitis     DM ophthalmopathy (H)     Hypothyroidism     Senile cataract     ANUJ (obstructive sleep apnea)     Postmenopausal atrophic vaginitis     Restless leg syndrome     Squamous blepharitis     Morbid obesity due to excess calories (H)     Personal history of breast cancer s/p L masectomy     Diabetes mellitus type 2, insulin dependent (H)     Hypercholesteremia     Lives in group home     Extrapyramidal and movement disorder     CKD (chronic kidney disease) stage 2, GFR 60-89 ml/min     Solitary kidney, congenital     S/P hysterectomy     Impingement syndrome of right shoulder     Hypertriglyceridemia     Candidiasis of skin     Generalized anxiety disorder     Carpal tunnel syndrome of left wrist     Schizoaffective disorder, depressive type (H)     Major depressive disorder, recurrent episode, moderate (H)     Psychological factors affecting medical condition     Hx of psychiatric care     Nail complaint     Microalbuminuria due to type 2 diabetes mellitus (H)     Type 2 diabetes mellitus with microalbuminuria, with long-term current use of insulin (H)     Diabetes mellitus, type II, insulin dependent (H)     CKD (chronic kidney disease) stage 1, GFR 90 ml/min or greater     Conjunctivitis of both eyes     Corneal erosion of both eyes     Spastic entropion, right       Major Surgery- not discussed    ALLERGY                                Chlordiazepoxide hcl; Dimetapp dm cold-cough; Haldol; Ibuprofen; Lactose intolerance [beta-galactosidase]; Milk products; and Propofol  MEDICATIONS                               Current Outpatient Medications   Medication Sig Dispense Refill     acetaminophen (TYLENOL) 500 MG tablet Take 2 tablets (1,000 mg) by mouth every 6 hours as needed 1 Bottle 2     alum & mag hydroxide-simethicone (MYLANTA/MAALOX) 200-200-20 MG/5ML SUSP suspension Take 30 mLs by  mouth daily as needed for indigestion       amLODIPine (NORVASC) 5 MG tablet Take 2 tablets (10 mg) by mouth daily 30 tablet 3     artificial saliva (BIOTENE MT) AERS spray Take 1 spray by mouth 3 times daily as needed for dry mouth       artificial tears (AKWA TEARS) OINT ophthalmic ointment Apply  to eye. Place 0.5 inches into both eyes at bedtime 1 Tube 11     aspirin (ASA) 81 MG EC tablet        ASPIRIN 81 MG OR TABS Take 1 tablet by mouth daily. ENTERIC COATED. 100 3     atorvastatin (LIPITOR) 40 MG tablet Take 1 tablet (40 mg) by mouth daily 90 tablet 1     BD AUTOSHIELD DUO 30G X 5 MM        blood glucose (NO BRAND SPECIFIED) lancets standard Use to test blood sugar 2 times daily or as directed. 100 each 11     blood glucose calibration (NO BRAND SPECIFIED) solution Use to calibrate blood glucose monitor as directed. 1 each 3     blood glucose monitoring (NO BRAND SPECIFIED) meter device kit Check Blood sugars twice a day. 1 kit 0     blood glucose monitoring (NO BRAND SPECIFIED) test strip Use to test blood sugar 3 times daily or as directed. 100 each 3     Calcium Carbonate-Vitamin D (CALCIUM-VITAMIN D) 250-125 MG-UNIT TABS Take 1 tablet by mouth 2 times daily. Calcium 250 mg/Vit D 125 IU       calcium polycarbophil (FIBERCON) 625 MG tablet Take 2 tablets by mouth daily       CLARITIN 10 MG OR TABS 1 TAB PO QD (Once per day) as needed for ALLERGY SYMPTOMS 30 11     clotrimazole (LOTRIMIN) 1 % external cream Apply topically 2 times daily as needed (apply to area underneath pannus as needed for itching) 50 g 1     erythromycin (ROMYCIN) ophthalmic ointment Place 0.5 inches into both eyes 4 times daily       exenatide (BYETTA) 10 MCG/0.04ML injection Inject 10 mcg Subcutaneous 2 times daily (before meals) 1 Syringe 11     FLUoxetine (PROZAC) 20 MG capsule Take 3 capsules (60 mg) by mouth daily 90 capsule 1     fluticasone (FLONASE) 50 MCG/ACT nasal spray Spray 1 spray into both nostrils daily 16 g 3      furosemide (LASIX) 20 MG tablet Take 1 tablet (20 mg) by mouth 2 times daily 60 tablet 3     Gabapentin (NEURONTIN PO) Take 900 mg by mouth 3 times daily.       glucagon (GLUCAGON EMERGENCY) 1 MG kit Inject 1 mg into the muscle once for 1 dose 2 mg 3     Hypromellose (ARTIFICIAL TEARS OP) Apply 1 drop to eye 4 times daily.       insulin glargine (LANTUS) 100 UNIT/ML injection Inject 20 Units Subcutaneous At Bedtime 8 mL 11     lactase (LACTAID) 3000 UNIT tablet Take 4 tabs daily with meals.       levothyroxine (SYNTHROID, LEVOTHROID) 175 MCG tablet Take 1 tablet (175 mcg) by mouth daily Give on empty stomach 30 tablet 4     LORazepam (ATIVAN) 1 MG tablet Take 1 tablet at bedtime. May take 1 additional tab daily PRN for anxiety. 35 tablet 0     losartan (COZAAR) 100 MG tablet Take 1 tablet by mouth daily. 90 tablet 1     Magnesium Hydroxide (MILK OF MAGNESIA PO) Take  by mouth. Take 30 mL as needed for constipation.       melatonin 3 MG tablet Take 1 tablet (3 mg) by mouth nightly as needed for sleep 60 tablet 1     metFORMIN (GLUCOPHAGE-XR) 500 MG 24 hr tablet Take 2 tablets (1,000 mg) by mouth 2 times daily (with meals) (Patient taking differently: Take 2,000 mg by mouth daily ) 90 tablet 3     moxifloxacin (VIGAMOX) 0.5 % ophthalmic solution Place 1 drop into both eyes 4 times daily       nystatin (MYCOSTATIN) 448351 UNIT/GM POWD Apply topically 3 times daily as needed 60 g 1     ONETOUCH DELICA LANCETS 33G MISC        polyethylene glycol (MIRALAX/GLYCOLAX) powder Take 1 capful by mouth 2 times daily. 17 GM PO BID       Saline 0.9 % SOLN Spray 2 sprays in nostril as needed.       senna-docusate (SENNA S) 8.6-50 MG per tablet Take 2 tablets by mouth At Bedtime.       Skin Protectants, Misc. (EUCERIN) cream Apply  topically as needed. Apply to thigh PRN dry skin        SM LUBRICANT EYE DROPS 0.4-0.3 % SOLN ophthalmic solution        Sod Fluor-Solo Fluor-Hydrfl Ac (GEL-SASHA DENTINBLOC DT) Apply  to affected area. GEL.  Apply to 2nd molar on bottom right daily at bedtime.       solifenacin (VESICARE) 10 MG tablet Take 1 tablet (10 mg) by mouth daily 30 tablet 6     terbinafine (LAMISIL) 1 % external cream Apply topically 2 times daily       ziprasidone (GEODON) 40 MG capsule Take 1 capsule (40 mg) by mouth 2 times daily (with meals) 60 capsule 2     VITALS                                                                                                                                  3, 3   /84   Pulse 81   Wt 105.9 kg (233 lb 6.4 oz)   BMI 39.44 kg/m       MENTAL STATUS EXAM                                                                         9, 14 cog gs     Alertness: alert  and oriented  Appearance: well groomed - casual clothing, white hair  Behavior/Demeanor: cooperative, with poor eye contact   Speech: slowed  Language: intact and no problems  Psychomotor: slight tremor in right hand  Mood: description consistent with euthymia  Affect: full range; was congruent to mood; was congruent to content  Thought Process/Associations: unremarkable  Thought Content:  Reports none;  Denies suicidal ideation, violent ideation and delusions  Perception:  Reports none;  Denies auditory hallucinations and visual hallucinations  Insight: good  Judgment: good  Cognition: (6) does  appear grossly intact; formal cognitive testing was not done  Gait and Station: unremarkable    LABS and DATA     AIMS:  last done 9/10/18 with score(s): 0;  next due September 2019    PHQ9 TODAY = 5  PHQ-9 SCORE 10/25/2018 11/7/2018 11/8/2018   PHQ-9 Total Score - - -   PHQ-9 Total Score 12 6 6     ANTIPSYCHOTIC LABS  [glu, A1C, lipids (danika LDL), liver enzymes, WBC, ANEU, Hgb, plts]  q12 mo  Recent Labs   Lab Test 11/07/18  1531 10/25/18  1734 05/03/18  1414   GLC  --  147* 192.6*   A1C 7.4*  --  6.7*     Recent Labs   Lab Test 05/03/18  1414 03/21/17  1023   CHOL 126 142   TRIG 148 121   LDL 60 78   HDL 37* 40*     Recent Labs   Lab Test 06/26/18  0659  06/26/18   AST 16 16   ALT 21 21   ALKPHOS 111 111     Recent Labs   Lab Test 10/25/18  1734 06/26/18  0659   WBC 14.2* 9.72   ANEU 10.6* 5.31   HGB 14.0 12.6    247       DIAGNOSIS     Schizoaffective Disorder, depressed type, moderate depression, without current psychotic features  Generalized Anxiety Disorder  R/o Tardive Dyskinesia    ASSESSMENT                                                                                                          m2, h3     TODAY:  Pinky's symptoms appear to be at baseline. Her mood and anxiety symptoms remain well controlled following resolution of her UTI and improvement in corneal abrasions. She is engaging appropriately at her group home and exercising frequently. She appears to be coping well with the death of her brother-in-law a few weeks ago. Considering the significant improvement in her symptoms w/ the resolution of medical problems, I will reduce the dose of the fluoxetine back to where it was previously. She will return to clinic in 2 months for reevaluation.      [place] PRESCRIPTION MONITORING PROGRAM [] was not checked today:  N/A.    PSYCHOTROPIC DRUG INTERACTIONS:   Ziprasidone and fluoxetine may result in increased risk for QTc prolongation and serotonin syndrome.  Aspirin and fluoxetine may result in increased risk for bleeding.  MANAGEMENT:  Monitoring for adverse effects, periodic EKGs and patient is aware of risks     PLAN                                                                                                                       m2, h3     1) PSYCHOTROPIC MEDICATIONS:    - Continue ziprasidone 40 mg BID  - Decrease fluoxetine to 40 mg daily  - Continue lorazepam 1 mg at bedtime w/ additional 1 mg daily PRN    2) THERAPY:    Yes, continue w/ Dr. Gr    3) NEXT DUE:    Labs- AP labs due May 2019  EKG- Last EKG 8/17/16 - QTc 432. Will updated as needed.  Rating Scales- AIMS due September 2019    4) REFERRALS:    No Referrals needed     5)  RTC: 8 weeks    6) CRISIS NUMBERS:   Provided routinely in AVS.   [facility] [phone] (clinic)    [phone] (after hours)  ONLY if a [facility] PT: [facility] [phone] (clinic), [phone] (after hours)     TREATMENT RISK STATEMENT:  The risks, benefits, alternatives and potential adverse effects have been discussed and are understood by the pt. The pt understands the risks of using street drugs or alcohol. There are no medical contraindications, the pt agrees to treatment with the ability to do so. The pt knows to call the clinic for any problems or to access emergency care if needed.  Medical and substance use concerns are documented above.  Psychotropic drug interaction check was done, including changes made today.    PSYCHIATRY CLINIC INDIVIDUAL PSYCHOTHERAPY NOTE                                                  [16]   Start time - 1410           End time - 1431  Date last reviewed - 09/10/18       Date next due - 12/9/18 (or 12 months if Medicare)    Subjective: This supportive psychotherapy session addressed issues related to goals of therapy .  Patient's reaction: Maintenance in the context of mental status appropriate for ambulatory setting.  Progress: good  Plan: RTC 6-8 weeks  Psychotherapy services during this visit included  myself and the patient.   TREATMENT  PLAN          SYMPTOMS; PROBLEMS   MEASURABLE GOALS;    FUNCTIONAL IMPROVEMENT INTERVENTIONS;   GAINS MADE DISCHARGE CRITERIA   Depression: depressed mood   reduce depressive symptoms and reduce depressive episodes meds/therapy marked symptom improvement   Anxiety: excessive worry   reduce anxiety meds/therapy marked symptom improvement       PROVIDER: Maulik Kohler MD    Patient staffed in clinic with Dr. Rubio who will sign the note.  Supervisor is Dr. Estrada.  I saw the patient with the resident, and participated in key portions of the service, including the mental status examination and developing the plan of care. I  reviewed key portions of the history with the resident. I agree with the findings and plan as documented in this note.    Roxy Rubio

## 2019-01-07 ENCOUNTER — OFFICE VISIT (OUTPATIENT)
Dept: PSYCHOLOGY | Facility: CLINIC | Age: 69
End: 2019-01-07
Payer: COMMERCIAL

## 2019-01-07 VITALS — BODY MASS INDEX: 39.17 KG/M2 | WEIGHT: 231.8 LBS

## 2019-01-07 DIAGNOSIS — F41.1 GENERALIZED ANXIETY DISORDER: ICD-10-CM

## 2019-01-07 DIAGNOSIS — F33.1 MAJOR DEPRESSIVE DISORDER, RECURRENT EPISODE, MODERATE (H): Primary | ICD-10-CM

## 2019-01-07 DIAGNOSIS — F54 PSYCHOLOGICAL FACTORS AFFECTING MORBID OBESITY (H): ICD-10-CM

## 2019-01-07 DIAGNOSIS — E66.01 PSYCHOLOGICAL FACTORS AFFECTING MORBID OBESITY (H): ICD-10-CM

## 2019-01-07 DIAGNOSIS — E66.9 OBESITY, UNSPECIFIED OBESITY SEVERITY, UNSPECIFIED OBESITY TYPE: ICD-10-CM

## 2019-01-07 ASSESSMENT — PATIENT HEALTH QUESTIONNAIRE - PHQ9: SUM OF ALL RESPONSES TO PHQ QUESTIONS 1-9: 5

## 2019-01-07 NOTE — PROGRESS NOTES
Preceptor Attestation:   Patient seen, evaluated and discussed with the resident. I have verified the content of the note, which accurately reflects my assessment of the patient and the plan of care.   Supervising Physician:  Blake Sullivan MD

## 2019-01-07 NOTE — PROGRESS NOTES
.    Health Psychology                  Clinic    Department of Medicine  Madelaine Ingram, Ph.D., L.P. [phone]                          Clinics and Surgery Center  [facility] Miryam Woods, Ph.D.,  L.P. [phone]                 3rd Floor  [place] [address]   Pinky Panda, Ph.D., L.P. [phone]    [address] Rae Eugene, Ph.D., L.P. [phone]            [id number]  [place] [address]           Damon Gr, Ph.D., A.B.P.P., L.P. [phone]      Kimber Morales, Ph.D., L.P. [phone]    Health Psychology Follow-Up Note    Ms. Page is a pleasant 68-year old woman with chronic depressive illness, who returns to clinic for supportive and behavioral psychotherapy for moderate recurrent depression and for help losing weight given her morbid obesity.     She is 231.8 up from 229 last session.  Wt Readings from Last 4 Encounters:   01/07/19 105.1 kg (231 lb 12.8 oz)   01/04/19 105.9 kg (233 lb 6.4 oz)   01/02/19 104.3 kg (230 lb)   11/26/18 103.9 kg (229 lb)   Body mass index is 39.17 kg/m .    Past Medical History:   Diagnosis Date     Allergic rhinitis due to pollen     seasonal allergies      Anisometropia and aniseikonia      BMI greater than 40      Cardiomegaly      Chronic constipation      Congenital absence of one kidney      Cramp of limb      Dermatophytosis of the body      Dry eye syndrome      Esophageal reflux      Essential hypertension, benign      Gastro-oesophageal reflux disease      Hemorrhoids      Hypermetropia      Hypothyroidism      Incomplete Emptying of Bladder      Lactose intolerance      Major depressive disorder, recurrent episode, moderate (H)      Malignant neoplasm of breast (female), unspecified site     left mastectomy 1996      Mixed incontinence urge and stress (male)(female)      Moderate obstructive sleep apnea      Postmenopausal Atrophic Vaginitis      Presbyopia      Regular  astigmatism      Restless leg syndrome      Senile nuclear sclerosis      Thyroid eye disease     mild     Tinnitus      Trigger finger (acquired)     right hand     Type II or unspecified type diabetes mellitus without mention of complication, not stated as uncontrolled     on insulin since April 2006      Past Surgical History:   Procedure Laterality Date     APPENDECTOMY       C MASTECTOMY,SIMPLE  1996    Left mastectomy  [facility]. Had normal FU mammo 5/2007. Follow yearly.      C TOTAL ABDOM HYSTERECTOMY  7/2003    Dr. Castanon, for abnormal bleeding. Removal of both tubes with BSO for myoma w - - -     CHOLECYSTECTOMY       COLONOSCOPY  5/2005    Complete Colonoscopy-had one small polyp removed 5/2005.      COLONOSCOPY N/A 3/31/2016    Procedure: COLONOSCOPY;  Surgeon: Cary Ring MD;  Location: [facility]     COLONOSCOPY N/A 7/7/2016    Procedure: COLONOSCOPY;  Surgeon: Cary Ring MD;  Location: [facility]     DILATION AND CURETTAGE       HYSTERECTOMY       MASTECTOMY      Left breast     Current Outpatient Medications   Medication     acetaminophen (TYLENOL) 500 MG tablet     alum & mag hydroxide-simethicone (MYLANTA/MAALOX) 200-200-20 MG/5ML SUSP suspension     amLODIPine (NORVASC) 5 MG tablet     artificial saliva (BIOTENE MT) AERS spray     artificial tears (AKWA TEARS) OINT ophthalmic ointment     aspirin (ASA) 81 MG EC tablet     ASPIRIN 81 MG OR TABS     atorvastatin (LIPITOR) 40 MG tablet     BD AUTOSHIELD DUO 30G X 5 MM     blood glucose (NO BRAND SPECIFIED) lancets standard     blood glucose calibration (NO BRAND SPECIFIED) solution     blood glucose monitoring (NO BRAND SPECIFIED) meter device kit     blood glucose monitoring (NO BRAND SPECIFIED) test strip     Calcium Carbonate-Vitamin D (CALCIUM-VITAMIN D) 250-125 MG-UNIT TABS     calcium polycarbophil (FIBERCON) 625 MG tablet     CLARITIN 10 MG OR TABS     clotrimazole (LOTRIMIN) 1 % external cream      erythromycin (ROMYCIN) ophthalmic ointment     exenatide (BYETTA) 10 MCG/0.04ML injection     FLUoxetine (PROZAC) 20 MG capsule     fluticasone (FLONASE) 50 MCG/ACT nasal spray     furosemide (LASIX) 20 MG tablet     Gabapentin (NEURONTIN PO)     glucagon (GLUCAGON EMERGENCY) 1 MG kit     Hypromellose (ARTIFICIAL TEARS OP)     insulin glargine (LANTUS) 100 UNIT/ML injection     lactase (LACTAID) 3000 UNIT tablet     levothyroxine (SYNTHROID, LEVOTHROID) 175 MCG tablet     [START ON 2019] LORazepam (ATIVAN) 1 MG tablet     losartan (COZAAR) 100 MG tablet     Magnesium Hydroxide (MILK OF MAGNESIA PO)     melatonin 3 MG tablet     metFORMIN (GLUCOPHAGE-XR) 500 MG 24 hr tablet     moxifloxacin (VIGAMOX) 0.5 % ophthalmic solution     nystatin (MYCOSTATIN) 557703 UNIT/GM POWD     ONETOUCH DELICA LANCETS 33G MISC     polyethylene glycol (MIRALAX/GLYCOLAX) powder     Saline 0.9 % SOLN     senna-docusate (SENNA S) 8.6-50 MG per tablet     Skin Protectants, Misc. (EUCERIN) cream     SM LUBRICANT EYE DROPS 0.4-0.3 % SOLN ophthalmic solution     Sod Fluor-Solo Fluor-Hydrfl Ac (GEL-SASHA DENTINBLOC DT)     solifenacin (VESICARE) 10 MG tablet     terbinafine (LAMISIL) 1 % external cream     ziprasidone (GEODON) 40 MG capsule     No current facility-administered medications for this visit.      At [facility], she continues to serve on the [employer].  She has been on the [employer] for 20 years, plus decorating the bulletin boards.  She is working 1 day/week with her boss, [employer], Friday mornings for 1.5 hour.   She is satisfied with this level of effort and is getting along well with [place].  She missed some shifts in the late Fall due to illness. She had good holidays other than the death of her brother-in-law in  on .  She attended the , and appears to be coping well. December is always a bittersweet month for her as she will note the 50 year anniversary of her father's death, and the death of one of her  brothers.    She has recovered from eye surgery.  Up until her eye problems (bilateral involutional entropion), she had been doing more social walking some of the time and had apparently is gradually ramping up on her fitness center days.  She agrees to increase.  She is getting along well with her roommate.  No new compliants.  Goal for exercise has been 1.5 hours/day as of June 19, 2017 and 2 lbs./month.   We discussed increasing bicycling to 4 days per week short term and 5-6 long-term and is now at 40 minutes each time.   We discussed only eating half of the goldfish she is given at the time  Her medications are dispersed.  She arrived early today and was greeted in the waiting room.  Pinky participated fully and appeared to derive benefit.  Rapport was excellent.     Extended session due to complexity of case and length of interval.    IMPRESSION Distress Disability Risk    Low High Low     Time  In: 12:51  Time Out:  1:47  Diagnosis:   Major Depression, recurrent mild (F33.0)   Psychological Factors Affecting Morbid Obesity (F54)      Plan: She will return on 2/11 @ 1 for behavioral and supportive psychotherapy.  Will spread out to q 5 weeks.  Treatment plan due 5/7/2019  Damon Gr, Ph.D., A.B.P.P., L.P.

## 2019-01-21 ENCOUNTER — TELEPHONE (OUTPATIENT)
Dept: FAMILY MEDICINE | Facility: CLINIC | Age: 69
End: 2019-01-21

## 2019-02-05 ENCOUNTER — MEDICAL CORRESPONDENCE (OUTPATIENT)
Dept: HEALTH INFORMATION MANAGEMENT | Facility: CLINIC | Age: 69
End: 2019-02-05

## 2019-02-13 ENCOUNTER — TELEPHONE (OUTPATIENT)
Dept: FAMILY MEDICINE | Facility: CLINIC | Age: 69
End: 2019-02-13

## 2019-02-13 NOTE — TELEPHONE ENCOUNTER
Prior Authorization Retail Medication Request    Medication/Dose: solifenacin (VESICARE) 10 MG tablet  ICD code (if different than what is on RX):  Urge incontinence [N39.41]  Previously Tried and Failed:  See chart  Rationale:  See chart    Insurance Name:  [facility]  Insurance ID:  [id number]      Pharmacy Information (if different than what is on RX)  Name:  [facility]  Phone:  [phone]    JONNATHAN Roldan 2:31 PM February 13, 2019

## 2019-02-21 ENCOUNTER — TELEPHONE (OUTPATIENT)
Dept: PSYCHIATRY | Facility: CLINIC | Age: 69
End: 2019-02-21

## 2019-02-21 NOTE — TELEPHONE ENCOUNTER
received a Patient Safety and Health Consideration from [employer].  placed the original in Dr. Kohler folder and routed a message to Dr. Kohler.

## 2019-02-26 NOTE — TELEPHONE ENCOUNTER
Prior Authorization Approval        Authorization Effective Date: 1/27/2019  Authorization Expiration Date: 2/26/2020  Medication: solifenacin (VESICARE) 10 MG tablet - P/A APPROVED  Approved Dose/Quantity: 30  Reference #: CASE ID# [id number]   Insurance Company: [employer] - Phone [phone] Fax [phone]  Expected CoPay:       CoPay Card Available:      Foundation Assistance Needed:    Which Pharmacy is filling the prescription (Not needed for infusion/clinic administered): [facility] - [place], [place] - [address]  Pharmacy Notified:  Yes - spoke to Mona  Patient Notified:

## 2019-02-26 NOTE — TELEPHONE ENCOUNTER
Central Prior Authorization Team   Phone: [phone]    PA Initiation    Medication: solifenacin (VESICARE) 10 MG tablet  Insurance Company: [employer] - Phone [phone] Fax [phone]  Pharmacy Filling the Rx: [facility] - [place], [place] - [address]  Filling Pharmacy Phone: [phone]  Filling Pharmacy Fax:    Start Date: 2/26/2019

## 2019-03-06 ENCOUNTER — OFFICE VISIT (OUTPATIENT)
Dept: PSYCHIATRY | Facility: CLINIC | Age: 69
End: 2019-03-06
Attending: PSYCHIATRY & NEUROLOGY
Payer: COMMERCIAL

## 2019-03-06 VITALS
WEIGHT: 228 LBS | DIASTOLIC BLOOD PRESSURE: 74 MMHG | HEART RATE: 70 BPM | SYSTOLIC BLOOD PRESSURE: 145 MMHG | BODY MASS INDEX: 38.53 KG/M2

## 2019-03-06 DIAGNOSIS — F41.1 GENERALIZED ANXIETY DISORDER: ICD-10-CM

## 2019-03-06 PROCEDURE — G0463 HOSPITAL OUTPT CLINIC VISIT: HCPCS | Mod: ZF

## 2019-03-06 RX ORDER — LORAZEPAM 1 MG/1
TABLET ORAL
Qty: 35 TABLET | Refills: 0 | Status: SHIPPED | OUTPATIENT
Start: 2019-03-06 | End: 2019-04-11

## 2019-03-06 ASSESSMENT — PATIENT HEALTH QUESTIONNAIRE - PHQ9: SUM OF ALL RESPONSES TO PHQ QUESTIONS 1-9: 6

## 2019-03-06 ASSESSMENT — PAIN SCALES - GENERAL: PAINLEVEL: NO PAIN (0)

## 2019-03-06 NOTE — PATIENT INSTRUCTIONS
[place] I have sent updated prescriptions to your pharmacy. Please let us know if you have any questions or concerns before your next appointment.      Dr. Kohler

## 2019-03-06 NOTE — PROGRESS NOTES
[facility] PSYCHIATRY CLINIC PROGRESS NOTE     CARE TEAM:  PCP- Cary Brownlee    Specialty Providers- no    Therapist- Dr. Gr    Critical access hospital Team- no    Pinky ROSELIA Page is a 68 year old female who prefers the name Pinky & pronouns she, her, hers. Date of initial diagnostic assessment is 5/13/13.  Date of most recent transfer of care assessment is 07/30/18.     Pertinent Background:  This patient first experienced mental health issues in her twenties and has received treatment for schizoaffective disorder and generalized anxiety.  History details in last diagnostic assessment.  Notably, Pinky's symptoms of depression and psychosis have responded well to a combination of ECT and medication management. Pinky has a history of experiencing increasingly frequent psychotic symptoms w/ previously attempted antipsychotic tapers.        Psych critical item history includes psychosis [sxs include disorganization, hallucinations, paranoia], mutiple psychotropic trials, psych hosp (3-5) and ECT.    INTERIM HISTORY                                                                                              4, 4   The patient reports good treatment adherence.  History was provided by the patient who was a good historian.  The last visit ended with the following med change: decrease in fluoxetine.     Since the last visit:     - Mood remains stable. Did not notice a worsening in depressive symptoms with decreased dose of fluoxetine. Anxiety symptoms remain at baseline. She continues to experience a slight occasional tremor in her right hand that she feels began in the fall when anxiety surrounding medical issues was heightened.  - Reports 2-3 nights over the past 3 weeks where she woke up in the middle of the night because of difficult dreams. Had a difficult time getting back to sleep on each occasion. Hasn't had difficulty the past few nights.  - Continuing to function well at [employer]. Participated in a choir performance at  a nearby nursing home a few weeks ago.  - Tolerating medications well. Has not noticed any new side effects.  - Continuing to exercise frequently - enjoys using the exercise bike.      RECENT SYMPTOMS:   DEPRESSION:  reports-depressed mood, anhedonia, low energy and poor concentration /memory;  DENIES- suicidal ideation and self-destructive thoughts  MELVIN/HYPOMANIA:  reports-none;  DENIES- increased energy, decreased sleep need, increased activity and grandiosity  PSYCHOSIS:  reports-none;  DENIES- delusions, auditory hallucinations and visual hallucinations  DYSREGULATION:  reports-none;  DENIES- suicidal ideation, violent ideation and SIB  PANIC ATTACK:  none   ANXIETY:  excessive worry  TRAUMA RELATED:  none  COMPULSIVE:  none   SLEEP: No problems  EATING DISORDER: no     RECENT SUBSTANCE USE:     ALCOHOL- no      TOBACCO- no     CAFFEINE- coffee/ tea [coffee, diet Pepsi]  OPIOIDS- no         NARCAN KIT- no        CANNABIS- no            OTHER ILLICIT DRUGS- none      CURRENT SOCIAL HISTORY:  FINANCIAL SUPPORT- social security disability       CHILDREN- no      LIVING SITUATION- Lives at [facility].      SOCIAL/ SPIRITUAL SUPPORT- sisters, support staff at [facility], other residents at [place], other providers     FEELS SAFE AT HOME- yes     MEDICAL ROS (2,10):  Reports none, none, none    Denies headaches, sexual function problems [none], short term memory and/or word finding difficulty, wt gain, tremor, akathisia, spasms, bruxism, easy bruising , GI sxs [N/V], akathisia, muscle problems [stiffness], unusual movements, wt gain, sexual function problems [none], polydipsia, polyphagia and Parkinsonian-type symptoms [shuffling gait, masked facies, stooped posture, speech change, writing change], rash, hair loss , menstrual abnormality, skin/eye discoloration and bleeding or freq bruising    PSYCH and CD Critical Summary Points since July 2018 7/30 - resident transition, no changes  9/10 -  no changes  10/25 - increased anxiety, increased lorazepam, fluoxetine  11/19 - decreased lorazepam  1/4 - decreased fluoxetine  3/6 - no changes    PAST PSYCH MED TRIALS   see EMR Problem List: Hx of psychiatric care    MEDICAL / SURGICAL HISTORY                                   Pregnant or breastfeeding- no      Contraception- None    Neurologic Hx- no   Patient Active Problem List   Diagnosis     Allergic rhinitis due to pollen     Urge incontinence     Hypertension, essential     Cardiomegaly     Chronic constipation     Dry eye syndrome     Esophageal reflux     Exposure keratoconjunctivitis     DM ophthalmopathy (H)     Hypothyroidism     Senile cataract     ANUJ (obstructive sleep apnea)     Postmenopausal atrophic vaginitis     Restless leg syndrome     Squamous blepharitis     Morbid obesity due to excess calories (H)     Personal history of breast cancer s/p L masectomy     Diabetes mellitus type 2, insulin dependent (H)     Hypercholesteremia     Lives in group home     Extrapyramidal and movement disorder     CKD (chronic kidney disease) stage 2, GFR 60-89 ml/min     Solitary kidney, congenital     S/P hysterectomy     Impingement syndrome of right shoulder     Hypertriglyceridemia     Candidiasis of skin     Generalized anxiety disorder     Carpal tunnel syndrome of left wrist     Schizoaffective disorder, depressive type (H)     Major depressive disorder, recurrent episode, moderate (H)     Psychological factors affecting medical condition     Hx of psychiatric care     Nail complaint     Microalbuminuria due to type 2 diabetes mellitus (H)     Type 2 diabetes mellitus with microalbuminuria, with long-term current use of insulin (H)     Diabetes mellitus, type II, insulin dependent (H)     CKD (chronic kidney disease) stage 1, GFR 90 ml/min or greater     Conjunctivitis of both eyes     Corneal erosion of both eyes     Spastic entropion, right       Major Surgery- not discussed    ALLERGY                                 Chlordiazepoxide hcl; Dimetapp dm cold-cough; Haldol; Ibuprofen; Lactose intolerance [beta-galactosidase]; Milk products; and Propofol  MEDICATIONS                               Current Outpatient Medications   Medication Sig Dispense Refill     acetaminophen (TYLENOL) 500 MG tablet Take 2 tablets (1,000 mg) by mouth every 6 hours as needed 1 Bottle 2     alum & mag hydroxide-simethicone (MYLANTA/MAALOX) 200-200-20 MG/5ML SUSP suspension Take 30 mLs by mouth daily as needed for indigestion       amLODIPine (NORVASC) 5 MG tablet Take 2 tablets (10 mg) by mouth daily 30 tablet 3     artificial saliva (BIOTENE MT) AERS spray Take 1 spray by mouth 3 times daily as needed for dry mouth       artificial tears (AKWA TEARS) OINT ophthalmic ointment Apply  to eye. Place 0.5 inches into both eyes at bedtime 1 Tube 11     aspirin (ASA) 81 MG EC tablet        ASPIRIN 81 MG OR TABS Take 1 tablet by mouth daily. ENTERIC COATED. 100 3     atorvastatin (LIPITOR) 40 MG tablet Take 1 tablet (40 mg) by mouth daily 90 tablet 1     BD AUTOSHIELD DUO 30G X 5 MM        blood glucose (NO BRAND SPECIFIED) lancets standard Use to test blood sugar 2 times daily or as directed. 100 each 11     blood glucose calibration (NO BRAND SPECIFIED) solution Use to calibrate blood glucose monitor as directed. 1 each 3     blood glucose monitoring (NO BRAND SPECIFIED) meter device kit Check Blood sugars twice a day. 1 kit 0     blood glucose monitoring (NO BRAND SPECIFIED) test strip Use to test blood sugar 3 times daily or as directed. 100 each 3     Calcium Carbonate-Vitamin D (CALCIUM-VITAMIN D) 250-125 MG-UNIT TABS Take 1 tablet by mouth 2 times daily. Calcium 250 mg/Vit D 125 IU       calcium polycarbophil (FIBERCON) 625 MG tablet Take 2 tablets by mouth daily       CLARITIN 10 MG OR TABS 1 TAB PO QD (Once per day) as needed for ALLERGY SYMPTOMS 30 11     erythromycin (ROMYCIN) ophthalmic ointment Place 0.5 inches into both eyes 4  times daily       exenatide (BYETTA) 10 MCG/0.04ML injection Inject 10 mcg Subcutaneous 2 times daily (before meals) 1 Syringe 11     FLUoxetine (PROZAC) 20 MG capsule Take 2 capsules (40 mg) by mouth daily 90 capsule 1     fluticasone (FLONASE) 50 MCG/ACT nasal spray Spray 1 spray into both nostrils daily 16 g 3     furosemide (LASIX) 20 MG tablet Take 1 tablet (20 mg) by mouth 2 times daily 60 tablet 3     Gabapentin (NEURONTIN PO) Take 900 mg by mouth 3 times daily.       Hypromellose (ARTIFICIAL TEARS OP) Apply 1 drop to eye 4 times daily.       insulin glargine (LANTUS) 100 UNIT/ML injection Inject 20 Units Subcutaneous At Bedtime 8 mL 11     lactase (LACTAID) 3000 UNIT tablet Take 4 tabs daily with meals.       levothyroxine (SYNTHROID, LEVOTHROID) 175 MCG tablet Take 1 tablet (175 mcg) by mouth daily Give on empty stomach 30 tablet 4     LORazepam (ATIVAN) 1 MG tablet Take 1 tablet at bedtime. May take 1 additional tab daily PRN for anxiety. 35 tablet 0     losartan (COZAAR) 100 MG tablet Take 1 tablet by mouth daily. 90 tablet 1     Magnesium Hydroxide (MILK OF MAGNESIA PO) Take  by mouth. Take 30 mL as needed for constipation.       melatonin 3 MG tablet Take 1 tablet (3 mg) by mouth nightly as needed for sleep 60 tablet 1     metFORMIN (GLUCOPHAGE-XR) 500 MG 24 hr tablet Take 2 tablets (1,000 mg) by mouth 2 times daily (with meals) (Patient taking differently: Take 2,000 mg by mouth daily ) 90 tablet 3     moxifloxacin (VIGAMOX) 0.5 % ophthalmic solution Place 1 drop into both eyes 4 times daily       nystatin (MYCOSTATIN) 578765 UNIT/GM POWD Apply topically 3 times daily as needed 60 g 1     ONETOUCH DELICA LANCETS 33G MISC        polyethylene glycol (MIRALAX/GLYCOLAX) powder Take 1 capful by mouth 2 times daily. 17 GM PO BID       Saline 0.9 % SOLN Spray 2 sprays in nostril as needed.       senna-docusate (SENNA S) 8.6-50 MG per tablet Take 2 tablets by mouth At Bedtime.       Skin Protectants, Misc.  (EUCERIN) cream Apply  topically as needed. Apply to thigh PRN dry skin        SM LUBRICANT EYE DROPS 0.4-0.3 % SOLN ophthalmic solution        Sod Fluor-Solo Fluor-Hydrfl Ac (GEL-SASHA DENTINBLOC DT) Apply  to affected area. GEL. Apply to 2nd molar on bottom right daily at bedtime.       solifenacin (VESICARE) 10 MG tablet Take 1 tablet (10 mg) by mouth daily 30 tablet 6     terbinafine (LAMISIL) 1 % external cream Apply topically 2 times daily       ziprasidone (GEODON) 40 MG capsule Take 1 capsule (40 mg) by mouth 2 times daily (with meals) 60 capsule 2     clotrimazole (LOTRIMIN) 1 % external cream Apply topically 2 times daily as needed (apply to area underneath pannus as needed for itching) (Patient not taking: Reported on 1/4/2019) 50 g 1     glucagon (GLUCAGON EMERGENCY) 1 MG kit Inject 1 mg into the muscle once for 1 dose 2 mg 3     VITALS                                                                                                                                  3, 3   /74   Pulse 70   Wt 103.4 kg (228 lb)   BMI 38.53 kg/m       MENTAL STATUS EXAM                                                                         9, 14 cog gs     Alertness: alert  and oriented  Appearance: well groomed - casual clothing, white hair  Behavior/Demeanor: cooperative, with poor eye contact   Speech: slowed  Language: intact and no problems  Psychomotor: slight tremor in right hand  Mood: description consistent with euthymia  Affect: full range; was congruent to mood; was congruent to content  Thought Process/Associations: unremarkable  Thought Content:  Reports none;  Denies suicidal ideation, violent ideation and delusions  Perception:  Reports none;  Denies auditory hallucinations and visual hallucinations  Insight: good  Judgment: good  Cognition: (6) does  appear grossly intact; formal cognitive testing was not done  Gait and Station: unremarkable    LABS and DATA     AIMS:  last done 3/6/19 with score(s):  3;  next due March 2020    PHQ9 TODAY = 6  PHQ-9 SCORE 11/7/2018 11/8/2018 1/4/2019   PHQ-9 Total Score - - -   PHQ-9 Total Score 6 6 5     ANTIPSYCHOTIC LABS  [glu, A1C, lipids (danika LDL), liver enzymes, WBC, ANEU, Hgb, plts]  q12 mo  Recent Labs   Lab Test 11/07/18  1531 10/25/18  1734 05/03/18  1414   GLC  --  147* 192.6*   A1C 7.4*  --  6.7*     Recent Labs   Lab Test 05/03/18  1414 03/21/17  1023   CHOL 126 142   TRIG 148 121   LDL 60 78   HDL 37* 40*     Recent Labs   Lab Test 06/26/18  0659 06/26/18   AST 16 16   ALT 21 21   ALKPHOS 111 111     Recent Labs   Lab Test 10/25/18  1734 06/26/18  0659   WBC 14.2* 9.72   ANEU 10.6* 5.31   HGB 14.0 12.6    247       DIAGNOSIS     Schizoaffective Disorder, depressed type, moderate depression, without psychotic features  Generalized Anxiety Disorder    ASSESSMENT                                                                                                          m2, h3     TODAY:      Pinky's mood and anxiety symptoms continue to be well controlled. She has experienced some mild sleep disruption over the last few weeks but has otherwise been doing well. She does continue to experience a mild, intermittent tremor in her right hand that first started when she was experiencing heightened anxiety over medical issues in the fall. There doesn't appear to be any specific trigger - I encouraged her to follow-up with PCP at next visit. Pinky is experiencing some mild EPS symptoms - mild rigidity in upper extremities and slight baseline tremor in the right hand. She wasn't aware of EPS, isn't bothered by the symptoms, and would prefer not to make med changes right now. We did discuss possibility of slow Ativan taper in the future considering her age and risk for cognitive impairment. She will return to clinic in 8 weeks for reevaluation.      [place] PRESCRIPTION MONITORING PROGRAM [] was not checked today:  N/A.    PSYCHOTROPIC DRUG INTERACTIONS:   Ziprasidone and  fluoxetine may result in increased risk for QTc prolongation and serotonin syndrome.  Aspirin and fluoxetine may result in increased risk for bleeding.  MANAGEMENT:  Monitoring for adverse effects, periodic EKGs and patient is aware of risks     PLAN                                                                                                                       m2, h3     1) PSYCHOTROPIC MEDICATIONS:    - Continue ziprasidone 40 mg BID  - Continue fluoxetine 40 mg daily  - Continue lorazepam 1 mg at bedtime w/ additional 1 mg daily PRN    2) THERAPY:    Yes, continue w/ Dr. Gr    3) NEXT DUE:    Labs- AP labs due May 2019  EKG- Last EKG 8/17/16 - QTc 432. Will updated as needed.  Rating Scales- AIMS due March 2020    4) REFERRALS:    No Referrals needed     5) RTC: 8 weeks    6) CRISIS NUMBERS:   Provided routinely in AVS.   [facility] [phone] (clinic)    [phone] (after hours)  ONLY if a [facility] PT: [facility] [phone] (clinic), [phone] (after hours)     TREATMENT RISK STATEMENT:  The risks, benefits, alternatives and potential adverse effects have been discussed and are understood by the pt. The pt understands the risks of using street drugs or alcohol. There are no medical contraindications, the pt agrees to treatment with the ability to do so. The pt knows to call the clinic for any problems or to access emergency care if needed.  Medical and substance use concerns are documented above.  Psychotropic drug interaction check was done, including changes made today.    PSYCHIATRY CLINIC INDIVIDUAL PSYCHOTHERAPY NOTE                                                  [16]   Start time - 1410           End time - 1431  Date last reviewed - 09/10/18       Date next due - 12/9/18 (or 12 months if Medicare)    Subjective: This supportive psychotherapy session addressed issues related to goals of therapy .  Patient's reaction: Maintenance in the context of mental status appropriate  for ambulatory setting.  Progress: good  Plan: RTC 6-8 weeks  Psychotherapy services during this visit included  myself and the patient.   TREATMENT  PLAN          SYMPTOMS; PROBLEMS   MEASURABLE GOALS;    FUNCTIONAL IMPROVEMENT INTERVENTIONS;   GAINS MADE DISCHARGE CRITERIA   Depression: depressed mood   reduce depressive symptoms and reduce depressive episodes meds/therapy marked symptom improvement   Anxiety: excessive worry   reduce anxiety meds/therapy marked symptom improvement       PROVIDER: Maulik Kohler MD    Patient staffed in clinic with Dr. Gordon who will sign the note.  Supervisor is Dr. Estrada.  I saw the patient with the resident, and participated in key portions of the service, including the mental status examination and developing the plan of care. I reviewed key portions of the history with the resident. I agree with the findings and plan as documented in this note.    Anine Gordon MD

## 2019-03-12 ENCOUNTER — OFFICE VISIT (OUTPATIENT)
Dept: PSYCHOLOGY | Facility: CLINIC | Age: 69
End: 2019-03-12
Payer: COMMERCIAL

## 2019-03-12 VITALS — WEIGHT: 225.9 LBS | BODY MASS INDEX: 38.18 KG/M2

## 2019-03-12 DIAGNOSIS — F54 PSYCHOLOGICAL FACTORS AFFECTING MEDICAL CONDITION: ICD-10-CM

## 2019-03-12 DIAGNOSIS — F33.1 MAJOR DEPRESSIVE DISORDER, RECURRENT EPISODE, MODERATE (H): Primary | ICD-10-CM

## 2019-03-12 NOTE — PROGRESS NOTES
Health Psychology                  Clinic    Department of Medicine  Madelaine Ingram, Ph.D., L.P. [phone]                          Cl[facility]  [facility] Miryam oWods, Ph.D.,  L.P. [phone]                 3rd Floor  [place] [address]   Pinky Panda, Ph.D., L.P. [phone]    [address] Rae Eugene, Ph.D., L.P. [phone]            [id number]  [place] [address]           Damon Gr, Ph.D., A.B.P.P., L.P. [phone]      Kimber Morales, Ph.D., L.P. [phone]    Health Psychology Follow-Up Note    Ms. Page is a pleasant 68-year old woman with chronic depressive illness, who returns to clinic for supportive and behavioral psychotherapy for moderate recurrent depression and for help losing weight given her morbid obesity.     She is 225.9 down from  231.8 last session and 229 the previous session. She is reinfroced for her progress and is now at a 1-year low.  Wt Readings from Last 4 Encounters:   03/12/19 102.5 kg (225 lb 14.4 oz)   03/06/19 103.4 kg (228 lb)   01/07/19 105.1 kg (231 lb 12.8 oz)   01/04/19 105.9 kg (233 lb 6.4 oz)     Past Medical History:   Diagnosis Date     Allergic rhinitis due to pollen     seasonal allergies      Anisometropia and aniseikonia      BMI greater than 40      Cardiomegaly      Chronic constipation      Congenital absence of one kidney      Cramp of limb      Dermatophytosis of the body      Dry eye syndrome      Esophageal reflux      Essential hypertension, benign      Gastro-oesophageal reflux disease      Hemorrhoids      Hypermetropia      Hypothyroidism      Incomplete Emptying of Bladder      Lactose intolerance      Major depressive disorder, recurrent episode, moderate (H)      Malignant neoplasm of breast (female), unspecified site     left mastectomy 1996      Mixed incontinence urge and stress (male)(female)      Moderate obstructive sleep apnea       Postmenopausal Atrophic Vaginitis      Presbyopia      Regular astigmatism      Restless leg syndrome      Senile nuclear sclerosis      Thyroid eye disease     mild     Tinnitus      Trigger finger (acquired)     right hand     Type II or unspecified type diabetes mellitus without mention of complication, not stated as uncontrolled     on insulin since April 2006      Past Surgical History:   Procedure Laterality Date     APPENDECTOMY       C MASTECTOMY,SIMPLE  1996    Left mastectomy  [facility]. Had normal FU mammo 5/2007. Follow yearly.      C TOTAL ABDOM HYSTERECTOMY  7/2003    Dr. Castanon, for abnormal bleeding. Removal of both tubes with BSO for myoma w - - -     CHOLECYSTECTOMY       COLONOSCOPY  5/2005    Complete Colonoscopy-had one small polyp removed 5/2005.      COLONOSCOPY N/A 3/31/2016    Procedure: COLONOSCOPY;  Surgeon: Cary Ring MD;  Location: [facility]     COLONOSCOPY N/A 7/7/2016    Procedure: COLONOSCOPY;  Surgeon: Cary Ring MD;  Location: [facility]     DILATION AND CURETTAGE       HYSTERECTOMY       MASTECTOMY      Left breast     Current Outpatient Medications   Medication     acetaminophen (TYLENOL) 500 MG tablet     alum & mag hydroxide-simethicone (MYLANTA/MAALOX) 200-200-20 MG/5ML SUSP suspension     amLODIPine (NORVASC) 5 MG tablet     artificial saliva (BIOTENE MT) AERS spray     artificial tears (AKWA TEARS) OINT ophthalmic ointment     aspirin (ASA) 81 MG EC tablet     ASPIRIN 81 MG OR TABS     atorvastatin (LIPITOR) 40 MG tablet     BD AUTOSHIELD DUO 30G X 5 MM     blood glucose (NO BRAND SPECIFIED) lancets standard     blood glucose calibration (NO BRAND SPECIFIED) solution     blood glucose monitoring (NO BRAND SPECIFIED) meter device kit     blood glucose monitoring (NO BRAND SPECIFIED) test strip     Calcium Carbonate-Vitamin D (CALCIUM-VITAMIN D) 250-125 MG-UNIT TABS     calcium polycarbophil (FIBERCON) 625 MG tablet     CLARITIN 10  MG OR TABS     clotrimazole (LOTRIMIN) 1 % external cream     erythromycin (ROMYCIN) ophthalmic ointment     exenatide (BYETTA) 10 MCG/0.04ML injection     FLUoxetine (PROZAC) 20 MG capsule     fluticasone (FLONASE) 50 MCG/ACT nasal spray     furosemide (LASIX) 20 MG tablet     Gabapentin (NEURONTIN PO)     glucagon (GLUCAGON EMERGENCY) 1 MG kit     Hypromellose (ARTIFICIAL TEARS OP)     insulin glargine (LANTUS) 100 UNIT/ML injection     lactase (LACTAID) 3000 UNIT tablet     levothyroxine (SYNTHROID, LEVOTHROID) 175 MCG tablet     LORazepam (ATIVAN) 1 MG tablet     losartan (COZAAR) 100 MG tablet     Magnesium Hydroxide (MILK OF MAGNESIA PO)     melatonin 3 MG tablet     metFORMIN (GLUCOPHAGE-XR) 500 MG 24 hr tablet     moxifloxacin (VIGAMOX) 0.5 % ophthalmic solution     nystatin (MYCOSTATIN) 648390 UNIT/GM POWD     ONETOUCH DELICA LANCETS 33G MISC     polyethylene glycol (MIRALAX/GLYCOLAX) powder     Saline 0.9 % SOLN     senna-docusate (SENNA S) 8.6-50 MG per tablet     Skin Protectants, Misc. (EUCERIN) cream     SM LUBRICANT EYE DROPS 0.4-0.3 % SOLN ophthalmic solution     Sod Fluor-Solo Fluor-Hydrfl Ac (GEL-SASHA DENTINBLOC DT)     solifenacin (VESICARE) 10 MG tablet     terbinafine (LAMISIL) 1 % external cream     ziprasidone (GEODON) 40 MG capsule     No current facility-administered medications for this visit.       PHQ-9 SCORE 11/8/2018 1/4/2019 3/6/2019   PHQ-9 Total Score - - -   PHQ-9 Total Score 6 5 6     NATTY-7 SCORE 7/27/2018 11/7/2018 11/8/2018   Total Score - - -   Total Score 7 7 7     A 12-item WHODAS 2.0 assessment was not completed by the patient today and recorded in [facility].      At [facility], she continues to serve on the [employer].  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  She is working 1 day/week with her boss, [place], Friday mornings for 1.5 hour.   She is satisfied with this level of effort and is getting along well with [employer]S he had  been doing more social walking some of the time and had apparently is gradually ramping up on her fitness center days.  She agrees to increase.  She is getting along well with her roommate.  No new compliants.    She has been asked to get rid of a chair because it is cloth and so harder to clean.  She would prefer to hold on to it and bought it herself.  I indicated in my note her preference to retain it and work with staff to keep it clean.  She is enjoying bicycling now at [place] and has a more robust routine.  She is getting along okay with her roommate.    She acknowledges sometimes being  Down for a few hours or a day  She thinks she is happier when busy.  We discussed various activities that might bring her job (e.g., art, going to the library, etc).  She will consider options for when she is feeling more down.  She is at baseline today.  Goal for exercise has been 1.5 hours/day as of June 19, 2017 and 2 lbs./month.   We discussed increasing bicycling to 4 days per week short term and 5-6 long-term and is now at 40 minutes each time.   We discussed only eating half of the goldfish she is given at the time  Her medications are dispersed.  She arrived early today and was greeted in the waiting room.  Pinky participated fully and appeared to derive benefit.  Rapport was excellent.     Extended session due to complexity of case and length of interval.    IMPRESSION Distress Disability Risk    Low High Low     Time  In: 1:15  Time Out:  2:09  Diagnosis:   Major Depression, recurrent mild (F33.0)   Psychological Factors Affecting Morbid Obesity (F54)      Plan: She will return on 4/17 @ 2 for behavioral and supportive psychotherapy.  Will spread out to q 5 weeks.  Treatment plan due 5/7/2019  Damon Gr, Ph.D., A.B.P.P., L.P.

## 2019-03-20 ENCOUNTER — OFFICE VISIT (OUTPATIENT)
Dept: FAMILY MEDICINE | Facility: CLINIC | Age: 69
End: 2019-03-20
Payer: COMMERCIAL

## 2019-03-20 VITALS
RESPIRATION RATE: 16 BRPM | HEART RATE: 77 BPM | SYSTOLIC BLOOD PRESSURE: 132 MMHG | OXYGEN SATURATION: 96 % | TEMPERATURE: 98.6 F | WEIGHT: 227.4 LBS | BODY MASS INDEX: 38.43 KG/M2 | DIASTOLIC BLOOD PRESSURE: 79 MMHG

## 2019-03-20 DIAGNOSIS — Z02.89 ENCOUNTER FOR REVIEW OF FORM WITH PATIENT: ICD-10-CM

## 2019-03-20 DIAGNOSIS — R39.15 URINARY URGENCY: Primary | ICD-10-CM

## 2019-03-20 DIAGNOSIS — N95.2 VAGINAL ATROPHY: ICD-10-CM

## 2019-03-20 LAB
BILIRUBIN UR: NEGATIVE
BLOOD UR: ABNORMAL
GLUCOSE URINE: NEGATIVE
KETONES UR QL: NEGATIVE
LEUKOCYTE ESTERASE UR: ABNORMAL
NITRITE UR QL STRIP: NEGATIVE
PH UR STRIP: 5 [PH] (ref 5–7)
PROTEIN UR: NEGATIVE
SP GR UR STRIP: <=1.005
UROBILINOGEN UR STRIP-ACNC: ABNORMAL

## 2019-03-20 NOTE — PROGRESS NOTES
HPI       Pinky Page is a 68 year old  who presents for   Chief Complaint   Patient presents with     Forms     Formerly [facility] referral form and [place] department of public safety DM report     Urinary Problem     urinary frequency for the last 2 months. No burning sensation or abdominal/ back pain          Concern: waking up more at night to urinate   Description of the problem: Started at the end of January. Before then, was getting up only once or twice a night to urinate. Now getting up 3-4 times/night. More urgency at night as well. Denies dysuria or increased frequency or urgency during the day. Goes frequently during the day, but that hasn't changed. On vesicare 10 mg for a long time.  No change in medications. Thinks she drinks the same amount of water. No late night coffee or tea. No other changes to her routine.         Adherence and Exercise  Medication side effects: no  How often is a medication missed? Never  Exercise:biking 4-5 days/week for an average of 30-45 minutes     +++++++    Problem, Medication and Allergy Lists were reviewed and updated if needed..    Patient is an established patient of this clinic..         Review of Systems:   Review of Systems  Negative except for noted in HPI       Physical Exam:     Vitals:    03/20/19 1329   BP: 132/79   Pulse: 77   Resp: 16   Temp: 98.6  F (37  C)   TempSrc: Oral   SpO2: 96%   Weight: 103.1 kg (227 lb 6.4 oz)     Body mass index is 38.43 kg/m .  Vital signs normal except elevated BMI     Physical Exam   Constitutional: She is oriented to person, place, and time. No distress.   HENT:   Head: Normocephalic and atraumatic.   Cardiovascular: Normal rate, regular rhythm and normal heart sounds. Exam reveals no gallop and no friction rub.   No murmur heard.  Pulmonary/Chest: Effort normal and breath sounds normal. No respiratory distress. She has no wheezes. She has no rales.   Abdominal: Soft. Bowel sounds are normal. She exhibits no distension.  There is no tenderness (suprapubic or elsewhere). There is no rebound and no guarding.   Genitourinary: There is no tenderness on the right labia. There is no tenderness on the left labia.   Genitourinary Comments: Labia and periurethral area exhibit atrophy. No uterine prolapse noted.   Neurological: She is alert and oriented to person, place, and time.   Skin: Skin is warm and dry. She is not diaphoretic.         Results:      Results from this visit  Results for orders placed or performed in visit on 03/20/19   Urinalysis, Micro If (UA) ([facility])   Result Value Ref Range    Specific Gravity Urine <=1.005 1.005 - 1.030    pH Urine 5.0 4.5 - 8.0    Leukocyte Esterase UR 2+ (A) NEGATIVE    Nitrite Urine Negative NEGATIVE    Protein UR Negative NEGATIVE    Glucose Urine Negative NEGATIVE    Ketones Urine Negative NEGATIVE    Urobilinogen mg/dL 0.2 E.U./dL 0.2 E.U./dL    Bilirubin UR Negative NEGATIVE    Blood UR Trace-intact (A) NEGATIVE     *Note: Due to a large number of results and/or encounters for the requested time period, some results have not been displayed. A complete set of results can be found in Results Review.       Assessment and Plan        Pinky was seen today for forms and urinary problem.    Diagnoses and all orders for this visit:    Urinary urgency  Vaginal atrophy  Will rule out infection as cause for increased nocturia. Ordered UA reflex to culture, but noticed culture was canceled. UA with only 2+ LE and no nitrites with only new symptom because increased nocturia. Low suspicion for infection. No uterine prolapse noted on exam. Feel atrophy surround the urethra most likely culprit.  Will try premarin daily to see if symptoms decrease. Would like to see her back in 2 months for follow up.  -     Urinalysis, Micro If (UA) ([facility])  -     conjugated estrogens (PREMARIN) 0.625 MG/GM vaginal cream; Place 0.5 g vaginally At Bedtime    Encounter for review of form with patient - Filled out  Insulin-Treated Diabetes Mellitus Report for [place] Department of Public Safety with patient.       There are no discontinued medications.    Options for treatment and follow-up care were reviewed with the patient. Pinky Page  engaged in the decision making process and verbalized understanding of the options discussed and agreed with the final plan.    Cary Brownlee MD  PGY-2

## 2019-03-20 NOTE — NURSING NOTE
Diabetic Foot Screen:  Any complaints of increased pain or numbness ? No  Is there a foot ulcer now or a history of foot ulcer? No  Does the foot have an abnormal shape? No  Are the nails thick, too long or ingrown? No  Are there any redness or open areas? No         Sensation Testing done at all points on the diagram with monofilament     Right Foot: Sensation Normal at all points  Left Foot: Sensation Normal at all points     Risk Category: 0- No loss of protective sensation  Performed by Viridiana Powell CMA

## 2019-03-28 ENCOUNTER — DOCUMENTATION ONLY (OUTPATIENT)
Dept: FAMILY MEDICINE | Facility: CLINIC | Age: 69
End: 2019-03-28

## 2019-04-01 ENCOUNTER — OFFICE VISIT (OUTPATIENT)
Dept: ENDOCRINOLOGY | Facility: CLINIC | Age: 69
End: 2019-04-01
Payer: COMMERCIAL

## 2019-04-01 VITALS
RESPIRATION RATE: 18 BRPM | HEART RATE: 69 BPM | DIASTOLIC BLOOD PRESSURE: 63 MMHG | BODY MASS INDEX: 37.27 KG/M2 | TEMPERATURE: 98.3 F | WEIGHT: 223.7 LBS | HEIGHT: 65 IN | SYSTOLIC BLOOD PRESSURE: 153 MMHG | OXYGEN SATURATION: 96 %

## 2019-04-01 DIAGNOSIS — E66.01 MORBID OBESITY (H): Primary | ICD-10-CM

## 2019-04-01 ASSESSMENT — MIFFLIN-ST. JEOR: SCORE: 1532.64

## 2019-04-01 ASSESSMENT — PAIN SCALES - GENERAL: PAINLEVEL: NO PAIN (0)

## 2019-04-01 NOTE — PROGRESS NOTES
"    Return Medical Weight Management Note     Pinky Page  MRN:  [id number]  :  1950  CORINNE:  18    Dear Dr. Ann,     I had the pleasure of seeing your patient Pinky Page.  She is a 65 year old female who I am continuing to see for treatment of obesity related to: DM-2 and Hypertension.    CURRENT WEIGHT:   223 lbs 11.2 oz    Wt Readings from Last 4 Encounters:   19 101.5 kg (223 lb 11.2 oz)   19 103.1 kg (227 lb 6.4 oz)   19 104.3 kg (230 lb)   18 104.1 kg (229 lb 8 oz)     Height:  5' 4.5\"  Body Mass Index:  Body mass index is 37.81 kg/m .  Vitals:  B/P: 163/75, P: 68    Initial consult weight was 283 on .  Weight change since last seen on 18 is down 6 pounds.   Total loss is 60 pounds.    INTERVAL HISTORY:  Patient has been working on the following dietary changes: Still eating from the diet line at her group home (1500 miguel, no concentrated sweets, some fruit snacks).   She also continues to be on Byetta 10 cg BID for her DM and also to help her lose weight and as a appetite suppressant as well.  Her DM is being managed by us as well as the [facility] ( PCP). Is on metformin and her glargine dose is 20/d with HbA1c in 7s.  Patient has been working on the following activity changes: Very regular walking at least 40 minutes / day.    Diet and Activity Changes Since Last Visit Reviewed With Patient 2019   I have made the following changes to my diet since my last visit: less snacks   With regards to my diet, I am still struggling with: food   For breakfast, I typically eat: -   For lunch, I typically eat: -   For supper, I typically eat: -   For snack(s), I typically eat: -   I have made the following changes to my activity/exercise since my last visit: 45 mins bike   With regards to my activity/exercise, I am still struggling with: doing more       MEDICATIONS:   Current Outpatient Medications   Medication     acetaminophen (TYLENOL) 500 MG tablet     " alum & mag hydroxide-simethicone (MYLANTA/MAALOX) 200-200-20 MG/5ML SUSP suspension     amLODIPine (NORVASC) 5 MG tablet     artificial saliva (BIOTENE MT) AERS spray     artificial tears (AKWA TEARS) OINT ophthalmic ointment     aspirin (ASA) 81 MG EC tablet     ASPIRIN 81 MG OR TABS     atorvastatin (LIPITOR) 40 MG tablet     BD AUTOSHIELD DUO 30G X 5 MM     blood glucose (NO BRAND SPECIFIED) lancets standard     blood glucose calibration (NO BRAND SPECIFIED) solution     blood glucose monitoring (NO BRAND SPECIFIED) meter device kit     blood glucose monitoring (NO BRAND SPECIFIED) test strip     Calcium Carbonate-Vitamin D (CALCIUM-VITAMIN D) 250-125 MG-UNIT TABS     calcium polycarbophil (FIBERCON) 625 MG tablet     CLARITIN 10 MG OR TABS     clotrimazole (LOTRIMIN) 1 % external cream     conjugated estrogens (PREMARIN) 0.625 MG/GM vaginal cream     erythromycin (ROMYCIN) ophthalmic ointment     exenatide (BYETTA) 10 MCG/0.04ML injection     FLUoxetine (PROZAC) 20 MG capsule     fluticasone (FLONASE) 50 MCG/ACT nasal spray     furosemide (LASIX) 20 MG tablet     Gabapentin (NEURONTIN PO)     Hypromellose (ARTIFICIAL TEARS OP)     insulin glargine (LANTUS) 100 UNIT/ML injection     lactase (LACTAID) 3000 UNIT tablet     levothyroxine (SYNTHROID, LEVOTHROID) 175 MCG tablet     LORazepam (ATIVAN) 1 MG tablet     losartan (COZAAR) 100 MG tablet     Magnesium Hydroxide (MILK OF MAGNESIA PO)     melatonin 3 MG tablet     metFORMIN (GLUCOPHAGE-XR) 500 MG 24 hr tablet     moxifloxacin (VIGAMOX) 0.5 % ophthalmic solution     nystatin (MYCOSTATIN) 200465 UNIT/GM POWD     ONETOUCH DELICA LANCETS 33G MISC     polyethylene glycol (MIRALAX/GLYCOLAX) powder     Saline 0.9 % SOLN     senna-docusate (SENNA S) 8.6-50 MG per tablet     Skin Protectants, Misc. (EUCERIN) cream     SM LUBRICANT EYE DROPS 0.4-0.3 % SOLN ophthalmic solution     Sod Fluor-Solo Fluor-Hydrfl Ac (GEL-SASHA DENTINBLOC DT)     solifenacin (VESICARE) 10 MG  tablet     terbinafine (LAMISIL) 1 % external cream     ziprasidone (GEODON) 40 MG capsule     glucagon (GLUCAGON EMERGENCY) 1 MG kit     No current facility-administered medications for this visit.        Weight Loss Medication History Reviewed With Patient 4/1/2019   Which weight loss medications are you currently taking on a regular basis?  Byetta (exentatide)   Are you having any side effects from the weight loss medication that we have prescribed you? No   If you are having side effects please describe: -     ASSESSMENT:   Continues to do well with weight loss maintenance.     FOLLOW-UP:    12 weeks.  10/15 minutes spent on counseling and education    Sincerely,    Marcos Magdaleno MD

## 2019-04-01 NOTE — LETTER
"2019       RE: Pinky Page  [address] [facility] [address]     Dear Colleague,    Thank you for referring your patient, Pinky Page, to the [facility] MEDICAL WEIGHT MANAGEMENT at [facility]. Please see a copy of my visit note below.        Return Medical Weight Management Note     Pinky Page  MRN:  [id number]  :  1950  CORINNE:  18    Dear Dr. Ann,     I had the pleasure of seeing your patient Pinky Page.  She is a 65 year old female who I am continuing to see for treatment of obesity related to: DM-2 and Hypertension.    CURRENT WEIGHT:   223 lbs 11.2 oz    Wt Readings from Last 4 Encounters:   19 101.5 kg (223 lb 11.2 oz)   19 103.1 kg (227 lb 6.4 oz)   19 104.3 kg (230 lb)   18 104.1 kg (229 lb 8 oz)     Height:  5' 4.5\"  Body Mass Index:  Body mass index is 37.81 kg/m .  Vitals:  B/P: 163/75, P: 68    Initial consult weight was 283 on .  Weight change since last seen on 18 is down 6 pounds.   Total loss is 60 pounds.    INTERVAL HISTORY:  Patient has been working on the following dietary changes: Still eating from the diet line at her group home (1500 miguel, no concentrated sweets, some fruit snacks).   She also continues to be on Byetta 10 cg BID for her DM and also to help her lose weight and as a appetite suppressant as well.  Her DM is being managed by us as well as the [place] [facility] ( PCP). Is on metformin and her glargine dose is 20/d with HbA1c in 7s.  Patient has been working on the following activity changes: Very regular walking at least 40 minutes / day.    Diet and Activity Changes Since Last Visit Reviewed With Patient 2019   I have made the following changes to my diet since my last visit: less snacks   With regards to my diet, I am still struggling with: food   For breakfast, I typically eat: -   For lunch, I typically eat: -   For supper, I typically eat: -   For " snack(s), I typically eat: -   I have made the following changes to my activity/exercise since my last visit: 45 mins bike   With regards to my activity/exercise, I am still struggling with: doing more       MEDICATIONS:   Current Outpatient Medications   Medication     acetaminophen (TYLENOL) 500 MG tablet     alum & mag hydroxide-simethicone (MYLANTA/MAALOX) 200-200-20 MG/5ML SUSP suspension     amLODIPine (NORVASC) 5 MG tablet     artificial saliva (BIOTENE MT) AERS spray     artificial tears (AKWA TEARS) OINT ophthalmic ointment     aspirin (ASA) 81 MG EC tablet     ASPIRIN 81 MG OR TABS     atorvastatin (LIPITOR) 40 MG tablet     BD AUTOSHIELD DUO 30G X 5 MM     blood glucose (NO BRAND SPECIFIED) lancets standard     blood glucose calibration (NO BRAND SPECIFIED) solution     blood glucose monitoring (NO BRAND SPECIFIED) meter device kit     blood glucose monitoring (NO BRAND SPECIFIED) test strip     Calcium Carbonate-Vitamin D (CALCIUM-VITAMIN D) 250-125 MG-UNIT TABS     calcium polycarbophil (FIBERCON) 625 MG tablet     CLARITIN 10 MG OR TABS     clotrimazole (LOTRIMIN) 1 % external cream     conjugated estrogens (PREMARIN) 0.625 MG/GM vaginal cream     erythromycin (ROMYCIN) ophthalmic ointment     exenatide (BYETTA) 10 MCG/0.04ML injection     FLUoxetine (PROZAC) 20 MG capsule     fluticasone (FLONASE) 50 MCG/ACT nasal spray     furosemide (LASIX) 20 MG tablet     Gabapentin (NEURONTIN PO)     Hypromellose (ARTIFICIAL TEARS OP)     insulin glargine (LANTUS) 100 UNIT/ML injection     lactase (LACTAID) 3000 UNIT tablet     levothyroxine (SYNTHROID, LEVOTHROID) 175 MCG tablet     LORazepam (ATIVAN) 1 MG tablet     losartan (COZAAR) 100 MG tablet     Magnesium Hydroxide (MILK OF MAGNESIA PO)     melatonin 3 MG tablet     metFORMIN (GLUCOPHAGE-XR) 500 MG 24 hr tablet     moxifloxacin (VIGAMOX) 0.5 % ophthalmic solution     nystatin (MYCOSTATIN) 202246 UNIT/GM POWD     ONETOUCH DELICA LANCETS 33G MISC      polyethylene glycol (MIRALAX/GLYCOLAX) powder     Saline 0.9 % SOLN     senna-docusate (SENNA S) 8.6-50 MG per tablet     Skin Protectants, Misc. (EUCERIN) cream     SM LUBRICANT EYE DROPS 0.4-0.3 % SOLN ophthalmic solution     Sod Fluor-Solo Fluor-Hydrfl Ac (GEL-SASHA DENTINBLOC DT)     solifenacin (VESICARE) 10 MG tablet     terbinafine (LAMISIL) 1 % external cream     ziprasidone (GEODON) 40 MG capsule     glucagon (GLUCAGON EMERGENCY) 1 MG kit     No current facility-administered medications for this visit.        Weight Loss Medication History Reviewed With Patient 4/1/2019   Which weight loss medications are you currently taking on a regular basis?  Byetta (exentatide)   Are you having any side effects from the weight loss medication that we have prescribed you? No   If you are having side effects please describe: -     ASSESSMENT:   Continues to do well with weight loss maintenance.     FOLLOW-UP:    12 weeks.  10/15 minutes spent on counseling and education    Sincerely,    Marcos Magdaleno MD

## 2019-04-01 NOTE — NURSING NOTE
"Chief Complaint   Patient presents with     Weight Problem     PT here for weight management follow up       Vitals:    04/01/19 1113   BP: 153/63   BP Location: Right arm   Patient Position: Sitting   Cuff Size: Adult Large   Pulse: 69   Resp: 18   Temp: 98.3  F (36.8  C)   TempSrc: Oral   SpO2: 96%   Weight: 101.5 kg (223 lb 11.2 oz)   Height: 1.638 m (5' 4.5\")       Body mass index is 37.81 kg/m .      ARIAN WhyteT                      "

## 2019-04-02 NOTE — PROGRESS NOTES
Preceptor Attestation:   Patient seen, evaluated and discussed with the resident. I have verified the content of the note, which accurately reflects my assessment of the patient and the plan of care.   Supervising Physician:  David Garner MD

## 2019-04-08 ENCOUNTER — OFFICE VISIT (OUTPATIENT)
Dept: FAMILY MEDICINE | Facility: CLINIC | Age: 69
End: 2019-04-08
Payer: COMMERCIAL

## 2019-04-08 VITALS
HEIGHT: 64 IN | WEIGHT: 224.4 LBS | OXYGEN SATURATION: 95 % | HEART RATE: 76 BPM | RESPIRATION RATE: 16 BRPM | TEMPERATURE: 98.9 F | BODY MASS INDEX: 38.31 KG/M2 | DIASTOLIC BLOOD PRESSURE: 79 MMHG | SYSTOLIC BLOOD PRESSURE: 162 MMHG

## 2019-04-08 DIAGNOSIS — R14.2 FLATULENCE, ERUCTATION AND GAS PAIN: ICD-10-CM

## 2019-04-08 DIAGNOSIS — R14.3 FLATULENCE, ERUCTATION AND GAS PAIN: ICD-10-CM

## 2019-04-08 DIAGNOSIS — H00.014 HORDEOLUM EXTERNUM OF LEFT UPPER EYELID: Primary | ICD-10-CM

## 2019-04-08 DIAGNOSIS — R14.1 FLATULENCE, ERUCTATION AND GAS PAIN: ICD-10-CM

## 2019-04-08 RX ORDER — SIMETHICONE 125 MG
125 TABLET,CHEWABLE ORAL 2 TIMES DAILY
Qty: 60 TABLET | Refills: 0 | Status: SHIPPED | OUTPATIENT
Start: 2019-04-08 | End: 2022-09-14

## 2019-04-08 ASSESSMENT — MIFFLIN-ST. JEOR: SCORE: 1527.87

## 2019-04-08 NOTE — PROGRESS NOTES
HPI       Pinky Page is a 68 year old  who presents for   Chief Complaint   Patient presents with     Er F/u     4/4/19 Right side pain, 6 days, present no improvement, intermit,      Eye Problem     left eye drainage,pain        Abdominal Pain     Onset: 4/4/19    Description:   Character: Sharp  Location: right lower quadrant  Radiation: None    Intensity: moderate    Progression of Symptoms:  same    Accompanying Signs & Symptoms:  Fever/Chills?: No   Gas/Bloating:  YES has had more gas than normal, but no bloating  Dysuria:No   Hematuria: No   Nausea: No   Vomiting: No   Diarrhea:No   Constipation: No       History:           Trauma: No   Previous similar pain:  YES when she has a UTI   Previous tests done: CBC, BMP, ECG,  troponin, and UA were all neg    Precipitating factors:   Does the pain change with:     Food?: no     BM?: no     Urination:no     What makes it better?:  Sometimes nothing. The abdominal hot pack sometimes helps (brings it down from a 8/10 to a 4/10). Pain normally lasts about 1-2 hours.    Therapies Tried and outcome: hot pack, tylenol    LMP:  in her 50s right before her hysterectomy        +++++++      Problem, Medication and Allergy Lists were reviewed and updated if needed..    Patient is an established patient of this clinic..         Review of Systems:   Review of Systems   Constitutional: Negative for appetite change, chills and fever.   HENT: Negative for trouble swallowing.    Respiratory: Negative for shortness of breath.    Cardiovascular: Negative for chest pain.   Gastrointestinal: Positive for abdominal pain. Negative for blood in stool, constipation, nausea and vomiting.   Genitourinary: Negative for dysuria, hematuria, urgency and vaginal discharge.   Musculoskeletal: Negative for back pain.   Neurological: Negative for weakness.            Physical Exam:     Vitals:    04/08/19 1538 04/08/19 1539   BP: 154/80 162/79   Pulse: 76    Resp: 16    Temp: 98.9  F (37.2  " C)    SpO2: 95%    Weight: 101.8 kg (224 lb 6.4 oz)    Height: 1.626 m (5' 4\")      Body mass index is 38.52 kg/m .  Vital signs normal except BMI and BP elevated     Physical Exam   Constitutional: She is oriented to person, place, and time. No distress.   HENT:   Head: Normocephalic and atraumatic.   Eyes:       Small red raised area just above eye lash line on upper left eye lid. Not tender to palpation. Eye is tearing.   Cardiovascular: Normal rate.   Pulmonary/Chest: Effort normal.   Abdominal: Soft. Bowel sounds are normal. She exhibits no distension. There is no tenderness (suprapubic or other areas). There is no rebound, no guarding and no CVA tenderness.   Neurological: She is alert and oriented to person, place, and time.   Psychiatric: She has a normal mood and affect. Her speech is normal and behavior is normal.         Results:   No testing ordered today    Assessment and Plan        Pinky was seen today for er f/u and eye problem.    Diagnoses and all orders for this visit:    Hordeolum externum of left upper eyelid - Has been treated for pink eye on right eye and finished treatment drops on both eyes less than a week ago. Less likely pink eye that has reoccurred. Most likely hordeolum. Use warm wash cloth to eye lid 3-4 times daily for 1 week. If not resolving, return for follow up    Flatulence, eructation and gas pain - Exam benign, no acute/surgical abdomen. Patient does not have appendix or gallbladder. Stools have not changed, but complaining of much more gas than usual. Up to date on colonoscopy screening. Most likely gas pain. Will try simethicone, if pain is not relieved come back for follow up within 1 week.  -     simethicone (MYLICON) 125 MG chewable tablet; Take 1 tablet (125 mg) by mouth 2 times daily       There are no discontinued medications.    Options for treatment and follow-up care were reviewed with the patient. Pinky Page  engaged in the decision making process and verbalized " understanding of the options discussed and agreed with the final plan.    Cary Brownlee MD  PGY- 2

## 2019-04-08 NOTE — PATIENT INSTRUCTIONS
Here is the plan from today's visit    1. Hordeolum externum of left upper eyelid  Use warm wash cloth to eye lid 3-4 times daily for 1 week.    2. Flatulence, eructation and gas pain  Take 125 mg twice daily for 1 week  - simethicone (MYLICON) 125 MG chewable tablet; Take 1 tablet (125 mg) by mouth 2 times daily  Dispense: 60 tablet; Refill: 0      Please call or return to clinic if your symptoms don't go away.    Follow up plan  Please make a clinic appointment for follow up with me (EDIS GRAHAM) in 1  week for follow up if eye and abdominal symptoms are not resolved.    Thank you for coming to [facility] today.  Lab Testing:  **If you had lab testing today and your results are reassuring or normal they will be mailed to you or sent through [facility] within 7 days.   **If the lab tests need quick action we will call you with the results.  The phone number we will call with results is # [phone] (home) . If this is not the best number please call our clinic and change the number.  Medication Refills:  If you need any refills please call your pharmacy and they will contact us.   If you need to  your refill at a new pharmacy, please contact the new pharmacy directly. The new pharmacy will help you get your medications transferred faster.   Scheduling:  If you have any concerns about today's visit or wish to schedule another appointment please call our office during normal business hours [phone] (8-5:00 M-F)  If a referral was made to a [facility] and you don't get a call from central scheduling please call [phone].  If a Mammogram was ordered for you at [facility] call [phone] to schedule or change your appointment.  If you had an XRay/CT/Ultrasound/MRI ordered the number is [phone] to schedule or change your radiology appointment.   Medical Concerns:  If you have urgent medical concerns please call [phone] at any time of the day.    Edis MCGOVERN  MD [place]

## 2019-04-08 NOTE — PROGRESS NOTES
Preceptor Attestation:   Patient seen, evaluated and discussed with the resident. I have verified the content of the note, which accurately reflects my assessment of the patient and the plan of care.   Supervising Physician:  Sheila Mast MD

## 2019-04-10 ENCOUNTER — TELEPHONE (OUTPATIENT)
Dept: FAMILY MEDICINE | Facility: CLINIC | Age: 69
End: 2019-04-10

## 2019-04-10 DIAGNOSIS — F41.1 GENERALIZED ANXIETY DISORDER: ICD-10-CM

## 2019-04-11 RX ORDER — LORAZEPAM 1 MG/1
TABLET ORAL
Qty: 35 TABLET | Refills: 0 | Status: SHIPPED | OUTPATIENT
Start: 2019-04-11 | End: 2019-04-11

## 2019-04-11 RX ORDER — LORAZEPAM 1 MG/1
TABLET ORAL
Qty: 35 TABLET | Refills: 0 | Status: SHIPPED | OUTPATIENT
Start: 2019-04-11 | End: 2019-06-11

## 2019-04-15 ASSESSMENT — ENCOUNTER SYMPTOMS
HEMATURIA: 0
VOMITING: 0
ABDOMINAL PAIN: 1
NAUSEA: 0
TROUBLE SWALLOWING: 0
FEVER: 0
WEAKNESS: 0
BLOOD IN STOOL: 0
CHILLS: 0
DYSURIA: 0
APPETITE CHANGE: 0
CONSTIPATION: 0
SHORTNESS OF BREATH: 0
BACK PAIN: 0

## 2019-04-17 ENCOUNTER — OFFICE VISIT (OUTPATIENT)
Dept: PSYCHOLOGY | Facility: CLINIC | Age: 69
End: 2019-04-17
Payer: COMMERCIAL

## 2019-04-17 VITALS — WEIGHT: 225.3 LBS | BODY MASS INDEX: 38.67 KG/M2

## 2019-04-17 DIAGNOSIS — F41.1 GENERALIZED ANXIETY DISORDER: ICD-10-CM

## 2019-04-17 DIAGNOSIS — F33.1 MAJOR DEPRESSIVE DISORDER, RECURRENT EPISODE, MODERATE (H): Primary | ICD-10-CM

## 2019-04-17 DIAGNOSIS — F54 PSYCHOLOGICAL FACTORS AFFECTING MEDICAL CONDITION: ICD-10-CM

## 2019-04-17 NOTE — PROGRESS NOTES
Health Psychology                  Clinic    Department of Medicine  Madelaine Ingram, Ph.D., L.P. [phone]                          Cl[facility] Center  [facility] Miryam Woods, Ph.D.,  L.P. [phone]                 3rd Floor  [place] Mail Code 742   Pinky Panda, Ph.D., L.P. [phone]     [address] Rae Eugene, Ph.D., L.P. [phone]            [place]  [place] [address]           Damon Gr, Ph.D., A.B.P.P., L.P. [phone]      Kimber Morales, Ph.D., L.P. [phone]    Health Psychology Follow-Up Note    Ms. Page is a pleasant 68-year old woman with chronic depressive illness, who returns to clinic for supportive and behavioral psychotherapy for moderate recurrent depression and for help losing weight given her morbid obesity.     She is 225.3 down from 225.9 last session. She is reinfroced for her progress and is now at a 1-year low.  Wt Readings from Last 4 Encounters:   04/17/19 102.2 kg (225 lb 4.8 oz)   04/08/19 101.8 kg (224 lb 6.4 oz)   04/01/19 101.5 kg (223 lb 11.2 oz)   03/20/19 103.1 kg (227 lb 6.4 oz)     Past Medical History:   Diagnosis Date     Allergic rhinitis due to pollen     seasonal allergies      Anisometropia and aniseikonia      BMI greater than 40      Cardiomegaly      Chronic constipation      Congenital absence of one kidney      Cramp of limb      Dermatophytosis of the body      Dry eye syndrome      Esophageal reflux      Essential hypertension, benign      Gastro-oesophageal reflux disease      Hemorrhoids      Hypermetropia      Hypothyroidism      Incomplete Emptying of Bladder      Lactose intolerance      Major depressive disorder, recurrent episode, moderate (H)      Malignant neoplasm of breast (female), unspecified site     left mastectomy 1996      Mixed incontinence urge and stress (male)(female)      Moderate obstructive sleep apnea      Postmenopausal  Atrophic Vaginitis      Presbyopia      Regular astigmatism      Restless leg syndrome      Senile nuclear sclerosis      Thyroid eye disease     mild     Tinnitus      Trigger finger (acquired)     right hand     Type II or unspecified type diabetes mellitus without mention of complication, not stated as uncontrolled     on insulin since April 2006      Past Surgical History:   Procedure Laterality Date     APPENDECTOMY       C MASTECTOMY,SIMPLE  1996    Left mastectomy  [facility]. Had normal FU mammo 5/2007. Follow yearly.      C TOTAL ABDOM HYSTERECTOMY  7/2003    Dr. Castanon, for abnormal bleeding. Removal of both tubes with BSO for myoma w - - -     CHOLECYSTECTOMY       COLONOSCOPY  5/2005    Complete Colonoscopy-had one small polyp removed 5/2005.      COLONOSCOPY N/A 3/31/2016    Procedure: COLONOSCOPY;  Surgeon: Cary Ring MD;  Location: [facility]     COLONOSCOPY N/A 7/7/2016    Procedure: COLONOSCOPY;  Surgeon: Cary Ring MD;  Location: [facility]     DILATION AND CURETTAGE       HYSTERECTOMY       MASTECTOMY      Left breast     Current Outpatient Medications   Medication     acetaminophen (TYLENOL) 500 MG tablet     alum & mag hydroxide-simethicone (MYLANTA/MAALOX) 200-200-20 MG/5ML SUSP suspension     amLODIPine (NORVASC) 5 MG tablet     artificial saliva (BIOTENE MT) AERS spray     artificial tears (AKWA TEARS) OINT ophthalmic ointment     aspirin (ASA) 81 MG EC tablet     ASPIRIN 81 MG OR TABS     atorvastatin (LIPITOR) 40 MG tablet     BD AUTOSHIELD DUO 30G X 5 MM     blood glucose (NO BRAND SPECIFIED) lancets standard     blood glucose calibration (NO BRAND SPECIFIED) solution     blood glucose monitoring (NO BRAND SPECIFIED) meter device kit     blood glucose monitoring (NO BRAND SPECIFIED) test strip     Calcium Carbonate-Vitamin D (CALCIUM-VITAMIN D) 250-125 MG-UNIT TABS     calcium polycarbophil (FIBERCON) 625 MG tablet     CLARITIN 10 MG OR TABS      clotrimazole (LOTRIMIN) 1 % external cream     conjugated estrogens (PREMARIN) 0.625 MG/GM vaginal cream     erythromycin (ROMYCIN) ophthalmic ointment     exenatide (BYETTA) 10 MCG/0.04ML injection     FLUoxetine (PROZAC) 20 MG capsule     fluticasone (FLONASE) 50 MCG/ACT nasal spray     furosemide (LASIX) 20 MG tablet     Gabapentin (NEURONTIN PO)     glucagon (GLUCAGON EMERGENCY) 1 MG kit     Hypromellose (ARTIFICIAL TEARS OP)     insulin glargine (LANTUS) 100 UNIT/ML injection     lactase (LACTAID) 3000 UNIT tablet     levothyroxine (SYNTHROID, LEVOTHROID) 175 MCG tablet     LORazepam (ATIVAN) 1 MG tablet     losartan (COZAAR) 100 MG tablet     Magnesium Hydroxide (MILK OF MAGNESIA PO)     melatonin 3 MG tablet     metFORMIN (GLUCOPHAGE-XR) 500 MG 24 hr tablet     moxifloxacin (VIGAMOX) 0.5 % ophthalmic solution     nystatin (MYCOSTATIN) 727906 UNIT/GM POWD     ONETOUCH DELICA LANCETS 33G MISC     polyethylene glycol (MIRALAX/GLYCOLAX) powder     Saline 0.9 % SOLN     senna-docusate (SENNA S) 8.6-50 MG per tablet     simethicone (MYLICON) 125 MG chewable tablet     Skin Protectants, Misc. (EUCERIN) cream     SM LUBRICANT EYE DROPS 0.4-0.3 % SOLN ophthalmic solution     Sod Fluor-Solo Fluor-Hydrfl Ac (GEL-SASHA DENTINBLOC DT)     solifenacin (VESICARE) 10 MG tablet     terbinafine (LAMISIL) 1 % external cream     ziprasidone (GEODON) 40 MG capsule     No current facility-administered medications for this visit.       PHQ-9 SCORE 11/8/2018 1/4/2019 3/6/2019   PHQ-9 Total Score - - -   PHQ-9 Total Score 6 5 6     NATTY-7 SCORE 7/27/2018 11/7/2018 11/8/2018   Total Score - - -   Total Score 7 7 7     A 12-item WHODAS 2.0 assessment was not completed by the patient today and recorded in EPIC.    At [facility], she continues to serve on the [employer].  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  She is working 1 day/week with her boss, [place], Friday mornings for 1.5 hour.    She is satisfied with this level of effort and is getting along well with [place]. S he had been doing more social walking some of the time and had apparently is gradually ramping up on her fitness center days.  She agrees to increase.  She is getting along well with her roommate.    She reports two health issues in the past month.  She had conjunctivitis in both eyes, and then hard right flank pain. She cut back on exercise, but lost part of a pound anyway.  She is looking forward to getting back to the exercise as she is pleased to now be lower weight than in the past decade.  She is noting she will turn 89 this year and would like to be feeling mo healthier. She is enjoying bicycling now at [place] and has a more robust routin which she hopes to resume.  She is starting to walk outside.  She acknowledges sometimes being down for a few hours or a day.  When she felt ill, her mood was worse, but now it is at baseline (4/10).   Goal for exercise has been 1.5 hours/day as of June 19, 2017 and 2 lbs./month.   We discussed increasing bicycling to 4 days per week short term and 5-6 long-term and is now at 40 minutes each time.   We discussed only eating half of the crackers..  Her medications are dispersed.  She arrived early today and was greeted in the waiting room.  Pinky participated fully and appeared to derive benefit.  Rapport was excellent.     Extended session due to complexity of case and length of interval.    IMPRESSION Distress Disability Risk    Low High Low     Time  In: 2:06  Time Out:  2:59  Diagnosis:   Major Depression, recurrent mild (F33.0)   Psychological Factors Affecting Morbid Obesity (F54)      Plan: She will return on 5/21 @ 2 for behavioral and supportive psychotherapy.  Will spread out to q 5 weeks.  Treatment plan due 5/7/2019 (next session)  Damon Gr, Ph.D., A.B.P.P., L.P.

## 2019-04-30 ENCOUNTER — ANCILLARY PROCEDURE (OUTPATIENT)
Dept: MAMMOGRAPHY | Facility: CLINIC | Age: 69
End: 2019-04-30
Attending: INTERNAL MEDICINE
Payer: COMMERCIAL

## 2019-04-30 DIAGNOSIS — Z12.31 ENCOUNTER FOR SCREENING MAMMOGRAM FOR BREAST CANCER: ICD-10-CM

## 2019-04-30 PROCEDURE — 77067 SCR MAMMO BI INCL CAD: CPT | Mod: 52

## 2019-05-01 ENCOUNTER — OFFICE VISIT (OUTPATIENT)
Dept: PSYCHIATRY | Facility: CLINIC | Age: 69
End: 2019-05-01
Attending: PSYCHIATRY & NEUROLOGY
Payer: COMMERCIAL

## 2019-05-01 VITALS — WEIGHT: 227 LBS | BODY MASS INDEX: 38.96 KG/M2

## 2019-05-01 DIAGNOSIS — Z79.899 ENCOUNTER FOR LONG-TERM (CURRENT) USE OF MEDICATIONS: Primary | ICD-10-CM

## 2019-05-01 DIAGNOSIS — F25.1 SCHIZOAFFECTIVE DISORDER, DEPRESSIVE TYPE (H): ICD-10-CM

## 2019-05-01 PROCEDURE — G0463 HOSPITAL OUTPT CLINIC VISIT: HCPCS | Mod: ZF

## 2019-05-01 RX ORDER — ZIPRASIDONE HYDROCHLORIDE 40 MG/1
40 CAPSULE ORAL 2 TIMES DAILY WITH MEALS
Qty: 60 CAPSULE | Refills: 2 | Status: SHIPPED | OUTPATIENT
Start: 2019-05-01 | End: 2019-07-11

## 2019-05-01 ASSESSMENT — PAIN SCALES - GENERAL: PAINLEVEL: NO PAIN (0)

## 2019-05-01 NOTE — PROGRESS NOTES
[facility] PSYCHIATRY CLINIC PROGRESS NOTE     CARE TEAM:  PCP- Cary Brownlee    Specialty Providers- no    Therapist- Dr. Gr    [facility] Team- no    Pinky Page is a 68 year old female who prefers the name Pinky & pronouns she, her, hers. Date of initial diagnostic assessment is 5/13/13.  Date of most recent transfer of care assessment is 07/30/18.     Pertinent Background:  This patient first experienced mental health issues in her twenties and has received treatment for schizoaffective disorder and generalized anxiety.  History details in last diagnostic assessment.  Notably, Pinky's symptoms of depression and psychosis have responded well to a combination of ECT and medication management. Pinky has a history of experiencing increasingly frequent psychotic symptoms w/ previously attempted antipsychotic tapers.        Psych critical item history includes psychosis [sxs include disorganization, hallucinations, paranoia], mutiple psychotropic trials, psych hosp (3-5) and ECT.    INTERIM HISTORY                                                                                              4, 4   The patient reports good treatment adherence.  History was provided by the patient who was a good historian.  The last visit ended with no change to the med regimen.     Since the last visit:     - Mood and anxiety symptoms have been stable. Has been managing some side pain and a leg rash recently that have made it more difficult to exercise - otherwise feeling well.  - Quality of sleep has improved. She had previously described some challenges falling asleep, but feels this is now less of a problem. Will sometimes lie awake for a few hours prior to falling asleep.  - Tolerating medications without problem - no new side effects.  - Has not needed to use any PRN Ativan in several months.    RECENT SYMPTOMS:   DEPRESSION:  reports-depressed mood, anhedonia, low energy and poor concentration /memory;  DENIES- suicidal ideation  and self-destructive thoughts  MELVIN/HYPOMANIA:  reports-none;  DENIES- increased energy, decreased sleep need, increased activity and grandiosity  PSYCHOSIS:  reports-none;  DENIES- delusions, auditory hallucinations and visual hallucinations  DYSREGULATION:  reports-none;  DENIES- suicidal ideation, violent ideation and SIB  PANIC ATTACK:  none   ANXIETY:  excessive worry  TRAUMA RELATED:  none  COMPULSIVE:  none   SLEEP: No problems  EATING DISORDER: no     RECENT SUBSTANCE USE:     ALCOHOL- no      TOBACCO- no     CAFFEINE- coffee/ tea [coffee, diet Pepsi]  OPIOIDS- no         NARCAN KIT- no        CANNABIS- no            OTHER ILLICIT DRUGS- none      CURRENT SOCIAL HISTORY:  FINANCIAL SUPPORT- social security disability       CHILDREN- no      LIVING SITUATION- Lives at [facility].      SOCIAL/ SPIRITUAL SUPPORT- sisters, support staff at [facility], other residents at [place], other providers     FEELS SAFE AT HOME- yes     MEDICAL ROS (2,10):  Reports none, none, none    Denies headaches, sexual function problems [none], short term memory and/or word finding difficulty, wt gain, tremor, akathisia, spasms, bruxism, easy bruising , GI sxs [N/V], akathisia, muscle problems [stiffness], unusual movements, wt gain, sexual function problems [none], polydipsia, polyphagia and Parkinsonian-type symptoms [shuffling gait, masked facies, stooped posture, speech change, writing change], rash, hair loss , menstrual abnormality, skin/eye discoloration and bleeding or freq bruising    PSYCH and CD Critical Summary Points since July 2018 7/30 - resident transition, no changes  9/10 - no changes  10/25 - increased anxiety, increased lorazepam, fluoxetine  11/19 - decreased lorazepam  1/4 - decreased fluoxetine  3/6 - no changes  5/1 - discontinued PRN Ativan    PAST PSYCH MED TRIALS   see EMR Problem List: Hx of psychiatric care    MEDICAL / SURGICAL HISTORY                                   Pregnant  or breastfeeding- no      Contraception- None    Neurologic Hx- no   Patient Active Problem List   Diagnosis     Allergic rhinitis due to pollen     Urge incontinence     Hypertension, essential     Cardiomegaly     Chronic constipation     Dry eye syndrome     Esophageal reflux     Exposure keratoconjunctivitis     DM ophthalmopathy (H)     Hypothyroidism     Senile cataract     ANUJ (obstructive sleep apnea)     Postmenopausal atrophic vaginitis     Restless leg syndrome     Squamous blepharitis     Morbid obesity due to excess calories (H)     Personal history of breast cancer s/p L masectomy     Diabetes mellitus type 2, insulin dependent (H)     Hypercholesteremia     Lives in group home     Extrapyramidal and movement disorder     CKD (chronic kidney disease) stage 2, GFR 60-89 ml/min     Solitary kidney, congenital     S/P hysterectomy     Impingement syndrome of right shoulder     Hypertriglyceridemia     Candidiasis of skin     Generalized anxiety disorder     Carpal tunnel syndrome of left wrist     Schizoaffective disorder, depressive type (H)     Major depressive disorder, recurrent episode, moderate (H)     Psychological factors affecting medical condition     Hx of psychiatric care     Nail complaint     Microalbuminuria due to type 2 diabetes mellitus (H)     Type 2 diabetes mellitus with microalbuminuria, with long-term current use of insulin (H)     Diabetes mellitus, type II, insulin dependent (H)     CKD (chronic kidney disease) stage 1, GFR 90 ml/min or greater     Conjunctivitis of both eyes     Corneal erosion of both eyes     Spastic entropion, right       Major Surgery- not discussed    ALLERGY                                Chlordiazepoxide hcl; Dimetapp dm cold-cough; Haldol; Ibuprofen; Lactose intolerance [beta-galactosidase]; Milk products; and Propofol  MEDICATIONS                               Current Outpatient Medications   Medication Sig Dispense Refill     acetaminophen (TYLENOL) 500  MG tablet Take 2 tablets (1,000 mg) by mouth every 6 hours as needed 1 Bottle 2     alum & mag hydroxide-simethicone (MYLANTA/MAALOX) 200-200-20 MG/5ML SUSP suspension Take 30 mLs by mouth daily as needed for indigestion       amLODIPine (NORVASC) 5 MG tablet Take 2 tablets (10 mg) by mouth daily 30 tablet 3     artificial saliva (BIOTENE MT) AERS spray Take 1 spray by mouth 3 times daily as needed for dry mouth       artificial tears (AKWA TEARS) OINT ophthalmic ointment Apply  to eye. Place 0.5 inches into both eyes at bedtime 1 Tube 11     aspirin (ASA) 81 MG EC tablet        ASPIRIN 81 MG OR TABS Take 1 tablet by mouth daily. ENTERIC COATED. 100 3     atorvastatin (LIPITOR) 40 MG tablet Take 1 tablet (40 mg) by mouth daily 90 tablet 1     BD AUTOSHIELD DUO 30G X 5 MM        blood glucose (NO BRAND SPECIFIED) lancets standard Use to test blood sugar 2 times daily or as directed. 100 each 11     blood glucose calibration (NO BRAND SPECIFIED) solution Use to calibrate blood glucose monitor as directed. 1 each 3     blood glucose monitoring (NO BRAND SPECIFIED) meter device kit Check Blood sugars twice a day. 1 kit 0     blood glucose monitoring (NO BRAND SPECIFIED) test strip Use to test blood sugar 3 times daily or as directed. 100 each 3     Calcium Carbonate-Vitamin D (CALCIUM-VITAMIN D) 250-125 MG-UNIT TABS Take 1 tablet by mouth 2 times daily. Calcium 250 mg/Vit D 125 IU       calcium polycarbophil (FIBERCON) 625 MG tablet Take 2 tablets by mouth daily       CLARITIN 10 MG OR TABS 1 TAB PO QD (Once per day) as needed for ALLERGY SYMPTOMS 30 11     clotrimazole (LOTRIMIN) 1 % external cream Apply topically 2 times daily as needed (apply to area underneath pannus as needed for itching) 50 g 1     conjugated estrogens (PREMARIN) 0.625 MG/GM vaginal cream Place 0.5 g vaginally At Bedtime 30 g 0     erythromycin (ROMYCIN) ophthalmic ointment Place 0.5 inches into both eyes 4 times daily       exenatide (BYETTA) 10  MCG/0.04ML injection Inject 10 mcg Subcutaneous 2 times daily (before meals) 1 Syringe 11     FLUoxetine (PROZAC) 20 MG capsule Take 2 capsules (40 mg) by mouth daily 90 capsule 1     fluticasone (FLONASE) 50 MCG/ACT nasal spray Spray 1 spray into both nostrils daily 16 g 3     furosemide (LASIX) 20 MG tablet Take 1 tablet (20 mg) by mouth 2 times daily 60 tablet 3     Gabapentin (NEURONTIN PO) Take 900 mg by mouth 3 times daily.       glucagon (GLUCAGON EMERGENCY) 1 MG kit Inject 1 mg into the muscle once for 1 dose 2 mg 3     Hypromellose (ARTIFICIAL TEARS OP) Apply 1 drop to eye 4 times daily.       insulin glargine (LANTUS) 100 UNIT/ML injection Inject 20 Units Subcutaneous At Bedtime 8 mL 11     lactase (LACTAID) 3000 UNIT tablet Take 4 tabs daily with meals.       levothyroxine (SYNTHROID, LEVOTHROID) 175 MCG tablet Take 1 tablet (175 mcg) by mouth daily Give on empty stomach 30 tablet 4     LORazepam (ATIVAN) 1 MG tablet Take 1 tablet at bedtime. May take 1 additional tab daily PRN for anxiety. 35 tablet 0     losartan (COZAAR) 100 MG tablet Take 1 tablet by mouth daily. 90 tablet 1     Magnesium Hydroxide (MILK OF MAGNESIA PO) Take  by mouth. Take 30 mL as needed for constipation.       melatonin 3 MG tablet Take 1 tablet (3 mg) by mouth nightly as needed for sleep 60 tablet 1     metFORMIN (GLUCOPHAGE-XR) 500 MG 24 hr tablet Take 2 tablets (1,000 mg) by mouth 2 times daily (with meals) (Patient taking differently: Take 2,000 mg by mouth daily ) 90 tablet 3     moxifloxacin (VIGAMOX) 0.5 % ophthalmic solution Place 1 drop into both eyes 4 times daily       nystatin (MYCOSTATIN) 118457 UNIT/GM POWD Apply topically 3 times daily as needed 60 g 1     ONETOUCH DELICA LANCETS 33G MISC        polyethylene glycol (MIRALAX/GLYCOLAX) powder Take 1 capful by mouth 2 times daily. 17 GM PO BID       Saline 0.9 % SOLN Spray 2 sprays in nostril as needed.       senna-docusate (SENNA S) 8.6-50 MG per tablet Take 2 tablets  by mouth At Bedtime.       simethicone (MYLICON) 125 MG chewable tablet Take 1 tablet (125 mg) by mouth 2 times daily 60 tablet 0     Skin Protectants, Misc. (EUCERIN) cream Apply  topically as needed. Apply to thigh PRN dry skin        SM LUBRICANT EYE DROPS 0.4-0.3 % SOLN ophthalmic solution        Sod Fluor-Solo Fluor-Hydrfl Ac (GEL-SASHA DENTINBLOC DT) Apply  to affected area. GEL. Apply to 2nd molar on bottom right daily at bedtime.       solifenacin (VESICARE) 10 MG tablet Take 1 tablet (10 mg) by mouth daily 30 tablet 6     terbinafine (LAMISIL) 1 % external cream Apply topically 2 times daily       ziprasidone (GEODON) 40 MG capsule Take 1 capsule (40 mg) by mouth 2 times daily (with meals) 60 capsule 2     VITALS                                                                                                                                  3, 3   Wt 103 kg (227 lb)   BMI 38.96 kg/m       MENTAL STATUS EXAM                                                                         9, 14 cog gs     Alertness: alert  and oriented  Appearance: well groomed - casual clothing, white hair  Behavior/Demeanor: cooperative, with poor eye contact   Speech: slowed  Language: intact and no problems  Psychomotor: slight tremor in right hand  Mood: description consistent with euthymia  Affect: full range; was congruent to mood; was congruent to content  Thought Process/Associations: unremarkable  Thought Content:  Reports none;  Denies suicidal ideation, violent ideation and delusions  Perception:  Reports none;  Denies auditory hallucinations and visual hallucinations  Insight: good  Judgment: good  Cognition: (6) does  appear grossly intact; formal cognitive testing was not done  Gait and Station: unremarkable    LABS and DATA     AIMS:  last done 3/6/19 with score(s): 3;  next due March 2020    PHQ9 TODAY = 6  PHQ-9 SCORE 11/8/2018 1/4/2019 3/6/2019   PHQ-9 Total Score - - -   PHQ-9 Total Score 6 5 6     ANTIPSYCHOTIC LABS   [glu, A1C, lipids (danika LDL), liver enzymes, WBC, ANEU, Hgb, plts]  q12 mo  Recent Labs   Lab Test 11/07/18  1531 10/25/18  1734 05/03/18  1414   GLC  --  147* 192.6*   A1C 7.4*  --  6.7*     Recent Labs   Lab Test 05/03/18  1414 03/21/17  1023   CHOL 126 142   TRIG 148 121   LDL 60 78   HDL 37* 40*     Recent Labs   Lab Test 06/26/18  0659 06/26/18   AST 16 16   ALT 21 21   ALKPHOS 111 111     Recent Labs   Lab Test 10/25/18  1734 06/26/18  0659   WBC 14.2* 9.72   ANEU 10.6* 5.31   HGB 14.0 12.6    247       DIAGNOSIS     Schizoaffective Disorder, depressed type, mild to moderate, without psychotic features  Generalized Anxiety Disorder    ASSESSMENT                                                                                                          m2, h3     TODAY:      Pinky's depressive and anxiety symptoms continue to be well managed. She is quite stable on her current regimen. She has had some physical issues recently that have prevented her from exercising as frequently as she would like but has been coping well despite this. She hasn't used an Ativan PRN in several months - will plan today to discontinue and consider use of something like hydroxyzine or trazodone for anxiety in the future if the need arises. No indications for other changes today. She will return to clinic in 2 months with the new resident for reevaluation.      [place] PRESCRIPTION MONITORING PROGRAM [] was not checked today:  N/A.    PSYCHOTROPIC DRUG INTERACTIONS:   Ziprasidone and fluoxetine may result in increased risk for QTc prolongation and serotonin syndrome.  Aspirin and fluoxetine may result in increased risk for bleeding.  MANAGEMENT:  Monitoring for adverse effects, periodic EKGs and patient is aware of risks     PLAN                                                                                                                       m2, h3     1) PSYCHOTROPIC MEDICATIONS:    - Continue ziprasidone 40 mg BID  - Continue  fluoxetine 40 mg daily  - Continue lorazepam 1 mg at bedtime - discontinue additional 1 mg qHS PRN      2) THERAPY:    Yes, continue w/ Dr. Gr    3) NEXT DUE:    Labs- AP labs ordered today  EKG- Last EKG 8/17/16 - QTc 432. Will updated as needed.  Rating Scales- AIMS due March 2020    4) REFERRALS:    No Referrals needed     5) RTC: 8 weeks with new resident    6) CRISIS NUMBERS:   Provided routinely in AVS.   [facility] [phone] (clinic)    [phone] (after hours)  ONLY if a [facility] PT: [facility] [phone] (clinic), [phone] (after hours)     TREATMENT RISK STATEMENT:  The risks, benefits, alternatives and potential adverse effects have been discussed and are understood by the pt. The pt understands the risks of using street drugs or alcohol. There are no medical contraindications, the pt agrees to treatment with the ability to do so. The pt knows to call the clinic for any problems or to access emergency care if needed.  Medical and substance use concerns are documented above.  Psychotropic drug interaction check was done, including changes made today.    PSYCHIATRY CLINIC INDIVIDUAL PSYCHOTHERAPY NOTE                                                  [16]   Start time - 1410           End time - 1437  Date last reviewed - 09/10/18       Date next due - 12/9/18 (or 12 months if Medicare)    Subjective: This supportive psychotherapy session addressed issues related to goals of therapy .  Patient's reaction: Maintenance in the context of mental status appropriate for ambulatory setting.  Progress: good  Plan: RTC 6-8 weeks  Psychotherapy services during this visit included  myself and the patient.   TREATMENT  PLAN          SYMPTOMS; PROBLEMS   MEASURABLE GOALS;    FUNCTIONAL IMPROVEMENT INTERVENTIONS;   GAINS MADE DISCHARGE CRITERIA   Depression: depressed mood   reduce depressive symptoms and reduce depressive episodes meds/therapy marked symptom improvement   Anxiety: excessive  worry   reduce anxiety meds/therapy marked symptom improvement       PROVIDER: Maulik Kohler MD    Patient staffed in clinic with Dr. Gordon who will sign the note.  Supervisor is Dr. Estrada.  I saw the patient with the resident, and participated in key portions of the service, including the mental status examination and developing the plan of care. I reviewed key portions of the history with the resident. I agree with the findings and plan as documented in this note.    Annie Gordon MD

## 2019-05-02 ASSESSMENT — PATIENT HEALTH QUESTIONNAIRE - PHQ9: SUM OF ALL RESPONSES TO PHQ QUESTIONS 1-9: 6

## 2019-05-02 NOTE — PATIENT INSTRUCTIONS
[place] I have sent refills of your medications to the pharmacy. Please let us know if you have any questions or concerns prior to your next appointment.      Dr. Kohler

## 2019-05-07 ENCOUNTER — TELEPHONE (OUTPATIENT)
Dept: PSYCHIATRY | Facility: CLINIC | Age: 69
End: 2019-05-07

## 2019-05-07 ENCOUNTER — TRANSFERRED RECORDS (OUTPATIENT)
Dept: HEALTH INFORMATION MANAGEMENT | Facility: CLINIC | Age: 69
End: 2019-05-07

## 2019-05-07 DIAGNOSIS — Z79.899 ENCOUNTER FOR LONG-TERM (CURRENT) USE OF MEDICATIONS: ICD-10-CM

## 2019-05-07 LAB
ABS BASOPHILS: 0.07 CELLS/MM3
ABS EOSINOPHILS: 0.17 CELLS/MM3
ABS IMMATURE GRANULOCYTES: 0.04
ABS LYMPHOCYTES: 3.28 CELLS/MM3
ABS MONOCYTE: 0.65 CELLS/MM3
ABS NEUTROPHILS: 5.85 CELLS/MM3
ANION GAP SERPL CALCULATED.3IONS-SCNC: 11 MMOL/L
BASOPHILS NFR BLD AUTO: ABNORMAL %
BUN SERPL-MCNC: 13 MG/DL
CALCIUM SERPL-MCNC: 9.3 MG/DL
CHLORIDE SERPLBLD-SCNC: 105 MMOL/L
CHOLEST SERPL-MCNC: 138 MG/DL
CO2 SERPL-SCNC: 29 MMOL/L
CREAT SERPL-MCNC: 0.83 MG/DL
EOSINOPHIL NFR BLD AUTO: ABNORMAL %
ERYTHROCYTE [DISTWIDTH] IN BLOOD BY AUTOMATED COUNT: 13.2 %
GFR SERPL CREATININE-BSD FRML MDRD: 72 ML/MIN/1.73M2
GLUCOSE SERPL-MCNC: 109 MG/DL (ref 70–99)
HBA1C MFR BLD: 6.8 % (ref 0–5.6)
HCT VFR BLD AUTO: 40.9 %
HDLC SERPL-MCNC: 40 MG/DL
HEMOGLOBIN: 13.3 G/DL (ref 11.5–15.7)
LDLC SERPL CALC-MCNC: 79 MG/DL
LYMPHOCYTES NFR BLD AUTO: ABNORMAL %
MCH RBC QN AUTO: 29.8 PG
MCHC RBC AUTO-ENTMCNC: 32.5 G/DL
MCV RBC AUTO: 91.7 FL
MONOCYTES NFR BLD AUTO: ABNORMAL %
NEUTROPHILS NFR BLD AUTO: ABNORMAL %
NRBC: 0 %
PLATELET COUNT - QUEST: 292 10^9/L (ref 150–400)
PMV BLD: 11.4 FL
POTASSIUM SERPL-SCNC: 4.8 MMOL/L
RBC # BLD AUTO: 4.46 10^12/L
SODIUM SERPL-SCNC: 145 MMOL/L
TRIGL SERPL-MCNC: 96 MG/DL
VLDL - CALC: 19 CALC
WBC # BLD AUTO: 10.06 10^9/L

## 2019-05-07 NOTE — TELEPHONE ENCOUNTER
Received results of CBC W/DIFF from [facility] [place] drawn on 5/7/19 at 0650. Results entered into EMR by Arlene Orta LPN on 5/7/19. Routed to Dr. Kohler and aSmreen Fitzpatrick RNCC for review. Document placed in scanning and a copy held in Psychiatry until scanning complete/confirmed

## 2019-05-07 NOTE — TELEPHONE ENCOUNTER
/Received results of Panel Lipid from [facility] drawn on 05/07/2019 at 0650.  Results entered into EMR by Allie Valenzuela/RYNE on 05/07/19  Routed to Dr. Kohler and Mary Vaz, CORINNE for review  Document placed in scanning and a copy held in Psychiatry until scanning complete/confirmed. Allie Michelle

## 2019-05-07 NOTE — TELEPHONE ENCOUNTER
Received results of Hemoglobin A1C from [facility] drawn on 5/7/2019 at 0650  Results entered into EMR by Josefa Lopez CMA on 5/7/2019  Routed to Dr. Kohler and CORINNE Stevenson for review  Document placed in scanning and a copy held in Psychiatry until scanning confirmed.  Josefa Lopez CMA

## 2019-05-07 NOTE — TELEPHONE ENCOUNTER
Received results of BMP from [facility] drawn on 5/7/2019 at 0908  Results entered into EMR by Josefa Lopez CMA on 5/7/2019  Routed to Dr. Kohler and CORINNE Stevenson for review  Document placed in scanning and a copy held in Psychiatry until scanning confirmed.  Josefa Lopez CMA

## 2019-05-09 ENCOUNTER — OFFICE VISIT (OUTPATIENT)
Dept: SLEEP MEDICINE | Facility: CLINIC | Age: 69
End: 2019-05-09
Payer: COMMERCIAL

## 2019-05-09 VITALS
SYSTOLIC BLOOD PRESSURE: 159 MMHG | BODY MASS INDEX: 39.09 KG/M2 | DIASTOLIC BLOOD PRESSURE: 74 MMHG | HEART RATE: 77 BPM | HEIGHT: 64 IN | RESPIRATION RATE: 16 BRPM | OXYGEN SATURATION: 98 % | WEIGHT: 229 LBS

## 2019-05-09 DIAGNOSIS — E66.01 MORBID OBESITY DUE TO EXCESS CALORIES (H): ICD-10-CM

## 2019-05-09 DIAGNOSIS — Z78.9 LIVES IN GROUP HOME: ICD-10-CM

## 2019-05-09 DIAGNOSIS — G47.33 OSA (OBSTRUCTIVE SLEEP APNEA): Primary | ICD-10-CM

## 2019-05-09 PROCEDURE — 99214 OFFICE O/P EST MOD 30 MIN: CPT | Performed by: INTERNAL MEDICINE

## 2019-05-09 ASSESSMENT — MIFFLIN-ST. JEOR: SCORE: 1548.99

## 2019-05-09 NOTE — PROGRESS NOTES
Chief complaint: Follow-up of obstructive sleep apnea    History of Present Illness: 68-year-old female with history of schizoaffective disorder, depression and anxiety,  and diabetes and moderate obstructive sleep apnea.  She recently had had good CPAP tolerance despite a history of poor CPAP tolerance in the past.  However she returns today having not used PAP many months.  She states she developed leak into her eyes that was bothersome.  She has not identified any source of the leak.  She feels that her sleep is of good quality without the CPAP.  She has been evaluated for abdominal pain and is currently on medication for gastrointestinal gas.  She finds this mildly helpful.  She denies significant changes in her sleep schedule.     [place] Seepiness Scale:8 (Less than 10 normal)     CHRISTIANA 13    Past Medical History:   Diagnosis Date     Allergic rhinitis due to pollen     seasonal allergies      Anisometropia and aniseikonia      BMI greater than 40      Cardiomegaly      Chronic constipation      Congenital absence of one kidney      Cramp of limb      Dermatophytosis of the body      Dry eye syndrome      Esophageal reflux      Essential hypertension, benign      Gastro-oesophageal reflux disease      Hemorrhoids      Hypermetropia      Hypothyroidism      Incomplete Emptying of Bladder      Lactose intolerance      Major depressive disorder, recurrent episode, moderate (H)      Malignant neoplasm of breast (female), unspecified site     left mastectomy 1996      Mixed incontinence urge and stress (male)(female)      Moderate obstructive sleep apnea      Postmenopausal Atrophic Vaginitis      Presbyopia      Regular astigmatism      Restless leg syndrome      Senile nuclear sclerosis      Thyroid eye disease     mild     Tinnitus      Trigger finger (acquired)     right hand     Type II or unspecified type diabetes mellitus without mention of complication, not stated as uncontrolled     on insulin since April  2006        Allergies   Allergen Reactions     Chlordiazepoxide Hcl      Dimetapp Dm Cold-Cough      Cold/Congetion TABS     Haldol      Ibuprofen      TABS     Lactose Intolerance [Beta-Galactosidase]      CAPS     Milk Products      Propofol      EMUL       Current Outpatient Medications   Medication     acetaminophen (TYLENOL) 500 MG tablet     alum & mag hydroxide-simethicone (MYLANTA/MAALOX) 200-200-20 MG/5ML SUSP suspension     amLODIPine (NORVASC) 5 MG tablet     artificial saliva (BIOTENE MT) AERS spray     artificial tears (AKWA TEARS) OINT ophthalmic ointment     aspirin (ASA) 81 MG EC tablet     ASPIRIN 81 MG OR TABS     atorvastatin (LIPITOR) 40 MG tablet     BD AUTOSHIELD DUO 30G X 5 MM     blood glucose (NO BRAND SPECIFIED) lancets standard     blood glucose calibration (NO BRAND SPECIFIED) solution     blood glucose monitoring (NO BRAND SPECIFIED) meter device kit     blood glucose monitoring (NO BRAND SPECIFIED) test strip     Calcium Carbonate-Vitamin D (CALCIUM-VITAMIN D) 250-125 MG-UNIT TABS     calcium polycarbophil (FIBERCON) 625 MG tablet     CLARITIN 10 MG OR TABS     clotrimazole (LOTRIMIN) 1 % external cream     conjugated estrogens (PREMARIN) 0.625 MG/GM vaginal cream     erythromycin (ROMYCIN) ophthalmic ointment     exenatide (BYETTA) 10 MCG/0.04ML injection     FLUoxetine (PROZAC) 20 MG capsule     fluticasone (FLONASE) 50 MCG/ACT nasal spray     furosemide (LASIX) 20 MG tablet     Gabapentin (NEURONTIN PO)     Hypromellose (ARTIFICIAL TEARS OP)     insulin glargine (LANTUS) 100 UNIT/ML injection     lactase (LACTAID) 3000 UNIT tablet     levothyroxine (SYNTHROID, LEVOTHROID) 175 MCG tablet     LORazepam (ATIVAN) 1 MG tablet     losartan (COZAAR) 100 MG tablet     Magnesium Hydroxide (MILK OF MAGNESIA PO)     melatonin 3 MG tablet     metFORMIN (GLUCOPHAGE-XR) 500 MG 24 hr tablet     moxifloxacin (VIGAMOX) 0.5 % ophthalmic solution     nystatin (MYCOSTATIN) 775971 UNIT/GM POWD      ONETOUCH DELICA LANCETS 33G MISC     polyethylene glycol (MIRALAX/GLYCOLAX) powder     Saline 0.9 % SOLN     senna-docusate (SENNA S) 8.6-50 MG per tablet     simethicone (MYLICON) 125 MG chewable tablet     Skin Protectants, Misc. (EUCERIN) cream     SM LUBRICANT EYE DROPS 0.4-0.3 % SOLN ophthalmic solution     Sod Fluor-Solo Fluor-Hydrfl Ac (GEL-SASHA DENTINBLOC DT)     solifenacin (VESICARE) 10 MG tablet     terbinafine (LAMISIL) 1 % external cream     ziprasidone (GEODON) 40 MG capsule     glucagon (GLUCAGON EMERGENCY) 1 MG kit     No current facility-administered medications for this visit.        Social History     Socioeconomic History     Marital status: Single     Spouse name: Not on file     Number of children: Not on file     Years of education: Not on file     Highest education level: Not on file   Occupational History     Not on file   Social Needs     Financial resource strain: Not on file     Food insecurity:     Worry: Not on file     Inability: Not on file     Transportation needs:     Medical: Not on file     Non-medical: Not on file   Tobacco Use     Smoking status: Never Smoker     Smokeless tobacco: Never Used   Substance and Sexual Activity     Alcohol use: No     Alcohol/week: 0.0 oz     Drug use: No     Sexual activity: Never   Lifestyle     Physical activity:     Days per week: Not on file     Minutes per session: Not on file     Stress: Not on file   Relationships     Social connections:     Talks on phone: Not on file     Gets together: Not on file     Attends [employer] service: Not on file     Active member of club or organization: Not on file     Attends meetings of clubs or organizations: Not on file     Relationship status: Not on file     Intimate partner violence:     Fear of current or ex partner: Not on file     Emotionally abused: Not on file     Physically abused: Not on file     Forced sexual activity: Not on file   Other Topics Concern     Not on file   Social History Narrative  "   Pinky Page is a resident at [facility].       Family History   Problem Relation Age of Onset     Heart Disease Father         1968     Melanoma Mother         malignant melanoma     Glaucoma No family hx of      Macular Degeneration No family hx of          EXAM:ISI13  /74   Pulse 77   Resp 16   Ht 1.626 m (5' 4.02\")   Wt 103.9 kg (229 lb)   SpO2 98%   BMI 39.29 kg/m    Psychiatric: Mood and affect appear normal    PSG date:3/21/2017  Wt:249 lbs  AHI: 15.5    PAP download no data since early last fall 2018.      ASSESSMENT: 68-year-old female with moderate obstructive sleep apnea.  She lives in [facility]. Unclear what assistance she is getting regarding PAP.  She is not using CPAP currently due to reported issues with mask.    PLAN: Urged patient to get a mask fitting and resume CPAP use.  Instructions provided to her residence staff to assist her with achieving compliance.  They should also inspect her supplies periodically.  They should ensure that she is cleaning her supplies.  Patient should return to clinic in 3 months to reassess compliance with new supplies.  Patient is reminded the importance of using CPAP all night every night.  She is agreeable with this plan.    Twenty-five minutes spent with patient, >50% spent counseling and coordinating care.      Brianda Reddy M.D.  Pulmonary/Critical Care/Sleep Medicine    [facility]   Floor 1, [address]   [address] [place] [address]. [place] [address]   Appointments: [phone]    The above note was dictated using voice recognition software and may include typographical errors. Please contact the author for any clarifications.          "

## 2019-05-09 NOTE — PATIENT INSTRUCTIONS

## 2019-05-10 NOTE — NURSING NOTE
Orders sent to [facility] medical    Tamanna Diaz [facility] ~[place]

## 2019-05-14 ENCOUNTER — OFFICE VISIT (OUTPATIENT)
Dept: FAMILY MEDICINE | Facility: CLINIC | Age: 69
End: 2019-05-14
Payer: COMMERCIAL

## 2019-05-14 VITALS
HEART RATE: 64 BPM | SYSTOLIC BLOOD PRESSURE: 139 MMHG | HEIGHT: 64 IN | BODY MASS INDEX: 38.55 KG/M2 | OXYGEN SATURATION: 96 % | DIASTOLIC BLOOD PRESSURE: 75 MMHG | TEMPERATURE: 98.4 F | RESPIRATION RATE: 16 BRPM | WEIGHT: 225.8 LBS

## 2019-05-14 DIAGNOSIS — R14.3 FLATULENCE, ERUCTATION AND GAS PAIN: ICD-10-CM

## 2019-05-14 DIAGNOSIS — R14.1 FLATULENCE, ERUCTATION AND GAS PAIN: ICD-10-CM

## 2019-05-14 DIAGNOSIS — N39.41 URGE INCONTINENCE: Primary | ICD-10-CM

## 2019-05-14 DIAGNOSIS — N95.2 VAGINAL ATROPHY: ICD-10-CM

## 2019-05-14 DIAGNOSIS — R14.2 FLATULENCE, ERUCTATION AND GAS PAIN: ICD-10-CM

## 2019-05-14 ASSESSMENT — ENCOUNTER SYMPTOMS
SHORTNESS OF BREATH: 0
VOMITING: 0
COUGH: 0
DYSURIA: 0
CHILLS: 0
FREQUENCY: 0
EYE REDNESS: 0
SORE THROAT: 0
ABDOMINAL PAIN: 0
NAUSEA: 0
FEVER: 0
EYE DISCHARGE: 0

## 2019-05-14 ASSESSMENT — MIFFLIN-ST. JEOR: SCORE: 1540.09

## 2019-05-14 NOTE — PROGRESS NOTES
HPI       Pinky Page is a 68 year old  who presents for   Chief Complaint   Patient presents with     RECHECK     follow up on premarin cream      Forms     Referral form, from [facility]       Patient is here to follow-up on increased urinary frequency: Patient came n on 3/20/19 for urinary frequency at night since January of this year.  Was waking up 3-4 times during the night to urinate which is unusual for her.  In the past, it was 1-2 times per night.  Patient is been using Premarin cream since then and is no longer waking up 3-4 times a night, and is back to normal 1-2.     Patient saw me on  4/8/19 for hordeolum of L eye:  has resolved completely    Patient was also seen for abdominal pain at that 4/8/19 visit: Still gets occasional funny feeling in her R lower quadrant of her abdomen, but has no pain like she had before.  Using simethicone as needed.    ANUJ: Went to see sleep medicine 5/9/19 due to concern with her CPAP not fitting well.  Was drying her eyes.  CPAP fitting better now and she is tolerating it just fine.    Adherence and Exercise  Medication side effects: no  How often is a medication missed? Never  Exercise:walking  6-7 days/week for an average of 15-30 minutes     +++++++    Problem, Medication and Allergy Lists were reviewed and updated if needed..    Patient is an established patient of this clinic..         Review of Systems:   Review of Systems   Constitutional: Negative for chills and fever.   HENT: Negative for congestion and sore throat.    Eyes: Negative for discharge and redness.   Respiratory: Negative for cough and shortness of breath.    Cardiovascular: Negative for chest pain.   Gastrointestinal: Negative for abdominal pain, nausea and vomiting.   Genitourinary: Negative for dysuria and frequency.            Physical Exam:     Vitals:    05/14/19 1006 05/14/19 1009   BP: 139/79 139/75   Pulse: 64    Resp: 16    Temp: 98.4  F (36.9  C)    TempSrc: Oral    SpO2:  "96%    Weight: 102.4 kg (225 lb 12.8 oz)    Height: 1.635 m (5' 4.37\")      Body mass index is 38.31 kg/m .  Vital signs normal except elevated BMI     Physical Exam   Constitutional: She is oriented to person, place, and time. No distress.   HENT:   Head: Normocephalic and atraumatic.   Eyes: Conjunctivae and lids are normal. Right eye exhibits no discharge and no hordeolum. Left eye exhibits no discharge and no hordeolum.   Cardiovascular: Normal rate, regular rhythm and normal heart sounds. Exam reveals no gallop and no friction rub.   No murmur heard.  Pulmonary/Chest: Effort normal and breath sounds normal. No respiratory distress. She has no wheezes. She has no rales.   Abdominal: Soft. Bowel sounds are normal. She exhibits no distension and no mass. There is no tenderness. There is no rebound and no guarding.   Neurological: She is alert and oriented to person, place, and time.   Psychiatric: Her speech is normal and behavior is normal. Her affect is blunt.         Results:   No testing ordered today    Assessment and Plan        Pinky was seen today for recheck and forms.    Diagnoses and all orders for this visit:    Urge incontinence  Vaginal atrophy  Patient has been using Premarin cream since 3/20/19 visit.  Good success in reduction of symptoms.  No longer waking up 3-4 times a night to urinate.  She is back to her 1-2 times a night.  We will continue using as we have had good success.  Would like to see her again in about 4 months to see if we can slowly taper to twice a week and then once weekly without return of symptoms.    Flatulence, eructation gas pain - Has been using simethicone as needed and no longer having abdominal pain as before       There are no discontinued medications.    Options for treatment and follow-up care were reviewed with the patient. Pinky Page  engaged in the decision making process and verbalized understanding of the options discussed and agreed with the final " plan.    Cary Brownlee MD  PGY-2

## 2019-05-14 NOTE — PROGRESS NOTES
Preceptor Attestation:   Patient seen, evaluated and discussed with the resident. I have verified the content of the note, which accurately reflects my assessment of the patient and the plan of care.   Supervising Physician:  Toribio Daugherty MD

## 2019-05-14 NOTE — PATIENT INSTRUCTIONS
Here is the plan from today's visit    1. Urge incontinence  Continue premarin for 6 months total and then can consider slowly discontinuing      Please call or return to clinic if your symptoms don't go away.    Follow up plan  Please make a clinic appointment for follow up with me (EDIS BROWNLEE) in 4  months for follow up.    Thank you for coming to [facility] today.  Lab Testing:  **If you had lab testing today and your results are reassuring or normal they will be mailed to you or sent through [employer] within 7 days.   **If the lab tests need quick action we will call you with the results.  The phone number we will call with results is # [phone] (home) . If this is not the best number please call our clinic and change the number.  Medication Refills:  If you need any refills please call your pharmacy and they will contact us.   If you need to  your refill at a new pharmacy, please contact the new pharmacy directly. The new pharmacy will help you get your medications transferred faster.   Scheduling:  If you have any concerns about today's visit or wish to schedule another appointment please call our office during normal business hours [phone] (8-5:00 M-F)  If a referral was made to a [facility] and you don't get a call from central scheduling please call [phone].  If a Mammogram was ordered for you at [facility] call [phone] to schedule or change your appointment.  If you had an XRay/CT/Ultrasound/MRI ordered the number is [phone] to schedule or change your radiology appointment.   Medical Concerns:  If you have urgent medical concerns please call [phone] at any time of the day.    Edis Brownlee MD

## 2019-05-21 ENCOUNTER — OFFICE VISIT (OUTPATIENT)
Dept: PSYCHOLOGY | Facility: CLINIC | Age: 69
End: 2019-05-21
Payer: COMMERCIAL

## 2019-05-21 VITALS — WEIGHT: 231.2 LBS | BODY MASS INDEX: 39.23 KG/M2

## 2019-05-21 DIAGNOSIS — F33.1 MAJOR DEPRESSIVE DISORDER, RECURRENT EPISODE, MODERATE (H): Primary | ICD-10-CM

## 2019-05-21 DIAGNOSIS — F54 PSYCHOLOGICAL FACTORS AFFECTING MEDICAL CONDITION: ICD-10-CM

## 2019-05-21 DIAGNOSIS — F41.1 GENERALIZED ANXIETY DISORDER: ICD-10-CM

## 2019-05-21 NOTE — PROGRESS NOTES
Health Psychology                  Clinic    Department of Medicine  Madelaine Ingram, Ph.D., L.P. [phone]                          Clinics and Surgery Center  [facility] Miryam Woods, Ph.D.,  L.P. [phone]                 3rd Floor  [place] Mail Code 947   Pinky Panda, Ph.D., L.P. [phone] [address] Rae Eugene, Ph.D., L.P. [phone]            [address] [place]  [place] [address]           Damon Gr, Ph.D., A.B.P.P., L.P. [phone]      Kimber Morales, Ph.D., L.P. [phone]    Health Psychology Follow-Up Note    Ms. Page is a pleasant 68-year old woman with chronic depressive illness, who returns to clinic for supportive and behavioral psychotherapy for moderate recurrent depression and for help losing weight given her morbid obesity.     She is 231.2# up from 225.3 d last session. Sh ehas taken a break from exercise due to pain in her right side beginning around the time of our last session.  She is now starting to gradually increase her walking and has begun to use the stationary bicycle. (20 minutes) .  She states she was checked out at the ER and her primary care clinic. and they couldn't find anything wrong with her.  Wt Readings from Last 4 Encounters:   05/21/19 104.9 kg (231 lb 3.2 oz)   05/14/19 102.4 kg (225 lb 12.8 oz)   05/09/19 103.9 kg (229 lb)   05/01/19 103 kg (227 lb)     Past Medical History:   Diagnosis Date     Allergic rhinitis due to pollen     seasonal allergies      Anisometropia and aniseikonia      BMI greater than 40      Cardiomegaly      Chronic constipation      Congenital absence of one kidney      Cramp of limb      Dermatophytosis of the body      Dry eye syndrome      Esophageal reflux      Essential hypertension, benign      Gastro-oesophageal reflux disease      Hemorrhoids      Hypermetropia      Hypothyroidism      Incomplete Emptying of Bladder      Lactose intolerance       Major depressive disorder, recurrent episode, moderate (H)      Malignant neoplasm of breast (female), unspecified site     left mastectomy 1996      Mixed incontinence urge and stress (male)(female)      Moderate obstructive sleep apnea      Postmenopausal Atrophic Vaginitis      Presbyopia      Regular astigmatism      Restless leg syndrome      Senile nuclear sclerosis      Thyroid eye disease     mild     Tinnitus      Trigger finger (acquired)     right hand     Type II or unspecified type diabetes mellitus without mention of complication, not stated as uncontrolled     on insulin since April 2006      Past Surgical History:   Procedure Laterality Date     APPENDECTOMY       C MASTECTOMY,SIMPLE  1996    Left mastectomy  [facility]. Had normal FU mammo 5/2007. Follow yearly.      C TOTAL ABDOM HYSTERECTOMY  7/2003    Dr. Castanon, for abnormal bleeding. Removal of both tubes with BSO for myoma w - - -     CHOLECYSTECTOMY       COLONOSCOPY  5/2005    Complete Colonoscopy-had one small polyp removed 5/2005.      COLONOSCOPY N/A 3/31/2016    Procedure: COLONOSCOPY;  Surgeon: Cary Ring MD;  Location: [facility]     COLONOSCOPY N/A 7/7/2016    Procedure: COLONOSCOPY;  Surgeon: Cary Ring MD;  Location: [facility]     DILATION AND CURETTAGE       HYSTERECTOMY       MASTECTOMY      Left breast     Current Outpatient Medications   Medication     acetaminophen (TYLENOL) 500 MG tablet     alum & mag hydroxide-simethicone (MYLANTA/MAALOX) 200-200-20 MG/5ML SUSP suspension     amLODIPine (NORVASC) 5 MG tablet     artificial saliva (BIOTENE MT) AERS spray     artificial tears (AKWA TEARS) OINT ophthalmic ointment     aspirin (ASA) 81 MG EC tablet     ASPIRIN 81 MG OR TABS     atorvastatin (LIPITOR) 40 MG tablet     BD AUTOSHIELD DUO 30G X 5 MM     blood glucose (NO BRAND SPECIFIED) lancets standard     blood glucose calibration (NO BRAND SPECIFIED) solution     blood glucose  monitoring (NO BRAND SPECIFIED) meter device kit     blood glucose monitoring (NO BRAND SPECIFIED) test strip     Calcium Carbonate-Vitamin D (CALCIUM-VITAMIN D) 250-125 MG-UNIT TABS     calcium polycarbophil (FIBERCON) 625 MG tablet     CLARITIN 10 MG OR TABS     clotrimazole (LOTRIMIN) 1 % external cream     conjugated estrogens (PREMARIN) 0.625 MG/GM vaginal cream     erythromycin (ROMYCIN) ophthalmic ointment     exenatide (BYETTA) 10 MCG/0.04ML injection     FLUoxetine (PROZAC) 20 MG capsule     fluticasone (FLONASE) 50 MCG/ACT nasal spray     furosemide (LASIX) 20 MG tablet     Gabapentin (NEURONTIN PO)     glucagon (GLUCAGON EMERGENCY) 1 MG kit     Hypromellose (ARTIFICIAL TEARS OP)     insulin glargine (LANTUS) 100 UNIT/ML injection     lactase (LACTAID) 3000 UNIT tablet     levothyroxine (SYNTHROID, LEVOTHROID) 175 MCG tablet     LORazepam (ATIVAN) 1 MG tablet     losartan (COZAAR) 100 MG tablet     Magnesium Hydroxide (MILK OF MAGNESIA PO)     melatonin 3 MG tablet     metFORMIN (GLUCOPHAGE-XR) 500 MG 24 hr tablet     moxifloxacin (VIGAMOX) 0.5 % ophthalmic solution     nystatin (MYCOSTATIN) 471714 UNIT/GM POWD     ONETOUCH DELICA LANCETS 33G MISC     polyethylene glycol (MIRALAX/GLYCOLAX) powder     Saline 0.9 % SOLN     senna-docusate (SENNA S) 8.6-50 MG per tablet     simethicone (MYLICON) 125 MG chewable tablet     Skin Protectants, Misc. (EUCERIN) cream     SM LUBRICANT EYE DROPS 0.4-0.3 % SOLN ophthalmic solution     Sod Fluor-Solo Fluor-Hydrfl Ac (GEL-SASHA DENTINBLOC DT)     solifenacin (VESICARE) 10 MG tablet     terbinafine (LAMISIL) 1 % external cream     ziprasidone (GEODON) 40 MG capsule     No current facility-administered medications for this visit.       PHQ-9 SCORE 1/4/2019 3/6/2019 5/1/2019   PHQ-9 Total Score - - -   PHQ-9 Total Score 5 6 6     NATTY-7 SCORE 7/27/2018 11/7/2018 11/8/2018   Total Score - - -   Total Score 7 7 7     At [facility], she continues to serve on the Community  [employer].  She has been on the [employer] for 20 years, plus decorating the bulletin boards.  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  She is satisfied with this level of effort and is getting along well with Tevin.  She is getting along well with her roommate.    She reports two health issues in the past month.  She had a pain in her side, and cut back considerably on her exercise as  A result.  Today she is doing better and is gradually increasing the exercise.  She is enjoying bicycling grant [place] and has a more robust routine which she hopes to resume.  She is starting to walk outside.  She was required to get rid of her fabric-covered chair due to concerns in [place] about dust.  The staff and  She brought it to her sister's house.  She is frustrated that she couldn't keep it.   She is handling it well.   She acknowledges sometimes being down for a few hours or a day.  When she felt ill, her mood was worse, but now it is at baseline (4/10).   Goal for exercise has been 1.5 hours/day as of June 19, 2017 and 2 lbs./month.   We walked two laps around the building.  She arrived early today and was greeted in the waiting room.  Pinky participated fully and appeared to derive benefit.  Rapport was excellent.     Extended session due to complexity of case and length of interval.    IMPRESSION Distress Disability Risk    Low High Low     Time  In: 2:03  Time Out:  2:59    Diagnosis:   Major Depression, recurrent mild (F33.0)   Psychological Factors Affecting Morbid Obesity (F54)      Plan: She will return on 6/25 @ 1 for behavioral and supportive psychotherapy.  Will spread out to q 5 weeks.      Last treatment plan signed: 5/21/2019  Treatment plan due: 5/21/2020                         Damon Gr, Ph.D., A.B.P.P., L.P.

## 2019-05-30 ENCOUNTER — TELEPHONE (OUTPATIENT)
Dept: FAMILY MEDICINE | Facility: CLINIC | Age: 69
End: 2019-05-30

## 2019-05-30 NOTE — TELEPHONE ENCOUNTER
Prior Authorization Retail Medication Request    Medication/Dose: conjugated estrogens (PREMARIN) 0.625 MG/GM vaginal cream  ICD code (if different than what is on RX):  Vaginal atrophy [N95.2]     Previously Tried and Failed:  See Chart  Rationale:  See Chart    Insurance Name:  Kajal  Insurance ID:  [id number]       Pharmacy Information (if different than what is on RX)  Name:  [facility]  Phone:  [phone]

## 2019-05-31 NOTE — TELEPHONE ENCOUNTER
Prior Authorization Approval       Authorization Effective Date: 5/1/2019  Authorization Expiration Date: 5/30/2020  Medication: conjugated estrogens (PREMARIN) 0.625 MG/GM vaginal cream-PA APPROVED   Approved Dose/Quantity:  Reference #: CASE ID # [id number]   Insurance Company: [employer] - Phone [phone] Fax [phone]  Expected CoPay: NO CO-PAY     CoPay Card Available:      Foundation Assistance Needed:    Which Pharmacy is filling the prescription (Not needed for infusion/clinic administered): [facility] - [place], [place] - [address]  Pharmacy Notified: Yes- **Instructed pharmacy to notify patient when script is ready to /ship.**  Patient Notified: Yes

## 2019-06-07 ENCOUNTER — DOCUMENTATION ONLY (OUTPATIENT)
Dept: FAMILY MEDICINE | Facility: CLINIC | Age: 69
End: 2019-06-07

## 2019-06-07 DIAGNOSIS — F41.1 GENERALIZED ANXIETY DISORDER: ICD-10-CM

## 2019-06-07 NOTE — PROGRESS NOTES
"When opening a documentation only encounter, be sure to enter in \"Chief Complaint\" Forms and in \" Comments\" Title of form, description if needed.    Pinky is a 68 year old  female  Form received via: Patient Drop Off  Form now resides in: Provider Ready    Law Alma MA        Form has been completed by provider.     Form sent out via: Mailed to [facility] on 6/26/2019 Wednesday  Patient informed: N/A  Output date: June 26, 2019    Law Alma MA      **Please close the encounter**              "

## 2019-06-11 RX ORDER — LORAZEPAM 1 MG/1
TABLET ORAL
Qty: 35 TABLET | Refills: 0 | Status: SHIPPED | OUTPATIENT
Start: 2019-06-11 | End: 2019-07-11

## 2019-06-25 ENCOUNTER — OFFICE VISIT (OUTPATIENT)
Dept: PSYCHOLOGY | Facility: CLINIC | Age: 69
End: 2019-06-25
Payer: COMMERCIAL

## 2019-06-25 VITALS — WEIGHT: 229.8 LBS | BODY MASS INDEX: 38.99 KG/M2

## 2019-06-25 DIAGNOSIS — F41.1 GENERALIZED ANXIETY DISORDER: ICD-10-CM

## 2019-06-25 DIAGNOSIS — E66.01 PSYCHOLOGICAL FACTORS AFFECTING MORBID OBESITY (H): ICD-10-CM

## 2019-06-25 DIAGNOSIS — F33.1 MAJOR DEPRESSIVE DISORDER, RECURRENT EPISODE, MODERATE (H): Primary | ICD-10-CM

## 2019-06-25 DIAGNOSIS — E66.9 OBESITY, UNSPECIFIED OBESITY SEVERITY, UNSPECIFIED OBESITY TYPE: ICD-10-CM

## 2019-06-25 DIAGNOSIS — F54 PSYCHOLOGICAL FACTORS AFFECTING MORBID OBESITY (H): ICD-10-CM

## 2019-06-25 DIAGNOSIS — F54 PSYCHOLOGICAL FACTORS AFFECTING MEDICAL CONDITION: ICD-10-CM

## 2019-06-25 NOTE — PROGRESS NOTES
Health Psychology                  Clinic    Department of Medicine  Madelaine Ingram, Ph.D., L.P. [phone]                          [facility]  [facility] Miryam Wodos, Ph.D.,  L.P. [phone]                 3rd Floor  [place] [address]   Pinky Panda, Ph.D., L.P. [phone]    [address] Rae Eugene, Ph.D., L.P. [phone]            [id number]  [place] [address]           Damon Gr, Ph.D., A.B.P.P., L.P. [phone]      Kimber Morales, Ph.D., L.P. [phone]    Health Psychology Follow-Up Note    Ms. Page is a pleasant 68-year old woman with chronic depressive illness, who returns to clinic for supportive and behavioral psychotherapy for moderate recurrent depression and for help losing weight given her morbid obesity.   Her 69th is tomorrow.  She is 229.8# down from 231.2# d last session. She is back to exercising after the pain in her right side subsided.  .  She is now starting to gradually increase her walking and has begun to use the stationary bicycl. (20 minutes) .  She is feeling good today, recovering from some dental work.  Wt Readings from Last 4 Encounters:   06/25/19 104.2 kg (229 lb 12.8 oz)   05/21/19 104.9 kg (231 lb 3.2 oz)   05/14/19 102.4 kg (225 lb 12.8 oz)   05/09/19 103.9 kg (229 lb)     Past Medical History:   Diagnosis Date     Allergic rhinitis due to pollen     seasonal allergies      Anisometropia and aniseikonia      BMI greater than 40      Cardiomegaly      Chronic constipation      Congenital absence of one kidney      Cramp of limb      Dermatophytosis of the body      Dry eye syndrome      Esophageal reflux      Essential hypertension, benign      Gastro-oesophageal reflux disease      Hemorrhoids      Hypermetropia      Hypothyroidism      Incomplete Emptying of Bladder      Lactose intolerance      Major depressive disorder, recurrent episode, moderate (H)       Malignant neoplasm of breast (female), unspecified site     left mastectomy 1996      Mixed incontinence urge and stress (male)(female)      Moderate obstructive sleep apnea      Postmenopausal Atrophic Vaginitis      Presbyopia      Regular astigmatism      Restless leg syndrome      Senile nuclear sclerosis      Thyroid eye disease     mild     Tinnitus      Trigger finger (acquired)     right hand     Type II or unspecified type diabetes mellitus without mention of complication, not stated as uncontrolled     on insulin since April 2006      Past Surgical History:   Procedure Laterality Date     APPENDECTOMY       C MASTECTOMY,SIMPLE  1996    Left mastectomy  [facility]. Had normal FU mammo 5/2007. Follow yearly.      C TOTAL ABDOM HYSTERECTOMY  7/2003    Dr. Castanon, for abnormal bleeding. Removal of both tubes with BSO for myoma w - - -     CHOLECYSTECTOMY       COLONOSCOPY  5/2005    Complete Colonoscopy-had one small polyp removed 5/2005.      COLONOSCOPY N/A 3/31/2016    Procedure: COLONOSCOPY;  Surgeon: Cary Ring MD;  Location: [facility]     COLONOSCOPY N/A 7/7/2016    Procedure: COLONOSCOPY;  Surgeon: Cary Ring MD;  Location: [facility]     DILATION AND CURETTAGE       HYSTERECTOMY       MASTECTOMY      Left breast     Current Outpatient Medications   Medication     acetaminophen (TYLENOL) 500 MG tablet     alum & mag hydroxide-simethicone (MYLANTA/MAALOX) 200-200-20 MG/5ML SUSP suspension     amLODIPine (NORVASC) 5 MG tablet     artificial saliva (BIOTENE MT) AERS spray     artificial tears (AKWA TEARS) OINT ophthalmic ointment     aspirin (ASA) 81 MG EC tablet     ASPIRIN 81 MG OR TABS     atorvastatin (LIPITOR) 40 MG tablet     BD AUTOSHIELD DUO 30G X 5 MM     blood glucose (NO BRAND SPECIFIED) lancets standard     blood glucose calibration (NO BRAND SPECIFIED) solution     blood glucose monitoring (NO BRAND SPECIFIED) meter device kit     blood glucose  monitoring (NO BRAND SPECIFIED) test strip     Calcium Carbonate-Vitamin D (CALCIUM-VITAMIN D) 250-125 MG-UNIT TABS     calcium polycarbophil (FIBERCON) 625 MG tablet     CLARITIN 10 MG OR TABS     clotrimazole (LOTRIMIN) 1 % external cream     conjugated estrogens (PREMARIN) 0.625 MG/GM vaginal cream     erythromycin (ROMYCIN) ophthalmic ointment     exenatide (BYETTA) 10 MCG/0.04ML injection     FLUoxetine (PROZAC) 20 MG capsule     fluticasone (FLONASE) 50 MCG/ACT nasal spray     furosemide (LASIX) 20 MG tablet     Gabapentin (NEURONTIN PO)     glucagon (GLUCAGON EMERGENCY) 1 MG kit     Hypromellose (ARTIFICIAL TEARS OP)     insulin glargine (LANTUS) 100 UNIT/ML injection     lactase (LACTAID) 3000 UNIT tablet     levothyroxine (SYNTHROID, LEVOTHROID) 175 MCG tablet     LORazepam (ATIVAN) 1 MG tablet     losartan (COZAAR) 100 MG tablet     Magnesium Hydroxide (MILK OF MAGNESIA PO)     melatonin 3 MG tablet     metFORMIN (GLUCOPHAGE-XR) 500 MG 24 hr tablet     moxifloxacin (VIGAMOX) 0.5 % ophthalmic solution     nystatin (MYCOSTATIN) 760147 UNIT/GM POWD     ONETOUCH DELICA LANCETS 33G MISC     polyethylene glycol (MIRALAX/GLYCOLAX) powder     Saline 0.9 % SOLN     senna-docusate (SENNA S) 8.6-50 MG per tablet     simethicone (MYLICON) 125 MG chewable tablet     Skin Protectants, Misc. (EUCERIN) cream     SM LUBRICANT EYE DROPS 0.4-0.3 % SOLN ophthalmic solution     Sod Fluor-Solo Fluor-Hydrfl Ac (GEL-SASHA DENTINBLOC DT)     solifenacin (VESICARE) 10 MG tablet     terbinafine (LAMISIL) 1 % external cream     ziprasidone (GEODON) 40 MG capsule     No current facility-administered medications for this visit.       PHQ-9 SCORE 1/4/2019 3/6/2019 5/1/2019   PHQ-9 Total Score - - -   PHQ-9 Total Score 5 6 6     NATTY-7 SCORE 7/27/2018 11/7/2018 11/8/2018   Total Score - - -   Total Score 7 7 7     She is greeted in the waiting room. 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  She has been on the [employer] for 20 years, plus decorating the bulletin boards.  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   She is satisfied with this level of effort and is getting along well with Tevin.  She is getting along well with her roommate.    She was in a concert at [facility], which went well.   She acknowledges her depression is now at baseline (4/10).   We walked outside part of the session.  Goal for exercise has been 1.5 hours/day as of June 19, 2017 and 2 lbs./month.   We walked two laps around the building.  She arrived early today and was greeted in the waiting room.  Pniky participated fully and appeared to derive benefit.  Rapport was excellent.     Extended session due to complexity of case and length of interval.    IMPRESSION Distress Disability Risk    Low High Low     Time In: 1:09  Time Out:  2:04    Diagnosis:   Major Depression, recurrent mild (F33.0)   Psychological Factors Affecting Morbid Obesity (F54)    Plan: She will return on 6/25 @ 1 for behavioral and supportive psychotherapy.  Will spread out to q 5 weeks.    Last treatment plan signed: 5/21/2019  Treatment plan due: 5/21/2020                         Damon Gr, Ph.D., A.B.P.P., L.P.

## 2019-06-26 ENCOUNTER — DOCUMENTATION ONLY (OUTPATIENT)
Dept: FAMILY MEDICINE | Facility: CLINIC | Age: 69
End: 2019-06-26

## 2019-06-26 NOTE — PROGRESS NOTES
"When opening a documentation only encounter, be sure to enter in \"Chief Complaint\" Forms and in \" Comments\" Title of form, description if needed.    Pinky is a 69 year old  female  Form received via: Fax  Form now resides in: Provider Ready    Law Alma MA    Form has been completed by provider.     Form sent out via: Fax to Narendra Truong at Fax Number: [phone]  Patient informed: N/A  Output date: July 10, 2019    Law Alma MA      **Please close the encounter**                      "

## 2019-07-09 ENCOUNTER — TELEPHONE (OUTPATIENT)
Dept: FAMILY MEDICINE | Facility: CLINIC | Age: 69
End: 2019-07-09

## 2019-07-09 ENCOUNTER — MEDICAL CORRESPONDENCE (OUTPATIENT)
Dept: HEALTH INFORMATION MANAGEMENT | Facility: CLINIC | Age: 69
End: 2019-07-09

## 2019-07-09 DIAGNOSIS — F41.1 GENERALIZED ANXIETY DISORDER: ICD-10-CM

## 2019-07-09 NOTE — TELEPHONE ENCOUNTER

## 2019-07-09 NOTE — PROGRESS NOTES
"When opening a documentation only encounter, be sure to enter in \"Chief Complaint\" Forms and in \" Comments\" Title of form, description if needed.    Pinky is a 68 year old  female  Form received via: Patient Drop Off  Form now resides in: Provider Ready      Form has been completed by provider.     Form sent out via: Mailed to [place] [employer]  Patient informed: na  Output date: March 28, 2019    Nancy Newell CMA      **Please close the encounter**      "
[phone]

## 2019-07-10 NOTE — PROGRESS NOTES
"[facility] PSYCHIATRY CLINIC PROGRESS NOTE     CARE TEAM:  PCP- Cary Brownlee (resident working at [facility] with Dr. Birmingham, attending)   Specialty Providers- no    Therapist- Dr. Gr    [facility] Team- no    Pinky Page is a 69 year old female who prefers the name Pinky & pronouns she, her, hers. Date of initial diagnostic assessment is 5/13/13.  Date of most recent transfer of care assessment is 07/30/18.     Pertinent Background:  This patient first experienced mental health issues in her twenties and has received treatment for schizoaffective disorder and generalized anxiety.  History details in last diagnostic assessment.  Notably, Pinky's symptoms of depression and psychosis have responded well to a combination of ECT and medication management. Pinky has a history of experiencing increasingly frequent psychotic symptoms w/ previously attempted antipsychotic tapers.        Psych critical item history includes psychosis [sxs include disorganization, hallucinations, paranoia], mutiple psychotropic trials, psych hosp (3-5) and ECT.    INTERIM HISTORY                                                                                              4, 4   The patient reports good treatment adherence.  History was provided by the patient who was a good historian.  The last visit ended with the following med change: discontinuation of additional 1mg PRN Ativan. No other changes. Attended appt with Dr. Gr 5/21    Since the last visit:   - States things have been good since last visit.   - Early this month having some pain in R side. Saw PCP who diagnosed some gas pain and constipation. Started simethicone which alleviated. Since then, have been feeling pretty good.   - Mood described as \"fluctuates, sometimes pretty good, sometimes rebecca sad. Depends on how I get up in the morning.\" Never lasts more than a day. Doesn't feel like she's getting depressed.   - Anxiety off and on, more days without anxiety than with. When " "anxious, worries about her health, her family, things that are happening in the world and in her life.   - Sleeping is an issue. Has a CPAP, going to sleep clinic, got new mask that fits. Using CPAP \"most nights,\" wears for 4-5 hrs. When they wake her up at midnight, sometimes forgets to put back on. She thinks getting more exercise would help.  - She's been getting more exercise lately. Worked on stationary bike for 45 min earlier this week, and walked around [employer] the other day. Takes walks outside 5x/wk. Also walks halls after dinner at [employer] 15-20 min almost every day. Notes she had a mild weight gain today in clinic, but sure that she lost around 5 lbs since last year.  Review of chart indicates 10lb loss in last year.   - Good appetite, trying to eat healthy food at [employer], eating more diet options that they offer, doesn't eat rich desserts.   - Going well without PRN ativan dose, states that most of the time, she doesn't miss it.     RECENT SYMPTOMS:   DEPRESSION:  reports-insomnia and poor concentration /memory;  DENIES- suicidal ideation, self-destructive thoughts, depressed mood, feeling worthless, feeling hopeless and overwhelmed  MELVIN/HYPOMANIA:  reports-none;  DENIES- increased energy, decreased sleep need, increased activity and grandiosity  PSYCHOSIS:  reports-none;  DENIES- delusions, auditory hallucinations and visual hallucinations  DYSREGULATION:  reports-none;  DENIES- suicidal ideation, violent ideation and SIB  PANIC ATTACK:  none   ANXIETY:  excessive worry off and on, more days without anxiety than with anxiety  TRAUMA RELATED:  none  COMPULSIVE:  none   SLEEP: No problems  EATING DISORDER: no     RECENT SUBSTANCE USE:     ALCOHOL- no      TOBACCO- no     CAFFEINE- coffee/ tea [coffee, diet Pepsi]  OPIOIDS- no         NARCAN KIT- no        CANNABIS- no            OTHER ILLICIT DRUGS- none      CURRENT SOCIAL HISTORY:  FINANCIAL SUPPORT- social security disability       CHILDREN- no    "   LIVING SITUATION- Lives at [facility] for past 20+ years  SOCIAL/ SPIRITUAL SUPPORT- sisters, support staff at [facility], other residents at [facility], other providers     FEELS SAFE AT HOME- yes     MEDICAL ROS (2,10):  Reports none, none, none   Occasionally notices tremor in R arm and that her toes are moving in her shoes, both feet. Denies headaches, sexual function problems [none], short term memory and/or word finding difficulty, wt gain, tremor, akathisia, spasms, bruxism, easy bruising , GI sxs [N/V/D/Constipation], akathisia, muscle problems [stiffness], unusual movements, wt gain, sexual function problems [none], polydipsia, polyphagia and Parkinsonian-type symptoms [shuffling gait, masked facies, stooped posture, speech change, writing change], rash, hair loss , menstrual abnormality, skin/eye discoloration and bleeding or freq bruising    PSYCH and CD Critical Summary Points since July 2018 7/30 - resident transition, no changes  9/10 - no changes  10/25 - increased anxiety, increased lorazepam, fluoxetine  11/19 - decreased lorazepam  1/4 - decreased fluoxetine  3/6 - no changes  5/1 - discontinued PRN Ativan  7/11 - resident transition    PAST PSYCH MED TRIALS   see EMR Problem List: Hx of psychiatric care    MEDICAL / SURGICAL HISTORY                                   Pregnant or breastfeeding- no      Contraception- None    Neurologic Hx- no   Patient Active Problem List   Diagnosis     Allergic rhinitis due to pollen     Urge incontinence     Hypertension, essential     Cardiomegaly     Chronic constipation     Dry eye syndrome     Esophageal reflux     Exposure keratoconjunctivitis     DM ophthalmopathy (H)     Hypothyroidism     Senile cataract     ANUJ (obstructive sleep apnea)     Vaginal atrophy     Restless leg syndrome     Squamous blepharitis     Morbid obesity due to excess calories (H)     Personal history of breast cancer s/p L masectomy     Diabetes mellitus type  2, insulin dependent (H)     Hypercholesteremia     Lives in group home     Extrapyramidal and movement disorder     CKD (chronic kidney disease) stage 2, GFR 60-89 ml/min     Solitary kidney, congenital     S/P hysterectomy     Impingement syndrome of right shoulder     Hypertriglyceridemia     Candidiasis of skin     Generalized anxiety disorder     Carpal tunnel syndrome of left wrist     Schizoaffective disorder, depressive type (H)     Major depressive disorder, recurrent episode, moderate (H)     Psychological factors affecting medical condition     Hx of psychiatric care     Nail complaint     Microalbuminuria due to type 2 diabetes mellitus (H)     Type 2 diabetes mellitus with microalbuminuria, with long-term current use of insulin (H)     Diabetes mellitus, type II, insulin dependent (H)     CKD (chronic kidney disease) stage 1, GFR 90 ml/min or greater     Conjunctivitis of both eyes     Corneal erosion of both eyes     Spastic entropion, right       Major Surgery- not discussed    ALLERGY                                Chlordiazepoxide hcl; Dimetapp dm cold-cough; Haldol; Ibuprofen; Lactose intolerance [beta-galactosidase]; Milk products; and Propofol  MEDICATIONS                               Current Outpatient Medications   Medication Sig Dispense Refill     acetaminophen (TYLENOL) 500 MG tablet Take 2 tablets (1,000 mg) by mouth every 6 hours as needed 1 Bottle 2     alum & mag hydroxide-simethicone (MYLANTA/MAALOX) 200-200-20 MG/5ML SUSP suspension Take 30 mLs by mouth daily as needed for indigestion       amLODIPine (NORVASC) 5 MG tablet Take 2 tablets (10 mg) by mouth daily 30 tablet 3     artificial saliva (BIOTENE MT) AERS spray Take 1 spray by mouth 3 times daily as needed for dry mouth       artificial tears (AKWA TEARS) OINT ophthalmic ointment Apply  to eye. Place 0.5 inches into both eyes at bedtime 1 Tube 11     aspirin (ASA) 81 MG EC tablet        ASPIRIN 81 MG OR TABS Take 1 tablet by  mouth daily. ENTERIC COATED. 100 3     atorvastatin (LIPITOR) 40 MG tablet Take 1 tablet (40 mg) by mouth daily 90 tablet 1     BD AUTOSHIELD DUO 30G X 5 MM        blood glucose (NO BRAND SPECIFIED) lancets standard Use to test blood sugar 2 times daily or as directed. 100 each 11     blood glucose calibration (NO BRAND SPECIFIED) solution Use to calibrate blood glucose monitor as directed. 1 each 3     blood glucose monitoring (NO BRAND SPECIFIED) meter device kit Check Blood sugars twice a day. 1 kit 0     blood glucose monitoring (NO BRAND SPECIFIED) test strip Use to test blood sugar 3 times daily or as directed. 100 each 3     Calcium Carbonate-Vitamin D (CALCIUM-VITAMIN D) 250-125 MG-UNIT TABS Take 1 tablet by mouth 2 times daily. Calcium 250 mg/Vit D 125 IU       calcium polycarbophil (FIBERCON) 625 MG tablet Take 2 tablets by mouth daily       CLARITIN 10 MG OR TABS 1 TAB PO QD (Once per day) as needed for ALLERGY SYMPTOMS 30 11     clotrimazole (LOTRIMIN) 1 % external cream Apply topically 2 times daily as needed (apply to area underneath pannus as needed for itching) 50 g 1     conjugated estrogens (PREMARIN) 0.625 MG/GM vaginal cream Place 0.5 g vaginally At Bedtime 30 g 0     erythromycin (ROMYCIN) ophthalmic ointment Place 0.5 inches into both eyes 4 times daily       exenatide (BYETTA) 10 MCG/0.04ML injection Inject 10 mcg Subcutaneous 2 times daily (before meals) 1 Syringe 11     FLUoxetine (PROZAC) 20 MG capsule Take 2 capsules (40 mg) by mouth daily 90 capsule 1     fluticasone (FLONASE) 50 MCG/ACT nasal spray Spray 1 spray into both nostrils daily 16 g 3     furosemide (LASIX) 20 MG tablet Take 1 tablet (20 mg) by mouth 2 times daily 60 tablet 3     Gabapentin (NEURONTIN PO) Take 900 mg by mouth 3 times daily.       glucagon (GLUCAGON EMERGENCY) 1 MG kit Inject 1 mg into the muscle once for 1 dose 2 mg 3     Hypromellose (ARTIFICIAL TEARS OP) Apply 1 drop to eye 4 times daily.       insulin  glargine (LANTUS) 100 UNIT/ML injection Inject 20 Units Subcutaneous At Bedtime 8 mL 11     lactase (LACTAID) 3000 UNIT tablet Take 4 tabs daily with meals.       levothyroxine (SYNTHROID, LEVOTHROID) 175 MCG tablet Take 1 tablet (175 mcg) by mouth daily Give on empty stomach 30 tablet 4     LORazepam (ATIVAN) 1 MG tablet Take 1 tablet at bedtime. May take 1 additional tab daily PRN for anxiety. 35 tablet 0     losartan (COZAAR) 100 MG tablet Take 1 tablet by mouth daily. 90 tablet 1     Magnesium Hydroxide (MILK OF MAGNESIA PO) Take  by mouth. Take 30 mL as needed for constipation.       melatonin 3 MG tablet Take 1 tablet (3 mg) by mouth nightly as needed for sleep 60 tablet 1     metFORMIN (GLUCOPHAGE-XR) 500 MG 24 hr tablet Take 2 tablets (1,000 mg) by mouth 2 times daily (with meals) (Patient taking differently: Take 2,000 mg by mouth daily ) 90 tablet 3     moxifloxacin (VIGAMOX) 0.5 % ophthalmic solution Place 1 drop into both eyes 4 times daily       nystatin (MYCOSTATIN) 715132 UNIT/GM POWD Apply topically 3 times daily as needed 60 g 1     ONETOUCH DELICA LANCETS 33G MISC        polyethylene glycol (MIRALAX/GLYCOLAX) powder Take 1 capful by mouth 2 times daily. 17 GM PO BID       Saline 0.9 % SOLN Spray 2 sprays in nostril as needed.       senna-docusate (SENNA S) 8.6-50 MG per tablet Take 2 tablets by mouth At Bedtime.       simethicone (MYLICON) 125 MG chewable tablet Take 1 tablet (125 mg) by mouth 2 times daily 60 tablet 0     Skin Protectants, Misc. (EUCERIN) cream Apply  topically as needed. Apply to thigh PRN dry skin        SM LUBRICANT EYE DROPS 0.4-0.3 % SOLN ophthalmic solution        Sod Fluor-Solo Fluor-Hydrfl Ac (GEL-SASHA DENTINBLOC DT) Apply  to affected area. GEL. Apply to 2nd molar on bottom right daily at bedtime.       solifenacin (VESICARE) 10 MG tablet Take 1 tablet (10 mg) by mouth daily 30 tablet 6     terbinafine (LAMISIL) 1 % external cream Apply topically 2 times daily        ziprasidone (GEODON) 40 MG capsule Take 1 capsule (40 mg) by mouth 2 times daily (with meals) 60 capsule 2     VITALS                                                                                                                                  3, 3   149/74, 76bpm, 104.8kg     MENTAL STATUS EXAM                                                                         9, 14 cog gs     Alertness: alert  and oriented  Appearance: well groomed - casual clothing, white hair, wearing tie dyed shirt  Behavior/Demeanor: cooperative, with good eye contact   Speech: slowed  Language: intact and no problems  Psychomotor: slight tremor in right hand  Mood: description consistent with euthymia  Affect: full range; was congruent to mood; was congruent to content  Thought Process/Associations: unremarkable  Thought Content:  Reports none;  Denies suicidal ideation, violent ideation and delusions  Perception:  Reports none;  Denies auditory hallucinations and visual hallucinations  Insight: good  Judgment: good  Cognition: (6) does  appear grossly intact; formal cognitive testing was not done  Gait and Station: unremarkable    LABS and DATA     AIMS:  last done 3/6/19 with score(s): 3;  next due March 2020    PHQ9 TODAY = 6  PHQ-9 SCORE 1/4/2019 3/6/2019 5/1/2019   PHQ-9 Total Score - - -   PHQ-9 Total Score 5 6 6     ANTIPSYCHOTIC LABS  [glu, A1C, lipids (danika LDL), liver enzymes, WBC, ANEU, Hgb, plts]  q12 mo  Recent Labs   Lab Test 05/07/19  0908 05/07/19  0650 11/07/18  1531 10/25/18  1734   *  --   --  147*   A1C  --  6.8* 7.4*  --      Recent Labs   Lab Test 05/07/19  0650 05/03/18  1414   CHOL 138 126   TRIG 96 148   LDL 79 60   HDL 40 37*     Recent Labs   Lab Test 06/26/18  0659 06/26/18   AST 16 16   ALT 21 21   ALKPHOS 111 111     Recent Labs   Lab Test 05/07/19  0650 10/25/18  1734 06/26/18  0659   WBC 10.06* 14.2* 9.72   ANEU  --  10.6* 5.31   HGB 13.3 14.0 12.6    275 247       DIAGNOSIS      Schizoaffective Disorder, depressed type, in partial remission  Generalized Anxiety Disorder    ASSESSMENT                                                                                                          m2, h3     TODAY: Pinky seems to be doing well today in terms of depression, anxiety, and psychosis.  She notes ongoing issues with sleep, although this is likely multifactorial in the context of ANUJ (recently got new mask which she is only intermittently wearing), multiple awakenings by staff given history of nocturnal enuresis.  No other medication side effects noted, appears to be tolerating these well.  I do note occasional movements of her right hand and patient's mentioning of occasionally feeling like her toes move uncontrollably within her shoes.  I will perform a formal AIMs assessment at our next visit.  Seems to have tolerated the decrease in her Ativan dosing well without an uptake in anxiety.  Over the course of the following year, team might consider a medication alternative to Ativan for sleep and anxiety with an improved side effect profile.  However given long-term period of stability, I am hesitant to make any other changes today.  She will return to clinic in 2 months for reevaluation.  Refills provided.    [place] PRESCRIPTION MONITORING PROGRAM [] was not checked today:  N/A.    PSYCHOTROPIC DRUG INTERACTIONS:   Ziprasidone and fluoxetine may result in increased risk for QTc prolongation and serotonin syndrome.  Aspirin and fluoxetine may result in increased risk for bleeding.  MANAGEMENT:  Monitoring for adverse effects, periodic EKGs and patient is aware of risks     PLAN                                                                                                                       m2, h3     1) PSYCHOTROPIC MEDICATIONS:    - Continue ziprasidone 40 mg BID  - Continue fluoxetine 40 mg daily  - Continue lorazepam 1 mg at bedtime       2) THERAPY:    Yes, continue w/   Maile    3) NEXT DUE:    Labs- AP labs by 05/2020  EKG- Last EKG 8/17/16 - QTc 432. Will updated as needed.  Rating Scales- AIMS due March 2020    4) REFERRALS:    No Referrals needed     5) RTC: 8 weeks with new resident    6) CRISIS NUMBERS:   Provided routinely in AVS.  ONLY if a DAV[facility] PT: [facility] [place] [facility] [phone] (clinic), [phone] (after hours)     TREATMENT RISK STATEMENT:  The risks, benefits, alternatives and potential adverse effects have been discussed and are understood by the pt. The pt understands the risks of using street drugs or alcohol. There are no medical contraindications, the pt agrees to treatment with the ability to do so. The pt knows to call the clinic for any problems or to access emergency care if needed.  Medical and substance use concerns are documented above.  Psychotropic drug interaction check was done, including changes made today.    PROVIDER: Garcia Ley MD    Patient staffed in clinic with Dr. Estrada who will sign the note.  Supervisor is Dr. Estrada.    Supervisor Attestation:  I met with Pinky Page along with the resident physician, Garcia Ley MD. I participated in key portions of the service, including the mental status examination and developing the plan of care. I reviewed key portions of the history with the resident. I agree with the findings and plan as documented in this note.  Cortez Estrada MD

## 2019-07-11 ENCOUNTER — OFFICE VISIT (OUTPATIENT)
Dept: PSYCHIATRY | Facility: CLINIC | Age: 69
End: 2019-07-11
Attending: PSYCHIATRY & NEUROLOGY
Payer: COMMERCIAL

## 2019-07-11 VITALS
HEART RATE: 76 BPM | WEIGHT: 231 LBS | DIASTOLIC BLOOD PRESSURE: 74 MMHG | BODY MASS INDEX: 39.2 KG/M2 | SYSTOLIC BLOOD PRESSURE: 149 MMHG

## 2019-07-11 DIAGNOSIS — F25.1 SCHIZOAFFECTIVE DISORDER, DEPRESSIVE TYPE (H): ICD-10-CM

## 2019-07-11 PROCEDURE — G0463 HOSPITAL OUTPT CLINIC VISIT: HCPCS | Mod: ZF

## 2019-07-11 RX ORDER — LORAZEPAM 1 MG/1
TABLET ORAL
Qty: 35 TABLET | Refills: 0 | Status: SHIPPED | OUTPATIENT
Start: 2019-07-11 | End: 2019-07-18

## 2019-07-11 RX ORDER — ZIPRASIDONE HYDROCHLORIDE 40 MG/1
40 CAPSULE ORAL 2 TIMES DAILY WITH MEALS
Qty: 60 CAPSULE | Refills: 2 | Status: SHIPPED | OUTPATIENT
Start: 2019-07-11 | End: 2019-10-09

## 2019-07-11 ASSESSMENT — PAIN SCALES - GENERAL: PAINLEVEL: NO PAIN (0)

## 2019-07-11 NOTE — TELEPHONE ENCOUNTER
Preceptor would like to get clarification surrounding the fact patient did not receive this at last visit, and rationale for providing this now without a visit to discuss the concern.     Routing to PCP to give thoughts, and RN can talk with preceptor again after that.  Stella Mari RN

## 2019-07-11 NOTE — PATIENT INSTRUCTIONS
No changes to your medications today.  Your primary care doctor refilled your Ativan recently.  I have provided refills of your Geodon and Prozac and sent them to your pharmacy.   It was very nice meeting you today!    Thank you for coming to the PSYCHIATRY CLINIC.    Lab Testing:  If you had lab testing today and your results are reassuring or normal they will be mailed to you or sent through [facility] within 7 days.   If the lab tests need quick action we will call you with the results.  The phone number we will call with results is # [phone] (home) . If this is not the best number please call our clinic and change the number.    Medication Refills:  If you need any refills please call your pharmacy and they will contact us. Our fax number for refills is [phone]. Please allow three business for refill processing.   If you need to  your refill at a new pharmacy, please contact the new pharmacy directly. The new pharmacy will help you get your medications transferred.     Scheduling:  If you have any concerns about today's visit or wish to schedule another appointment please call our office during normal business hours [phone] (8-5:00 M-F)    Contact Us:  Please call [phone] during business hours (8-5:00 M-F).  If after clinic hours, or on the weekend, please call  [phone].    Financial Assistance [phone]  [facility] Billing [phone]  [facility] Billing Office, [facility]: [phone]  [facility] Billing [phone]  Medical Records [phone]      MENTAL HEALTH CRISIS NUMBERS:  [facility]:   [facility] - [phone]   Crisis Residence [facility] - [phone]   Walk-In Counseling Center [facility] - [phone]   COPE 24/7 [place] Mobile Team for Adults - [[phone]]; Child - [[phone]]        [facility]:   [facility] - [phone]   Walk-in counseling [facility] - [phone]   Walk-in counseling   [facility] - [phone]   Crisis Residence  [facility] - [phone]   Urgent Care Adult Mental Health:   --Drop-in, 24/7 crisis line, and [facility] Mobile Team [[phone]]    CRISIS TEXT LINE: Text [phone] from anywhere, anytime, any crisis 24/7;    OR SEE [email or web address]     Poison Control Center - [phone]    CHILD: [facility] needs assessment team - [phone]     [facility] Lifeline - [phone]; or [facility] Lifeline - [phone]    If you have a medical emergency please call 911or go to the nearest ER.                    _____________________________________________    Again thank you for choosing PSYCHIATRY CLINIC and please let us know how we can best partner with you to improve you and your family's health.  You may be receiving a survey in the mail regarding this appointment. We would love to have your feedback, both positive and negative, so please fill out the survey and return it using the provided envelope. The survey is done by an external company, so your answers are anonymous.

## 2019-07-12 ASSESSMENT — PATIENT HEALTH QUESTIONNAIRE - PHQ9: SUM OF ALL RESPONSES TO PHQ QUESTIONS 1-9: 8

## 2019-07-17 DIAGNOSIS — F41.1 GENERALIZED ANXIETY DISORDER: ICD-10-CM

## 2019-07-17 NOTE — TELEPHONE ENCOUNTER
Dutch Cummings Laura, RN             She only has 13 LORazepam (ATIVAN) 1 MG tablet left. Her pharmacy needs a copy new script sent over ([facility] - [place] - [address])  According to patient the pharmacy has sent the script over twice with no response            Writer reviewed pt's chart. It appears on 7/11 another provider may have filled this medication with different directions than provider's most recent note says. Called main phone number, which is for [facility] and spoke with the pt's nurse. She is not sure who this provider is and confirmed that [facility], the pt's pharmacy never received that Rx. Agreed to reach out to the provider for approval to refill the med.    Last seen: 7/11/19  RTC: 8 weeks  Cancel: none  No-show: none  Next appt: 9/11/19     Medication requested: lorazepam 1 mg   Directions: Take 1 tab QHS  Qty: 30  Last refilled: 6/11/19 (#35 for 18 d/s- filled by Denise Tejeda), 4/11/19 (#35 for 18 d/s- filled by Symone Reynolds), and 3/6/19 (#35 for 17 d/s).

## 2019-07-18 RX ORDER — LORAZEPAM 1 MG/1
1 TABLET ORAL AT BEDTIME
Qty: 30 TABLET | Refills: 2 | Status: SHIPPED | OUTPATIENT
Start: 2019-07-18 | End: 2019-10-09

## 2019-07-18 NOTE — TELEPHONE ENCOUNTER
Samreen Fitzpatrick, María Larson, ALLEN   Phone Number: [phone]          Previous Messages      ----- Message -----   From: Elodia Lopez   Sent: 7/18/2019   2:13 PM   To: [facility]   Subject: Ley rx renewal                                 Needs a renewed prescriptions     Caller:  Trudi   Relationship to pt: [facility]   Medication:   ativan   How many days left of med do you have left (if >3 day supply, redirect to pharmacy): 10   Pharmacy and location: [facility]   Pending appt date:  9/11   Okay to leave detailed VM:  [phone]     Pharmacy has already sent request 9th, 12th, 16th        Writer called Trudi and informed her the script was faxed to the pharmacy at approximately 12 pm this afternoon. She voiced understanding.

## 2019-07-18 NOTE — TELEPHONE ENCOUNTER
Garcia Ley MD Labossiere, Laura, RN   Caller: Unspecified (Yesterday, 12:51 PM)             Thanks for reaching out to them! Yes, we can refill this med. I will place the order.   Thanks,   Garcia Scott received the signed script from the provider. Faxed to Enrico portillo at [phone] and script held at writer's desk.

## 2019-07-18 NOTE — TELEPHONE ENCOUNTER
Writer called ALLEN Douglas with [facility] to gather more information. Per Stella, the pt's provider with [facility] has not been addressing this medication or mental health concerns at the pt's visits with their clinic. This medication is prescribed for sleep and anxiety.    On 7/9, Ignacio received a RF request for the Ativan, which was initially printed, but then the preceptor did not approve/sign the script. Stella asked that the psychiatry team take care of Ativan refills going forward. Will discuss with Dr. Ley.

## 2019-07-24 ENCOUNTER — OFFICE VISIT (OUTPATIENT)
Dept: PSYCHOLOGY | Facility: CLINIC | Age: 69
End: 2019-07-24
Payer: COMMERCIAL

## 2019-07-24 VITALS — WEIGHT: 229.8 LBS | BODY MASS INDEX: 38.99 KG/M2

## 2019-07-24 DIAGNOSIS — F33.0 MAJOR DEPRESSIVE DISORDER, RECURRENT EPISODE, MILD (H): Primary | ICD-10-CM

## 2019-07-24 DIAGNOSIS — F41.1 GENERALIZED ANXIETY DISORDER: ICD-10-CM

## 2019-07-24 DIAGNOSIS — F54 PSYCHOLOGICAL FACTORS AFFECTING MEDICAL CONDITION: ICD-10-CM

## 2019-07-24 NOTE — PROGRESS NOTES
Health Psychology                  Clinic    Department of Medicine  Madelaine Ingram, Ph.D., L.P. [phone]                          [facility]  [facility] Miryam Woods, Ph.D.,  L.P. [phone]                 3rd Floor  [place] [address]   Pinky Panda, Ph.D., L.P. [phone]    [address] Rae Eugene, Ph.D., L.P. [phone]            [place] [address]           Damon Gr, Ph.D., A.B.P.P., L.P. [phone]      Kimber Morales, Ph.D., L.P. [phone]    Health Psychology Follow-Up Note    Ms. Page is a pleasant 68-year old woman with chronic depressive illness, who returns to clinic for supportive and behavioral psychotherapy for moderate recurrent depression and for help losing weight given her morbid obesity.   Her 69th is tomorrow.  She is 229.8# identical to 229.8# last session.  She is back to exercising after the pain in her right side subsided. She had been gradually increasing exercise, but has been doing it a bit less.  She is encouraged to increased her walking and to use of stationary bicycl. (20 minutes) .    Afer the walk, she was 229.7#  Wt Readings from Last 4 Encounters:   07/24/19 104.2 kg (229 lb 12.8 oz)   07/11/19 104.8 kg (231 lb)   06/25/19 104.2 kg (229 lb 12.8 oz)   05/21/19 104.9 kg (231 lb 3.2 oz)     Past Medical History:   Diagnosis Date     Allergic rhinitis due to pollen     seasonal allergies      Anisometropia and aniseikonia      BMI greater than 40      Cardiomegaly      Chronic constipation      Congenital absence of one kidney      Cramp of limb      Dermatophytosis of the body      Dry eye syndrome      Esophageal reflux      Essential hypertension, benign      Gastro-oesophageal reflux disease      Hemorrhoids      Hypermetropia      Hypothyroidism      Incomplete Emptying of Bladder      Lactose intolerance      Major depressive disorder,  recurrent episode, moderate (H)      Malignant neoplasm of breast (female), unspecified site     left mastectomy 1996      Mixed incontinence urge and stress (male)(female)      Moderate obstructive sleep apnea      Postmenopausal Atrophic Vaginitis      Presbyopia      Regular astigmatism      Restless leg syndrome      Senile nuclear sclerosis      Thyroid eye disease     mild     Tinnitus      Trigger finger (acquired)     right hand     Type II or unspecified type diabetes mellitus without mention of complication, not stated as uncontrolled     on insulin since April 2006      Past Surgical History:   Procedure Laterality Date     APPENDECTOMY       C MASTECTOMY,SIMPLE  1996    Left mastectomy  [facility]. Had normal FU mammo 5/2007. Follow yearly.      C TOTAL ABDOM HYSTERECTOMY  7/2003    Dr. Castanon, for abnormal bleeding. Removal of both tubes with BSO for myoma w - - -     CHOLECYSTECTOMY       COLONOSCOPY  5/2005    Complete Colonoscopy-had one small polyp removed 5/2005.      COLONOSCOPY N/A 3/31/2016    Procedure: COLONOSCOPY;  Surgeon: Cary Ring MD;  Location: [facility]     COLONOSCOPY N/A 7/7/2016    Procedure: COLONOSCOPY;  Surgeon: Cary Ring MD;  Location: [facility]     DILATION AND CURETTAGE       HYSTERECTOMY       MASTECTOMY      Left breast     Current Outpatient Medications   Medication     acetaminophen (TYLENOL) 500 MG tablet     alum & mag hydroxide-simethicone (MYLANTA/MAALOX) 200-200-20 MG/5ML SUSP suspension     amLODIPine (NORVASC) 5 MG tablet     artificial saliva (BIOTENE MT) AERS spray     artificial tears (AKWA TEARS) OINT ophthalmic ointment     aspirin (ASA) 81 MG EC tablet     ASPIRIN 81 MG OR TABS     atorvastatin (LIPITOR) 40 MG tablet     BD AUTOSHIELD DUO 30G X 5 MM     blood glucose (NO BRAND SPECIFIED) lancets standard     blood glucose calibration (NO BRAND SPECIFIED) solution     blood glucose monitoring (NO BRAND SPECIFIED) meter  device kit     blood glucose monitoring (NO BRAND SPECIFIED) test strip     Calcium Carbonate-Vitamin D (CALCIUM-VITAMIN D) 250-125 MG-UNIT TABS     calcium polycarbophil (FIBERCON) 625 MG tablet     CLARITIN 10 MG OR TABS     clotrimazole (LOTRIMIN) 1 % external cream     conjugated estrogens (PREMARIN) 0.625 MG/GM vaginal cream     erythromycin (ROMYCIN) ophthalmic ointment     exenatide (BYETTA) 10 MCG/0.04ML injection     FLUoxetine (PROZAC) 20 MG capsule     fluticasone (FLONASE) 50 MCG/ACT nasal spray     furosemide (LASIX) 20 MG tablet     Gabapentin (NEURONTIN PO)     glucagon (GLUCAGON EMERGENCY) 1 MG kit     Hypromellose (ARTIFICIAL TEARS OP)     insulin glargine (LANTUS) 100 UNIT/ML injection     lactase (LACTAID) 3000 UNIT tablet     levothyroxine (SYNTHROID, LEVOTHROID) 175 MCG tablet     LORazepam (ATIVAN) 1 MG tablet     losartan (COZAAR) 100 MG tablet     Magnesium Hydroxide (MILK OF MAGNESIA PO)     melatonin 3 MG tablet     metFORMIN (GLUCOPHAGE-XR) 500 MG 24 hr tablet     moxifloxacin (VIGAMOX) 0.5 % ophthalmic solution     nystatin (MYCOSTATIN) 208131 UNIT/GM POWD     ONETOUCH DELICA LANCETS 33G MISC     polyethylene glycol (MIRALAX/GLYCOLAX) powder     Saline 0.9 % SOLN     senna-docusate (SENNA S) 8.6-50 MG per tablet     simethicone (MYLICON) 125 MG chewable tablet     Skin Protectants, Misc. (EUCERIN) cream     SM LUBRICANT EYE DROPS 0.4-0.3 % SOLN ophthalmic solution     Sod Fluor-Solo Fluor-Hydrfl Ac (GEL-SASHA DENTINBLOC DT)     solifenacin (VESICARE) 10 MG tablet     terbinafine (LAMISIL) 1 % external cream     ziprasidone (GEODON) 40 MG capsule     No current facility-administered medications for this visit.       PHQ-9 SCORE 3/6/2019 5/1/2019 7/11/2019   PHQ-9 Total Score - - -   PHQ-9 Total Score 6 6 8     NATTY-7 SCORE 7/27/2018 11/7/2018 11/8/2018   Total Score - - -   Total Score 7 7 7     She is greeted in the waiting room. 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   She is satisfied with this level of effort and is getting along well with Tevin.  She is getting along well with her roommate.      She acknowledges her depression is now at baseline (3-4/10) or perhaps slightly better.  Her score on PHQ9 and on  GAD7 both suggest mild sx..   We walked outside part of the session.  Goal for exercise has been 1.5 hours/day as of June 19, 2017 and 2 lbs./month.   We walked two laps around the building.  She arrived early today and was greeted in the waiting room.  Pinky participated fully and appeared to derive benefit.  Rapport was excellent.     Extended session due to complexity of case and length of interval.    IMPRESSION Distress Disability Risk    Low High Low     Time In: 1:03  Time Out:  1:58    Diagnosis:   Major Depression, recurrent mild (F33.0)   Psychological Factors Affecting Morbid Obesity (F54)    Plan: She will return on 9/10  @ 1 for behavioral and supportive psychotherapy.  Will spread out to q 5 weeks.    Last treatment plan signed: 5/21/2019  Treatment plan due: 5/21/2020                         Damon Gr, Ph.D., A.B.P.P., L.P.

## 2019-08-05 ENCOUNTER — OFFICE VISIT (OUTPATIENT)
Dept: ENDOCRINOLOGY | Facility: CLINIC | Age: 69
End: 2019-08-05
Payer: COMMERCIAL

## 2019-08-05 VITALS
SYSTOLIC BLOOD PRESSURE: 143 MMHG | OXYGEN SATURATION: 98 % | BODY MASS INDEX: 39.47 KG/M2 | WEIGHT: 231.2 LBS | DIASTOLIC BLOOD PRESSURE: 72 MMHG | HEART RATE: 68 BPM | HEIGHT: 64 IN

## 2019-08-05 DIAGNOSIS — E66.01 MORBID OBESITY (H): Primary | ICD-10-CM

## 2019-08-05 ASSESSMENT — PAIN SCALES - GENERAL: PAINLEVEL: NO PAIN (0)

## 2019-08-05 ASSESSMENT — MIFFLIN-ST. JEOR: SCORE: 1553.72

## 2019-08-05 NOTE — PROGRESS NOTES
"    Return Medical Weight Management Note     Pinky Page  MRN:  [id number]  :  1950  CORINNE:  19    Dear Dr. Ann,     I had the pleasure of seeing your patient Pinky Page.  She is a 65 year old female who I am continuing to see for treatment of obesity related to: DM-2 and Hypertension.    CURRENT WEIGHT:   231 lbs 3.2 oz    Wt Readings from Last 4 Encounters:   19 104.9 kg (231 lb 3.2 oz)   19 102.4 kg (225 lb 12.8 oz)   19 103.9 kg (229 lb)   19 101.8 kg (224 lb 6.4 oz)     Height:  5' 4\"  Body Mass Index:  Body mass index is 39.69 kg/m .  Vitals:  B/P: 163/75, P: 68    Initial consult weight was 283 on .  Weight change since last seen on 19 is up 8 pounds.   Total loss is 52 pounds.    INTERVAL HISTORY:  Patient has been working on the following dietary changes: Still eating from the diet line at her group home (1500 miguel, no concentrated sweets, some fruit snacks) but says in summer is eating more of new foods.  She continues Byetta 10 cg BID for DM and appetite suppressant.  Is on metformin and her glargine dose is 20/d with HbA1c in 7s.  Patient has been working on the following activity changes: Very regular walking at least 40 minutes / day.    Diet and Activity Changes Since Last Visit Reviewed With Patient 2019   I have made the following changes to my diet since my last visit: less snacks   With regards to my diet, I am still struggling with: food   For breakfast, I typically eat: -   For lunch, I typically eat: -   For supper, I typically eat: -   For snack(s), I typically eat: -   I have made the following changes to my activity/exercise since my last visit: 45 mins bike   With regards to my activity/exercise, I am still struggling with: doing more       MEDICATIONS:   Current Outpatient Medications   Medication     acetaminophen (TYLENOL) 500 MG tablet     alum & mag hydroxide-simethicone (MYLANTA/MAALOX) 200-200-20 MG/5ML SUSP suspension     " amLODIPine (NORVASC) 5 MG tablet     artificial saliva (BIOTENE MT) AERS spray     artificial tears (AKWA TEARS) OINT ophthalmic ointment     aspirin (ASA) 81 MG EC tablet     ASPIRIN 81 MG OR TABS     atorvastatin (LIPITOR) 40 MG tablet     BD AUTOSHIELD DUO 30G X 5 MM     blood glucose (NO BRAND SPECIFIED) lancets standard     blood glucose calibration (NO BRAND SPECIFIED) solution     blood glucose monitoring (NO BRAND SPECIFIED) meter device kit     blood glucose monitoring (NO BRAND SPECIFIED) test strip     Calcium Carbonate-Vitamin D (CALCIUM-VITAMIN D) 250-125 MG-UNIT TABS     calcium polycarbophil (FIBERCON) 625 MG tablet     CLARITIN 10 MG OR TABS     clotrimazole (LOTRIMIN) 1 % external cream     conjugated estrogens (PREMARIN) 0.625 MG/GM vaginal cream     erythromycin (ROMYCIN) ophthalmic ointment     exenatide (BYETTA) 10 MCG/0.04ML injection     FLUoxetine (PROZAC) 20 MG capsule     fluticasone (FLONASE) 50 MCG/ACT nasal spray     furosemide (LASIX) 20 MG tablet     Gabapentin (NEURONTIN PO)     Hypromellose (ARTIFICIAL TEARS OP)     insulin glargine (LANTUS) 100 UNIT/ML injection     lactase (LACTAID) 3000 UNIT tablet     levothyroxine (SYNTHROID, LEVOTHROID) 175 MCG tablet     LORazepam (ATIVAN) 1 MG tablet     losartan (COZAAR) 100 MG tablet     Magnesium Hydroxide (MILK OF MAGNESIA PO)     melatonin 3 MG tablet     metFORMIN (GLUCOPHAGE-XR) 500 MG 24 hr tablet     moxifloxacin (VIGAMOX) 0.5 % ophthalmic solution     nystatin (MYCOSTATIN) 448734 UNIT/GM POWD     ONETOUCH DELICA LANCETS 33G MISC     polyethylene glycol (MIRALAX/GLYCOLAX) powder     Saline 0.9 % SOLN     senna-docusate (SENNA S) 8.6-50 MG per tablet     simethicone (MYLICON) 125 MG chewable tablet     Skin Protectants, Misc. (EUCERIN) cream     SM LUBRICANT EYE DROPS 0.4-0.3 % SOLN ophthalmic solution     Sod Fluor-Solo Fluor-Hydrfl Ac (GEL-SASHA DENTINBLOC DT)     solifenacin (VESICARE) 10 MG tablet     terbinafine (LAMISIL) 1 %  external cream     ziprasidone (GEODON) 40 MG capsule     glucagon (GLUCAGON EMERGENCY) 1 MG kit     No current facility-administered medications for this visit.        Weight Loss Medication History Reviewed With Patient 4/1/2019   Which weight loss medications are you currently taking on a regular basis?  Byetta (exentatide)   Are you having any side effects from the weight loss medication that we have prescribed you? No   If you are having side effects please describe: -     ASSESSMENT:   Continues to do well with weight loss maintenance. Reinforced food plan - focus on no between meal snacking, aggressive lowering of starches and cheese    FOLLOW-UP:    12 weeks.  10/15 minutes spent on counseling and education    Sincerely,    Marcos Magdaleno MD

## 2019-08-05 NOTE — NURSING NOTE
"Chief Complaint   Patient presents with     Weight Problem     MWM return       Vitals:    08/05/19 1117   BP: (!) 143/72   BP Location: Right arm   Patient Position: Chair   Cuff Size: Adult Large   Pulse: 68   SpO2: 98%   Weight: 104.9 kg (231 lb 3.2 oz)   Height: 1.626 m (5' 4\")       Body mass index is 39.69 kg/m .    Andrei Crowder, EMT    "

## 2019-08-05 NOTE — LETTER
"2019       RE: Pinky Page  [address] [facility] [address]  [place] [address]     Dear Colleague,    Thank you for referring your patient, Pinky Page, to the [facility] at [facility] Please see a copy of my visit note below.        Return Medical Weight Management Note     Pinky Page  MRN:  [id number]  :  1950  CORINNE:  19    Dear Dr. Ann,     I had the pleasure of seeing your patient Pinky Page.  She is a 65 year old female who I am continuing to see for treatment of obesity related to: DM-2 and Hypertension.    CURRENT WEIGHT:   231 lbs 3.2 oz    Wt Readings from Last 4 Encounters:   19 104.9 kg (231 lb 3.2 oz)   19 102.4 kg (225 lb 12.8 oz)   19 103.9 kg (229 lb)   19 101.8 kg (224 lb 6.4 oz)     Height:  5' 4\"  Body Mass Index:  Body mass index is 39.69 kg/m .  Vitals:  B/P: 163/75, P: 68    Initial consult weight was 283 on .  Weight change since last seen on 19 is up 8 pounds.   Total loss is 52 pounds.    INTERVAL HISTORY:  Patient has been working on the following dietary changes: Still eating from the diet line at her group home (1500 miguel, no concentrated sweets, some fruit snacks) but says in summer is eating more of new foods.  She continues Byetta 10 cg BID for DM and appetite suppressant.  Is on metformin and her glargine dose is 20/d with HbA1c in 7s.  Patient has been working on the following activity changes: Very regular walking at least 40 minutes / day.    Diet and Activity Changes Since Last Visit Reviewed With Patient 2019   I have made the following changes to my diet since my last visit: less snacks   With regards to my diet, I am still struggling with: food   For breakfast, I typically eat: -   For lunch, I typically eat: -   For supper, I typically eat: -   For snack(s), I typically eat: -   I have made the following changes to my activity/exercise since " my last visit: 45 mins bike   With regards to my activity/exercise, I am still struggling with: doing more       MEDICATIONS:   Current Outpatient Medications   Medication     acetaminophen (TYLENOL) 500 MG tablet     alum & mag hydroxide-simethicone (MYLANTA/MAALOX) 200-200-20 MG/5ML SUSP suspension     amLODIPine (NORVASC) 5 MG tablet     artificial saliva (BIOTENE MT) AERS spray     artificial tears (AKWA TEARS) OINT ophthalmic ointment     aspirin (ASA) 81 MG EC tablet     ASPIRIN 81 MG OR TABS     atorvastatin (LIPITOR) 40 MG tablet     BD AUTOSHIELD DUO 30G X 5 MM     blood glucose (NO BRAND SPECIFIED) lancets standard     blood glucose calibration (NO BRAND SPECIFIED) solution     blood glucose monitoring (NO BRAND SPECIFIED) meter device kit     blood glucose monitoring (NO BRAND SPECIFIED) test strip     Calcium Carbonate-Vitamin D (CALCIUM-VITAMIN D) 250-125 MG-UNIT TABS     calcium polycarbophil (FIBERCON) 625 MG tablet     CLARITIN 10 MG OR TABS     clotrimazole (LOTRIMIN) 1 % external cream     conjugated estrogens (PREMARIN) 0.625 MG/GM vaginal cream     erythromycin (ROMYCIN) ophthalmic ointment     exenatide (BYETTA) 10 MCG/0.04ML injection     FLUoxetine (PROZAC) 20 MG capsule     fluticasone (FLONASE) 50 MCG/ACT nasal spray     furosemide (LASIX) 20 MG tablet     Gabapentin (NEURONTIN PO)     Hypromellose (ARTIFICIAL TEARS OP)     insulin glargine (LANTUS) 100 UNIT/ML injection     lactase (LACTAID) 3000 UNIT tablet     levothyroxine (SYNTHROID, LEVOTHROID) 175 MCG tablet     LORazepam (ATIVAN) 1 MG tablet     losartan (COZAAR) 100 MG tablet     Magnesium Hydroxide (MILK OF MAGNESIA PO)     melatonin 3 MG tablet     metFORMIN (GLUCOPHAGE-XR) 500 MG 24 hr tablet     moxifloxacin (VIGAMOX) 0.5 % ophthalmic solution     nystatin (MYCOSTATIN) 071226 UNIT/GM POWD     ONETOUCH DELICA LANCETS 33G MISC     polyethylene glycol (MIRALAX/GLYCOLAX) powder     Saline 0.9 % SOLN     senna-docusate (SENNA S)  8.6-50 MG per tablet     simethicone (MYLICON) 125 MG chewable tablet     Skin Protectants, Misc. (EUCERIN) cream     SM LUBRICANT EYE DROPS 0.4-0.3 % SOLN ophthalmic solution     Sod Fluor-Solo Fluor-Hydrfl Ac (GEL-SASHA DENTINBLOC DT)     solifenacin (VESICARE) 10 MG tablet     terbinafine (LAMISIL) 1 % external cream     ziprasidone (GEODON) 40 MG capsule     glucagon (GLUCAGON EMERGENCY) 1 MG kit     No current facility-administered medications for this visit.        Weight Loss Medication History Reviewed With Patient 4/1/2019   Which weight loss medications are you currently taking on a regular basis?  Byetta (exentatide)   Are you having any side effects from the weight loss medication that we have prescribed you? No   If you are having side effects please describe: -     ASSESSMENT:   Continues to do well with weight loss maintenance. Reinforced food plan - focus on no between meal snacking, aggressive lowering of starches and cheese    FOLLOW-UP:    12 weeks.  10/15 minutes spent on counseling and education    Sincerely,    Marcos Magdaleno MD

## 2019-08-06 DIAGNOSIS — E66.01 MORBID OBESITY (H): Primary | ICD-10-CM

## 2019-08-06 DIAGNOSIS — E66.01 MORBID OBESITY (H): ICD-10-CM

## 2019-08-06 LAB
CREAT SERPL-MCNC: 0.7 MG/DL (ref 0.5–1)
HBA1C MFR BLD: 6.6 % (ref 4.1–5.7)
TSH SERPL DL<=0.005 MIU/L-ACNC: 1.93 MU/L (ref 0.4–4)

## 2019-08-09 ENCOUNTER — OFFICE VISIT (OUTPATIENT)
Dept: SLEEP MEDICINE | Facility: CLINIC | Age: 69
End: 2019-08-09
Payer: COMMERCIAL

## 2019-08-09 VITALS
RESPIRATION RATE: 18 BRPM | DIASTOLIC BLOOD PRESSURE: 71 MMHG | HEIGHT: 64 IN | WEIGHT: 232 LBS | HEART RATE: 77 BPM | OXYGEN SATURATION: 95 % | BODY MASS INDEX: 39.61 KG/M2 | SYSTOLIC BLOOD PRESSURE: 145 MMHG

## 2019-08-09 DIAGNOSIS — I10 HYPERTENSION, ESSENTIAL: ICD-10-CM

## 2019-08-09 DIAGNOSIS — G47.33 OSA (OBSTRUCTIVE SLEEP APNEA): Primary | ICD-10-CM

## 2019-08-09 PROCEDURE — 99214 OFFICE O/P EST MOD 30 MIN: CPT | Performed by: INTERNAL MEDICINE

## 2019-08-09 ASSESSMENT — MIFFLIN-ST. JEOR: SCORE: 1557.35

## 2019-08-09 NOTE — PROGRESS NOTES
Chief complaint: Follow-up of CPAP compliance    History of Present Illness: 69-year-old female with history of schizoaffective disorder, depression, anxiety, diabetes, moderate obstructive sleep apnea.  She has a history of poor CPAP tolerance in the past but had been reassessed and developed good CPAP tolerance.  However she was seen in May after stopping using her machine for period of at least months.  She developed leak into her eyes that had been bothersome.  She resumed therapy approximately 3 months ago after getting new supplies.  She feels that she is making progress.  She notes that sometimes she falls asleep without the device.  She may be doing puzzles or coloring in bed and then get sleepy.  Sometimes she ends up putting the device on in the middle of the night.  She does have staff that attend to her for blood sugar checks.  She is working hard on weight loss.  She is also following with primary care regarding hypertension.    Total score - [place]: 9 (8/9/2019  1:00 PM) (Less than 10 normal)    CHRISTIANA Total Score: 13 (normal 0-7, mild 8-14, moderate 15-21, severe 22-28)    Past Medical History:   Diagnosis Date     Allergic rhinitis due to pollen     seasonal allergies      Anisometropia and aniseikonia      BMI greater than 40      Cardiomegaly      Chronic constipation      Congenital absence of one kidney      Cramp of limb      Dermatophytosis of the body      Dry eye syndrome      Esophageal reflux      Essential hypertension, benign      Gastro-oesophageal reflux disease      Hemorrhoids      Hypermetropia      Hypothyroidism      Incomplete Emptying of Bladder      Lactose intolerance      Major depressive disorder, recurrent episode, moderate (H)      Malignant neoplasm of breast (female), unspecified site     left mastectomy 1996      Mixed incontinence urge and stress (male)(female)      Moderate obstructive sleep apnea      Postmenopausal Atrophic Vaginitis      Presbyopia      Regular  astigmatism      Restless leg syndrome      Senile nuclear sclerosis      Thyroid eye disease     mild     Tinnitus      Trigger finger (acquired)     right hand     Type II or unspecified type diabetes mellitus without mention of complication, not stated as uncontrolled     on insulin since April 2006        Allergies   Allergen Reactions     Chlordiazepoxide Hcl      Dimetapp Dm Cold-Cough      Cold/Congetion TABS     Haldol      Ibuprofen      TABS     Lactose Intolerance [Beta-Galactosidase]      CAPS     Milk Products      Propofol      EMUL       Current Outpatient Medications   Medication     alum & mag hydroxide-simethicone (MYLANTA/MAALOX) 200-200-20 MG/5ML SUSP suspension     amLODIPine (NORVASC) 5 MG tablet     artificial saliva (BIOTENE MT) AERS spray     artificial tears (AKWA TEARS) OINT ophthalmic ointment     ASPIRIN 81 MG OR TABS     atorvastatin (LIPITOR) 40 MG tablet     BD AUTOSHIELD DUO 30G X 5 MM     blood glucose (NO BRAND SPECIFIED) lancets standard     blood glucose calibration (NO BRAND SPECIFIED) solution     blood glucose monitoring (NO BRAND SPECIFIED) meter device kit     blood glucose monitoring (NO BRAND SPECIFIED) test strip     Calcium Carbonate-Vitamin D (CALCIUM-VITAMIN D) 250-125 MG-UNIT TABS     calcium polycarbophil (FIBERCON) 625 MG tablet     CLARITIN 10 MG OR TABS     conjugated estrogens (PREMARIN) 0.625 MG/GM vaginal cream     erythromycin (ROMYCIN) ophthalmic ointment     exenatide (BYETTA) 10 MCG/0.04ML injection     FLUoxetine (PROZAC) 20 MG capsule     fluticasone (FLONASE) 50 MCG/ACT nasal spray     furosemide (LASIX) 20 MG tablet     Gabapentin (NEURONTIN PO)     glucagon (GLUCAGON EMERGENCY) 1 MG kit     Hypromellose (ARTIFICIAL TEARS OP)     insulin glargine (LANTUS) 100 UNIT/ML injection     lactase (LACTAID) 3000 UNIT tablet     levothyroxine (SYNTHROID, LEVOTHROID) 175 MCG tablet     LORazepam (ATIVAN) 1 MG tablet     Magnesium Hydroxide (MILK OF MAGNESIA PO)      melatonin 3 MG tablet     nystatin (MYCOSTATIN) 835884 UNIT/GM POWD     polyethylene glycol (MIRALAX/GLYCOLAX) powder     Saline 0.9 % SOLN     senna-docusate (SENNA S) 8.6-50 MG per tablet     simethicone (MYLICON) 125 MG chewable tablet     solifenacin (VESICARE) 10 MG tablet     ziprasidone (GEODON) 40 MG capsule     acetaminophen (TYLENOL) 500 MG tablet     clotrimazole (LOTRIMIN) 1 % external cream     losartan (COZAAR) 100 MG tablet     metFORMIN (GLUCOPHAGE-XR) 500 MG 24 hr tablet     moxifloxacin (VIGAMOX) 0.5 % ophthalmic solution     ONETOUCH DELICA LANCETS 33G MISC     Skin Protectants, Misc. (EUCERIN) cream     SM LUBRICANT EYE DROPS 0.4-0.3 % SOLN ophthalmic solution     Sod Fluor-Solo Fluor-Hydrfl Ac (GEL-SASHA DENTINBLOC DT)     terbinafine (LAMISIL) 1 % external cream     No current facility-administered medications for this visit.        Social History     Socioeconomic History     Marital status: Single     Spouse name: Not on file     Number of children: Not on file     Years of education: Not on file     Highest education level: Not on file   Occupational History     Not on file   Social Needs     Financial resource strain: Not on file     Food insecurity:     Worry: Not on file     Inability: Not on file     Transportation needs:     Medical: Not on file     Non-medical: Not on file   Tobacco Use     Smoking status: Never Smoker     Smokeless tobacco: Never Used   Substance and Sexual Activity     Alcohol use: No     Alcohol/week: 0.0 oz     Drug use: No     Sexual activity: Never   Lifestyle     Physical activity:     Days per week: Not on file     Minutes per session: Not on file     Stress: Not on file   Relationships     Social connections:     Talks on phone: Not on file     Gets together: Not on file     Attends Jain service: Not on file     Active member of club or organization: Not on file     Attends meetings of clubs or organizations: Not on file     Relationship status: Not on  "file     Intimate partner violence:     Fear of current or ex partner: Not on file     Emotionally abused: Not on file     Physically abused: Not on file     Forced sexual activity: Not on file   Other Topics Concern     Not on file   Social History Narrative    Pinky Page is a resident at [facility].       Family History   Problem Relation Age of Onset     Heart Disease Father         1968     Melanoma Mother         malignant melanoma     Glaucoma No family hx of      Macular Degeneration No family hx of          EXAM: Obese, pleasant, no distress  BP (!) 151/71   Pulse 75   Resp 18   Ht 1.626 m (5' 4\")   Wt 105.2 kg (232 lb)   SpO2 95%   BMI 39.82 kg/m    Psychiatric: Mood and affect appear normal    PSG date:3/21/2017  Wt:249 lbs  AHI: 15.5    Titration PSG date:4/5/2017  Wt:249  CPAP titrated to 13 cmH2O    PAP download from 7/10/2019 to 8/8/2019 reviewed:  Per cent of days used greater than 4 Hours 47 % (minimum goal greater than 70%)  Average use on days used: 4 hours 11 min  Settings: Min EPAP 13 cmH2O    Max EPAP 15 cmH2O  Pressures delivered 90/95th percentile for pressure 13.6 cmH2O  Average AHI 2.0 events per hour (goal less than 5)  Leak excessive    ASSESSMENT: 69-year-old female with history of moderate obstructive sleep apnea.  Improving compliance with new supplies. AHI normalized when using PAP. Continues to suffer with leak.  She also is not meeting optimal compliance goals.  She may benefit from more assistance and supervision from her group home staff.  As well is more assistance from her medical supplier.    PLAN: Encouraged her to continue to work on achieving compliance goals.  Suggested set an alarm at 10 or 10:30 in case she falls asleep this should remind her to put her mask on at bedtime.  She should also work with [facility] to learn how to optimally fit her mask.  This can continue to work on weight loss and blood pressure management with primary care.  Skin follow-up " in 6 months.  Please see patient instructions for further details of counseling provided.  She is agreeable with this plan.    Twenty-five minutes spent with patient, >50% spent counseling and coordinating care.      Brianda Reddy M.D.  Pulmonary/Critical Care/Sleep Medicine    [facility]   Floor 1, [address]   [address] [place] [address]   Appointments: [phone]    The above note was dictated using voice recognition software and may include typographical errors. Please contact the author for any clarifications.

## 2019-08-09 NOTE — PATIENT INSTRUCTIONS
1.  Continue CPAP every night for all hours that you are sleeping.  If you nap use CPAP.  As always, try to get at least 8 hours of sleep or more each day, and avoid sleep deprivation. Avoid alcohol.    2.  Reasons that you might need a change to your pressure therapy would be weight gain or loss, waking having inadvertently removed your CPAP overnight, having previously felt refreshed by sleep with CPAP use and now waking un-refreshed, and return of daytime sleepiness. Also, the development of new medical problems can sometimes affect breathing at night-heart failure, stroke, medications such as narcotics.    3.  Please bring CPAP with you if you are hospitalized.  If anticipating surgery be sure to discuss with your surgeon that you have sleep apnea and use PAP therapy.      4.  Maintain your equipment as recommended which includes routine cleaning and replacement of supplies.  Call DME for any questions regarding supplies or maintenance.      5.  Do not drive on engage in potentially dangerous activities if feeling sleepy.    6.  Please see me again in 6 months and bring your machine and card to your follow-up visit.      [facility] DME and cpap specialist [phone]  [facility] [phone]    [facility] DME [phone], CPAP specialist [phone]  [facility] [phone]    [facility], [facility] [phone]    [facility] D[facility]  Joann Hernandez [phone]  [facility] [place] [phone]  [facility] DME phone [phone]         Tips for your CPAP and BIPAP use-    Mask fitting tips  Mask fitting exercise:    To improve your mask seal and your mobility at night, put mask on and secure in place.  Lie down in bed with full pressure and roll to one side, adjust headgear while in that position to eliminate any  leaks. Repeat process rolling to other side.     The mask seal does not have to be perfect:   CPAP machines are designed to make up for small leaks. However, you will not tolerate leaks blowing in your eyes so you will need to adjust.   Any leak should only be near or at the bottom of the mask.  We expect your mask to leak slightly at night.    Do not over-tighten the headgear straps, tighter IS NOT better, we expect minimal leak.    First try re-positioning the mask or headgear before tightening the headgear straps.  Mask leaks are expected due to changing sleeping positions. Try pulling the mask away from your skin allowing the cushion to re-inflate will minimize the leak.  If you struggle for a good fit, try turning the CPAP off and then readjust the mask by pulling it away from your face and then turning back on the CPAP.        Humidifier tips  Humidifiers can be adjusted to increase or decrease the amount of moisture according to your comfort level. You may need to adjust this frequently at first, but then might only change it with seasonal weather changes.     Try INCREASING the humidity if:  You experience a dry, irritated nasal passage or throat.  You have a runny, drippy nose or sneezing fits after using CPAP.  You experience nasal congestion during or after CPAP use.    Try DECREASING the humidity if:  You have excessive condensation or  rain out  in the tubing or mask.  Otherwise keep the tubing warm during the night by running it underneath the blankets or pillow.      Clinic visit after initial CPAP and BIPAP set-up   Bring your equipment with you to your 4 week follow up clinic visit.  We will be extracting your data from the machine.        Travel  Always take your equipment with you.  If you fly with your equipment bring it on with you as a carry on.  Medical equipment does not count as a carry on.    If you travel international the machines take 110-240.  The only adapter needed is the adapter that  will fit into the receptacle (outlet).    You may also want to bring an extension cord as many hotel rooms have limited outlets at the bedside.  Do not travel with water in your humidifier chamber.     Cleaning and Maintenance Guidelines    Equipment Frequency Cleaning Method   Mask First Day    Daily      Weekly Soak mask in hot soapy water for 30 minutes, rinse and air dry.  Wipe nasal cushion with a hot soapy (Ivory, baby shampoo) cloth and rinse.  Baby wipes may also be used.  Do not use anti-bacterial soaps,Jessy  liquid soap, rubbing alcohol, bleach or ammonia.  Wash frame in hot soapy water (Ivory, baby shampoo) rinse and let air dry   Headgear Biweekly Wash in hot soapy water, rinse and air dry   Reusable Gray Filter Weekly Wash in hot soapy water, rinse, put in towel squeeze moisture out, let air dry   Disposable White Filter Check Weekly Replace when brown or gray in color; at least every 2 to 3 months   Humidifier Chamber Daily    Weekly Empty distilled water from humidifier and let air dry    Hand wash in hot soapy water, rinse and air dry   Tubing Weekly Wash in hot soapy water, rinse and let air dry   Mask, Tubing and Humidifier Chamber As needed Disinfect: Soak in 1 part vinegar to 3 parts hot water for 30 minutes, rinse well and air dry  Not the material headgear        MASK AND SUPPLY REORDERING  Reminder: Most insurance companies will allow for a new mask, headgear, tubing, and reusable gray filter every six months.  Disposable white ultra-fine filters are covered monthly.    EQUIPMENT NEEDS  Please call our CPAP specialist with any equipment problems, questions or needs.    HOME AND SAFETY INSTRUCTIONS    Do not use frayed or cracked electrical cords, multi plug adaptors, or switched receptacles    Do not immerse electrical equipment into water    Assure that electrical cords do not become a tripping hazard      Your BMI is Body mass index is 39.82 kg/m .  Weight management is a personal decision.   If you are interested in exploring weight loss strategies, the following discussion covers the approaches that may be successful. Body mass index (BMI) is one way to tell whether you are at a healthy weight, overweight, or obese. It measures your weight in relation to your height.  A BMI of 18.5 to 24.9 is in the healthy range. A person with a BMI of 25 to 29.9 is considered overweight, and someone with a BMI of 30 or greater is considered obese. More than two-thirds of American adults are considered overweight or obese.  Being overweight or obese increases the risk for further weight gain. Excess weight may lead to heart disease and diabetes.  Creating and following plans for healthy eating and physical activity may help you improve your health.  Weight control is part of healthy lifestyle and includes exercise, emotional health, and healthy eating habits. Careful eating habits lifelong are the mainstay of weight control. Though there are significant health benefits from weight loss, long-term weight loss with diet alone may be very difficult to achieve- studies show long-term success with dietary management in less than 10% of people. Attaining a healthy weight may be especially difficult to achieve in those with severe obesity. In some cases, medications, devices and surgical management might be considered.  What can you do?  If you are overweight or obese and are interested in methods for weight loss, you should discuss this with your provider.     Consider reducing daily calorie intake by 500 calories.     Keep a food journal.     Avoiding skipping meals, consider cutting portions instead.    Diet combined with exercise helps maintain muscle while optimizing fat loss. Strength training is particularly important for building and maintaining muscle mass. Exercise helps reduce stress, increase energy, and improves fitness. Increasing exercise without diet control, however, may not burn enough calories to loose  weight.       Start walking three days a week 10-20 minutes at a time    Work towards walking thirty minutes five days a week     Eventually, increase the speed of your walking for 1-2 minutes at time    In addition, we recommend that you review healthy lifestyles and methods for weight loss available through the National Institutes of Health patient information sites:  http://win.niddk.nih.gov/publications/index.htm    And look into health and wellness programs that may be available through your health insurance provider, employer, local community center, or wally club.    Weight management plan: Patient was referred to their PCP to discuss a diet and exercise plan.    Your blood pressure was checked while you were in clinic today.  Please read the guidelines below about what these numbers mean and what you should do about them.  Your systolic blood pressure is the top number.  This is the pressure when the heart is pumping.  Your diastolic blood pressure is the bottom number.  This is the pressure in between beats.  If your systolic blood pressure is less than 120 and your diastolic blood pressure is less than 80, then your blood pressure is normal. There is nothing more that you need to do about it  If your systolic blood pressure is 120-139 or your diastolic blood pressure is 80-89, your blood pressure may be higher than it should be.  You should have your blood pressure re-checked within a year by a primary care provider.  If your systolic blood pressure is 140 or greater or your diastolic blood pressure is 90 or greater, you may have high blood pressure.  High blood pressure is treatable, but if left untreated over time it can put you at risk for heart attack, stroke, or kidney failure.  You should have your blood pressure re-checked by a primary care provider within the next four weeks.

## 2019-08-15 ENCOUNTER — TELEPHONE (OUTPATIENT)
Dept: OPHTHALMOLOGY | Facility: CLINIC | Age: 69
End: 2019-08-15

## 2019-08-15 ENCOUNTER — NURSE TRIAGE (OUTPATIENT)
Dept: INTERNAL MEDICINE | Facility: CLINIC | Age: 69
End: 2019-08-15

## 2019-08-15 NOTE — TELEPHONE ENCOUNTER
[facility]: Nurse Triage Note  SITUATION/BACKGROUND                                                      Pinky Page is a 69 year old female. Jaclyn at [facility] is RN and caller reporting symptoms OS  Reddened sclera  Clear drainage  Photophobia  Blurry vision with copious tearing  Rates pain at 7/10  Eyelids reddened and somewhat swollen  No specific trauma noted    Last saw Dr Lopez 11/15/2018:  1. 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 Age-related nuclear cataract of both eyes    3. Insufficiency of tear film of both eyes    4. Anatomical narrow angle borderline glaucoma of both eyes         Using artificial tears and warm compresses now.       Allergies:   Allergies   Allergen Reactions     Chlordiazepoxide Hcl      Dimetapp Dm Cold-Cough      Cold/Congetion TABS     Haldol      Ibuprofen      TABS     Lactose Intolerance [Beta-Galactosidase]      CAPS     Milk Products      Propofol      EMUL       ASSESSMENT     Patient with eye pain 7/10, clear tearing,blurriness with tearing , no floaters/flasshers but keeping eye shut photophobia, residence RN looking for appt today if possible or will send to .   High priority to eye clinic now  RECOMMENDATION/PLAN                                                      RECOMMENDED DISPOSITION:  See today eval  Eye pain  Will comply with recommendation: Yes    If further questions/concerns or if symptoms do not improve, worsen or new symptoms develop, call your PCP or [phone] to talk with the Resident on call, as soon as possible.    Guideline used: pp219 eye problems  Telephone Triage Protocols for Nurses, Fifth Edition, Cary Still RN

## 2019-08-15 NOTE — TELEPHONE ENCOUNTER
Pt seen in urgent care today  Cornea abrasion and f/u in one week per nursing    Pt care center across street from [facility] where pt normally goes to.  Care center was provided [facility] number for scheduling from urgent care and will f/u with [facility] for scheduling    Care center has direct number if needs any further assistance.  Dez Vega RN 5:09 PM 08/15/19

## 2019-08-15 NOTE — TELEPHONE ENCOUNTER
Spoke to nursing at 1430    H/o recurrent cornea erosions per care everywhere.  Pt seen at [facility] in past and once here    Pt woke up and experienced pain/tearing/redness this AM    Pt went to urgent care    Reviewed s/s of recurrent cornea erosions  Pt is using preservative free artificial tears and uses lubricating eye ointment at night    Provided direct triage number to call if would like to f/u with [facility] following urgent care visit today  Dez Vega RN 2:51 PM 08/15/19

## 2019-09-08 NOTE — PROGRESS NOTES
Encounter opened in error. Patient not seen due to time constraints. Seen by Bossman Vargas RN. Please see his note dated 9/11/19 for details.     Garcia Ley MD

## 2019-09-10 ENCOUNTER — OFFICE VISIT (OUTPATIENT)
Dept: PSYCHOLOGY | Facility: CLINIC | Age: 69
End: 2019-09-10
Payer: COMMERCIAL

## 2019-09-10 VITALS — WEIGHT: 231.7 LBS | BODY MASS INDEX: 39.77 KG/M2

## 2019-09-10 DIAGNOSIS — F33.0 MAJOR DEPRESSIVE DISORDER, RECURRENT EPISODE, MILD (H): Primary | ICD-10-CM

## 2019-09-10 DIAGNOSIS — E66.9 OBESITY, UNSPECIFIED OBESITY SEVERITY, UNSPECIFIED OBESITY TYPE: ICD-10-CM

## 2019-09-10 DIAGNOSIS — F54 PSYCHOLOGICAL FACTORS AFFECTING MEDICAL CONDITION: ICD-10-CM

## 2019-09-10 DIAGNOSIS — F41.1 GENERALIZED ANXIETY DISORDER: ICD-10-CM

## 2019-09-10 NOTE — PROGRESS NOTES
Health Psychology                  Clinic    Department of Medicine  Madelaine Ingram, Ph.D., L.P. [phone]                          [facility]  [facility] Miryam Woods, Ph.D.,  L.P. [phone]                 3rd Floor  [place] Mail Code 747   Pinky Panda, Ph.D., L.P. [phone] [address] Rae Eugene, Ph.D., L.P. [phone]            [place] [address]           Damon Gr, Ph.D., A.B.P.P., L.P. [phone]      Kimber Morales, Ph.D., L.P. [phone]    Health Psychology Follow-Up Note    Ms. Page is a pleasant 68-year old woman with chronic depressive illness, who returns to clinic for supportive and behavioral psychotherapy for moderate recurrent depression and for help losing weight given her morbid obesity.   Her 69th is tomorrow.  She is 231.7# up from  229.7#.  She is back to exercising after the pain in her right side subsided. She had been gradually increasing exercise, but has been doing it a bit less.  She is encouraged to increased her walking and to use of stationary bicycle. (20 minutes) .    She ws aware that she had likely gained weight.  We did not walk today.  We discussed ways of lengthening her walks (e.g., doubling laps around [place])  and increasing her time on exercycle bike (I.e. 50 50 minutes)   Wt Readings from Last 4 Encounters:   09/10/19 105.1 kg (231 lb 11.2 oz)   08/09/19 105.2 kg (232 lb)   08/05/19 104.9 kg (231 lb 3.2 oz)   07/24/19 104.2 kg (229 lb 12.8 oz)     Past Medical History:   Diagnosis Date     Allergic rhinitis due to pollen     seasonal allergies      Anisometropia and aniseikonia      BMI greater than 40      Cardiomegaly      Chronic constipation      Congenital absence of one kidney      Cramp of limb      Dermatophytosis of the body      Dry eye syndrome      Esophageal reflux      Essential hypertension, benign       Gastro-oesophageal reflux disease      Hemorrhoids      Hypermetropia      Hypothyroidism      Incomplete Emptying of Bladder      Lactose intolerance      Major depressive disorder, recurrent episode, moderate (H)      Malignant neoplasm of breast (female), unspecified site     left mastectomy 1996      Mixed incontinence urge and stress (male)(female)      Moderate obstructive sleep apnea      Postmenopausal Atrophic Vaginitis      Presbyopia      Regular astigmatism      Restless leg syndrome      Senile nuclear sclerosis      Thyroid eye disease     mild     Tinnitus      Trigger finger (acquired)     right hand     Type II or unspecified type diabetes mellitus without mention of complication, not stated as uncontrolled     on insulin since April 2006      Past Surgical History:   Procedure Laterality Date     APPENDECTOMY       C MASTECTOMY,SIMPLE  1996    Left mastectomy  [facility]. Had normal FU mammo 5/2007. Follow yearly.      C TOTAL ABDOM HYSTERECTOMY  7/2003    Dr. Castanon, for abnormal bleeding. Removal of both tubes with BSO for myoma w - - -     CHOLECYSTECTOMY       COLONOSCOPY  5/2005    Complete Colonoscopy-had one small polyp removed 5/2005.      COLONOSCOPY N/A 3/31/2016    Procedure: COLONOSCOPY;  Surgeon: Cary Ring MD;  Location: [facility]     COLONOSCOPY N/A 7/7/2016    Procedure: COLONOSCOPY;  Surgeon: Cary Ring MD;  Location: [facility]     DILATION AND CURETTAGE       HYSTERECTOMY       MASTECTOMY      Left breast     Current Outpatient Medications   Medication     acetaminophen (TYLENOL) 500 MG tablet     alum & mag hydroxide-simethicone (MYLANTA/MAALOX) 200-200-20 MG/5ML SUSP suspension     amLODIPine (NORVASC) 5 MG tablet     artificial saliva (BIOTENE MT) AERS spray     artificial tears (AKWA TEARS) OINT ophthalmic ointment     ASPIRIN 81 MG OR TABS     atorvastatin (LIPITOR) 40 MG tablet     BD AUTOSHIELD DUO 30G X 5 MM     blood glucose (NO  BRAND SPECIFIED) lancets standard     blood glucose calibration (NO BRAND SPECIFIED) solution     blood glucose monitoring (NO BRAND SPECIFIED) meter device kit     blood glucose monitoring (NO BRAND SPECIFIED) test strip     Calcium Carbonate-Vitamin D (CALCIUM-VITAMIN D) 250-125 MG-UNIT TABS     calcium polycarbophil (FIBERCON) 625 MG tablet     CLARITIN 10 MG OR TABS     clotrimazole (LOTRIMIN) 1 % external cream     conjugated estrogens (PREMARIN) 0.625 MG/GM vaginal cream     erythromycin (ROMYCIN) ophthalmic ointment     exenatide (BYETTA) 10 MCG/0.04ML injection     FLUoxetine (PROZAC) 20 MG capsule     fluticasone (FLONASE) 50 MCG/ACT nasal spray     furosemide (LASIX) 20 MG tablet     Gabapentin (NEURONTIN PO)     glucagon (GLUCAGON EMERGENCY) 1 MG kit     Hypromellose (ARTIFICIAL TEARS OP)     insulin glargine (LANTUS) 100 UNIT/ML injection     lactase (LACTAID) 3000 UNIT tablet     levothyroxine (SYNTHROID, LEVOTHROID) 175 MCG tablet     LORazepam (ATIVAN) 1 MG tablet     losartan (COZAAR) 100 MG tablet     Magnesium Hydroxide (MILK OF MAGNESIA PO)     melatonin 3 MG tablet     metFORMIN (GLUCOPHAGE-XR) 500 MG 24 hr tablet     moxifloxacin (VIGAMOX) 0.5 % ophthalmic solution     nystatin (MYCOSTATIN) 682716 UNIT/GM POWD     ONETOUCH DELICA LANCETS 33G MISC     polyethylene glycol (MIRALAX/GLYCOLAX) powder     Saline 0.9 % SOLN     senna-docusate (SENNA S) 8.6-50 MG per tablet     simethicone (MYLICON) 125 MG chewable tablet     Skin Protectants, Misc. (EUCERIN) cream     SM LUBRICANT EYE DROPS 0.4-0.3 % SOLN ophthalmic solution     Sod Fluor-Solo Fluor-Hydrfl Ac (GEL-SASHA DENTINBLOC DT)     solifenacin (VESICARE) 10 MG tablet     terbinafine (LAMISIL) 1 % external cream     ziprasidone (GEODON) 40 MG capsule     No current facility-administered medications for this visit.       PHQ-9 SCORE 3/6/2019 5/1/2019 7/11/2019   PHQ-9 Total Score - - -   PHQ-9 Total Score 6 6 8     NATTY-7 SCORE 7/27/2018 11/7/2018  11/8/2018   Total Score - - -   Total Score 7 7 7     She arrived early today.   She is greeted in the waiting room. 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   She is satisfied with this level of effort and is getting along well with Tevin.  She is getting along well with her roommate.  She acknowledges her depression is now at baseline (3-4/10) or perhaps slightly better.    Goal for exercise has been 1.5 hours/day as of June 19, 2017 and 2 lbs./month.   We walked two laps around the building.   Pinky participated fully and appeared to derive benefit.  Rapport was excellent.     Extended session due to complexity of case and length of interval.    IMPRESSION Distress Disability Risk    Low High Low     Time In: 1:07  Time Out: 2:00    Diagnosis:   Major Depression, recurrent mild (F33.0)   Psychological Factors Affecting Morbid Obesity (F54)    Plan: She will return on 10/16 @ 1 for behavioral and supportive psychotherapy.  Will spread out to q 5 weeks.    Last treatment plan signed: 5/21/2019  Treatment plan due: 5/21/2020                         Damon Gr, Ph.D., A.B.P.P., L.P.

## 2019-09-11 ENCOUNTER — CARE COORDINATION (OUTPATIENT)
Dept: PSYCHIATRY | Facility: CLINIC | Age: 69
End: 2019-09-11

## 2019-09-11 ENCOUNTER — MEDICAL CORRESPONDENCE (OUTPATIENT)
Dept: HEALTH INFORMATION MANAGEMENT | Facility: CLINIC | Age: 69
End: 2019-09-11

## 2019-09-11 ENCOUNTER — OFFICE VISIT (OUTPATIENT)
Dept: PSYCHIATRY | Facility: CLINIC | Age: 69
End: 2019-09-11
Attending: PSYCHIATRY & NEUROLOGY
Payer: COMMERCIAL

## 2019-09-11 VITALS
DIASTOLIC BLOOD PRESSURE: 78 MMHG | WEIGHT: 233 LBS | SYSTOLIC BLOOD PRESSURE: 139 MMHG | BODY MASS INDEX: 39.99 KG/M2 | HEART RATE: 72 BPM

## 2019-09-11 DIAGNOSIS — F25.1 SCHIZOAFFECTIVE DISORDER, DEPRESSIVE TYPE (H): ICD-10-CM

## 2019-09-11 DIAGNOSIS — Z53.9 ERRONEOUS ENCOUNTER--DISREGARD: Primary | ICD-10-CM

## 2019-09-11 PROCEDURE — G0463 HOSPITAL OUTPT CLINIC VISIT: HCPCS | Mod: ZF

## 2019-09-11 ASSESSMENT — PAIN SCALES - GENERAL: PAINLEVEL: NO PAIN (0)

## 2019-09-11 NOTE — PROGRESS NOTES
[facility] PS[facility]AT[facility] CLINIC NURSING ASSESSMENT NOTE       SITUATION                                                                                                                 Pinky Page presents in-clinic for the following reason: scheduled appointment, provider not available.      BACKGROUND                                                                                                                   Today was a scheduled 2-month follow up appointment.      ASSESSMENT                                                                                                                   ==> Mood <==    PHQ-9 score today=6.    How has your mood been? Good. Fluctuates somewhat, but today is good.    Mood (include other cultures' views - heavy heart, heaviness, pain): description consistent with euthymia  Reports None  Denies suicidal ideation, self-destructive thoughts, depressed mood and anhedonia  What do you do for fun? N/A  Affect: full range and appropriate; was congruent to mood; was congruent to content    Martha:  Reports none  Denies none    Speech: normal and regular rate and rhythm      ==> Sustance use: <==    Alcohol: no      Tobacco (be alert for effects on sleep, medication effectiveness}: no     Caffeine: coffee/ tea [2-3 cups] and soda [reduced to 1 can per day]  Opioids: no         NarCan kit: N/A        Cannabis: no            Other illicit drugs: none                      ==> Psychosis <==    Perception (hallucinations):  Reports none;  Denies auditory hallucinations and visual hallucinations  Thought Content (include paranoia, ideas of reference, thought broadcasting/insertion, grandiosity, religiosity, other delusions):  Reports N/A;  Denies N/A  Thought Process/Associations:  (confusion, able to focus on task or conversation, cognition, poverty of content, guardedness): unremarkable       ==> Suicide, safety <==    If detailed assessment needed, see CAMS Suicide Status Form on  Please review rectal swab result.   [facility] Share.    Suicidal thoughts: no  Homicidal thoughts: no  Thoughts of self-harm: no  Concerns for safety in home or relationships (physical and emotional): no  Plan for suicide / self-harm: no  Has the patient acted on any of these? N/A  Access to means (firearm, rope, knife, medications, automobile): N/A  Change in frequency and/or intensity of thoughts: N/A  New stressors or life changes: no  Hope for future, motivation to not attempt suicide: N/A  Strengths and resources, skills and abilities, vulnerabilities: N/A  Support system (mary, family, through relationships and from professionals (including therapist)): N/A  Likelihood to act on these thoughts: N/A  Does the patient feel a need to be hospitalized? N/A  Does the patient feel a need a referral to a crisis residence? N/A  What do you plan to do today / over the weekend / until your next appointment? N/@    SUICIDE RISK ASSESSMENT:  Rate SI-desire: 0/5, intent: 0/5.  Pinky Page reports no suicidal thoughts.             See community resources on [facility] Share.  Use PSYCHRISKSUICIDESAFETY smart phrase for crisis plan.    Other crisis plan (where to go, who to contact):      ==> Miscellaneous symptoms <==    Anxiety: no  Reports N/A  Denies N/A    ==> Medications <==    Review ALL meds with patient.  Include coaching on effects of alcohol and other substances.    Taking medications as prescribed: yes  Difficulty obtaining refills: no  Medication side effect concerns: no  Concerns with pregnancy and medications: N/A  Questions about meds: no  Any new meds prescribed outside of psych clinic: N/A    Medications are managed by [facility] staff.       ==> Miscellaneous assessments <==    How have you been sleeping (include ability to fall asleep and stay asleep)? Sleep is OK. Uses CPAP when having difficulty falling asleep; otherwise it interferes with her sleep.  Diet: N/A  Exercise: yes, rides stationary bike several times per week  Physical health  concerns: no  Getting out of house when needed: yes, groups, volunteers, once weekly employment  Alertness: alert  and oriented  Appearance: casually groomed  Behavior/Demeanor (include restlessness): cooperative, pleasant and calm, with good  eye contact   Speech: normal and regular rate and rhythm  Language: intact and no problems  Psychomotor: slight restlessness observed in feet  Housing concerns: no  Transportation concerns: no  Insight: excellent  Judgment: excellent  Cognition: does  appear grossly intact; formal cognitive testing was not done  Orientation: yes  Working with therapist: N/A            Is there anything else you want me to know? no      RECOMMENDATION                                                                                                     Refill needed of fluoxetine.  Pt agreed to meet with Dr. Ley on 10/9 @ 2:00PM.Writer routed message to scheduling to convert held time.    Writer e-prescribed 30-day supply of fluoxetine 20 mg to [facility] ([phone]). Qty 60, refills 0.    Patient provided paperwork related to visit from [facility]. Writer signed, copied, returned to pt, routed to scanning.

## 2019-09-13 ASSESSMENT — PATIENT HEALTH QUESTIONNAIRE - PHQ9: SUM OF ALL RESPONSES TO PHQ QUESTIONS 1-9: 6

## 2019-10-08 NOTE — PROGRESS NOTES
"[facility] PSYCHIATRY CLINIC PROGRESS NOTE     CARE TEAM:  PCP- Cary Brownlee (resident working at [facility] with Dr. Birmingham, attending)   Specialty Providers- no    Therapist- Dr. Gr    [facility] Team- no    Pinky Page is a 69 year old female who prefers the name Pinky & pronouns she, her, hers. Date of initial diagnostic assessment is 5/13/13.  Date of most recent transfer of care assessment is 07/30/18.     Pertinent Background:  This patient first experienced mental health issues in her twenties and has received treatment for schizoaffective disorder and generalized anxiety.  History details in last diagnostic assessment.  Notably, Pinky's symptoms of depression and psychosis have responded well to a combination of ECT and medication management. Pinky has a history of experiencing increasingly frequent psychotic symptoms w/ previously attempted antipsychotic tapers.        Psych critical item history includes psychosis [sxs include disorganization, hallucinations, paranoia], mutiple psychotropic trials, psych hosp (3-5) and ECT.    INTERIM HISTORY                                                                                              4, 4   The patient reports good treatment adherence.  History was provided by the patient who was a good historian.  The last visit ended with the following med change: discontinuation of additional 1mg PRN Ativan. No other changes. Attended appt with Dr. Gr 5/21    Since the last visit:   - Followed up with sleep medicine, wt [facility], therapist  -Was unable to meet with patient 9/11 due to time constraints.  Seen by Galo Vargas RN.  Provided refills, rescheduled with me for today.  - \"Things are going well.\" Enjoying her life, going for walks, attending groups regularly, occasionally going shopping at [employer]. Mood feels stable.   - Spending some time sending greeting cards to family.   - Anxiety still present from time to time, \"but it really feels under " "control.\" No acute changes, no panic  - Occasional irritability, unchanged from last visit, usually joel by taking a break, taking a walk  - Using Ativan every night, finds helpful for anxiety, depression and sleep.  - Still has off/on problems sleeping. Uses CPAP. Wakes in night sometimes, occasionally has trouble falling back asleep. Doesn't occur every night. Ongoing for many years, no acute changes.   - Appetite is strong, eating well. Making sure to walk every day, rides stationary bike 4x per week.   - Looking forward to the holidays, usually spends them with her sister in [place].     RECENT SYMPTOMS:   DEPRESSION:  reports-insomnia;  DENIES- suicidal ideation, self-destructive thoughts, depressed mood, feeling worthless, feeling hopeless and overwhelmed  MELVIN/HYPOMANIA:  reports-none;  DENIES- increased energy, decreased sleep need, increased activity and grandiosity  PSYCHOSIS:  reports-none;  DENIES- delusions, auditory hallucinations and visual hallucinations  DYSREGULATION:  reports-none;  DENIES- suicidal ideation, violent ideation and SIB  PANIC ATTACK:  none   ANXIETY:  excessive worry off and on, more days without anxiety than with anxiety  TRAUMA RELATED:  none  COMPULSIVE:  none   SLEEP: No problems  EATING DISORDER: no     RECENT SUBSTANCE USE:     ALCOHOL- no      TOBACCO- no     CAFFEINE- coffee/ tea [coffee, diet Pepsi]  OPIOIDS- no         NARCAN KIT- no        CANNABIS- no            OTHER ILLICIT DRUGS- none      CURRENT SOCIAL HISTORY:  FINANCIAL SUPPORT- social security disability       CHILDREN- no      LIVING SITUATION- Lives at [facility] for past 20+ years  SOCIAL/ SPIRITUAL SUPPORT- sisters, support staff at [facility], other residents at [place], other providers     FEELS SAFE AT HOME- yes     MEDICAL ROS (2,10):  Reports none, none, none   Occasionally notices tremor in R arm and that her toes are moving in her shoes, both feet. Denies headaches, sexual function " problems [none], short term memory and/or word finding difficulty, wt gain, tremor, akathisia, spasms, bruxism, easy bruising , GI sxs [N/V/D/Constipation], akathisia, muscle problems [stiffness], unusual movements, wt gain, sexual function problems [none], polydipsia, polyphagia and Parkinsonian-type symptoms [shuffling gait, masked facies, stooped posture, speech change, writing change], rash, hair loss , menstrual abnormality, skin/eye discoloration and bleeding or freq bruising    PSYCH and CD Critical Summary Points since July 2018 7/30 - resident transition, no changes  9/10 - no changes  10/25 - increased anxiety, increased lorazepam, fluoxetine  11/19 - decreased lorazepam  1/4 - decreased fluoxetine  3/6 - no changes  5/1 - discontinued PRN Ativan  7/11 - resident transition  Sept 2019 - no changes  Oct 2019 -no change.  Transition to María Shi MD    PAST PSYCH MED TRIALS   see EMR Problem List: Hx of psychiatric care    MEDICAL / SURGICAL HISTORY                                   Pregnant or breastfeeding- no      Contraception- None    Neurologic Hx- no   Patient Active Problem List   Diagnosis     Allergic rhinitis due to pollen     Urge incontinence     Hypertension, essential     Cardiomegaly     Chronic constipation     Dry eye syndrome     Esophageal reflux     Exposure keratoconjunctivitis     DM ophthalmopathy (H)     Hypothyroidism     Senile cataract     ANUJ (obstructive sleep apnea)     Vaginal atrophy     Restless leg syndrome     Squamous blepharitis     Morbid obesity due to excess calories (H)     Personal history of breast cancer s/p L masectomy     Diabetes mellitus type 2, insulin dependent (H)     Hypercholesteremia     Lives in group home     Extrapyramidal and movement disorder     CKD (chronic kidney disease) stage 2, GFR 60-89 ml/min     Solitary kidney, congenital     S/P hysterectomy     Impingement syndrome of right shoulder     Hypertriglyceridemia     Candidiasis  of skin     Generalized anxiety disorder     Carpal tunnel syndrome of left wrist     Schizoaffective disorder, depressive type (H)     Major depressive disorder, recurrent episode, moderate (H)     Psychological factors affecting medical condition     Hx of psychiatric care     Nail complaint     Microalbuminuria due to type 2 diabetes mellitus (H)     Type 2 diabetes mellitus with microalbuminuria, with long-term current use of insulin (H)     Diabetes mellitus, type II, insulin dependent (H)     CKD (chronic kidney disease) stage 1, GFR 90 ml/min or greater     Conjunctivitis of both eyes     Corneal erosion of both eyes     Spastic entropion, right       Major Surgery- not discussed    ALLERGY                                Chlordiazepoxide hcl; Dimetapp dm cold-cough; Haldol; Ibuprofen; Lactose intolerance [beta-galactosidase]; Milk products; and Propofol  MEDICATIONS                               Current Outpatient Medications   Medication Sig Dispense Refill     acetaminophen (TYLENOL) 500 MG tablet Take 2 tablets (1,000 mg) by mouth every 6 hours as needed 1 Bottle 2     alum & mag hydroxide-simethicone (MYLANTA/MAALOX) 200-200-20 MG/5ML SUSP suspension Take 30 mLs by mouth daily as needed for indigestion       amLODIPine (NORVASC) 5 MG tablet Take 2 tablets (10 mg) by mouth daily 30 tablet 3     artificial saliva (BIOTENE MT) AERS spray Take 1 spray by mouth 3 times daily as needed for dry mouth       artificial tears (AKWA TEARS) OINT ophthalmic ointment Apply  to eye. Place 0.5 inches into both eyes at bedtime 1 Tube 11     ASPIRIN 81 MG OR TABS Take 1 tablet by mouth daily. ENTERIC COATED. 100 3     atorvastatin (LIPITOR) 40 MG tablet Take 1 tablet (40 mg) by mouth daily 90 tablet 1     BD AUTOSHIELD DUO 30G X 5 MM        blood glucose (NO BRAND SPECIFIED) lancets standard Use to test blood sugar 2 times daily or as directed. 100 each 11     blood glucose calibration (NO BRAND SPECIFIED) solution Use to  calibrate blood glucose monitor as directed. 1 each 3     blood glucose monitoring (NO BRAND SPECIFIED) meter device kit Check Blood sugars twice a day. 1 kit 0     blood glucose monitoring (NO BRAND SPECIFIED) test strip Use to test blood sugar 3 times daily or as directed. 100 each 3     Calcium Carbonate-Vitamin D (CALCIUM-VITAMIN D) 250-125 MG-UNIT TABS Take 1 tablet by mouth 2 times daily. Calcium 250 mg/Vit D 125 IU       calcium polycarbophil (FIBERCON) 625 MG tablet Take 2 tablets by mouth daily       CLARITIN 10 MG OR TABS 1 TAB PO QD (Once per day) as needed for ALLERGY SYMPTOMS 30 11     clotrimazole (LOTRIMIN) 1 % external cream Apply topically 2 times daily as needed (apply to area underneath pannus as needed for itching) 50 g 1     conjugated estrogens (PREMARIN) 0.625 MG/GM vaginal cream Place 0.5 g vaginally At Bedtime 30 g 0     erythromycin (ROMYCIN) ophthalmic ointment Place 0.5 inches into both eyes 4 times daily       exenatide (BYETTA) 10 MCG/0.04ML injection Inject 10 mcg Subcutaneous 2 times daily (before meals) 1 Syringe 11     FLUoxetine (PROZAC) 20 MG capsule Take 2 capsules (40 mg) by mouth daily 60 capsule 2     fluticasone (FLONASE) 50 MCG/ACT nasal spray Spray 1 spray into both nostrils daily 16 g 3     furosemide (LASIX) 20 MG tablet Take 1 tablet (20 mg) by mouth 2 times daily 60 tablet 3     Gabapentin (NEURONTIN PO) Take 900 mg by mouth 3 times daily.       Hypromellose (ARTIFICIAL TEARS OP) Apply 1 drop to eye 4 times daily.       insulin glargine (LANTUS) 100 UNIT/ML injection Inject 20 Units Subcutaneous At Bedtime 8 mL 11     lactase (LACTAID) 3000 UNIT tablet Take 4 tabs daily with meals.       levothyroxine (SYNTHROID, LEVOTHROID) 175 MCG tablet Take 1 tablet (175 mcg) by mouth daily Give on empty stomach 30 tablet 4     LORazepam (ATIVAN) 1 MG tablet Take 1 tablet (1 mg) by mouth At Bedtime Take 1 tablet at bedtime. 30 tablet 2     losartan (COZAAR) 100 MG tablet Take 1  tablet by mouth daily. 90 tablet 1     Magnesium Hydroxide (MILK OF MAGNESIA PO) Take  by mouth. Take 30 mL as needed for constipation.       melatonin 3 MG tablet Take 1 tablet (3 mg) by mouth nightly as needed for sleep 60 tablet 1     metFORMIN (GLUCOPHAGE-XR) 500 MG 24 hr tablet Take 2 tablets (1,000 mg) by mouth 2 times daily (with meals) (Patient taking differently: Take 2,000 mg by mouth daily ) 90 tablet 3     moxifloxacin (VIGAMOX) 0.5 % ophthalmic solution Place 1 drop into both eyes 4 times daily       nystatin (MYCOSTATIN) 623526 UNIT/GM POWD Apply topically 3 times daily as needed 60 g 1     ONETOUCH DELICA LANCETS 33G MISC        polyethylene glycol (MIRALAX/GLYCOLAX) powder Take 1 capful by mouth 2 times daily. 17 GM PO BID       Saline 0.9 % SOLN Spray 2 sprays in nostril as needed.       senna-docusate (SENNA S) 8.6-50 MG per tablet Take 2 tablets by mouth At Bedtime.       simethicone (MYLICON) 125 MG chewable tablet Take 1 tablet (125 mg) by mouth 2 times daily 60 tablet 0     Skin Protectants, Misc. (EUCERIN) cream Apply  topically as needed. Apply to thigh PRN dry skin        SM LUBRICANT EYE DROPS 0.4-0.3 % SOLN ophthalmic solution        Sod Fluor-Solo Fluor-Hydrfl Ac (GEL-SASHA DENTINBLOC DT) Apply  to affected area. GEL. Apply to 2nd molar on bottom right daily at bedtime.       solifenacin (VESICARE) 10 MG tablet Take 1 tablet (10 mg) by mouth daily 30 tablet 6     terbinafine (LAMISIL) 1 % external cream Apply topically 2 times daily       ziprasidone (GEODON) 40 MG capsule Take 1 capsule (40 mg) by mouth 2 times daily (with meals) 60 capsule 2     glucagon (GLUCAGON EMERGENCY) 1 MG kit Inject 1 mg into the muscle once for 1 dose 2 mg 3     VITALS                                                                                                                                  3, 3   149/74, 76bpm, 104.8kg     MENTAL STATUS EXAM                                                                          9, 14 cog gs     Alertness: alert  and oriented  Appearance: well groomed - casual clothing, white hair, dressed all in black  Behavior/Demeanor: cooperative, with good eye contact   Speech: slowed  Language: intact and no problems  Psychomotor: slight tremor in right hand  Mood: description consistent with euthymia  Affect: full range; was congruent to mood; was congruent to content  Thought Process/Associations: unremarkable  Thought Content:  Reports none;  Denies suicidal ideation, violent ideation and delusions  Perception:  Reports none;  Denies auditory hallucinations and visual hallucinations  Insight: good  Judgment: good  Cognition: (6) does  appear grossly intact; formal cognitive testing was not done  Gait and Station: unremarkable    LABS and DATA     AIMS:  last done 3/6/19 with score(s): 3;  next due March 2020    PHQ9 TODAY = 8  PHQ-9 SCORE 7/11/2019 9/11/2019 10/9/2019   PHQ-9 Total Score - - -   PHQ-9 Total Score 8 6 8     ANTIPSYCHOTIC LABS  [glu, A1C, lipids (danika LDL), liver enzymes, WBC, ANEU, Hgb, plts]  q12 mo  Recent Labs   Lab Test 08/06/19  0941 05/07/19  0908 05/07/19  0650  10/25/18  1734   GLC  --  109*  --   --  147*   A1C 6.6*  --  6.8*   < >  --     < > = values in this interval not displayed.     Recent Labs   Lab Test 05/07/19  0650 05/03/18  1414   CHOL 138 126   TRIG 96 148   LDL 79 60   HDL 40 37*     Recent Labs   Lab Test 06/26/18  0659 06/26/18   AST 16 16   ALT 21 21   ALKPHOS 111 111     Recent Labs   Lab Test 05/07/19  0650 10/25/18  1734 06/26/18  0659   WBC 10.06* 14.2* 9.72   ANEU  --  10.6* 5.31   HGB 13.3 14.0 12.6    275 247       DIAGNOSIS     Schizoaffective Disorder, depressed type, in partial remission  Generalized Anxiety Disorder    ASSESSMENT                                                                                                          m2, h3     TODAY: Pinky is doing well today.  Mood is stable, anxiety off and on, but no acute changes  since last visit.  Psychotic symptoms stable, no evidence of positive symptoms today.  Ongoing issues sleep, thought to be multifactorial in context of ANUJ, multiple nighttime awakenings by staff given history of nocturnal enuresis.  Tolerating medications well without side effects.  She is due for a formal aims at her next visit.  Over the course of the following year, team may consider an alternative to Ativan for sleep and anxiety with an improved side effect profile.  However, given long-term period of stability, I am hesitant to make any other changes today, as she is transferring to Dr. Shi at her next visit.  Refills provided.  Return to clinic in 2 months time for reevaluation.      Note the patient's blood pressure was elevated, 150s-160s/70s-80s.  This is not atypical for patient, and she is experiencing no acute vision changes, headaches, chest pain, palpitations, racing heart, shortness of breath, GARCIA. Advised her to continue to monitor at  and with PCP.    [place] PRESCRIPTION MONITORING PROGRAM [] was checked today:  below.  - Lorazepam   - 9/21 1mg qty30   -8/21 1 mg qty30   -7/18 1 mg qty30   -6/11 1 mg qty35 (Denise Pacala)   -monthly intervals  - gabapentin monthly intervals, most recent 9/29 300mg qty 90 by Dr. Milagros VUONG    PSYCHOTROPIC DRUG INTERACTIONS:   Concurrent use of ZIPRASIDONE and QT INTERVAL PROLONGING DRUGS may result in increased risk of QT-interval prolongation.  Concurrent use of ZIPRASIDONE and SEROTONERGIC DRUGS THAT PROLONG QT INTERVAL may result in increased risk of QT-interval prolongation and increased risk of serotonin syndrome (hypertension, hyperthermia, myoclonus, mental status changes).  Concurrent use of NSAID and SSRI may result in an increased risk of bleeding  Concurrent use of ERYTHROMYCIN and FLUOXETINE may result in an increased risk of cardiotoxicity (QT prolongation, torsades de pointes, cardiac arrest).  Concurrent use of FLUOXETINE and INSULINS OR  PRAMLINTIDE may result in increased risk of hypoglycemia.    MANAGEMENT:  Monitoring for adverse effects, periodic EKGs and patient is aware of risks     PLAN                                                                                                                       m2, h3     1) PSYCHOTROPIC MEDICATIONS:  - Continue ziprasidone 40 mg BID  - Continue fluoxetine 40 mg daily  - Continue lorazepam 1 mg at bedtime       2) THERAPY:    Yes, continue w/ Dr. Gr    3) NEXT DUE:    Labs- AP labs by 05/2020  EKG- Last EKG 8/17/16 - QTc 432. Will updated as needed.  Rating Scales- AIMS due March 2020    4) REFERRALS:    No Referrals needed     5) RTC: 2 months with Dr. María Shi    6) CRISIS NUMBERS:   Provided routinely in AVS.  ONLY if a [facility]: [facility] [phone] (clinic), [phone] (after hours)     TREATMENT RISK STATEMENT:  The risks, benefits, alternatives and potential adverse effects have been discussed and are understood by the pt. The pt understands the risks of using street drugs or alcohol. There are no medical contraindications, the pt agrees to treatment with the ability to do so. The pt knows to call the clinic for any problems or to access emergency care if needed.  Medical and substance use concerns are documented above.  Psychotropic drug interaction check was done, including changes made today.    PROVIDER: Garcia Ley MD    Patient not staffed in clinic. Supervisor is Dr. Estrada, who will sign the note.  Attestation:  I did not see Pinky Page directly. I have reviewed the documentation and I agree with the resident's plan of care.   Cortez Estrada MD, MD

## 2019-10-09 ENCOUNTER — OFFICE VISIT (OUTPATIENT)
Dept: PSYCHIATRY | Facility: CLINIC | Age: 69
End: 2019-10-09
Attending: PSYCHIATRY & NEUROLOGY
Payer: COMMERCIAL

## 2019-10-09 VITALS
DIASTOLIC BLOOD PRESSURE: 78 MMHG | HEART RATE: 75 BPM | SYSTOLIC BLOOD PRESSURE: 159 MMHG | WEIGHT: 233 LBS | BODY MASS INDEX: 39.99 KG/M2

## 2019-10-09 DIAGNOSIS — F25.1 SCHIZOAFFECTIVE DISORDER, DEPRESSIVE TYPE (H): ICD-10-CM

## 2019-10-09 DIAGNOSIS — F41.1 GENERALIZED ANXIETY DISORDER: ICD-10-CM

## 2019-10-09 PROCEDURE — G0463 HOSPITAL OUTPT CLINIC VISIT: HCPCS | Mod: ZF

## 2019-10-09 RX ORDER — LORAZEPAM 1 MG/1
1 TABLET ORAL AT BEDTIME
Qty: 30 TABLET | Refills: 2 | Status: SHIPPED | OUTPATIENT
Start: 2019-10-09 | End: 2019-12-12

## 2019-10-09 RX ORDER — ZIPRASIDONE HYDROCHLORIDE 40 MG/1
40 CAPSULE ORAL 2 TIMES DAILY WITH MEALS
Qty: 60 CAPSULE | Refills: 2 | Status: SHIPPED | OUTPATIENT
Start: 2019-10-09 | End: 2019-12-11

## 2019-10-09 ASSESSMENT — PAIN SCALES - GENERAL: PAINLEVEL: NO PAIN (0)

## 2019-10-09 ASSESSMENT — PATIENT HEALTH QUESTIONNAIRE - PHQ9: SUM OF ALL RESPONSES TO PHQ QUESTIONS 1-9: 8

## 2019-10-09 NOTE — PATIENT INSTRUCTIONS
Great seeing you, Pinky!  No changes today.  Follow up in 2 months with Dr. María Shi.  I enjoyed my time working with you!    Thank you for coming to the PSYCHIATRY CLINIC.    Lab Testing:  If you had lab testing today and your results are reassuring or normal they will be mailed to you or sent through [employer] within 7 days.   If the lab tests need quick action we will call you with the results.  The phone number we will call with results is # [phone] (home) . If this is not the best number please call our clinic and change the number.    Medication Refills:  If you need any refills please call your pharmacy and they will contact us. Our fax number for refills is [phone]. Please allow three business for refill processing.   If you need to  your refill at a new pharmacy, please contact the new pharmacy directly. The new pharmacy will help you get your medications transferred.     Scheduling:  If you have any concerns about today's visit or wish to schedule another appointment please call our office during normal business hours [phone] (8-5:00 M-F)    Contact Us:  Please call [phone] during business hours (8-5:00 M-F).  If after clinic hours, or on the weekend, please call  [phone].    Financial Assistance [phone]  [facility] Billing [phone]  Central Billing Office, [facility]: [phone]  [place] Billing [phone]  Medical Records [phone]      MENTAL HEALTH CRISIS NUMBERS:  [facility]:   [facility] - [phone]   Crisis Residence [facility] - [phone]   Walk-In Counseling Center [facility] - [phone]   COPE 24/7 [place] Mobile Team for Adults - [[phone]]; Child - [[phone]]        [facility]:   [facility] - [phone]   Walk-in counseling [facility] - [phone]   Walk-in counseling [facility] - [phone]   Crisis Residence [facility]  - [phone]   Urgent Care Adult Mental Health:   --Drop-in, 24/7 crisis line, and [facility] Mobile Team [[phone]]    CRISIS TEXT LINE: Text [phone] from anywhere, anytime, any crisis 24/7;    OR SEE [email or web address]     Poison Control Center - [phone]    CHILD: [facility] needs assessment team - [phone]     [facility] - [phone]; or [facility] - [phone]    If you have a medical emergency please call 911or go to the nearest ER.                    _____________________________________________    Again thank you for choosing PSYCHIATRY CLINIC and please let us know how we can best partner with you to improve you and your family's health.  You may be receiving a survey in the mail regarding this appointment. We would love to have your feedback, both positive and negative, so please fill out the survey and return it using the provided envelope. The survey is done by an external company, so your answers are anonymous.

## 2019-10-16 ENCOUNTER — OFFICE VISIT (OUTPATIENT)
Dept: PSYCHOLOGY | Facility: CLINIC | Age: 69
End: 2019-10-16
Payer: COMMERCIAL

## 2019-10-16 VITALS — WEIGHT: 231.4 LBS | BODY MASS INDEX: 39.72 KG/M2

## 2019-10-16 DIAGNOSIS — E66.9 OBESITY, UNSPECIFIED OBESITY SEVERITY, UNSPECIFIED OBESITY TYPE: ICD-10-CM

## 2019-10-16 DIAGNOSIS — F54 PSYCHOLOGICAL FACTORS AFFECTING MEDICAL CONDITION: ICD-10-CM

## 2019-10-16 DIAGNOSIS — F41.1 GENERALIZED ANXIETY DISORDER: ICD-10-CM

## 2019-10-16 DIAGNOSIS — F33.0 MAJOR DEPRESSIVE DISORDER, RECURRENT EPISODE, MILD (H): Primary | ICD-10-CM

## 2019-10-16 NOTE — PROGRESS NOTES
Health Psychology                  Clinic    Department of Medicine  Madelaine Ingram, Ph.D., L.P. [phone]                          [facility]  [facility] Miryam Woods, Ph.D.,  L.P. [phone]                 3rd Floor  [place] Mail Code 749   Pinky Panda, Ph.D., L.P. [phone] [address] Rae Eugene, Ph.D., L.P. [phone]            [place] [address]           Damon Gr, Ph.D., A.B.P.P., L.P. [phone]      Kimber Morales, Ph.D., L.P. [phone]    Health Psychology Follow-Up Note    Ms. Page is a pleasant 68-year old woman with chronic depressive illness, who returns to clinic for supportive and behavioral psychotherapy for moderate recurrent depression and for help losing weight given her morbid obesity.   Her 69th is tomorrow.  She is 231.4# down from 231.7#.  She is back to exercising after the pain in her right side subsided. She had been gradually increasing exercise, but has been doing it a bit less.  She is encouraged to increased her walking and to use of stationary bicycle. (20 minutes) .    She ws aware that she had likely gained weight.  We did not walk today.  We discussed ways of lengthening her walks (e.g., doubling laps around [employer])  and increasing her time on exercycle bike (I.e. 50 50 minutes)   Wt Readings from Last 4 Encounters:   10/16/19 105 kg (231 lb 6.4 oz)   10/09/19 105.7 kg (233 lb)   09/11/19 105.7 kg (233 lb)   09/10/19 105.1 kg (231 lb 11.2 oz)     Past Medical History:   Diagnosis Date     Allergic rhinitis due to pollen     seasonal allergies      Anisometropia and aniseikonia      BMI greater than 40      Cardiomegaly      Chronic constipation      Congenital absence of one kidney      Cramp of limb      Dermatophytosis of the body      Dry eye syndrome      Esophageal reflux      Essential hypertension, benign      Gastro-oesophageal  reflux disease      Hemorrhoids      Hypermetropia      Hypothyroidism      Incomplete Emptying of Bladder      Lactose intolerance      Major depressive disorder, recurrent episode, moderate (H)      Malignant neoplasm of breast (female), unspecified site     left mastectomy 1996      Mixed incontinence urge and stress (male)(female)      Moderate obstructive sleep apnea      Postmenopausal Atrophic Vaginitis      Presbyopia      Regular astigmatism      Restless leg syndrome      Senile nuclear sclerosis      Thyroid eye disease     mild     Tinnitus      Trigger finger (acquired)     right hand     Type II or unspecified type diabetes mellitus without mention of complication, not stated as uncontrolled     on insulin since April 2006      Past Surgical History:   Procedure Laterality Date     APPENDECTOMY       C MASTECTOMY,SIMPLE  1996    Left mastectomy  [facility]. Had normal FU mammo 5/2007. Follow yearly.      C TOTAL ABDOM HYSTERECTOMY  7/2003    Dr. Castanon, for abnormal bleeding. Removal of both tubes with BSO for myoma w - - -     CHOLECYSTECTOMY       COLONOSCOPY  5/2005    Complete Colonoscopy-had one small polyp removed 5/2005.      COLONOSCOPY N/A 3/31/2016    Procedure: COLONOSCOPY;  Surgeon: Cary Ring MD;  Location: [facility]     COLONOSCOPY N/A 7/7/2016    Procedure: COLONOSCOPY;  Surgeon: Cary Ring MD;  Location: [facility]     DILATION AND CURETTAGE       HYSTERECTOMY       MASTECTOMY      Left breast     Current Outpatient Medications   Medication     acetaminophen (TYLENOL) 500 MG tablet     alum & mag hydroxide-simethicone (MYLANTA/MAALOX) 200-200-20 MG/5ML SUSP suspension     amLODIPine (NORVASC) 5 MG tablet     artificial saliva (BIOTENE MT) AERS spray     artificial tears (AKWA TEARS) OINT ophthalmic ointment     ASPIRIN 81 MG OR TABS     atorvastatin (LIPITOR) 40 MG tablet     BD AUTOSHIELD DUO 30G X 5 MM     blood glucose (NO BRAND SPECIFIED)  lancets standard     blood glucose calibration (NO BRAND SPECIFIED) solution     blood glucose monitoring (NO BRAND SPECIFIED) meter device kit     blood glucose monitoring (NO BRAND SPECIFIED) test strip     Calcium Carbonate-Vitamin D (CALCIUM-VITAMIN D) 250-125 MG-UNIT TABS     calcium polycarbophil (FIBERCON) 625 MG tablet     CLARITIN 10 MG OR TABS     clotrimazole (LOTRIMIN) 1 % external cream     conjugated estrogens (PREMARIN) 0.625 MG/GM vaginal cream     erythromycin (ROMYCIN) ophthalmic ointment     exenatide (BYETTA) 10 MCG/0.04ML injection     FLUoxetine (PROZAC) 20 MG capsule     fluticasone (FLONASE) 50 MCG/ACT nasal spray     furosemide (LASIX) 20 MG tablet     Gabapentin (NEURONTIN PO)     glucagon (GLUCAGON EMERGENCY) 1 MG kit     Hypromellose (ARTIFICIAL TEARS OP)     insulin glargine (LANTUS) 100 UNIT/ML injection     lactase (LACTAID) 3000 UNIT tablet     levothyroxine (SYNTHROID, LEVOTHROID) 175 MCG tablet     LORazepam (ATIVAN) 1 MG tablet     losartan (COZAAR) 100 MG tablet     Magnesium Hydroxide (MILK OF MAGNESIA PO)     melatonin 3 MG tablet     metFORMIN (GLUCOPHAGE-XR) 500 MG 24 hr tablet     moxifloxacin (VIGAMOX) 0.5 % ophthalmic solution     nystatin (MYCOSTATIN) 318037 UNIT/GM POWD     ONETOUCH DELICA LANCETS 33G MISC     polyethylene glycol (MIRALAX/GLYCOLAX) powder     Saline 0.9 % SOLN     senna-docusate (SENNA S) 8.6-50 MG per tablet     simethicone (MYLICON) 125 MG chewable tablet     Skin Protectants, Misc. (EUCERIN) cream     SM LUBRICANT EYE DROPS 0.4-0.3 % SOLN ophthalmic solution     Sod Fluor-Solo Fluor-Hydrfl Ac (GEL-SASHA DENTINBLOC DT)     solifenacin (VESICARE) 10 MG tablet     terbinafine (LAMISIL) 1 % external cream     ziprasidone (GEODON) 40 MG capsule     No current facility-administered medications for this visit.       PHQ-9 SCORE 7/11/2019 9/11/2019 10/9/2019   PHQ-9 Total Score - - -   PHQ-9 Total Score 8 6 8     NATTY-7 SCORE 7/27/2018 11/7/2018 11/8/2018   Total  Score - - -   Total Score 7 7 7     She arrived early today.   She is greeted in the waiting room.  At [facility], she continues to serve on the [employer] and will run again.  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   She is satisfied with this level of effort and is getting along well with Eddy.  She is getting along well with her roommate.  Two [place] residents  this past week.   We discussed the plan to decrease caloric intake by about 250 calories /day (e.g, less frequent popcorn and pretzels).  We walked for 25 minutes.  She states she is walking and trying to bicycle 5 days/week.  She acknowledges her depression is now at baseline (3-4/10) or perhaps slightly better.    Goal for exercise has been 1.5 hours/day as of 2017 and 2 lbs./month.     Pinky participated fully and appeared to derive benefit.  Rapport was excellent.     Extended session due to complexity of case and length of interval.    IMPRESSION Distress Disability Risk    Low High Low     Time In: 1:07  Time Out: 2:00    Diagnosis:   Major Depression, recurrent mild (F33.0)   Psychological Factors Affecting Morbid Obesity (F54)    Plan: She will return on  @ 1 for behavioral and supportive psychotherapy.  Will spread out to q 5-6 weeks.    Last treatment plan signed: 2019  Treatment plan due: 2020                         Damon Gr, Ph.D., A.B.P.P., L.P.

## 2019-11-07 ENCOUNTER — OFFICE VISIT (OUTPATIENT)
Dept: FAMILY MEDICINE | Facility: CLINIC | Age: 69
End: 2019-11-07
Payer: COMMERCIAL

## 2019-11-07 VITALS
SYSTOLIC BLOOD PRESSURE: 145 MMHG | HEART RATE: 73 BPM | DIASTOLIC BLOOD PRESSURE: 76 MMHG | HEIGHT: 64 IN | BODY MASS INDEX: 39.57 KG/M2 | OXYGEN SATURATION: 97 % | TEMPERATURE: 98.3 F | WEIGHT: 231.8 LBS

## 2019-11-07 DIAGNOSIS — Z00.00 MEDICARE ANNUAL WELLNESS VISIT, SUBSEQUENT: Primary | ICD-10-CM

## 2019-11-07 DIAGNOSIS — E11.9 DIABETES MELLITUS TYPE 2, INSULIN DEPENDENT (H): ICD-10-CM

## 2019-11-07 DIAGNOSIS — E11.29 TYPE 2 DIABETES MELLITUS WITH MICROALBUMINURIA, WITH LONG-TERM CURRENT USE OF INSULIN (H): ICD-10-CM

## 2019-11-07 DIAGNOSIS — R80.9 TYPE 2 DIABETES MELLITUS WITH MICROALBUMINURIA, WITH LONG-TERM CURRENT USE OF INSULIN (H): ICD-10-CM

## 2019-11-07 DIAGNOSIS — Z23 ENCOUNTER FOR IMMUNIZATION: ICD-10-CM

## 2019-11-07 DIAGNOSIS — Z79.4 DIABETES MELLITUS TYPE 2, INSULIN DEPENDENT (H): ICD-10-CM

## 2019-11-07 DIAGNOSIS — Z79.4 TYPE 2 DIABETES MELLITUS WITH MICROALBUMINURIA, WITH LONG-TERM CURRENT USE OF INSULIN (H): ICD-10-CM

## 2019-11-07 LAB
CREAT UR-MCNC: 13 MG/DL
MICROALBUMIN UR-MCNC: 8 MG/L
MICROALBUMIN/CREAT UR: 60.98 MG/G CR (ref 0–25)

## 2019-11-07 RX ORDER — METFORMIN HCL 500 MG
2000 TABLET, EXTENDED RELEASE 24 HR ORAL DAILY
Start: 2019-11-07 | End: 2019-11-22

## 2019-11-07 ASSESSMENT — MIFFLIN-ST. JEOR: SCORE: 1560.41

## 2019-11-07 NOTE — PATIENT INSTRUCTIONS
If you had an XRay/CT/Ultrasound/MRI ordered the number is [phone] to schedule or change your radiology appointment.   Medical Concerns:  If you have urgent medical concerns please call [phone] at any time of the day.    Cary Brownlee MD  [facility] [employer] PERSONAL PREVENTIVE SERVICES PLAN - SERVICES     Review these tests with your doctor then decide which ones you want and take this page home for your reference  Immunization History   Administered Date(s) Administered     DTaP, Unspecified 03/13/2016     Flu 65+ Years 09/27/2016     Flu, Unspecified 10/30/1998     Influenza (H1N1) 01/11/2010     Influenza (High Dose) 3 valent vaccine 09/16/2015, 10/16/2017     Influenza (IIV3) PF 10/21/2002, 11/10/2004, 11/04/2005, 11/02/2006, 11/01/2007, 10/22/2008, 09/01/2009, 10/05/2010, 10/13/2011, 09/18/2012, 09/30/2013     Influenza Vaccine, 3 YRS +, IM (QUADRIVALENT W/PRESERVATIVES) 09/27/2018, 09/19/2019     Mantoux Tuberculin Skin Test 08/02/2002, 08/02/2002, 07/22/2003, 07/22/2003, 07/16/2004, 07/16/2004, 07/28/2005, 07/28/2005, 09/18/2011, 09/18/2012, 08/21/2014, 09/29/2015, 10/16/2017     Pneumo Conj 13-V (2010&after) 09/27/2016     Pneumococcal 23 valent 10/14/2005, 05/28/2015     TD (ADULT, 7+) 06/15/2004     TDAP Vaccine (Boostrix) 06/10/2014     Td (Adult), Adsorbed 06/15/2004     Zoster vaccine recombinant adjuvanted (SHINGRIX) 09/27/2018, 03/13/2019          IMMUNIZATIONS Description Recommend today?     Influenza (flu shot) Helps to prevent flu; you should get it every year No; is up to date.   PCV 13 Prevents pneumonia; given once No; is up to date.   PPSV 23 Prevents pneumonia; given once No; is up to date.   Tetanus Prevents tetanus; need every 10 years No; is up to date.   Herpes Zoster (shingles) Prevents or lessens symptoms from shingles; given once No; is up to date.   Hepatitis B  If you have any of the following risk factors you should be immunized for hepatitis B: severe kidney disease,  people who live in the same house as a carrier of Hepatitis B virus, people who live in  institutions (e.g. nursing homes or group homes), homosexual men, patients with hemophilia who received Factor VIII or IX concentrates, abusers of illicit injectable drugs Yes; Recommended and ordered.           Health Maintenance   Topic Date Due     DEPRESSION ACTION PLAN  1950     PNEUMOCOCCAL IMMUNIZATION 65+ LOW/MEDIUM RISK (2 of 2 - PCV13) 05/28/2016     MEDICARE ANNUAL WELLNESS VISIT  11/07/2019     MICROALBUMIN  11/07/2019     PHQ-9  11/09/2019     EYE EXAM  11/15/2019     A1C  02/06/2020     FALL RISK ASSESSMENT  03/06/2020     DIABETIC FOOT EXAM  03/20/2020     BMP  05/07/2020     LIPID  05/07/2020     TSH W/FREE T4 REFLEX  08/06/2020     MAMMO SCREENING  04/30/2021     COLONOSCOPY  07/07/2021     ADVANCE CARE PLANNING  10/16/2022     DTAP/TDAP/TD IMMUNIZATION (3 - Td) 06/10/2024     DEXA  Completed     HEPATITIS C SCREENING  Completed     INFLUENZA VACCINE  Completed     ZOSTER IMMUNIZATION  Completed     IPV IMMUNIZATION  Aged Out     MENINGITIS IMMUNIZATION  Aged Out       SCREENING TESTS     Description   Year of Last Screening   Recommended Today?   Heart disease screening blood tests    Cholesterol level Reducing cholesterol can reduce your risk of heart attacks by 25%.  Screening is recommended yearly if you are at risk of heart disease otherwise every 4-5 years  No; is up to date.   Diabetes screening tests    Hemoglobin A1c blood test   Finding and treating diabetes early can reduce complications.  Screening recommended/covered yearly if you have high blood pressure, high cholesterol, obesity (BMI >30), or a history of high blood glucose tests; or 2 of the following: family history of diabetes, overweight (BMI >25 but <30), age 65 years or older, and a history of diabetes of pregnancy or gave birth to baby weighing more than 9 lbs.  No; is up to date.   Hepatitis B screening Finding hepatitis B early can  reduce complications.  Screening is recommended for persons with selected risk factors.  Yes; Recommended and ordered.   Hepatitis C screening Finding hepatitis C early can reduce complications.  Screening is recommended for all persons born from 1945 through 1965 and for those with selected other risk factors.   No; is up to date.   HIV screening Finding HIV early can reduce complications.  Screening is recommended for persons with risk factors for HIV infection.  Yes; Recommended and ordered.   Glaucoma screening Early detection of glaucoma can prevent blindness.   Please talk to your eye doctor about this.       Health Maintenance   Topic Date Due     DEPRESSION ACTION PLAN  1950     PNEUMOCOCCAL IMMUNIZATION 65+ LOW/MEDIUM RISK (2 of 2 - PCV13) 05/28/2016     MEDICARE ANNUAL WELLNESS VISIT  11/07/2019     MICROALBUMIN  11/07/2019     PHQ-9  11/09/2019     EYE EXAM  11/15/2019     A1C  02/06/2020     FALL RISK ASSESSMENT  03/06/2020     DIABETIC FOOT EXAM  03/20/2020     BMP  05/07/2020     LIPID  05/07/2020     TSH W/FREE T4 REFLEX  08/06/2020     MAMMO SCREENING  04/30/2021     COLONOSCOPY  07/07/2021     ADVANCE CARE PLANNING  10/16/2022     DTAP/TDAP/TD IMMUNIZATION (3 - Td) 06/10/2024     DEXA  Completed     HEPATITIS C SCREENING  Completed     INFLUENZA VACCINE  Completed     ZOSTER IMMUNIZATION  Completed     IPV IMMUNIZATION  Aged Out     MENINGITIS IMMUNIZATION  Aged Out   }      SCREENING TESTS     Description   Year of Last Screening   Recommended Today?   Colorectal cancer screening    Fecal occult blood test     Screening colonoscopy Screening for colon cancer has been shown to reduce death from colon cancer by 25-30%. Screening recommended to start at 50 years and continuing until age 75 years.    No; is up to date.   Breast Cancer Screening (women)    Mammogram Mammogram screening for breast cancer has been shown to reduce the risk of dying from breast cancer and prolong life. Screening is  recommended every 1-2 years for women aged 50 to 74 years.   No; is up to date.   Cervical Cancer screening (women)    Pap Cervical pap smears can reduce cervical cancer. Screening is recommended annually if high risk (history of abnormal pap smears) otherwise every 2-3 years, stop screening at 65 years of age if history of normal paps.  No; is up to date.   Screening for Osteoporosis:  Bone mass measurements (women)    Dexa Scan Screening and treating Osteoporosis can reduce the risk of hip and spine fractures. Screening is recommended in women 65 years or older and in women and men at risk of osteoporosis.  No; is up to date.   Screening for Lung Cancer     Low-dose CT scanning Screening can reduce mortality in persons aged 55-80 who have smoked at least 30 pack-years and who are either still smoking or have quit in the past 15 years.  No: is not indicated today.   Abdominal Aortic Aneurysm (AAA) screening    Ultrasound (US)   An aneurysm treated before rupture is very safe -a ruptured aneurysm can be fatal.  Screening  by US for AAA is limited to patients who meet one of the following criteria:    Men who are 65-75 years old and have smoked more than 100 cigarettes in their lifetime    Anyone with a family history of abdominal aortic aneurysm  No: is not indicated today.       MEDICARE WELLNESS EXAM PATIENT INSTRUCTIONS    Yearly exam:     See your health care provider every year in order to review changes in your health, review medicines that you take, and discuss preventive care needs such as immunizations and cancer screening.    Get a flu shot each year.     Advance Directive:    If you have not done so, you are encouraged to complete an advance directive. If you would like support with this, please contact the clinic  through the main clinic line. More information about advance directives can be found at:     [email or web address]    Nutrition:      Eat at least 5 servings of fruits and vegetables each day.     Eat whole-grain bread, whole-wheat pasta and brown rice instead of white grains and rice.     Talk to your doctor about Calcium and Vitamin D.     Lifestyle:    Exercise for at least 150 minutes a week (30 minutes a day, 5 days a week). This will help you control your weight and prevent disease.     Limit alcohol to one drink per day.     If you smoke, try to quit - your doctor will be happy to help.     Wear sunscreen to prevent skin cancer.     See your dentist every six months for an exam and cleaning.     See your eye doctor every 1 to 2 years to screen for conditions such as glaucoma, macular degeneration and cataracts.

## 2019-11-07 NOTE — LETTER
November 12, 2019      Pinky ROSELIA Page  [address] [facility] [address]        Dear Pinky,    Thank you for getting your care at [facility]. Please see below for your test results. Your HIV and Hep B are negative. Your kidneys are leaking protein due to your diabetes which is a little worse than the last time we checked. Nothing extra needs to be done at this time.    Resulted Orders   Albumin Random Urine Quantitative with Creat Ratio   Result Value Ref Range    Creatinine Urine 13 mg/dL    Albumin Urine mg/L 8 mg/L    Albumin Urine mg/g Cr 60.98 (H) 0 - 25 mg/g Cr   HIV Antigen Antibody Combo   Result Value Ref Range    HIV Antigen Antibody Combo Nonreactive NR^Nonreactive          Comment:      HIV-1 p24 Ag & HIV-1/HIV-2 Ab Not Detected   Hepatitis B Surface Antibody   Result Value Ref Range    Hepatitis B Surface Antibody 0.00 <8.00 m[IU]/mL      Comment:      Nonreactive, No antibody detected when the value is less than 8.00 m[IU]/mL.   Hepatitis B surface antigen   Result Value Ref Range    Hep B Surface Agn Nonreactive NR^Nonreactive       If you have any concerns about these results please call and leave a message for me or send a eInstruction by [employer] message to the clinic.    Sincerely,    Cary Brownlee MD

## 2019-11-07 NOTE — PROGRESS NOTES
>64 year old Wellness Visit ( Medicare etc)         HPI       This 69 year old female presents as an established patient Cary Brownlee who presents for a  Annual Wellness Exam.    Other issues patient wants to address today:    Continue premarin?  Hep B vaccine?    Visual Acuity: :  Testing not done; patient has seen eye doctor in the past 12 months.    Patient Active Problem List   Diagnosis     Allergic rhinitis due to pollen     Urge incontinence     Hypertension, essential     Cardiomegaly     Chronic constipation     Dry eye syndrome     Esophageal reflux     Exposure keratoconjunctivitis     DM ophthalmopathy (H)     Hypothyroidism     Senile cataract     ANUJ (obstructive sleep apnea)     Vaginal atrophy     Restless leg syndrome     Squamous blepharitis     Morbid obesity due to excess calories (H)     Personal history of breast cancer s/p L masectomy     Diabetes mellitus type 2, insulin dependent (H)     Hypercholesteremia     Lives in group home     Extrapyramidal and movement disorder     CKD (chronic kidney disease) stage 2, GFR 60-89 ml/min     Solitary kidney, congenital     S/P hysterectomy     Impingement syndrome of right shoulder     Hypertriglyceridemia     Candidiasis of skin     Generalized anxiety disorder     Carpal tunnel syndrome of left wrist     Schizoaffective disorder, depressive type (H)     Major depressive disorder, recurrent episode, moderate (H)     Psychological factors affecting medical condition     Hx of psychiatric care     Nail complaint     Microalbuminuria due to type 2 diabetes mellitus (H)     Type 2 diabetes mellitus with microalbuminuria, with long-term current use of insulin (H)     Diabetes mellitus, type II, insulin dependent (H)     CKD (chronic kidney disease) stage 1, GFR 90 ml/min or greater     Conjunctivitis of both eyes     Corneal erosion of both eyes     Spastic entropion, right       Past Medical History:   Diagnosis Date     Allergic rhinitis due to pollen      seasonal allergies      Anisometropia and aniseikonia      BMI greater than 40      Cardiomegaly      Chronic constipation      Congenital absence of one kidney      Cramp of limb      Dermatophytosis of the body      Dry eye syndrome      Esophageal reflux      Essential hypertension, benign      Gastro-oesophageal reflux disease      Hemorrhoids      Hypermetropia      Hypothyroidism      Incomplete Emptying of Bladder      Lactose intolerance      Major depressive disorder, recurrent episode, moderate (H)      Malignant neoplasm of breast (female), unspecified site     left mastectomy 1996      Mixed incontinence urge and stress (male)(female)      Moderate obstructive sleep apnea      Postmenopausal Atrophic Vaginitis      Presbyopia      Regular astigmatism      Restless leg syndrome      Senile nuclear sclerosis      Thyroid eye disease     mild     Tinnitus      Trigger finger (acquired)     right hand     Type II or unspecified type diabetes mellitus without mention of complication, not stated as uncontrolled     on insulin since April 2006         Family History   Problem Relation Age of Onset     Heart Disease Father         1968     Melanoma Mother         malignant melanoma     Glaucoma No family hx of      Macular Degeneration No family hx of          Problem List, Family History and past Medical History reviewed and unchanged/updated.       Health risk Assessment/ Review of Systems:         Constitutional:   Fevers or night sweats?  NO      Eyes:   Vision problems?  NO           Hearing:  Do you feel you have hearing loss?  YES  Cardiovascular:  Chest pain, palpitations, or pain with walking?  NO        Respiratory:   Breathing problems or cough?  NO    :   Difficulty controlling urination?  YES, wears briefs        Musculoskeletal:   Stiffness or sore joints?  NO            Skin:   concerning lesions or moles?  YES, several innocent moles           Nervous System:   loss of strength or  sensation,  numbness or tingling,  tremor,  dizziness,  headache?  YES, numbness and tingling in hands that resolves with hand rubbing         Mental Health:   depression or anxiety,  sleep problems?  YES, some depressive and anxious symptoms and is able to talk to [facility] staff or psychiatric   Cognition:  Memory problems?  YES, remembering medications, nieces and nephews  (24 of them)      Weight Loss: Have you lost 10 or more pounds unintentionally in the previous year?  NO  Energy: How much of the time during the past 3 weeks did you feel tired?   (check one of the following):   ?  All of the time  ? Most of the time  ? Some of the time  ? A little of the time  ? None of the Time    PHQ-2 Score:   PHQ-2 ( 1999 [employer]) 11/7/2019 10/9/2019   Q1: Little interest or pleasure in doing things 1 1   Q2: Feeling down, depressed or hopeless 1 1   PHQ-2 Score 2 2       PHQ-9 Score:   [facility] [place] Follow-up to PHQ 7/11/2019 9/11/2019 10/9/2019   PHQ-9 9. Suicide Ideation past 2 weeks Not at all Not at all Not at all     Medical Care:     What other specialists or organizations are involved in your medical care?     Patient Care Team       Relationship Specialty Notifications Start End    Cary Brownlee MD PCP - General Student in organized health care education/training program  7/3/18     Phone: [phone] Fax: [phone]         [facility] [address] [facility] [address]    Dr Narendra Truong MD   5/31/12     Phone: [phone] Fax: [phone]        [facility] [address] [address]    Damon Gr, PhD LP  Psychology  4/30/15     Phone: [phone] Fax: [phone]         420 [facility] [place] [address] [facility] [address]    Dedra Dior MD MD Urology  7/20/15     Phone: [phone] Fax: [phone]         [address] [facility] [address] [facility] [address]    Analy Bush RN Clinic Care Coordinator Urology  7/20/15     Phone: [phone] Pager: [phone]        Matilde Ann,  MD Referring Physician   8/20/15     Phone: [phone] Fax: [phone]         [facility] [address] [facility] JUNIOR [address] [facility] [address]    Narendra Truong    8/24/15     Phone: [phone] Fax: [phone]         [address] [address]    Nany Brown MD MD Ophthalmology  9/3/15     Phone: [phone] Fax: [phone]         [address] [facility] [address] [facility] [address]    Gaby Holliday MD MD Nephrology  5/18/16     Phone: [phone] Fax: [phone]         [address] [facility] [address] [facility] [address]    Allie Bauer, RN Registered Nurse  [facility] 2/26/18     [facility] Community Care Coordinator [phone]    Michael Lopez MD MD Ophthalmology  10/1/18     Phone: [phone] Fax: [phone]         [address] [facility] [address]    Juventino Sweeney MD Resident Student in organized health care education/training program  10/1/18     Phone: [phone] Fax: [phone]         [facility] [address] [facility] [address] [facility] [address]    Garcia Ley MD Resident Student in organized health care education/training program  7/11/19     Phone: [phone] Fax: [phone]         [address] [address]    Cortez Estrada MD MD Psychiatry  7/11/19     Phone: [phone] Fax: [phone]         [facility] [address]          Do you have an advanced directive? Yes, POLST on file      Social History / Home Safety     Social History     Tobacco Use     Smoking status: Never Smoker     Smokeless tobacco: Never Used   Substance Use Topics     Alcohol use: No     Alcohol/week: 0.0 standard drinks     Marital Status: Single  Who lives in your household? Self, room mate  Does your home have any of the following safety concerns? Loose rugs in the hallway, no grab bars in the bathroom, no handrails on the stairs or have poorly lit areas?  No   Do you feel threatened or  controlled by a partner, ex-partner or anyone in your life? {Yes No   Has anyone hurt you physically, for example by pushing, hitting, slapping or kicking you   or forcing you to have sex? No       Functional Status     Do you need help with dressing yourself, bathing, or walking?No   Do you need help with the phone, transportation, shopping, preparing meals, housework, laundry, medications or managing money?No   By yourself and not using aids, do you have any difficulty walking up 10 steps  without resting? No   By yourself and not using aids, do you have any difficulty walking several hundred yards?  YES              Risk Behaviors and Healthy Habits     History   Smoking Status     Never Smoker   Smokeless Tobacco     Never Used     How many servings of fruits and vegetables do you eat a day? 4  Exercise: biking 4-5 days/week for an average of 15-30 minutes  Do you frequently drive without a seatbelt? No   History   Smoking Status     Never Smoker   Smokeless Tobacco     Never Used     Do you use any other drugs? No       Do you use alcohol?No      Functional Ability   Was the patient's timed Up & Go test (Get up from chair walk, 10 feet turn, return to chair and sit down) unsteady or longer than 10 seconds? No     Fall risk:     1. Have you fallen two or more times in the past year? No   2. Have you fallen and had an injury in the past year?  No         Evaulation of Cognitive Function     Family member/caregiver input: Normal    1) Repeat 3 items (Leader, Season, Table)    2) Clock draw: ABNORMAL   3) 3 item recall: Recalls 2 objects   Results: ABNORMAL clock, 1-2 items recalled: PROBABLE COGNITIVE IMPAIRMENT, **INFORM PROVIDER**    Mini-CogTM Copyright S Paula. Licensed by the author for use in [facility]; reprinted with permission ([email or web address]). All rights reserved.        Other Assessments:     CV Risk based on Pooled Cohort Risk (consider assessing every 4-6 years; consider statin in patients  "with 10-yr risk > 7.5%):  The 10-year ASCVD risk score (James EATON Jr., et al., 2013) is: 27.3%    Values used to calculate the score:      Age: 69 years      Sex: Female      Is Non- : No      Diabetic: Yes      Tobacco smoker: No      Systolic Blood Pressure: 145 mmHg      Is BP treated: Yes      HDL Cholesterol: 40 mg/dL      Total Cholesterol: 138 mg/dL    Advance Directives: Discussed with patient and family as appropriate.  Has patient completed advance directives? Yes, advance care planning is on file.      Immunization History   Administered Date(s) Administered     DTaP, Unspecified 03/13/2016     Flu 65+ Years 09/27/2016     Flu, Unspecified 10/30/1998     HepB-Adult 11/07/2019     Influenza (H1N1) 01/11/2010     Influenza (High Dose) 3 valent vaccine 09/16/2015, 10/16/2017     Influenza (IIV3) PF 10/21/2002, 11/10/2004, 11/04/2005, 11/02/2006, 11/01/2007, 10/22/2008, 09/01/2009, 10/05/2010, 10/13/2011, 09/18/2012, 09/30/2013     Influenza Vaccine, 3 YRS +, IM (QUADRIVALENT W/PRESERVATIVES) 09/27/2018, 09/19/2019     Mantoux Tuberculin Skin Test 08/02/2002, 08/02/2002, 07/22/2003, 07/22/2003, 07/16/2004, 07/16/2004, 07/28/2005, 07/28/2005, 09/18/2011, 09/18/2012, 08/21/2014, 09/29/2015, 10/16/2017     Pneumo Conj 13-V (2010&after) 09/27/2016     Pneumococcal 23 valent 10/14/2005, 05/28/2015     TD (ADULT, 7+) 06/15/2004     TDAP Vaccine (Boostrix) 06/10/2014     Td (Adult), Adsorbed 06/15/2004     Zoster vaccine recombinant adjuvanted (SHINGRIX) 09/27/2018, 03/13/2019     Reviewed Immunization Record Today    Physical Exam     Vitals: BP (!) 145/76   Pulse 73   Temp 98.3  F (36.8  C) (Oral)   Ht 1.632 m (5' 4.25\")   Wt 105.1 kg (231 lb 12.8 oz)   SpO2 97%   BMI 39.48 kg/m    BMI= Body mass index is 39.48 kg/m .  GENERAL APPEARANCE: alert and no distress  HENT: ear canals patent and TM's normal; mouth without ulcers or lesions; and oral mucous membranes moist  Hearing loss screen: "   Normal   DENTITION:  Good repair?  yes  RESP: lungs clear to auscultation - no rales, rhonchi or wheezes  CV: regular rates and rhythm, no murmur, click or rub, no peripheral edema and peripheral pulses strong  SKIN: no suspicious lesions or rashes  NEURO: Normal strength and tone  MENTAL STATUS EXAM  Appearance: appropriate  Attitude: cooperative  Behavior: normal  Eye Contact: normal  Speech: normal  Orientation: oreinted to person , place, time and situation  Mood:  blunted  Affect: Mood Congruent  Thought Process: clear      Assessment and Plan     Welcome to Medicare Preventive Visit / Initial Medicare Annual Wellness Exam - reviewed Preventive Services and Plan form with patient as specified in Patient Instructions.    Pinky was seen today for physical.    Diagnoses and all orders for this visit:    Medicare annual wellness visit, subsequent - Has not had Hep B series. Will screen for that and HIV and start Hep B vaccine series. Can make nurse only visit for follow up for the rest of the shots.  -     HIV Antigen Antibody Combo  -     Hepatitis B Surface Antibody  -     Hepatitis B surface antigen    Diabetes mellitus type 2, insulin dependent (H) - Will check albumin/creatinin ratio today. Follow up in 6 months.  -     Albumin Random Urine Quantitative with Creat Ratio      Options for treatment and follow-up care were reviewed with the Pinky Page and/or guardian engaged in the decision making process and verbalized understanding of the options discussed and agreed with the final plan.    Cary Brownlee MD  PGY-3

## 2019-11-11 LAB
HBV SURFACE AB SERPL IA-ACNC: 0 M[IU]/ML
HBV SURFACE AG SERPL QL IA: NONREACTIVE
HIV 1+2 AB+HIV1 P24 AG SERPL QL IA: NONREACTIVE

## 2019-11-13 ENCOUNTER — TELEPHONE (OUTPATIENT)
Dept: PSYCHIATRY | Facility: CLINIC | Age: 69
End: 2019-11-13

## 2019-11-13 DIAGNOSIS — F25.1 SCHIZOAFFECTIVE DISORDER, DEPRESSIVE TYPE (H): Primary | ICD-10-CM

## 2019-11-13 NOTE — TELEPHONE ENCOUNTER
Pt's home care called to notify the doctor that the pt has been off their fluoxtine for 7 days now and they aren't sure whether the medication should be tapered up or not.

## 2019-11-13 NOTE — TELEPHONE ENCOUNTER
Refill Request (Prozac 40 mg )     Garcia Ley MD  You; Arlene Orozco 24 minutes ago (10:46 AM)      Thanks for the message! I approved the refill.     Given the long half life of Prozac, it would be safe to restart at current dose rather than starting lower and titrating.     FYI I cared for Pinky while María Shi was on leave, but moving forward, she'll be seeing Dr. Shi in clinic. It does make sense for me to cover Pinky's [employer]/phone messages until she meets Dr. Shi for the first time in December.     Thanks!   Garcia Ley    Routing comment      Writer placed a call to  staff at [phone] to relay the above information. No answer at number provided. Left detail message informing them of longer half life and therefore provider has approved for patient to start taking Prozac 40 mg daily. Clinic number provided for a call back.

## 2019-11-13 NOTE — TELEPHONE ENCOUNTER
Writer placed a call to  at [phone] ans spoke with staff Faye. Informed her that 30 day supply of Prozac was sent to [facility] - [place] - [address]     No further action needed by this writer

## 2019-11-13 NOTE — TELEPHONE ENCOUNTER
FW: refill request   Received: Today   Message Contents   Samreen Fitzpatrick, Samreen Polo, RN   Phone Number: [phone]          Previous Messages      ----- Message -----   From: Naina Romo   Sent: 11/13/2019   9:33 AM CST   To: Dominga Sher RN   Subject: refill request                                   Sam, calling from [facility], Fluoxetine 40 mg, [place]'s or fax to [facility] [phone]. Patient has been out of this medication for a few days.     Karma      Last seen: 10/9/19  RTC: 2 months with Dr. María Shi  Cancel: none   No-show: none   Next appt: 12/11/19    Incoming refill from group home via phone     Medication requested: FLUoxetine (PROZAC) 20 MG capsule   Directions: Take 2 capsules (40 mg) by mouth daily - Oral  Qty: 60  Last refilled: 10/9/19    Writer reached out to group home at [phone] to gather additional information. Staff informed this writer, patient has been missing Prozac dose since the past 4 days. Denies any change in symptoms. She requested a refill be sent to [facility] - [place], [place] - [address]. Writer agreed to reach out to provider for approval.     Per chart review, last refill for Prozac 20 mg sent on 10/9/19. Placed a call to pharmacy and was notified a refill was not received. Will route to provider for approval.

## 2019-11-14 NOTE — TELEPHONE ENCOUNTER
Writer placed a call to [facility] - [place], [place] - [address] to confirm regarding Prozac refill. Spoke with pharmacist who confirmed the script for Prozac was received on 11/13/19. He agreed send the medication to  today.     Placed a call got  at [phone] to updated regarding Prozac refill. No answer at number provided. LVM, notifying the refill was sent on 11/13/19 and medications should be delivered today. Requested a call back if meds are not received by the end of the day.     No further action needed by this writer

## 2019-11-14 NOTE — TELEPHONE ENCOUNTER
Pt's group home called stating the pharmacy didn't refill the prozac order even though it was sent. They are asking that the medication refill is resent to the pharmacy.

## 2019-11-19 ENCOUNTER — TRANSFERRED RECORDS (OUTPATIENT)
Dept: HEALTH INFORMATION MANAGEMENT | Facility: CLINIC | Age: 69
End: 2019-11-19

## 2019-11-19 ENCOUNTER — OFFICE VISIT (OUTPATIENT)
Dept: OPHTHALMOLOGY | Facility: CLINIC | Age: 69
End: 2019-11-19
Attending: OPHTHALMOLOGY
Payer: COMMERCIAL

## 2019-11-19 ENCOUNTER — DOCUMENTATION ONLY (OUTPATIENT)
Dept: FAMILY MEDICINE | Facility: CLINIC | Age: 69
End: 2019-11-19

## 2019-11-19 DIAGNOSIS — H40.033 ANATOMICAL NARROW ANGLE BORDERLINE GLAUCOMA OF BOTH EYES: ICD-10-CM

## 2019-11-19 DIAGNOSIS — E11.9 DIABETES TYPE 2, NO OCULAR INVOLVEMENT (H): Primary | ICD-10-CM

## 2019-11-19 DIAGNOSIS — H04.123 INSUFFICIENCY OF TEAR FILM OF BOTH EYES: ICD-10-CM

## 2019-11-19 DIAGNOSIS — H25.13 AGE-RELATED NUCLEAR CATARACT OF BOTH EYES: ICD-10-CM

## 2019-11-19 DIAGNOSIS — H02.889 MEIBOMIAN GLAND DISEASE, UNSPECIFIED LATERALITY: ICD-10-CM

## 2019-11-19 PROCEDURE — 92015 DETERMINE REFRACTIVE STATE: CPT | Mod: ZF

## 2019-11-19 PROCEDURE — G0463 HOSPITAL OUTPT CLINIC VISIT: HCPCS | Mod: ZF

## 2019-11-19 ASSESSMENT — CONF VISUAL FIELD
OS_NORMAL: 1
OD_NORMAL: 1
METHOD: COUNTING FINGERS

## 2019-11-19 ASSESSMENT — REFRACTION_MANIFEST
OD_CYLINDER: +0.75
OD_ADD: +2.50
OS_CYLINDER: +0.25
OS_SPHERE: +1.75
OS_AXIS: 070
OS_ADD: +2.50
OD_AXIS: 060
OD_SPHERE: +2.00

## 2019-11-19 ASSESSMENT — VISUAL ACUITY
OS_CC+: -2
OD_CC: 20/25
CORRECTION_TYPE: GLASSES
OD_CC: J1
OD_CC+: -2
OS_CC: J1
OS_CC: 20/25
METHOD: SNELLEN - LINEAR

## 2019-11-19 ASSESSMENT — CUP TO DISC RATIO
OS_RATIO: 0.2
OD_RATIO: 0.2

## 2019-11-19 ASSESSMENT — REFRACTION_WEARINGRX
OS_ADD: +2.25
OD_CYLINDER: +0.75
OD_ADD: +2.25
SPECS_TYPE: BIFOCAL
OD_SPHERE: +1.50
OD_AXIS: 068
OS_SPHERE: +2.00
OS_AXIS: 055
OS_CYLINDER: +0.25

## 2019-11-19 ASSESSMENT — TONOMETRY
IOP_METHOD: TONOPEN
OD_IOP_MMHG: 18
OS_IOP_MMHG: 16

## 2019-11-19 ASSESSMENT — EXTERNAL EXAM - RIGHT EYE: OD_EXAM: NORMAL

## 2019-11-19 ASSESSMENT — SLIT LAMP EXAM - LIDS
COMMENTS: NORMAL
COMMENTS: NORMAL

## 2019-11-19 ASSESSMENT — EXTERNAL EXAM - LEFT EYE: OS_EXAM: NORMAL

## 2019-11-19 NOTE — PROGRESS NOTES
"When opening a documentation only encounter, be sure to enter in \"Chief Complaint\" Forms and in \" Comments\" Title of form, description if needed.    Pinky is a 69 year old  female  Form received via: Fax  Form now resides in: Provider Ready    Pat Mallory CMA      Form has been completed by provider.     Form sent out via: Fax to Narendra avendano/lab report at Fax Number: [phone]  Patient informed: NA   Output date: November 19, 2019    Pat Mallory CMA      **Please close the encounter**              "

## 2019-11-19 NOTE — PROGRESS NOTES
Chief Complaint(s) and History of Present Illness(es)     Annual Eye Exam     Laterality: both eyes    Frequency: constantly    Timing: throughout the day    Course: stable    Associated symptoms: dryness.  Negative for eye pain, redness, tearing,   floaters and flashes    Treatments tried: artificial tears and ointment    Response to treatment: significant improvement    Pain scale: 0/10              Comments     Pink eye in March. Treated by UC at [facility]. Treated with eye medicated   drops which seemed to completely resolve symptoms.  Corneal scratch to right cornea in August. Again went to  at [facility] where   was treated with success.  Pt happy with vision.   Art tears 4 times a day and at bedtime marissa to BE.   Deana Frank, COT COT 2:02 PM 11/19/2019       Review of systems for the eyes was negative other than the pertinent positives/negatives listed in the HPI.      Assessment & Plan      Pinky Page is a 69 year old female with the following diagnoses:   1. 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 Age-related nuclear cataract of both eyes    3. Insufficiency of tear film of both eyes    4. Anatomical narrow angle borderline glaucoma of both eyes       Last HgA1c 7.1% on 10/22/2019  No retinopathy   Stressed good glycemic and hypertensive control  Cataracts not visually significant at this time  Early narrowing, no occlusion, open 360 both eyes on gonio today (11/19/2019) - monitor      Cont artificial tears four times a day.  Artificial tear ointment at bedtime recommended  Home humidifier  Consider plugs if not improving  Begin fish oil 1000 mg daily        Patient disposition:   Return in about 1 year (around 11/19/2020) for Annual Visit, Refraction, Dilation.         Sergio Chavez, PGY2  Ophthalmology Resident    Attending Physician Attestation:  Complete documentation of historical and exam elements from today's encounter can be found in the full encounter summary report (not reduplicated in this  progress note).  I personally obtained the chief complaint(s) and history of present illness.  I confirmed and edited as necessary the review of systems, past medical/surgical history, family history, social history, and examination findings as documented by others; and I examined the patient myself.  I personally reviewed the relevant tests, images, and reports as documented above.  I formulated and edited as necessary the assessment and plan and discussed the findings and management plan with the patient and family. . - Michael Lopez MD

## 2019-11-19 NOTE — NURSING NOTE
Chief Complaints and History of Present Illnesses   Patient presents with     Annual Eye Exam     Chief Complaint(s) and History of Present Illness(es)     Annual Eye Exam     Laterality: both eyes    Frequency: constantly    Timing: throughout the day    Course: stable    Associated symptoms: dryness.  Negative for eye pain, redness, tearing, floaters and flashes    Treatments tried: artificial tears and ointment    Response to treatment: significant improvement    Pain scale: 0/10              Comments     Pink eye in March. [place] [facility] Treated with eye medicated drops which seemed to completely resolve symptoms.  Corneal scratch to right cornea in August. Again [place] [facility] [place] was [place] with success.  Pt happy with vision.   Art tears 4 times a day and at bedtime marissa to BE.   Deana Frank, COT COT 2:02 PM 11/19/2019

## 2019-11-20 RX ORDER — CHLORAL HYDRATE 500 MG
1 CAPSULE ORAL DAILY
Status: DISCONTINUED | OUTPATIENT
Start: 2019-11-20 | End: 2019-11-20

## 2019-11-21 ENCOUNTER — MEDICAL CORRESPONDENCE (OUTPATIENT)
Dept: HEALTH INFORMATION MANAGEMENT | Facility: CLINIC | Age: 69
End: 2019-11-21

## 2019-11-22 RX ORDER — LOSARTAN POTASSIUM 100 MG/1
100 TABLET ORAL DAILY
COMMUNITY
End: 2020-09-10

## 2019-11-22 RX ORDER — AMLODIPINE BESYLATE 10 MG/1
10 TABLET ORAL DAILY
COMMUNITY
End: 2021-04-14

## 2019-11-22 RX ORDER — LEVOTHYROXINE SODIUM 175 UG/1
175 TABLET ORAL DAILY
COMMUNITY
End: 2021-04-05

## 2019-11-25 ENCOUNTER — OFFICE VISIT (OUTPATIENT)
Dept: PSYCHOLOGY | Facility: CLINIC | Age: 69
End: 2019-11-25
Payer: COMMERCIAL

## 2019-11-25 VITALS — WEIGHT: 231.2 LBS | BODY MASS INDEX: 39.38 KG/M2

## 2019-11-25 DIAGNOSIS — F54 PSYCHOLOGICAL FACTORS AFFECTING MEDICAL CONDITION: ICD-10-CM

## 2019-11-25 DIAGNOSIS — F41.1 GENERALIZED ANXIETY DISORDER: ICD-10-CM

## 2019-11-25 DIAGNOSIS — E66.9 OBESITY, UNSPECIFIED OBESITY SEVERITY, UNSPECIFIED OBESITY TYPE: ICD-10-CM

## 2019-11-25 DIAGNOSIS — F33.0 MAJOR DEPRESSIVE DISORDER, RECURRENT EPISODE, MILD (H): Primary | ICD-10-CM

## 2019-11-25 NOTE — PROGRESS NOTES
Health Psychology                  Clinic    Department of Medicine  Madelaine Ingram, Ph.D., L.P. [phone]                          [facility]  [facility] Miryam Woods, Ph.D.,  L.P. [phone]                 3rd Floor  [place] Mail Code 749   Pinky Panda, Ph.D., L.P. [phone] [address] Rae Eugene, Ph.D., L.P. [phone]            [place] [address]           Damon Gr, Ph.D., A.B.P.P., L.P. [phone]      Kimber Morales, Ph.D., L.P. [phone]    Health Psychology Follow-Up Note    Ms. Page is a pleasant 69-year old woman with chronic depressive illness, who returns to clinic for supportive and behavioral psychotherapy for moderate recurrent depression and for help losing weight given her morbid obesity.     She is 231.2# down from 231.4# down from 231.7#.  She is back to exercising after the pain in her right side subsided. She had been gradually increasing exercise, but has been doing it a bit less.  She is encouraged to increased her walking and to use of stationary bicycle. (20 minutes) .    She ws aware that she had likely gained weight.  We did not walk today.  We discussed ways of lengthening her walks (e.g., doubling laps around [place])  and increasing her time on exercycle bike (I.e. 50 50 minutes)   Wt Readings from Last 4 Encounters:   11/25/19 104.9 kg (231 lb 3.2 oz)   11/07/19 105.1 kg (231 lb 12.8 oz)   10/16/19 105 kg (231 lb 6.4 oz)   10/09/19 105.7 kg (233 lb)     Past Medical History:   Diagnosis Date     Allergic rhinitis due to pollen     seasonal allergies      Anisometropia and aniseikonia      BMI greater than 40      Cardiomegaly      Chronic constipation      Congenital absence of one kidney      Cramp of limb      Dermatophytosis of the body      Dry eye syndrome      Esophageal reflux      Essential hypertension, benign      Gastro-oesophageal  reflux disease      Hemorrhoids      Hypermetropia      Hypothyroidism      Incomplete Emptying of Bladder      Lactose intolerance      Major depressive disorder, recurrent episode, moderate (H)      Malignant neoplasm of breast (female), unspecified site     left mastectomy 1996      Mixed incontinence urge and stress (male)(female)      Moderate obstructive sleep apnea      Postmenopausal Atrophic Vaginitis      Presbyopia      Regular astigmatism      Restless leg syndrome      Senile nuclear sclerosis      Thyroid eye disease     mild     Tinnitus      Trigger finger (acquired)     right hand     Type II or unspecified type diabetes mellitus without mention of complication, not stated as uncontrolled     on insulin since April 2006      Past Surgical History:   Procedure Laterality Date     APPENDECTOMY       C MASTECTOMY,SIMPLE  1996    Left mastectomy  [facility]. Had normal FU mammo 5/2007. Follow yearly.      C TOTAL ABDOM HYSTERECTOMY  7/2003    Dr. Castanon, for abnormal bleeding. Removal of both tubes with BSO for myoma w - - -     CHOLECYSTECTOMY       COLONOSCOPY  5/2005    Complete Colonoscopy-had one small polyp removed 5/2005.      COLONOSCOPY N/A 3/31/2016    Procedure: COLONOSCOPY;  Surgeon: Cary Ring MD;  Location: [facility]     COLONOSCOPY N/A 7/7/2016    Procedure: COLONOSCOPY;  Surgeon: Cary Ring MD;  Location: [facility]     DILATION AND CURETTAGE       HYSTERECTOMY       MASTECTOMY      Left breast     Current Outpatient Medications   Medication     acetaminophen (TYLENOL) 500 MG tablet     alum & mag hydroxide-simethicone (MYLANTA/MAALOX) 200-200-20 MG/5ML SUSP suspension     amLODIPine (NORVASC) 10 MG tablet     artificial saliva (BIOTENE MT) AERS spray     ASPIRIN 81 MG OR TABS     atorvastatin (LIPITOR) 40 MG tablet     BD AUTOSHIELD DUO 30G X 5 MM     blood glucose (NO BRAND SPECIFIED) lancets standard     blood glucose calibration (NO BRAND  SPECIFIED) solution     blood glucose monitoring (NO BRAND SPECIFIED) meter device kit     blood glucose monitoring (NO BRAND SPECIFIED) test strip     Calcium Carbonate-Vitamin D (CALCIUM-VITAMIN D) 250-125 MG-UNIT TABS     calcium polycarbophil (FIBERCON) 625 MG tablet     CLARITIN 10 MG OR TABS     exenatide (BYETTA) 10 MCG/0.04ML injection     FLUoxetine (PROZAC) 20 MG capsule     fluticasone (FLONASE) 50 MCG/ACT nasal spray     furosemide (LASIX) 20 MG tablet     Gabapentin (NEURONTIN PO)     glucagon (GLUCAGON EMERGENCY) 1 MG kit     Hypromellose (ARTIFICIAL TEARS OP)     insulin glargine (LANTUS) 100 UNIT/ML injection     lactase (LACTAID) 3000 UNIT tablet     levothyroxine (SYNTHROID, LEVOTHROID) 175 MCG tablet     levothyroxine (SYNTHROID/LEVOTHROID) 175 MCG tablet     LORazepam (ATIVAN) 1 MG tablet     losartan (COZAAR) 100 MG tablet     Magnesium Hydroxide (MILK OF MAGNESIA PO)     melatonin 3 MG tablet     metFORMIN (GLUCOPHAGE) 1000 MG tablet     nystatin (MYCOSTATIN) 613765 UNIT/GM POWD     ONETOUCH DELICA LANCETS 33G MISC     polyethylene glycol (MIRALAX/GLYCOLAX) powder     Saline 0.9 % SOLN     senna-docusate (SENNA S) 8.6-50 MG per tablet     simethicone (MYLICON) 125 MG chewable tablet     Skin Protectants, Misc. (EUCERIN) cream     SM LUBRICANT EYE DROPS 0.4-0.3 % SOLN ophthalmic solution     Sod Fluor-Solo Fluor-Hydrfl Ac (GEL-SASHA DENTINBLOC DT)     solifenacin (VESICARE) 10 MG tablet     terbinafine (LAMISIL) 1 % external cream     ziprasidone (GEODON) 40 MG capsule     No current facility-administered medications for this visit.       PHQ-9 SCORE 7/11/2019 9/11/2019 10/9/2019   PHQ-9 Total Score - - -   PHQ-9 Total Score 8 6 8     NATTY-7 SCORE 7/27/2018 11/7/2018 11/8/2018   Total Score - - -   Total Score 7 7 7     She arrived early today.   She is greeted in the waiting room.  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   She is satisfied with this level of effort and gets positive feedback from others.  She is getting along well with Tevin and with her roommate.   We discussed the plan to decrease caloric intake by about 250 calories /day (e.g, less frequent popcorn and pretzels).  We walked for 25 minutes.  She states she is walking and trying to bicycle 5 days/week.  She acknowledges her depression is now at baseline (3-4/10) or perhaps slightly better.    Goal for exercise has been 1.5 hours/day as of June 19, 2017 and 2 lbs./month.     Pinky participated fully and appeared to derive benefit.  Rapport was excellent.     Extended session due to complexity of case and length of interval.    IMPRESSION Distress Disability Risk    Low High Low     Time In: 1:06  Time Out: 2:01  Diagnosis:   Major Depression, recurrent mild (F33.0)   Psychological Factors Affecting Morbid Obesity (F54)    Plan: She will return on 1/7 @ 1 for behavioral and supportive psychotherapy.  Will spread out to q 5-6 weeks.    Last treatment plan signed: 5/21/2019  Treatment plan due: 5/21/2020                         Damon Gr, Ph.D., A.B.P.P., L.P.

## 2019-12-01 ENCOUNTER — MEDICAL CORRESPONDENCE (OUTPATIENT)
Dept: HEALTH INFORMATION MANAGEMENT | Facility: CLINIC | Age: 69
End: 2019-12-01

## 2019-12-05 ENCOUNTER — TRANSFERRED RECORDS (OUTPATIENT)
Dept: HEALTH INFORMATION MANAGEMENT | Facility: CLINIC | Age: 69
End: 2019-12-05

## 2019-12-05 ENCOUNTER — ALLIED HEALTH/NURSE VISIT (OUTPATIENT)
Dept: FAMILY MEDICINE | Facility: CLINIC | Age: 69
End: 2019-12-05
Payer: COMMERCIAL

## 2019-12-05 DIAGNOSIS — Z13.9 SCREENING FOR CONDITION: Primary | ICD-10-CM

## 2019-12-09 ENCOUNTER — OFFICE VISIT (OUTPATIENT)
Dept: ENDOCRINOLOGY | Facility: CLINIC | Age: 69
End: 2019-12-09
Payer: COMMERCIAL

## 2019-12-09 VITALS
DIASTOLIC BLOOD PRESSURE: 59 MMHG | TEMPERATURE: 97.8 F | HEART RATE: 75 BPM | WEIGHT: 230.4 LBS | BODY MASS INDEX: 39.34 KG/M2 | SYSTOLIC BLOOD PRESSURE: 159 MMHG | OXYGEN SATURATION: 98 % | HEIGHT: 64 IN

## 2019-12-09 DIAGNOSIS — E66.01 MORBID OBESITY (H): Primary | ICD-10-CM

## 2019-12-09 ASSESSMENT — PAIN SCALES - GENERAL: PAINLEVEL: MODERATE PAIN (5)

## 2019-12-09 ASSESSMENT — MIFFLIN-ST. JEOR: SCORE: 1554.06

## 2019-12-09 NOTE — PROGRESS NOTES
"    Return Medical Weight Management Note     Pinky Page  MRN:  [id number]  :  1950  CORINNE:  19    Dear Dr. Ann,     I had the pleasure of seeing your patient Pinky Page.  She is a 65 year old female who I am continuing to see for treatment of obesity related to: DM-2 and Hypertension.    CURRENT WEIGHT:   230 lbs 6.4 oz    Wt Readings from Last 4 Encounters:   19 104.5 kg (230 lb 6.4 oz)   19 105.1 kg (231 lb 12.8 oz)   19 105.2 kg (232 lb)   19 104.9 kg (231 lb 3.2 oz)     Height:  5' 4.25\"  Body Mass Index:  Body mass index is 39.24 kg/m .  Vitals:  B/P: 163/75, P: 68    Initial consult weight was 283 on .  Weight change since last seen on 19 is down 1 pounds.   Total loss is 53 pounds.    INTERVAL HISTORY:  Patient has been working on the following dietary changes: Still eating from the diet line at her group home (1500 miguel, no concentrated sweets, some fruit snacks) but says in summer is eating more of new foods.  She continues Byetta 10 cg BID for DM and appetite suppressant.  Is on metformin and her glargine dose is 20/d with HbA1c in 7s.  Patient has been working on the following activity changes: Very regular walking at least 40 minutes / day.    Diet and Activity Changes Since Last Visit Reviewed With Patient 2019   I have made the following changes to my diet since my last visit: -   With regards to my diet, I am still struggling with: -   For breakfast, I typically eat: -   For lunch, I typically eat: -   For supper, I typically eat: -   For snack(s), I typically eat: -   I have made the following changes to my activity/exercise since my last visit: ride the bike   With regards to my activity/exercise, I am still struggling with: -       MEDICATIONS:   Current Outpatient Medications   Medication     acetaminophen (TYLENOL) 500 MG tablet     alum & mag hydroxide-simethicone (MYLANTA/MAALOX) 200-200-20 MG/5ML SUSP suspension     amLODIPine " (NORVASC) 10 MG tablet     artificial saliva (BIOTENE MT) AERS spray     ASPIRIN 81 MG OR TABS     atorvastatin (LIPITOR) 40 MG tablet     BD AUTOSHIELD DUO 30G X 5 MM     blood glucose (NO BRAND SPECIFIED) lancets standard     blood glucose calibration (NO BRAND SPECIFIED) solution     blood glucose monitoring (NO BRAND SPECIFIED) meter device kit     blood glucose monitoring (NO BRAND SPECIFIED) test strip     Calcium Carbonate-Vitamin D (CALCIUM-VITAMIN D) 250-125 MG-UNIT TABS     calcium polycarbophil (FIBERCON) 625 MG tablet     CLARITIN 10 MG OR TABS     exenatide (BYETTA) 10 MCG/0.04ML injection     FLUoxetine (PROZAC) 20 MG capsule     fluticasone (FLONASE) 50 MCG/ACT nasal spray     furosemide (LASIX) 20 MG tablet     Gabapentin (NEURONTIN PO)     Hypromellose (ARTIFICIAL TEARS OP)     insulin glargine (LANTUS) 100 UNIT/ML injection     lactase (LACTAID) 3000 UNIT tablet     levothyroxine (SYNTHROID, LEVOTHROID) 175 MCG tablet     levothyroxine (SYNTHROID/LEVOTHROID) 175 MCG tablet     LORazepam (ATIVAN) 1 MG tablet     losartan (COZAAR) 100 MG tablet     Magnesium Hydroxide (MILK OF MAGNESIA PO)     melatonin 3 MG tablet     metFORMIN (GLUCOPHAGE) 1000 MG tablet     nystatin (MYCOSTATIN) 298152 UNIT/GM POWD     ONETOUCH DELICA LANCETS 33G MISC     polyethylene glycol (MIRALAX/GLYCOLAX) powder     Saline 0.9 % SOLN     senna-docusate (SENNA S) 8.6-50 MG per tablet     simethicone (MYLICON) 125 MG chewable tablet     Skin Protectants, Misc. (EUCERIN) cream     SM LUBRICANT EYE DROPS 0.4-0.3 % SOLN ophthalmic solution     Sod Fluor-Solo Fluor-Hydrfl Ac (GEL-SASHA DENTINBLOC DT)     solifenacin (VESICARE) 10 MG tablet     terbinafine (LAMISIL) 1 % external cream     ziprasidone (GEODON) 40 MG capsule     glucagon (GLUCAGON EMERGENCY) 1 MG kit     No current facility-administered medications for this visit.        Weight Loss Medication History Reviewed With Patient 12/9/2019   Which weight loss medications are  you currently taking on a regular basis?  Byetta (exentatide)   Are you having any side effects from the weight loss medication that we have prescribed you? No   If you are having side effects please describe: -     ASSESSMENT:   Continues to do well with weight loss maintenance. Reinforced food plan - focus on no between meal snacking, aggressive lowering of starches and cheese    FOLLOW-UP:    12 weeks.  10/15 minutes spent on counseling and education    Sincerely,    Marcos Magdaleno MD

## 2019-12-09 NOTE — LETTER
"2019       RE: Pinky Page  [address] [facility] [address]  [place] [address]     Dear Colleague,    Thank you for referring your patient, Pinky Page, to the [facility] MEDICAL WEIGHT MANAGEMENT at [facility]. Please see a copy of my visit note below.        Return Medical Weight Management Note     Pinky Page  MRN:  [id number]  :  1950  CORINNE:  19    Dear Dr. Ann,     I had the pleasure of seeing your patient Pinky Page.  She is a 65 year old female who I am continuing to see for treatment of obesity related to: DM-2 and Hypertension.    CURRENT WEIGHT:   230 lbs 6.4 oz    Wt Readings from Last 4 Encounters:   19 104.5 kg (230 lb 6.4 oz)   19 105.1 kg (231 lb 12.8 oz)   19 105.2 kg (232 lb)   19 104.9 kg (231 lb 3.2 oz)     Height:  5' 4.25\"  Body Mass Index:  Body mass index is 39.24 kg/m .  Vitals:  B/P: 163/75, P: 68    Initial consult weight was 283 on .  Weight change since last seen on 19 is down 1 pounds.   Total loss is 53 pounds.    INTERVAL HISTORY:  Patient has been working on the following dietary changes: Still eating from the diet line at her group home (1500 miguel, no concentrated sweets, some fruit snacks) but says in summer is eating more of new foods.  She continues Byetta 10 cg BID for DM and appetite suppressant.  Is on metformin and her glargine dose is 20/d with HbA1c in 7s.  Patient has been working on the following activity changes: Very regular walking at least 40 minutes / day.    Diet and Activity Changes Since Last Visit Reviewed With Patient 2019   I have made the following changes to my diet since my last visit: -   With regards to my diet, I am still struggling with: -   For breakfast, I typically eat: -   For lunch, I typically eat: -   For supper, I typically eat: -   For snack(s), I typically eat: -   I have made the following changes to my activity/exercise since my " last visit: ride the bike   With regards to my activity/exercise, I am still struggling with: -       MEDICATIONS:   Current Outpatient Medications   Medication     acetaminophen (TYLENOL) 500 MG tablet     alum & mag hydroxide-simethicone (MYLANTA/MAALOX) 200-200-20 MG/5ML SUSP suspension     amLODIPine (NORVASC) 10 MG tablet     artificial saliva (BIOTENE MT) AERS spray     ASPIRIN 81 MG OR TABS     atorvastatin (LIPITOR) 40 MG tablet     BD AUTOSHIELD DUO 30G X 5 MM     blood glucose (NO BRAND SPECIFIED) lancets standard     blood glucose calibration (NO BRAND SPECIFIED) solution     blood glucose monitoring (NO BRAND SPECIFIED) meter device kit     blood glucose monitoring (NO BRAND SPECIFIED) test strip     Calcium Carbonate-Vitamin D (CALCIUM-VITAMIN D) 250-125 MG-UNIT TABS     calcium polycarbophil (FIBERCON) 625 MG tablet     CLARITIN 10 MG OR TABS     exenatide (BYETTA) 10 MCG/0.04ML injection     FLUoxetine (PROZAC) 20 MG capsule     fluticasone (FLONASE) 50 MCG/ACT nasal spray     furosemide (LASIX) 20 MG tablet     Gabapentin (NEURONTIN PO)     Hypromellose (ARTIFICIAL TEARS OP)     insulin glargine (LANTUS) 100 UNIT/ML injection     lactase (LACTAID) 3000 UNIT tablet     levothyroxine (SYNTHROID, LEVOTHROID) 175 MCG tablet     levothyroxine (SYNTHROID/LEVOTHROID) 175 MCG tablet     LORazepam (ATIVAN) 1 MG tablet     losartan (COZAAR) 100 MG tablet     Magnesium Hydroxide (MILK OF MAGNESIA PO)     melatonin 3 MG tablet     metFORMIN (GLUCOPHAGE) 1000 MG tablet     nystatin (MYCOSTATIN) 606359 UNIT/GM POWD     ONETOUCH DELICA LANCETS 33G MISC     polyethylene glycol (MIRALAX/GLYCOLAX) powder     Saline 0.9 % SOLN     senna-docusate (SENNA S) 8.6-50 MG per tablet     simethicone (MYLICON) 125 MG chewable tablet     Skin Protectants, Misc. (EUCERIN) cream     SM LUBRICANT EYE DROPS 0.4-0.3 % SOLN ophthalmic solution     Sod Fluor-Solo Fluor-Hydrfl Ac (GEL-SASHA DENTINBLOC DT)     solifenacin (VESICARE) 10 MG  tablet     terbinafine (LAMISIL) 1 % external cream     ziprasidone (GEODON) 40 MG capsule     glucagon (GLUCAGON EMERGENCY) 1 MG kit     No current facility-administered medications for this visit.        Weight Loss Medication History Reviewed With Patient 12/9/2019   Which weight loss medications are you currently taking on a regular basis?  Byetta (exentatide)   Are you having any side effects from the weight loss medication that we have prescribed you? No   If you are having side effects please describe: -     ASSESSMENT:   Continues to do well with weight loss maintenance. Reinforced food plan - focus on no between meal snacking, aggressive lowering of starches and cheese    FOLLOW-UP:    12 weeks.  10/15 minutes spent on counseling and education    Sincerely,    Marcos Magdaleno MD

## 2019-12-10 NOTE — PROGRESS NOTES
"     [facility]  Psychiatry Clinic  TRANSFER of CARE DIAGNOSTIC ASSESSMENT     Date of initial Diagnostic Assessment (DA) is 5/13/13 and most recent Transfer of Care DA is 12/11/19.    CARE TEAM:  PCP- aCry Brownlee  (resident working at [facility] with Dr. Birmingham, attending)   Specialty Providers- no    Therapist- Dr. Gr    [facility] Team- no            Pinky Page is a 69 year old female who prefers the name Pinky & pronouns she, her, hers.      Pertinent Background: This patient first experienced mental health issues in her twenties and has received treatment for schizoaffective disorder and generalized anxiety. Notably, Pinky's symptoms of depression and psychosis have responded well to a combination of ECT and medication management. Pinky has a history of experiencing increasingly frequent psychotic symptoms w/ previously attempted antipsychotic tapers.      Psych critical item history includes psychosis [sxs include disorganization, hallucinations, paranoia], mutiple psychotropic trials, psych hosp (3-5) and ECT.    INTERIM HISTORY      [4, 4]   The patient reports good treatment adherence.  History was provided by the patient who was a good historian.    The last visit ended with no change to the med regimen.   Since the last visit:   -Reports her mood has been \"pretty good.\"  -Continues to stay busy at [facility] with daily walks, biking on a stationary bike for an hour 1-2 times a week, trivia, watching TV, coloring, doing word searches, decorating for the holidays, and making crafts including mosaic ornaments and paper snowflakes.   -Has not been exercising as much recently due to right leg pain for the past several weeks. She denies any swelling or warmth to the leg and is planning to see her PCP about it.   -Loves the holidays and thinks about her family's holiday traditions, especially being at home with her mother and brother in later years and playing games. This " "year, she is looking forward to spending [place] with her older sister, Fariba, her brother-in-law, her niece, and her niece's one year old son, Erwin. She is also looking forward to seeing their dog, Danitza.   -Is a little nervous for December because her father  on , 51 years ago and there is a  this month. Her brother and her brother-in-law also both  in December.   -Continues to have difficulty falling asleep and staying asleep. It will sometimes take her 2 hours to fall asleep about twice a week. She wears her CPAP with the nose piece but will wake up and take it off. She also gets up to go to the bathroom twice a night and sometimes has difficulty falling back asleep. She goes to sleep at 10:30pm after watching Two and a Half Men with her roommate. She will usually get up by 6AM. Sometimes she will take a nap if she is tired during the day. She has found that Ativan has been helpful for her sleep. Denies sleepwalking but endorses restless legs overnight.   -Denies any paranoia but states \"sometimes I feel people behind me.\"   -Reports occasional irritability about once a week. When she is feeling irritable, she won't get angry at others, she will just walk away. 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     RECENT PSYCH ROS:   Depression: REPORTS: insomnia DENIES: suicidal ideation, depressed mood, anhedonia, low energy, appetite changes, weight changes and poor concentration /memory  Elevated: DENIES: increased energy, decreased sleep need, increased activity and grandiosity  Psychosis: DENIES: delusions, auditory hallucinations, visual hallucinations and disorganized behavior  Anxiety: DENIES: excessive worry, feeling fearful and nervous/overwhelmed  Panic Attack:  none  Trauma Related:  none  Dysregulation: REPORTS: irritable  Eating Disorder: no     Pertinent Negative Symptoms:  NO suicidal and " violent ideation, aggression, psychosis, hallucinations, delusions and alberta    RECENT SUBSTANCE USE:     ALCOHOL- no      TOBACCO- no     CAFFEINE- coffee/ tea [coffee, diet Pepsi]. 1 can diet coke a day, 3-4 cups of coffee a day, doesn't drink regular coffee after 1pm.   OPIOIDS- no         NARCAN KIT- no        CANNABIS- no            OTHER ILLICIT DRUGS- none    CURRENT SOCIAL HISTORY:  FINANCIAL SUPPORT- social security disability       CHILDREN- no      LIVING SITUATION- Lives at [facility].      SOCIAL/ SPIRITUAL SUPPORT- sisters, support staff at [facility], other residents at [facility], other providers     FEELS SAFE AT HOME- yes     PSYCHIATRIC HISTORY                                                            SIB- no  Suicidal Ideation Hx- no  Suicide Attempt- #- no, most recent- N/A    Violence/Aggression Hx- no  Psychosis Hx- Chart review indicates distant history of auditory hallucinations and paranoia.   Eating Disorder: She reports stopping eating in 1985-86; likely secondary to depression. She does not have history of anorexia diagnosis.    Psych Hosp- #- She was hospitalized in [employer] in 1986 where she received ECT(probably for depression, patient doesn't know for sure). She was hospitalized again in 1987, again received ECT. Was hospitalized for depression in 1995, around the same time she was diagnosed with breast cancer. She was hospitalized for about nine months this time. She received ECT maintence here from [id number], most recent- N/A   ECT- Yes, as described above.  Outpatient Programs - Day treatment in 1987.    PAST MED TRIALS                                                          sertaline   amitriptyline   lithium   risperidone   olanzapine   trazodone   clonazepam  Quetiapine (weight gain)    SUBSTANCE USE HISTORY                                               Past Use- none reported  Treatment- #, most recent- no  Medical Consequences- no  HIV/Hepatitis- no  Legal  Consequences- no    SOCIAL and FAMILY HISTORY      [1ea, 1ea]       patient reported                  Financial Support- if known, documented above  Family/ Children/ Relationships- if known, documented above  Living Situation- if known, documented above  Cultural/ Social/ Spiritual Support- Support staff at [facility]. She also has four sisters and is closest with one who lives in [place].  Trauma History (self-report)- no  Legal- no  Early History/Education-  Grew up in [place], with 7 siblings, second youngest. Parents remained  and she reports a positive childhood. 4 sisters still living with many nieces and nephews. 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  FAMILY MENTAL HEALTH HX- none known  MEDICAL / SURGICAL HISTORY          Patient Active Problem List   Diagnosis     Allergic rhinitis due to pollen     Urge incontinence     Hypertension, essential     Cardiomegaly     Chronic constipation     Dry eye syndrome     Esophageal reflux     Exposure keratoconjunctivitis     DM ophthalmopathy (H)     Hypothyroidism     Senile cataract     ANUJ (obstructive sleep apnea)     Vaginal atrophy     Restless leg syndrome     Squamous blepharitis     Morbid obesity due to excess calories (H)     Personal history of breast cancer s/p L masectomy     Diabetes mellitus type 2, insulin dependent (H)     Hypercholesteremia     Lives in group home     Extrapyramidal and movement disorder     CKD (chronic kidney disease) stage 2, GFR 60-89 ml/min     Solitary kidney, congenital     S/P hysterectomy     Impingement syndrome of right shoulder     Hypertriglyceridemia     Candidiasis of skin     Generalized anxiety disorder     Carpal tunnel syndrome of left wrist     Schizoaffective disorder, depressive type (H)     Major depressive disorder, recurrent episode, moderate (H)     Psychological factors affecting medical condition     Hx of  psychiatric care     Nail complaint     Microalbuminuria due to type 2 diabetes mellitus (H)     Type 2 diabetes mellitus with microalbuminuria, with long-term current use of insulin (H)     Diabetes mellitus, type II, insulin dependent (H)     CKD (chronic kidney disease) stage 1, GFR 90 ml/min or greater     Conjunctivitis of both eyes     Corneal erosion of both eyes     Spastic entropion, right       Past Surgical History:   Procedure Laterality Date     APPENDECTOMY       C MASTECTOMY,SIMPLE  1996    Left mastectomy  [facility]. Had normal FU mammo 5/2007. Follow yearly.      C TOTAL ABDOM HYSTERECTOMY  7/2003    Dr. Castanon, for abnormal bleeding. Removal of both tubes with BSO for myoma w - - -     CHOLECYSTECTOMY       COLONOSCOPY  5/2005    Complete Colonoscopy-had one small polyp removed 5/2005.      COLONOSCOPY N/A 3/31/2016    Procedure: COLONOSCOPY;  Surgeon: Cary Ring MD;  Location: [facility]     COLONOSCOPY N/A 7/7/2016    Procedure: COLONOSCOPY;  Surgeon: Cary Ring MD;  Location: [facility]     DILATION AND CURETTAGE       HYSTERECTOMY       MASTECTOMY      Left breast       MEDICAL REVIEW OF SYSTEMS    [2, 10]     REPORTS: right leg pain DENIES: leg edema, leg erythema, palpitations, chest pain, loss of consciousness, dyspnea, dizziness, fever, muscle stiffness, muscle twitching, Parkinsonian symptoms     ALLERGY       Chlordiazepoxide hcl; Dimetapp dm cold-cough; Haldol; Ibuprofen; Lactose intolerance [beta-galactosidase]; Milk products; and Propofol  MEDICATIONS        Current Outpatient Medications   Medication Sig Dispense Refill     acetaminophen (TYLENOL) 500 MG tablet Take 2 tablets (1,000 mg) by mouth every 6 hours as needed 1 Bottle 2     alum & mag hydroxide-simethicone (MYLANTA/MAALOX) 200-200-20 MG/5ML SUSP suspension Take 30 mLs by mouth daily as needed for indigestion       amLODIPine (NORVASC) 10 MG tablet Take 10 mg by mouth daily        artificial saliva (BIOTENE MT) AERS spray Take 1 spray by mouth 3 times daily as needed for dry mouth       ASPIRIN 81 MG OR TABS Take 1 tablet by mouth daily. ENTERIC COATED. 100 3     atorvastatin (LIPITOR) 40 MG tablet Take 1 tablet (40 mg) by mouth daily 90 tablet 1     BD AUTOSHIELD DUO 30G X 5 MM        blood glucose (NO BRAND SPECIFIED) lancets standard Use to test blood sugar 2 times daily or as directed. 100 each 11     blood glucose calibration (NO BRAND SPECIFIED) solution Use to calibrate blood glucose monitor as directed. 1 each 3     blood glucose monitoring (NO BRAND SPECIFIED) meter device kit Check Blood sugars twice a day. 1 kit 0     blood glucose monitoring (NO BRAND SPECIFIED) test strip Use to test blood sugar 3 times daily or as directed. 100 each 3     Calcium Carbonate-Vitamin D (CALCIUM-VITAMIN D) 250-125 MG-UNIT TABS Take 1 tablet by mouth 2 times daily. Calcium 250 mg/Vit D 125 IU       calcium polycarbophil (FIBERCON) 625 MG tablet Take 2 tablets by mouth daily       CLARITIN 10 MG OR TABS 1 TAB PO QD (Once per day) as needed for ALLERGY SYMPTOMS 30 11     exenatide (BYETTA) 10 MCG/0.04ML injection Inject 10 mcg Subcutaneous 2 times daily (before meals) 1 Syringe 11     FLUoxetine (PROZAC) 20 MG capsule Take 2 capsules (40 mg) by mouth daily 60 capsule 1     fluticasone (FLONASE) 50 MCG/ACT nasal spray Spray 1 spray into both nostrils daily 16 g 3     furosemide (LASIX) 20 MG tablet Take 1 tablet (20 mg) by mouth 2 times daily 60 tablet 3     Gabapentin (NEURONTIN PO) Take 900 mg by mouth 3 times daily.       Hypromellose (ARTIFICIAL TEARS OP) Apply 1 drop to eye 4 times daily.       insulin glargine (LANTUS) 100 UNIT/ML injection Inject 20 Units Subcutaneous At Bedtime 8 mL 11     lactase (LACTAID) 3000 UNIT tablet Take 4 tabs daily with meals.       levothyroxine (SYNTHROID, LEVOTHROID) 175 MCG tablet Take 1 tablet (175 mcg) by mouth daily Give on empty stomach 30 tablet 4      levothyroxine (SYNTHROID/LEVOTHROID) 175 MCG tablet Take 175 mcg by mouth daily       LORazepam (ATIVAN) 1 MG tablet Take 1 tablet (1 mg) by mouth At Bedtime Take 1 tablet at bedtime. 30 tablet 1     losartan (COZAAR) 100 MG tablet Take 100 mg by mouth daily       Magnesium Hydroxide (MILK OF MAGNESIA PO) Take  by mouth. Take 30 mL as needed for constipation.       melatonin 5 MG tablet Take 1 tablet (5 mg) by mouth At Bedtime 30 tablet 1     metFORMIN (GLUCOPHAGE) 1000 MG tablet Take 1,000 mg by mouth 2 times daily (with meals)       nystatin (MYCOSTATIN) 168740 UNIT/GM POWD Apply topically 3 times daily as needed 60 g 1     ONETOUCH DELICA LANCETS 33G MISC        polyethylene glycol (MIRALAX/GLYCOLAX) powder Take 1 capful by mouth 2 times daily. 17 GM PO BID       Saline 0.9 % SOLN Spray 2 sprays in nostril as needed.       senna-docusate (SENNA S) 8.6-50 MG per tablet Take 2 tablets by mouth At Bedtime.       simethicone (MYLICON) 125 MG chewable tablet Take 1 tablet (125 mg) by mouth 2 times daily 60 tablet 0     Skin Protectants, Misc. (EUCERIN) cream Apply  topically as needed. Apply to thigh PRN dry skin        SM LUBRICANT EYE DROPS 0.4-0.3 % SOLN ophthalmic solution        Sod Fluor-Solo Fluor-Hydrfl Ac (GEL-SASHA DENTINBLOC DT) Apply  to affected area. GEL. Apply to 2nd molar on bottom right daily at bedtime.       solifenacin (VESICARE) 10 MG tablet Take 1 tablet (10 mg) by mouth daily 30 tablet 6     terbinafine (LAMISIL) 1 % external cream Apply topically 2 times daily       ziprasidone (GEODON) 40 MG capsule Take 1 capsule (40 mg) by mouth 2 times daily (with meals) 60 capsule 2     glucagon (GLUCAGON EMERGENCY) 1 MG kit Inject 1 mg into the muscle once for 1 dose 2 mg 3     VITALS      [3, 3]   BP (!) 147/76   Pulse 72   Wt 105.2 kg (232 lb)   BMI 39.51 kg/m     MENTAL STATUS EXAM      [9, 14 cog gs]     Alertness: alert  and oriented  Appearance: adequately groomed, glasses, slightly messy long  "white hair in a ponytail  Behavior/Demeanor: cooperative, pleasant and calm, with good  eye contact   Speech: normal and regular rate and rhythm  Language: intact and no problems  Psychomotor: normal or unremarkable  Mood: \"pretty good\"  Affect: full range; was congruent to mood; was congruent to content  Thought Process/Associations: unremarkable  Thought Content:  Reports none;  Denies suicidal and violent ideation and delusions  Perception:  Reports none;  Denies auditory hallucinations and visual hallucinations  Insight: good  Judgment: good  Cognition: (6) does  appear grossly intact; formal cognitive testing was not done  Gait and Station: unremarkable    LABS and DATA     AIMS:  last done 3/6/19 with score(s): 3;  next due March 2020    PHQ9 TODAY = 7  PHQ-9 SCORE 7/11/2019 9/11/2019 10/9/2019   PHQ-9 Total Score - - -   PHQ-9 Total Score 8 6 8     ANTIPSYCHOTIC LABS  [glu, A1C, lipids (danika LDL), liver enzymes, WBC, ANEU, Hgb, plts]  q12 mo  Recent Labs   Lab Test 08/06/19  0941 05/07/19  0908 05/07/19  0650 11/07/18  1531 10/25/18  1734 05/03/18  1414  06/21/17  1047   GLC  --  109*  --   --  147* 192.6*  --  134*   A1C 6.6*  --  6.8* 7.4*  --  6.7*   < >  --     < > = values in this interval not displayed.     Recent Labs   Lab Test 05/07/19  0650 05/03/18  1414 03/21/17  1023 02/22/16  1028   CHOL 138 126 142 162.0   TRIG 96 148 121 151.9*   LDL 79 60 78 95   HDL 40 37* 40* 36.6*     Recent Labs   Lab Test 06/26/18  0659 06/26/18 03/21/17  1023 01/22/15  1600   AST 16 16 13 18   ALT 21 21 20 35   ALKPHOS 111 111 92 113     Recent Labs   Lab Test 05/07/19  0650 10/25/18  1734 06/26/18  0659 06/21/17  1047 03/21/17  1023  07/04/14  0705   WBC 10.06* 14.2* 9.72  --  11.8*   < > 7.7   ANEU  --  10.6* 5.31  --  8.3  --  4.8   HGB 13.3 14.0 12.6 13.8 13.4   < > 13.3    275 247  --  266   < > 223    < > = values in this interval not displayed.     PSYCHOTROPIC DRUG INTERACTIONS     Concurrent use of " ZIPRASIDONE and QT INTERVAL PROLONGING DRUGS may result in increased risk of QT-interval prolongation.  Concurrent use of ZIPRASIDONE and SEROTONERGIC DRUGS THAT PROLONG QT INTERVAL may result in increased risk of QT-interval prolongation and increased risk of serotonin syndrome (hypertension, hyperthermia, myoclonus, mental status changes).  Concurrent use of NSAID and SSRI may result in an increased risk of bleeding  Concurrent use of ERYTHROMYCIN and FLUOXETINE may result in an increased risk of cardiotoxicity (QT prolongation, torsades de pointes, cardiac arrest).  Concurrent use of FLUOXETINE and INSULINS OR PRAMLINTIDE may result in increased risk of hypoglycemia.    MANAGEMENT:  Monitoring for adverse effects, periodic EKGs and patient is aware of risks    RISK STATEMENT for SAFETY     Pinky Page did not appear to be an imminent safety risk to self or others.    PSYCHIATRIC DIAGNOSIS     Schizoaffective Disorder, depressed type, in partial remission  Generalized Anxiety Disorder    ASSESSMENT      [m2, h3]     TODAY Pinky presents for a two month follow-up. She continues to do well with a euthymic mood and engagement in many enjoyable activities at her residence. Aside from an occasional vague feeling of people behind her, she denies any psychotic symptoms. Pinky continues to have difficulty with falling asleep and staying asleep which is likely multifactorial in the setting of ANUJ and multiple nighttime awakenings by staff given history of nocturnal enuresis. She continues to tolerate her medications well without side effects. Pinky would likely benefit from an alternative medication to Ativan for sleep and anxiety given the risks of benzodiazepines in someone of her age. Will discuss this with Pinky in future visits. In the meantime, will increase melatonin to target her insomnia as this is relatively benign given her age.      PLAN      [m2, h3]     1) PSYCHOTROPIC MEDICATIONS:  - Continue ziprasidone 40 mg  BID  - Continue fluoxetine 40 mg daily  - Continue lorazepam 1 mg at bedtime   - Increase melatonin to 5mg qhs    2) [place]  was checked today:  Lorazepam filled at monthly intervals: 11/18 for 30 tablets, 10/13 for 30 tablets. Gabapentin filled at monthly intervals for 30 day supplies and only prescribed by Dr. Linares    3) THERAPY:    Yes, continue w/ Dr. Gr once a month    4) NEXT DUE:    Labs- AP labs by 05/2020  EKG- Last EKG 8/17/16 - QTc 432. EKG today with QTc 456. Will obtain another EKG in 1 year  Rating Scales- AIMS due March 2020    5) REFERRALS:    No Referrals needed   Patient will follow-up with PCP for her leg pain    6) RTC: 2 months    7) CRISIS NUMBERS:   Provided routinely in AVS   ONLY if a LOBITO PT: [facility] [phone] (clinic), [phone] (after hours)     TREATMENT RISK STATEMENT:  The risks, benefits, alternatives and potential adverse effects have been discussed and are understood by the pt. The pt understands the risks of using street drugs or alcohol. There are no medical contraindications, the pt agrees to treatment with the ability to do so. The pt knows to call the clinic for any problems or to access emergency care if needed.  Medical and substance use concerns are documented above.  Psychotropic drug interaction check was done, including changes made today.    PROVIDER: María Shi MD    Patient staffed in clinic with Dr. Gordon who will sign the note.  Supervisor is Dr. Gordon.  I saw the patient with the resident, and participated in key portions of the service, including the mental status examination and developing the plan of care. I reviewed key portions of the history with the resident. I agree with the findings and plan as documented in this note.    Annie Gordon MD

## 2019-12-11 ENCOUNTER — OFFICE VISIT (OUTPATIENT)
Dept: PSYCHIATRY | Facility: CLINIC | Age: 69
End: 2019-12-11
Attending: PSYCHIATRY & NEUROLOGY
Payer: COMMERCIAL

## 2019-12-11 VITALS
WEIGHT: 232 LBS | DIASTOLIC BLOOD PRESSURE: 76 MMHG | BODY MASS INDEX: 39.51 KG/M2 | HEART RATE: 72 BPM | SYSTOLIC BLOOD PRESSURE: 147 MMHG

## 2019-12-11 DIAGNOSIS — F25.0 SCHIZOAFFECTIVE DISORDER, BIPOLAR TYPE (H): Primary | ICD-10-CM

## 2019-12-11 DIAGNOSIS — G47.09 OTHER INSOMNIA: ICD-10-CM

## 2019-12-11 DIAGNOSIS — F25.1 SCHIZOAFFECTIVE DISORDER, DEPRESSIVE TYPE (H): ICD-10-CM

## 2019-12-11 DIAGNOSIS — F41.1 GENERALIZED ANXIETY DISORDER: ICD-10-CM

## 2019-12-11 PROCEDURE — 93010 ELECTROCARDIOGRAM REPORT: CPT | Performed by: INTERNAL MEDICINE

## 2019-12-11 PROCEDURE — G0463 HOSPITAL OUTPT CLINIC VISIT: HCPCS | Mod: ZF

## 2019-12-11 PROCEDURE — 93005 ELECTROCARDIOGRAM TRACING: CPT | Mod: ZF | Performed by: STUDENT IN AN ORGANIZED HEALTH CARE EDUCATION/TRAINING PROGRAM

## 2019-12-11 RX ORDER — ZIPRASIDONE HYDROCHLORIDE 40 MG/1
40 CAPSULE ORAL 2 TIMES DAILY WITH MEALS
Qty: 60 CAPSULE | Refills: 2 | Status: SHIPPED | OUTPATIENT
Start: 2019-12-11 | End: 2020-02-20

## 2019-12-11 ASSESSMENT — PAIN SCALES - GENERAL: PAINLEVEL: MODERATE PAIN (5)

## 2019-12-11 NOTE — PATIENT INSTRUCTIONS
It was good to meet you Pinky!    Increase the melatonin to 5mg every night at 9:30pm, otherwise continue your other medications.    Wait in the waiting for the EKG.   Follow-up with me in 2 months.    Dr. Shi    Thank you for coming to the PSYCHIATRY CLINIC.    Lab Testing:  If you had lab testing today and your results are reassuring or normal they will be mailed to you or sent through [employer] within 7 days.   If the lab tests need quick action we will call you with the results.  The phone number we will call with results is # [phone] (home) . If this is not the best number please call our clinic and change the number.    Medication Refills:  If you need any refills please call your pharmacy and they will contact us. Our fax number for refills is [phone]. Please allow three business for refill processing.   If you need to  your refill at a new pharmacy, please contact the new pharmacy directly. The new pharmacy will help you get your medications transferred.     Scheduling:  If you have any concerns about today's visit or wish to schedule another appointment please call our office during normal business hours [phone] (8-5:00 M-F)    Contact Us:  Please call [phone] during business hours (8-5:00 M-F).  If after clinic hours, or on the weekend, please call  [phone].    Financial Assistance [phone]  [facility] Billing [phone]  [facility], [facility]: [phone]  [facility]illing [phone]  Medical Records [phone]      MENTAL HEALTH CRISIS NUMBERS:  [facility]:   [facility] - [phone]   Crisis Residence [facility] - [phone]   Walk-In Counseling Center [facility] - [phone]   COPE 24/7 [place] Mobile Team for Adults - [[phone]]; Child - [[phone]]        [facility]:   [facility] - [phone]   Walk-in counseling [facility] - [phone]   Walk-in counseling [facility]  [facility] - [phone]   Crisis Residence [facility] - [phone]   Urgent Care Adult Mental Health:   --Drop-in, 24/7 crisis line, and [facility] Mobile Team [[phone]]    CRISIS TEXT LINE: Text [phone] from anywhere, anytime, any crisis 24/7;    OR SEE [email or web address].crisistextline.org     Poison Control Center - [phone]    CHILD: [facility] needs assessment team - [phone]     [facility] Lifeline - [phone]; or [employer] Lifeline - [phone]    If you have a medical emergency please call 911or go to the nearest ER.                    _____________________________________________    Again thank you for choosing PSYCHIATRY CLINIC and please let us know how we can best partner with you to improve you and your family's health.  You may be receiving a survey in the mail regarding this appointment. We would love to have your feedback, both positive and negative, so please fill out the survey and return it using the provided envelope. The survey is done by an external company, so your answers are anonymous.

## 2019-12-12 LAB — INTERPRETATION ECG - MUSE: NORMAL

## 2019-12-12 RX ORDER — LORAZEPAM 1 MG/1
1 TABLET ORAL AT BEDTIME
Qty: 30 TABLET | Refills: 1 | Status: SHIPPED | OUTPATIENT
Start: 2019-12-12 | End: 2020-02-12

## 2019-12-13 ENCOUNTER — TELEPHONE (OUTPATIENT)
Dept: PSYCHIATRY | Facility: CLINIC | Age: 69
End: 2019-12-13

## 2019-12-13 ENCOUNTER — MEDICAL CORRESPONDENCE (OUTPATIENT)
Dept: HEALTH INFORMATION MANAGEMENT | Facility: CLINIC | Age: 69
End: 2019-12-13

## 2019-12-13 NOTE — TELEPHONE ENCOUNTER
Received the signed form from Dr. Shi authorizing the switch to melatonin 6 mg Q HS.  Faxed the form back to the 3rd floor nursing at [facility].      Fax # [phone]    Sent signed form to scanning and retained a copy in clinic.

## 2019-12-13 NOTE — TELEPHONE ENCOUNTER
Received a fax from [facility] where patient resides.  Her melatonin was increased from 3 mg to 5 mg at her most recent appointment on Dec 11, 2019.  However, 5 mg is not covered by her insurance and the staff nurse would like to know if the order can be changed to 6 mg instead, as they will be able to use their house supply.    They sent a form that can be signed if the revision is authorized.  Will place the form in Dr. Shi's folder for review and signature.

## 2019-12-31 ENCOUNTER — TELEPHONE (OUTPATIENT)
Dept: PSYCHIATRY | Facility: CLINIC | Age: 69
End: 2019-12-31

## 2019-12-31 NOTE — TELEPHONE ENCOUNTER
Naina Romo [place], RN   Phone Number: [phone]             Pastora, calling from [facility] requesting EKG, fax number is [phone].     Thanks!        Faxed the ECG to Pastora and called her to let her know that it has been sent. She confirmed that she received it during the conversation.      Routed to Dr. Shi for FYI.

## 2020-01-07 ENCOUNTER — OFFICE VISIT (OUTPATIENT)
Dept: PSYCHOLOGY | Facility: CLINIC | Age: 70
End: 2020-01-07
Payer: COMMERCIAL

## 2020-01-07 VITALS — BODY MASS INDEX: 39.55 KG/M2 | WEIGHT: 232.2 LBS

## 2020-01-07 DIAGNOSIS — F33.0 MAJOR DEPRESSIVE DISORDER, RECURRENT EPISODE, MILD (H): Primary | ICD-10-CM

## 2020-01-07 DIAGNOSIS — E66.9 OBESITY, UNSPECIFIED OBESITY SEVERITY, UNSPECIFIED OBESITY TYPE: ICD-10-CM

## 2020-01-07 DIAGNOSIS — F41.1 GENERALIZED ANXIETY DISORDER: ICD-10-CM

## 2020-01-07 DIAGNOSIS — F54 PSYCHOLOGICAL FACTORS AFFECTING MEDICAL CONDITION: ICD-10-CM

## 2020-01-07 NOTE — PROGRESS NOTES
Health Psychology                  Clinic    Department of Medicine  Madelaine Ingram, Ph.D., L.P. [phone]                          [facility]  [facility] Miryam Woods, Ph.D.,  L.P. [phone]                 3rd Floor  [place] Mail Code 743   Pinky Panda, Ph.D., L.P. [phone]    [address] Rae Eugene, Ph.D., L.P. [phone]            [address]  [place] [address]           Damon Gr, Ph.D., A.B.P.P., L.P. [phone]      Kimber Morales, Ph.D., L.P. [phone]    Health Psychology Follow-Up Note    Ms. Page is a pleasant 69-year old woman with chronic depressive illness, who returns to clinic for supportive and behavioral psychotherapy for moderate recurrent depression and for help losing weight given her morbid obesity.     She is 232.2#, up from 231.2#.  She is back to exercising after the pain in her right side subsided. She had been gradually increasing exercise, but has been doing it a bit less.  She is encouraged to increased her walking and to use of stationary bicycle. (20 minutes) .    She ws aware that she had likely gained weight.  We did not walk today.  We discussed ways of lengthening her walks (e.g., doubling laps around [place])  and increasing her time on exercycle bike (I.e. 50 50 minutes)   Wt Readings from Last 4 Encounters:   01/07/20 105.3 kg (232 lb 3.2 oz)   12/11/19 105.2 kg (232 lb)   12/09/19 104.5 kg (230 lb 6.4 oz)   11/25/19 104.9 kg (231 lb 3.2 oz)     Past Medical History:   Diagnosis Date     Allergic rhinitis due to pollen     seasonal allergies      Anisometropia and aniseikonia      BMI greater than 40      Cardiomegaly      Chronic constipation      Congenital absence of one kidney      Cramp of limb      Dermatophytosis of the body      Dry eye syndrome      Esophageal reflux      Essential hypertension, benign      Gastro-oesophageal reflux disease       Hemorrhoids      Hypermetropia      Hypothyroidism      Incomplete Emptying of Bladder      Lactose intolerance      Major depressive disorder, recurrent episode, moderate (H)      Malignant neoplasm of breast (female), unspecified site     left mastectomy 1996      Mixed incontinence urge and stress (male)(female)      Moderate obstructive sleep apnea      Postmenopausal Atrophic Vaginitis      Presbyopia      Regular astigmatism      Restless leg syndrome      Senile nuclear sclerosis      Thyroid eye disease     mild     Tinnitus      Trigger finger (acquired)     right hand     Type II or unspecified type diabetes mellitus without mention of complication, not stated as uncontrolled     on insulin since April 2006      Past Surgical History:   Procedure Laterality Date     APPENDECTOMY       C MASTECTOMY,SIMPLE  1996    Left mastectomy  [facility]. Had normal FU mammo 5/2007. Follow yearly.      C TOTAL ABDOM HYSTERECTOMY  7/2003    Dr. Castanon, for abnormal bleeding. Removal of both tubes with BSO for myoma w - - -     CHOLECYSTECTOMY       COLONOSCOPY  5/2005    Complete Colonoscopy-had one small polyp removed 5/2005.      COLONOSCOPY N/A 3/31/2016    Procedure: COLONOSCOPY;  Surgeon: Cary Ring MD;  Location: [facility]     COLONOSCOPY N/A 7/7/2016    Procedure: COLONOSCOPY;  Surgeon: Cary Ring MD;  Location: [facility]     DILATION AND CURETTAGE       HYSTERECTOMY       MASTECTOMY      Left breast     Current Outpatient Medications   Medication     acetaminophen (TYLENOL) 500 MG tablet     alum & mag hydroxide-simethicone (MYLANTA/MAALOX) 200-200-20 MG/5ML SUSP suspension     amLODIPine (NORVASC) 10 MG tablet     artificial saliva (BIOTENE MT) AERS spray     ASPIRIN 81 MG OR TABS     atorvastatin (LIPITOR) 40 MG tablet     BD AUTOSHIELD DUO 30G X 5 MM     blood glucose (NO BRAND SPECIFIED) lancets standard     blood glucose calibration (NO BRAND SPECIFIED) solution      blood glucose monitoring (NO BRAND SPECIFIED) meter device kit     blood glucose monitoring (NO BRAND SPECIFIED) test strip     Calcium Carbonate-Vitamin D (CALCIUM-VITAMIN D) 250-125 MG-UNIT TABS     calcium polycarbophil (FIBERCON) 625 MG tablet     CLARITIN 10 MG OR TABS     exenatide (BYETTA) 10 MCG/0.04ML injection     FLUoxetine (PROZAC) 20 MG capsule     fluticasone (FLONASE) 50 MCG/ACT nasal spray     furosemide (LASIX) 20 MG tablet     Gabapentin (NEURONTIN PO)     glucagon (GLUCAGON EMERGENCY) 1 MG kit     Hypromellose (ARTIFICIAL TEARS OP)     insulin glargine (LANTUS) 100 UNIT/ML injection     lactase (LACTAID) 3000 UNIT tablet     levothyroxine (SYNTHROID, LEVOTHROID) 175 MCG tablet     levothyroxine (SYNTHROID/LEVOTHROID) 175 MCG tablet     LORazepam (ATIVAN) 1 MG tablet     losartan (COZAAR) 100 MG tablet     Magnesium Hydroxide (MILK OF MAGNESIA PO)     melatonin 5 MG tablet     metFORMIN (GLUCOPHAGE) 1000 MG tablet     nystatin (MYCOSTATIN) 552460 UNIT/GM POWD     ONETOUCH DELICA LANCETS 33G MISC     polyethylene glycol (MIRALAX/GLYCOLAX) powder     Saline 0.9 % SOLN     senna-docusate (SENNA S) 8.6-50 MG per tablet     simethicone (MYLICON) 125 MG chewable tablet     Skin Protectants, Misc. (EUCERIN) cream     SM LUBRICANT EYE DROPS 0.4-0.3 % SOLN ophthalmic solution     Sod Fluor-Solo Fluor-Hydrfl Ac (GEL-SASHA DENTINBLOC DT)     solifenacin (VESICARE) 10 MG tablet     terbinafine (LAMISIL) 1 % external cream     ziprasidone (GEODON) 40 MG capsule     No current facility-administered medications for this visit.       PHQ-9 SCORE 7/11/2019 9/11/2019 10/9/2019   PHQ-9 Total Score - - -   PHQ-9 Total Score 8 6 8     NATTY-7 SCORE 7/27/2018 11/7/2018 11/8/2018   Total Score - - -   Total Score 7 7 7     She arrived early today.   She is greeted in the waiting room.  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   She is satisfied with this level of effort and gets positive feedback from others.  She is getting along well with Tevin and with her roommate.   She hurt her left leg and knee 3 weeks ago. Pain is exacerbated by biking so she stopped.  She will see her physician about it.  We discussed the plan to decrease caloric intake by about 250 calories /day (e.g, less frequent popcorn and pretzels).   She has cut back, and can cut back some more.    She states she is walking about a half hour and stopped using the bicycle 3 weeks ago, but had been at 5 days/week.  She acknowledges her depression is now at baseline (4/10).    Goal for exercise has been 1.5 hours/day as of June 19, 2017 and 2 lbs./month.     Pinky participated fully and appeared to derive benefit.  Rapport was excellent.     Extended session due to complexity of case and length of interval.    IMPRESSION Distress Disability Risk    Low High Low     Time In: 12:59  Time Out: 1:59  Diagnosis:   Major Depression, recurrent mild (F33.0)   Psychological Factors Affecting Morbid Obesity (F54)    Plan: She will return on 2/10 @ 1 for behavioral and supportive psychotherapy.  Will spread out to q 5-6 weeks.    Last treatment plan signed: 5/21/2019  Treatment plan due: 5/21/2020                         Damon Gr, Ph.D., A.B.P.P., L.P.

## 2020-01-09 ENCOUNTER — OFFICE VISIT (OUTPATIENT)
Dept: FAMILY MEDICINE | Facility: CLINIC | Age: 70
End: 2020-01-09
Payer: COMMERCIAL

## 2020-01-09 VITALS
HEIGHT: 64 IN | DIASTOLIC BLOOD PRESSURE: 79 MMHG | SYSTOLIC BLOOD PRESSURE: 154 MMHG | HEART RATE: 69 BPM | BODY MASS INDEX: 39.71 KG/M2 | OXYGEN SATURATION: 96 % | RESPIRATION RATE: 16 BRPM | WEIGHT: 232.6 LBS | TEMPERATURE: 98.1 F

## 2020-01-09 DIAGNOSIS — S86.919A MUSCLE STRAIN OF LOWER EXTREMITY, INITIAL ENCOUNTER: Primary | ICD-10-CM

## 2020-01-09 ASSESSMENT — ANXIETY QUESTIONNAIRES
GAD7 TOTAL SCORE: 10
2. NOT BEING ABLE TO STOP OR CONTROL WORRYING: MORE THAN HALF THE DAYS
2. NOT BEING ABLE TO STOP OR CONTROL WORRYING: MORE THAN HALF THE DAYS
7. FEELING AFRAID AS IF SOMETHING AWFUL MIGHT HAPPEN: SEVERAL DAYS
IF YOU CHECKED OFF ANY PROBLEMS ON THIS QUESTIONNAIRE, HOW DIFFICULT HAVE THESE PROBLEMS MADE IT FOR YOU TO DO YOUR WORK, TAKE CARE OF THINGS AT HOME, OR GET ALONG WITH OTHER PEOPLE: SOMEWHAT DIFFICULT
GAD7 TOTAL SCORE: 10
7. FEELING AFRAID AS IF SOMETHING AWFUL MIGHT HAPPEN: SEVERAL DAYS
3. WORRYING TOO MUCH ABOUT DIFFERENT THINGS: MORE THAN HALF THE DAYS
6. BECOMING EASILY ANNOYED OR IRRITABLE: MORE THAN HALF THE DAYS
1. FEELING NERVOUS, ANXIOUS, OR ON EDGE: SEVERAL DAYS
6. BECOMING EASILY ANNOYED OR IRRITABLE: MORE THAN HALF THE DAYS
1. FEELING NERVOUS, ANXIOUS, OR ON EDGE: SEVERAL DAYS
3. WORRYING TOO MUCH ABOUT DIFFERENT THINGS: MORE THAN HALF THE DAYS
IF YOU CHECKED OFF ANY PROBLEMS ON THIS QUESTIONNAIRE, HOW DIFFICULT HAVE THESE PROBLEMS MADE IT FOR YOU TO DO YOUR WORK, TAKE CARE OF THINGS AT HOME, OR GET ALONG WITH OTHER PEOPLE: SOMEWHAT DIFFICULT
5. BEING SO RESTLESS THAT IT IS HARD TO SIT STILL: SEVERAL DAYS
5. BEING SO RESTLESS THAT IT IS HARD TO SIT STILL: SEVERAL DAYS

## 2020-01-09 ASSESSMENT — MIFFLIN-ST. JEOR: SCORE: 1565.94

## 2020-01-09 ASSESSMENT — PATIENT HEALTH QUESTIONNAIRE - PHQ9
5. POOR APPETITE OR OVEREATING: SEVERAL DAYS
5. POOR APPETITE OR OVEREATING: SEVERAL DAYS

## 2020-01-09 ASSESSMENT — PAIN SCALES - GENERAL: PAINLEVEL: MODERATE PAIN (4)

## 2020-01-09 NOTE — PATIENT INSTRUCTIONS
Here is the plan from today's visit    1. Muscle strain of lower extremity, initial encounter  I think the pain in your left leg is from a muscle strain in your calf. It is going to take 6-8 weeks to heal. Continue to take tylenol as needed for pain. Try to continue exercising, but try to exercise without pain. Can use ACE wrap when exercising. Wrap around left affected leg before exercise and remove after exercising.      Please call or return to clinic if your symptoms don't go away.    Follow up plan  Please make a clinic appointment for follow up with me (EDIS GRAHAM) as needed.    Thank you for coming to [facility] today.  Lab Testing:  **If you had lab testing today and your results are reassuring or normal they will be mailed to you or sent through [facility] within 7 days.   **If the lab tests need quick action we will call you with the results.  The phone number we will call with results is # [phone] (home) . If this is not the best number please call our clinic and change the number.  Medication Refills:  If you need any refills please call your pharmacy and they will contact us.   If you need to  your refill at a new pharmacy, please contact the new pharmacy directly. The new pharmacy will help you get your medications transferred faster.   Scheduling:  If you have any concerns about today's visit or wish to schedule another appointment please call our office during normal business hours [phone] (8-5:00 M-F)  If a referral was made to a [facility] and you don't get a call from central scheduling please call [phone].  If a Mammogram was ordered for you at [facility] call [phone] to schedule or change your appointment.  If you had an XRay/CT/Ultrasound/MRI ordered the number is [phone] to schedule or change your radiology appointment.   Medical Concerns:  If you have urgent medical concerns please call [phone] at any time of the  day.    Cary Brownlee MD

## 2020-01-09 NOTE — PROGRESS NOTES
"       HPI       Pinky Page is a 69 year old  who presents for   Chief Complaint   Patient presents with     Follow Up     Patient is here for left knee pain follow up      Forms     form from [facility] staff comments        Left leg pain: Patient stated that this left leg pain started when she was riding her stationary bike before [place].  She does not remember a click or pop or any fall, the knee and lower left leg just started to hurt.  At the time she just decided to stop riding her bike. Has taken tylenol and used a hot and cold pack. All of these things seemed to help, but pain continued. Quit riding stationary bike and hasn't gone back. Continues to walk. The leg pain can happen while walking or while not doing any activity at all.  The pain eventually goes away if she sits or lays down and usually has to takle tylenol once or twice a day. No fevers or chills. No injury or fall per se. No pain in left hip or left ankle. No swelling noted at time of initial pain or now.    +++++++    Problem, Medication and Allergy Lists were reviewed and updated if needed..    Patient is an established patient of this clinic..         Review of Systems:   Review of Systems  See HPI       Physical Exam:     Vitals:    01/09/20 1310 01/09/20 1314   BP: (!) 176/75 (!) 154/79   Pulse: 64 69   Resp: 16    Temp: 98.1  F (36.7  C)    TempSrc: Oral    SpO2: 96%    Weight: 105.5 kg (232 lb 9.6 oz)    Height: 1.635 m (5' 4.37\")      Body mass index is 39.47 kg/m .  Vital signs normal except elevated BP, second one only slightly more elevated than goal of 150/90     Physical Exam  Constitutional:       General: She is not in acute distress.  HENT:      Head: Normocephalic and atraumatic.   Eyes:      Conjunctiva/sclera: Conjunctivae normal.   Cardiovascular:      Rate and Rhythm: Normal rate.   Pulmonary:      Effort: Pulmonary effort is normal.   Musculoskeletal:      Right lower leg: No edema.      Left lower leg: No edema.      " "Comments: Left knee with no obvious deformity or swelling.  No tenderness around the joint line medial or laterally.  Normal strength of left lower extremity as well as right.  Left calf muscle with with mild tenderness to palpation that feels ropey and taut.  No tenderness to palpation or swelling behind the knee in popliteal fossa.   Skin:     General: Skin is warm and dry.   Neurological:      Mental Status: She is alert.   Psychiatric:         Mood and Affect: Affect is flat.         Speech: Speech normal.         Behavior: Behavior normal.           Results:   No testing ordered today    Assessment and Plan        Pinky was seen today for follow up and forms.    Diagnoses and all orders for this visit:    Muscle strain of lower extremity, initial encounter - Left lower extremity pain most likely due to calf muscle strain while biking.  No swelling in back of knee in popliteal fossa so ruptured Baker's cyst less likely. No swelling of LLE versus R and with ropey feeling of calf muscle makes DVT less likely.  Has been about 3 weeks since injury with some improvement. Informed patient that this could take up to 4 to 6 weeks to heal.  Recommend ice or heat, whichever feels better and Tylenol for any pain (patient allergic to ibuprofen).  Recommended continuing exercising and stretching, but without pain.  Presented patient with Ace wrap to use while exercising to see if that helps as well.  Patient agreed with plan and will follow-up if pain is not relieved in the next month.  -     C ACE BANDAGE 3-5\" WIDTH           There are no discontinued medications.    Options for treatment and follow-up care were reviewed with the patient. Pinky Page  engaged in the decision making process and verbalized understanding of the options discussed and agreed with the final plan.    Cary Brownlee MD  PGY-3  "

## 2020-01-09 NOTE — PROGRESS NOTES
Preceptor Attestation:   Patient seen and discussed with the resident. Assessment and plan reviewed with resident and agreed upon.   Supervising Physician:  DO [facility]

## 2020-01-10 ASSESSMENT — ANXIETY QUESTIONNAIRES: GAD7 TOTAL SCORE: 10

## 2020-01-13 ASSESSMENT — PATIENT HEALTH QUESTIONNAIRE - PHQ9: SUM OF ALL RESPONSES TO PHQ QUESTIONS 1-9: 9

## 2020-02-10 ENCOUNTER — OFFICE VISIT (OUTPATIENT)
Dept: PSYCHOLOGY | Facility: CLINIC | Age: 70
End: 2020-02-10
Payer: COMMERCIAL

## 2020-02-10 VITALS — WEIGHT: 229.1 LBS | BODY MASS INDEX: 38.87 KG/M2

## 2020-02-10 DIAGNOSIS — F41.1 GENERALIZED ANXIETY DISORDER: ICD-10-CM

## 2020-02-10 DIAGNOSIS — F33.0 MAJOR DEPRESSIVE DISORDER, RECURRENT EPISODE, MILD (H): Primary | ICD-10-CM

## 2020-02-10 DIAGNOSIS — E66.9 OBESITY, UNSPECIFIED OBESITY SEVERITY, UNSPECIFIED OBESITY TYPE: ICD-10-CM

## 2020-02-10 DIAGNOSIS — F54 PSYCHOLOGICAL FACTORS AFFECTING MEDICAL CONDITION: ICD-10-CM

## 2020-02-10 NOTE — PROGRESS NOTES
Health Psychology                  Clinic    Department of Medicine  Madelaine Ingram, Ph.D., L.P. [phone]                          Clinics and Surgery Center  [facility] Miryam Woods, Ph.D.,  L.P. [phone]                 3rd Floor  [place] Mail Code 515   Pinky Panda, Ph.D., L.P. [phone]    [address] Rae Eugene, Ph.D., L.P. [phone]            [address] [id number]  [place] [address]           Damon Gr, Ph.D., A.B.P.P., L.P. [phone]      Kimber Morales, Ph.D., L.P. [phone]    Health Psychology Follow-Up Note    Ms. Page is a pleasant 69-year old woman with chronic depressive illness, who returns to clinic for supportive and behavioral psychotherapy for moderate recurrent depression and for help losing weight given her morbid obesity.   She is 229.1# down from 232.2#. She is reinforced for the change and is optimistic she can lose more.    Wt Readings from Last 4 Encounters:   02/10/20 103.9 kg (229 lb 1.6 oz)   01/09/20 105.5 kg (232 lb 9.6 oz)   01/07/20 105.3 kg (232 lb 3.2 oz)   12/11/19 105.2 kg (232 lb)     Past Medical History:   Diagnosis Date     Allergic rhinitis due to pollen     seasonal allergies      Anisometropia and aniseikonia      BMI greater than 40      Cardiomegaly      Chronic constipation      Congenital absence of one kidney      Cramp of limb      Dermatophytosis of the body      Dry eye syndrome      Esophageal reflux      Essential hypertension, benign      Gastro-oesophageal reflux disease      Hemorrhoids      Hypermetropia      Hypothyroidism      Incomplete Emptying of Bladder      Lactose intolerance      Major depressive disorder, recurrent episode, moderate (H)      Malignant neoplasm of breast (female), unspecified site     left mastectomy 1996      Mixed incontinence urge and stress (male)(female)      Moderate obstructive sleep apnea      Postmenopausal Atrophic  Vaginitis      Presbyopia      Regular astigmatism      Restless leg syndrome      Senile nuclear sclerosis      Thyroid eye disease     mild     Tinnitus      Trigger finger (acquired)     right hand     Type II or unspecified type diabetes mellitus without mention of complication, not stated as uncontrolled     on insulin since April 2006      Past Surgical History:   Procedure Laterality Date     APPENDECTOMY       C MASTECTOMY,SIMPLE  1996    Left mastectomy  [facility]. Had normal FU mammo 5/2007. Follow yearly.      C TOTAL ABDOM HYSTERECTOMY  7/2003    Dr. Castanon, for abnormal bleeding. Removal of both tubes with BSO for myoma w - - -     CHOLECYSTECTOMY       COLONOSCOPY  5/2005    Complete Colonoscopy-had one small polyp removed 5/2005.      COLONOSCOPY N/A 3/31/2016    Procedure: COLONOSCOPY;  Surgeon: Cary Ring MD;  [facility]     COLONOSCOPY N/A 7/7/2016    Procedure: COLONOSCOPY;  Surgeon: Cary Ring MD;  Location: [facility]     DILATION AND CURETTAGE       HYSTERECTOMY       MASTECTOMY      Left breast     Current Outpatient Medications   Medication     acetaminophen (TYLENOL) 500 MG tablet     alum & mag hydroxide-simethicone (MYLANTA/MAALOX) 200-200-20 MG/5ML SUSP suspension     amLODIPine (NORVASC) 10 MG tablet     artificial saliva (BIOTENE MT) AERS spray     ASPIRIN 81 MG OR TABS     atorvastatin (LIPITOR) 40 MG tablet     BD AUTOSHIELD DUO 30G X 5 MM     blood glucose (NO BRAND SPECIFIED) lancets standard     blood glucose calibration (NO BRAND SPECIFIED) solution     blood glucose monitoring (NO BRAND SPECIFIED) meter device kit     blood glucose monitoring (NO BRAND SPECIFIED) test strip     Calcium Carbonate-Vitamin D (CALCIUM-VITAMIN D) 250-125 MG-UNIT TABS     calcium polycarbophil (FIBERCON) 625 MG tablet     CLARITIN 10 MG OR TABS     exenatide (BYETTA) 10 MCG/0.04ML injection     FLUoxetine (PROZAC) 20 MG capsule     fluticasone  (FLONASE) 50 MCG/ACT nasal spray     furosemide (LASIX) 20 MG tablet     Gabapentin (NEURONTIN PO)     glucagon (GLUCAGON EMERGENCY) 1 MG kit     Hypromellose (ARTIFICIAL TEARS OP)     insulin glargine (LANTUS) 100 UNIT/ML injection     lactase (LACTAID) 3000 UNIT tablet     levothyroxine (SYNTHROID, LEVOTHROID) 175 MCG tablet     levothyroxine (SYNTHROID/LEVOTHROID) 175 MCG tablet     LORazepam (ATIVAN) 1 MG tablet     losartan (COZAAR) 100 MG tablet     Magnesium Hydroxide (MILK OF MAGNESIA PO)     melatonin 5 MG tablet     metFORMIN (GLUCOPHAGE) 1000 MG tablet     nystatin (MYCOSTATIN) 339229 UNIT/GM POWD     ONETOUCH DELICA LANCETS 33G MISC     polyethylene glycol (MIRALAX/GLYCOLAX) powder     Saline 0.9 % SOLN     senna-docusate (SENNA S) 8.6-50 MG per tablet     simethicone (MYLICON) 125 MG chewable tablet     Skin Protectants, Misc. (EUCERIN) cream     SM LUBRICANT EYE DROPS 0.4-0.3 % SOLN ophthalmic solution     Sod Fluor-Solo Fluor-Hydrfl Ac (GEL-SASHA DENTINBLOC DT)     solifenacin (VESICARE) 10 MG tablet     terbinafine (LAMISIL) 1 % external cream     ziprasidone (GEODON) 40 MG capsule     No current facility-administered medications for this visit.       PHQ-9 SCORE 10/9/2019 1/9/2020 1/9/2020   PHQ-9 Total Score - - -   PHQ-9 Total Score 8 9 7     NATTY-7 SCORE 11/8/2018 1/9/2020 1/9/2020   Total Score - - -   Total Score 7 10 10     Session: She arrived early today.   She is greeted in the waiting room.  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   She is satisfied with this level of effort and gets positive feedback from others.    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  She is getting along well with [place] and with her roommate.   She hurt her left calf and has been wearing a  Bandage during the days, which seems to be helping.   Pain is tolerated while she is biking. She states she will increase to 5x/week.  We discussed the plan to decrease caloric intake by about 250 calories /day (e.g, less frequent popcorn and pretzels).   She has cut back, and can cut back some more.  She is taking Tamiflu and got the flu shot.  Other health concerns involve dental work.  She is contemplating getting implants if she can swing the finances.  She states her depression is at baseline (4/10).     Goal for exercise has been 1.5 hours/day as of June 19, 2017 and 2 lbs./month.     Pinky participated fully and appeared to derive benefit.  Rapport was excellent.     Extended session due to complexity of case and length of interval.    IMPRESSION Distress Disability Risk    Low High Low     Time In: 1:04  Time Out: 1:57  Diagnosis:   Major Depression, recurrent mild (F33.0)   Psychological Factors Affecting Morbid Obesity (F54)    Plan: She will return on 3/9 @ 2 for behavioral and supportive psychotherapy.    Last treatment plan signed: 5/21/2019  Treatment plan due: 5/21/2020                         Damon Gr, Ph.D., A.B.P.P., L.P.

## 2020-02-12 DIAGNOSIS — F41.1 GENERALIZED ANXIETY DISORDER: ICD-10-CM

## 2020-02-12 RX ORDER — LORAZEPAM 1 MG/1
1 TABLET ORAL AT BEDTIME
Qty: 30 TABLET | Refills: 0 | Status: SHIPPED | OUTPATIENT
Start: 2020-02-12 | End: 2020-02-20

## 2020-02-12 NOTE — TELEPHONE ENCOUNTER
Last seen: 12/11/19  RTC: 2 months  Cancel: 2/13/20  No-show: None  Next appt: 2/20/20     Medication requested: LORazepam (ATIVAN) 1 MG tablet  Directions: Take 1 tablet (1 mg) by mouth At Bedtime  Qty: #30 (Rx written 12/12/19 for #30 with 1 refill)  Last refilled: 1/5/20, 11/18/19, 10/13/19 per [place] .      Placed phone call to pharmacy to determine whether there is a refill remaining on the previous prescription, as it appears there should be according to  refill history. Per pharmacy, the pt received a refill of #30 tablets of Ativan 1mg on 12/12/20 (refill not listed in ) and 1/5/20. There are no refills remaining on the rx.    Writer pended a 30-day rx and routed to provider to approve.

## 2020-02-12 NOTE — TELEPHONE ENCOUNTER
[facility] Call Center    Phone Message    May a detailed message be left on voicemail: yes     Reason for Call: Medication Refill Request    Has the patient contacted the pharmacy for the refill? Yes   Name of medication being requested: lorzapam 1mg at bedtime for anxiety  Provider who prescribed the medication:    Pharmacy: [facility] in [place]  Date medication is needed: ASAP     Action Taken: Other: [facility]    Travel Screening: Not Applicable

## 2020-02-12 NOTE — TELEPHONE ENCOUNTER
--Medication refill submitted to pharmacy by provider. No further action required by writer at this time as original refill request was submitted by pharmacy.

## 2020-02-13 ENCOUNTER — OFFICE VISIT (OUTPATIENT)
Dept: SLEEP MEDICINE | Facility: CLINIC | Age: 70
End: 2020-02-13
Payer: COMMERCIAL

## 2020-02-13 VITALS
SYSTOLIC BLOOD PRESSURE: 137 MMHG | RESPIRATION RATE: 16 BRPM | BODY MASS INDEX: 39.44 KG/M2 | DIASTOLIC BLOOD PRESSURE: 68 MMHG | WEIGHT: 231 LBS | HEART RATE: 80 BPM | HEIGHT: 64 IN | OXYGEN SATURATION: 100 %

## 2020-02-13 DIAGNOSIS — G47.33 OSA (OBSTRUCTIVE SLEEP APNEA): Primary | ICD-10-CM

## 2020-02-13 PROCEDURE — 99214 OFFICE O/P EST MOD 30 MIN: CPT | Performed by: INTERNAL MEDICINE

## 2020-02-13 ASSESSMENT — MIFFLIN-ST. JEOR: SCORE: 1558.68

## 2020-02-13 NOTE — PATIENT INSTRUCTIONS
Minimize caffeine, one in the am only.  Avoid falling asleep/napping during th day  Have [facility] come do a mask fitting to help resolve mask leak  Always put mask back on after getting up at night      1.  Continue CPAP every night, for all hours that you are sleeping.  If you nap use CPAP.  As always, try to get at least 8 hours of sleep or more each day, and avoid sleep deprivation. Avoid alcohol.    2.  Reasons that you might need a change to your pressure therapy would be weight gain or loss, waking having inadvertently removed your CPAP overnight, having previously felt refreshed by sleep with CPAP use and now waking un-refreshed, and return of daytime sleepiness. Also, the development of new medical problems can sometimes affect breathing at night-heart failure, stroke, medications such as narcotics.    3.  Please bring CPAP with you if you are hospitalized.  If anticipating surgery be sure to discuss with your surgeon that you have sleep apnea and use PAP therapy.      4.  Maintain your equipment as recommended which includes routine cleaning and replacement of supplies.  Call DME for any questions regarding supplies or maintenance.    [facility], [facility] [phone]    5.  Do not drive on engage in potentially dangerous activities if feeling sleepy.    6.  Please see me again in 6 months and bring your machine and card to your follow-up visit.                    Tips for your CPAP and BIPAP use-    Mask fitting tips  Mask fitting exercise:    To improve your mask seal and your mobility at night, put mask on and secure in place.  Lie down in bed with full pressure and roll to one side, adjust headgear while in that position to eliminate any leaks. Repeat process rolling to other side.     The mask seal does not have to be perfect:   CPAP machines are designed to make up for small leaks. However, you will not tolerate leaks blowing in your eyes so you  will need to adjust.   Any leak should only be near or at the bottom of the mask.  We expect your mask to leak slightly at night.    Do not over-tighten the headgear straps, tighter IS NOT better, we expect minimal leak.    First try re-positioning the mask or headgear before tightening the headgear straps.  Mask leaks are expected due to changing sleeping positions. Try pulling the mask away from your skin allowing the cushion to re-inflate will minimize the leak.  If you struggle for a good fit, try turning the CPAP off and then readjust the mask by pulling it away from your face and then turning back on the CPAP.        Humidifier tips  Humidifiers can be adjusted to increase or decrease the amount of moisture according to your comfort level. You may need to adjust this frequently at first, but then might only change it with seasonal weather changes.     Try INCREASING the humidity if:  You experience a dry, irritated nasal passage or throat.  You have a runny, drippy nose or sneezing fits after using CPAP.  You experience nasal congestion during or after CPAP use.    Try DECREASING the humidity if:  You have excessive condensation or  rain out  in the tubing or mask.  Otherwise keep the tubing warm during the night by running it underneath the blankets or pillow.      Clinic visit after initial CPAP and BIPAP set-up   Bring your equipment with you to your 4 week follow up clinic visit.  We will be extracting your data from the machine.        Travel  Always take your equipment with you.  If you fly with your equipment bring it on with you as a carry on.  Medical equipment does not count as a carry on.    If you travel international the machines take 110-240.  The only adapter needed is the adapter that will fit into the receptacle (outlet).    You may also want to bring an extension cord as many hotel rooms have limited outlets at the bedside.  Do not travel with water in your humidifier chamber.     Cleaning  and Maintenance Guidelines    Equipment Frequency Cleaning Method   Mask First Day    Daily      Weekly Soak mask in hot soapy water for 30 minutes, rinse and air dry.  Wipe nasal cushion with a hot soapy (Ivory, baby shampoo) cloth and rinse.  Baby wipes may also be used.  Do not use anti-bacterial soaps,Jessy  liquid soap, rubbing alcohol, bleach or ammonia.  Wash frame in hot soapy water (Ivory, baby shampoo) rinse and let air dry   Headgear Biweekly Wash in hot soapy water, rinse and air dry   Reusable Gray Filter Weekly Wash in hot soapy water, rinse, put in towel squeeze moisture out, let air dry   Disposable White Filter Check Weekly Replace when brown or gray in color; at least every 2 to 3 months   Humidifier Chamber Daily    Weekly Empty distilled water from humidifier and let air dry    Hand wash in hot soapy water, rinse and air dry   Tubing Weekly Wash in hot soapy water, rinse and let air dry   Mask, Tubing and Humidifier Chamber As needed Disinfect: Soak in 1 part distilled white vinegar to 3 parts hot water for 30 minutes, rinse well and air dry  Not the material headgear        MASK AND SUPPLY REORDERING and EQUIPMENT NEEDS through your DME and per your insurance  Reminder: Most insurance companies will allow for a new mask, headgear, tubing, and reusable gray filter every six months.  Disposable white ultra-fine filters are covered monthly.      HOME AND SAFETY INSTRUCTIONS    Do not use frayed or cracked electrical cords, multi plug adaptors, or switched receptacles    Do not immerse electrical equipment into water    Assure that electrical cords do not become a tripping hazard      Your BMI is Body mass index is 39.2 kg/m .  Weight management is a personal decision.  If you are interested in exploring weight loss strategies, the following discussion covers the approaches that may be successful. Body mass index (BMI) is one way to tell whether you are at a healthy weight, overweight, or obese. It  measures your weight in relation to your height.  A BMI of 18.5 to 24.9 is in the healthy range. A person with a BMI of 25 to 29.9 is considered overweight, and someone with a BMI of 30 or greater is considered obese. More than two-thirds of American adults are considered overweight or obese.  Being overweight or obese increases the risk for further weight gain. Excess weight may lead to heart disease and diabetes.  Creating and following plans for healthy eating and physical activity may help you improve your health.  Weight control is part of healthy lifestyle and includes exercise, emotional health, and healthy eating habits. Careful eating habits lifelong are the mainstay of weight control. Though there are significant health benefits from weight loss, long-term weight loss with diet alone may be very difficult to achieve- studies show long-term success with dietary management in less than 10% of people. Attaining a healthy weight may be especially difficult to achieve in those with severe obesity. In some cases, medications, devices and surgical management might be considered.  What can you do?  If you are overweight or obese and are interested in methods for weight loss, you should discuss this with your provider.     Consider reducing daily calorie intake by 500 calories.     Keep a food journal.     Avoiding skipping meals, consider cutting portions instead.    Diet combined with exercise helps maintain muscle while optimizing fat loss. Strength training is particularly important for building and maintaining muscle mass. Exercise helps reduce stress, increase energy, and improves fitness. Increasing exercise without diet control, however, may not burn enough calories to loose weight.       Start walking three days a week 10-20 minutes at a time    Work towards walking thirty minutes five days a week     Eventually, increase the speed of your walking for 1-2 minutes at time    In addition, we recommend that  you review healthy lifestyles and methods for weight loss available through the National Institutes of Health patient information sites:  http://win.niddk.nih.gov/publications/index.htm    And look into health and wellness programs that may be available through your health insurance provider, employer, local community center, or wally club.    Keep up the walking and stationary bike, watching diet

## 2020-02-13 NOTE — NURSING NOTE
Cpap orders have been faxed to [facility] in [place].    JONNATHAN Mark

## 2020-02-13 NOTE — NURSING NOTE
Orders sent to [facility]    Tamanna Diaz [facility] ~[place]

## 2020-02-13 NOTE — PROGRESS NOTES
Chief complaint: Follow-up of obstructive sleep apnea    History of Present Illness: 69-year-old female with history of obesity, obstructive sleep apnea, depression, lives in a group home.  She has long history of poor  tolerance of CPAP.  She was originally diagnosed with sleep apnea many years ago.  For a while she was on supplemental oxygen due to CPAP intolerance.  However supplemental oxygen is not covered for obstructive sleep apnea.  She was reassessed and reinitiated on CPAP therapy.  She continues to struggle with CPAP use.  We reviewed her pattern since her last visit.  She continues to get into bed and usually do some coloring.  Then she will put the mask on when she gets sleepy.  Then she is usually awoken by staff about an hour and a half later to go to the bathroom.  She often does not put the mask on when she goes back into bed.  She is usually up for her morning meds after 6 AM.  She does lay down for a little bit during the day to put her leg up sometimes she falls asleep for anywhere from half an hour to 45 minutes.  She drinks about 3 caffeinated beverages a day.  She denies symptoms of restless legs.  She is on gabapentin.  She has been able to maintain her weight loss.  She denies sleepwalking, sleep talking or dream enactment behavior.  She is using a nasal mask.  She gets her supplies from [employer].  She is unaware of leak issues.    Total score - Grace: 5 (2/13/2020  1:03 PM) (Less than 10 normal)    CHRISTIANA Total Score: 14 (normal 0-7, mild 8-14, moderate 15-21, severe 22-28)    Past Medical History:   Diagnosis Date     Allergic rhinitis due to pollen     seasonal allergies      Anisometropia and aniseikonia      BMI greater than 40      Cardiomegaly      Chronic constipation      Congenital absence of one kidney      Cramp of limb      Dermatophytosis of the body      Dry eye syndrome      Esophageal reflux      Essential hypertension, benign      Gastro-oesophageal reflux disease       Hemorrhoids      Hypermetropia      Hypothyroidism      Incomplete Emptying of Bladder      Lactose intolerance      Major depressive disorder, recurrent episode, moderate (H)      Malignant neoplasm of breast (female), unspecified site     left mastectomy 1996      Mixed incontinence urge and stress (male)(female)      Moderate obstructive sleep apnea      Postmenopausal Atrophic Vaginitis      Presbyopia      Regular astigmatism      Restless leg syndrome      Senile nuclear sclerosis      Thyroid eye disease     mild     Tinnitus      Trigger finger (acquired)     right hand     Type II or unspecified type diabetes mellitus without mention of complication, not stated as uncontrolled     on insulin since April 2006        Allergies   Allergen Reactions     Chlordiazepoxide Hcl      Dimetapp Dm Cold-Cough      Cold/Congetion TABS     Haldol      Ibuprofen      TABS     Lactose Intolerance [Beta-Galactosidase]      CAPS     Milk Products      Propofol      EMUL       Current Outpatient Medications   Medication     acetaminophen (TYLENOL) 500 MG tablet     alum & mag hydroxide-simethicone (MYLANTA/MAALOX) 200-200-20 MG/5ML SUSP suspension     amLODIPine (NORVASC) 10 MG tablet     artificial saliva (BIOTENE MT) AERS spray     ASPIRIN 81 MG OR TABS     atorvastatin (LIPITOR) 40 MG tablet     BD AUTOSHIELD DUO 30G X 5 MM     blood glucose (NO BRAND SPECIFIED) lancets standard     blood glucose calibration (NO BRAND SPECIFIED) solution     blood glucose monitoring (NO BRAND SPECIFIED) meter device kit     blood glucose monitoring (NO BRAND SPECIFIED) test strip     Calcium Carbonate-Vitamin D (CALCIUM-VITAMIN D) 250-125 MG-UNIT TABS     calcium polycarbophil (FIBERCON) 625 MG tablet     CLARITIN 10 MG OR TABS     exenatide (BYETTA) 10 MCG/0.04ML injection     FLUoxetine (PROZAC) 20 MG capsule     fluticasone (FLONASE) 50 MCG/ACT nasal spray     furosemide (LASIX) 20 MG tablet     Gabapentin (NEURONTIN PO)      Hypromellose (ARTIFICIAL TEARS OP)     insulin glargine (LANTUS) 100 UNIT/ML injection     lactase (LACTAID) 3000 UNIT tablet     levothyroxine (SYNTHROID, LEVOTHROID) 175 MCG tablet     levothyroxine (SYNTHROID/LEVOTHROID) 175 MCG tablet     LORazepam (ATIVAN) 1 MG tablet     losartan (COZAAR) 100 MG tablet     Magnesium Hydroxide (MILK OF MAGNESIA PO)     melatonin 5 MG tablet     metFORMIN (GLUCOPHAGE) 1000 MG tablet     nystatin (MYCOSTATIN) 117201 UNIT/GM POWD     ONETOUCH DELICA LANCETS 33G MISC     polyethylene glycol (MIRALAX/GLYCOLAX) powder     Saline 0.9 % SOLN     senna-docusate (SENNA S) 8.6-50 MG per tablet     simethicone (MYLICON) 125 MG chewable tablet     Skin Protectants, Misc. (EUCERIN) cream     SM LUBRICANT EYE DROPS 0.4-0.3 % SOLN ophthalmic solution     Sod Fluor-Solo Fluor-Hydrfl Ac (GEL-SASHA DENTINBLOC DT)     solifenacin (VESICARE) 10 MG tablet     terbinafine (LAMISIL) 1 % external cream     ziprasidone (GEODON) 40 MG capsule     glucagon (GLUCAGON EMERGENCY) 1 MG kit     No current facility-administered medications for this visit.        Social History     Socioeconomic History     Marital status: Single     Spouse name: Not on file     Number of children: Not on file     Years of education: Not on file     Highest education level: Not on file   Occupational History     Not on file   Social Needs     Financial resource strain: Not on file     Food insecurity:     Worry: Not on file     Inability: Not on file     Transportation needs:     Medical: Not on file     Non-medical: Not on file   Tobacco Use     Smoking status: Never Smoker     Smokeless tobacco: Never Used   Substance and Sexual Activity     Alcohol use: No     Alcohol/week: 0.0 standard drinks     Drug use: No     Sexual activity: Not Currently   Lifestyle     Physical activity:     Days per week: Not on file     Minutes per session: Not on file     Stress: Not on file   Relationships     Social connections:     Talks on phone:  "Not on file     Gets together: Not on file     Attends Rastafarian service: Not on file     Active member of club or organization: Not on file     Attends meetings of clubs or organizations: Not on file     Relationship status: Not on file     Intimate partner violence:     Fear of current or ex partner: Not on file     Emotionally abused: Not on file     Physically abused: Not on file     Forced sexual activity: Not on file   Other Topics Concern     Not on file   Social History Narrative    Pinky Page is a resident at [facility].       Family History   Problem Relation Age of Onset     Heart Disease Father         1968     Melanoma Mother         malignant melanoma     Glaucoma No family hx of      Macular Degeneration No family hx of          EXAM: Pleasant alert no distress  /68   Pulse 80   Resp 16   Ht 1.635 m (5' 4.37\")   Wt 104.8 kg (231 lb)   LMP  (LMP Unknown)   SpO2 100%   BMI 39.20 kg/m    Psychiatric: Mood and affect appear normal/flat    PSG 3/21/2017  Wt 249 lbs  AHI 15.5    Titration PSG 4/5/2017  Wt 249  CPAP titrated to 13 cmH2O    10/21/2007 Split Night  Wt 270 BMI 44.9  AHI 39.0 Lowest Sat 71%    11/23/2007 Titration  CPAP 12 No REM supine rec aPAP 10-15    PAP download from 1/14/2020 to 2/12/2020 reviewed:  Per cent of days used greater than 4 Hours 3 % (minimum goal greater than 70%)  Average use on days used: 1 hours 40 min  Settings: Min EPAP 13 cmH2O    Max EPAP 15 cmH2O  Pressures delivered 90/95th percentile for pressure 13.6 cmH2O  Average AHI 1.4 events per hour (goal less than 5)  Leak average 25 minutes    ASSESSMENT:  69-year-old female with obesity, depression, type 2 diabetes, obstructive sleep apnea.  She continues to struggle with compliance.  She does not have optimal insight into the disorder.  Is not suffering a lot of impact from non-CPAP use.    PLAN:  Try to  her on the importance of using CPAP all night every night.  Orders generated for [facility] " to visit the residents to do mask fitting and find optimal mask fit.  She should they should educate patient and caregivers on mask fit as well.  Encouraged patient to discontinue caffeine use beyond 1 beverage.  Try to avoid falling asleep during daytime rest periods.  Orders for staff to try to ensure patient puts the mask on after returning to the bed after middle night trips to the bathroom.  Encouraged them to encourage her to use mask all night every night.  Please see patient instructions for details of other counseling provided.  Patient should continue follow-up closely in this clinic in 6 months.  Continue her efforts at weight maintenance/weight loss.  she is agreeable with this plan.    Twenty-five minutes spent with patient, >50% spent counseling and coordinating care.      Brianda Reddy M.D.  Pulmonary/Critical Care/Sleep Medicine    [facility]   Floor 1, [address]   [address] [place] [address]. [place] [address]   Appointments: [phone]    The above note was dictated using voice recognition software and may include typographical errors. Please contact the author for any clarifications.

## 2020-02-14 ENCOUNTER — TELEPHONE (OUTPATIENT)
Dept: SLEEP MEDICINE | Facility: CLINIC | Age: 70
End: 2020-02-14

## 2020-02-14 NOTE — TELEPHONE ENCOUNTER
Orders from [facility] medical with sleep study and face to face notes request has been rev'd and given to Dr. Reddy for review and signature.    JONNATHAN Mark

## 2020-02-18 NOTE — TELEPHONE ENCOUNTER
Orders signed, faxed and sent to scanning    Tamanna Diaz [facility] ~[place]

## 2020-02-19 NOTE — PROGRESS NOTES
[facility]  Psychiatry Clinic  PSYCHIATRIC PROGRESS NOTE     Date of initial Diagnostic Assessment (DA) is 5/13/13 and most recent Transfer of Care DA is 12/11/19.    CARE TEAM:  PCP- Cary Brownlee  (resident working at [facility] with Dr. Birmingham, attending)   Specialty Providers- no    Therapist- Dr. Gr    [facility] Team- no            Pinky Page is a 69 year old female who prefers the name Pinky & pronouns she, her, hers.      Pertinent Background: This patient first experienced mental health issues in her twenties and has received treatment for schizoaffective disorder and generalized anxiety. Notably, Pinky's symptoms of depression and psychosis have responded well to a combination of ECT and medication management. Pinky has a history of experiencing increasingly frequent psychotic symptoms with previously attempted antipsychotic tapers.      Psych critical item history includes psychosis [sxs include disorganization, hallucinations, paranoia], mutiple psychotropic trials, psych hosp (3-5) and ECT.    INTERIM HISTORY      [4, 4]   The patient reports good treatment adherence.  History was provided by the patient who was a good historian.    The last visit ended with the following med change: melatonin increased.   Since the last visit:   -Had a tooth pulled yesterday and has some tooth pain now.   -Continues to have some left knee pain that is slightly improved with wearing the ace bandage prescribed by her PCP. Has resumed walking and riding the stationary bike for 40min 3x/week without pain.   -Had a good [place] at her sister's house   -Continues to be busy with craft, decorating, and news groups at [facility]   -Her mood has been good overall, though she is a little sadder in the winter.   -Her sleep has improved since increasing melatonin in December. She falls asleep sooner and sleeps longer.   -Continues to have difficulty tolerating her CPAP for longer than  a few hours. Has not been fitted for a new CPAP mask.   -Sometimes is tired during the day but most of the time her energy is good.  -Has occasional irritability and some worries about her teeth and her leg.   -Denies paranoia, delusions, or auditory or visual hallucinations.   -Is looking forward to walking more outside as the weather gets nicer.     RECENT PSYCH ROS:   Depression: REPORTS: none DENIES: suicidal ideation, depressed mood, anhedonia, low energy, insomnia, hypersomnia, appetite changes, weight changes and poor concentration /memory  Elevated: DENIES: increased energy, decreased sleep need, increased activity and grandiosity  Psychosis: DENIES: delusions, auditory hallucinations, visual hallucinations and disorganized behavior  Anxiety: DENIES: excessive worry, feeling fearful and nervous/overwhelmed  Panic Attack:  none  Trauma Related:  none  Dysregulation: REPORTS: irritable  Eating Disorder: no     Pertinent Negative Symptoms:  NO suicidal and violent ideation, aggression, psychosis, hallucinations, delusions and alberta    RECENT SUBSTANCE USE:     ALCOHOL- no      TOBACCO- no     CAFFEINE- coffee/ tea [coffee, diet Pepsi]. 1 cup of coffee in the morning, 1 can diet coke in the afternoon, 1 decaf coffee at lunch. doesn't drink regular coffee after 1pm.   OPIOIDS- no         NARCAN KIT- no        CANNABIS- no            OTHER ILLICIT DRUGS- none    CURRENT SOCIAL HISTORY:  FINANCIAL SUPPORT- social security disability       CHILDREN- no      LIVING SITUATION- Lives at [facility].      SOCIAL/ SPIRITUAL SUPPORT- sisters, support staff at [facility], other residents at [facility], other providers     FEELS SAFE AT HOME- yes     PSYCH and CD Critical Summary Points since July 2019 7/1-Ley covering for Jose Guadalupe, no changes  10/9-no changes  12/11-resident transition, increased melatonin  2/20/20- decreased Ativan     PAST MED TRIALS          See last Diagnostic Assessment    MEDICAL /  SURGICAL HISTORY          Patient Active Problem List   Diagnosis     Allergic rhinitis due to pollen     Urge incontinence     Hypertension, essential     Cardiomegaly     Chronic constipation     Dry eye syndrome     Esophageal reflux     Exposure keratoconjunctivitis     DM ophthalmopathy (H)     Hypothyroidism     Senile cataract     ANUJ (obstructive sleep apnea)     Vaginal atrophy     Restless leg syndrome     Squamous blepharitis     Morbid obesity due to excess calories (H)     Personal history of breast cancer s/p L masectomy     Diabetes mellitus type 2, insulin dependent (H)     Hypercholesteremia     Lives in group home     Extrapyramidal and movement disorder     CKD (chronic kidney disease) stage 2, GFR 60-89 ml/min     Solitary kidney, congenital     S/P hysterectomy     Impingement syndrome of right shoulder     Hypertriglyceridemia     Candidiasis of skin     Generalized anxiety disorder     Carpal tunnel syndrome of left wrist     Schizoaffective disorder, depressive type (H)     Major depressive disorder, recurrent episode, moderate (H)     Psychological factors affecting medical condition     Hx of psychiatric care     Nail complaint     Microalbuminuria due to type 2 diabetes mellitus (H)     Type 2 diabetes mellitus with microalbuminuria, with long-term current use of insulin (H)     Diabetes mellitus, type II, insulin dependent (H)     CKD (chronic kidney disease) stage 1, GFR 90 ml/min or greater     Conjunctivitis of both eyes     Corneal erosion of both eyes     Spastic entropion, right       Past Surgical History:   Procedure Laterality Date     APPENDECTOMY       C MASTECTOMY,SIMPLE  1996    Left mastectomy  [facility]. Had normal FU mammo 5/2007. Follow yearly.      C TOTAL ABDOM HYSTERECTOMY  7/2003    Dr. Castanon, for abnormal bleeding. Removal of both tubes with BSO for myoma w - - -     CHOLECYSTECTOMY       COLONOSCOPY  5/2005    Complete Colonoscopy-had one small polyp  removed 5/2005.      COLONOSCOPY N/A 3/31/2016    Procedure: COLONOSCOPY;  Surgeon: Cary Ring MD;  Location: [facility]     COLONOSCOPY N/A 7/7/2016    Procedure: COLONOSCOPY;  Surgeon: Cary Ring MD;  Location: [facility]     DILATION AND CURETTAGE       HYSTERECTOMY       MASTECTOMY      Left breast       MEDICAL REVIEW OF SYSTEMS    [2, 10]     REPORTS: left knee pain, tooth pain DENIES: leg edema, leg erythema, palpitations, chest pain, loss of consciousness, dyspnea, dizziness, fever, muscle stiffness, muscle twitching, Parkinsonian symptoms     ALLERGY       Chlordiazepoxide hcl; Dimetapp dm cold-cough; Haldol; Ibuprofen; Lactose intolerance [beta-galactosidase]; Milk products; and Propofol  MEDICATIONS        Current Outpatient Medications   Medication Sig Dispense Refill     acetaminophen (TYLENOL) 500 MG tablet Take 2 tablets (1,000 mg) by mouth every 6 hours as needed 1 Bottle 2     alum & mag hydroxide-simethicone (MYLANTA/MAALOX) 200-200-20 MG/5ML SUSP suspension Take 30 mLs by mouth daily as needed for indigestion       amLODIPine (NORVASC) 10 MG tablet Take 10 mg by mouth daily       artificial saliva (BIOTENE MT) AERS spray Take 1 spray by mouth 3 times daily as needed for dry mouth       ASPIRIN 81 MG OR TABS Take 1 tablet by mouth daily. ENTERIC COATED. 100 3     atorvastatin (LIPITOR) 40 MG tablet Take 1 tablet (40 mg) by mouth daily 90 tablet 1     BD AUTOSHIELD DUO 30G X 5 MM        blood glucose (NO BRAND SPECIFIED) lancets standard Use to test blood sugar 2 times daily or as directed. 100 each 11     blood glucose calibration (NO BRAND SPECIFIED) solution Use to calibrate blood glucose monitor as directed. 1 each 3     blood glucose monitoring (NO BRAND SPECIFIED) meter device kit Check Blood sugars twice a day. 1 kit 0     blood glucose monitoring (NO BRAND SPECIFIED) test strip Use to test blood sugar 3 times daily or as directed. 100 each 3     Calcium  Carbonate-Vitamin D (CALCIUM-VITAMIN D) 250-125 MG-UNIT TABS Take 1 tablet by mouth 2 times daily. Calcium 250 mg/Vit D 125 IU       calcium polycarbophil (FIBERCON) 625 MG tablet Take 2 tablets by mouth daily       CLARITIN 10 MG OR TABS 1 TAB PO QD (Once per day) as needed for ALLERGY SYMPTOMS 30 11     exenatide (BYETTA) 10 MCG/0.04ML injection Inject 10 mcg Subcutaneous 2 times daily (before meals) 1 Syringe 11     FLUoxetine 20 MG PO capsule Take 2 capsules (40 mg) by mouth daily 60 capsule 1     fluticasone (FLONASE) 50 MCG/ACT nasal spray Spray 1 spray into both nostrils daily 16 g 3     furosemide (LASIX) 20 MG tablet Take 1 tablet (20 mg) by mouth 2 times daily 60 tablet 3     Gabapentin (NEURONTIN PO) Take 900 mg by mouth 3 times daily.       Hypromellose (ARTIFICIAL TEARS OP) Apply 1 drop to eye 4 times daily.       insulin glargine (LANTUS) 100 UNIT/ML injection Inject 20 Units Subcutaneous At Bedtime 8 mL 11     lactase (LACTAID) 3000 UNIT tablet Take 4 tabs daily with meals.       levothyroxine (SYNTHROID, LEVOTHROID) 175 MCG tablet Take 1 tablet (175 mcg) by mouth daily Give on empty stomach 30 tablet 4     levothyroxine (SYNTHROID/LEVOTHROID) 175 MCG tablet Take 175 mcg by mouth daily       LORazepam 0.5 MG PO tablet Take 1 tablet (0.5 mg) by mouth At Bedtime 30 tablet 0     losartan (COZAAR) 100 MG tablet Take 100 mg by mouth daily       Magnesium Hydroxide (MILK OF MAGNESIA PO) Take  by mouth. Take 30 mL as needed for constipation.       melatonin 3 MG PO CAPS Take 6 mg by mouth At Bedtime 60 capsule 1     metFORMIN (GLUCOPHAGE) 1000 MG tablet Take 1,000 mg by mouth 2 times daily (with meals)       nystatin (MYCOSTATIN) 742741 UNIT/GM POWD Apply topically 3 times daily as needed 60 g 1     ONETOUCH DELICA LANCETS 33G MISC        polyethylene glycol (MIRALAX/GLYCOLAX) powder Take 1 capful by mouth 2 times daily. 17 GM PO BID       Saline 0.9 % SOLN Spray 2 sprays in nostril as needed.        "senna-docusate (SENNA S) 8.6-50 MG per tablet Take 2 tablets by mouth At Bedtime.       simethicone (MYLICON) 125 MG chewable tablet Take 1 tablet (125 mg) by mouth 2 times daily 60 tablet 0     Skin Protectants, Misc. (EUCERIN) cream Apply  topically as needed. Apply to thigh PRN dry skin        SM LUBRICANT EYE DROPS 0.4-0.3 % SOLN ophthalmic solution        Sod Fluor-Solo Fluor-Hydrfl Ac (GEL-SASHA DENTINBLOC DT) Apply  to affected area. GEL. Apply to 2nd molar on bottom right daily at bedtime.       solifenacin (VESICARE) 10 MG tablet Take 1 tablet (10 mg) by mouth daily 30 tablet 6     terbinafine (LAMISIL) 1 % external cream Apply topically 2 times daily       ziprasidone 40 MG PO capsule Take 1 capsule (40 mg) by mouth 2 times daily (with meals) 60 capsule 1     glucagon (GLUCAGON EMERGENCY) 1 MG kit Inject 1 mg into the muscle once for 1 dose 2 mg 3     VITALS      [3, 3]   BP (!) 149/84   Pulse 70   Wt 104.7 kg (230 lb 12.8 oz)   LMP  (LMP Unknown)   BMI 39.16 kg/m     MENTAL STATUS EXAM      [9, 14 cog gs]     Alertness: alert  and oriented  Appearance: adequately groomed, glasses, slightly messy long white hair in a ponytail  Behavior/Demeanor: cooperative, pleasant and calm, with good  eye contact   Speech: normal and regular rate and rhythm  Language: intact and no problems  Psychomotor: normal or unremarkable  Mood: \"good\"  Affect: full range; was congruent to mood; was congruent to content  Thought Process/Associations: unremarkable  Thought Content:  Reports none;  Denies suicidal and violent ideation and delusions  Perception:  Reports none;  Denies auditory hallucinations and visual hallucinations  Insight: good  Judgment: good  Cognition: (6) does  appear grossly intact; formal cognitive testing was not done  Gait and Station: unremarkable    LABS and DATA     AIMS:  last done 3/6/19 with score(s): 3;  next due March 2020    PHQ9 TODAY = 6  PHQ-9 SCORE 10/9/2019 1/9/2020 1/9/2020   PHQ-9 Total " Score - - -   PHQ-9 Total Score 8 9 7     ANTIPSYCHOTIC LABS  [glu, A1C, lipids (danika LDL), liver enzymes, WBC, ANEU, Hgb, plts]  q12 mo  Recent Labs   Lab Test 08/06/19  0941 05/07/19  0908 05/07/19  0650 11/07/18  1531 10/25/18  1734 05/03/18  1414  06/21/17  1047   GLC  --  109*  --   --  147* 192.6*  --  134*   A1C 6.6*  --  6.8* 7.4*  --  6.7*   < >  --     < > = values in this interval not displayed.     Recent Labs   Lab Test 05/07/19  0650 05/03/18  1414 03/21/17  1023 02/22/16  1028   CHOL 138 126 142 162.0   TRIG 96 148 121 151.9*   LDL 79 60 78 95   HDL 40 37* 40* 36.6*     Recent Labs   Lab Test 06/26/18  0659 06/26/18 03/21/17  1023 01/22/15  1600   AST 16 16 13 18   ALT 21 21 20 35   ALKPHOS 111 111 92 113     Recent Labs   Lab Test 05/07/19  0650 10/25/18  1734 06/26/18  0659 06/21/17  1047 03/21/17  1023  07/04/14  0705   WBC 10.06* 14.2* 9.72  --  11.8*   < > 7.7   ANEU  --  10.6* 5.31  --  8.3  --  4.8   HGB 13.3 14.0 12.6 13.8 13.4   < > 13.3    275 247  --  266   < > 223    < > = values in this interval not displayed.     PSYCHOTROPIC DRUG INTERACTIONS     ZIPRASIDONE and QT INTERVAL PROLONGING DRUGS may result in increased risk of QT-interval prolongation.  ZIPRASIDONE and SEROTONERGIC DRUGS THAT PROLONG QT INTERVAL may result in increased risk of QT-interval prolongation and increased risk of serotonin syndrome (hypertension, hyperthermia, myoclonus, mental status changes).  NSAID and SSRI may result in an increased risk of bleeding  ERYTHROMYCIN and FLUOXETINE may result in an increased risk of cardiotoxicity (QT prolongation, torsades de pointes, cardiac arrest).  FLUOXETINE and INSULINS OR PRAMLINTIDE may result in increased risk of hypoglycemia.  FLUOXETINE and QT INTERVAL PROLONGING DRUGS may result in increased risk of QT-interval prolongation.   GABAPENTIN and CNS DEPRESSANTS may result in respiratory depression.   GABAPENTIN and ANTACIDS may result in decreased gabapentin  effectiveness.     MANAGEMENT:  Monitoring for adverse effects, periodic EKGs and patient is aware of risks    RISK STATEMENT for SAFETY     Pinky Page did not appear to be an imminent safety risk to self or others.    PSYCHIATRIC DIAGNOSIS     Schizoaffective Disorder, depressed type, in partial remission  Generalized Anxiety Disorder    ASSESSMENT      [m2, h3]     TODAY Pinky presents for a follow-up. She continues to do well overall with a euthymic mood and engagement in activities at her residence. She denies any psychotic symptoms. Her difficulty falling asleep and staying asleep have improved with increased melatonin. Her sleep difficulty is also likely impacted by her ANUJ and multiple nighttime awakenings by staff given history of nocturnal enuresis. She would likely benefit from a refitting of her CPAP mask. Given Pinky's age, she would likely benefit from medications with less CNS depression. Discussed decreasing Ativan today to reduce her overall fall risk which she agrees with. Pinky is unsure why she is prescribed gabapentin by her PCP. Discussed that people can become more sensitive to the sedating effects of medications like gabapentin and Ativan as they age. Pinky agreed to have this writer discuss her gabapentin with her PCP. Can consider decreasing and tapering lorazepam in the future. Pinky may need a safer replacement medication to help her sleep in the future.      PLAN      [m2, h3]     1) PSYCHOTROPIC MEDICATIONS:  - Continue ziprasidone 40 mg BID  - Continue fluoxetine 40 mg daily  - Decrease lorazepam to 0.5 mg at bedtime   - Continue melatonin 6mg at bedtime    Gabapentin 900mg TID prescribed by primary care    Will discuss gabapentin prescription with Pinky's PCP    2) [place]  was checked today:  Lorazepam filled at monthly intervals only from this clinic, last filled 2/12 for 30 tablets. Gabapentin filled at monthly intervals for 30 day supplies and only prescribed by Dr. Linares    3)  THERAPY:    Yes, continue w/ Dr. Gr once a month    4) NEXT DUE:    Labs- AP labs by 05/2020  EKG- Last EKG 8/17/16 - QTc 432. EKG today with QTc 456. Will obtain another EKG in 1 year  Rating Scales- AIMS due March 2020    5) REFERRALS:    No Referrals needed     6) RTC: 1 month    7) CRISIS NUMBERS:   Provided routinely in AVS   ONLY if a LOBITO PT: [facility] [phone] (clinic), [phone] (after hours)     TREATMENT RISK STATEMENT:  The risks, benefits, alternatives and potential adverse effects have been discussed and are understood by the pt. The pt understands the risks of using street drugs or alcohol. There are no medical contraindications, the pt agrees to treatment with the ability to do so. The pt knows to call the clinic for any problems or to access emergency care if needed.  Medical and substance use concerns are documented above.  Psychotropic drug interaction check was done, including changes made today.    PROVIDER: María Shi MD    Patient staffed in clinic with Dr. Estrada who will sign the note.  Supervisor is Dr. Gordon.    Supervisor Attestation:  I met with Pinky Page along with the resident physician, María Shi MD. I participated in key portions of the service, including the mental status examination and developing the plan of care. I reviewed key portions of the history with the resident. I agree with the findings and plan as documented in this note.  Cortez Estrada MD

## 2020-02-20 ENCOUNTER — OFFICE VISIT (OUTPATIENT)
Dept: PSYCHIATRY | Facility: CLINIC | Age: 70
End: 2020-02-20
Attending: PSYCHIATRY & NEUROLOGY
Payer: COMMERCIAL

## 2020-02-20 VITALS
WEIGHT: 230.8 LBS | DIASTOLIC BLOOD PRESSURE: 84 MMHG | BODY MASS INDEX: 39.16 KG/M2 | SYSTOLIC BLOOD PRESSURE: 149 MMHG | HEART RATE: 70 BPM

## 2020-02-20 DIAGNOSIS — F25.1 SCHIZOAFFECTIVE DISORDER, DEPRESSIVE TYPE (H): ICD-10-CM

## 2020-02-20 DIAGNOSIS — G47.09 OTHER INSOMNIA: Primary | ICD-10-CM

## 2020-02-20 PROCEDURE — G0463 HOSPITAL OUTPT CLINIC VISIT: HCPCS | Mod: ZF

## 2020-02-20 RX ORDER — LORAZEPAM 0.5 MG/1
0.5 TABLET ORAL AT BEDTIME
Qty: 30 TABLET | Refills: 0 | Status: SHIPPED | OUTPATIENT
Start: 2020-02-20 | End: 2020-03-17

## 2020-02-20 RX ORDER — ZIPRASIDONE HYDROCHLORIDE 40 MG/1
40 CAPSULE ORAL 2 TIMES DAILY WITH MEALS
Qty: 60 CAPSULE | Refills: 1 | Status: SHIPPED | OUTPATIENT
Start: 2020-02-20 | End: 2020-03-26

## 2020-02-20 ASSESSMENT — PAIN SCALES - GENERAL: PAINLEVEL: SEVERE PAIN (7)

## 2020-02-20 NOTE — PATIENT INSTRUCTIONS
Thank you for coming to the PSYCHIATRY CLINIC.    Lab Testing:  If you had lab testing today and your results are reassuring or normal they will be mailed to you or sent through [employer] within 7 days.   If the lab tests need quick action we will call you with the results.  The phone number we will call with results is # [phone] (home) . If this is not the best number please call our clinic and change the number.    Medication Refills:  If you need any refills please call your pharmacy and they will contact us. Our fax number for refills is [phone]. Please allow three business for refill processing.   If you need to  your refill at a new pharmacy, please contact the new pharmacy directly. The new pharmacy will help you get your medications transferred.     Scheduling:  If you have any concerns about today's visit or wish to schedule another appointment please call our office during normal business hours [phone] (8-5:00 M-F)    Contact Us:  Please call [phone] during business hours (8-5:00 M-F).  If after clinic hours, or on the weekend, please call  [phone].    Financial Assistance [phone]  [facility] Billing [phone]  Central Billing Office, [facility]: [phone]  [facility] Billing [phone]  Medical Records [phone]      MENTAL HEALTH CRISIS NUMBERS:  [facility]:   [facility] - [phone]   Crisis Residence [facility] - [phone]   Walk-In Counseling [facility] [phone]   COPE 24/7 [facility] Mobile Team for Adults - [[phone]]; Child - [[phone]]        [facility]:   [facility] - [phone]   Walk-in counseling [facility] - [phone]   Walk-in counseling  - [phone]   Crisis Residence [facility] - [phone]   Urgent Care Adult Mental Health:   --Drop-in, 24/7 crisis line, and [place] Mobile Team  [[phone]]    CRISIS TEXT LINE: Text [phone] from anywhere, anytime, any crisis 24/7;    OR SEE www.crisistextline.org     National Suicide Prevention Lifeline: 600-837-GBKK (138-831-5911)    Poison Control Center - [phone]    CHILD: [facility] needs assessment team - [phone]     [facility] Lifeline - 1-194.451.7242; or [facility] Lifeline - 1-682.163.1370    If you have a medical emergency please call 911or go to the nearest ER.                    _____________________________________________    Again thank you for choosing PSYCHIATRY CLINIC and please let us know how we can best partner with you to improve you and your family's health.  You may be receiving a survey regarding this appointment. We would love to have your feedback, both positive and negative. The survey is done by an external company, so your answers are anonymous.

## 2020-03-02 ENCOUNTER — OFFICE VISIT (OUTPATIENT)
Dept: FAMILY MEDICINE | Facility: CLINIC | Age: 70
End: 2020-03-02
Payer: COMMERCIAL

## 2020-03-02 VITALS
TEMPERATURE: 98.5 F | HEART RATE: 76 BPM | BODY MASS INDEX: 39.09 KG/M2 | DIASTOLIC BLOOD PRESSURE: 80 MMHG | WEIGHT: 229 LBS | SYSTOLIC BLOOD PRESSURE: 143 MMHG | HEIGHT: 64 IN | RESPIRATION RATE: 16 BRPM | OXYGEN SATURATION: 91 %

## 2020-03-02 DIAGNOSIS — Z79.4 TYPE 2 DIABETES MELLITUS WITH MICROALBUMINURIA, WITH LONG-TERM CURRENT USE OF INSULIN (H): ICD-10-CM

## 2020-03-02 DIAGNOSIS — Z79.4 DIABETES MELLITUS TYPE 2, INSULIN DEPENDENT (H): Primary | ICD-10-CM

## 2020-03-02 DIAGNOSIS — R80.9 TYPE 2 DIABETES MELLITUS WITH MICROALBUMINURIA, WITH LONG-TERM CURRENT USE OF INSULIN (H): ICD-10-CM

## 2020-03-02 DIAGNOSIS — E11.9 DIABETES MELLITUS TYPE 2, INSULIN DEPENDENT (H): Primary | ICD-10-CM

## 2020-03-02 DIAGNOSIS — L84 FOOT CALLUS: ICD-10-CM

## 2020-03-02 DIAGNOSIS — E11.29 TYPE 2 DIABETES MELLITUS WITH MICROALBUMINURIA, WITH LONG-TERM CURRENT USE OF INSULIN (H): ICD-10-CM

## 2020-03-02 DIAGNOSIS — E66.01 MORBID OBESITY (H): ICD-10-CM

## 2020-03-02 DIAGNOSIS — Z85.3 PERSONAL HISTORY OF BREAST CANCER: ICD-10-CM

## 2020-03-02 LAB — HBA1C MFR BLD: 6.6 % (ref 4.1–5.7)

## 2020-03-02 ASSESSMENT — ANXIETY QUESTIONNAIRES
GAD7 TOTAL SCORE: 7
7. FEELING AFRAID AS IF SOMETHING AWFUL MIGHT HAPPEN: SEVERAL DAYS
3. WORRYING TOO MUCH ABOUT DIFFERENT THINGS: SEVERAL DAYS
2. NOT BEING ABLE TO STOP OR CONTROL WORRYING: SEVERAL DAYS
IF YOU CHECKED OFF ANY PROBLEMS ON THIS QUESTIONNAIRE, HOW DIFFICULT HAVE THESE PROBLEMS MADE IT FOR YOU TO DO YOUR WORK, TAKE CARE OF THINGS AT HOME, OR GET ALONG WITH OTHER PEOPLE: SOMEWHAT DIFFICULT
5. BEING SO RESTLESS THAT IT IS HARD TO SIT STILL: SEVERAL DAYS
1. FEELING NERVOUS, ANXIOUS, OR ON EDGE: SEVERAL DAYS
6. BECOMING EASILY ANNOYED OR IRRITABLE: SEVERAL DAYS

## 2020-03-02 ASSESSMENT — PAIN SCALES - GENERAL: PAINLEVEL: NO PAIN (0)

## 2020-03-02 ASSESSMENT — MIFFLIN-ST. JEOR: SCORE: 1546.49

## 2020-03-02 ASSESSMENT — PATIENT HEALTH QUESTIONNAIRE - PHQ9: 5. POOR APPETITE OR OVEREATING: SEVERAL DAYS

## 2020-03-02 NOTE — LETTER
March 4, 2020      Pinky Page  [address] [place] [facility] [facility] [address]        Dear Pinky,    Thank you for getting your care at [facility]. Please see below for your test results.    Resulted Orders   Hemoglobin A1c ([facility])   Result Value Ref Range    Hemoglobin A1C 6.6 (H) 4.1 - 5.7 %     Your diabetes is very well controlled.  Please follow up in 2 months as planned, and bring your meter in with you if you can.    If you have any concerns about these results please call and leave a message for me or send a [employer] message to the clinic.    Sincerely,    Blake Sullivan MD

## 2020-03-03 ENCOUNTER — DOCUMENTATION ONLY (OUTPATIENT)
Dept: FAMILY MEDICINE | Facility: CLINIC | Age: 70
End: 2020-03-03

## 2020-03-03 NOTE — PROGRESS NOTES
"When opening a documentation only encounter, be sure to enter in \"Chief Complaint\" Forms and in \" Comments\" Title of form, description if needed.    Pinky is a 69 year old  female  Form received via: Fax  Form now resides in: Provider Ready    Shanna Arora CMA        Form has been completed by provider.     Form sent out via: Fax to Narendra Truong at Fax Number: [phone]  Patient informed: N/A  Output date: March 11, 2020    Shanna Arora CMA      **Please close the encounter**              "

## 2020-03-05 ENCOUNTER — MEDICAL CORRESPONDENCE (OUTPATIENT)
Dept: HEALTH INFORMATION MANAGEMENT | Facility: CLINIC | Age: 70
End: 2020-03-05

## 2020-03-08 PROBLEM — L84 FOOT CALLUS: Status: ACTIVE | Noted: 2020-03-08

## 2020-03-08 NOTE — PROGRESS NOTES
ASSESSMENT/PLAN:         ICD-10-CM    1. Diabetes mellitus type 2, insulin dependent (H)  E11.9 Hemoglobin A1c (Tatamy's)    Z79.4    2. Personal history of breast cancer s/p L masectomy  Z85.3    3. Type 2 diabetes mellitus with microalbuminuria, with long-term current use of insulin (H)  E11.29     R80.9     Z79.4    4. Morbid obesity (H)  E66.01    5. Foot callus  L84        >25 minutes was spent with patient, with >50% in counseling and coordination of care    See discussions below--urged to follow up in 2 months with her meter.  Could consider podiatry to work with patient on her dry skin/calloused, possible onychomycosis.      SUBJECTIVE:   Pinky Page is a 69 year old female who presents to clinic today for the following health issues:  1. Med check--patient comes in every 2 months for med check.  Reviewed all of her medications and heard about her chronic conditions.  Patient has no concerns. Says no side effects.  Signed forms for her orders renewal for her group home.  Says things are going well there.  2. Diabetes, type 2, insulin--doing well with fasting blood sugars in the  range and no lows.  Possibly could decrease insulin slightly given age.  Patient is comfortable with her current dose.  Urged her to bring in her blood sugar monitor for follow up.  3.  Other conditions.  Discussed her breast cancer screening giver her cancer history--she says she does this regularly, and her mental health--says meds followed by psychiatry.      Problem list and histories reviewed & adjusted, as indicated.  Additional history: as documented    Patient Active Problem List   Diagnosis     Allergic rhinitis due to pollen     Urge incontinence     Hypertension, essential     Cardiomegaly     Chronic constipation     Dry eye syndrome     Esophageal reflux     Exposure keratoconjunctivitis     DM ophthalmopathy (H)     Hypothyroidism     Senile cataract     ANUJ (obstructive sleep apnea)     Vaginal atrophy      Restless leg syndrome     Squamous blepharitis     Morbid obesity due to excess calories (H)     Personal history of breast cancer s/p L masectomy     Diabetes mellitus type 2, insulin dependent (H)     Hypercholesteremia     Lives in group home     Extrapyramidal and movement disorder     CKD (chronic kidney disease) stage 2, GFR 60-89 ml/min     Solitary kidney, congenital     S/P hysterectomy     Impingement syndrome of right shoulder     Hypertriglyceridemia     Candidiasis of skin     Generalized anxiety disorder     Carpal tunnel syndrome of left wrist     Schizoaffective disorder, depressive type (H)     Major depressive disorder, recurrent episode, moderate (H)     Psychological factors affecting medical condition     Hx of psychiatric care     Nail complaint     Microalbuminuria due to type 2 diabetes mellitus (H)     Type 2 diabetes mellitus with microalbuminuria, with long-term current use of insulin (H)     Diabetes mellitus, type II, insulin dependent (H)     CKD (chronic kidney disease) stage 1, GFR 90 ml/min or greater     Conjunctivitis of both eyes     Corneal erosion of both eyes     Spastic entropion, right     Past Surgical History:   Procedure Laterality Date     APPENDECTOMY       C MASTECTOMY,SIMPLE  1996    Left mastectomy  [facility]. Had normal FU mammo 5/2007. Follow yearly.      C TOTAL ABDOM HYSTERECTOMY  7/2003    Dr. Castanon, for abnormal bleeding. Removal of both tubes with BSO for myoma w - - -     CHOLECYSTECTOMY       COLONOSCOPY  5/2005    Complete Colonoscopy-had one small polyp removed 5/2005.      COLONOSCOPY N/A 3/31/2016    Procedure: COLONOSCOPY;  Surgeon: Cary Ring MD;  Location: [facility]     COLONOSCOPY N/A 7/7/2016    Procedure: COLONOSCOPY;  Surgeon: Cary Ring MD;  Location: [facility]     DILATION AND CURETTAGE       HYSTERECTOMY       MASTECTOMY      Left breast       Social History     Tobacco Use     Smoking status: Never  Smoker     Smokeless tobacco: Never Used   Substance Use Topics     Alcohol use: No     Alcohol/week: 0.0 standard drinks     Family History   Problem Relation Age of Onset     Heart Disease Father         1968     Melanoma Mother         malignant melanoma     Glaucoma No family hx of      Macular Degeneration No family hx of          Current Outpatient Medications   Medication Sig Dispense Refill     acetaminophen (TYLENOL) 500 MG tablet Take 2 tablets (1,000 mg) by mouth every 6 hours as needed 1 Bottle 2     alum & mag hydroxide-simethicone (MYLANTA/MAALOX) 200-200-20 MG/5ML SUSP suspension Take 30 mLs by mouth daily as needed for indigestion       amLODIPine (NORVASC) 10 MG tablet Take 10 mg by mouth daily       artificial saliva (BIOTENE MT) AERS spray Take 1 spray by mouth 3 times daily as needed for dry mouth       ASPIRIN 81 MG OR TABS Take 1 tablet by mouth daily. ENTERIC COATED. 100 3     atorvastatin (LIPITOR) 40 MG tablet Take 1 tablet (40 mg) by mouth daily 90 tablet 1     BD AUTOSHIELD DUO 30G X 5 MM        blood glucose (NO BRAND SPECIFIED) lancets standard Use to test blood sugar 2 times daily or as directed. 100 each 11     blood glucose calibration (NO BRAND SPECIFIED) solution Use to calibrate blood glucose monitor as directed. 1 each 3     blood glucose monitoring (NO BRAND SPECIFIED) meter device kit Check Blood sugars twice a day. 1 kit 0     blood glucose monitoring (NO BRAND SPECIFIED) test strip Use to test blood sugar 3 times daily or as directed. 100 each 3     Calcium Carbonate-Vitamin D (CALCIUM-VITAMIN D) 250-125 MG-UNIT TABS Take 1 tablet by mouth 2 times daily. Calcium 250 mg/Vit D 125 IU       calcium polycarbophil (FIBERCON) 625 MG tablet Take 2 tablets by mouth daily       CLARITIN 10 MG OR TABS 1 TAB PO QD (Once per day) as needed for ALLERGY SYMPTOMS 30 11     exenatide (BYETTA) 10 MCG/0.04ML injection Inject 10 mcg Subcutaneous 2 times daily (before meals) 1 Syringe 11      FLUoxetine 20 MG PO capsule Take 2 capsules (40 mg) by mouth daily 60 capsule 1     fluticasone (FLONASE) 50 MCG/ACT nasal spray Spray 1 spray into both nostrils daily 16 g 3     furosemide (LASIX) 20 MG tablet Take 1 tablet (20 mg) by mouth 2 times daily 60 tablet 3     Gabapentin (NEURONTIN PO) Take 900 mg by mouth 3 times daily.       Hypromellose (ARTIFICIAL TEARS OP) Apply 1 drop to eye 4 times daily.       insulin glargine (LANTUS) 100 UNIT/ML injection Inject 20 Units Subcutaneous At Bedtime 8 mL 11     lactase (LACTAID) 3000 UNIT tablet Take 4 tabs daily with meals.       levothyroxine (SYNTHROID, LEVOTHROID) 175 MCG tablet Take 1 tablet (175 mcg) by mouth daily Give on empty stomach 30 tablet 4     levothyroxine (SYNTHROID/LEVOTHROID) 175 MCG tablet Take 175 mcg by mouth daily       LORazepam 0.5 MG PO tablet Take 1 tablet (0.5 mg) by mouth At Bedtime 30 tablet 0     losartan (COZAAR) 100 MG tablet Take 100 mg by mouth daily       Magnesium Hydroxide (MILK OF MAGNESIA PO) Take  by mouth. Take 30 mL as needed for constipation.       melatonin 3 MG PO CAPS Take 6 mg by mouth At Bedtime 60 capsule 1     metFORMIN (GLUCOPHAGE) 1000 MG tablet Take 1,000 mg by mouth 2 times daily (with meals)       nystatin (MYCOSTATIN) 934914 UNIT/GM POWD Apply topically 3 times daily as needed 60 g 1     ONETOUCH DELICA LANCETS 33G MISC        polyethylene glycol (MIRALAX/GLYCOLAX) powder Take 1 capful by mouth 2 times daily. 17 GM PO BID       Saline 0.9 % SOLN Spray 2 sprays in nostril as needed.       senna-docusate (SENNA S) 8.6-50 MG per tablet Take 2 tablets by mouth At Bedtime.       simethicone (MYLICON) 125 MG chewable tablet Take 1 tablet (125 mg) by mouth 2 times daily 60 tablet 0     Skin Protectants, Misc. (EUCERIN) cream Apply  topically as needed. Apply to thigh PRN dry skin        SM LUBRICANT EYE DROPS 0.4-0.3 % SOLN ophthalmic solution        Sod Fluor-Solo Fluor-Hydrfl Ac (GEL-SASHA DENTINBLOC DT) Apply  to  "affected area. GEL. Apply to 2nd molar on bottom right daily at bedtime.       solifenacin (VESICARE) 10 MG tablet Take 1 tablet (10 mg) by mouth daily 30 tablet 6     terbinafine (LAMISIL) 1 % external cream Apply topically 2 times daily       ziprasidone 40 MG PO capsule Take 1 capsule (40 mg) by mouth 2 times daily (with meals) 60 capsule 1     glucagon (GLUCAGON EMERGENCY) 1 MG kit Inject 1 mg into the muscle once for 1 dose 2 mg 3     Allergies   Allergen Reactions     Chlordiazepoxide Hcl      Dimetapp Dm Cold-Cough      Cold/Congetion TABS     Haldol      Ibuprofen      TABS     Lactose Intolerance [Beta-Galactosidase]      CAPS     Milk Products      Propofol      EMUL       Reviewed and updated as needed this visit by clinical staff  Tobacco  Allergies  Meds  Med Hx  Surg Hx  Fam Hx  Soc Hx      Reviewed and updated as needed this visit by Provider  Meds         ROS:  Constitutional, HEENT, cardiovascular, pulmonary, gi and gu systems are negative, except as otherwise noted.    OBJECTIVE:     BP (!) 143/80   Pulse 76   Temp 98.5  F (36.9  C) (Oral)   Resp 16   Ht 1.63 m (5' 4.17\")   Wt 103.9 kg (229 lb)   LMP  (LMP Unknown)   SpO2 91%   Breastfeeding No   BMI 39.10 kg/m    Body mass index is 39.1 kg/m .  GENERAL: healthy, alert and no distress  NECK: no adenopathy, no asymmetry, masses, or scars and thyroid normal to palpation  RESP: lungs clear to auscultation - no rales, rhonchi or wheezes  CV: regular rate and rhythm, normal S1 S2, no S3 or S4, no murmur, click or rub, no peripheral edema and peripheral pulses strong  ABDOMEN: soft, nontender, no hepatosplenomegaly, no masses and bowel sounds normal  MS: no gross musculoskeletal defects noted, no edema  Diabetic foot exam: normal DP and PT pulses, normal monofilament exam and but some dryness of her feet and slightly dystrophic nails and slight calloses.      Diagnostic Test Results:  Results for orders placed or performed in visit on " 03/02/20   Hemoglobin A1c (Ignacio)     Status: Abnormal   Result Value Ref Range    Hemoglobin A1C 6.6 (H) 4.1 - 5.7 %           [facility]

## 2020-03-09 ENCOUNTER — TELEPHONE (OUTPATIENT)
Dept: PSYCHOLOGY | Facility: CLINIC | Age: 70
End: 2020-03-09

## 2020-03-09 NOTE — TELEPHONE ENCOUNTER
Health Psychology                   Clinic    Department of Medicine  Keri Arnett, Ph.D., L.P. [phone]                        Clin[facility] Center  [facility] Madelaine Ingram, Ph.D., L.P. [phone]                 3rd [facility] Mail Code 904   Denise Low, Ph.D.  [phone]     [address] [place] [address] Miryam Woods, Ph.D.,  L.P. [phone]      [address] [address]           Dennis Dillard, Ph.D. [phone]      Rae Eugene, Ph.D., L.P. [phone]    Damon Gr, Ph.D., A.B.P.P., L.P. [phone]     Kimber Morales, Ph.D., L.P. [phone]       Telephone Note      I called patient as she did not arrive for today's 2:00 appointment.  She apparently contracted a norovirus over weekend. Staff was too busy to call to cancel.  We rescheduled her for 3/24 @ 1.    Damon Gr, PhD ,  A.B.P.P.  Director, Health Psychology  [phone]

## 2020-03-11 ASSESSMENT — PATIENT HEALTH QUESTIONNAIRE - PHQ9: SUM OF ALL RESPONSES TO PHQ QUESTIONS 1-9: 7

## 2020-03-12 ENCOUNTER — TELEPHONE (OUTPATIENT)
Dept: FAMILY MEDICINE | Facility: CLINIC | Age: 70
End: 2020-03-12

## 2020-03-12 DIAGNOSIS — Z79.4 DIABETES MELLITUS TYPE 2, INSULIN DEPENDENT (H): Primary | ICD-10-CM

## 2020-03-12 DIAGNOSIS — E11.9 DIABETES MELLITUS TYPE 2, INSULIN DEPENDENT (H): Primary | ICD-10-CM

## 2020-03-12 ASSESSMENT — ANXIETY QUESTIONNAIRES: GAD7 TOTAL SCORE: 7

## 2020-03-12 NOTE — TELEPHONE ENCOUNTER
Verify that the refill encounter hasn't been started Yes    [facility] phone call message- patient requesting a refill:    Full Medication Name: exenatide (BYETTA) 10 MCG/0.04ML injection     Dose: Inject 10 mcg Subcutaneous 2 times daily (before meals) - Subcutaneous      Pharmacy confirmed as   [facility] - [place], [place] - [address]  [address]  [facility] [address]  Phone: [phone] Fax: [phone]  : Yes    Medication tab checked to see if medication has been sent  Yes    Additional Comments: ALLEN Vargas with [facility] calling to request an alternative medication d/t insurance no longer covering Byetta. Pharmacy recommended Victoza or Trulicity as alternative medications that will be covered by insurance. ALLEN Vargas also requested a fax to [phone] to confirm the medication change and when It gets sent to the pharmacy.     OK to leave a message on voice mail? Yes    Advised patient refill may take up to 2 business days? Yes    Primary language: English      needed? No    Call taken on March 12, 2020 at 2:08 PM by Marry Wilson to [facility] MED REFILL

## 2020-03-12 NOTE — TELEPHONE ENCOUNTER
Routing to PCP to please place order for an alternate and then route back to me so I can provide fax communication with the RN where she lives.  Stella Mari RN

## 2020-03-13 RX ORDER — LIRAGLUTIDE 6 MG/ML
1.8 INJECTION SUBCUTANEOUS DAILY
Qty: 9 ML | Refills: 0 | Status: SHIPPED | OUTPATIENT
Start: 2020-03-13 | End: 2020-05-04

## 2020-03-13 NOTE — TELEPHONE ENCOUNTER
RN notified Sam, and scheduled patient for a follow up. Faxed medication order to Sam.  Stella Mari RN

## 2020-03-13 NOTE — TELEPHONE ENCOUNTER
New RX prescribed for patient. Was on max dose of Byetta BID and so switched to max dose of Victoza daily (1.8 mg). Prescribed 30 day supply and would like to follow up in 1 month to see how blood sugars are going or sooner with any issues.    Cary Brownlee MD  PGY-3

## 2020-03-17 DIAGNOSIS — G47.09 OTHER INSOMNIA: ICD-10-CM

## 2020-03-17 RX ORDER — LORAZEPAM 0.5 MG/1
0.5 TABLET ORAL AT BEDTIME
Qty: 30 TABLET | Refills: 1 | Status: SHIPPED | OUTPATIENT
Start: 2020-03-17 | End: 2020-04-23

## 2020-03-17 NOTE — TELEPHONE ENCOUNTER
Received RF request from [facility]     Last seen: 2/20/2020  RTC: 1 month  Cancel: none  No-show: none  Next appt: 3/26/2020     Medication requested: LORazepam 0.5 MG PO tablet  Directions: Take 1 tablet (0.5 mg) by mouth At Bedtime   Qty: 30  Last Rx written 2/20/2020  [place] and last [place]illed on 2/21/20 (0.5 mg)  1 mg - 1/5, 2/12       Plan per 2/20 office visit:    - Continue ziprasidone 40 mg BID  - Continue fluoxetine 40 mg daily  - Decrease lorazepam to 0.5 mg at bedtime   - Continue melatonin 6mg at bedtime       Medication refill approved per refill protocol. Pended 30 ds with 1 refill. Routed to provider to sign off on for controlled substances.

## 2020-03-17 NOTE — TELEPHONE ENCOUNTER
[facility] Call Center    Phone Message    May a detailed message be left on voicemail: yes     Reason for Call: Medication Refill Request    Has the patient contacted the pharmacy for the refill? Yes   Name of medication being requested: ativan 0.5mg  Provider who prescribed the medication:   [facility]  Date medication is needed: Pt will be out by 3/22/20         Action Taken: Other: [facility]    Travel Screening: Not Applicable

## 2020-03-24 ENCOUNTER — VIRTUAL VISIT (OUTPATIENT)
Dept: PSYCHOLOGY | Facility: CLINIC | Age: 70
End: 2020-03-24
Payer: COMMERCIAL

## 2020-03-24 DIAGNOSIS — F54 PSYCHOLOGICAL FACTORS AFFECTING MEDICAL CONDITION: ICD-10-CM

## 2020-03-24 DIAGNOSIS — F33.0 MAJOR DEPRESSIVE DISORDER, RECURRENT EPISODE, MILD (H): Primary | ICD-10-CM

## 2020-03-24 DIAGNOSIS — F41.1 GENERALIZED ANXIETY DISORDER: ICD-10-CM

## 2020-03-24 NOTE — PROGRESS NOTES
Health Psychology      Clinic    Department of Medicine  Keri Arnett, Ph.D., L.P. [phone]            Clinics and Surgery Center  [facility] Madelaine Ingram, Ph.D., L.P. [phone] 3rd Floor  [place] Mail Code 256   Denise Low, Ph.D.  [phone]    [address] [facility] [address] Miryam Woods, Ph.D.,  L.P. [phone]     [place]   [address]  [place] [address]           Dennis Dillard, Ph.D. [phone]      Rae Eugene, Ph.D., L.P. [phone]    Damon Gr, Ph.D., A.B.P.P., L.P. [phone]     Kibmer Morales, Ph.D., L.P. [phone]       Telephone Visit    Pinky had been scheduled for an outpatient visit for today.  Since we are not seeing patients due to the COVID19 pandemic, we contacted [facility] Truong to schedule a telephone visit as she does not have access to telehealth.  She wished to get a call.     Pinky reports her anxiety, which is usually at a 1, is now 7 due to the pandemic.  S/he is pleased that they are taking it seriously at [facility].  She is not going outside.  There has been some in-visit quarantine as there was concern another resident had contracted the illness.  Fortunately, they didn't so she is now allowed to go about the building, using social distancing to the extent possible.  She shares her rooms with a roommate.  They are getting along well.     Depression has increased from 4 to 5 as well.   She is trying to do some exercisie.  People are eating alone.  We  discussed her buying snacks, 1-2/week, and possibly cutting back.  She is in touch with her family.  Her sister was laid off.    Rapport is positive.   She wishes to continue services.     Start time:  1:05  End time:  1:31    Diagnosis:   Major Depression, recurrent mild (F33.0)   Psychological Factors Affecting Morbid Obesity (F54)  Adjustment disorder with anxiety.       Plan:  I will call next 4/10 @ 1, which is sooner than usual, and at her request due  to the increased anxiety.    Damon Gr, PhD ADRIAN,  A.B.P.P.  Director, Health Psychology  [phone]

## 2020-03-25 ENCOUNTER — TELEPHONE (OUTPATIENT)
Dept: PSYCHIATRY | Facility: CLINIC | Age: 70
End: 2020-03-25

## 2020-03-25 NOTE — TELEPHONE ENCOUNTER
Brief Psychiatry Telephone Note    Phone call time: 12:15PM    S:  Left voicemail for patient informing her that all clinic visits are now by phone or video. As Pinky does not have MyChart, instructed that I will give her a call at her appointment time to either do a phone visit or walk her through setting up a video visit.     A/P: Pinky Page is a 69 year old adult with PMH of Schizoaffective Disorder depressed type and NATTY who has a psychiatry appointment on 3/26 at 10:30AM.     -Will call patient at appointment time     María Shi MD  Psychiatry Resident

## 2020-03-26 ENCOUNTER — OFFICE VISIT (OUTPATIENT)
Dept: PSYCHIATRY | Facility: CLINIC | Age: 70
End: 2020-03-26
Attending: PSYCHIATRY & NEUROLOGY
Payer: COMMERCIAL

## 2020-03-26 ENCOUNTER — TELEPHONE (OUTPATIENT)
Dept: PSYCHIATRY | Facility: CLINIC | Age: 70
End: 2020-03-26

## 2020-03-26 ENCOUNTER — MEDICAL CORRESPONDENCE (OUTPATIENT)
Dept: HEALTH INFORMATION MANAGEMENT | Facility: CLINIC | Age: 70
End: 2020-03-26

## 2020-03-26 DIAGNOSIS — F25.1 SCHIZOAFFECTIVE DISORDER, DEPRESSIVE TYPE (H): ICD-10-CM

## 2020-03-26 DIAGNOSIS — G47.09 OTHER INSOMNIA: ICD-10-CM

## 2020-03-26 RX ORDER — ZIPRASIDONE HYDROCHLORIDE 40 MG/1
40 CAPSULE ORAL 2 TIMES DAILY WITH MEALS
Qty: 60 CAPSULE | Refills: 2 | Status: SHIPPED | OUTPATIENT
Start: 2020-03-26 | End: 2020-05-21

## 2020-03-26 NOTE — TELEPHONE ENCOUNTER
Brief Psychiatry Telephone Note    Phone call time: 1:30PM    S:  Called and spoke with nursing at the [facility] where Pinky lives. They stated that it is ok if I do not fill out and send back the form that was faxed to our clinic. They are able to write that there are no medication changes and Pinky's next visit with me will be on 4/23 at 3:15PM    A/P: Pinky Page is a 69 year old adult with PMH of Schizoaffective Disorder depressed type and NATTY who had a psychiatry appointment on 3/26 at 10:30AM. Pinky's residence was requesting that I complete a form that was faxed to our clinic. Discussed with [facility] nursing that this is more challenging as I am working remotely. [facility] nursing completed form from our discussion.      María Shi MD  Psychiatry Resident

## 2020-03-26 NOTE — PROGRESS NOTES
"     [facility]  Psychiatry Clinic  PSYCHIATRIC PROGRESS NOTE     Date of initial Diagnostic Assessment (DA) is 5/13/13 and most recent Transfer of Care DA is 12/11/19.    CARE TEAM:  PCP- Cary Brownlee  (resident working at [facility] with Dr. Birmingham, attending)   Specialty Providers- no    Therapist- Dr. Gr    [facility] Team- no            Pinky Page is a 69 year old female who prefers the name Pinky & pronouns she, her, hers.      Pertinent Background: This patient first experienced mental health issues in her twenties and has received treatment for schizoaffective disorder and generalized anxiety. Notably, Pinky's symptoms of depression and psychosis have responded well to a combination of ECT and medication management. Pinky has a history of experiencing increasingly frequent psychotic symptoms with previously attempted antipsychotic tapers.      Psych critical item history includes psychosis [sxs include disorganization, hallucinations, paranoia], mutiple psychotropic trials, psych hosp (3-5) and ECT.    INTERIM HISTORY      [4, 4]   The patient reports good treatment adherence.  History was provided by the patient who was a good historian.    The last visit ended with the following med change: Ativan decreased.   Since the last visit:   -There have a been a lot of changes at Pinky's residence due to the virus including a reduction in group activities.   -Pinky had to be quarantined in her room for 4 days because a resident she shares a bathroom with had symptoms but ultimately tested negative.  -Has energy during the day so has been coloring, reading, listening to the radio, walking the halls, and going on the stationary bike a couple of times a week.  -Was sick with fever, diarrhea, and vomiting for 3 days.   -Her knee pain is much improved and her dental pain resolved.  -Her mood has been \"up and down\" due to the changes. Her mood is down for at most a few days. She " attributes some irritability and anxiety to the changes as well.  -Her sleep is unchanged over this past month where she sleeps ok about 3 out of every 5 nights. Did have difficulty falling asleep last night. Uses CPAP for 3-4 hours every night.   -Denies any medication changes.     RECENT PSYCH ROS:   Depression: REPORTS: insomnia DENIES: suicidal ideation, depressed mood, anhedonia, low energy, hypersomnia, appetite changes, weight changes and poor concentration /memory  Elevated: DENIES: increased energy, decreased sleep need, increased activity and grandiosity  Psychosis: DENIES: delusions, auditory hallucinations, visual hallucinations and disorganized behavior  Anxiety: REPORTS: excessive worry DENIES: feeling fearful and nervous/overwhelmed  Panic Attack:  none  Trauma Related:  none  Dysregulation: REPORTS: irritable  Eating Disorder: no   Pertinent Negative Symptoms:  NO suicidal and violent ideation, aggression, psychosis, hallucinations, delusions and alberta    RECENT SUBSTANCE USE:     ALCOHOL- no      TOBACCO- no     CAFFEINE- coffee/ tea [coffee, diet Pepsi]. 1 cup of coffee in the morning, 1 can diet coke in the afternoon, 1 decaf coffee at lunch. doesn't drink regular coffee after 1pm.   OPIOIDS- no         NARCAN KIT- no        CANNABIS- no            OTHER ILLICIT DRUGS- none    CURRENT SOCIAL HISTORY:  FINANCIAL SUPPORT- social security disability       CHILDREN- no      LIVING SITUATION- Lives at [facility].      SOCIAL/ SPIRITUAL SUPPORT- sisters, support staff at [facility], other residents at [place], other providers     FEELS SAFE AT HOME- yes     PSYCH and CD Critical Summary Points since July 2019 7/1-Ley covering for Shi, no changes  10/9-no changes  12/11-resident transition, increased melatonin  2/20/20- decreased Ativan   3/26-no changes    PAST MED TRIALS          See last Diagnostic Assessment    MEDICAL / SURGICAL HISTORY          Patient Active Problem List    Diagnosis     Allergic rhinitis due to pollen     Urge incontinence     Hypertension, essential     Cardiomegaly     Chronic constipation     Dry eye syndrome     Esophageal reflux     Exposure keratoconjunctivitis     DM ophthalmopathy (H)     Hypothyroidism     Senile cataract     ANUJ (obstructive sleep apnea)     Vaginal atrophy     Restless leg syndrome     Squamous blepharitis     Morbid obesity due to excess calories (H)     Personal history of breast cancer s/p L masectomy     Diabetes mellitus type 2, insulin dependent (H)     Hypercholesteremia     Lives in group home     Extrapyramidal and movement disorder     CKD (chronic kidney disease) stage 2, GFR 60-89 ml/min     Solitary kidney, congenital     S/P hysterectomy     Impingement syndrome of right shoulder     Hypertriglyceridemia     Candidiasis of skin     Generalized anxiety disorder     Carpal tunnel syndrome of left wrist     Schizoaffective disorder, depressive type (H)     Major depressive disorder, recurrent episode, moderate (H)     Psychological factors affecting medical condition     Hx of psychiatric care     Nail complaint     Microalbuminuria due to type 2 diabetes mellitus (H)     Type 2 diabetes mellitus with microalbuminuria, with long-term current use of insulin (H)     Diabetes mellitus, type II, insulin dependent (H)     CKD (chronic kidney disease) stage 1, GFR 90 ml/min or greater     Conjunctivitis of both eyes     Corneal erosion of both eyes     Spastic entropion, right     Foot callus       Past Surgical History:   Procedure Laterality Date     APPENDECTOMY       C MASTECTOMY,SIMPLE  1996    Left mastectomy  [facility]. Had normal FU mammo 5/2007. Follow yearly.      C TOTAL ABDOM HYSTERECTOMY  7/2003    Dr. Castanon, for abnormal bleeding. Removal of both tubes with BSO for myoma w - - -     CHOLECYSTECTOMY       COLONOSCOPY  5/2005    Complete Colonoscopy-had one small polyp removed 5/2005.      COLONOSCOPY N/A  3/31/2016    Procedure: COLONOSCOPY;  Surgeon: Cary Ring MD;  Location: [facility]     COLONOSCOPY N/A 7/7/2016    Procedure: COLONOSCOPY;  Surgeon: Cary Ring MD;  Location: [facility]     DILATION AND CURETTAGE       HYSTERECTOMY       MASTECTOMY      Left breast       MEDICAL REVIEW OF SYSTEMS    [2, 10]     REPORTS: left knee pain DENIES: leg edema, leg erythema, palpitations, chest pain, loss of consciousness, dyspnea, dizziness, fever, muscle stiffness, muscle twitching, Parkinsonian symptoms     ALLERGY       Chlordiazepoxide hcl; Dimetapp dm cold-cough; Haldol; Ibuprofen; Lactose intolerance [beta-galactosidase]; Milk products; and Propofol  MEDICATIONS        Current Outpatient Medications   Medication Sig Dispense Refill     acetaminophen (TYLENOL) 500 MG tablet Take 2 tablets (1,000 mg) by mouth every 6 hours as needed 1 Bottle 2     alum & mag hydroxide-simethicone (MYLANTA/MAALOX) 200-200-20 MG/5ML SUSP suspension Take 30 mLs by mouth daily as needed for indigestion       amLODIPine (NORVASC) 10 MG tablet Take 10 mg by mouth daily       artificial saliva (BIOTENE MT) AERS spray Take 1 spray by mouth 3 times daily as needed for dry mouth       ASPIRIN 81 MG OR TABS Take 1 tablet by mouth daily. ENTERIC COATED. 100 3     atorvastatin (LIPITOR) 40 MG tablet Take 1 tablet (40 mg) by mouth daily 90 tablet 1     BD AUTOSHIELD DUO 30G X 5 MM        blood glucose (NO BRAND SPECIFIED) lancets standard Use to test blood sugar 2 times daily or as directed. 100 each 11     blood glucose calibration (NO BRAND SPECIFIED) solution Use to calibrate blood glucose monitor as directed. 1 each 3     blood glucose monitoring (NO BRAND SPECIFIED) meter device kit Check Blood sugars twice a day. 1 kit 0     blood glucose monitoring (NO BRAND SPECIFIED) test strip Use to test blood sugar 3 times daily or as directed. 100 each 3     Calcium Carbonate-Vitamin D (CALCIUM-VITAMIN D) 250-125 MG-UNIT TABS  Take 1 tablet by mouth 2 times daily. Calcium 250 mg/Vit D 125 IU       calcium polycarbophil (FIBERCON) 625 MG tablet Take 2 tablets by mouth daily       CLARITIN 10 MG OR TABS 1 TAB PO QD (Once per day) as needed for ALLERGY SYMPTOMS 30 11     FLUoxetine 20 MG PO capsule Take 2 capsules (40 mg) by mouth daily 60 capsule 1     fluticasone (FLONASE) 50 MCG/ACT nasal spray Spray 1 spray into both nostrils daily 16 g 3     furosemide (LASIX) 20 MG tablet Take 1 tablet (20 mg) by mouth 2 times daily 60 tablet 3     Gabapentin (NEURONTIN PO) Take 900 mg by mouth 3 times daily.       glucagon (GLUCAGON EMERGENCY) 1 MG kit Inject 1 mg into the muscle once for 1 dose 2 mg 3     Hypromellose (ARTIFICIAL TEARS OP) Apply 1 drop to eye 4 times daily.       insulin glargine (LANTUS) 100 UNIT/ML injection Inject 20 Units Subcutaneous At Bedtime 8 mL 11     lactase (LACTAID) 3000 UNIT tablet Take 4 tabs daily with meals.       levothyroxine (SYNTHROID, LEVOTHROID) 175 MCG tablet Take 1 tablet (175 mcg) by mouth daily Give on empty stomach 30 tablet 4     levothyroxine (SYNTHROID/LEVOTHROID) 175 MCG tablet Take 175 mcg by mouth daily       liraglutide (VICTOZA) 18 MG/3ML solution Inject 1.8 mg Subcutaneous daily 9 mL 0     LORazepam (ATIVAN) 0.5 MG tablet Take 1 tablet (0.5 mg) by mouth At Bedtime 30 tablet 1     losartan (COZAAR) 100 MG tablet Take 100 mg by mouth daily       Magnesium Hydroxide (MILK OF MAGNESIA PO) Take  by mouth. Take 30 mL as needed for constipation.       melatonin 3 MG PO CAPS Take 6 mg by mouth At Bedtime 60 capsule 1     metFORMIN (GLUCOPHAGE) 1000 MG tablet Take 1,000 mg by mouth 2 times daily (with meals)       nystatin (MYCOSTATIN) 672426 UNIT/GM POWD Apply topically 3 times daily as needed 60 g 1     ONETOUCH DELICA LANCETS 33G MISC        polyethylene glycol (MIRALAX/GLYCOLAX) powder Take 1 capful by mouth 2 times daily. 17 GM PO BID       Saline 0.9 % SOLN Spray 2 sprays in nostril as needed.        "senna-docusate (SENNA S) 8.6-50 MG per tablet Take 2 tablets by mouth At Bedtime.       simethicone (MYLICON) 125 MG chewable tablet Take 1 tablet (125 mg) by mouth 2 times daily 60 tablet 0     Skin Protectants, Misc. (EUCERIN) cream Apply  topically as needed. Apply to thigh PRN dry skin        SM LUBRICANT EYE DROPS 0.4-0.3 % SOLN ophthalmic solution        Sod Fluor-Solo Fluor-Hydrfl Ac (GEL-SASHA DENTINBLOC DT) Apply  to affected area. GEL. Apply to 2nd molar on bottom right daily at bedtime.       solifenacin (VESICARE) 10 MG tablet Take 1 tablet (10 mg) by mouth daily 30 tablet 6     terbinafine (LAMISIL) 1 % external cream Apply topically 2 times daily       ziprasidone 40 MG PO capsule Take 1 capsule (40 mg) by mouth 2 times daily (with meals) 60 capsule 1     VITALS      [3, 3]   LMP  (LMP Unknown)    MENTAL STATUS EXAM      [9, 14 cog gs]     Alertness: alert  and oriented  Appearance: could not assess  Behavior/Demeanor: cooperative, pleasant and calm, could not assess eye contact   Speech: normal and regular rate and rhythm  Language: intact and no problems  Psychomotor: normal or unremarkable  Mood: \"good\"  Affect: sounded full range; was congruent to mood; was congruent to content  Thought Process/Associations: unremarkable  Thought Content:  Reports none;  Denies suicidal and violent ideation and delusions  Perception:  Reports none;  Denies auditory hallucinations and visual hallucinations  Insight: good  Judgment: good  Cognition: (6) does  appear grossly intact; formal cognitive testing was not done  Gait and Station: could not assess    LABS and DATA     AIMS:  last done 3/6/19 with score(s): 3;  next due March 2020    PHQ9 TODAY = not completed  PHQ-9 SCORE 1/9/2020 1/9/2020 3/2/2020   PHQ-9 Total Score - - -   PHQ-9 Total Score 9 7 7     ANTIPSYCHOTIC LABS  [glu, A1C, lipids (danika LDL), liver enzymes, WBC, ANEU, Hgb, plts]  q12 mo  Recent Labs   Lab Test 03/02/20  1420 08/06/19  0941 " 05/07/19  0908 05/07/19  0650 11/07/18  1531 10/25/18  1734 05/03/18  1414  06/21/17  1047   GLC  --   --  109*  --   --  147* 192.6*  --  134*   A1C 6.6* 6.6*  --  6.8* 7.4*  --  6.7*   < >  --     < > = values in this interval not displayed.     Recent Labs   Lab Test 05/07/19  0650 05/03/18  1414 03/21/17  1023 02/22/16  1028   CHOL 138 126 142 162.0   TRIG 96 148 121 151.9*   LDL 79 60 78 95   HDL 40 37* 40* 36.6*     Recent Labs   Lab Test 06/26/18  0659 06/26/18 03/21/17  1023 01/22/15  1600   AST 16 16 13 18   ALT 21 21 20 35   ALKPHOS 111 111 92 113     Recent Labs   Lab Test 05/07/19  0650 10/25/18  1734 06/26/18  0659 06/21/17  1047 03/21/17  1023  07/04/14  0705   WBC 10.06* 14.2* 9.72  --  11.8*   < > 7.7   ANEU  --  10.6* 5.31  --  8.3  --  4.8   HGB 13.3 14.0 12.6 13.8 13.4   < > 13.3    275 247  --  266   < > 223    < > = values in this interval not displayed.     PSYCHOTROPIC DRUG INTERACTIONS     ZIPRASIDONE and QT INTERVAL PROLONGING DRUGS may result in increased risk of QT-interval prolongation.  ZIPRASIDONE and SEROTONERGIC DRUGS THAT PROLONG QT INTERVAL may result in increased risk of QT-interval prolongation and increased risk of serotonin syndrome (hypertension, hyperthermia, myoclonus, mental status changes).  NSAID and SSRI may result in an increased risk of bleeding  ERYTHROMYCIN and FLUOXETINE may result in an increased risk of cardiotoxicity (QT prolongation, torsades de pointes, cardiac arrest).  FLUOXETINE and INSULINS OR PRAMLINTIDE may result in increased risk of hypoglycemia.  FLUOXETINE and QT INTERVAL PROLONGING DRUGS may result in increased risk of QT-interval prolongation.   GABAPENTIN and CNS DEPRESSANTS may result in respiratory depression.   GABAPENTIN and ANTACIDS may result in decreased gabapentin effectiveness.     MANAGEMENT:  Monitoring for adverse effects, periodic EKGs and patient is aware of risks    RISK STATEMENT for SAFETY     Pinky Page did not appear to  "be an imminent safety risk to self or others.    PSYCHIATRIC DIAGNOSIS     Schizoaffective Disorder, depressed type, in partial remission  Generalized Anxiety Disorder    ASSESSMENT      [m2, h3]     TODAY Pinky presents for a follow-up via telephone. The changes at her residence have been an adjustment for Pinky, causing her mood to go \"up and down\" as well as causing some anxiety and irritability. She denies any psychotic symptoms and overall, appears to be managing the changes well. She continues to have insomnia, though notes that it is unchanged over the past month in the setting of decreasing Ativan. While discontinuation of Ativan would be recommended for Pinky in the future to reduce her fall risk at her age, will not make reductions today given her insomnia and the multiple changes in her life. With further Ativan reductions, other insomnia medications may be needed. Her sleep difficulty is also likely impacted by her ANUJ and multiple nighttime awakenings by staff given history of nocturnal enuresis. She would likely benefit from a refitting of her CPAP mask.      PLAN      [m2, h3]     1) PSYCHOTROPIC MEDICATIONS:  - Continue ziprasidone 40 mg BID  - Continue fluoxetine 40 mg daily  - Continue lorazepam 0.5 mg at bedtime (no refills provided today)   - Continue melatonin 6mg at bedtime    Gabapentin 900mg TID prescribed by primary care    2) [place]  was checked today:  Lorazepam filled at monthly intervals only from this clinic, last filled 3/17 for 30 tablets. Gabapentin filled at monthly intervals for 30 day supplies and only prescribed by Dr. Linares    3) THERAPY:    Yes, continue w/ Dr. Gr once a month    4) NEXT DUE:    Labs- AP labs by 05/2020  EKG- Last EKG 8/17/16 - QTc 432. EKG today with QTc 456. Will obtain another EKG in 1 year  Rating Scales- AIMS due March 2020    5) REFERRALS:    No Referrals needed     6) RTC: 1 month    7) CRISIS NUMBERS:   Provided routinely in AVS   ONLY if a " [facility] [phone] (clinic), [phone] (after hours)     TREATMENT RISK STATEMENT:  The risks, benefits, alternatives and potential adverse effects have been discussed and are understood by the pt. The pt understands the risks of using street drugs or alcohol. There are no medical contraindications, the pt agrees to treatment with the ability to do so. The pt knows to call the clinic for any problems or to access emergency care if needed.  Medical and substance use concerns are documented above.  Psychotropic drug interaction check was done, including changes made today.    PROVIDER: María Shi MD    Patient staffed with Dr. Estrada who will sign the note.  Supervisor is Dr. Gordon.    Type of service: Telephone Visit for Schizoaffective disorder and generalized anxiety disorder  Time of service:     Date: 3/26/20    Time Service Began: 10:30AM    Time Service Ended: 11:00AM  Reason that telemedicine is appropriate: Patient unable to travel  Mode of transmission: Telephone call  Location of originating and distant sites:    [facility]    Distant site (provider site): Provider's home    Patient has agreed to receiving services via telemedicine technology.    TELEHEALTH ATTENDING ATTESTATION  I met with Pinky Page via telecommunication technology, along with resident physician, MAYUR Shi MD.  Following the ACGME guidelines on telemedicine and direct supervision due to COVID-19, I was concurrently participating in and/or monitoring the patient care through appropriate telecommunication technology.  I discussed the key portions of the service with the resident, including the mental status examination and developing the plan of care. I reviewed key portions of the history with the resident. I agree with the findings and plan as documented in this note.   Cortez Estrada MD

## 2020-03-26 NOTE — TELEPHONE ENCOUNTER
"Received a referral for patient's 3/26 appointment at 10:30 am with Dr. Shi via fax.  They are requesting that it be faxed back to [phone] with comments/orders.    Psychiatric symptoms observed during the week:  - depressed mood  - anxiety  - irritability    Staff summary:  Pinky present at or around baseline regarding symptoms and behaviors.  She recently had a tooth pulled out and will eventually have dental implants.  Historically, she has exhibited some symptoms when experiencing physical ailments, however, she denies this being a significant recent stressor.  When asked about stressors, she mentioned \"just my sleep,\" but acknowledged this issue is neither new nor remarkable.  Pinky reports improvement to her sleep since her melatonin dose was increased in December.  Pinky reports keeping busy with groups and activities continues to help her cope with symptoms; however, recently due to multiple facility changes relating to Covid-19 prevention, multiple groups have been modified or cancelled.  Also, Pinky's room was in prophylactic quarantine for three days over the last weekend due to a resident who shares her bathroom exhibiting symptoms.  The test result returned negative, and Pinky's room restriction was lifted, and she continued attending some groups/activities when able.    Routed to Dr. Shi for FYI and direction on how to get the form updated for [facility].  "

## 2020-03-31 NOTE — TELEPHONE ENCOUNTER
María Shi MD Valena, Victoria, RN    Caller: Unspecified (5 days ago, 12:09 PM)               Twan Orr,     I just called and spoke with nursing at [facility]. I explained the situation and they were ok with having me not fill out the form as there are no medication changes. So NTD.     Thanks!   María

## 2020-04-10 ENCOUNTER — VIRTUAL VISIT (OUTPATIENT)
Dept: PSYCHOLOGY | Facility: CLINIC | Age: 70
End: 2020-04-10
Payer: COMMERCIAL

## 2020-04-10 DIAGNOSIS — F54 PSYCHOLOGICAL FACTORS AFFECTING MEDICAL CONDITION: ICD-10-CM

## 2020-04-10 DIAGNOSIS — F41.1 GENERALIZED ANXIETY DISORDER: ICD-10-CM

## 2020-04-10 DIAGNOSIS — F33.0 MAJOR DEPRESSIVE DISORDER, RECURRENT EPISODE, MILD (H): Primary | ICD-10-CM

## 2020-04-10 NOTE — PROGRESS NOTES
Health Psychology      Clinic    Department of Medicine  Keri Arnett, Ph.D., L.P. [phone]            Clinics and Surgery Center  [facility] Madelaine Ingram, Ph.D., L.P. [phone] 3rd Floor  [place] Mail Code 745   Denise Low, Ph.D.  [phone]    [address] Miryam Woods, Ph.D.,  L.P. [phone]     [place]   [address]  [place] [address]           Dennis Dillard, Ph.D. [phone]      Rae Eugene, Ph.D., L.P. [phone]    Damon Gr, Ph.D., A.B.P.P., L.P. [phone]     Kimber Morales, Ph.D., L.P. [phone]       Telephone Visit    Pinky his seen for a telephone visit for supportive therapy. She does not have access to telehealth.  She wished to get a call.     Pinky reports her anxiety, which is usually at a 1, is now 6-7 due to the pandemic.  Last visit it was 7.   S/he is pleased that they are taking it seriously at the [facility]..  She is not going outside.  They are now wearing a mask when out of their room (though she had forgotten).  She shares her rooms with a roommate.  They are getting along well.     Depression has decreased from 4 to 5 to 3-4   She is trying to do some exercisie.   She used the bicycle 45m inutes yesterday and is walking.  We discussed the importance of getting better at using the mask.  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      She is in touch with her family.  Her sister was laid off.  She will not see her family on [facility] due to COVID19.    Rapport is positive.   She wishes to continue services.     Start time:  1:10  End time:  1:47    Diagnosis:   Major Depression, recurrent mild (F33.0)   Psychological Factors Affecting Morbid Obesity (F54)  Generalized anxiety (F41.1)       Plan:  I will call next 5/1 @ 1, which is sooner than usual, and at her request due to the increased anxiety.    aDmon Gr, PhD LP,  A.B.P.P.  Director,  Health Psychology  [phone]

## 2020-04-23 ENCOUNTER — VIRTUAL VISIT (OUTPATIENT)
Dept: PSYCHIATRY | Facility: CLINIC | Age: 70
End: 2020-04-23
Attending: PSYCHIATRY & NEUROLOGY
Payer: COMMERCIAL

## 2020-04-23 DIAGNOSIS — G47.09 OTHER INSOMNIA: ICD-10-CM

## 2020-04-23 RX ORDER — LORAZEPAM 0.5 MG/1
0.5 TABLET ORAL AT BEDTIME
Qty: 30 TABLET | Refills: 1 | Status: SHIPPED | OUTPATIENT
Start: 2020-05-12 | End: 2020-07-10

## 2020-04-23 NOTE — PROGRESS NOTES
TELEPHONE VISIT  Pinky Page is a 69 year old pt. who is being evaluated via a billable telephone visit.      The patient has been notified of the following:    We have found that certain health care needs can be provided without the need for a physical exam. This service lets us provide the care you need with a short phone conversation. If a prescription is necessary we can send it directly to your pharmacy. If lab work is needed we can place an order for that and you can then stop by our lab to have the test done at a later time. Insurers are generally covering virtual visits as they would in-office visits so billing should not be different than normal.  If for some reason you do get billed incorrectly, you should contact the billing office to correct it and that number is in the AVS .    Patient has given verbal consent for a telephone visit?:  Yes   How would the pt like to obtain the AVS?:  Patient declined  AVS SmartPhrase [PsychAVS] has been placed in 'Patient Instructions':  N/A     Start Time: 3:15PM       End Time:  3:45PM       [facility]  Psychiatry Clinic  PSYCHIATRIC PROGRESS NOTE     Date of initial Diagnostic Assessment (DA) is 5/13/13 and most recent Transfer of Care DA is 12/11/19.    CARE TEAM:  PCP- Cary Brownlee  (resident working at [facility] with Dr. Birmingham, attending)   Specialty Providers- no    Therapist- Dr. Gr    [facility]ly [facility] Team- no            Pinky Page is a 69 year old female who prefers the name Pinky & pronouns she, her, hers.      Pertinent Background: This patient first experienced mental health issues in her twenties and has received treatment for schizoaffective disorder and generalized anxiety. Notably, Pinky's symptoms of depression and psychosis have responded well to a combination of ECT and medication management. Pinky has a history of experiencing increasingly frequent psychotic symptoms with previously attempted antipsychotic tapers.       Psych critical item history includes psychosis [sxs include disorganization, hallucinations, paranoia], mutiple psychotropic trials, psych hosp (3-5) and ECT.    INTERIM HISTORY      [4, 4]   The patient reports good treatment adherence.  History was provided by the patient who was a good historian.    The last visit ended with no change to the med regimen.   Since the last visit:   -There have been a lot of changes at Pinky's residence due to the coronavirus. Feels she has been managing this ok.  -Her mood has been ok. Denies any irritability.   -Has had a little bit of anxiety due to the coronavirus but feels this is manageable.   -Continues to walk outside, ride the stationary bike most days, color, do word searches, pray, and do chores to stay busy.  -Continues to have unchanged insomnia though reports sleeping well last night. Uses her CPAP for 2-3 hours every night. Her energy has been good.   -Her knee pain has resolved, so she no longer has to wear a bandage wrapped around her knee  -Denies any medication side effects.       RECENT PSYCH ROS:   Depression: REPORTS: insomnia DENIES: suicidal ideation, depressed mood, anhedonia, low energy, hypersomnia, appetite changes, weight changes and poor concentration /memory  Elevated: DENIES: increased energy, decreased sleep need, increased activity and grandiosity  Psychosis: DENIES: delusions, auditory hallucinations, visual hallucinations and disorganized behavior  Anxiety: REPORTS: excessive worry DENIES: feeling fearful and nervous/overwhelmed  Panic Attack:  none  Trauma Related:  none  Dysregulation: REPORTS: irritable  Eating Disorder: no   Pertinent Negative Symptoms:  NO suicidal and violent ideation, aggression, psychosis, hallucinations, delusions and alberta    RECENT SUBSTANCE USE:     ALCOHOL- no      TOBACCO- no     CAFFEINE- coffee/ tea [coffee, diet Pepsi]. 1 cup of coffee in the morning, 1 can diet coke in the afternoon, 1 decaf coffee at lunch.  doesn't drink regular coffee after 1pm.   OPIOIDS- no         NARCAN KIT- no        CANNABIS- no            OTHER ILLICIT DRUGS- none    CURRENT SOCIAL HISTORY:  FINANCIAL SUPPORT- social security disability       CHILDREN- no      LIVING SITUATION- Lives at [facility].      SOCIAL/ SPIRITUAL SUPPORT- sisters, support staff at [facility], other residents at [place], other providers     FEELS SAFE AT HOME- yes     PSYCH and CD Critical Summary Points since July 2019 7/1-Ley covering for Shi, no changes  10/9-no changes  12/11-resident transition, increased melatonin  2/20/20- decreased Ativan   3/26-no changes  4/23- no changes    PAST MED TRIALS          See last Diagnostic Assessment    MEDICAL / SURGICAL HISTORY          Patient Active Problem List   Diagnosis     Allergic rhinitis due to pollen     Urge incontinence     Hypertension, essential     Cardiomegaly     Chronic constipation     Dry eye syndrome     Esophageal reflux     Exposure keratoconjunctivitis     DM ophthalmopathy (H)     Hypothyroidism     Senile cataract     ANUJ (obstructive sleep apnea)     Vaginal atrophy     Restless leg syndrome     Squamous blepharitis     Morbid obesity due to excess calories (H)     Personal history of breast cancer s/p L masectomy     Diabetes mellitus type 2, insulin dependent (H)     Hypercholesteremia     Lives in group home     Extrapyramidal and movement disorder     CKD (chronic kidney disease) stage 2, GFR 60-89 ml/min     Solitary kidney, congenital     S/P hysterectomy     Impingement syndrome of right shoulder     Hypertriglyceridemia     Candidiasis of skin     Generalized anxiety disorder     Carpal tunnel syndrome of left wrist     Schizoaffective disorder, depressive type (H)     Major depressive disorder, recurrent episode, moderate (H)     Psychological factors affecting medical condition     Hx of psychiatric care     Nail complaint     Microalbuminuria due to type 2 diabetes  mellitus (H)     Type 2 diabetes mellitus with microalbuminuria, with long-term current use of insulin (H)     Diabetes mellitus, type II, insulin dependent (H)     CKD (chronic kidney disease) stage 1, GFR 90 ml/min or greater     Conjunctivitis of both eyes     Corneal erosion of both eyes     Spastic entropion, right     Foot callus       Past Surgical History:   Procedure Laterality Date     APPENDECTOMY       C MASTECTOMY,SIMPLE  1996    Left mastectomy  [facility]. Had normal FU mammo 5/2007. Follow yearly.      C TOTAL ABDOM HYSTERECTOMY  7/2003    Dr. Castanon, for abnormal bleeding. Removal of both tubes with BSO for myoma w - - -     CHOLECYSTECTOMY       COLONOSCOPY  5/2005    Complete Colonoscopy-had one small polyp removed 5/2005.      COLONOSCOPY N/A 3/31/2016    Procedure: COLONOSCOPY;  Surgeon: Cary Ring MD;  Location: [facility]     COLONOSCOPY N/A 7/7/2016    Procedure: COLONOSCOPY;  Surgeon: Cary Ring MD;  Location: [facility]     DILATION AND CURETTAGE       HYSTERECTOMY       MASTECTOMY      Left breast       MEDICAL REVIEW OF SYSTEMS    [2, 10]     REPORTS: none DENIES: knee pain, leg edema, leg erythema, palpitations, chest pain, loss of consciousness, dyspnea, dizziness, fever, muscle stiffness, muscle twitching, Parkinsonian symptoms     ALLERGY       Chlordiazepoxide hcl; Dimetapp dm cold-cough; Haldol; Ibuprofen; Lactose intolerance [beta-galactosidase]; Milk products; and Propofol  MEDICATIONS        Current Outpatient Medications   Medication Sig Dispense Refill     acetaminophen (TYLENOL) 500 MG tablet Take 2 tablets (1,000 mg) by mouth every 6 hours as needed 1 Bottle 2     alum & mag hydroxide-simethicone (MYLANTA/MAALOX) 200-200-20 MG/5ML SUSP suspension Take 30 mLs by mouth daily as needed for indigestion       amLODIPine (NORVASC) 10 MG tablet Take 10 mg by mouth daily       artificial saliva (BIOTENE MT) AERS spray Take 1 spray by mouth 3  times daily as needed for dry mouth       ASPIRIN 81 MG OR TABS Take 1 tablet by mouth daily. ENTERIC COATED. 100 3     atorvastatin (LIPITOR) 40 MG tablet Take 1 tablet (40 mg) by mouth daily 90 tablet 1     BD AUTOSHIELD DUO 30G X 5 MM        blood glucose (NO BRAND SPECIFIED) lancets standard Use to test blood sugar 2 times daily or as directed. 100 each 11     blood glucose calibration (NO BRAND SPECIFIED) solution Use to calibrate blood glucose monitor as directed. 1 each 3     blood glucose monitoring (NO BRAND SPECIFIED) meter device kit Check Blood sugars twice a day. 1 kit 0     blood glucose monitoring (NO BRAND SPECIFIED) test strip Use to test blood sugar 3 times daily or as directed. 100 each 3     Calcium Carbonate-Vitamin D (CALCIUM-VITAMIN D) 250-125 MG-UNIT TABS Take 1 tablet by mouth 2 times daily. Calcium 250 mg/Vit D 125 IU       calcium polycarbophil (FIBERCON) 625 MG tablet Take 2 tablets by mouth daily       CLARITIN 10 MG OR TABS 1 TAB PO QD (Once per day) as needed for ALLERGY SYMPTOMS 30 11     FLUoxetine (PROZAC) 20 MG capsule Take 2 capsules (40 mg) by mouth daily 60 capsule 2     fluticasone (FLONASE) 50 MCG/ACT nasal spray Spray 1 spray into both nostrils daily 16 g 3     furosemide (LASIX) 20 MG tablet Take 1 tablet (20 mg) by mouth 2 times daily 60 tablet 3     Gabapentin (NEURONTIN PO) Take 900 mg by mouth 3 times daily.       glucagon (GLUCAGON EMERGENCY) 1 MG kit Inject 1 mg into the muscle once for 1 dose 2 mg 3     Hypromellose (ARTIFICIAL TEARS OP) Apply 1 drop to eye 4 times daily.       insulin glargine (LANTUS) 100 UNIT/ML injection Inject 20 Units Subcutaneous At Bedtime 8 mL 11     lactase (LACTAID) 3000 UNIT tablet Take 4 tabs daily with meals.       levothyroxine (SYNTHROID, LEVOTHROID) 175 MCG tablet Take 1 tablet (175 mcg) by mouth daily Give on empty stomach 30 tablet 4     levothyroxine (SYNTHROID/LEVOTHROID) 175 MCG tablet Take 175 mcg by mouth daily        liraglutide (VICTOZA) 18 MG/3ML solution Inject 1.8 mg Subcutaneous daily 9 mL 0     LORazepam (ATIVAN) 0.5 MG tablet Take 1 tablet (0.5 mg) by mouth At Bedtime 30 tablet 1     losartan (COZAAR) 100 MG tablet Take 100 mg by mouth daily       Magnesium Hydroxide (MILK OF MAGNESIA PO) Take  by mouth. Take 30 mL as needed for constipation.       melatonin 3 MG CAPS Take 6 mg by mouth At Bedtime 60 capsule 1     metFORMIN (GLUCOPHAGE) 1000 MG tablet Take 1,000 mg by mouth 2 times daily (with meals)       nystatin (MYCOSTATIN) 698846 UNIT/GM POWD Apply topically 3 times daily as needed 60 g 1     ONETOUCH DELICA LANCETS 33G MISC        polyethylene glycol (MIRALAX/GLYCOLAX) powder Take 1 capful by mouth 2 times daily. 17 GM PO BID       Saline 0.9 % SOLN Spray 2 sprays in nostril as needed.       senna-docusate (SENNA S) 8.6-50 MG per tablet Take 2 tablets by mouth At Bedtime.       simethicone (MYLICON) 125 MG chewable tablet Take 1 tablet (125 mg) by mouth 2 times daily 60 tablet 0     Skin Protectants, Misc. (EUCERIN) cream Apply  topically as needed. Apply to thigh PRN dry skin        SM LUBRICANT EYE DROPS 0.4-0.3 % SOLN ophthalmic solution        Sod Fluor-Solo Fluor-Hydrfl Ac (GEL-SASHA DENTINBLOC DT) Apply  to affected area. GEL. Apply to 2nd molar on bottom right daily at bedtime.       solifenacin (VESICARE) 10 MG tablet Take 1 tablet (10 mg) by mouth daily 30 tablet 6     terbinafine (LAMISIL) 1 % external cream Apply topically 2 times daily       ziprasidone (GEODON) 40 MG capsule Take 1 capsule (40 mg) by mouth 2 times daily (with meals) 60 capsule 2     VITALS      [3, 3]   LMP  (LMP Unknown)    MENTAL STATUS EXAM      [9, 14 cog gs]     Alertness: alert  and oriented  Appearance: could not assess  Behavior/Demeanor: cooperative, pleasant and calm, could not assess eye contact   Speech: normal and regular rate and rhythm  Language: intact and no problems  Psychomotor: normal or unremarkable  Mood:  "\"good\"  Affect: sounded full range; was congruent to mood; was congruent to content  Thought Process/Associations: unremarkable  Thought Content:  Reports none;  Denies suicidal and violent ideation and delusions  Perception:  Reports none;  Denies auditory hallucinations and visual hallucinations  Insight: good  Judgment: good  Cognition: (6) does  appear grossly intact; formal cognitive testing was not done  Gait and Station: could not assess    LABS and DATA     AIMS:  last done 3/6/19 with score(s): 3;  due at next in-person visit    PHQ9 TODAY = not completed  PHQ-9 SCORE 1/9/2020 1/9/2020 3/2/2020   PHQ-9 Total Score - - -   PHQ-9 Total Score 9 7 7     ANTIPSYCHOTIC LABS  [glu, A1C, lipids (danika LDL), liver enzymes, WBC, ANEU, Hgb, plts]  q12 mo  Recent Labs   Lab Test 03/02/20  1420 08/06/19  0941 05/07/19  0908 05/07/19  0650 11/07/18  1531 10/25/18  1734 05/03/18  1414  06/21/17  1047   GLC  --   --  109*  --   --  147* 192.6*  --  134*   A1C 6.6* 6.6*  --  6.8* 7.4*  --  6.7*   < >  --     < > = values in this interval not displayed.     Recent Labs   Lab Test 05/07/19  0650 05/03/18  1414 03/21/17  1023 02/22/16  1028   CHOL 138 126 142 162.0   TRIG 96 148 121 151.9*   LDL 79 60 78 95   HDL 40 37* 40* 36.6*     Recent Labs   Lab Test 06/26/18  0659 06/26/18 03/21/17  1023 01/22/15  1600   AST 16 16 13 18   ALT 21 21 20 35   ALKPHOS 111 111 92 113     Recent Labs   Lab Test 05/07/19  0650 10/25/18  1734 06/26/18  0659 06/21/17  1047 03/21/17  1023  07/04/14  0705   WBC 10.06* 14.2* 9.72  --  11.8*   < > 7.7   ANEU  --  10.6* 5.31  --  8.3  --  4.8   HGB 13.3 14.0 12.6 13.8 13.4   < > 13.3    275 247  --  266   < > 223    < > = values in this interval not displayed.     PSYCHOTROPIC DRUG INTERACTIONS     ZIPRASIDONE and QT INTERVAL PROLONGING DRUGS may result in increased risk of QT-interval prolongation.  ZIPRASIDONE and SEROTONERGIC DRUGS THAT PROLONG QT INTERVAL may result in increased risk of " QT-interval prolongation and increased risk of serotonin syndrome (hypertension, hyperthermia, myoclonus, mental status changes).  NSAID and SSRI may result in an increased risk of bleeding  ERYTHROMYCIN and FLUOXETINE may result in an increased risk of cardiotoxicity (QT prolongation, torsades de pointes, cardiac arrest).  FLUOXETINE and INSULINS OR PRAMLINTIDE may result in increased risk of hypoglycemia.  FLUOXETINE and QT INTERVAL PROLONGING DRUGS may result in increased risk of QT-interval prolongation.   GABAPENTIN and CNS DEPRESSANTS may result in respiratory depression.   GABAPENTIN and ANTACIDS may result in decreased gabapentin effectiveness.     MANAGEMENT:  Monitoring for adverse effects, periodic EKGs and patient is aware of risks    RISK STATEMENT for SAFETY     Pinky Page did not appear to be an imminent safety risk to self or others.    PSYCHIATRIC DIAGNOSIS     Schizoaffective Disorder, depressed type, in partial remission  Generalized Anxiety Disorder    ASSESSMENT      [m2, h3]     TODAY Pinky presents for a follow-up via telephone. Pinky continues to be stable without depressive or psychotic symptoms. She is managing ok the changes at her residence due to the coronavirus. Her insomnia remains unchanged. While discontinuation of Ativan would be recommended for Pinky in the future to reduce her fall risk at her age, will not make reductions today given the stress of the coronavirus and inability to see Pinky in-person. With further Ativan reductions, other insomnia medications may be needed. Her sleep difficulty is also likely impacted by her ANUJ and multiple nighttime awakenings by staff given history of nocturnal enuresis. She would likely benefit from a refitting of her CPAP mask. Can consider spacing out Pinky's visits in the future if she continues to be stable.      PLAN      [m2, h3]     1) PSYCHOTROPIC MEDICATIONS:  - Continue ziprasidone 40 mg BID  - Continue fluoxetine 40 mg daily  -  Continue lorazepam 0.5 mg at bedtime (prescribed 2 months today)  - Continue melatonin 6mg at bedtime    Gabapentin 900mg TID prescribed by primary care    2) [place]  was checked today:  Lorazepam filled at monthly intervals only from this clinic, last filled 4/14 for 30 tablets. Gabapentin filled at monthly intervals for 30 day supplies and only prescribed by Dr. Linares, last filled 4/17    3) THERAPY:    Yes, continue w/ Dr. Gr once a month    4) NEXT DUE:    Labs- AP labs by 05/2020  EKG- Last EKG 12/11/19 QTc 456. EKG due 12/2020   Rating Scales- AIMS due at next in-person visit    5) REFERRALS:    No Referrals needed     6) RTC: 1 month    7) CRISIS NUMBERS:   Provided routinely in AVS   ONLY if a LOBITO PT: [facility] [place] [phone] (clinic), [phone] (after hours)     TREATMENT RISK STATEMENT:  The risks, benefits, alternatives and potential adverse effects have been discussed and are understood by the pt. The pt understands the risks of using street drugs or alcohol. There are no medical contraindications, the pt agrees to treatment with the ability to do so. The pt knows to call the clinic for any problems or to access emergency care if needed.  Medical and substance use concerns are documented above.  Psychotropic drug interaction check was done, including changes made today.    PROVIDER: María Shi MD    Patient staffed with Dr. Gordon who will sign the note.  Supervisor is Dr. Gordon.  TELEHEALTH ATTENDING ATTESTATION  Following the ACGME guidelines on telemedicine and direct supervision due to COVID-19, I was concurrently participating in and/or monitoring the patient care through appropriate telecommunication technology.  I discussed the key portions of the service with the resident, including the mental status examination and developing the plan of care. I reviewed key portions of the history with the resident. I agree with the findings and plan as documented in this note.    Annie Gordon MD

## 2020-04-27 ENCOUNTER — TELEPHONE (OUTPATIENT)
Dept: ENDOCRINOLOGY | Facility: CLINIC | Age: 70
End: 2020-04-27

## 2020-05-01 ENCOUNTER — VIRTUAL VISIT (OUTPATIENT)
Dept: PSYCHOLOGY | Facility: CLINIC | Age: 70
End: 2020-05-01
Payer: COMMERCIAL

## 2020-05-01 DIAGNOSIS — F54 PSYCHOLOGICAL FACTORS AFFECTING MEDICAL CONDITION: ICD-10-CM

## 2020-05-01 DIAGNOSIS — E66.9 OBESITY, UNSPECIFIED OBESITY SEVERITY, UNSPECIFIED OBESITY TYPE: ICD-10-CM

## 2020-05-01 DIAGNOSIS — F41.1 GENERALIZED ANXIETY DISORDER: ICD-10-CM

## 2020-05-01 DIAGNOSIS — F33.0 MAJOR DEPRESSIVE DISORDER, RECURRENT EPISODE, MILD (H): Primary | ICD-10-CM

## 2020-05-01 NOTE — PROGRESS NOTES
Health Psychology      Clinic    Department of Medicine  Keri Arnett, Ph.D., L.P. [phone]            [facility]  [facility] Madelaine Ingram, Ph.D., L.P. [phone] 3rd Floor  [place] Mail Code 765   Denise Low, Ph.D.  [phone]    [address] Miryam Woods, Ph.D.,  L.P. [phone]     [address] [address]           Dennis Dillard, Ph.D. [phone]      Rae Eugene, Ph.D., L.P. [phone]    Damon Gr, Ph.D., A.B.P.P., L.P. [phone]     Kimber Morales, Ph.D., L.P. [phone]       Health Psychology Telephone Visit    Pinky his seen for a telephone visit for supportive therapy. She does not have access to video  telehealth.  She wished to get a call.     Pinky reports her anxiety is a bit lower as she feels safe at [facility] during the pandemic.  S/he is pleased that [facility]. Depression remains at a 4.  She is not going outside.  They are now wearing a mask when out of their room (though she had forgotten).  She shares her room with a roommate.  They are getting along well.   She continues to work.    New stressor is a  Lost filling.  She wonders when she might be able to be seen for it.   She continues to exercise.  She used the bicycle 30 minutes.  Yesterday she walked for 50 minutes.  We discussed the importance of getting better at using the mask.      She is in touch with her family.  Her sister was laid off.  She  Did  not see her family on [facility] due to COVID19 but understands the importance of current rules..    Rapport is positive.   She wishes to continue services.     This telephone service is appropriate and effective for delivering services in light of the necessity for social distancing to mitigate the COVID-19 epidemic and for conservation of PPE.     Patient has agreed to receiving telephone services after being informed  about it: Yes    Time service started: 1:04  Time service ended: 1:43    Location of originating:   Residence of the patient    Distance site:  Home office of provider for [facility]    The patient has been notified that:  Visits will be conducted via a call with their psychologist to provide the care they need with a video conversation. Video visits may be billed at different rates depending on insurance coverage.  Patients are advised to please contact their insurance provider with any questions about their health insurance coverage. If during the course of a call the psychologist feels a video visit is not appropriate, patients will not be charged for this service.      Diagnosis:   Major Depression, recurrent mild (F33.0)   Psychological Factors Affecting Morbid Obesity (F54)  Generalized anxiety (F41.1)       Plan:  I will call next 6/2 @ 2 at her request due to the increased anxiety.    Damon Gr, PhD LP,  A.B.P.P.  Director, Health Psychology  [phone]

## 2020-05-04 ENCOUNTER — VIRTUAL VISIT (OUTPATIENT)
Dept: ENDOCRINOLOGY | Facility: CLINIC | Age: 70
End: 2020-05-04
Payer: COMMERCIAL

## 2020-05-04 VITALS — BODY MASS INDEX: 39.09 KG/M2 | WEIGHT: 229 LBS | HEIGHT: 64 IN

## 2020-05-04 DIAGNOSIS — E11.9 DIABETES MELLITUS TYPE 2, INSULIN DEPENDENT (H): ICD-10-CM

## 2020-05-04 DIAGNOSIS — Z79.4 DIABETES MELLITUS TYPE 2, INSULIN DEPENDENT (H): ICD-10-CM

## 2020-05-04 RX ORDER — LIRAGLUTIDE 6 MG/ML
1.8 INJECTION SUBCUTANEOUS DAILY
Qty: 9 ML | Refills: 0 | Status: SHIPPED | OUTPATIENT
Start: 2020-05-04 | End: 2022-09-09

## 2020-05-04 ASSESSMENT — MIFFLIN-ST. JEOR: SCORE: 1546.44

## 2020-05-04 ASSESSMENT — PAIN SCALES - GENERAL: PAINLEVEL: NO PAIN (0)

## 2020-05-04 NOTE — NURSING NOTE
"Chief Complaint   Patient presents with     RECHECK     Follow up weight management.       Vitals:    05/04/20 0900   Weight: 103.9 kg (229 lb)   Height: 1.63 m (5' 4.17\")       Body mass index is 39.1 kg/m .                            MARIO OJEDA, EMT    "

## 2020-05-04 NOTE — PROGRESS NOTES
Return Medical Weight Management Note     Pinky Page  MRN:  [id number]  :  1950  CORINNE:  19    Dear Dr. Ann,     I had the pleasure of seeing your patient Pinky Page.  She is a 65 year old female who I am continuing to see for treatment of obesity related to: DM-2 and Hypertension.    CURRENT WEIGHT:   0 lbs 0 oz Self report 229.    Wt Readings from Last 4 Encounters:   20 103.9 kg (229 lb)   20 104.8 kg (231 lb)   20 105.5 kg (232 lb 9.6 oz)   19 104.5 kg (230 lb 6.4 oz)     Height:  Data Unavailable  Body Mass Index:  There is no height or weight on file to calculate BMI.  Vitals:  B/P: 163/75, P: 68    Initial consult weight was 283 on .  Weight change since last seen on 19 is down 1 pounds.   Total loss is 54 pounds.    INTERVAL HISTORY:  Patient has been working on the following dietary changes: No diet line during pandemic at her group home (1500 miguel, no concentrated sweets, some fruit snacks) but is trying to eat as though diet line food.  She remains on Victoza 1.8 for DM and appetite suppressant.  Is on metformin and her glargine dose is 20/d with HbA1c in 7s.  Patient has been working on the following activity changes: Very regular walking at least 40 minutes / day and exercycle 30 min/d.    No flowsheet data found.    MEDICATIONS:   Current Outpatient Medications   Medication     acetaminophen (TYLENOL) 500 MG tablet     alum & mag hydroxide-simethicone (MYLANTA/MAALOX) 200-200-20 MG/5ML SUSP suspension     amLODIPine (NORVASC) 10 MG tablet     artificial saliva (BIOTENE MT) AERS spray     ASPIRIN 81 MG OR TABS     atorvastatin (LIPITOR) 40 MG tablet     BD AUTOSHIELD DUO 30G X 5 MM     blood glucose (NO BRAND SPECIFIED) lancets standard     blood glucose calibration (NO BRAND SPECIFIED) solution     blood glucose monitoring (NO BRAND SPECIFIED) meter device kit     blood glucose monitoring (NO BRAND SPECIFIED) test strip     Calcium  Carbonate-Vitamin D (CALCIUM-VITAMIN D) 250-125 MG-UNIT TABS     calcium polycarbophil (FIBERCON) 625 MG tablet     CLARITIN 10 MG OR TABS     FLUoxetine (PROZAC) 20 MG capsule     fluticasone (FLONASE) 50 MCG/ACT nasal spray     furosemide (LASIX) 20 MG tablet     Gabapentin (NEURONTIN PO)     glucagon (GLUCAGON EMERGENCY) 1 MG kit     Hypromellose (ARTIFICIAL TEARS OP)     insulin glargine (LANTUS) 100 UNIT/ML injection     lactase (LACTAID) 3000 UNIT tablet     levothyroxine (SYNTHROID, LEVOTHROID) 175 MCG tablet     levothyroxine (SYNTHROID/LEVOTHROID) 175 MCG tablet     liraglutide (VICTOZA) 18 MG/3ML solution     [START ON 5/12/2020] LORazepam (ATIVAN) 0.5 MG tablet     losartan (COZAAR) 100 MG tablet     Magnesium Hydroxide (MILK OF MAGNESIA PO)     melatonin 3 MG CAPS     metFORMIN (GLUCOPHAGE) 1000 MG tablet     nystatin (MYCOSTATIN) 397045 UNIT/GM POWD     ONETOUCH DELICA LANCETS 33G MISC     polyethylene glycol (MIRALAX/GLYCOLAX) powder     Saline 0.9 % SOLN     senna-docusate (SENNA S) 8.6-50 MG per tablet     simethicone (MYLICON) 125 MG chewable tablet     Skin Protectants, Misc. (EUCERIN) cream     SM LUBRICANT EYE DROPS 0.4-0.3 % SOLN ophthalmic solution     Sod Fluor-Solo Fluor-Hydrfl Ac (GEL-SASHA DENTINBLOC DT)     solifenacin (VESICARE) 10 MG tablet     terbinafine (LAMISIL) 1 % external cream     ziprasidone (GEODON) 40 MG capsule     No current facility-administered medications for this visit.        Weight Loss Medication History Reviewed With Patient 12/9/2019   Which weight loss medications are you currently taking on a regular basis?  Byetta (exentatide)   Are you having any side effects from the weight loss medication that we have prescribed you? No   If you are having side effects please describe: -     ASSESSMENT:   Continues to do well with weight loss maintenance. Reinforced food plan - focus on no between meal snacking, aggressive lowering of starches and cheese    FOLLOW-UP:    12  "weeks.  Phone call duration: 15 minutes.  I explained the conditions and plans (more than 50% of time was counseling/coordination of weight management).    Sincerely,    Marcos Magdaleno MD     The patient was evaluated via a billable telephone visit and notified:  \"This visit will be via telephone call between you and your physician. If lab work is needed we can place an order and you can later stop by the lab. Telephone visits are billed at different rates depending on your insurance coverage. During this emergency period, some insurers may be billed the same as an in-person visit.  Please check with your insurance provider with any questions. If the physician feels a telephone visit is not appropriate, you will not be charged for this service.\"    Patient has given verbal consent for Telephone visit?  Yes. How would you like to obtain your AVS? MyChart                     "

## 2020-05-08 ENCOUNTER — DOCUMENTATION ONLY (OUTPATIENT)
Dept: FAMILY MEDICINE | Facility: CLINIC | Age: 70
End: 2020-05-08

## 2020-05-08 NOTE — PROGRESS NOTES
"When opening a documentation only encounter, be sure to enter in \"Chief Complaint\" Forms and in \" Comments\" Title of form, description if needed.    Pinky is a 69 year old  female  Form received via: Fax  Form now resides in: Provider Ready    Cecily Hickman MA              \    "

## 2020-05-21 ENCOUNTER — VIRTUAL VISIT (OUTPATIENT)
Dept: PSYCHIATRY | Facility: CLINIC | Age: 70
End: 2020-05-21
Attending: PSYCHIATRY & NEUROLOGY
Payer: COMMERCIAL

## 2020-05-21 DIAGNOSIS — G47.09 OTHER INSOMNIA: ICD-10-CM

## 2020-05-21 DIAGNOSIS — F25.1 SCHIZOAFFECTIVE DISORDER, DEPRESSIVE TYPE (H): ICD-10-CM

## 2020-05-21 RX ORDER — ZIPRASIDONE HYDROCHLORIDE 40 MG/1
40 CAPSULE ORAL 2 TIMES DAILY WITH MEALS
Qty: 60 CAPSULE | Refills: 2 | Status: SHIPPED | OUTPATIENT
Start: 2020-05-21 | End: 2020-07-22

## 2020-05-21 RX ORDER — LORAZEPAM 0.5 MG/1
0.5 TABLET ORAL AT BEDTIME
Qty: 30 TABLET | Refills: 0 | Status: SHIPPED | OUTPATIENT
Start: 2020-07-09 | End: 2020-07-10

## 2020-05-21 NOTE — PROGRESS NOTES
TELEPHONE VISIT  Pinky Page is a 69 year old pt. who is being evaluated via a billable telephone visit.      The patient has been notified of the following:    We have found that certain health care needs can be provided without the need for a physical exam. This service lets us provide the care you need with a short phone conversation. If a prescription is necessary we can send it directly to your pharmacy. If lab work is needed we can place an order for that and you can then stop by our lab to have the test done at a later time. Insurers are generally covering virtual visits as they would in-office visits so billing should not be different than normal.  If for some reason you do get billed incorrectly, you should contact the billing office to correct it and that number is in the AVS .    Patient has given verbal consent for a telephone visit?:  Yes   How would the pt like to obtain the AVS?:  Patient declined  AVS SmartPhrase [PsychAVS] has been placed in 'Patient Instructions':  N/A     Start Time: 3:15PM       End Time:  3:45PM       [facility]  Psychiatry Clinic  PSYCHIATRIC PROGRESS NOTE     Date of initial Diagnostic Assessment (DA) is 5/13/13 and most recent Transfer of Care DA is 12/11/19.    CARE TEAM:  PCP- Cary Brownlee  (resident working at [facility] with Dr. Birmingham, attending)   Specialty Providers- no    Therapist- Dr. Gr    [facility] Team- no            Pinky Page is a 69 year old female who prefers the name Pinky & pronouns she, her, hers.      Pertinent Background: This patient first experienced mental health issues in her twenties and has received treatment for schizoaffective disorder and generalized anxiety. Notably, Pinky's symptoms of depression and psychosis have responded well to a combination of ECT and medication management. Pinky has a history of experiencing increasingly frequent psychotic symptoms with previously attempted antipsychotic tapers.       Psych critical item history includes psychosis [sxs include disorganization, hallucinations, paranoia], mutiple psychotropic trials, psych hosp (3-5) and ECT.    INTERIM HISTORY      [4, 4]   The patient reports good treatment adherence.  History was provided by the patient who was a good historian.    The last visit ended with no change to the med regimen.   Since the last visit:   -There have been a lot of changes at Pinky's residence due to the coronavirus. Feels she has been managing this ok.  -Her mood has been ok.   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 Reports that it is a lot of money because it gathered interest over years. Hopes to use the money for dental implants.   -Continues to walk outside, ride the stationary bike most days, color, do word searches, pray, and do chores to stay busy.  -Has been a little anxious because of alf fund but feels this is manageable.   -Has been sleeping better which she attributes to exercising more. Continues to use her CPAP for 2-3 hours every night. Her energy has been good.   -Denies any medication side effects or irritability.      RECENT PSYCH ROS:   Depression: REPORTS: none DENIES: suicidal ideation, depressed mood, anhedonia, low energy, insomnia, hypersomnia, appetite changes, weight changes and poor concentration /memory  Elevated: DENIES: increased energy, decreased sleep need, increased activity and grandiosity  Psychosis: DENIES: delusions, auditory hallucinations, visual hallucinations and disorganized behavior  Anxiety: REPORTS: excessive worry DENIES: feeling fearful and nervous/overwhelmed  Panic Attack:  none  Trauma Related:  none  Dysregulation: DENIES: irritable  Eating Disorder: no   Pertinent Negative Symptoms:  NO suicidal and violent ideation, aggression, psychosis, hallucinations, delusions and alberta    RECENT SUBSTANCE USE:     ALCOHOL- no      TOBACCO- no      CAFFEINE- coffee/ tea [coffee, diet Pepsi]. 1 cup of coffee in the morning, 1 can diet coke in the afternoon, 1 decaf coffee at lunch. doesn't drink regular coffee after 1pm.   OPIOIDS- no         NARCAN KIT- no        CANNABIS- no            OTHER ILLICIT DRUGS- none    CURRENT SOCIAL HISTORY:  FINANCIAL SUPPORT- social security disability       CHILDREN- no      LIVING SITUATION- Lives at [facility].      SOCIAL/ SPIRITUAL SUPPORT- sisters, support staff at [facility], other residents at [facility], other providers     FEELS SAFE AT HOME- yes     PSYCH and CD Critical Summary Points since July 2019 7/1-Ley covering for Shi, no changes  10/9-no changes  12/11-resident transition, increased melatonin  2/20/20- decreased Ativan   3/26-no changes  4/23- no changes  5/21- no changes    PAST MED TRIALS          See last Diagnostic Assessment    MEDICAL / SURGICAL HISTORY          Patient Active Problem List   Diagnosis     Allergic rhinitis due to pollen     Urge incontinence     Hypertension, essential     Cardiomegaly     Chronic constipation     Dry eye syndrome     Esophageal reflux     Exposure keratoconjunctivitis     DM ophthalmopathy (H)     Hypothyroidism     Senile cataract     ANUJ (obstructive sleep apnea)     Vaginal atrophy     Restless leg syndrome     Squamous blepharitis     Morbid obesity due to excess calories (H)     Personal history of breast cancer s/p L masectomy     Diabetes mellitus type 2, insulin dependent (H)     Hypercholesteremia     Lives in group home     Extrapyramidal and movement disorder     CKD (chronic kidney disease) stage 2, GFR 60-89 ml/min     Solitary kidney, congenital     S/P hysterectomy     Impingement syndrome of right shoulder     Hypertriglyceridemia     Candidiasis of skin     Generalized anxiety disorder     Carpal tunnel syndrome of left wrist     Schizoaffective disorder, depressive type (H)     Major depressive disorder, recurrent episode,  moderate (H)     Psychological factors affecting medical condition     Hx of psychiatric care     Nail complaint     Microalbuminuria due to type 2 diabetes mellitus (H)     Type 2 diabetes mellitus with microalbuminuria, with long-term current use of insulin (H)     Diabetes mellitus, type II, insulin dependent (H)     CKD (chronic kidney disease) stage 1, GFR 90 ml/min or greater     Conjunctivitis of both eyes     Corneal erosion of both eyes     Spastic entropion, right     Foot callus       Past Surgical History:   Procedure Laterality Date     APPENDECTOMY       C MASTECTOMY,SIMPLE  1996    Left mastectomy  [facility]. Had normal FU mammo 5/2007. Follow yearly.      C TOTAL ABDOM HYSTERECTOMY  7/2003    Dr. Castanon, for abnormal bleeding. Removal of both tubes with BSO for myoma w - - -     CHOLECYSTECTOMY       COLONOSCOPY  5/2005    Complete Colonoscopy-had one small polyp removed 5/2005.      COLONOSCOPY N/A 3/31/2016    Procedure: COLONOSCOPY;  Surgeon: Cary Ring MD;  Location: [facility]     COLONOSCOPY N/A 7/7/2016    Procedure: COLONOSCOPY;  Surgeon: Cary Ring MD;  Location: [facility]     DILATION AND CURETTAGE       HYSTERECTOMY       MASTECTOMY      Left breast       MEDICAL REVIEW OF SYSTEMS    [2, 10]     REPORTS: none DENIES: palpitations, chest pain, loss of consciousness, dyspnea, dizziness, fever, muscle stiffness, muscle twitching, Parkinsonian symptoms     ALLERGY       Chlordiazepoxide hcl; Dimetapp dm cold-cough; Haldol; Ibuprofen; Lactose intolerance [beta-galactosidase]; Milk products; and Propofol  MEDICATIONS        Current Outpatient Medications   Medication Sig Dispense Refill     acetaminophen (TYLENOL) 500 MG tablet Take 2 tablets (1,000 mg) by mouth every 6 hours as needed 1 Bottle 2     alum & mag hydroxide-simethicone (MYLANTA/MAALOX) 200-200-20 MG/5ML SUSP suspension Take 30 mLs by mouth daily as needed for indigestion       amLODIPine  (NORVASC) 10 MG tablet Take 10 mg by mouth daily       artificial saliva (BIOTENE MT) AERS spray Take 1 spray by mouth 3 times daily as needed for dry mouth       ASPIRIN 81 MG OR TABS Take 1 tablet by mouth daily. ENTERIC COATED. 100 3     atorvastatin (LIPITOR) 40 MG tablet Take 1 tablet (40 mg) by mouth daily 90 tablet 1     BD AUTOSHIELD DUO 30G X 5 MM        blood glucose (NO BRAND SPECIFIED) lancets standard Use to test blood sugar 2 times daily or as directed. 100 each 11     blood glucose calibration (NO BRAND SPECIFIED) solution Use to calibrate blood glucose monitor as directed. 1 each 3     blood glucose monitoring (NO BRAND SPECIFIED) meter device kit Check Blood sugars twice a day. 1 kit 0     blood glucose monitoring (NO BRAND SPECIFIED) test strip Use to test blood sugar 3 times daily or as directed. 100 each 3     Calcium Carbonate-Vitamin D (CALCIUM-VITAMIN D) 250-125 MG-UNIT TABS Take 1 tablet by mouth 2 times daily. Calcium 250 mg/Vit D 125 IU       calcium polycarbophil (FIBERCON) 625 MG tablet Take 2 tablets by mouth daily       CLARITIN 10 MG OR TABS 1 TAB PO QD (Once per day) as needed for ALLERGY SYMPTOMS 30 11     FLUoxetine (PROZAC) 20 MG capsule Take 2 capsules (40 mg) by mouth daily 60 capsule 2     fluticasone (FLONASE) 50 MCG/ACT nasal spray Spray 1 spray into both nostrils daily 16 g 3     furosemide (LASIX) 20 MG tablet Take 1 tablet (20 mg) by mouth 2 times daily 60 tablet 3     Gabapentin (NEURONTIN PO) Take 900 mg by mouth 3 times daily.       glucagon (GLUCAGON EMERGENCY) 1 MG kit Inject 1 mg into the muscle once for 1 dose 2 mg 3     Hypromellose (ARTIFICIAL TEARS OP) Apply 1 drop to eye 4 times daily.       insulin glargine (LANTUS) 100 UNIT/ML injection Inject 20 Units Subcutaneous At Bedtime 8 mL 11     lactase (LACTAID) 3000 UNIT tablet Take 4 tabs daily with meals.       levothyroxine (SYNTHROID, LEVOTHROID) 175 MCG tablet Take 1 tablet (175 mcg) by mouth daily Give on empty  stomach 30 tablet 4     levothyroxine (SYNTHROID/LEVOTHROID) 175 MCG tablet Take 175 mcg by mouth daily       liraglutide (VICTOZA) 18 MG/3ML solution Inject 1.8 mg Subcutaneous daily 9 mL 0     LORazepam (ATIVAN) 0.5 MG tablet Take 1 tablet (0.5 mg) by mouth At Bedtime 30 tablet 1     losartan (COZAAR) 100 MG tablet Take 100 mg by mouth daily       Magnesium Hydroxide (MILK OF MAGNESIA PO) Take  by mouth. Take 30 mL as needed for constipation.       melatonin 3 MG CAPS Take 6 mg by mouth At Bedtime 60 capsule 1     metFORMIN (GLUCOPHAGE) 1000 MG tablet Take 1,000 mg by mouth 2 times daily (with meals)       nystatin (MYCOSTATIN) 322024 UNIT/GM POWD Apply topically 3 times daily as needed 60 g 1     ONETOUCH DELICA LANCETS 33G MISC        polyethylene glycol (MIRALAX/GLYCOLAX) powder Take 1 capful by mouth 2 times daily. 17 GM PO BID       Saline 0.9 % SOLN Spray 2 sprays in nostril as needed.       senna-docusate (SENNA S) 8.6-50 MG per tablet Take 2 tablets by mouth At Bedtime.       simethicone (MYLICON) 125 MG chewable tablet Take 1 tablet (125 mg) by mouth 2 times daily 60 tablet 0     Skin Protectants, Misc. (EUCERIN) cream Apply  topically as needed. Apply to thigh PRN dry skin        SM LUBRICANT EYE DROPS 0.4-0.3 % SOLN ophthalmic solution        Sod Fluor-Solo Fluor-Hydrfl Ac (GEL-SASHA DENTINBLOC DT) Apply  to affected area. GEL. Apply to 2nd molar on bottom right daily at bedtime.       solifenacin (VESICARE) 10 MG tablet Take 1 tablet (10 mg) by mouth daily 30 tablet 6     terbinafine (LAMISIL) 1 % external cream Apply topically 2 times daily       ziprasidone (GEODON) 40 MG capsule Take 1 capsule (40 mg) by mouth 2 times daily (with meals) 60 capsule 2     VITALS      [3, 3]   LMP  (LMP Unknown)    MENTAL STATUS EXAM      [9, 14 cog gs]     Alertness: alert  and oriented  Appearance: could not assess  Behavior/Demeanor: cooperative, pleasant and calm, could not assess eye contact   Speech: normal and  "regular rate and rhythm  Language: intact and no problems  Psychomotor: normal or unremarkable  Mood: \"good\"  Affect: sounded full range; was congruent to mood; was congruent to content  Thought Process/Associations: unremarkable  Thought Content:  Reports none;  Denies suicidal and violent ideation and delusions  Perception:  Reports none;  Denies auditory hallucinations and visual hallucinations  Insight: good  Judgment: good  Cognition: (6) does  appear grossly intact; formal cognitive testing was not done  Gait and Station: could not assess    LABS and DATA     AIMS:  last done 3/6/19 with score(s): 3;  due at next in-person visit    PHQ9 TODAY = not completed  PHQ-9 SCORE 1/9/2020 1/9/2020 3/2/2020   PHQ-9 Total Score - - -   PHQ-9 Total Score 9 7 7     ANTIPSYCHOTIC LABS  [glu, A1C, lipids (danika LDL), liver enzymes, WBC, ANEU, Hgb, plts]  q12 mo  Recent Labs   Lab Test 03/02/20  1420 08/06/19  0941 05/07/19  0908 05/07/19  0650 11/07/18  1531 10/25/18  1734 05/03/18  1414  06/21/17  1047   GLC  --   --  109*  --   --  147* 192.6*  --  134*   A1C 6.6* 6.6*  --  6.8* 7.4*  --  6.7*   < >  --     < > = values in this interval not displayed.     Recent Labs   Lab Test 05/07/19  0650 05/03/18  1414 03/21/17  1023 02/22/16  1028   CHOL 138 126 142 162.0   TRIG 96 148 121 151.9*   LDL 79 60 78 95   HDL 40 37* 40* 36.6*     Recent Labs   Lab Test 06/26/18  0659 06/26/18 03/21/17  1023 01/22/15  1600   AST 16 16 13 18   ALT 21 21 20 35   ALKPHOS 111 111 92 113     Recent Labs   Lab Test 05/07/19  0650 10/25/18  1734 06/26/18  0659 06/21/17  1047 03/21/17  1023  07/04/14  0705   WBC 10.06* 14.2* 9.72  --  11.8*   < > 7.7   ANEU  --  10.6* 5.31  --  8.3  --  4.8   HGB 13.3 14.0 12.6 13.8 13.4   < > 13.3    275 247  --  266   < > 223    < > = values in this interval not displayed.     PSYCHOTROPIC DRUG INTERACTIONS     ZIPRASIDONE and QT INTERVAL PROLONGING DRUGS may result in increased risk of QT-interval " prolongation.  ZIPRASIDONE and SEROTONERGIC DRUGS THAT PROLONG QT INTERVAL may result in increased risk of QT-interval prolongation and increased risk of serotonin syndrome (hypertension, hyperthermia, myoclonus, mental status changes).  NSAID and SSRI may result in an increased risk of bleeding  ERYTHROMYCIN and FLUOXETINE may result in an increased risk of cardiotoxicity (QT prolongation, torsades de pointes, cardiac arrest).  FLUOXETINE and INSULINS OR PRAMLINTIDE may result in increased risk of hypoglycemia.  FLUOXETINE and QT INTERVAL PROLONGING DRUGS may result in increased risk of QT-interval prolongation.   GABAPENTIN and CNS DEPRESSANTS may result in respiratory depression.   GABAPENTIN and ANTACIDS may result in decreased gabapentin effectiveness.     MANAGEMENT:  Monitoring for adverse effects, periodic EKGs and patient is aware of risks    RISK STATEMENT for SAFETY     Pinky Page did not appear to be an imminent safety risk to self or others.    PSYCHIATRIC DIAGNOSIS     Schizoaffective Disorder, depressed type, in partial remission  Generalized Anxiety Disorder    ASSESSMENT      [m2, h3]     TODAY Pinky presents for a follow-up via telephone. Pinky continues to be stable without depressive or psychotic symptoms and is managing well with the changes at her residence due to the coronavirus. Her chronic insomnia has improved slightly.  While discontinuation of Ativan would be recommended for Pinky in the future to reduce her fall risk at her age, will not make reductions today given the stress of the coronavirus and inability to see Pinky in-person. With further Ativan reductions, other insomnia medications may be needed. Her sleep difficulty is also likely impacted by her ANUJ and multiple nighttime awakenings by staff given history of nocturnal enuresis. She would likely benefit from a refitting of her CPAP mask.      PLAN      [m2, h3]     1) PSYCHOTROPIC MEDICATIONS:  - Continue ziprasidone 40 mg BID  -  Continue fluoxetine 40 mg daily  - Continue lorazepam 0.5 mg at bedtime (prescribed for one additional month-prescription starting 7/9/20)  - Continue melatonin 6mg at bedtime    Gabapentin 900mg TID prescribed by primary care    2) [place]  was checked today:  Lorazepam filled at monthly intervals only from this clinic, last filled 5/14 for 30 tablets. Gabapentin filled at monthly intervals for 30 day supplies and only prescribed by Dr. Linares, last filled 5/18    3) THERAPY:    Yes, continue w/ Dr. Gr once a month    4) NEXT DUE:    Labs- AP labs by 05/2020  EKG- Last EKG 12/11/19 QTc 456. EKG due 12/2020   Rating Scales- AIMS due at next in-person visit    5) REFERRALS:    No Referrals needed     6) RTC: 2 months with me    7) CRISIS NUMBERS:   Provided routinely in AVS   ONLY if a [facility]OBIT[facility] PT: [facility] [phone] (clinic), [phone] (after hours)     TREATMENT RISK STATEMENT:  The risks, benefits, alternatives and potential adverse effects have been discussed and are understood by the pt. The pt understands the risks of using street drugs or alcohol. There are no medical contraindications, the pt agrees to treatment with the ability to do so. The pt knows to call the clinic for any problems or to access emergency care if needed.  Medical and substance use concerns are documented above.  Psychotropic drug interaction check was done, including changes made today.    PROVIDER: María Shi MD    Patient staffed with Dr. Estrada who will sign the note.  Supervisor is Dr. Gordon.    TELEHEALTH ATTENDING ATTESTATION  Following the ACGME guidelines on telemedicine and direct supervision due to COVID-19, I was concurrently participating in and/or monitoring the patient care through appropriate telecommunication technology.  I discussed the key portions of the service with the resident, including the mental status examination and developing the plan of care. I reviewed key portions of the history  with the resident. I agree with the findings and plan as documented in this note.   Cortez Estrada MD

## 2020-05-22 ENCOUNTER — MEDICAL CORRESPONDENCE (OUTPATIENT)
Dept: HEALTH INFORMATION MANAGEMENT | Facility: CLINIC | Age: 70
End: 2020-05-22

## 2020-05-22 NOTE — PROGRESS NOTES
Form has been completed by provider.     Form sent out via: Fax to Narendra Truong at Fax Number: [phone]  Patient informed: N/A  Output date: May 22, 2020    Shanna Arora CMA      **Please close the encounter**

## 2020-05-26 ENCOUNTER — TELEPHONE (OUTPATIENT)
Dept: PSYCHIATRY | Facility: CLINIC | Age: 70
End: 2020-05-26

## 2020-05-26 NOTE — TELEPHONE ENCOUNTER
"7 faxed pages was received from [facility] requesting an update of medications and any new orders from her 5/21/20 visit with Dr. Shi. Writer spoke with Dr. Shi whom said there was \"no changes for Pinky's medications. Everything remains the same.\" Writer printed current medication list and faxed 11 pages to [facility] at [phone]. The original forms were sent to christelle.[email or web address] Doyle LPN    "

## 2020-06-02 ENCOUNTER — VIRTUAL VISIT (OUTPATIENT)
Dept: PSYCHOLOGY | Facility: CLINIC | Age: 70
End: 2020-06-02
Payer: COMMERCIAL

## 2020-06-02 DIAGNOSIS — F41.1 GENERALIZED ANXIETY DISORDER: ICD-10-CM

## 2020-06-02 DIAGNOSIS — E66.9 OBESITY, UNSPECIFIED OBESITY SEVERITY, UNSPECIFIED OBESITY TYPE: ICD-10-CM

## 2020-06-02 DIAGNOSIS — F33.0 MAJOR DEPRESSIVE DISORDER, RECURRENT EPISODE, MILD (H): Primary | ICD-10-CM

## 2020-06-02 DIAGNOSIS — F54 PSYCHOLOGICAL FACTORS AFFECTING MEDICAL CONDITION: ICD-10-CM
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
## 2020-06-18 ENCOUNTER — DOCUMENTATION ONLY (OUTPATIENT)
Dept: FAMILY MEDICINE | Facility: CLINIC | Age: 70
End: 2020-06-18

## 2020-06-18 NOTE — PROGRESS NOTES
"When opening a documentation only encounter, be sure to enter in \"Chief Complaint\" Forms and in \" Comments\" Title of form, description if needed.    Pinky is a 69 year old  female  Form received via: Fax  Form now resides in: Provider Ready    Shanna Arora CMA          Form has been completed by provider.     Form sent out via: Fax to [facility] at Fax Number: [phone]  Patient informed: N/A  Output date: June 24, 2020    Shanna Arora CMA      **Please close the encounter**            "

## 2020-06-28 ENCOUNTER — MEDICAL CORRESPONDENCE (OUTPATIENT)
Dept: HEALTH INFORMATION MANAGEMENT | Facility: CLINIC | Age: 70
End: 2020-06-28

## 2020-07-06 ENCOUNTER — VIRTUAL VISIT (OUTPATIENT)
Dept: PSYCHOLOGY | Facility: CLINIC | Age: 70
End: 2020-07-06
Payer: COMMERCIAL

## 2020-07-06 DIAGNOSIS — F33.0 MAJOR DEPRESSIVE DISORDER, RECURRENT EPISODE, MILD (H): Primary | ICD-10-CM

## 2020-07-06 DIAGNOSIS — F41.1 GENERALIZED ANXIETY DISORDER: ICD-10-CM

## 2020-07-06 DIAGNOSIS — F54 PSYCHOLOGICAL FACTORS AFFECTING MEDICAL CONDITION: ICD-10-CM

## 2020-07-06 DIAGNOSIS — E66.9 OBESITY, UNSPECIFIED OBESITY SEVERITY, UNSPECIFIED OBESITY TYPE: ICD-10-CM
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
## 2020-07-10 DIAGNOSIS — G47.09 OTHER INSOMNIA: ICD-10-CM

## 2020-07-10 RX ORDER — LORAZEPAM 0.5 MG/1
0.5 TABLET ORAL AT BEDTIME
Qty: 30 TABLET | Refills: 0 | Status: SHIPPED | OUTPATIENT
Start: 2020-07-10 | End: 2020-07-22

## 2020-07-10 NOTE — TELEPHONE ENCOUNTER
[facility] Call Center    Phone Message    May a detailed message be left on voicemail: yes     Reason for Call: Medication Refill Request    Has the patient contacted the pharmacy for the refill? Yes   Name of medication being requested: Lorazapam 0.5mg  Provider who prescribed the medication:   Pharmacy: [facility]  Date medication is needed: The last fill was on 6/13. Pt needs 30 day supply.     Action Taken: Other: [facility] Psych Pool    Travel Screening: Not Applicable

## 2020-07-10 NOTE — TELEPHONE ENCOUNTER
María Shi MD Valena, Victoria, RN    Caller: Unspecified (Today, 10:01 AM)               I wrote an Ativan prescription on 5/21/20 with a start date of 7/9/20. Can the pharmacy not fill that prescription? I just don't want to fill duplicates.     Thanks,   María        Follow up:    Per the pharmacist, when the 5/21 Rx came through, the start date did not register on their end, so when the patient was due for a refill in June, they had to use the most recent Rx on file by law, which is the 5/21 Rx. The refill on the 5/12 Rx was cancelled out and the whole 5/12 Rx was supplanted by the 5/21 Rx. He explained that cancelling out refills on an older Rx when a new Rx comes in is to prevent refills from accumulating on controlled substances.

## 2020-07-10 NOTE — TELEPHONE ENCOUNTER
Received RF request from [facility]     Last seen: 5/21/20  RTC: 2 months  Cancel: none  No-show: none  Next appt: 7/22/20     Medication requested: LORazepam (ATIVAN) 0.5 MG tablet   Directions: Take 1 tablet (0.5 mg) by mouth At Bedtime   Qty: 30  Last Rx written 5/21/20 with a start date of 7/9/20  [place]  checked and last refilled on 6/13    Filled   Written  6/13   5/21  5/14   4/23  4/14   3/17  3/17   3/17  2/21   2/20    * Called [facility] and spoke with the pharmacist to inquire about the Rx with a start date of 7/9. He said that when they received the Rx on 5/21, the remaining refill on Rx written on 5/12 was cancelled and they had to fill out of the most recent Rx per law. So Pinky does not have a refill on file.       Plan per 5/21/20 virtual visit:    - Continue ziprasidone 40 mg BID  - Continue fluoxetine 40 mg daily  - Continue lorazepam 0.5 mg at bedtime (prescribed for one additional month-prescription starting 7/9/20)  - Continue melatonin 6mg at bedtime       Medication refill approved per refill protocol. Pended 30 ds and routed to Dr. Shi to sign off on for controlled substances.

## 2020-07-22 ENCOUNTER — VIRTUAL VISIT (OUTPATIENT)
Dept: PSYCHIATRY | Facility: CLINIC | Age: 70
End: 2020-07-22
Attending: PSYCHIATRY & NEUROLOGY
Payer: COMMERCIAL

## 2020-07-22 ENCOUNTER — MEDICAL CORRESPONDENCE (OUTPATIENT)
Dept: HEALTH INFORMATION MANAGEMENT | Facility: CLINIC | Age: 70
End: 2020-07-22

## 2020-07-22 DIAGNOSIS — G47.09 OTHER INSOMNIA: ICD-10-CM

## 2020-07-22 DIAGNOSIS — F25.1 SCHIZOAFFECTIVE DISORDER, DEPRESSIVE TYPE (H): ICD-10-CM

## 2020-07-22 RX ORDER — LORAZEPAM 0.5 MG/1
0.5 TABLET ORAL AT BEDTIME
Qty: 30 TABLET | Refills: 2 | Status: SHIPPED | OUTPATIENT
Start: 2020-07-30 | End: 2020-09-23

## 2020-07-22 RX ORDER — ZIPRASIDONE HYDROCHLORIDE 40 MG/1
40 CAPSULE ORAL 2 TIMES DAILY WITH MEALS
Qty: 60 CAPSULE | Refills: 2 | Status: SHIPPED | OUTPATIENT
Start: 2020-07-22 | End: 2020-09-23

## 2020-07-22 NOTE — PROGRESS NOTES
TELEPHONE VISIT  Pinky Page is a 69 year old pt. who is being evaluated via a billable telephone visit.      The patient has been notified of the following:    We have found that certain health care needs can be provided without the need for a physical exam. This service lets us provide the care you need with a short phone conversation. If a prescription is necessary we can send it directly to your pharmacy. If lab work is needed we can place an order for that and you can then stop by our lab to have the test done at a later time. Insurers are generally covering virtual visits as they would in-office visits so billing should not be different than normal.  If for some reason you do get billed incorrectly, you should contact the billing office to correct it and that number is in the AVS .    Patient has given verbal consent for a telephone visit?:  Yes   How would the pt like to obtain the AVS?:  Patient declined  AVS SmartPhrase [PsychAVS] has been placed in 'Patient Instructions':  N/A     Start Time: 2:00PM       End Time:  2:30PM       [facility]  Psychiatry Clinic  PSYCHIATRIC PROGRESS NOTE     Date of initial Diagnostic Assessment (DA) is 5/13/13 and most recent Transfer of Care DA is 12/11/19.    CARE TEAM:  PCP- Cary Brownlee  (resident working at [facility] with Dr. Birmingham, attending)   Specialty Providers- no    Therapist- Dr. Gr    [facility] Team- no            Pinky Page is a 70 year old female who prefers the name Pinky & pronouns she, her, hers.      Pertinent Background: This patient first experienced mental health issues in her twenties and has received treatment for schizoaffective disorder and generalized anxiety. Notably, Pinky's symptoms of depression and psychosis have responded well to a combination of ECT and medication management. Pinky has a history of experiencing increasingly frequent psychotic symptoms with previously attempted antipsychotic tapers.       Psych critical item history includes psychosis [sxs include disorganization, hallucinations, paranoia], mutiple psychotropic trials, psych hosp (3-5) and ECT.    INTERIM HISTORY      [4, 4]   The patient reports good treatment adherence.  History was provided by the patient who was a good historian.    The last visit ended with no change to the med regimen.   Since the last visit:   -States that things have been going pretty good.  -Describes that no one at [facility] has gotten COVID-19.  -Had a tooth filling fall out in April. She had to only eat soft foods for 6 weeks until she got it fixed in early June.   -Has been working with a  about working to get money from her [facility]. Wants to make sure that she doesn't lose her current housing.   -Continues to ride the stationary bike 5 days a week, walk outside sometimes with her roommate, walk inside at times, do word searches, write letters, watch TV, and color.   -Is planning to get a cellphone.  -Her sleep has improved recently, as she is getting 5-6 hours a night. Continues to use her CPAP for about 2 hours a night. Her energy has been good.   -Reports some irritability and mood swings. Describes that she yelled at another resident who had left their clothes in the washer and dryer when it was Pinky's scheduled time to do laundry. She was able to calm herself by going to her room and praying.    -Had a good birthday celebration including having cupcakes.   -Denies any recent anxiety, auditory or visual hallucinations, or suicidal ideation.     RECENT PSYCH ROS:   Depression: REPORTS: none DENIES: suicidal ideation, depressed mood, anhedonia, low energy, insomnia, hypersomnia, appetite changes, weight changes and poor concentration /memory  Elevated: DENIES: increased energy, decreased sleep need, increased activity and grandiosity  Psychosis: DENIES: delusions, auditory hallucinations, visual hallucinations and disorganized  behavior  Anxiety: REPORTS: none DENIES: excessive worry, feeling fearful and nervous/overwhelmed  Panic Attack:  none  Trauma Related:  none  Dysregulation: REPORTS: irritable  Eating Disorder: no   Pertinent Negative Symptoms:  NO suicidal and violent ideation, aggression, psychosis, hallucinations, delusions and alberta    RECENT SUBSTANCE USE:     ALCOHOL- no      TOBACCO- no     CAFFEINE- coffee/ tea [coffee, diet Pepsi]. 1 cup of coffee in the morning, 1 can diet coke in the afternoon, 1 decaf coffee at lunch. doesn't drink regular coffee after 1pm.   OPIOIDS- no         NARCAN KIT- no        CANNABIS- no            OTHER ILLICIT DRUGS- none    CURRENT SOCIAL HISTORY:  FINANCIAL SUPPORT- social security disability       CHILDREN- no      LIVING SITUATION- Lives at [facility].      SOCIAL/ SPIRITUAL SUPPORT- sisters, support staff at [facility], other residents at [place], other providers     FEELS SAFE AT HOME- yes     PSYCH and CD Critical Summary Points since July 2019 7/22-no changes    PAST MED TRIALS          See last Diagnostic Assessment    MEDICAL / SURGICAL HISTORY          Patient Active Problem List   Diagnosis     Allergic rhinitis due to pollen     Urge incontinence     Hypertension, essential     Cardiomegaly     Chronic constipation     Dry eye syndrome     Esophageal reflux     Exposure keratoconjunctivitis     DM ophthalmopathy (H)     Hypothyroidism     Senile cataract     ANUJ (obstructive sleep apnea)     Vaginal atrophy     Restless leg syndrome     Squamous blepharitis     Morbid obesity due to excess calories (H)     Personal history of breast cancer s/p L masectomy     Diabetes mellitus type 2, insulin dependent (H)     Hypercholesteremia     Lives in group home     Extrapyramidal and movement disorder     CKD (chronic kidney disease) stage 2, GFR 60-89 ml/min     Solitary kidney, congenital     S/P hysterectomy     Impingement syndrome of right shoulder      Hypertriglyceridemia     Candidiasis of skin     Generalized anxiety disorder     Carpal tunnel syndrome of left wrist     Schizoaffective disorder, depressive type (H)     Major depressive disorder, recurrent episode, moderate (H)     Psychological factors affecting medical condition     Hx of psychiatric care     Nail complaint     Microalbuminuria due to type 2 diabetes mellitus (H)     Type 2 diabetes mellitus with microalbuminuria, with long-term current use of insulin (H)     Diabetes mellitus, type II, insulin dependent (H)     CKD (chronic kidney disease) stage 1, GFR 90 ml/min or greater     Conjunctivitis of both eyes     Corneal erosion of both eyes     Spastic entropion, right     Foot callus       Past Surgical History:   Procedure Laterality Date     APPENDECTOMY       C MASTECTOMY,SIMPLE  1996    Left mastectomy  [facility]. Had normal FU mammo 5/2007. Follow yearly.      C TOTAL ABDOM HYSTERECTOMY  7/2003    Dr. Castanon, for abnormal bleeding. Removal of both tubes with BSO for myoma w - - -     CHOLECYSTECTOMY       COLONOSCOPY  5/2005    Complete Colonoscopy-had one small polyp removed 5/2005.      COLONOSCOPY N/A 3/31/2016    Procedure: COLONOSCOPY;  Surgeon: Cary Ring MD;  Location: [facility]     COLONOSCOPY N/A 7/7/2016    Procedure: COLONOSCOPY;  Surgeon: aCry Ring MD;  Location: [facility]     DILATION AND CURETTAGE       HYSTERECTOMY       MASTECTOMY      Left breast       MEDICAL REVIEW OF SYSTEMS    [2, 10]     REPORTS: none DENIES: palpitations, chest pain, loss of consciousness, dyspnea, dizziness, fever, muscle stiffness, muscle twitching, Parkinsonian symptoms     ALLERGY       Chlordiazepoxide hcl; Dimetapp dm cold-cough; Haldol; Ibuprofen; Lactose intolerance [beta-galactosidase]; Milk products; and Propofol  MEDICATIONS        Current Outpatient Medications   Medication Sig Dispense Refill     acetaminophen (TYLENOL) 500 MG tablet Take 2  tablets (1,000 mg) by mouth every 6 hours as needed 1 Bottle 2     alum & mag hydroxide-simethicone (MYLANTA/MAALOX) 200-200-20 MG/5ML SUSP suspension Take 30 mLs by mouth daily as needed for indigestion       amLODIPine (NORVASC) 10 MG tablet Take 10 mg by mouth daily       artificial saliva (BIOTENE MT) AERS spray Take 1 spray by mouth 3 times daily as needed for dry mouth       ASPIRIN 81 MG OR TABS Take 1 tablet by mouth daily. ENTERIC COATED. 100 3     atorvastatin (LIPITOR) 40 MG tablet Take 1 tablet (40 mg) by mouth daily 90 tablet 1     BD AUTOSHIELD DUO 30G X 5 MM        blood glucose (NO BRAND SPECIFIED) lancets standard Use to test blood sugar 2 times daily or as directed. 100 each 11     blood glucose calibration (NO BRAND SPECIFIED) solution Use to calibrate blood glucose monitor as directed. 1 each 3     blood glucose monitoring (NO BRAND SPECIFIED) meter device kit Check Blood sugars twice a day. 1 kit 0     blood glucose monitoring (NO BRAND SPECIFIED) test strip Use to test blood sugar 3 times daily or as directed. 100 each 3     Calcium Carbonate-Vitamin D (CALCIUM-VITAMIN D) 250-125 MG-UNIT TABS Take 1 tablet by mouth 2 times daily. Calcium 250 mg/Vit D 125 IU       calcium polycarbophil (FIBERCON) 625 MG tablet Take 2 tablets by mouth daily       CLARITIN 10 MG OR TABS 1 TAB PO QD (Once per day) as needed for ALLERGY SYMPTOMS 30 11     FLUoxetine (PROZAC) 20 MG capsule Take 2 capsules (40 mg) by mouth daily 60 capsule 2     fluticasone (FLONASE) 50 MCG/ACT nasal spray Spray 1 spray into both nostrils daily 16 g 3     furosemide (LASIX) 20 MG tablet Take 1 tablet (20 mg) by mouth 2 times daily 60 tablet 3     Gabapentin (NEURONTIN PO) Take 900 mg by mouth 3 times daily.       glucagon (GLUCAGON EMERGENCY) 1 MG kit Inject 1 mg into the muscle once for 1 dose 2 mg 3     Hypromellose (ARTIFICIAL TEARS OP) Apply 1 drop to eye 4 times daily.       insulin glargine (LANTUS) 100 UNIT/ML injection Inject  20 Units Subcutaneous At Bedtime 8 mL 11     lactase (LACTAID) 3000 UNIT tablet Take 4 tabs daily with meals.       levothyroxine (SYNTHROID, LEVOTHROID) 175 MCG tablet Take 1 tablet (175 mcg) by mouth daily Give on empty stomach 30 tablet 4     levothyroxine (SYNTHROID/LEVOTHROID) 175 MCG tablet Take 175 mcg by mouth daily       liraglutide (VICTOZA) 18 MG/3ML solution Inject 1.8 mg Subcutaneous daily 9 mL 0     LORazepam (ATIVAN) 0.5 MG tablet Take 1 tablet (0.5 mg) by mouth At Bedtime 30 tablet 0     losartan (COZAAR) 100 MG tablet Take 100 mg by mouth daily       Magnesium Hydroxide (MILK OF MAGNESIA PO) Take  by mouth. Take 30 mL as needed for constipation.       melatonin 3 MG CAPS Take 6 mg by mouth At Bedtime 60 capsule 2     metFORMIN (GLUCOPHAGE) 1000 MG tablet Take 1,000 mg by mouth 2 times daily (with meals)       nystatin (MYCOSTATIN) 054941 UNIT/GM POWD Apply topically 3 times daily as needed 60 g 1     ONETOUCH DELICA LANCETS 33G MISC        polyethylene glycol (MIRALAX/GLYCOLAX) powder Take 1 capful by mouth 2 times daily. 17 GM PO BID       Saline 0.9 % SOLN Spray 2 sprays in nostril as needed.       senna-docusate (SENNA S) 8.6-50 MG per tablet Take 2 tablets by mouth At Bedtime.       simethicone (MYLICON) 125 MG chewable tablet Take 1 tablet (125 mg) by mouth 2 times daily 60 tablet 0     Skin Protectants, Misc. (EUCERIN) cream Apply  topically as needed. Apply to thigh PRN dry skin        SM LUBRICANT EYE DROPS 0.4-0.3 % SOLN ophthalmic solution        Sod Fluor-Solo Fluor-Hydrfl Ac (GEL-SASHA DENTINBLOC DT) Apply  to affected area. GEL. Apply to 2nd molar on bottom right daily at bedtime.       solifenacin (VESICARE) 10 MG tablet Take 1 tablet (10 mg) by mouth daily 30 tablet 6     terbinafine (LAMISIL) 1 % external cream Apply topically 2 times daily       ziprasidone (GEODON) 40 MG capsule Take 1 capsule (40 mg) by mouth 2 times daily (with meals) 60 capsule 2     VITALS      [3, 3]   LMP  (LMP  "Unknown)    MENTAL STATUS EXAM      [9, 14 cog gs]     Alertness: alert  and oriented  Appearance: could not assess  Behavior/Demeanor: cooperative, pleasant and calm, could not assess eye contact   Speech: normal and regular rate and rhythm  Language: intact and no problems  Psychomotor: normal or unremarkable  Mood: \"good\"  Affect: sounded full range; was congruent to mood; was congruent to content  Thought Process/Associations: unremarkable  Thought Content:  Reports none;  Denies suicidal and violent ideation and delusions  Perception:  Reports none;  Denies auditory hallucinations and visual hallucinations  Insight: good  Judgment: good  Cognition: (6) does  appear grossly intact; formal cognitive testing was not done  Gait and Station: could not assess    LABS and DATA     AIMS:  last done 3/6/19 with score(s): 3;  due at next in-person visit    PHQ9 TODAY = not completed  PHQ-9 SCORE 1/9/2020 1/9/2020 3/2/2020   PHQ-9 Total Score - - -   PHQ-9 Total Score 9 7 7     ANTIPSYCHOTIC LABS  [glu, A1C, lipids (danika LDL), liver enzymes, WBC, ANEU, Hgb, plts]  q12 mo  Recent Labs   Lab Test 03/02/20  1420 08/06/19  0941 05/07/19  0908 05/07/19  0650 11/07/18  1531 10/25/18  1734 05/03/18  1414  06/21/17  1047   GLC  --   --  109*  --   --  147* 192.6*  --  134*   A1C 6.6* 6.6*  --  6.8* 7.4*  --  6.7*   < >  --     < > = values in this interval not displayed.     Recent Labs   Lab Test 05/07/19  0650 05/03/18  1414 03/21/17  1023 02/22/16  1028   CHOL 138 126 142 162.0   TRIG 96 148 121 151.9*   LDL 79 60 78 95   HDL 40 37* 40* 36.6*     Recent Labs   Lab Test 06/26/18  0659 06/26/18 03/21/17  1023 01/22/15  1600   AST 16 16 13 18   ALT 21 21 20 35   ALKPHOS 111 111 92 113     Recent Labs   Lab Test 05/07/19  0650 10/25/18  1734 06/26/18  0659 06/21/17  1047 03/21/17  1023  07/04/14  0705   WBC 10.06* 14.2* 9.72  --  11.8*   < > 7.7   ANEU  --  10.6* 5.31  --  8.3  --  4.8   HGB 13.3 14.0 12.6 13.8 13.4   < > 13.3 "    275 247  --  266   < > 223    < > = values in this interval not displayed.     PSYCHOTROPIC DRUG INTERACTIONS     ZIPRASIDONE and QT INTERVAL PROLONGING DRUGS may result in increased risk of QT-interval prolongation.  ZIPRASIDONE and SEROTONERGIC DRUGS THAT PROLONG QT INTERVAL may result in increased risk of QT-interval prolongation and increased risk of serotonin syndrome (hypertension, hyperthermia, myoclonus, mental status changes).  NSAID and SSRI may result in an increased risk of bleeding  ERYTHROMYCIN and FLUOXETINE may result in an increased risk of cardiotoxicity (QT prolongation, torsades de pointes, cardiac arrest).  FLUOXETINE and INSULINS OR PRAMLINTIDE may result in increased risk of hypoglycemia.  FLUOXETINE and QT INTERVAL PROLONGING DRUGS may result in increased risk of QT-interval prolongation.   GABAPENTIN and CNS DEPRESSANTS may result in respiratory depression.   GABAPENTIN and ANTACIDS may result in decreased gabapentin effectiveness.     MANAGEMENT:  Monitoring for adverse effects, periodic EKGs and patient is aware of risks    RISK STATEMENT for SAFETY     Pinky Page did not appear to be an imminent safety risk to self or others.    PSYCHIATRIC DIAGNOSIS     Schizoaffective Disorder, depressed type, in partial remission  Generalized Anxiety Disorder    ASSESSMENT      [m2, h3]     TODAY Pinky presents for a follow-up via telephone. Pinky continues to be stable without depressive or psychotic symptoms despite the stress of the coronavirus. While Pinky reports that her insomnia has improved, she continues to sleep 5-6 hours a night. She would benefit from reduction of Ativan to reduce her fall risk at her age. Will not make changes today but these should be considered going forward. Her sleep difficulty is also likely impacted by her ANUJ and multiple nighttime awakenings by staff given history of nocturnal enuresis. She would likely benefit from a refitting of her CPAP mask. She may  require an alternate medication for insomnia given her persistent insomnia.       PLAN      [m2, h3]     1) PSYCHOTROPIC MEDICATIONS:  - Continue ziprasidone 40 mg BID  - Continue fluoxetine 40 mg daily  - Continue lorazepam 0.5 mg at bedtime  - Continue melatonin 6mg at bedtime    Gabapentin 900mg TID prescribed by primary care    2) MN  was checked today:  Lorazepam filled at monthly intervals only from this clinic.. Gabapentin filled at monthly intervals for 30 day supplies and only prescribed by Dr. Linares.    3) THERAPY:    Yes, continue w/ Dr. Gr once a month    4) NEXT DUE:    Labs- AP labs by 05/2020  EKG- Last EKG 12/11/19 QTc 456. EKG due 12/2020   Rating Scales- AIMS due at next in-person visit    5) REFERRALS:    No Referrals needed     6) RTC: 2 months with me    7) CRISIS NUMBERS:   Provided routinely in AVS   ONLY if a [place] PT: [facility] [place] [phone] (clinic), [phone] (after hours)     TREATMENT RISK STATEMENT:  The risks, benefits, alternatives and potential adverse effects have been discussed and are understood by the pt. The pt understands the risks of using street drugs or alcohol. There are no medical contraindications, the pt agrees to treatment with the ability to do so. The pt knows to call the clinic for any problems or to access emergency care if needed.  Medical and substance use concerns are documented above.  Psychotropic drug interaction check was done, including changes made today.    PROVIDER: María Shi MD    Patient not staffed in clinic. Supervisor is Dr. Rubio who will sign this note.  I did not see this pt directly. I have reviewed the documentation, and I agree with the resident's plan of care.    Roxy Rubio MD

## 2020-07-24 ENCOUNTER — DOCUMENTATION ONLY (OUTPATIENT)
Dept: FAMILY MEDICINE | Facility: CLINIC | Age: 70
End: 2020-07-24

## 2020-07-24 NOTE — PROGRESS NOTES
"When opening a documentation only encounter, be sure to enter in \"Chief Complaint\" Forms and in \" Comments\" Title of form, description if needed.    Pinky is a 70 year old  female  Form received via: Fax  Form now resides in: Provider Tom Powell CMA                  "

## 2020-07-27 ENCOUNTER — TELEPHONE (OUTPATIENT)
Dept: PSYCHIATRY | Facility: CLINIC | Age: 70
End: 2020-07-27

## 2020-07-27 NOTE — TELEPHONE ENCOUNTER
7 faxed pages was received from [facility] requesting Physician Orders & Psych Referral. The original copies are being worked on in Nurse Triage awaiting an Attending 's signature.Nataliya Doyle LPN

## 2020-07-28 ENCOUNTER — CARE COORDINATION (OUTPATIENT)
Dept: SLEEP MEDICINE | Facility: CLINIC | Age: 70
End: 2020-07-28

## 2020-07-28 NOTE — PROGRESS NOTES
Faxed signed CMN for cpap supplies to [facility] 7/24/2020.  Fax number [phone].  Document sent to [facility] for scan into epic

## 2020-07-30 NOTE — TELEPHONE ENCOUNTER
Signed forms were returned to this writer and faxed to [facility] 3rd Floor Nursing at [phone]. The original copies were sent to scanning.Nataliya Doyle LPN

## 2020-08-10 ENCOUNTER — VIRTUAL VISIT (OUTPATIENT)
Dept: PSYCHOLOGY | Facility: CLINIC | Age: 70
End: 2020-08-10
Payer: COMMERCIAL

## 2020-08-10 DIAGNOSIS — F54 PSYCHOLOGICAL FACTORS AFFECTING MEDICAL CONDITION: ICD-10-CM

## 2020-08-10 DIAGNOSIS — F41.1 GENERALIZED ANXIETY DISORDER: ICD-10-CM

## 2020-08-10 DIAGNOSIS — F33.0 MAJOR DEPRESSIVE DISORDER, RECURRENT EPISODE, MILD (H): Primary | ICD-10-CM

## 2020-08-10 DIAGNOSIS — E66.9 OBESITY, UNSPECIFIED OBESITY SEVERITY, UNSPECIFIED OBESITY TYPE: ICD-10-CM

## 2020-08-10 NOTE — PROGRESS NOTES
"    Health Psychology      Clinic    Department of Medicine  Keri Arnett, Ph.D., L.P. [phone]            [facility]  [facility] Madelaine Ingram, Ph.D., L.P. [phone] 3rd Floor  [place] Mail Code 747   Denise Low, Ph.D.  [phone]    [address] Miryma Woods, Ph.D.,  L.P. [phone]     [address] [address]           Dennis Dillard, Ph.D. [phone]      Rae Eugene, Ph.D., L.P. [phone]    Damon Gr, Ph.D., A.B.P.P., L.P. [phone]     Kimber Morales, Ph.D., L.P. 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 She does not have access to video  telehealth.  She wished to get a call.     Pinky reports her anxiety is at reasonable levels and has come down and her depression is at baseline.   She feels safe at [facility] during the pandemic and is not aware of any cases in residents or staff.  She is pleased that they are taking it seriously at the [facility] and anticipates being tested in the future along with residents and staff. She wears a mask except for when in her room.  She shares her room with a roommate.  They are getting along well.   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    [facility] and now broke a tooth and will need to be seen again.  She has cut back on exercise due to the recent heat.  We discussed increasing her exercise and  Not taking the full meals as the diet line was suspended due to COVID-19.     She states weighed 227 last Friday at [facility].    She continues to exercise.  She used the bicycle 30-40 minutes  x3/week.  She is not sure if she is continuing " to lose weight gradually, because she has been walking less.  She tentatively agrees to increase the exercise.    She is in touch with her family by phone but can't actually see them.  She understand the reasoning.     Her  of the past 6 years left. She now will be working with a nurse who is taking over that role with her.  Rapport is positive.   She wishes to continue services.     This telephone service is appropriate and effective for delivering services in light of the necessity for social distancing to mitigate the COVID-19 epidemic and for conservation of PPE.     Patient has agreed to receiving telephone services after being informed about it: Yes    Patient prefers video invitation/information to be sent by:   email    Time service started: 1:13  Time service ended:1:50    Mode of transmission: Telephone    Location of originating:  [facility]    Distance site:  Home office of provider for [facility]    The patient has been notified that:  Video visits will be conducted via a call with their psychologist to provide the care they need with a video conversation. Video visits may be billed at different rates depending on insurance coverage.  Patients are advised to please contact their insurance provider with any questions about their health insurance coverage. If during the course of a call the psychologist feels a video visit is not appropriate, patients will not be charged for this service.      Diagnosis:   Major Depression, recurrent mild (F33.0)   Psychological Factors Affecting Morbid Obesity (F54)  Generalized anxiety (F41.1)       Plan:  I will call her at [facility] 9/15 @ 2 at her request tfor supportive counseling due to the serious and persistent nature of her depression and anxiety, consistent with treatment plan.    Damon Gr, PhD LP,  A.B.P.P.  Director, Health Psychology  [phone]

## 2020-08-20 ENCOUNTER — MEDICAL CORRESPONDENCE (OUTPATIENT)
Dept: HEALTH INFORMATION MANAGEMENT | Facility: CLINIC | Age: 70
End: 2020-08-20

## 2020-08-24 ENCOUNTER — TELEPHONE (OUTPATIENT)
Dept: PSYCHIATRY | Facility: CLINIC | Age: 70
End: 2020-08-24

## 2020-08-24 NOTE — TELEPHONE ENCOUNTER
6 pages, including signed MERI, was received from [facility] requesting medication verification and completion ofPhysician's Order. Completed forms was signed by Dr. Noland and mailed to [facility] in [place] attn Schelly Day. The original copies were sent to Gavin Doyle LPN'

## 2020-08-26 ENCOUNTER — TELEPHONE (OUTPATIENT)
Dept: FAMILY MEDICINE | Facility: CLINIC | Age: 70
End: 2020-08-26
Payer: COMMERCIAL

## 2020-08-26 ENCOUNTER — DOCUMENTATION ONLY (OUTPATIENT)
Dept: FAMILY MEDICINE | Facility: CLINIC | Age: 70
End: 2020-08-26

## 2020-08-26 NOTE — TELEPHONE ENCOUNTER
Spoke with the nurse at [facility] who stated that they completed a UA through [facility] which showed cloudy urine, with RBCs and WBCs (this report has been faxed to us and is in Dr Virgen folder). She stated that she's checking to see if a UC is being completed. She would like to start treatment for the patient as she is complaining of dysuria and her urine is quite odorous. They use the [facility].    Please advise, thank you!    Kelsy Masters RN

## 2020-08-26 NOTE — TELEPHONE ENCOUNTER
[facility] phone call message- patient requesting to speak directly with PCP or provider.    PCP: Tonia Goel    Reason for Call: Symone RN with [facility] calling to speak with RN or PCP. ALLEN Ashby stated a fax was sent to the clinic regarding a abnormal UA and urinary frequency with burning. ALLEN Ashby would like clinic to look over document and call her back to discuss.     Additional Details:     Are you willing to speak with a nurse? Yes    Is a call back needed? Yes    Patient informed that it may take up to 2 business days to hear back from PCP:Yes    OK to leave a message on voice mail? Yes    Primary language: English      needed? No    Call taken on August 26, 2020 at 11:18 AM by Marry Duran route to PCP unless willing to speak with a nurse.

## 2020-08-27 ENCOUNTER — OFFICE VISIT (OUTPATIENT)
Dept: FAMILY MEDICINE | Facility: CLINIC | Age: 70
End: 2020-08-27
Payer: COMMERCIAL

## 2020-08-27 VITALS
OXYGEN SATURATION: 96 % | HEART RATE: 79 BPM | HEIGHT: 64 IN | BODY MASS INDEX: 38.76 KG/M2 | TEMPERATURE: 98.4 F | SYSTOLIC BLOOD PRESSURE: 153 MMHG | WEIGHT: 227 LBS | DIASTOLIC BLOOD PRESSURE: 84 MMHG | RESPIRATION RATE: 18 BRPM

## 2020-08-27 DIAGNOSIS — I10 HYPERTENSION, ESSENTIAL: ICD-10-CM

## 2020-08-27 DIAGNOSIS — M72.2 PLANTAR FASCIITIS: ICD-10-CM

## 2020-08-27 DIAGNOSIS — N30.00 ACUTE CYSTITIS WITHOUT HEMATURIA: Primary | ICD-10-CM

## 2020-08-27 RX ORDER — SULFAMETHOXAZOLE/TRIMETHOPRIM 800-160 MG
1 TABLET ORAL 2 TIMES DAILY
Qty: 6 TABLET | Refills: 0 | Status: SHIPPED | OUTPATIENT
Start: 2020-08-27 | End: 2020-09-10

## 2020-08-27 ASSESSMENT — MIFFLIN-ST. JEOR: SCORE: 1532.42

## 2020-08-27 ASSESSMENT — PATIENT HEALTH QUESTIONNAIRE - PHQ9: SUM OF ALL RESPONSES TO PHQ QUESTIONS 1-9: 5

## 2020-08-27 NOTE — PROGRESS NOTES
HPI       Pinky Page is a 70 year old  who presents for right heel pain for the past 2 weeks, and 2 days of dysuria. Pinky reports she was having symptoms of a UTI at [facility] including burning with urination and increased frequency for the past 2 days. There has been no change in urine color, however she has noticed a stronger odor. She denies back pain, abdominal pain, fevers, chills, and has had no N/V/D. Pinky has a history of UTIs which have responded well to medication. The nurse at [facility] did a UA which was sent to [facility] labs and showed cloudy urine with RBCs and WBCs present.    Pinky also has a complaint of right heel pain with walking for 2 weeks. No inciting incident. Sharp pain in about half her heel and stays in that area. Consistent pain, doesn't wax/wane. At a 7-8/10 for pain. She has taken Tylenol,used  ice packs, and elevates her foot without relief. She cannot take NSAIDs due to unspecified allergic reaction, and cannot elaborate on this.     Pinky has high blood pressure today as well as at home at times. She has no history of low blood pressures, dizziness, or syncope.       Problem, Medication and Allergy Lists were reviewed and updated if needed..    Patient is an established patient of this clinic..         Review of Systems:   Review of Systems   Constitutional: Negative.    HENT: Negative.    Eyes: Negative.    Respiratory: Negative.    Cardiovascular: Negative.    Gastrointestinal: Negative.    Endocrine: Negative.    Genitourinary: Positive for dysuria, frequency and urgency. Negative for hematuria.   Musculoskeletal: Positive for gait problem and myalgias.   Skin: Negative.    Allergic/Immunologic: Negative.    Neurological: Negative for dizziness, syncope and light-headedness.   Hematological: Negative.    Psychiatric/Behavioral: Negative.             Physical Exam:     Vitals:    08/27/20 1308 08/27/20 1310   BP: (!) 150/79 (!) 153/84   Pulse: 79    Resp: 18   "  Temp: 98.4  F (36.9  C)    TempSrc: Oral    SpO2: 96%    Weight: 103 kg (227 lb)    Height: 1.63 m (5' 4.17\")      Body mass index is 38.75 kg/m .  Vitals were reviewed and were normal  Vital signs normal except her blood pressure is elevated on initial measurement as well as repeat.      Physical Exam  Constitutional:       General: She is not in acute distress.     Appearance: She is obese. She is not ill-appearing or toxic-appearing.   HENT:      Head: Normocephalic and atraumatic.      Right Ear: External ear normal.      Left Ear: External ear normal.   Eyes:      Extraocular Movements: Extraocular movements intact.      Conjunctiva/sclera: Conjunctivae normal.   Neck:      Musculoskeletal: Normal range of motion and neck supple.   Cardiovascular:      Rate and Rhythm: Normal rate and regular rhythm.      Pulses: Normal pulses.      Heart sounds: Normal heart sounds. No murmur.   Pulmonary:      Effort: Pulmonary effort is normal.      Breath sounds: Normal breath sounds. No wheezing, rhonchi or rales.   Abdominal:      General: Bowel sounds are normal.      Palpations: Abdomen is soft.   Musculoskeletal:         General: Tenderness present. No deformity.      Right lower leg: Edema present.      Left lower leg: Edema present.      Comments: Right heel is tender to palpation on plantar surface, midway, at point of fascial attachment   Skin:     General: Skin is warm and dry.   Neurological:      General: No focal deficit present.      Mental Status: She is alert and oriented to person, place, and time.   Psychiatric:         Mood and Affect: Mood normal.         Behavior: Behavior normal.         Thought Content: Thought content normal.         Judgment: Judgment normal.         Results:   No testing ordered today    Assessment and Plan        Pinky was seen today for heel pain and concern for acute cystitis.    Diagnoses and all orders for this visit:    Acute cystitis without hematuria  -     " sulfamethoxazole-trimethoprim (BACTRIM DS) 800-160 MG tablet; Take 1 tablet by mouth 2 times daily    Plantar fasciitis  - Discussed what plantar fasciitis is and how to improve pain with ice and stretching. NSAIDs can provide good pain relief however pt has an unspecified allergy to ibuprofen and could not elaborate on what symptoms she has previously developed with ibuprofen use. I explained it would be helpful if a family member or a health aid at her residence knew more information about this allergy, as without this information I am not comfortable recommending even topical NSAID use. She can continue to use Tylenol for pain, and a list of exercises was provided to help stretch and relax the fascia, which will hopefully provide some relief. Other things that could improve her pain are weight loss, and wearing footwear with good arch support and a flat sole. If these measures do not improve her pain we discussed the next step to consider would be injections.     Hypertension  - Given her elevated blood pressures in clinic, and a recent history of elevated blood pressures at home, pt should start monitoring her blood pressures more closely as it may be necessary to increase the dose of her antihypertensive medications.   - [facility] staff should take her blood pressure at minimum 4x/week, and pt should keep a list of these values, as well as the time of day each value was taken. She should then bring this list with her to her next appointment. If her urinary issues and heel pain resolve, she can be seen for a blood pressure check alone in about 2 weeks, however if he pain continues she should be seen for a complete follow up visit.     There are no discontinued medications.    Options for treatment and follow-up care were reviewed with the patient. Pinky Page  engaged in the decision making process and verbalized understanding of the options discussed and agreed with the final plan.    Tonia Goel,  MD

## 2020-08-27 NOTE — PATIENT INSTRUCTIONS
Patient Education     Plantar Fasciitis  Plantar fasciitis is a painful swelling of the plantar fascia. The plantar fascia is a thick, fibrous layer of tissue that covers the bones on the bottom of your foot. It supports the foot bones in an arched position.  Plantar fasciitis can happen gradually or suddenly. It usually affects one foot at a time. Heel pain can be sharp, like a knife sticking into the bottom of your foot. You may feel pain after exercising, long-distance jogging, stair climbing, long periods of standing, or after standing up.  Risk factors include: non-active lifestyle, arthritis, diabetes, obesity or recent weight gain, flat foot, high arch. Wearing high heels, loose shoes, or shoes with poor arch support for long periods of time adds to the risk. This problem is commonly found in runners and dancers. It also found in people who stand on hard surfaces for long periods of time.  Foot pain from this condition is usually worse in the morning. But it often improves with walking. By the end of the day there may be a dull aching. Treatment requires short-term rest and controlling swelling. It may take up to 9 months before all symptoms go away. Rarely, a steroid injection into the foot, or surgery, may be needed.  Home care    If you are overweight, lose weight to help healing.    Choose supportive shoes with good arch support and shock absorbency. Replace athletic shoes when they become worn out. Don t walk or run barefoot.    Premade or custom-fitted shoe inserts may be helpful. Inserts made of silicone seem to be the most effective. Custom-made inserts can be provided by foot specialist, physical therapist, or orthopedist.    Premade or custom-made night splints keep the heel stretched out while you sleep. They may prevent morning pain.    Limit activities that stress the feet: jogging, prolonged standing or walking, contact sports, etc.    First thing in the morning and before sports, stretch the  bottom of your feet. Gently flex your ankle so the toes move toward your knee.    Icing may help control heel pain. Apply an ice pack to the heel for 10 to 20 minutes as a preventive. Or ice your heel after a severe flare-up of symptoms. You may repeat this every 1 to 2 hours as needed.    You may use over-the-counter pain medicine to control pain, unless another medicine was prescribed. Anti-inflammatory pain medicines, such as ibuprofen or naproxen, may work better than acetaminophen. If you have chronic liver or kidney disease or ever had a stomach ulcer or gastrointestinal bleeding, talk with your healthcare provider before using these medicines.  Follow-up care  Follow up with your healthcare provider, or as advised.  Call for an appointment if pain worsens or there is no relief after a few weeks of home treatment. Shoe inserts, a night splint, or a special boot may be required.  If X-rays were taken, you will be told of any new findings that may affect your care.  When to seek medical advice  Call your healthcare provider right away if any of these occur:    Foot swelling    Redness or warmth with increasing pain  Date Last Reviewed: 5/1/2018 2000-2019 [employer]. [address] [place] [address]. All rights reserved. This information is not intended as a substitute for professional medical care. Always follow your healthcare professional's instructions.           Patient Education     Ankle Dorsiflexion/Plantarflexion (Flexibility)    1. Sit on the floor or in bed with your legs straight in front of you.  2. Point both feet. Then flex both feet.  3. Do this 10 to 30 times in a row.  4. Repeat this exercise 2 times a day, or as instructed.  Date Last Reviewed: 5/1/2016 2000-2019 [employer]. [address] [place] [address]. All rights reserved. This information is not intended as a substitute for professional medical care. Always follow your healthcare  professional's instructions.

## 2020-09-08 ENCOUNTER — TELEPHONE (OUTPATIENT)
Dept: FAMILY MEDICINE | Facility: CLINIC | Age: 70
End: 2020-09-08

## 2020-09-08 ASSESSMENT — ENCOUNTER SYMPTOMS
PSYCHIATRIC NEGATIVE: 1
ALLERGIC/IMMUNOLOGIC NEGATIVE: 1
HEMATOLOGIC/LYMPHATIC NEGATIVE: 1
RESPIRATORY NEGATIVE: 1
HEMATURIA: 0
MYALGIAS: 1
FREQUENCY: 1
GASTROINTESTINAL NEGATIVE: 1
CARDIOVASCULAR NEGATIVE: 1
DIZZINESS: 0
EYES NEGATIVE: 1
CONSTITUTIONAL NEGATIVE: 1
DYSURIA: 1
LIGHT-HEADEDNESS: 0
ENDOCRINE NEGATIVE: 1

## 2020-09-08 NOTE — TELEPHONE ENCOUNTER
Called to follow up on last appt progress. Per provider  Tonia Goel MD [facility]               Would you please reach out to pt to see if her symptoms have resolved? If they have she should still be seen for a blood pressure check in the next week or two, and should bring the list of recent blood pressures with her. If her symptoms are still present please schedule her for a follow up appointment with me or another provider, again within the next week or two      Please schedule accordingly.    Pooja Jones CMA  [facility]

## 2020-09-10 ENCOUNTER — TELEPHONE (OUTPATIENT)
Dept: FAMILY MEDICINE | Facility: CLINIC | Age: 70
End: 2020-09-10

## 2020-09-10 ENCOUNTER — OFFICE VISIT (OUTPATIENT)
Dept: FAMILY MEDICINE | Facility: CLINIC | Age: 70
End: 2020-09-10
Payer: COMMERCIAL

## 2020-09-10 VITALS
RESPIRATION RATE: 16 BRPM | HEART RATE: 76 BPM | WEIGHT: 230.4 LBS | BODY MASS INDEX: 39.34 KG/M2 | OXYGEN SATURATION: 96 % | TEMPERATURE: 98.6 F | SYSTOLIC BLOOD PRESSURE: 160 MMHG | DIASTOLIC BLOOD PRESSURE: 91 MMHG

## 2020-09-10 DIAGNOSIS — I10 HYPERTENSION, ESSENTIAL: ICD-10-CM

## 2020-09-10 DIAGNOSIS — B96.89 BACTERIAL VAGINOSIS: ICD-10-CM

## 2020-09-10 DIAGNOSIS — N30.01 ACUTE CYSTITIS WITH HEMATURIA: Primary | ICD-10-CM

## 2020-09-10 DIAGNOSIS — N76.0 BACTERIAL VAGINOSIS: ICD-10-CM

## 2020-09-10 DIAGNOSIS — M72.2 PLANTAR FASCIITIS: ICD-10-CM

## 2020-09-10 DIAGNOSIS — N89.8 VAGINAL IRRITATION: ICD-10-CM

## 2020-09-10 PROBLEM — Z79.4 DIABETES MELLITUS, TYPE II, INSULIN DEPENDENT (H): Status: RESOLVED | Noted: 2017-03-24 | Resolved: 2020-09-10

## 2020-09-10 PROBLEM — E11.9 DIABETES MELLITUS, TYPE II, INSULIN DEPENDENT (H): Status: RESOLVED | Noted: 2017-03-24 | Resolved: 2020-09-10

## 2020-09-10 PROBLEM — N18.1 CKD (CHRONIC KIDNEY DISEASE) STAGE 1, GFR 90 ML/MIN OR GREATER: Status: RESOLVED | Noted: 2017-06-21 | Resolved: 2020-09-10

## 2020-09-10 LAB
BACTERIA: NORMAL
BACTERIA: NORMAL
BILIRUBIN UR: NEGATIVE MG/DL
BLOOD UR: ABNORMAL MG/DL
CASTS: NORMAL /LPF
CLUE CELLS: NORMAL
CRYSTAL URINE: NORMAL /LPF
EPITHELIAL CELLS UR: NORMAL /LPF (ref 0–2)
GLUCOSE URINE: NEGATIVE
KETONES UR QL: NEGATIVE MG/DL
LEUKOCYTE ESTERASE UR: ABNORMAL
MOTILE TRICHOMONAS: NEGATIVE
MUCOUS URINE: NORMAL LPF
NITRITE UR QL STRIP: NEGATIVE MG/DL
ODOR: NORMAL
PH UR STRIP: 7 [PH] (ref 4.5–8)
PH WET PREP: >4.5 (ref 3.8–4.5)
PROTEIN UR: NEGATIVE MG/DL
RBC URINE: NORMAL /HPF
SP GR UR STRIP: 1.01 (ref 1–1.03)
UROBILINOGEN UR STRIP-ACNC: ABNORMAL E.U./DL
WBC URINE: NORMAL /HPF
WBC WET PREP: NORMAL (ref 2–5)
YEAST: NORMAL

## 2020-09-10 RX ORDER — METRONIDAZOLE 500 MG/1
500 TABLET ORAL 2 TIMES DAILY
Qty: 14 TABLET | Refills: 0 | Status: SHIPPED | OUTPATIENT
Start: 2020-09-10 | End: 2020-09-17

## 2020-09-10 RX ORDER — CIPROFLOXACIN 500 MG/1
500 TABLET, FILM COATED ORAL 2 TIMES DAILY
Qty: 14 TABLET | Refills: 0 | Status: SHIPPED | OUTPATIENT
Start: 2020-09-10 | End: 2021-03-23

## 2020-09-10 RX ORDER — LOSARTAN POTASSIUM 100 MG/1
50 TABLET ORAL 2 TIMES DAILY
Qty: 120 TABLET | Refills: 2 | Status: SHIPPED | OUTPATIENT
Start: 2020-09-10 | End: 2021-08-31

## 2020-09-10 ASSESSMENT — ENCOUNTER SYMPTOMS
GASTROINTESTINAL NEGATIVE: 1
ENDOCRINE NEGATIVE: 1
PSYCHIATRIC NEGATIVE: 1
EYES NEGATIVE: 1
ALLERGIC/IMMUNOLOGIC NEGATIVE: 1
FLANK PAIN: 0
HEMATOLOGIC/LYMPHATIC NEGATIVE: 1
CONSTITUTIONAL NEGATIVE: 1
NEUROLOGICAL NEGATIVE: 1
RESPIRATORY NEGATIVE: 1
CARDIOVASCULAR NEGATIVE: 1
FREQUENCY: 0
LIGHT-HEADEDNESS: 0
DIZZINESS: 0

## 2020-09-10 NOTE — Clinical Note
"Please call pt's living facility ([facility]) and update 3rd floor nurse with the following message: \"Pinky's urine is still showing signs of an infection. I have sent a prescription for ciprofloxacin to her pharmacy to be delivered to [facility]. She will need to take this antibiotic 2 times a day, for 7 days. If the urine culture shows this infection is not susceptible to ciprofloxacin, I will send a new prescription and update you at that time. Please separate giving this antibiotic from any Milk of Magnesia or Maalox by 3-4 hours. Pinky's labs were otherwise negative for a vaginal infection.\"  Thank you!"

## 2020-09-10 NOTE — Clinical Note
This patient has a history of breast cancer, s/p mastectomy in 1996. There are no records in her chart giving any details about the type of breast cancer she had, what treatment she received, and what sort of follow up is recommended. Would you please see if you can track any of this information down? Thank you!

## 2020-09-10 NOTE — PROGRESS NOTES
"       HPI       Pinky Page is a 70 year old  who presents for   Chief Complaint   Patient presents with     Foot Injury     follow up right heel pain      Hypertension     Vaginal Itching     burning in vagina x 2 days     Heel pain improving with plantar fasciitis exercises, now 5/10. Taking Tylenol \"as little as possible\" typically once every other day. She finds her foot feels good while she does the exercises as well as afterward.    Pt brought copies of her morning blood pressure readings. They're fairly labile, ranging from 128/70 to 160/78. Each blood pressure recording is from the morning and is taken immediately prior to her medications being distributed, typically around 9099-2323. She reports no episodes of dizziness or syncope.     New complaint of vaginal burning and itching, with some dryness. Pt recently took an antibiotic for a UTIc, which she finished on 8/30, and vaginal symptoms started 3 days ago. She has had similar symptoms in the past but it was several years ago and she cannot recall what was used to treat symptoms at that time. Pinky has a history of breast cancer, post mastectomy, and cannot recall whether or not her cancer was hormone positive. She does believe she has used topical estrogen cream in the past. She has a history of yeast infections but has not noticed any new discharge and thinks this feels different from her past yeast infections. She thinks this feels different to the pain she was having with the UTI recently.       +++++++      Problem, Medication and Allergy Lists were reviewed and updated if needed..    Patient is an established patient of this clinic..         Review of Systems:   Review of Systems   Constitutional: Negative.    HENT: Negative.    Eyes: Negative.    Respiratory: Negative.    Cardiovascular: Negative.    Gastrointestinal: Negative.    Endocrine: Negative.    Genitourinary: Negative for flank pain, frequency, pelvic pain and vaginal discharge.        " Vaginal irritation    Musculoskeletal:        Right heel pain-improving   Skin: Negative.    Allergic/Immunologic: Negative.    Neurological: Negative.  Negative for dizziness, syncope and light-headedness.   Hematological: Negative.    Psychiatric/Behavioral: Negative.             Physical Exam:     Vitals:    09/10/20 1007 09/10/20 1008   BP: (!) 173/90 (!) 160/91   Pulse:  76   Resp: 16 16   Temp: 98.6  F (37  C) 98.6  F (37  C)   TempSrc: Oral Oral   SpO2: 96% 96%   Weight: 104.5 kg (230 lb 6.4 oz) 104.5 kg (230 lb 6.4 oz)     Body mass index is 39.34 kg/m .    Vital signs normal except for elevated blood pressure, including on repeat.      Physical Exam  Constitutional:       Appearance: She is obese.   HENT:      Head: Normocephalic.      Right Ear: External ear normal.      Left Ear: External ear normal.   Eyes:      Extraocular Movements: Extraocular movements intact.      Conjunctiva/sclera: Conjunctivae normal.   Neck:      Musculoskeletal: Normal range of motion.   Cardiovascular:      Rate and Rhythm: Normal rate. Rhythm irregular.      Heart sounds: No murmur.   Pulmonary:      Effort: Pulmonary effort is normal.      Breath sounds: Normal breath sounds.   Abdominal:      General: Bowel sounds are normal.      Palpations: Abdomen is soft.   Genitourinary:     Exam position: Supine.      Vagina: Erythema present.      Cervix: Discharge and erythema present.      Uterus: Absent.       Comments: Mild erythema present along vaginal wall and near cervix  Musculoskeletal: Normal range of motion.   Skin:     General: Skin is warm and dry.      Capillary Refill: Capillary refill takes less than 2 seconds.   Neurological:      General: No focal deficit present.      Mental Status: She is alert and oriented to person, place, and time.   Psychiatric:         Mood and Affect: Mood normal.         Behavior: Behavior normal.         Thought Content: Thought content normal.         Judgment: Judgment normal.            Results:      Results from this visit  Results for orders placed or performed in visit on 09/10/20   Wet Prep ([facility])     Status: None   Result Value Ref Range    Yeast Wet Prep None none    Motile Trichomonas Wet Prep Negative Negative    Clue Cells Wet Prep Present >20% NONE    WBC WET PREP 5-10 2 - 5    Bacteria Wet Prep Few None    pH Wet Prep >4.5 3.8 - 4.5    Odor Wet Prep None NONE   Urinalysis, Micro If (UA) ([facility])     Status: Abnormal   Result Value Ref Range    Specific Gravity Urine 1.015 1.005 - 1.030    pH Urine 7.0 4.5 - 8.0    Leukocyte Esterase UR 2+ (A) NEGATIVE    Nitrite Urine Negative NEGATIVE mg/dL    Protein UR Negative NEGATIVE mg/dL    Glucose Urine Negative NEGATIVE    Ketones Urine Negative NEGATIVE mg/dL    Urobilinogen mg/dL 0.2 E.U./dL 0.2 E.U./dL E.U./dL    Bilirubin UR Negative NEGATIVE mg/dL    Blood UR 1+ (A) NEGATIVE mg/dL       Assessment and Plan        Pinky was seen today for foot injury, hypertension and vaginal itching.    Diagnoses and all orders for this visit:    Acute cystitis with hematuria  Pinky was seen in clinic 2 weeks ago for acute cystitis with hematuria which has not been adequately treated. A different antibiotic (ciprofloxacin) for a longer course may better treat this infection. A urine culture was sent for susceptibilities, and her living facility will be updated on her treatment needs.   -     Urinalysis, Micro If (UA) ([facility])  -     Urine Microscopic (UA) ([facility])  -     Urine Culture Aerobic Bacterial  -     ciprofloxacin (CIPRO) 500 MG tablet; Take 1 tablet (500 mg) by mouth 2 times daily for 7 days    Vaginal irritation  Bacterial vaginosis  Pinky's living facility will be updated with this result        -     Wet Prep ([facility]): evidence of BV  -     Offered and declined topical med. Prefers metroNIDAZOLE (FLAGYL) 500 MG tablet; Take 1 tablet (500 mg) by mouth 2 times daily for 7 days  -     Would avoid topical estrogen in pt with remote h/o  breast CA without documentation of hormone sensitivity.    Hypertension, essential  Given Pinky continues to have some daily blood pressures within her goal range, and an extensive medication list, I am hesitant to add another blood pressure medication at this time. Her care facility should continue to take her blood pressure 4 times a week, and she should bring these values with her to future appointments so that we can continue to watch her pressures. If they become more outside of goal, we can readdress the need to add a new medication at that time. For now, it may be helpful to split her Losartan from 100mg daily to 50mg BID, that it may provide more consistent control.   -     losartan (COZAAR) 100 MG tablet; Take 0.5 tablets (50 mg) by mouth 2 times daily    Plantar fasciitis  Improving with current plan of care    q 3 month follow-up advised     Medications Discontinued During This Encounter   Medication Reason     losartan (COZAAR) 100 MG tablet      sulfamethoxazole-trimethoprim (BACTRIM DS) 800-160 MG tablet        Options for treatment and follow-up care were reviewed with the patient. Pinky Page  engaged in the decision making process and verbalized understanding of the options discussed and agreed with the final plan.    Tonia Goel MD

## 2020-09-10 NOTE — TELEPHONE ENCOUNTER
Rn spoke to technician at [facility] who confirmed they do have the rx and are filling it now.     RN then spoke to Christiano at [facility] who confirmed that he talked to them and are sending the prescription over ton[facility]

## 2020-09-10 NOTE — PROGRESS NOTES
Preceptor Attestation:   Patient seen, evaluated and discussed with the resident. I have verified the content of the note, which accurately reflects my assessment of the patient and the plan of care.   Supervising Physician:  Denise Tejeda MD

## 2020-09-10 NOTE — PATIENT INSTRUCTIONS
Here is the plan from today's visit    1. Acute cystitis with hematuria  - repeat UA today to ensure resolution of urine infection    2. Hypertension, essential  Blood pressures have been somewhat elevated at home, however you are also having some blood pressures within your goal range. We will try to change your Losartan to a lower dose two times a day, and hopefully this will provide better coverage. Continue to have your blood pressure taken 4 times a week and bring these numbers with you to future appointments.  - losartan (COZAAR) 100 MG tablet; Take 0.5 tablets (50 mg) by mouth 2 times daily  Dispense: 120 tablet; Refill: 2    3. Vaginal irritation  This may be a symptom of an unresolved urine infection, or it could be a yeast infection, or a bacterial imbalance from your recent antibiotics. We are running tests to see if any of these are the cause of your irritation. This may also be the result of low vaginal estrogen. Given your history of   - Wet Prep ([facility])      Please call or return to clinic if your symptoms don't go away.    Follow up plan  Please make a clinic appointment for follow up with me (ZAK ARMENTA) in 3  months for medication follow up or sooner if needed.    Thank you for coming to [facility] today.  Lab Testing:  **If you had lab testing today and your results are reassuring or normal they will be mailed to you or sent through [employer] within 7 days.   **If the lab tests need quick action we will call you with the results.  The phone number we will call with results is # [phone] (home) . If this is not the best number please call our clinic and change the number.  Medication Refills:  If you need any refills please call your pharmacy and they will contact us.   If you need to  your refill at a new pharmacy, please contact the new pharmacy directly. The new pharmacy will help you get your medications transferred faster.   Scheduling:  If you have any concerns about  today's visit or wish to schedule another appointment please call our office during normal business hours [phone] (8-5:00 M-F)  If a referral was made to a [facility] and you don't get a call from central scheduling please call [phone].  If a Mammogram was ordered for you at [facility] call [phone] to schedule or change your appointment.  If you had an XRay/CT/Ultrasound/MRI ordered the number is [phone] to schedule or change your radiology appointment.   Medical Concerns:  If you have urgent medical concerns please call [phone] at any time of the day.    Tonia Goel MD

## 2020-09-10 NOTE — TELEPHONE ENCOUNTER
[facility] phone call message- medication clarification/question:    Full Medication Name: metroNIDAZOLE (FLAGYL) 500 MG tablet      Dose: Take 1 tablet (500 mg) by mouth 2 times daily for 7 days - Oral     Question/Clarification needed: Script was not received by pharmacy, please re send      Pharmacy confirmed as   [facility] - [place], [place] - [address]  [address]  [facility] [address]  Phone: [phone] Fax: [phone]  : Yes    Please leave ONLY preferred pharmacy    OK to leave a message on voice mail? Yes    Advised patient that RN would call back within 3 hours, unless emergent.    Primary language: English      needed? No    Call taken on September 10, 2020 at 4:41 PM by Caro Castro    Route to [facility]

## 2020-09-11 ENCOUNTER — TELEPHONE (OUTPATIENT)
Dept: FAMILY MEDICINE | Facility: CLINIC | Age: 70
End: 2020-09-11

## 2020-09-11 NOTE — TELEPHONE ENCOUNTER
Left message to call direct line for location of surgery. If received at another healthcare system we will need a MERI to transfer medical records.    Pooja Jones CMA  [facility] Coordinator

## 2020-09-12 LAB
BACTERIA SPEC CULT: NORMAL
Lab: NORMAL
SPECIMEN SOURCE: NORMAL

## 2020-09-15 ENCOUNTER — VIRTUAL VISIT (OUTPATIENT)
Dept: PSYCHOLOGY | Facility: CLINIC | Age: 70
End: 2020-09-15
Payer: COMMERCIAL

## 2020-09-15 DIAGNOSIS — F33.0 MAJOR DEPRESSIVE DISORDER, RECURRENT EPISODE, MILD (H): Primary | ICD-10-CM

## 2020-09-15 DIAGNOSIS — F54 PSYCHOLOGICAL FACTORS AFFECTING MEDICAL CONDITION: ICD-10-CM

## 2020-09-15 DIAGNOSIS — F41.1 GENERALIZED ANXIETY DISORDER: ICD-10-CM

## 2020-09-15 DIAGNOSIS — E66.9 OBESITY, UNSPECIFIED OBESITY SEVERITY, UNSPECIFIED OBESITY TYPE: ICD-10-CM

## 2020-09-15 NOTE — PROGRESS NOTES
"    Health Psychology      Clinic    Department of Medicine  Keri Arnett, Ph.D., L.P. [phone]            [facility]  [facility] Madelaine Ingram, Ph.D., L.P. [phone] 3rd Floor  [place] Mail Code 744   Denise Low, Ph.D.  [phone]    [address] Miryam Woods, Ph.D.,  L.P. [phone]     [place] [address]           Dennis Dillard, Ph.D. [phone]      Rea Eugene, Ph.D., L.P. [phone]    Damon Gr, Ph.D., A.B.P.P., L.P. [phone]     Kimber Morales, Ph.D., L.P. 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 She does not have access to video telehealth.  She wished to get a call.     Pinky reports her anxiety is at reasonable levels and has come down and her depression is at baseline.   She feels safe at [facility] during the pandemic and is not aware of any cases in residents or staff.  She is pleased that they are taking it seriously at the [facility] and anticipates being tested in the future along with residents and staff. She wears a mask except for when in her room.  She shares her room with a roommate.  They are getting along well.   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    She had a good experience at [facility] another tooth that had broken.   She has cut back on exercise due to the recent heat.     She states weighed 229 up from 227 last Friday at [facility].  The increase is in part to decreased exercise die to an injury. She has a sore heel so had to reduce her exercise.  She is  using the bicycle 30-40 minutes  x2-3/week.  She iswalking less.  She tentatively agrees to increase the exercise when her heel " heals.    She is in touch with her family by phone but can't actually see them.  She understand the reasoning.     Her  is a nurse who recentlytook over that role with her. Rapport is positive.   She wishes to continue services.     This telephone service is appropriate and effective for delivering services in light of the necessity for social distancing to mitigate the COVID-19 epidemic and for conservation of PPE.     Patient has agreed to receiving telephone services after being informed about it: Yes    Patient prefers video invitation/information to be sent by:   email    Time service started: 2:02  Time service ended: 2:57    Mode of transmission: Telephone    Location of originating:  [facility]    Distance site:  Home office of provider for [facility]    The patient has been notified that:  Video visits will be conducted via a call with their psychologist to provide the care they need with a video conversation. Video visits may be billed at different rates depending on insurance coverage.  Patients are advised to please contact their insurance provider with any questions about their health insurance coverage. If during the course of a call the psychologist feels a video visit is not appropriate, patients will not be charged for this service.      Diagnosis:   Major Depression, recurrent mild (F33.0)   Psychological Factors Affecting Morbid Obesity (F54)  Generalized anxiety (F41.1)       Plan:  I will call her at [facility] 10/19 @ 1 for a telephone session at her request tfor supportive counseling due to the serious and persistent nature of her depression and anxiety, consistent with treatment plan.  Last treatment plan signed:5/21/20  Treatment plan due: 5/21/21 (next time present  In session)..  May contact her manager to email  one.                            Damon Gr, PhD LP,  A.B.P.P.  Director, Health Psychology  [phone]

## 2020-09-16 ENCOUNTER — TRANSFERRED RECORDS (OUTPATIENT)
Dept: HEALTH INFORMATION MANAGEMENT | Facility: CLINIC | Age: 70
End: 2020-09-16

## 2020-09-16 LAB
CHOLEST SERPL-MCNC: 106 MG/DL
CREAT SERPL-MCNC: 0.94 MG/DL (ref 0.5–1)
GFR SERPL CREATININE-BSD FRML MDRD: 61 ML/MIN/1.73M2
GLUCOSE SERPL-MCNC: 73 MG/DL (ref 70–100)
HBA1C MFR BLD: 6.6 % (ref 4–6)
HDLC SERPL-MCNC: 42 MG/DL
LDLC SERPL CALC-MCNC: 46 MG/DL
NONHDLC SERPL-MCNC: 64 MG/DL
POTASSIUM SERPL-SCNC: 4.3 MEQ/L (ref 3.5–5.3)
TRIGL SERPL-MCNC: 89 MG/DL

## 2020-09-17 ENCOUNTER — MEDICAL CORRESPONDENCE (OUTPATIENT)
Dept: HEALTH INFORMATION MANAGEMENT | Facility: CLINIC | Age: 70
End: 2020-09-17

## 2020-09-18 ENCOUNTER — TELEPHONE (OUTPATIENT)
Dept: PSYCHIATRY | Facility: CLINIC | Age: 70
End: 2020-09-18

## 2020-09-18 NOTE — TELEPHONE ENCOUNTER
10 faxed pages were received from [facility] requesting the review and signature of medication list and plan of care. The forms are being held in psychiatry until complete. Renee Hartman MA

## 2020-09-23 ENCOUNTER — VIRTUAL VISIT (OUTPATIENT)
Dept: PSYCHIATRY | Facility: CLINIC | Age: 70
End: 2020-09-23
Attending: PSYCHIATRY & NEUROLOGY
Payer: COMMERCIAL

## 2020-09-23 DIAGNOSIS — G47.09 OTHER INSOMNIA: ICD-10-CM

## 2020-09-23 DIAGNOSIS — F25.1 SCHIZOAFFECTIVE DISORDER, DEPRESSIVE TYPE (H): ICD-10-CM

## 2020-09-23 RX ORDER — LORAZEPAM 0.5 MG/1
0.5 TABLET ORAL AT BEDTIME
Qty: 30 TABLET | Refills: 2 | Status: SHIPPED | OUTPATIENT
Start: 2020-09-23 | End: 2020-11-18

## 2020-09-23 RX ORDER — ZIPRASIDONE HYDROCHLORIDE 40 MG/1
40 CAPSULE ORAL 2 TIMES DAILY WITH MEALS
Qty: 60 CAPSULE | Refills: 2 | Status: SHIPPED | OUTPATIENT
Start: 2020-09-23 | End: 2020-11-18

## 2020-09-23 NOTE — TELEPHONE ENCOUNTER
Completed signed forms were returned to this writer and faxed to [facility] at [phone] attn: 3rd floor nursing. The original copies were sent to christelle.Nataliya Doyle LPN

## 2020-09-23 NOTE — PROGRESS NOTES
TELEPHONE VISIT  Pinky Page is a 69 year old pt. who is being evaluated via a billable telephone visit.      The patient has been notified of the following:    We have found that certain health care needs can be provided without the need for a physical exam. This service lets us provide the care you need with a short phone conversation. If a prescription is necessary we can send it directly to your pharmacy. If lab work is needed we can place an order for that and you can then stop by our lab to have the test done at a later time. Insurers are generally covering virtual visits as they would in-office visits so billing should not be different than normal.  If for some reason you do get billed incorrectly, you should contact the billing office to correct it and that number is in the AVS .    Patient has given verbal consent for a telephone visit?:  Yes   How would the pt like to obtain the AVS?:  Patient declined  AVS SmartPhrase [PsychAVS] has been placed in 'Patient Instructions':  N/A     Start Time: 2:00PM       End Time:  2:30PM       [facility]  Psychiatry Clinic  PSYCHIATRY PROGRESS NOTE     Date of initial Diagnostic Assessment (DA) is 5/13/13 and most recent Transfer of Care DA is 12/11/19.    CARE TEAM:  PCP- Cary Brownlee  (resident working at [facility] with Dr. Birmingham, attending)   Specialty Providers- no    Therapist- Dr. Gr    [facility] Team- no          Pinky Page is a 70 year old female who prefers the name Pinky & pronouns she, her, hers.    Pertinent Background: This patient first experienced mental health issues in her twenties and has received treatment for schizoaffective disorder and generalized anxiety. Notably, Pinky's symptoms of depression and psychosis have responded well to a combination of ECT and medication management. Pinky has a history of experiencing increasingly frequent psychotic symptoms with previously attempted antipsychotic tapers.       Psych critical item history includes psychosis [sxs include disorganization, hallucinations, paranoia], mutiple psychotropic trials, psych hosp (3-5) and ECT.    INTERIM HISTORY      [4, 4]   The patient reports good treatment adherence.  History was provided by the patient who was a good historian.    The last visit ended with no change to the med regimen.   Since the last visit:   -States that there have been no cases of COVID at her residence so far. All residents got tested 3 Fridays in a row and everyone was negative.   -Had another broken tooth which has been replaced with a filling.   -Has been staying busy with watching TV, going to groups, doing word searches, coloring, writing letters, and sending birthday cards to family. She got a new  who is nice. They have gone on a couple of walks together. Stays active by walking inside and outside and by using the stationary bike regularly.   -Saw a doctor for recent heel pain. Determined it was due to a tendon or muscle, so Pinky has been doing prescribed exercises.  -Her sleep has been on and off this week. One night this week didn't sleep at all. Felt very tired the next day. Normally sleeps 7-8 hours a night. Reports difficulty initiating sleep at night where it will take her at least 2 hours to fall asleep. If she can't sleep, ruminates and lays in bed for hours. Does not drink any caffeine after lunch. Does not exercise after a walk after supper around 7PM. Naps sometimes during the day when she has a scheduled time to elevate her legs to reduce edema. States that staff and her roommate try to wake her up because she is not supposed to nap.   -Has been using her CPAP for about 4 hours each night. Staff have permitted her to sleep without it if she has persistent difficulty falling asleep.   -Reports unchanged occasional mood swings.  -Worries sometimes about her physical condition and about having to leave her residence in the future when she  requires a higher level of care.  -Denies any irritability, hallucinations, or suicidal ideation.  -Her mood has been good.  -States that she has a dry mouth but no other medication side effects.      RECENT PSYCH ROS:   Depression: REPORTS: insomnia, low energy DENIES: suicidal ideation, depressed mood, anhedonia, hypersomnia, appetite changes, weight changes and poor concentration /memory  Elevated: DENIES: increased energy, decreased sleep need, increased activity and grandiosity  Psychosis: DENIES: delusions, auditory hallucinations, visual hallucinations and disorganized behavior  Anxiety: REPORTS: none DENIES: excessive worry, feeling fearful and nervous/overwhelmed  Panic Attack:  none  Trauma Related:  none  Dysregulation: DENIES: irritable  Eating Disorder: no   Pertinent Negative Symptoms:  NO suicidal and violent ideation, aggression, psychosis, hallucinations, delusions and alberta    RECENT SUBSTANCE USE:     ALCOHOL- no      TOBACCO- no     CAFFEINE- coffee/ tea [coffee, diet Pepsi]. 1 cup of coffee in the morning, 1 can diet coke in the afternoon, 1 decaf coffee at lunch. doesn't drink regular coffee after 1pm.   OPIOIDS- no         NARCAN KIT- no        CANNABIS- no            OTHER ILLICIT DRUGS- none    CURRENT SOCIAL HISTORY:  FINANCIAL SUPPORT- social security disability       CHILDREN- no      LIVING SITUATION- Lives at [facility].      SOCIAL/ SPIRITUAL SUPPORT- sisters, support staff at [facility], other residents at [facility], other providers     FEELS SAFE AT HOME- yes     PSYCH and CD Critical Summary Points since July 2020 7/22/20-no changes  9/23- no changes    PAST MED TRIALS          See last Diagnostic Assessment    MEDICAL / SURGICAL HISTORY          Patient Active Problem List   Diagnosis     Allergic rhinitis due to pollen     Urge incontinence     Hypertension, essential     Cardiomegaly     Chronic constipation     Dry eye syndrome     Esophageal reflux      Exposure keratoconjunctivitis     DM ophthalmopathy (H)     Hypothyroidism     Senile cataract     ANUJ (obstructive sleep apnea)     Vaginal atrophy     Restless leg syndrome     Squamous blepharitis     Morbid obesity due to excess calories (H)     Personal history of breast cancer s/p L masectomy     Hypercholesteremia     Lives in group home     Extrapyramidal and movement disorder     CKD (chronic kidney disease) stage 2, GFR 60-89 ml/min     Solitary kidney, congenital     S/P hysterectomy     Impingement syndrome of right shoulder     Hypertriglyceridemia     Candidiasis of skin     Generalized anxiety disorder     Carpal tunnel syndrome of left wrist     Schizoaffective disorder, depressive type (H)     Major depressive disorder, recurrent episode, moderate (H)     Psychological factors affecting medical condition     Hx of psychiatric care     Nail complaint     Microalbuminuria due to type 2 diabetes mellitus (H)     Type 2 diabetes mellitus with microalbuminuria, with long-term current use of insulin (H)     Conjunctivitis of both eyes     Corneal erosion of both eyes     Spastic entropion, right     Foot callus       Past Surgical History:   Procedure Laterality Date     APPENDECTOMY       C MASTECTOMY,SIMPLE  1996    Left mastectomy  [facility]. Had normal FU mammo 5/2007. Follow yearly.      C TOTAL ABDOM HYSTERECTOMY  7/2003    Dr. Castanon, for abnormal bleeding. Removal of both tubes with BSO for myoma w - - -     CHOLECYSTECTOMY       COLONOSCOPY  5/2005    Complete Colonoscopy-had one small polyp removed 5/2005.      COLONOSCOPY N/A 3/31/2016    Procedure: COLONOSCOPY;  Surgeon: Cary Ring MD;  Location: [facility]     COLONOSCOPY N/A 7/7/2016    Procedure: COLONOSCOPY;  Surgeon: Cary Ring MD;  Location: [facility]     DILATION AND CURETTAGE       HYSTERECTOMY       MASTECTOMY      Left breast       MEDICAL REVIEW OF SYSTEMS    [2, 10]     REPORTS: dry mouth  DENIES: palpitations, chest pain, loss of consciousness, dyspnea, dizziness, fever, muscle stiffness, muscle twitching, Parkinsonian symptoms     ALLERGY       Chlordiazepoxide hcl; Dimetapp dm cold-cough; Haldol; Ibuprofen; Lactose intolerance [beta-galactosidase]; Milk products; and Propofol  MEDICATIONS        Current Outpatient Medications   Medication Sig Dispense Refill     acetaminophen (TYLENOL) 500 MG tablet Take 2 tablets (1,000 mg) by mouth every 6 hours as needed 1 Bottle 2     alum & mag hydroxide-simethicone (MYLANTA/MAALOX) 200-200-20 MG/5ML SUSP suspension Take 30 mLs by mouth daily as needed for indigestion       amLODIPine (NORVASC) 10 MG tablet Take 10 mg by mouth daily       artificial saliva (BIOTENE MT) AERS spray Take 1 spray by mouth 3 times daily as needed for dry mouth       ASPIRIN 81 MG OR TABS Take 1 tablet by mouth daily. ENTERIC COATED. 100 3     atorvastatin (LIPITOR) 40 MG tablet Take 1 tablet (40 mg) by mouth daily 90 tablet 1     BD AUTOSHIELD DUO 30G X 5 MM        blood glucose (NO BRAND SPECIFIED) lancets standard Use to test blood sugar 2 times daily or as directed. 100 each 11     blood glucose calibration (NO BRAND SPECIFIED) solution Use to calibrate blood glucose monitor as directed. 1 each 3     blood glucose monitoring (NO BRAND SPECIFIED) meter device kit Check Blood sugars twice a day. 1 kit 0     blood glucose monitoring (NO BRAND SPECIFIED) test strip Use to test blood sugar 3 times daily or as directed. 100 each 3     Calcium Carbonate-Vitamin D (CALCIUM-VITAMIN D) 250-125 MG-UNIT TABS Take 1 tablet by mouth 2 times daily. Calcium 250 mg/Vit D 125 IU       calcium polycarbophil (FIBERCON) 625 MG tablet Take 2 tablets by mouth daily       ciprofloxacin (CIPRO) 500 MG tablet Take 1 tablet (500 mg) by mouth 2 times daily 14 tablet 0     CLARITIN 10 MG OR TABS 1 TAB PO QD (Once per day) as needed for ALLERGY SYMPTOMS 30 11     FLUoxetine (PROZAC) 20 MG capsule Take 2  capsules (40 mg) by mouth daily 60 capsule 2     fluticasone (FLONASE) 50 MCG/ACT nasal spray Spray 1 spray into both nostrils daily 16 g 3     furosemide (LASIX) 20 MG tablet Take 1 tablet (20 mg) by mouth 2 times daily 60 tablet 3     Gabapentin (NEURONTIN PO) Take 900 mg by mouth 3 times daily.       glucagon (GLUCAGON EMERGENCY) 1 MG kit Inject 1 mg into the muscle once for 1 dose 2 mg 3     Hypromellose (ARTIFICIAL TEARS OP) Apply 1 drop to eye 4 times daily.       insulin glargine (LANTUS) 100 UNIT/ML injection Inject 20 Units Subcutaneous At Bedtime 8 mL 11     lactase (LACTAID) 3000 UNIT tablet Take 4 tabs daily with meals.       levothyroxine (SYNTHROID, LEVOTHROID) 175 MCG tablet Take 1 tablet (175 mcg) by mouth daily Give on empty stomach 30 tablet 4     levothyroxine (SYNTHROID/LEVOTHROID) 175 MCG tablet Take 175 mcg by mouth daily       liraglutide (VICTOZA) 18 MG/3ML solution Inject 1.8 mg Subcutaneous daily 9 mL 0     LORazepam (ATIVAN) 0.5 MG tablet Take 1 tablet (0.5 mg) by mouth At Bedtime 30 tablet 2     losartan (COZAAR) 100 MG tablet Take 0.5 tablets (50 mg) by mouth 2 times daily 120 tablet 2     Magnesium Hydroxide (MILK OF MAGNESIA PO) Take  by mouth. Take 30 mL as needed for constipation.       melatonin 3 MG CAPS Take 6 mg by mouth At Bedtime 60 capsule 2     metFORMIN (GLUCOPHAGE) 1000 MG tablet Take 1,000 mg by mouth 2 times daily (with meals)       nystatin (MYCOSTATIN) 010667 UNIT/GM POWD Apply topically 3 times daily as needed 60 g 1     ONETOUCH DELICA LANCETS 33G MISC        polyethylene glycol (MIRALAX/GLYCOLAX) powder Take 1 capful by mouth 2 times daily. 17 GM PO BID       Saline 0.9 % SOLN Spray 2 sprays in nostril as needed.       senna-docusate (SENNA S) 8.6-50 MG per tablet Take 2 tablets by mouth At Bedtime.       simethicone (MYLICON) 125 MG chewable tablet Take 1 tablet (125 mg) by mouth 2 times daily 60 tablet 0     Skin Protectants, Misc. (EUCERIN) cream Apply  topically  "as needed. Apply to thigh PRN dry skin        SM LUBRICANT EYE DROPS 0.4-0.3 % SOLN ophthalmic solution        Sod Fluor-Solo Fluor-Hydrfl Ac (GEL-SASHA DENTINBLOC DT) Apply  to affected area. GEL. Apply to 2nd molar on bottom right daily at bedtime.       solifenacin (VESICARE) 10 MG tablet Take 1 tablet (10 mg) by mouth daily 30 tablet 6     terbinafine (LAMISIL) 1 % external cream Apply topically 2 times daily       ziprasidone (GEODON) 40 MG capsule Take 1 capsule (40 mg) by mouth 2 times daily (with meals) 60 capsule 2     VITALS      [3, 3]   LMP  (LMP Unknown)    MENTAL STATUS EXAM      [9, 14 cog gs]     Alertness: alert  and oriented  Appearance: could not assess  Behavior/Demeanor: cooperative, pleasant and calm, could not assess eye contact   Speech: normal and regular rate and rhythm  Language: intact and no problems  Psychomotor: normal or unremarkable  Mood: \"good\"  Affect: sounded full range; was congruent to mood; was congruent to content  Thought Process/Associations: unremarkable  Thought Content:  Reports none;  Denies suicidal and violent ideation and delusions  Perception:  Reports none;  Denies auditory hallucinations and visual hallucinations  Insight: good  Judgment: good  Cognition: (6) does  appear grossly intact; formal cognitive testing was not done  Gait and Station: could not assess    LABS and DATA     AIMS:  last done 3/6/19 with score(s): 3;  due at next in-person visit    PHQ9 TODAY = not completed  PHQ-9 SCORE 1/9/2020 3/2/2020 8/27/2020   PHQ-9 Total Score - - -   PHQ-9 Total Score 7 7 5     ANTIPSYCHOTIC LABS  [glu, A1C, lipids (danika LDL), liver enzymes, WBC, ANEU, Hgb, plts]  q12 mo  Recent Labs   Lab Test 03/02/20  1420 08/06/19  0941 05/07/19  0908 05/07/19  0650 11/07/18  1531 10/25/18  1734 05/03/18  1414  06/21/17  1047   GLC  --   --  109*  --   --  147* 192.6*  --  134*   A1C 6.6* 6.6*  --  6.8* 7.4*  --  6.7*   < >  --     < > = values in this interval not displayed. "     Recent Labs   Lab Test 05/07/19  0650 05/03/18  1414 03/21/17  1023 02/22/16  1028   CHOL 138 126 142 162.0   TRIG 96 148 121 151.9*   LDL 79 60 78 95   HDL 40 37* 40* 36.6*     Recent Labs   Lab Test 06/26/18  0659 06/26/18 03/21/17  1023 01/22/15  1600   AST 16 16 13 18   ALT 21 21 20 35   ALKPHOS 111 111 92 113     Recent Labs   Lab Test 05/07/19  0650 10/25/18  1734 06/26/18  0659 06/21/17  1047 03/21/17  1023  07/04/14  0705   WBC 10.06* 14.2* 9.72  --  11.8*   < > 7.7   ANEU  --  10.6* 5.31  --  8.3  --  4.8   HGB 13.3 14.0 12.6 13.8 13.4   < > 13.3    275 247  --  266   < > 223    < > = values in this interval not displayed.     PSYCHOTROPIC DRUG INTERACTIONS     ZIPRASIDONE and QT INTERVAL PROLONGING DRUGS may result in increased risk of QT-interval prolongation.  ZIPRASIDONE and SEROTONERGIC DRUGS THAT PROLONG QT INTERVAL may result in increased risk of QT-interval prolongation and increased risk of serotonin syndrome (hypertension, hyperthermia, myoclonus, mental status changes).  NSAID and SSRI may result in an increased risk of bleeding  ERYTHROMYCIN and FLUOXETINE may result in an increased risk of cardiotoxicity (QT prolongation, torsades de pointes, cardiac arrest).  FLUOXETINE and INSULINS OR PRAMLINTIDE may result in increased risk of hypoglycemia.  FLUOXETINE and QT INTERVAL PROLONGING DRUGS may result in increased risk of QT-interval prolongation.   GABAPENTIN and CNS DEPRESSANTS may result in respiratory depression.   GABAPENTIN and ANTACIDS may result in decreased gabapentin effectiveness.     MANAGEMENT:  Monitoring for adverse effects, periodic EKGs and patient is aware of risks    RISK STATEMENT for SAFETY     Pinky Page did not appear to be an imminent safety risk to self or others.    PSYCHIATRIC DIAGNOSIS     Schizoaffective Disorder, depressed type, in partial remission  Generalized Anxiety Disorder    ASSESSMENT      [m2, h3]     TODAY Pinky presents for a follow-up via  telephone. Pinky describes a euthymic mood without psychotic symptoms. She has minimal anxiety. Pinky reports worse insomnia recently, including a night this week where she did not sleep at all. This does not appear to be a decreased need for sleep, as she was very tired the next day. She further describes her insomnia as difficulty initiating sleep in the evening. Denies drinking caffeine after lunch or exercising late in the evening. She does nap sometimes but knows she shouldn't. Emphasized eliminating naps, drinking camomile tea before bed, and getting up from bed and doing a boring activity if she can't fall asleep after 1 hour. Will call Pinky in 2 weeks to see if her insomnia has improved. Her Ativan, which is used for insomnia, was reduced over the past year given its risk for falls given her age. While Pinky would benefit from stopping Ativan in the future, will defer this given her recent insomnia. Her sleep difficulty is also likely impacted by her ANUJ and multiple nighttime awakenings by staff given history of nocturnal enuresis. She would likely benefit from a refitting of her CPAP mask. She may require an additional medication for insomnia. Future considerations include trazodone or low-dose mirtazapine. Records indicate that Pinky has taken trazodone many years ago but it's effect is unknown.      PLAN      [m2, h3]     1) PSYCHOTROPIC MEDICATIONS:  - Continue ziprasidone 40 mg BID  - Continue fluoxetine 40 mg daily  - Continue lorazepam 0.5 mg at bedtime  - Continue melatonin 6mg at bedtime    Gabapentin 900mg TID prescribed by primary care    2) [place]  was checked today:  Lorazepam filled at monthly intervals only from this clinic.. Gabapentin filled at monthly intervals for 30 day supplies and only prescribed by Dr. Linares.    3) THERAPY:    Yes, continue w/ Dr. Gr once a month    4) NEXT DUE:    Labs- AP labs by 05/2020  EKG- Last EKG 12/11/19 QTc 456. EKG due 12/2020   Rating Scales- AIMS due  at next in-person visit    5) REFERRALS:    No Referrals needed     6) RTC: 2 months. Check-in call with me in 2 weeks.     7) CRISIS NUMBERS:   Provided routinely in AVS   ONLY if a [facility]OBIT[facility] PT: [facility] [phone] (clinic), [phone] (after hours)     TREATMENT RISK STATEMENT:  The risks, benefits, alternatives and potential adverse effects have been discussed and are understood by the pt. The pt understands the risks of using street drugs or alcohol. There are no medical contraindications, the pt agrees to treatment with the ability to do so. The pt knows to call the clinic for any problems or to access emergency care if needed.  Medical and substance use concerns are documented above.  Psychotropic drug interaction check was done, including changes made today.    PROVIDER: María Shi MD    Patient not staffed in clinic. Supervisor is Dr. Rubio who will sign this note.    I did not see this pt directly. I have reviewed the documentation, and I agree with the resident's plan of care.    Roxy Rubio MD

## 2020-10-19 ENCOUNTER — VIRTUAL VISIT (OUTPATIENT)
Dept: PSYCHOLOGY | Facility: CLINIC | Age: 70
End: 2020-10-19
Payer: COMMERCIAL

## 2020-10-19 DIAGNOSIS — F33.0 MAJOR DEPRESSIVE DISORDER, RECURRENT EPISODE, MILD (H): Primary | ICD-10-CM

## 2020-10-19 DIAGNOSIS — F54 PSYCHOLOGICAL FACTORS AFFECTING MEDICAL CONDITION: ICD-10-CM

## 2020-10-19 DIAGNOSIS — F41.1 GENERALIZED ANXIETY DISORDER: ICD-10-CM

## 2020-10-19 PROCEDURE — 90837 PSYTX W PT 60 MINUTES: CPT | Mod: 95 | Performed by: PSYCHOLOGIST

## 2020-10-19 NOTE — PROGRESS NOTES
"    Health Psychology      Clinic    Department of Medicine  Keri Arnett, Ph.D., L.P. [phone]            Cl[facility]  [facility] Madelaine Ingram, Ph.D., L.P. [phone] 3rd Floor  [place] Mail Code 745   Denise Low, Ph.D.  [phone]    [address] Miraym Woods, Ph.D.,  L.P. [phone]     [address] [address]           Dennis Dillard, Ph.D. [phone]      Rae Euegne, Ph.D., L.P. [phone]    Damon Gr, Ph.D., A.B.P.P., L.P. [phone]     Kimber Morales, Ph.D., L.P. 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 She does not have access to video telehealth.  She wished to get a call. She got a cell phone, but prefers for me to call her again on the [facility] phone.     A cousin  after a fall.  She is sad about it.  She didn't attend the  but her sister did.    Pinky reports her anxiety is at reasonable levels and has come down and her depression is at baseline, but does continue to have varying levels of depression.   She feels safe at [facility] during the pandemic.  However, a staff member tested positive so they will be screening [facility] staff and residents again.  She is masking and keeping distant from people, washing hands, etc. She is pleased that they are taking it seriously at the [facility]. She shares her room with a roommate.  They are getting along well.   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  It is going well    She will be getting dental implants in the future.  She is waiting to hear from the [employer]     She states she weighed 228, down from 229 last session at [facility].  The increase is in part to " decreased exercise die to an injury. She has a sore heel so had to reduce her exercise, and fortunately is improving.  She is  using the bicycle 30-40 minutes just once/week, down from 2-3/week.  She is walking less.  She tentatively agrees to increase the exercise when her heel heals She recognizes that to be more successful at weight management she needs to eat less.    She is still waiting for her ballot to vote. She hopes to vote soon.    She is in touch with her family by phone but can't actually see them.  She understand the reasoning.   She realizes she won't see them on Thanksgiving.     Her  is a nurse who recently took over that role with her.She participates fully, ventilates emotional experiences.  Rapport is positive.   She wishes to continue services.    A treatment plan was completed.      Current Outpatient Medications   Medication     acetaminophen (TYLENOL) 500 MG tablet     alum & mag hydroxide-simethicone (MYLANTA/MAALOX) 200-200-20 MG/5ML SUSP suspension     amLODIPine (NORVASC) 10 MG tablet     artificial saliva (BIOTENE MT) AERS spray     ASPIRIN 81 MG OR TABS     atorvastatin (LIPITOR) 40 MG tablet     BD AUTOSHIELD DUO 30G X 5 MM     blood glucose (NO BRAND SPECIFIED) lancets standard     blood glucose calibration (NO BRAND SPECIFIED) solution     blood glucose monitoring (NO BRAND SPECIFIED) meter device kit     blood glucose monitoring (NO BRAND SPECIFIED) test strip     Calcium Carbonate-Vitamin D (CALCIUM-VITAMIN D) 250-125 MG-UNIT TABS     calcium polycarbophil (FIBERCON) 625 MG tablet     ciprofloxacin (CIPRO) 500 MG tablet     CLARITIN 10 MG OR TABS     FLUoxetine (PROZAC) 20 MG capsule     fluticasone (FLONASE) 50 MCG/ACT nasal spray     furosemide (LASIX) 20 MG tablet     Gabapentin (NEURONTIN PO)     glucagon (GLUCAGON EMERGENCY) 1 MG kit     Hypromellose (ARTIFICIAL TEARS OP)     insulin glargine (LANTUS) 100 UNIT/ML injection     lactase (LACTAID) 3000 UNIT tablet      levothyroxine (SYNTHROID, LEVOTHROID) 175 MCG tablet     levothyroxine (SYNTHROID/LEVOTHROID) 175 MCG tablet     liraglutide (VICTOZA) 18 MG/3ML solution     LORazepam (ATIVAN) 0.5 MG tablet     losartan (COZAAR) 100 MG tablet     Magnesium Hydroxide (MILK OF MAGNESIA PO)     melatonin 3 MG CAPS     metFORMIN (GLUCOPHAGE) 1000 MG tablet     nystatin (MYCOSTATIN) 997981 UNIT/GM POWD     ONETOUCH DELICA LANCETS 33G MISC     polyethylene glycol (MIRALAX/GLYCOLAX) powder     Saline 0.9 % SOLN     senna-docusate (SENNA S) 8.6-50 MG per tablet     simethicone (MYLICON) 125 MG chewable tablet     Skin Protectants, Misc. (EUCERIN) cream     SM LUBRICANT EYE DROPS 0.4-0.3 % SOLN ophthalmic solution     Sod Fluor-Solo Fluor-Hydrfl Ac (GEL-SASHA DENTINBLOC DT)     solifenacin (VESICARE) 10 MG tablet     terbinafine (LAMISIL) 1 % external cream     ziprasidone (GEODON) 40 MG capsule     No current facility-administered medications for this visit.        This telephone service is appropriate and effective for delivering services in light of the necessity for social distancing to mitigate the COVID-19 epidemic and for conservation of PPE.     Patient has agreed to receiving telephone services after being informed about it: Yes    Patient prefers video invitation/information to be sent by:   email    Time service started: 12:59  Time service ended: 1:52    Mode of transmission: Telephone    Location of originating:  [facility]    Distance site:  Home office of provider for [facility]    The patient has been notified that:  Video visits will be conducted via a call with their psychologist to provide the care they need with a video conversation. Video visits may be billed at different rates depending on insurance coverage.  Patients are advised to please contact their insurance provider with any questions about their health insurance coverage. If during the course of a call the psychologist feels a video visit is not  appropriate, patients will not be charged for this service.    Diagnosis:   Major Depression, recurrent mild (F33.0)   Psychological Factors Affecting Morbid Obesity (F54)  Generalized anxiety (F41.1)       Plan:  I will call her at [facility] 11/20 @ 1 for a telephone session at her request tfor supportive counseling due to the serious and persistent nature of her depression and anxiety, consistent with treatment plan.  Last treatment plan signed: 10/19/2020  Treatment plan due: 10/19/2021                          Damon Gr, PhD LP,  A.B.P.P.  Director, Health Psychology  [phone]

## 2020-11-01 ENCOUNTER — MEDICAL CORRESPONDENCE (OUTPATIENT)
Dept: HEALTH INFORMATION MANAGEMENT | Facility: CLINIC | Age: 70
End: 2020-11-01

## 2020-11-17 ENCOUNTER — MEDICAL CORRESPONDENCE (OUTPATIENT)
Dept: HEALTH INFORMATION MANAGEMENT | Facility: CLINIC | Age: 70
End: 2020-11-17

## 2020-11-18 ENCOUNTER — VIRTUAL VISIT (OUTPATIENT)
Dept: PSYCHIATRY | Facility: CLINIC | Age: 70
End: 2020-11-18
Attending: PSYCHIATRY & NEUROLOGY
Payer: COMMERCIAL

## 2020-11-18 DIAGNOSIS — F25.1 SCHIZOAFFECTIVE DISORDER, DEPRESSIVE TYPE (H): ICD-10-CM

## 2020-11-18 DIAGNOSIS — G47.09 OTHER INSOMNIA: ICD-10-CM

## 2020-11-18 PROCEDURE — 99207 PR SERVICE NOT STAFFED W/SUPERV PROV: CPT | Performed by: STUDENT IN AN ORGANIZED HEALTH CARE EDUCATION/TRAINING PROGRAM

## 2020-11-18 RX ORDER — ZIPRASIDONE HYDROCHLORIDE 40 MG/1
40 CAPSULE ORAL 2 TIMES DAILY WITH MEALS
Qty: 60 CAPSULE | Refills: 1 | Status: SHIPPED | OUTPATIENT
Start: 2020-11-18 | End: 2021-01-22

## 2020-11-18 RX ORDER — LORAZEPAM 0.5 MG/1
0.5 TABLET ORAL AT BEDTIME
Qty: 30 TABLET | Refills: 1 | Status: SHIPPED | OUTPATIENT
Start: 2020-11-18 | End: 2021-01-22

## 2020-11-18 NOTE — PROGRESS NOTES
TELEPHONE VISIT  Pinky Pgae is a 69 year old pt. who is being evaluated via a billable telephone visit.      The patient has been notified of the following:    We have found that certain health care needs can be provided without the need for a physical exam. This service lets us provide the care you need with a short phone conversation. If a prescription is necessary we can send it directly to your pharmacy. If lab work is needed we can place an order for that and you can then stop by our lab to have the test done at a later time. Insurers are generally covering virtual visits as they would in-office visits so billing should not be different than normal.  If for some reason you do get billed incorrectly, you should contact the billing office to correct it and that number is in the AVS .    Patient has given verbal consent for a telephone visit?:  Yes   How would the pt like to obtain the AVS?:  Patient declined  AVS SmartPhrase [PsychAVS] has been placed in 'Patient Instructions':  N/A     Start Time: 3:00PM       End Time:  3:30PM       [facility]  Psychiatry Clinic  PSYCHIATRY PROGRESS NOTE     Date of initial Diagnostic Assessment (DA) is 5/13/13 and most recent Transfer of Care DA is 12/11/19.    CARE TEAM:  PCP- Cary Brownlee  (resident working at [facility] with Dr. Birmingham, attending)   Specialty Providers- no    Therapist- Dr. Gr    Critical access hospital Team- no          Pinky Page is a 70 year old female who prefers the name Pinky & pronouns she, her, hers.      Pertinent Background: Pinky first experienced mental health issues in her twenties and has received treatment for schizoaffective disorder and generalized anxiety. Notably, Pinky's symptoms of depression and psychosis have responded well to a combination of ECT and medication management. Pinky has a history of experiencing increased psychotic symptoms with past attempted antipsychotic tapers.      Psych pertinent history  includes psychosis [sxs include disorganization, hallucinations, paranoia], mutiple psychotropic trials, psych hosp (3-5) and ECT.    INTERIM HISTORY      [4, 4]   The patient reports good treatment adherence.  History was provided by the patient who was a good historian.    The last visit ended with no change to the med regimen.   Since the last visit:   -There have been cases of COVID at [facility]. Pinky has not had COVID.   -Her movement within the residence has been restricted as a result which has been difficult.   -Her mood has been ok, and she reports managing ok with the difficult restrictions.   -Continues to stay busy mostly in her room with reading, coloring, word searches, TV, and other activities. Has been coping by saying her prayers. Has been walking the halls.   -Gets tested weekly for COVID per residence protocol.   -Continues to have insomnia. Has been drinking caffeine free tea. Plays curtis's corner card game with her roommate when they both can't sleep. About once a week sleeping about 4-8AM. Sometimes takes a nap. Has lower energy on days when she sleeps less. On average, she sleeps about 7 hours a night.   -Continues to use her CPAP for 1-2 hours a night.   -Continues to have unchanged mood swings and irritability that don't cause her problems.  -Denies excessive anxiety, hallucinations, suicidal ideation, or medication side effects.     RECENT PSYCH ROS:   Depression: REPORTS: insomnia, low energy DENIES: suicidal ideation, depressed mood, anhedonia, hypersomnia, appetite changes, weight changes and poor concentration /memory  Elevated: DENIES: increased energy, decreased sleep need, increased activity and grandiosity  Psychosis: DENIES: delusions, auditory hallucinations, visual hallucinations and disorganized behavior  Anxiety: DENIES: excessive worry, feeling fearful and nervous/overwhelmed  Panic Attack:  none  Trauma Related:  none  Dysregulation: REPORTS: irritable  Eating Disorder:  no   Pertinent Negative Symptoms:  NO suicidal and violent ideation, aggression, psychosis, hallucinations, delusions and alberta    RECENT SUBSTANCE USE:     ALCOHOL- no      TOBACCO- no     CAFFEINE- coffee/ tea [coffee, diet Pepsi]. 1 cup of coffee in the morning, 1 can diet coke in the afternoon, 1 decaf coffee at lunch. doesn't drink regular coffee after 1pm.   OPIOIDS- no         NARCAN KIT- no        CANNABIS- no            OTHER ILLICIT DRUGS- none    CURRENT SOCIAL HISTORY:  FINANCIAL SUPPORT- social security disability       CHILDREN- no      LIVING SITUATION- Lives at [facility].      SOCIAL/ SPIRITUAL SUPPORT- sisters, support staff at [facility], other residents at [facility], other providers     FEELS SAFE AT HOME- yes     PSYCH and CD Critical Summary Points since July 2020 7/22/20-no changes  9/23- no changes  11/18-no changes    PAST MED TRIALS          See last Diagnostic Assessment    MEDICAL / SURGICAL HISTORY          Patient Active Problem List   Diagnosis     Allergic rhinitis due to pollen     Urge incontinence     Hypertension, essential     Cardiomegaly     Chronic constipation     Dry eye syndrome     Esophageal reflux     Exposure keratoconjunctivitis     DM ophthalmopathy (H)     Hypothyroidism     Senile cataract     ANUJ (obstructive sleep apnea)     Vaginal atrophy     Restless leg syndrome     Squamous blepharitis     Morbid obesity due to excess calories (H)     Personal history of breast cancer s/p L masectomy     Hypercholesteremia     Lives in group home     Extrapyramidal and movement disorder     CKD (chronic kidney disease) stage 2, GFR 60-89 ml/min     Solitary kidney, congenital     S/P hysterectomy     Impingement syndrome of right shoulder     Hypertriglyceridemia     Candidiasis of skin     Generalized anxiety disorder     Carpal tunnel syndrome of left wrist     Schizoaffective disorder, depressive type (H)     Major depressive disorder, recurrent episode,  moderate (H)     Psychological factors affecting medical condition     Hx of psychiatric care     Nail complaint     Microalbuminuria due to type 2 diabetes mellitus (H)     Type 2 diabetes mellitus with microalbuminuria, with long-term current use of insulin (H)     Conjunctivitis of both eyes     Corneal erosion of both eyes     Spastic entropion, right     Foot callus       Past Surgical History:   Procedure Laterality Date     APPENDECTOMY       C MASTECTOMY,SIMPLE  1996    Left mastectomy  [facility]. Had normal FU mammo 5/2007. Follow yearly.      C TOTAL ABDOM HYSTERECTOMY  7/2003    Dr. Castanon, for abnormal bleeding. Removal of both tubes with BSO for myoma w - - -     CHOLECYSTECTOMY       COLONOSCOPY  5/2005    Complete Colonoscopy-had one small polyp removed 5/2005.      COLONOSCOPY N/A 3/31/2016    Procedure: COLONOSCOPY;  Surgeon: Cary Ring MD;  Location: [facility]     COLONOSCOPY N/A 7/7/2016    Procedure: COLONOSCOPY;  Surgeon: Cary Ring MD;  Location: [facility]     DILATION AND CURETTAGE       HYSTERECTOMY       MASTECTOMY      Left breast       MEDICAL REVIEW OF SYSTEMS    [2, 10]     REPORTS: dry mouth DENIES: palpitations, chest pain, loss of consciousness, dyspnea, dizziness, fever, muscle stiffness, muscle twitching, Parkinsonian symptoms     ALLERGY       Chlordiazepoxide hcl, Dimetapp dm cold-cough, Haldol, Ibuprofen, Lactose intolerance [beta-galactosidase], Milk products, and Propofol  MEDICATIONS        Current Outpatient Medications   Medication Sig Dispense Refill     acetaminophen (TYLENOL) 500 MG tablet Take 2 tablets (1,000 mg) by mouth every 6 hours as needed 1 Bottle 2     alum & mag hydroxide-simethicone (MYLANTA/MAALOX) 200-200-20 MG/5ML SUSP suspension Take 30 mLs by mouth daily as needed for indigestion       amLODIPine (NORVASC) 10 MG tablet Take 10 mg by mouth daily       artificial saliva (BIOTENE MT) AERS spray Take 1 spray by mouth  3 times daily as needed for dry mouth       ASPIRIN 81 MG OR TABS Take 1 tablet by mouth daily. ENTERIC COATED. 100 3     atorvastatin (LIPITOR) 40 MG tablet Take 1 tablet (40 mg) by mouth daily 90 tablet 1     BD AUTOSHIELD DUO 30G X 5 MM        blood glucose (NO BRAND SPECIFIED) lancets standard Use to test blood sugar 2 times daily or as directed. 100 each 11     blood glucose calibration (NO BRAND SPECIFIED) solution Use to calibrate blood glucose monitor as directed. 1 each 3     blood glucose monitoring (NO BRAND SPECIFIED) meter device kit Check Blood sugars twice a day. 1 kit 0     blood glucose monitoring (NO BRAND SPECIFIED) test strip Use to test blood sugar 3 times daily or as directed. 100 each 3     Calcium Carbonate-Vitamin D (CALCIUM-VITAMIN D) 250-125 MG-UNIT TABS Take 1 tablet by mouth 2 times daily. Calcium 250 mg/Vit D 125 IU       calcium polycarbophil (FIBERCON) 625 MG tablet Take 2 tablets by mouth daily       ciprofloxacin (CIPRO) 500 MG tablet Take 1 tablet (500 mg) by mouth 2 times daily 14 tablet 0     CLARITIN 10 MG OR TABS 1 TAB PO QD (Once per day) as needed for ALLERGY SYMPTOMS 30 11     FLUoxetine (PROZAC) 20 MG capsule Take 2 capsules (40 mg) by mouth daily 60 capsule 2     fluticasone (FLONASE) 50 MCG/ACT nasal spray Spray 1 spray into both nostrils daily 16 g 3     furosemide (LASIX) 20 MG tablet Take 1 tablet (20 mg) by mouth 2 times daily 60 tablet 3     Gabapentin (NEURONTIN PO) Take 900 mg by mouth 3 times daily.       glucagon (GLUCAGON EMERGENCY) 1 MG kit Inject 1 mg into the muscle once for 1 dose 2 mg 3     Hypromellose (ARTIFICIAL TEARS OP) Apply 1 drop to eye 4 times daily.       insulin glargine (LANTUS) 100 UNIT/ML injection Inject 20 Units Subcutaneous At Bedtime 8 mL 11     lactase (LACTAID) 3000 UNIT tablet Take 4 tabs daily with meals.       levothyroxine (SYNTHROID, LEVOTHROID) 175 MCG tablet Take 1 tablet (175 mcg) by mouth daily Give on empty stomach 30 tablet 4      levothyroxine (SYNTHROID/LEVOTHROID) 175 MCG tablet Take 175 mcg by mouth daily       liraglutide (VICTOZA) 18 MG/3ML solution Inject 1.8 mg Subcutaneous daily 9 mL 0     LORazepam (ATIVAN) 0.5 MG tablet Take 1 tablet (0.5 mg) by mouth At Bedtime 30 tablet 2     losartan (COZAAR) 100 MG tablet Take 0.5 tablets (50 mg) by mouth 2 times daily 120 tablet 2     Magnesium Hydroxide (MILK OF MAGNESIA PO) Take  by mouth. Take 30 mL as needed for constipation.       melatonin 3 MG CAPS Take 6 mg by mouth At Bedtime 60 capsule 2     metFORMIN (GLUCOPHAGE) 1000 MG tablet Take 1,000 mg by mouth 2 times daily (with meals)       nystatin (MYCOSTATIN) 713337 UNIT/GM POWD Apply topically 3 times daily as needed 60 g 1     ONETOUCH DELICA LANCETS 33G MISC        polyethylene glycol (MIRALAX/GLYCOLAX) powder Take 1 capful by mouth 2 times daily. 17 GM PO BID       Saline 0.9 % SOLN Spray 2 sprays in nostril as needed.       senna-docusate (SENNA S) 8.6-50 MG per tablet Take 2 tablets by mouth At Bedtime.       simethicone (MYLICON) 125 MG chewable tablet Take 1 tablet (125 mg) by mouth 2 times daily 60 tablet 0     Skin Protectants, Misc. (EUCERIN) cream Apply  topically as needed. Apply to thigh PRN dry skin        SM LUBRICANT EYE DROPS 0.4-0.3 % SOLN ophthalmic solution        Sod Fluor-Solo Fluor-Hydrfl Ac (GEL-SASHA DENTINBLOC DT) Apply  to affected area. GEL. Apply to 2nd molar on bottom right daily at bedtime.       solifenacin (VESICARE) 10 MG tablet Take 1 tablet (10 mg) by mouth daily 30 tablet 6     terbinafine (LAMISIL) 1 % external cream Apply topically 2 times daily       ziprasidone (GEODON) 40 MG capsule Take 1 capsule (40 mg) by mouth 2 times daily (with meals) 60 capsule 2     VITALS      [3, 3]   LMP  (LMP Unknown)    MENTAL STATUS EXAM      [9, 14 cog gs]     Alertness: alert  and oriented  Appearance: could not assess  Behavior/Demeanor: cooperative, pleasant and calm, could not assess eye contact   Speech:  "normal and regular rate and rhythm  Language: intact and no problems  Psychomotor: normal or unremarkable  Mood: \"good\"  Affect: sounded full range; was congruent to mood; was congruent to content  Thought Process/Associations: unremarkable  Thought Content:  Reports none;  Denies suicidal and violent ideation and delusions  Perception:  Reports none;  Denies auditory hallucinations and visual hallucinations  Insight: good  Judgment: good  Cognition: (6) does  appear grossly intact; formal cognitive testing was not done  Gait and Station: could not assess    LABS and DATA     AIMS:  last done 3/6/19 with score(s): 3;  due at next in-person visit    PHQ9 TODAY = not completed  PHQ-9 SCORE 1/9/2020 3/2/2020 8/27/2020   PHQ-9 Total Score - - -   PHQ-9 Total Score 7 7 5     ANTIPSYCHOTIC LABS  [glu, A1C, lipids (danika LDL), liver enzymes, WBC, ANEU, Hgb, plts]  q12 mo  Recent Labs   Lab Test 09/16/20 03/02/20  1420 08/06/19  0941 05/07/19  0908 05/07/19  0650 10/25/18  1734 10/25/18  1734 05/03/18  1414   GLC 73  --   --  109*  --   --  147* 192.6*   A1C 6.6* 6.6* 6.6*  --  6.8*   < >  --  6.7*    < > = values in this interval not displayed.     Recent Labs   Lab Test 09/16/20 05/07/19  0650 05/03/18  1414 03/21/17  1023   CHOL 106 138 126 142   TRIG 89 96 148 121   LDL 46 79 60 78   HDL 42* 40 37* 40*     Recent Labs   Lab Test 06/26/18  0659 06/26/18 03/21/17  1023 01/22/15  1600   AST 16 16 13 18   ALT 21 21 20 35   ALKPHOS 111 111 92 113     Recent Labs   Lab Test 05/07/19  0650 10/25/18  1734 06/26/18  0659 06/21/17  1047 03/21/17  1023 07/04/14  0705 07/04/14  0705   WBC 10.06* 14.2* 9.72  --  11.8*   < > 7.7   ANEU  --  10.6* 5.31  --  8.3  --  4.8   HGB 13.3 14.0 12.6 13.8 13.4   < > 13.3    275 247  --  266   < > 223    < > = values in this interval not displayed.     PSYCHOTROPIC DRUG INTERACTIONS     ZIPRASIDONE and QT INTERVAL PROLONGING DRUGS may result in increased risk of QT-interval " prolongation.  ZIPRASIDONE and SEROTONERGIC DRUGS THAT PROLONG QT INTERVAL may result in increased risk of QT-interval prolongation and increased risk of serotonin syndrome (hypertension, hyperthermia, myoclonus, mental status changes).  NSAID and SSRI may result in an increased risk of bleeding  ERYTHROMYCIN and FLUOXETINE may result in an increased risk of cardiotoxicity (QT prolongation, torsades de pointes, cardiac arrest).  FLUOXETINE and INSULINS OR PRAMLINTIDE may result in increased risk of hypoglycemia.  FLUOXETINE and QT INTERVAL PROLONGING DRUGS may result in increased risk of QT-interval prolongation.   GABAPENTIN and CNS DEPRESSANTS may result in respiratory depression.   GABAPENTIN and ANTACIDS may result in decreased gabapentin effectiveness.     MANAGEMENT:  Monitoring for adverse effects, periodic EKGs and patient is aware of risks    RISK STATEMENT for SAFETY     Pinky Page did not appear to be an imminent safety risk to self or others.    PSYCHIATRIC DIAGNOSIS     Schizoaffective Disorder, depressed type, in partial remission  Generalized Anxiety Disorder    ASSESSMENT      [m2, h3]     TODAY Pinky presents for a follow-up via telephone. She continues to be euthymic and without psychotic symptoms despite the increased stress of COVID cases at her residence and repeat testing. Her insomnia has become more manageable with an average of 7 hours of sleep a night. Discussed that if her sleep worsens, she could benefit from medication changes to help with this. Given her current sleep habits, will continue her medications unchanged today. Pinky would benefit from stopping Ativan in the future but will defer this at this time given her insomnia. Future considerations for insomnia include trazodone or low-dose mirtazapine. Records indicate that Pinky has taken trazodone many years ago but it's effect is unknown. Will defer AP labs and EKG given Pinky's elevated risk should she get COVID and the risk of  exposure with coming to the clinic for labs.      PLAN      [m2, h3]     1) PSYCHOTROPIC MEDICATIONS:  - Continue ziprasidone 40 mg BID  - Continue fluoxetine 40 mg daily  - Continue lorazepam 0.5 mg at bedtime  - Continue melatonin 6mg at bedtime    Gabapentin 900mg TID prescribed by primary care    2) [place]  was checked today:  Lorazepam filled at monthly intervals only from this clinic.. Gabapentin filled at monthly intervals for 30 day supplies and only prescribed by Dr. Linares.    3) THERAPY:    Yes, continue w/ Dr. Gr once a month    4) NEXT DUE:    Labs- AP labs by 05/2020  EKG- Last EKG 12/11/19 QTc 456. EKG due 12/2020   Rating Scales- AIMS due at next in-person visit    5) REFERRALS:    No Referrals needed     6) RTC: 2 months.    7) CRISIS NUMBERS:   Provided routinely in AVS   ONLY if a [facility] PT: [facility] [phone] (clinic), [phone] (after hours)     TREATMENT RISK STATEMENT:  The risks, benefits, alternatives and potential adverse effects have been discussed and are understood by the pt. The pt understands the risks of using street drugs or alcohol. There are no medical contraindications, the pt agrees to treatment with the ability to do so. The pt knows to call the clinic for any problems or to access emergency care if needed.  Medical and substance use concerns are documented above.  Psychotropic drug interaction check was done, including changes made today.    PROVIDER: María Shi MD    Patient not staffed in clinic. Supervisor is Dr. Rubio who will sign this note.  I did not see this pt directly. I have reviewed the documentation, and I agree with the resident's plan of care.    Roxy Rubio MD

## 2020-11-20 ENCOUNTER — VIRTUAL VISIT (OUTPATIENT)
Dept: PSYCHOLOGY | Facility: CLINIC | Age: 70
End: 2020-11-20
Payer: COMMERCIAL

## 2020-11-20 ENCOUNTER — TELEPHONE (OUTPATIENT)
Dept: PSYCHIATRY | Facility: CLINIC | Age: 70
End: 2020-11-20

## 2020-11-20 DIAGNOSIS — F41.1 GENERALIZED ANXIETY DISORDER: ICD-10-CM

## 2020-11-20 DIAGNOSIS — E66.9 OBESITY, UNSPECIFIED OBESITY SEVERITY, UNSPECIFIED OBESITY TYPE: ICD-10-CM

## 2020-11-20 DIAGNOSIS — F54 PSYCHOLOGICAL FACTORS AFFECTING MEDICAL CONDITION: ICD-10-CM

## 2020-11-20 DIAGNOSIS — F33.0 MAJOR DEPRESSIVE DISORDER, RECURRENT EPISODE, MILD (H): Primary | ICD-10-CM

## 2020-11-20 PROCEDURE — 90837 PSYTX W PT 60 MINUTES: CPT | Mod: 95 | Performed by: PSYCHOLOGIST

## 2020-11-20 NOTE — TELEPHONE ENCOUNTER
7 faxed pages was received from [facility] requesting provider signature on Physician Orders. Writer reviewed current medications and ICD10 Codes. The original copies were faxed back to [facility] at [phone] attn: 3rd floor nursing and then sent to christelle.Nataliya Doyle LPN

## 2020-11-20 NOTE — PROGRESS NOTES
"    Health Psychology      Clinic    Department of Medicine  Keri Arnett, Ph.D., L.P. [phone]            Clinics and Surgery Center  [facility] Madelaine Ingram, Ph.D., L.P. [phone] 3rd Floor  [place] Mail Code 740   Denise Low, Ph.D.  [phone]    [address] Miryam Woods, Ph.D.,  L.P. [phone]     [place] [address]           Dennis Dillard, Ph.D. [phone]      Rae Eugene, Ph.D., L.P. [phone]    Damon Gr, Ph.D., A.B.P.P., L.P. [phone]     Kimber Morales, Ph.D., L.P. 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 She does not have access to video telehealth.  She wished to get a call. She got a cell phone, but prefers for me to call her again on the [place] phone.     When we last spoke, there had been no COVID cases at [facility]. Since the, there have been around 50 with no deaths.  She has not contracted it.  We discussed precautionary measures and reinforced her for it.     Pinky reports her anxiety is at reasonable levels and has come down and her depression is at baseline, but does continue to have varying levels of depression. She is at a 4-5 in terms of depression on a 10-point selvin.      She shares her room with a roommate.  They are getting along well.   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  It is going well    She states she weighed 223 this morning, down from 228 this AM at [facility] [place].  The increase is in part to decreased exercise die to an injury. She has a sore heel so had to reduce her exercise, and fortunately is improving.  She is using the bicycle 30-40 minutes from 2-3/week.  She is walking less.  Her heel healed, so she " can do more.  She is walking less due to being confined in her room more.  She recognizes that to be more successful at weight management she needs to eat less.    She is in touch with her family by phone but can't actually see them.  She understands the reasoning.   She realizes she won't see them on Thanksgiving.     Her  is a nurse who recently took over that role with her temporarily   A new manager begins in 2 weeks.    .She participates fully, ventilates emotional experiences.  Rapport is positive.   She wishes to continue services.  She has observed that December is usually a difficult month as there are some death anniversaries in her fairly, but she felt we could hold off meeting until the new year.      Current Outpatient Medications   Medication     acetaminophen (TYLENOL) 500 MG tablet     alum & mag hydroxide-simethicone (MYLANTA/MAALOX) 200-200-20 MG/5ML SUSP suspension     amLODIPine (NORVASC) 10 MG tablet     artificial saliva (BIOTENE MT) AERS spray     ASPIRIN 81 MG OR TABS     atorvastatin (LIPITOR) 40 MG tablet     BD AUTOSHIELD DUO 30G X 5 MM     blood glucose (NO BRAND SPECIFIED) lancets standard     blood glucose calibration (NO BRAND SPECIFIED) solution     blood glucose monitoring (NO BRAND SPECIFIED) meter device kit     blood glucose monitoring (NO BRAND SPECIFIED) test strip     Calcium Carbonate-Vitamin D (CALCIUM-VITAMIN D) 250-125 MG-UNIT TABS     calcium polycarbophil (FIBERCON) 625 MG tablet     ciprofloxacin (CIPRO) 500 MG tablet     CLARITIN 10 MG OR TABS     FLUoxetine (PROZAC) 20 MG capsule     fluticasone (FLONASE) 50 MCG/ACT nasal spray     furosemide (LASIX) 20 MG tablet     Gabapentin (NEURONTIN PO)     glucagon (GLUCAGON EMERGENCY) 1 MG kit     Hypromellose (ARTIFICIAL TEARS OP)     insulin glargine (LANTUS) 100 UNIT/ML injection     lactase (LACTAID) 3000 UNIT tablet     levothyroxine (SYNTHROID, LEVOTHROID) 175 MCG tablet     levothyroxine  (SYNTHROID/LEVOTHROID) 175 MCG tablet     liraglutide (VICTOZA) 18 MG/3ML solution     LORazepam (ATIVAN) 0.5 MG tablet     losartan (COZAAR) 100 MG tablet     Magnesium Hydroxide (MILK OF MAGNESIA PO)     melatonin 3 MG CAPS     metFORMIN (GLUCOPHAGE) 1000 MG tablet     nystatin (MYCOSTATIN) 072349 UNIT/GM POWD     ONETOUCH DELICA LANCETS 33G MISC     polyethylene glycol (MIRALAX/GLYCOLAX) powder     Saline 0.9 % SOLN     senna-docusate (SENNA S) 8.6-50 MG per tablet     simethicone (MYLICON) 125 MG chewable tablet     Skin Protectants, Misc. (EUCERIN) cream     SM LUBRICANT EYE DROPS 0.4-0.3 % SOLN ophthalmic solution     Sod Fluor-Solo Fluor-Hydrfl Ac (GEL-SASHA DENTINBLOC DT)     solifenacin (VESICARE) 10 MG tablet     terbinafine (LAMISIL) 1 % external cream     ziprasidone (GEODON) 40 MG capsule     No current facility-administered medications for this visit.        This telephone service is appropriate and effective for delivering services in light of the necessity for social distancing to mitigate the COVID-19 epidemic and for conservation of PPE.     Patient has agreed to receiving telephone services after being informed about it: Yes    Patient prefers video invitation/information to be sent by:   email    Time service started: 1:10  Time service ended: 2:03  Extended session due to complexity of case and length of interval.    Mode of transmission: Telephone    Location of originating:  [facility]    Distance site:  Home office of provider for [facility]    The patient has been notified that:  Video visits will be conducted via a call with their psychologist to provide the care they need with a video conversation. Video visits may be billed at different rates depending on insurance coverage.  Patients are advised to please contact their insurance provider with any questions about their health insurance coverage. If during the course of a call the psychologist feels a video visit is not appropriate,  patients will not be charged for this service.    Diagnosis:   Major Depression, recurrent mild (F33.0)   Psychological Factors Affecting Morbid Obesity (F54)  Generalized anxiety (F41.1)       Plan:  I will call her at [facility] 1/5 @ 1 for a telephone session at her request tfor supportive counseling due to the serious and persistent nature of her depression and anxiety, consistent with treatment plan.  Last treatment plan signed: 10/19/2020  Treatment plan due: 10/19/2021                          Damon Gr, PhD LP,  A.B.P.P.  Director, Health Psychology  [phone]

## 2020-11-30 ENCOUNTER — TELEPHONE (OUTPATIENT)
Dept: PSYCHIATRY | Facility: CLINIC | Age: 70
End: 2020-11-30

## 2020-11-30 ENCOUNTER — MEDICAL CORRESPONDENCE (OUTPATIENT)
Dept: HEALTH INFORMATION MANAGEMENT | Facility: CLINIC | Age: 70
End: 2020-11-30

## 2020-12-11 ENCOUNTER — TELEPHONE (OUTPATIENT)
Dept: FAMILY MEDICINE | Facility: CLINIC | Age: 70
End: 2020-12-11
Payer: COMMERCIAL

## 2020-12-11 DIAGNOSIS — N30.00 ACUTE CYSTITIS WITHOUT HEMATURIA: ICD-10-CM

## 2020-12-11 DIAGNOSIS — N30.01 ACUTE CYSTITIS WITH HEMATURIA: Primary | ICD-10-CM

## 2020-12-11 RX ORDER — SULFAMETHOXAZOLE/TRIMETHOPRIM 800-160 MG
1 TABLET ORAL 2 TIMES DAILY
Qty: 14 TABLET | Refills: 0 | Status: SHIPPED | OUTPATIENT
Start: 2020-12-11 | End: 2020-12-18

## 2020-12-11 NOTE — TELEPHONE ENCOUNTER
"RN spoke with Telma Mckinney RN at [facility]. She states patient began complaining of UTI symptoms on 12/9/20. They did a UA with culture that came back positive- writer has faxed results. Patient reports dysuria \"burning\", urgency and lower abdomen \"pressure\". Denies fever, blood in urine or back pain. Patient reports she gets UTIs frequently. Of note, patient is currently positive for COVID 19. RN requesting labs be reviewed and medication sent if appropriate. Preferred pharmacy is [facility]. Routing to preceptor as PCP is not in clinic today.   Rosario Klein RN    "
RN reached out to Nancy to gather more information- LM with [facility] staff to call back as she was with another patient. Please transfer to Rosario VILLA when she calls back.   Rosario Klein, RN    
RN spoke with preceptor Dr. Sullivan who ordered bactrim 7 days and advised patient to follow up if symptoms don't improve.     RN left detailed message at [facility]. Provided contact number if they have any questions.   Rosario Klein RN    
Telma Mckinney, Home Care RN with [facility] calling to report that the patient I suspected to have a UTI and had an abnormal result after taking a UA/UC. RN stated results have been faxed to [facility] and they would like them to be reviewed and to be contacted to discuss next steps. Please advise.   
negative

## 2020-12-18 ENCOUNTER — DOCUMENTATION ONLY (OUTPATIENT)
Dept: FAMILY MEDICINE | Facility: CLINIC | Age: 70
End: 2020-12-18

## 2020-12-18 NOTE — PROGRESS NOTES
"When opening a documentation only encounter, be sure to enter in \"Chief Complaint\" Forms and in \" Comments\" Title of form, description if needed.    Pinky is a 70 year old  female  Form received via: Fax  Form now resides in: Provider Ready    Joana Delaney MA     Form has been completed by provider.     Form sent out via: Fax to Na[facility] Truong at Fax Number: [phone]  Patient informed: No, Reason:n/a  Output date: January 6, 2021    Quiana Swift MA      **Please close the encounter**                      "

## 2020-12-24 ENCOUNTER — TELEPHONE (OUTPATIENT)
Dept: FAMILY MEDICINE | Facility: CLINIC | Age: 70
End: 2020-12-24

## 2020-12-24 DIAGNOSIS — R60.0 LOWER LEG EDEMA: ICD-10-CM

## 2020-12-24 RX ORDER — FUROSEMIDE 20 MG
20 TABLET ORAL 2 TIMES DAILY
Qty: 60 TABLET | Refills: 3 | Status: SHIPPED | OUTPATIENT
Start: 2020-12-24 | End: 2022-10-03

## 2020-12-24 NOTE — TELEPHONE ENCOUNTER
I am covering Dr Goel's inbox this week, I have reviewed the script and documentation provided and have refilled Mx Page's lasix.    Nel Pretty MD  12/24/20  12:40 PM

## 2020-12-24 NOTE — TELEPHONE ENCOUNTER
[facility] phone call message- medication clarification/question:    Full Medication Name: furosemide (LASIX) 20 MG tablet   Dose: see chart    Question/Clarification needed: Doron, nurse at [facility], calling to advise that they were informed by pharmacy that patient's prescription for med noted above is . Patient does not need a refill at this time.    Pharmacy confirmed as   [facility] - [place], [place] -  [address]  [facility] [address]  Phone: [phone] Fax: [phone]  : Yes    Please leave ONLY preferred pharmacy    OK to leave a message on voice mail? Yes    Advised patient that RN would call back within 3 hours, unless emergent.    Primary language: English      needed? No    Call taken on 2020 at 9:48 AM by Carolyn Toure    Route to [facility]

## 2020-12-24 NOTE — TELEPHONE ENCOUNTER
RN spoke to Doron at [facility] who confirms patient takes 20 mg furosemide 2x daily. She has 20 tablets right now but will need a new rx sent to [facility] for next week. RN unable to fill as last ordering provider not at [facility]. Routing to PCP to order if appropriate.   Rosario Klein, ALLEN

## 2021-01-05 ENCOUNTER — VIRTUAL VISIT (OUTPATIENT)
Dept: PSYCHOLOGY | Facility: CLINIC | Age: 71
End: 2021-01-05
Payer: COMMERCIAL

## 2021-01-05 ENCOUNTER — TELEPHONE (OUTPATIENT)
Dept: PSYCHIATRY | Facility: CLINIC | Age: 71
End: 2021-01-05

## 2021-01-05 DIAGNOSIS — F54 PSYCHOLOGICAL FACTORS AFFECTING MEDICAL CONDITION: ICD-10-CM

## 2021-01-05 DIAGNOSIS — F41.1 GENERALIZED ANXIETY DISORDER: ICD-10-CM

## 2021-01-05 DIAGNOSIS — F33.0 MAJOR DEPRESSIVE DISORDER, RECURRENT EPISODE, MILD (H): Primary | ICD-10-CM

## 2021-01-05 DIAGNOSIS — E66.9 OBESITY, UNSPECIFIED OBESITY SEVERITY, UNSPECIFIED OBESITY TYPE: ICD-10-CM

## 2021-01-05 PROCEDURE — 90834 PSYTX W PT 45 MINUTES: CPT | Mod: 95 | Performed by: PSYCHOLOGIST

## 2021-01-05 NOTE — TELEPHONE ENCOUNTER
[facility] Call Center    Phone Message    May a detailed message be left on voicemail: yes     Reason for Call: Medication Refill Request    Has the patient contacted the pharmacy for the refill? Yes   Name of medication being requested: Ativan  Provider who prescribed the medication: Jose Guadalupe  Pharmacy: [facility] - [place], [place] - [address]    Date medication is needed: 1/12/21      Action Taken: Other: Sent to Dominga Randall    Travel Screening: Not Applicable

## 2021-01-05 NOTE — PROGRESS NOTES
"    Health Psychology      Clinic    Department of Medicine  Keri Arnett, Ph.D., L.P. [phone]            Clinics and Surgery Center  [facility] Madelaine Ingram, Ph.D., L.P. [phone] 3rd Floor  [place] Mail Code 057   Denise Low, Ph.D.  [phone]    [address] [facility] [address] Miryam Woods, Ph.D.,  L.P. [phone]     [place]   [address]  [place] [address]           Dennis Dillard, Ph.D. [phone]      Rae Eugene, Ph.D., L.P. [phone]    Damon Gr, Ph.D., A.B.P.P., L.P. [phone]     Kimber Morales, Ph.D., L.P. 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 She does not have access to video telehealth.  She wished to get a call. She got a cell phone, but prefers for me to call her again on the [place] phone. We discussed learning how to use it for telehealth in the future given the likelihoo that remuneration for telephone only contacts is likely to decrease during the summer.    She tested positive for COVID-19 in early December.  She was quarantined -  She had temp, stuffed nose, sore throat, low energy, and also had an UTI.  She feels betters generally, but today didn't feel good today at first.  Her roommate also had COVID and quarantined with her.  Many people tested positive at [place].  Some were hospitalized.   Two people  ain December.  She suspects it was COVID-19.  Some staff were also affected.    We discussed her  resolutions:  Ride bike more, watch what she eats more. We discussed the importance of regaining strength, limiting intake.      Pinky reports her anxiety is at reasonable levels and has come down and her depression is at baseline, but does continue to have varying levels of depression. She is at a 4-5 in terms of " depression on a 10-point scale. It was worse while she was sick.    She is in touch with her family by phone but can't actually see them. She used the cell phone for a Zoom, or Zoom-like call on [employer]. She doesn't understand how to use it and will speakk with staff.  I recommended she have more regular videochats with her family.     She participates fully, ventilated feelings appropriately.  She appears to derive benefit from the support.      Current Outpatient Medications   Medication     acetaminophen (TYLENOL) 500 MG tablet     alum & mag hydroxide-simethicone (MYLANTA/MAALOX) 200-200-20 MG/5ML SUSP suspension     amLODIPine (NORVASC) 10 MG tablet     artificial saliva (BIOTENE MT) AERS spray     ASPIRIN 81 MG OR TABS     atorvastatin (LIPITOR) 40 MG tablet     BD AUTOSHIELD DUO 30G X 5 MM     blood glucose (NO BRAND SPECIFIED) lancets standard     blood glucose calibration (NO BRAND SPECIFIED) solution     blood glucose monitoring (NO BRAND SPECIFIED) meter device kit     blood glucose monitoring (NO BRAND SPECIFIED) test strip     Calcium Carbonate-Vitamin D (CALCIUM-VITAMIN D) 250-125 MG-UNIT TABS     calcium polycarbophil (FIBERCON) 625 MG tablet     ciprofloxacin (CIPRO) 500 MG tablet     CLARITIN 10 MG OR TABS     FLUoxetine (PROZAC) 20 MG capsule     fluticasone (FLONASE) 50 MCG/ACT nasal spray     furosemide (LASIX) 20 MG tablet     Gabapentin (NEURONTIN PO)     glucagon (GLUCAGON EMERGENCY) 1 MG kit     Hypromellose (ARTIFICIAL TEARS OP)     insulin glargine (LANTUS) 100 UNIT/ML injection     lactase (LACTAID) 3000 UNIT tablet     levothyroxine (SYNTHROID, LEVOTHROID) 175 MCG tablet     levothyroxine (SYNTHROID/LEVOTHROID) 175 MCG tablet     liraglutide (VICTOZA) 18 MG/3ML solution     LORazepam (ATIVAN) 0.5 MG tablet     losartan (COZAAR) 100 MG tablet     Magnesium Hydroxide (MILK OF MAGNESIA PO)     melatonin 3 MG CAPS     metFORMIN (GLUCOPHAGE) 1000 MG tablet     nystatin (MYCOSTATIN) 959138  UNIT/GM POWD     ONETOUCH DELICA LANCETS 33G MISC     polyethylene glycol (MIRALAX/GLYCOLAX) powder     Saline 0.9 % SOLN     senna-docusate (SENNA S) 8.6-50 MG per tablet     simethicone (MYLICON) 125 MG chewable tablet     Skin Protectants, Misc. (EUCERIN) cream     SM LUBRICANT EYE DROPS 0.4-0.3 % SOLN ophthalmic solution     Sod Fluor-Solo Fluor-Hydrfl Ac (GEL-SASHA DENTINBLOC DT)     solifenacin (VESICARE) 10 MG tablet     terbinafine (LAMISIL) 1 % external cream     ziprasidone (GEODON) 40 MG capsule     No current facility-administered medications for this visit.        This telephone service is appropriate and effective for delivering services in light of the necessity for social distancing to mitigate the COVID-19 epidemic and for conservation of PPE.     Patient has agreed to receiving telephone services after being informed about it: Yes    Patient prefers video invitation/information to be sent by:   email    Time service started: 1:10  Time service ended: 1:50  Extended session due to complexity of case and length of interval.    Mode of transmission: Telephone    Location of originating:  [facility]    Distance site:  Home office of provider for [facility]    The patient has been notified that:  Video visits will be conducted via a call with their psychologist to provide the care they need with a video conversation. Video visits may be billed at different rates depending on insurance coverage.  Patients are advised to please contact their insurance provider with any questions about their health insurance coverage. If during the course of a call the psychologist feels a video visit is not appropriate, patients will not be charged for this service.    Diagnosis:   Major Depression, recurrent mild (F33.0)   Psychological Factors Affecting Morbid Obesity (F54)  Generalized anxiety (F41.1)       Plan:  I will call her at [facility] 2/2 @ 1 for a telephone session at her request tfor supportive counseling due  to the serious and persistent nature of her depression and anxiety, consistent with treatment plan.  Last treatment plan signed: 10/19/2020  Treatment plan due: 10/19/2021                          Damon Gr, PhD LP,  A.B.P.P.  Director, Health Psychology  [phone]

## 2021-01-05 NOTE — TELEPHONE ENCOUNTER
Received RF request from patient's [facility] RN     Last seen: 11/18/20  RTC: 2 months  Cancel: none  No-show: none  Next appt: 1/22/21     Medication requested: LORazepam (ATIVAN) 0.5 MG tablet  Directions: Take 1 tablet (0.5 mg) by mouth At Bedtime   Qty: 30  Last Rx written 11/18/20 for 30 ds with 1 rf  [place] and last refilled on 12/6, 11/8, 10/7 from Rx written on 9/23/20  *Rx written on 11/18/20 has not been filled.          Plan per 11/18 virtual visit:    - Continue ziprasidone 40 mg BID  - Continue fluoxetine 40 mg daily  - Continue lorazepam 0.5 mg at bedtime  - Continue melatonin 6mg at bedtime       Medication refills from Rx dated 11/18/20 available at the pharmacy. Called [facility] and spoke with Ely who confirmed that there is a prescription on file but the nursing home has not requested a refill. She will get a refill ready for delivery to the nursing home.

## 2021-01-18 ENCOUNTER — TELEPHONE (OUTPATIENT)
Dept: PSYCHIATRY | Facility: CLINIC | Age: 71
End: 2021-01-18

## 2021-01-18 NOTE — TELEPHONE ENCOUNTER
9 faxed pages was received from [facility] requesting update for patient's next scheduled appointment on 1/22/21. Forms will be completed after that appointment with any updates and current medication. The forms are being held in Nurse Triage awaiting appointment.Nataliya Doyle LPN  .

## 2021-01-22 ENCOUNTER — VIRTUAL VISIT (OUTPATIENT)
Dept: PSYCHIATRY | Facility: CLINIC | Age: 71
End: 2021-01-22
Attending: PSYCHIATRY & NEUROLOGY
Payer: COMMERCIAL

## 2021-01-22 DIAGNOSIS — Z79.899 OTHER LONG TERM (CURRENT) DRUG THERAPY: ICD-10-CM

## 2021-01-22 DIAGNOSIS — G47.09 OTHER INSOMNIA: ICD-10-CM

## 2021-01-22 DIAGNOSIS — F25.1 SCHIZOAFFECTIVE DISORDER, DEPRESSIVE TYPE (H): ICD-10-CM

## 2021-01-22 PROCEDURE — 99214 OFFICE O/P EST MOD 30 MIN: CPT | Mod: HN | Performed by: STUDENT IN AN ORGANIZED HEALTH CARE EDUCATION/TRAINING PROGRAM

## 2021-01-22 RX ORDER — ZIPRASIDONE HYDROCHLORIDE 40 MG/1
40 CAPSULE ORAL 2 TIMES DAILY WITH MEALS
Qty: 60 CAPSULE | Refills: 1 | Status: SHIPPED | OUTPATIENT
Start: 2021-01-22 | End: 2021-03-19

## 2021-01-22 RX ORDER — LORAZEPAM 0.5 MG/1
0.5 TABLET ORAL AT BEDTIME
Qty: 30 TABLET | Refills: 1 | Status: SHIPPED | OUTPATIENT
Start: 2021-01-22 | End: 2021-03-19

## 2021-01-22 NOTE — PROGRESS NOTES
TELEPHONE VISIT  Pinky Page is a 69 year old pt. who is being evaluated via a billable telephone visit.      The patient has been notified of the following:    We have found that certain health care needs can be provided without the need for a physical exam. This service lets us provide the care you need with a short phone conversation. If a prescription is necessary we can send it directly to your pharmacy. If lab work is needed we can place an order for that and you can then stop by our lab to have the test done at a later time. Insurers are generally covering virtual visits as they would in-office visits so billing should not be different than normal.  If for some reason you do get billed incorrectly, you should contact the billing office to correct it and that number is in the AVS .    Patient has given verbal consent for a telephone visit?:  Yes   How would the pt like to obtain the AVS?:  Patient declined  AVS SmartPhrase [PsychAVS] has been placed in 'Patient Instructions':  N/A     Start Time: 2:15PM       End Time:  2:45PM       [facility]  Psychiatry Clinic  PSYCHIATRY PROGRESS NOTE     Date of initial Diagnostic Assessment (DA) is 5/13/13 and most recent Transfer of Care DA is 12/11/19.    CARE TEAM:  PCP- Cary Brownlee  (resident working at \[facility]\"" with Dr. Birmingham, attending)   Specialty Providers- no    Therapist- Dr. Gr    [facility] Team- no          Pinky Page is a 70 year old female who prefers the name Pinky & pronouns she, her, hers.      Pertinent Background: Pinky first experienced mental health issues in her twenties and has received treatment for schizoaffective disorder and generalized anxiety. Notably, Pinky's symptoms of depression and psychosis have responded well to a combination of ECT and medication management. Pinky has a history of experiencing increased psychotic symptoms with past attempted antipsychotic tapers.      Psych pertinent history  includes psychosis [sxs include disorganization, hallucinations, paranoia], mutiple psychotropic trials, psych hosp (3-5) and ECT.    INTERIM HISTORY      [4, 4]   The patient reports good treatment adherence.  History was provided by the patient who was a good historian.    The last visit ended with no change to the med regimen.   Since the last visit:   -Had COVID about 2 weeks before Lashaun. Couldn't leave her room for 2 weeks. Wasn't hospitalized but felt miserable.   -Got first dose of the COVID vaccine and felt sick with a fever for 3 days. Due for the next dose in early February. Feeling better now.   -Is sad about the 3 residents and one staff member from [facility] who  from COVID. She knew all of them.  -Finds it difficult to have to sit alone at a table for meals in the dining room.   -Her residence regularly screens staff and residents. If no new cases for 2 weeks, they'll start to open activities back up.  -Lashaun was different because normally goes to her sister's house. Video chatted with her 2 year great nephew when he opened his presents.   -Felt sad when she was sick. Her mood is back to normal. Has felt happy for the past 10 days since feeling physically better.  -Continues to have insomnia but feels it has improved recently. If she doesn't sleep well overnight, will take an hour nap during the day. Only has low energy on days when she doesn't sleep well. Uses her CPAP.   -Reports occasional, manageable worries.   -Tries to stay busy with coloring, word searches, and talking to family. Talked to her sister who lives in [place] recently which she enjoyed.   -Denies SI, mood swings, irritability, AH/VH, paranoia, or medication side effects.     RECENT PSYCH ROS:   Depression: REPORTS: insomnia, low energy DENIES: suicidal ideation, depressed mood, anhedonia, hypersomnia, appetite changes, weight changes and poor concentration /memory  Elevated: DENIES: increased energy, decreased  sleep need, increased activity and grandiosity  Psychosis: DENIES: delusions, auditory hallucinations, visual hallucinations and disorganized behavior  Anxiety: DENIES: excessive worry, feeling fearful and nervous/overwhelmed  Panic Attack:  none  Trauma Related:  none  Dysregulation: DENIES: irritable  Eating Disorder: no   Pertinent Negative Symptoms:  NO suicidal and violent ideation, aggression, psychosis, hallucinations, delusions and alberta    RECENT SUBSTANCE USE:     ALCOHOL- no      TOBACCO- no     CAFFEINE- coffee/ tea [coffee, diet Pepsi]. 1 cup of coffee in the morning, 1 can diet coke in the afternoon, 1 decaf coffee at lunch. doesn't drink regular coffee after 1pm.   OPIOIDS- no         NARCAN KIT- no        CANNABIS- no            OTHER ILLICIT DRUGS- none    CURRENT SOCIAL HISTORY:  FINANCIAL SUPPORT- social security disability       CHILDREN- no      LIVING SITUATION- Lives at [facility].      SOCIAL/ SPIRITUAL SUPPORT- sisters, support staff at [facility], other residents at [place], other providers     FEELS SAFE AT HOME- yes     PSYCH and CD Critical Summary Points since July 2020 7/22/20-no changes  9/23- no changes  11/18-no changes  1/22/21-no changes    PAST MED TRIALS          See last Diagnostic Assessment    MEDICAL / SURGICAL HISTORY          Patient Active Problem List   Diagnosis     Allergic rhinitis due to pollen     Urge incontinence     Hypertension, essential     Cardiomegaly     Chronic constipation     Dry eye syndrome     Esophageal reflux     Exposure keratoconjunctivitis     DM ophthalmopathy (H)     Hypothyroidism     Senile cataract     ANUJ (obstructive sleep apnea)     Vaginal atrophy     Restless leg syndrome     Squamous blepharitis     Morbid obesity due to excess calories (H)     Personal history of breast cancer s/p L masectomy     Hypercholesteremia     Lives in group home     Extrapyramidal and movement disorder     CKD (chronic kidney disease)  stage 2, GFR 60-89 ml/min     Solitary kidney, congenital     S/P hysterectomy     Impingement syndrome of right shoulder     Hypertriglyceridemia     Candidiasis of skin     Generalized anxiety disorder     Carpal tunnel syndrome of left wrist     Schizoaffective disorder, depressive type (H)     Major depressive disorder, recurrent episode, moderate (H)     Psychological factors affecting medical condition     Hx of psychiatric care     Nail complaint     Microalbuminuria due to type 2 diabetes mellitus (H)     Type 2 diabetes mellitus with microalbuminuria, with long-term current use of insulin (H)     Conjunctivitis of both eyes     Corneal erosion of both eyes     Spastic entropion, right     Foot callus       Past Surgical History:   Procedure Laterality Date     APPENDECTOMY       C MASTECTOMY,SIMPLE  1996    Left mastectomy  [facility]. Had normal FU mammo 5/2007. Follow yearly.      C TOTAL ABDOM HYSTERECTOMY  7/2003    Dr. Castanon, for abnormal bleeding. Removal of both tubes with BSO for myoma w - - -     CHOLECYSTECTOMY       COLONOSCOPY  5/2005    Complete Colonoscopy-had one small polyp removed 5/2005.      COLONOSCOPY N/A 3/31/2016    Procedure: COLONOSCOPY;  Surgeon: Cary Ring MD;  Location: [facility]     COLONOSCOPY N/A 7/7/2016    Procedure: COLONOSCOPY;  Surgeon: Cary Ring MD;  Location: [facility]     DILATION AND CURETTAGE       HYSTERECTOMY       MASTECTOMY      Left breast       MEDICAL REVIEW OF SYSTEMS    [2, 10]     REPORTS: none DENIES: palpitations, chest pain, loss of consciousness, dyspnea, dizziness, fever, muscle stiffness, muscle twitching, Parkinsonian symptoms     ALLERGY       Chlordiazepoxide hcl, Dimetapp dm cold-cough, Haldol, Ibuprofen, Lactose intolerance [beta-galactosidase], Milk products, and Propofol  MEDICATIONS        Current Outpatient Medications   Medication Sig Dispense Refill     acetaminophen (TYLENOL) 500 MG tablet Take 2  tablets (1,000 mg) by mouth every 6 hours as needed 1 Bottle 2     alum & mag hydroxide-simethicone (MYLANTA/MAALOX) 200-200-20 MG/5ML SUSP suspension Take 30 mLs by mouth daily as needed for indigestion       amLODIPine (NORVASC) 10 MG tablet Take 10 mg by mouth daily       artificial saliva (BIOTENE MT) AERS spray Take 1 spray by mouth 3 times daily as needed for dry mouth       ASPIRIN 81 MG OR TABS Take 1 tablet by mouth daily. ENTERIC COATED. 100 3     atorvastatin (LIPITOR) 40 MG tablet Take 1 tablet (40 mg) by mouth daily 90 tablet 1     BD AUTOSHIELD DUO 30G X 5 MM        blood glucose (NO BRAND SPECIFIED) lancets standard Use to test blood sugar 2 times daily or as directed. 100 each 11     blood glucose calibration (NO BRAND SPECIFIED) solution Use to calibrate blood glucose monitor as directed. 1 each 3     blood glucose monitoring (NO BRAND SPECIFIED) meter device kit Check Blood sugars twice a day. 1 kit 0     blood glucose monitoring (NO BRAND SPECIFIED) test strip Use to test blood sugar 3 times daily or as directed. 100 each 3     Calcium Carbonate-Vitamin D (CALCIUM-VITAMIN D) 250-125 MG-UNIT TABS Take 1 tablet by mouth 2 times daily. Calcium 250 mg/Vit D 125 IU       calcium polycarbophil (FIBERCON) 625 MG tablet Take 2 tablets by mouth daily       ciprofloxacin (CIPRO) 500 MG tablet Take 1 tablet (500 mg) by mouth 2 times daily 14 tablet 0     CLARITIN 10 MG OR TABS 1 TAB PO QD (Once per day) as needed for ALLERGY SYMPTOMS 30 11     FLUoxetine (PROZAC) 20 MG capsule Take 2 capsules (40 mg) by mouth daily 60 capsule 1     fluticasone (FLONASE) 50 MCG/ACT nasal spray Spray 1 spray into both nostrils daily 16 g 3     furosemide (LASIX) 20 MG tablet Take 1 tablet (20 mg) by mouth 2 times daily 60 tablet 3     Gabapentin (NEURONTIN PO) Take 900 mg by mouth 3 times daily.       glucagon (GLUCAGON EMERGENCY) 1 MG kit Inject 1 mg into the muscle once for 1 dose 2 mg 3     Hypromellose (ARTIFICIAL TEARS  OP) Apply 1 drop to eye 4 times daily.       insulin glargine (LANTUS) 100 UNIT/ML injection Inject 20 Units Subcutaneous At Bedtime 8 mL 11     lactase (LACTAID) 3000 UNIT tablet Take 4 tabs daily with meals.       levothyroxine (SYNTHROID, LEVOTHROID) 175 MCG tablet Take 1 tablet (175 mcg) by mouth daily Give on empty stomach 30 tablet 4     levothyroxine (SYNTHROID/LEVOTHROID) 175 MCG tablet Take 175 mcg by mouth daily       liraglutide (VICTOZA) 18 MG/3ML solution Inject 1.8 mg Subcutaneous daily 9 mL 0     LORazepam (ATIVAN) 0.5 MG tablet Take 1 tablet (0.5 mg) by mouth At Bedtime 30 tablet 1     losartan (COZAAR) 100 MG tablet Take 0.5 tablets (50 mg) by mouth 2 times daily 120 tablet 2     Magnesium Hydroxide (MILK OF MAGNESIA PO) Take  by mouth. Take 30 mL as needed for constipation.       melatonin 3 MG CAPS Take 6 mg by mouth At Bedtime 60 capsule 1     metFORMIN (GLUCOPHAGE) 1000 MG tablet Take 1,000 mg by mouth 2 times daily (with meals)       nystatin (MYCOSTATIN) 370274 UNIT/GM POWD Apply topically 3 times daily as needed 60 g 1     ONETOUCH DELICA LANCETS 33G MISC        polyethylene glycol (MIRALAX/GLYCOLAX) powder Take 1 capful by mouth 2 times daily. 17 GM PO BID       Saline 0.9 % SOLN Spray 2 sprays in nostril as needed.       senna-docusate (SENNA S) 8.6-50 MG per tablet Take 2 tablets by mouth At Bedtime.       simethicone (MYLICON) 125 MG chewable tablet Take 1 tablet (125 mg) by mouth 2 times daily 60 tablet 0     Skin Protectants, Misc. (EUCERIN) cream Apply  topically as needed. Apply to thigh PRN dry skin        SM LUBRICANT EYE DROPS 0.4-0.3 % SOLN ophthalmic solution        Sod Fluor-Solo Fluor-Hydrfl Ac (GEL-SASHA DENTINBLOC DT) Apply  to affected area. GEL. Apply to 2nd molar on bottom right daily at bedtime.       solifenacin (VESICARE) 10 MG tablet Take 1 tablet (10 mg) by mouth daily 30 tablet 6     terbinafine (LAMISIL) 1 % external cream Apply topically 2 times daily        "ziprasidone (GEODON) 40 MG capsule Take 1 capsule (40 mg) by mouth 2 times daily (with meals) 60 capsule 1     VITALS      [3, 3]   LMP  (LMP Unknown)    MENTAL STATUS EXAM      [9, 14 cog gs]     Alertness: alert  and oriented  Appearance: could not assess  Behavior/Demeanor: cooperative, pleasant and calm, could not assess eye contact   Speech: normal and regular rate and rhythm  Language: intact and no problems  Psychomotor: normal or unremarkable  Mood: \"happy\"  Affect: sounded full range; was congruent to mood; was congruent to content  Thought Process/Associations: unremarkable  Thought Content:  Reports none;  Denies suicidal and violent ideation and delusions  Perception:  Reports none;  Denies auditory hallucinations and visual hallucinations  Insight: good  Judgment: good  Cognition: (6) does  appear grossly intact; formal cognitive testing was not done  Gait and Station: could not assess    LABS and DATA     AIMS:  last done 3/6/19 with score(s): 3;  due at next in-person visit    PHQ9 TODAY = not completed  PHQ-9 SCORE 1/9/2020 3/2/2020 8/27/2020   PHQ-9 Total Score - - -   PHQ-9 Total Score 7 7 5     ANTIPSYCHOTIC LABS  [glu, A1C, lipids (danika LDL), liver enzymes, WBC, ANEU, Hgb, plts]  q12 mo  Recent Labs   Lab Test 09/16/20 03/02/20  1420 08/06/19  0941 05/07/19  0908 05/07/19  0650 10/25/18  1734 10/25/18  1734 05/03/18  1414   GLC 73  --   --  109*  --   --  147* 192.6*   A1C 6.6* 6.6* 6.6*  --  6.8*   < >  --  6.7*    < > = values in this interval not displayed.     Recent Labs   Lab Test 09/16/20 05/07/19  0650 05/03/18  1414 03/21/17  1023   CHOL 106 138 126 142   TRIG 89 96 148 121   LDL 46 79 60 78   HDL 42* 40 37* 40*     Recent Labs   Lab Test 06/26/18  0659 06/26/18 03/21/17  1023 01/22/15  1600   AST 16 16 13 18   ALT 21 21 20 35   ALKPHOS 111 111 92 113     Recent Labs   Lab Test 05/07/19  0650 10/25/18  1734 06/26/18  0659 06/21/17  1047 03/21/17  1023 07/04/14  0705 07/04/14  0705   WBC " 10.06* 14.2* 9.72  --  11.8*   < > 7.7   ANEU  --  10.6* 5.31  --  8.3  --  4.8   HGB 13.3 14.0 12.6 13.8 13.4   < > 13.3    275 247  --  266   < > 223    < > = values in this interval not displayed.     PSYCHOTROPIC DRUG INTERACTIONS     ZIPRASIDONE and QT INTERVAL PROLONGING DRUGS may result in increased risk of QT-interval prolongation.  ZIPRASIDONE and SEROTONERGIC DRUGS THAT PROLONG QT INTERVAL may result in increased risk of QT-interval prolongation and increased risk of serotonin syndrome (hypertension, hyperthermia, myoclonus, mental status changes).  NSAID and SSRI may result in an increased risk of bleeding  ERYTHROMYCIN and FLUOXETINE may result in an increased risk of cardiotoxicity (QT prolongation, torsades de pointes, cardiac arrest).  FLUOXETINE and INSULINS OR PRAMLINTIDE may result in increased risk of hypoglycemia.  FLUOXETINE and QT INTERVAL PROLONGING DRUGS may result in increased risk of QT-interval prolongation.   GABAPENTIN and CNS DEPRESSANTS may result in respiratory depression.   GABAPENTIN and ANTACIDS may result in decreased gabapentin effectiveness.     MANAGEMENT:  Monitoring for adverse effects, periodic EKGs and patient is aware of risks    RISK STATEMENT for SAFETY     Pinky Page did not appear to be an imminent safety risk to self or others.    PSYCHIATRIC DIAGNOSIS     Schizoaffective Disorder, depressed type, in partial remission  Generalized Anxiety Disorder    ASSESSMENT      [m2, h3]     TODAY Pinky presents for a follow-up via telephone. She is currently euthymic without psychotic symptoms. She felt sad and down when she was sick with COVID, but her mood has improved now that she feels physically well. Pinky continues to have some insomnia though it has improved recently. Future considerations include stopping Ativan given risk of falls with her age. Insomnia could be targeted with trazodone or low-dose mirtazapine. Records indicate that Pinky took trazodone many years  ago but it's effect is unknown. Will defer AP labs and EKG given Pinky's elevated risk should she get COVID and the risk of exposure with coming to the clinic for labs. Will obtain following next visit once she is fully vaccinated.      PLAN      [m2, h3]     1) PSYCHOTROPIC MEDICATIONS:  - Continue ziprasidone 40 mg BID  - Continue fluoxetine 40 mg daily  - Continue lorazepam 0.5 mg at bedtime  - Continue melatonin 6mg at bedtime    Gabapentin 900mg TID prescribed by primary care    2) [place]  was checked today:  Lorazepam filled at monthly intervals only from this clinic. Gabapentin filled at monthly intervals for 30 day supplies and only prescribed by Dr. Linares/Dr. Frey.    3) THERAPY:    Yes, continue w/ Dr. Gr once a month    4) NEXT DUE:    Labs- AP labs by 05/2020  EKG- Last EKG 12/11/19 QTc 456. EKG due 12/2020   Rating Scales- AIMS due at next in-person visit    5) REFERRALS:    No Referrals needed     6) RTC: 2 months.    7) CRISIS NUMBERS:   Provided routinely in AVS   ONLY if a [facility] PT: [facility] [place] [phone] (clinic), [phone] (after hours)     TREATMENT RISK STATEMENT:  The risks, benefits, alternatives and potential adverse effects have been discussed and are understood by the pt. The pt understands the risks of using street drugs or alcohol. There are no medical contraindications, the pt agrees to treatment with the ability to do so. The pt knows to call the clinic for any problems or to access emergency care if needed.  Medical and substance use concerns are documented above.  Psychotropic drug interaction check was done, including changes made today.    PROVIDER: María Shi MD    Patient not staffed in clinic. Supervisor is Dr. Rubio who will sign this note.

## 2021-01-28 ENCOUNTER — VIRTUAL VISIT (OUTPATIENT)
Dept: FAMILY MEDICINE | Facility: CLINIC | Age: 71
End: 2021-01-28
Payer: COMMERCIAL

## 2021-01-28 DIAGNOSIS — E11.29 TYPE 2 DIABETES MELLITUS WITH MICROALBUMINURIA, WITH LONG-TERM CURRENT USE OF INSULIN (H): Primary | ICD-10-CM

## 2021-01-28 DIAGNOSIS — R80.9 TYPE 2 DIABETES MELLITUS WITH MICROALBUMINURIA, WITH LONG-TERM CURRENT USE OF INSULIN (H): Primary | ICD-10-CM

## 2021-01-28 DIAGNOSIS — U07.1 2019 NOVEL CORONAVIRUS DISEASE (COVID-19): ICD-10-CM

## 2021-01-28 DIAGNOSIS — Z79.4 TYPE 2 DIABETES MELLITUS WITH MICROALBUMINURIA, WITH LONG-TERM CURRENT USE OF INSULIN (H): Primary | ICD-10-CM

## 2021-01-28 PROCEDURE — 99442 PR PHYSICIAN TELEPHONE EVALUATION 11-20 MIN: CPT | Mod: 95 | Performed by: STUDENT IN AN ORGANIZED HEALTH CARE EDUCATION/TRAINING PROGRAM

## 2021-01-28 NOTE — PROGRESS NOTES
"Pinky is a 70 year old who is being evaluated via a billable telephone visit.        How would you like to obtain your AVS? Mail a copy     Assessment & Plan     Type 2 diabetes mellitus with microalbuminuria, with long-term current use of insulin (H)  2019 novel coronavirus disease (COVID-19)  Patient with increased evening blood glucose levels in the setting of recent COVID-19 infection and significant decrease in daily activities/exercise. Based on pattern of elevated afternoon blood sugars, stable fasting sugars near goal, and decrease in physical activity, likely patient's hyperglycemia secondary to decreased glycemic uptake from skeletal muscles. The fatigue related to her COVID-19 infection is progressively improving and she is tapering up her exercise daily. Likely her blood sugars will normalize when she is back to her baseline activity level. Recommended no changes to medications at this visit, but did recommend decreased simple carbohydrate intake with adequate replacement with vegetables and lean protein. Plan for RTC in 2 weeks to check on blood sugars, sooner if patient's blood sugars consistently >300-400 or she develops new symptoms including polydypsia, polyuria, altered mentation. Patient in agreement with plan.       BMI:   Estimated body mass index is 39.34 kg/m  as calculated from the following:    Height as of 8/27/20: 1.63 m (5' 4.17\").    Weight as of 9/10/20: 104.5 kg (230 lb 6.4 oz).       No follow-ups on file.    Apple Buckley MD  [facility] MICHAEL Vance is a 70 year old who presents to clinic today for the following health issues     Chief Complaint   Patient presents with     RECHECK     elevated blood sugars in the high 300  and now in the 200       HPI     Patients name: Pinky  Appointment start time:  9:47 AM    Hyperglycemia: Accuchecks running high. 200-300s.   BID checks: 6AM (fasting), 5PM (before dinner)  6AM: 170s-180s  5PM: 200-300s over " the last 2 weeks. Prior to the last 2 weeks were closer to 100. Meals have not changed at all, but has been less active. Had COVID-19 12/2020. Has been fatigued since then, more tired than usual. Prior to COVID-19 was doing exercises daily, 1 hour split between walking and stationary bike. Now less active, biking 25 minutes a few days per week, and walking 20ish minutes a few days a week, working back up.     No increased urination or thirst. Not feeling confused.         Review of Systems   Negative except as noted above      Objective         Vitals:  No vitals were obtained today due to virtual visit.    Physical Exam   healthy, alert and no distress  PSYCH: Alert and oriented times 3; coherent speech, normal   rate and volume, able to articulate logical thoughts, able   to abstract reason, no tangential thoughts, no hallucinations   or delusions  Her affect is normal  RESP: No cough, no audible wheezing, able to talk in full sentences  Remainder of exam unable to be completed due to telephone visits            Phone call duration: 15 minutes

## 2021-01-28 NOTE — PATIENT INSTRUCTIONS
Patient Education   Here is the plan from today's visit    1. Type 2 diabetes mellitus with microalbuminuria, with long-term current use of insulin (H)  Today we talked about your higher blood sugars over the past few weeks. I think that these are due to decrease in your daily exercise because of your recent COVID-19 infection. Our plan for now is:  - NO changes to your medications  - Continue increasing your daily exercise as much as you can tolerate  - Decrease simple carbohydrates (white rice, pasta, white bread, desserts, juice) and exchange for whole grains (brain rice, whole wheat pasta), vegetables and lean protein.  - Follow-up visit in 2 weeks to check in on blood sugars to see if there's improvement  - Call the clinic if your blood sugars are consistently higher than 300 (especially in the morning) after changing your diet, or if you begin peeing much more than normal and feeling much thirstier than normal or are feeling confused.         Thank you for coming to [facility] today.  Lab Testing:  **If you had lab testing today and your results are reassuring or normal they will be mailed to you or sent through [employer] within 7 days.   **If the lab tests need quick action we will call you with the results.  The phone number we will call with results is # [phone] (home) . If this is not the best number please call our clinic and change the number.  Medication Refills:  If you need any refills please call your pharmacy and they will contact us.   If you need to  your refill at a new pharmacy, please contact the new pharmacy directly. The new pharmacy will help you get your medications transferred faster.   Scheduling:  If you have any concerns about today's visit or wish to schedule another appointment please call our office during normal business hours [phone] (8-5:00 M-F)  If a referral was made to a [facility] and you don't get a call from central scheduling  please call [phone].  If a Mammogram was ordered for you at [facility] call [phone] to schedule or change your appointment.  If you had an XRay/CT/Ultrasound/MRI ordered the number is [phone] to schedule or change your radiology appointment.   Medical Concerns:  If you have urgent medical concerns please call [phone] at any time of the day.    Apple Buckley MD

## 2021-01-28 NOTE — PROGRESS NOTES
Preceptor Attestation:   I talked to the patient on the phone. I discussed the patient with the resident. I have verified the content of the note, which accurately reflects my assessment of the patient and the plan of care.   Supervising Physician:  oRe Vidal DO.

## 2021-02-02 ENCOUNTER — VIRTUAL VISIT (OUTPATIENT)
Dept: PSYCHOLOGY | Facility: CLINIC | Age: 71
End: 2021-02-02
Payer: COMMERCIAL

## 2021-02-02 DIAGNOSIS — F33.0 MAJOR DEPRESSIVE DISORDER, RECURRENT EPISODE, MILD (H): Primary | ICD-10-CM

## 2021-02-02 DIAGNOSIS — F54 PSYCHOLOGICAL FACTORS AFFECTING MEDICAL CONDITION: ICD-10-CM

## 2021-02-02 DIAGNOSIS — E66.9 OBESITY, UNSPECIFIED OBESITY SEVERITY, UNSPECIFIED OBESITY TYPE: ICD-10-CM

## 2021-02-02 DIAGNOSIS — F41.1 GENERALIZED ANXIETY DISORDER: ICD-10-CM

## 2021-02-02 PROCEDURE — 90837 PSYTX W PT 60 MINUTES: CPT | Mod: 95 | Performed by: PSYCHOLOGIST

## 2021-02-02 NOTE — PROGRESS NOTES
"  Health Psychology                    Department of Medicine  Keri Arnett, Ph.D., L.P. [phone]                          [facility] Madelaine Ingram, Ph.D., L.P. [phone]                   [place] Mail Code 747   Miryam Hillrico, Ph.D.,  L.P. [phone]         [address] [place] [address] Dennis Dillard, Ph.D. [phone]     [place] [address]           Rae Eugene, Ph.D., L.P. [phone]       Damon Gr, Ph.D., A.B.P.P., L.P. [phone]   [facility]   Kimber Moralse, Ph.D., L.P. 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 She does not have access to video telehealth.  She wished to get a call. She got a cell phone, but prefers for me to call her again on the [employer] phone. We discussed learning how to use it for telehealth in the future given the likelihood that remuneration for telephone only contacts is likely to decrease during the summer.  She is not sure of her email address or how to input URLs.    She tested positive for COVID-19 in early December.  She was quarantined -  She had temp, stuffed nose, sore throat, low energy, and also had an UTI.  She feels betters now, is regaining her strength and can bike 25 minutes (was up to 40 pre-COVID).  Her roommate also had COVID and quarantined with her.  Many people tested positive at [facility].  Some were hospitalized. Three residents of [facility] have  since December.  She suspects it was COVID-19.  Some staff were also affected.  She has gotten her first dose of the vaccine. Next dose is next week.    We discussed her  resolutions:  Ride bike more, watch what she eats more. We discussed the importance of regaining strength, limiting intake.  We discussed her reactions to the insurrection and " inauguration.    She is in touch with her family by phone but can't actually see them. She used the cell phone for a Zoom, or Zoom-like call on [place]. She doesn't understand how to use it and will speakk with staff.  I recommended she have more regular videochats with her family.     She states she weighed 222# on 1/29/21.    She participates fully, ventilated feelings appropriately.  She appears to derive benefit from the support.      Current Outpatient Medications   Medication     acetaminophen (TYLENOL) 500 MG tablet     alum & mag hydroxide-simethicone (MYLANTA/MAALOX) 200-200-20 MG/5ML SUSP suspension     amLODIPine (NORVASC) 10 MG tablet     artificial saliva (BIOTENE MT) AERS spray     ASPIRIN 81 MG OR TABS     atorvastatin (LIPITOR) 40 MG tablet     BD AUTOSHIELD DUO 30G X 5 MM     blood glucose (NO BRAND SPECIFIED) lancets standard     blood glucose calibration (NO BRAND SPECIFIED) solution     blood glucose monitoring (NO BRAND SPECIFIED) meter device kit     blood glucose monitoring (NO BRAND SPECIFIED) test strip     Calcium Carbonate-Vitamin D (CALCIUM-VITAMIN D) 250-125 MG-UNIT TABS     calcium polycarbophil (FIBERCON) 625 MG tablet     ciprofloxacin (CIPRO) 500 MG tablet     CLARITIN 10 MG OR TABS     FLUoxetine (PROZAC) 20 MG capsule     fluticasone (FLONASE) 50 MCG/ACT nasal spray     furosemide (LASIX) 20 MG tablet     Gabapentin (NEURONTIN PO)     glucagon (GLUCAGON EMERGENCY) 1 MG kit     Hypromellose (ARTIFICIAL TEARS OP)     insulin glargine (LANTUS) 100 UNIT/ML injection     lactase (LACTAID) 3000 UNIT tablet     levothyroxine (SYNTHROID, LEVOTHROID) 175 MCG tablet     levothyroxine (SYNTHROID/LEVOTHROID) 175 MCG tablet     liraglutide (VICTOZA) 18 MG/3ML solution     LORazepam (ATIVAN) 0.5 MG tablet     losartan (COZAAR) 100 MG tablet     Magnesium Hydroxide (MILK OF MAGNESIA PO)     melatonin 3 MG CAPS     metFORMIN (GLUCOPHAGE) 1000 MG tablet     nystatin (MYCOSTATIN) 989437 UNIT/GM  POWD     ONETOUCH DELICA LANCETS 33G MISC     polyethylene glycol (MIRALAX/GLYCOLAX) powder     Saline 0.9 % SOLN     senna-docusate (SENNA S) 8.6-50 MG per tablet     simethicone (MYLICON) 125 MG chewable tablet     Skin Protectants, Misc. (EUCERIN) cream     SM LUBRICANT EYE DROPS 0.4-0.3 % SOLN ophthalmic solution     Sod Fluor-Solo Fluor-Hydrfl Ac (GEL-SASHA DENTINBLOC DT)     solifenacin (VESICARE) 10 MG tablet     terbinafine (LAMISIL) 1 % external cream     ziprasidone (GEODON) 40 MG capsule     No current facility-administered medications for this visit.        This telephone service is appropriate and effective for delivering services in light of the necessity for social distancing to mitigate the COVID-19 epidemic and for conservation of PPE.     Patient has agreed to receiving telephone services after being informed about it: Yes    Patient prefers video invitation/information to be sent by:   email    Time service started: 1:04  Time service ended: 1:58  Extended session due to complexity of case and length of interval.    Mode of transmission: Telephone    Location of originating:  [facility]    Distance site:  Home office of provider for [facility]    The patient has been notified that:  Video visits will be conducted via a call with their psychologist to provide the care they need with a video conversation. Video visits may be billed at different rates depending on insurance coverage.  Patients are advised to please contact their insurance provider with any questions about their health insurance coverage. If during the course of a call the psychologist feels a video visit is not appropriate, patients will not be charged for this service.    Diagnosis:   Major Depression, recurrent mild (F33.0)   Psychological Factors Affecting Morbid Obesity (F54)  Generalized anxiety (F41.1)       Plan:  I will call her at [facility] 3/16 @ 1 for a telephone session at her request tfor supportive counseling due to the  serious and persistent nature of her depression and anxiety, consistent with treatment plan.  Last treatment plan signed: 10/19/2020  Treatment plan due: 10/19/2021                          Damon Gr, PhD LP,  A.B.P.P.  Director, Health Psychology  [phone]

## 2021-02-05 ENCOUNTER — MEDICAL CORRESPONDENCE (OUTPATIENT)
Dept: HEALTH INFORMATION MANAGEMENT | Facility: CLINIC | Age: 71
End: 2021-02-05

## 2021-02-08 NOTE — TELEPHONE ENCOUNTER
The forms were reviewed and signed after the appointment. The forms along with a current medication list were faxed back to Narendra [facility] at [phone]. They were also sent to scanning with a copy held in scanning until scanning confirmed. Renee Hartman CMA

## 2021-03-08 ENCOUNTER — DOCUMENTATION ONLY (OUTPATIENT)
Dept: FAMILY MEDICINE | Facility: CLINIC | Age: 71
End: 2021-03-08

## 2021-03-08 NOTE — PROGRESS NOTES
"When opening a documentation only encounter, be sure to enter in \"Chief Complaint\" Forms and in \" Comments\" Title of form, description if needed.    Pinky is a 70 year old  female  Form received via: Fax  Form now resides in: Provider Ready    Quiana Swift MA                  "

## 2021-03-12 ENCOUNTER — VIRTUAL VISIT (OUTPATIENT)
Dept: FAMILY MEDICINE | Facility: CLINIC | Age: 71
End: 2021-03-12
Payer: COMMERCIAL

## 2021-03-12 DIAGNOSIS — E11.29 TYPE 2 DIABETES MELLITUS WITH MICROALBUMINURIA, WITH LONG-TERM CURRENT USE OF INSULIN (H): Primary | ICD-10-CM

## 2021-03-12 DIAGNOSIS — I10 HYPERTENSION, ESSENTIAL: ICD-10-CM

## 2021-03-12 DIAGNOSIS — Z79.4 TYPE 2 DIABETES MELLITUS WITH MICROALBUMINURIA, WITH LONG-TERM CURRENT USE OF INSULIN (H): Primary | ICD-10-CM

## 2021-03-12 DIAGNOSIS — R80.9 TYPE 2 DIABETES MELLITUS WITH MICROALBUMINURIA, WITH LONG-TERM CURRENT USE OF INSULIN (H): Primary | ICD-10-CM

## 2021-03-12 PROCEDURE — 99442 PR PHYSICIAN TELEPHONE EVALUATION 11-20 MIN: CPT | Mod: 95 | Performed by: STUDENT IN AN ORGANIZED HEALTH CARE EDUCATION/TRAINING PROGRAM

## 2021-03-12 NOTE — PATIENT INSTRUCTIONS
Patient Education   Here is the plan from today's visit    1. Type 2 diabetes mellitus with microalbuminuria, with long-term current use of insulin (H)  Your morning blood sugars are looking great and while your evening sugars area still often higher than ideal, they are coming down nicely! Continue to work on eating fewer carbs and more fresh vegetables and fruits, and eat more lean protein. I've included some information below on goals for a diabetic diet. Continue with the great work you're doing to increase your physical activity with daily walks and riding the stationary bike.   - Hemoglobin A1c ([facility]); Future  - Comprehensive Metabolic Panel ([facility]); Future    2. Hypertension, essential  Continue with blood pressure checks 4 times a week and continue taking your blood pressure medications.   - CBC with Diff Plt ([facility]); Future  - Comprehensive Metabolic Panel ([facility]); Future    Diabetes Diet  Food is an important tool that you can use to control diabetes and stay healthy. Eating well-balanced meals in the correct amounts will help you control your blood glucose levels and prevent low blood sugar reactions. It will also help you reduce the health risks of diabetes.    Guidelines For Success:     Monitor your blood sugar levels as requested by your doctor. Take any medicine as prescribed by your doctor.     Learn to read nutrition labels and select appropriate portion sizes.     Eat only the amount of food in your meal plan. Eat about the same amount of food at regular times each day. Do not skip meals. Eat meals 4 to 5 hours apart, with snacks in between.     Limit alcohol. It raises blood sugar levels. Drink water or calorie-free diet drinks that use safe sweeteners.     Eat less fat to help lower your risk of heart disease. Use non-fat or low-fat dairy products and lean meats. Avoid fried foods. Use cooking oils that are unsaturated.     Learn about safe sugar substitutes.     Avoid added salt.  It can contribute to high blood pressure, which can cause heart disease.     Maintain healthy weight. If you need to lose weight, cut down on your portion sizes. But do not skip meals. Exercise is an important part of any weight management program.    Keeping Track of Serving Sizes    When you re planning for a snack or a meal, keep servings in mind. If you  don t have measuring cups or a scale handy, there are ways to  eyeball   serving sizes.   Managing Portion Sizes      If your weight is a concern, reducing your portions can help. You can eat more than one serving of a food at once. But to keep from eating too much at one meal, learn how to manage your portions. A portion is the amount of each type of food on your plate.     A serving size is a fixed size. Food producers use this term to describe their products. For example, the label on a cereal box could say that 1 cup of dry cereal = 1 serving.     A portion (also called a  helping ) is how much you eat or how much you put on your plate at a meal.                                        Healthy Meals for Diabetes  You don t have to give up all the foods you like. But you do need to follow some guidelines:  Eat Foods Rich in Fiber    Fiber is a carbohydrate that breaks down slowly. Fiber is also healthy for your heart. Fiber-rich foods include:   Whole-grain breads cereals; Bulgur wheat, Whole-wheat pasta, Brown rice; Fruits and vegetables; Dry beans, and peas.  Choose Healthy Protein Foods   Eating protein that is low in fat can help you control your weight. It also helps keep your heart healthy. Low-fat protein foods include:   Fish, Plant proteins, such as dry beans and peas; nuts, and soy products like tofu and soymilk; Lean meat with all visible fat removed; Poultry with the skin removed; low-fat or nonfat milk, cheese, and yogurt  Limit Unhealthy Fats and Sugar   Saturated and trans fats are unhealthy for your heart.  Fat is also high in calories, so it  can make you gain weight. To cut down on unhealthy fats and sugar, limit these foods:   Butter or margarine, palm and palm kernel oils and coconut oil; Ice cream; Sweet bakery goods such as pies, muffins, and donuts; Cream, jams and jellies; Candy bars, Cheese, Schumacher, Lunch meats, Regular sodas.  How Much to Eat  The amount of food you eat affects your blood sugar. It also affects your weight. Your health care team will tell you how much of each type of food you should eat.     Use measuring cups and spoons and a food scale to measure serving sizes.     Learn what a correct serving size looks like on your plate. This will help when you are away from home and can t measure your servings.   When to Eat  Your meal plan will likely include breakfast, lunch, dinner, and some snacks.     Try to eat your meals and snacks at about the same times each day.     Eat all your meals and snacks. Skipping a meal or snack can make your blood sugar drop too low. It can also cause you to eat too much at the next meal or snack. Then your blood sugar could get too high.  For more information about the best diet plan for you, talk with a registered dietitian (RD). To obtain a referral to an RD in your area, contact your Primary Care Physician. Also see:    Academy of Nutrition and Dietetics www.eatright.org     The American Diabetes Association [phone] www.diabetes.org    Please call or return to clinic if your symptoms don't go away.    Follow up plan  Please make a clinic appointment for follow up with me (ZAK ARMENTA) in 2  months for follow up on hypertension and diabetes management. Plan to have labs at that visit.    Thank you for coming to [facility] today.  Lab Testing:  **If you had lab testing today and your results are reassuring or normal they will be mailed to you or sent through [place] within 7 days.   **If the lab tests need quick action we will call you with the results.  The phone number we will call  with results is # [phone] (home) . If this is not the best number please call our clinic and change the number.  Medication Refills:  If you need any refills please call your pharmacy and they will contact us.   If you need to  your refill at a new pharmacy, please contact the new pharmacy directly. The new pharmacy will help you get your medications transferred faster.   Scheduling:  If you have any concerns about today's visit or wish to schedule another appointment please call our office during normal business hours [phone] (8-5:00 M-F)  If a referral was made to a [facility] and you don't get a call from central scheduling please call [phone].  If a Mammogram was ordered for you at [facility] call [phone] to schedule or change your appointment.  If you had an XRay/CT/Ultrasound/MRI ordered the number is [phone] to schedule or change your radiology appointment.   Medical Concerns:  If you have urgent medical concerns please call [phone] at any time of the day.    Tonia Goel MD

## 2021-03-12 NOTE — PROGRESS NOTES
"Pinky is a 70 year old who is being evaluated via a billable telephone visit.      How would you like to obtain your AVS? Mail a copy    Assessment & Plan     Type 2 diabetes mellitus with microalbuminuria, with long-term current use of insulin (H)  Morning blood sugars are looking great and while evening sugars area still often higher than ideal, they are coming down nicely. Continue to work on eating fewer carbs and more fresh vegetables and fruits, and eat more lean protein. Information was provided in the AVS on goals for a diabetic diet. Continue with the great work with increasing physical activity with daily walks and riding the stationary bike.   - Hemoglobin A1c ([facility])  - Comprehensive Metabolic Panel ([facility])    Hypertension, essential  Continue with blood pressure checks 4 times a week and continue taking your blood pressure medications.   - CBC with Diff Plt ([facility])  - Comprehensive Metabolic Panel ([facility])      Review of prior external note(s) from - Outside records from [facility]  [id number]}     BMI:   Estimated body mass index is 39.34 kg/m  as calculated from the following:    Height as of 8/27/20: 1.63 m (5' 4.17\").    Weight as of 9/10/20: 104.5 kg (230 lb 6.4 oz).       Follow up for an in-person visit with labs in 2 months    No follow-ups on file.    Tonia Goel MD  [facility] is a 70 year old who presents for the following health issues via billable telephone encounter    Since her last discussion with Dr. Correa she continues to have her blood sugars checked twice daily and blood pressures checked 4x a week.     Recent sugars:   6am: 89 and 140   5pm: 144, 155, 127, 270    Pinky reports eating less carbs, and more vegetables, and increasing her daily exercise.  She has been walking more, and has already gone on two walks today and will ride stationary bike tonight for 30         Diabetes Follow-up    How often are you checking your " blood sugar? Two times daily  Blood sugar testing frequency justification:  Uncontrolled diabetes  What time of day are you checking your blood sugars (select all that apply)?  6am and 5pm  Have you had any blood sugars above 200?  Yes evening blood sugars are frequently over 200  Have you had any blood sugars below 70?  No    What symptoms do you notice when your blood sugar is low?  None    What concerns do you have today about your diabetes? None     Do you have any of these symptoms? (Select all that apply)  No numbness or tingling in feet.  No redness, sores or blisters on feet.  No complaints of excessive thirst.  No reports of blurry vision.  No significant changes to weight.    Have you had a diabetic eye exam in the last 12 months? No        BP Readings from Last 2 Encounters:   09/10/20 (!) 160/91   08/27/20 (!) 153/84     Hemoglobin A1C (%)   Date Value   09/16/2020 6.6 (H)   03/02/2020 6.6 (H)     LDL Cholesterol Calculated (mg/dL)   Date Value   09/16/2020 46   05/07/2019 79         How many servings of fruits and vegetables do you eat daily?  2-3    On average, how many sweetened beverages do you drink each day (Examples: soda, juice, sweet tea, etc.  Do NOT count diet or artificially sweetened beverages)?   1    How many days per week do you exercise enough to make your heart beat faster? 5    How many minutes a day do you exercise enough to make your heart beat faster? 30 - 60    How many days per week do you miss taking your medication? 0      Review of Systems   Constitutional, HEENT, cardiovascular, pulmonary, gi and gu systems are negative, except as otherwise noted.      Objective       Vitals:  No vitals were obtained today due to virtual visit.    Physical Exam   healthy, alert and no distress  PSYCH: Alert and oriented times 3; coherent speech, normal   rate and volume, able to articulate logical thoughts  RESP: No cough, no audible wheezing, able to talk in full sentences  Remainder of exam  unable to be completed due to telephone visits    Transferred Records on 09/16/2020   Component Date Value Ref Range Status     Creatinine 09/16/2020 0.94  0.50 - 1.00 mg/dL Final     GFR Estimate 09/16/2020 61  >=60 ml/min/1.73m2 Final     GFR Estimate If Black 09/16/2020 71  >=60 ml/min/1.73m2 Final     Glucose 09/16/2020 73  70 - 100 mg/dL Final     Potassium 09/16/2020 4.3  3.5 - 5.3 mEq/L Final     Hemoglobin A1C 09/16/2020 6.6* 4.0 - 6.0 % Final     Cholesterol 09/16/2020 106  <=200 mg/dL Final     Triglycerides 09/16/2020 89  <=150 mg/dL Final     HDL Cholesterol 09/16/2020 42* >=50 mg/dL Final     LDL Cholesterol Calculated 09/16/2020 46  <=100 mg/dL Final     Non HDL Cholesterol 09/16/2020 64  <=130 mg/dL Final               Phone call duration: 15 minutes

## 2021-03-15 ENCOUNTER — TELEPHONE (OUTPATIENT)
Dept: PSYCHIATRY | Facility: CLINIC | Age: 71
End: 2021-03-15

## 2021-03-15 NOTE — PROGRESS NOTES
Preceptor Attestation:   Patient seen and discussed with the resident. Assessment and plan reviewed with resident and agreed upon.   Supervising Physician:  Abhijit Parks MD  [facility]     Pt not progressing; continues to be withdrawn, flat, depressed, minimally verbal; no DC date - disposition unclear

## 2021-03-15 NOTE — TELEPHONE ENCOUNTER
9 faxed pages was received from [facility] requesting review and signature on current Physician Orders. The forms are being worked on in Nurse Triage.Nataliya Doyle LPN

## 2021-03-15 NOTE — PROGRESS NOTES
Form has been completed by provider.     Form sent out via: Faxed to Narendra Truong at [phone]  Patient informed: Yes  Output date: March 15, 2021    Joana Delaney MA      **Please close the encounter**

## 2021-03-16 ENCOUNTER — OFFICE VISIT (OUTPATIENT)
Dept: FAMILY MEDICINE | Facility: CLINIC | Age: 71
End: 2021-03-16
Payer: COMMERCIAL

## 2021-03-16 ENCOUNTER — VIRTUAL VISIT (OUTPATIENT)
Dept: PSYCHOLOGY | Facility: CLINIC | Age: 71
End: 2021-03-16
Payer: COMMERCIAL

## 2021-03-16 VITALS
BODY MASS INDEX: 39.73 KG/M2 | HEIGHT: 63 IN | SYSTOLIC BLOOD PRESSURE: 122 MMHG | OXYGEN SATURATION: 99 % | WEIGHT: 224.2 LBS | DIASTOLIC BLOOD PRESSURE: 83 MMHG | TEMPERATURE: 98.5 F | HEART RATE: 78 BPM

## 2021-03-16 DIAGNOSIS — F33.0 MAJOR DEPRESSIVE DISORDER, RECURRENT EPISODE, MILD (H): Primary | ICD-10-CM

## 2021-03-16 DIAGNOSIS — Z79.899 OTHER LONG TERM (CURRENT) DRUG THERAPY: ICD-10-CM

## 2021-03-16 DIAGNOSIS — I10 HYPERTENSION, ESSENTIAL: ICD-10-CM

## 2021-03-16 DIAGNOSIS — E66.9 OBESITY, UNSPECIFIED OBESITY SEVERITY, UNSPECIFIED OBESITY TYPE: ICD-10-CM

## 2021-03-16 DIAGNOSIS — F25.1 SCHIZOAFFECTIVE DISORDER, DEPRESSIVE TYPE (H): ICD-10-CM

## 2021-03-16 DIAGNOSIS — I48.0 PAROXYSMAL ATRIAL FIBRILLATION (H): Primary | ICD-10-CM

## 2021-03-16 DIAGNOSIS — E11.29 TYPE 2 DIABETES MELLITUS WITH MICROALBUMINURIA, WITH LONG-TERM CURRENT USE OF INSULIN (H): ICD-10-CM

## 2021-03-16 DIAGNOSIS — E03.8 OTHER SPECIFIED HYPOTHYROIDISM: ICD-10-CM

## 2021-03-16 DIAGNOSIS — F41.1 GENERALIZED ANXIETY DISORDER: ICD-10-CM

## 2021-03-16 DIAGNOSIS — Z79.4 TYPE 2 DIABETES MELLITUS WITH MICROALBUMINURIA, WITH LONG-TERM CURRENT USE OF INSULIN (H): ICD-10-CM

## 2021-03-16 DIAGNOSIS — R80.9 TYPE 2 DIABETES MELLITUS WITH MICROALBUMINURIA, WITH LONG-TERM CURRENT USE OF INSULIN (H): ICD-10-CM

## 2021-03-16 DIAGNOSIS — F54 PSYCHOLOGICAL FACTORS AFFECTING MEDICAL CONDITION: ICD-10-CM

## 2021-03-16 LAB
% IMMATURE GRANULOCYTES: 0 % (ref 0–0)
ABS IMMATURE GRANULOCYTES: 0 10E9/L (ref 0–0)
ALBUMIN SERPL-MCNC: 4.1 MG/DL (ref 3.5–4.7)
ALP SERPL-CCNC: 78.9 U/L (ref 31.7–110.5)
ALT SERPL-CCNC: 12.8 U/L (ref 0–45)
AST SERPL-CCNC: 7.9 U/L (ref 0–45)
BASOPHILS # BLD AUTO: 0 10E9/L (ref 0–0.2)
BASOPHILS NFR BLD AUTO: 0 % (ref 0–2)
BILIRUB SERPL-MCNC: <0.4 MG/DL (ref 0.2–1.3)
BUN SERPL-MCNC: 12.8 MG/DL (ref 7–19)
CALCIUM SERPL-MCNC: 9.2 MG/DL (ref 8.5–10.1)
CHLORIDE SERPLBLD-SCNC: 96.2 MMOL/L (ref 98–110)
CHOLEST SERPL-MCNC: 124 MG/DL
CO2 SERPL-SCNC: 28.9 MMOL/L (ref 20–32)
CREAT SERPL-MCNC: 0.8 MG/DL (ref 0.5–1)
EOSINOPHIL # BLD AUTO: 0 10E9/L (ref 0–0.7)
EOSINOPHIL NFR BLD AUTO: 0 % (ref 0–6)
ERYTHROCYTE [DISTWIDTH] IN BLOOD BY AUTOMATED COUNT: 14.4 % (ref 10–15)
GFR SERPL CREATININE-BSD FRML MDRD: 70.7 ML/MIN/1.7 M2
GLUCOSE SERPL-MCNC: 204 MG'DL (ref 70–99)
HBA1C MFR BLD: 7.8 % (ref 4.1–5.7)
HCT VFR BLD AUTO: 42.7 % (ref 35–47)
HDLC SERPL-MCNC: 43 MG/DL
HEMOGLOBIN: 13.7 G/DL (ref 11.7–15.7)
LDLC SERPL CALC-MCNC: 53 MG/DL
LYMPHOCYTES # BLD AUTO: 2.5 10E9/L (ref 0.8–5.3)
LYMPHOCYTES NFR BLD AUTO: 23.4 % (ref 20–48)
MCH RBC QN AUTO: 28.7 PG (ref 26.5–35)
MCHC RBC AUTO-ENTMCNC: 32.1 G/DL (ref 32–36)
MCV RBC AUTO: 89.3 FL (ref 78–100)
MONOCYTES # BLD AUTO: 0.5 10E9/L (ref 0–1.3)
MONOCYTES NFR BLD AUTO: 5 % (ref 0–12)
NEUTROPHILS # BLD AUTO: 7.5 10E9/L (ref 1.6–8.3)
NEUTROPHILS NFR BLD AUTO: 70.8 % (ref 40–75)
NONHDLC SERPL-MCNC: 80 MG/DL
PLATELET # BLD AUTO: 280 10E9/L (ref 150–450)
POTASSIUM SERPL-SCNC: 4.2 MMOL/L (ref 3.3–4.5)
PROT SERPL-MCNC: 6.5 G/DL (ref 6.8–8.8)
RBC # BLD AUTO: 4.78 10E12/L (ref 3.8–5.2)
SODIUM SERPL-SCNC: 136.1 MMOL/L (ref 132.6–141.4)
TRIGL SERPL-MCNC: 136 MG/DL
TSH SERPL DL<=0.005 MIU/L-ACNC: 1.08 MU/L (ref 0.4–4)
WBC # BLD AUTO: 10.6 10E9/L (ref 4–11)

## 2021-03-16 PROCEDURE — 83036 HEMOGLOBIN GLYCOSYLATED A1C: CPT | Performed by: STUDENT IN AN ORGANIZED HEALTH CARE EDUCATION/TRAINING PROGRAM

## 2021-03-16 PROCEDURE — 36415 COLL VENOUS BLD VENIPUNCTURE: CPT | Performed by: FAMILY MEDICINE

## 2021-03-16 PROCEDURE — 84443 ASSAY THYROID STIM HORMONE: CPT | Performed by: FAMILY MEDICINE

## 2021-03-16 PROCEDURE — 99215 OFFICE O/P EST HI 40 MIN: CPT | Mod: GC | Performed by: STUDENT IN AN ORGANIZED HEALTH CARE EDUCATION/TRAINING PROGRAM

## 2021-03-16 PROCEDURE — 90834 PSYTX W PT 45 MINUTES: CPT | Mod: 95 | Performed by: PSYCHOLOGIST

## 2021-03-16 PROCEDURE — 85025 COMPLETE CBC W/AUTO DIFF WBC: CPT | Performed by: STUDENT IN AN ORGANIZED HEALTH CARE EDUCATION/TRAINING PROGRAM

## 2021-03-16 PROCEDURE — 80061 LIPID PANEL: CPT | Performed by: FAMILY MEDICINE

## 2021-03-16 PROCEDURE — 93000 ELECTROCARDIOGRAM COMPLETE: CPT | Mod: GC | Performed by: STUDENT IN AN ORGANIZED HEALTH CARE EDUCATION/TRAINING PROGRAM

## 2021-03-16 PROCEDURE — 80053 COMPREHEN METABOLIC PANEL: CPT | Performed by: STUDENT IN AN ORGANIZED HEALTH CARE EDUCATION/TRAINING PROGRAM

## 2021-03-16 ASSESSMENT — MIFFLIN-ST. JEOR: SCORE: 1501.09

## 2021-03-16 ASSESSMENT — ENCOUNTER SYMPTOMS
MYALGIAS: 0
COUGH: 0
WEAKNESS: 0
SHORTNESS OF BREATH: 0
ARTHRALGIAS: 0
LIGHT-HEADEDNESS: 0
ENDOCRINE NEGATIVE: 1
PALPITATIONS: 0
BRUISES/BLEEDS EASILY: 0
EYES NEGATIVE: 1
DIZZINESS: 0
PSYCHIATRIC NEGATIVE: 1
DIARRHEA: 0
CHEST TIGHTNESS: 0
CONSTITUTIONAL NEGATIVE: 1
ABDOMINAL PAIN: 0
NAUSEA: 0
SPEECH DIFFICULTY: 0

## 2021-03-16 NOTE — PROGRESS NOTES
"  Health Psychology                    Department of Medicine  Keri Arnett, Ph.D., L.P. [phone]                          [facility] Madelaine Ingram, Ph.D., L.P. [phone]                   [place] Mail Code 741   Miryam Hillrico, Ph.D.,  L.P. [phone]         [address] [place] [address] Dennis Dillard, Ph.D. [phone]     [place] [address]           Rae Eugene, Ph.D., L.P. [phone]       Damon Gr, Ph.D., A.B.P.P., L.P. [phone]   [facility]   Kimber Morales, Ph.D., L.P. 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 She does not have access to video telehealth, in part because she doesn't know how to use her relatively new cell phone..  She wished to get a call.  We discussed learning how to use it for telehealth in the future given the likelihood that remuneration for telephone only contacts is likely to decrease during the summer.  She is not sure of her email address or how to input URLs.  I asked her to speak with the nurses at [facility] to see if she can learn it.  She thinks she has an email but doesn't know what it is or how to access it.    She tested positive for COVID-19 in early December. She has gotten her two doses of the vaccine. There have been 2 other cases in the past 4 weeks (none for the past 2 weeks).  Health concerns today are mainly related to  Her heart.  She will see her PCC later today.  Nurses heard abnormal sounds v ia stethoscope.    Exercise:  Biking 5-6x/week for 25-30 minutes;  She is also walking 20-25 minutes indoor plus some days 30 minutes of walking outdoors.    She is in touch with her family by phone but can't actually see them. She used the cell phone for a Zoom, or Zoom-like call on [place].     She states she weighed 219# on " 3/13, down from  222# on 1/29/21.    She participates fully, ventilated feelings appropriately.  She appears to derive benefit from the support.      Current Outpatient Medications   Medication     acetaminophen (TYLENOL) 500 MG tablet     alum & mag hydroxide-simethicone (MYLANTA/MAALOX) 200-200-20 MG/5ML SUSP suspension     amLODIPine (NORVASC) 10 MG tablet     artificial saliva (BIOTENE MT) AERS spray     ASPIRIN 81 MG OR TABS     atorvastatin (LIPITOR) 40 MG tablet     BD AUTOSHIELD DUO 30G X 5 MM     blood glucose (NO BRAND SPECIFIED) lancets standard     blood glucose calibration (NO BRAND SPECIFIED) solution     blood glucose monitoring (NO BRAND SPECIFIED) meter device kit     blood glucose monitoring (NO BRAND SPECIFIED) test strip     Calcium Carbonate-Vitamin D (CALCIUM-VITAMIN D) 250-125 MG-UNIT TABS     calcium polycarbophil (FIBERCON) 625 MG tablet     ciprofloxacin (CIPRO) 500 MG tablet     CLARITIN 10 MG OR TABS     FLUoxetine (PROZAC) 20 MG capsule     fluticasone (FLONASE) 50 MCG/ACT nasal spray     furosemide (LASIX) 20 MG tablet     Gabapentin (NEURONTIN PO)     glucagon (GLUCAGON EMERGENCY) 1 MG kit     Hypromellose (ARTIFICIAL TEARS OP)     insulin glargine (LANTUS) 100 UNIT/ML injection     lactase (LACTAID) 3000 UNIT tablet     levothyroxine (SYNTHROID, LEVOTHROID) 175 MCG tablet     levothyroxine (SYNTHROID/LEVOTHROID) 175 MCG tablet     liraglutide (VICTOZA) 18 MG/3ML solution     LORazepam (ATIVAN) 0.5 MG tablet     losartan (COZAAR) 100 MG tablet     Magnesium Hydroxide (MILK OF MAGNESIA PO)     melatonin 3 MG CAPS     metFORMIN (GLUCOPHAGE) 1000 MG tablet     nystatin (MYCOSTATIN) 008378 UNIT/GM POWD     ONETOUCH DELICA LANCETS 33G MISC     polyethylene glycol (MIRALAX/GLYCOLAX) powder     Saline 0.9 % SOLN     senna-docusate (SENNA S) 8.6-50 MG per tablet     simethicone (MYLICON) 125 MG chewable tablet     Skin Protectants, Misc. (EUCERIN) cream     SM LUBRICANT EYE DROPS 0.4-0.3 %  SOLN ophthalmic solution     Sod Fluor-Solo Fluor-Hydrfl Ac (GEL-SASHA DENTINBLOC DT)     solifenacin (VESICARE) 10 MG tablet     terbinafine (LAMISIL) 1 % external cream     ziprasidone (GEODON) 40 MG capsule     No current facility-administered medications for this visit.        This telephone service is appropriate and effective for delivering services in light of the necessity for social distancing to mitigate the COVID-19 epidemic and for conservation of PPE.     Patient has agreed to receiving telephone services after being informed about it: Yes    Patient prefers video invitation/information to be sent by:   email    Time service started: 1:04  Time service ended: 1:49      Mode of transmission: Telephone    Location of originating:  [facility]    Distance site:  Home office of provider for [facility]    The patient has been notified that:  Video visits will be conducted via a call with their psychologist to provide the care they need with a video conversation. Video visits may be billed at different rates depending on insurance coverage.  Patients are advised to please contact their insurance provider with any questions about their health insurance coverage. If during the course of a call the psychologist feels a video visit is not appropriate, patients will not be charged for this service.    Diagnosis:   Major Depression, recurrent mild (F33.0)   Psychological Factors Affecting Morbid Obesity (F54)  Generalized anxiety (F41.1)       Plan:  I will call her at [facility] 4/20 @ 2 for a telephone session at her request tfor supportive counseling due to the serious and persistent nature of her depression and anxiety, consistent with treatment plan.  Last treatment plan signed: 10/19/2020  Treatment plan due: 10/19/2021                          Damon Gr, PhD LP,  A.B.P.P.  Director, Health Psychology  [phone]

## 2021-03-16 NOTE — PATIENT INSTRUCTIONS
Patient Education   Here is the plan from today's visit    1. Paroxysmal atrial fibrillation (H)  4 days of irregular heart rhythm found at [facility], confirmed new afib on EKG today. Unclear etiology. We will start you on a new medication today called apixaban. This is a blood thinner and will help prevent your body from forming blood clots. You should stop taking the daily aspirin while you are taking apixaban. You are at a higher risk for bleeding when on apixaban so take care to avoid falls. Keep exercising and working on eating more fresh fruits and vegetables. Come back in one week for follow up and we will review your labs. We've ordered an ECHO which will help give us a better picture of your heart and how it is functioning. Someone should call you to get this scheduled. You will also be contacted by the heart doctors (cardiology) for an appointment.    - CBC with Diff Plt ([facility])  - Comprehensive Metabolic Panel ([facility])  - TSH with free T4 reflex  - Echocardiogram Complete; Future  - CARDIOLOGY EVAL ADULT REFERRAL - INTERNAL; Future  - apixaban ANTICOAGULANT (ELIQUIS) 5 MG tablet; Take 1 tablet (5 mg) by mouth 2 times daily  Dispense: 180 tablet; Refill: 0  - STOP daily aspirin use    2. Other specified hypothyroidism  - TSH with free T4 reflex    3. Hypertension, essential  - Comprehensive Metabolic Panel ([facility])  - CBC with Diff Plt ([facility])    4. Type 2 diabetes mellitus with microalbuminuria, with long-term current use of insulin (H)  - Comprehensive Metabolic Panel ([facility])  - Hemoglobin A1c ([facility])    5. Other long term (current) drug therapy   - Lipid panel reflex to direct LDL Fasting        Please call or return to clinic if your symptoms don't go away.    Follow up plan  Please make a clinic appointment for follow up with me (ZAK ARMENTA) in 1  week to review labs and check in on new afib diagnosis and apixaban use.    Thank you for coming to [facility]  today.  Lab Testing:  **If you had lab testing today and your results are reassuring or normal they will be mailed to you or sent through [employer] within 7 days.   **If the lab tests need quick action we will call you with the results.  The phone number we will call with results is # [phone] (home) . If this is not the best number please call our clinic and change the number.  Medication Refills:  If you need any refills please call your pharmacy and they will contact us.   If you need to  your refill at a new pharmacy, please contact the new pharmacy directly. The new pharmacy will help you get your medications transferred faster.   Scheduling:  If you have any concerns about today's visit or wish to schedule another appointment please call our office during normal business hours [phone] (8-5:00 M-F)  If a referral was made to a [facility] and you don't get a call from central scheduling please call [phone].  If a Mammogram was ordered for you at [facility] call [phone] to schedule or change your appointment.  If you had an XRay/CT/Ultrasound/MRI ordered the number is [phone] to schedule or change your radiology appointment.   Medical Concerns:  If you have urgent medical concerns please call [phone] at any time of the day.    Tonia Goel MD

## 2021-03-16 NOTE — PROGRESS NOTES
Assessment & Plan   Pinky Page is a 70-year-old woman with a history of breast cancer status post left mastectomy, hypertension, type 2 diabetes, hyperlipidemia, hypothyroidism, major depressive disorder, generalized anxiety disorder, and schizoaffective disorder depressive type, who presents with a new irregular heart rhythm consistent with atrial fibrillation.    Paroxysmal atrial fibrillation (H)  Staff at her [facility] facility first noticed an irregular rhythm 3 days ago.  At the time Pinky was asymptomatic, so staff determined it was appropriate to forego an emergency department visit at that time and follow-up with her primary care clinic this week.  They continue to monitor her heart rate and rhythm over the next 2 days, and her rhythm was persistently irregular.  An EKG was obtained at this visit consistent with paroxysmal atrial fibrillation, with a regular rate.  There are no signs of ischemic change, and no evidence of prolonged QTc. She has had numerous EKGs since that time none of which have showed evidence of A. Fib, however all have shown some degree of irregularity. The cause of Pinky's newly irregular rhythm is unclear at this time.She has a JYJ3-NY0-WPJV score of 4 and a HAS BLED score of 3, so while she has a significant bleeding risk she also has a significant clotting risk and it is felt anticoagulation is appropriate. Pinky had a prior Echo completed in November 2015 for lower extremity edema.  At that time her left ventricular function chamber size wall motion and wall thickness were normal and she had a normal ejection fraction of 55 to 60%.  Given the unclear source of her new A. fib and past history of multiple abnormalities seen on EKG, it is reasonable to follow-up with cardiology at this time.  Baseline labs and an echocardiogram were ordered today.  Follow-up in 1 week to review labs, assess tolerance of apixaban, and evaluate for persistent rhythm abnormalities.    -  "Comprehensive Metabolic Panel ([facility])  - CBC with Diff Plt ([facility])  - TSH with free T4 reflex  - Echocardiogram Complete  - CARDIOLOGY EVAL ADULT REFERRAL - INTERNAL  - apixaban ANTICOAGULANT (ELIQUIS) 5 MG tablet  Dispense: 180 tablet; Refill: 0    Other specified hypothyroidism  - TSH with free T4 reflex    Hypertension, essential  - Comprehensive Metabolic Panel ([facility])  - CBC with Diff Plt ([facility])    Type 2 diabetes mellitus with microalbuminuria, with long-term current use of insulin (H)  - Comprehensive Metabolic Panel ([facility])  - Hemoglobin A1c ([facility])    Other long term (current) drug therapy   - Lipid panel reflex to direct LDL Fasting      Review of the result(s) of each unique test - EKG  Independent interpretation of a test performed by another physician/other qualified health care professional (not separately reported) - Previous EKGs and Echo       BMI:   Estimated body mass index is 39.72 kg/m  as calculated from the following:    Height as of this encounter: 1.6 m (5' 3\").    Weight as of this encounter: 101.7 kg (224 lb 3.2 oz).     FUTURE APPOINTMENTS:       - Follow-up visit in 1 week    No follow-ups on file.    Tonia Goel MD  [facility] MICHAEL Vance is a 70 year old who presents for the following health issues:    [facility]     Facility nurse noticed irregular heart rhythm on Saturday (3 days ago), confirmed by charge nurse and facility head nurse, all of whom thought heart beat sounded irregular.  Pinky was asymptomatic at the time, denying any chest pain, shortness of breath, palpitations, abdominal pain or nausea, therefore staff did not feel it was necessary for her to go to the emergency department at that time, rather she could follow-up in clinic early this week.  Staff continued to monitor Pinky's heartbeat with twice daily checks on Sunday and Monday and her rhythm continued to be irregular.     Pinky continues to deny any new chest pain, " "palpitations, cough, chest tightness, shortness of breath, abdominal pain, nausea, upper extremity pain, cramping, or numbness, lower extremity pain cramping or numbness, or new vision changes.      Review of Systems   Constitutional: Negative.    HENT: Negative.    Eyes: Negative.    Respiratory: Negative for cough, chest tightness and shortness of breath.    Cardiovascular: Positive for peripheral edema. Negative for chest pain and palpitations.   Gastrointestinal: Negative for abdominal pain, diarrhea and nausea.   Endocrine: Negative.    Breasts:         History of mastectomy in 1990s   Genitourinary: Negative.    Musculoskeletal: Negative for arthralgias and myalgias.   Skin: Negative.    Neurological: Negative for dizziness, syncope, speech difficulty, weakness and light-headedness.   Hematological: Does not bruise/bleed easily.   Psychiatric/Behavioral: Negative.             Objective    /83   Pulse 78   Temp 98.5  F (36.9  C) (Oral)   Ht 1.6 m (5' 3\")   Wt 101.7 kg (224 lb 3.2 oz)   LMP  (LMP Unknown)   SpO2 99%   BMI 39.72 kg/m    Body mass index is 39.72 kg/m .  Physical Exam  Vitals signs reviewed.   Constitutional:       General: She is not in acute distress.     Appearance: Normal appearance. She is not ill-appearing, toxic-appearing or diaphoretic.   HENT:      Head: Normocephalic and atraumatic.      Right Ear: External ear normal.      Left Ear: External ear normal.      Nose: Nose normal.      Mouth/Throat:      Mouth: Mucous membranes are moist.      Pharynx: Oropharynx is clear. No oropharyngeal exudate or posterior oropharyngeal erythema.   Eyes:      General: No scleral icterus.     Extraocular Movements: Extraocular movements intact.      Conjunctiva/sclera: Conjunctivae normal.      Pupils: Pupils are equal, round, and reactive to light.      Comments: Anisocoria L pupil 2mm, R pupil 3mm   Neck:      Musculoskeletal: Normal range of motion and neck supple.   Cardiovascular:      " Rate and Rhythm: Normal rate. Rhythm irregular.      Pulses: Normal pulses.      Heart sounds: No murmur. No friction rub. No gallop.    Pulmonary:      Effort: Pulmonary effort is normal. No respiratory distress.      Breath sounds: Normal breath sounds. No stridor. No wheezing, rhonchi or rales.   Abdominal:      General: Bowel sounds are normal. There is no distension.      Palpations: Abdomen is soft.      Tenderness: There is no abdominal tenderness.   Musculoskeletal: Normal range of motion.         General: No tenderness.      Right lower leg: Edema present.      Left lower leg: Edema present.   Skin:     General: Skin is warm and dry.      Capillary Refill: Capillary refill takes less than 2 seconds.      Coloration: Skin is not jaundiced.      Findings: No rash.   Neurological:      General: No focal deficit present.      Mental Status: She is alert and oriented to person, place, and time.   Psychiatric:         Mood and Affect: Mood normal.         Behavior: Behavior normal.         Thought Content: Thought content normal.         Judgment: Judgment normal.          EKG - Reviewed and interpreted by me atrial fibrillation, rate 86 bpm, normal QTc, no LVH by voltage criteria, multilead T wave flattening (lead I, V2, V3, and aVL    Results for orders placed or performed in visit on 03/16/21 (from the past 24 hour(s))   CBC with Diff Plt ([facility])   Result Value Ref Range    WBC 10.6 4.0 - 11.0 10e9/L    RBC 4.78 3.80 - 5.20 10e12/L    Hemoglobin 13.7 11.7 - 15.7 g/dL    Hematocrit 42.7 35.0 - 47.0 %    MCV 89.3 78.0 - 100.0 fL    MCH 28.7 26.5 - 35.0 pg    MCHC 32.1 32.0 - 36.0 g/dL    RDW 14.4 10.0 - 15.0 %    Platelets 280.0 150.0 - 450.0 10e9/L    % Neutrophils 70.8 40.0 - 75.0 %    % Lymphocytes 23.4 20.0 - 48.0 %    % Monocytes 5 0 - 12 %    % Eosinophils 0 0 - 6 %    % Basophils 0 0 - 2 %    % Immature Granulocytes 0 0 - 0 %    Absolute Neutrophil 7.5 1.6 - 8.3 10e9/L    Lymphocytes # 2.5 0.8 - 5.3  10e9/L    Absolute Monocytes 0.5 0.0 - 1.3 10e9/L    Absolute Eosinophils 0.0 0.0 - 0.7 10e9/L    Absolute Basophils 0.0 0.0 - 0.2 10e9/L    Abs Immature Granulocytes 0.0 0.0 - 0.0 10e9/L   Hemoglobin A1c ([facility])   Result Value Ref Range    Hemoglobin A1C 7.8 (H) 4.1 - 5.7 %     *Note: Due to a large number of results and/or encounters for the requested time period, some results have not been displayed. A complete set of results can be found in Results Review.

## 2021-03-16 NOTE — PROGRESS NOTES
Preceptor Attestation:   Patient seen, evaluated and discussed with the resident. I have verified the content of the note, which accurately reflects my assessment of the patient and the plan of care. I reviewed ECG with Dr. Goel   Supervising Physician:  Mona Nava MD

## 2021-03-19 ENCOUNTER — VIRTUAL VISIT (OUTPATIENT)
Dept: PSYCHIATRY | Facility: CLINIC | Age: 71
End: 2021-03-19
Attending: PSYCHIATRY & NEUROLOGY
Payer: COMMERCIAL

## 2021-03-19 DIAGNOSIS — G47.09 OTHER INSOMNIA: ICD-10-CM

## 2021-03-19 DIAGNOSIS — F25.1 SCHIZOAFFECTIVE DISORDER, DEPRESSIVE TYPE (H): ICD-10-CM

## 2021-03-19 PROCEDURE — 99441 PR PHYSICIAN TELEPHONE EVALUATION 5-10 MIN: CPT | Mod: GC | Performed by: STUDENT IN AN ORGANIZED HEALTH CARE EDUCATION/TRAINING PROGRAM

## 2021-03-19 RX ORDER — ZIPRASIDONE HYDROCHLORIDE 40 MG/1
40 CAPSULE ORAL 2 TIMES DAILY WITH MEALS
Qty: 60 CAPSULE | Refills: 2 | Status: SHIPPED | OUTPATIENT
Start: 2021-03-19 | End: 2021-04-30

## 2021-03-19 RX ORDER — LORAZEPAM 0.5 MG/1
0.5 TABLET ORAL AT BEDTIME
Qty: 30 TABLET | Refills: 1 | Status: SHIPPED | OUTPATIENT
Start: 2021-03-19 | End: 2021-03-23

## 2021-03-19 NOTE — TELEPHONE ENCOUNTER
Action    Action Taken 3-19: records internal. Called her LTC and they don't do EKGs there. EKG showing a-fib was done at [facility]. Not scanned into chart yet, will check a few days prior to appt to make sure it's in Epic   3-26: confirmed EKG in Epic

## 2021-03-19 NOTE — PROGRESS NOTES
Preceptor Attestation:   I discussed the patient with the resident. I talked to the patient on the phone. I have verified the content of the note, which accurately reflects my assessment of the patient and the plan of care.   Supervising Physician:  Blake Sullivan MD.

## 2021-03-19 NOTE — PROGRESS NOTES
TELEPHONE VISIT  Pinky Page is a 69 year old pt. who is being evaluated via a billable telephone visit.      The patient has been notified of the following:    We have found that certain health care needs can be provided without the need for a physical exam. This service lets us provide the care you need with a short phone conversation. If a prescription is necessary we can send it directly to your pharmacy. If lab work is needed we can place an order for that and you can then stop by our lab to have the test done at a later time. Insurers are generally covering virtual visits as they would in-office visits so billing should not be different than normal.  If for some reason you do get billed incorrectly, you should contact the billing office to correct it and that number is in the AVS .    Patient has given verbal consent for a telephone visit?:  Yes   How would the pt like to obtain the AVS?:  Patient declined  AVS SmartPhrase [PsychAVS] has been placed in 'Patient Instructions':  N/A     Start Time: 2:15PM       End Time:  2:45PM       [facility]  Psychiatry Clinic  PSYCHIATRY PROGRESS NOTE     Date of initial Diagnostic Assessment (DA) is 5/13/13 and most recent Transfer of Care DA is 12/11/19.    CARE TEAM:  PCP- Cary Brownlee  (resident working at hospitals with Dr. Birmingham, attending)   Specialty Providers- no    Therapist- Dr. Gr    [facility] Team- no          Pinky Page is a 70 year old female who prefers the name Pinky & pronouns she, her, hers.      Pertinent Background: Pinky first experienced mental health issues in her twenties and has received treatment for schizoaffective disorder and generalized anxiety. Notably, Pinky's symptoms of depression and psychosis have responded well to a combination of ECT and medication management. Pinky has a history of experiencing increased psychotic symptoms with past attempted antipsychotic tapers.      Psych pertinent history  "includes psychosis [sxs include disorganization, hallucinations, paranoia], mutiple psychotropic trials, psych hosp (3-5) and ECT.    INTERIM HISTORY      [4, 4]   The patient reports good treatment adherence.  History was provided by the patient who was a good historian.    The last visit ended with no change to the med regimen.     Since the last visit:   -Got her second COVID vaccine in February. Felt tired after the dose.   -States that has had problems with her diabetes and her heart recently.  -Activities at [facility] are no longer restricted because of no COVID cases there.  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  -Spending her time watching TV, coloring, reading, walks, and doing word searches which make her happy. Denies anhedonia.   -Her mood has been \"slow.\" Denies feeling depressed.   -Reports that her energy is lower recently. She has been sleeping more both during the day and at night. Her lower energy has affected her mood.   -Discussed this with her PCP who suspects that her fatigue and hypersomnia are likely due to her worse diabetes and new diagnosis of atrial fibrillation.   -Continues to have initial insomnia about 2 nights a week.   -Denies increased anxiety, mood swings, irritability, SI, AH/VH, paranoia, or medication side effects.     RECENT PSYCH ROS:   Depression: REPORTS: insomnia, hypersomnia, low energy DENIES: suicidal ideation, depressed mood, anhedonia, appetite changes, weight changes and poor concentration /memory  Elevated: DENIES: increased energy, decreased sleep need, increased activity and grandiosity  Psychosis: DENIES: delusions, auditory hallucinations, visual hallucinations and disorganized behavior  Anxiety: DENIES: excessive worry, feeling fearful and nervous/overwhelmed  Panic Attack:  none  Trauma Related:  none  Dysregulation: DENIES: irritable  Eating Disorder: no   Pertinent Negative Symptoms:  NO suicidal and violent ideation, aggression, " psychosis, hallucinations, delusions and alberta    RECENT SUBSTANCE USE:     ALCOHOL- no      TOBACCO- no     CAFFEINE- coffee/ tea [coffee, diet Pepsi]. 1 cup of coffee in the morning, 1 can diet coke in the afternoon, 1 decaf coffee at lunch. doesn't drink regular coffee after 1pm.   OPIOIDS- no         NARCAN KIT- no        CANNABIS- no            OTHER ILLICIT DRUGS- none    CURRENT SOCIAL HISTORY:  FINANCIAL SUPPORT- social security disability       CHILDREN- no      LIVING SITUATION- Lives at [facility].      SOCIAL/ SPIRITUAL SUPPORT- sisters, support staff at [facility], other residents at [place], other providers     FEELS SAFE AT HOME- yes     PSYCH and CD Critical Summary Points since July 2020 7/22/20-no changes  9/23- no changes  11/18-no changes  1/22/21-no changes  3/19- no changes    PAST MED TRIALS          See last Diagnostic Assessment    MEDICAL / SURGICAL HISTORY          Patient Active Problem List   Diagnosis     Allergic rhinitis due to pollen     Urge incontinence     Hypertension, essential     Cardiomegaly     Chronic constipation     Dry eye syndrome     Esophageal reflux     Exposure keratoconjunctivitis     DM ophthalmopathy (H)     Hypothyroidism     Senile cataract     ANUJ (obstructive sleep apnea)     Vaginal atrophy     Restless leg syndrome     Squamous blepharitis     Morbid obesity due to excess calories (H)     Personal history of breast cancer s/p L masectomy     Hypercholesteremia     Lives in group home     Extrapyramidal and movement disorder     CKD (chronic kidney disease) stage 2, GFR 60-89 ml/min     Solitary kidney, congenital     S/P hysterectomy     Impingement syndrome of right shoulder     Hypertriglyceridemia     Candidiasis of skin     Generalized anxiety disorder     Carpal tunnel syndrome of left wrist     Schizoaffective disorder, depressive type (H)     Major depressive disorder, recurrent episode, moderate (H)     Psychological factors  affecting medical condition     Hx of psychiatric care     Nail complaint     Microalbuminuria due to type 2 diabetes mellitus (H)     Type 2 diabetes mellitus with microalbuminuria, with long-term current use of insulin (H)     Conjunctivitis of both eyes     Corneal erosion of both eyes     Spastic entropion, right     Foot callus       Past Surgical History:   Procedure Laterality Date     APPENDECTOMY       C MASTECTOMY,SIMPLE  1996    Left mastectomy  [facility]. Had normal FU mammo 5/2007. Follow yearly.      C TOTAL ABDOM HYSTERECTOMY  7/2003    Dr. Castanon, for abnormal bleeding. Removal of both tubes with BSO for myoma w - - -     CHOLECYSTECTOMY       COLONOSCOPY  5/2005    Complete Colonoscopy-had one small polyp removed 5/2005.      COLONOSCOPY N/A 3/31/2016    Procedure: COLONOSCOPY;  Surgeon: Cary Ring MD;  Location: [facility]     COLONOSCOPY N/A 7/7/2016    Procedure: COLONOSCOPY;  Surgeon: Cary Ring MD;  Location: [facility]     DILATION AND CURETTAGE       HYSTERECTOMY       MASTECTOMY      Left breast       MEDICAL REVIEW OF SYSTEMS    [2, 10]     REPORTS: fatigue DENIES:  loss of consciousness, dyspnea, dizziness, fever, muscle stiffness, muscle twitching, Parkinsonian symptoms     ALLERGY       Chlordiazepoxide hcl, Dimetapp dm cold-cough, Haldol, Ibuprofen, Lactose intolerance [beta-galactosidase], Milk products, and Propofol  MEDICATIONS        Current Outpatient Medications   Medication Sig Dispense Refill     acetaminophen (TYLENOL) 500 MG tablet Take 2 tablets (1,000 mg) by mouth every 6 hours as needed 1 Bottle 2     alum & mag hydroxide-simethicone (MYLANTA/MAALOX) 200-200-20 MG/5ML SUSP suspension Take 30 mLs by mouth daily as needed for indigestion       amLODIPine (NORVASC) 10 MG tablet Take 10 mg by mouth daily       apixaban ANTICOAGULANT (ELIQUIS) 5 MG tablet Take 1 tablet (5 mg) by mouth 2 times daily 180 tablet 0     artificial saliva  (BIOTENE MT) AERS spray Take 1 spray by mouth 3 times daily as needed for dry mouth       atorvastatin (LIPITOR) 40 MG tablet Take 1 tablet (40 mg) by mouth daily 90 tablet 1     BD AUTOSHIELD DUO 30G X 5 MM        blood glucose (NO BRAND SPECIFIED) lancets standard Use to test blood sugar 2 times daily or as directed. 100 each 11     blood glucose calibration (NO BRAND SPECIFIED) solution Use to calibrate blood glucose monitor as directed. 1 each 3     blood glucose monitoring (NO BRAND SPECIFIED) meter device kit Check Blood sugars twice a day. 1 kit 0     blood glucose monitoring (NO BRAND SPECIFIED) test strip Use to test blood sugar 3 times daily or as directed. 100 each 3     Calcium Carbonate-Vitamin D (CALCIUM-VITAMIN D) 250-125 MG-UNIT TABS Take 1 tablet by mouth 2 times daily. Calcium 250 mg/Vit D 125 IU       calcium polycarbophil (FIBERCON) 625 MG tablet Take 2 tablets by mouth daily       ciprofloxacin (CIPRO) 500 MG tablet Take 1 tablet (500 mg) by mouth 2 times daily (Patient not taking: Reported on 3/12/2021) 14 tablet 0     CLARITIN 10 MG OR TABS 1 TAB PO QD (Once per day) as needed for ALLERGY SYMPTOMS 30 11     FLUoxetine (PROZAC) 20 MG capsule Take 2 capsules (40 mg) by mouth daily 60 capsule 1     fluticasone (FLONASE) 50 MCG/ACT nasal spray Spray 1 spray into both nostrils daily 16 g 3     furosemide (LASIX) 20 MG tablet Take 1 tablet (20 mg) by mouth 2 times daily 60 tablet 3     Gabapentin (NEURONTIN PO) Take 900 mg by mouth 3 times daily.       glucagon (GLUCAGON EMERGENCY) 1 MG kit Inject 1 mg into the muscle once for 1 dose 2 mg 3     Hypromellose (ARTIFICIAL TEARS OP) Apply 1 drop to eye 4 times daily.       insulin glargine (LANTUS) 100 UNIT/ML injection Inject 20 Units Subcutaneous At Bedtime 8 mL 11     lactase (LACTAID) 3000 UNIT tablet Take 4 tabs daily with meals.       levothyroxine (SYNTHROID, LEVOTHROID) 175 MCG tablet Take 1 tablet (175 mcg) by mouth daily Give on empty stomach  30 tablet 4     levothyroxine (SYNTHROID/LEVOTHROID) 175 MCG tablet Take 175 mcg by mouth daily       liraglutide (VICTOZA) 18 MG/3ML solution Inject 1.8 mg Subcutaneous daily 9 mL 0     LORazepam (ATIVAN) 0.5 MG tablet Take 1 tablet (0.5 mg) by mouth At Bedtime 30 tablet 1     losartan (COZAAR) 100 MG tablet Take 0.5 tablets (50 mg) by mouth 2 times daily 120 tablet 2     Magnesium Hydroxide (MILK OF MAGNESIA PO) Take  by mouth. Take 30 mL as needed for constipation.       melatonin 3 MG CAPS Take 6 mg by mouth At Bedtime 60 capsule 1     metFORMIN (GLUCOPHAGE) 1000 MG tablet Take 1,000 mg by mouth 2 times daily (with meals)       nystatin (MYCOSTATIN) 443322 UNIT/GM POWD Apply topically 3 times daily as needed 60 g 1     ONETOUCH DELICA LANCETS 33G MISC        polyethylene glycol (MIRALAX/GLYCOLAX) powder Take 1 capful by mouth 2 times daily. 17 GM PO BID       Saline 0.9 % SOLN Spray 2 sprays in nostril as needed.       senna-docusate (SENNA S) 8.6-50 MG per tablet Take 2 tablets by mouth At Bedtime.       simethicone (MYLICON) 125 MG chewable tablet Take 1 tablet (125 mg) by mouth 2 times daily 60 tablet 0     Skin Protectants, Misc. (EUCERIN) cream Apply  topically as needed. Apply to thigh PRN dry skin        SM LUBRICANT EYE DROPS 0.4-0.3 % SOLN ophthalmic solution        Sod Fluor-Solo Fluor-Hydrfl Ac (GEL-SASHA DENTINBLOC DT) Apply  to affected area. GEL. Apply to 2nd molar on bottom right daily at bedtime.       solifenacin (VESICARE) 10 MG tablet Take 1 tablet (10 mg) by mouth daily 30 tablet 6     terbinafine (LAMISIL) 1 % external cream Apply topically 2 times daily       ziprasidone (GEODON) 40 MG capsule Take 1 capsule (40 mg) by mouth 2 times daily (with meals) 60 capsule 1     VITALS      [3, 3]   LMP  (LMP Unknown)    MENTAL STATUS EXAM      [9, 14 cog gs]     Alertness: alert  and oriented  Appearance: could not assess  Behavior/Demeanor: cooperative, pleasant and calm, could not assess eye contact  "  Speech: normal and regular rate and rhythm  Language: intact and no problems  Psychomotor: could not assess  Mood: \"slow\"  Affect: sounded full range; was congruent to mood; was congruent to content  Thought Process/Associations: unremarkable  Thought Content:  Reports none;  Denies suicidal and violent ideation and delusions  Perception:  Reports none;  Denies auditory hallucinations and visual hallucinations  Insight: good  Judgment: good  Cognition: (6) does  appear grossly intact; formal cognitive testing was not done  Gait and Station: could not assess    LABS and DATA     AIMS:  last done 3/6/19 with score(s): 3;  due at next in-person visit    PHQ9 TODAY = not completed  PHQ-9 SCORE 1/9/2020 3/2/2020 8/27/2020   PHQ-9 Total Score - - -   PHQ-9 Total Score 7 7 5     ANTIPSYCHOTIC LABS  [glu, A1C, lipids (danika LDL), liver enzymes, WBC, ANEU, Hgb, plts]  q12 mo  Recent Labs   Lab Test 03/16/21  1537 09/16/20 03/02/20  1420 08/06/19  0941 05/07/19  0908 10/25/18  1734 10/25/18  1734   .0* 73  --   --  109*  --  147*   A1C 7.8* 6.6* 6.6* 6.6*  --    < >  --     < > = values in this interval not displayed.     Recent Labs   Lab Test 03/16/21 1537 09/16/20 05/07/19  0650 05/03/18  1414   CHOL 124 106 138 126   TRIG 136 89 96 148   LDL 53 46 79 60   HDL 43* 42* 40 37*     Recent Labs   Lab Test 03/16/21  1537 06/26/18  0659 06/26/18 03/21/17  1023   AST 7.9 16 16 13   ALT 12.8 21 21 20   ALKPHOS 78.9 111 111 92     Recent Labs   Lab Test 03/16/21  1537 05/07/19  0650 10/25/18  1734 06/26/18  0659 03/21/17  1023 03/21/17  1023   WBC  --  10.06* 14.2* 9.72  --  11.8*   ANEU 7.5  --  10.6* 5.31  --  8.3   HGB 13.7 13.3 14.0 12.6   < > 13.4   PLT  --  292 275 247  --  266    < > = values in this interval not displayed.     PSYCHOTROPIC DRUG INTERACTIONS     ZIPRASIDONE and QT INTERVAL PROLONGING DRUGS may result in increased risk of QT-interval prolongation.  ZIPRASIDONE and SEROTONERGIC DRUGS THAT PROLONG QT " INTERVAL may result in increased risk of QT-interval prolongation and increased risk of serotonin syndrome (hypertension, hyperthermia, myoclonus, mental status changes).  NSAID and SSRI may result in an increased risk of bleeding  ERYTHROMYCIN and FLUOXETINE may result in an increased risk of cardiotoxicity (QT prolongation, torsades de pointes, cardiac arrest).  FLUOXETINE and INSULINS OR PRAMLINTIDE may result in increased risk of hypoglycemia.  FLUOXETINE and QT INTERVAL PROLONGING DRUGS may result in increased risk of QT-interval prolongation.   GABAPENTIN and CNS DEPRESSANTS may result in respiratory depression.   GABAPENTIN and ANTACIDS may result in decreased gabapentin effectiveness.     MANAGEMENT:  Monitoring for adverse effects, periodic EKGs and patient is aware of risks    RISK STATEMENT for SAFETY     Pinky Page did not appear to be an imminent safety risk to self or others.    PSYCHIATRIC DIAGNOSIS     Schizoaffective Disorder, depressed type, in partial remission  Generalized Anxiety Disorder    ASSESSMENT      [m2, h3]     TODAY Pinky presents for a follow-up via telephone. She has had worse low energy and hypersomnia recently which her PCP has suggested is likely due to worsening diabetes and new diagnosis of afib. While her low energy has affected her mood, she denies significant depressed mood or anhedonia that would indicate that her fatigue is from a depressive episode. Pinky continues to have no psychotic symptoms. Discussed that her Ativan dose is low enough that it is not likely significantly worsening her low energy. She prefers to continue her medication unchanged today. Future considerations include stopping Ativan given risk of falls with her age. Insomnia could be targeted with trazodone or low-dose mirtazapine. Records indicate that Pinky took trazodone many years ago but it's effect is unknown.     PLAN      [m2, h3]     1) PSYCHOTROPIC MEDICATIONS:  - Continue ziprasidone 40 mg  BID  - Continue fluoxetine 40 mg daily  - Continue lorazepam 0.5 mg at bedtime  - Continue melatonin 6mg at bedtime    Gabapentin 900mg TID prescribed by primary care    2) [place]  was checked today:  Lorazepam filled at monthly intervals only from this clinic. Gabapentin filled at monthly intervals for 30 day supplies and only prescribed by Dr. Linares/Dr. Frey.    3) THERAPY:    Yes, continue w/ Dr. Gr once a month    4) NEXT DUE:    Labs- AP labs due 3/2022  EKG- Last EKG 3/2021 without prolonged QTc (per PCP note)  Rating Scales- AIMS due at next in-person visit    5) REFERRALS:    No Referrals needed     6) RTC: 6 weeks    7) CRISIS NUMBERS:   Provided routinely in AVS   ONLY if a [facility] PT: [facility] [phone] (clinic), [phone] (after hours)     TREATMENT RISK STATEMENT:  The risks, benefits, alternatives and potential adverse effects have been discussed and are understood by the pt. The pt understands the risks of using street drugs or alcohol. There are no medical contraindications, the pt agrees to treatment with the ability to do so. The pt knows to call the clinic for any problems or to access emergency care if needed.  Medical and substance use concerns are documented above.  Psychotropic drug interaction check was done, including changes made today.    PROVIDER: María Shi MD    Patient staffed with Dr. Martin who will sign this note. Supervisor is Dr. Rubio. Future visits do not require staffing.     TELEHEALTH ATTENDING ATTESTATION  Following the ACGME guidelines on telemedicine and direct supervision due to COVID-19, I was concurrently participating in and/or monitoring the patient care through appropriate telecommunication technology.  I discussed the key portions of the service with the resident, including the mental status examination and developing the plan of care. I reviewed key portions of the history with the resident. I agree with the findings and plan as  documented in this note.   Galo Martin MD

## 2021-03-23 ENCOUNTER — OFFICE VISIT (OUTPATIENT)
Dept: FAMILY MEDICINE | Facility: CLINIC | Age: 71
End: 2021-03-23
Payer: COMMERCIAL

## 2021-03-23 VITALS
BODY MASS INDEX: 40.1 KG/M2 | WEIGHT: 226.4 LBS | DIASTOLIC BLOOD PRESSURE: 77 MMHG | RESPIRATION RATE: 14 BRPM | SYSTOLIC BLOOD PRESSURE: 118 MMHG | HEART RATE: 73 BPM | OXYGEN SATURATION: 96 % | TEMPERATURE: 98.6 F

## 2021-03-23 DIAGNOSIS — R35.1 NOCTURIA: ICD-10-CM

## 2021-03-23 DIAGNOSIS — N30.01 ACUTE CYSTITIS WITH HEMATURIA: ICD-10-CM

## 2021-03-23 DIAGNOSIS — I48.0 PAROXYSMAL ATRIAL FIBRILLATION (H): ICD-10-CM

## 2021-03-23 DIAGNOSIS — E11.29 TYPE 2 DIABETES MELLITUS WITH MICROALBUMINURIA, WITH LONG-TERM CURRENT USE OF INSULIN (H): Primary | ICD-10-CM

## 2021-03-23 DIAGNOSIS — R80.9 TYPE 2 DIABETES MELLITUS WITH MICROALBUMINURIA, WITH LONG-TERM CURRENT USE OF INSULIN (H): Primary | ICD-10-CM

## 2021-03-23 DIAGNOSIS — Z79.4 TYPE 2 DIABETES MELLITUS WITH MICROALBUMINURIA, WITH LONG-TERM CURRENT USE OF INSULIN (H): Primary | ICD-10-CM

## 2021-03-23 LAB
BACTERIA: NORMAL
BILIRUBIN UR: NEGATIVE MG/DL
BLOOD UR: ABNORMAL MG/DL
CASTS: NORMAL /LPF
CREAT UR-MCNC: 38 MG/DL
CRYSTAL URINE: NORMAL /LPF
EPITHELIAL CELLS UR: NORMAL /LPF (ref 0–2)
GLUCOSE URINE: ABNORMAL
KETONES UR QL: NEGATIVE MG/DL
LEUKOCYTE ESTERASE UR: ABNORMAL
MICROALBUMIN UR-MCNC: 9 MG/L
MICROALBUMIN/CREAT UR: 24.77 MG/G CR (ref 0–25)
MUCOUS URINE: NORMAL LPF
NITRITE UR QL STRIP: NEGATIVE MG/DL
PH UR STRIP: 5 [PH] (ref 4.5–8)
PROTEIN UR: NEGATIVE MG/DL
RBC URINE: NORMAL /HPF
SP GR UR STRIP: 1.01 (ref 1–1.03)
UROBILINOGEN UR STRIP-ACNC: ABNORMAL E.U./DL
WBC URINE: NORMAL /HPF

## 2021-03-23 PROCEDURE — 99214 OFFICE O/P EST MOD 30 MIN: CPT | Mod: GC | Performed by: STUDENT IN AN ORGANIZED HEALTH CARE EDUCATION/TRAINING PROGRAM

## 2021-03-23 PROCEDURE — 82043 UR ALBUMIN QUANTITATIVE: CPT | Performed by: STUDENT IN AN ORGANIZED HEALTH CARE EDUCATION/TRAINING PROGRAM

## 2021-03-23 PROCEDURE — 81001 URINALYSIS AUTO W/SCOPE: CPT | Performed by: STUDENT IN AN ORGANIZED HEALTH CARE EDUCATION/TRAINING PROGRAM

## 2021-03-23 RX ORDER — GABAPENTIN 300 MG/1
CAPSULE ORAL
COMMUNITY
Start: 2021-02-21 | End: 2021-03-23

## 2021-03-23 RX ORDER — GABAPENTIN 600 MG/1
TABLET ORAL
COMMUNITY
Start: 2021-03-15 | End: 2021-03-23

## 2021-03-23 NOTE — PROGRESS NOTES
Preceptor Attestation:  Patient seen and evaluated in person. I discussed the patient with the resident. I have verified the content of the note, which accurately reflects my assessment of the patient and the plan of care.    Supervising Physician:  Roe Vidal DO.

## 2021-03-23 NOTE — PATIENT INSTRUCTIONS
Patient Education   Here is the plan from today's visit    1. Type 2 diabetes mellitus with microalbuminuria, with long-term current use of insulin (H)  Continue your daily exercises, and eating more fresh fruits and vegetables and limiting your carbohydrate intake. Your blood sugars are improving so keep up the good work!  - Albumin Random Urine Quantitative with Creat Ratio  - EYE ADULT REFERRAL; Future    2. Nocturia  While this may be a sign of a urinary infection, this also may be an exacerbation of your spastic bladder. We will check your urine for signs of infection today, and if there is no infection we can discuss increasing your Vesicare to help with symptom improvement.  - Urinalysis, Micro If (UA) ([facility])    3. Paroxysmal atrial fibrillation (H)  Continue to take your Eliquis as scheduled and follow up with cardiology. Monitor for signs of new bleeding or bruising.       Please call or return to clinic if your symptoms don't go away.    Follow up plan  Please make a clinic appointment for follow up with me (ZAK ARMENTA) in 1  month for medicare wellness visit.    Thank you for coming to [facility] today.  Lab Testing:  **If you had lab testing today and your results are reassuring or normal they will be mailed to you or sent through [facility] within 7 days.   **If the lab tests need quick action we will call you with the results.  The phone number we will call with results is # [phone] (home) . If this is not the best number please call our clinic and change the number.  Medication Refills:  If you need any refills please call your pharmacy and they will contact us.   If you need to  your refill at a new pharmacy, please contact the new pharmacy directly. The new pharmacy will help you get your medications transferred faster.   Scheduling:  If you have any concerns about today's visit or wish to schedule another appointment please call our office during normal business hours  [phone] (8-5:00 M-F)  If a referral was made to a [facility] and you don't get a call from central scheduling please call [phone].  If a Mammogram was ordered for you at [facility] call [phone] to schedule or change your appointment.  If you had an XRay/CT/Ultrasound/MRI ordered the number is [phone] to schedule or change your radiology appointment.   Medical Concerns:  If you have urgent medical concerns please call [phone] at any time of the day.    Tonia Goel MD

## 2021-03-23 NOTE — PROGRESS NOTES
"  Assessment & Plan     Paroxysmal atrial fibrillation (H)  Pinky has been taking her Eliquis twice daily as scheduled without any notable side effects such as bleeding or bruising.  She has not noticed any new chest pain, shortness of breath, or rapid or irregular heartbeat, however she had not previously noted any symptoms when her afib was first recognized. Continue to take Eliquis as scheduled and follow up with cardiology. Monitor for signs of new bleeding or bruising.     Nocturia  Hemorrhagic cystitis  Nocturia may be an exacerbation of her spastic bladder, or may be due to an infection. UA was sent and showed lysed RBCs. Urine was sent for micro and culture which showed active infection with >1000,000 colonies klebsiella and 10,000-50,000 Staphylococcus simulans. Will call patient with results of labs indicting hemorrhagic cystitis. Plan to start Bactrim 160/800 BID for a course of 7 days. If symptoms do not improve with treatment patient should return for follow up evaluation to determine if she requires longer treatment course vs if this is an exacerbation of her spastic bladder.  - Urinalysis, Micro If (UA) ([facility])  - Urine Microscopic (UA) ([facility])  - Urine Culture Aerobic Bacterial    Type 2 diabetes mellitus with microalbuminuria, with long-term current use of insulin (H)  Continue daily exercises, and eating more fresh fruits and vegetables and limiting carbohydrate intake. Blood sugars slowly improving.  - Albumin Random Urine Quantitative with Creat Ratio  - EYE ADULT REFERRAL  - Urine Microscopic (UA) ([facility])    6}     BMI:   Estimated body mass index is 40.1 kg/m  as calculated from the following:    Height as of 3/16/21: 1.6 m (5' 3\").    Weight as of this encounter: 102.7 kg (226 lb 6.4 oz).       Follow up if symptoms not improving in 1 week after completion of antibiotic course.    No follow-ups on file.    Tonia Goel MD  [facility]    Beronica Vance is a " 70 year old who presents for the following health issues:    Worsening incontinence issues for the past week. Waking up wet and urinating less frequently during the day. Has a history of incontinence, wears pads and takes Tolterodine for overactive bladder since 2015. Drinks 2-3 glasses of water in the evening (1 with medications and 1 after super).     Medication Followup of Eliquis    Taking Medication as prescribed: yes    Side Effects:  None    Medication Helping Symptoms:  not applicable       Review of Systems   Constitutional, HEENT, cardiovascular, pulmonary, gi and gu systems are negative, except as otherwise noted.      Objective    /77 (BP Location: Right arm, Patient Position: Sitting, Cuff Size: Adult Large)   Pulse 73   Temp 98.6  F (37  C) (Oral)   Resp 14   Wt 102.7 kg (226 lb 6.4 oz)   LMP  (LMP Unknown)   SpO2 96%   Breastfeeding No   BMI 40.10 kg/m    Body mass index is 40.1 kg/m .  Physical Exam   GENERAL: healthy, alert and no distress  EYES: Eyes grossly normal to inspection, PERRL and conjunctivae and sclerae normal  NECK: no adenopathy, no asymmetry, masses, or scars and thyroid normal to palpation  RESP: lungs clear to auscultation - no rales, rhonchi or wheezes  CV: irregularly irregular rhythm, normal rate normal S1 S2, no S3 or S4, no murmur, click or rub, peripheral pulses strong and 2+ bilateral lower extremity pitting edema to knee    ABDOMEN: soft, nontender, no hepatosplenomegaly, no masses and bowel sounds normal  MS: extremities normal- no gross deformities noted  SKIN: no suspicious lesions or rashes  NEURO: Normal strength and tone, mentation intact and speech normal  PSYCH: mentation appears normal, affect normal/bright    Results for orders placed or performed in visit on 03/23/21   Urinalysis, Micro If (UA) ([facility])     Status: Abnormal   Result Value Ref Range    Specific Gravity Urine 1.015 1.005 - 1.030    pH Urine 5.0 4.5 - 8.0    Leukocyte Esterase UR 1+  (A) NEGATIVE    Nitrite Urine Negative NEGATIVE mg/dL    Protein UR Negative NEGATIVE mg/dL    Glucose Urine Trace (A) NEGATIVE    Ketones Urine Negative NEGATIVE mg/dL    Urobilinogen mg/dL 0.2 E.U./dL 0.2 E.U./dL E.U./dL    Bilirubin UR Negative NEGATIVE mg/dL    Blood UR Trace-lysed (A) NEGATIVE mg/dL   Albumin Random Urine Quantitative with Creat Ratio     Status: None   Result Value Ref Range    Creatinine Urine 38 mg/dL    Albumin Urine mg/L 9 mg/L    Albumin Urine mg/g Cr 24.77 0 - 25 mg/g Cr   Urine Microscopic (UA) ([facility])     Status: None   Result Value Ref Range    WBC Urine 25-50 <5 /hpf    RBC Urine 5-10 <5 /hpf    Epithelial Cells UR 2-5 0 - 2 /lpf    Mucous Urine None NONE lpf    Casts Urine None NONE /lpf    Crystal Urine None NONE /lpf    Bacteria Wet Prep Many None   Urine Culture Aerobic Bacterial     Status: Abnormal    Specimen: Midstream Urine   Result Value Ref Range    Specimen Description Midstream Urine     Special Requests Specimen received in preservative     Culture Micro >100,000 colonies/mL  Klebsiella pneumoniae   (A)     Culture Micro (A)      10,000 to 50,000 colonies/mL  Staphylococcus simulans         Susceptibility    Klebsiella pneumoniae - LIZA     AMPICILLIN*  Resistant ug/mL      * Intrinsically Resistant     CEFAZOLIN* <=4 Sensitive ug/mL      * Cefazolin LIZA breakpoints are for the treatment of uncomplicated urinary tract infections.  For the treatment of systemic infections, please contact the laboratory for additional testing.     CEFOXITIN <=4 Sensitive ug/mL     CEFTAZIDIME <=1 Sensitive ug/mL     CEFTRIAXONE <=1 Sensitive ug/mL     CIPROFLOXACIN <=0.25 Sensitive ug/mL     GENTAMICIN <=1 Sensitive ug/mL     LEVOFLOXACIN <=0.12 Sensitive ug/mL     NITROFURANTOIN 64 Intermediate ug/mL     TOBRAMYCIN <=1 Sensitive ug/mL     Trimethoprim/Sulfa <=1/19 Sensitive ug/mL     AMPICILLIN/SULBACTAM 4 Sensitive ug/mL     Piperacillin/Tazo <=4 Sensitive ug/mL     CEFEPIME <=1  Sensitive ug/mL    Staphylococcus simulans - LIZA     CIPROFLOXACIN <=0.5 Sensitive ug/mL     GENTAMICIN <=0.5 Sensitive ug/mL     LEVOFLOXACIN <=0.12 Sensitive ug/mL     NITROFURANTOIN <=16 Sensitive ug/mL     OXACILLIN <=0.25 Sensitive ug/mL     TETRACYCLINE <=1 Sensitive ug/mL     VANCOMYCIN <=0.5 Sensitive ug/mL     Trimethoprim/Sulfa  Sensitive ug/mL

## 2021-03-24 PROCEDURE — 87088 URINE BACTERIA CULTURE: CPT | Performed by: STUDENT IN AN ORGANIZED HEALTH CARE EDUCATION/TRAINING PROGRAM

## 2021-03-24 PROCEDURE — 87086 URINE CULTURE/COLONY COUNT: CPT | Performed by: STUDENT IN AN ORGANIZED HEALTH CARE EDUCATION/TRAINING PROGRAM

## 2021-03-24 PROCEDURE — 87186 SC STD MICRODIL/AGAR DIL: CPT | Performed by: STUDENT IN AN ORGANIZED HEALTH CARE EDUCATION/TRAINING PROGRAM

## 2021-03-26 ENCOUNTER — DOCUMENTATION ONLY (OUTPATIENT)
Dept: FAMILY MEDICINE | Facility: CLINIC | Age: 71
End: 2021-03-26

## 2021-03-26 NOTE — PROGRESS NOTES
"When opening a documentation only encounter, be sure to enter in \"Chief Complaint\" Forms and in \" Comments\" Title of form, description if needed.    Pinky is a 70 year old  female  Form received via: Fax  Form now resides in: Provider Ready    Quiana Swift MA     -Unable to locate form in provider ready folder or  forms folder. Per protocol encounter will be signed and addressed.    Quiana Swift MA                        "

## 2021-03-27 LAB
BACTERIA SPEC CULT: ABNORMAL
BACTERIA SPEC CULT: ABNORMAL
Lab: ABNORMAL
SPECIMEN SOURCE: ABNORMAL

## 2021-03-29 ENCOUNTER — PRE VISIT (OUTPATIENT)
Dept: CARDIOLOGY | Facility: CLINIC | Age: 71
End: 2021-03-29

## 2021-03-29 ENCOUNTER — OFFICE VISIT (OUTPATIENT)
Dept: CARDIOLOGY | Facility: CLINIC | Age: 71
End: 2021-03-29
Attending: FAMILY MEDICINE
Payer: COMMERCIAL

## 2021-03-29 ENCOUNTER — ANCILLARY PROCEDURE (OUTPATIENT)
Dept: CARDIOLOGY | Facility: CLINIC | Age: 71
End: 2021-03-29
Attending: INTERNAL MEDICINE
Payer: COMMERCIAL

## 2021-03-29 VITALS
OXYGEN SATURATION: 95 % | HEIGHT: 63 IN | BODY MASS INDEX: 39.46 KG/M2 | WEIGHT: 222.7 LBS | DIASTOLIC BLOOD PRESSURE: 63 MMHG | HEART RATE: 70 BPM | SYSTOLIC BLOOD PRESSURE: 121 MMHG

## 2021-03-29 DIAGNOSIS — Z79.4 TYPE 2 DIABETES MELLITUS WITHOUT COMPLICATION, WITH LONG-TERM CURRENT USE OF INSULIN (H): ICD-10-CM

## 2021-03-29 DIAGNOSIS — E11.9 TYPE 2 DIABETES MELLITUS WITHOUT COMPLICATION, WITH LONG-TERM CURRENT USE OF INSULIN (H): ICD-10-CM

## 2021-03-29 DIAGNOSIS — E66.01 MORBID OBESITY (H): ICD-10-CM

## 2021-03-29 DIAGNOSIS — I48.19 PERSISTENT ATRIAL FIBRILLATION (H): Primary | ICD-10-CM

## 2021-03-29 DIAGNOSIS — I10 BENIGN ESSENTIAL HYPERTENSION: ICD-10-CM

## 2021-03-29 DIAGNOSIS — I48.91 ATRIAL FIBRILLATION BY ELECTROCARDIOGRAM (H): ICD-10-CM

## 2021-03-29 DIAGNOSIS — I48.0 PAROXYSMAL ATRIAL FIBRILLATION (H): ICD-10-CM

## 2021-03-29 PROCEDURE — 93005 ELECTROCARDIOGRAM TRACING: CPT | Mod: XU

## 2021-03-29 PROCEDURE — G0463 HOSPITAL OUTPT CLINIC VISIT: HCPCS

## 2021-03-29 PROCEDURE — 99204 OFFICE O/P NEW MOD 45 MIN: CPT | Mod: 25 | Performed by: INTERNAL MEDICINE

## 2021-03-29 PROCEDURE — 93242 EXT ECG>48HR<7D RECORDING: CPT

## 2021-03-29 PROCEDURE — 93244 EXT ECG>48HR<7D REV&INTERPJ: CPT | Performed by: INTERNAL MEDICINE

## 2021-03-29 RX ORDER — ASPIRIN 81 MG/1
81 TABLET ORAL DAILY
COMMUNITY
End: 2021-12-27

## 2021-03-29 ASSESSMENT — PAIN SCALES - GENERAL: PAINLEVEL: NO PAIN (0)

## 2021-03-29 ASSESSMENT — MIFFLIN-ST. JEOR: SCORE: 1494.29

## 2021-03-29 NOTE — LETTER
3/29/2021      RE: Pikny Page  [address] [facility] [address]  [place] [address]       Dear Colleague,    Thank you for the opportunity to participate in the care of your patient, Pinky Page, at the [facility] [place] [facility]. Please see a copy of my visit note below.    Referring provider:Mona Nava MD    Chief complaint:     HPI: Ms. Pinky Page is a 70 year old  female with PMH significant for   -type 2 diabetes on insulin  -Paroxysmal atrial fibrillation on Eliquis  -Shizoaffective disorder  -Obstructive sleep apnea noncompliant with CPAP  -Morbid obesity with body mass index at 30.9.5  -Hypertension  -Hyperlipidemia    Patient lives at long-term care facility (nursing home).  She was noted to have irregular rhythm on 3/13/2021 by the staff.  At that time she was asymptomatic.  Subsequently she was seen by her primary care physician.  Patient was started on Eliquis 5 mg twice daily.  Patient's [id number] score is 4.  Of note she has no prior history of atrial fibrillation.  EKG 2019 shows sinus rhythm.  Patient is currently asymptomatic from cardiac standpoint.The patient denies a history of chest discomfort, dyspnea, PND, orthopnea, pedal edema, palpitations, lightheadedness, or syncope.  She tells me that she walks for 25 minutes and rides her stationary bike for 20 minutes almost every day.  Denies symptoms during exercise.  Lifetime non-smoker.  No alcohol abuse.    Echocardiogram in 2015 showed a structurally normal heart.  EKG in clinic today shows atrial fibrillation heart rate is well controlled at 80 bpm.  Current medications are insulin, liraglutide, Lasix 20 mg twice daily, atorvastatin 40, Eliquis 5 mg twice daily amlodipine 10 mg, losartan 100 mg.  Medications, personal, family, and social history reviewed with patient and revised.    PAST MEDICAL HISTORY:  Past Medical History:   Diagnosis  Date     Allergic rhinitis due to pollen     seasonal allergies      Anisometropia and aniseikonia      BMI greater than 40      Cardiomegaly      Chronic constipation      Congenital absence of one kidney      Cramp of limb      Dermatophytosis of the body      Dry eye syndrome      Esophageal reflux      Essential hypertension, benign      Gastro-oesophageal reflux disease      Hemorrhoids      Hypermetropia      Hypothyroidism      Incomplete Emptying of Bladder      Lactose intolerance      Major depressive disorder, recurrent episode, moderate (H)      Malignant neoplasm of breast (female), unspecified site     left mastectomy 1996      Mixed incontinence urge and stress (male)(female)      Moderate obstructive sleep apnea      Postmenopausal Atrophic Vaginitis      Presbyopia      Regular astigmatism      Restless leg syndrome      Senile nuclear sclerosis      Thyroid eye disease     mild     Tinnitus      Trigger finger (acquired)     right hand     Type II or unspecified type diabetes mellitus without mention of complication, not stated as uncontrolled     on insulin since April 2006        CURRENT MEDICATIONS:  Current Outpatient Medications   Medication Sig Dispense Refill     acetaminophen (TYLENOL) 500 MG tablet Take 2 tablets (1,000 mg) by mouth every 6 hours as needed 1 Bottle 2     alum & mag hydroxide-simethicone (MYLANTA/MAALOX) 200-200-20 MG/5ML SUSP suspension Take 30 mLs by mouth daily as needed for indigestion       amLODIPine (NORVASC) 10 MG tablet Take 10 mg by mouth daily       apixaban ANTICOAGULANT (ELIQUIS) 5 MG tablet Take 1 tablet (5 mg) by mouth 2 times daily 180 tablet 0     artificial saliva (BIOTENE MT) AERS spray Take 1 spray by mouth 3 times daily as needed for dry mouth       atorvastatin (LIPITOR) 40 MG tablet Take 1 tablet (40 mg) by mouth daily 90 tablet 1     blood glucose (NO BRAND SPECIFIED) lancets standard Use to test blood sugar 2 times daily or as directed. 100 each 11      blood glucose calibration (NO BRAND SPECIFIED) solution Use to calibrate blood glucose monitor as directed. 1 each 3     blood glucose monitoring (NO BRAND SPECIFIED) meter device kit Check Blood sugars twice a day. 1 kit 0     blood glucose monitoring (NO BRAND SPECIFIED) test strip Use to test blood sugar 3 times daily or as directed. 100 each 3     Calcium Carbonate-Vitamin D (CALCIUM-VITAMIN D) 250-125 MG-UNIT TABS Take 1 tablet by mouth 2 times daily. Calcium 250 mg/Vit D 125 IU       calcium polycarbophil (FIBERCON) 625 MG tablet Take 2 tablets by mouth daily       CLARITIN 10 MG OR TABS 1 TAB PO QD (Once per day) as needed for ALLERGY SYMPTOMS 30 11     FLUoxetine (PROZAC) 20 MG capsule Take 2 capsules (40 mg) by mouth daily 60 capsule 2     fluticasone (FLONASE) 50 MCG/ACT nasal spray Spray 1 spray into both nostrils daily 16 g 3     furosemide (LASIX) 20 MG tablet Take 1 tablet (20 mg) by mouth 2 times daily 60 tablet 3     Gabapentin (NEURONTIN PO) Take 900 mg by mouth 3 times daily.       Hypromellose (ARTIFICIAL TEARS OP) Apply 1 drop to eye 4 times daily.       insulin glargine (LANTUS) 100 UNIT/ML injection Inject 20 Units Subcutaneous At Bedtime 8 mL 11     lactase (LACTAID) 3000 UNIT tablet Take 4 tabs daily with meals.       levothyroxine (SYNTHROID, LEVOTHROID) 175 MCG tablet Take 1 tablet (175 mcg) by mouth daily Give on empty stomach 30 tablet 4     levothyroxine (SYNTHROID/LEVOTHROID) 175 MCG tablet Take 175 mcg by mouth daily       liraglutide (VICTOZA) 18 MG/3ML solution Inject 1.8 mg Subcutaneous daily 9 mL 0     losartan (COZAAR) 100 MG tablet Take 0.5 tablets (50 mg) by mouth 2 times daily 120 tablet 2     Magnesium Hydroxide (MILK OF MAGNESIA PO) Take  by mouth. Take 30 mL as needed for constipation.       melatonin 3 MG CAPS Take 6 mg by mouth At Bedtime 60 capsule 1     metFORMIN (GLUCOPHAGE) 1000 MG tablet Take 1,000 mg by mouth 2 times daily (with meals)       nystatin (MYCOSTATIN)  139518 UNIT/GM POWD Apply topically 3 times daily as needed 60 g 1     ONETOUCH DELICA LANCETS 33G MISC        polyethylene glycol (MIRALAX/GLYCOLAX) powder Take 1 capful by mouth 2 times daily. 17 GM PO BID       Saline 0.9 % SOLN Spray 2 sprays in nostril as needed.       senna-docusate (SENNA S) 8.6-50 MG per tablet Take 2 tablets by mouth At Bedtime.       simethicone (MYLICON) 125 MG chewable tablet Take 1 tablet (125 mg) by mouth 2 times daily 60 tablet 0     Skin Protectants, Misc. (EUCERIN) cream Apply  topically as needed. Apply to thigh PRN dry skin        SM LUBRICANT EYE DROPS 0.4-0.3 % SOLN ophthalmic solution        solifenacin (VESICARE) 10 MG tablet Take 1 tablet (10 mg) by mouth daily 30 tablet 6     ziprasidone (GEODON) 40 MG capsule Take 1 capsule (40 mg) by mouth 2 times daily (with meals) 60 capsule 2       PAST SURGICAL HISTORY:  Past Surgical History:   Procedure Laterality Date     APPENDECTOMY       C MASTECTOMY,SIMPLE  1996    Left mastectomy  [facility]. Had normal FU mammo 5/2007. Follow yearly.      C TOTAL ABDOM HYSTERECTOMY  7/2003    Dr. Castanon, for abnormal bleeding. Removal of both tubes with BSO for myoma w - - -     CHOLECYSTECTOMY       COLONOSCOPY  5/2005    Complete Colonoscopy-had one small polyp removed 5/2005.      COLONOSCOPY N/A 3/31/2016    Procedure: COLONOSCOPY;  Surgeon: Cary Ring MD;  Location:  GI     COLONOSCOPY N/A 7/7/2016    Procedure: COLONOSCOPY;  Surgeon: Cary Ring MD;  Location:  GI     DILATION AND CURETTAGE       HYSTERECTOMY       MASTECTOMY      Left breast       ALLERGIES:     Allergies   Allergen Reactions     Chlordiazepoxide Hcl      Dimetapp Dm Cold-Cough      Cold/Congetion TABS     Haldol      Ibuprofen      TABS     Lactose Intolerance [Beta-Galactosidase]      CAPS     Milk Products      Propofol      EMUL       FAMILY HISTORY:  Family History   Problem Relation Age of Onset     Heart  "Disease Father         1968     Melanoma Mother         malignant melanoma     Glaucoma No family hx of      Macular Degeneration No family hx of          SOCIAL HISTORY:  Social History     Tobacco Use     Smoking status: Never Smoker     Smokeless tobacco: Never Used   Substance Use Topics     Alcohol use: No     Alcohol/week: 0.0 standard drinks     Drug use: No       ROS:   Constitutional: No fever, chills, or sweats. Weight stable.   ENT: No visual disturbance, ear ache, epistaxis, sore throat.   Cardiovascular: As per HPI.   Respiratory: No cough, hemoptysis.    GI: No nausea, vomiting, hematemesis, melena, or hematochezia.   : No hematuria.   Integument: Negative.   Psychiatric: Negative.   Hematologic:  No easy bruising, no easy bleeding.  Neuro: Negative.   Endocrinology: No significant heat or cold intolerance   Musculoskeletal: No myalgia.    Exam:  /63   Pulse 70   Ht 1.6 m (5' 3\")   Wt 101 kg (222 lb 11.2 oz)   LMP  (LMP Unknown)   SpO2 95%   BMI 39.45 kg/m    GENERAL APPEARANCE: alert and no distress  HEENT: no icterus, no central cyanosis  LYMPH/NECK: no adenopathy, no asymmetry, JVP not elevated, no carotid bruits.  RESPIRATORY: lungs clear to auscultation - no rales, rhonchi or wheezes, no use of accessory muscles, no retractions, respirations are unlabored, normal respiratory rate  CARDIOVASCULAR: irregular rhythm, normal S1, S2, no S3 or S4 and no murmur, click or rub, precordium quiet with normal PMI.  GI: soft, non tender  EXTREMITIES: peripheral pulses normal, no edema  NEURO: alert, normal speech,and affect  VASC: Radial, dorsalis pedis and posterior tibialis pulses are normal in volumes and symmetric bilaterally.   SKIN: no ecchymoses, no rashes     I have reviewed the labs and personally reviewed the imaging below and made my comment in the assessment and plan.    Labs:  CBC RESULTS:   Lab Results   Component Value Date    WBC 10.06 (H) 05/07/2019    RBC 4.46 05/07/2019    HGB " 13.7 03/16/2021    HCT 42.7 03/16/2021    MCV 89.3 03/16/2021    MCH 28.7 03/16/2021    MCHC 32.1 03/16/2021    RDW 14.4 03/16/2021     05/07/2019     10/25/2018       BMP RESULTS:  Lab Results   Component Value Date    .1 03/16/2021    POTASSIUM 4.2 03/16/2021    CHLORIDE 96.2 (L) 03/16/2021    CO2 28.9 03/16/2021    ANIONGAP 11 05/07/2019    .0 (H) 03/16/2021    BUN 12.8 03/16/2021    CR 0.8 03/16/2021    GFRESTIMATED 70.7 03/16/2021    GFRESTBLACK 85.5 03/16/2021    ASHLEY 9.2 03/16/2021        INR RESULTS:  Lab Results   Component Value Date    INR 1.00 07/04/2014    INR 1.03 10/26/2011    INR 1.04 08/11/2008         Echocardiogram 11/18/2015  Technically difficult study.  Left ventricular function, chamber size, wall motion, and wall thickness are  normal.The EF is 55-60%.  Mild diastolic dysfunction, with no evidence of high filling pressures. Cause  of edema unlikely to be cardiac.      EKG 3/16/2021      EKG in clinic today shows atrial fibrillation, heart rate 80 bpm and low voltage.      Assessment and Plan:   Patient is being seen today for persistent atrial fibrillation which is a new diagnosis since 3/13/2021.  She has been started on Eliquis 5 mg twice daily ([id number] score is 4, female one-point, diabetes one-point, hypertension one-point, age one-point).  No side effects so far.  Heart rate is well controlled without any AV andra blocking agents at rest.  EKG in clinic today shows atrial fibrillation, unchanged.    #Persistent atrial fibrillation  -Asymptomatic, no indication for rhythm control.  -No indication for rate control as heart rate is well controlled at rest  -Major risk factors for atrial fibrillation are likely history of hypertension, obesity, obstructive sleep apnea and diabetes type 2.  No history of stroke.  -Recommend to continue Eliquis 5 mg twice daily    #Hypertension, well controlled  -Continue current medications with amlodipine 10, losartan 100  mg.    #Unclear why she is on furosemide 20 mg twice daily.  -Euvolemic on exam  -Continue current treatment  -EKG shows low voltage.  Amyloid?  -We will check if echocardiogram shows LVH pattern.    #Diabetes type 2  -Hemoglobin A1c is elevated at 7.8  -On insulin and liraglutide.  -Follows with primary care physician    Recommendations  -No medication changes today.  -Recommend to continue current treatment  -Transthoracic echocardiogram  -Zio patch for 1 week to assess ambulatory rate  -Follow-up in cardiology as needed    Total time spent today for this visit is 45 minutes including precharting, face-to-face clinic visit, review of labs/imaging and medical documentation.    Please donot hesitate to contact me if you have any questions or concerns. Again, thank you for allowing me to participate in the care of your patient.    Haile GARCIA MD  [facility] Division of Cardiology  Pager [phone]

## 2021-03-29 NOTE — PATIENT INSTRUCTIONS
Complete an echocardiogram.  Scheduling : [phone]    ZIOpatch placed today. To be worn for 1 week. Please return by mail as soon as completed. Results come back to us approximately 10-14 days after you place in mail.    As soon as results are compiled and reviewed you will be notified.

## 2021-03-29 NOTE — PROGRESS NOTES
Per Haile Willard, patient to have 7 day Zio patch monitor placed.  Diagnosis: Paroxysmal atrial fibrillation I48.0  Monitor placed: Yes  Patient Instructed: Yes  Patient verbalized understanding: Yes  Holter # [id number]      Placed by Ky Sprague

## 2021-03-29 NOTE — PROGRESS NOTES
Referring provider:Mona Nava MD    Chief complaint:     HPI: Ms. Pinky Page is a 70 year old  female with PMH significant for   -type 2 diabetes on insulin  -Paroxysmal atrial fibrillation on Eliquis  -Shizoaffective disorder  -Obstructive sleep apnea noncompliant with CPAP  -Morbid obesity with body mass index at 30.9.5  -Hypertension  -Hyperlipidemia    Patient lives at long-term care facility (nursing home).  She was noted to have irregular rhythm on 3/13/2021 by the staff.  At that time she was asymptomatic.  Subsequently she was seen by her primary care physician.  Patient was started on Eliquis 5 mg twice daily.  Patient's [id number] score is 4.  Of note she has no prior history of atrial fibrillation.  EKG 2019 shows sinus rhythm.  Patient is currently asymptomatic from cardiac standpoint.The patient denies a history of chest discomfort, dyspnea, PND, orthopnea, pedal edema, palpitations, lightheadedness, or syncope.  She tells me that she walks for 25 minutes and rides her stationary bike for 20 minutes almost every day.  Denies symptoms during exercise.  Lifetime non-smoker.  No alcohol abuse.    Echocardiogram in 2015 showed a structurally normal heart.  EKG in clinic today shows atrial fibrillation heart rate is well controlled at 80 bpm.  Current medications are insulin, liraglutide, Lasix 20 mg twice daily, atorvastatin 40, Eliquis 5 mg twice daily amlodipine 10 mg, losartan 100 mg.  Medications, personal, family, and social history reviewed with patient and revised.    PAST MEDICAL HISTORY:  Past Medical History:   Diagnosis Date     Allergic rhinitis due to pollen     seasonal allergies      Anisometropia and aniseikonia      BMI greater than 40      Cardiomegaly      Chronic constipation      Congenital absence of one kidney      Cramp of limb      Dermatophytosis of the body      Dry eye syndrome      Esophageal reflux      Essential hypertension, benign       Gastro-oesophageal reflux disease      Hemorrhoids      Hypermetropia      Hypothyroidism      Incomplete Emptying of Bladder      Lactose intolerance      Major depressive disorder, recurrent episode, moderate (H)      Malignant neoplasm of breast (female), unspecified site     left mastectomy 1996      Mixed incontinence urge and stress (male)(female)      Moderate obstructive sleep apnea      Postmenopausal Atrophic Vaginitis      Presbyopia      Regular astigmatism      Restless leg syndrome      Senile nuclear sclerosis      Thyroid eye disease     mild     Tinnitus      Trigger finger (acquired)     right hand     Type II or unspecified type diabetes mellitus without mention of complication, not stated as uncontrolled     on insulin since April 2006        CURRENT MEDICATIONS:  Current Outpatient Medications   Medication Sig Dispense Refill     acetaminophen (TYLENOL) 500 MG tablet Take 2 tablets (1,000 mg) by mouth every 6 hours as needed 1 Bottle 2     alum & mag hydroxide-simethicone (MYLANTA/MAALOX) 200-200-20 MG/5ML SUSP suspension Take 30 mLs by mouth daily as needed for indigestion       amLODIPine (NORVASC) 10 MG tablet Take 10 mg by mouth daily       apixaban ANTICOAGULANT (ELIQUIS) 5 MG tablet Take 1 tablet (5 mg) by mouth 2 times daily 180 tablet 0     artificial saliva (BIOTENE MT) AERS spray Take 1 spray by mouth 3 times daily as needed for dry mouth       atorvastatin (LIPITOR) 40 MG tablet Take 1 tablet (40 mg) by mouth daily 90 tablet 1     blood glucose (NO BRAND SPECIFIED) lancets standard Use to test blood sugar 2 times daily or as directed. 100 each 11     blood glucose calibration (NO BRAND SPECIFIED) solution Use to calibrate blood glucose monitor as directed. 1 each 3     blood glucose monitoring (NO BRAND SPECIFIED) meter device kit Check Blood sugars twice a day. 1 kit 0     blood glucose monitoring (NO BRAND SPECIFIED) test strip Use to test blood sugar 3 times daily or as directed.  100 each 3     Calcium Carbonate-Vitamin D (CALCIUM-VITAMIN D) 250-125 MG-UNIT TABS Take 1 tablet by mouth 2 times daily. Calcium 250 mg/Vit D 125 IU       calcium polycarbophil (FIBERCON) 625 MG tablet Take 2 tablets by mouth daily       CLARITIN 10 MG OR TABS 1 TAB PO QD (Once per day) as needed for ALLERGY SYMPTOMS 30 11     FLUoxetine (PROZAC) 20 MG capsule Take 2 capsules (40 mg) by mouth daily 60 capsule 2     fluticasone (FLONASE) 50 MCG/ACT nasal spray Spray 1 spray into both nostrils daily 16 g 3     furosemide (LASIX) 20 MG tablet Take 1 tablet (20 mg) by mouth 2 times daily 60 tablet 3     Gabapentin (NEURONTIN PO) Take 900 mg by mouth 3 times daily.       Hypromellose (ARTIFICIAL TEARS OP) Apply 1 drop to eye 4 times daily.       insulin glargine (LANTUS) 100 UNIT/ML injection Inject 20 Units Subcutaneous At Bedtime 8 mL 11     lactase (LACTAID) 3000 UNIT tablet Take 4 tabs daily with meals.       levothyroxine (SYNTHROID, LEVOTHROID) 175 MCG tablet Take 1 tablet (175 mcg) by mouth daily Give on empty stomach 30 tablet 4     levothyroxine (SYNTHROID/LEVOTHROID) 175 MCG tablet Take 175 mcg by mouth daily       liraglutide (VICTOZA) 18 MG/3ML solution Inject 1.8 mg Subcutaneous daily 9 mL 0     losartan (COZAAR) 100 MG tablet Take 0.5 tablets (50 mg) by mouth 2 times daily 120 tablet 2     Magnesium Hydroxide (MILK OF MAGNESIA PO) Take  by mouth. Take 30 mL as needed for constipation.       melatonin 3 MG CAPS Take 6 mg by mouth At Bedtime 60 capsule 1     metFORMIN (GLUCOPHAGE) 1000 MG tablet Take 1,000 mg by mouth 2 times daily (with meals)       nystatin (MYCOSTATIN) 817512 UNIT/GM POWD Apply topically 3 times daily as needed 60 g 1     ONETOUCH DELICA LANCETS 33G MISC        polyethylene glycol (MIRALAX/GLYCOLAX) powder Take 1 capful by mouth 2 times daily. 17 GM PO BID       Saline 0.9 % SOLN Spray 2 sprays in nostril as needed.       senna-docusate (SENNA S) 8.6-50 MG per tablet Take 2 tablets by  mouth At Bedtime.       simethicone (MYLICON) 125 MG chewable tablet Take 1 tablet (125 mg) by mouth 2 times daily 60 tablet 0     Skin Protectants, Misc. (EUCERIN) cream Apply  topically as needed. Apply to thigh PRN dry skin        SM LUBRICANT EYE DROPS 0.4-0.3 % SOLN ophthalmic solution        solifenacin (VESICARE) 10 MG tablet Take 1 tablet (10 mg) by mouth daily 30 tablet 6     ziprasidone (GEODON) 40 MG capsule Take 1 capsule (40 mg) by mouth 2 times daily (with meals) 60 capsule 2       PAST SURGICAL HISTORY:  Past Surgical History:   Procedure Laterality Date     APPENDECTOMY       C MASTECTOMY,SIMPLE  1996    Left mastectomy  [facility]. Had normal FU mammo 5/2007. Follow yearly.      C TOTAL ABDOM HYSTERECTOMY  7/2003    Dr. Castanon, for abnormal bleeding. Removal of both tubes with BSO for myoma w - - -     CHOLECYSTECTOMY       COLONOSCOPY  5/2005    Complete Colonoscopy-had one small polyp removed 5/2005.      COLONOSCOPY N/A 3/31/2016    Procedure: COLONOSCOPY;  Surgeon: Cary Ring MD;  Location: [facility]     COLONOSCOPY N/A 7/7/2016    Procedure: COLONOSCOPY;  Surgeon: Cary Ring MD;  Location: [facility]     DILATION AND CURETTAGE       HYSTERECTOMY       MASTECTOMY      Left breast       ALLERGIES:     Allergies   Allergen Reactions     Chlordiazepoxide Hcl      Dimetapp Dm Cold-Cough      Cold/Congetion TABS     Haldol      Ibuprofen      TABS     Lactose Intolerance [Beta-Galactosidase]      CAPS     Milk Products      Propofol      EMUL       FAMILY HISTORY:  Family History   Problem Relation Age of Onset     Heart Disease Father         1968     Melanoma Mother         malignant melanoma     Glaucoma No family hx of      Macular Degeneration No family hx of          SOCIAL HISTORY:  Social History     Tobacco Use     Smoking status: Never Smoker     Smokeless tobacco: Never Used   Substance Use Topics     Alcohol use: No     Alcohol/week: 0.0 standard  "drinks     Drug use: No       ROS:   Constitutional: No fever, chills, or sweats. Weight stable.   ENT: No visual disturbance, ear ache, epistaxis, sore throat.   Cardiovascular: As per HPI.   Respiratory: No cough, hemoptysis.    GI: No nausea, vomiting, hematemesis, melena, or hematochezia.   : No hematuria.   Integument: Negative.   Psychiatric: Negative.   Hematologic:  No easy bruising, no easy bleeding.  Neuro: Negative.   Endocrinology: No significant heat or cold intolerance   Musculoskeletal: No myalgia.    Exam:  /63   Pulse 70   Ht 1.6 m (5' 3\")   Wt 101 kg (222 lb 11.2 oz)   LMP  (LMP Unknown)   SpO2 95%   BMI 39.45 kg/m    GENERAL APPEARANCE: alert and no distress  HEENT: no icterus, no central cyanosis  LYMPH/NECK: no adenopathy, no asymmetry, JVP not elevated, no carotid bruits.  RESPIRATORY: lungs clear to auscultation - no rales, rhonchi or wheezes, no use of accessory muscles, no retractions, respirations are unlabored, normal respiratory rate  CARDIOVASCULAR: irregular rhythm, normal S1, S2, no S3 or S4 and no murmur, click or rub, precordium quiet with normal PMI.  GI: soft, non tender  EXTREMITIES: peripheral pulses normal, no edema  NEURO: alert, normal speech,and affect  VASC: Radial, dorsalis pedis and posterior tibialis pulses are normal in volumes and symmetric bilaterally.   SKIN: no ecchymoses, no rashes     I have reviewed the labs and personally reviewed the imaging below and made my comment in the assessment and plan.    Labs:  CBC RESULTS:   Lab Results   Component Value Date    WBC 10.06 (H) 05/07/2019    RBC 4.46 05/07/2019    HGB 13.7 03/16/2021    HCT 42.7 03/16/2021    MCV 89.3 03/16/2021    MCH 28.7 03/16/2021    MCHC 32.1 03/16/2021    RDW 14.4 03/16/2021     05/07/2019     10/25/2018       BMP RESULTS:  Lab Results   Component Value Date    .1 03/16/2021    POTASSIUM 4.2 03/16/2021    CHLORIDE 96.2 (L) 03/16/2021    CO2 28.9 03/16/2021    " ANIONGAP 11 05/07/2019    .0 (H) 03/16/2021    BUN 12.8 03/16/2021    CR 0.8 03/16/2021    GFRESTIMATED 70.7 03/16/2021    GFRESTBLACK 85.5 03/16/2021    ASHLEY 9.2 03/16/2021        INR RESULTS:  Lab Results   Component Value Date    INR 1.00 07/04/2014    INR 1.03 10/26/2011    INR 1.04 08/11/2008         Echocardiogram 11/18/2015  Technically difficult study.  Left ventricular function, chamber size, wall motion, and wall thickness are  normal.The EF is 55-60%.  Mild diastolic dysfunction, with no evidence of high filling pressures. Cause  of edema unlikely to be cardiac.      EKG 3/16/2021      EKG in clinic today shows atrial fibrillation, heart rate 80 bpm and low voltage.      Assessment and Plan:   Patient is being seen today for persistent atrial fibrillation which is a new diagnosis since 3/13/2021.  She has been started on Eliquis 5 mg twice daily ([id number] score is 4, female one-point, diabetes one-point, hypertension one-point, age one-point).  No side effects so far.  Heart rate is well controlled without any AV andra blocking agents at rest.  EKG in clinic today shows atrial fibrillation, unchanged.    #Persistent atrial fibrillation  -Asymptomatic, no indication for rhythm control.  -No indication for rate control as heart rate is well controlled at rest  -Major risk factors for atrial fibrillation are likely history of hypertension, obesity, obstructive sleep apnea and diabetes type 2.  No history of stroke.  -Recommend to continue Eliquis 5 mg twice daily    #Hypertension, well controlled  -Continue current medications with amlodipine 10, losartan 100 mg.    #Unclear why she is on furosemide 20 mg twice daily.  -Euvolemic on exam  -Continue current treatment  -EKG shows low voltage.  Amyloid?  -We will check if echocardiogram shows LVH pattern.    #Diabetes type 2  -Hemoglobin A1c is elevated at 7.8  -On insulin and liraglutide.  -Follows with primary care physician    Recommendations  -No  medication changes today.  -Recommend to continue current treatment  -Transthoracic echocardiogram  -Zio patch for 1 week to assess ambulatory rate  -Follow-up in cardiology as needed    Total time spent today for this visit is 45 minutes including precharting, face-to-face clinic visit, review of labs/imaging and medical documentation.    Please donot hesitate to contact me if you have any questions or concerns. Again, thank you for allowing me to participate in the care of your patient.    Haile GARCIA MD  [facility] Division of Cardiology  Pager [phone]   negative

## 2021-03-29 NOTE — NURSING NOTE
Chief Complaint   Patient presents with     New Patient     new - referral from PCP at [facility]      Vitals were taken and medications were reconciled. EKG was performed    Liseth DE SANTIAGO  2:46 PM

## 2021-03-30 LAB — INTERPRETATION ECG - MUSE: NORMAL

## 2021-03-30 RX ORDER — SULFAMETHOXAZOLE/TRIMETHOPRIM 800-160 MG
1 TABLET ORAL 2 TIMES DAILY
Qty: 14 TABLET | Refills: 0 | Status: SHIPPED | OUTPATIENT
Start: 2021-03-30 | End: 2021-07-20

## 2021-04-05 DIAGNOSIS — E03.8 OTHER SPECIFIED HYPOTHYROIDISM: Primary | ICD-10-CM

## 2021-04-05 NOTE — TELEPHONE ENCOUNTER
Pt group home requesting refill of levothyroxine. Last office visit 3/23/21. RN unable to refill as patient reported. Routing to provider to approve if appropriate.    .Ebenezer Carpenter RN

## 2021-04-05 NOTE — TELEPHONE ENCOUNTER
Verify that the refill encounter hasn't been started Yes    [facility] phone call message- patient requesting a refill:    Full Medication Name: levothyroxine (SYNTHROID, LEVOTHROID) 175 MCG tablet    Dose: Take 1 tablet (175 mcg) by mouth daily Give on empty stomach - Oral     Pharmacy confirmed as   [facility] - [place], [place] - [address]  [address]  [facility] [address]  Phone: [phone] Fax: [phone]  : Yes    Medication tab checked to see if medication has been sent  Yes    Additional Comments: Per RN at [facility], script states pt will be out of medication on 4/8.     OK to leave a message on voice mail? Yes    Advised patient refill may take up to 2 business days? Yes    Primary language: English      needed? No    Call taken on April 5, 2021 at 8:30 AM by Marry Wilson to [facility] REFILL

## 2021-04-06 RX ORDER — LEVOTHYROXINE SODIUM 175 UG/1
175 TABLET ORAL DAILY
Qty: 90 TABLET | Refills: 3 | Status: SHIPPED | OUTPATIENT
Start: 2021-04-06

## 2021-04-07 ENCOUNTER — ANCILLARY PROCEDURE (OUTPATIENT)
Dept: CARDIOLOGY | Facility: CLINIC | Age: 71
End: 2021-04-07
Attending: INTERNAL MEDICINE
Payer: COMMERCIAL

## 2021-04-07 PROCEDURE — 93306 TTE W/DOPPLER COMPLETE: CPT | Performed by: INTERNAL MEDICINE

## 2021-04-14 ENCOUNTER — TELEPHONE (OUTPATIENT)
Dept: FAMILY MEDICINE | Facility: CLINIC | Age: 71
End: 2021-04-14

## 2021-04-14 DIAGNOSIS — I10 HYPERTENSION, ESSENTIAL: Primary | ICD-10-CM

## 2021-04-14 RX ORDER — AMLODIPINE BESYLATE 10 MG/1
10 TABLET ORAL DAILY
Qty: 90 TABLET | Refills: 3 | Status: SHIPPED | OUTPATIENT
Start: 2021-04-14 | End: 2021-04-14

## 2021-04-14 RX ORDER — AMLODIPINE BESYLATE 10 MG/1
10 TABLET ORAL DAILY
Qty: 90 TABLET | Refills: 0 | Status: SHIPPED | OUTPATIENT
Start: 2021-04-14

## 2021-04-14 NOTE — TELEPHONE ENCOUNTER
Verify that the refill encounter hasn't been started Yes    [facility] phone call message- patient requesting a refill:    Full Medication Name: amLODIPine (NORVASC) 10 MG tablet    Dose:      Pharmacy confirmed as   [facility] - [place], [place] - [address]  [facility] [address]  Phone: [phone] Fax: [phone]    [facility] [place], [place] - [address]  [facility] [address]  Phone: [phone] Fax: [phone]  : Yes    Medication tab checked to see if medication has been sent  Yes    Additional Comments:patient out of med's  Ussing jackie andrea leo    OK to leave a message on voice mail? Yes    Advised patient refill may take up to 2 business days? Yes    Primary language: English      needed? No    Call taken on April 14, 2021 at 2:03 PM by Anirudh Solis    Route to [facility] REFILL

## 2021-04-14 NOTE — TELEPHONE ENCOUNTER
Ileana, assisted living staff, calling to check status of refill request. Advised pending review by provider. Ileana noted that patient is out of med and needs refill in time to take med tomorrow morning. Patient uses [facility] in [place].

## 2021-04-20 ENCOUNTER — VIRTUAL VISIT (OUTPATIENT)
Dept: PSYCHOLOGY | Facility: CLINIC | Age: 71
End: 2021-04-20
Payer: COMMERCIAL

## 2021-04-20 DIAGNOSIS — F41.1 GENERALIZED ANXIETY DISORDER: ICD-10-CM

## 2021-04-20 DIAGNOSIS — E66.9 OBESITY, UNSPECIFIED OBESITY SEVERITY, UNSPECIFIED OBESITY TYPE: ICD-10-CM

## 2021-04-20 DIAGNOSIS — F54 PSYCHOLOGICAL FACTORS AFFECTING MEDICAL CONDITION: ICD-10-CM

## 2021-04-20 DIAGNOSIS — F33.0 MAJOR DEPRESSIVE DISORDER, RECURRENT EPISODE, MILD (H): Primary | ICD-10-CM

## 2021-04-20 PROCEDURE — 90834 PSYTX W PT 45 MINUTES: CPT | Mod: 95 | Performed by: PSYCHOLOGIST

## 2021-04-20 NOTE — PROGRESS NOTES
"    Health Psychology                     Department of Medicine  Keri Arnett, Ph.D., L.P. [phone]                          [facility] Madelaine Ingram, Ph.D., L.P. [phone]                   [place] Mail Code 745   Dennis Dillard, Ph.D., L.P.  [phone]       [address] [place] [address] Rae Eugene, Ph.D., L.P. [phone]       [place] [address]          Damon Gr, Ph.D., A.B.P.P., L.P. [phone]     Kimber Morales, Ph.D., L.P. [phone]   [address]     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 She does not have access to video telehealth [facility] in part because she doesn't know how to use her relatively new cell phone..  She wished to get a call.  We discussed learning how to use it for telehealth in the future given the likelihood that remuneration for telephone only contacts is likely to decrease during the summer.   She gave me her email address and I was able to send her an email but she doesn't really know how to retrieve it.  It had instructions for telehealth including the link to [email or web address].  We tried to work through it, but she couldn't understand so Iasked her to follow up with her nurses.      She tested positive for COVID-19 in early December.  The residence is down to 1 positive case  Some residents and staff are refusing the vaccination.  She has gotten her two doses of the vaccine.  She  saw her PCC and learned  has been experiencing atrial fibrillation.  Echo results are pending. Heart monitor results are pending.     Exercise:  Biking 5-6x/week for 25-30 minutes;  She is also walking 20-25 minutes indoor plus some days 30 minutes of walking outdoors.  We discussed a total of 1.5 hours per day  If possible.    She is in touch with her family and saw them on Brynn.  " She is very relieved.  It went great.     She last stated she weighed 219# on 3/13, down from  222# on 1/29/21.  Not addressed today.     She participates fully, ventilated feelings appropriately.  She appears to derive benefit from the support.      Current Outpatient Medications   Medication     acetaminophen (TYLENOL) 500 MG tablet     alum & mag hydroxide-simethicone (MYLANTA/MAALOX) 200-200-20 MG/5ML SUSP suspension     amLODIPine (NORVASC) 10 MG tablet     apixaban ANTICOAGULANT (ELIQUIS) 5 MG tablet     artificial saliva (BIOTENE MT) AERS spray     aspirin 81 MG EC tablet     atorvastatin (LIPITOR) 40 MG tablet     blood glucose (NO BRAND SPECIFIED) lancets standard     blood glucose calibration (NO BRAND SPECIFIED) solution     blood glucose monitoring (NO BRAND SPECIFIED) meter device kit     blood glucose monitoring (NO BRAND SPECIFIED) test strip     Calcium Carbonate-Vitamin D (CALCIUM-VITAMIN D) 250-125 MG-UNIT TABS     calcium polycarbophil (FIBERCON) 625 MG tablet     CLARITIN 10 MG OR TABS     FLUoxetine (PROZAC) 20 MG capsule     fluticasone (FLONASE) 50 MCG/ACT nasal spray     furosemide (LASIX) 20 MG tablet     Gabapentin (NEURONTIN PO)     Hypromellose (ARTIFICIAL TEARS OP)     insulin glargine (LANTUS) 100 UNIT/ML injection     lactase (LACTAID) 3000 UNIT tablet     levothyroxine (SYNTHROID, LEVOTHROID) 175 MCG tablet     levothyroxine (SYNTHROID/LEVOTHROID) 175 MCG tablet     liraglutide (VICTOZA) 18 MG/3ML solution     losartan (COZAAR) 100 MG tablet     Magnesium Hydroxide (MILK OF MAGNESIA PO)     melatonin 3 MG CAPS     metFORMIN (GLUCOPHAGE) 1000 MG tablet     nystatin (MYCOSTATIN) 091808 UNIT/GM POWD     ONETOUCH DELICA LANCETS 33G MISC     polyethylene glycol (MIRALAX/GLYCOLAX) powder     Saline 0.9 % SOLN     senna-docusate (SENNA S) 8.6-50 MG per tablet     simethicone (MYLICON) 125 MG chewable tablet     Skin Protectants, Misc. (EUCERIN) cream     SM LUBRICANT EYE DROPS 0.4-0.3 % SOLN  ophthalmic solution     solifenacin (VESICARE) 10 MG tablet     sulfamethoxazole-trimethoprim (BACTRIM DS) 800-160 MG tablet     ziprasidone (GEODON) 40 MG capsule     No current facility-administered medications for this visit.      Wt Readings from Last 4 Encounters:   03/29/21 101 kg (222 lb 11.2 oz)   03/23/21 102.7 kg (226 lb 6.4 oz)   03/16/21 101.7 kg (224 lb 3.2 oz)   09/10/20 104.5 kg (230 lb 6.4 oz)   There is no height or weight on file to calculate BMI.      This telephone service is appropriate and effective for delivering services in light of the necessity for social distancing to mitigate the COVID.bmi  -19 epidemic and for conservation of PPE.     Patient has agreed to receiving telephone services after being informed about it: Yes    Patient prefers video invitation/information to be sent by:   email    Time service started: 2:05  Time service ended: 2:47      Mode of transmission: Telephone    Location of originating:  [facility]    Distance site:  Home office of provider for [facility]     The patient has been notified that:  Video visits will be conducted via a call with their psychologist to provide the care they need with a video conversation. Video visits may be billed at different rates depending on insurance coverage.  Patients are advised to please contact their insurance provider with any questions about their health insurance coverage. If during the course of a call the psychologist feels a video visit is not appropriate, patients will not be charged for this service.    Diagnosis:   Major Depression, recurrent mild (F33.0)   Psychological Factors Affecting Morbid Obesity (F54)  Generalized anxiety (F41.1)       Plan:  I will call her at [facility] 5/25 @ 1 for a telephone session at her request tfor supportive counseling due to the serious and persistent nature of her depression and anxiety, consistent with treatment plan.  Last treatment plan signed: 10/19/2020  Treatment plan  due: 10/19/2021                          Damon Gr, PhD ADRIAN,  A.B.P.P.  Director, Health Psychology  [phone]

## 2021-04-24 ENCOUNTER — MEDICAL CORRESPONDENCE (OUTPATIENT)
Dept: HEALTH INFORMATION MANAGEMENT | Facility: CLINIC | Age: 71
End: 2021-04-24

## 2021-04-27 ENCOUNTER — TELEPHONE (OUTPATIENT)
Dept: PSYCHIATRY | Facility: CLINIC | Age: 71
End: 2021-04-27

## 2021-04-27 NOTE — TELEPHONE ENCOUNTER
4 faxed pages, including signed MERI was received from Nraendra Truong requesting update after 4/30/21 appointment.Nataliya Doyle LPN

## 2021-04-30 ENCOUNTER — VIRTUAL VISIT (OUTPATIENT)
Dept: PSYCHIATRY | Facility: CLINIC | Age: 71
End: 2021-04-30
Attending: PSYCHIATRY & NEUROLOGY
Payer: COMMERCIAL

## 2021-04-30 DIAGNOSIS — F25.1 SCHIZOAFFECTIVE DISORDER, DEPRESSIVE TYPE (H): ICD-10-CM

## 2021-04-30 DIAGNOSIS — G47.09 OTHER INSOMNIA: ICD-10-CM

## 2021-04-30 PROCEDURE — 99214 OFFICE O/P EST MOD 30 MIN: CPT | Mod: 95 | Performed by: STUDENT IN AN ORGANIZED HEALTH CARE EDUCATION/TRAINING PROGRAM

## 2021-04-30 RX ORDER — LORAZEPAM 0.5 MG/1
0.5 TABLET ORAL AT BEDTIME
Qty: 30 TABLET | Refills: 2 | Status: SHIPPED | OUTPATIENT
Start: 2021-04-30 | End: 2021-07-23 | Stop reason: DRUGHIGH

## 2021-04-30 RX ORDER — ZIPRASIDONE HYDROCHLORIDE 40 MG/1
40 CAPSULE ORAL 2 TIMES DAILY WITH MEALS
Qty: 60 CAPSULE | Refills: 2 | Status: SHIPPED | OUTPATIENT
Start: 2021-04-30 | End: 2021-07-20

## 2021-04-30 NOTE — PROGRESS NOTES
TELEPHONE VISIT  Pinky Page is a 69 year old pt. who is being evaluated via a billable telephone visit.      The patient has been notified of the following:    We have found that certain health care needs can be provided without the need for a physical exam. This service lets us provide the care you need with a short phone conversation. If a prescription is necessary we can send it directly to your pharmacy. If lab work is needed we can place an order for that and you can then stop by our lab to have the test done at a later time. Insurers are generally covering virtual visits as they would in-office visits so billing should not be different than normal.  If for some reason you do get billed incorrectly, you should contact the billing office to correct it and that number is in the AVS .    Patient has given verbal consent for a telephone visit?:  Yes   How would the pt like to obtain the AVS?:  Patient declined  AVS SmartPhrase [PsychAVS] has been placed in 'Patient Instructions':  N/A     Start Time: 2:15PM       End Time:  2:45PM       [facility]  Psychiatry Clinic  PSYCHIATRY PROGRESS NOTE     Date of initial Diagnostic Assessment (DA) is 5/13/13 and most recent Transfer of Care DA is 12/11/19.    CARE TEAM:  PCP- Cary Brownlee  (resident working at [facility] with Dr. Birmingham, attending)   Specialty Providers- no    Therapist- Dr. Gr    [facility] Team- no          Pinky Page is a 70 year old female who prefers the name Pinky & pronouns she, her, hers.      Pertinent Background: Pinky first experienced mental health issues in her twenties and has received treatment for schizoaffective disorder and generalized anxiety. Notably, Pinky's symptoms of depression and psychosis have responded well to a combination of ECT and medication management. Pinky has a history of experiencing increased psychotic symptoms with past attempted antipsychotic tapers.      Psych pertinent history  "includes psychosis [sxs include disorganization, hallucinations, paranoia], mutiple psychotropic trials, psych hosp (3-5) and ECT.    INTERIM HISTORY      [4, 4]   The patient reports good treatment adherence.  History was provided by the patient who was a good historian.    The last visit ended with no change to the med regimen.   Since the last visit:   -Was prescribed a new medication for afib. Has a follow-up with her cardiologist on 5/11.  -There haven't been any COVID cases this week at her residence.   -Some of the activities at her residence are open. Has been going to a judge.me.   -Went shopping at [employer] with her roommate which she enjoyed..   -Enjoys going for walks and doing 40 minutes a day on the stationary bike. Thinks this has helped with her past irritability. Enjoys coloring.   -Her mood has been \"pretty good.\"  -Her energy has been better recently.   -Has trouble sleeping about twice a week. Gets about 7 hours of sleep a night. Sometimes takes naps when has to elevate her legs.   -Worries about the afib a bit.   -Denies anhedonia, mood swings, irritability, SI, AH/VH, paranoia, or medication side effects.     Call with nursing at [facility]:  Nursing confirms that Pinky has continued to get Ativan 0.5mg at bedtime.     RECENT PSYCH ROS:   Depression: REPORTS: insomnia DENIES: suicidal ideation, depressed mood, anhedonia, low energy, hypersomnia, appetite changes, weight changes and poor concentration /memory  Elevated: DENIES: increased energy, decreased sleep need, increased activity and grandiosity  Psychosis: DENIES: delusions, auditory hallucinations, visual hallucinations and disorganized behavior  Anxiety: DENIES: excessive worry, feeling fearful and nervous/overwhelmed  Panic Attack:  none  Trauma Related:  none  Dysregulation: DENIES: irritable  Eating Disorder: no   Pertinent Negative Symptoms:  NO suicidal and violent ideation, aggression, psychosis, hallucinations, " delusions and alberta    RECENT SUBSTANCE USE:     ALCOHOL- no      TOBACCO- no     CAFFEINE- coffee/ tea [coffee, diet Pepsi]. 1 cup of coffee in the morning, 1 can diet coke in the afternoon, 1 decaf coffee at lunch. doesn't drink regular coffee after 1pm.   OPIOIDS- no         NARCAN KIT- no        CANNABIS- no            OTHER ILLICIT DRUGS- none    CURRENT SOCIAL HISTORY:  FINANCIAL SUPPORT- social security disability       CHILDREN- no      LIVING SITUATION- Lives at [facility].      SOCIAL/ SPIRITUAL SUPPORT- sisters, support staff at [facility], other residents at [place], other providers     FEELS SAFE AT HOME- yes     PSYCH and CD Critical Summary Points since July 2020 7/22/20-no changes  9/23- no changes  11/18-no changes  1/22/21-no changes  3/19- no changes  4/30-no changes    PAST MED TRIALS          See last Diagnostic Assessment    MEDICAL / SURGICAL HISTORY          Patient Active Problem List   Diagnosis     Allergic rhinitis due to pollen     Urge incontinence     Hypertension, essential     Cardiomegaly     Chronic constipation     Dry eye syndrome     Esophageal reflux     Exposure keratoconjunctivitis     DM ophthalmopathy (H)     Hypothyroidism     Senile cataract     ANUJ (obstructive sleep apnea)     Vaginal atrophy     Restless leg syndrome     Squamous blepharitis     Morbid obesity due to excess calories (H)     Personal history of breast cancer s/p L masectomy     Hypercholesteremia     Lives in group home     Extrapyramidal and movement disorder     CKD (chronic kidney disease) stage 2, GFR 60-89 ml/min     Solitary kidney, congenital     S/P hysterectomy     Impingement syndrome of right shoulder     Hypertriglyceridemia     Candidiasis of skin     Generalized anxiety disorder     Carpal tunnel syndrome of left wrist     Schizoaffective disorder, depressive type (H)     Major depressive disorder, recurrent episode, moderate (H)     Psychological factors affecting  medical condition     Hx of psychiatric care     Nail complaint     Microalbuminuria due to type 2 diabetes mellitus (H)     Type 2 diabetes mellitus with microalbuminuria, with long-term current use of insulin (H)     Conjunctivitis of both eyes     Corneal erosion of both eyes     Spastic entropion, right     Foot callus     Paroxysmal atrial fibrillation (H)       Past Surgical History:   Procedure Laterality Date     APPENDECTOMY       C MASTECTOMY,SIMPLE  1996    Left mastectomy  [facility]. Had normal FU mammo 5/2007. Follow yearly.      C TOTAL ABDOM HYSTERECTOMY  7/2003    Dr. Castanon, for abnormal bleeding. Removal of both tubes with BSO for myoma w - - -     CHOLECYSTECTOMY       COLONOSCOPY  5/2005    Complete Colonoscopy-had one small polyp removed 5/2005.      COLONOSCOPY N/A 3/31/2016    Procedure: COLONOSCOPY;  Surgeon: Cary Ring MD;  Location: [facility]     COLONOSCOPY N/A 7/7/2016    Procedure: COLONOSCOPY;  Surgeon: Cary Ring MD;  Location: [facility]     DILATION AND CURETTAGE       HYSTERECTOMY       MASTECTOMY      Left breast       MEDICAL REVIEW OF SYSTEMS    [2, 10]     REPORTS: none DENIES:  loss of consciousness, dyspnea, dizziness, fever, muscle stiffness, muscle twitching, Parkinsonian symptoms     ALLERGY       Chlordiazepoxide hcl, Dimetapp dm cold-cough, Haldol, Ibuprofen, Lactose intolerance [beta-galactosidase], Milk products, and Propofol  MEDICATIONS        Current Outpatient Medications   Medication Sig Dispense Refill     acetaminophen (TYLENOL) 500 MG tablet Take 2 tablets (1,000 mg) by mouth every 6 hours as needed 1 Bottle 2     alum & mag hydroxide-simethicone (MYLANTA/MAALOX) 200-200-20 MG/5ML SUSP suspension Take 30 mLs by mouth daily as needed for indigestion       amLODIPine (NORVASC) 10 MG tablet Take 1 tablet (10 mg) by mouth daily 90 tablet 0     apixaban ANTICOAGULANT (ELIQUIS) 5 MG tablet Take 1 tablet (5 mg) by mouth 2 times  daily 180 tablet 0     artificial saliva (BIOTENE MT) AERS spray Take 1 spray by mouth 3 times daily as needed for dry mouth       aspirin 81 MG EC tablet Take 81 mg by mouth daily       atorvastatin (LIPITOR) 40 MG tablet Take 1 tablet (40 mg) by mouth daily 90 tablet 1     blood glucose (NO BRAND SPECIFIED) lancets standard Use to test blood sugar 2 times daily or as directed. 100 each 11     blood glucose calibration (NO BRAND SPECIFIED) solution Use to calibrate blood glucose monitor as directed. 1 each 3     blood glucose monitoring (NO BRAND SPECIFIED) meter device kit Check Blood sugars twice a day. 1 kit 0     blood glucose monitoring (NO BRAND SPECIFIED) test strip Use to test blood sugar 3 times daily or as directed. 100 each 3     Calcium Carbonate-Vitamin D (CALCIUM-VITAMIN D) 250-125 MG-UNIT TABS Take 1 tablet by mouth 2 times daily. Calcium 250 mg/Vit D 125 IU       calcium polycarbophil (FIBERCON) 625 MG tablet Take 2 tablets by mouth daily       CLARITIN 10 MG OR TABS 1 TAB PO QD (Once per day) as needed for ALLERGY SYMPTOMS 30 11     FLUoxetine (PROZAC) 20 MG capsule Take 2 capsules (40 mg) by mouth daily 60 capsule 2     fluticasone (FLONASE) 50 MCG/ACT nasal spray Spray 1 spray into both nostrils daily 16 g 3     furosemide (LASIX) 20 MG tablet Take 1 tablet (20 mg) by mouth 2 times daily 60 tablet 3     Gabapentin (NEURONTIN PO) Take 900 mg by mouth 3 times daily.       Hypromellose (ARTIFICIAL TEARS OP) Apply 1 drop to eye 4 times daily.       insulin glargine (LANTUS) 100 UNIT/ML injection Inject 20 Units Subcutaneous At Bedtime 8 mL 11     lactase (LACTAID) 3000 UNIT tablet Take 4 tabs daily with meals.       levothyroxine (SYNTHROID, LEVOTHROID) 175 MCG tablet Take 1 tablet (175 mcg) by mouth daily Give on empty stomach 30 tablet 4     levothyroxine (SYNTHROID/LEVOTHROID) 175 MCG tablet Take 1 tablet (175 mcg) by mouth daily 90 tablet 3     liraglutide (VICTOZA) 18 MG/3ML solution Inject 1.8  "mg Subcutaneous daily 9 mL 0     losartan (COZAAR) 100 MG tablet Take 0.5 tablets (50 mg) by mouth 2 times daily 120 tablet 2     Magnesium Hydroxide (MILK OF MAGNESIA PO) Take  by mouth. Take 30 mL as needed for constipation.       melatonin 3 MG CAPS Take 6 mg by mouth At Bedtime 60 capsule 1     metFORMIN (GLUCOPHAGE) 1000 MG tablet Take 1,000 mg by mouth 2 times daily (with meals)       nystatin (MYCOSTATIN) 220063 UNIT/GM POWD Apply topically 3 times daily as needed 60 g 1     ONETOUCH DELICA LANCETS 33G MISC        polyethylene glycol (MIRALAX/GLYCOLAX) powder Take 1 capful by mouth 2 times daily. 17 GM PO BID       Saline 0.9 % SOLN Spray 2 sprays in nostril as needed.       senna-docusate (SENNA S) 8.6-50 MG per tablet Take 2 tablets by mouth At Bedtime.       simethicone (MYLICON) 125 MG chewable tablet Take 1 tablet (125 mg) by mouth 2 times daily 60 tablet 0     Skin Protectants, Misc. (EUCERIN) cream Apply  topically as needed. Apply to thigh PRN dry skin        SM LUBRICANT EYE DROPS 0.4-0.3 % SOLN ophthalmic solution        solifenacin (VESICARE) 10 MG tablet Take 1 tablet (10 mg) by mouth daily 30 tablet 6     sulfamethoxazole-trimethoprim (BACTRIM DS) 800-160 MG tablet Take 1 tablet by mouth 2 times daily 14 tablet 0     ziprasidone (GEODON) 40 MG capsule Take 1 capsule (40 mg) by mouth 2 times daily (with meals) 60 capsule 2     VITALS      [3, 3]   LMP  (LMP Unknown)    MENTAL STATUS EXAM      [9, 14 cog gs]     Alertness: alert  and oriented  Appearance: could not assess  Behavior/Demeanor: cooperative, pleasant and calm, could not assess eye contact   Speech: normal and regular rate and rhythm  Language: intact and no problems  Psychomotor: could not assess  Mood: \"good\"  Affect: sounded full range; was congruent to mood; was congruent to content  Thought Process/Associations: unremarkable  Thought Content:  Reports none;  Denies suicidal and violent ideation and delusions  Perception:  Reports " none;  Denies auditory hallucinations and visual hallucinations  Insight: good  Judgment: good  Cognition: (6) does  appear grossly intact; formal cognitive testing was not done  Gait and Station: could not assess    LABS and DATA     AIMS:  last done 3/6/19 with score(s): 3;  due at next in-person visit    PHQ9 TODAY = not completed  PHQ-9 SCORE 1/9/2020 3/2/2020 8/27/2020   PHQ-9 Total Score - - -   PHQ-9 Total Score 7 7 5     ANTIPSYCHOTIC LABS  [glu, A1C, lipids (danika LDL), liver enzymes, WBC, ANEU, Hgb, plts]  q12 mo  Recent Labs   Lab Test 03/16/21  1537 09/16/20 03/02/20  1420 08/06/19  0941 05/07/19  0908 10/25/18  1734 10/25/18  1734   .0* 73  --   --  109*  --  147*   A1C 7.8* 6.6* 6.6* 6.6*  --    < >  --     < > = values in this interval not displayed.     Recent Labs   Lab Test 03/16/21  1537 09/16/20 05/07/19  0650 05/03/18  1414   CHOL 124 106 138 126   TRIG 136 89 96 148   LDL 53 46 79 60   HDL 43* 42* 40 37*     Recent Labs   Lab Test 03/16/21  1537 06/26/18  0659 06/26/18 03/21/17  1023   AST 7.9 16 16 13   ALT 12.8 21 21 20   ALKPHOS 78.9 111 111 92     Recent Labs   Lab Test 03/16/21  1537 05/07/19  0650 10/25/18  1734 06/26/18  0659 03/21/17  1023 03/21/17  1023   WBC  --  10.06* 14.2* 9.72  --  11.8*   ANEU 7.5  --  10.6* 5.31  --  8.3   HGB 13.7 13.3 14.0 12.6   < > 13.4   PLT  --  292 275 247  --  266    < > = values in this interval not displayed.     PSYCHOTROPIC DRUG INTERACTIONS     ZIPRASIDONE and QT INTERVAL PROLONGING DRUGS may result in increased risk of QT-interval prolongation.  ZIPRASIDONE and SEROTONERGIC DRUGS THAT PROLONG QT INTERVAL may result in increased risk of QT-interval prolongation and increased risk of serotonin syndrome (hypertension, hyperthermia, myoclonus, mental status changes).  NSAID and SSRI may result in an increased risk of bleeding  ERYTHROMYCIN and FLUOXETINE may result in an increased risk of cardiotoxicity (QT prolongation, torsades de pointes,  cardiac arrest).  FLUOXETINE and INSULINS OR PRAMLINTIDE may result in increased risk of hypoglycemia.  FLUOXETINE and QT INTERVAL PROLONGING DRUGS may result in increased risk of QT-interval prolongation.   GABAPENTIN and CNS DEPRESSANTS may result in respiratory depression.   GABAPENTIN and ANTACIDS may result in decreased gabapentin effectiveness.     MANAGEMENT:  Monitoring for adverse effects, periodic EKGs and patient is aware of risks    RISK STATEMENT for SAFETY     Pinky Page did not appear to be an imminent safety risk to self or others.    PSYCHIATRIC DIAGNOSIS     Schizoaffective Disorder, depressed type, in partial remission  Generalized Anxiety Disorder    ASSESSMENT      [m2, h3]     TODAY Pinky presents for a follow-up via telephone. She is euthymic and without psychotic symptoms. While she has some worry about her new diagnosis of atrial fibrillation, she feels this anxiety is manageable. Given her minimal symptoms and stability, we agree to continue her medication unchanged. Future considerations include stopping Ativan given risk of falls with her age. Insomnia could be targeted with trazodone or low-dose mirtazapine. Records indicate that Pinky took trazodone many years ago but it's effect is unknown.     PLAN      [m2, h3]     1) PSYCHOTROPIC MEDICATIONS:  - Continue ziprasidone 40 mg BID  - Continue fluoxetine 40 mg daily  - Continue lorazepam 0.5 mg at bedtime  - Continue melatonin 6mg at bedtime    Gabapentin 900mg TID prescribed by primary care    2) [place]  was checked today:  Lorazepam filled at monthly intervals only from this clinic. Gabapentin filled at monthly intervals for 30 day supplies and only prescribed by Dr. Linares/Dr. Frey.    3) THERAPY:    Yes, continue w/ Dr. Gr once a month    4) NEXT DUE:    Labs- AP labs due 3/2022  EKG- Last EKG 3/2021 without prolonged QTc (per PCP note)  Rating Scales- AIMS due at next in-person visit    5) REFERRALS:    No Referrals  needed     6) RTC: 2 months with new resident.     7) CRISIS NUMBERS:   Provided routinely in AVS   ONLY if a [place] PT: [facility] [place] [phone] (clinic), [phone] (after hours)     TREATMENT RISK STATEMENT:  The risks, benefits, alternatives and potential adverse effects have been discussed and are understood by the pt. The pt understands the risks of using street drugs or alcohol. There are no medical contraindications, the pt agrees to treatment with the ability to do so. The pt knows to call the clinic for any problems or to access emergency care if needed.  Medical and substance use concerns are documented above.  Psychotropic drug interaction check was done, including changes made today.    PROVIDER: María Shi MD    Patient not staffed in clinic. Supervisor is Dr. Rubio who will sign this note.

## 2021-05-05 DIAGNOSIS — R14.1 FLATULENCE, ERUCTATION AND GAS PAIN: Primary | ICD-10-CM

## 2021-05-05 DIAGNOSIS — R14.3 FLATULENCE, ERUCTATION AND GAS PAIN: Primary | ICD-10-CM

## 2021-05-05 DIAGNOSIS — R14.2 FLATULENCE, ERUCTATION AND GAS PAIN: Primary | ICD-10-CM

## 2021-05-05 NOTE — TELEPHONE ENCOUNTER
Completed, reviewed, and signed forms were faxed to [facility] 3rd floor nursing at [phone]. The original copies were sent to scanning and copies are held in Psych until scanning is completed.Nataliya Doyle LPN

## 2021-05-05 NOTE — TELEPHONE ENCOUNTER
Verify that the refill encounter hasn't been started Yes    [facility] phone call message- patient requesting a refill:    Full Medication Name: lactase tablets    Dose: 4 tablets PO daily     Pharmacy confirmed as   [facility] - [place], [place] - [address]  [facility] [address]  Phone: [phone] Fax: [phone]  : Yes    Medication tab checked to see if medication has been sent  Yes    Additional Comments: Patient has approximately 2 days worth of med left.     OK to leave a message on voice mail? Yes    Advised patient refill may take up to 2 business days? Yes    Primary language: English      needed? No    Call taken on May 5, 2021 at 1:46 PM by Carolyn Wilson to [facility] MED REFILL

## 2021-05-05 NOTE — TELEPHONE ENCOUNTER
Patient's assisted living requesting refill of lactaid tablets. Last office visit 3/23/21 with Dr. Goel. RN unable to fill as medication was patient reported. Routing to PCP to re-order if appropriate.   Rosario Klein RN

## 2021-05-07 RX ORDER — CHOLECALCIFEROL (VITAMIN D3) 125 MCG
3000 CAPSULE ORAL
Qty: 90 TABLET | Refills: 11 | Status: SHIPPED | OUTPATIENT
Start: 2021-05-07 | End: 2023-10-13

## 2021-05-10 ENCOUNTER — TELEPHONE (OUTPATIENT)
Dept: CARDIOLOGY | Facility: CLINIC | Age: 71
End: 2021-05-10

## 2021-05-10 NOTE — TELEPHONE ENCOUNTER
[facility] Call Center    Phone Message    May a detailed message be left on voicemail: yes     Reason for Call: Other: ALLEN Post from the [facility] the notes from last appt and last device check faxed over to him at [phone]     Action Taken: Message routed to:  [facility] Cardio    Travel Screening: Not Applicable

## 2021-05-11 ENCOUNTER — OFFICE VISIT (OUTPATIENT)
Dept: FAMILY MEDICINE | Facility: CLINIC | Age: 71
End: 2021-05-11
Payer: COMMERCIAL

## 2021-05-11 VITALS
TEMPERATURE: 98.4 F | WEIGHT: 225 LBS | HEIGHT: 63 IN | BODY MASS INDEX: 39.87 KG/M2 | HEART RATE: 98 BPM | SYSTOLIC BLOOD PRESSURE: 141 MMHG | DIASTOLIC BLOOD PRESSURE: 78 MMHG | OXYGEN SATURATION: 97 % | RESPIRATION RATE: 16 BRPM

## 2021-05-11 DIAGNOSIS — Z00.00 ENCOUNTER FOR MEDICARE ANNUAL WELLNESS EXAM: Primary | ICD-10-CM

## 2021-05-11 DIAGNOSIS — Z85.3 PERSONAL HISTORY OF BREAST CANCER: ICD-10-CM

## 2021-05-11 DIAGNOSIS — N95.1 MENOPAUSAL STATE: ICD-10-CM

## 2021-05-11 PROCEDURE — G0010 ADMIN HEPATITIS B VACCINE: HCPCS | Performed by: STUDENT IN AN ORGANIZED HEALTH CARE EDUCATION/TRAINING PROGRAM

## 2021-05-11 PROCEDURE — 90472 IMMUNIZATION ADMIN EACH ADD: CPT | Performed by: STUDENT IN AN ORGANIZED HEALTH CARE EDUCATION/TRAINING PROGRAM

## 2021-05-11 PROCEDURE — 90746 HEPB VACCINE 3 DOSE ADULT IM: CPT | Performed by: STUDENT IN AN ORGANIZED HEALTH CARE EDUCATION/TRAINING PROGRAM

## 2021-05-11 PROCEDURE — G0439 PPPS, SUBSEQ VISIT: HCPCS | Mod: 25 | Performed by: STUDENT IN AN ORGANIZED HEALTH CARE EDUCATION/TRAINING PROGRAM

## 2021-05-11 PROCEDURE — 90715 TDAP VACCINE 7 YRS/> IM: CPT | Performed by: STUDENT IN AN ORGANIZED HEALTH CARE EDUCATION/TRAINING PROGRAM

## 2021-05-11 ASSESSMENT — ANXIETY QUESTIONNAIRES
2. NOT BEING ABLE TO STOP OR CONTROL WORRYING: SEVERAL DAYS
5. BEING SO RESTLESS THAT IT IS HARD TO SIT STILL: NOT AT ALL
GAD7 TOTAL SCORE: 5
7. FEELING AFRAID AS IF SOMETHING AWFUL MIGHT HAPPEN: SEVERAL DAYS
1. FEELING NERVOUS, ANXIOUS, OR ON EDGE: SEVERAL DAYS
3. WORRYING TOO MUCH ABOUT DIFFERENT THINGS: SEVERAL DAYS
IF YOU CHECKED OFF ANY PROBLEMS ON THIS QUESTIONNAIRE, HOW DIFFICULT HAVE THESE PROBLEMS MADE IT FOR YOU TO DO YOUR WORK, TAKE CARE OF THINGS AT HOME, OR GET ALONG WITH OTHER PEOPLE: SOMEWHAT DIFFICULT
6. BECOMING EASILY ANNOYED OR IRRITABLE: NOT AT ALL

## 2021-05-11 ASSESSMENT — PATIENT HEALTH QUESTIONNAIRE - PHQ9: 5. POOR APPETITE OR OVEREATING: SEVERAL DAYS

## 2021-05-11 ASSESSMENT — MIFFLIN-ST. JEOR: SCORE: 1510.84

## 2021-05-11 NOTE — PROGRESS NOTES
>64 year old Wellness Visit ( Medicare etc)         HPI       This 70 year old female presents as an established patient  Tonia Goel who presents for a  Annual Wellness Exam.    Other issues patient wants to address today:    none      Visual Acuity:  Right Eye: 20/40   Left Eye: 20/40  Both Eyes: 20/40    Patient Active Problem List   Diagnosis     Allergic rhinitis due to pollen     Urge incontinence     Hypertension, essential     Cardiomegaly     Chronic constipation     Dry eye syndrome     Esophageal reflux     Exposure keratoconjunctivitis     DM ophthalmopathy (H)     Hypothyroidism     Senile cataract     ANUJ (obstructive sleep apnea)     Vaginal atrophy     Restless leg syndrome     Squamous blepharitis     Morbid obesity due to excess calories (H)     Personal history of breast cancer s/p L masectomy     Hypercholesteremia     Lives in group home     Extrapyramidal and movement disorder     CKD (chronic kidney disease) stage 2, GFR 60-89 ml/min     Solitary kidney, congenital     S/P hysterectomy     Impingement syndrome of right shoulder     Hypertriglyceridemia     Candidiasis of skin     Generalized anxiety disorder     Carpal tunnel syndrome of left wrist     Schizoaffective disorder, depressive type (H)     Major depressive disorder, recurrent episode, moderate (H)     Psychological factors affecting medical condition     Hx of psychiatric care     Nail complaint     Microalbuminuria due to type 2 diabetes mellitus (H)     Type 2 diabetes mellitus with microalbuminuria, with long-term current use of insulin (H)     Conjunctivitis of both eyes     Corneal erosion of both eyes     Spastic entropion, right     Foot callus     Paroxysmal atrial fibrillation (H)       Past Medical History:   Diagnosis Date     Allergic rhinitis due to pollen     seasonal allergies      Anisometropia and aniseikonia      BMI greater than 40      Cardiomegaly      Chronic constipation      Congenital absence of one  kidney      Cramp of limb      Dermatophytosis of the body      Dry eye syndrome      Esophageal reflux      Essential hypertension, benign      Gastro-oesophageal reflux disease      Hemorrhoids      Hypermetropia      Hypothyroidism      Incomplete Emptying of Bladder      Lactose intolerance      Major depressive disorder, recurrent episode, moderate (H)      Malignant neoplasm of breast (female), unspecified site     left mastectomy 1996      Mixed incontinence urge and stress (male)(female)      Moderate obstructive sleep apnea      Postmenopausal Atrophic Vaginitis      Presbyopia      Regular astigmatism      Restless leg syndrome      Senile nuclear sclerosis      Thyroid eye disease     mild     Tinnitus      Trigger finger (acquired)     right hand     Type II or unspecified type diabetes mellitus without mention of complication, not stated as uncontrolled     on insulin since April 2006         Family History   Problem Relation Age of Onset     Heart Disease Father         1968     Melanoma Mother         malignant melanoma     Glaucoma No family hx of      Macular Degeneration No family hx of          Problem List, Family History and past Medical History reviewed and unchanged/updated.       Health risk Assessment/ Review of Systems:     Constitutional:   Fevers or night sweats?  NO      Eyes:   Vision problems?  NO            Hearing:  Do you feel you have hearing loss?  NO  Cardiovascular:  Chest pain, palpitations, or pain with walking?  NO        Respiratory:   Breathing problems or cough?  NO    :   Difficulty controlling urination?  NO        Musculoskeletal:   Stiffness or sore joints?  NO            Skin:   concerning lesions or moles?  NO           Nervous System:   loss of strength or sensation,  numbness or tingling,  tremor,  dizziness,  headache?  NO         Mental Health:   depression or anxiety,  sleep problems?  NO   Cognition:  Memory problems?  NO       Weight Loss: Have you lost 10  or more pounds unintentionally in the previous year?  NO  Energy: How much of the time during the past 3 weeks did you feel tired?   (check one of the following):     ? Some of the time      PHQ-2 Score:   PHQ-2 ( 1999 [employer]) 5/11/2021 3/23/2021   Q1: Little interest or pleasure in doing things 1 1   Q2: Feeling down, depressed or hopeless 1 1   PHQ-2 Score 2 2   Some encounter information is confidential and restricted. Go to Review Flowsheets activity to see all data.       PHQ-9 Score:   [facility] Follow-up to PHQ 1/9/2020 3/2/2020 8/27/2020   PHQ-9 9. Suicide Ideation past 2 weeks Not at all Not at all Not at all   Thoughts of suicide or self harm in past 2 weeks - - -   PHQ-9 Safety concerns? - - -   Some encounter information is confidential and restricted. Go to Review Flowsheets activity to see all data.     Medical Care:     What other specialists or organizations are involved in your medical care?     Patient Care Team       Relationship Specialty Notifications Start End    Tonia Goel MD PCP - General Student in organized health care education/training program  7/8/20     Phone: [phone] Fax: [phone]         [facility], [address] [facility] [address]    Dr Narendra Truong MD   5/31/12     Phone: [phone] Fax: [phone]        [facility] [address]    Damon Gr, PhD LP  Psychology  4/30/15     Phone: [phone] Fax: [phone]         [address] [place] [address] [address] [facility] [address]    Dedra Dior MD MD Urology  7/20/15     Phone: [phone] Fax: [phone]         [address] [facility] [address] [facility] [address]    Analy Bush, RN Clinic Care Coordinator Urology  7/20/15     Phone: [phone] Pager: [phone]        Matilde Ann MD Referring Physician   8/20/15     Phone: [phone] Fax: [phone]         [facility] [address]  [facility] [address]    Narendra Truong    8/24/15     Phone: [phone] Fax: [phone]         [address] [facility] 9[facility] [address]    Nany Brown MD MD Ophthalmology  9/3/15     Phone: [phone] Fax: [phone]         [address] [facility] 911 [facility] [address]    Gaby Holliday MD MD Nephrology  5/18/16     Phone: [phone] Fax: [phone]         [address] [facility] [address] [facility] [address]    Allie Bauer, RN Registered Nurse  Admissions 2/26/18     [facility] Community Care Coordinator [phone]    Michael Lopez MD MD Ophthalmology  10/1/18     Phone: [phone] Fax: [phone]         [address] [facility] [address]    María Shi MD Resident Student in organized health care education/training program Admissions 12/11/19     Phone: [phone] Fax: [phone]         [facility] [address] [facility] [address]    Brianda Reddy MD Assigned Pulmonology Provider   10/23/20     Phone: [phone] Fax: [phone]         [address] [facility] [address] [facility] [address]    Michael Lopez MD Assigned Surgical Provider   10/23/20     Phone: [phone] Fax: [phone]         [address] [place] [address]    Marcos Magdaleno MD Assigned Endocrinology Provider   10/23/20     Phone: [phone] Fax: [phone]         [address] [facility] [address] [facility] [address]    Roxy Rubio MD MD Psychiatry  1/11/21     Attending    Phone: [phone] Fax: [phone]         [address] [place] [id number]    Haile King MD Assigned Heart and Vascular Provider   4/4/21     Phone: [phone] Fax: [phone]         [address] [facility] [address] [facility] [address]    Tonia Goel MD Assigned PCP   4/4/21     Phone: [phone] Fax: [phone]         [facility], [address]  [place] [address]          Do you have an advanced directive? No-ACP Packet given to patient.      Social History / Home Safety     Social History     Tobacco Use     Smoking status: Never Smoker     Smokeless tobacco: Never Used   Substance Use Topics     Alcohol use: No     Alcohol/week: 0.0 standard drinks     Marital Status:Single  Who lives in your household? Lives at an nursing home  Does your home have any of the following safety concerns? Loose rugs in the hallway, no grab bars in the bathroom, no handrails on the stairs or have poorly lit areas?  No   Do you feel threatened or controlled by a partner, ex-partner or anyone in your life? {Yes No   Has anyone hurt you physically, for example by pushing, hitting, slapping or kicking you   or forcing you to have sex? No       Functional Status     Do you need help with dressing yourself, bathing, or walking?No   Do you need help with the phone, transportation, shopping, preparing meals, housework, laundry, medications or managing money? YES [facility] helps with Transportation  By yourself and not using aids, do you have any difficulty walking up 10 steps  without resting?  YES when patient gets tired will use handrail or take a break  By yourself and not using aids, do you have any difficulty walking several hundred yards? No              Risk Behaviors and Healthy Habits     History   Smoking Status     Never Smoker   Smokeless Tobacco     Never Used     How many servings of fruits and vegetables do you eat a day? 4-6  Exercise:walking  and biking 6-7 days/week for an average of 30-45 minutes  Do you frequently drive without a seatbelt? No   History   Smoking Status     Never Smoker   Smokeless Tobacco     Never Used     Do you use any other drugs? No       Do you use alcohol?No      Functional Ability   Was the patient's timed Up & Go test (Get up from chair walk, 10 feet turn, return to chair and sit down) unsteady or longer than 10 seconds? No     Fall risk:      1. Have you fallen two or more times in the past year? No   2. Have you fallen and had an injury in the past year?  No       Evaulation of Cognitive Function     Family member/caregiver input: Normal    1) Repeat 3 items (Leader, Season, Table)    2) Clock draw: NORMAL  3) 3 item recall: Recalls 2 objects   Results: NORMAL clock, 1-2 items recalled: COGNITIVE IMPAIRMENT LESS LIKELY    Mini-CogTM Copyright STEFFANY Mitchell. Licensed by the author for use in [facility]; reprinted with permission ([email or web address]). All rights reserved.          Other Assessments:     CV Risk based on Pooled Cohort Risk (consider assessing every 4-6 years; consider statin in patients with 10-yr risk > 7.5%):   The ASCVD Risk score (Freeportmilli EATON Jr., et al., 2013) failed to calculate for the following reasons:    The valid total cholesterol range is 130 to 320 mg/dL     Advance Directives: Discussed with patient and family as appropriate.  Has patient completed advance directives? No, advance care planning information given to patient to review.  Patient plans to discuss their wishes with loved ones or provider.            Immunization History   Administered Date(s) Administered     DTaP, Unspecified 03/13/2016     Flu 65+ Years 09/27/2016     Flu, Unspecified 10/30/1998     HepB-Adult 11/07/2019, 12/05/2019     Influenza (H1N1) 01/11/2010     Influenza (High Dose) 3 valent vaccine 09/16/2015, 10/16/2017     Influenza (IIV3) PF 10/21/2002, 11/10/2004, 11/04/2005, 11/02/2006, 11/01/2007, 10/22/2008, 09/01/2009, 10/05/2010, 10/13/2011, 09/18/2012, 09/30/2013     Influenza Vaccine, 6+MO IM (QUADRIVALENT W/PRESERVATIVES) 09/27/2018, 09/19/2019     Mantoux Tuberculin Skin Test 08/02/2002, 08/02/2002, 07/22/2003, 07/22/2003, 07/16/2004, 07/16/2004, 07/28/2005, 07/28/2005, 09/18/2011, 09/18/2012, 08/21/2014, 09/29/2015, 10/16/2017     Pneumo Conj 13-V (2010&after) 09/27/2016     Pneumococcal 23 valent 10/14/2005, 05/28/2015     TD (ADULT, 7+)  "06/15/2004     TDAP Vaccine (Boostrix) 06/10/2014     Td (Adult), Adsorbed 06/15/2004     Zoster vaccine recombinant adjuvanted (SHINGRIX) 09/27/2018, 03/13/2019     Reviewed Immunization Record Today    Physical Exam     Vitals: BP (!) 144/76   Pulse 98   Temp 98.4  F (36.9  C) (Oral)   Resp 16   Ht 1.61 m (5' 3.39\")   Wt 102.1 kg (225 lb)   LMP  (LMP Unknown)   SpO2 97%   BMI 39.37 kg/m    BMI= Body mass index is 39.37 kg/m .  GENERAL APPEARANCE: alert and no distress  HENT: ear canals patent and TM's normal, and oral mucous membranes moist  Hearing loss screen:   Abnormal - could not identify sound on either side   DENTITION:  Good repair?  No- multiple pulled teeth, crowns, and false teeth  RESP: lungs clear to auscultation - no rales, rhonchi or wheezes  CV: regular rates and irregular rhythm, no murmur, click or rub, pitting edema in LE bilaterally   SKIN: no suspicious lesions or rashes  NEURO: Normal strength and tone  MENTAL STATUS EXAM  Appearance: appropriate  Attitude: cooperative  Behavior: normal  Eye Contact: normal  Speech: normal  Orientation: oreinted to person , place, time and situation  Mood:  \"ok\"  Affect: Mood Congruent  Thought Process: clear      Assessment and Plan     Welcome to Medicare Preventive Visit / Initial Medicare Annual Wellness Exam - reviewed Preventive Services and Plan form with patient as specified in Patient Instructions.    Pinky was seen today for physical and results.    Diagnoses and all orders for this visit:    Encounter for Medicare annual wellness exam  -     Screening Mammogram Digital Bilateral; Future  -     DX Hip/Pelvis/Spine (DEXA); Future  -     TDAP VACCINE (Adacel, Boostrix)  [[id number]]  -     HEPATITIS B VACCINE,ADULT,IM      Options for treatment and follow-up care were reviewed with the Pinky Page and/or guardian engaged in the decision making process and verbalized understanding of the options discussed and agreed with the final " plan.    Tonia Goel MD

## 2021-05-11 NOTE — PROGRESS NOTES
Preceptor Attestation:   Patient seen, evaluated and discussed with the resident. I have verified the content of the note, which accurately reflects my assessment of the patient and the plan of care.   Supervising Physician:  Mona Nava MD

## 2021-05-11 NOTE — PATIENT INSTRUCTIONS
Here is the plan from today's visit    1. Encounter for Medicare annual wellness exam  - Screening Mammogram Digital Bilateral; Future  - DX Hip/Pelvis/Spine (DEXA); Future  - TDAP VACCINE (Adacel, Boostrix)  [[id number]]  - HEPATITIS B VACCINE,ADULT,IM    Thank you for coming to [facility] today.  Medication Refills:  If you need any refills please call your pharmacy and they will contact us.   If you need to  your refill at a new pharmacy, please contact the new pharmacy directly. The new pharmacy will help you get your medications transferred faster.   Scheduling:  If you have any concerns about today's visit or wish to schedule another appointment please call our office during normal business hours [phone] (8-5:00 M-F)  If a referral was made to a [facility] and you don't get a call from central scheduling please call [phone].  If a Mammogram was ordered for you at [facility] call [phone] to schedule or change your appointment.  If you had an EKG/XRay/CT/Ultrasound/MRI ordered the number is [phone] to schedule or change your radiology appointment.   Medical Concerns:  If you have urgent medical concerns please call [phone] at any time of the day.    Tonia Goel MD         If you had an XRay/CT/Ultrasound/MRI ordered the number is [phone] to schedule or change your radiology appointment.   Medical Concerns:  If you have urgent medical concerns please call [phone] at any time of the day.    Tonia Goel MD  [facility]ICARE PERSONAL PREVENTIVE SERVICES PLAN - SERVICES     Review these tests with your doctor then decide which ones you want and take this page home for your reference  Immunization History   Administered Date(s) Administered     DTaP, Unspecified 03/13/2016     Flu 65+ Years 09/27/2016     Flu, Unspecified 10/30/1998     HepB-Adult 11/07/2019, 12/05/2019     Influenza (H1N1) 01/11/2010     Influenza (High Dose) 3  valent vaccine 09/16/2015, 10/16/2017     Influenza (IIV3) PF 10/21/2002, 11/10/2004, 11/04/2005, 11/02/2006, 11/01/2007, 10/22/2008, 09/01/2009, 10/05/2010, 10/13/2011, 09/18/2012, 09/30/2013     Influenza Vaccine, 6+MO IM (QUADRIVALENT W/PRESERVATIVES) 09/27/2018, 09/19/2019     Mantoux Tuberculin Skin Test 08/02/2002, 08/02/2002, 07/22/2003, 07/22/2003, 07/16/2004, 07/16/2004, 07/28/2005, 07/28/2005, 09/18/2011, 09/18/2012, 08/21/2014, 09/29/2015, 10/16/2017     Pneumo Conj 13-V (2010&after) 09/27/2016     Pneumococcal 23 valent 10/14/2005, 05/28/2015     TD (ADULT, 7+) 06/15/2004     TDAP Vaccine (Boostrix) 06/10/2014     Td (Adult), Adsorbed 06/15/2004     Zoster vaccine recombinant adjuvanted (SHINGRIX) 09/27/2018, 03/13/2019          IMMUNIZATIONS Description Recommend today?     Influenza (flu shot) Helps to prevent flu; you should get it every year No: is not indicated today.   PCV 13 Prevents pneumonia; given once No; maximum age exceeded   PPSV 23 Prevents pneumonia; given once No; is up to date.   Tetanus Prevents tetanus; need every 10 years Yes; Recommended and ordered.   Herpes Zoster (shingles) Prevents or lessens symptoms from shingles; given once No; is up to date.   Hepatitis B  If you have any of the following risk factors you should be immunized for hepatitis B: severe kidney disease, people who live in the same house as a carrier of Hepatitis B virus, people who live in  institutions (e.g. nursing homes or group homes), homosexual men, patients with hemophilia who received Factor VIII or IX concentrates, abusers of illicit injectable drugs Yes; Recommended and ordered.           Health Maintenance   Topic Date Due     DEPRESSION ACTION PLAN  Never done     PHQ-9  09/27/2020     EYE EXAM  11/19/2020     DIABETIC FOOT EXAM  03/02/2021     FALL RISK ASSESSMENT  03/02/2021     MAMMO SCREENING  04/30/2021     COLORECTAL CANCER SCREENING  07/07/2021     A1C  09/16/2021     BMP  03/16/2022     LIPID   03/16/2022     TSH W/FREE T4 REFLEX  03/16/2022     MICROALBUMIN  03/23/2022     MEDICARE ANNUAL WELLNESS VISIT  05/11/2022     ADVANCE CARE PLANNING  11/19/2024     DTAP/TDAP/TD IMMUNIZATION (3 - Td) 03/13/2026     DEXA  08/28/2029     HEPATITIS C SCREENING  Completed     INFLUENZA VACCINE  Completed     Pneumococcal Vaccine: Pediatrics (0 to 5 Years) and At-Risk Patients (6 to 64 Years)  Completed     Pneumococcal Vaccine: 65+ Years  Completed     ZOSTER IMMUNIZATION  Completed     COVID-19 Vaccine  Completed     IPV IMMUNIZATION  Aged Out     MENINGITIS IMMUNIZATION  Aged Out       SCREENING TESTS     Description   Year of Last Screening   Recommended Today?   Heart disease screening blood tests    Cholesterol level Reducing cholesterol can reduce your risk of heart attacks by 25%.  Screening is recommended yearly if you are at risk of heart disease otherwise every 4-5 years  No: is not indicated today.   Diabetes screening tests    Hemoglobin A1c blood test   Finding and treating diabetes early can reduce complications.  Screening recommended/covered yearly if you have high blood pressure, high cholesterol, obesity (BMI >30), or a history of high blood glucose tests; or 2 of the following: family history of diabetes, overweight (BMI >25 but <30), age 65 years or older, and a history of diabetes of pregnancy or gave birth to baby weighing more than 9 lbs.  No; is up to date.   Hepatitis B screening Finding hepatitis B early can reduce complications.  Screening is recommended for persons with selected risk factors.  No: is not indicated today.   Hepatitis C screening Finding hepatitis C early can reduce complications.  Screening is recommended for all persons born from 1945 through 1965 and for those with selected other risk factors.   No: is not indicated today.   HIV screening Finding HIV early can reduce complications.  Screening is recommended for persons with risk factors for HIV infection.  No: is not  indicated today.   Glaucoma screening Early detection of glaucoma can prevent blindness.   Please talk to your eye doctor about this.         SCREENING TESTS     Description   Year of Last Screening   Recommended Today?   Colorectal cancer screening    Fecal occult blood test     Screening colonoscopy Screening for colon cancer has been shown to reduce death from colon cancer by 25-30%. Screening recommended to start at 50 years and continuing until age 75 years.    No; is up to date.   Breast Cancer Screening (women)    Mammogram Mammogram screening for breast cancer has been shown to reduce the risk of dying from breast cancer and prolong life. Screening is recommended every 1-2 years for women aged 50 to 74 years.   Yes; Recommended and ordered.   Cervical Cancer screening (women)    Pap Cervical pap smears can reduce cervical cancer. Screening is recommended annually if high risk (history of abnormal pap smears) otherwise every 2-3 years, stop screening at 65 years of age if history of normal paps.  No: is not indicated today.   Screening for Osteoporosis:  Bone mass measurements (women)    Dexa Scan Screening and treating Osteoporosis can reduce the risk of hip and spine fractures. Screening is recommended in women 65 years or older and in women and men at risk of osteoporosis.  Yes; Recommended and ordered.   Screening for Lung Cancer     Low-dose CT scanning Screening can reduce mortality in persons aged 55-80 who have smoked at least 30 pack-years and who are either still smoking or have quit in the past 15 years.  No: is not indicated today.   Abdominal Aortic Aneurysm (AAA) screening    Ultrasound (US)   An aneurysm treated before rupture is very safe -a ruptured aneurysm can be fatal.  Screening  by US for AAA is limited to patients who meet one of the following criteria:    Men who are 65-75 years old and have smoked more than 100 cigarettes in their lifetime    Anyone with a family history of abdominal  aortic aneurysm  No: is not indicated today.       MEDICARE WELLNESS EXAM PATIENT INSTRUCTIONS    Yearly exam:     See your health care provider every year in order to review changes in your health, review medicines that you take, and discuss preventive care needs such as immunizations and cancer screening.    Get a flu shot each year.     Advance Directive:    If you have not done so, you are encouraged to complete an advance directive. If you would like support with this, please contact the clinic  through the main clinic line. More information about advance directives can be found at:     [email or web address]    Nutrition:     Eat at least 5 servings of fruits and vegetables each day.     Eat whole-grain bread, whole-wheat pasta and brown rice instead of white grains and rice.     Talk to your doctor about Calcium and Vitamin D.     Lifestyle:    Exercise for at least 150 minutes a week (30 minutes a day, 5 days a week). This will help you control your weight and prevent disease.     Limit alcohol to one drink per day.     If you smoke, try to quit - your doctor will be happy to help.     Wear sunscreen to prevent skin cancer.     See your dentist every six months for an exam and cleaning.     See your eye doctor every 1 to 2 years to screen for conditions such as glaucoma, macular degeneration and cataracts.

## 2021-05-12 NOTE — TELEPHONE ENCOUNTER
Patient has no device check on file.  Called, spoke to jacques, he said he just needs office visit notes from last visit (only visit)  Faxed to number provided.

## 2021-05-14 ENCOUNTER — TELEPHONE (OUTPATIENT)
Dept: FAMILY MEDICINE | Facility: CLINIC | Age: 71
End: 2021-05-14

## 2021-05-14 NOTE — TELEPHONE ENCOUNTER
[facility] phone call message- general phone call:    Reason for call: Tried to get patient scheduled for mammogram and the associated diagnosis code is not covered by insurance.     Action Desired: Would like Dr to write the diagnosis code to screen for osteoporosis.     Return call needed: Yes    OK to leave a message on voice mail? Yes    Advised patient response may take up to 2 business days: Yes    Primary language: English      needed? No    Call taken on May 14, 2021 at 9:48 AM by April Bivens    Route to [facility] TRIAGE

## 2021-05-14 NOTE — TELEPHONE ENCOUNTER
RN received notice that diagnostic codes for patient imaging not covered by insurance. Will route to provider to advise.     Ebenezer Carpenter RN

## 2021-05-17 NOTE — TELEPHONE ENCOUNTER
Updated regarding orders for mammogram and DEXA scan. nurse Pastora, verbalized understanding and stated patient was scheduled for imaging on June 3rd.     Ebenezer Carpenter RN

## 2021-05-20 ENCOUNTER — TELEPHONE (OUTPATIENT)
Dept: PSYCHIATRY | Facility: CLINIC | Age: 71
End: 2021-05-20

## 2021-05-20 NOTE — CONFIDENTIAL NOTE
SW called patient to discuss possibility of transferring care to NP within clinic. Writer provided update on up-coming resident changes and offered Pinky opportunity to transition to long term nurse practitioner provider.     Pinky states she needs to work with a psychiatrist to stay at [facility]. Writer attempted to clarify whether she needed to work with a psychiatrist or a mental health med prescriber. She was unsure. After some discussion, it appears that Pinky has never worked with anyone but a doctor.     Writer attempted to provide education on NPs abilities and that they regularly consult with medical staff when needed. Pinky would prefer to stay within resident clinic at this time, but did note that it would be nice to stay with the same provider. Writer will update medical director and identify next steps.    JAN Malone, [facility]  [phone]

## 2021-05-25 ENCOUNTER — VIRTUAL VISIT (OUTPATIENT)
Dept: PSYCHOLOGY | Facility: CLINIC | Age: 71
End: 2021-05-25
Payer: COMMERCIAL

## 2021-05-25 DIAGNOSIS — F41.1 GENERALIZED ANXIETY DISORDER: ICD-10-CM

## 2021-05-25 DIAGNOSIS — F54 PSYCHOLOGICAL FACTORS AFFECTING MEDICAL CONDITION: ICD-10-CM

## 2021-05-25 DIAGNOSIS — E66.9 OBESITY, UNSPECIFIED OBESITY SEVERITY, UNSPECIFIED OBESITY TYPE: ICD-10-CM

## 2021-05-25 DIAGNOSIS — F33.0 MAJOR DEPRESSIVE DISORDER, RECURRENT EPISODE, MILD (H): Primary | ICD-10-CM

## 2021-05-25 PROCEDURE — 90834 PSYTX W PT 45 MINUTES: CPT | Mod: 95 | Performed by: PSYCHOLOGIST

## 2021-05-25 NOTE — TELEPHONE ENCOUNTER
RN attempted to reach Pastora- spoke to Mag at [facility] who states Pastora has already left for the day. She took down writer contact information and will have Pastora call back- please transfer to any available RN when she calls back.     RN attempting to figure out where they needed the Dexa scan sent- according to Epic patient already scheduled for 6/2/21 at  radiology so they should have access to Dexa order.   Rosario Klein, RN

## 2021-05-25 NOTE — PROGRESS NOTES
"    Health Psychology                     Department of Medicine  Keri Arnett, Ph.D., L.P. [phone]                          [facility] Madelaine Ingram, Ph.D., L.P. [phone]                   [place] Mail Code 741   Dennis Dillard, Ph.D., L.P.  [phone]       [address] [place] [address] Rae Eugene, Ph.D., L.P. [phone]       [place] [address]            Damon Gr, Ph.D., A.B.P.P., L.P. [phone]     Kimber Morales, Ph.D., L.P. [phone]   [address]     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 She johnie not had access to video telehealth  in part because she doesn't know how to use her relatively new cell phone.  Today, with the help of a recreational therapist, we used the video of her new cell phone She gave me her email address and I was able to send her an email.   This is her first video session.  Intake with Health Psychology was in the 1980s and I began to see her in the early 1990s.    She tested positive for COVID-19 in early December.  The residence is down to 1 positive case  Some residents and staff are refusing the vaccination.  She has gotten her two doses of the vaccine.  She  saw her PCC and learned  has been experiencing atrial fibrillation.  Echo results are pending. Heart monitor results are pending.     Exercise:  Biking 7x/week for 30 minutes;  She is also walking 20-25 minutes indoor or outdoor, or both.  We discussed a total of 1.5 hours per day  If possible.  They now allow two vaccinated people to eat meals together in the dining room.     No pain currently.  She will be getting dental implant in June.  She is going to get a new psychiatrist in June, usually for a 2-year assignment.    She is in touch with her family and will see them this " weekend.  She is very relieved to be able to see family members, and is more optimistic.      She stated she weighed 222 up from 219# last session.   She participates fully, ventilated feelings appropriately.  She appears to derive benefit from the support.      Current Outpatient Medications   Medication     acetaminophen (TYLENOL) 500 MG tablet     alum & mag hydroxide-simethicone (MYLANTA/MAALOX) 200-200-20 MG/5ML SUSP suspension     amLODIPine (NORVASC) 10 MG tablet     apixaban ANTICOAGULANT (ELIQUIS) 5 MG tablet     artificial saliva (BIOTENE MT) AERS spray     aspirin 81 MG EC tablet     atorvastatin (LIPITOR) 40 MG tablet     blood glucose (NO BRAND SPECIFIED) lancets standard     blood glucose calibration (NO BRAND SPECIFIED) solution     blood glucose monitoring (NO BRAND SPECIFIED) meter device kit     blood glucose monitoring (NO BRAND SPECIFIED) test strip     Calcium Carbonate-Vitamin D (CALCIUM-VITAMIN D) 250-125 MG-UNIT TABS     calcium polycarbophil (FIBERCON) 625 MG tablet     CLARITIN 10 MG OR TABS     FLUoxetine (PROZAC) 20 MG capsule     fluticasone (FLONASE) 50 MCG/ACT nasal spray     furosemide (LASIX) 20 MG tablet     Gabapentin (NEURONTIN PO)     Hypromellose (ARTIFICIAL TEARS OP)     insulin glargine (LANTUS) 100 UNIT/ML injection     lactase (LACTAID) 3000 UNIT tablet     levothyroxine (SYNTHROID, LEVOTHROID) 175 MCG tablet     levothyroxine (SYNTHROID/LEVOTHROID) 175 MCG tablet     liraglutide (VICTOZA) 18 MG/3ML solution     LORazepam (ATIVAN) 0.5 MG tablet     losartan (COZAAR) 100 MG tablet     Magnesium Hydroxide (MILK OF MAGNESIA PO)     melatonin 3 MG CAPS     metFORMIN (GLUCOPHAGE) 1000 MG tablet     nystatin (MYCOSTATIN) 166190 UNIT/GM POWD     ONETOUCH DELICA LANCETS 33G MISC     polyethylene glycol (MIRALAX/GLYCOLAX) powder     Saline 0.9 % SOLN     senna-docusate (SENNA S) 8.6-50 MG per tablet     simethicone (MYLICON) 125 MG chewable tablet     Skin Protectants, Misc.  (EUCERIN) cream     SM LUBRICANT EYE DROPS 0.4-0.3 % SOLN ophthalmic solution     solifenacin (VESICARE) 10 MG tablet     sulfamethoxazole-trimethoprim (BACTRIM DS) 800-160 MG tablet     ziprasidone (GEODON) 40 MG capsule     No current facility-administered medications for this visit.      Wt Readings from Last 4 Encounters:   05/11/21 102.1 kg (225 lb)   03/29/21 101 kg (222 lb 11.2 oz)   03/23/21 102.7 kg (226 lb 6.4 oz)   03/16/21 101.7 kg (224 lb 3.2 oz)   There is no height or weight on file to calculate BMI.      This telephone service is appropriate and effective for delivering services in light of the necessity for social distancing to mitigate the COVID.bmi  -19 epidemic and for conservation of PPE.     Patient has agreed to receiving telephone services after being informed about it: Yes    Patient prefers video invitation/information to be sent by:   email    Time service started: 1:03  Time service ended: 1:46      Mode of transmission: Doxy augmented by telephone so as to reduce chance of being overheard.    Location of originating:  [facility]    Distance site:  Home office of provider for [facility]     The patient has been notified that:  Video visits will be conducted via a call with their psychologist to provide the care they need with a video conversation. Video visits may be billed at different rates depending on insurance coverage.  Patients are advised to please contact their insurance provider with any questions about their health insurance coverage. If during the course of a call the psychologist feels a video visit is not appropriate, patients will not be charged for this service.    Diagnosis:   Major Depression, recurrent mild (F33.0)   Psychological Factors Affecting Morbid Obesity (F54)  Generalized anxiety (F41.1)       Plan:  I will call her at [facility] 6//29 @ 1 for a combined video-telephone session at her request tfor supportive counseling due to the serious and  persistent nature of her depression and anxiety, consistent with treatment plan.  Last treatment plan signed: 10/19/2020  Treatment plan due: 10/19/2021                          Damon Gr, PhD LP,  A.B.P.P.  Director, Health Psychology  [phone]

## 2021-05-26 NOTE — TELEPHONE ENCOUNTER
RN spoke to Doron VILLA at [facility]- he was unsure what call yesterday was requesting. States things looked set on his end for patient's dexa and mammogram next week. Orders for both are in the system and she is scheduled on 6/2 & 6/3. No further action needed at this time. Advised to call us back if they have any other questions.   Rosario Klein, RN

## 2021-06-02 ENCOUNTER — ANCILLARY PROCEDURE (OUTPATIENT)
Dept: BONE DENSITY | Facility: CLINIC | Age: 71
End: 2021-06-02
Attending: FAMILY MEDICINE
Payer: COMMERCIAL

## 2021-06-02 DIAGNOSIS — Z00.00 ENCOUNTER FOR MEDICARE ANNUAL WELLNESS EXAM: ICD-10-CM

## 2021-06-02 DIAGNOSIS — N95.1 MENOPAUSAL STATE: ICD-10-CM

## 2021-06-02 PROCEDURE — 77080 DXA BONE DENSITY AXIAL: CPT | Mod: GA

## 2021-06-02 PROCEDURE — 77080 DXA BONE DENSITY AXIAL: CPT | Mod: 26 | Performed by: RADIOLOGY

## 2021-06-03 ENCOUNTER — ANCILLARY PROCEDURE (OUTPATIENT)
Dept: MAMMOGRAPHY | Facility: CLINIC | Age: 71
End: 2021-06-03
Attending: FAMILY MEDICINE
Payer: COMMERCIAL

## 2021-06-03 DIAGNOSIS — Z85.3 PERSONAL HISTORY OF BREAST CANCER: ICD-10-CM

## 2021-06-03 DIAGNOSIS — Z12.31 VISIT FOR SCREENING MAMMOGRAM: ICD-10-CM

## 2021-06-03 DIAGNOSIS — Z00.00 ENCOUNTER FOR MEDICARE ANNUAL WELLNESS EXAM: ICD-10-CM

## 2021-06-03 PROCEDURE — 77067 SCR MAMMO BI INCL CAD: CPT | Mod: 26 | Performed by: STUDENT IN AN ORGANIZED HEALTH CARE EDUCATION/TRAINING PROGRAM

## 2021-06-03 PROCEDURE — 77067 SCR MAMMO BI INCL CAD: CPT | Mod: 52

## 2021-06-15 ENCOUNTER — TELEPHONE (OUTPATIENT)
Dept: FAMILY MEDICINE | Facility: CLINIC | Age: 71
End: 2021-06-15

## 2021-06-15 NOTE — TELEPHONE ENCOUNTER
6/15/2021   9:34 AM     Data/Intervention:  Patient Name:  Pinky Page  /Age:  1950 (70 year old)    Spoke with: Pinky     Reason for Call:  ACD f/u     Assessment / Plan:  REBECCA reached out to Pinky & PRIMITIVO to see if they had any questions or to see if they needed help filling out the Advanced Care Directive Form that Dr. Goel gave them on 2021. REBECCA LVM with her direct phone number should she want to return call.      Lisbeth Teixeira, [facility]  Social Work Care Coordinator

## 2021-06-17 ENCOUNTER — TELEPHONE (OUTPATIENT)
Dept: OBGYN | Facility: CLINIC | Age: 71
End: 2021-06-17

## 2021-06-17 NOTE — TELEPHONE ENCOUNTER
Call received from ALLEN Guillory at [facility] requesting MA results be faxed to [phone].    Faxed as requested.

## 2021-06-24 ENCOUNTER — DOCUMENTATION ONLY (OUTPATIENT)
Dept: FAMILY MEDICINE | Facility: CLINIC | Age: 71
End: 2021-06-24

## 2021-06-28 ENCOUNTER — DOCUMENTATION ONLY (OUTPATIENT)
Dept: FAMILY MEDICINE | Facility: CLINIC | Age: 71
End: 2021-06-28

## 2021-06-28 NOTE — PROGRESS NOTES
"When opening a documentation only encounter, be sure to enter in \"Chief Complaint\" Forms and in \" Comments\" Title of form, description if needed.    Pinky is a 71 year old  female  Form received via: Fax  Form now resides in: Provider Ready    Quiana Swift MA                  "

## 2021-06-29 ENCOUNTER — VIRTUAL VISIT (OUTPATIENT)
Dept: PSYCHOLOGY | Facility: CLINIC | Age: 71
End: 2021-06-29
Payer: COMMERCIAL

## 2021-06-29 DIAGNOSIS — F41.1 GENERALIZED ANXIETY DISORDER: ICD-10-CM

## 2021-06-29 DIAGNOSIS — F33.0 MAJOR DEPRESSIVE DISORDER, RECURRENT EPISODE, MILD (H): Primary | ICD-10-CM

## 2021-06-29 DIAGNOSIS — F54 PSYCHOLOGICAL FACTORS AFFECTING MEDICAL CONDITION: ICD-10-CM

## 2021-06-29 PROCEDURE — 90834 PSYTX W PT 45 MINUTES: CPT | Mod: 95 | Performed by: PSYCHOLOGIST

## 2021-06-29 NOTE — PROGRESS NOTES
"    Health Psychology                     Department of Medicine  Keri Arnett, Ph.D., L.P. [phone]                          [facility] Madelaine Ingram, Ph.D., L.P. [phone]                   [place] Mail Code 74   Dennis Dillard, Ph.D., L.P.  [phone]       [address] [place] [address] Rae Eugene, Ph.D., L.P. [phone]       [place] [address]            Damon Gr, Ph.D., A.B.P.EWA., L.P. [phone]     Kimber Morales, Ph.D., L.P. [phone]   [address]     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 She has not had access to video telehealth  in part because she doesn't know how to use her relatively new cell phone.  Today, with the help of a recreational therapist, we used the video of her new cell phone She gave me her email address and I was able to send her an email.   This is her second video session.  Intake with Health Psychology was in the 1980s and I began to see her in the early 1990s.    She tested positive for COVID-19 in early December.  The [place] residence is down to 1 positive case.  A few people are quarantined.   Some residents and staff are refusing the vaccination.  She has gotten her two doses of the vaccine.      She saw her PCP and learned  has been experiencing atrial fibrillation. She is now on medication, Apixaban (Eloquis).  Echo results and. heart monitor results were reportedly normal.  She is contemplating [facility] [employer].     Mood is\"pretty good.\".  She is a 3-4 on 10-point scale of depression.    Exercise:  She is biking 7x/week for 30 minutes;  She is also walking 20-25 minutes indoor or outdoor, or both.  We discussed a total of 1.5 hours per day  If possible.  They now allow two vaccinated people to eat meals together in the dining room. "     No pain currently.  She will be getting dental implant in June.  She is going to get a new psychiatrist in June, usually for a 2-year assignment.    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     She is in touch with her family and will see them this weekend.  She is very relieved to be able to see family members, and is more optimistic.  She is in contact with them with her cell phone..      She stated she weighed 220 last Friday, down  from 219# last session.     She participates fully, ventilated feelings appropriately.  She appears to derive benefit from the support.      Current Outpatient Medications   Medication     acetaminophen (TYLENOL) 500 MG tablet     alum & mag hydroxide-simethicone (MYLANTA/MAALOX) 200-200-20 MG/5ML SUSP suspension     amLODIPine (NORVASC) 10 MG tablet     apixaban ANTICOAGULANT (ELIQUIS) 5 MG tablet     artificial saliva (BIOTENE MT) AERS spray     aspirin 81 MG EC tablet     atorvastatin (LIPITOR) 40 MG tablet     blood glucose (NO BRAND SPECIFIED) lancets standard     blood glucose calibration (NO BRAND SPECIFIED) solution     blood glucose monitoring (NO BRAND SPECIFIED) meter device kit     blood glucose monitoring (NO BRAND SPECIFIED) test strip     Calcium Carbonate-Vitamin D (CALCIUM-VITAMIN D) 250-125 MG-UNIT TABS     calcium polycarbophil (FIBERCON) 625 MG tablet     CLARITIN 10 MG OR TABS     FLUoxetine (PROZAC) 20 MG capsule     fluticasone (FLONASE) 50 MCG/ACT nasal spray     furosemide (LASIX) 20 MG tablet     Gabapentin (NEURONTIN PO)     Hypromellose (ARTIFICIAL TEARS OP)     insulin glargine (LANTUS) 100 UNIT/ML injection     lactase (LACTAID) 3000 UNIT tablet     levothyroxine (SYNTHROID, LEVOTHROID) 175 MCG tablet     levothyroxine (SYNTHROID/LEVOTHROID) 175 MCG tablet     liraglutide (VICTOZA) 18 MG/3ML solution     LORazepam (ATIVAN) 0.5 MG tablet     losartan (COZAAR) 100  MG tablet     Magnesium Hydroxide (MILK OF MAGNESIA PO)     melatonin 3 MG CAPS     metFORMIN (GLUCOPHAGE) 1000 MG tablet     nystatin (MYCOSTATIN) 648900 UNIT/GM POWD     ONETOUCH DELICA LANCETS 33G MISC     polyethylene glycol (MIRALAX/GLYCOLAX) powder     Saline 0.9 % SOLN     senna-docusate (SENNA S) 8.6-50 MG per tablet     simethicone (MYLICON) 125 MG chewable tablet     Skin Protectants, Misc. (EUCERIN) cream     SM LUBRICANT EYE DROPS 0.4-0.3 % SOLN ophthalmic solution     solifenacin (VESICARE) 10 MG tablet     sulfamethoxazole-trimethoprim (BACTRIM DS) 800-160 MG tablet     ziprasidone (GEODON) 40 MG capsule     No current facility-administered medications for this visit.      Wt Readings from Last 4 Encounters:   05/11/21 102.1 kg (225 lb)   03/29/21 101 kg (222 lb 11.2 oz)   03/23/21 102.7 kg (226 lb 6.4 oz)   03/16/21 101.7 kg (224 lb 3.2 oz)   There is no height or weight on file to calculate BMI.      This telephone service is appropriate and effective for delivering services in light of the necessity for social distancing to mitigate the COVID.bmi  -19 epidemic and for conservation of PPE.     Patient has agreed to receiving telephone services after being informed about it: Yes    Patient prefers video invitation/information to be sent by:   email    Time service started: 1:03  Time service ended: 1:45      Mode of transmission: Doxy following instructions  by telephone so as to reduce chance of being overheard.    Location of originating:  [facility]    Distance site:  Home office of provider for [facility]     The patient has been notified that:  Video visits will be conducted via a call with their psychologist to provide the care they need with a video conversation. Video visits may be billed at different rates depending on insurance coverage.  Patients are advised to please contact their insurance provider with any questions about their health insurance coverage. If during the course of a call  the psychologist feels a video visit is not appropriate, patients will not be charged for this service.    Diagnosis:   Major Depression, recurrent mild (F33.0)   Psychological Factors Affecting Morbid Obesity (F54)  Generalized anxiety (F41.1)       Plan:  I will call her at [facility] 7/27 @ 2 for a combined video-telephone session at her request tfor supportive counseling due to the serious and persistent nature of her depression and anxiety, consistent with treatment plan.  Last treatment plan signed: 10/19/2020  Treatment plan due: 10/19/2021                    Preference for future meetings:           X   In-person, even if both wearing masks             Remote              Either            Damon Gr, PhD LP,  A.B.P.P.  Director, Health Psychology  [phone]

## 2021-07-13 ENCOUNTER — OFFICE VISIT (OUTPATIENT)
Dept: OPHTHALMOLOGY | Facility: CLINIC | Age: 71
End: 2021-07-13
Attending: OPHTHALMOLOGY
Payer: COMMERCIAL

## 2021-07-13 DIAGNOSIS — H25.813 MIXED TYPE AGE-RELATED CATARACT, BOTH EYES: Primary | ICD-10-CM

## 2021-07-13 DIAGNOSIS — E11.29 TYPE 2 DIABETES MELLITUS WITH MICROALBUMINURIA, WITH LONG-TERM CURRENT USE OF INSULIN (H): ICD-10-CM

## 2021-07-13 DIAGNOSIS — Z79.4 TYPE 2 DIABETES MELLITUS WITH MICROALBUMINURIA, WITH LONG-TERM CURRENT USE OF INSULIN (H): ICD-10-CM

## 2021-07-13 DIAGNOSIS — R80.9 TYPE 2 DIABETES MELLITUS WITH MICROALBUMINURIA, WITH LONG-TERM CURRENT USE OF INSULIN (H): ICD-10-CM

## 2021-07-13 DIAGNOSIS — H40.033 ANATOMICAL NARROW ANGLE BORDERLINE GLAUCOMA OF BOTH EYES: ICD-10-CM

## 2021-07-13 DIAGNOSIS — H04.123 DRY EYES: ICD-10-CM

## 2021-07-13 PROCEDURE — G0463 HOSPITAL OUTPT CLINIC VISIT: HCPCS

## 2021-07-13 PROCEDURE — 92015 DETERMINE REFRACTIVE STATE: CPT | Mod: GY

## 2021-07-13 PROCEDURE — 92014 COMPRE OPH EXAM EST PT 1/>: CPT | Performed by: OPHTHALMOLOGY

## 2021-07-13 RX ORDER — ERYTHROMYCIN 5 MG/G
0.5 OINTMENT OPHTHALMIC AT BEDTIME
Qty: 3.5 G | Refills: 11 | Status: SHIPPED | OUTPATIENT
Start: 2021-07-13 | End: 2023-09-19

## 2021-07-13 RX ORDER — LIFITEGRAST 50 MG/ML
1 SOLUTION/ DROPS OPHTHALMIC 2 TIMES DAILY
Qty: 90 EACH | Refills: 3 | Status: SHIPPED | OUTPATIENT
Start: 2021-07-13 | End: 2021-07-19

## 2021-07-13 ASSESSMENT — SLIT LAMP EXAM - LIDS
COMMENTS: NORMAL
COMMENTS: NORMAL

## 2021-07-13 ASSESSMENT — TONOMETRY
OS_IOP_MMHG: 17
OD_IOP_MMHG: 19
IOP_METHOD: TONOPEN

## 2021-07-13 ASSESSMENT — REFRACTION_MANIFEST
OS_SPHERE: +1.75
OS_ADD: +2.75
OD_CYLINDER: +0.25
OD_AXIS: 175
OS_AXIS: 055
OS_CYLINDER: +0.50
OD_ADD: +2.75
OD_SPHERE: +2.00

## 2021-07-13 ASSESSMENT — REFRACTION_WEARINGRX
SPECS_TYPE: PAL
OS_CYLINDER: +0.25
OS_SPHERE: +1.75
OD_AXIS: 061
OD_CYLINDER: +0.75
OS_ADD: +2.75
OS_AXIS: 065
OD_ADD: +2.75
OD_SPHERE: +2.00

## 2021-07-13 ASSESSMENT — CONF VISUAL FIELD
METHOD: COUNTING FINGERS
OD_NORMAL: 1
OS_NORMAL: 1

## 2021-07-13 ASSESSMENT — CUP TO DISC RATIO
OD_RATIO: 0.2
OS_RATIO: 0.2

## 2021-07-13 ASSESSMENT — EXTERNAL EXAM - LEFT EYE: OS_EXAM: NORMAL

## 2021-07-13 ASSESSMENT — VISUAL ACUITY
METHOD: SNELLEN - LINEAR
OS_CC: 20/25
OD_CC: 20/25
CORRECTION_TYPE: GLASSES

## 2021-07-13 ASSESSMENT — EXTERNAL EXAM - RIGHT EYE: OD_EXAM: NORMAL

## 2021-07-13 NOTE — NURSING NOTE
Chief Complaints and History of Present Illnesses   Patient presents with     Diabetic Eye Exam     1.5 year follow up both eyes.     Chief Complaint(s) and History of Present Illness(es)     Diabetic Eye Exam     Comments: 1.5 year follow up both eyes.              Comments     Pt states distant vision is not as clear as it used to be. No flashes or floaters.   No redness. Dryness in BE, relief with drops and ointment.  DM2 BS: 109 this morning per pt.  Lab Results       Component                Value               Date                       A1C                      7.8                 03/16/2021                 A1C                      6.6                 09/16/2020                 A1C                      6.6                 03/02/2020                 A1C                      6.6                 08/06/2019                 A1C                      6.8                 05/07/2019              PATRICA Fink July 13, 2021 1:11 PM

## 2021-07-13 NOTE — PROGRESS NOTES
Chief Complaint(s) and History of Present Illness(es)     Diabetic Eye Exam     Comments: 1.5 year follow up both eyes.              Comments     Pt states distant vision is not as clear as it used to be. No flashes or   floaters.   No redness. Dryness in BE, relief with drops and ointment.  DM2 BS: 109 this morning per pt.  Lab Results       Component                Value               Date                       A1C                      7.8                 03/16/2021                 A1C                      6.6                 09/16/2020                 A1C                      6.6                 03/02/2020                 A1C                      6.6                 08/06/2019                 A1C                      6.8                 05/07/2019              Fahad Banks, [facility] July 13, 2021 1:11 PM                Review of systems for the eyes was negative other than the pertinent positives/negatives listed in the HPI.      Assessment & Plan      Pinky Page is a 71 year old female with the following diagnoses:   1. Mixed type age-related cataract, both eyes    2. Anatomical narrow angle borderline glaucoma of both eyes    3. Dry eyes    4. Type 2 diabetes mellitus with microalbuminuria, with long-term current use of insulin (H)         No retinopathy  Stressed good glycemic and hypertensive control  Monitor yearly     Cataract left eye > right eye  Not visually significant at this time  Monitor    Discussed contributing factors to vision loss  Recommend lid hygiene and warm compresses  Continue artifical tears four times a day    Add xiidra twice a day both eyes   ESS marissa at bedtime as needed     Refractive options reviewed  Refraction given     Patient disposition:   Return in about 1 year (around 7/13/2022) for DFE.           Attending Physician Attestation:  Complete documentation of historical and exam elements from today's encounter can be found in the full encounter summary report (not  reduplicated in this progress note).  I personally obtained the chief complaint(s) and history of present illness.  I confirmed and edited as necessary the review of systems, past medical/surgical history, family history, social history, and examination findings as documented by others; and I examined the patient myself.  I personally reviewed the relevant tests, images, and reports as documented above.  I formulated and edited as necessary the assessment and plan and discussed the findings and management plan with the patient and family. . - Michael Lopez MD

## 2021-07-16 ENCOUNTER — OFFICE VISIT (OUTPATIENT)
Dept: FAMILY MEDICINE | Facility: CLINIC | Age: 71
End: 2021-07-16
Payer: COMMERCIAL

## 2021-07-16 VITALS
TEMPERATURE: 98 F | WEIGHT: 222.4 LBS | DIASTOLIC BLOOD PRESSURE: 85 MMHG | BODY MASS INDEX: 38.92 KG/M2 | OXYGEN SATURATION: 97 % | SYSTOLIC BLOOD PRESSURE: 143 MMHG | RESPIRATION RATE: 16 BRPM | HEART RATE: 78 BPM

## 2021-07-16 DIAGNOSIS — N39.0 URINARY TRACT INFECTION WITH HEMATURIA, SITE UNSPECIFIED: Primary | ICD-10-CM

## 2021-07-16 DIAGNOSIS — R31.9 URINARY TRACT INFECTION WITH HEMATURIA, SITE UNSPECIFIED: Primary | ICD-10-CM

## 2021-07-16 DIAGNOSIS — I48.0 PAROXYSMAL ATRIAL FIBRILLATION (H): ICD-10-CM

## 2021-07-16 PROCEDURE — 99213 OFFICE O/P EST LOW 20 MIN: CPT | Mod: GC | Performed by: STUDENT IN AN ORGANIZED HEALTH CARE EDUCATION/TRAINING PROGRAM

## 2021-07-16 ASSESSMENT — ANXIETY QUESTIONNAIRES
3. WORRYING TOO MUCH ABOUT DIFFERENT THINGS: SEVERAL DAYS
IF YOU CHECKED OFF ANY PROBLEMS ON THIS QUESTIONNAIRE, HOW DIFFICULT HAVE THESE PROBLEMS MADE IT FOR YOU TO DO YOUR WORK, TAKE CARE OF THINGS AT HOME, OR GET ALONG WITH OTHER PEOPLE: SOMEWHAT DIFFICULT
GAD7 TOTAL SCORE: 7
7. FEELING AFRAID AS IF SOMETHING AWFUL MIGHT HAPPEN: SEVERAL DAYS
1. FEELING NERVOUS, ANXIOUS, OR ON EDGE: SEVERAL DAYS
2. NOT BEING ABLE TO STOP OR CONTROL WORRYING: SEVERAL DAYS
5. BEING SO RESTLESS THAT IT IS HARD TO SIT STILL: SEVERAL DAYS
6. BECOMING EASILY ANNOYED OR IRRITABLE: SEVERAL DAYS

## 2021-07-16 ASSESSMENT — PATIENT HEALTH QUESTIONNAIRE - PHQ9
SUM OF ALL RESPONSES TO PHQ QUESTIONS 1-9: 9
5. POOR APPETITE OR OVEREATING: SEVERAL DAYS

## 2021-07-16 NOTE — PATIENT INSTRUCTIONS
Patient Education     It was great to see you today! Here is the plan from today's visit  1. Urinary tract infection with hematuria, site unspecified  Symptoms currently improving on Ceftin. Continue the medication until you've taken all of it. Try not using soap on the genital/vaginal area and instead just cleaning with warm water and allowing the area to dry completely. Also change your underwear liner 3-4 times a day and see if these things help decrease the frequency of your UTIs. If you have symptoms again we can do a vaginal exam and see if it makes sense to try a vaginal estrogen cream to help with dryness, which can cause UTIs. Call if you have any concerns.    2. Paroxysmal atrial fibrillation (H)  No new symptoms. Continue metoprolol daily and let me know you have any new issues.       Please call or return to clinic if your symptoms don't go away.    Follow up plan  No follow-ups on file.    Thank you for coming to [facility] today.  COVID-19 Vaccine:  If you are eligible for the COVID-19 vaccine, you can schedule via [facility] or call [facility] at [address] [place] [address]. If you need assistance with scheduling, please speak to a Care Coordinator or your provider.   Lab Testing:  **If you had lab testing today and your results are reassuring or normal they will be mailed to you or sent through [employer] within 7 days.   **If the lab tests need quick action we will call you with the results.  **If you are having labs done on a different day, please call [phone] to schedule at [facility] or [phone] for other [facility] locations.   The phone number we will call with results is # [phone] (home) . If this is not the best number please call our clinic and change the number.  Medication Refills:  If you need any refills please call your pharmacy and they will contact us.   If you need to  your refill at a new pharmacy, please contact the new pharmacy directly. The new  pharmacy will help you get your medications transferred faster.   Scheduling:  If you have any concerns about today's visit or wish to schedule another appointment please call our office during normal business hours [phone] (8-5:00 M-F)  If a referral was made to a [facility] and you don't get a call from central scheduling please call [phone].  If a Mammogram was ordered for you at [facility] call [phone] to schedule or change your appointment.  If you had an EKG/XRay/CT/Ultrasound/MRI ordered the number is [phone] to schedule or change your radiology appointment.   Medical Concerns:  If you have urgent medical concerns please call [phone] at any time of the day.    Tonia Goel MD

## 2021-07-16 NOTE — PROGRESS NOTES
Assessment & Plan     Urinary tract infection with hematuria, site unspecified  Seen in ED 1 week ago for UTI with hematuria. Having dysuria, irritation, and pain. All symptoms currently improving on Ceftin, 10 day course prescribed. Complete full medication course. This is the 4th UTI Pinky has had in the past 7 months. Considered prophylaxis however given her age this would not be ideal. Will trial more conservative measures for UTI prevention. Recommend discontinuation of washing vaginal area with any soap (currently uses Dove body soap), and rather using just warm water to clean. Allow area to fully dry before dressing. Pinky also uses underwear liners that she currently changes twice a day. Recommend increasing this to 3-4 times a day and see if this reduces frequency of UTIs. If she has another UTI in the next 6 months would recommend vaginal exam to check for atrophy and consider use of estrogen cream if present.     Paroxysmal atrial fibrillation (H)  No new symptoms. Saw cardiology who determined no need for rhythm control at this time. Continue metoprolol daily and return if having any new issues.       Follow up as needed     No follow-ups on file.    Tonia Goel MD  [facility] MICHAEL Vance is a 71 year old who presents for the following health issues:    HPI     Went to ED 1 week ago for UTI symptoms (dysuria, itching, pain) and received ceftin 500 mg BID for 10 days. No side effects of treatment. Symptoms are improving. Having UTIs December, March, June, and July. Uses Dove body soap and does wash in her vaginal/genital area with Dove. Wears underware liner that she changes twice a day. Consider stop using soap on genital area and increasing frequency of liner changes. If this does not improve frequency of UTIs consider vaginal exam next vist to check for vaginal driness and consider vaginal estrogen cream.     Implant clinic appointment on 7/23 for teeth implants.      Saw cardiology for afib, still on metoprolol but not on anything for rhythm control. No plans to follow up. No symptoms.     Review of Systems   Constitutional, HEENT, cardiovascular, pulmonary, gi and gu systems are negative, except as otherwise noted.      Objective    BP (!) 143/85   Pulse 78   Temp 98  F (36.7  C) (Oral)   Resp 16   Wt 100.9 kg (222 lb 6.4 oz)   LMP  (LMP Unknown)   SpO2 97%   BMI 38.92 kg/m    Body mass index is 38.92 kg/m .  Physical Exam  HENT:      Head: Normocephalic and atraumatic.      Comments: Multiple teeth have been pulled; well healed.     Mouth/Throat:      Mouth: Mucous membranes are moist.      Pharynx: No oropharyngeal exudate.   Eyes:      Extraocular Movements: Extraocular movements intact.      Conjunctiva/sclera: Conjunctivae normal.      Pupils: Pupils are equal, round, and reactive to light.   Cardiovascular:      Rate and Rhythm: Normal rate. Rhythm irregular.      Pulses: Normal pulses.      Heart sounds: No murmur heard.     Pulmonary:      Effort: Pulmonary effort is normal.      Breath sounds: Normal breath sounds.   Musculoskeletal:      Cervical back: Neck supple. No tenderness.   Skin:     General: Skin is warm and dry.   Neurological:      Mental Status: She is alert.   Psychiatric:         Mood and Affect: Mood normal.         Behavior: Behavior normal.         Thought Content: Thought content normal.         Judgment: Judgment normal.

## 2021-07-16 NOTE — PROGRESS NOTES
Preceptor Attestation:    I discussed the patient with the resident and evaluated the patient in person. I have verified the content of the note, which accurately reflects my assessment of the patient and the plan of care.   Supervising Physician:  Leo Castillo MD.

## 2021-07-17 ASSESSMENT — ANXIETY QUESTIONNAIRES: GAD7 TOTAL SCORE: 7

## 2021-07-19 DIAGNOSIS — H04.123 DRY EYES: ICD-10-CM

## 2021-07-19 RX ORDER — LIFITEGRAST 50 MG/ML
1 SOLUTION/ DROPS OPHTHALMIC 2 TIMES DAILY
Qty: 90 EACH | Refills: 3 | Status: SHIPPED | OUTPATIENT
Start: 2021-07-19 | End: 2021-12-22

## 2021-07-19 NOTE — PROGRESS NOTES
TELEPHONE NOTE    Called patient's long term care facility to clarify that Xiidra drop should be taken 1 drop in each eye, twice a day. Edited prescription to reflect this.     Juancarlos Ring MD  Ophthalmology, PGY-1

## 2021-07-19 NOTE — PROGRESS NOTES
[facility]  Psychiatry Clinic  TRANSFER of CARE DIAGNOSTIC ASSESSMENT     CARE TEAM: PCP- Cary Brownlee  (resident working at [facility] with Dr. Birmingham, attending)   Specialty Providers- no    Therapist- Dr. Gr PhD   [facility] Team- no    - staff at [facility].          Pinky Page is a 70 year old female who prefers the name Pinky & pronouns she, her, hers.      DIAGNOSIS   Schizoaffective disorder, depressed type     ASSESSMENT     TODAY Pinky is doing well with no symptoms of depression or psychosis. Collateral from residence staff is in agreement. She continues to have difficulty falling asleep, which has been chronic for her. She is able to sleep 8-9 house one she falls asleep. Today, she is open to tapering off lorazepam after discussing the risks and benefits. In particular, concern for fall risk in her age group when using lorazepam was discussed. If insomnia worsens with taper, could consider low dose trazodone or mirtazapine for sleep.  Given her mood stablity and lack of psychosis symptoms, no changes to ziprasidone or fluoxetine. Unclear if room cleaning is a compulsion, though staff express some concern over the amount of time spent cleaning. Will gather more information on this in future visits.     [place] was [place] today:  Indicates taking controlled medication as prescribed.     PLAN                                                                                                                1) Meds-  - Continue ziprasidone 40 mg BID  - Continue fluoxetine 40 mg daily  - Continue melatonin 6mg at bedtime  - DECREASE lorazepam 0.5 mg at bedtime to 0.375 at bedtime.  -- will change from tablets to solution         2) Psychotherapy- continue with Dr. Gr    3) Next due-  Labs- AP labs due 3/2022  EKG- Last EKG 3/2021 without prolonged QTc   Rating Scales- AIMS due at next in-person visit    4) Referrals-  None    5) Dispo- 4 weeks       PERTINENT  BACKGROUND                           [most recent eval 07/19/21]     Pinky first experienced mental health issues in her 20s or 30s and has received treatment for schizoaffective disorder and generalized anxiety. Notably, Pinky's symptoms of depression and psychosis have responded well to a combination of ECT and medication management. Pinky has a history of experiencing increased psychotic symptoms with past attempted antipsychotic tapers. No changes to antipsychotic or antidepressant since 2016. Only medication change in last 5 years has been lowering lorazepam from 1 to 0.5mg.     In her 30s, she had her gallbladder removed and then suffered first episode of severe depression. She was experiencing psychotic symptoms - paranoia, AH, and suspiciousness. She was hospitalized in [place] in 1986 where she received ECT.  She was hospitalized again in 1987, again received ECT. Was hospitalized for depression in 1995, around the same time she was diagnosed with breast cancer.   She was hospitalized for about nine months this time. She received ECT maintence from 0372-2761. Patient was stable taking Seroquel 400mg, Prozac 40mg, and Ativan 1mg for many years. Seroquel was cross-tapered to ziprasidone due to metabolic side effects in 2016.     She has been seen at our clinic since 2013.       Medical history  Notable for afib, DMT2, HTN, ANUJ, hypothyroid, breast cancer s/p mastectomy     Psych pertinent item history includes psychosis [sxs include disorganization, hallucinations, paranoia], mutiple psychotropic trials, psych hosp (3-5) and ECT.     SUBJECTIVE     - Sleep: having trouble sleeping. CPAP machine isn't working. Has  appt on August 12 to get CPAP fixed. But she had trouble sleeping even when CPAP was working. Can take 1+ hour to fall asleep.   Goes to bed at 11pm. Woken up at 6am by nursing staff. Then sleeps again until 8am. Sometimes naps during the day. Usually feels rested    - Walks and rides stationary bike  "30min every day.     - Recent UTI- treated with antibiotics - Ceftin - completed yesterday.     - mood \"jumps around\"- sometimes good sometimes bad, but doesn't get too bad for long periods.     - keeps busy with coloring, word searches, reading, writing letters to sisters.    - right hand tremor, mildly bothersome. Not new- has mentioned it to PCP and Dr. Shi.     - Note from residence notes stable MH, sage is doing well. Some anxiety, \"compulsion\" noted. Sage says she spends 2-3 hours cleaning her room a day.  She says she \"doesn't know\" why she spends so long cleaning. If she didn't clean so much, thinks \"Probably nothing\" would happen. States, \"I like it to be organized.\"     - open to stopping Ativan. Has been on it for 5-10 years.     - Sage brought a detailed medication list from her nursing home, which was reconciled with Epic today.     RECENT PSYCH ROS:   Depression:  minimal  Elevated:  none  Psychosis:  none  Anxiety:  none  Trauma Related:  none  Sleep: yes, see HPI  Other: N/A    Adverse Effects: right hand tremor- resting tremor. No chest pain. No shortness of breath. No muscle stiffness or involuntary movements.    Pertinent Negative Symptoms: No suicidal ideation, violent ideation, hallucinations or delusions  Recent Substance Use:     None reported     FAMILY and SOCIAL HISTORY                                 pt reported     Family Hx: none known.     Social Hx:  Financial/ Work- social security disability.   Partner/ - never  Children- None      Living situation- [facility]  Since 1998.  Social/ Spiritual Support- sisters (4), staff at [facility]    Feels Safe at Home- yes   Legal- N/A     Trauma History (self-report)- None  Early History/Education- Born into family of 8 children. 3 siblings passed away. Born in [place], [place].   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 Worked until got sick in her 30s.       PSYCHIATRIC HISTORY     SIB- " None  Suicide Attempt [#, most recent]- None  Suicidal Ideation Hx- None    Violence/Aggression Hx- None  Psychosis Hx- paranoia, VH  Eating Disorder Hx- yes, in 30s-40s- stopped eating- possibly secondary to depression. No diagnosis of anorexia       Psych Hosp [#, most recent]- 5-6 times, 10+ years since last hospitalization.   Commitment- None  ECT- yes, 1986, 1987, 1996- 2005. (last 2005). Was very helpful.  Outpatient Programs - IOP (1987), eating disorder tx, 1987  Other - N/A     PAST MED TRIALS   sertaline   amitriptyline   lithium   risperidone   olanzapine   trazodone   clonazepam   quetiapine 400mg (weight gain/metabolic SEs)  Lorazepam   triazolam  clonazepam.       SUBSTANCE USE HISTORY     Past Use- none reported  Treatment- #, most recent- N/A  Medical Consequences- N/A  Legal Consequences- N/A  Other- N/A     MEDICAL HISTORY and ALLERGY     ALLERGIES: Chlordiazepoxide hcl, Dimetapp dm cold-cough, Haldol, Ibuprofen, Lactose intolerance [beta-galactosidase], Milk products, and Propofol    Patient Active Problem List   Diagnosis     Allergic rhinitis due to pollen     Urge incontinence     Hypertension, essential     Cardiomegaly     Chronic constipation     Dry eye syndrome     Esophageal reflux     Exposure keratoconjunctivitis     DM ophthalmopathy (H)     Hypothyroidism     Senile cataract     ANUJ (obstructive sleep apnea)     Vaginal atrophy     Restless leg syndrome     Squamous blepharitis     Morbid obesity due to excess calories (H)     Personal history of breast cancer s/p L masectomy     Hypercholesteremia     Lives in group home     Extrapyramidal and movement disorder     CKD (chronic kidney disease) stage 2, GFR 60-89 ml/min     Solitary kidney, congenital     S/P hysterectomy     Impingement syndrome of right shoulder     Hypertriglyceridemia     Candidiasis of skin     Generalized anxiety disorder     Carpal tunnel syndrome of left wrist     Schizoaffective disorder, depressive type (H)      Major depressive disorder, recurrent episode, moderate (H)     Psychological factors affecting medical condition     Hx of psychiatric care     Nail complaint     Microalbuminuria due to type 2 diabetes mellitus (H)     Type 2 diabetes mellitus with microalbuminuria, with long-term current use of insulin (H)     Conjunctivitis of both eyes     Corneal erosion of both eyes     Spastic entropion, right     Foot callus     Paroxysmal atrial fibrillation (H)     Past Medical History:   Diagnosis Date     Allergic rhinitis due to pollen     seasonal allergies      Anisometropia and aniseikonia      BMI greater than 40      Cardiomegaly      Chronic constipation      Congenital absence of one kidney      Cramp of limb      Dermatophytosis of the body      Dry eye syndrome      Esophageal reflux      Essential hypertension, benign      Gastro-oesophageal reflux disease      Hemorrhoids      Hypermetropia      Hypothyroidism      Incomplete Emptying of Bladder      Lactose intolerance      Major depressive disorder, recurrent episode, moderate (H)      Malignant neoplasm of breast (female), unspecified site     left mastectomy 1996      Mixed incontinence urge and stress (male)(female)      Moderate obstructive sleep apnea      Postmenopausal Atrophic Vaginitis      Presbyopia      Regular astigmatism      Restless leg syndrome      Senile nuclear sclerosis      Thyroid eye disease     mild     Tinnitus      Trigger finger (acquired)     right hand     Type II or unspecified type diabetes mellitus without mention of complication, not stated as uncontrolled     on insulin since April 2006         MEDICAL REVIEW OF SYSTEMS   Contraception- not needed    A comprehensive review of systems was performed and is negative other than noted in the HPI.     MEDICATIONS     Current Outpatient Medications   Medication Sig Dispense Refill     acetaminophen (TYLENOL) 500 MG tablet Take 2 tablets (1,000 mg) by mouth every 6 hours as  needed 1 Bottle 2     alum & mag hydroxide-simethicone (MYLANTA/MAALOX) 200-200-20 MG/5ML SUSP suspension Take 30 mLs by mouth daily as needed for indigestion       amLODIPine (NORVASC) 10 MG tablet Take 1 tablet (10 mg) by mouth daily 90 tablet 0     apixaban ANTICOAGULANT (ELIQUIS) 5 MG tablet Take 1 tablet (5 mg) by mouth 2 times daily 180 tablet 0     artificial saliva (BIOTENE MT) AERS spray Take 1 spray by mouth 3 times daily as needed for dry mouth       aspirin 81 MG EC tablet Take 81 mg by mouth daily       atorvastatin (LIPITOR) 40 MG tablet Take 1 tablet (40 mg) by mouth daily 90 tablet 1     blood glucose (NO BRAND SPECIFIED) lancets standard Use to test blood sugar 2 times daily or as directed. 100 each 11     blood glucose calibration (NO BRAND SPECIFIED) solution Use to calibrate blood glucose monitor as directed. 1 each 3     blood glucose monitoring (NO BRAND SPECIFIED) meter device kit Check Blood sugars twice a day. 1 kit 0     blood glucose monitoring (NO BRAND SPECIFIED) test strip Use to test blood sugar 3 times daily or as directed. 100 each 3     Calcium Carbonate-Vitamin D (CALCIUM-VITAMIN D) 250-125 MG-UNIT TABS Take 1 tablet by mouth 2 times daily. Calcium 250 mg/Vit D 125 IU       calcium polycarbophil (FIBERCON) 625 MG tablet Take 2 tablets by mouth daily       CLARITIN 10 MG OR TABS 1 TAB PO QD (Once per day) as needed for ALLERGY SYMPTOMS 30 11     erythromycin (ROMYCIN) 5 MG/GM ophthalmic ointment Place 0.5 inches into both eyes At Bedtime 3.5 g 11     FLUoxetine (PROZAC) 20 MG capsule Take 2 capsules (40 mg) by mouth daily 60 capsule 2     fluticasone (FLONASE) 50 MCG/ACT nasal spray Spray 1 spray into both nostrils daily 16 g 3     furosemide (LASIX) 20 MG tablet Take 1 tablet (20 mg) by mouth 2 times daily 60 tablet 3     Gabapentin (NEURONTIN PO) Take 900 mg by mouth 3 times daily.       Hypromellose (ARTIFICIAL TEARS OP) Apply 1 drop to eye 4 times daily.       insulin glargine  (LANTUS) 100 UNIT/ML injection Inject 20 Units Subcutaneous At Bedtime 8 mL 11     lactase (LACTAID) 3000 UNIT tablet Take 1 tablet (3,000 Units) by mouth 3 times daily (with meals) 90 tablet 11     levothyroxine (SYNTHROID, LEVOTHROID) 175 MCG tablet Take 1 tablet (175 mcg) by mouth daily Give on empty stomach 30 tablet 4     levothyroxine (SYNTHROID/LEVOTHROID) 175 MCG tablet Take 1 tablet (175 mcg) by mouth daily 90 tablet 3     lifitegrast (XIIDRA) 5 % opthalmic solution Place 1 drop into both eyes 2 times daily 90 each 3     liraglutide (VICTOZA) 18 MG/3ML solution Inject 1.8 mg Subcutaneous daily 9 mL 0     LORazepam (ATIVAN) 0.5 MG tablet Take 1 tablet (0.5 mg) by mouth At Bedtime 30 tablet 2     losartan (COZAAR) 100 MG tablet Take 0.5 tablets (50 mg) by mouth 2 times daily 120 tablet 2     Magnesium Hydroxide (MILK OF MAGNESIA PO) Take  by mouth. Take 30 mL as needed for constipation.       melatonin 3 MG CAPS Take 6 mg by mouth At Bedtime 60 capsule 2     metFORMIN (GLUCOPHAGE) 1000 MG tablet Take 1,000 mg by mouth 2 times daily (with meals)       nystatin (MYCOSTATIN) 221985 UNIT/GM POWD Apply topically 3 times daily as needed 60 g 1     ONETOUCH DELICA LANCETS 33G MISC        polyethylene glycol (MIRALAX/GLYCOLAX) powder Take 1 capful by mouth 2 times daily. 17 GM PO BID       Saline 0.9 % SOLN Spray 2 sprays in nostril as needed.       senna-docusate (SENNA S) 8.6-50 MG per tablet Take 2 tablets by mouth At Bedtime.       simethicone (MYLICON) 125 MG chewable tablet Take 1 tablet (125 mg) by mouth 2 times daily 60 tablet 0     Skin Protectants, Misc. (EUCERIN) cream Apply  topically as needed. Apply to thigh PRN dry skin        SM LUBRICANT EYE DROPS 0.4-0.3 % SOLN ophthalmic solution        solifenacin (VESICARE) 10 MG tablet Take 1 tablet (10 mg) by mouth daily 30 tablet 6     sulfamethoxazole-trimethoprim (BACTRIM DS) 800-160 MG tablet Take 1 tablet by mouth 2 times daily 14 tablet 0     ziprasidone  (GEODON) 40 MG capsule Take 1 capsule (40 mg) by mouth 2 times daily (with meals) 60 capsule 2      VITALS   LMP  (LMP Unknown)     MENTAL STATUS EXAM     Alertness: alert   Appearance: well groomed  Behavior/Demeanor: cooperative, with good  eye contact, humming and singing.   Speech: slowed,  Language: no problems  Psychomotor: tremor  Mood: description consistent with euthymia  Affect: full range and indifferent; congruent to: mood- yes, content- yes  Thought Process/Associations: unremarkable  Thought Content:  Reports none;  Denies suicidal & violent ideation and delusions  Perception:  Reports none;  Denies hallucinations  Insight: adequate  Judgment: adequate for safety  Cognition: does  appear grossly intact; formal cognitive testing was not done  Gait and Station: remarkable for:  slowed, small steps , pivot turn     LABS and DATA     PHQ9 TODAY = 7  PHQ 3/2/2020 8/27/2020 7/16/2021   PHQ-9 Total Score 7 5 9   Q9: Thoughts of better off dead/self-harm past 2 weeks Not at all Not at all Not at all   F/U: Thoughts of suicide or self-harm - - -   F/U: Safety concerns - - -       Recent Labs   Lab Test 03/16/21  1537 09/16/20  0000 08/06/19  0941 05/07/19  0908   CR 0.8 0.94 0.7 0.83   GFRESTIMATED 70.7 61  --  72     Recent Labs   Lab Test 03/16/21  1537 06/26/18  0659 06/26/18  0000   AST 7.9 16 16   ALT 12.8 21 21   ALKPHOS 78.9 111 111       EKG 3/2021 - showing atrial fibrillation. QtC 392.      PSYCHOTROPIC DRUG INTERACTIONS     ZIPRASIDONE and QT INTERVAL PROLONGING DRUGS may result in increased risk of QT-interval prolongation.  ZIPRASIDONE and SEROTONERGIC DRUGS THAT PROLONG QT INTERVAL may result in increased risk of QT-interval prolongation and increased risk of serotonin syndrome (hypertension, hyperthermia, myoclonus, mental status changes).  NSAID and SSRI may result in an increased risk of bleeding  ERYTHROMYCIN and FLUOXETINE may result in an increased risk of cardiotoxicity (QT prolongation,  torsades de pointes, cardiac arrest).  FLUOXETINE and INSULINS OR PRAMLINTIDE may result in increased risk of hypoglycemia.  FLUOXETINE and QT INTERVAL PROLONGING DRUGS may result in increased risk of QT-interval prolongation.   GABAPENTIN and CNS DEPRESSANTS may result in respiratory depression.   GABAPENTIN and ANTACIDS may result in decreased gabapentin effectiveness.      MANAGEMENT:  Monitoring for adverse effects, periodic EKGs and patient is aware of risks     RISK STATEMENT for SAFETY     Pinky Page did not appear to be an imminent safety risk to self or others.    TREATMENT RISK STATEMENT: The risks, benefits, alternatives and potential adverse effects have been discussed and are understood by the pt. The pt understands the risks of using street drugs or alcohol. There are no medical contraindications, the pt agrees to treatment with the ability to do so. The pt knows to call the clinic for any problems or to access emergency care if needed.  Medical and substance use concerns are documented above.  Psychotropic drug interaction check was done, including changes made today.    PROVIDER: Denise Dewey MD    Patient staffed in clinic with Dr. Martin who will sign the note.  Supervisor is Dr. Gordon.      TELEHEALTH ATTENDING ATTESTATION  Following the ACGME guidelines on telemedicine and direct supervision due to COVID-19, I was concurrently participating in and/or monitoring the patient care through appropriate telecommunication technology.  I discussed the key portions of the service with the resident, including the mental status examination and developing the plan of care. I reviewed key portions of the history with the resident. I agree with the findings and plan as documented in this note.   Galo Martin MD

## 2021-07-20 ENCOUNTER — OFFICE VISIT (OUTPATIENT)
Dept: PSYCHIATRY | Facility: CLINIC | Age: 71
End: 2021-07-20
Attending: PSYCHIATRY & NEUROLOGY
Payer: COMMERCIAL

## 2021-07-20 VITALS
BODY MASS INDEX: 38.78 KG/M2 | SYSTOLIC BLOOD PRESSURE: 158 MMHG | DIASTOLIC BLOOD PRESSURE: 79 MMHG | WEIGHT: 221.6 LBS | HEART RATE: 91 BPM

## 2021-07-20 DIAGNOSIS — F25.1 SCHIZOAFFECTIVE DISORDER, DEPRESSIVE TYPE (H): Primary | ICD-10-CM

## 2021-07-20 DIAGNOSIS — G47.09 OTHER INSOMNIA: ICD-10-CM

## 2021-07-20 PROCEDURE — 90792 PSYCH DIAG EVAL W/MED SRVCS: CPT | Mod: GC | Performed by: STUDENT IN AN ORGANIZED HEALTH CARE EDUCATION/TRAINING PROGRAM

## 2021-07-20 PROCEDURE — G0463 HOSPITAL OUTPT CLINIC VISIT: HCPCS

## 2021-07-20 RX ORDER — ZIPRASIDONE HYDROCHLORIDE 40 MG/1
40 CAPSULE ORAL 2 TIMES DAILY WITH MEALS
Qty: 60 CAPSULE | Refills: 2 | Status: SHIPPED | OUTPATIENT
Start: 2021-07-20 | End: 2021-10-29

## 2021-07-20 RX ORDER — LORAZEPAM 0.5 MG/1
0.38 TABLET ORAL AT BEDTIME
Qty: 23 TABLET | Refills: 1 | Status: CANCELLED | OUTPATIENT
Start: 2021-07-20

## 2021-07-20 ASSESSMENT — PAIN SCALES - GENERAL: PAINLEVEL: NO PAIN (0)

## 2021-07-20 NOTE — PATIENT INSTRUCTIONS
Pinky,    Today we made the following changes:    - DECREASE LORAZEPAM (ATIVAN) from 0.5mg to 0.375mg at bedtime     - Continue ziprasidone 40mg BID   - continue fluoxetine 40mg daily  - continue melatonin 6mg           **For crisis resources, please see the information at the end of this document**     Patient Education      Thank you for coming to the [facility] [place] [facility].    Lab Testing:  If you had lab testing today and your results are reassuring or normal they will be mailed to you or sent through [employer] within 7 days. If the lab tests need quick action we will call you with the results. The phone number we will call with results is # [phone] (home) . If this is not the best number please call our clinic and change the number.    Medication Refills:  If you need any refills please call your pharmacy and they will contact us. Our fax number for refills is [phone]. Please allow three business for refill processing. If you need to  your refill at a new pharmacy, please contact the new pharmacy directly. The new pharmacy will help you get your medications transferred.     Scheduling:  If you have any concerns about today's visit or wish to schedule another appointment please call our office during normal business hours [phone] (8-5:00 M-F)    Contact Us:  Please call [phone] during business hours (8-5:00 M-F).  If after clinic hours, or on the weekend, please call  [phone].    Financial Assistance [phone]  [facility] Billing [phone]  Central Billing Office, ealth: [phone]  [facility] Billing [phone]  Medical Records [phone]  [facility] Patient Bill of Rights [email or web address]       MENTAL HEALTH CRISIS NUMBERS:  For a medical emergency please call  911 or go to the nearest ER.     [facility]:   [facility] -[phone]   Crisis  Residence [facility] -[phone]   Walk-In Counseling Center [facility] -[phone]   COPE 24/7 [facility] Mobile Team -[phone] (adults)/[phone] (child)  CHILD: [facility] needs assessment team - [phone]      Psychiatric:   [facility] - [phone]   Walk-in counseling [facility] - [phone]   Walk-in counseling [facility] - [phone]   Crisis Residence [facility] - [phone]  Urgent Care Adult Mental Cfqffv-[phone] mobile unit/ 24/7 crisis line    National Crisis Numbers:   National Suicide Prevention Lifeline: 6-798-226-TALK (347-228-9587)  Poison Control Center - 1-715.545.7677  [employer]/resources for a list of additional resources (SOS)  Trans Lifeline a hotline for transgender people 1-562.659.8742  The Bo Project a hotline for LGBT youth 1-377-454-5316  Crisis Text Line: For any crisis 24/7   To: 090783  see www.crisistextline.org  - IF MAKING A CALL FEELS TOO HARD, send a text!         Again thank you for choosing [facility] ADDI[facility] and please let us know how we can best partner with you to improve you and your family's health.    You may be receiving a survey regarding this appointment. We would love to have your feedback, both positive and negative. The survey is done by an external company, so your answers are anonymous.

## 2021-07-21 DIAGNOSIS — G47.09 OTHER INSOMNIA: Primary | ICD-10-CM

## 2021-07-21 ASSESSMENT — PATIENT HEALTH QUESTIONNAIRE - PHQ9: SUM OF ALL RESPONSES TO PHQ QUESTIONS 1-9: 7

## 2021-07-21 NOTE — LETTER
2021      RE: Pinky Page  [address] [facility] [address]  : 1950         To Whom it May Concern:    Please make the following medication changes for Pinky Page:    1. Stop lorazepam (Ativan) 0.5 mg tablet: take 1 tablet (0.5 mg) by mouth at bedtime    2. Start lorazepam (Ativan) 2 mg/mL solution: Take 0.188 mLs (0.375 mg) by mouth at bedtime    If you have questions regarding these orders, please call the clinic at the number listed above.       Sincerely,    *ELECTRONICALLY SIGNED BY ISRA LANDAVERDE MD*    Isra Landaverde MD

## 2021-07-21 NOTE — LETTER
2021      RE: Pinky Page  [address] [facility] [address]  : 1950         To Whom it May Concern:    Please make the following medication changes for Pinky Page:    1. Stop lorazepam (Ativan) 0.5 mg tablet: take 1 tablet (0.5 mg) by mouth at bedtime    2. Start lorazepam (Ativan) 2 mg/mL solution: Take 0.188 mLs (0.375 mg) by mouth at bedtime    If you have questions regarding these orders, please call the clinic at the number listed above.       Sincerely,    *ELECTRONICALLY SIGNED BY ISRA LANDAVERDE MD*    Isra Landaverde MD

## 2021-07-21 NOTE — TELEPHONE ENCOUNTER
Took a call from Spencer, nurse at [facility]. He is calling regarding the dose change for lorazepam, from 0.5 mg at bedtime to 0.375 mg.     He called their pharmacy, [facility], who told him that they cannot fill the new Rx as tablets but can use a solution for the new dose. Spencer would like to know if it is possible to send in a prescription for lorazepam solution instead. He would like a copy of the updated order faxed to the residence.    Phone: [phone]    Fax: [phone]    Called the pharmacy and spoke with Alexandro. He said that the new dose could be filled with three fourths of 0.5 mg tabs, but with the tablet size being already very small, there would likely be variability in the dose that patient receives. It would be better to use the solution as it would be possible to be more precise.     *Of note, the bottle comes in 30 ml, which is a 160 day supply. However, the medication is only good for 60 days once the bottle has been opened per  recommendations. He said the nursing home has a stricter rule, which is liquids are only good for 30 days. The Rx can be sent for the whole bottle, and they will fill it according to what insurance will cover.     Pended Rx and routed to Dr. Dewey for review and signature.

## 2021-07-22 RX ORDER — LORAZEPAM 2 MG/ML
0.38 CONCENTRATE ORAL AT BEDTIME
Qty: 30 ML | Refills: 0 | Status: SHIPPED | OUTPATIENT
Start: 2021-07-22 | End: 2021-08-30

## 2021-07-23 ENCOUNTER — DOCUMENTATION ONLY (OUTPATIENT)
Dept: OTHER | Facility: CLINIC | Age: 71
End: 2021-07-23

## 2021-07-23 NOTE — TELEPHONE ENCOUNTER
Dr. Martin signed the Rx for the lorazepam liquid solution.    Writer faxed this order to Spencer with [facility] at [phone] per his request.     Med tab updated.

## 2021-07-26 ENCOUNTER — TRANSFERRED RECORDS (OUTPATIENT)
Dept: HEALTH INFORMATION MANAGEMENT | Facility: CLINIC | Age: 71
End: 2021-07-26

## 2021-07-27 ENCOUNTER — TELEPHONE (OUTPATIENT)
Dept: FAMILY MEDICINE | Facility: CLINIC | Age: 71
End: 2021-07-27

## 2021-07-27 ENCOUNTER — VIRTUAL VISIT (OUTPATIENT)
Dept: PSYCHOLOGY | Facility: CLINIC | Age: 71
End: 2021-07-27
Payer: COMMERCIAL

## 2021-07-27 DIAGNOSIS — F41.1 GENERALIZED ANXIETY DISORDER: ICD-10-CM

## 2021-07-27 DIAGNOSIS — F33.0 MAJOR DEPRESSIVE DISORDER, RECURRENT EPISODE, MILD (H): Primary | ICD-10-CM

## 2021-07-27 DIAGNOSIS — N39.0 URINARY TRACT INFECTION WITH HEMATURIA, SITE UNSPECIFIED: Primary | ICD-10-CM

## 2021-07-27 DIAGNOSIS — R31.9 URINARY TRACT INFECTION WITH HEMATURIA, SITE UNSPECIFIED: Primary | ICD-10-CM

## 2021-07-27 DIAGNOSIS — F54 PSYCHOLOGICAL FACTORS AFFECTING MEDICAL CONDITION: ICD-10-CM

## 2021-07-27 DIAGNOSIS — N39.0 URINARY TRACT INFECTION WITHOUT HEMATURIA, SITE UNSPECIFIED: ICD-10-CM

## 2021-07-27 DIAGNOSIS — E66.9 OBESITY, UNSPECIFIED OBESITY SEVERITY, UNSPECIFIED OBESITY TYPE: ICD-10-CM

## 2021-07-27 PROCEDURE — 90837 PSYTX W PT 60 MINUTES: CPT | Mod: 95 | Performed by: PSYCHOLOGIST

## 2021-07-27 RX ORDER — SULFAMETHOXAZOLE/TRIMETHOPRIM 800-160 MG
1 TABLET ORAL 2 TIMES DAILY
Qty: 14 TABLET | Refills: 0 | Status: SHIPPED | OUTPATIENT
Start: 2021-07-27 | End: 2021-12-27

## 2021-07-27 NOTE — TELEPHONE ENCOUNTER
RN attempted to reach DARLENE Macdonald with call back number. Please transfer to any available RN when she calls back.     Unclear who ordered the UA for patient- that provider should be ordering antibiotics if appropriate. Will route to PCP in meantime and update when we hear back from staff.   Rosario lKein, RN

## 2021-07-27 NOTE — TELEPHONE ENCOUNTER
RN received faxed lab results from Doron Lopez RN at [facility] [phone]- attempted to call him but had to leave a message.     Per [facility] staff, they had standing order for suspected UTI and so they ordered it for patient as she was symptomatic. Requesting PCP place order for antibiotic to [facility].   Rosario Klein RN

## 2021-07-27 NOTE — PROGRESS NOTES
"    Health Psychology                     Department of Medicine  Keri Arnett, Ph.D., L.P. [phone]                          [facility] Madelaine Ingram, Ph.D., L.P. [phone]                   [place] Mail Code 741   Dennis Dillard, Ph.D., L.P.  [phone]       [address] [place] [address] Rae Eugene, Ph.D., L.P. [phone]       [place] [address]            Damon Gr, Ph.D., A.B.P.EWA., L.P. [phone]     Kimber Morales, Ph.D., L.P. [phone]   [address]     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 Today, with the help of her , we used the video of her cell phone.  I was able to send her an email.   This is her third video session.  Intake with Health Psychology was in the 1980s and I began to see her in the early 1990s.    She was diagnosed with a UTI yesterday and is awaiting medication. She said this is her third in a few months.     Mood is\"pretty good.\".  She is a 3-4 on 10-point scale of depression.    Exercise:  She is biking 7x/week for 30 minutes;  She is also walking 20-25 minutes indoor or outdoor, or both.  We discussed a total of 1.5 hours per day  If possible.  They now allow two vaccinated people to eat meals together in the dining room, so she eats with her roommate.    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     She is in touch with her family, and that is going well, though she did not see them July 4.     She stated she weighed 219 last Friday, down from 220# stated last session. She is reinforced for her efforts.     She participates fully, ventilated feelings appropriately.  She appears to derive benefit from the support.      Current Outpatient Medications "   Medication     acetaminophen (TYLENOL) 500 MG tablet     alum & mag hydroxide-simethicone (MYLANTA/MAALOX) 200-200-20 MG/5ML SUSP suspension     amLODIPine (NORVASC) 10 MG tablet     apixaban ANTICOAGULANT (ELIQUIS) 5 MG tablet     artificial saliva (BIOTENE MT) AERS spray     aspirin 81 MG EC tablet     atorvastatin (LIPITOR) 40 MG tablet     blood glucose (NO BRAND SPECIFIED) lancets standard     blood glucose calibration (NO BRAND SPECIFIED) solution     blood glucose monitoring (NO BRAND SPECIFIED) meter device kit     blood glucose monitoring (NO BRAND SPECIFIED) test strip     Calcium Carbonate-Vitamin D (CALCIUM-VITAMIN D) 250-125 MG-UNIT TABS     calcium polycarbophil (FIBERCON) 625 MG tablet     CLARITIN 10 MG OR TABS     erythromycin (ROMYCIN) 5 MG/GM ophthalmic ointment     FLUoxetine (PROZAC) 20 MG capsule     fluticasone (FLONASE) 50 MCG/ACT nasal spray     furosemide (LASIX) 20 MG tablet     Gabapentin (NEURONTIN PO)     Hypromellose (ARTIFICIAL TEARS OP)     insulin glargine (LANTUS) 100 UNIT/ML injection     lactase (LACTAID) 3000 UNIT tablet     levothyroxine (SYNTHROID, LEVOTHROID) 175 MCG tablet     levothyroxine (SYNTHROID/LEVOTHROID) 175 MCG tablet     lifitegrast (XIIDRA) 5 % opthalmic solution     liraglutide (VICTOZA) 18 MG/3ML solution     LORazepam (ATIVAN) 2 MG/ML (HIGH CONC) solution     losartan (COZAAR) 100 MG tablet     Magnesium Hydroxide (MILK OF MAGNESIA PO)     melatonin 3 MG CAPS     metFORMIN (GLUCOPHAGE) 1000 MG tablet     nystatin (MYCOSTATIN) 077163 UNIT/GM POWD     ONETOUCH DELICA LANCETS 33G MISC     polyethylene glycol (MIRALAX/GLYCOLAX) powder     Saline 0.9 % SOLN     senna-docusate (SENNA S) 8.6-50 MG per tablet     simethicone (MYLICON) 125 MG chewable tablet     Skin Protectants, Misc. (EUCERIN) cream     SM LUBRICANT EYE DROPS 0.4-0.3 % SOLN ophthalmic solution     solifenacin (VESICARE) 10 MG tablet     ziprasidone (GEODON) 40 MG capsule     No current  facility-administered medications for this visit.     Wt Readings from Last 4 Encounters:   07/20/21 100.5 kg (221 lb 9.6 oz)   07/16/21 100.9 kg (222 lb 6.4 oz)   05/11/21 102.1 kg (225 lb)   03/29/21 101 kg (222 lb 11.2 oz)   There is no height or weight on file to calculate BMI.      This telephone service is appropriate and effective for delivering services in light of the necessity for social distancing to mitigate the COVID.bmi  -19 epidemic and for conservation of PPE.     Patient has agreed to receiving telephone services after being informed about it: Yes    Patient prefers video invitation/information to be sent by:   email    Time service started: 2:02  Time service ended:  2:55      Mode of transmission: Doxy following instructions  by telephone so as to reduce chance of being overheard.    Location of originating:  [facility]    Distance site:  Home office of provider for [facility]     The patient has been notified that:  Video visits will be conducted via a call with their psychologist to provide the care they need with a video conversation. Video visits may be billed at different rates depending on insurance coverage.  Patients are advised to please contact their insurance provider with any questions about their health insurance coverage. If during the course of a call the psychologist feels a video visit is not appropriate, patients will not be charged for this service.    Diagnosis:   Major Depression, recurrent mild (F33.0)   Psychological Factors Affecting Morbid Obesity (F54)  Generalized anxiety (F41.1)       Plan:  I will call her at [facility] for a combined video-telephone session at her request tfor supportive counseling due to the serious and persistent nature of her depression and anxiety, consistent with treatment plan.  Last treatment plan signed: 10/19/2020  Treatment plan due: 10/19/2021                    Preference for future meetings:           X   In-person, even if  both wearing masks             Remote              Either            Damon Gr, PhD LP,  A.B.P.P.  Director, Health Psychology  [phone]

## 2021-07-27 NOTE — TELEPHONE ENCOUNTER
RN attempted to reach [facility] to contact clinic. Please transfer to any available RN when they call back.       Rn spoke directly with Dr. Goel who states she will review results and prescribe antibiotic however, she wants patient to come in for a follow up appointment as she has had multiple UTIs recently and may need prophylactic treatment. RN attempted to reach [facility] to relay but had to leave message.   Rosario Klein RN

## 2021-07-27 NOTE — TELEPHONE ENCOUNTER
[facility] phone call message-patient reporting a symptom:     Symptom: UTI    When did symptoms begin? One week ago    Characteristics: (location on body, intensity, what makes it better or worse, associated symptoms):      Additional Details: Renae calling from [facility] stating patient reported hesitancy, urgency, burning, and itching when urinating. Renae stated that a bacteria called Klebsiella Pneumonia was found. Renae would like a medication prescribed for this bacteria. Please advise.     Same Day Visit Offered: Yes, declined    Additional comments:     OK to leave message on voice mail? Yes    Advised patient that RN would call back within 3 hours, unless emergent   Primary language: English      needed? No    Call taken on July 27, 2021 at 10:03 AM by Marry Howell

## 2021-07-28 ENCOUNTER — TELEPHONE (OUTPATIENT)
Dept: FAMILY MEDICINE | Facility: CLINIC | Age: 71
End: 2021-07-28

## 2021-07-28 RX ORDER — CEFDINIR 300 MG/1
300 CAPSULE ORAL 2 TIMES DAILY
Qty: 20 CAPSULE | Refills: 0 | Status: SHIPPED | OUTPATIENT
Start: 2021-07-28 | End: 2021-12-27

## 2021-07-28 NOTE — TELEPHONE ENCOUNTER
RN faxed order to residence and called and scheduled follow up for next week    Kimadrienne Hill RN

## 2021-07-28 NOTE — TELEPHONE ENCOUNTER
Rx for cefdinir 300 mg BID x 10 days sent to patient pharmacy. Please ensure patient has a follow up appointment with me to address repeat UTI's if not yet scheduled.     Tonia Goel MD

## 2021-07-28 NOTE — TELEPHONE ENCOUNTER
Symone from [facility] calling regarding the Antibiotic that was prescribed. States the urine culture came back and results were Kielbella Pneumonia and the antibiotic is resistant to this pneumonia and they would need a change in antibiotics. Please advise and fax new order. ALLEN Pineda will speak with

## 2021-08-02 ENCOUNTER — OFFICE VISIT (OUTPATIENT)
Dept: URGENT CARE | Facility: URGENT CARE | Age: 71
End: 2021-08-02
Payer: COMMERCIAL

## 2021-08-02 VITALS
HEIGHT: 63 IN | TEMPERATURE: 98.4 F | HEART RATE: 91 BPM | DIASTOLIC BLOOD PRESSURE: 84 MMHG | OXYGEN SATURATION: 97 % | SYSTOLIC BLOOD PRESSURE: 150 MMHG | WEIGHT: 220 LBS | BODY MASS INDEX: 38.98 KG/M2

## 2021-08-02 DIAGNOSIS — H10.022 PINK EYE DISEASE OF LEFT EYE: Primary | ICD-10-CM

## 2021-08-02 PROCEDURE — 99213 OFFICE O/P EST LOW 20 MIN: CPT | Performed by: PHYSICIAN ASSISTANT

## 2021-08-02 RX ORDER — TOBRAMYCIN 3 MG/ML
1-2 SOLUTION/ DROPS OPHTHALMIC EVERY 4 HOURS
Qty: 5 ML | Refills: 0 | Status: SHIPPED | OUTPATIENT
Start: 2021-08-02 | End: 2021-08-09

## 2021-08-02 ASSESSMENT — MIFFLIN-ST. JEOR: SCORE: 1477.04

## 2021-08-02 ASSESSMENT — ENCOUNTER SYMPTOMS
EYE ITCHING: 1
EYE REDNESS: 1
EYE DISCHARGE: 1

## 2021-08-02 NOTE — PROGRESS NOTES
SUBJECTIVE:   Pinky Page is a 71 year old female presenting with a chief complaint of   Chief Complaint   Patient presents with     Urgent Care     Pt in clinic to have eval for left eye drainage, redness, and swelling.     Eye Problem       She is an established patient of [facility].  Patient presents with left eye itching/pain and discharge since yesterday.  No trauma.  Yellow discharge.  No contacts.  No hx of eye surgery.          Review of Systems   Eyes: Positive for discharge, redness and itching.   All other systems reviewed and are negative.      Past Medical History:   Diagnosis Date     Allergic rhinitis due to pollen     seasonal allergies      Anisometropia and aniseikonia      BMI greater than 40      Cardiomegaly      Chronic constipation      Congenital absence of one kidney      Cramp of limb      Dermatophytosis of the body      Dry eye syndrome      Esophageal reflux      Essential hypertension, benign      Gastro-oesophageal reflux disease      Hemorrhoids      Hypermetropia      Hypothyroidism      Incomplete Emptying of Bladder      Lactose intolerance      Major depressive disorder, recurrent episode, moderate (H)      Malignant neoplasm of breast (female), unspecified site     left mastectomy 1996      Mixed incontinence urge and stress (male)(female)      Moderate obstructive sleep apnea      Postmenopausal Atrophic Vaginitis      Presbyopia      Regular astigmatism      Restless leg syndrome      Senile nuclear sclerosis      Thyroid eye disease     mild     Tinnitus      Trigger finger (acquired)     right hand     Type II or unspecified type diabetes mellitus without mention of complication, not stated as uncontrolled     on insulin since April 2006      Family History   Problem Relation Age of Onset     Heart Disease Father         1968     Melanoma Mother         malignant melanoma     Glaucoma No family hx of      Macular Degeneration No family hx of      Current Outpatient  Medications   Medication Sig Dispense Refill     acetaminophen (TYLENOL) 500 MG tablet Take 2 tablets (1,000 mg) by mouth every 6 hours as needed 1 Bottle 2     alum & mag hydroxide-simethicone (MYLANTA/MAALOX) 200-200-20 MG/5ML SUSP suspension Take 30 mLs by mouth daily as needed for indigestion       amLODIPine (NORVASC) 10 MG tablet Take 1 tablet (10 mg) by mouth daily 90 tablet 0     apixaban ANTICOAGULANT (ELIQUIS) 5 MG tablet Take 1 tablet (5 mg) by mouth 2 times daily 180 tablet 0     artificial saliva (BIOTENE MT) AERS spray Take 1 spray by mouth 3 times daily as needed for dry mouth       aspirin 81 MG EC tablet Take 81 mg by mouth daily       atorvastatin (LIPITOR) 40 MG tablet Take 1 tablet (40 mg) by mouth daily 90 tablet 1     blood glucose (NO BRAND SPECIFIED) lancets standard Use to test blood sugar 2 times daily or as directed. 100 each 11     blood glucose calibration (NO BRAND SPECIFIED) solution Use to calibrate blood glucose monitor as directed. 1 each 3     blood glucose monitoring (NO BRAND SPECIFIED) meter device kit Check Blood sugars twice a day. 1 kit 0     blood glucose monitoring (NO BRAND SPECIFIED) test strip Use to test blood sugar 3 times daily or as directed. 100 each 3     Calcium Carbonate-Vitamin D (CALCIUM-VITAMIN D) 250-125 MG-UNIT TABS Take 1 tablet by mouth 2 times daily. Calcium 250 mg/Vit D 125 IU       calcium polycarbophil (FIBERCON) 625 MG tablet Take 2 tablets by mouth daily       cefdinir (OMNICEF) 300 MG capsule Take 1 capsule (300 mg) by mouth 2 times daily 20 capsule 0     CLARITIN 10 MG OR TABS 1 TAB PO QD (Once per day) as needed for ALLERGY SYMPTOMS 30 11     erythromycin (ROMYCIN) 5 MG/GM ophthalmic ointment Place 0.5 inches into both eyes At Bedtime 3.5 g 11     FLUoxetine (PROZAC) 20 MG capsule Take 2 capsules (40 mg) by mouth daily 60 capsule 2     fluticasone (FLONASE) 50 MCG/ACT nasal spray Spray 1 spray into both nostrils daily 16 g 3     furosemide (LASIX)  20 MG tablet Take 1 tablet (20 mg) by mouth 2 times daily 60 tablet 3     Gabapentin (NEURONTIN PO) Take 900 mg by mouth 3 times daily.       Hypromellose (ARTIFICIAL TEARS OP) Apply 1 drop to eye 4 times daily.       insulin glargine (LANTUS) 100 UNIT/ML injection Inject 20 Units Subcutaneous At Bedtime 8 mL 11     lactase (LACTAID) 3000 UNIT tablet Take 1 tablet (3,000 Units) by mouth 3 times daily (with meals) 90 tablet 11     levothyroxine (SYNTHROID, LEVOTHROID) 175 MCG tablet Take 1 tablet (175 mcg) by mouth daily Give on empty stomach 30 tablet 4     levothyroxine (SYNTHROID/LEVOTHROID) 175 MCG tablet Take 1 tablet (175 mcg) by mouth daily 90 tablet 3     lifitegrast (XIIDRA) 5 % opthalmic solution Place 1 drop into both eyes 2 times daily 90 each 3     liraglutide (VICTOZA) 18 MG/3ML solution Inject 1.8 mg Subcutaneous daily 9 mL 0     LORazepam (ATIVAN) 2 MG/ML (HIGH CONC) solution Take 0.188 mLs (0.375 mg) by mouth At Bedtime 30 mL 0     losartan (COZAAR) 100 MG tablet Take 0.5 tablets (50 mg) by mouth 2 times daily 120 tablet 2     Magnesium Hydroxide (MILK OF MAGNESIA PO) Take  by mouth. Take 30 mL as needed for constipation.       melatonin 3 MG CAPS Take 6 mg by mouth At Bedtime 60 capsule 2     metFORMIN (GLUCOPHAGE) 1000 MG tablet Take 1,000 mg by mouth 2 times daily (with meals)       nystatin (MYCOSTATIN) 449738 UNIT/GM POWD Apply topically 3 times daily as needed 60 g 1     ONETOUCH DELICA LANCETS 33G MISC        polyethylene glycol (MIRALAX/GLYCOLAX) powder Take 1 capful by mouth 2 times daily. 17 GM PO BID       Saline 0.9 % SOLN Spray 2 sprays in nostril as needed.       senna-docusate (SENNA S) 8.6-50 MG per tablet Take 2 tablets by mouth At Bedtime.       simethicone (MYLICON) 125 MG chewable tablet Take 1 tablet (125 mg) by mouth 2 times daily 60 tablet 0     Skin Protectants, Misc. (EUCERIN) cream Apply  topically as needed. Apply to thigh PRN dry skin        SM LUBRICANT EYE DROPS 0.4-0.3  "% SOLN ophthalmic solution        solifenacin (VESICARE) 10 MG tablet Take 1 tablet (10 mg) by mouth daily 30 tablet 6     tobramycin (TOBREX) 0.3 % ophthalmic solution Place 1-2 drops Into the left eye every 4 hours for 7 days 5 mL 0     ziprasidone (GEODON) 40 MG capsule Take 1 capsule (40 mg) by mouth 2 times daily (with meals) 60 capsule 2     sulfamethoxazole-trimethoprim (BACTRIM DS) 800-160 MG tablet Take 1 tablet by mouth 2 times daily (Patient not taking: Reported on 8/2/2021) 14 tablet 0     Social History     Tobacco Use     Smoking status: Never Smoker     Smokeless tobacco: Never Used   Substance Use Topics     Alcohol use: No     Alcohol/week: 0.0 standard drinks       OBJECTIVE  BP (!) 150/84   Pulse 91   Temp 98.4  F (36.9  C) (Oral)   Ht 1.6 m (5' 3\")   Wt 99.8 kg (220 lb)   LMP  (LMP Unknown)   SpO2 97%   BMI 38.97 kg/m      Physical Exam  Vitals and nursing note reviewed.   Constitutional:       Appearance: Normal appearance. She is obese.   HENT:      Head: Normocephalic.   Eyes:      General:         Left eye: Discharge present.     Extraocular Movements: Extraocular movements intact.      Pupils: Pupils are equal, round, and reactive to light.      Comments: Erythematous conjunctiva with yellow moderate discharge.     Skin:     General: Skin is warm and dry.   Neurological:      General: No focal deficit present.      Mental Status: She is alert.   Psychiatric:         Mood and Affect: Mood normal.         Labs:  No results found for this or any previous visit (from the past 24 hour(s)).    X-Ray was not done.    ASSESSMENT:      ICD-10-CM    1. Pink eye disease of left eye  H10.022 tobramycin (TOBREX) 0.3 % ophthalmic solution        Medical Decision Making:    Differential Diagnosis:  Eye Problem: Bacterial conjunctivitis    Serious Comorbid Conditions:  Adult:  as above    PLAN:    Eye Problem: Warm packs for comfort. Hygiene measures discussed. Rx for tobramycin.  Discussed reasons " to seek immediate medical attention.        Followup:    If not improving or if condition worsens, follow up with your Primary Care Provider, If not improving or if conditions worsens over the next 12-24 hours, go to the Emergency Department    Patient Instructions     Patient Education     What Is Conjunctivitis?  Conjunctivitis is an irritation or infection. It affects the membrane that covers the white of your eye and the inside of your eyelid (conjunctiva). It can happen to one or both eyes. The membrane swells and the blood vessels enlarge (dilate). This makes your eye red. That's why conjunctivitis is sometimes called red eye or pink eye.     What are the symptoms?  If you have one or more of these symptoms, see an eye healthcare provider:     Redness in and around your eye    Eyes that are puffy and sore    Itching, burning, or stinging eyes    Watery eyes or discharge from your eye    Eyelids that are crusty or stuck together when you wake up in the morning    Pink color in the whites of one or both eyes    Sensitivity to bright light  Getting treatment quickly can help prevent damage to your eyes.   How is it diagnosed?  Conjunctivitis is often a minor eye infection. But it can sometimes become a more serious problem. Some more serious eye diseases have symptoms that look like conjunctivitis. So it's important for an eye healthcare provider to diagnose you. Your eye healthcare provider will ask about your symptoms and any medicines you take. He or she will ask about any illnesses or health conditions you may have. The healthcare provider will also check your eyes with a hand-held light and a special microscope called a slit lamp.   [employer] last reviewed this educational content on 8/1/2020 2000-2021 [employer]. All rights reserved. This information is not intended as a substitute for professional medical care. Always follow your healthcare professional's instructions.

## 2021-08-02 NOTE — PATIENT INSTRUCTIONS
Patient Education     What Is Conjunctivitis?  Conjunctivitis is an irritation or infection. It affects the membrane that covers the white of your eye and the inside of your eyelid (conjunctiva). It can happen to one or both eyes. The membrane swells and the blood vessels enlarge (dilate). This makes your eye red. That's why conjunctivitis is sometimes called red eye or pink eye.     What are the symptoms?  If you have one or more of these symptoms, see an eye healthcare provider:     Redness in and around your eye    Eyes that are puffy and sore    Itching, burning, or stinging eyes    Watery eyes or discharge from your eye    Eyelids that are crusty or stuck together when you wake up in the morning    Pink color in the whites of one or both eyes    Sensitivity to bright light  Getting treatment quickly can help prevent damage to your eyes.   How is it diagnosed?  Conjunctivitis is often a minor eye infection. But it can sometimes become a more serious problem. Some more serious eye diseases have symptoms that look like conjunctivitis. So it's important for an eye healthcare provider to diagnose you. Your eye healthcare provider will ask about your symptoms and any medicines you take. He or she will ask about any illnesses or health conditions you may have. The healthcare provider will also check your eyes with a hand-held light and a special microscope called a slit lamp.   [employer] last reviewed this educational content on 8/1/2020 2000-2021 [employer]. All rights reserved. This information is not intended as a substitute for professional medical care. Always follow your healthcare professional's instructions.

## 2021-08-04 ASSESSMENT — PATIENT HEALTH QUESTIONNAIRE - PHQ9: SUM OF ALL RESPONSES TO PHQ QUESTIONS 1-9: 5

## 2021-08-05 ASSESSMENT — ANXIETY QUESTIONNAIRES: GAD7 TOTAL SCORE: 5

## 2021-08-06 ENCOUNTER — OFFICE VISIT (OUTPATIENT)
Dept: FAMILY MEDICINE | Facility: CLINIC | Age: 71
End: 2021-08-06
Payer: COMMERCIAL

## 2021-08-06 VITALS
BODY MASS INDEX: 39.68 KG/M2 | TEMPERATURE: 98.5 F | HEART RATE: 92 BPM | WEIGHT: 224 LBS | RESPIRATION RATE: 16 BRPM | SYSTOLIC BLOOD PRESSURE: 126 MMHG | DIASTOLIC BLOOD PRESSURE: 78 MMHG | OXYGEN SATURATION: 94 %

## 2021-08-06 DIAGNOSIS — N39.0 RECURRENT UTI: Primary | ICD-10-CM

## 2021-08-06 DIAGNOSIS — N95.2 VAGINAL ATROPHY: ICD-10-CM

## 2021-08-06 PROCEDURE — 99213 OFFICE O/P EST LOW 20 MIN: CPT | Mod: GC | Performed by: STUDENT IN AN ORGANIZED HEALTH CARE EDUCATION/TRAINING PROGRAM

## 2021-08-06 NOTE — PROGRESS NOTES
Assessment & Plan     Recurrent UTI  Vaginal atrophy  Seven documented UTIs in the past year. Exam today showed some vaginal atrophy which is likely contributing to her recurrent infections. Treatment for most recent infection started 7/27, currently having no symptoms. Concern that vaginal atrophy is driving recurrent infections. Will start a vaginal moisturizer today. Pinky has a history of breast cancer, although unknown if estrogen sensitive. Current research does not show increased risk in use of vaginal estrogen among women with a history of breast cancer, however first line treatment remains use of vaginal moisturizers. Will start with this and see if we can obtain oncology records to determine breast cancer type. If Pinky continues to have UTIs despite use of vaginal moisturizer next steps to consider would be vaginal estrogen cream, prophylactic antibiotic therapy, referral to urogynecology.   - Vaginal Moisturizer (VAGISIL INTIMATE MOISTURIZER) LOTN; Place 1 applicator vaginally four times a week        No follow-ups on file.    Tonia Goel MD  [facility] MICHAEL Vance is a 71 year old who presents for the following health issues:    HPI   UTIs in the past year:   7/27/21 Klebsiella pneumoniae UTI treated with cefdinir BID x 10 days  7/9/21 Klebsiella pneumoniae UTI treated with cefdinir   6/21/21 Klebsiella oxytoca UTI treated with Bactrim BID x3 days  3/23/21 Klebsiella UTI treated with Bactrim BID x 7 days   12/11/20 UTI treated with Bactrim BID x 7 days  9/10/20 UTI treated with ciprofloxacin BID x 7 days  8/27/20 UTI treated with bactrim BID     Currently still taking cefdinir for last UTI, doing well. No UTI symptoms currently. No fever, chills, fatigue, flank pain, dysuria. Has soft, formed BM every other day. Doesn't feel she has any symptoms of vaginal atrophy.    Wears Depends for urinary incontinence which she now changes 3x/day (in early July was changing  BID but increased frequency due to increased UTI occurrence).       Review of Systems   Constitutional, HEENT, cardiovascular, pulmonary, gi and gu systems are negative, except as otherwise noted.      Objective    /78   Pulse 92   Temp 98.5  F (36.9  C) (Oral)   Resp 16   Wt 101.6 kg (224 lb)   LMP  (LMP Unknown)   SpO2 94%   BMI 39.68 kg/m    Body mass index is 39.68 kg/m .  Physical Exam   GENERAL: healthy, alert and no distress  EYES: Eyes grossly normal to inspection, and conjunctivae and sclerae normal  RESP: speaking in full sentences on room air without increased WOB   (female): normal female external genitalia, normal urethral meatus  and vaginal mucosal atrophy  RECTAL (female): normal sphincter tone, no rectal masses  MS: no gross musculoskeletal defects noted, no edema  SKIN: no suspicious lesions or rashes  PSYCH: mentation appears normal, affect normal/bright    No results found. However, due to the size of the patient record, not all encounters were searched. Please check Results Review for a complete set of results.

## 2021-08-06 NOTE — PATIENT INSTRUCTIONS
Patient Education   Here is the plan from today's visit    1. Recurrent UTI  2. Vaginal atrophy  5 UTIs since Deceber 2020. Will trial vaginal moisturizer today. Our medical records team will look into your breast cancer history to see if a vaginal estrogen cream is a safe option in the future. If you continue to have UTIs despite these treatments we will consider either a prophylactic antibiotic or sending you to see a urogynecologist for a second opinion. Call if you have questions/concerns and follow up as needed, particularly if you develop new UTI symptoms.  - Vaginal Moisturizer (VAGISIL INTIMATE MOISTURIZER) LOTN; Place 1 applicator vaginally four times a week  Dispense: 59 mL; Refill: 1        Please call or return to clinic if your symptoms don't go away.    Follow up plan  No follow-ups on file.    Thank you for coming to [facility] today.  COVID-19 Vaccine:  Starting Monday 8/2/21: [facility] will have walk-in appointments for Moderna, Pfizer and [employer] vaccines. No appointment needed! You will also have the option of receiving Moderna vaccine during your physician appointment. Please ask your care team for more information!  You may be eligible for a $100 Visa giftcard if you receive your first dose between Friday July 30th and Sunday August 15th. Visit https://[place].gov/covid19/100/ for more information.   Lab Testing:  **If you had lab testing today and your results are reassuring or normal they will be mailed to you or sent through [employer] within 7 days.   **If the lab tests need quick action we will call you with the results.  **If you are having labs done on a different day, please call [phone] to schedule at [facility] or [phone] for other [facility] locations.   The phone number we will call with results is # [phone] (home) . If this is not the best number please call our clinic and change the number.  Medication Refills:  If you need any  refills please call your pharmacy and they will contact us.   If you need to  your refill at a new pharmacy, please contact the new pharmacy directly. The new pharmacy will help you get your medications transferred faster.   Scheduling:  If you have any concerns about today's visit or wish to schedule another appointment please call our office during normal business hours [phone] (8-5:00 M-F)  If a referral was made to a [facility] and you don't get a call from central scheduling please call [phone].  If a Mammogram was ordered for you at [facility] call [phone] to schedule or change your appointment.  If you had an EKG/XRay/CT/Ultrasound/MRI ordered the number is [phone] to schedule or change your radiology appointment.   Medical Concerns:  If you have urgent medical concerns please call [phone] at any time of the day.    Tonia Goel MD

## 2021-08-09 ENCOUNTER — DOCUMENTATION ONLY (OUTPATIENT)
Dept: FAMILY MEDICINE | Facility: CLINIC | Age: 71
End: 2021-08-09

## 2021-08-09 DIAGNOSIS — N95.2 VAGINAL ATROPHY: Primary | ICD-10-CM

## 2021-08-12 NOTE — PROGRESS NOTES
Pinky is a 71 year old who is being evaluated via a billable video visit.      How would you like to obtain your AVS? MyChart  If the video visit is dropped, the invitation should be resent by: Send to e-mail at: [email or web address]  Will anyone else be joining your video visit? Kinza Bojorquezth Hufredis RYNE    Emailed links for video visits, also called her personal number as well as [place] residents and left messages at both places.      Video-Visit Details    Type of service:  Video Visit    Video Start Time: 11:17 AM    Video End Time:11:31 AM    Originating Location (pt. Location): Long term Care/Group Home    Distant Location (provider location):  [facility]     Platform used for Video Visit: Jovan     Chief complaint: Follow-up of obstructive sleep apnea    History of Present Illness: 71-year-old female with history of obesity, obstructive sleep apnea, depression, lives in a group home.  She is here for routine follow-up.  She recently discontinued using CPAP altogether due to manufactures recall.  She does not notice any significant deterioration in her sleep quality when she goes without CPAP.  She has occasional symptoms of restlessness.  She typically wakes up about 3 times a night to go to the bathroom.  No history of sleepwalking, sleep talking, or dream enactment behavior.  She has been having some dental work and anticipates getting implants in future although it date is not set.  She is wondering what to do while she goes off Pap therapy.  She has been working on weight loss and has had some significant success.      Past Medical History:   Diagnosis Date     Allergic rhinitis due to pollen     seasonal allergies      Anisometropia and aniseikonia      BMI greater than 40      Cardiomegaly      Chronic constipation      Congenital absence of one kidney      Cramp of limb      Dermatophytosis of the body      Dry eye syndrome      Esophageal reflux      Essential  hypertension, benign      Gastro-oesophageal reflux disease      Hemorrhoids      Hypermetropia      Hypothyroidism      Incomplete Emptying of Bladder      Lactose intolerance      Major depressive disorder, recurrent episode, moderate (H)      Malignant neoplasm of breast (female), unspecified site     left mastectomy 1996      Mixed incontinence urge and stress (male)(female)      Moderate obstructive sleep apnea      Postmenopausal Atrophic Vaginitis      Presbyopia      Regular astigmatism      Restless leg syndrome      Senile nuclear sclerosis      Thyroid eye disease     mild     Tinnitus      Trigger finger (acquired)     right hand     Type II or unspecified type diabetes mellitus without mention of complication, not stated as uncontrolled     on insulin since April 2006        Allergies   Allergen Reactions     Chlordiazepoxide Hcl      Dimetapp Dm Cold-Cough      Cold/Congetion TABS     Haldol      Ibuprofen      TABS     Lactose Intolerance [Beta-Galactosidase]      CAPS     Milk Products      Propofol      EMUL       Current Outpatient Medications   Medication     acetaminophen (TYLENOL) 500 MG tablet     alum & mag hydroxide-simethicone (MYLANTA/MAALOX) 200-200-20 MG/5ML SUSP suspension     amLODIPine (NORVASC) 10 MG tablet     apixaban ANTICOAGULANT (ELIQUIS) 5 MG tablet     artificial saliva (BIOTENE MT) AERS spray     aspirin 81 MG EC tablet     atorvastatin (LIPITOR) 40 MG tablet     blood glucose (NO BRAND SPECIFIED) lancets standard     blood glucose calibration (NO BRAND SPECIFIED) solution     blood glucose monitoring (NO BRAND SPECIFIED) meter device kit     blood glucose monitoring (NO BRAND SPECIFIED) test strip     Calcium Carbonate-Vitamin D (CALCIUM-VITAMIN D) 250-125 MG-UNIT TABS     calcium polycarbophil (FIBERCON) 625 MG tablet     cefdinir (OMNICEF) 300 MG capsule     CLARITIN 10 MG OR TABS     erythromycin (ROMYCIN) 5 MG/GM ophthalmic ointment     FLUoxetine (PROZAC) 20 MG capsule      fluticasone (FLONASE) 50 MCG/ACT nasal spray     furosemide (LASIX) 20 MG tablet     Gabapentin (NEURONTIN PO)     Hypromellose (ARTIFICIAL TEARS OP)     insulin glargine (LANTUS) 100 UNIT/ML injection     lactase (LACTAID) 3000 UNIT tablet     levothyroxine (SYNTHROID, LEVOTHROID) 175 MCG tablet     levothyroxine (SYNTHROID/LEVOTHROID) 175 MCG tablet     lifitegrast (XIIDRA) 5 % opthalmic solution     liraglutide (VICTOZA) 18 MG/3ML solution     LORazepam (ATIVAN) 2 MG/ML (HIGH CONC) solution     losartan (COZAAR) 100 MG tablet     Magnesium Hydroxide (MILK OF MAGNESIA PO)     melatonin 3 MG CAPS     metFORMIN (GLUCOPHAGE) 1000 MG tablet     nystatin (MYCOSTATIN) 548518 UNIT/GM POWD     ONETOUCH DELICA LANCETS 33G MISC     polyethylene glycol (MIRALAX/GLYCOLAX) powder     Saline 0.9 % SOLN     senna-docusate (SENNA S) 8.6-50 MG per tablet     simethicone (MYLICON) 125 MG chewable tablet     Skin Protectants, Misc. (EUCERIN) cream     SM LUBRICANT EYE DROPS 0.4-0.3 % SOLN ophthalmic solution     solifenacin (VESICARE) 10 MG tablet     sulfamethoxazole-trimethoprim (BACTRIM DS) 800-160 MG tablet     Vaginal Moisturizer (VAGISIL INTIMATE MOISTURIZER) LOTN     ziprasidone (GEODON) 40 MG capsule     No current facility-administered medications for this visit.       Social History     Socioeconomic History     Marital status: Single     Spouse name: Not on file     Number of children: Not on file     Years of education: Not on file     Highest education level: Not on file   Occupational History     Not on file   Tobacco Use     Smoking status: Never Smoker     Smokeless tobacco: Never Used   Substance and Sexual Activity     Alcohol use: No     Alcohol/week: 0.0 standard drinks     Drug use: No     Sexual activity: Not Currently   Other Topics Concern     Not on file   Social History Narrative    Pinky Page [facility] a resident at [facility].     Social Determinants of Health     Financial Resource Strain:      Difficulty  of Paying Living Expenses:    Food Insecurity:      Worried About Running Out of Food in the Last Year:      Ran Out of Food in the Last Year:    Transportation Needs:      Lack of Transportation (Medical):      Lack of Transportation (Non-Medical):    Physical Activity:      Days of Exercise per Week:      Minutes of Exercise per Session:    Stress:      Feeling of Stress :    Social Connections:      Frequency of Communication with Friends and Family:      Frequency of Social Gatherings with Friends and Family:      Attends Sabianism Services:      Active Member of Clubs or Organizations:      Attends Club or Organization Meetings:      Marital Status:    Intimate Partner Violence:      Fear of Current or Ex-Partner:      Emotionally Abused:      Physically Abused:      Sexually Abused:        Family History   Problem Relation Age of Onset     Heart Disease Father         1968     Melanoma Mother         malignant melanoma     Glaucoma No family hx of      Macular Degeneration No family hx of            EXAM:  LMP  (LMP Unknown) wt 220  GENERAL: Alert and no distress  EYES: Eyes grossly normal to inspection.  No discharge or erythema, or obvious scleral/conjunctival abnormalities.  RESP: No audible wheeze, cough, or visible cyanosis.  No visible retractions or increased work of breathing.    SKIN: Visible skin clear. No significant rash, abnormal pigmentation or lesions.  NEURO: Cranial nerves grossly intact.  Mentation and speech appropriate for age.  PSYCH: Mentation appears normal, affect normal, judgement and insight intact, normal speech and appearance well-groomed.       PSG 3/21/2017  Wt 249 lbs BMI 43  AHI 15.5     Titration PSG 4/5/2017  Wt 249 BMI 43  CPAP titrated to 13 cmH2O     10/21/2007 Split Night  Wt 270 BMI 44.9  AHI 39.0 Lowest Sat 71%     11/23/2007 Titration  CPAP 12 No REM supine rec aPAP 10-15    PAP download [employer] Dream Station  Min pressure set at 13 max of 15, minimal use  in the last 30 days.    ASSESSMENT:  71-year-old female with obesity, obstructive sleep apnea on most recent assessment was mild to moderate, depression, she is actively working on weight loss and has been making some success.  Is possible that her sleep apnea is improved at this time.  Her current CPAP machine is under manufactures recall.  Unclear if she is a candidate for oral appliance due to her significant dental work.    PLAN:  Patient to discontinue using CPAP.  She is to make sure her machine is registered with the manufacture.  In the meantime she should continue her efforts at weight loss.  If she continues to lose weight could can consider doing a repeat sleep study to reevaluate as she has lost over 10% of her weight compared to her last sleep study.  Sleep dentistry referral also placed.  She can explore whether that would be an option for her post dental Implantation.  She is in a follow-up with me in 6 months.      36 minutes spent on the date of the encounter doing chart review, history and exam, documentation and further activities per the note    Brianda Reddy M.D.  Pulmonary/Critical Care/Sleep Medicine    [facility]   Floor 1, [address]   [address] [place] [address]. [place] [address]   Appointments: [phone]    The above note was dictated using voice recognition software and may include typographical errors. Please contact the author for any clarifications.

## 2021-08-12 NOTE — PATIENT INSTRUCTIONS
I have placed a dental referral -look into whether that could be an option after you dental implalnts are completed    Keep working on weigh loss, we can do a study when you are closer top your goal weight    Stop using CPAP due to the recall.    Your sleep apnea treatment may be affected by device recall    Our records show that you may have a Radha Respironics CPAP for the treatment of sleep apnea. Many of these devices have been recalled* by the  for replacement. [facility] recommends:     1) If you are using a Resmed device, continue using the device.  2) If you have a Radha Respironics device, register your device for confirmation of type of device and repair of the device at https://www.EduSourced/OneFold/e/sleep/communications/src-update -if you cannot use link, call [phone].  The website will assist you in obtaining the serial number for registration.   3) If you are using a Radha Respironics CPAP or Bilevel PAP device and you do not have immediate breathing, driving or cardiovascular risks without the device, consider stopping use of the device after verification that is has been recalled. Discuss this decision with your medical provider if you are uncertain about your medical risks.  4) If you are not using Respironics CPAP but are using a Respironics advanced device for breathing support (AVAPS, ASV, Bilevel PAP), continue using the device and review 5 and 6 below).     5) If you continue the device, do not include ozone generating  connected to PAP devices.  6) If you continue the device, you may choose to use a bacterial filter to reduce particulates from the device although on CPAP devices, pressures may need to be increased with the filter in place. These filters are not as effective as repair of the device.     ?       You may also choose discuss with your provider alternative approaches to treatment.      *Radha Respironics is voluntarily  recalling the below devices due to two (2) issues related to the polyester-based polyurethane (PE-PUR) sound abatement foam used in Radha Continuous and Non-Continuous Ventilators: 1) PE-PUR foam may degrade into particles which may enter the device's the air pathway and be ingested or inhaled by the user, and 2) the PE-PUR foam may off-gas certain chemicals. The foam degradation may be exacerbated by use of unapproved cleaning methods, such as ozone (see FDA safety communication on use of Ozone ), and off-gassing may occur during initial operation and may possibly continue throughout the device's useful life.   These issues can result in serious injury which can be life-threatening, cause permanent impairment, and/or require medical intervention to preclude permanent impairment. To date, [facility] has received several complaints regarding the presence of black debris/particles within the airpath circuit (extending from the device outlet, humidifier, tubing, and mask). [employer] also has received reports of headache, upper airway irritation, cough, chest pressure and sinus infection. The potential risks of particulate exposure include: Irritation (skin, eye, and respiratory tract), inflammatory response, headache, asthma, adverse effects to other organs (e.g. kidneys and liver) and toxic carcinogenic affects. The potential risks of chemical exposure due to off-gassing include: headache/dizziness, irritation (eyes, nose, respiratory tract, skin), hypersensitivity, nausea/vomiting, toxic and carcinogenic effects. There have been no reports of death as a result of these issues.    Actions to be taken:  Discontinue the use of your device.  Do not continue to use ozone  with the device.     [place] affected devices on the recall website, www.Humedica.ClearStream/SRC-update    i. The website provides current information on the status of the recall and how  to receive permanent corrective action to address  the two issues.    ii. The website also provides instructions on how to locate an affected device  Serial Number and will guide users through the registration process.    iii. In the [place], call [phone] Service Hotline if you cannot visit the website             Your BMI is There is no height or weight on file to calculate BMI.  Weight management is a personal decision.  If you are interested in exploring weight loss strategies, the following discussion covers the approaches that may be successful. Body mass index (BMI) is one way to tell whether you are at a healthy weight, overweight, or obese. It measures your weight in relation to your height.  A BMI of 18.5 to 24.9 is in the healthy range. A person with a BMI of 25 to 29.9 is considered overweight, and someone with a BMI of 30 or greater is considered obese. More than two-thirds of American adults are considered overweight or obese.  Being overweight or obese increases the risk for further weight gain. Excess weight may lead to heart disease and diabetes.  Creating and following plans for healthy eating and physical activity may help you improve your health.  Weight control is part of healthy lifestyle and includes exercise, emotional health, and healthy eating habits. Careful eating habits lifelong are the mainstay of weight control. Though there are significant health benefits from weight loss, long-term weight loss with diet alone may be very difficult to achieve- studies show long-term success with dietary management in less than 10% of people. Attaining a healthy weight may be especially difficult to achieve in those with severe obesity. In some cases, medications, devices and surgical management might be considered.  What can you do?  If you are overweight or obese and are interested in methods for weight loss, you should discuss this with your provider.     Consider reducing daily calorie intake by 500 calories.     Keep a food journal.     Avoiding  skipping meals, consider cutting portions instead.    Diet combined with exercise helps maintain muscle while optimizing fat loss. Strength training is particularly important for building and maintaining muscle mass. Exercise helps reduce stress, increase energy, and improves fitness. Increasing exercise without diet control, however, may not burn enough calories to loose weight.       Start walking three days a week 10-20 minutes at a time    Work towards walking thirty minutes five days a week     Eventually, increase the speed of your walking for 1-2 minutes at time    In addition, we recommend that you review healthy lifestyles and methods for weight loss available through the National Institutes of Health patient information sites:  http://win.niddk.nih.gov/publications/index.htm    And look into health and wellness programs that may be available through your health insurance provider, employer, local community center, or wally club.    Weight management plan: Patient was referred to their PCP to discuss a diet and exercise plan.

## 2021-08-13 ENCOUNTER — VIRTUAL VISIT (OUTPATIENT)
Dept: SLEEP MEDICINE | Facility: CLINIC | Age: 71
End: 2021-08-13
Payer: COMMERCIAL

## 2021-08-13 DIAGNOSIS — G47.33 OSA (OBSTRUCTIVE SLEEP APNEA): Primary | ICD-10-CM

## 2021-08-13 DIAGNOSIS — G47.09 OTHER INSOMNIA: Primary | ICD-10-CM

## 2021-08-13 DIAGNOSIS — E66.01 MORBID OBESITY DUE TO EXCESS CALORIES (H): ICD-10-CM

## 2021-08-13 PROCEDURE — 99214 OFFICE O/P EST MOD 30 MIN: CPT | Mod: 95 | Performed by: INTERNAL MEDICINE

## 2021-08-13 RX ORDER — LORAZEPAM 0.5 MG/1
0.25 TABLET ORAL AT BEDTIME
Qty: 15 TABLET | Refills: 0 | OUTPATIENT
Start: 2021-08-13

## 2021-08-13 NOTE — TELEPHONE ENCOUNTER
Took a call from ALLEN Palmer. He would like a different order for Ativan 0.375 mg at bedtime because they are having difficulty with drawing up the precise dose. He said that patient is to get 0.188 ml but this is impossible to measure precisely and the bottle is almost empty and she is not even close to when a refill is due.    Per [place] , the lorazepam solution was last filled on 8/9 and 7/22 for 6 ml each. Spencer said he is not sure that the bottle they have has enough for even one dose.     He is wondering if she can go back to tablets. Please see related refill encounter dated 7/21/21.     Routed to Dr. Dewey for FYI and recommendations.

## 2021-08-13 NOTE — TELEPHONE ENCOUNTER
Denise Dewey MD [place], [place], RN  Caller: Unspecified (Today,  1:07 PM)    She can go down to 0.25mg tablets.     Follow up:  - pended Rx for lorazepam 0.25 mg tabs and routed to Dr. Dewey for review

## 2021-08-16 ENCOUNTER — DOCUMENTATION ONLY (OUTPATIENT)
Dept: FAMILY MEDICINE | Facility: CLINIC | Age: 71
End: 2021-08-16

## 2021-08-24 ENCOUNTER — DOCUMENTATION ONLY (OUTPATIENT)
Dept: FAMILY MEDICINE | Facility: CLINIC | Age: 71
End: 2021-08-24

## 2021-08-24 NOTE — PROGRESS NOTES
"When opening a documentation only encounter, be sure to enter in \"Chief Complaint\" Forms and in \" Comments\" Title of form, description if needed.    Pinky is a 71 year old  female  Form received via: Fax  Form now resides in: Provider Ready    Joana Delaney MA        Form has been completed by provider.     Form sent out via: Fax to Narendra Truong at Fax Number: [phone]  Patient informed: No, Reason:N/A  Output date: August 25, 2021    Tamanna Vickers CMA      **Please close the encounter**                "

## 2021-08-27 ENCOUNTER — TELEPHONE (OUTPATIENT)
Dept: PSYCHIATRY | Facility: CLINIC | Age: 71
End: 2021-08-27

## 2021-08-27 ENCOUNTER — TELEPHONE (OUTPATIENT)
Dept: FAMILY MEDICINE | Facility: CLINIC | Age: 71
End: 2021-08-27

## 2021-08-27 RX ORDER — LORAZEPAM 0.5 MG/1
0.25 TABLET ORAL AT BEDTIME
Qty: 15 TABLET | Refills: 0 | Status: SHIPPED | OUTPATIENT
Start: 2021-08-27 | End: 2021-10-25

## 2021-08-27 NOTE — TELEPHONE ENCOUNTER
[facility] phone call message- general phone call:    Reason for call: jeff from [facility] return call from 08/18/2021  Regarding medication issue like to speak with a  nurse would you please call    Action Desired: no  detailed  availbe     Return call needed: Yes    OK to leave a message on voice mail? Yes    Advised patient response may take up to 2 business days: Yes    Primary language: English      needed? No    Call taken on August 27, 2021 at 12:18 PM by LAW Lantigua    Route to [facility] TRIAGE

## 2021-08-27 NOTE — TELEPHONE ENCOUNTER
Called [facility] at [phone] and spoke with ALLEN Saavedra. She called [facility] on another line to verify that they received the Rx. [facility] was able to find it and will get it filled and delivered to them tonight. They will also fax the order to [facility] for their records.     Confirmed with [facility] that the lorazepam solution is to be discontinued and the tablet replaces it. Pinky is to take lorazepam 0.25 mg at bedtime. They will monitor to see if the slightly lower dose will be an issue.

## 2021-08-27 NOTE — TELEPHONE ENCOUNTER
Per Dr. Dewey, entered order for lorazepam tablet at 0.25mg at bedtime and routed to Dr. Estrada for signature.

## 2021-08-27 NOTE — TELEPHONE ENCOUNTER
Writer received incoming phone call from ALLEN Sal with [facility]. The patient has been receiving LORazepam (ATIVAN) 2 MG/ML (HIGH CONC) solution: Take 0.188 mLs (0.375 mg) by mouth At Bedtime. Doron reports it's very difficult to draw up this dose. Patient's supply of Ativan often runs out a few early because it's difficult to measure this precise dose. He is wondering if patient could go back to the tablet, taking half tab (0.25 mg) at bedtime.    Writer reviewed 8/13 refill note. Appears staff from [facility] has requested this already, but unfortunately it appears the Ativan 0.25 mg tablet was refused. Will reach out to the pt's care team to gather more information.    Doron requests a call back with final recommendations at [phone]. Detailed VM okay.

## 2021-08-27 NOTE — TELEPHONE ENCOUNTER
RN called back and doron stated they had questions about her ativan. RN relayed psychiatry prescribes that. Doron states they have that number and will call them      Kim Hill RN

## 2021-08-30 ENCOUNTER — OFFICE VISIT (OUTPATIENT)
Dept: PSYCHIATRY | Facility: CLINIC | Age: 71
End: 2021-08-30
Attending: PSYCHIATRY & NEUROLOGY
Payer: COMMERCIAL

## 2021-08-30 VITALS
BODY MASS INDEX: 39.57 KG/M2 | SYSTOLIC BLOOD PRESSURE: 136 MMHG | HEART RATE: 82 BPM | DIASTOLIC BLOOD PRESSURE: 80 MMHG | WEIGHT: 223.4 LBS

## 2021-08-30 DIAGNOSIS — F25.0 SCHIZOAFFECTIVE DISORDER, BIPOLAR TYPE (H): Primary | ICD-10-CM

## 2021-08-30 PROCEDURE — G0463 HOSPITAL OUTPT CLINIC VISIT: HCPCS

## 2021-08-30 PROCEDURE — 99214 OFFICE O/P EST MOD 30 MIN: CPT | Mod: GC | Performed by: STUDENT IN AN ORGANIZED HEALTH CARE EDUCATION/TRAINING PROGRAM

## 2021-08-30 ASSESSMENT — PAIN SCALES - GENERAL: PAINLEVEL: NO PAIN (0)

## 2021-08-30 NOTE — PROGRESS NOTES
"   [facility]  Psychiatry Clinic  MEDICAL PROGRESS NOTE     CARE TEAM: PCP- Cary Brownlee  (resident working at [facility] with Dr. Birmingham, attending)   Specialty Providers- no    Therapist- Dr. Gr PhD   Formerly [facility], later [facility] Team- no. staff at [facility].        Pinky Page is a 70 year old female who prefers the name Pinky & pronouns she, her, hers.      DIAGNOSIS     Schizoaffective disorder, depressed type, in remission without psychotic features  Tardive dyskinesia      ASSESSMENT     TODAY Pinky is doing well with stable mood and no symptoms of depression or psychosis. She reports some anxiety around being around other people, which is corroborated by staff. Staff also reports compulsive behaviors around cleaning. She has decreased her dose of lorazepam from 0.5mg nightly to 0.25mg nightly. She has been taking it for 10+ years for insomnia, and is tapering it down due to risk of cognitive issues and falls. So far, she has not noticed increased anxiety or other effects of decreasing lorazepam dose. She reports chronic right hand tremor, as well as some perioral abnormal involuntary movements. Last AIMS in 3/2019 was 3. She says these are tolerable and not particularly bothersome to her at this time. She will think about whether she is interested in a medication like Ingrezza for TD. She reports no hallucinations or psychosis for \"years,\" but prefers not to decrease ziprasidone today. Chart review also indicates she has developed worse psychosis with attempts to titrate down antipsychotics.  Given her mood stablity and lack of psychosis symptoms, no changes to ziprasidone or fluoxetine. Unclear if room cleaning is a compulsion, though staff express some concern over the amount of time spent cleaning. Will gather more information on this in future visits.     [place]NPMP was checked today:  Indicates taking controlled medication as prescribed.     PLAN                                       "                                                                          1) Meds-  - Continue ziprasidone 40 mg BID  - Continue fluoxetine 40 mg daily  - Continue melatonin 6mg at bedtime  - continue lorazepam 0.25mg at bedtime         2) Psychotherapy- continue with Dr. Gr    3) Next due-  Labs- AP labs due 3/2022  EKG- Last EKG 3/2021 without prolonged QTc   Rating Scales- AIMS today 4.    4) Referrals-  None    5) Dispo- 6-8 weeks       PERTINENT BACKGROUND                           [most recent eval 07/19/21]     Pinky first experienced mental health issues in her 20s or 30s and has received treatment for schizoaffective disorder and generalized anxiety. Notably, Pinky's symptoms of depression and psychosis have responded well to a combination of ECT and medication management. Pinky has a history of experiencing increased psychotic symptoms with past attempted antipsychotic tapers. No changes to antipsychotic or antidepressant since 2016. Only medication change in last 5 years has been lowering lorazepam.    In her 30s, she had her gallbladder removed and then suffered first episode of severe depression. She was experiencing psychotic symptoms - paranoia, AH, and suspiciousness. She was hospitalized in [place] in 1986 where she received ECT.  She was hospitalized again in 1987, again received ECT. Was hospitalized for depression in 1995, around the same time she was diagnosed with breast cancer.   She was hospitalized for about nine months this time. She received ECT maintence from 1158-3121. Patient was stable taking Seroquel 400mg, Prozac 40mg, and Ativan 1mg for many years. Seroquel was cross-tapered to ziprasidone due to metabolic side effects in 2016.     She has been seen at our clinic since 2013. She has lived at [facility] for many years.      Medical history  Notable for afib, DMT2, HTN, ANUJ, hypothyroid, breast cancer s/p mastectomy     Psych pertinent item history includes psychosis [sxs  "include disorganization, hallucinations, paranoia], mutiple psychotropic trials, psych hosp (3-5) and ECT.     SUBJECTIVE     - right arm shaking. Twice per week. Gets better with intension. Never interferes with drinking coffee, etc.   - medical doctor knows and will watch it- watchful waiting. Has been going on a few months.not associated with anything.  - notices some nervousness related to coming into clinic. Occasionally gets nervous about groups at [facility] (art, music). Sometimes feels nervous about being around other people.    - CPAP recalled. Not using CPAP now.  - trouble falling asleep sometiemes. Sometimes wakes up at 3am and can't fall back asleep. Sits up and does coloring or walking. Talks tonights taff.    -  Mood \"pretty good.\"  - No hallucinations. No AH/VH, \"been years.\" Prefers to stay on Geodon.    - oral movements been going on a long time, \"years\" at least. \"used to bother more,\" mask useful now. Not sure if it will be more bothersome when we aren't using masks anymore.  - muscle soreness- left shoulder. Not new. Once every 2 weeks.    - discussed Ingrezza.   - already has dry eyes and dry mouth. Koko think about it.   ----------  RECENT PSYCH ROS:   Depression:  minimal  Elevated:  none  Psychosis:  none  Anxiety:  none  Trauma Related:  none  Sleep: yes, see HPI  Other: N/A    Adverse Effects: right hand tremor- resting tremor, perioral involuntary movements, stiffness in UE. No chest pain. No shortness of breath.   Pertinent Negative Symptoms: No suicidal ideation, violent ideation, hallucinations or delusions  Recent Substance Use:     None reported     FAMILY and SOCIAL HISTORY                                 pt reported     Social Hx:  Financial/ Work- social security disability.   Partner/ - never  Children- None      Living situation- [facility]  Since 1998.  Social/ Spiritual Support- sisters (4), staff at [facility]    Feels Safe at Home- yes   Legal- " N/A     Trauma History (self-report)- None  Early History/Education- Born into family of 8 children. 3 siblings passed away. Born in [place], [place].   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 Worked until got sick in her 30s.        PSYCH and SUBSTANCE USE Critical Summary Points since July 2021 July- decrease lorazepam from 0.5mg to 0.375 (liquid)  August 30 - decrease lorazepam to 0.25mg due to difficulty drawing up correct liquid amount     PAST MED TRIALS   sertaline   amitriptyline   lithium   risperidone   olanzapine   trazodone   clonazepam   quetiapine 400mg (weight gain/metabolic SEs)  Lorazepam   triazolam  clonazepam.       SUBSTANCE USE HISTORY     Past Use- none reported     MEDICAL HISTORY and ALLERGY     ALLERGIES: Chlordiazepoxide hcl, Dimetapp dm cold-cough, Haldol, Ibuprofen, Lactose intolerance [beta-galactosidase], Milk products, and Propofol    Patient Active Problem List   Diagnosis     Allergic rhinitis due to pollen     Urge incontinence     Hypertension, essential     Cardiomegaly     Chronic constipation     Dry eye syndrome     Esophageal reflux     Exposure keratoconjunctivitis     DM ophthalmopathy (H)     Hypothyroidism     Senile cataract     ANUJ (obstructive sleep apnea)     Vaginal atrophy     Restless leg syndrome     Squamous blepharitis     Morbid obesity due to excess calories (H)     Personal history of breast cancer s/p L masectomy     Hypercholesteremia     Lives in group home     Extrapyramidal and movement disorder     CKD (chronic kidney disease) stage 2, GFR 60-89 ml/min     Solitary kidney, congenital     S/P hysterectomy     Impingement syndrome of right shoulder     Hypertriglyceridemia     Candidiasis of skin     Generalized anxiety disorder     Carpal tunnel syndrome of left wrist     Schizoaffective disorder, depressive type (H)     Major depressive disorder, recurrent episode, moderate (H)     Psychological factors affecting  medical condition     Hx of psychiatric care     Nail complaint     Microalbuminuria due to type 2 diabetes mellitus (H)     Type 2 diabetes mellitus with microalbuminuria, with long-term current use of insulin (H)     Conjunctivitis of both eyes     Corneal erosion of both eyes     Spastic entropion, right     Foot callus     Paroxysmal atrial fibrillation (H)     Past Medical History:   Diagnosis Date     Allergic rhinitis due to pollen     seasonal allergies      Anisometropia and aniseikonia      BMI greater than 40      Cardiomegaly      Chronic constipation      Congenital absence of one kidney      Cramp of limb      Dermatophytosis of the body      Dry eye syndrome      Esophageal reflux      Essential hypertension, benign      Gastro-oesophageal reflux disease      Hemorrhoids      Hypermetropia      Hypothyroidism      Incomplete Emptying of Bladder      Lactose intolerance      Major depressive disorder, recurrent episode, moderate (H)      Malignant neoplasm of breast (female), unspecified site     left mastectomy 1996      Mixed incontinence urge and stress (male)(female)      Moderate obstructive sleep apnea      Postmenopausal Atrophic Vaginitis      Presbyopia      Regular astigmatism      Restless leg syndrome      Senile nuclear sclerosis      Thyroid eye disease     mild     Tinnitus      Trigger finger (acquired)     right hand     Type II or unspecified type diabetes mellitus without mention of complication, not stated as uncontrolled     on insulin since April 2006         MEDICAL REVIEW OF SYSTEMS   Contraception- not needed    A comprehensive review of systems was performed and is negative other than noted in the HPI.     MEDICATIONS     Current Outpatient Medications   Medication Sig Dispense Refill     acetaminophen (TYLENOL) 500 MG tablet Take 2 tablets (1,000 mg) by mouth every 6 hours as needed 1 Bottle 2     alum & mag hydroxide-simethicone (MYLANTA/MAALOX) 200-200-20 MG/5ML SUSP  suspension Take 30 mLs by mouth daily as needed for indigestion       amLODIPine (NORVASC) 10 MG tablet Take 1 tablet (10 mg) by mouth daily 90 tablet 0     apixaban ANTICOAGULANT (ELIQUIS) 5 MG tablet Take 1 tablet (5 mg) by mouth 2 times daily 180 tablet 0     artificial saliva (BIOTENE MT) AERS spray Take 1 spray by mouth 3 times daily as needed for dry mouth       aspirin 81 MG EC tablet Take 81 mg by mouth daily       atorvastatin (LIPITOR) 40 MG tablet Take 1 tablet (40 mg) by mouth daily 90 tablet 1     blood glucose (NO BRAND SPECIFIED) lancets standard Use to test blood sugar 2 times daily or as directed. 100 each 11     blood glucose calibration (NO BRAND SPECIFIED) solution Use to calibrate blood glucose monitor as directed. 1 each 3     blood glucose monitoring (NO BRAND SPECIFIED) meter device kit Check Blood sugars twice a day. 1 kit 0     blood glucose monitoring (NO BRAND SPECIFIED) test strip Use to test blood sugar 3 times daily or as directed. 100 each 3     Calcium Carbonate-Vitamin D (CALCIUM-VITAMIN D) 250-125 MG-UNIT TABS Take 1 tablet by mouth 2 times daily. Calcium 250 mg/Vit D 125 IU       calcium polycarbophil (FIBERCON) 625 MG tablet Take 2 tablets by mouth daily       cefdinir (OMNICEF) 300 MG capsule Take 1 capsule (300 mg) by mouth 2 times daily 20 capsule 0     CLARITIN 10 MG OR TABS 1 TAB PO QD (Once per day) as needed for ALLERGY SYMPTOMS 30 11     erythromycin (ROMYCIN) 5 MG/GM ophthalmic ointment Place 0.5 inches into both eyes At Bedtime 3.5 g 11     FLUoxetine (PROZAC) 20 MG capsule Take 2 capsules (40 mg) by mouth daily 60 capsule 2     fluticasone (FLONASE) 50 MCG/ACT nasal spray Spray 1 spray into both nostrils daily 16 g 3     furosemide (LASIX) 20 MG tablet Take 1 tablet (20 mg) by mouth 2 times daily 60 tablet 3     Gabapentin (NEURONTIN PO) Take 900 mg by mouth 3 times daily.       Hypromellose (ARTIFICIAL TEARS OP) Apply 1 drop to eye 4 times daily.       insulin  glargine (LANTUS) 100 UNIT/ML injection Inject 20 Units Subcutaneous At Bedtime 8 mL 11     lactase (LACTAID) 3000 UNIT tablet Take 1 tablet (3,000 Units) by mouth 3 times daily (with meals) 90 tablet 11     levothyroxine (SYNTHROID, LEVOTHROID) 175 MCG tablet Take 1 tablet (175 mcg) by mouth daily Give on empty stomach 30 tablet 4     levothyroxine (SYNTHROID/LEVOTHROID) 175 MCG tablet Take 1 tablet (175 mcg) by mouth daily 90 tablet 3     lifitegrast (XIIDRA) 5 % opthalmic solution Place 1 drop into both eyes 2 times daily 90 each 3     liraglutide (VICTOZA) 18 MG/3ML solution Inject 1.8 mg Subcutaneous daily 9 mL 0     LORazepam (ATIVAN) 0.5 MG tablet Take 0.5 tablets (0.25 mg) by mouth At Bedtime 15 tablet 0     losartan (COZAAR) 100 MG tablet Take 0.5 tablets (50 mg) by mouth 2 times daily 120 tablet 2     Magnesium Hydroxide (MILK OF MAGNESIA PO) Take  by mouth. Take 30 mL as needed for constipation.       melatonin 3 MG CAPS Take 6 mg by mouth At Bedtime 60 capsule 2     metFORMIN (GLUCOPHAGE) 1000 MG tablet Take 1,000 mg by mouth 2 times daily (with meals)       NEW MED Prohydrate Moisturizing gel  Place 1 applicator vaginally 4 times a week 20 oz 3     nystatin (MYCOSTATIN) 789971 UNIT/GM POWD Apply topically 3 times daily as needed 60 g 1     ONETOUCH DELICA LANCETS 33G MISC        polyethylene glycol (MIRALAX/GLYCOLAX) powder Take 1 capful by mouth 2 times daily. 17 GM PO BID       Saline 0.9 % SOLN Spray 2 sprays in nostril as needed.       senna-docusate (SENNA S) 8.6-50 MG per tablet Take 2 tablets by mouth At Bedtime.       simethicone (MYLICON) 125 MG chewable tablet Take 1 tablet (125 mg) by mouth 2 times daily 60 tablet 0     Skin Protectants, Misc. (EUCERIN) cream Apply  topically as needed. Apply to thigh PRN dry skin        SM LUBRICANT EYE DROPS 0.4-0.3 % SOLN ophthalmic solution        solifenacin (VESICARE) 10 MG tablet Take 1 tablet (10 mg) by mouth daily 30 tablet 6      sulfamethoxazole-trimethoprim (BACTRIM DS) 800-160 MG tablet Take 1 tablet by mouth 2 times daily 14 tablet 0     Vaginal Moisturizer (VAGISIL INTIMATE MOISTURIZER) LOTN Place 1 applicator vaginally four times a week 59 mL 1     ziprasidone (GEODON) 40 MG capsule Take 1 capsule (40 mg) by mouth 2 times daily (with meals) 60 capsule 2      VITALS   /80   Pulse 82   Wt 101.3 kg (223 lb 6.4 oz)   LMP  (LMP Unknown)   BMI 39.57 kg/m      MENTAL STATUS EXAM     Alertness: alert   Appearance: well groomed  Behavior/Demeanor: cooperative, with good  eye contact, humming and singing.   Speech: slowed,  Language: no problems  Psychomotor: tremor in Right hand, perioral movements (puckering, clenching), some stiffness in upper extremities  Mood: description consistent with euthymia  Affect: full range and indifferent; congruent to: mood- yes, content- yes  Thought Process/Associations: unremarkable  Thought Content:  Reports none;  Denies suicidal & violent ideation and delusions  Perception:  Reports none;  Denies hallucinations  Insight: adequate  Judgment: adequate for safety  Cognition: does  appear grossly intact; formal cognitive testing was not done  Gait and Station: remarkable for:  slowed, small steps , pivot turn     LABS and DATA     PHQ9 TODAY = 7  PHQ 5/11/2021 7/16/2021 7/21/2021   PHQ-9 Total Score 5 9 7   Q9: Thoughts of better off dead/self-harm past 2 weeks Not at all Not at all Not at all   F/U: Thoughts of suicide or self-harm - - -   F/U: Safety concerns - - -       Recent Labs   Lab Test 03/16/21 1537 09/16/20  0000 08/06/19  0941 05/07/19  0908   CR 0.8 0.94 0.7 0.83   GFRESTIMATED 70.7 61  --  72       Recent Labs   Lab Test 03/16/21 1537 09/16/20  0000   .0* 73   A1C 7.8* 6.6*     Recent Labs   Lab Test 03/16/21 1537 09/16/20  0000   CHOL 124 106   TRIG 136 89   LDL 53 46   HDL 43* 42*     Recent Labs   Lab Test 03/16/21 1537 06/26/18  0659   AST 7.9 16   ALT 12.8 21   ALKPHOS  78.9 111     Recent Labs   Lab Test 03/16/21  1537 05/07/19  0650 10/25/18  1734 10/25/18  1734   WBC  --  10.06*  --  14.2*   ANEU 7.5  --   --  10.6*   HGB 13.7 13.3   < > 14.0   PLT  --  292  --  275    < > = values in this interval not displayed.       EKG 3/2021 - showing atrial fibrillation. QtC 392.      PSYCHOTROPIC DRUG INTERACTIONS     ZIPRASIDONE and QT INTERVAL PROLONGING DRUGS may result in increased risk of QT-interval prolongation.  ZIPRASIDONE and SEROTONERGIC DRUGS THAT PROLONG QT INTERVAL may result in increased risk of QT-interval prolongation and increased risk of serotonin syndrome (hypertension, hyperthermia, myoclonus, mental status changes).  NSAID and SSRI may result in an increased risk of bleeding  ERYTHROMYCIN and FLUOXETINE may result in an increased risk of cardiotoxicity (QT prolongation, torsades de pointes, cardiac arrest).  FLUOXETINE and INSULINS OR PRAMLINTIDE may result in increased risk of hypoglycemia.  FLUOXETINE and QT INTERVAL PROLONGING DRUGS may result in increased risk of QT-interval prolongation.   GABAPENTIN and CNS DEPRESSANTS may result in respiratory depression.   GABAPENTIN and ANTACIDS may result in decreased gabapentin effectiveness.      MANAGEMENT:  Monitoring for adverse effects, periodic EKGs and patient is aware of risks     RISK STATEMENT for SAFETY     Pinky Page did not appear to be an imminent safety risk to self or others.    TREATMENT RISK STATEMENT: The risks, benefits, alternatives and potential adverse effects have been discussed and are understood by the pt. The pt understands the risks of using street drugs or alcohol. There are no medical contraindications, the pt agrees to treatment with the ability to do so. The pt knows to call the clinic for any problems or to access emergency care if needed.  Medical and substance use concerns are documented above.  Psychotropic drug interaction check was done, including changes made today.    PROVIDER:  Denise Dewey MD    Patient staffed in clinic with Dr. Estrada who will sign the note.  Supervisor is Dr. Gordon.      Supervisor Attestation:  I met with Pinky Page along with the resident physician, eDnise Dewey MD. I participated in key portions of the service, including the mental status examination and developing the plan of care. I reviewed key portions of the history with the resident. I agree with the findings and plan as documented in this note.  Cortez Estrada MD

## 2021-08-30 NOTE — PATIENT INSTRUCTIONS
- no changes to medications today    - return in 8 weeks         **For crisis resources, please see the information at the end of this document**     Patient Education      Thank you for coming to the [facility] [place] [facility].    Lab Testing:  If you had lab testing today and your results are reassuring or normal they will be mailed to you or sent through [employer] within 7 days. If the lab tests need quick action we will call you with the results. The phone number we will call with results is # [phone] (home) . If this is not the best number please call our clinic and change the number.    Medication Refills:  If you need any refills please call your pharmacy and they will contact us. Our fax number for refills is [phone]. Please allow three business for refill processing. If you need to  your refill at a new pharmacy, please contact the new pharmacy directly. The new pharmacy will help you get your medications transferred.     Scheduling:  If you have any concerns about today's visit or wish to schedule another appointment please call our office during normal business hours [phone] (8-5:00 M-F)    Contact Us:  Please call [phone] during business hours (8-5:00 M-F).  If after clinic hours, or on the weekend, please call  [phone].    Financial Assistance [phone]  [facility] Billing [phone]  Central Billing Office, [facility]: [phone]  [place] Billing [phone]  Medical Records [phone]  [facility] Patient Bill of Rights [email or web address]       MENTAL HEALTH CRISIS NUMBERS:  For a medical emergency please call  911 or go to the nearest ER.     [facility]:   [facility] -[phone]   Crisis Residence [facility] -[phone]   Walk-In Counseling Center [facility] -[phone]   COPE 24/7 [place] Mobile Team -[phone]  (adults)/[phone] (child)  CHILD: [facility] needs assessment team - [phone]      [facility]:   [facility] - [phone]   Walk-in counseling [facility] - [phone]   Walk-in counseling [facility] - [phone]   Crisis Residence [facility] - [phone]  Urgent Care Adult Mental [facility]-[phone] mobile unit/ 24/7 crisis line    National Crisis Numbers:   National Suicide Prevention Lifeline: 2-853-602-TALK (129-100-8429)  Poison Control Center - 8-998-065-8241  [employer]/resources for a list of additional resources (SOS)  Trans Lifeline a hotline for transgender people 2-867-863-9814  [employer] a hotline for LGBT youth 2-734-808-7624  Crisis Text Line: For any crisis 24/7   To: 775516  see www.crisistextline.org  - IF MAKING A CALL FEELS TOO HARD, send a text!         Again thank you for choosing [facility] [place] [facility] and please let us know how we can best partner with you to improve you and your family's health.    You may be receiving a survey regarding this appointment. We would love to have your feedback, both positive and negative. The survey is done by an external company, so your answers are anonymous.

## 2021-08-31 ENCOUNTER — TELEPHONE (OUTPATIENT)
Dept: FAMILY MEDICINE | Facility: CLINIC | Age: 71
End: 2021-08-31

## 2021-08-31 DIAGNOSIS — I10 HYPERTENSION, ESSENTIAL: ICD-10-CM

## 2021-08-31 ASSESSMENT — PATIENT HEALTH QUESTIONNAIRE - PHQ9: SUM OF ALL RESPONSES TO PHQ QUESTIONS 1-9: 7

## 2021-08-31 NOTE — TELEPHONE ENCOUNTER
"Patient's assisted living requesting refill of losartan 100mg tablet. Last office visit 8/6/21 with Dr. Goel. Routing to PCP to approve if appropriate.   Rosario Klein RN      Request for medication refill:    Providers if patient needs an appointment and you are willing to give a one month supply please refill for one month and  send a letter/MyChart using \".SMILLIMITEDREFILL\" .smillimited and route chart to \"P SMI \" (Giving one month refill in non controlled medications is strongly recommended before denial)    If refill has been denied, meaning absolutely no refills without visit, please complete the smart phrase \".smirxrefuse\" and route it to the \"P SMI MED REFILLS\"  pool to inform the patient and the pharmacy.          "

## 2021-09-01 ENCOUNTER — VIRTUAL VISIT (OUTPATIENT)
Dept: PSYCHOLOGY | Facility: CLINIC | Age: 71
End: 2021-09-01
Payer: COMMERCIAL

## 2021-09-01 ENCOUNTER — DOCUMENTATION ONLY (OUTPATIENT)
Dept: FAMILY MEDICINE | Facility: CLINIC | Age: 71
End: 2021-09-01

## 2021-09-01 DIAGNOSIS — F41.1 GENERALIZED ANXIETY DISORDER: ICD-10-CM

## 2021-09-01 DIAGNOSIS — F33.0 MAJOR DEPRESSIVE DISORDER, RECURRENT EPISODE, MILD (H): Primary | ICD-10-CM

## 2021-09-01 DIAGNOSIS — F54 PSYCHOLOGICAL FACTORS AFFECTING MEDICAL CONDITION: ICD-10-CM

## 2021-09-01 DIAGNOSIS — E66.9 OBESITY, UNSPECIFIED OBESITY SEVERITY, UNSPECIFIED OBESITY TYPE: ICD-10-CM

## 2021-09-01 PROCEDURE — 90834 PSYTX W PT 45 MINUTES: CPT | Mod: 95 | Performed by: PSYCHOLOGIST

## 2021-09-01 RX ORDER — LOSARTAN POTASSIUM 100 MG/1
50 TABLET ORAL 2 TIMES DAILY
Qty: 90 TABLET | Refills: 1 | Status: SHIPPED | OUTPATIENT
Start: 2021-09-01 | End: 2022-10-03

## 2021-09-01 NOTE — PROGRESS NOTES
"When opening a documentation only encounter, be sure to enter in \"Chief Complaint\" Forms and in \" Comments\" Title of form, description if needed.    Pinky is a 71 year old  female  Form received via: Fax  Form now resides in: Provider Ready    Quiana Swift MA    Unable to locate form in providers folder or  forms completed bin.Per protocol encounter will be signed and addressed.    Quiana Swift MA                "

## 2021-09-01 NOTE — PROGRESS NOTES
Health Psychology                     Department of Medicine  Keri Arnett, Ph.D., L.P. [phone]                          [facility] Madelaine Ingram, Ph.D., L.P. [phone]                   [place] Mail Code 741   Dennis Dillard, Ph.D., L.P.  [phone]       [address] [place] [address] Rae Eugene, Ph.D., L.P. [phone]       [place] [address]            Damon Gr, Ph.D., A.B.P.P., L.P. [phone]     Kimber Morales, Ph.D., L.P. 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 Today we used the video of her cell phone. This is her fourth video session.  Intake with Health Psychology was in the 1980s and I began to see her in the early 1990s.    She was diagnosed with a UTI yesterday and is awaiting medication. She said this is her third in a few months.     Her mood is variable  She is down about COVID returning, happy she is still here.  She anticipates getitng booster for COVID.   She is a 3-4 on 10-point scale of depression.    Exercise:  She had been  biking 7x/week for 30 minutes;  She was also walking 20-25 minutes indoor or outdoor, or both.  We discussed a total of 1.5 hours per day  If possible.  She had a UTI and slowed down, is going to need to increase her exercise.  She is walking 20 -25 minutes/day and biking 15 minutes/day..     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     Her weight increased to 223 from 220.  We discussed approach to weight loss in terms of fewer calories despite lack of diet line, increasing exercise. She is reinforced for her efforts.     Recreation/Activities: Coloring, word search puzzles, read, tv, radio, exercise.     Two residents and one  staff tested positive 2 weeks ago.  They are now more quarantined.   They now allow two vaccinated people to eat meals together in the dining room, so she eats with her roommate.    They cut back on her Ativan due to potential for age-relaxed memory side-effects.  She hasn't had an increase in anxiety.    Reviewed note from Narendra Truong.    She participates fully, ventilated feelings appropriately.  She appears to derive benefit from the support.    Current Outpatient Medications   Medication     acetaminophen (TYLENOL) 500 MG tablet     alum & mag hydroxide-simethicone (MYLANTA/MAALOX) 200-200-20 MG/5ML SUSP suspension     amLODIPine (NORVASC) 10 MG tablet     apixaban ANTICOAGULANT (ELIQUIS) 5 MG tablet     artificial saliva (BIOTENE MT) AERS spray     aspirin 81 MG EC tablet     atorvastatin (LIPITOR) 40 MG tablet     blood glucose (NO BRAND SPECIFIED) lancets standard     blood glucose calibration (NO BRAND SPECIFIED) solution     blood glucose monitoring (NO BRAND SPECIFIED) meter device kit     blood glucose monitoring (NO BRAND SPECIFIED) test strip     Calcium Carbonate-Vitamin D (CALCIUM-VITAMIN D) 250-125 MG-UNIT TABS     calcium polycarbophil (FIBERCON) 625 MG tablet     cefdinir (OMNICEF) 300 MG capsule     CLARITIN 10 MG OR TABS     erythromycin (ROMYCIN) 5 MG/GM ophthalmic ointment     FLUoxetine (PROZAC) 20 MG capsule     fluticasone (FLONASE) 50 MCG/ACT nasal spray     furosemide (LASIX) 20 MG tablet     Gabapentin (NEURONTIN PO)     Hypromellose (ARTIFICIAL TEARS OP)     insulin glargine (LANTUS) 100 UNIT/ML injection     lactase (LACTAID) 3000 UNIT tablet     levothyroxine (SYNTHROID, LEVOTHROID) 175 MCG tablet     levothyroxine (SYNTHROID/LEVOTHROID) 175 MCG tablet     lifitegrast (XIIDRA) 5 % opthalmic solution     liraglutide (VICTOZA) 18 MG/3ML solution     LORazepam (ATIVAN) 0.5 MG tablet     losartan (COZAAR) 100 MG tablet     Magnesium Hydroxide (MILK OF MAGNESIA PO)     melatonin 3 MG  CAPS     metFORMIN (GLUCOPHAGE) 1000 MG tablet     NEW MED     nystatin (MYCOSTATIN) 393367 UNIT/GM POWD     ONETOUCH DELICA LANCETS 33G MISC     polyethylene glycol (MIRALAX/GLYCOLAX) powder     Saline 0.9 % SOLN     senna-docusate (SENNA S) 8.6-50 MG per tablet     simethicone (MYLICON) 125 MG chewable tablet     Skin Protectants, Misc. (EUCERIN) cream     SM LUBRICANT EYE DROPS 0.4-0.3 % SOLN ophthalmic solution     solifenacin (VESICARE) 10 MG tablet     sulfamethoxazole-trimethoprim (BACTRIM DS) 800-160 MG tablet     Vaginal Moisturizer (VAGISIL INTIMATE MOISTURIZER) LOTN     ziprasidone (GEODON) 40 MG capsule     No current facility-administered medications for this visit.     Wt Readings from Last 4 Encounters:   08/30/21 101.3 kg (223 lb 6.4 oz)   08/06/21 101.6 kg (224 lb)   08/02/21 99.8 kg (220 lb)   07/20/21 100.5 kg (221 lb 9.6 oz)   There is no height or weight on file to calculate BMI.      This telephone service is appropriate and effective for delivering services in light of the necessity for social distancing to mitigate the COVID.bmi  -19 epidemic and for conservation of PPE.     Patient has agreed to receiving telephone services after being informed about it: Yes    Patient prefers video invitation/information to be sent by:   email    Time service started: 1;02  Time service ended:  1:41      Mode of transmission: Doxy following instructions  by telephone so as to reduce chance of being overheard.    Location of originating:  [facility]    Distance site:  Home office of provider for [facility]     The patient has been notified that:  Video visits will be conducted via a call with their psychologist to provide the care they need with a video conversation. Video visits may be billed at different rates depending on insurance coverage.  Patients are advised to please contact their insurance provider with any questions about their health insurance coverage. If during the course of a call the  psychologist feels a video visit is not appropriate, patients will not be charged for this service.    Diagnosis:   Major Depression, recurrent mild (F33.0)   Psychological Factors Affecting Morbid Obesity (F54)  Generalized anxiety (F41.1)       Plan: Return video telementalhealth visit  10/12 @ 1 due to the serious and persistent nature of her depression and anxiety, consistent with treatment plan.  Last treatment plan signed: 10/19/2020  Treatment plan due: 10/19/2021                    Preference for future meetings:           X   In-person, even if both wearing masks             Remote              Either            Damon Gr, PhD LP,  A.B.P.P.  Director, Health Psychology  [phone]

## 2021-09-02 ENCOUNTER — DOCUMENTATION ONLY (OUTPATIENT)
Dept: OTHER | Facility: CLINIC | Age: 71
End: 2021-09-02

## 2021-09-02 NOTE — TELEPHONE ENCOUNTER
Doron from [facility] calling for losartan prescription, routing to triage and Dr. Goel

## 2021-09-02 NOTE — TELEPHONE ENCOUNTER
Reached out to [facility] and informed him that [facility] He verbalized understanding and will follow up with them directly.   Rosario Klein RN

## 2021-09-03 ENCOUNTER — OFFICE VISIT (OUTPATIENT)
Dept: URGENT CARE | Facility: URGENT CARE | Age: 71
End: 2021-09-03
Payer: COMMERCIAL

## 2021-09-03 ENCOUNTER — MEDICAL CORRESPONDENCE (OUTPATIENT)
Dept: HEALTH INFORMATION MANAGEMENT | Facility: CLINIC | Age: 71
End: 2021-09-03

## 2021-09-03 VITALS
SYSTOLIC BLOOD PRESSURE: 112 MMHG | HEIGHT: 63 IN | BODY MASS INDEX: 39.69 KG/M2 | HEART RATE: 82 BPM | TEMPERATURE: 98.2 F | RESPIRATION RATE: 16 BRPM | WEIGHT: 224 LBS | DIASTOLIC BLOOD PRESSURE: 68 MMHG | OXYGEN SATURATION: 96 %

## 2021-09-03 DIAGNOSIS — L03.213 PRESEPTAL CELLULITIS OF RIGHT LOWER EYELID: Primary | ICD-10-CM

## 2021-09-03 DIAGNOSIS — H10.9 CONJUNCTIVITIS OF RIGHT EYE, UNSPECIFIED CONJUNCTIVITIS TYPE: ICD-10-CM

## 2021-09-03 PROCEDURE — 99214 OFFICE O/P EST MOD 30 MIN: CPT | Performed by: FAMILY MEDICINE

## 2021-09-03 RX ORDER — DOXYCYCLINE HYCLATE 100 MG
100 TABLET ORAL 2 TIMES DAILY
Qty: 14 TABLET | Refills: 0 | Status: SHIPPED | OUTPATIENT
Start: 2021-09-03 | End: 2021-09-10

## 2021-09-03 RX ORDER — TOBRAMYCIN 3 MG/ML
SOLUTION/ DROPS OPHTHALMIC
Qty: 5 ML | Refills: 0 | Status: SHIPPED | OUTPATIENT
Start: 2021-09-03 | End: 2021-12-27

## 2021-09-03 ASSESSMENT — MIFFLIN-ST. JEOR: SCORE: 1495.19

## 2021-09-03 NOTE — PATIENT INSTRUCTIONS
Start doxycycline 2 times a day for 7 days always take with food your pharmacist told you not to take it with food due to patient will vomit if she takes this on an empty stomach    This medication is for the redness/tenderness under her right eye  Please stop her milk of magnesia while she is on doxycyline it will decrease the effectiveness of the doxycycline    Start tobramycin drops in the right eye 1 to 2 drops in the right eye every 2 hours until bedtime tonight then, tomorrow 9/4/2021 start 1 to 2 drops in the right eye 4 times a day for the next 6 days    Once her eye is no longer having discharge mattering and the redness has improved please stop all of her eyedrops and ointment for her dry eye treatment they are now contaminated from the pinkeye  Start new eyedrops and ointment once I has known her having mattering and redness has resolved    If redness increases, increased pain in eye, changes in vision,  swelling increases around the eye, any fever to ER    If the left eye becomes red and has discharge he can use the tobramycin eyedrops 4 times a day for 7 days             Patient Education     Bacterial Conjunctivitis    You have an infection in the membranes covering the white part of the eye. This part of the eye is called the conjunctiva. The infection is called conjunctivitis. The most common symptoms of conjunctivitis include a thick, pus-like discharge from the eye, swollen eyelids, redness, eyelids sticking together upon awakening, and a gritty or scratchy feeling in the eye. Your infection was caused by bacteria. It may be treated with medicine. With treatment, the infection takes about 7 to 10 days to resolve.   Home care    Use prescribed antibiotic eye drops or ointment as directed to treat the infection.    Apply a warm compress (towel soaked in warm water) to the affected eye 3 to 4 times a day. Do this just before applying medicine to the eye.    Use a warm, wet cloth to wipe away crusting  of the eyelids in the morning. This is caused by mucus drainage during the night. You may also use saline irrigating solution or artificial tears to rinse away mucus in the eye. Do not put a patch over the eye.    Wash your hands before and after touching the infected eye. This is to prevent spreading the infection to the other eye, and to other people. Don't share your towels or washcloths with others.    You may use acetaminophen or ibuprofen to control pain, unless another medicine was prescribed. Talk with your healthcare provider before using these medicines if you have chronic liver or kidney disease. Also talk with your provider if you have ever had a stomach ulcer or digestive bleeding.    Don't wear contact lenses until your eyes have healed and all symptoms are gone.    Follow-up care  Follow up with your healthcare provider, or as advised.  When to seek medical advice  Call your healthcare provider right away if any of these occur:    Worsening vision    Increasing pain in the eye    Increasing swelling or redness of the eyelid    Redness spreading around the eye  [employer] last reviewed this educational content on 4/1/2020 2000-2021 [employer]. All rights reserved. This information is not intended as a substitute for professional medical care. Always follow your healthcare professional's instructions.           Patient Education     Conjunctivitis Caused by Infection     Wash hands often to help prevent spreading infection.   Infections are caused by viruses or bacteria. Treatment includes keeping your eyes and hands clean. Your healthcare provider may prescribe eye drops and tell you to stay home from work or school if you re contagious. Untreated infections can be serious. It's important to see your provider for a diagnosis.   Viral infections  A cold, flu, or other virus can spread to your eyes. This causes a watery discharge. Your eyes may burn or itch and get red. Your eyelids may  also be puffy and sore.   Treatment  Most viral infections go away on their own. Artificial tears and warm compresses can relieve symptoms. Your healthcare provider may also prescribe eye drops. A viral infection can be very contagious and spread quickly. To prevent this, wash your hands often. Use a separate tissue to wipe each eye. Don t touch your eyes or share bedding or towels. Use a new, clean washcloth every time you use one. Throw away eye cosmetics, especially mascara. Never use someone else's eye cosmetics. If you use contact lenses, follow your healthcare provider's instructions on proper lens care.    Bacterial infections  Bacterial infections often happen in one eye. There may be a watery or a thick discharge from the eye. These infections can cause serious damage to your eye if not treated promptly.   Treatment  Your healthcare provider may prescribe eye drops or ointment to kill the bacteria. Use the medicine for the number of days it is prescribed. Don't stop using it when the symptoms improve. Other tips:     Warm compresses can help keep the eyelids clean.    Wash your hands often to keep the bacteria from spreading.    Use a separate tissue to wipe each eye.    Don't touch your eyes or share bedding or towels.    Use a new, clean washcloth every time you use one.    Throw away eye cosmetics, especially mascara.    Never use someone else's eye cosmetics.    If you use contact lenses, follow your healthcare provider's instructions on proper lens care.  Victor Manuel last reviewed this educational content on 4/1/2020 2000-2021 [employer]. All rights reserved. This information is not intended as a substitute for professional medical care. Always follow your healthcare professional's instructions.           Patient Education     What Is Conjunctivitis?  Conjunctivitis is an irritation or infection. It affects the membrane that covers the white of your eye and the inside of your eyelid  (conjunctiva). It can happen to one or both eyes. The membrane swells and the blood vessels enlarge (dilate). This makes your eye red. That's why conjunctivitis is sometimes called red eye or pink eye.     What are the symptoms?  If you have one or more of these symptoms, see an eye healthcare provider:     Redness in and around your eye    Eyes that are puffy and sore    Itching, burning, or stinging eyes    Watery eyes or discharge from your eye    Eyelids that are crusty or stuck together when you wake up in the morning    Pink color in the whites of one or both eyes    Sensitivity to bright light  Getting treatment quickly can help prevent damage to your eyes.   How is it diagnosed?  Conjunctivitis is often a minor eye infection. But it can sometimes become a more serious problem. Some more serious eye diseases have symptoms that look like conjunctivitis. So it's important for an eye healthcare provider to diagnose you. Your eye healthcare provider will ask about your symptoms and any medicines you take. He or she will ask about any illnesses or health conditions you may have. The healthcare provider will also check your eyes with a hand-held light and a special microscope called a slit lamp.   [employer] last reviewed this educational content on 8/1/2020 2000-2021 [employer]. All rights reserved. This information is not intended as a substitute for professional medical care. Always follow your healthcare professional's instructions.           Patient Education     Periorbital Cellulitis  Periorbital cellulitis is an infection of the tissues around the eye. It's most often caused by an infected scratch, insect bite, or a sinus infection. If not treated, it may infect the eye. This can cause permanent changes in vision.   Home care  Take your antibiotics exactly as directed. Take all the medicine until it is finished.   Use pain medicine as prescribed. If no medicine was prescribed, you may use  over-the-counter medicine as advised to help with pain and fever. If you have liver disease or ever had a stomach ulcer, talk with your healthcare provider before using these.   Don't give ibuprofen to a child under 6 months of age. Never give aspirin to a child under age 18 years who has a fever or viral illness. It may cause severe illness or death from Reye syndrome.   Follow-up care  Follow up with your healthcare provider, or as advised.  When to seek medical advice  Call your healthcare provider right away if any of these occur:    Swelling or pain around the eye that gets worse    Redness that gets worse    Changes in vision    Fever of 100.4 (38  C) oral or 101.5 (38.6  C) rectal for more than 2 days on antibiotics  [employer] last reviewed this educational content on 7/1/2019 2000-2021 [employer]. All rights reserved. This information is not intended as a substitute for professional medical care. Always follow your healthcare professional's instructions.

## 2021-09-03 NOTE — PROGRESS NOTES
Patient presents with:  Urgent Care  Eye Problem: woke up today with sore right eye, slight discharge.        Subjective     Pinky Page is a 71 year old female who presents to clinic today for the following health issues:    HPI   Eye(s) Problem  Onset/Duration: onset this am   Description:   Location: right   Pain: YES  Redness: YES  Accompanying Signs & Symptoms:  Discharge/mattering: YES  Swelling: no  Visual changes: no  Fever: no  Nasal Congestion: no  Bothered by bright lights: no  History:  Trauma: no  Foreign body exposure: no  Wearing contacts: no  Precipitating or alleviating factors: some relief with dry eye drops   Therapies tried and outcome: none-just started this am         Past Medical History:   Diagnosis Date     Allergic rhinitis due to pollen     seasonal allergies      Anisometropia and aniseikonia      BMI greater than 40      Cardiomegaly      Chronic constipation      Congenital absence of one kidney      Cramp of limb      Dermatophytosis of the body      Dry eye syndrome      Esophageal reflux      Essential hypertension, benign      Gastro-oesophageal reflux disease      Hemorrhoids      Hypermetropia      Hypothyroidism      Incomplete Emptying of Bladder      Lactose intolerance      Major depressive disorder, recurrent episode, moderate (H)      Malignant neoplasm of breast (female), unspecified site     left mastectomy 1996      Mixed incontinence urge and stress (male)(female)      Moderate obstructive sleep apnea      Postmenopausal Atrophic Vaginitis      Presbyopia      Regular astigmatism      Restless leg syndrome      Senile nuclear sclerosis      Thyroid eye disease     mild     Tinnitus      Trigger finger (acquired)     right hand     Type II or unspecified type diabetes mellitus without mention of complication, not stated as uncontrolled     on insulin since April 2006      Social History     Tobacco Use     Smoking status: Never Smoker     Smokeless tobacco: Never Used    Substance Use Topics     Alcohol use: No     Alcohol/week: 0.0 standard drinks       Current Outpatient Medications   Medication Sig Dispense Refill     acetaminophen (TYLENOL) 500 MG tablet Take 2 tablets (1,000 mg) by mouth every 6 hours as needed 1 Bottle 2     alum & mag hydroxide-simethicone (MYLANTA/MAALOX) 200-200-20 MG/5ML SUSP suspension Take 30 mLs by mouth daily as needed for indigestion       amLODIPine (NORVASC) 10 MG tablet Take 1 tablet (10 mg) by mouth daily 90 tablet 0     apixaban ANTICOAGULANT (ELIQUIS) 5 MG tablet Take 1 tablet (5 mg) by mouth 2 times daily 180 tablet 0     artificial saliva (BIOTENE MT) AERS spray Take 1 spray by mouth 3 times daily as needed for dry mouth       aspirin 81 MG EC tablet Take 81 mg by mouth daily       atorvastatin (LIPITOR) 40 MG tablet Take 1 tablet (40 mg) by mouth daily 90 tablet 1     blood glucose (NO BRAND SPECIFIED) lancets standard Use to test blood sugar 2 times daily or as directed. 100 each 11     blood glucose calibration (NO BRAND SPECIFIED) solution Use to calibrate blood glucose monitor as directed. 1 each 3     blood glucose monitoring (NO BRAND SPECIFIED) meter device kit Check Blood sugars twice a day. 1 kit 0     blood glucose monitoring (NO BRAND SPECIFIED) test strip Use to test blood sugar 3 times daily or as directed. 100 each 3     Calcium Carbonate-Vitamin D (CALCIUM-VITAMIN D) 250-125 MG-UNIT TABS Take 1 tablet by mouth 2 times daily. Calcium 250 mg/Vit D 125 IU       calcium polycarbophil (FIBERCON) 625 MG tablet Take 2 tablets by mouth daily       cefdinir (OMNICEF) 300 MG capsule Take 1 capsule (300 mg) by mouth 2 times daily 20 capsule 0     CLARITIN 10 MG OR TABS 1 TAB PO QD (Once per day) as needed for ALLERGY SYMPTOMS 30 11     erythromycin (ROMYCIN) 5 MG/GM ophthalmic ointment Place 0.5 inches into both eyes At Bedtime 3.5 g 11     FLUoxetine (PROZAC) 20 MG capsule Take 2 capsules (40 mg) by mouth daily 60 capsule 2      fluticasone (FLONASE) 50 MCG/ACT nasal spray Spray 1 spray into both nostrils daily 16 g 3     furosemide (LASIX) 20 MG tablet Take 1 tablet (20 mg) by mouth 2 times daily 60 tablet 3     Gabapentin (NEURONTIN PO) Take 900 mg by mouth 3 times daily.       Hypromellose (ARTIFICIAL TEARS OP) Apply 1 drop to eye 4 times daily.       insulin glargine (LANTUS) 100 UNIT/ML injection Inject 20 Units Subcutaneous At Bedtime 8 mL 11     lactase (LACTAID) 3000 UNIT tablet Take 1 tablet (3,000 Units) by mouth 3 times daily (with meals) 90 tablet 11     levothyroxine (SYNTHROID, LEVOTHROID) 175 MCG tablet Take 1 tablet (175 mcg) by mouth daily Give on empty stomach 30 tablet 4     levothyroxine (SYNTHROID/LEVOTHROID) 175 MCG tablet Take 1 tablet (175 mcg) by mouth daily 90 tablet 3     lifitegrast (XIIDRA) 5 % opthalmic solution Place 1 drop into both eyes 2 times daily 90 each 3     liraglutide (VICTOZA) 18 MG/3ML solution Inject 1.8 mg Subcutaneous daily 9 mL 0     LORazepam (ATIVAN) 0.5 MG tablet Take 0.5 tablets (0.25 mg) by mouth At Bedtime 15 tablet 0     losartan (COZAAR) 100 MG tablet Take 0.5 tablets (50 mg) by mouth 2 times daily 90 tablet 1     Magnesium Hydroxide (MILK OF MAGNESIA PO) Take  by mouth. Take 30 mL as needed for constipation.       melatonin 3 MG CAPS Take 6 mg by mouth At Bedtime 60 capsule 2     metFORMIN (GLUCOPHAGE) 1000 MG tablet Take 1,000 mg by mouth 2 times daily (with meals)       NEW MED Prohydrate Moisturizing gel  Place 1 applicator vaginally 4 times a week 20 oz 3     nystatin (MYCOSTATIN) 845764 UNIT/GM POWD Apply topically 3 times daily as needed 60 g 1     ONETOUCH DELICA LANCETS 33G MISC        polyethylene glycol (MIRALAX/GLYCOLAX) powder Take 1 capful by mouth 2 times daily. 17 GM PO BID       Saline 0.9 % SOLN Spray 2 sprays in nostril as needed.       senna-docusate (SENNA S) 8.6-50 MG per tablet Take 2 tablets by mouth At Bedtime.       simethicone (MYLICON) 125 MG chewable tablet  "Take 1 tablet (125 mg) by mouth 2 times daily 60 tablet 0     Skin Protectants, Misc. (EUCERIN) cream Apply  topically as needed. Apply to thigh PRN dry skin        SM LUBRICANT EYE DROPS 0.4-0.3 % SOLN ophthalmic solution        solifenacin (VESICARE) 10 MG tablet Take 1 tablet (10 mg) by mouth daily 30 tablet 6     sulfamethoxazole-trimethoprim (BACTRIM DS) 800-160 MG tablet Take 1 tablet by mouth 2 times daily 14 tablet 0     tobramycin (TOBREX) 0.3 % ophthalmic solution 1-2 drops every 2 hours in right eye until bedtime today and then tomorrow, 9/4/21, start 1-2 drops 4 times a day for the next 6 days 5 mL 0     Vaginal Moisturizer (VAGISIL INTIMATE MOISTURIZER) LOTN Place 1 applicator vaginally four times a week 59 mL 1     ziprasidone (GEODON) 40 MG capsule Take 1 capsule (40 mg) by mouth 2 times daily (with meals) 60 capsule 2     LORazepam (ATIVAN) 0.5 MG tablet Take 0.5 tablets (0.25 mg) by mouth At Bedtime 15 tablet 0     Allergies   Allergen Reactions     Chlordiazepoxide Hcl      Dimetapp Dm Cold-Cough      Cold/Congetion TABS     Haldol      Ibuprofen      TABS     Lactose Intolerance [Beta-Galactosidase]      CAPS     Milk Products      Propofol      EMUL             ROS are negative, except as otherwise noted HPI      Objective    /68   Pulse 82   Temp 98.2  F (36.8  C) (Oral)   Resp 16   Ht 1.6 m (5' 3\")   Wt 101.6 kg (224 lb)   LMP  (LMP Unknown)   SpO2 96%   BMI 39.68 kg/m    Body mass index is 39.68 kg/m .  Physical Exam     /68   Pulse 82   Temp 98.2  F (36.8  C) (Oral)   Resp 16   Ht 1.6 m (5' 3\")   Wt 101.6 kg (224 lb)   LMP  (LMP Unknown)   SpO2 96%   BMI 39.68 kg/m    GENERAL: Pleasant and interactive.  Alert and oriented x 3.  Mild  distress.  HEENT: Normocephalic, atraumatic. PEERRLA, EOMI. left eye- sclera and lids are normal  right eye -sclera injected normal upper lid lower lid is swollen mild erythema, no lesion no periorbital erythema tenderness or swelling " , Conjunctiae are injected on right with some clear to white drainage. .  Nose and oropharynx moist and clear.      Diagnostic Test Results:  Labs reviewed in Epic  No results found for any visits on 09/03/21.        ASSESSMENT/PLAN:      ICD-10-CM    1. Preseptal cellulitis of right lower eyelid  L03.213 doxycycline hyclate (VIBRA-TABS) 100 MG tablet   2. Conjunctivitis of right eye, unspecified conjunctivitis type  H10.9 tobramycin (TOBREX) 0.3 % ophthalmic solution         Reviewed medication instructions and side effects. Follow up if experiences side effects.     I reviewed supportive care, otc meds to use if needed, expected course, and signs of concern.  Follow up as needed or if she does not improve within 1 day(s) or if worsens in any way.  Reviewed red flag symptoms and is to go to the ER if experiences any of these.         On the day of the encounter, time spend on chart review, patient visit, review of testing, documentation and discussion with other providers was 30  minutes      Patient Instructions     Start doxycycline 2 times a day for 7 days always take with food your pharmacist told you not to take it with food due to patient will vomit if she takes this on an empty stomach    This medication is for the redness/tenderness under her right eye  Please stop her milk of magnesia while she is on doxycyline it will decrease the effectiveness of the doxycycline    Start tobramycin drops in the right eye 1 to 2 drops in the right eye every 2 hours until bedtime tonight then, tomorrow 9/4/2021 start 1 to 2 drops in the right eye 4 times a day for the next 6 days    Once her eye is no longer having discharge mattering and the redness has improved please stop all of her eyedrops and ointment for her dry eye treatment they are now contaminated from the pinkeye  Start new eyedrops and ointment once I has known her having mattering and redness has resolved    If redness increases, increased pain in eye,  changes in vision,  swelling increases around the eye, any fever to ER    If the left eye becomes red and has discharge he can use the tobramycin eyedrops 4 times a day for 7 days             Patient Education     Bacterial Conjunctivitis    You have an infection in the membranes covering the white part of the eye. This part of the eye is called the conjunctiva. The infection is called conjunctivitis. The most common symptoms of conjunctivitis include a thick, pus-like discharge from the eye, swollen eyelids, redness, eyelids sticking together upon awakening, and a gritty or scratchy feeling in the eye. Your infection was caused by bacteria. It may be treated with medicine. With treatment, the infection takes about 7 to 10 days to resolve.   Home care    Use prescribed antibiotic eye drops or ointment as directed to treat the infection.    Apply a warm compress (towel soaked in warm water) to the affected eye 3 to 4 times a day. Do this just before applying medicine to the eye.    Use a warm, wet cloth to wipe away crusting of the eyelids in the morning. This is caused by mucus drainage during the night. You may also use saline irrigating solution or artificial tears to rinse away mucus in the eye. Do not put a patch over the eye.    Wash your hands before and after touching the infected eye. This is to prevent spreading the infection to the other eye, and to other people. Don't share your towels or washcloths with others.    You may use acetaminophen or ibuprofen to control pain, unless another medicine was prescribed. Talk with your healthcare provider before using these medicines if you have chronic liver or kidney disease. Also talk with your provider if you have ever had a stomach ulcer or digestive bleeding.    Don't wear contact lenses until your eyes have healed and all symptoms are gone.    Follow-up care  Follow up with your healthcare provider, or as advised.  When to seek medical advice  Call your  healthcare provider right away if any of these occur:    Worsening vision    Increasing pain in the eye    Increasing swelling or redness of the eyelid    Redness spreading around the eye  [employer] last reviewed this educational content on 4/1/2020 2000-2021 [employer]. All rights reserved. This information is not intended as a substitute for professional medical care. Always follow your healthcare professional's instructions.           Patient Education     Conjunctivitis Caused by Infection     Wash hands often to help prevent spreading infection.   Infections are caused by viruses or bacteria. Treatment includes keeping your eyes and hands clean. Your healthcare provider may prescribe eye drops and tell you to stay home from work or school if you re contagious. Untreated infections can be serious. It's important to see your provider for a diagnosis.   Viral infections  A cold, flu, or other virus can spread to your eyes. This causes a watery discharge. Your eyes may burn or itch and get red. Your eyelids may also be puffy and sore.   Treatment  Most viral infections go away on their own. Artificial tears and warm compresses can relieve symptoms. Your healthcare provider may also prescribe eye drops. A viral infection can be very contagious and spread quickly. To prevent this, wash your hands often. Use a separate tissue to wipe each eye. Don t touch your eyes or share bedding or towels. Use a new, clean washcloth every time you use one. Throw away eye cosmetics, especially mascara. Never use someone else's eye cosmetics. If you use contact lenses, follow your healthcare provider's instructions on proper lens care.    Bacterial infections  Bacterial infections often happen in one eye. There may be a watery or a thick discharge from the eye. These infections can cause serious damage to your eye if not treated promptly.   Treatment  Your healthcare provider may prescribe eye drops or ointment to  kill the bacteria. Use the medicine for the number of days it is prescribed. Don't stop using it when the symptoms improve. Other tips:     Warm compresses can help keep the eyelids clean.    Wash your hands often to keep the bacteria from spreading.    Use a separate tissue to wipe each eye.    Don't touch your eyes or share bedding or towels.    Use a new, clean washcloth every time you use one.    Throw away eye cosmetics, especially mascara.    Never use someone else's eye cosmetics.    If you use contact lenses, follow your healthcare provider's instructions on proper lens care.  [employer] last reviewed this educational content on 4/1/2020 2000-2021 [employer]. All rights reserved. This information is not intended as a substitute for professional medical care. Always follow your healthcare professional's instructions.           Patient Education     What Is Conjunctivitis?  Conjunctivitis is an irritation or infection. It affects the membrane that covers the white of your eye and the inside of your eyelid (conjunctiva). It can happen to one or both eyes. The membrane swells and the blood vessels enlarge (dilate). This makes your eye red. That's why conjunctivitis is sometimes called red eye or pink eye.     What are the symptoms?  If you have one or more of these symptoms, see an eye healthcare provider:     Redness in and around your eye    Eyes that are puffy and sore    Itching, burning, or stinging eyes    Watery eyes or discharge from your eye    Eyelids that are crusty or stuck together when you wake up in the morning    Pink color in the whites of one or both eyes    Sensitivity to bright light  Getting treatment quickly can help prevent damage to your eyes.   How is it diagnosed?  Conjunctivitis is often a minor eye infection. But it can sometimes become a more serious problem. Some more serious eye diseases have symptoms that look like conjunctivitis. So it's important for an eye  healthcare provider to diagnose you. Your eye healthcare provider will ask about your symptoms and any medicines you take. He or she will ask about any illnesses or health conditions you may have. The healthcare provider will also check your eyes with a hand-held light and a special microscope called a slit lamp.   [employer] last reviewed this educational content on 8/1/2020 2000-2021 [employer]. All rights reserved. This information is not intended as a substitute for professional medical care. Always follow your healthcare professional's instructions.           Patient Education     Periorbital Cellulitis  Periorbital cellulitis is an infection of the tissues around the eye. It's most often caused by an infected scratch, insect bite, or a sinus infection. If not treated, it may infect the eye. This can cause permanent changes in vision.   Home care  Take your antibiotics exactly as directed. Take all the medicine until it is finished.   Use pain medicine as prescribed. If no medicine was prescribed, you may use over-the-counter medicine as advised to help with pain and fever. If you have liver disease or ever had a stomach ulcer, talk with your healthcare provider before using these.   Don't give ibuprofen to a child under 6 months of age. Never give aspirin to a child under age 18 years who has a fever or viral illness. It may cause severe illness or death from Reye syndrome.   Follow-up care  Follow up with your healthcare provider, or as advised.  When to seek medical advice  Call your healthcare provider right away if any of these occur:    Swelling or pain around the eye that gets worse    Redness that gets worse    Changes in vision    Fever of 100.4 (38  C) oral or 101.5 (38.6  C) rectal for more than 2 days on antibiotics  [employer] last reviewed this educational content on 7/1/2019 2000-2021 [employer]. All rights reserved. This information is not intended as a substitute  for professional medical care. Always follow your healthcare professional's instructions.

## 2021-09-07 ENCOUNTER — DOCUMENTATION ONLY (OUTPATIENT)
Dept: FAMILY MEDICINE | Facility: CLINIC | Age: 71
End: 2021-09-07
Payer: COMMERCIAL

## 2021-09-07 NOTE — PROGRESS NOTES
"When opening a documentation only encounter, be sure to enter in \"Chief Complaint\" Forms and in \" Comments\" Title of form, description if needed.    Pinky is a 71 year old  female  Form received via: Fax  Form now resides in: Provider Ready    Joana Delaney MA                  "

## 2021-09-20 DIAGNOSIS — G47.09 OTHER INSOMNIA: ICD-10-CM

## 2021-09-20 RX ORDER — LORAZEPAM 0.5 MG/1
0.25 TABLET ORAL AT BEDTIME
Qty: 15 TABLET | Refills: 1 | Status: CANCELLED | OUTPATIENT
Start: 2021-09-23

## 2021-09-20 NOTE — TELEPHONE ENCOUNTER
Received RF request from pharmacy     Last seen: 8/30/21  RTC: 6-8 weeks  Cancel: none  No-show: none  Next appt: 10/25/21     Medication requested: LORazepam (ATIVAN) 0.5 MG tablet  Directions: Take 0.5 tablets (0.25 mg) by mouth At Bedtime   Qty: 15  Last Rx written 8/27/21 for 30 ds  [place]  checked and last refilled on 8/27/21 for 30 ds       Plan per 8/30/21 office visit:    - Continue ziprasidone 40 mg BID  - Continue fluoxetine 40 mg daily  - Continue melatonin 6mg at bedtime  - continue lorazepam 0.25mg at bedtime        Medication refill approved per refill protocol. Pended 30 ds with 1 refill. Routed to provider to sign off on for controlled substances.

## 2021-09-21 RX ORDER — LORAZEPAM 0.5 MG/1
0.25 TABLET ORAL AT BEDTIME
Qty: 15 TABLET | Refills: 0 | Status: SHIPPED | OUTPATIENT
Start: 2021-09-23 | End: 2021-10-22

## 2021-09-22 ENCOUNTER — TRANSFERRED RECORDS (OUTPATIENT)
Dept: HEALTH INFORMATION MANAGEMENT | Facility: CLINIC | Age: 71
End: 2021-09-22

## 2021-09-23 ENCOUNTER — DOCUMENTATION ONLY (OUTPATIENT)
Dept: FAMILY MEDICINE | Facility: CLINIC | Age: 71
End: 2021-09-23

## 2021-09-27 ENCOUNTER — TELEPHONE (OUTPATIENT)
Dept: SLEEP MEDICINE | Facility: CLINIC | Age: 71
End: 2021-09-27

## 2021-09-27 NOTE — TELEPHONE ENCOUNTER
Reason for call:  Other   Patient called regarding (reason for call): call back  Additional comments: Long term care/ group home staffing is calling requesting a call back to schedule sleep study would like a call back     Phone number to reach patient:  Cell number on file:    Telephone Information:   Long term care/ group home [phone]       Best Time:  Anytime    Can we leave a detailed message on this number?  YES    Travel screening: Not Applicable

## 2021-10-05 ENCOUNTER — DOCUMENTATION ONLY (OUTPATIENT)
Dept: FAMILY MEDICINE | Facility: CLINIC | Age: 71
End: 2021-10-05

## 2021-10-07 ENCOUNTER — DOCUMENTATION ONLY (OUTPATIENT)
Dept: FAMILY MEDICINE | Facility: CLINIC | Age: 71
End: 2021-10-07

## 2021-10-07 NOTE — TELEPHONE ENCOUNTER
Outside labs from 9/22/21.     Hgb A1c 7.2  eAG 160  - Improving from 6 months ago but still above goal.      Lipid panel  Total cholesterol 127  LDL 71  nonHDL cholesterol 86  HDL 41 (low)  Triglyceride 74  - Stable     Tonia oGel MD

## 2021-10-07 NOTE — PROGRESS NOTES
Outside labs from 9/22/21.     Hgb A1c 7.2  eAG 160  - Improving from 6 months ago but still above goal.      Lipid panel  Total cholesterol 127  LDL 71  nonHDL cholesterol 86  HDL 41 (low)  Triglyceride 74  - Stable     Tonia Goel MD

## 2021-10-08 ENCOUNTER — APPOINTMENT (OUTPATIENT)
Dept: GENERAL RADIOLOGY | Facility: CLINIC | Age: 71
End: 2021-10-08
Attending: EMERGENCY MEDICINE
Payer: COMMERCIAL

## 2021-10-08 ENCOUNTER — HOSPITAL ENCOUNTER (EMERGENCY)
Facility: CLINIC | Age: 71
Discharge: HOME | End: 2021-10-09
Attending: EMERGENCY MEDICINE | Admitting: EMERGENCY MEDICINE
Payer: COMMERCIAL

## 2021-10-08 ENCOUNTER — APPOINTMENT (OUTPATIENT)
Dept: CT IMAGING | Facility: CLINIC | Age: 71
End: 2021-10-08
Attending: EMERGENCY MEDICINE
Payer: COMMERCIAL

## 2021-10-08 DIAGNOSIS — M25.522 LEFT ELBOW PAIN: ICD-10-CM

## 2021-10-08 DIAGNOSIS — N30.00 ACUTE CYSTITIS WITHOUT HEMATURIA: ICD-10-CM

## 2021-10-08 DIAGNOSIS — M25.512 LEFT SHOULDER PAIN: ICD-10-CM
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
## 2021-10-08 DIAGNOSIS — N39.0 URINARY TRACT INFECTION, SITE NOT SPECIFIED: ICD-10-CM

## 2021-10-08 DIAGNOSIS — M25.562 LEFT KNEE PAIN: ICD-10-CM

## 2021-10-08 DIAGNOSIS — S00.81XA ABRASION, CHIN WITHOUT INFECTION: ICD-10-CM

## 2021-10-08 LAB
ANION GAP SERPL CALCULATED.3IONS-SCNC: 8 MMOL/L (ref 3–14)
BASOPHILS # BLD AUTO: 0.1 10E3/UL (ref 0–0.2)
BASOPHILS NFR BLD AUTO: 1 %
BUN SERPL-MCNC: 18 MG/DL (ref 7–30)
CALCIUM SERPL-MCNC: 9 MG/DL (ref 8.5–10.1)
CHLORIDE BLD-SCNC: 104 MMOL/L (ref 94–109)
CO2 SERPL-SCNC: 26 MMOL/L (ref 20–32)
CREAT SERPL-MCNC: 0.84 MG/DL (ref 0.52–1.04)
EOSINOPHIL # BLD AUTO: 0.1 10E3/UL (ref 0–0.7)
EOSINOPHIL NFR BLD AUTO: 1 %
ERYTHROCYTE [DISTWIDTH] IN BLOOD BY AUTOMATED COUNT: 13.7 % (ref 10–15)
GFR SERPL CREATININE-BSD FRML MDRD: 70 ML/MIN/1.73M2
GLUCOSE BLD-MCNC: 168 MG/DL (ref 70–99)
HCT VFR BLD AUTO: 40 % (ref 35–47)
HGB BLD-MCNC: 13 G/DL (ref 11.7–15.7)
HOLD SPECIMEN: NORMAL
IMM GRANULOCYTES # BLD: 0 10E3/UL
IMM GRANULOCYTES NFR BLD: 0 %
LYMPHOCYTES # BLD AUTO: 3 10E3/UL (ref 0.8–5.3)
LYMPHOCYTES NFR BLD AUTO: 26 %
MCH RBC QN AUTO: 29.5 PG (ref 26.5–33)
MCHC RBC AUTO-ENTMCNC: 32.5 G/DL (ref 31.5–36.5)
MCV RBC AUTO: 91 FL (ref 78–100)
MONOCYTES # BLD AUTO: 0.8 10E3/UL (ref 0–1.3)
MONOCYTES NFR BLD AUTO: 6 %
NEUTROPHILS # BLD AUTO: 7.8 10E3/UL (ref 1.6–8.3)
NEUTROPHILS NFR BLD AUTO: 66 %
NRBC # BLD AUTO: 0 10E3/UL
NRBC BLD AUTO-RTO: 0 /100
PLATELET # BLD AUTO: 271 10E3/UL (ref 150–450)
POTASSIUM BLD-SCNC: 4.2 MMOL/L (ref 3.4–5.3)
RBC # BLD AUTO: 4.41 10E6/UL (ref 3.8–5.2)
SODIUM SERPL-SCNC: 138 MMOL/L (ref 133–144)
TROPONIN I SERPL-MCNC: <0.015 UG/L (ref 0–0.04)
WBC # BLD AUTO: 11.8 10E3/UL (ref 4–11)

## 2021-10-08 PROCEDURE — 73560 X-RAY EXAM OF KNEE 1 OR 2: CPT | Mod: 26 | Performed by: RADIOLOGY

## 2021-10-08 PROCEDURE — 99285 EMERGENCY DEPT VISIT HI MDM: CPT | Mod: 25 | Performed by: EMERGENCY MEDICINE

## 2021-10-08 PROCEDURE — 36415 COLL VENOUS BLD VENIPUNCTURE: CPT | Performed by: EMERGENCY MEDICINE

## 2021-10-08 PROCEDURE — 73030 X-RAY EXAM OF SHOULDER: CPT | Mod: 26 | Performed by: RADIOLOGY

## 2021-10-08 PROCEDURE — 84484 ASSAY OF TROPONIN QUANT: CPT | Performed by: EMERGENCY MEDICINE

## 2021-10-08 PROCEDURE — 73070 X-RAY EXAM OF ELBOW: CPT | Mod: 26 | Performed by: RADIOLOGY

## 2021-10-08 PROCEDURE — 93005 ELECTROCARDIOGRAM TRACING: CPT

## 2021-10-08 PROCEDURE — 73560 X-RAY EXAM OF KNEE 1 OR 2: CPT | Mod: LT

## 2021-10-08 PROCEDURE — 82374 ASSAY BLOOD CARBON DIOXIDE: CPT | Performed by: EMERGENCY MEDICINE

## 2021-10-08 PROCEDURE — 73070 X-RAY EXAM OF ELBOW: CPT | Mod: LT

## 2021-10-08 PROCEDURE — 99285 EMERGENCY DEPT VISIT HI MDM: CPT | Mod: 25

## 2021-10-08 PROCEDURE — 85025 COMPLETE CBC W/AUTO DIFF WBC: CPT | Performed by: EMERGENCY MEDICINE

## 2021-10-08 PROCEDURE — 71046 X-RAY EXAM CHEST 2 VIEWS: CPT

## 2021-10-08 PROCEDURE — 93010 ELECTROCARDIOGRAM REPORT: CPT | Performed by: EMERGENCY MEDICINE

## 2021-10-08 PROCEDURE — 70450 CT HEAD/BRAIN W/O DYE: CPT

## 2021-10-08 PROCEDURE — 70450 CT HEAD/BRAIN W/O DYE: CPT | Mod: 26 | Performed by: RADIOLOGY

## 2021-10-08 PROCEDURE — 71046 X-RAY EXAM CHEST 2 VIEWS: CPT | Mod: 26 | Performed by: RADIOLOGY

## 2021-10-08 PROCEDURE — 73030 X-RAY EXAM OF SHOULDER: CPT | Mod: LT

## 2021-10-08 ASSESSMENT — ENCOUNTER SYMPTOMS
DIARRHEA: 0
COLOR CHANGE: 0
ARTHRALGIAS: 0
DIFFICULTY URINATING: 1
FEVER: 0
WEAKNESS: 0
COUGH: 0
FATIGUE: 0
HEADACHES: 0
ABDOMINAL DISTENTION: 0
DIZZINESS: 0
CHEST TIGHTNESS: 0
BACK PAIN: 0
DYSURIA: 0
NAUSEA: 0
SHORTNESS OF BREATH: 0
CHILLS: 0
FREQUENCY: 0
SORE THROAT: 0
ABDOMINAL PAIN: 0
CONFUSION: 0
PALPITATIONS: 0
NECK PAIN: 0
MYALGIAS: 0
VOMITING: 0
CONSTIPATION: 0
EYE PAIN: 0

## 2021-10-09 VITALS
DIASTOLIC BLOOD PRESSURE: 93 MMHG | WEIGHT: 236 LBS | BODY MASS INDEX: 41.81 KG/M2 | RESPIRATION RATE: 18 BRPM | TEMPERATURE: 98.6 F | HEART RATE: 108 BPM | OXYGEN SATURATION: 95 % | SYSTOLIC BLOOD PRESSURE: 138 MMHG

## 2021-10-09 LAB
ALBUMIN UR-MCNC: NEGATIVE MG/DL
APPEARANCE UR: CLEAR
BILIRUB UR QL STRIP: NEGATIVE
COLOR UR AUTO: ABNORMAL
GLUCOSE UR STRIP-MCNC: NEGATIVE MG/DL
HGB UR QL STRIP: NEGATIVE
HOLD SPECIMEN: NORMAL
KETONES UR STRIP-MCNC: NEGATIVE MG/DL
LEUKOCYTE ESTERASE UR QL STRIP: ABNORMAL
NITRATE UR QL: POSITIVE
PH UR STRIP: 7 [PH] (ref 5–7)
RBC URINE: <1 /HPF
SP GR UR STRIP: 1.01 (ref 1–1.03)
UROBILINOGEN UR STRIP-MCNC: NORMAL MG/DL
WBC URINE: 33 /HPF

## 2021-10-09 PROCEDURE — 87086 URINE CULTURE/COLONY COUNT: CPT | Performed by: EMERGENCY MEDICINE

## 2021-10-09 PROCEDURE — 81001 URINALYSIS AUTO W/SCOPE: CPT | Performed by: EMERGENCY MEDICINE

## 2021-10-09 PROCEDURE — 250N000013 HC RX MED GY IP 250 OP 250 PS 637: Performed by: EMERGENCY MEDICINE

## 2021-10-09 RX ORDER — ACETAMINOPHEN 500 MG
1000 TABLET ORAL ONCE
Status: COMPLETED | OUTPATIENT
Start: 2021-10-09 | End: 2021-10-09

## 2021-10-09 RX ORDER — NITROFURANTOIN 25; 75 MG/1; MG/1
100 CAPSULE ORAL 2 TIMES DAILY
Qty: 14 CAPSULE | Refills: 0 | Status: SHIPPED | OUTPATIENT
Start: 2021-10-09 | End: 2022-02-14

## 2021-10-09 RX ADMIN — ACETAMINOPHEN 1000 MG: 500 TABLET, FILM COATED ORAL at 04:00

## 2021-10-09 NOTE — ED PROVIDER NOTES
[place] EMERGENCY DEPARTMENT ([facility])  10/08/21  History     Chief Complaint   Patient presents with     Fall     HPI  Pinky Page is a 71 year old female with PMH significant for schizoaffective disorder, ANUJ, MDD, HTN, and type 2 diabetes mellitus who presents to the Emergency Department for evaluation of a fall.  Patient states that sometime after 8 PM she stood up from a chair and then fell hitting the front/left side of her head, left elbow, left shoulder, and left knee.  She does states that she thinks she lost consciousness and is unsure of what caused the fall.  Patient does report she has fallen in the past, but not in a long time.  She denies history of seizure.  She denies new loss of bowel or bladder control.  She denies fever, chills, shortness of breath, abdominal pain, nausea, or vomiting.  She denies being anticoagulated.  She denies neck or back pain.  Patient does indicate that she did scrape her left ring finger on the carpet.  She does report that she has been eating and drinking normally.  She indicates that she has been having normal BMs, but has had urinary incontinence however this is not new.    Past Medical History  Past Medical History:   Diagnosis Date     Allergic rhinitis due to pollen     seasonal allergies      Anisometropia and aniseikonia      BMI greater than 40      Cardiomegaly      Chronic constipation      Congenital absence of one kidney      Cramp of limb      Dermatophytosis of the body      Dry eye syndrome      Esophageal reflux      Essential hypertension, benign      Gastro-oesophageal reflux disease      Hemorrhoids      Hypermetropia      Hypothyroidism      Incomplete Emptying of Bladder      Lactose intolerance      Major depressive disorder, recurrent episode, moderate (H)      Malignant neoplasm of breast (female), unspecified site     left mastectomy 1996      Mixed incontinence urge and stress (male)(female)      Moderate obstructive sleep  apnea      Postmenopausal Atrophic Vaginitis      Presbyopia      Regular astigmatism      Restless leg syndrome      Senile nuclear sclerosis      Thyroid eye disease     mild     Tinnitus      Trigger finger (acquired)     right hand     Type II or unspecified type diabetes mellitus without mention of complication, not stated as uncontrolled     on insulin since April 2006      Past Surgical History:   Procedure Laterality Date     APPENDECTOMY       C MASTECTOMY,SIMPLE  1996    Left mastectomy  [facility]. Had normal FU mammo 5/2007. Follow yearly.      C TOTAL ABDOM HYSTERECTOMY  7/2003    Dr. Castanon, for abnormal bleeding. Removal of both tubes with BSO for myoma w - - -     CHOLECYSTECTOMY       COLONOSCOPY  5/2005    Complete Colonoscopy-had one small polyp removed 5/2005.      COLONOSCOPY N/A 3/31/2016    Procedure: COLONOSCOPY;  Surgeon: Cary Ring MD;  Location: [facility]     COLONOSCOPY N/A 7/7/2016    Procedure: COLONOSCOPY;  Surgeon: Cary Ring MD;  Location: [facility]     DILATION AND CURETTAGE       HYSTERECTOMY       MASTECTOMY      Left breast     acetaminophen (TYLENOL) 500 MG tablet  alum & mag hydroxide-simethicone (MYLANTA/MAALOX) 200-200-20 MG/5ML SUSP suspension  amLODIPine (NORVASC) 10 MG tablet  apixaban ANTICOAGULANT (ELIQUIS) 5 MG tablet  artificial saliva (BIOTENE MT) AERS spray  aspirin 81 MG EC tablet  atorvastatin (LIPITOR) 40 MG tablet  blood glucose (NO BRAND SPECIFIED) lancets standard  blood glucose calibration (NO BRAND SPECIFIED) solution  blood glucose monitoring (NO BRAND SPECIFIED) meter device kit  blood glucose monitoring (NO BRAND SPECIFIED) test strip  Calcium Carbonate-Vitamin D (CALCIUM-VITAMIN D) 250-125 MG-UNIT TABS  calcium polycarbophil (FIBERCON) 625 MG tablet  cefdinir (OMNICEF) 300 MG capsule  CLARITIN 10 MG OR TABS  erythromycin (ROMYCIN) 5 MG/GM ophthalmic ointment  FLUoxetine (PROZAC) 20 MG capsule  fluticasone  (FLONASE) 50 MCG/ACT nasal spray  furosemide (LASIX) 20 MG tablet  Gabapentin (NEURONTIN PO)  Hypromellose (ARTIFICIAL TEARS OP)  insulin glargine (LANTUS) 100 UNIT/ML injection  lactase (LACTAID) 3000 UNIT tablet  levothyroxine (SYNTHROID, LEVOTHROID) 175 MCG tablet  levothyroxine (SYNTHROID/LEVOTHROID) 175 MCG tablet  lifitegrast (XIIDRA) 5 % opthalmic solution  liraglutide (VICTOZA) 18 MG/3ML solution  LORazepam (ATIVAN) 0.5 MG tablet  LORazepam (ATIVAN) 0.5 MG tablet  losartan (COZAAR) 100 MG tablet  Magnesium Hydroxide (MILK OF MAGNESIA PO)  melatonin 3 MG CAPS  metFORMIN (GLUCOPHAGE) 1000 MG tablet  NEW MED  nystatin (MYCOSTATIN) 284123 UNIT/GM POWD  ONETOUCH DELICA LANCETS 33G MISC  polyethylene glycol (MIRALAX/GLYCOLAX) powder  Saline 0.9 % SOLN  senna-docusate (SENNA S) 8.6-50 MG per tablet  simethicone (MYLICON) 125 MG chewable tablet  Skin Protectants, Misc. (EUCERIN) cream  SM LUBRICANT EYE DROPS 0.4-0.3 % SOLN ophthalmic solution  solifenacin (VESICARE) 10 MG tablet  sulfamethoxazole-trimethoprim (BACTRIM DS) 800-160 MG tablet  tobramycin (TOBREX) 0.3 % ophthalmic solution  Vaginal Moisturizer (VAGISIL INTIMATE MOISTURIZER) LOTN  ziprasidone (GEODON) 40 MG capsule      Allergies   Allergen Reactions     Chlordiazepoxide Hcl      Dimetapp Dm Cold-Cough      Cold/Congetion TABS     Haldol      Ibuprofen      TABS     Lactose Intolerance [Beta-Galactosidase]      CAPS     Milk Products      Propofol      EMUL     Family History  Family History   Problem Relation Age of Onset     Heart Disease Father         1968     Melanoma Mother         malignant melanoma     Glaucoma No family hx of      Macular Degeneration No family hx of      Social History   Social History     Tobacco Use     Smoking status: Never Smoker     Smokeless tobacco: Never Used   Substance Use Topics     Alcohol use: No     Alcohol/week: 0.0 standard drinks     Drug use: No      Past medical history, past surgical history, medications,  allergies, family history, and social history were reviewed with the patient. No additional pertinent items.       Review of Systems   Constitutional: Negative for chills, fatigue and fever.   HENT: Negative for congestion and sore throat.    Eyes: Negative for pain and visual disturbance.   Respiratory: Negative for cough, chest tightness and shortness of breath.    Cardiovascular: Negative for chest pain and palpitations.   Gastrointestinal: Negative for abdominal distention, abdominal pain, constipation, diarrhea, nausea and vomiting.   Genitourinary: Positive for difficulty urinating. Negative for dysuria, frequency and urgency.   Musculoskeletal: Negative for arthralgias, back pain, myalgias and neck pain.        L elbow, shoulder, knee pain  L ring finger scrape   Skin: Negative for color change and rash.   Neurological: Negative for dizziness, weakness and headaches.   Psychiatric/Behavioral: Negative for confusion.         Physical Exam   BP: 135/84  Pulse: 86  Temp: 98.6  F (37  C)  Resp: 16  Weight: 107 kg (236 lb)  SpO2: 97 %  Physical Exam  Vitals and nursing note reviewed.   Constitutional:       General: She is not in acute distress.     Appearance: Normal appearance. She is not ill-appearing or toxic-appearing.   HENT:      Head: Normocephalic. Abrasion present. No raccoon eyes, Saravia's sign, contusion or laceration.      Jaw: There is normal jaw occlusion.        Right Ear: No hemotympanum.      Left Ear: No hemotympanum.      Nose: Nose normal. No nasal deformity, signs of injury or rhinorrhea.      Mouth/Throat:      Mouth: Mucous membranes are moist.      Pharynx: Oropharynx is clear.   Eyes:      Pupils: Pupils are equal, round, and reactive to light.   Cardiovascular:      Rate and Rhythm: Normal rate.      Pulses: Normal pulses.      Heart sounds: Normal heart sounds.   Pulmonary:      Effort: Pulmonary effort is normal. No respiratory distress.      Breath sounds: Normal breath sounds.    Abdominal:      General: Abdomen is flat. There is no distension.   Musculoskeletal:         General: No swelling or deformity. Normal range of motion.        Arms:       Cervical back: Normal range of motion. No rigidity.        Legs:    Skin:     General: Skin is warm.      Capillary Refill: Capillary refill takes less than 2 seconds.   Neurological:      Mental Status: She is alert and oriented to person, place, and time.   Psychiatric:         Mood and Affect: Mood normal.         ED Course     ED Course as of Oct 09 0137   Fri Oct 08, 2021   2214      EKG Interpretation:     Interpreted by Trevor Watts DO  Time reviewed:2214   Symptoms at time of EKG: fall  Rhythm: afib  Rate: 74  Axis: NORMAL  Ectopy: none  Conduction: normal  ST Segments/ T Waves: No ST-T wave changes  Q Waves: none  Comparison to prior: Unchanged    Clinical Impression: afib o/w normal EKG       Sat Oct 09, 2021   0055 XR Elbow Left 2 Views      9:46 PM  The patient was seen and examined by Trevor Watts DO in Room ED04.   Procedures       The medical record was reviewed and interpreted.  Current labs reviewed and interpreted.  Current images reviewed and interpreted: neg trauma w/u.       Results for orders placed or performed during the hospital encounter of 10/08/21   Head CT w/o contrast     Status: None    Narrative    EXAM: CT HEAD W/O CONTRAST  LOCATION: [facility]  DATE/TIME: 10/8/2021 10:18 PM    INDICATION: Head injury  COMPARISON: 08/20/2015  TECHNIQUE: Routine CT Head without IV contrast. Multiplanar reformats. Dose reduction techniques were used.    FINDINGS:  INTRACRANIAL CONTENTS: No intracranial hemorrhage, extraaxial collection, or mass effect.  No CT evidence of acute infarct. Mild presumed chronic small vessel ischemic changes. Mild generalized volume loss. No hydrocephalus.     VISUALIZED ORBITS/SINUSES/MASTOIDS: No intraorbital abnormality. No paranasal sinus mucosal  disease. No middle ear or mastoid effusion.    BONES/SOFT TISSUES: No acute abnormality.      Impression    IMPRESSION:  1.  Normal head CT.   XR Shoulder Left 3 Views     Status: None    Narrative    EXAM: XR SHOULDER LEFT G/E 3 VIEWS  LOCATION: [facility]  DATE/TIME: 10/8/2021 10:19 PM    INDICATION: Fall. Left shoulder pain.  COMPARISON: None.      Impression    IMPRESSION: No acute fracture or dislocation. Arthritis in the AC joint and glenohumeral joint.    XR Elbow Left 2 Views     Status: None    Narrative    EXAM: XR ELBOW LT 2 VW  LOCATION: [facility]  DATE/TIME: 10/8/2021 10:19 PM    INDICATION: Fall, left elbow pain  COMPARISON: None.      Impression    IMPRESSION: Rotated lateral image compromises evaluation for joint effusion. No evidence of displaced fracture on the AP view. No dislocation.   XR Chest 2 Views     Status: None    Narrative    EXAM: XR CHEST 2 VW  LOCATION: [facility]  DATE/TIME: 10/8/2021 10:19 PM    INDICATION: Fall. Syncope.  COMPARISON: 3/15/2010.    FINDINGS: The heart is at the upper limits normal in size. There is no pulmonary edema. The thoracic aorta is calcified. The lungs are clear. No pneumothorax or pleural effusion. Degenerative disease in the spine.      Impression    IMPRESSION: No acute abnormality.   XR Knee Left 1/2 Views     Status: None    Narrative    EXAM: XR KNEE LT 1/2 VW  LOCATION: [facility]  DATE/TIME: 10/8/2021 10:19 PM    INDICATION: Fall, left knee pain with anterior swelling  COMPARISON: None.      Impression    IMPRESSION: No fracture or dislocation. No significant knee effusion.   Extra Red Top Tube     Status: None   Result Value Ref Range    Hold Specimen JIC    Extra Green Top (Lithium Heparin) Tube     Status: None   Result Value Ref Range    Hold Specimen JIC    Extra  Purple Top Tube     Status: None   Result Value Ref Range    Hold Specimen JIC    Troponin I     Status: Normal   Result Value Ref Range    Troponin I <0.015 0.000 - 0.045 ug/L   Basic metabolic panel     Status: Abnormal   Result Value Ref Range    Sodium 138 133 - 144 mmol/L    Potassium 4.2 3.4 - 5.3 mmol/L    Chloride 104 94 - 109 mmol/L    Carbon Dioxide (CO2) 26 20 - 32 mmol/L    Anion Gap 8 3 - 14 mmol/L    Urea Nitrogen 18 7 - 30 mg/dL    Creatinine 0.84 0.52 - 1.04 mg/dL    Calcium 9.0 8.5 - 10.1 mg/dL    Glucose 168 (H) 70 - 99 mg/dL    GFR Estimate 70 >60 mL/min/1.73m2   CBC with platelets and differential     Status: Abnormal   Result Value Ref Range    WBC Count 11.8 (H) 4.0 - 11.0 10e3/uL    RBC Count 4.41 3.80 - 5.20 10e6/uL    Hemoglobin 13.0 11.7 - 15.7 g/dL    Hematocrit 40.0 35.0 - 47.0 %    MCV 91 78 - 100 fL    MCH 29.5 26.5 - 33.0 pg    MCHC 32.5 31.5 - 36.5 g/dL    RDW 13.7 10.0 - 15.0 %    Platelet Count 271 150 - 450 10e3/uL    % Neutrophils 66 %    % Lymphocytes 26 %    % Monocytes 6 %    % Eosinophils 1 %    % Basophils 1 %    % Immature Granulocytes 0 %    NRBCs per 100 WBC 0 <1 /100    Absolute Neutrophils 7.8 1.6 - 8.3 10e3/uL    Absolute Lymphocytes 3.0 0.8 - 5.3 10e3/uL    Absolute Monocytes 0.8 0.0 - 1.3 10e3/uL    Absolute Eosinophils 0.1 0.0 - 0.7 10e3/uL    Absolute Basophils 0.1 0.0 - 0.2 10e3/uL    Absolute Immature Granulocytes 0.0 <=0.0 10e3/uL    Absolute NRBCs 0.0 10e3/uL   EKG 12-lead, tracing only     Status: None (Preliminary result)   Result Value Ref Range    Systolic Blood Pressure  mmHg    Diastolic Blood Pressure  mmHg    Ventricular Rate 74 BPM    Atrial Rate 394 BPM    CA Interval  ms    QRS Duration 70 ms     ms    QTc 439 ms    P Axis  degrees    R AXIS 5 degrees    T Axis 24 degrees    Interpretation ECG       Atrial fibrillation  Possible Inferior infarct , age undetermined  Abnormal ECG     [place] Draw     Status: None (In process)    Narrative    The  following orders were created for panel order [place] Draw.  Procedure                               Abnormality         Status                     ---------                               -----------         ------                     Extra Blue Top Tube[[id number]]                                                         Extra Red Top Tube[[id number]]                               Final result               Extra Green Top (Lithium...[[id number]]                      Final result               Extra Purple Top Tube[[id number]]                            Final result               Extra Blood Bank Purple ...[[id number]]                                                   Please view results for these tests on the individual orders.   CBC with platelets differential     Status: Abnormal    Narrative    The following orders were created for panel order CBC with platelets differential.  Procedure                               Abnormality         Status                     ---------                               -----------         ------                     CBC with platelets and d...[[id number]]  Abnormal            Final result                 Please view results for these tests on the individual orders.     Medications   acetaminophen (TYLENOL) tablet 1,000 mg (has no administration in time range)        Assessments & Plan (with Medical Decision Making)   Patient presents for evaluation of multiple injuries after a fall.  She has abrasion to the left side of her head, pain to her shoulder, elbow, and left knee.  Is otherwise alert and oriented.  She denies any neck or back pain.  Duration of the knee shoulder elbow revealed tenderness to these areas without any ligamentous injury, skin changes, or other significant deformity.  X-rays are negative.  Pain likely consistent with mild contusions.  Discussed ice/acetaminophen at home for pain.    CT negative for intracranial pathology.    Laboratory work-up is unremarkable.  EKG  shows normal sinus rhythm.  Patient is alert and oriented with otherwise normal exam.  She does not appear particularly distressed.  Suspect this was likely a mechanical fall.  Urinalysis is pending.  Would like this to be resulted prior to discharge.  Will sign out to overnight provider to follow-up on this result.  If negative anticipate discharge back to nursing home.    I have reviewed the nursing notes. I have reviewed the findings, diagnosis, plan and need for follow up with the patient.    New Prescriptions    No medications on file       Final diagnoses:   Fall, initial encounter     IJonathan, am serving as a trained medical scribe to document services personally performed by Trevor Watts DO, based on the provider's statements to me.     ITrevor DO, was physically present and have reviewed and verified the accuracy of this note documented by Jonathan Bernabe.    --  Trevor Watts DO  [facility] EMERGENCY DEPARTMENT  10/8/2021     Trevor Watts DO  10/09/21 0137

## 2021-10-09 NOTE — ED PROVIDER NOTES
Patient signed out to me by prior attending  Briefly 71-year-old female with history of schizoaffective, hypertension, diabetes who presented for fall.  Patient received work-up as noted by Dr. Goltz and has been cleared for discharge with exception of need for urinalysis to exclude occult UTI.    Plan is to send an awake UA results and discharge either with or without antibiotics pending these results.    Urinalysis shows positive nitrites large leukocytes slight esterase and 33 WBCs consistent with cystitis.  Reevaluated, patient has no CVA tenderness to suggest pyelonephritis and is afebrile.    BP (!) 168/81   Pulse 92   Temp 98.6  F (37  C) (Oral)   Resp 16   Wt 107 kg (236 lb)   LMP  (LMP Unknown)   SpO2 96%   BMI 41.81 kg/m        -Macrobid x7 days  -UTI discharge instructions and strict ED return precautions      - Patient agrees to our plan and is ready and eager for discharge. Care plan, follow up plan, and reasons to return immediately to the ED were dicussed in detail and summarized as noted in the discharge instructions.       Eriberto Santana MD  10/09/21 032

## 2021-10-09 NOTE — ED TRIAGE NOTES
BIBA from assisted living. Had fall with LOC. Hit left side of head. C/o left shoulder pain, left knee pain which is new since fall. A&Ox4.

## 2021-10-10 LAB
ATRIAL RATE - MUSE: 394 BPM
BACTERIA UR CULT: ABNORMAL
DIASTOLIC BLOOD PRESSURE - MUSE: NORMAL MMHG
INTERPRETATION ECG - MUSE: NORMAL
P AXIS - MUSE: NORMAL DEGREES
PR INTERVAL - MUSE: NORMAL MS
QRS DURATION - MUSE: 70 MS
QT - MUSE: 396 MS
QTC - MUSE: 439 MS
R AXIS - MUSE: 5 DEGREES
SYSTOLIC BLOOD PRESSURE - MUSE: NORMAL MMHG
T AXIS - MUSE: 24 DEGREES
VENTRICULAR RATE- MUSE: 74 BPM

## 2021-10-11 NOTE — PROGRESS NOTES
Form has been completed by provider.     Form sent out via: Fax to gustavo Truong (Result Information: Gycosylated [id number](7.2) at Fax Number: [phone]  Patient informed: N/A  Output date: October 11, 2021    Sherrill Lafleur      **Please close the encounter**

## 2021-10-12 ENCOUNTER — VIRTUAL VISIT (OUTPATIENT)
Dept: PSYCHOLOGY | Facility: CLINIC | Age: 71
End: 2021-10-12
Payer: COMMERCIAL

## 2021-10-12 DIAGNOSIS — E66.9 OBESITY, UNSPECIFIED OBESITY SEVERITY, UNSPECIFIED OBESITY TYPE: ICD-10-CM

## 2021-10-12 DIAGNOSIS — F33.0 MAJOR DEPRESSIVE DISORDER, RECURRENT EPISODE, MILD (H): Primary | ICD-10-CM

## 2021-10-12 DIAGNOSIS — F54 PSYCHOLOGICAL FACTORS AFFECTING MEDICAL CONDITION: ICD-10-CM

## 2021-10-12 DIAGNOSIS — F41.1 GENERALIZED ANXIETY DISORDER: ICD-10-CM

## 2021-10-12 PROCEDURE — 90834 PSYTX W PT 45 MINUTES: CPT | Mod: 95 | Performed by: PSYCHOLOGIST

## 2021-10-12 NOTE — RESULT ENCOUNTER NOTE
Final Urine Culture Report on 10/10/21  [facility] Emergency Dept discharge antibiotic prescribed: Nitrofurantoin Macrocrystal-Monohydrate (Macrobid) 100 mg PO capsule, 1 capsule (100 mg) by mouth 2 times daily for 7 days  #1. Bacteria, >100,000 colonies/mL Escherichia coli, is SUSCEPTIBLE to Antibiotic.    Recommendations in treatment per [facility] ED lab result Urine Culture protocol.

## 2021-10-12 NOTE — PROGRESS NOTES
Health Psychology                     Department of Medicine  Keri Arnett, Ph.D., L.P. [phone]                          [facility] Madelaine Ingram, Ph.D., L.P. [phone]                   [place] Mail Code 783   GilmaDennis, Ph.D., L.P.  [phone]       [address] [place] [address] Rae Eugene, Ph.D., L.P. [phone]       [place] [address]            Damon Gr, Ph.D., A.B.P.P., L.P. [phone]     Kimber Morales, Ph.D., L.P. 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 Today we used the video of her cell phone. This is her fifrth video session. She still needs elp setting it up.      Intake with Health Psychology was in the 1980s and I began to see her in the early 1990s.    Health:  She has not gotten booster yet. She fell a few days ago, was seen at [facility] ED, had imaging studies.  No fractures.   She reports discomfort on left side, both knees, and had a bump on her head, still feels it.  She isn't sure why she fell.  She used ice packs.  She fell on carpet.   She was upset after the fall and feels her mood is better.     Her mood is variable  She is down about the COVID pandemic returning, happy she is still here.  She anticipates getitng booster for COVID and is encouraged to speak with staff about when that is possible.  She doesn't know which one she got.   She is a 3-4 on 10-point scale of depression.  No residuals from when she COVID.  She feels good in general.  She reports there have been incidence of staff with COVID during past two weeks.     Exercise:  She had been  biking 7x/week for 30 minutes; not currently exercising due to knees. She was also walking 20-25 minutes indoor or outdoor, or both.  We discussed a total of 1.5 hours per day  If possible.   She had a UTI and slowed down, is going to need to increase her exercise.  She is walking 20 -25 minutes/day and biking 15 minutes/day..     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     Her weight increased to 236 (hospital entry)  from 223 at [place];  She states she was weighed at 223 today at [place].  We discussed approach to weight loss in terms of fewer calories despite lack of diet line, increasing exercise. She is reinforced for her efforts.     Recreation/Activities: Coloring, word search puzzles, read, tv, radio, exercise.     They cut back on her Ativan due to potential for age-relaxed memory side-effects.  She hasn't had an increase in anxiety.  He is nw taking a small dose.    Reviewed note from [facility].    She participates fully, ventilated feelings appropriately.  She appears to derive benefit from the support.    Current Outpatient Medications   Medication     acetaminophen (TYLENOL) 500 MG tablet     alum & mag hydroxide-simethicone (MYLANTA/MAALOX) 200-200-20 MG/5ML SUSP suspension     amLODIPine (NORVASC) 10 MG tablet     apixaban ANTICOAGULANT (ELIQUIS) 5 MG tablet     artificial saliva (BIOTENE MT) AERS spray     aspirin 81 MG EC tablet     atorvastatin (LIPITOR) 40 MG tablet     blood glucose (NO BRAND SPECIFIED) lancets standard     blood glucose calibration (NO BRAND SPECIFIED) solution     blood glucose monitoring (NO BRAND SPECIFIED) meter device kit     blood glucose monitoring (NO BRAND SPECIFIED) test strip     Calcium Carbonate-Vitamin D (CALCIUM-VITAMIN D) 250-125 MG-UNIT TABS     calcium polycarbophil (FIBERCON) 625 MG tablet     cefdinir (OMNICEF) 300 MG capsule     CLARITIN 10 MG OR TABS     erythromycin (ROMYCIN) 5 MG/GM ophthalmic ointment     FLUoxetine (PROZAC) 20 MG capsule     fluticasone (FLONASE) 50 MCG/ACT nasal spray     furosemide (LASIX)  20 MG tablet     Gabapentin (NEURONTIN PO)     Hypromellose (ARTIFICIAL TEARS OP)     insulin glargine (LANTUS) 100 UNIT/ML injection     lactase (LACTAID) 3000 UNIT tablet     levothyroxine (SYNTHROID, LEVOTHROID) 175 MCG tablet     levothyroxine (SYNTHROID/LEVOTHROID) 175 MCG tablet     lifitegrast (XIIDRA) 5 % opthalmic solution     liraglutide (VICTOZA) 18 MG/3ML solution     LORazepam (ATIVAN) 0.5 MG tablet     LORazepam (ATIVAN) 0.5 MG tablet     losartan (COZAAR) 100 MG tablet     Magnesium Hydroxide (MILK OF MAGNESIA PO)     melatonin 3 MG CAPS     metFORMIN (GLUCOPHAGE) 1000 MG tablet     NEW MED     nitroFURantoin macrocrystal-monohydrate (MACROBID) 100 MG capsule     nystatin (MYCOSTATIN) 468255 UNIT/GM POWD     ONETOUCH DELICA LANCETS 33G MISC     polyethylene glycol (MIRALAX/GLYCOLAX) powder     Saline 0.9 % SOLN     senna-docusate (SENNA S) 8.6-50 MG per tablet     simethicone (MYLICON) 125 MG chewable tablet     Skin Protectants, Misc. (EUCERIN) cream     SM LUBRICANT EYE DROPS 0.4-0.3 % SOLN ophthalmic solution     solifenacin (VESICARE) 10 MG tablet     sulfamethoxazole-trimethoprim (BACTRIM DS) 800-160 MG tablet     tobramycin (TOBREX) 0.3 % ophthalmic solution     Vaginal Moisturizer (VAGISIL INTIMATE MOISTURIZER) LOTN     ziprasidone (GEODON) 40 MG capsule     No current facility-administered medications for this visit.     Wt Readings from Last 4 Encounters:   10/08/21 107 kg (236 lb)   09/03/21 101.6 kg (224 lb)   08/30/21 101.3 kg (223 lb 6.4 oz)   08/06/21 101.6 kg (224 lb)   There is no height or weight on file to calculate BMI.      This telephone service is appropriate and effective for delivering services in light of the necessity for social distancing to mitigate the COVID.bmi  -19 epidemic and for conservation of PPE.     Patient has agreed to receiving telephone services after being informed about it: Yes    Patient prefers video invitation/information to be sent by:   email    Time  service started: 1;02  Time service ended:  1:41      Mode of transmission: Doxy following instructions  by telephone so as to reduce chance of being overheard.    Location of originating:  [facility]    Distance site:  Home office of provider for [facility]     The patient has been notified that:  Video visits will be conducted via a call with their psychologist to provide the care they need with a video conversation. Video visits may be billed at different rates depending on insurance coverage.  Patients are advised to please contact their insurance provider with any questions about their health insurance coverage. If during the course of a call the psychologist feels a video visit is not appropriate, patients will not be charged for this service.    Diagnosis:   Major Depression, recurrent mild (F33.0)   Psychological Factors Affecting Morbid Obesity (F54)  Generalized anxiety (F41.1)       Plan: Return video telementalhealth visit 11/16 @ 1 due to the serious and persistent nature of her depression and anxiety, consistent with treatment plan.  Last treatment plan signed: 10/19/2020  Treatment plan due: 10/19/2021 (next session)                    Preference for future meetings:           X   In-person, even if both wearing masks             Remote              Either            Damon Gr, PhD LP,  A.B.P.P.  Director, Health Psychology  [phone]

## 2021-10-14 ENCOUNTER — OFFICE VISIT (OUTPATIENT)
Dept: FAMILY MEDICINE | Facility: CLINIC | Age: 71
End: 2021-10-14
Payer: COMMERCIAL

## 2021-10-14 VITALS
WEIGHT: 236 LBS | HEART RATE: 73 BPM | DIASTOLIC BLOOD PRESSURE: 80 MMHG | RESPIRATION RATE: 16 BRPM | HEIGHT: 63 IN | BODY MASS INDEX: 41.82 KG/M2 | TEMPERATURE: 98.5 F | SYSTOLIC BLOOD PRESSURE: 137 MMHG | OXYGEN SATURATION: 98 %

## 2021-10-14 DIAGNOSIS — G47.09 OTHER INSOMNIA: ICD-10-CM

## 2021-10-14 DIAGNOSIS — N39.0 RECURRENT UTI: Primary | ICD-10-CM

## 2021-10-14 PROCEDURE — 99213 OFFICE O/P EST LOW 20 MIN: CPT | Performed by: FAMILY MEDICINE

## 2021-10-14 ASSESSMENT — MIFFLIN-ST. JEOR: SCORE: 1549.49

## 2021-10-14 NOTE — TELEPHONE ENCOUNTER

## 2021-10-14 NOTE — PROGRESS NOTES
"    Assessment & Plan     Recurrent UTI  Continue macrobid, reviewed susceptibilities  Encourage PO hydration  Discussed additional supportive care  24h cares at living facility  Refer to urology given increased recurrent UTI  - Adult Urology Referral; Future      No follow-ups on file.    Sarah Renner DO  [facility] MICHAEL Vance is a 71 year old who presents for the following health issues:    HPI     S/p ED visit for fall and UTI  Feeling much better, dysuria improving  Denies back pain, nausea, fevers  Lives at [facility] with 24 hour support    Review of Systems   Constitutional, HEENT, cardiovascular, pulmonary, gi and gu systems are negative, except as otherwise noted.      Objective    /80   Pulse 73   Temp 98.5  F (36.9  C) (Oral)   Resp 16   Ht 1.6 m (5' 2.99\")   Wt 107 kg (236 lb)   LMP  (LMP Unknown)   SpO2 98%   BMI 41.82 kg/m    Body mass index is 41.82 kg/m .  Physical Exam   GENERAL: healthy, alert and no distress  NECK: no adenopathy, no asymmetry, masses, or scars and thyroid normal to palpation  RESP: lungs clear to auscultation - no rales, rhonchi or wheezes  CV: regular rate and rhythm, normal S1 S2, no S3 or S4, no murmur, click or rub, no peripheral edema and peripheral pulses strong  ABDOMEN: soft, nontender, no hepatosplenomegaly, no masses and bowel sounds normal  MS: no gross musculoskeletal defects noted, no edema          "

## 2021-10-15 RX ORDER — LORAZEPAM 0.5 MG/1
0.25 TABLET ORAL AT BEDTIME
Qty: 15 TABLET | Refills: 0 | OUTPATIENT
Start: 2021-10-15

## 2021-10-19 NOTE — TELEPHONE ENCOUNTER
MEDICAL RECORDS REQUEST   [facility]  Urology Clinic  [address] [place] [address]  PHONE: [phone]  Fax: [phone]        FUTURE VISIT INFORMATION                                                   Pinky VELOZ Kaylee, : 1950 scheduled for future visit at [facility]    APPOINTMENT INFORMATION:    Date: 2021    Provider:  Kelton Costa MD    Reason for Visit/Diagnosis: [facility] Nurse at [facility] called to schedule appt for recurring UTI's.  Med Recs in [facility].  In Person Appt requested.  [facility] [place]    REFERRAL INFORMATION:    Referring provider:  Sarah Renner DO    Specialty: family med     Referring providers clinic:  [facility]    Clinic contact number:      RECORDS REQUESTED FOR VISIT                                                     NOTES  STATUS/DETAILS   OFFICE NOTE from referring provider  yes   OFFICE NOTE from other specialist  yes   DISCHARGE SUMMARY from hospital  no   DISCHARGE REPORT from the ER  no   OPERATIVE REPORT  no   MEDICATION LIST  yes   LABS     URINALYSIS (UA)  yes   URINE CYTOLOGY  no     PRE-VISIT CHECKLIST      Record collection complete Yes   Appointment appropriately scheduled           (right time/right provider) No   Joint diagnostic appointment coordinated correctly          (ensure right order & amount of time) N/a   MyChart activation no   Questionnaire complete If no, please explain

## 2021-10-22 ENCOUNTER — PRE VISIT (OUTPATIENT)
Dept: UROLOGY | Facility: CLINIC | Age: 71
End: 2021-10-22

## 2021-10-22 DIAGNOSIS — F25.1 SCHIZOAFFECTIVE DISORDER, DEPRESSIVE TYPE (H): ICD-10-CM

## 2021-10-22 DIAGNOSIS — G47.09 OTHER INSOMNIA: ICD-10-CM

## 2021-10-22 RX ORDER — LORAZEPAM 0.5 MG/1
0.25 TABLET ORAL AT BEDTIME
Qty: 15 TABLET | Refills: 0 | Status: SHIPPED | OUTPATIENT
Start: 2021-10-22 | End: 2021-10-25

## 2021-10-22 NOTE — TELEPHONE ENCOUNTER
Verify that the refill encounter hasn't been started Yes    [facility] phone call message- patient requesting a refill:    Full Medication Name:   LORazepam (ATIVAN) 0.5 MG tablet 15 tablet 0         Dose: Sig - Route: Take 0.5 tablets (0.25 mg) by mouth At Bedtime - Oral     Pharmacy confirmed as   [facility] - [place], [place] - [address]  [address]  [facility] [address]  Phone: [phone] Fax: [phone]      : Yes    Medication tab checked to see if medication has been sent  Yes    Additional Comments: *second request* RN from [facility] made initial request on 10/14/2021. Refill has not been sent yet. Patient will be out on 10/24.     OK to leave a message on voice mail? Yes    Advised patient refill may take up to 2 business days? Yes    Primary language: English      needed? No    Call taken on October 22, 2021 at 9:42 AM by Yani Wilson to [facility]

## 2021-10-22 NOTE — TELEPHONE ENCOUNTER
RN will route high priority to psychiatry team who has prescribed this medication.     Ebenezer Carpenter RN

## 2021-10-22 NOTE — TELEPHONE ENCOUNTER
Reason for visit: Consult (referred bySarah Renner DO)   Relevant information: Recurrent UTI    Records/imaging/labs/orders: All records available    Pt called: N/A    At Rooming: Urine dip/PVR

## 2021-10-25 ENCOUNTER — OFFICE VISIT (OUTPATIENT)
Dept: PSYCHIATRY | Facility: CLINIC | Age: 71
End: 2021-10-25
Attending: PSYCHIATRY & NEUROLOGY
Payer: COMMERCIAL

## 2021-10-25 ENCOUNTER — TELEPHONE (OUTPATIENT)
Dept: SLEEP MEDICINE | Facility: CLINIC | Age: 71
End: 2021-10-25

## 2021-10-25 VITALS
HEART RATE: 92 BPM | BODY MASS INDEX: 39.73 KG/M2 | WEIGHT: 224.2 LBS | SYSTOLIC BLOOD PRESSURE: 144 MMHG | DIASTOLIC BLOOD PRESSURE: 78 MMHG

## 2021-10-25 DIAGNOSIS — F25.1 SCHIZOAFFECTIVE DISORDER, DEPRESSIVE TYPE (H): ICD-10-CM

## 2021-10-25 DIAGNOSIS — F25.1 SCHIZOAFFECTIVE DISORDER, DEPRESSIVE TYPE (H): Primary | ICD-10-CM

## 2021-10-25 PROCEDURE — G0463 HOSPITAL OUTPT CLINIC VISIT: HCPCS

## 2021-10-25 PROCEDURE — 99214 OFFICE O/P EST MOD 30 MIN: CPT | Mod: GC | Performed by: STUDENT IN AN ORGANIZED HEALTH CARE EDUCATION/TRAINING PROGRAM

## 2021-10-25 ASSESSMENT — PAIN SCALES - GENERAL: PAINLEVEL: MODERATE PAIN (5)

## 2021-10-25 NOTE — TELEPHONE ENCOUNTER

## 2021-10-25 NOTE — TELEPHONE ENCOUNTER
Reason for Call:  Other appointment    Detailed comments: patient's RN at group home called today.  Would like to schedule dental appliance appt for patient.  Any RN okay to speak with at [phone].  Thank you.    Phone Number Patient can be reached at: Other phone number:  [phone]*    Best Time: any    Can we leave a detailed message on this number? YES    Call taken on 10/25/2021 at 12:27 PM by Tessy Estevez

## 2021-10-25 NOTE — PROGRESS NOTES
[facility]  Psychiatry Clinic  MEDICAL PROGRESS NOTE     CARE TEAM: PCP- Cary Brownlee  (resident working at \[employer] [facility] [employer]\"" with Dr. Birmingham, attending)   Specialty Providers- no    Therapist- Dr. Gr PhD   Formerly [facility] Team- no. staff at [facility].        Pinky Page is a 70 year old female who prefers the name Pinky & pronouns she, her, hers.      DIAGNOSIS     Schizoaffective disorder, depressed type, in remission without psychotic features  Tardive dyskinesia      ASSESSMENT     TODAY Pinky returns for routine follow-up. Since her last visit, she had a fall and went to the ED. ED notes indicate this was likely a mechanical fall, though Pinky recounts the fall occurring shortly after standing up from sitting position. Orthostatics taken in clinic today significant for a jump in HR from 68 to 92 when going from sitting to standing; no lightheadedness or symptoms; her blood pressure, however, remained elevated. Will collaborate with primary care doctor, consider whether ziprasidone may be contributing to orthostasis, though polypharmacy also likely contributing. Additionally, will discontinue lorazepam today. Pinky has been on a slow taper down from 0.5mg at bedtime since the summer, and has not noticed worsening anxiety or sleep since decreasing this. She agrees to stop lorazepam. Pinky and staff notice slight increase in depression and anxiety symptoms related to fall and injuries. No other medication changes indicated today.       MNPMP was checked today:  Indicates taking controlled medication as prescribed.     PLAN                                                                                                                1) Meds-  - Continue ziprasidone 40 mg BID  - Continue fluoxetine 40 mg daily  - Continue melatonin 6mg at bedtime  - Discontinue lorazepam 0.25mg at bedtime     2) Psychotherapy- continue with Dr. Gr    3) Next due-  Labs- AP labs due 3/2022  EKG-  Last EKG 3/2021 without prolonged QTc   Rating Scales- AIMS today 4.    4) Referrals-  None    5) Dispo- 12 weeks or sooner if needed       PERTINENT BACKGROUND                           [most recent eval 07/19/21]     Pinky first experienced mental health issues in her 20s or 30s and has received treatment for schizoaffective disorder and generalized anxiety. Notably, Pinky's symptoms of depression and psychosis have responded well to a combination of ECT and medication management. Pinky has a history of experiencing increased psychotic symptoms with past attempted antipsychotic tapers. No changes to antipsychotic or antidepressant since 2016. Only medication change in last 5 years has been lowering lorazepam.    In her 30s, she had her gallbladder removed and then suffered first episode of severe depression. She was experiencing psychotic symptoms - paranoia, AH, and suspiciousness. She was hospitalized in [place] in 1986 where she received ECT.  She was hospitalized again in 1987, again received ECT. Was hospitalized for depression in 1995, around the same time she was diagnosed with breast cancer.   She was hospitalized for about nine months this time. She received ECT maintence from 7030-9127. Patient was stable taking Seroquel 400mg, Prozac 40mg, and Ativan 1mg for many years. Seroquel was cross-tapered to ziprasidone due to metabolic side effects in 2016.     She has been seen at our clinic since 2013. She has lived at [facility] for many years.      Medical history  Notable for afib, DMT2, HTN, ANUJ, hypothyroid, breast cancer s/p mastectomy     Psych pertinent item history includes psychosis [sxs include disorganization, hallucinations, paranoia], mutiple psychotropic trials, psych hosp (3-5) and ECT.     SUBJECTIVE     Last seen August 2020  - since last visit, came to ED after a mechanical fall in October. Resulted in small head laceration. Head CT was done, as well as x-rays of joints. EKG was done.  "    - Pinky says this fall happened after she was sitting down to get BP checked, stood up, and then fell. Last fall before this  was Dec 2020. Still has sore kneee    - at last appointment, we discussed Ingrezza for tardive dyskinesia, at which point Pinky said she would think about it. Worried about dry eyes and dry mouth. Says she stilll notices her tongue \"moving inside her mouth\" but doesn't think others have noticed .Wears a mask when others would see.     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 Would like to continue working, though she missed last two weeks due to feeling poorly after her fall.     - knees sore from fall. Lays in bed. Walking less since fall.     - falls asleep during the day. Naps for 1-3 hours at a time. Lies down due to sore leg. Sleep is inverted- awake at night and sleeping during the day.     [facility] reports that she has had bladder and eye infections since last appt. Not participating gin activities that usually help her cope with anxiety and depression. She says she enjoys beading, music groups, and other arts and crafts.     -Pinky denies hallucinations, delusions. Says she is starting to feel better after the fall. Worries about how long she has to live.   ----------  RECENT PSYCH ROS:   Depression:  Low energy, anhedonia  Elevated:  none  Psychosis:  none  Anxiety:  Worries about the future  Trauma Related:  none  Sleep: yes, see HPI  Other: N/A    Adverse Effects: right hand tremor- resting tremor, perioral involuntary movements, stiffness in UE. No chest pain. No shortness of breath. Some dry mouth.  Pertinent Negative Symptoms: No suicidal ideation, violent ideation, hallucinations or delusions  Recent Substance Use:     None reported     FAMILY and SOCIAL HISTORY                                 pt reported     Social Hx:  Financial/ Work- social security disability.   Partner/ - never  Children- None      Living " situation- [facility]  Since 1998.  Social/ Spiritual Support- sisters (4), staff at [facility]    PSYCH and SUBSTANCE USE Critical Summary Points since July 2021 July- decrease lorazepam from 0.5mg to 0.375 (liquid)  August 30 - decrease lorazepam to 0.25mg due to difficulty drawing up correct liquid amount  10/25/2021- stop lorazepam.      PAST MED TRIALS   sertaline   amitriptyline   lithium   risperidone   olanzapine   trazodone   clonazepam   quetiapine 400mg (weight gain/metabolic SEs)  Lorazepam   triazolam  clonazepam.       SUBSTANCE USE HISTORY     Past Use- none reported     MEDICAL HISTORY and ALLERGY     ALLERGIES: Chlordiazepoxide hcl, Dimetapp dm cold-cough, Haldol, Ibuprofen, Lactose intolerance [beta-galactosidase], Milk products, and Propofol    Patient Active Problem List   Diagnosis     Allergic rhinitis due to pollen     Urge incontinence     Hypertension, essential     Cardiomegaly     Chronic constipation     Dry eye syndrome     Esophageal reflux     Exposure keratoconjunctivitis     DM ophthalmopathy (H)     Hypothyroidism     Senile cataract     ANUJ (obstructive sleep apnea)     Vaginal atrophy     Restless leg syndrome     Squamous blepharitis     Morbid obesity due to excess calories (H)     Personal history of breast cancer s/p L masectomy     Hypercholesteremia     Lives in group home     Extrapyramidal and movement disorder     CKD (chronic kidney disease) stage 2, GFR 60-89 ml/min     Solitary kidney, congenital     S/P hysterectomy     Impingement syndrome of right shoulder     Hypertriglyceridemia     Candidiasis of skin     Generalized anxiety disorder     Carpal tunnel syndrome of left wrist     Schizoaffective disorder, depressive type (H)     Major depressive disorder, recurrent episode, moderate (H)     Psychological factors affecting medical condition     Hx of psychiatric care     Nail complaint     Microalbuminuria due to type 2 diabetes mellitus (H)      Type 2 diabetes mellitus with microalbuminuria, with long-term current use of insulin (H)     Conjunctivitis of both eyes     Corneal erosion of both eyes     Spastic entropion, right     Foot callus     Paroxysmal atrial fibrillation (H)     Past Medical History:   Diagnosis Date     Allergic rhinitis due to pollen     seasonal allergies      Anisometropia and aniseikonia      BMI greater than 40      Cardiomegaly      Chronic constipation      Congenital absence of one kidney      Cramp of limb      Dermatophytosis of the body      Dry eye syndrome      Esophageal reflux      Essential hypertension, benign      Gastro-oesophageal reflux disease      Hemorrhoids      Hypermetropia      Hypothyroidism      Incomplete Emptying of Bladder      Lactose intolerance      Major depressive disorder, recurrent episode, moderate (H)      Malignant neoplasm of breast (female), unspecified site     left mastectomy 1996      Mixed incontinence urge and stress (male)(female)      Moderate obstructive sleep apnea      Postmenopausal Atrophic Vaginitis      Presbyopia      Regular astigmatism      Restless leg syndrome      Senile nuclear sclerosis      Thyroid eye disease     mild     Tinnitus      Trigger finger (acquired)     right hand     Type II or unspecified type diabetes mellitus without mention of complication, not stated as uncontrolled     on insulin since April 2006         MEDICAL REVIEW OF SYSTEMS   Contraception- not needed    A comprehensive review of systems was performed and is negative other than noted in the HPI.     MEDICATIONS     Current Outpatient Medications   Medication Sig Dispense Refill     acetaminophen (TYLENOL) 500 MG tablet Take 2 tablets (1,000 mg) by mouth every 6 hours as needed 1 Bottle 2     alum & mag hydroxide-simethicone (MYLANTA/MAALOX) 200-200-20 MG/5ML SUSP suspension Take 30 mLs by mouth daily as needed for indigestion       amLODIPine (NORVASC) 10 MG tablet Take 1 tablet (10 mg) by  mouth daily 90 tablet 0     apixaban ANTICOAGULANT (ELIQUIS) 5 MG tablet Take 1 tablet (5 mg) by mouth 2 times daily 180 tablet 0     artificial saliva (BIOTENE MT) AERS spray Take 1 spray by mouth 3 times daily as needed for dry mouth       aspirin 81 MG EC tablet Take 81 mg by mouth daily       atorvastatin (LIPITOR) 40 MG tablet Take 1 tablet (40 mg) by mouth daily 90 tablet 1     blood glucose (NO BRAND SPECIFIED) lancets standard Use to test blood sugar 2 times daily or as directed. 100 each 11     blood glucose calibration (NO BRAND SPECIFIED) solution Use to calibrate blood glucose monitor as directed. 1 each 3     blood glucose monitoring (NO BRAND SPECIFIED) meter device kit Check Blood sugars twice a day. 1 kit 0     blood glucose monitoring (NO BRAND SPECIFIED) test strip Use to test blood sugar 3 times daily or as directed. 100 each 3     Calcium Carbonate-Vitamin D (CALCIUM-VITAMIN D) 250-125 MG-UNIT TABS Take 1 tablet by mouth 2 times daily. Calcium 250 mg/Vit D 125 IU       calcium polycarbophil (FIBERCON) 625 MG tablet Take 2 tablets by mouth daily       cefdinir (OMNICEF) 300 MG capsule Take 1 capsule (300 mg) by mouth 2 times daily 20 capsule 0     CLARITIN 10 MG OR TABS 1 TAB PO QD (Once per day) as needed for ALLERGY SYMPTOMS 30 11     erythromycin (ROMYCIN) 5 MG/GM ophthalmic ointment Place 0.5 inches into both eyes At Bedtime 3.5 g 11     FLUoxetine (PROZAC) 20 MG capsule Take 2 capsules (40 mg) by mouth daily 60 capsule 2     fluticasone (FLONASE) 50 MCG/ACT nasal spray Spray 1 spray into both nostrils daily 16 g 3     furosemide (LASIX) 20 MG tablet Take 1 tablet (20 mg) by mouth 2 times daily 60 tablet 3     Gabapentin (NEURONTIN PO) Take 900 mg by mouth 3 times daily.       Hypromellose (ARTIFICIAL TEARS OP) Apply 1 drop to eye 4 times daily.       insulin glargine (LANTUS) 100 UNIT/ML injection Inject 20 Units Subcutaneous At Bedtime 8 mL 11     lactase (LACTAID) 3000 UNIT tablet Take 1  tablet (3,000 Units) by mouth 3 times daily (with meals) 90 tablet 11     levothyroxine (SYNTHROID, LEVOTHROID) 175 MCG tablet Take 1 tablet (175 mcg) by mouth daily Give on empty stomach 30 tablet 4     levothyroxine (SYNTHROID/LEVOTHROID) 175 MCG tablet Take 1 tablet (175 mcg) by mouth daily 90 tablet 3     lifitegrast (XIIDRA) 5 % opthalmic solution Place 1 drop into both eyes 2 times daily 90 each 3     liraglutide (VICTOZA) 18 MG/3ML solution Inject 1.8 mg Subcutaneous daily 9 mL 0     losartan (COZAAR) 100 MG tablet Take 0.5 tablets (50 mg) by mouth 2 times daily 90 tablet 1     Magnesium Hydroxide (MILK OF MAGNESIA PO) Take  by mouth. Take 30 mL as needed for constipation.       melatonin 3 MG CAPS Take 6 mg by mouth At Bedtime 60 capsule 2     metFORMIN (GLUCOPHAGE) 1000 MG tablet Take 1,000 mg by mouth 2 times daily (with meals)       NEW MED Prohydrate Moisturizing gel  Place 1 applicator vaginally 4 times a week 20 oz 3     nitroFURantoin macrocrystal-monohydrate (MACROBID) 100 MG capsule Take 1 capsule (100 mg) by mouth 2 times daily 14 capsule 0     nystatin (MYCOSTATIN) 239186 UNIT/GM POWD Apply topically 3 times daily as needed 60 g 1     ONETOUCH DELICA LANCETS 33G MISC        polyethylene glycol (MIRALAX/GLYCOLAX) powder Take 1 capful by mouth 2 times daily. 17 GM PO BID       Saline 0.9 % SOLN Spray 2 sprays in nostril as needed.       senna-docusate (SENNA S) 8.6-50 MG per tablet Take 2 tablets by mouth At Bedtime.       simethicone (MYLICON) 125 MG chewable tablet Take 1 tablet (125 mg) by mouth 2 times daily 60 tablet 0     Skin Protectants, Misc. (EUCERIN) cream Apply  topically as needed. Apply to thigh PRN dry skin        SM LUBRICANT EYE DROPS 0.4-0.3 % SOLN ophthalmic solution        solifenacin (VESICARE) 10 MG tablet Take 1 tablet (10 mg) by mouth daily 30 tablet 6     sulfamethoxazole-trimethoprim (BACTRIM DS) 800-160 MG tablet Take 1 tablet by mouth 2 times daily 14 tablet 0      tobramycin (TOBREX) 0.3 % ophthalmic solution 1-2 drops every 2 hours in right eye until bedtime today and then tomorrow, 9/4/21, start 1-2 drops 4 times a day for the next 6 days 5 mL 0     Vaginal Moisturizer (VAGISIL INTIMATE MOISTURIZER) LOTN Place 1 applicator vaginally four times a week 59 mL 1     ziprasidone (GEODON) 40 MG capsule Take 1 capsule (40 mg) by mouth 2 times daily (with meals) 60 capsule 2      VITALS   LMP  (LMP Unknown)     MENTAL STATUS EXAM     Alertness: alert   Appearance: well groomed  Behavior/Demeanor: cooperative, with good  eye contact.  Speech: slowed,  Language: no problems  Psychomotor: tremor in Right hand, perioral movements (puckering, clenching), some stiffness in upper extremities  Mood: description consistent with euthymia  Affect: full range and appropriate; congruent to: mood- yes, content- yes  Thought Process/Associations: unremarkable  Thought Content:  Reports none;  Denies suicidal & violent ideation and delusions  Perception:  Reports none;  Denies hallucinations  Insight: adequate  Judgment: adequate for safety  Cognition: does  appear grossly intact; formal cognitive testing was not done  Gait and Station: remarkable for:  slowed, small steps , pivot turn     LABS and DATA     PHQ9 TODAY = 13  PHQ 7/16/2021 7/21/2021 8/31/2021   PHQ-9 Total Score 9 7 7   Q9: Thoughts of better off dead/self-harm past 2 weeks Not at all Not at all Not at all   F/U: Thoughts of suicide or self-harm - - -   F/U: Safety concerns - - -       Recent Labs   Lab Test 10/08/21  2143 03/16/21  1537 09/16/20  0000   CR 0.84 0.8 0.94   GFRESTIMATED 70 70.7 61       Recent Labs   Lab Test 10/08/21  2143 03/16/21  1537 09/16/20  0000 09/16/20  0000   * 204.0*   < > 73   A1C  --  7.8*  --  6.6*    < > = values in this interval not displayed.     Recent Labs   Lab Test 03/16/21 1537 09/16/20  0000   CHOL 124 106   TRIG 136 89   LDL 53 46   HDL 43* 42*     Recent Labs   Lab Test  03/16/21  1537 06/26/18  0659   AST 7.9 16   ALT 12.8 21   ALKPHOS 78.9 111     Recent Labs   Lab Test 10/08/21  2143 03/16/21  1537 05/07/19  0650 05/07/19  0650 10/25/18  1734 06/26/18  0659   WBC 11.8*  --   --  10.06* 14.2*   < >   ANEU  --  7.5  --   --  10.6*  --    HGB 13.0 13.7   < > 13.3 14.0   < >     --   --  292 275   < >    < > = values in this interval not displayed.       EKG 3/2021 - showing atrial fibrillation. QtC 392.      PSYCHOTROPIC DRUG INTERACTIONS     ZIPRASIDONE and fluoxetine may result in increased risk of QT-interval prolongation.  ZIPRASIDONE and SEROTONERGIC DRUGS THAT PROLONG QT INTERVAL may result in increased risk of QT-interval prolongation and increased risk of serotonin syndrome (hypertension, hyperthermia, myoclonus, mental status changes).  NSAID and SSRI may result in an increased risk of bleeding  ERYTHROMYCIN and FLUOXETINE may result in an increased risk of cardiotoxicity (QT prolongation, torsades de pointes, cardiac arrest).  FLUOXETINE and INSULINS OR PRAMLINTIDE may result in increased risk of hypoglycemia.  GABAPENTIN and CNS DEPRESSANTS may result in respiratory depression.   GABAPENTIN and ANTACIDS may result in decreased gabapentin effectiveness.      MANAGEMENT:  Monitoring for adverse effects, periodic EKGs and patient is aware of risks     RISK STATEMENT for SAFETY     Pinky Page did not appear to be an imminent safety risk to self or others. Risk factors include chronic mental illness, chronic medical problems. Protective factors include supportive 24/7 residence staff, commitment to friends, no expressed SI.    TREATMENT RISK STATEMENT: The risks, benefits, alternatives and potential adverse effects have been discussed and are understood by the pt. The pt understands the risks of using street drugs or alcohol. There are no medical contraindications, the pt agrees to treatment with the ability to do so. The pt knows to call the clinic for any problems  or to access emergency care if needed.  Medical and substance use concerns are documented above.  Psychotropic drug interaction check was done, including changes made today.    PROVIDER: Denise Dewey MD    Patient staffed in clinic with Dr. Estrada who will sign the note.  Supervisor is Dr. Gordon.      Supervisor Attestation:  I met with Pinky Page along with the resident physician, Denise Dewey MD. I participated in key portions of the service, including the mental status examination and developing the plan of care. I reviewed key portions of the history with the resident. I agree with the findings and plan as documented in this note.  Cortez Estrada MD

## 2021-10-26 ASSESSMENT — PATIENT HEALTH QUESTIONNAIRE - PHQ9: SUM OF ALL RESPONSES TO PHQ QUESTIONS 1-9: 13

## 2021-10-29 ENCOUNTER — TELEPHONE (OUTPATIENT)
Dept: PSYCHIATRY | Facility: CLINIC | Age: 71
End: 2021-10-29

## 2021-10-29 DIAGNOSIS — F25.1 SCHIZOAFFECTIVE DISORDER, DEPRESSIVE TYPE (H): ICD-10-CM

## 2021-10-29 RX ORDER — ZIPRASIDONE HYDROCHLORIDE 40 MG/1
40 CAPSULE ORAL 2 TIMES DAILY WITH MEALS
Qty: 60 CAPSULE | Refills: 2 | Status: SHIPPED | OUTPATIENT
Start: 2021-10-29 | End: 2022-01-28

## 2021-10-29 NOTE — TELEPHONE ENCOUNTER
----- Message from Galo Vargas RN sent at 10/29/2021 10:09 AM CDT -----  Regarding: FW: wilian bass's pt - refills  Contact: [phone]    ----- Message -----  From: Karine Mercado  Sent: 10/29/2021   9:55 AM CDT  To: [facility] [place]  Subject: wilian bass's pt - refills                     Caller:  Narendra [facility]  Relationship to pt: MILY  Medication:   FLUoxetine (PROZAC) 20 MG capsule  ziprasidone (GEODON) 40 MG capsule     How many days left of med do you have left (if >3 day supply, redirect to pharmacy): n/a  Pharmacy and location: [facility] - [place], [place] - [address]  Pending appt date:  01/18 @ 10am  Okay to leave detailed VM:  n/a, GH: [phone]

## 2021-10-29 NOTE — TELEPHONE ENCOUNTER
Last seen: 10/25  RTC: 12 weeks  Non-provider cancel: None  No-show: None  Next appt: 1/18     Incoming refill from [facility] via telephone     Medication requested:   Disp Refills Start End THADDEUS   FLUoxetine (PROZAC) 20 MG capsule 60 capsule 2 7/20/2021  No   Sig - Route: Take 2 capsules (40 mg) by mouth daily     Disp Refills Start End THADDEUS    ziprasidone (GEODON) 40 MG capsule 60 capsule 2 7/20/2021  No   Sig - Route: Take 1 capsule (40 mg) by mouth 2 times daily (with meals)     Medication refill approved per refill protocol.  Writer e-prescribed 30-day supply to [facility] ([phone]). Qty 60, refills 2.

## 2021-11-01 ENCOUNTER — TRANSFERRED RECORDS (OUTPATIENT)
Dept: HEALTH INFORMATION MANAGEMENT | Facility: CLINIC | Age: 71
End: 2021-11-01
Payer: COMMERCIAL

## 2021-11-03 ENCOUNTER — MEDICAL CORRESPONDENCE (OUTPATIENT)
Dept: HEALTH INFORMATION MANAGEMENT | Facility: CLINIC | Age: 71
End: 2021-11-03
Payer: COMMERCIAL

## 2021-11-04 ENCOUNTER — TELEPHONE (OUTPATIENT)
Dept: FAMILY MEDICINE | Facility: CLINIC | Age: 71
End: 2021-11-04

## 2021-11-04 DIAGNOSIS — N39.0 RECURRENT UTI: Primary | ICD-10-CM

## 2021-11-04 RX ORDER — NITROFURANTOIN 25; 75 MG/1; MG/1
100 CAPSULE ORAL 2 TIMES DAILY
Qty: 10 CAPSULE | Refills: 0 | Status: SHIPPED | OUTPATIENT
Start: 2021-11-04 | End: 2021-11-09

## 2021-11-04 NOTE — TELEPHONE ENCOUNTER
[facility] phone call message-patient reporting a symptom:     Symptom: Poss UTI     When did symptoms begin? Few days    Characteristics: (location on body, intensity, what makes it better or worse, associated symptoms):      Additional Details: ALLEN Sal stated lab orders were faxed to the clinic regarding a possible UTI and the patient is experiencing increased symptoms of dysuria, burning with or without urination, pain rated 4/10, increased frequency and urgency but no abdominal or flank pain. RN stated they would like a call back before the weekend to discuss results and medication. Please advise.       Same Day Visit Offered: N/A    Additional comments:     OK to leave message on voice mail? Yes    Advised patient that RN would call back within 3 hours, unless emergent   Primary language: English      needed? No    Call taken on November 4, 2021 at 9:50 AM by Marry Howell           SUBJECTIVE:   Taqueria Bone is a 71 year old male who presents for Preventive Visit.      Are you in the first 12 months of your Medicare coverage?  No    Physical   Annual:     Getting at least 3 servings of Calcium per day:  NO    Bi-annual eye exam:  Yes    Dental care twice a year:  Yes    Sleep apnea or symptoms of sleep apnea:  None, Daytime drowsiness and Excessive snoring    Diet:  Low salt and Other    Frequency of exercise:  4-5 days/week    Duration of exercise:  45-60 minutes    Taking medications regularly:  Yes    Additional concerns today:  YES    Ability to successfully perform activities of daily living: no assistance needed    Home Safety:  Throw rugs in the hallway    Hearing Impairment: need to ask people to speak up or repeat themselves and difficulty understanding soft or whispered speech             COGNITIVE SCREEN  1) Repeat 3 items (Leader, Season, Table)    2) Clock draw: NORMAL  3) 3 item recall: Recalls 3 objects  Results: 3 items recalled: COGNITIVE IMPAIRMENT LESS LIKELY    Mini-CogTM Copyright [employer]. Licensed by the author for use in [facility]; reprinted with permission ([email or web address]). All rights reserved.        Reviewed and updated as needed this visit by clinical staff  Tobacco  Allergies  Meds  Med Hx  Surg Hx  Fam Hx  Soc Hx        Reviewed and updated as needed this visit by Provider        Social History   Substance Use Topics     Smoking status: Former Smoker     Quit date: 10/31/1979     Smokeless tobacco: Former User      Comment: 1979     Alcohol use 0.5 oz/week     1 Cans of beer per week      Comment: 1-2 beer daily       Alcohol Use 10/3/2018   If you drink alcohol do you typically have greater than 3 drinks per day OR greater than 7 drinks per week? No   No flowsheet data found.            Today's PHQ-2 Score:   PHQ-2 ( 1999 Pfizer) 10/3/2018   Q1: Little interest or pleasure in doing things 1   Q2: Feeling down, depressed or hopeless 0    PHQ-2 Score 1   Q1: Little interest or pleasure in doing things Several days   Q2: Feeling down, depressed or hopeless Not at all   PHQ-2 Score 1       Do you feel safe in your environment - Yes    Do you have a Health Care Directive?: No: Advance care planning reviewed with patient; information given to patient to review.    Current providers sharing in care for this patient include:   Patient Care Team:  Myron Bob MD as PCP - General (Family Practice)    The following health maintenance items are reviewed in Epic and correct as of today:  Health Maintenance   Topic Date Due     URINE DRUG SCREEN Q1 YR  09/05/1962     KEDAR QUESTIONNAIRE 1 YEAR  08/15/2018     INFLUENZA VACCINE (1) 09/01/2018     TETANUS IMMUNIZATION (SYSTEM ASSIGNED)  11/13/2018     FALL RISK ASSESSMENT  11/17/2018     ADVANCE DIRECTIVE PLANNING Q5 YRS  06/17/2019     PHQ-9 Q1YR  07/17/2019     BMP Q1 YR  09/26/2019     COLONOSCOPY Q5 YR  10/09/2022     LIPID SCREEN Q5 YR MALE (SYSTEM ASSIGNED)  09/26/2023     PNEUMOCOCCAL  Completed     AORTIC ANEURYSM SCREENING (SYSTEM ASSIGNED)  Completed     HEPATITIS C SCREENING  Completed             Review of Systems   Constitutional: Positive for chills.   HENT: Positive for congestion, ear pain, hearing loss and sore throat.    Eyes: Negative for pain and visual disturbance.   Respiratory: Positive for cough. Negative for shortness of breath.    Cardiovascular: Negative for chest pain, palpitations and peripheral edema.   Gastrointestinal: Positive for heartburn. Negative for abdominal pain, constipation, diarrhea, hematochezia and nausea.   Genitourinary: Positive for frequency and urgency. Negative for discharge, dysuria, genital sores, hematuria and impotence.   Musculoskeletal: Positive for arthralgias and myalgias. Negative for joint swelling.   Skin: Negative for rash.   Neurological: Positive for weakness. Negative for dizziness, headaches and paresthesias.   Psychiatric/Behavioral:  "Negative for mood changes.         OBJECTIVE:   /78  Pulse 107  Temp 99.2  F (37.3  C) (Oral)  Resp 20  Wt 180 lb (81.6 kg)  SpO2 97%  BMI 28.19 kg/m2 Estimated body mass index is 28.19 kg/(m^2) as calculated from the following:    Height as of 5/23/18: 5' 7\" (1.702 m).    Weight as of this encounter: 180 lb (81.6 kg).  Physical Exam      Diagnostic Test Results:  none     ASSESSMENT / PLAN:       ICD-10-CM    1. Medicare annual wellness visit, subsequent Z00.00    2. Need for prophylactic vaccination and inoculation against influenza Z23    3. 10 year risk of MI or stroke 7.5% or greater, 20.7% in October 2018 Z91.89 atorvastatin (LIPITOR) 20 MG tablet   4. Benign prostatic hyperplasia with nocturia N40.1 dutasteride (AVODART) 0.5 MG capsule    R35.1        End of Life Planning:  Patient currently has an advanced directive: Yes.  Practitioner is supportive of decision.    COUNSELING:  Reviewed preventive health counseling, as reflected in patient instructions       Regular exercise       Healthy diet/nutrition       Vision screening       Dental care       Hepatitis C screening       HIV screening for high risk patient       Colon cancer screening       Osteoporosis Prevention/Bone Health    BP Readings from Last 1 Encounters:   10/03/18 122/78     Estimated body mass index is 28.19 kg/(m^2) as calculated from the following:    Height as of 5/23/18: 5' 7\" (1.702 m).    Weight as of this encounter: 180 lb (81.6 kg).    BP Screening:   Last 3 BP Readings:    BP Readings from Last 3 Encounters:   10/03/18 122/78   08/27/18 142/88   07/17/18 135/89       The following was recommended to the patient:  Re-screen BP within a year and recommended lifestyle modifications       reports that he quit smoking about 38 years ago. He has quit using smokeless tobacco.      Appropriate preventive services were discussed with this patient, including applicable screening as appropriate for cardiovascular disease, " diabetes, osteopenia/osteoporosis, and glaucoma.  As appropriate for age/gender, discussed screening for colorectal cancer, prostate cancer, breast cancer, and cervical cancer. Checklist reviewing preventive services available has been given to the patient.    Reviewed patients plan of care and provided an AVS. The Basic Care Plan (routine screening as documented in Health Maintenance) for Taqueria meets the Care Plan requirement. This Care Plan has been established and reviewed with the Patient.    Counseling Resources:  ATP IV Guidelines  Pooled Cohorts Equation Calculator  Breast Cancer Risk Calculator  FRAX Risk Assessment  ICSI Preventive Guidelines  Dietary Guidelines for Americans, 2010  [employer]'s MyPlate  ASA Prophylaxis  Lung CA Screening    Myron Bob MD  [facility]  Answers for HPI/ROS submitted by the patient on 10/3/2018   PHQ-2 Score: 1    --------------------------------------------------------------------------------------------------------------------------------------    SUBJECTIVE:  Taqueria Bone is a 71 year old male who presents to the clinic today for a routine physical exam.    The patient's last physical was 6 years ago.     Cholesterol   Date Value Ref Range Status   09/26/2018 188 <200 mg/dL Final   11/10/2017 196 <200 mg/dL Final     HDL Cholesterol   Date Value Ref Range Status   09/26/2018 55 >39 mg/dL Final   11/10/2017 61 >39 mg/dL Final     LDL Cholesterol Calculated   Date Value Ref Range Status   09/26/2018 109 (H) <100 mg/dL Final     Comment:     Above desirable:  100-129 mg/dl  Borderline High:  130-159 mg/dL  High:             160-189 mg/dL  Very high:       >189 mg/dl     11/10/2017 116 (H) <100 mg/dL Final     Comment:     Above desirable:  100-129 mg/dl  Borderline High:  130-159 mg/dL  High:             160-189 mg/dL  Very high:       >189 mg/dl       Triglycerides   Date Value Ref Range Status   09/26/2018 118 <150 mg/dL Final     Comment:     Fasting  specimen   11/10/2017 97 <150 mg/dL Final     Comment:     Fasting specimen     Cholesterol/HDL Ratio   Date Value Ref Range Status   06/20/2014 3.2 0.0 - 5.0 Final   01/09/2012 4.3 0.0 - 5.0 Final     The patient's last fasting lipid panel was done 1 weeks ago and the results are listed above.      The 10-year ASCVD risk score (Jack ESTRADA Jr, et al., 2013) is: 20.7%    Values used to calculate the score:      Age: 71 years      Sex: Male      Is Non- : No      Diabetic: No      Tobacco smoker: No      Systolic Blood Pressure: 122 mmHg      Is BP treated: Yes      HDL Cholesterol: 55 mg/dL      Total Cholesterol: 188 mg/dL        The patient reports that he has never been treated for high blood pressure.    The patient reports that he does take a daily aspirin.    Lab Results   Component Value Date    HCVAB Negative 07/22/2009     The patient reports that he has been screened for Hepatitis C    (Screen all baby boomers once per CDC-- the generation born from 1945 through 1965)  He would not like to have an Hepatitis C test today    No results found for: HIV  The patient reports that he has not been screened for HIV   (Screen all 15 years and older)  He would not like to have an HIV test today      Immunization History   Administered Date(s) Administered     Influenza (H1N1) 01/07/2010     Influenza (High Dose) 3 valent vaccine 10/25/2011, 11/23/2015, 12/05/2016, 10/15/2017     Influenza (IIV3) PF 10/01/2008, 09/23/2009, 09/28/2010, 10/12/2012, 10/20/2013     LYMERIX 03/23/1999, 04/22/1999     Mantoux Tuberculin Skin Test 02/15/2005     Pneumo Conj 13-V (2010&after) 11/23/2015     Pneumococcal 23 valent 11/02/2005, 10/25/2011     TD (ADULT, 7+) 12/16/1997     Tdap (Adacel,Boostrix) 11/13/2008     Twinrix A/B 01/30/2012, 03/05/2012, 11/13/2012     Zoster vaccine, live 01/16/2012     The patient's believes that his last tetanus shot was given 10 year(s) ago.   The patient believes that he has not  had a Shingrix in the past  The patient believes that he has had a PPSV23 in the past.  The patient believes that he has had a PCV13 in the past.  The patient believes that he has not had a seasonal flu vaccination this fall or winter.  The patient would like to have a Influenza, Td and Shingrix      No results found for this or any previous visit.]   The patient reports a family history of colon cancer in his father at age 78..  The patient reports that he has had a colonoscopy. His  last colonoscopy was in 2017 and he  report that is was abnormal. The patient was told to have this repeated in 5 years.      The patient reports a family history of diabetes in his mother and father.    The patient reports that he eats or drinks 1 servings of dairy products per day. He also eats a lot of green leafy vegtables. He does take a multivitamin with calcium once daily.  The patient reports that he has dental appointments approximately every 6 months.  The patient reports that he  has an eye examination approximately every 1.0 year(s).    Do you currently smoke? No, quit in 1979  How many years have you smoked? 18   How many packs per day did you smoke on average? 3/4 ppd  (if more than 30 pack year history and the patient is age 55-80 consider ordering an annual low dose radiation lung CT to screen for cancer)  (Do not order if patient has quit more than 15 years ago or has a health condition that limits life expectancy or could not tolerate curative lung surgery)  Are you interested having a lung CT to screen for lung cancer? N/A    If the patient has smoked more that 100 cigarettes, has the patient had an imaging study (US or CT) for an AAA between the ages of 65 and 75? Yes:  in 2017              Patient Active Problem List   Diagnosis     Anxiety     Chronic nonallergic rhinitis     CARDIOVASCULAR SCREENING; LDL GOAL LESS THAN 130     Advanced directives, counseling/discussion     Insomnia     Medial epicondylitis      Episodic recurrent vertigo     BPH (benign prostatic hyperplasia)     Seasonal allergic rhinitis     Essential hypertension with goal blood pressure less than 140/90     Chronic low back pain     Right knee pain     Personal history of tobacco use, presenting hazards to health     10 year risk of MI or stroke 7.5% or greater, 18% in August 2017     Adenomatous polyp of colon, unspecified part of colon     Gastroesophageal reflux disease without esophagitis     IGT (impaired glucose tolerance)       Past Surgical History:   Procedure Laterality Date     ------------OTHER-------------  7/14/10    YAG CAPSULOTOMY OS     ARTHROSCOPY KNEE Right 12/16/2016    Procedure: ARTHROSCOPY KNEE;  Surgeon: Jose Stone MD;  Location: [facility] OR     C SKIN TISSUE PROCEDURE UNLISTED      cysts removed neck / tailbone / back     COLONOSCOPY  3-30-12    Return in 5 yrs.      ENDOSCOPY  3-10-11     TONSILLECTOMY & ADENOIDECTOMY         Family History   Problem Relation Age of Onset     Cancer Father      colon, spread to liver     Diabetes Father      Diabetes Mother      Hypertension Mother      Thyroid Disease Mother      Glaucoma Mother      HEART DISEASE Mother 91     MI      Cerebrovascular Disease Maternal Grandfather      Diabetes Paternal Grandfather      Hypertension Brother        Social History     Social History     Marital status:      Spouse name: N/A     Number of children: N/A     Years of education: N/A     Occupational History     Not on file.     Social History Main Topics     Smoking status: Former Smoker     Quit date: 10/31/1979     Smokeless tobacco: Former User      Comment: 1979     Alcohol use 0.5 oz/week     1 Cans of beer per week      Comment: 1-2 beer daily     Drug use: No     Sexual activity: Yes     Partners: Female     Other Topics Concern     Parent/Sibling W/ Cabg, Mi Or Angioplasty Before 65f 55m? No     Social History Narrative       Current Outpatient Prescriptions   Medication Sig  Dispense Refill     amLODIPine (NORVASC) 5 MG tablet TAKE 2 TABLETS DAILY 180 tablet 0     Artificial Tear Ointment (ARTIFICIAL TEARS) OINT 4 x a day as needed and at bedtime. 1 Tube 0     aspirin 325 MG EC tablet Take 325 mg by mouth daily. 1/2 pill a day.        cetirizine (ZYRTEC) 10 MG tablet Take 5 mg by mouth 2 times daily Taking 1/2 tablet once daily       FISH OIL 1 capsule.       fluticasone (FLONASE) 50 MCG/ACT spray Spray 1 spray into both nostrils 2 times daily 3 Bottle 3     GLUCOS-AMANDA-MSM-DM-L-ADVH-SECU PO TABS one daily       guaiFENesin-codeine (ROBITUSSIN AC) 100-10 MG/5ML SOLN solution Take 5-10 mLs by mouth every 6 hours as needed for cough 120 mL 0     LORazepam (ATIVAN) 0.5 MG tablet Take 1 tablet (0.5 mg) by mouth daily as needed 30 tablet 0     meclizine (ANTIVERT) 25 MG tablet Take 1 tablet (25 mg) by mouth 3 times daily as needed 30 tablet 1     MULTIVITAMIN TABS   OR 1.5 TABLET DAILY       olopatadine (PATANOL) 0.1 % ophthalmic solution Place 1 drop into both eyes 2 times daily. 15 mL 1     PROSTATE PO QD       ranitidine (ZANTAC) 150 MG tablet TAKE 1 TABLET TWICE A  tablet 0     triamcinolone (KENALOG) 0.1 % cream Apply sparingly to affected area three times daily for 14 days. 30 g 1       Review Of Systems    Genitourinary: nocturia x 2-3      PHYSICAL EXAMINATION:  Blood pressure 122/78, pulse 107, temperature 99.2  F (37.3  C), temperature source Oral, resp. rate 20, weight 180 lb (81.6 kg), SpO2 97 %.  General appearance - healthy, alert and no distress  Skin - Skin color, texture, turgor normal. No rashes or lesions.  Head - Normocephalic. No masses, lesions, tenderness or abnormalities  Eyes - conjunctivae/corneas clear. PERRL, EOM's intact. Fundi benign  Ears - External ears normal. Canals clear. TM's normal.  Nose/Sinuses - Nares normal. Septum midline. Mucosa normal. No drainage or sinus tenderness.  Oropharynx - Lips, mucosa, and tongue normal. Teeth and gums  normal.  Neck - Neck supple. No adenopathy. Thyroid symmetric, normal size,  Lungs - Percussion normal. Good diaphragmatic excursion. Lungs clear  Heart - PMI normal. No lifts, heaves, or thrills. RRR. No murmurs, clicks gallops or rub  Abdomen - Abdomen soft, non-tender. BS normal. No masses, organomegaly  Extremities - Extremities normal. No deformities, edema, or skin discoloration.  Musculoskeletal - Spine ROM normal. Muscular strength intact.  Peripheral pulses - radial=4/4, femoral=4/4, popliteal=4/4, dorsalis pedis=4/4,  Neuro - Gait normal. Reflexes normal and symmetric. Sensation grossly WNL.  Genitalia - Penis normal. No urethral discharge. Scrotum normal to palpation. No hernia.  Rectal - positive findings: prostate 2+      Orders Only on 09/26/2018   Component Date Value Ref Range Status     Cholesterol 09/26/2018 188  <200 mg/dL Final     Triglycerides 09/26/2018 118  <150 mg/dL Final    Fasting specimen     HDL Cholesterol 09/26/2018 55  >39 mg/dL Final     LDL Cholesterol Calculated 09/26/2018 109* <100 mg/dL Final    Comment: Above desirable:  100-129 mg/dl  Borderline High:  130-159 mg/dL  High:             160-189 mg/dL  Very high:       >189 mg/dl       Non HDL Cholesterol 09/26/2018 133* <130 mg/dL Final    Comment: Above Desirable:  130-159 mg/dl  Borderline high:  160-189 mg/dl  High:             190-219 mg/dl  Very high:       >219 mg/dl       Sodium 09/26/2018 141  133 - 144 mmol/L Final     Potassium 09/26/2018 4.2  3.4 - 5.3 mmol/L Final     Chloride 09/26/2018 105  94 - 109 mmol/L Final     Carbon Dioxide 09/26/2018 32  20 - 32 mmol/L Final     Anion Gap 09/26/2018 4  3 - 14 mmol/L Final     Glucose 09/26/2018 103* 70 - 99 mg/dL Final    Fasting specimen     Urea Nitrogen 09/26/2018 14  7 - 30 mg/dL Final     Creatinine 09/26/2018 0.96  0.66 - 1.25 mg/dL Final     GFR Estimate 09/26/2018 77  >60 mL/min/1.7m2 Final    Non  GFR Calc     GFR Estimate If Black 09/26/2018 >90   >60 mL/min/1.7m2 Final    African American GFR Calc     Calcium 09/26/2018 9.7  8.5 - 10.1 mg/dL Final     Bilirubin Total 09/26/2018 0.5  0.2 - 1.3 mg/dL Final     Albumin 09/26/2018 3.7  3.4 - 5.0 g/dL Final     Protein Total 09/26/2018 7.5  6.8 - 8.8 g/dL Final     Alkaline Phosphatase 09/26/2018 106  40 - 150 U/L Final     ALT 09/26/2018 33  0 - 70 U/L Final     AST 09/26/2018 23  0 - 45 U/L Final       ASSESSMENT:    ICD-10-CM    1. Medicare annual wellness visit, subsequent Z00.00        Well-Adult Physical Exam.  Health Maintenance Due   Topic Date Due     URINE DRUG SCREEN Q1 YR  09/05/1962     KEDAR QUESTIONNAIRE 1 YEAR  08/15/2018     INFLUENZA VACCINE (1) 09/01/2018     Health Maintenance   Topic Date Due     URINE DRUG SCREEN Q1 YR  09/05/1962     KEDAR QUESTIONNAIRE 1 YEAR  08/15/2018     INFLUENZA VACCINE (1) 09/01/2018     TETANUS IMMUNIZATION (SYSTEM ASSIGNED)  11/13/2018     FALL RISK ASSESSMENT  11/17/2018     ADVANCE DIRECTIVE PLANNING Q5 YRS  06/17/2019     PHQ-9 Q1YR  07/17/2019     BMP Q1 YR  09/26/2019     COLONOSCOPY Q5 YR  10/09/2022     LIPID SCREEN Q5 YR MALE (SYSTEM ASSIGNED)  09/26/2023     PNEUMOCOCCAL  Completed     AORTIC ANEURYSM SCREENING (SYSTEM ASSIGNED)  Completed     HEPATITIS C SCREENING  Completed         HEALTH CARE MAINTENENCE: The recommended screening tests and vaccinatons for this patient have been discussed as above.  The appropriate tests and vaccinations  have been ordered or declined by the patient. Please see the orders in EPIC.The patient specifically declines: n/a     Immunization Status:  up to date and documented except for Shingrix, influenza and tetanus    Patient Active Problem List   Diagnosis     Anxiety     Chronic nonallergic rhinitis     CARDIOVASCULAR SCREENING; LDL GOAL LESS THAN 130     Advanced directives, counseling/discussion     Insomnia     Medial epicondylitis     Episodic recurrent vertigo     BPH (benign prostatic hyperplasia)     Seasonal allergic  rhinitis     Essential hypertension with goal blood pressure less than 140/90     Chronic low back pain     Right knee pain     Personal history of tobacco use, presenting hazards to health     10 year risk of MI or stroke 7.5% or greater, 18% in August 2017     Adenomatous polyp of colon, unspecified part of colon     Gastroesophageal reflux disease without esophagitis     IGT (impaired glucose tolerance)        ATP III Guidelines  ICSI Preventive Guidelines    PLAN:   Start atorvastatin 20 for his elevated cardiac risk(10 year risk of having MI/Stroke by ACC/AHA risk)  Start dutasteride for his symptomatic BPH   I recommended continuing to take a daily aspirin (81 to 325 mg)  HIV testing was discussed but the pt declined  Shingrix recommended  PCV13 recommended  Flu shot recommended  Discussed calcium intake, vitamins and supplements. Recommended 1000 mg of calcium daily  Sunscreen use was recommended especially in the area of tatoos  Refills on chronic medication given  Recommended dental exams every 6 months  Recommended eye exam every 1-2 years  Follow up in 1 year for the next preventative medical visit        Body mass index is 28.19 kg/(m^2).

## 2021-11-04 NOTE — TELEPHONE ENCOUNTER
Results reviewed. Can see via Care Everywhere. Culture sensitivities show resistance to multiple abx. Sensitive to Macrobid.     Please re-confirm no systemic symptoms like fever/chills, vomiting, flank pain. If yes would choose a different antibiotic. If no, I pended 5 days of macrobid - please confirm pharmacy and I can send along.

## 2021-11-04 NOTE — TELEPHONE ENCOUNTER
RN spoke to [facility]- states no systemic symptoms, denies fever, hematuria, flank pain. Endorses dysuria, frequency, urgency and odor. Confirmed patient has tolerated Macrobid in the past.     Sent antibiotic to [facility] pharmacy. [facility] also requesting copy of order be faxed to them at [phone] which was done and confirmation received at [phone].   Rosario Klein RN

## 2021-11-04 NOTE — TELEPHONE ENCOUNTER
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 UA also abnormal with leukocytes, WBC and RBC present. RN will bring results to preceptor to review and advise on antibotic plan as PCP not in clinic until 11/8/21. Will also have copy of results made and sent to HIM to be uploaded into chart.   Rosario Klein RN

## 2021-11-04 NOTE — TELEPHONE ENCOUNTER
RN attempted to reach DARLENE Sal with call back number. When he calls back, please transfer to any available RN.   Rosario Klein RN

## 2021-11-12 ENCOUNTER — OFFICE VISIT (OUTPATIENT)
Dept: UROLOGY | Facility: CLINIC | Age: 71
End: 2021-11-12
Attending: FAMILY MEDICINE
Payer: COMMERCIAL

## 2021-11-12 ENCOUNTER — PRE VISIT (OUTPATIENT)
Dept: UROLOGY | Facility: CLINIC | Age: 71
End: 2021-11-12

## 2021-11-12 VITALS
WEIGHT: 226 LBS | BODY MASS INDEX: 40.04 KG/M2 | SYSTOLIC BLOOD PRESSURE: 114 MMHG | HEART RATE: 94 BPM | DIASTOLIC BLOOD PRESSURE: 72 MMHG | HEIGHT: 63 IN

## 2021-11-12 DIAGNOSIS — N39.0 RECURRENT UTI: ICD-10-CM

## 2021-11-12 LAB
ALBUMIN UR-MCNC: NEGATIVE MG/DL
APPEARANCE UR: ABNORMAL
BILIRUB UR QL STRIP: NEGATIVE
COLOR UR AUTO: YELLOW
GLUCOSE UR STRIP-MCNC: NEGATIVE MG/DL
HGB UR QL STRIP: ABNORMAL
KETONES UR STRIP-MCNC: NEGATIVE MG/DL
LEUKOCYTE ESTERASE UR QL STRIP: ABNORMAL
NITRATE UR QL: NEGATIVE
PH UR STRIP: 6.5 [PH] (ref 5–8)
SP GR UR STRIP: <1.005 (ref 1–1.03)
UROBILINOGEN UR STRIP-ACNC: 0.2 E.U./DL

## 2021-11-12 PROCEDURE — 99203 OFFICE O/P NEW LOW 30 MIN: CPT | Performed by: OBSTETRICS & GYNECOLOGY

## 2021-11-12 ASSESSMENT — PAIN SCALES - GENERAL: PAINLEVEL: NO PAIN (0)

## 2021-11-12 ASSESSMENT — MIFFLIN-ST. JEOR: SCORE: 1504.26

## 2021-11-12 NOTE — NURSING NOTE
"Chief Complaint   Patient presents with     Consult     Recurrent UTI       Blood pressure 114/72, pulse 94, height 1.6 m (5' 3\"), weight 102.5 kg (226 lb), not currently breastfeeding. Body mass index is 40.03 kg/m .    Patient Active Problem List   Diagnosis     Allergic rhinitis due to pollen     Urge incontinence     Hypertension, essential     Cardiomegaly     Chronic constipation     Dry eye syndrome     Esophageal reflux     Exposure keratoconjunctivitis     DM ophthalmopathy (H)     Hypothyroidism     Senile cataract     ANUJ (obstructive sleep apnea)     Vaginal atrophy     Restless leg syndrome     Squamous blepharitis     Morbid obesity due to excess calories (H)     Personal history of breast cancer s/p L masectomy     Hypercholesteremia     Lives in group home     Extrapyramidal and movement disorder     CKD (chronic kidney disease) stage 2, GFR 60-89 ml/min     Solitary kidney, congenital     S/P hysterectomy     Impingement syndrome of right shoulder     Hypertriglyceridemia     Candidiasis of skin     Generalized anxiety disorder     Carpal tunnel syndrome of left wrist     Schizoaffective disorder, depressive type (H)     Major depressive disorder, recurrent episode, moderate (H)     Psychological factors affecting medical condition     Hx of psychiatric care     Nail complaint     Microalbuminuria due to type 2 diabetes mellitus (H)     Type 2 diabetes mellitus with microalbuminuria, with long-term current use of insulin (H)     Conjunctivitis of both eyes     Corneal erosion of both eyes     Spastic entropion, right     Foot callus     Paroxysmal atrial fibrillation (H)       Allergies   Allergen Reactions     Chlordiazepoxide Hcl      Dimetapp Dm Cold-Cough      Cold/Congetion TABS     Haldol      Ibuprofen      TABS     Lactose Intolerance [Beta-Galactosidase]      CAPS     Milk Products      Propofol      EMUL       Current Outpatient Medications   Medication Sig Dispense Refill     " acetaminophen (TYLENOL) 500 MG tablet Take 2 tablets (1,000 mg) by mouth every 6 hours as needed 1 Bottle 2     alum & mag hydroxide-simethicone (MYLANTA/MAALOX) 200-200-20 MG/5ML SUSP suspension Take 30 mLs by mouth daily as needed for indigestion       amLODIPine (NORVASC) 10 MG tablet Take 1 tablet (10 mg) by mouth daily 90 tablet 0     apixaban ANTICOAGULANT (ELIQUIS) 5 MG tablet Take 1 tablet (5 mg) by mouth 2 times daily 180 tablet 0     artificial saliva (BIOTENE MT) AERS spray Take 1 spray by mouth 3 times daily as needed for dry mouth       atorvastatin (LIPITOR) 40 MG tablet Take 1 tablet (40 mg) by mouth daily 90 tablet 1     blood glucose (NO BRAND SPECIFIED) lancets standard Use to test blood sugar 2 times daily or as directed. 100 each 11     blood glucose calibration (NO BRAND SPECIFIED) solution Use to calibrate blood glucose monitor as directed. 1 each 3     blood glucose monitoring (NO BRAND SPECIFIED) meter device kit Check Blood sugars twice a day. 1 kit 0     blood glucose monitoring (NO BRAND SPECIFIED) test strip Use to test blood sugar 3 times daily or as directed. 100 each 3     Calcium Carbonate-Vitamin D (CALCIUM-VITAMIN D) 250-125 MG-UNIT TABS Take 1 tablet by mouth 2 times daily. Calcium 250 mg/Vit D 125 IU       calcium polycarbophil (FIBERCON) 625 MG tablet Take 2 tablets by mouth daily       CLARITIN 10 MG OR TABS 1 TAB PO QD (Once per day) as needed for ALLERGY SYMPTOMS 30 11     FLUoxetine (PROZAC) 20 MG capsule Take 2 capsules (40 mg) by mouth daily 60 capsule 2     fluticasone (FLONASE) 50 MCG/ACT nasal spray Spray 1 spray into both nostrils daily 16 g 3     furosemide (LASIX) 20 MG tablet Take 1 tablet (20 mg) by mouth 2 times daily 60 tablet 3     Gabapentin (NEURONTIN PO) Take 900 mg by mouth 3 times daily.       Hypromellose (ARTIFICIAL TEARS OP) Apply 1 drop to eye 4 times daily.       insulin glargine (LANTUS) 100 UNIT/ML injection Inject 20 Units Subcutaneous At Bedtime 8  mL 11     lactase (LACTAID) 3000 UNIT tablet Take 1 tablet (3,000 Units) by mouth 3 times daily (with meals) 90 tablet 11     levothyroxine (SYNTHROID/LEVOTHROID) 175 MCG tablet Take 1 tablet (175 mcg) by mouth daily 90 tablet 3     lifitegrast (XIIDRA) 5 % opthalmic solution Place 1 drop into both eyes 2 times daily 90 each 3     liraglutide (VICTOZA) 18 MG/3ML solution Inject 1.8 mg Subcutaneous daily 9 mL 0     losartan (COZAAR) 100 MG tablet Take 0.5 tablets (50 mg) by mouth 2 times daily 90 tablet 1     Magnesium Hydroxide (MILK OF MAGNESIA PO) Take  by mouth. Take 30 mL as needed for constipation.       melatonin 3 MG CAPS Take 6 mg by mouth At Bedtime 60 capsule 2     metFORMIN (GLUCOPHAGE) 1000 MG tablet Take 1,000 mg by mouth 2 times daily (with meals)       NEW MED Prohydrate Moisturizing gel  Place 1 applicator vaginally 4 times a week 20 oz 3     nitroFURantoin macrocrystal-monohydrate (MACROBID) 100 MG capsule Take 1 capsule (100 mg) by mouth 2 times daily 14 capsule 0     nystatin (MYCOSTATIN) 167472 UNIT/GM POWD Apply topically 3 times daily as needed 60 g 1     ONETOUCH DELICA LANCETS 33G MISC        polyethylene glycol (MIRALAX/GLYCOLAX) powder Take 1 capful by mouth 2 times daily. 17 GM PO BID       Saline 0.9 % SOLN Spray 2 sprays in nostril as needed.       senna-docusate (SENNA S) 8.6-50 MG per tablet Take 2 tablets by mouth At Bedtime.       simethicone (MYLICON) 125 MG chewable tablet Take 1 tablet (125 mg) by mouth 2 times daily 60 tablet 0     Skin Protectants, Misc. (EUCERIN) cream Apply  topically as needed. Apply to thigh PRN dry skin        SM LUBRICANT EYE DROPS 0.4-0.3 % SOLN ophthalmic solution        solifenacin (VESICARE) 10 MG tablet Take 1 tablet (10 mg) by mouth daily 30 tablet 6     tobramycin (TOBREX) 0.3 % ophthalmic solution 1-2 drops every 2 hours in right eye until bedtime today and then tomorrow, 9/4/21, start 1-2 drops 4 times a day for the next 6 days 5 mL 0     Vaginal  Moisturizer (VAGISIL INTIMATE MOISTURIZER) LOTN Place 1 applicator vaginally four times a week 59 mL 1     ziprasidone (GEODON) 40 MG capsule Take 1 capsule (40 mg) by mouth 2 times daily (with meals) 60 capsule 2     aspirin 81 MG EC tablet Take 81 mg by mouth daily (Patient not taking: Reported on 11/12/2021)       cefdinir (OMNICEF) 300 MG capsule Take 1 capsule (300 mg) by mouth 2 times daily (Patient not taking: Reported on 11/12/2021) 20 capsule 0     erythromycin (ROMYCIN) 5 MG/GM ophthalmic ointment Place 0.5 inches into both eyes At Bedtime (Patient not taking: Reported on 11/12/2021) 3.5 g 11     sulfamethoxazole-trimethoprim (BACTRIM DS) 800-160 MG tablet Take 1 tablet by mouth 2 times daily (Patient not taking: Reported on 11/12/2021) 14 tablet 0       Social History     Tobacco Use     Smoking status: Never Smoker     Smokeless tobacco: Never Used   Substance Use Topics     Alcohol use: No     Alcohol/week: 0.0 standard drinks     Drug use: No       Марина Glover CMA  11/12/2021  3:21 PM

## 2021-11-12 NOTE — LETTER
11/12/2021       RE: Pinky Page  [address] [facility]  [place] [id number]     Dear Colleague,    Thank you for referring your patient, Pinky Page, to the [facility] [place] at [facility]. Please see a copy of my visit note below.    November 12, 2021    Referring Provider: Sarah Renner DO  [address] [place] [address]    Primary Care Provider: Tonia Goel    CC: Recurrent UTI    HPI:  Pinky Page is a 71 year old female who presents for evaluation of her pelvic floor symptoms.  She has had last year urge incontinence, increasing in frequency.  Feels like it started after getting COVID in Dec 2020. Now has itching and burning intermittently, goes away with antibiotics and then comes back pretty quickly.     7+ UAs consistent with infection in last year, 2 cultures.  Culture 10/09/2021 E coli  Cx 3/24/21 klebsiella, staph simulans    Prolapse:  Do you feel a vaginal bulge? No             Stress Incontinence:  Do you leak urine with cough, sneeze, exercise? Yes  How often do you leak with cough, sneeze, exercise?  Yes  How much do you usually leak? A little   Do you wear a pad? - If so; -  Impact to quality of life? -    Urge Incontinence:  Do you often get sudden urges to urinate? Yes  How often do have urges? 3-4x per week, increasing  If so, do you leak with these urges? Yes  How much do you usually leak? Whole bladder  Impact to quality of life? Made things worse    Voids/day:10  Nocturia: 3-4  Fluid intake: 7-8 glasses  Caffeine: Decaf coffee, 2 cups lunch and dinner    Urinating:  Difficulty starting urination or strain to void? -  Weak or intermittent stream? -  Incomplete emptying or dribbling? -  Pain or burning with urination? -  Any blood in your urine? -    GI:  Constipation? Sometimes, then gets prune juice  Frequency stools 2-3 days    Straining for stools No  Stool consistency Soft      Ever leak stool (Accidental Bowel Leakage)? Yes      If so, how often?               Once ev lionel 2 months      If so, do you leak?                   -      Soiling without sensation? -  History of irritable bowel or Crohn's? No    Sexual/Pain:  Are you currently having sex?. No  Pain with sex?   -   Sexual Partner: -  Do any of these symptoms interfere with sex? -  Impact to quality of life? -    Prior therapy:  Ever done pelvic floor physical therapy? Not sure  Trial of medication? No  Have you ever tried a pessary? No    Medical History:  Do you have?   High Cholesterol? No   Diabetes? Yes  High Blood pressure? Yes  Recurrent UTIs? Yes (see HPI)  Sleep Apnea? Yes  Other medical problems: Hx incomplete emptying of bladder, congenital single kidney, CKD 2    Surgical History:    Hysterectomy? Yes ( total abdominal)  Bladder Surgery? No   Other? Appendectomy, L Mastectomy, cholecystectomy,     OB/Gyn History:  Pregnancies? No  Deliveries? -  Vaginal -  Section -  Current birth control? -  Periods? -  When was the first day of your last period? -  Last Pap smear? Pre-hysterectomy Any abnormal? Not sure  Last mammogram? 2021  Last colonoscopy? 5 years    Medications/Vitamins/Supplements:   Current Outpatient Medications   Medication     acetaminophen (TYLENOL) 500 MG tablet     alum & mag hydroxide-simethicone (MYLANTA/MAALOX) 200-200-20 MG/5ML SUSP suspension     amLODIPine (NORVASC) 10 MG tablet     apixaban ANTICOAGULANT (ELIQUIS) 5 MG tablet     artificial saliva (BIOTENE MT) AERS spray     atorvastatin (LIPITOR) 40 MG tablet     blood glucose (NO BRAND SPECIFIED) lancets standard     blood glucose calibration (NO BRAND SPECIFIED) solution     blood glucose monitoring (NO BRAND SPECIFIED) meter device kit     blood glucose monitoring (NO BRAND SPECIFIED) test strip     Calcium Carbonate-Vitamin D (CALCIUM-VITAMIN D) 250-125 MG-UNIT TABS     calcium polycarbophil (FIBERCON) 625 MG tablet      CLARITIN 10 MG OR TABS     FLUoxetine (PROZAC) 20 MG capsule     fluticasone (FLONASE) 50 MCG/ACT nasal spray     furosemide (LASIX) 20 MG tablet     Gabapentin (NEURONTIN PO)     Hypromellose (ARTIFICIAL TEARS OP)     insulin glargine (LANTUS) 100 UNIT/ML injection     lactase (LACTAID) 3000 UNIT tablet     levothyroxine (SYNTHROID/LEVOTHROID) 175 MCG tablet     lifitegrast (XIIDRA) 5 % opthalmic solution     liraglutide (VICTOZA) 18 MG/3ML solution     losartan (COZAAR) 100 MG tablet     Magnesium Hydroxide (MILK OF MAGNESIA PO)     melatonin 3 MG CAPS     metFORMIN (GLUCOPHAGE) 1000 MG tablet     NEW MED     nitroFURantoin macrocrystal-monohydrate (MACROBID) 100 MG capsule     nystatin (MYCOSTATIN) 835305 UNIT/GM POWD     ONETOUCH DELICA LANCETS 33G MISC     polyethylene glycol (MIRALAX/GLYCOLAX) powder     Saline 0.9 % SOLN     senna-docusate (SENNA S) 8.6-50 MG per tablet     simethicone (MYLICON) 125 MG chewable tablet     Skin Protectants, Misc. (EUCERIN) cream     SM LUBRICANT EYE DROPS 0.4-0.3 % SOLN ophthalmic solution     solifenacin (VESICARE) 10 MG tablet     tobramycin (TOBREX) 0.3 % ophthalmic solution     Vaginal Moisturizer (VAGISIL INTIMATE MOISTURIZER) LOTN     ziprasidone (GEODON) 40 MG capsule     aspirin 81 MG EC tablet     cefdinir (OMNICEF) 300 MG capsule     erythromycin (ROMYCIN) 5 MG/GM ophthalmic ointment     sulfamethoxazole-trimethoprim (BACTRIM DS) 800-160 MG tablet     No current facility-administered medications for this visit.       Drug Allergies: Chlordiazepoxide HCL, Dimetapp DM, Haldol, Ibuprofen (tablets), Propofol   Latex Allergy: No  Iodine Allergy No    Family History: (list relationship and age at diagnosis)  Breast cancer? No   Ovarian cancer? No   Colon cancer? No  Other? No    Social History:  Marital status: Single  Do you/ have you ever smoke(d)  cigarettes? Never  Drink more than 1 alcoholic beverage a day?  No  Occupation?     In the past 3 months have you regularly  experienced:  Chest pain w/ walking/exercise? -                   Unusual headaches? -  Leg pain w/ walking/exercise? -                       Easy bruising? -  Difficulty breathing w/ walking/exercise? -  Problems with vision? -  Dizziness, falls, or fainting? -  Excessive bleeding from cuts, gums, surgery? -  Other: -    Past Medical History:   Diagnosis Date     Allergic rhinitis due to pollen     seasonal allergies      Anisometropia and aniseikonia      BMI greater than 40      Cardiomegaly      Chronic constipation      Congenital absence of one kidney      Cramp of limb      Dermatophytosis of the body      Dry eye syndrome      Esophageal reflux      Essential hypertension, benign      Gastro-oesophageal reflux disease      Hemorrhoids      Hypermetropia      Hypothyroidism      Incomplete Emptying of Bladder      Lactose intolerance      Major depressive disorder, recurrent episode, moderate (H)      Malignant neoplasm of breast (female), unspecified site     left mastectomy 1996      Mixed incontinence urge and stress (male)(female)      Moderate obstructive sleep apnea      Postmenopausal Atrophic Vaginitis      Presbyopia      Regular astigmatism      Restless leg syndrome      Senile nuclear sclerosis      Thyroid eye disease     mild     Tinnitus      Trigger finger (acquired)     right hand     Type II or unspecified type diabetes mellitus without mention of complication, not stated as uncontrolled     on insulin since April 2006        Past Surgical History:   Procedure Laterality Date     APPENDECTOMY       C MASTECTOMY,SIMPLE  1996    Left mastectomy  [facility]. Had normal FU mammo 5/2007. Follow yearly.      C TOTAL ABDOM HYSTERECTOMY  7/2003    Dr. Castanon, for abnormal bleeding. Removal of both tubes with BSO for myoma w - - -     CHOLECYSTECTOMY       COLONOSCOPY  5/2005    Complete Colonoscopy-had one small polyp removed 5/2005.      COLONOSCOPY N/A 3/31/2016    Procedure:  COLONOSCOPY;  Surgeon: Cary Ring MD;  Location: [facility]     COLONOSCOPY N/A 7/7/2016    Procedure: COLONOSCOPY;  Surgeon: Cary Ring MD;  Location: [facility]     DILATION AND CURETTAGE       HYSTERECTOMY       MASTECTOMY      Left breast       Social History     Socioeconomic History     Marital status: Single     Spouse name: Not on file     Number of children: Not on file     Years of education: Not on file     Highest education level: Not on file   Occupational History     Not on file   Tobacco Use     Smoking status: Never Smoker     Smokeless tobacco: Never Used   Substance and Sexual Activity     Alcohol use: No     Alcohol/week: 0.0 standard drinks     Drug use: No     Sexual activity: Not Currently   Other Topics Concern     Not on file   Social History Narrative    Pinky Page is a resident at [facility].     Social Determinants of Health     Financial Resource Strain: Not on file   Food Insecurity: Not on file   Transportation Needs: Not on file   Physical Activity: Not on file   Stress: Not on file   Social Connections: Not on file   Intimate Partner Violence: Not on file   Housing Stability: Not on file       Family History   Problem Relation Age of Onset     Heart Disease Father         1968     Melanoma Mother         malignant melanoma     Glaucoma No family hx of      Macular Degeneration No family hx of        ROS    Allergies   Allergen Reactions     Chlordiazepoxide Hcl      Dimetapp Dm Cold-Cough      Cold/Congetion TABS     Haldol      Ibuprofen      TABS     Lactose Intolerance [Beta-Galactosidase]      CAPS     Milk Products      Propofol      EMUL       Current Outpatient Medications   Medication     acetaminophen (TYLENOL) 500 MG tablet     alum & mag hydroxide-simethicone (MYLANTA/MAALOX) 200-200-20 MG/5ML SUSP suspension     amLODIPine (NORVASC) 10 MG tablet     apixaban ANTICOAGULANT (ELIQUIS) 5 MG tablet     artificial saliva (BIOTENE MT) AERS spray      aspirin 81 MG EC tablet     atorvastatin (LIPITOR) 40 MG tablet     blood glucose (NO BRAND SPECIFIED) lancets standard     blood glucose calibration (NO BRAND SPECIFIED) solution     blood glucose monitoring (NO BRAND SPECIFIED) meter device kit     blood glucose monitoring (NO BRAND SPECIFIED) test strip     Calcium Carbonate-Vitamin D (CALCIUM-VITAMIN D) 250-125 MG-UNIT TABS     calcium polycarbophil (FIBERCON) 625 MG tablet     cefdinir (OMNICEF) 300 MG capsule     CLARITIN 10 MG OR TABS     erythromycin (ROMYCIN) 5 MG/GM ophthalmic ointment     FLUoxetine (PROZAC) 20 MG capsule     fluticasone (FLONASE) 50 MCG/ACT nasal spray     furosemide (LASIX) 20 MG tablet     Gabapentin (NEURONTIN PO)     Hypromellose (ARTIFICIAL TEARS OP)     insulin glargine (LANTUS) 100 UNIT/ML injection     lactase (LACTAID) 3000 UNIT tablet     levothyroxine (SYNTHROID, LEVOTHROID) 175 MCG tablet     levothyroxine (SYNTHROID/LEVOTHROID) 175 MCG tablet     lifitegrast (XIIDRA) 5 % opthalmic solution     liraglutide (VICTOZA) 18 MG/3ML solution     losartan (COZAAR) 100 MG tablet     Magnesium Hydroxide (MILK OF MAGNESIA PO)     melatonin 3 MG CAPS     metFORMIN (GLUCOPHAGE) 1000 MG tablet     NEW MED     nitroFURantoin macrocrystal-monohydrate (MACROBID) 100 MG capsule     nystatin (MYCOSTATIN) 875930 UNIT/GM POWD     ONETOUCH DELICA LANCETS 33G MISC     polyethylene glycol (MIRALAX/GLYCOLAX) powder     Saline 0.9 % SOLN     senna-docusate (SENNA S) 8.6-50 MG per tablet     simethicone (MYLICON) 125 MG chewable tablet     Skin Protectants, Misc. (EUCERIN) cream     SM LUBRICANT EYE DROPS 0.4-0.3 % SOLN ophthalmic solution     solifenacin (VESICARE) 10 MG tablet     sulfamethoxazole-trimethoprim (BACTRIM DS) 800-160 MG tablet     tobramycin (TOBREX) 0.3 % ophthalmic solution     Vaginal Moisturizer (VAGISIL INTIMATE MOISTURIZER) LOTN     ziprasidone (GEODON) 40 MG capsule     No current facility-administered medications for this visit.        LMP  (LMP Unknown)  No LMP recorded (lmp unknown). Patient has had a hysterectomy. There is no height or weight on file to calculate BMI.  Ms. Page is alert, comfortable in no acute distress, non-labored breathing.   Exam was deferred    A/P: Pinky Page is a 71 year old F with UUI and recurrent UTIs    Schedule CT Urogram, cystoscopy    Kelton Costa MD  Professor, OB/GYN  Urogynecologist    CC  Patient Care Team:  Tonia Goel MD as PCP - General (Student in organized health care education/training program)  Dr Mackey Residence as Damon Abad, PhD LP (Psychology)  Ovi, Dedra Urbina MD as MD (Urology)  Analy Bush, RN as Clinic Care Coordinator (Urology)  Matilde Ann MD as Referring Physician  Residence, Nany Blackmon MD as MD (Ophthalmology)  Gaby Holliday MD as MD (Nephrology)  Allie Bauer, RN as Registered Nurse  Michael Lopez MD as MD (Ophthalmology)  María Shi MD as Resident (Student in organized health care education/training program)  Marcos Magdaleno MD as Assigned Endocrinology Provider  Roxy Rubio MD as MD (Psychiatry)  Haile King MD as Assigned Heart and Vascular Provider  Tonia Goel MD as Assigned PCP  Michael Lopez MD as Assigned Surgical Provider  Denise Dewey MD as Resident (Psychiatry)  Annie Gordon MD as MD (Psychiatry)  Brianda Reddy MD as Assigned Pulmonology Provider  Kelton Costa MD as MD (OB/Gyn)  Bandar Renner DO as MD (Family Medicine)  BANDAR RENENR

## 2021-11-12 NOTE — PROGRESS NOTES
November 12, 2021    Referring Provider: Sarah Renner, DO  [address] [place] [address]    Primary Care Provider: Tonia Goel    CC: Recurrent UTI    HPI:  Pinky Page is a 71 year old female who presents for evaluation of her pelvic floor symptoms.  She has had last year urge incontinence, increasing in frequency.  Feels like it started after getting COVID in Dec 2020. Now has itching and burning intermittently, goes away with antibiotics and then comes back pretty quickly.     7+ UAs consistent with infection in last year, 2 cultures.  Culture 10/09/2021 E coli  Cx 3/24/21 klebsiella, staph simulans    Prolapse:  Do you feel a vaginal bulge? No             Stress Incontinence:  Do you leak urine with cough, sneeze, exercise? Yes  How often do you leak with cough, sneeze, exercise?  Yes  How much do you usually leak? A little   Do you wear a pad? - If so; -  Impact to quality of life? -    Urge Incontinence:  Do you often get sudden urges to urinate? Yes  How often do have urges? 3-4x per week, increasing  If so, do you leak with these urges? Yes  How much do you usually leak? Whole bladder  Impact to quality of life? Made things worse    Voids/day:10  Nocturia: 3-4  Fluid intake: 7-8 glasses  Caffeine: Decaf coffee, 2 cups lunch and dinner    Urinating:  Difficulty starting urination or strain to void? -  Weak or intermittent stream? -  Incomplete emptying or dribbling? -  Pain or burning with urination? -  Any blood in your urine? -    GI:  Constipation? Sometimes, then gets prune juice  Frequency stools 2-3 days    Straining for stools No  Stool consistency Soft     Ever leak stool (Accidental Bowel Leakage)? Yes      If so, how often?               Once ev lionel 2 months      If so, do you leak?                   -      Soiling without sensation? -  History of irritable bowel or Crohn's? No    Sexual/Pain:  Are you currently having sex?. No  Pain with sex?   -   Sexual Partner: -  Do any of  these symptoms interfere with sex? -  Impact to quality of life? -    Prior therapy:  Ever done pelvic floor physical therapy? Not sure  Trial of medication? No  Have you ever tried a pessary? No    Medical History:  Do you have?   High Cholesterol? No   Diabetes? Yes  High Blood pressure? Yes  Recurrent UTIs? Yes (see HPI)  Sleep Apnea? Yes  Other medical problems: Hx incomplete emptying of bladder, congenital single kidney, CKD 2    Surgical History:    Hysterectomy? Yes ( total abdominal)  Bladder Surgery? No   Other? Appendectomy, L Mastectomy, cholecystectomy,     OB/Gyn History:  Pregnancies? No  Deliveries? -  Vaginal -  Section -  Current birth control? -  Periods? -  When was the first day of your last period? -  Last Pap smear? Pre-hysterectomy Any abnormal? Not sure  Last mammogram? 2021  Last colonoscopy? 5 years    Medications/Vitamins/Supplements:   Current Outpatient Medications   Medication     acetaminophen (TYLENOL) 500 MG tablet     alum & mag hydroxide-simethicone (MYLANTA/MAALOX) 200-200-20 MG/5ML SUSP suspension     amLODIPine (NORVASC) 10 MG tablet     apixaban ANTICOAGULANT (ELIQUIS) 5 MG tablet     artificial saliva (BIOTENE MT) AERS spray     atorvastatin (LIPITOR) 40 MG tablet     blood glucose (NO BRAND SPECIFIED) lancets standard     blood glucose calibration (NO BRAND SPECIFIED) solution     blood glucose monitoring (NO BRAND SPECIFIED) meter device kit     blood glucose monitoring (NO BRAND SPECIFIED) test strip     Calcium Carbonate-Vitamin D (CALCIUM-VITAMIN D) 250-125 MG-UNIT TABS     calcium polycarbophil (FIBERCON) 625 MG tablet     CLARITIN 10 MG OR TABS     FLUoxetine (PROZAC) 20 MG capsule     fluticasone (FLONASE) 50 MCG/ACT nasal spray     furosemide (LASIX) 20 MG tablet     Gabapentin (NEURONTIN PO)     Hypromellose (ARTIFICIAL TEARS OP)     insulin glargine (LANTUS) 100 UNIT/ML injection     lactase (LACTAID) 3000 UNIT tablet     levothyroxine  (SYNTHROID/LEVOTHROID) 175 MCG tablet     lifitegrast (XIIDRA) 5 % opthalmic solution     liraglutide (VICTOZA) 18 MG/3ML solution     losartan (COZAAR) 100 MG tablet     Magnesium Hydroxide (MILK OF MAGNESIA PO)     melatonin 3 MG CAPS     metFORMIN (GLUCOPHAGE) 1000 MG tablet     NEW MED     nitroFURantoin macrocrystal-monohydrate (MACROBID) 100 MG capsule     nystatin (MYCOSTATIN) 892064 UNIT/GM POWD     ONETOUCH DELICA LANCETS 33G MISC     polyethylene glycol (MIRALAX/GLYCOLAX) powder     Saline 0.9 % SOLN     senna-docusate (SENNA S) 8.6-50 MG per tablet     simethicone (MYLICON) 125 MG chewable tablet     Skin Protectants, Misc. (EUCERIN) cream     SM LUBRICANT EYE DROPS 0.4-0.3 % SOLN ophthalmic solution     solifenacin (VESICARE) 10 MG tablet     tobramycin (TOBREX) 0.3 % ophthalmic solution     Vaginal Moisturizer (VAGISIL INTIMATE MOISTURIZER) LOTN     ziprasidone (GEODON) 40 MG capsule     aspirin 81 MG EC tablet     cefdinir (OMNICEF) 300 MG capsule     erythromycin (ROMYCIN) 5 MG/GM ophthalmic ointment     sulfamethoxazole-trimethoprim (BACTRIM DS) 800-160 MG tablet     No current facility-administered medications for this visit.       Drug Allergies: Chlordiazepoxide HCL, Dimetapp DM, Haldol, Ibuprofen (tablets), Propofol   Latex Allergy: No  Iodine Allergy No    Family History: (list relationship and age at diagnosis)  Breast cancer? No   Ovarian cancer? No   Colon cancer? No  Other? No    Social History:  Marital status: Single  Do you/ have you ever smoke(d)  cigarettes? Never  Drink more than 1 alcoholic beverage a day?  No  Occupation?     In the past 3 months have you regularly experienced:  Chest pain w/ walking/exercise? -                   Unusual headaches? -  Leg pain w/ walking/exercise? -                       Easy bruising? -  Difficulty breathing w/ walking/exercise? -  Problems with vision? -  Dizziness, falls, or fainting? -  Excessive bleeding from cuts, gums, surgery? -  Other:  -    Past Medical History:   Diagnosis Date     Allergic rhinitis due to pollen     seasonal allergies      Anisometropia and aniseikonia      BMI greater than 40      Cardiomegaly      Chronic constipation      Congenital absence of one kidney      Cramp of limb      Dermatophytosis of the body      Dry eye syndrome      Esophageal reflux      Essential hypertension, benign      Gastro-oesophageal reflux disease      Hemorrhoids      Hypermetropia      Hypothyroidism      Incomplete Emptying of Bladder      Lactose intolerance      Major depressive disorder, recurrent episode, moderate (H)      Malignant neoplasm of breast (female), unspecified site     left mastectomy 1996      Mixed incontinence urge and stress (male)(female)      Moderate obstructive sleep apnea      Postmenopausal Atrophic Vaginitis      Presbyopia      Regular astigmatism      Restless leg syndrome      Senile nuclear sclerosis      Thyroid eye disease     mild     Tinnitus      Trigger finger (acquired)     right hand     Type II or unspecified type diabetes mellitus without mention of complication, not stated as uncontrolled     on insulin since April 2006        Past Surgical History:   Procedure Laterality Date     APPENDECTOMY       C MASTECTOMY,SIMPLE  1996    Left mastectomy  [facility]. Had normal FU mammo 5/2007. Follow yearly.      C TOTAL ABDOM HYSTERECTOMY  7/2003    Dr. Castanon, for abnormal bleeding. Removal of both tubes with BSO for myoma w - - -     CHOLECYSTECTOMY       COLONOSCOPY  5/2005    Complete Colonoscopy-had one small polyp removed 5/2005.      COLONOSCOPY N/A 3/31/2016    Procedure: COLONOSCOPY;  Surgeon: Cary Ring MD;  Location:  GI     COLONOSCOPY N/A 7/7/2016    Procedure: COLONOSCOPY;  Surgeon: Cary Ring MD;  Location:  GI     DILATION AND CURETTAGE       HYSTERECTOMY       MASTECTOMY      Left breast       Social History     Socioeconomic History      Marital status: Single     Spouse name: Not on file     Number of children: Not on file     Years of education: Not on file     Highest education level: Not on file   Occupational History     Not on file   Tobacco Use     Smoking status: Never Smoker     Smokeless tobacco: Never Used   Substance and Sexual Activity     Alcohol use: No     Alcohol/week: 0.0 standard drinks     Drug use: No     Sexual activity: Not Currently   Other Topics Concern     Not on file   Social History Narrative    Pinky Page is a resident at [facility].     Social Determinants of Health     Financial Resource Strain: Not on file   Food Insecurity: Not on file   Transportation Needs: Not on file   Physical Activity: Not on file   Stress: Not on file   Social Connections: Not on file   Intimate Partner Violence: Not on file   Housing Stability: Not on file       Family History   Problem Relation Age of Onset     Heart Disease Father         1968     Melanoma Mother         malignant melanoma     Glaucoma No family hx of      Macular Degeneration No family hx of        ROS    Allergies   Allergen Reactions     Chlordiazepoxide Hcl      Dimetapp Dm Cold-Cough      Cold/Congetion TABS     Haldol      Ibuprofen      TABS     Lactose Intolerance [Beta-Galactosidase]      CAPS     Milk Products      Propofol      EMUL       Current Outpatient Medications   Medication     acetaminophen (TYLENOL) 500 MG tablet     alum & mag hydroxide-simethicone (MYLANTA/MAALOX) 200-200-20 MG/5ML SUSP suspension     amLODIPine (NORVASC) 10 MG tablet     apixaban ANTICOAGULANT (ELIQUIS) 5 MG tablet     artificial saliva (BIOTENE MT) AERS spray     aspirin 81 MG EC tablet     atorvastatin (LIPITOR) 40 MG tablet     blood glucose (NO BRAND SPECIFIED) lancets standard     blood glucose calibration (NO BRAND SPECIFIED) solution     blood glucose monitoring (NO BRAND SPECIFIED) meter device kit     blood glucose monitoring (NO BRAND SPECIFIED) test strip     Calcium  Carbonate-Vitamin D (CALCIUM-VITAMIN D) 250-125 MG-UNIT TABS     calcium polycarbophil (FIBERCON) 625 MG tablet     cefdinir (OMNICEF) 300 MG capsule     CLARITIN 10 MG OR TABS     erythromycin (ROMYCIN) 5 MG/GM ophthalmic ointment     FLUoxetine (PROZAC) 20 MG capsule     fluticasone (FLONASE) 50 MCG/ACT nasal spray     furosemide (LASIX) 20 MG tablet     Gabapentin (NEURONTIN PO)     Hypromellose (ARTIFICIAL TEARS OP)     insulin glargine (LANTUS) 100 UNIT/ML injection     lactase (LACTAID) 3000 UNIT tablet     levothyroxine (SYNTHROID, LEVOTHROID) 175 MCG tablet     levothyroxine (SYNTHROID/LEVOTHROID) 175 MCG tablet     lifitegrast (XIIDRA) 5 % opthalmic solution     liraglutide (VICTOZA) 18 MG/3ML solution     losartan (COZAAR) 100 MG tablet     Magnesium Hydroxide (MILK OF MAGNESIA PO)     melatonin 3 MG CAPS     metFORMIN (GLUCOPHAGE) 1000 MG tablet     NEW MED     nitroFURantoin macrocrystal-monohydrate (MACROBID) 100 MG capsule     nystatin (MYCOSTATIN) 681397 UNIT/GM POWD     ONETOUCH DELICA LANCETS 33G MISC     polyethylene glycol (MIRALAX/GLYCOLAX) powder     Saline 0.9 % SOLN     senna-docusate (SENNA S) 8.6-50 MG per tablet     simethicone (MYLICON) 125 MG chewable tablet     Skin Protectants, Misc. (EUCERIN) cream     SM LUBRICANT EYE DROPS 0.4-0.3 % SOLN ophthalmic solution     solifenacin (VESICARE) 10 MG tablet     sulfamethoxazole-trimethoprim (BACTRIM DS) 800-160 MG tablet     tobramycin (TOBREX) 0.3 % ophthalmic solution     Vaginal Moisturizer (VAGISIL INTIMATE MOISTURIZER) LOTN     ziprasidone (GEODON) 40 MG capsule     No current facility-administered medications for this visit.       LMP  (LMP Unknown)  No LMP recorded (lmp unknown). Patient has had a hysterectomy. There is no height or weight on file to calculate BMI.  Ms. Page is alert, comfortable in no acute distress, non-labored breathing.   Exam was deferred    A/P: Pinky Page is a 71 year old F with UUI and recurrent  UTIs    Schedule CT Urogram, cystoscopy    Kelton Costa MD  Professor, OB/GYN  Urogynecologist    CC  Patient Care Team:  Tonia Goel MD as PCP - General (Student in organized health care education/training program)  Dr Mackey Residence as Damon Abad, PhD LP (Psychology)  Dedra Dior MD as MD (Urology)  Analy Bush, RN as Clinic Care Coordinator (Urology)  Matilde Ann MD as Referring Physician  Residence, Nany Blackmon MD as MD (Ophthalmology)  Gaby Holliday MD as MD (Nephrology)  Allie Bauer, RN as Registered Nurse  Jessica, Michael Mcqueen MD as MD (Ophthalmology)  María Shi MD as Resident (Student in organized health care education/training program)  Marcos Magdaleno MD as Assigned Endocrinology Provider  Roxy Rubio MD as MD (Psychiatry)  Haile King MD as Assigned Heart and Vascular Provider  Tonia Goel MD as Assigned PCP  Michael Lopez MD as Assigned Surgical Provider  Denise Dewey MD as Resident (Psychiatry)  Annie Gordon MD as MD (Psychiatry)  Brianda Reddy MD as Assigned Pulmonology Provider  Kelton Costa MD as MD (OB/Gyn)  Bandar Renner DO as MD (Family Medicine)  BANDAR RENNER

## 2021-11-16 ENCOUNTER — VIRTUAL VISIT (OUTPATIENT)
Dept: PSYCHOLOGY | Facility: CLINIC | Age: 71
End: 2021-11-16
Payer: COMMERCIAL

## 2021-11-16 DIAGNOSIS — F33.0 MAJOR DEPRESSIVE DISORDER, RECURRENT EPISODE, MILD (H): Primary | ICD-10-CM

## 2021-11-16 DIAGNOSIS — E66.9 OBESITY, UNSPECIFIED OBESITY SEVERITY, UNSPECIFIED OBESITY TYPE: ICD-10-CM

## 2021-11-16 DIAGNOSIS — F54 PSYCHOLOGICAL FACTORS AFFECTING MEDICAL CONDITION: ICD-10-CM

## 2021-11-16 DIAGNOSIS — F41.1 GENERALIZED ANXIETY DISORDER: ICD-10-CM

## 2021-11-16 PROCEDURE — 90834 PSYTX W PT 45 MINUTES: CPT | Mod: 95 | Performed by: PSYCHOLOGIST

## 2021-11-16 NOTE — PROGRESS NOTES
Health Psychology                     Department of Medicine  Keri Arnett, Ph.D., L.P. [phone]                          [facility] Madelaine Ingram, Ph.D., L.P. [phone]                   [place] Mail Code 748   Gilma Collins, Ph.D., L.P.  [phone]       [address] [place] [address] Rae Eugene, Ph.D., L.P. [phone]       [place] [address]            Damon Gr, Ph.D., A.B.P.P., L.P. [phone]     Kimber Morales, Ph.D., L.P. 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 Today we used the video of her cell phone. This is her fifrth video session. She still needs elp setting it up.     Intake with Health Psychology was in thelate  1980s and I began to see her in the early 1990s.    Health:  She has not gotten booster yet. She fell a few days ago, was seen at [facility] ED, had imaging studies.  No fractures.   She reports discomfort on left side, both knees.  She had a bump on her head which has resolved.t.  She isn't sure why she fell.  She used ice packs.  She fell on carpet.   She is slowly recovering in terms of knees and shoulder.  She is not in a lot of pain- just a little.     She was at ED yesterday for a facial lesion.      Weight has increased a little.      She is a 3-4 on 10-point scale of depression.  No residuals from when she COVID.  She feels good in general.  She reports there have been incidence of 1 resident with COVID during past two weeks.   She had booster of Pfizer in early November..     Exercise:  She had been  biking 7x/week for 30 minutes; not currently exercising due to knee getting sore. Discussed biking 5 minutes a few times/day.   She is walking 30 minutes indoor everyday.   Discussed adding 15 minutes in the afternoon.  We discussed a total  of 1.5 hours per day  If possible.  She had a UTI and slsowed down, is going to need to increase her exercise.  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     Her weight increased to 226.  We discussed approach to weight loss in terms of fewer calories despite lack of diet line.  Discussed elimination of snacks or major decrease.     Recreation/Activities: Coloring, word search puzzles, read, tv, radio, exercise.   She has plans to join her sister at [employer].  Encouraging setting her up for video conferences and use of her phone's camera.    They discontinued  her Ativan.  She is doing well.     She participates fully, ventilated feelings appropriately.  She appears to derive benefit from the support.  No safety issues.    A treatment plan was completed.      Current Outpatient Medications   Medication     acetaminophen (TYLENOL) 500 MG tablet     alum & mag hydroxide-simethicone (MYLANTA/MAALOX) 200-200-20 MG/5ML SUSP suspension     amLODIPine (NORVASC) 10 MG tablet     apixaban ANTICOAGULANT (ELIQUIS) 5 MG tablet     artificial saliva (BIOTENE MT) AERS spray     aspirin 81 MG EC tablet     atorvastatin (LIPITOR) 40 MG tablet     blood glucose (NO BRAND SPECIFIED) lancets standard     blood glucose calibration (NO BRAND SPECIFIED) solution     blood glucose monitoring (NO BRAND SPECIFIED) meter device kit     blood glucose monitoring (NO BRAND SPECIFIED) test strip     Calcium Carbonate-Vitamin D (CALCIUM-VITAMIN D) 250-125 MG-UNIT TABS     calcium polycarbophil (FIBERCON) 625 MG tablet     cefdinir (OMNICEF) 300 MG capsule     CLARITIN 10 MG OR TABS     erythromycin (ROMYCIN) 5 MG/GM ophthalmic ointment     FLUoxetine (PROZAC) 20 MG capsule     fluticasone (FLONASE) 50 MCG/ACT nasal spray     furosemide (LASIX) 20 MG tablet     Gabapentin (NEURONTIN PO)     Hypromellose (ARTIFICIAL  TEARS OP)     insulin glargine (LANTUS) 100 UNIT/ML injection     lactase (LACTAID) 3000 UNIT tablet     levothyroxine (SYNTHROID/LEVOTHROID) 175 MCG tablet     lifitegrast (XIIDRA) 5 % opthalmic solution     liraglutide (VICTOZA) 18 MG/3ML solution     losartan (COZAAR) 100 MG tablet     Magnesium Hydroxide (MILK OF MAGNESIA PO)     melatonin 3 MG CAPS     metFORMIN (GLUCOPHAGE) 1000 MG tablet     NEW MED     nitroFURantoin macrocrystal-monohydrate (MACROBID) 100 MG capsule     nystatin (MYCOSTATIN) 500598 UNIT/GM POWD     ONETOUCH DELICA LANCETS 33G MISC     polyethylene glycol (MIRALAX/GLYCOLAX) powder     Saline 0.9 % SOLN     senna-docusate (SENNA S) 8.6-50 MG per tablet     simethicone (MYLICON) 125 MG chewable tablet     Skin Protectants, Misc. (EUCERIN) cream     SM LUBRICANT EYE DROPS 0.4-0.3 % SOLN ophthalmic solution     solifenacin (VESICARE) 10 MG tablet     sulfamethoxazole-trimethoprim (BACTRIM DS) 800-160 MG tablet     tobramycin (TOBREX) 0.3 % ophthalmic solution     Vaginal Moisturizer (VAGISIL INTIMATE MOISTURIZER) LOTN     ziprasidone (GEODON) 40 MG capsule     No current facility-administered medications for this visit.     Wt Readings from Last 4 Encounters:   11/12/21 102.5 kg (226 lb)   10/25/21 101.7 kg (224 lb 3.2 oz)   10/14/21 107 kg (236 lb)   10/08/21 107 kg (236 lb)   There is no height or weight on file to calculate BMI.      This telephone service is appropriate and effective for delivering services in light of the necessity for social distancing to mitigate the COVID.bmi  -19 epidemic and for conservation of PPE.     Patient has agreed to receiving telephone services after being informed about it: Yes    Patient prefers video invitation/information to be sent by:   email    Time service started: 1:08  Time service ended:  1:45    Mode of transmission: Doxy following instructions  by telephone.  She had difficulty connectiong so I called the staff to help her.    Location of  originating:  [facility]    Distance site:  Home office of provider for [facility]     The patient has been notified that:  Video visits will be conducted via a call with their psychologist to provide the care they need with a video conversation. Video visits may be billed at different rates depending on insurance coverage.  Patients are advised to please contact their insurance provider with any questions about their health insurance coverage. If during the course of a call the psychologist feels a video visit is not appropriate, patients will not be charged for this service.    Diagnosis:   Major Depression, recurrent mild (F33.0)   Psychological Factors Affecting Morbid Obesity (F54)  Generalized anxiety (F41.1)       Plan: Return video telementalhealth visit 11/16 @ 1 due to the serious and persistent nature of her depression and anxiety, consistent with treatment plan.  Last treatment plan signed: 11/16/2021  Treatment plan due: 11/16/2022               Preference for future meetings:           X   In-person, even if both wearing masks             Remote              Either            Damon Gr, PhD LP,  A.B.P.P.  Director, Health Psychology  [phone]

## 2021-11-18 ENCOUNTER — ANCILLARY PROCEDURE (OUTPATIENT)
Dept: CT IMAGING | Facility: CLINIC | Age: 71
End: 2021-11-18
Attending: OBSTETRICS & GYNECOLOGY
Payer: COMMERCIAL

## 2021-11-18 DIAGNOSIS — N39.0 RECURRENT UTI: ICD-10-CM

## 2021-11-18 LAB
CREAT BLD-MCNC: 0.8 MG/DL (ref 0.5–1)
GFR SERPL CREATININE-BSD FRML MDRD: >60 ML/MIN/1.73M2

## 2021-11-18 PROCEDURE — 74178 CT ABD&PLV WO CNTR FLWD CNTR: CPT | Performed by: RADIOLOGY

## 2021-11-18 PROCEDURE — 82565 ASSAY OF CREATININE: CPT | Performed by: PATHOLOGY

## 2021-11-18 RX ORDER — IOPAMIDOL 755 MG/ML
135 INJECTION, SOLUTION INTRAVASCULAR ONCE
Status: COMPLETED | OUTPATIENT
Start: 2021-11-18 | End: 2021-11-18

## 2021-11-18 RX ADMIN — IOPAMIDOL 135 ML: 755 INJECTION, SOLUTION INTRAVASCULAR at 16:21

## 2021-11-19 DIAGNOSIS — R80.9 TYPE 2 DIABETES MELLITUS WITH MICROALBUMINURIA, WITH LONG-TERM CURRENT USE OF INSULIN (H): ICD-10-CM

## 2021-11-19 DIAGNOSIS — E11.29 TYPE 2 DIABETES MELLITUS WITH MICROALBUMINURIA, WITH LONG-TERM CURRENT USE OF INSULIN (H): ICD-10-CM

## 2021-11-19 DIAGNOSIS — Z79.4 TYPE 2 DIABETES MELLITUS WITH MICROALBUMINURIA, WITH LONG-TERM CURRENT USE OF INSULIN (H): ICD-10-CM

## 2021-11-19 NOTE — TELEPHONE ENCOUNTER
[facility] phone call message- patient requesting a refill:    Full Medication Name: Lantus      Pharmacy confirmed as :  [facility] - [place], [place] - [address]  [address]  [facility] [address]  Phone: [phone] Fax: [phone] Yes    Additional Comments: Patient is out of medication     OK to leave a message on voice mail? Yes    Primary language: English      needed? No    Call taken on November 19, 2021 at 10:24 AM by Nancy Newman CMA

## 2021-11-19 NOTE — TELEPHONE ENCOUNTER
Routing to preceptor to advise as PCP not in clinic and patient is out of medication.   Rosario Klein RN

## 2021-11-19 NOTE — TELEPHONE ENCOUNTER
"Patient pharmacy requesting refill of lantus- currently listed as \"historical medication\" from 2018. RN contacted assisted living and spoke to Ambar VILLA who confirms patient is taking medication 20 units at bedtime and states last prescribed by Dr. [place]. Last A1C 7.8 on 3/16/21. RN unable to fill as current order is historical. Will route high priority to PCP to fill if appropriate.   Rosario Klein RN    Request for medication refill:    Providers if patient needs an appointment and you are willing to give a one month supply please refill for one month and  send a letter/MyChart using \".SMILLIMITEDREFILL\" .smillimited and route chart to \"P SMI \" (Giving one month refill in non controlled medications is strongly recommended before denial)    If refill has been denied, meaning absolutely no refills without visit, please complete the smart phrase \".smirxrefuse\" and route it to the \"P SMI MED REFILLS\"  pool to inform the patient and the pharmacy.        "

## 2021-11-30 ENCOUNTER — OFFICE VISIT (OUTPATIENT)
Dept: FAMILY MEDICINE | Facility: CLINIC | Age: 71
End: 2021-11-30
Payer: COMMERCIAL

## 2021-11-30 VITALS
TEMPERATURE: 98.4 F | SYSTOLIC BLOOD PRESSURE: 128 MMHG | OXYGEN SATURATION: 99 % | HEART RATE: 90 BPM | RESPIRATION RATE: 16 BRPM | BODY MASS INDEX: 39.29 KG/M2 | DIASTOLIC BLOOD PRESSURE: 80 MMHG | WEIGHT: 221.8 LBS

## 2021-11-30 DIAGNOSIS — B07.9 FILIFORM WART: ICD-10-CM

## 2021-11-30 DIAGNOSIS — L98.9 SKIN LESION OF CHEEK: Primary | ICD-10-CM

## 2021-11-30 PROCEDURE — 99213 OFFICE O/P EST LOW 20 MIN: CPT | Mod: GC | Performed by: STUDENT IN AN ORGANIZED HEALTH CARE EDUCATION/TRAINING PROGRAM

## 2021-11-30 NOTE — PROGRESS NOTES
"   Assessment & Plan     Skin lesion of cheek  Filiform wart  Left cheek lesion present for the past 2 weeks. Sudden onset without obvious changes in 2 weeks. Patient was seen [place] [facility] [place] and was recommended she be seen by dermatology for a biopsy due to concern for cutaneous horn lesion, however was unable to be scheduled until March 2022. On exam lesion appears more consistent with filiform wart however would recommend follow up with our procedure clinic for further evaluation and possible treatment. Patient scheduled for 12/8 at 1:40pm with Dr. Lord.   - Procedure Clinic - [facility] Internal Referral         Follow up in Procedure clinic on 12/8    No follow-ups on file.    Tonia Goel MD  [facility] MICHAEL Vance is a 71 year old who presents for the following health issues:    HPI   New sore on left cheek for the past 2 weeks. Woke up one morning and noticed new lesion that has not changed in size since that time. Was seen at [facility] who recommended biopsy but can't get into [facility] derm until March. Sore like a pimple when touched but otherwise not bothersome. Never had similar lesions in the past. No history of warts. Has had moles removed from shoulder in the past but no personal history of skin cancer. Mother had a malignant melanoma on her leg, removed and lived 38 years after diagnosis. No other family history of skin cancer. Patient has a history of breast cancer in her left breast, treated with surgery, no radiation, no chemotherapy.     Has some recent diarrhea and vomiting 5 days ago. Vomited 1 time and diarrhea one day, no longer present. Also being treated for \"pink eye\" with an antibiotic eye drop every 4 hours in the right eye (10 days total treatment). No other new medications or treatments.       Review of Systems   Constitutional, HEENT, cardiovascular, pulmonary, gi and gu systems are negative, except as otherwise noted.      Objective    BP " 128/80 (BP Location: Right arm, Patient Position: Sitting, Cuff Size: Adult Large)   Pulse 90   Temp 98.4  F (36.9  C) (Oral)   Resp 16   Wt 100.6 kg (221 lb 12.8 oz)   LMP  (LMP Unknown)   SpO2 99%   Breastfeeding No   BMI 39.29 kg/m    Body mass index is 39.29 kg/m .  Physical Exam   GENERAL: healthy, alert and no distress  EYES: Eyes grossly normal to inspection, PERRL and conjunctivae and sclerae normal  NECK: no adenopathy, no asymmetry, masses, or scars and thyroid normal to palpation  RESP: lungs clear to auscultation - no rales, rhonchi or wheezes  CV: irregularly irregular rhythm, normal S1 S2, no S3 or S4 and no murmur, click or rub  MS: no gross musculoskeletal defects noted, no edema  SKIN: elongated, narrow, hypopigmented growth on left cheek with erythematous base (see photo below)  NEURO: sensory exam grossly normal and mentation intact  PSYCH: mentation appears normal, affect normal/bright                No results found for any visits on 11/30/21.

## 2021-11-30 NOTE — PATIENT INSTRUCTIONS
Patient Education   Here is the plan from today's visit    1. Skin lesion of cheek  2. Cutaneous horn  2 week history of new skin lesion. We got you on the schedule for our procedure clinic on 12/7 at 1:40 with plan for biopsy at that time.   - Procedure Clinic - [facility] Internal Referral; Future        Please call or return to clinic if your symptoms don't go away.    Follow up plan  No follow-ups on file.    Thank you for coming to [facility] today.  Lab Testing:  **If you had lab testing today and your results are reassuring or normal they will be mailed to you or sent through [employer] within 7 days.   **If the lab tests need quick action we will call you with the results.  **If you are having labs done on a different day, please call [phone] to schedule at [facility] or [phone] for other [facility] locations. Labs do not offer walk-in appointments.  The phone number we will call with results is # [phone] (home) . If this is not the best number please call our clinic and change the number.  Medication Refills:  If you need any refills please call your pharmacy and they will contact us.   If you need to  your refill at a new pharmacy, please contact the new pharmacy directly. The new pharmacy will help you get your medications transferred faster.   Scheduling:  If you have any concerns about today's visit or wish to schedule another appointment please call our office during normal business hours [phone] (8-5:00 M-F)  If a referral was made to a [facility] and you don't get a call from central scheduling please call [phone].  If a Mammogram was ordered for you at [facility] call [phone] to schedule or change your appointment.  If you had an EKG/XRay/CT/Ultrasound/MRI ordered the number is [phone] to schedule or change your radiology appointment.   [facility] has limited ultrasound appointments available on Wednesdays,  if you would like your ultrasound at [facility], please call [phone] to schedule.   Medical Concerns:  If you have urgent medical concerns please call [phone] at any time of the day.    Tonia Goel MD   No follow-ups on file.

## 2021-12-01 ASSESSMENT — PATIENT HEALTH QUESTIONNAIRE - PHQ9: SUM OF ALL RESPONSES TO PHQ QUESTIONS 1-9: 7

## 2021-12-07 ENCOUNTER — DOCUMENTATION ONLY (OUTPATIENT)
Dept: FAMILY MEDICINE | Facility: CLINIC | Age: 71
End: 2021-12-07

## 2021-12-07 ENCOUNTER — OFFICE VISIT (OUTPATIENT)
Dept: FAMILY MEDICINE | Facility: CLINIC | Age: 71
End: 2021-12-07
Payer: COMMERCIAL

## 2021-12-07 VITALS
OXYGEN SATURATION: 95 % | SYSTOLIC BLOOD PRESSURE: 151 MMHG | BODY MASS INDEX: 39.79 KG/M2 | WEIGHT: 224.6 LBS | HEART RATE: 85 BPM | RESPIRATION RATE: 16 BRPM | DIASTOLIC BLOOD PRESSURE: 77 MMHG | TEMPERATURE: 98.6 F

## 2021-12-07 DIAGNOSIS — L98.9 SKIN LESION OF CHEEK: Primary | ICD-10-CM

## 2021-12-07 DIAGNOSIS — D04.30 SQUAMOUS CELL CARCINOMA IN SITU OF SKIN OF FACE: ICD-10-CM

## 2021-12-07 PROCEDURE — 88305 TISSUE EXAM BY PATHOLOGIST: CPT | Performed by: DERMATOLOGY

## 2021-12-07 PROCEDURE — 11310 SHAVE SKIN LESION 0.5 CM/<: CPT | Mod: GC | Performed by: FAMILY MEDICINE

## 2021-12-07 NOTE — PATIENT INSTRUCTIONS
Skin Biopsy  What is a skin biopsy?   A skin biopsy is the removal of a small piece of skin for lab tests. It may be done to help diagnose a problem with the skin.   When is it used?   A skin biopsy will help your healthcare provider make a diagnosis of your problem. For example:   You may have an internal disease that a skin biopsy may explain.   You may have a skin disease or cancer.   Your skin may have become discolored.   Your skin may be inflamed.   Alternatives to this procedure include:   to proceed with treatment without a diagnosis   to choose not to have treatment, recognizing the risks of your condition   to take a watchful approach and reevaluate the lesion or disorder at a future time.   When should I call my healthcare provider?   Call your provider right away if:   You have bleeding that cannot be stopped by putting pressure on the wound.   Your wound becomes red or has pus or you develop a fever (signs of infection).   You have significant pain that is not controlled with ibuprofen or tylenol.     Wound Care  1. Keep it covered for the first 2-3 days with a band aid, or if it is a large wound or is draining a lot, a small piece of gauze with tape.  2. Apply a thin film of vaseline over the area to keep it mildly moist and prevent scab formation.   3. Change your bandaid daily.  4. As you heal, the new skin will be very sensitive to sun - please protect your healing wound by covering up and using sunscreen.   5. Results will be mailed or called to you. It can take up to 10 days to get biopsy results back. If you do not hear from us, please call us at [phone] and let us know that you haven't received your results.

## 2021-12-07 NOTE — PROGRESS NOTES
"When opening a documentation only encounter, be sure to enter in \"Chief Complaint\" Forms and in \" Comments\" Title of form, description if needed.    Pinky is a 71 year old  female  Form received via: Fax  Form now resides in: Provider Tom Petty CMA                  "

## 2021-12-07 NOTE — LETTER
"December 12, 2021      Pinky VELOZ Kaylee  [address] [facility] [address]        Dear Pinky,    Thank you for getting your care at [facility]. Please see below for your test results. Your biopsy returned with \"squamous cell carcinoma in situ\". This is a precursor to squamous cell carcinoma, which is a type of skin cancer. It should be treated before invasive cancer develops. I recommend meeting with a dermatologist to discuss treatment options. I put in a referral, they should call to schedule an appt.    Resulted Orders   Dermatological Path Order and Indications   Result Value Ref Range    Case Report       Surgical Pathology Report                         Case: [id number]                                  Authorizing Provider:  Eriberto Painter MD      Collected:           12/07/2021 01:51 PM          Ordering Location:     [facility]   Received:            12/07/2021 12:48 PM                                 [facility]                                                                      Pathologist:           Raad Waterman MD                                                       Specimen:    Skin, L cheek, raised friable lesion                                                       Final Diagnosis       A(1). 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        Gross Description       A(1). Skin, L cheek, raised friable lesion:  The specimen is received in formalin with proper patient identification, labeled \"L cheek, raised friable lesion\".  The specimen consists of a 0.4 x 0.3 cm white-tan skin shave remarkable for a 0.5 x 0.3 x 0.2 cm raised white-tan lesion.  The subcutaneous surface is inked black, and the specimen is bisected and entirely submitted in cassette A1.         Microscopic Description       The specimen " exhibits epidermal hyperplasia with full-thickness keratinocyte atypia and suprabasal mitoses. The lesion extends to the lateral and deep margins.      MCRS Yes (A) N/A    Performing Labs       The technical component of this testing was completed at [facility]         If you have any concerns about these results please call and leave a message for me or send a MyChart message to the clinic.    Sincerely,    Paulino El, DO

## 2021-12-07 NOTE — PROGRESS NOTES
[facility]  Skin Biopsy Procedure Note    Pinky Page is a patient of Tonia Phoenix here for abnormal skin lesion on L cheek.    Lesions is raised, elongated hypopigmented growth on L cheek. No bleeding or itching. No history of skin cancer.    Consent: Affirmation of informed consent was signed and scanned into the medical record. Risks, benefits and alternatives were discussed. Patient's questions were elicited and answered.   Procedure safety checklist was completed:  Yes  Time Out (Pause for the Cause) completed: Yes    Preoperative Diagnosis: atypical skin lesion   Location: L cheek   Size:  <0.5cm  Postoperative Diagnosis: same    Technique:   Skin prep Alcohol swab  Anesthesia 1 cc 1% lidocaine, with epi   Biopsy size <0.5cm  Biopsy taken via shave  Suture  n/a  Hemostasis  drysol    EBL:    minimal  Complications:  No  Tolerance:   Pt tolerated procedure well and was in stable condition.   Pathology sent Yes    Instructions:    Pt should keep dressing in place for 24 hours, then may change and apply antibiotic ointment and simple bandage. May shower after 24 hours.  Pt was instructed to call if bleeding, severe pain or foul smell.     Follow up in 2-4 weeks. Will call living facility with results.      Resident: Paulino El, DO  Faculty: Eriberto Painter MD present for and supervised this entire procedure.

## 2021-12-10 ENCOUNTER — PRE VISIT (OUTPATIENT)
Dept: UROLOGY | Facility: CLINIC | Age: 71
End: 2021-12-10
Payer: COMMERCIAL

## 2021-12-10 LAB
PATH REPORT.COMMENTS IMP SPEC: ABNORMAL
PATH REPORT.COMMENTS IMP SPEC: ABNORMAL
PATH REPORT.COMMENTS IMP SPEC: YES
PATH REPORT.FINAL DX SPEC: ABNORMAL
PATH REPORT.GROSS SPEC: ABNORMAL
PATH REPORT.MICROSCOPIC SPEC OTHER STN: ABNORMAL
PATH REPORT.RELEVANT HX SPEC: ABNORMAL

## 2021-12-10 NOTE — CONFIDENTIAL NOTE
Reason for visit: Cystoscopy     Relevant information: Urge urinary incontinence; Andre    Records/imaging/labs/orders: CT urogram on 11/18    Pt called: Call about cysto    At Rooming: Urine dip

## 2021-12-12 PROBLEM — D04.30 SQUAMOUS CELL CARCINOMA IN SITU OF SKIN OF FACE: Status: ACTIVE | Noted: 2021-12-12

## 2021-12-13 NOTE — RESULT ENCOUNTER NOTE
"Called to discuss results. Left message with 3rd floor nurse at patient's residence.    \"Your biopsy returned with \"squamous cell carcinoma in situ\". This is a precursor to squamous cell carcinoma, which is a type of skin cancer. It should be treated before invasive cancer develops. I recommend meeting with a dermatologist to discuss treatment options. I put in a referral, they should call to schedule an appt.     Please call with any questions. A letter was sent as well with results.\""

## 2021-12-14 ENCOUNTER — VIRTUAL VISIT (OUTPATIENT)
Dept: PSYCHOLOGY | Facility: CLINIC | Age: 71
End: 2021-12-14
Payer: COMMERCIAL

## 2021-12-14 DIAGNOSIS — E66.9 OBESITY, UNSPECIFIED OBESITY SEVERITY, UNSPECIFIED OBESITY TYPE: ICD-10-CM

## 2021-12-14 DIAGNOSIS — F54 PSYCHOLOGICAL FACTORS AFFECTING MEDICAL CONDITION: ICD-10-CM

## 2021-12-14 DIAGNOSIS — F41.1 GENERALIZED ANXIETY DISORDER: ICD-10-CM

## 2021-12-14 DIAGNOSIS — F33.0 MAJOR DEPRESSIVE DISORDER, RECURRENT EPISODE, MILD (H): Primary | ICD-10-CM

## 2021-12-14 PROCEDURE — 90834 PSYTX W PT 45 MINUTES: CPT | Mod: 95 | Performed by: PSYCHOLOGIST

## 2021-12-14 NOTE — PROGRESS NOTES
Health Psychology                     Department of Medicine  Keri Arnett, Ph.D., L.P. [phone]                          [facility] Madelaine Ingram, Ph.D., L.P. [phone]                   [place] Mail Code 747   Gilma Collins, Ph.D., L.P.  [phone]       [address] [place] [address] Rae Eugene, Ph.D., L.P. [phone]       [place] [address]            Damon Gr, Ph.D., A.B.P.P., L.P. [phone]     Kimber Morales, Ph.D., L.P. 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 Today we used the video of her cell phone. This is her 6rth video session. She still needs help from staff setting it up.     Intake with Health Psychology was in the late 1980s;  This writer began to see her in the early 1990s.    She is a 3-4 on 10-point scale of depression.     She just learned a half hour before the session that the growth hremoved  under her eye on her left cheek was SCC. She will be seeing a dermatologist.  Her hand is shaking with nervousness as she just learned less than an hour ago.     Health:  She got  booster and her flu shot. She fell in early November was seen at [facility] ED, had imaging studies.  No fractures.   She reports discomfort on left side, both knees.  She is slowly recovering in terms of knees and shoulder.  The knees are about entirely recovered. No residuals from when she COVID.  She feels good in general.  She reports there have been incidence of 1 resident with COVID during past two weeks.   She had booster of Pfizer in early November.   Currently 1 person with COVID on 4th floor.     Weight: Her weight increased to 226 last session    She weighed 22 last Friday, which is a decrease for her.   We discussed approach to weight loss in terms of fewer calories  despite lack of diet line.  Discussed elimination of snacks or major decrease.     Exercise:  She had been waking 7x/week for 30 minutes before injuring her knee.  Her knee is  getting better, and she is up to 25 minutes walking all indoors currently.  Discussed again starting biking 5 minutes a few times/day.  Discussed working up to  15 minutes in the afternoon.  We discussed a total of 1.5 hours per day  If possible.  She had a UTI and slsowed down, is going to need to increase her exercise.    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     Recreation/Activities: Coloring, word search puzzles, read, tv, radio, exercise.   She  joined her sister's family at [facility].  She will do the same for [place].    She participates fully, ventilated feelings appropriately.  She appears to derive benefit from the support.  No safety issues.    A treatment plan was completed.      Current Outpatient Medications   Medication     acetaminophen (TYLENOL) 500 MG tablet     alum & mag hydroxide-simethicone (MYLANTA/MAALOX) 200-200-20 MG/5ML SUSP suspension     amLODIPine (NORVASC) 10 MG tablet     apixaban ANTICOAGULANT (ELIQUIS) 5 MG tablet     artificial saliva (BIOTENE MT) AERS spray     aspirin 81 MG EC tablet     atorvastatin (LIPITOR) 40 MG tablet     blood glucose (NO BRAND SPECIFIED) lancets standard     blood glucose calibration (NO BRAND SPECIFIED) solution     blood glucose monitoring (NO BRAND SPECIFIED) meter device kit     blood glucose monitoring (NO BRAND SPECIFIED) test strip     Calcium Carbonate-Vitamin D (CALCIUM-VITAMIN D) 250-125 MG-UNIT TABS     calcium polycarbophil (FIBERCON) 625 MG tablet     cefdinir (OMNICEF) 300 MG capsule     CLARITIN 10 MG OR TABS     erythromycin (ROMYCIN) 5 MG/GM ophthalmic ointment     FLUoxetine (PROZAC) 20 MG capsule     fluticasone (FLONASE) 50  MCG/ACT nasal spray     furosemide (LASIX) 20 MG tablet     Gabapentin (NEURONTIN PO)     Hypromellose (ARTIFICIAL TEARS OP)     insulin glargine (LANTUS PEN) 100 UNIT/ML pen     lactase (LACTAID) 3000 UNIT tablet     levothyroxine (SYNTHROID/LEVOTHROID) 175 MCG tablet     lifitegrast (XIIDRA) 5 % opthalmic solution     liraglutide (VICTOZA) 18 MG/3ML solution     losartan (COZAAR) 100 MG tablet     Magnesium Hydroxide (MILK OF MAGNESIA PO)     melatonin 3 MG CAPS     metFORMIN (GLUCOPHAGE) 1000 MG tablet     NEW MED     nitroFURantoin macrocrystal-monohydrate (MACROBID) 100 MG capsule     nystatin (MYCOSTATIN) 186649 UNIT/GM POWD     ONETOUCH DELICA LANCETS 33G MISC     polyethylene glycol (MIRALAX/GLYCOLAX) powder     Saline 0.9 % SOLN     senna-docusate (SENNA S) 8.6-50 MG per tablet     simethicone (MYLICON) 125 MG chewable tablet     Skin Protectants, Misc. (EUCERIN) cream     SM LUBRICANT EYE DROPS 0.4-0.3 % SOLN ophthalmic solution     solifenacin (VESICARE) 10 MG tablet     sulfamethoxazole-trimethoprim (BACTRIM DS) 800-160 MG tablet     tobramycin (TOBREX) 0.3 % ophthalmic solution     Vaginal Moisturizer (VAGISIL INTIMATE MOISTURIZER) LOTN     ziprasidone (GEODON) 40 MG capsule     No current facility-administered medications for this visit.     Wt Readings from Last 4 Encounters:   12/07/21 101.9 kg (224 lb 9.6 oz)   11/30/21 100.6 kg (221 lb 12.8 oz)   11/12/21 102.5 kg (226 lb)   10/25/21 101.7 kg (224 lb 3.2 oz)   There is no height or weight on file to calculate BMI.      This telephone service is appropriate and effective for delivering services in light of the necessity for social distancing to mitigate the COVID.bmi  -19 epidemic and for conservation of PPE.     Patient has agreed to receiving telephone services after being informed about it: Yes    Patient prefers video invitation/information to be sent by:   email    Time service started: 1:10  Time service ended:  1:54    Mode of transmission:  Doxy following instructions  by telephone.  She still isn't sure of how to  connect so I called the staff to help her.  Explained benefit of bookmarking the website for future use.     Location of originating:  [facility]    Distance site:  Home office of provider for [facility]     The patient has been notified that:  Video visits will be conducted via a call with their psychologist to provide the care they need with a video conversation. Video visits may be billed at different rates depending on insurance coverage.  Patients are advised to please contact their insurance provider with any questions about their health insurance coverage. If during the course of a call the psychologist feels a video visit is not appropriate, patients will not be charged for this service.    Diagnosis:   Major Depression, recurrent mild (F33.0)   Psychological Factors Affecting Morbid Obesity (F54)  Generalized anxiety (F41.1)       Plan: Return video telementalhealth visit 1/13 @ 10 to provide support in light of  serious and persistent nature of her depression and anxiety, consistent with treatment plan.  Last treatment plan signed: 11/16/2021  Treatment plan due: 11/16/2022               Preference for future meetings:           X   In-person, even if both wearing masks             Remote              Either            Damon Gr, PhD LP,  A.B.P.P.  Director, Health Psychology  [phone]

## 2021-12-20 DIAGNOSIS — H04.123 DRY EYES: ICD-10-CM

## 2021-12-20 NOTE — TELEPHONE ENCOUNTER
"Request for medication refill:    lifitegrast (XIIDRA) 5 % opthalmic solution    Providers if patient needs an appointment and you are willing to give a one month supply please refill for one month and  send a letter/MyChart using \".SMILLIMITEDREFILL\" .smillimited and route chart to \"P SMI \" (Giving one month refill in non controlled medications is strongly recommended before denial)    If refill has been denied, meaning absolutely no refills without visit, please complete the smart phrase \".smirxrefuse\" and route it to the \"P SMI MED REFILLS\"  pool to inform the patient and the pharmacy.    Tamanna Vickers, [facility]    ]  "

## 2021-12-21 RX ORDER — LIFITEGRAST 50 MG/ML
1 SOLUTION/ DROPS OPHTHALMIC 2 TIMES DAILY
Qty: 90 EACH | Refills: 3 | OUTPATIENT
Start: 2021-12-21

## 2021-12-22 ENCOUNTER — TELEPHONE (OUTPATIENT)
Dept: OPHTHALMOLOGY | Facility: CLINIC | Age: 71
End: 2021-12-22
Payer: COMMERCIAL

## 2021-12-22 DIAGNOSIS — H04.123 DRY EYES: ICD-10-CM

## 2021-12-22 RX ORDER — LIFITEGRAST 50 MG/ML
1 SOLUTION/ DROPS OPHTHALMIC 2 TIMES DAILY
Qty: 60 EACH | Refills: 11 | Status: SHIPPED | OUTPATIENT
Start: 2021-12-22 | End: 2022-09-14

## 2021-12-22 NOTE — TELEPHONE ENCOUNTER
rx sent to pharmacy per request    Dez Vega RN 10:26 AM 12/22/21        [facility] Call Center    Phone Message    May a detailed message be left on voicemail: yes     Reason for Call: Medication Refill Request    Has the patient contacted the pharmacy for the refill? Yes   Name of medication being requested: lifitegrast (XIIDRA) 5 % opthalmic solution    Provider who prescribed the medication: Dr. Ring  Pharmacy:  [facility] - [place], [place] - [address]  Phone number [phone]    Date medication is needed: ASAP. Thank you.     Action Taken: Message routed to:  [facility]    Travel Screening: Not Applicable

## 2021-12-23 ENCOUNTER — TELEPHONE (OUTPATIENT)
Dept: DERMATOLOGY | Facility: CLINIC | Age: 71
End: 2021-12-23
Payer: COMMERCIAL

## 2021-12-23 NOTE — TELEPHONE ENCOUNTER
[facility] Call Center    Phone Message    May a detailed message be left on voicemail: yes     Reason for Call: Appointment Intake    Referring Provider Name:   Nidia Sampson DO in [facility]    Diagnosis and/or Symptoms:   L cheek biopsy with squamous cell carcinoma in situ; referral for treatment Confirmed by Bx    Action Taken: Message routed to:  [facility] Derm Surg    Travel Screening: Not Applicable  Referral to Derm Surg for removal. Please call Pt's living facility to schedule. Thanks!

## 2021-12-27 ENCOUNTER — OFFICE VISIT (OUTPATIENT)
Dept: FAMILY MEDICINE | Facility: CLINIC | Age: 71
End: 2021-12-27
Payer: COMMERCIAL

## 2021-12-27 VITALS
OXYGEN SATURATION: 96 % | RESPIRATION RATE: 16 BRPM | HEIGHT: 63 IN | WEIGHT: 223.4 LBS | HEART RATE: 67 BPM | SYSTOLIC BLOOD PRESSURE: 120 MMHG | BODY MASS INDEX: 39.58 KG/M2 | TEMPERATURE: 98.4 F | DIASTOLIC BLOOD PRESSURE: 76 MMHG

## 2021-12-27 DIAGNOSIS — R80.9 TYPE 2 DIABETES MELLITUS WITH MICROALBUMINURIA, WITH LONG-TERM CURRENT USE OF INSULIN (H): ICD-10-CM

## 2021-12-27 DIAGNOSIS — S46.312D STRAIN OF LEFT TRICEPS MUSCLE, SUBSEQUENT ENCOUNTER: Primary | ICD-10-CM

## 2021-12-27 DIAGNOSIS — D04.30 SQUAMOUS CELL CARCINOMA IN SITU OF SKIN OF FACE: ICD-10-CM

## 2021-12-27 DIAGNOSIS — Z79.4 TYPE 2 DIABETES MELLITUS WITH MICROALBUMINURIA, WITH LONG-TERM CURRENT USE OF INSULIN (H): ICD-10-CM

## 2021-12-27 DIAGNOSIS — E11.29 TYPE 2 DIABETES MELLITUS WITH MICROALBUMINURIA, WITH LONG-TERM CURRENT USE OF INSULIN (H): ICD-10-CM

## 2021-12-27 PROCEDURE — 99207 PR FOOT EXAM NO CHARGE: CPT | Mod: GC | Performed by: STUDENT IN AN ORGANIZED HEALTH CARE EDUCATION/TRAINING PROGRAM

## 2021-12-27 PROCEDURE — 99214 OFFICE O/P EST MOD 30 MIN: CPT | Mod: GC | Performed by: STUDENT IN AN ORGANIZED HEALTH CARE EDUCATION/TRAINING PROGRAM

## 2021-12-27 RX ORDER — LIDOCAINE HYDROCHLORIDE 20 MG/ML
JELLY TOPICAL 3 TIMES DAILY PRN
Qty: 85 G | Refills: 1 | Status: SHIPPED | OUTPATIENT
Start: 2021-12-27 | End: 2023-12-21

## 2021-12-27 ASSESSMENT — MIFFLIN-ST. JEOR: SCORE: 1492.34

## 2021-12-27 NOTE — PATIENT INSTRUCTIONS
Patient Education   Here is the plan from today's visit    1. Strain of left triceps muscle, subsequent encounter  The pain in your left arm is most likely a muscle strain, from your fall back in October. Unfortunately I can't tell you why it has recently flared up and I think for now it would be helpful for you to be evaluated by our sports medicine doctor, who is an expert in musculoskeletal problems. Until then you can continue to use Tylenol and heat pad as needed for pain control and I also am sending you a prescription for lidocaine gel, which can be used as needed for pain as well. If you aren't having pain you don't need to use it but you may find it helpful. Make sure you continue to use the arm some, but try to avoid lifting heavy objects as this could further aggravate your arm.   - Sports Medicine Clinic - [facility] Internal Referral; Future  - lidocaine (XYLOCAINE) 2 % external gel; Apply topically 3 times daily as needed for moderate pain  Dispense: 85 g; Refill: 1    2. Type 2 diabetes mellitus with microalbuminuria, with long-term current use of insulin (H)  Follow up visit with labs in 3 months. Please bring a copy of your recent home glucose checks with you to that visit.     3. Squamous cell carcinoma in situ of skin of face  I'm happy to hear you have follow up scheduled with the [facility]! The good thing is this kind of skin cancer typically does not spread and things go well after the spot is removed. Please let me know if you need further assistance from me.       Please call or return to clinic if your symptoms don't go away.    Follow up plan  No follow-ups on file.    Thank you for coming to [facility] today.  Lab Testing:  **If you had lab testing today and your results are reassuring or normal they will be mailed to you or sent through [employer] within 7 days.   **If the lab tests need quick action we will call you with the results.  **If you are having labs done on a  different day, please call [phone] to schedule at [facility] or [phone] for other [facility] locations. Labs do not offer walk-in appointments.  The phone number we will call with results is # [phone] (home) . If this is not the best number please call our clinic and change the number.  Medication Refills:  If you need any refills please call your pharmacy and they will contact us.   If you need to  your refill at a new pharmacy, please contact the new pharmacy directly. The new pharmacy will help you get your medications transferred faster.   Scheduling:  If you have any concerns about today's visit or wish to schedule another appointment please call our office during normal business hours [phone] (8-5:00 M-F)  If a referral was made to an [facility] specialty provider and you do not get a call from central scheduling, please refer to directions on your visit summary or call our office during normal business hours for assistance.   If a Mammogram was ordered for you at the [facility] call [phone] to schedule or change your appointment.  If you had an XRay/CT/Ultrasound/MRI ordered the number is [phone] to schedule or change your radiology appointment.   [facility] has limited ultrasound appointments available on Wednesdays, if you would like your ultrasound at [facility], please call [phone] to schedule.   Medical Concerns:  If you have urgent medical concerns please call [phone] at any time of the day.    Tonia Goel MD

## 2021-12-27 NOTE — PROGRESS NOTES
Assessment & Plan     Patient Instructions   Patient Education   Here is the plan from today's visit    1. Strain of left triceps muscle, subsequent encounter  Most likely a muscle strain; initial injury in October with new flare up due to unknown trigger. Positive tenderness at biceps head and positive Speeds test concerning for biceps tendonopathy however would benefit from further evaluation by Sports Medicine. Referral placed today. Continue to use Tylenol and heat pad as needed for pain control. No NSAIDs due to HTN and hx of single kidney. Prescription for lidocaine gel sent to pharmacy today to be used as needed.   - Sports Medicine Clinic - [facility] Internal Referral; Future  - lidocaine (XYLOCAINE) 2 % external gel; Apply topically 3 times daily as needed for moderate pain  Dispense: 85 g; Refill: 1    2. Type 2 diabetes mellitus with microalbuminuria, with long-term current use of insulin (H)  No change in symptoms, taking max metformin and Victoza as well as 20U Lantus nightly. No issues with medications. Diabetic foot exam today. Single point of sensation loss on left foot, right foot with intact sensation. Follow up visit with labs in 3 months. Requested patient bring copy of recent blood sugars to upcoming appointment.     3. Squamous cell carcinoma in situ of skin of face  Discussed recent biopsy results and plan for follow up with [facility] dermatology (visit scheduled 1/18/22).        Return in about 3 months (around 3/27/2022) for diabetes .    Tonia Goel MD  [facility] MICHAEL Vance is a 71 year old who presents for the following health issues:    HPI     Has follow up with [facility] January 18th and Feb 22nd for squamous cell carcinoma in situ.     Fell on left side back in October. Has had left arm pain since then. Typically pain waxes and wanes but last two nights the pain was so severe she could not sleep. Pain on posterior part of right arm  "just above the elbow. Takes Tylenol 500 mg and uses heating pad which are helpful.      Taking max metformin and Victoza as well as insulin 20 U nightly. Accuchecks have been good lately. This morning was 129. Generally numbers are under 200 but does not have copy of checks today. Walks every day but still trying to get back into stationary bike. Eats lunch and dinner every day and tires to eat single portion sizes (one scoop of a casserole, one scoop veggies, chicken) has some fruit each day, often a banana. Drinks diet coke, one can a day, but doesn't have a can if she doesn't get it before 4 PM. Drinks skim milk, about two cartons a day. Will sometimes have decaf coffee. No polydypsia, polyuria, pins or needles in hands or feet.       Review of Systems   ROS otherwise negative if not stated in HPI        Objective    /76   Pulse 67   Temp 98.4  F (36.9  C) (Oral)   Resp 16   Ht 1.6 m (5' 2.99\")   Wt 101.3 kg (223 lb 6.4 oz)   LMP  (LMP Unknown)   SpO2 96%   BMI 39.58 kg/m    Body mass index is 39.58 kg/m .  Physical Exam  HENT:      Head: Normocephalic.   Eyes:      Extraocular Movements: Extraocular movements intact.      Conjunctiva/sclera: Conjunctivae normal.   Cardiovascular:      Rate and Rhythm: Normal rate. Rhythm irregular.   Pulmonary:      Effort: Pulmonary effort is normal.      Breath sounds: Normal breath sounds.   Musculoskeletal:      Cervical back: Normal range of motion.      Comments: Pain in right arm: with negative Hawkings, negative Spurlings, negative Speed's, positive cross body, 100 degree lateral rase, 160 flexion, tender bicep tendon to touch   Skin:     Findings: Lesion (left check; squamous cell lesion s/p biopsy) present.   Neurological:      Mental Status: She is alert and oriented to person, place, and time. Mental status is at baseline.   Psychiatric:         Mood and Affect: Mood normal.          Diabetic Foot Screen:  Any complaints of increased pain or numbness ? " No  Is there a foot ulcer now or a history of foot ulcer? No  Does the foot have an abnormal shape? YES overlapping toes with lateral deviation   Are the nails thick, too long or ingrown?  YES a bit long, nurse at facility cuts them  Are there any redness or open areas? No         Sensation Testing done at all points on the diagram with monofilament     Right Foot: Sensation Normal at all points  Left Foot: Sensation Absent at the following points  and 9     Risk Category: 1- Loss of protective sensation with normal appearing foot (no weakness, deformity, callous, pre-ulcer or h/o ulceration). Singular location of loss of sensation and deformity of toes with otherwise normal appearing foot    Performed by Tonia Goel MD       No results found for any visits on 12/27/21.

## 2021-12-28 NOTE — TELEPHONE ENCOUNTER
FUTURE VISIT INFORMATION      FUTURE VISIT INFORMATION:    Date: 1.18.22    Time: 3:15    Location: Telephone  REFERRAL INFORMATION:    Referring provider:  Dr. Eriberto Painter    Referring providers clinic:  [facility]    Reason for visit/diagnosis  consult SCC on cheek.     RECORDS REQUESTED FROM:       Clinic name Comments Records Status Photos Status   [facility] 12.7.21  Dr. Painter  Path # [id number]    11.30.21  Dr. Tonia Goel Epic Epic

## 2021-12-30 ENCOUNTER — TELEPHONE (OUTPATIENT)
Dept: FAMILY MEDICINE | Facility: CLINIC | Age: 71
End: 2021-12-30
Payer: COMMERCIAL

## 2021-12-30 DIAGNOSIS — B96.89 UTI DUE TO KLEBSIELLA SPECIES: Primary | ICD-10-CM

## 2021-12-30 DIAGNOSIS — N39.0 UTI DUE TO KLEBSIELLA SPECIES: Primary | ICD-10-CM

## 2021-12-30 RX ORDER — SULFAMETHOXAZOLE/TRIMETHOPRIM 800-160 MG
1 TABLET ORAL 2 TIMES DAILY
Qty: 14 TABLET | Refills: 0 | Status: SHIPPED | OUTPATIENT
Start: 2021-12-30 | End: 2022-01-06

## 2021-12-30 NOTE — TELEPHONE ENCOUNTER
Message Return  12/30/2021  5:16 PM    Message returned by Paulino El DO    Patient: Pinky Page   Phone number-  [phone] (home)       Return their call  Phone conversation with: Keri (caregiver/RN at living facility)    Situation:   Pinky Page is a 71 year old  female who is calling because of  abnormal urine culture. UCx was drawn at living facility on 12/29 due to increased urinary frequency, dysuria and vaginal itching. No fevers or abdominal pain. UCx resulted as >100,000 Klebsiella. Susceptibilities pending. Lab was sent to [facility] (do not see in epic).    Last UTI was 10/9/21 which grew E coli, resistant to ampicillin, gentamicin and levofloxacin. UTI 3/24/21 grew klebsiella and staph simulans, resistant to ampicillin, and intermediate to macrobid. 3/24/21 UTI was treated with bactrim.    Recommendation/Plan:  -will treat with bactrim -160mg x 7 days   -await susceptibilities  -advised to make follow up appt if symptoms do not improve after antibiotic; should be seen to discuss chronic UTI's as well    Dr. El

## 2021-12-31 ENCOUNTER — DOCUMENTATION ONLY (OUTPATIENT)
Dept: FAMILY MEDICINE | Facility: CLINIC | Age: 71
End: 2021-12-31
Payer: COMMERCIAL

## 2022-01-05 NOTE — PROGRESS NOTES
SUBJECTIVE: Pinky Page is a 71 year old female who presents with left arm pain after fall.    Pain points one finger front of left humerus.   No pain that area before fall.   Fall was back in October, was sitting down, and RN taking blood pressure.  She stood up and fell forward, onto the floor. No one caught her. Doesn't remember if she used arm to brace herself.   Hit whole left side she thinks when she fell and her knees.   After the fall noticed bruising left should area.   No swelling to the area.   Took tylenol for the pain, helped.   Now takes tylenol once a week for the pain, when she takes it does help the pain.   Did not try physical therapy.   Use hot pack.   Is a diabetic A1c 7.8 3/16/2021     Per Chart Review:  Fell on left back in October. Has had arm pain since then (according to 12/27/2021) office visit with Dr. Goel.  Pain on posterior part of ?right arm just above the elbow. Takes Tylenol 500 mg and uses heating pad which are helpful.        PAST MEDICAL, SOCIAL, SURGICAL AND FAMILY HISTORY: Past medical, surgical, family and social history was reviewed and unchanged since last visit.    ALLERGIES: She is allergic to chlordiazepoxide hcl, dimetapp dm cold-cough, haldol, ibuprofen, lactose intolerance [beta-galactosidase], milk products, and propofol.    CURRENT MEDICATIONS: She has a current medication list which includes the following prescription(s): acetaminophen, alum & mag hydroxide-simethicone, amlodipine, apixaban anticoagulant, artificial saliva, atorvastatin, calcium-vitamin d, calcium polycarbophil, claritin, erythromycin, fluoxetine, fluticasone, furosemide, gabapentin, carboxymethylcellulose sodium, insulin glargine, lactase, levothyroxine, lidocaine, xiidra, liraglutide, losartan, magnesium hydroxide, melatonin, metformin, nystatin, onetouch delica lancets 33g, polyethylene glycol, saline, senna-docusate, simethicone, eucerin, sm lubricant eye drops, solifenacin, ziprasidone,  blood glucose, blood glucose calibration, blood glucose monitoring, blood glucose, NEW MED, nitrofurantoin macrocrystal-monohydrate, and vagisil intimate moisturizer.     REVIEW OF SYSTEMS: 3 point review of systems is negative except as noted above.    EXAM:  BP (!) 152/83   Pulse 74   Temp 98.9  F (37.2  C) (Oral)   Resp 16   Wt 101.6 kg (224 lb)   LMP  (LMP Unknown)   SpO2 97%   BMI 38.45 kg/m    CONSTITUTIONIAL: healthy, alert and no distress  HEAD: Normocephalic. No masses, lesions, tenderness or abnormalities  ENT: ENT exam normal, no neck nodes or sinus tenderness  SKIN: no suspicious lesions or rashes  NEUROLOGIC: Normal muscle tone and strength, reflexes normal, sensation grossly normal.  PSYCHIATRIC: affect normal/bright and mentation appears normal.  MUSCULOSKELETAL:   Cervical: Full ROM of cervical neck without pain. Negative spurling's.   Shoulder: Pain with full passive flexion of left shoulder, limitted ROM with active flexion to 140 degrees. Left shoulder with reduced range of motion and pain with passive and active internal rotation, cannot touch waist band. External rotation symmetric though overall reduced. Pt with tenderness over AC joint. Positive cross over test. Positive Hawkin's and Neers impingement signs. Negative drop-arm and empty can testing. Negative O'Briens. Unable to perform lift off test due to reduced ROM.     ASSESSMENT/PLAN:    Chronic left shoulder pain  Rotator cuff injury, left, initial encounter  Primary osteoarthritis of left shoulder  Pt with pain in left shoulder / arm since her fall in October of 2021. X rays from that event show no acute fracture of elbow or shoulder, no tumor. She does have some mild arthritis. Based on positive impingement signs on exam, suspecting some degree of chronic rotator cuff injury. Today gave subacromial injection as well as a referral to PT for further strengthening  of SITS muscles. Likely was flared up by recent fall. Can continue  Tylenol and heat pack at home as needed for pain. Does not appear to be referred from neck (see physical exam above).   -     Physical Therapy Referral; Future  -     Large Joint/Bursa injection and/or drainage - Unilateral (Shoulder, Knee) [20610]  -     triamcinolone (KENALOG-40) injection 40 mg    Follow-up: 6 weeks with sports medicine after completion of physical therapy     X-RAY INTERPRETATION:   Shoulder 3 view X ray from 10/8/2021:  No acute fracture or dislocation. Arthritis in the AC joint and glenohumeral joint.     Elbow 2 view X ray from 10/8/2021:  Rotated lateral image compromises evaluation for joint effusion. No evidence of displaced fracture on the AP view. No dislocation.    Danna Olivia DO  Family Medicine PGY-2  [facility]   Pronouns: She/Hers     Patient seen, evaluated and discussed with the resident. I have verified the content of the note, which accurately reflects my assessment of the patient and the plan of care.  I was present for the subacromial injection performed by Dr. Olivia.   Supervising Physician:  Mona Nava MD

## 2022-01-07 ENCOUNTER — OFFICE VISIT (OUTPATIENT)
Dept: UROLOGY | Facility: CLINIC | Age: 72
End: 2022-01-07
Payer: COMMERCIAL

## 2022-01-07 VITALS
SYSTOLIC BLOOD PRESSURE: 135 MMHG | DIASTOLIC BLOOD PRESSURE: 75 MMHG | HEIGHT: 64 IN | BODY MASS INDEX: 38.86 KG/M2 | WEIGHT: 227.6 LBS | HEART RATE: 80 BPM

## 2022-01-07 DIAGNOSIS — N39.0 RECURRENT UTI: Primary | ICD-10-CM

## 2022-01-07 LAB
ALBUMIN UR-MCNC: NEGATIVE MG/DL
APPEARANCE UR: CLEAR
BILIRUB UR QL STRIP: NEGATIVE
COLOR UR AUTO: YELLOW
GLUCOSE UR STRIP-MCNC: NEGATIVE MG/DL
HGB UR QL STRIP: ABNORMAL
KETONES UR STRIP-MCNC: NEGATIVE MG/DL
LEUKOCYTE ESTERASE UR QL STRIP: ABNORMAL
NITRATE UR QL: NEGATIVE
PH UR STRIP: 5 [PH] (ref 5–8)
SP GR UR STRIP: <=1.005 (ref 1–1.03)
UROBILINOGEN UR STRIP-ACNC: 0.2 E.U./DL

## 2022-01-07 PROCEDURE — 52000 CYSTOURETHROSCOPY: CPT | Performed by: OBSTETRICS & GYNECOLOGY

## 2022-01-07 ASSESSMENT — MIFFLIN-ST. JEOR: SCORE: 1527.39

## 2022-01-07 ASSESSMENT — PAIN SCALES - GENERAL: PAINLEVEL: NO PAIN (0)

## 2022-01-07 NOTE — PATIENT INSTRUCTIONS
"Please follow-up with Dr. Costa in 6 months.    It was a pleasure meeting with you today.  Thank you for allowing me and my team the privilege of caring for you today.  YOU are the reason we are here, and I truly hope we provided you with the excellent service you deserve.  Please let us know if there is anything else we can do for you so that we can be sure you are leaving completely satisfied with your care experience.        AFTER YOUR CYSTOSCOPY        You have just completed a cystoscopy, or \"cysto\", which allowed your physician to learn more about your bladder (or to remove a stent placed after surgery). We suggest that you continue to avoid caffeine, fruit juice, and alcohol for the next 24 hours, however, you are encouraged to return to your normal activities.         A few things that are considered normal after your cystoscopy:     * Small amount of bleeding (or spotting) that clears within the next 24 hours     * Slight burning sensation with urination     * Sensation to of needing to avoid more frequently     * The feeling of \"air\" in your urine     * Mild discomfort that is relieved with Tylenol        Please contact our office promptly if you:     * Develop a fever above 101 degrees     * Are unable to urinate     * Develop bright red blood that does not stop     * Severe pain or swelling         Please contact our office with any concerns or questions @[email or web address].  "

## 2022-01-07 NOTE — LETTER
1/7/2022       RE: Pinky Page  [address] [facility] [place] [facility]  [place] [address]     Dear Colleague,    Thank you for referring your patient, Pinky Page, to the [facility] [place] [facility]. Please see a copy of my visit note below.      Cysto:    (Informed consent obtained. Pause for cause performed)   Sterile prep.    Flexible scope inserted through urethra. Systematic examination w flexible scope.   MUCOSA: Normal without lesion   ORIFICES: Normal location and morphology   Scope withdrawn without untoward effect.    (Pt tolerated procedure without difficulty).  Kelton Costa MD  Professor, OB/GYN  Urogynecologist

## 2022-01-07 NOTE — PROGRESS NOTES
Cysto:    (Informed consent obtained. Pause for cause performed)   Sterile prep.    Flexible scope inserted through urethra. Systematic examination w flexible scope.   MUCOSA: Normal without lesion   ORIFICES: Normal location and morphology   Scope withdrawn without untoward effect.    (Pt tolerated procedure without difficulty).  Kelton Costa MD  Professor, OB/GYN  Urogynecologist

## 2022-01-07 NOTE — NURSING NOTE
"Chief Complaint   Patient presents with     Cystoscopy       Blood pressure 135/75, pulse 80, height 1.626 m (5' 4\"), weight 103.2 kg (227 lb 9.6 oz), not currently breastfeeding. Body mass index is 39.07 kg/m .    Patient Active Problem List   Diagnosis     Allergic rhinitis due to pollen     Urge incontinence     Hypertension, essential     Cardiomegaly     Chronic constipation     Dry eye syndrome     Esophageal reflux     Exposure keratoconjunctivitis     DM ophthalmopathy (H)     Hypothyroidism     Senile cataract     ANUJ (obstructive sleep apnea)     Vaginal atrophy     Restless leg syndrome     Squamous blepharitis     Morbid obesity due to excess calories (H)     Personal history of breast cancer s/p L masectomy     Hypercholesteremia     Lives in group home     Extrapyramidal and movement disorder     CKD (chronic kidney disease) stage 2, GFR 60-89 ml/min     Solitary kidney, congenital     S/P hysterectomy     Impingement syndrome of right shoulder     Hypertriglyceridemia     Candidiasis of skin     Generalized anxiety disorder     Carpal tunnel syndrome of left wrist     Schizoaffective disorder, depressive type (H)     Major depressive disorder, recurrent episode, moderate (H)     Psychological factors affecting medical condition     Hx of psychiatric care     Nail complaint     Microalbuminuria due to type 2 diabetes mellitus (H)     Type 2 diabetes mellitus with microalbuminuria, with long-term current use of insulin (H)     Conjunctivitis of both eyes     Corneal erosion of both eyes     Spastic entropion, right     Foot callus     Paroxysmal atrial fibrillation (H)     Squamous cell carcinoma in situ of skin of face       Allergies   Allergen Reactions     Chlordiazepoxide Hcl      Dimetapp Dm Cold-Cough      Cold/Congetion TABS     Haldol      Ibuprofen      TABS     Lactose Intolerance [Beta-Galactosidase]      CAPS     Milk Products      Propofol      EMUL       Current Outpatient Medications "   Medication Sig Dispense Refill     acetaminophen (TYLENOL) 500 MG tablet Take 2 tablets (1,000 mg) by mouth every 6 hours as needed 1 Bottle 2     alum & mag hydroxide-simethicone (MYLANTA/MAALOX) 200-200-20 MG/5ML SUSP suspension Take 30 mLs by mouth daily as needed for indigestion       amLODIPine (NORVASC) 10 MG tablet Take 1 tablet (10 mg) by mouth daily 90 tablet 0     apixaban ANTICOAGULANT (ELIQUIS) 5 MG tablet Take 1 tablet (5 mg) by mouth 2 times daily 180 tablet 0     artificial saliva (BIOTENE MT) AERS spray Take 1 spray by mouth 3 times daily as needed for dry mouth       atorvastatin (LIPITOR) 40 MG tablet Take 1 tablet (40 mg) by mouth daily 90 tablet 1     blood glucose (NO BRAND SPECIFIED) lancets standard Use to test blood sugar 2 times daily or as directed. 100 each 11     blood glucose calibration (NO BRAND SPECIFIED) solution Use to calibrate blood glucose monitor as directed. 1 each 3     blood glucose monitoring (NO BRAND SPECIFIED) meter device kit Check Blood sugars twice a day. 1 kit 0     blood glucose monitoring (NO BRAND SPECIFIED) test strip Use to test blood sugar 3 times daily or as directed. 100 each 3     Calcium Carbonate-Vitamin D (CALCIUM-VITAMIN D) 250-125 MG-UNIT TABS Take 1 tablet by mouth 2 times daily. Calcium 250 mg/Vit D 125 IU       calcium polycarbophil (FIBERCON) 625 MG tablet Take 2 tablets by mouth daily       CLARITIN 10 MG OR TABS 1 TAB PO QD (Once per day) as needed for ALLERGY SYMPTOMS 30 11     erythromycin (ROMYCIN) 5 MG/GM ophthalmic ointment Place 0.5 inches into both eyes At Bedtime 3.5 g 11     FLUoxetine (PROZAC) 20 MG capsule Take 2 capsules (40 mg) by mouth daily 60 capsule 2     fluticasone (FLONASE) 50 MCG/ACT nasal spray Spray 1 spray into both nostrils daily 16 g 3     furosemide (LASIX) 20 MG tablet Take 1 tablet (20 mg) by mouth 2 times daily 60 tablet 3     Gabapentin (NEURONTIN PO) Take 900 mg by mouth 3 times daily.       Hypromellose  (ARTIFICIAL TEARS OP) Apply 1 drop to eye 4 times daily.       insulin glargine (LANTUS PEN) 100 UNIT/ML pen Inject 20 Units Subcutaneous At Bedtime 8 mL 0     lactase (LACTAID) 3000 UNIT tablet Take 1 tablet (3,000 Units) by mouth 3 times daily (with meals) 90 tablet 11     levothyroxine (SYNTHROID/LEVOTHROID) 175 MCG tablet Take 1 tablet (175 mcg) by mouth daily 90 tablet 3     lidocaine (XYLOCAINE) 2 % external gel Apply topically 3 times daily as needed for moderate pain 85 g 1     lifitegrast (XIIDRA) 5 % opthalmic solution Place 1 drop into both eyes 2 times daily 60 each 11     liraglutide (VICTOZA) 18 MG/3ML solution Inject 1.8 mg Subcutaneous daily 9 mL 0     losartan (COZAAR) 100 MG tablet Take 0.5 tablets (50 mg) by mouth 2 times daily 90 tablet 1     Magnesium Hydroxide (MILK OF MAGNESIA PO) Take  by mouth. Take 30 mL as needed for constipation.       melatonin 3 MG CAPS Take 6 mg by mouth At Bedtime 60 capsule 2     metFORMIN (GLUCOPHAGE) 1000 MG tablet Take 1,000 mg by mouth 2 times daily (with meals)       NEW MED Prohydrate Moisturizing gel  Place 1 applicator vaginally 4 times a week 20 oz 3     nitroFURantoin macrocrystal-monohydrate (MACROBID) 100 MG capsule Take 1 capsule (100 mg) by mouth 2 times daily 14 capsule 0     nystatin (MYCOSTATIN) 600024 UNIT/GM POWD Apply topically 3 times daily as needed 60 g 1     ONETOUCH DELICA LANCETS 33G MISC        polyethylene glycol (MIRALAX/GLYCOLAX) powder Take 1 capful by mouth 2 times daily. 17 GM PO BID       Saline 0.9 % SOLN Spray 2 sprays in nostril as needed.       senna-docusate (SENNA S) 8.6-50 MG per tablet Take 2 tablets by mouth At Bedtime.       simethicone (MYLICON) 125 MG chewable tablet Take 1 tablet (125 mg) by mouth 2 times daily 60 tablet 0     Skin Protectants, Misc. (EUCERIN) cream Apply  topically as needed. Apply to thigh PRN dry skin        SM LUBRICANT EYE DROPS 0.4-0.3 % SOLN ophthalmic solution        solifenacin (VESICARE) 10 MG  tablet Take 1 tablet (10 mg) by mouth daily 30 tablet 6     Vaginal Moisturizer (VAGISIL INTIMATE MOISTURIZER) LOTN Place 1 applicator vaginally four times a week 59 mL 1     ziprasidone (GEODON) 40 MG capsule Take 1 capsule (40 mg) by mouth 2 times daily (with meals) 60 capsule 2       Social History     Tobacco Use     Smoking status: Never Smoker     Smokeless tobacco: Never Used   Vaping Use     Vaping Use: Never used   Substance Use Topics     Alcohol use: No     Alcohol/week: 0.0 standard drinks     Drug use: No       Invasive Procedure Safety Checklist:    Procedure: Cystoscopy    Action: Complete sections and checkboxes as appropriate.    Pre-procedure:  1. Patient ID Verified with 2 identifiers (Thelma and  or MRN) : YES    2. Procedure and site verified with patient/designee (when able) : YES    3. Accurate consent documentation in medical record : YES    4. H&P (or appropriate assessment) documented in medical record : N/A  H&P must be up to 30 days prior to procedure an updated within 24 hours of                 Procedure as applicable.     5. Relevant diagnostic and radiology test results appropriately labeled and displayed as applicable : YES    6. Blood products, implants, devices, and/or special equipment available for the procedure as applicable : YES    7. Procedure site(s) marked with provider initials [Exclusions: none] : NO    8. Marking not required. Reason : Yes  Procedure does not require site marking    Time Out:     Time-Out performed immediately prior to starting procedure, including verbal and active participation of all team members addressing: YES    1. Correct patient identity.  2. Confirmed that the correct side and site are marked.  3. An accurate procedure to be done.  4. Agreement on the procedure to be done.  5. Correct patient position.  6. Relevant images and results are properly labeled and appropriately displayed.  7. The need to administer antibiotics or fluids for  irrigation purposes during the procedure as applicable.  8. Safety precautions based on patient history or medication use.    During Procedure: Verification of correct person, site, and procedure occurs any time the responsibility for care of the patient is transferred to another member of the care team.      Adela Lemus  1/7/2022  2:38 PM

## 2022-01-11 ENCOUNTER — OFFICE VISIT (OUTPATIENT)
Dept: FAMILY MEDICINE | Facility: CLINIC | Age: 72
End: 2022-01-11
Payer: COMMERCIAL

## 2022-01-11 VITALS
SYSTOLIC BLOOD PRESSURE: 152 MMHG | TEMPERATURE: 98.9 F | HEART RATE: 74 BPM | DIASTOLIC BLOOD PRESSURE: 83 MMHG | RESPIRATION RATE: 16 BRPM | WEIGHT: 224 LBS | BODY MASS INDEX: 38.45 KG/M2 | OXYGEN SATURATION: 97 %

## 2022-01-11 DIAGNOSIS — S46.002A ROTATOR CUFF INJURY, LEFT, INITIAL ENCOUNTER: ICD-10-CM

## 2022-01-11 DIAGNOSIS — M19.012 PRIMARY OSTEOARTHRITIS OF LEFT SHOULDER: ICD-10-CM

## 2022-01-11 DIAGNOSIS — M25.512 CHRONIC LEFT SHOULDER PAIN: Primary | ICD-10-CM

## 2022-01-11 DIAGNOSIS — G89.29 CHRONIC LEFT SHOULDER PAIN: Primary | ICD-10-CM

## 2022-01-11 PROCEDURE — 99214 OFFICE O/P EST MOD 30 MIN: CPT | Mod: 25 | Performed by: FAMILY MEDICINE

## 2022-01-11 PROCEDURE — 20610 DRAIN/INJ JOINT/BURSA W/O US: CPT | Mod: GC | Performed by: FAMILY MEDICINE

## 2022-01-11 RX ORDER — TRIAMCINOLONE ACETONIDE 40 MG/ML
40 INJECTION, SUSPENSION INTRA-ARTICULAR; INTRAMUSCULAR ONCE
Status: COMPLETED | OUTPATIENT
Start: 2022-01-11 | End: 2022-01-11

## 2022-01-11 RX ADMIN — TRIAMCINOLONE ACETONIDE 40 MG: 40 INJECTION, SUSPENSION INTRA-ARTICULAR; INTRAMUSCULAR at 10:35

## 2022-01-11 ASSESSMENT — PATIENT HEALTH QUESTIONNAIRE - PHQ9: SUM OF ALL RESPONSES TO PHQ QUESTIONS 1-9: 6

## 2022-01-11 NOTE — PATIENT INSTRUCTIONS
Patient Education   Here is the plan from today's visit    1. Chronic left shoulder pain  Please go to physical therapy.  Return to sports medicine clinic with Dr. Nava in 4-6 weeks.   Can use up to 3 grams of Tylenol every day to help with pain.   Can use heat to help with pain.  The physical therapy is VERY important!    We also did a subacromial injection today.  Please watch your blood sugars for the next 4 days as a steroid injection can increase your blood sugars.     You NEED a blood pressure follow-up visit with Dr. Goel your primary care doctor within the next 4-6 weeks as well.     - Physical Therapy Referral; Future    2. Rotator cuff injury, left, initial encounter  - Physical Therapy Referral; Future      Please call or return to clinic if your symptoms don't go away.    Follow up plan  Return in about 6 weeks (around 2/22/2022) for after physical therapy to check in how your doing, to sports medicine clinic. .     Please make appointment with Dr. Goel your primary care doctor to check your blood pressure.     Thank you for coming to [facility] today.  Lab Testing:  **If you had lab testing today and your results are reassuring or normal they will be mailed to you or sent through "[employer] within 7 days.   **If the lab tests need quick action we will call you with the results.  **If you are having labs done on a different day, please call [phone] to schedule at [facility] or [phone] for other [facility] locations. Labs do not offer walk-in appointments.  The phone number we will call with results is # [phone] (home) . If this is not the best number please call our clinic and change the number.  Medication Refills:  If you need any refills please call your pharmacy and they will contact us.   If you need to  your refill at a new pharmacy, please contact the new pharmacy directly. The new pharmacy will help you get your medications transferred  faster.   Scheduling:  If you have any concerns about today's visit or wish to schedule another appointment please call our office during normal business hours [phone] (8-5:00 M-F)  If a referral was made to an [facility] specialty provider and you do not get a call from central scheduling, please refer to directions on your visit summary or call our office during normal business hours for assistance.   If a Mammogram was ordered for you at the Breast Center call [phone] to schedule or change your appointment.  If you had an XRay/CT/Ultrasound/MRI ordered the number is [phone] to schedule or change your radiology appointment.   [facility] has limited ultrasound appointments available on Wednesdays, if you would like your ultrasound at [facility], please call [phone] to schedule.   Medical Concerns:  If you have urgent medical concerns please call [phone] at any time of the day.    Mona Nava MD

## 2022-01-13 ENCOUNTER — VIRTUAL VISIT (OUTPATIENT)
Dept: PSYCHOLOGY | Facility: CLINIC | Age: 72
End: 2022-01-13
Payer: COMMERCIAL

## 2022-01-13 DIAGNOSIS — F54 PSYCHOLOGICAL FACTORS AFFECTING MEDICAL CONDITION: ICD-10-CM

## 2022-01-13 DIAGNOSIS — F41.1 GENERALIZED ANXIETY DISORDER: ICD-10-CM

## 2022-01-13 DIAGNOSIS — E66.9 OBESITY, UNSPECIFIED OBESITY SEVERITY, UNSPECIFIED OBESITY TYPE: ICD-10-CM

## 2022-01-13 DIAGNOSIS — F33.0 MAJOR DEPRESSIVE DISORDER, RECURRENT EPISODE, MILD (H): Primary | ICD-10-CM

## 2022-01-13 PROCEDURE — 90834 PSYTX W PT 45 MINUTES: CPT | Mod: 95 | Performed by: PSYCHOLOGIST

## 2022-01-13 NOTE — PROGRESS NOTES
Health Psychology                    Department of Medicine  Keri Arnett, Ph.D., L.P. [phone]                         [facility] Madelaine Ingram, Ph.D., L.P. [phone]                     [place] Mail Code 461   Elise Dillrader, Ph.D. [phone]      [address] [place] [address] Rae Eugene, Ph.D., A.B.P.P., L.P. [phone]              [place] [address]           Damon Gr, Ph.D., A.B.P.P., L.P. [phone]      Kimber Morales, Ph.D., L.P. [phone]  [facility]   Lilia Burroughs, Ph.D., A.B.P.P., L.P. 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 Today we used the video of her cell phone. This is her [facility] video session. She and Keri are getting better at setting it up.  Intake with Health Psychology was in the 1980s and I began to see her in the early 1990s.    Her mood is variable  She is down about COVID returning, happy she is still here.  She got her  booster for COVID and flu shot.   She is a 3-4 on 10-point scale of depression.    Exercise:  She had been  biking 7x/week for 10-15 minutes/day;  She was also walking 25-30 minutes indoors..  We discussed a total of 1.5 hours per day  If possible.  She had a UTI and slowed down, is going to need to increase her exercise.      Surgery for her face cancer in the Dermatology clinic will be 12/22.   She is starting physical therapy next Wednesday for her shoulder arthritis.   She injured it when she fell in October, 2021.  She had an injection which helps the shoulder.   She feels she recovered full from her COVID, and is hoping to avoid COVID.    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  We calculated by May 2023, she will have  "put up approximately 400 bulletin boards.     She visited her sister x3 days in a row for [employer].  It went well.      Her weight increased to 223 from 220 last session and last week was even higher, 224.  We discussed approach to weight loss in terms of fewer calories despite lack of diet line, increasing exercise. She is reinforced for her efforts.  We discussed the \"damage control\" mindset for the holidays.     Recreation/Activities: Coloring, word search puzzles, read, tv, radio, exercise.     13 residents and staff have COVID.  They are now more quarantined.  They now are 1 at a table for meals.     She participates fully, ventilated feelings appropriately.  She appears to derive benefit from the support.    A treatment plan was completed.      Current Outpatient Medications   Medication     acetaminophen (TYLENOL) 500 MG tablet     alum & mag hydroxide-simethicone (MYLANTA/MAALOX) 200-200-20 MG/5ML SUSP suspension     amLODIPine (NORVASC) 10 MG tablet     apixaban ANTICOAGULANT (ELIQUIS) 5 MG tablet     artificial saliva (BIOTENE MT) AERS spray     atorvastatin (LIPITOR) 40 MG tablet     blood glucose (NO BRAND SPECIFIED) lancets standard     blood glucose calibration (NO BRAND SPECIFIED) solution     blood glucose monitoring (NO BRAND SPECIFIED) meter device kit     blood glucose monitoring (NO BRAND SPECIFIED) test strip     Calcium Carbonate-Vitamin D (CALCIUM-VITAMIN D) 250-125 MG-UNIT TABS     calcium polycarbophil (FIBERCON) 625 MG tablet     CLARITIN 10 MG OR TABS     erythromycin (ROMYCIN) 5 MG/GM ophthalmic ointment     FLUoxetine (PROZAC) 20 MG capsule     fluticasone (FLONASE) 50 MCG/ACT nasal spray     furosemide (LASIX) 20 MG tablet     Gabapentin (NEURONTIN PO)     Hypromellose (ARTIFICIAL TEARS OP)     insulin glargine (LANTUS PEN) 100 UNIT/ML pen     lactase (LACTAID) 3000 UNIT tablet     levothyroxine (SYNTHROID/LEVOTHROID) 175 MCG tablet     lidocaine (XYLOCAINE) 2 % external gel     " lifitegrast (XIIDRA) 5 % opthalmic solution     liraglutide (VICTOZA) 18 MG/3ML solution     losartan (COZAAR) 100 MG tablet     Magnesium Hydroxide (MILK OF MAGNESIA PO)     melatonin 3 MG CAPS     metFORMIN (GLUCOPHAGE) 1000 MG tablet     NEW MED     nitroFURantoin macrocrystal-monohydrate (MACROBID) 100 MG capsule     nystatin (MYCOSTATIN) 887213 UNIT/GM POWD     ONETOUCH DELICA LANCETS 33G MISC     polyethylene glycol (MIRALAX/GLYCOLAX) powder     Saline 0.9 % SOLN     senna-docusate (SENNA S) 8.6-50 MG per tablet     simethicone (MYLICON) 125 MG chewable tablet     Skin Protectants, Misc. (EUCERIN) cream     SM LUBRICANT EYE DROPS 0.4-0.3 % SOLN ophthalmic solution     solifenacin (VESICARE) 10 MG tablet     Vaginal Moisturizer (VAGISIL INTIMATE MOISTURIZER) LOTN     ziprasidone (GEODON) 40 MG capsule     No current facility-administered medications for this visit.     Wt Readings from Last 4 Encounters:   01/11/22 101.6 kg (224 lb)   01/07/22 103.2 kg (227 lb 9.6 oz)   12/27/21 101.3 kg (223 lb 6.4 oz)   12/07/21 101.9 kg (224 lb 9.6 oz)   There is no height or weight on file to calculate BMI.    This telephone service is appropriate and effective for delivering services in light of the necessity for social distancing to mitigate the COVID.bmi  -19 epidemic and for conservation of PPE.     Patient has agreed to receiving telephone services after being informed about it: Yes    Patient prefers video invitation/information to be sent by:   email    Time service started: 10:02  Time service ended:  10:48      Mode of transmission: Doxy following instructions  by telephone so as to reduce chance of being overheard.    Location of originating:  [facility]    Distance site:  Home office of provider for [facility]     The patient has been notified that:  Video visits will be conducted via a call with their psychologist to provide the care they need with a video conversation. Video visits may be billed at different  rates depending on insurance coverage.  Patients are advised to please contact their insurance provider with any questions about their health insurance coverage. If during the course of a call the psychologist feels a video visit is not appropriate, patients will not be charged for this service.    Diagnosis:   Major Depression, recurrent mild (F33.0)   Psychological Factors Affecting Morbid Obesity (F54)  Generalized anxiety (F41.1)       Plan: Return video telementalhealth visit  2/15 @ 1 due to the serious and persistent nature of her depression and anxiety, consistent with treatment plan.  Last treatment plan signed: 1/13/22  Treatment plan due: 1/13/23                  Preference for future meetings:           X   In-person, even if both wearing masks                Remote                 Either            Damon Gr, PhD LP,  A.B.P.P.  Director, Health Psychology  [phone]

## 2022-01-18 ENCOUNTER — VIRTUAL VISIT (OUTPATIENT)
Dept: PSYCHIATRY | Facility: CLINIC | Age: 72
End: 2022-01-18
Attending: PSYCHIATRY & NEUROLOGY
Payer: COMMERCIAL

## 2022-01-18 ENCOUNTER — VIRTUAL VISIT (OUTPATIENT)
Dept: DERMATOLOGY | Facility: CLINIC | Age: 72
End: 2022-01-18
Payer: COMMERCIAL

## 2022-01-18 ENCOUNTER — PRE VISIT (OUTPATIENT)
Dept: DERMATOLOGY | Facility: CLINIC | Age: 72
End: 2022-01-18

## 2022-01-18 VITALS
SYSTOLIC BLOOD PRESSURE: 138 MMHG | WEIGHT: 219.8 LBS | BODY MASS INDEX: 37.73 KG/M2 | DIASTOLIC BLOOD PRESSURE: 78 MMHG | HEART RATE: 84 BPM

## 2022-01-18 DIAGNOSIS — F25.1 SCHIZOAFFECTIVE DISORDER, DEPRESSIVE TYPE (H): Primary | ICD-10-CM

## 2022-01-18 DIAGNOSIS — D04.30 SQUAMOUS CELL CARCINOMA IN SITU OF SKIN OF FACE: Primary | ICD-10-CM

## 2022-01-18 DIAGNOSIS — F25.1 SCHIZOAFFECTIVE DISORDER, DEPRESSIVE TYPE (H): ICD-10-CM

## 2022-01-18 PROCEDURE — 99214 OFFICE O/P EST MOD 30 MIN: CPT | Mod: GC | Performed by: STUDENT IN AN ORGANIZED HEALTH CARE EDUCATION/TRAINING PROGRAM

## 2022-01-18 PROCEDURE — 99442 PR PHYSICIAN TELEPHONE EVALUATION 11-20 MIN: CPT | Mod: 95 | Performed by: DERMATOLOGY

## 2022-01-18 PROCEDURE — G0463 HOSPITAL OUTPT CLINIC VISIT: HCPCS | Mod: GT

## 2022-01-18 ASSESSMENT — PAIN SCALES - GENERAL: PAINLEVEL: NO PAIN (0)

## 2022-01-18 NOTE — Clinical Note
This patient is scheduled to see me at the [facility] on 2/22, but we missed her telephone consult. I left a message with her care home to contact us (at ) to set up another phone consult. Thank you.

## 2022-01-18 NOTE — Clinical Note
The staff member from her care facility called my office number. I finished her telephone consult. A second one is not necessary. Thank you.

## 2022-01-18 NOTE — PROGRESS NOTES
"VIDEO VISIT  Pinky Page is a 71 year old patient who is being evaluated via a billable video visit.      The patient has been notified of following:   \"We have found that certain health care needs can be provided without the need for an in-person physical exam. This service lets us provide the care you need with a video conversation. If a prescription is necessary we can send it directly to your pharmacy. If lab work is needed we can place an order for that and you can then stop by our lab to have the test done at a later time. Insurers are generally covering virtual visits as they would in-office visits so billing should not be different than normal.  If for some reason you do get billed incorrectly, you should contact the billing office to correct it and that number is in the AVS .    Patient has given verbal consent for video visit?: Yes   How would you like to obtain your AVS?: Patient declined  AVS SmartPhrase [PsychAVS] has been placed in 'Patient Instructions': Yes      Video- Visit Details  Type of service:  video visit for medication management  Time of service:    Date:  1/18/2022    Video Start Time:  10:00AM     Video End Time:  10:30AM    Reason for video visit:  Patient unable to travel due to Covid-19  Originating Site (patient location):  [facility]   Location- [facility]  Distant Site (provider location):  [facility]  Mode of Communication:  Video Conference via AmWell  Consent:  Patient has given verbal consent for video visit?: Yes            [facility]  Psychiatry Clinic  MEDICAL PROGRESS NOTE     CARE TEAM: PCP- Cary Brownlee  (resident working at [facility] with Dr. Birmingham, attending)   Specialty Providers- no    Therapist- Dr. Gr PhD   [facility] Team- no. staff at [facility].        Pinky Page is a 70 year old female who prefers the name Pinky & pronouns she, her, hers.      DIAGNOSIS     Schizoaffective disorder, " depressed type, in remission without psychotic features  Tardive dyskinesia      ASSESSMENT   Pinky Page is a 71 year old with history of schizoaffective disorder, depressed type who has been relatively stable taking ziprasidone and fluoxetine since 2016. TODAY Pinky returns for routine follow-up. Since her last visit, she stopped taking lorazepam and this change went well. She also was diagnosed with malignant skin cancer, for which she is receiving MOHS surgery next month, and is understandably anxious about her health. Overall, she continues to do well with support of Narendra Truong and her therapist. No medication changes indicated today.       MNPMP was checked today:  Indicates taking controlled medication as prescribed.     PLAN                                                                                                                1) Meds-  - Continue ziprasidone 40 mg BID  - Continue fluoxetine 40 mg daily  - Continue melatonin 6mg at bedtime    (prescribed by PCP: gabapentin 900mg TID)    2) Psychotherapy- continue with Dr. Gr    3) Next due-  Labs- Up to date - Next due 9/2022  EKG- Last EKG 3/2021 without prolonged QTc   Rating Scales- AIMS today 4.    4) Referrals-  None    5) Dispo- 12 weeks or sooner if needed     PERTINENT BACKGROUND                           [most recent eval 07/19/21]     Pinky first experienced mental health issues in her 20s or 30s and has received treatment for schizoaffective disorder and generalized anxiety. Notably, Pinky's symptoms of depression and psychosis have responded well to a combination of ECT and medication management. Pinky has a history of experiencing increased psychotic symptoms with past attempted antipsychotic tapers. No changes to antipsychotic or antidepressant since 2016. Only medication change in last 5 years has been lowering lorazepam.    In her 30s, she had her gallbladder removed and then suffered first episode of severe depression. She was  "experiencing psychotic symptoms - paranoia, AH, and suspiciousness. She was hospitalized in [place] in 1986 where she received ECT.  She was hospitalized again in 1987, again received ECT. Was hospitalized for depression in 1995, around the same time she was diagnosed with breast cancer.   She was hospitalized for about nine months this time. She received ECT maintence from 7339-0246. Patient was stable taking Seroquel 400mg, Prozac 40mg, and Ativan 1mg for many years. Seroquel was cross-tapered to ziprasidone due to metabolic side effects in 2016.     She has been seen at our clinic since 2013. She has lived at [facility] for many years.      Medical history  Notable for afib, DMT2, HTN, ANUJ, hypothyroid, breast cancer s/p mastectomy     Psych pertinent item history includes psychosis [sxs include disorganization, hallucinations, paranoia], mutiple psychotropic trials, psych hosp (3-5) and ECT.     SUBJECTIVE     Last seen 10/25/2021  Today's visit converted from in-person to virtual visit due to her exposure to COVID (+) people at her residence.     Since last visit, stopped Ativan 0.25mg. Didn't notice much in terms of symptoms when stopped it - had no problems stopping it. Sleep may have gotten worse when she stopped it. But she is \"not sure\" what specifically may have changed in regards to sleep.Sleep- has trouble with sleep. Sometimes hard to fall asleep. Is awake at 2am and still awake. Happens once or twice a week  - sometimes can sleep in after. Not sure what keeps her up - not specific worries.Feels like she just can't sleep. She agrees with avoiding additional medications as she already takes many medications.     Orthostatics positive last visit. Since then no LH or dizziness.  Today, feels okay. Would like to continue same meds. Nothing is bothering her.    13 residents and staff - at [facility] have tested positive for COVID. Pinky did not test positive.    Since I last saw her, she has had " "several changes in her health. She had growth removed from eye - biopsy showed it as cancer so now she has surgery scheduled for February.She is Feeling scared and worried- meeting surgeon today- with [facility].     Has a sore shoulder - has an appointment with physical therapy.     Her physical health \"gets her down\" but she is hanging in there. Continues talking with therapist Dr. Gr on phone once a month. Focusing on healthy habits like eating habits and exercise.     No change in perioral movements - lips and tongue moving. Chewing gum today.     No concerns about her memory.    ----------  RECENT PSYCH ROS:   Depression:  Low energy, anhedonia  Elevated:  none  Psychosis:  none  Anxiety:  Worries about the future  Trauma Related:  none  Sleep: yes, see HPI  Other: N/A    Adverse Effects: right hand tremor- resting tremor, perioral involuntary movements, stiffness in UE. No chest pain. No shortness of breath. Some dry mouth.  Pertinent Negative Symptoms: No suicidal ideation, violent ideation, hallucinations or delusions  Recent Substance Use:     None reported     FAMILY and SOCIAL HISTORY                                 pt reported     Social Hx:  Financial/ Work- social security disability.   Partner/ - never  Children- None      Living situation- [facility]  Since 1998.  Social/ Spiritual Support- sisters (4), staff at [facility]    PSYCH and SUBSTANCE USE Critical Summary Points since July 2021 July- decrease lorazepam from 0.5mg to 0.375 (liquid)  August 30 - decrease lorazepam to 0.25mg due to difficulty drawing up correct liquid amount  10/25/2021- stop lorazepam.   1/18/2022 - no changes     PAST MED TRIALS   sertaline   amitriptyline   lithium   risperidone   olanzapine   trazodone   clonazepam   quetiapine 400mg (weight gain/metabolic SEs)  Lorazepam   triazolam  clonazepam.       SUBSTANCE USE HISTORY     Past Use- none reported     MEDICAL HISTORY and " ALLERGY     ALLERGIES: Chlordiazepoxide hcl, Dimetapp dm cold-cough, Haldol, Ibuprofen, Lactose intolerance [beta-galactosidase], Milk products, and Propofol    Patient Active Problem List   Diagnosis     Allergic rhinitis due to pollen     Urge incontinence     Hypertension, essential     Cardiomegaly     Chronic constipation     Dry eye syndrome     Esophageal reflux     Exposure keratoconjunctivitis     DM ophthalmopathy (H)     Hypothyroidism     Senile cataract     ANUJ (obstructive sleep apnea)     Vaginal atrophy     Restless leg syndrome     Squamous blepharitis     Morbid obesity due to excess calories (H)     Personal history of breast cancer s/p L masectomy     Hypercholesteremia     Lives in group home     Extrapyramidal and movement disorder     CKD (chronic kidney disease) stage 2, GFR 60-89 ml/min     Solitary kidney, congenital     S/P hysterectomy     Impingement syndrome of right shoulder     Hypertriglyceridemia     Candidiasis of skin     Generalized anxiety disorder     Carpal tunnel syndrome of left wrist     Schizoaffective disorder, depressive type (H)     Major depressive disorder, recurrent episode, moderate (H)     Psychological factors affecting medical condition     Hx of psychiatric care     Nail complaint     Microalbuminuria due to type 2 diabetes mellitus (H)     Type 2 diabetes mellitus with microalbuminuria, with long-term current use of insulin (H)     Conjunctivitis of both eyes     Corneal erosion of both eyes     Spastic entropion, right     Foot callus     Paroxysmal atrial fibrillation (H)     Squamous cell carcinoma in situ of skin of face     Past Medical History:   Diagnosis Date     Allergic rhinitis due to pollen     seasonal allergies      Anisometropia and aniseikonia      BMI greater than 40      Cardiomegaly      Chronic constipation      Congenital absence of one kidney      Cramp of limb      Dermatophytosis of the body      Dry eye syndrome      Esophageal reflux       Essential hypertension, benign      Gastro-oesophageal reflux disease      Hemorrhoids      Hypermetropia      Hypothyroidism      Incomplete Emptying of Bladder      Lactose intolerance      Major depressive disorder, recurrent episode, moderate (H)      Malignant neoplasm of breast (female), unspecified site     left mastectomy 1996      Mixed incontinence urge and stress (male)(female)      Moderate obstructive sleep apnea      Postmenopausal Atrophic Vaginitis      Presbyopia      Regular astigmatism      Restless leg syndrome      Senile nuclear sclerosis      Thyroid eye disease     mild     Tinnitus      Trigger finger (acquired)     right hand     Type II or unspecified type diabetes mellitus without mention of complication, not stated as uncontrolled     on insulin since April 2006         MEDICAL REVIEW OF SYSTEMS   Contraception- not needed    A comprehensive review of systems was performed and is negative other than noted in the HPI.     MEDICATIONS     Current Outpatient Medications   Medication Sig Dispense Refill     acetaminophen (TYLENOL) 500 MG tablet Take 2 tablets (1,000 mg) by mouth every 6 hours as needed 1 Bottle 2     alum & mag hydroxide-simethicone (MYLANTA/MAALOX) 200-200-20 MG/5ML SUSP suspension Take 30 mLs by mouth daily as needed for indigestion       amLODIPine (NORVASC) 10 MG tablet Take 1 tablet (10 mg) by mouth daily 90 tablet 0     apixaban ANTICOAGULANT (ELIQUIS) 5 MG tablet Take 1 tablet (5 mg) by mouth 2 times daily 180 tablet 0     artificial saliva (BIOTENE MT) AERS spray Take 1 spray by mouth 3 times daily as needed for dry mouth       atorvastatin (LIPITOR) 40 MG tablet Take 1 tablet (40 mg) by mouth daily 90 tablet 1     blood glucose (NO BRAND SPECIFIED) lancets standard Use to test blood sugar 2 times daily or as directed. 100 each 11     blood glucose calibration (NO BRAND SPECIFIED) solution Use to calibrate blood glucose monitor as directed. 1 each 3     blood  glucose monitoring (NO BRAND SPECIFIED) meter device kit Check Blood sugars twice a day. 1 kit 0     blood glucose monitoring (NO BRAND SPECIFIED) test strip Use to test blood sugar 3 times daily or as directed. 100 each 3     Calcium Carbonate-Vitamin D (CALCIUM-VITAMIN D) 250-125 MG-UNIT TABS Take 1 tablet by mouth 2 times daily. Calcium 250 mg/Vit D 125 IU       calcium polycarbophil (FIBERCON) 625 MG tablet Take 2 tablets by mouth daily       CLARITIN 10 MG OR TABS 1 TAB PO QD (Once per day) as needed for ALLERGY SYMPTOMS 30 11     erythromycin (ROMYCIN) 5 MG/GM ophthalmic ointment Place 0.5 inches into both eyes At Bedtime 3.5 g 11     FLUoxetine (PROZAC) 20 MG capsule Take 2 capsules (40 mg) by mouth daily 60 capsule 2     fluticasone (FLONASE) 50 MCG/ACT nasal spray Spray 1 spray into both nostrils daily 16 g 3     furosemide (LASIX) 20 MG tablet Take 1 tablet (20 mg) by mouth 2 times daily 60 tablet 3     Gabapentin (NEURONTIN PO) Take 900 mg by mouth 3 times daily.       Hypromellose (ARTIFICIAL TEARS OP) Apply 1 drop to eye 4 times daily.       insulin glargine (LANTUS PEN) 100 UNIT/ML pen Inject 20 Units Subcutaneous At Bedtime 8 mL 0     lactase (LACTAID) 3000 UNIT tablet Take 1 tablet (3,000 Units) by mouth 3 times daily (with meals) 90 tablet 11     levothyroxine (SYNTHROID/LEVOTHROID) 175 MCG tablet Take 1 tablet (175 mcg) by mouth daily 90 tablet 3     lidocaine (XYLOCAINE) 2 % external gel Apply topically 3 times daily as needed for moderate pain 85 g 1     lifitegrast (XIIDRA) 5 % opthalmic solution Place 1 drop into both eyes 2 times daily 60 each 11     liraglutide (VICTOZA) 18 MG/3ML solution Inject 1.8 mg Subcutaneous daily 9 mL 0     losartan (COZAAR) 100 MG tablet Take 0.5 tablets (50 mg) by mouth 2 times daily 90 tablet 1     Magnesium Hydroxide (MILK OF MAGNESIA PO) Take  by mouth. Take 30 mL as needed for constipation.       melatonin 3 MG CAPS Take 6 mg by mouth At Bedtime 60 capsule 2      metFORMIN (GLUCOPHAGE) 1000 MG tablet Take 1,000 mg by mouth 2 times daily (with meals)       NEW MED Prohydrate Moisturizing gel  Place 1 applicator vaginally 4 times a week 20 oz 3     nystatin (MYCOSTATIN) 794291 UNIT/GM POWD Apply topically 3 times daily as needed 60 g 1     ONETOUCH DELICA LANCETS 33G MISC        polyethylene glycol (MIRALAX/GLYCOLAX) powder Take 1 capful by mouth 2 times daily. 17 GM PO BID       Saline 0.9 % SOLN Spray 2 sprays in nostril as needed.       senna-docusate (SENNA S) 8.6-50 MG per tablet Take 2 tablets by mouth At Bedtime.       simethicone (MYLICON) 125 MG chewable tablet Take 1 tablet (125 mg) by mouth 2 times daily 60 tablet 0     Skin Protectants, Misc. (EUCERIN) cream Apply  topically as needed. Apply to thigh PRN dry skin        SM LUBRICANT EYE DROPS 0.4-0.3 % SOLN ophthalmic solution        solifenacin (VESICARE) 10 MG tablet Take 1 tablet (10 mg) by mouth daily 30 tablet 6     Vaginal Moisturizer (VAGISIL INTIMATE MOISTURIZER) LOTN Place 1 applicator vaginally four times a week 59 mL 1     ziprasidone (GEODON) 40 MG capsule Take 1 capsule (40 mg) by mouth 2 times daily (with meals) 60 capsule 2     nitroFURantoin macrocrystal-monohydrate (MACROBID) 100 MG capsule Take 1 capsule (100 mg) by mouth 2 times daily (Patient not taking: Reported on 1/11/2022) 14 capsule 0      VITALS   /78   Pulse 84   Wt 99.7 kg (219 lb 12.8 oz)   LMP  (LMP Unknown)   BMI 37.73 kg/m      MENTAL STATUS EXAM     Alertness: alert   Appearance: well groomed  Behavior/Demeanor: cooperative, with good  eye contact.  Speech: slowed,  Language: no problems  Psychomotor: tremor in Right hand, perioral movements (puckering, clenching), some stiffness in upper extremities  Mood: description consistent with euthymia  Affect: full range and appropriate; congruent to: mood- yes, content- yes  Thought Process/Associations: unremarkable  Thought Content:  Reports none;  Denies suicidal & violent  ideation and delusions  Perception:  Reports none;  Denies hallucinations  Insight: adequate  Judgment: adequate for safety  Cognition: does  appear grossly intact; formal cognitive testing was not done  Gait and Station: remarkable for:  slowed, small steps , pivot turn     LABS and DATA     PHQ9 TODAY = 13  PHQ 10/26/2021 11/30/2021 1/11/2022   PHQ-9 Total Score 13 7 6   Q9: Thoughts of better off dead/self-harm past 2 weeks Not at all Not at all Not at all   F/U: Thoughts of suicide or self-harm - - -   F/U: Safety concerns - - -       Recent Labs   Lab Test 11/18/21  1617 10/08/21  2143 03/16/21  1537   CR 0.8 0.84 0.8   GFRESTIMATED >60 70 70.7       Recent Labs   Lab Test 10/08/21  2143 03/16/21  1537 09/16/20  0000   * 204.0* 73   A1C  --  7.8* 6.6*     Recent Labs   Lab Test 03/16/21  1537 09/16/20  0000   CHOL 124 106   TRIG 136 89   LDL 53 46   HDL 43* 42*     Recent Labs   Lab Test 03/16/21  1537 06/26/18  0659   AST 7.9 16   ALT 12.8 21   ALKPHOS 78.9 111     Recent Labs   Lab Test 10/08/21  2143 03/16/21  1537 05/07/19  0650 10/25/18  1734   WBC 11.8*  --  10.06* 14.2*   ANEU  --  7.5  --  10.6*   HGB 13.0 13.7 13.3 14.0     --  292 275       EKG 3/2021 - showing atrial fibrillation. QtC 392.      PSYCHOTROPIC DRUG INTERACTIONS     ZIPRASIDONE and fluoxetine may result in increased risk of QT-interval prolongation.  ZIPRASIDONE and SEROTONERGIC DRUGS THAT PROLONG QT INTERVAL may result in increased risk of QT-interval prolongation and increased risk of serotonin syndrome (hypertension, hyperthermia, myoclonus, mental status changes).  NSAID and SSRI may result in an increased risk of bleeding  ERYTHROMYCIN and FLUOXETINE may result in an increased risk of cardiotoxicity (QT prolongation, torsades de pointes, cardiac arrest).  FLUOXETINE and INSULINS OR PRAMLINTIDE may result in increased risk of hypoglycemia.  GABAPENTIN and CNS DEPRESSANTS may result in respiratory depression.    GABAPENTIN and ANTACIDS may result in decreased gabapentin effectiveness.      MANAGEMENT:  Monitoring for adverse effects, periodic EKGs and patient is aware of risks     RISK STATEMENT for SAFETY     Pinky Page did not appear to be an imminent safety risk to self or others. Risk factors include chronic mental illness, chronic medical problems. Protective factors include supportive 24/7 residence staff, commitment to friends, no expressed SI.    TREATMENT RISK STATEMENT: The risks, benefits, alternatives and potential adverse effects have been discussed and are understood by the pt. The pt understands the risks of using street drugs or alcohol. There are no medical contraindications, the pt agrees to treatment with the ability to do so. The pt knows to call the clinic for any problems or to access emergency care if needed.  Medical and substance use concerns are documented above.  Psychotropic drug interaction check was done, including changes made today.    PROVIDER: Denise Dewey MD    Patient staffed in clinic with Dr. Tang who will sign the note.  Supervisor is Dr. Gordon.

## 2022-01-18 NOTE — TELEPHONE ENCOUNTER
"Request for medication refill:  FLUoxetine (PROZAC) 20 MG capsule    Providers if patient needs an appointment and you are willing to give a one month supply please refill for one month and  send a letter/MyChart using \".SMILLIMITEDREFILL\" .smillimited and route chart to \"[facility] \" (Giving one month refill in non controlled medications is strongly recommended before denial)    If refill has been denied, meaning absolutely no refills without visit, please complete the smart phrase \".smirxrefuse\" and route it to the \"[facility] MED REFILLS\"  pool to inform the patient and the pharmacy.    Joana Delaney MA        "

## 2022-01-18 NOTE — PROGRESS NOTES
[facility] Dermatology Note  Encounter Date: Jan 18, 2022  Store-and-Forward and Telephone ([phone] ). Location of teledermatologist: [facility] DERMATOLOG[facility]INIC [place].  Start time: 1237. End time: 1248.    Dermatology Problem List:  1. SCCIS, L cheek, scheduled Mohs 2/22/22    ____________________________________________    Assessment & Plan:     # SCCIS, L cheek. Mohs surgery scheduled 2/22/22.   The nature, risks, benefits, and alternatives to Mohs surgery were discussed. The patient would like to proceed with Mohs surgery.  She takes Eliquis, ok to continue anticoagulation.   She has no indications for pre-op antibiotics.     She will have a  home.     Procedures Performed:    None    Follow-up: Mohs Surgery 2/22/22    Staff:     Kris Serna DO    Department of Dermatology  [facility]: Phone: [phone], Fax:[phone]  [facility]: Phone: [phone], Fax: [phone]      ____________________________________________    CC: Squamous Cell Carcinoma in situ, left cheek. Mohs consultati    HPI:  Ms. Pinky Page is a(n) 71 year old female who presents today as a new patient for Mohs surgery consultation to treat SCCIS on the left cheek. Her primary care doctor did the biopsy and referred for Mohs. She has no experience with Mohs or skin cancer.     She lives in a care home with nurses, mental health workers, and social workers. If wound care assistance is needed, it is available to her.     Patient is otherwise feeling well, without additional skin concerns.    Labs Reviewed:  Dermpath report from 11/30/21 was reviewed.     Physical Exam:  Vitals: LMP  (LMP Unknown)   SKIN: Teledermatology photos were reviewed; image quality and interpretability: acceptable. Image date: 11/30/21.  - pink pedunculated papule on the left upper  cheek, ~8mm.   - No other lesions of concern on areas examined.     Medications:  Current Outpatient Medications   Medication     acetaminophen (TYLENOL) 500 MG tablet     alum & mag hydroxide-simethicone (MYLANTA/MAALOX) 200-200-20 MG/5ML SUSP suspension     amLODIPine (NORVASC) 10 MG tablet     apixaban ANTICOAGULANT (ELIQUIS) 5 MG tablet     artificial saliva (BIOTENE MT) AERS spray     atorvastatin (LIPITOR) 40 MG tablet     blood glucose (NO BRAND SPECIFIED) lancets standard     blood glucose calibration (NO BRAND SPECIFIED) solution     blood glucose monitoring (NO BRAND SPECIFIED) meter device kit     blood glucose monitoring (NO BRAND SPECIFIED) test strip     Calcium Carbonate-Vitamin D (CALCIUM-VITAMIN D) 250-125 MG-UNIT TABS     calcium polycarbophil (FIBERCON) 625 MG tablet     CLARITIN 10 MG OR TABS     erythromycin (ROMYCIN) 5 MG/GM ophthalmic ointment     FLUoxetine (PROZAC) 20 MG capsule     fluticasone (FLONASE) 50 MCG/ACT nasal spray     furosemide (LASIX) 20 MG tablet     Gabapentin (NEURONTIN PO)     Hypromellose (ARTIFICIAL TEARS OP)     insulin glargine (LANTUS PEN) 100 UNIT/ML pen     lactase (LACTAID) 3000 UNIT tablet     levothyroxine (SYNTHROID/LEVOTHROID) 175 MCG tablet     lidocaine (XYLOCAINE) 2 % external gel     lifitegrast (XIIDRA) 5 % opthalmic solution     liraglutide (VICTOZA) 18 MG/3ML solution     losartan (COZAAR) 100 MG tablet     Magnesium Hydroxide (MILK OF MAGNESIA PO)     melatonin 3 MG CAPS     metFORMIN (GLUCOPHAGE) 1000 MG tablet     NEW MED     nitroFURantoin macrocrystal-monohydrate (MACROBID) 100 MG capsule     nystatin (MYCOSTATIN) 336454 UNIT/GM POWD     ONETOUCH DELICA LANCETS 33G MISC     polyethylene glycol (MIRALAX/GLYCOLAX) powder     Saline 0.9 % SOLN     senna-docusate (SENNA S) 8.6-50 MG per tablet     simethicone (MYLICON) 125 MG chewable tablet     Skin Protectants, Misc. (EUCERIN) cream     SM LUBRICANT EYE DROPS 0.4-0.3 % SOLN ophthalmic solution      solifenacin (VESICARE) 10 MG tablet     Vaginal Moisturizer (VAGISIL INTIMATE MOISTURIZER) LOTN     ziprasidone (GEODON) 40 MG capsule     No current facility-administered medications for this visit.      Past Medical/Surgical History:   Patient Active Problem List   Diagnosis     Allergic rhinitis due to pollen     Urge incontinence     Hypertension, essential     Cardiomegaly     Chronic constipation     Dry eye syndrome     Esophageal reflux     Exposure keratoconjunctivitis     DM ophthalmopathy (H)     Hypothyroidism     Senile cataract     ANUJ (obstructive sleep apnea)     Vaginal atrophy     Restless leg syndrome     Squamous blepharitis     Morbid obesity due to excess calories (H)     Personal history of breast cancer s/p L masectomy     Hypercholesteremia     Lives in group home     Extrapyramidal and movement disorder     CKD (chronic kidney disease) stage 2, GFR 60-89 ml/min     Solitary kidney, congenital     S/P hysterectomy     Impingement syndrome of right shoulder     Hypertriglyceridemia     Candidiasis of skin     Generalized anxiety disorder     Carpal tunnel syndrome of left wrist     Schizoaffective disorder, depressive type (H)     Major depressive disorder, recurrent episode, moderate (H)     Psychological factors affecting medical condition     Hx of psychiatric care     Nail complaint     Microalbuminuria due to type 2 diabetes mellitus (H)     Type 2 diabetes mellitus with microalbuminuria, with long-term current use of insulin (H)     Conjunctivitis of both eyes     Corneal erosion of both eyes     Spastic entropion, right     Foot callus     Paroxysmal atrial fibrillation (H)     Squamous cell carcinoma in situ of skin of face     Chronic left shoulder pain     Rotator cuff injury, left, initial encounter     Past Medical History:   Diagnosis Date     Allergic rhinitis due to pollen     seasonal allergies      Anisometropia and aniseikonia      BMI greater than 40      Cardiomegaly       Chronic constipation      Congenital absence of one kidney      Cramp of limb      Dermatophytosis of the body      Dry eye syndrome      Esophageal reflux      Essential hypertension, benign      Gastro-oesophageal reflux disease      Hemorrhoids      Hypermetropia      Hypothyroidism      Incomplete Emptying of Bladder      Lactose intolerance      Major depressive disorder, recurrent episode, moderate (H)      Malignant neoplasm of breast (female), unspecified site     left mastectomy 1996      Mixed incontinence urge and stress (male)(female)      Moderate obstructive sleep apnea      Postmenopausal Atrophic Vaginitis      Presbyopia      Regular astigmatism      Restless leg syndrome      Senile nuclear sclerosis      Thyroid eye disease     mild     Tinnitus      Trigger finger (acquired)     right hand     Type II or unspecified type diabetes mellitus without mention of complication, not stated as uncontrolled     on insulin since April 2006        CC Paulino El MD and Eriberto Painter MD [facility]

## 2022-01-19 ENCOUNTER — THERAPY VISIT (OUTPATIENT)
Dept: PHYSICAL THERAPY | Facility: CLINIC | Age: 72
End: 2022-01-19
Attending: STUDENT IN AN ORGANIZED HEALTH CARE EDUCATION/TRAINING PROGRAM
Payer: COMMERCIAL

## 2022-01-19 DIAGNOSIS — S46.002A ROTATOR CUFF INJURY, LEFT, INITIAL ENCOUNTER: ICD-10-CM

## 2022-01-19 DIAGNOSIS — M25.512 CHRONIC LEFT SHOULDER PAIN: ICD-10-CM

## 2022-01-19 DIAGNOSIS — G89.29 CHRONIC LEFT SHOULDER PAIN: ICD-10-CM

## 2022-01-19 PROCEDURE — 97161 PT EVAL LOW COMPLEX 20 MIN: CPT | Mod: GP | Performed by: PHYSICAL THERAPIST

## 2022-01-19 PROCEDURE — 97110 THERAPEUTIC EXERCISES: CPT | Mod: GP | Performed by: PHYSICAL THERAPIST

## 2022-01-19 NOTE — PROGRESS NOTES
Physical Therapy Initial Examination/Evaluation  January 19, 2022    Pinky Page is a 71 year old female referred to physical therapy by Aster Olivia DO for treatment of chronic left shoulder pain with Precautions/Restrictions/MD instructions E&T    Therapist Impression:   Pinky presents to therapy with chronic left shoulder pain. Demonstrates reduced active range of motion, postural impairments, strength deficits, and overall pain affecting function. Skilled therapy is required to address ongoing limitations, make progress towards therapy goals, improve quality of life, and return to previous level of function.     Subjective:  DOI/onset: 1/11/2021 DOS: NA  Pinky had a fall in October 2021, has been up and down with shoulder pain since. Has difficulty with laying on the left side, pain to reach overhead or behind the back, pain with lifting anything heavier than usual.   Acute Injury or Gradual Onset?: Acute injury onset  Mechanism of Injury: Fall onto left side   Related PMH: no history of left shoulder injury Previous Treatment: Nothing Effect of prior treatment: NA  Imaging: x-ray   INDICATION: Fall. Left shoulder pain.  COMPARISON: None.  Chief Complaint/Functional Limitations:   Reaching overhead, behind back, lifting, sleeping  and see below in therapy evaluation codes   Pain: rest 4 /10, activity 8/10 Location: points to lateral humerus (globally) Frequency: Intermittent Described as: aching Alleviated by: Heat, Tylenol and Corticosteroid injection Progression of Symptoms: Gradually getting better. Time of day when pain is worse: Activity related and Position related  Sleeping: Interrupted due to current issue   Occupation:   Job duties: repetitive tasks, lifting/carrying  Current HEP/exercise regimen: stationary bike, walk  Patient's goals are see chief complaints reduced pain with lifting and sleeping     Other pertinent PMH/Red Flags: Cancer, Depression, Diabetes, Heart Problems,  High Blood Pressure, Incontinence, Kidney Disease, Mental Illness, Overweight, Sleep Disorder/Apnea, Thyroid Problems, Changes in Bowel/Bladder   Barriers at home/work: None as reported by patient  Pertinent Surgical History: breast surgery  Medications: Cardiac, Thyroid, Heparin/coumadin, Sleep, Anti-depressants and High blood pressure  General health as reported by patient: fair  Return to MD:  PRN    SHOULDER EXAMINATION  Diagnosis: L Shoulder Pain     CERVICAL SCREEN  AROM: 50% limit of extension, 25% limit of side bending bilaterally     THORACIC SPINE SCREEN  Limited range of motion in all directions     STATIC POSTURE  Forward head: moderate   Rounded shoulders:moderate  Shoulder internally rotated: moderate   Visual inspection: Elevated scapula bilaterally     DYNAMIC SCAPULAR TESTS  Dynamic Scapular Assessment: NA    SHOULDER RANGE OF MOTION  AROM Flexion Abduction ER   Base Ext/IR Extension   Left 140 120   50 T11 WNL   Right 160 155 50 T6 WNL   Pain: pain with flexion and internal rotation     PROM: demonstrates equivalent flexion with passive range of motion, pain at end range     STRENGTH Flexion Abd ER  IR   Left 3+/5, pain 3+/5 pain 4/5, pain 4+/5   Right 4/5 4/5 4+/5 4+/5       SPECIAL TESTS Left    Impingement Positive       PALPATION  Hypersensitive with palpation of left shoulder - pain noted on palpation of left pec musculature, upper trapezius, rotator cuff musculature     Assessment/Plan:  Patient is a 71 year old female with left side shoulder complaints.    Patient has the following significant findings with corresponding treatment plan.                Diagnosis 1:  Left Shoulder  Pain -  hot/cold therapy, manual therapy, splint/taping/bracing/orthotics, self management, education and home program  Decreased ROM/flexibility - manual therapy and therapeutic exercise  Decreased joint mobility - manual therapy and therapeutic exercise  Decreased strength - therapeutic exercise and therapeutic  activities  Impaired posture - neuro re-education    Therapy Evaluation Codes:     Cumulative Therapy Evaluation is: Low complexity.    Previous and current functional limitations:  (See Goal Flow Sheet for this information)    Short term and Long term goals: (See Goal Flow Sheet for this information)     Communication ability:  Patient appears to be able to clearly communicate and understand verbal and written communication and follow directions correctly.  Treatment Explanation - The following has been discussed with the patient:   RX ordered/plan of care  Anticipated outcomes  Possible risks and side effects  This patient would benefit from PT intervention to resume normal activities.   Rehab potential is good.    Frequency:  1 X week, once daily  Duration:  for 4 weeks tapering to 2 X a month over 2 weeks  Discharge Plan:  Achieve all LTG.  Independent in home treatment program.  Reach maximal therapeutic benefit.    Please refer to the daily flowsheet for treatment today, total treatment time and time spent performing 1:1 timed codes.

## 2022-01-19 NOTE — PROGRESS NOTES
[facility]    OUTPATIENT Physical Therapy ORTHOPEDIC EVALUATION  PLAN OF TREATMENT FOR OUTPATIENT REHABILITATION  (COMPLETE FOR INITIAL CLAIMS ONLY)  Patient's Last Name, First Name, M.I.  YOB: 1950  KayleePinky  [facility]    Provider s Name:  [facility]   Medical Record No.  [id number]   Start of Care Date:  01/19/22   Onset Date:   01/11/22   Type:     _X__PT   ___OT Medical Diagnosis:    Encounter Diagnoses   Name Primary?     Chronic left shoulder pain      Rotator cuff injury, left, initial encounter         Treatment Diagnosis:  L Shoulder        Goals:     01/19/22 0500   Body Part   Goals listed below are for L Shoulder   Goal #1   Goal #1 sleeping   Previous Functional Level No restrictions   Performance Level No limitations   Current Functional Level 2-3 hours without sleep per night   Performance Level wakes up 1-2x/night due to pain   STG Target Performance 1-2 hours without sleep per night   Performance Level waking 1x/night due to pain, reduced pain (<3/10 when laying on left side)   Rationale to establish restorative sleep pattern   Due Date 02/18/22   LTG Target Performance Sleep through the night w/o meds   Performance level able to sleep on left side    Rationale to establish restorative sleep pattern   Due Date 04/05/22       Therapy Frequency:  1x weekly for 4 weeks, 2x/month for 2 months  Predicted Duration of Therapy Intervention:       Mona Obrien, PT                 I CERTIFY THE NEED FOR THESE SERVICES FURNISHED UNDER        THIS PLAN OF TREATMENT AND WHILE UNDER MY CARE     (Physician attestation of this document indicates review and certification of the therapy plan).                       Certification Date From:  01/19/22   Certification Date To:  04/18/22    Referring Provider:  Aster Olivia MD    Initial Assessment        See  Epic Evaluation SOC Date: 01/19/22

## 2022-01-26 ENCOUNTER — THERAPY VISIT (OUTPATIENT)
Dept: PHYSICAL THERAPY | Facility: CLINIC | Age: 72
End: 2022-01-26
Payer: COMMERCIAL

## 2022-01-26 DIAGNOSIS — G89.29 CHRONIC LEFT SHOULDER PAIN: ICD-10-CM

## 2022-01-26 DIAGNOSIS — S46.002A ROTATOR CUFF INJURY, LEFT, INITIAL ENCOUNTER: ICD-10-CM

## 2022-01-26 DIAGNOSIS — M25.512 CHRONIC LEFT SHOULDER PAIN: ICD-10-CM

## 2022-01-26 PROCEDURE — 97110 THERAPEUTIC EXERCISES: CPT | Mod: GP | Performed by: PHYSICAL THERAPIST

## 2022-01-28 ENCOUNTER — TELEPHONE (OUTPATIENT)
Dept: PSYCHIATRY | Facility: CLINIC | Age: 72
End: 2022-01-28
Payer: COMMERCIAL

## 2022-01-28 DIAGNOSIS — F25.1 SCHIZOAFFECTIVE DISORDER, DEPRESSIVE TYPE (H): ICD-10-CM

## 2022-01-28 RX ORDER — ZIPRASIDONE HYDROCHLORIDE 40 MG/1
40 CAPSULE ORAL 2 TIMES DAILY WITH MEALS
Qty: 60 CAPSULE | Refills: 0 | Status: SHIPPED | OUTPATIENT
Start: 2022-01-28 | End: 2022-04-14

## 2022-01-28 NOTE — TELEPHONE ENCOUNTER
[facility] Call Center    Phone Message    May a detailed message be left on voicemail: yes     Reason for Call: Medication Refill Request    Has the patient contacted the pharmacy for the refill? Yes   Name of medication being requested: ziprasidone 40 mg  Provider who prescribed the medication: wilian bass  Pharmacy:    [facility] - [place], [place] - [address]      Date medication is needed: pt has 3 days remaining, will run out on Monday      Action Taken: Message routed to:  Other: nursing pool    Travel Screening: Not Applicable

## 2022-01-28 NOTE — TELEPHONE ENCOUNTER
Last Seen: 01/18/22  RTC: 12wk  Cancel: 0  No-Show: 0  Next Appt: 4/14/22    Incoming Refill From patient via phone call    Medication Requested: ziprasidone (GEODON) 40 MG capsule  Directions: Take 1 capsule (40 mg) by mouth 2 times daily (with meals)  Qty: 60   Last Refill: 12/29/21 30tab    Medication Refill Approved Per Refill Protocol

## 2022-01-31 ENCOUNTER — DOCUMENTATION ONLY (OUTPATIENT)
Dept: FAMILY MEDICINE | Facility: CLINIC | Age: 72
End: 2022-01-31

## 2022-02-01 ENCOUNTER — THERAPY VISIT (OUTPATIENT)
Dept: PHYSICAL THERAPY | Facility: CLINIC | Age: 72
End: 2022-02-01
Payer: COMMERCIAL

## 2022-02-01 DIAGNOSIS — G89.29 CHRONIC LEFT SHOULDER PAIN: ICD-10-CM

## 2022-02-01 DIAGNOSIS — S46.002A ROTATOR CUFF INJURY, LEFT, INITIAL ENCOUNTER: ICD-10-CM

## 2022-02-01 DIAGNOSIS — M25.512 CHRONIC LEFT SHOULDER PAIN: ICD-10-CM

## 2022-02-01 PROCEDURE — 97530 THERAPEUTIC ACTIVITIES: CPT | Mod: GP | Performed by: PHYSICAL THERAPIST

## 2022-02-01 PROCEDURE — 97110 THERAPEUTIC EXERCISES: CPT | Mod: GP | Performed by: PHYSICAL THERAPIST

## 2022-02-04 DIAGNOSIS — N95.2 VAGINAL ATROPHY: ICD-10-CM

## 2022-02-04 DIAGNOSIS — N39.0 RECURRENT UTI: Primary | ICD-10-CM

## 2022-02-07 ENCOUNTER — TELEPHONE (OUTPATIENT)
Dept: FAMILY MEDICINE | Facility: CLINIC | Age: 72
End: 2022-02-07

## 2022-02-09 ENCOUNTER — OFFICE VISIT (OUTPATIENT)
Dept: FAMILY MEDICINE | Facility: CLINIC | Age: 72
End: 2022-02-09
Payer: COMMERCIAL

## 2022-02-09 VITALS
RESPIRATION RATE: 16 BRPM | TEMPERATURE: 98.1 F | BODY MASS INDEX: 37.94 KG/M2 | HEIGHT: 64 IN | WEIGHT: 222.2 LBS | SYSTOLIC BLOOD PRESSURE: 122 MMHG | OXYGEN SATURATION: 95 % | HEART RATE: 89 BPM | DIASTOLIC BLOOD PRESSURE: 78 MMHG

## 2022-02-09 DIAGNOSIS — R30.0 DYSURIA: Primary | ICD-10-CM

## 2022-02-09 DIAGNOSIS — N39.0 RECURRENT UTI: ICD-10-CM

## 2022-02-09 LAB
ALBUMIN UR-MCNC: NEGATIVE MG/DL
APPEARANCE UR: CLEAR
BACTERIA #/AREA URNS HPF: ABNORMAL /HPF
BILIRUB UR QL STRIP: NEGATIVE
COLOR UR AUTO: YELLOW
GLUCOSE UR STRIP-MCNC: NEGATIVE MG/DL
HGB UR QL STRIP: ABNORMAL
KETONES UR STRIP-MCNC: NEGATIVE MG/DL
LEUKOCYTE ESTERASE UR QL STRIP: ABNORMAL
NITRATE UR QL: NEGATIVE
PH UR STRIP: 5.5 [PH] (ref 5–8)
RBC #/AREA URNS AUTO: ABNORMAL /HPF
SP GR UR STRIP: 1.01 (ref 1–1.03)
SQUAMOUS #/AREA URNS AUTO: ABNORMAL /LPF
UROBILINOGEN UR STRIP-ACNC: 0.2 E.U./DL
WBC #/AREA URNS AUTO: >100 /HPF

## 2022-02-09 PROCEDURE — 99214 OFFICE O/P EST MOD 30 MIN: CPT | Performed by: FAMILY MEDICINE

## 2022-02-09 PROCEDURE — 81001 URINALYSIS AUTO W/SCOPE: CPT | Performed by: FAMILY MEDICINE

## 2022-02-09 PROCEDURE — 87088 URINE BACTERIA CULTURE: CPT | Performed by: FAMILY MEDICINE

## 2022-02-09 PROCEDURE — 87086 URINE CULTURE/COLONY COUNT: CPT | Performed by: FAMILY MEDICINE

## 2022-02-09 PROCEDURE — 87186 SC STD MICRODIL/AGAR DIL: CPT | Performed by: FAMILY MEDICINE

## 2022-02-09 RX ORDER — NITROFURANTOIN MACROCRYSTAL 100 MG
100 CAPSULE ORAL 4 TIMES DAILY
Qty: 28 CAPSULE | Refills: 0 | Status: SHIPPED | OUTPATIENT
Start: 2022-02-09 | End: 2022-02-14 | Stop reason: ALTCHOICE

## 2022-02-09 ASSESSMENT — MIFFLIN-ST. JEOR: SCORE: 1503.14

## 2022-02-09 NOTE — PATIENT INSTRUCTIONS
Patient Education   Here is the plan from today's visit    1. Dysuria  - UA with Microscopic reflex to Culture; Future  - Urine Culture Aerobic Bacterial; Future  2. Recurrent UTI  Start:  - nitroFURantoin macrocrystal (MACRODANTIN) 100 MG capsule; Take 1 capsule (100 mg) by mouth 4 times daily for 7 days  Dispense: 28 capsule; Refill: 0  Will adjust antibiotics if needed based on the urine cultures  Follow up with Urology (Dr. Thomas) around July 2022, sooner if another UTI after this one    HTN  Check BP 3 times per week, call the clinic if BP >140/90 twice in a row    Thank you for coming to [facility] today.  Lab Testing:  **If you had lab testing today and your results are reassuring or normal they will be mailed to you or sent through [employer] within 7 days.   **If the lab tests need quick action we will call you with the results.  **If you are having labs done on a different day, please call [phone] to schedule at [facility] or [phone] for other [facility] locations. Labs do not offer walk-in appointments.  The phone number we will call with results is # [phone] (home) . If this is not the best number please call our clinic and change the number.  Medication Refills:  If you need any refills please call your pharmacy and they will contact us.   If you need to  your refill at a new pharmacy, please contact the new pharmacy directly. The new pharmacy will help you get your medications transferred faster.   Scheduling:  If you have any concerns about today's visit or wish to schedule another appointment please call our office during normal business hours [phone] (8-5:00 M-F)  If a referral was made to an [facility] specialty provider and you do not get a call from central scheduling, please refer to directions on your visit summary or call our office during normal business hours for assistance.   If a Mammogram was ordered for you at the [facility]  call [phone] to schedule or change your appointment.  If you had an XRay/CT/Ultrasound/MRI ordered the number is [phone] to schedule or change your radiology appointment.   [facility] has limited ultrasound appointments available on Wednesdays, if you would like your ultrasound at [facility], please call [phone] to schedule.   Medical Concerns:  If you have urgent medical concerns please call [phone] at any time of the day.    Symone Reynolds MD

## 2022-02-09 NOTE — PROGRESS NOTES
"[facility] Progress Note      If calling with results, call [facility] and ask for the 3rd floor nurse.       Assessment & Plan     Dysuria  Recurrent UTI  - UA with Microscopic reflex to Culture; Future  - Urine Culture Aerobic Bacterial; Future  - UA with Microscopic reflex to Culture  - Urine Culture Aerobic Bacterial  - Urine Microscopic  - Urine Culture  - nitroFURantoin macrocrystal (MACRODANTIN) 100 MG capsule; Take 1 capsule (100 mg) by mouth 4 times daily for 7 days  Culture pending, will change based on culture results as needed    ADDENDUM: UC growing Klebsiella PNA resistent to nitrofurantoin - prescribing Keflex instead. Team to contact pt via living facility nurse. -Symone Reynolds MD 2/14/2022     I spent a total of 33 minutes on the day of the visit.   Time spent doing chart review, history and exam, documentation and further activities per the note     BMI:   Estimated body mass index is 38.12 kg/m  as calculated from the following:    Height as of this encounter: 1.626 m (5' 4.02\").    Weight as of this encounter: 100.8 kg (222 lb 3.2 oz).   Weight management plan: Discussed healthy diet and exercise guidelines    FUTURE APPOINTMENTS:       - Follow-up visit in July with Dr. Costa (Urology)    No follow-ups on file.    Bernarda Johnson, Student Nurse Midwife    Preceptor Attestation:   I was present with the student who participated in the service and in the documentation of this note. I have verified the history and personally performed the physical exam and medical decision making. I have verified the content of the note, which accurately reflects my assessment of the patient and the plan of care.   Supervising Physician:  Symone Reynolds MD.    Beronica Vance is a 71 year old who presents for the following health issues   Patient presents with:  Hypertension: pt here to follow up on blood pressure, patient taking BP medications.   Dysuria: pt has had burning and itching " "while urinating for the last two days, pain has gotten slightly worse. no abdominal or flank pain.   Forms: appointment form for gustavo Vance has been noticing pain and burning with urination since Monday and believes she has a UTI. She has history of recurrent UTIs and has been seen recently by  in Urology for a cystoscopy. She was last treated for a UTI in December and felt that her symptoms had completely resolved from that infection. She has not noticed any blood in her urine and has not experienced fever, chills or back pain.     Genitourinary - Female  Onset/Duration: Monday  Description:   Painful urination (Dysuria): YES           Frequency: no  Blood in urine (Hematuria): no  Delay in urine (Hesitency): no  Intensity: moderate  Progression of Symptoms:  same  Accompanying Signs & Symptoms:  Fever/chills: no  Flank pain: no  Nausea and vomiting: no  Vaginal symptoms: itching and burning  Abdominal/Pelvic Pain: no  History:   History of frequent UTI s: YES  History of kidney stones: no  Sexually Active: no  Possibility of pregnancy: No  Precipitating or alleviating factors: None  Therapies tried and outcome: None      Review of Systems   CONSTITUTIONAL: NEGATIVE for fever, chills, change in weight  ENT/MOUTH: NEGATIVE for ear, mouth and throat problems  RESP: NEGATIVE for significant cough or SOB  CV: NEGATIVE for chest pain, palpitations or peripheral edema  : POSITIVE for dysuria and vaginal burning/itching      Objective    /78   Pulse 89   Temp 98.1  F (36.7  C) (Oral)   Resp 16   Ht 1.626 m (5' 4.02\")   Wt 100.8 kg (222 lb 3.2 oz)   LMP  (LMP Unknown)   SpO2 95%   BMI 38.12 kg/m    Body mass index is 38.12 kg/m .       Physical Exam     GENERAL: healthy, alert and no distress  ABDOMEN: soft, nontender, no hepatosplenomegaly, no masses   MS: no gross musculoskeletal defects noted, no edema  BACK: no CVA tenderness  PSYCH: mentation appears normal, affect " normal/bright        Results for orders placed or performed in visit on 02/09/22   UA with Microscopic reflex to Culture     Status: Abnormal    Specimen: Urine, Midstream   Result Value Ref Range    Color Urine Yellow Colorless, Straw, Light Yellow, Yellow    Appearance Urine Clear Clear    Glucose Urine Negative Negative mg/dL    Bilirubin Urine Negative Negative    Ketones Urine Negative Negative mg/dL    Specific Gravity Urine 1.015 1.005 - 1.030    Blood Urine Trace (A) Negative    pH Urine 5.5 5.0 - 8.0    Protein Albumin Urine Negative Negative mg/dL    Urobilinogen Urine 0.2 0.2, 1.0 E.U./dL    Nitrite Urine Negative Negative    Leukocyte Esterase Urine Moderate (A) Negative   Urine Microscopic     Status: Abnormal   Result Value Ref Range    Bacteria Urine Many (A) None Seen /HPF    RBC Urine 0-2 0-2 /HPF /HPF    WBC Urine >100 (A) 0-5 /HPF /HPF    Squamous Epithelials Urine Moderate (A) None Seen /LPF

## 2022-02-11 LAB — BACTERIA UR CULT: ABNORMAL

## 2022-02-13 NOTE — TELEPHONE ENCOUNTER
PA Initiation    Medication: conjugated estrogens (PREMARIN) 0.625 MG/GM vaginal cream   Insurance Company: [employer] - Phone [phone] Fax [phone]  Pharmacy Filling the Rx: [facility] - [place] - [address]  Filling Pharmacy Phone: [phone]  Filling Pharmacy Fax: [phone]  Start Date: 2/12/2022

## 2022-02-13 NOTE — TELEPHONE ENCOUNTER
PRIOR AUTHORIZATION DENIED    Medication: conjugated estrogens (PREMARIN) 0.625 MG/GM vaginal cream--DENIED    Denial Date: 2/13/2022    Denial Rational: Patient needs to try and fail preferred medications:       Appeal Information:

## 2022-02-14 ENCOUNTER — TELEPHONE (OUTPATIENT)
Dept: FAMILY MEDICINE | Facility: CLINIC | Age: 72
End: 2022-02-14
Payer: COMMERCIAL

## 2022-02-14 DIAGNOSIS — B96.89 URINARY TRACT INFECTION DUE TO KLEBSIELLA SPECIES: Primary | ICD-10-CM

## 2022-02-14 DIAGNOSIS — N39.0 URINARY TRACT INFECTION DUE TO KLEBSIELLA SPECIES: Primary | ICD-10-CM

## 2022-02-14 NOTE — TELEPHONE ENCOUNTER
RN attempted patient's nursing station again, no answer so left additional message. Please transfer to any available RN when they call back.   Rosario Klein, RN

## 2022-02-14 NOTE — TELEPHONE ENCOUNTER
Writer reached out to patient's facility-3rd floor nurse was not available. LM with staff to call back to discuss results and medication changes.   Rosario Klein, RN

## 2022-02-14 NOTE — TELEPHONE ENCOUNTER
RN spoke to Pastora VILLA at 3rd floor- relayed message from Dr. Reynolds. She repeated back plan and will contact pharmacy now to get medication delivered ASAP.   Rosario Klein RN

## 2022-02-14 NOTE — TELEPHONE ENCOUNTER
Team,   Please call patient by calling Narendra Alexi and asking for the 3rd floor nurse - relay the following message:    Urine culture is growing a bacteria that is resistant to the antibiotic (nitrofurantoin) that I prescribed on 2/9. I have prescribed a different antibiotic  Plan:   - Stop the Nitrofurantoin   - Start Keflex 250mg 4 times daily for 7 days - sent to pharmacy.   - Return to clinic if symptoms not improving within 48 hours of starting Keflex  - Follow up with Dr. Thomas (Female Bladder/urine specialist) in July 2022    -Symone Reynolds MD

## 2022-02-15 ENCOUNTER — VIRTUAL VISIT (OUTPATIENT)
Dept: PSYCHOLOGY | Facility: CLINIC | Age: 72
End: 2022-02-15
Payer: COMMERCIAL

## 2022-02-15 DIAGNOSIS — F33.0 MAJOR DEPRESSIVE DISORDER, RECURRENT EPISODE, MILD (H): Primary | ICD-10-CM

## 2022-02-15 DIAGNOSIS — F41.1 GENERALIZED ANXIETY DISORDER: ICD-10-CM

## 2022-02-15 PROCEDURE — 90832 PSYTX W PT 30 MINUTES: CPT | Mod: 95 | Performed by: PSYCHOLOGIST

## 2022-02-15 NOTE — PROGRESS NOTES
Health Psychology                    Department of Medicine  Keri Arnett, Ph.D., L.P. [phone]                         [facility] Madelaine Ingram, Ph.D., L.P. [phone]                     [place] Mail Code 494   Dennis Dillard, Ph.D. [phone]      [address] [place] [address] Rae Eugene, Ph.D., A.B.P.P., L.P. [phone]              [place] [address]           Damon Gr, Ph.D., A.B.P.P., L.P. [phone]      Kimber Morales, Ph.D., L.P. [phone]  [facility]   Lilia Burroughs, Ph.D., A.B.P.P., L.P. 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 Today we used the video of her cell phone.  She and Chitra are getting better at setting it up.  Intake with Health Psychology was in the 1980s.  I began to see her in the early 1990s.    Mood: Her mood is variable.    She rates herself at a 3-4 on 10-point scale of depression.    Exercise:  She had been biking 7x/week for 20 minutes/day;  She was also walking about 20 minutes indoors.  We discussed a total of 1.5 hours per day  If possible.      Health:  She got her booster for COVID and flu shot. She had a UTI in December, and now has another.   She feels she reovered fully from COVID.  Her temperature gets screened daily.  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   She is getting physical therapy for  her shoulder arthritis.  It is going well. She will be starting with a new therapist on Thursday.   She injured it when she fell in October, 2021.  She had an injection which helps the shoulder.   It helped initially.  She thinks it has worn off.     COVIID She feels she recovered full from her COVID, and is hoping to avoid COVID.  At [facility], they are no longer fully  are now quarantined.  They still are 1  "at a table for meals.  All residents and staff have recovered from COVID. There were 4 deaths due to COVID at the beginning of the pandemic 1 in 50s, other in 40s.. One staff  towards the beginning..  They are not sure whether it was COVID-related. She knew all of them.    Weight: Her weight decreased to 216 from 223 last session.  We discussed approach to weight loss in terms of fewer calories despite lack of diet line, increasing exercise. She is reinforced for her efforts.  We discussed the \"damage control\" mindset for the holidays.     Recreation/Activities: Coloring, word search puzzles, read, tv, radio, exercise. 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     She participates fully, ventilated feelings appropriately.  She appears to derive benefit from the support.    Current Outpatient Medications   Medication     acetaminophen (TYLENOL) 500 MG tablet     alum & mag hydroxide-simethicone (MYLANTA/MAALOX) 200-200-20 MG/5ML SUSP suspension     amLODIPine (NORVASC) 10 MG tablet     apixaban ANTICOAGULANT (ELIQUIS) 5 MG tablet     artificial saliva (BIOTENE MT) AERS spray     atorvastatin (LIPITOR) 40 MG tablet     blood glucose (NO BRAND SPECIFIED) lancets standard     blood glucose calibration (NO BRAND SPECIFIED) solution     blood glucose monitoring (NO BRAND SPECIFIED) meter device kit     blood glucose monitoring (NO BRAND SPECIFIED) test strip     Calcium Carbonate-Vitamin D (CALCIUM-VITAMIN D) 250-125 MG-UNIT TABS     calcium polycarbophil (FIBERCON) 625 MG tablet     cephALEXin (KEFLEX) 250 MG capsule     CLARITIN 10 MG OR TABS     conjugated estrogens (PREMARIN) 0.625 MG/GM vaginal cream     erythromycin (ROMYCIN) 5 MG/GM ophthalmic ointment     FLUoxetine (PROZAC) 20 MG capsule     fluticasone (FLONASE) 50 MCG/ACT " nasal spray     furosemide (LASIX) 20 MG tablet     Gabapentin (NEURONTIN PO)     Hypromellose (ARTIFICIAL TEARS OP)     insulin glargine (LANTUS PEN) 100 UNIT/ML pen     lactase (LACTAID) 3000 UNIT tablet     levothyroxine (SYNTHROID/LEVOTHROID) 175 MCG tablet     lidocaine (XYLOCAINE) 2 % external gel     lifitegrast (XIIDRA) 5 % opthalmic solution     liraglutide (VICTOZA) 18 MG/3ML solution     losartan (COZAAR) 100 MG tablet     Magnesium Hydroxide (MILK OF MAGNESIA PO)     melatonin 3 MG CAPS     metFORMIN (GLUCOPHAGE) 1000 MG tablet     NEW MED     nystatin (MYCOSTATIN) 111629 UNIT/GM POWD     ONETOUCH DELICA LANCETS 33G MISC     polyethylene glycol (MIRALAX/GLYCOLAX) powder     Saline 0.9 % SOLN     senna-docusate (SENNA S) 8.6-50 MG per tablet     simethicone (MYLICON) 125 MG chewable tablet     Skin Protectants, Misc. (EUCERIN) cream     SM LUBRICANT EYE DROPS 0.4-0.3 % SOLN ophthalmic solution     solifenacin (VESICARE) 10 MG tablet     ziprasidone (GEODON) 40 MG capsule     No current facility-administered medications for this visit.     Wt Readings from Last 4 Encounters:   02/09/22 100.8 kg (222 lb 3.2 oz)   01/18/22 99.7 kg (219 lb 12.8 oz)   01/11/22 101.6 kg (224 lb)   01/07/22 103.2 kg (227 lb 9.6 oz)   There is no height or weight on file to calculate BMI.    This telephone service is appropriate and effective for delivering services in light of the necessity for social distancing to mitigate the COVID.bmi  -19 epidemic and for conservation of PPE.     Patient has agreed to receiving telephone services after being informed about it: Yes    Patient prefers video invitation/information to be sent by:   email    Time service started: 12:14  Time service ended:  12:51    Mode of transmission: Doxy following instructions  by telephone so as to reduce chance of being overheard.    Location of originating:  [facility]    Distance site:  Home office of provider for [facility]     The patient has been  notified that:  Video visits will be conducted via a call with their psychologist to provide the care they need with a video conversation. Video visits may be billed at different rates depending on insurance coverage.  Patients are advised to please contact their insurance provider with any questions about their health insurance coverage. If during the course of a call the psychologist feels a video visit is not appropriate, patients will not be charged for this service.    Diagnosis:   Major Depression, recurrent mild (F33.0)   Psychological Factors Affecting Morbid Obesity (F54)  Generalized anxiety (F41.1)       Plan: Return video telementalhealth visit 3/29 @ 1 due to the serious and persistent nature of her depression and anxiety, consistent with treatment plan.  Last treatment plan signed: 1/13/22  Treatment plan due: 1/13/23                  Preference for future meetings:           X   In-person, even if both wearing masks                Remote                 Either            Damon Gr, PhD LP,  A.B.P.P.  Director, Health Psychology  [phone]

## 2022-02-17 PROBLEM — R80.9 MICROALBUMINURIA DUE TO TYPE 2 DIABETES MELLITUS (H): Status: ACTIVE | Noted: 2017-03-24

## 2022-02-17 PROBLEM — E11.29 MICROALBUMINURIA DUE TO TYPE 2 DIABETES MELLITUS (H): Status: ACTIVE | Noted: 2017-03-24

## 2022-02-22 ENCOUNTER — OFFICE VISIT (OUTPATIENT)
Dept: DERMATOLOGY | Facility: CLINIC | Age: 72
End: 2022-02-22
Payer: COMMERCIAL

## 2022-02-22 VITALS — DIASTOLIC BLOOD PRESSURE: 79 MMHG | SYSTOLIC BLOOD PRESSURE: 174 MMHG | HEART RATE: 73 BPM

## 2022-02-22 DIAGNOSIS — D04.30 SQUAMOUS CELL CARCINOMA IN SITU OF SKIN OF FACE: Primary | ICD-10-CM

## 2022-02-22 PROCEDURE — 17311 MOHS 1 STAGE H/N/HF/G: CPT | Mod: GC | Performed by: DERMATOLOGY

## 2022-02-22 PROCEDURE — 13132 CMPLX RPR F/C/C/M/N/AX/G/H/F: CPT | Mod: GC | Performed by: DERMATOLOGY

## 2022-02-22 NOTE — LETTER
2/22/2022       RE: Pinky Page  [address] [facility]  [facility] [address]     Dear Colleague,    Thank you for referring your patient, Pinky Page, to the [facility] [place] at [facility]. Please see a copy of my visit note below.    [facility] Mohs Surgery Procedure Note    Derm Problem List  # SCCIS, left cheek. S/p Mohs and linear repair 2/22/22  ___________________________________________________  Case #: 1  Date of Service:  Feb 22, 2022  Surgery: Mohs micrographic surgery (MMS)  Staff surgeon: Kris Serna DO  Fellow surgeon: Miugel Byrne MD  Resident surgeon: Denise Shah MD  Nurse: Viridiana Bynum CMA    Tumor Type: SCC in situ  Location: Left cheek  Derm-Path Accession #: [id number]    Mohs Accession #:   Pre-Op Size: 0.5 cm x 0.5 cm  Final Defect Size: 1.2 cm x 1.1 cm  Number of Mohs stages: 1  Level of Defect: Fat  Local anesthetic: 3 mL 1% lidocaine with epinephrine  Repair Type: Complex repair  Repair Size: 3.3 cm  Suture Material: 5-0 Monocryl; 5-0 fast absorbing gut    Procedure:    Stage I  We discussed the principles of treatment and most likely complications including scarring, bleeding, infection, swelling, pain, crusting, nerve damage, large wound,  incomplete excision, wound dehiscence,  nerve damage, recurrence, and a second procedure may be recommended to obtain the best cosmetic or functional result.    Informed consent was obtained and the patient underwent the procedure as follows:  The patient was placed supine on the operating table.  The cancer was identified, outlined with a marker, and verified by the patient.  The entire surgical field was prepped with chlorhexidine.  The surgical site was anesthetized using lidocaine with epinephrine.    The area of clinically apparent tumor was debulked. The layer of tissue was then surgically excised using a #15 blade  and was then transferred onto a specimen sheet maintaining the orientation of the specimen. Hemostasis was obtained using electrocoagulation. The wound site was then covered with a dressing while the tissue samples were processed for examination.    The excised tissue was transported to the Mohs histology laboratory maintaining the tissue orientation.  The tissue specimen was relaxed so that the entire surgical margin was in a a single horizontal plane for sectioning and inked for precise mapping.  A precise reference map was drawn to reflect the sectioning of the specimen, colored inking of the margins, and orientation on the patient.  The tissue was processed using horizontal sectioning of the base and continuous peripheral margins.  The histopathologic sections were reviewed in conjunction with the reference map.    Total blocks: 1  Total slides: 2    There were no cancer cells visualized on examination, therefore Mohs surgery was complete.    REPAIR:     Due to one or more of the following factors, complex linear closure was required/indicated: Extensive undermining (equal to or greater than the maximum perpendicular width of the defect), exposure of underlying structure (bone, cartilage, tendon, named neurovascular), free margin involvement (helical rim, vermillion border, nostril rim), and/or placement of retention sutures.    After the excision of the tumor, the area was extensively and carefully undermined using tissue scissors. Hemostasis was obtained with spot electrocautery and ligation of vessels where necessary. Initial deep plication sutures of 5-0 Monocryl sutures were placed in the deep, subcutaneous, and fascial planes to appose the lateral margins. The subcutaneous and dermal layers were then closed with 5-0 Monocryl sutures. The epidermis was then carefully approximated along the length of the wound using 5-0 fast absorbing gut running sutures.     Estimated blood loss was less than 10 ml for all  surgical sites. A sterile pressure dressing was applied and wound care instructions, with a written handout, were given. The patient was discharged from the Dermatologic Surgery Center alert and ambulatory.    Kris Serna DO staffed the patient and was present for the key portions of the above procedure.       Dr. Miguel Byrne (Mohs micrographic surgery fellow) performed the Mohs micrographic surgery and reconstruction under the direct supervision of Kris Serna DO, who was present for the entire micrographic surgery and key portions of the reconstruction, and always immediately available.    Staff Physician Comments:   I saw and evaluated the patient with the Mohs Surgery Fellow (Dr. John Byrne) and I agree with the assessment and plan and the above description of the procedure. I was present for the key portions of the procedure and entire exam.    I was immediately available in the clinic throughout the procedures.     Kris Serna DO    Department of Dermatology  [facility]: Phone: [phone], Fax:[phone]  [facility]: Phone: [phone], Fax: [phone]    Care and Laboratory Testing Performed at:  [facility]   Clinics and Surgery Center - Dermatologic Surgery Clinic  [address] [place] [address]   Mail Code [place], [place] [address]

## 2022-02-22 NOTE — PROGRESS NOTES
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 2/22/22  ___________________________________________________  Case #: 1  Date of Service:  Feb 22, 2022  Surgery: Mohs micrographic surgery (MMS)  Staff surgeon: Kris Serna DO  Fellow surgeon: Miguel Byrne MD  Resident surgeon: Denise Shah MD  Nurse: Viridiana Bynum CMA    Tumor Type: SCC in situ  Location: Left cheek  Derm-Path Accession #: [id number]    Mohs Accession #:   Pre-Op Size: 0.5 cm x 0.5 cm  Final Defect Size: 1.2 cm x 1.1 cm  Number of Mohs stages: 1  Level of Defect: Fat  Local anesthetic: 3 mL 1% lidocaine with epinephrine  Repair Type: Complex repair  Repair Size: 3.3 cm  Suture Material: 5-0 Monocryl; 5-0 fast absorbing gut    Procedure:    Stage I  We discussed the principles of treatment and most likely complications including scarring, bleeding, infection, swelling, pain, crusting, nerve damage, large wound,  incomplete excision, wound dehiscence,  nerve damage, recurrence, and a second procedure may be recommended to obtain the best cosmetic or functional result.    Informed consent was obtained and the patient underwent the procedure as follows:  The patient was placed supine on the operating table.  The cancer was identified, outlined with a marker, and verified by the patient.  The entire surgical field was prepped with chlorhexidine.  The surgical site was anesthetized using lidocaine with epinephrine.    The area of clinically apparent tumor was debulked. The layer of tissue was then surgically excised using a #15 blade and was then transferred onto a specimen sheet maintaining the orientation of the specimen. Hemostasis was obtained using electrocoagulation. The wound site was then covered with a dressing while the tissue samples were processed for examination.    The excised tissue was transported to the Mohs histology laboratory maintaining the tissue  orientation.  The tissue specimen was relaxed so that the entire surgical margin was in a a single horizontal plane for sectioning and inked for precise mapping.  A precise reference map was drawn to reflect the sectioning of the specimen, colored inking of the margins, and orientation on the patient.  The tissue was processed using horizontal sectioning of the base and continuous peripheral margins.  The histopathologic sections were reviewed in conjunction with the reference map.    Total blocks: 1  Total slides: 2    There were no cancer cells visualized on examination, therefore Mohs surgery was complete.    REPAIR:     Due to one or more of the following factors, complex linear closure was required/indicated: Extensive undermining (equal to or greater than the maximum perpendicular width of the defect), exposure of underlying structure (bone, cartilage, tendon, named neurovascular), free margin involvement (helical rim, vermillion border, nostril rim), and/or placement of retention sutures.    After the excision of the tumor, the area was extensively and carefully undermined using tissue scissors. Hemostasis was obtained with spot electrocautery and ligation of vessels where necessary. Initial deep plication sutures of 5-0 Monocryl sutures were placed in the deep, subcutaneous, and fascial planes to appose the lateral margins. The subcutaneous and dermal layers were then closed with 5-0 Monocryl sutures. The epidermis was then carefully approximated along the length of the wound using 5-0 fast absorbing gut running sutures.     Estimated blood loss was less than 10 ml for all surgical sites. A sterile pressure dressing was applied and wound care instructions, with a written handout, were given. The patient was discharged from the Dermatologic Surgery Center alert and ambulatory.    Kris Serna DO staffed the patient and was present for the key portions of the above procedure.       Dr. Miguel Byrne (Mohs  micrographic surgery fellow) performed the Mohs micrographic surgery and reconstruction under the direct supervision of Kris Serna DO, who was present for the entire micrographic surgery and key portions of the reconstruction, and always immediately available.    Staff Physician Comments:   I saw and evaluated the patient with the Mohs Surgery Fellow (Dr. John Byrne) and I agree with the assessment and plan and the above description of the procedure. I was present for the key portions of the procedure and entire exam.    I was immediately available in the clinic throughout the procedures.     Kris Serna DO    Department of Dermatology  [facility]: Phone: [phone], Fax:[phone]  [facility]: Phone: [phone], Fax: [phone]    Care and Laboratory Testing Performed at:  [facility]   Clinics and Surgery Center - Dermatologic Surgery Clinic  [address] [place] [address]   Mail Code [place], [place] [address]

## 2022-02-22 NOTE — PATIENT INSTRUCTIONS
Wound Care Instructions  I will experience scar, altered skin color, bleeding, swelling, pain, crusting and redness. I may experience altered sensation. Risks are excessive bleeding, infection, muscle weakness, thick (hypertrophic or keloidal) scar, and recurrence,. A second procedure may be recommended to obtain the best cosmetic or functional result.  Possible complications of any surgical procedure are bleeding, infection, scarring, alteration in skin color and sensation, muscle weakness in the area, wound dehiscence or seperation, or recurrence of the lesion or disease. On occasion, after healing, a secondary procedure or revision may be recommended in order to obtain the best cosmetic or functional result.   After your surgery, a pressure bandage will be placed over the area that has sutures. This will help prevent bleeding.   For the First 48 hours After Surgery:  1. Leave the pressure bandage on and keep it dry. If it should come loose, you may retape it, but do not take it off.  2. Relax and take it easy. Do not do any vigorous exercise, heavy lifting, or bending forward. This could cause the wound to bleed.  3. Post-operative pain is usually mild. You may take 1000 mg of extra strength Tylenol every 6 hours as needed (do not take more than 4,000mg in one day). Alternate with 600mg of ibuprofen every 6 hours as well. Alternating Tylenol and Ibuprofen will be most effective for pain control. For instance, take 1000mg of Tylenol at 1pm, take 600mg of ibuprofen at 3pm, retake tylenol at 7pm (6 hours after last dose), reake ibuprofen at 9pm (6 hours after last dose). Avoid alcohol and vitamin E as these may increase your tendency to bleed.  4. You may put an ice pack around the bandaged area for 20 minutes every 2-3 hours. This may help reduce swelling, bruising, and pain. Make sure the ice pack is waterproof so that the pressure bandage does not get wet.   5. You may see a small amount of drainage or blood on  your pressure bandage. This is normal. However, if drainage or bleeding continues or saturates the bandage, you will need to apply firm pressure over the bandage with a washcloth for 15 minutes. If bleeding continues after applying pressure for 15 minutes then go to the nearest emergency room.  48 Hours After Surgery  Carefully remove the bandage and start daily wound care and dressing changes. You may also now shower and get the wound wet. Wash wound with a mild soap and water.  Use caution when washing the wound. Be gentle and do not let the forceful shower stream hit the wound directly.  PAT dry.  Daily Wound Care:  1. Wash wound with a mild soap and water.  Use caution when washing the wound, be gentle and do not let the forceful shower stream hit the wound directly.  2. PAT DRY.  3. Apply Vaseline (from a new container or tube) over the suture line with a Q-tip. It is very important to keep the wound continuously moist, as wounds heal best in a moist environment.  4.  Keep the site covered until sutures are removed, you can cover it with a Telfa (non-stick) dressing and tape or a band-aid.    5. If you are unable to keep wound covered, you must apply Vaseline every 2 - 3 hours (while awake) to ensure it is being kept moist for optimal healing. A dressing overnight is recommended to keep the area moist.   Call Us If:  1. You have pain that is not controlled with Tylenol.  2. You have signs or symptoms of an infection, such as: fever over 100 degrees F, redness, warmth, or foul-smelling or yellow/creamy drainage from the wound.  Who should I call with questions?    [facility]: [phone]     [facility]: [phone]    For urgent needs outside of business hours call the [facility] at [phone] and ask for the dermatology resident on call

## 2022-02-25 NOTE — PROGRESS NOTES
[facility]    OUTPATIENT Physical Therapy ORTHOPEDIC EVALUATION  PLAN OF TREATMENT FOR OUTPATIENT REHABILITATION  (COMPLETE FOR INITIAL CLAIMS ONLY)  Patient's Last Name, First Name, M.I.  YOB: 1950  KayleePinky  ROSELIA    Provider s Name:  [facility]   Medical Record No.  [id number]   Start of Care Date:  01/19/22   Onset Date:   01/11/22   Type:     _X__PT   ___OT Medical Diagnosis:    Encounter Diagnoses   Name Primary?     Chronic left shoulder pain      Rotator cuff injury, left, initial encounter         Treatment Diagnosis:  L Shoulder        Goals:     01/19/22 0500   Body Part   Goals listed below are for L Shoulder   Goal #1   Goal #1 sleeping   Previous Functional Level No restrictions   Performance Level No limitations   Current Functional Level 2-3 hours without sleep per night   Performance Level wakes up 1-2x/night due to pain   STG Target Performance 1-2 hours without sleep per night   Performance Level waking 1x/night due to pain, reduced pain (<3/10 when laying on left side)   Rationale to establish restorative sleep pattern   Due Date 02/18/22   LTG Target Performance Sleep through the night w/o meds   Performance level able to sleep on left side    Rationale to establish restorative sleep pattern   Due Date 04/05/22       Therapy Frequency:  1x weekly for 4 weeks, 2x/month for 2 months  Predicted Duration of Therapy Intervention:       Mona Obrien, PT                 I CERTIFY THE NEED FOR THESE SERVICES FURNISHED UNDER        THIS PLAN OF TREATMENT AND WHILE UNDER MY CARE     (Physician attestation of this document indicates review and certification of the therapy plan).                       Certification Date From:  01/19/22   Certification Date To:  04/18/22    Referring Provider:  Aster Olivia,     Initial Assessment        See  Epic Evaluation SOC Date: 01/19/22

## 2022-03-08 ENCOUNTER — OFFICE VISIT (OUTPATIENT)
Dept: DERMATOLOGY | Facility: CLINIC | Age: 72
End: 2022-03-08
Payer: COMMERCIAL

## 2022-03-08 DIAGNOSIS — Z86.007 HISTORY OF SQUAMOUS CELL CARCINOMA IN SITU OF SKIN: Primary | ICD-10-CM

## 2022-03-08 DIAGNOSIS — Z51.89 VISIT FOR WOUND CHECK: ICD-10-CM

## 2022-03-08 PROCEDURE — 99024 POSTOP FOLLOW-UP VISIT: CPT | Performed by: DERMATOLOGY

## 2022-03-08 ASSESSMENT — PAIN SCALES - GENERAL: PAINLEVEL: NO PAIN (0)

## 2022-03-08 NOTE — PROGRESS NOTES
[facility] Dermatology Note  Encounter Date: Mar 8, 2022  Office Visit     Derm Problem List  # SCCIS, left cheek. S/p Mohs and linear repair 2/22/22  ____________________________________________    Assessment & Plan:     #SCCIS, left cheek.  Status post Mohs and linear repair 2/22/2022.   Wound appears to be healing appropriately.  Okay to discontinue wound care.   Continue sun protection and sun avoidance.   I recommend the patient have full skin cancer screening in general dermatology sometime in the next 3 months.  Our  will call to make arrangements.     Staff:     Kris Serna DO    Department of Dermatology  [facility]: Phone: [phone], Fax:[phone]  [facility]: Phone: [phone], Fax: [phone]    ____________________________________________    CC: Derm Problem (Pinky states she does not have any problems or concerns. Has notes that need to filled out for the nurse )    HPI:  Ms. 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  She denies any pain or discomfort in the scar.  She denies eye dryness and irritation.  She denies any weeping or bleeding.     Patient is otherwise feeling well, without additional skin concerns.     Labs Reviewed:  N/A    Physical Exam:  Vitals: LMP  (LMP Unknown)   SKIN: Focused examination of left cheek was performed.  -Linear pink to skin colored sclerotic plaque with skin edges apposed.   - No other lesions of concern on areas examined.     Medications:  Current Outpatient Medications   Medication     acetaminophen (TYLENOL) 500 MG tablet     alum & mag hydroxide-simethicone (MYLANTA/MAALOX) 200-200-20 MG/5ML SUSP suspension     amLODIPine (NORVASC) 10 MG tablet     apixaban ANTICOAGULANT (ELIQUIS) 5 MG tablet      artificial saliva (BIOTENE MT) AERS spray     atorvastatin (LIPITOR) 40 MG tablet     blood glucose (NO BRAND SPECIFIED) lancets standard     blood glucose calibration (NO BRAND SPECIFIED) solution     blood glucose monitoring (NO BRAND SPECIFIED) meter device kit     blood glucose monitoring (NO BRAND SPECIFIED) test strip     Calcium Carbonate-Vitamin D (CALCIUM-VITAMIN D) 250-125 MG-UNIT TABS     calcium polycarbophil (FIBERCON) 625 MG tablet     CLARITIN 10 MG OR TABS     conjugated estrogens (PREMARIN) 0.625 MG/GM vaginal cream     erythromycin (ROMYCIN) 5 MG/GM ophthalmic ointment     FLUoxetine (PROZAC) 20 MG capsule     fluticasone (FLONASE) 50 MCG/ACT nasal spray     furosemide (LASIX) 20 MG tablet     Gabapentin (NEURONTIN PO)     Hypromellose (ARTIFICIAL TEARS OP)     insulin glargine (LANTUS PEN) 100 UNIT/ML pen     lactase (LACTAID) 3000 UNIT tablet     levothyroxine (SYNTHROID/LEVOTHROID) 175 MCG tablet     lidocaine (XYLOCAINE) 2 % external gel     lifitegrast (XIIDRA) 5 % opthalmic solution     liraglutide (VICTOZA) 18 MG/3ML solution     losartan (COZAAR) 100 MG tablet     Magnesium Hydroxide (MILK OF MAGNESIA PO)     melatonin 3 MG CAPS     metFORMIN (GLUCOPHAGE) 1000 MG tablet     NEW MED     nystatin (MYCOSTATIN) 165325 UNIT/GM POWD     ONETOUCH DELICA LANCETS 33G MISC     polyethylene glycol (MIRALAX/GLYCOLAX) powder     Saline 0.9 % SOLN     senna-docusate (SENNA S) 8.6-50 MG per tablet     simethicone (MYLICON) 125 MG chewable tablet     Skin Protectants, Misc. (EUCERIN) cream     SM LUBRICANT EYE DROPS 0.4-0.3 % SOLN ophthalmic solution     solifenacin (VESICARE) 10 MG tablet     ziprasidone (GEODON) 40 MG capsule     No current facility-administered medications for this visit.      Past Medical History:   Patient Active Problem List   Diagnosis     Allergic rhinitis due to pollen     Urge incontinence     Hypertension, essential     Cardiomegaly     Chronic constipation     Dry eye syndrome      Esophageal reflux     Exposure keratoconjunctivitis     DM ophthalmopathy (H)     Hypothyroidism     Senile cataract     ANUJ (obstructive sleep apnea)     Vaginal atrophy     Restless leg syndrome     Squamous blepharitis     Morbid obesity due to excess calories (H)     Personal history of breast cancer s/p L masectomy     Hypercholesteremia     Lives in group home     Extrapyramidal and movement disorder     CKD (chronic kidney disease) stage 2, GFR 60-89 ml/min     Solitary kidney, congenital     S/P hysterectomy     Impingement syndrome of right shoulder     Hypertriglyceridemia     Candidiasis of skin     Generalized anxiety disorder     Carpal tunnel syndrome of left wrist     Schizoaffective disorder, depressive type (H)     Major depressive disorder, recurrent episode, moderate (H)     Psychological factors affecting medical condition     Hx of psychiatric care     Nail complaint     Microalbuminuria due to type 2 diabetes mellitus (H)     Type 2 diabetes mellitus with microalbuminuria, with long-term current use of insulin (H)     Conjunctivitis of both eyes     Corneal erosion of both eyes     Spastic entropion, right     Foot callus     Paroxysmal atrial fibrillation (H)     Squamous cell carcinoma in situ of skin of face     Chronic left shoulder pain     Rotator cuff injury, left, initial encounter     Past Medical History:   Diagnosis Date     Allergic rhinitis due to pollen     seasonal allergies      Anisometropia and aniseikonia      BMI greater than 40      Cardiomegaly      Chronic constipation      Congenital absence of one kidney      Cramp of limb      Dermatophytosis of the body      Dry eye syndrome      Esophageal reflux      Essential hypertension, benign      Gastro-oesophageal reflux disease      Hemorrhoids      Hypermetropia      Hypothyroidism      Incomplete Emptying of Bladder      Lactose intolerance      Major depressive disorder, recurrent episode, moderate (H)      Malignant  neoplasm of breast (female), unspecified site     left mastectomy 1996      Mixed incontinence urge and stress (male)(female)      Moderate obstructive sleep apnea      Postmenopausal Atrophic Vaginitis      Presbyopia      Regular astigmatism      Restless leg syndrome      Senile nuclear sclerosis      Thyroid eye disease     mild     Tinnitus      Trigger finger (acquired)     right hand     Type II or unspecified type diabetes mellitus without mention of complication, not stated as uncontrolled     on insulin since April 2006

## 2022-03-08 NOTE — LETTER
3/8/2022       RE: Pinky Page  [address] [facility]  [place] [address]     Dear Colleague,    Thank you for referring your patient, Pinky Page, to the [facility] [place] at [facility]. Please see a copy of my visit note below.    [facility] Dermatology Note  Encounter Date: Mar 8, 2022  Office Visit     Derm Problem List  # SCCIS, left cheek. S/p Mohs and linear repair 2/22/22  ____________________________________________    Assessment & Plan:     #SCCIS, left cheek.  Status post Mohs and linear repair 2/22/2022.   Wound appears to be healing appropriately.  Okay to discontinue wound care.   Continue sun protection and sun avoidance.   I recommend the patient have full skin cancer screening in general dermatology sometime in the next 3 months.  Our  will call to make arrangements.     Staff:     Kris Serna DO    Department of Dermatology  [facility]: Phone: [phone], Fax:[phone]  [facility]: Phone: [phone], Fax: [phone]    ____________________________________________    CC: Derm Problem (Pinky states she does not have any problems or concerns. Has notes that need to filled out for the nurse )    HPI:  Ms. 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  She denies any pain or discomfort in the scar.  She denies eye dryness and irritation.  She denies any weeping or bleeding.     Patient is otherwise feeling well, without additional skin concerns.     Labs Reviewed:  N/A    Physical Exam:  Vitals: LMP  (LMP Unknown)   SKIN: Focused examination of left cheek was performed.  -Linear pink to skin colored sclerotic plaque with skin edges  apposed.   - No other lesions of concern on areas examined.     Medications:  Current Outpatient Medications   Medication     acetaminophen (TYLENOL) 500 MG tablet     alum & mag hydroxide-simethicone (MYLANTA/MAALOX) 200-200-20 MG/5ML SUSP suspension     amLODIPine (NORVASC) 10 MG tablet     apixaban ANTICOAGULANT (ELIQUIS) 5 MG tablet     artificial saliva (BIOTENE MT) AERS spray     atorvastatin (LIPITOR) 40 MG tablet     blood glucose (NO BRAND SPECIFIED) lancets standard     blood glucose calibration (NO BRAND SPECIFIED) solution     blood glucose monitoring (NO BRAND SPECIFIED) meter device kit     blood glucose monitoring (NO BRAND SPECIFIED) test strip     Calcium Carbonate-Vitamin D (CALCIUM-VITAMIN D) 250-125 MG-UNIT TABS     calcium polycarbophil (FIBERCON) 625 MG tablet     CLARITIN 10 MG OR TABS     conjugated estrogens (PREMARIN) 0.625 MG/GM vaginal cream     erythromycin (ROMYCIN) 5 MG/GM ophthalmic ointment     FLUoxetine (PROZAC) 20 MG capsule     fluticasone (FLONASE) 50 MCG/ACT nasal spray     furosemide (LASIX) 20 MG tablet     Gabapentin (NEURONTIN PO)     Hypromellose (ARTIFICIAL TEARS OP)     insulin glargine (LANTUS PEN) 100 UNIT/ML pen     lactase (LACTAID) 3000 UNIT tablet     levothyroxine (SYNTHROID/LEVOTHROID) 175 MCG tablet     lidocaine (XYLOCAINE) 2 % external gel     lifitegrast (XIIDRA) 5 % opthalmic solution     liraglutide (VICTOZA) 18 MG/3ML solution     losartan (COZAAR) 100 MG tablet     Magnesium Hydroxide (MILK OF MAGNESIA PO)     melatonin 3 MG CAPS     metFORMIN (GLUCOPHAGE) 1000 MG tablet     NEW MED     nystatin (MYCOSTATIN) 999974 UNIT/GM POWD     ONETOUCH DELICA LANCETS 33G MISC     polyethylene glycol (MIRALAX/GLYCOLAX) powder     Saline 0.9 % SOLN     senna-docusate (SENNA S) 8.6-50 MG per tablet     simethicone (MYLICON) 125 MG chewable tablet     Skin Protectants, Misc. (EUCERIN) cream     SM LUBRICANT EYE DROPS 0.4-0.3 % SOLN ophthalmic solution     solifenacin  (VESICARE) 10 MG tablet     ziprasidone (GEODON) 40 MG capsule     No current facility-administered medications for this visit.      Past Medical History:   Patient Active Problem List   Diagnosis     Allergic rhinitis due to pollen     Urge incontinence     Hypertension, essential     Cardiomegaly     Chronic constipation     Dry eye syndrome     Esophageal reflux     Exposure keratoconjunctivitis     DM ophthalmopathy (H)     Hypothyroidism     Senile cataract     ANUJ (obstructive sleep apnea)     Vaginal atrophy     Restless leg syndrome     Squamous blepharitis     Morbid obesity due to excess calories (H)     Personal history of breast cancer s/p L masectomy     Hypercholesteremia     Lives in group home     Extrapyramidal and movement disorder     CKD (chronic kidney disease) stage 2, GFR 60-89 ml/min     Solitary kidney, congenital     S/P hysterectomy     Impingement syndrome of right shoulder     Hypertriglyceridemia     Candidiasis of skin     Generalized anxiety disorder     Carpal tunnel syndrome of left wrist     Schizoaffective disorder, depressive type (H)     Major depressive disorder, recurrent episode, moderate (H)     Psychological factors affecting medical condition     Hx of psychiatric care     Nail complaint     Microalbuminuria due to type 2 diabetes mellitus (H)     Type 2 diabetes mellitus with microalbuminuria, with long-term current use of insulin (H)     Conjunctivitis of both eyes     Corneal erosion of both eyes     Spastic entropion, right     Foot callus     Paroxysmal atrial fibrillation (H)     Squamous cell carcinoma in situ of skin of face     Chronic left shoulder pain     Rotator cuff injury, left, initial encounter     Past Medical History:   Diagnosis Date     Allergic rhinitis due to pollen     seasonal allergies      Anisometropia and aniseikonia      BMI greater than 40      Cardiomegaly      Chronic constipation      Congenital absence of one kidney      Cramp of limb       Dermatophytosis of the body      Dry eye syndrome      Esophageal reflux      Essential hypertension, benign      Gastro-oesophageal reflux disease      Hemorrhoids      Hypermetropia      Hypothyroidism      Incomplete Emptying of Bladder      Lactose intolerance      Major depressive disorder, recurrent episode, moderate (H)      Malignant neoplasm of breast (female), unspecified site     left mastectomy 1996      Mixed incontinence urge and stress (male)(female)      Moderate obstructive sleep apnea      Postmenopausal Atrophic Vaginitis      Presbyopia      Regular astigmatism      Restless leg syndrome      Senile nuclear sclerosis      Thyroid eye disease     mild     Tinnitus      Trigger finger (acquired)     right hand     Type II or unspecified type diabetes mellitus without mention of complication, not stated as uncontrolled     on insulin since April 2006

## 2022-03-08 NOTE — NURSING NOTE
Dermatology Rooming Note    Pinky Page's goals for this visit include:   Chief Complaint   Patient presents with     Derm Problem     Pinky states she does not have any problems or concerns. Has notes that need to filled out for the nurse      Ej Valdovinos, RYNE

## 2022-03-11 ENCOUNTER — DOCUMENTATION ONLY (OUTPATIENT)
Dept: FAMILY MEDICINE | Facility: CLINIC | Age: 72
End: 2022-03-11
Payer: COMMERCIAL

## 2022-03-17 ENCOUNTER — THERAPY VISIT (OUTPATIENT)
Dept: PHYSICAL THERAPY | Facility: CLINIC | Age: 72
End: 2022-03-17
Payer: COMMERCIAL

## 2022-03-17 DIAGNOSIS — G89.29 CHRONIC LEFT SHOULDER PAIN: ICD-10-CM

## 2022-03-17 DIAGNOSIS — S46.002A ROTATOR CUFF INJURY, LEFT, INITIAL ENCOUNTER: ICD-10-CM

## 2022-03-17 DIAGNOSIS — M25.512 CHRONIC LEFT SHOULDER PAIN: ICD-10-CM

## 2022-03-17 PROCEDURE — 97110 THERAPEUTIC EXERCISES: CPT | Mod: GP

## 2022-03-17 PROCEDURE — 97530 THERAPEUTIC ACTIVITIES: CPT | Mod: GP

## 2022-03-28 ENCOUNTER — DOCUMENTATION ONLY (OUTPATIENT)
Dept: FAMILY MEDICINE | Facility: CLINIC | Age: 72
End: 2022-03-28
Payer: COMMERCIAL

## 2022-03-29 ENCOUNTER — VIRTUAL VISIT (OUTPATIENT)
Dept: PSYCHOLOGY | Facility: CLINIC | Age: 72
End: 2022-03-29
Payer: COMMERCIAL

## 2022-03-29 DIAGNOSIS — F33.0 MAJOR DEPRESSIVE DISORDER, RECURRENT EPISODE, MILD (H): Primary | ICD-10-CM

## 2022-03-29 DIAGNOSIS — F41.1 GENERALIZED ANXIETY DISORDER: ICD-10-CM

## 2022-03-29 DIAGNOSIS — F54 PSYCHOLOGICAL FACTORS AFFECTING MEDICAL CONDITION: ICD-10-CM

## 2022-03-29 DIAGNOSIS — E66.9 OBESITY, UNSPECIFIED OBESITY SEVERITY, UNSPECIFIED OBESITY TYPE: ICD-10-CM

## 2022-03-29 PROCEDURE — 90832 PSYTX W PT 30 MINUTES: CPT | Mod: 95 | Performed by: PSYCHOLOGIST

## 2022-03-29 NOTE — PROGRESS NOTES
"  Health Psychology                    Department of Medicine  Keri Arnett, Ph.D., L.P. [phone]                         [facility] Madelaine Ingram, Ph.D., L.P. [phone]                     [place] Mail [address]   Dennis Dillard, Ph.D. [phone]      [address] [place] [address] Rae Eugene, Ph.D., A.B.P.P., L.P. [phone]              [place] [address]           Damon Gr, Ph.D., A.B.P.P., L.P. [phone]      Kimber Morales, Ph.D., L.P. [phone]  [facility]   Lilia Burroughs, Ph.D., A.B.P.P., L.P. 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 Today we used the video of her cell phone.  She is getting better at setting it up with help from [place] staff.  Intake with Health Psychology was in the 1980s.  I began to see her in the early 1990s.    Mood: Her mood is \"so so\" better with Spring being here. .    She rates herself at a 3-4 on 10-point scale of depression.    Exercise:  She had been biking 7x/week for 20 minutes/day; some days 25 minutes.   She was also walking about 20 minutes indoors.  We discussed a total of 1.5 hours per day  If possible.  She realizes she should increase somewhat given that she is not consistently losing e weight.      Health:  She got her booster for COVID and flu shot. She had a UTI in December, and now has another.   She feels she reovered fully from COVID.   She is getting physical therapy for  her shoulder arthritis.  It is going well. She will be starting with a new therapist on Thursday.   She injured it when she fell in October, 2021.  She had an injection which helped the shoulder initially.  She thinks it has worn off.     [place] [facility] cell [place] in situ IS, left cheek. S/p Mohs " and linear repair 2/22/22.  She is dong well .    COVIID She feels she recovered full from her COVID, and is hoping to avoid COVID.  At [facility], they are no longer fully quarantined.  They still are 1 at a table for meals.  All residents and staff have recovered from COVID. There were 4 deaths due to COVID at the beginning of the pandemic 1 in 50s, other in 40s..    Weight: Her weight increased  to 217  from  216 last sasyrch puzzles, read, tv, radio, exercise.     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     She participates fully, ventilated feelings appropriately.  She appears to derive benefit from the support.  Ended early due to fire drill at [facility].     Current Outpatient Medications   Medication     acetaminophen (TYLENOL) 500 MG tablet     alum & mag hydroxide-simethicone (MYLANTA/MAALOX) 200-200-20 MG/5ML SUSP suspension     amLODIPine (NORVASC) 10 MG tablet     apixaban ANTICOAGULANT (ELIQUIS) 5 MG tablet     artificial saliva (BIOTENE MT) AERS spray     atorvastatin (LIPITOR) 40 MG tablet     blood glucose (NO BRAND SPECIFIED) lancets standard     blood glucose calibration (NO BRAND SPECIFIED) solution     blood glucose monitoring (NO BRAND SPECIFIED) meter device kit     blood glucose monitoring (NO BRAND SPECIFIED) test strip     Calcium Carbonate-Vitamin D (CALCIUM-VITAMIN D) 250-125 MG-UNIT TABS     calcium polycarbophil (FIBERCON) 625 MG tablet     CLARITIN 10 MG OR TABS     conjugated estrogens (PREMARIN) 0.625 MG/GM vaginal cream     erythromycin (ROMYCIN) 5 MG/GM ophthalmic ointment     FLUoxetine (PROZAC) 20 MG capsule     fluticasone (FLONASE) 50 MCG/ACT nasal spray     furosemide (LASIX) 20 MG tablet     Gabapentin (NEURONTIN PO)     Hypromellose (ARTIFICIAL TEARS OP)     insulin glargine (LANTUS PEN) 100  UNIT/ML pen     lactase (LACTAID) 3000 UNIT tablet     levothyroxine (SYNTHROID/LEVOTHROID) 175 MCG tablet     lidocaine (XYLOCAINE) 2 % external gel     lifitegrast (XIIDRA) 5 % opthalmic solution     liraglutide (VICTOZA) 18 MG/3ML solution     losartan (COZAAR) 100 MG tablet     Magnesium Hydroxide (MILK OF MAGNESIA PO)     melatonin 3 MG CAPS     metFORMIN (GLUCOPHAGE) 1000 MG tablet     NEW MED     nystatin (MYCOSTATIN) 389780 UNIT/GM POWD     ONETOUCH DELICA LANCETS 33G MISC     polyethylene glycol (MIRALAX/GLYCOLAX) powder     Saline 0.9 % SOLN     senna-docusate (SENNA S) 8.6-50 MG per tablet     simethicone (MYLICON) 125 MG chewable tablet     Skin Protectants, Misc. (EUCERIN) cream     SM LUBRICANT EYE DROPS 0.4-0.3 % SOLN ophthalmic solution     solifenacin (VESICARE) 10 MG tablet     ziprasidone (GEODON) 40 MG capsule     No current facility-administered medications for this visit.     Wt Readings from Last 4 Encounters:   02/09/22 100.8 kg (222 lb 3.2 oz)   01/18/22 99.7 kg (219 lb 12.8 oz)   01/11/22 101.6 kg (224 lb)   01/07/22 103.2 kg (227 lb 9.6 oz)   There is no height or weight on file to calculate BMI.    This telephone service is appropriate and effective for delivering services in light of the necessity for social distancing to mitigate the COVID.bmi  -19 epidemic and for conservation of PPE.     Patient has agreed to receiving telephone services after being informed about it: Yes    Patient prefers video invitation/information to be sent by:   email    Time service started: 12:00  Time service ended:  1:30  Interrupted by fire drill    Mode of transmission: Doxy following instructions  by telephone so as to reduce chance of being overheard.    Location of originating:  [facility]    Distance site:  Home office of provider for [facility]     The patient has been notified that:  Video visits will be conducted via a call with their psychologist to provide the care they need with a video  conversation. Video visits may be billed at different rates depending on insurance coverage.  Patients are advised to please contact their insurance provider with any questions about their health insurance coverage. If during the course of a call the psychologist feels a video visit is not appropriate, patients will not be charged for this service.    Diagnosis:   Major Depression, recurrent mild (F33.0)   Psychological Factors Affecting Morbid Obesity (F54)  Generalized anxiety (F41.1)       Plan: Return for psychotherapy visit in-person/masked 4/29 @ 2 due to the serious and persistent nature of her depression and anxiety, consistent with treatment plan.  Last treatment plan signed: 1/13/22  Treatment plan due: 1/13/23                  Preference for future meetings:           X   In-person, even if both wearing masks                Remote                 Either          Damon Gr, PhD LP,  A.B.P.P.  Director, Health Psychology  [phone]

## 2022-03-31 ENCOUNTER — THERAPY VISIT (OUTPATIENT)
Dept: PHYSICAL THERAPY | Facility: CLINIC | Age: 72
End: 2022-03-31
Payer: COMMERCIAL

## 2022-03-31 DIAGNOSIS — S46.002A ROTATOR CUFF INJURY, LEFT, INITIAL ENCOUNTER: ICD-10-CM

## 2022-03-31 DIAGNOSIS — G89.29 CHRONIC LEFT SHOULDER PAIN: ICD-10-CM

## 2022-03-31 DIAGNOSIS — M25.512 CHRONIC LEFT SHOULDER PAIN: ICD-10-CM

## 2022-03-31 PROCEDURE — 97110 THERAPEUTIC EXERCISES: CPT | Mod: GP

## 2022-03-31 PROCEDURE — 97530 THERAPEUTIC ACTIVITIES: CPT | Mod: GP

## 2022-04-01 ENCOUNTER — TELEPHONE (OUTPATIENT)
Dept: FAMILY MEDICINE | Facility: CLINIC | Age: 72
End: 2022-04-01
Payer: COMMERCIAL

## 2022-04-01 DIAGNOSIS — R80.9 TYPE 2 DIABETES MELLITUS WITH MICROALBUMINURIA, WITH LONG-TERM CURRENT USE OF INSULIN (H): ICD-10-CM

## 2022-04-01 DIAGNOSIS — E11.29 TYPE 2 DIABETES MELLITUS WITH MICROALBUMINURIA, WITH LONG-TERM CURRENT USE OF INSULIN (H): ICD-10-CM

## 2022-04-01 DIAGNOSIS — Z79.4 TYPE 2 DIABETES MELLITUS WITH MICROALBUMINURIA, WITH LONG-TERM CURRENT USE OF INSULIN (H): ICD-10-CM

## 2022-04-01 NOTE — TELEPHONE ENCOUNTER
Would recommend decreasing lantus to 10 units for now to prevent fasting hypoglycemia. Consider switching to oral options at visit on 4/12. Bring blood sugar log with to visit.  -Dr Renner

## 2022-04-01 NOTE — TELEPHONE ENCOUNTER
"RN spoke to Maya VILLA as Chitra was with a patient. Reports patient's morning BG have been lower over the last month with at least 3 instances of <70- specifically 3/24 was 66. States overall for the month of March her am sugars have been \"lower\" and usually less than 100. States yesterday it was 122 however, today it was 78 so they held her morning victoza.     States patient is asymptomatic with lows. Denies any recent illness or sickness. Confirms she is currently getting 20 units of Lantus at bedtime and 1.8 mg of victoza every morning.     Patient has follow up with PCP on 4/12/22 but they are wondering if provider wants to make changes before then. Would like call back with plan. Routing high priority to Dr. Goel and Pharm D to advise.     RN also asked them to fax copy of BG record to [facility] fax at [phone] for provider to review- will place in mailbox once this is received.   Rosario Klein RN    "

## 2022-04-01 NOTE — TELEPHONE ENCOUNTER
RN did receive fax from [facility] with patient's BG readings for March. Difficult to read as they are hand written but appears that mornings sugars running  for month of March. Did note the following <70 readings:    3/20 61 recheck 102  3/23 63  3/24 66  3/28 56 with recheck 126    Will place record in Dr. Goel's mailbox.   Will also route to preceptor now to advise as PCP not in clinic.   Rosario Klein RN

## 2022-04-01 NOTE — TELEPHONE ENCOUNTER
RN attempted to reach Chitra to discuss, LM with call back number. Please transfer to any available RN when they call back.   Rosario Klein, ALLEN      RN attempted to find fax but it was not in provider's folder or mailbox. Would like to gather more specific information and confirm current insulin dosing.

## 2022-04-01 NOTE — TELEPHONE ENCOUNTER
[facility] phone call message-patient reporting a symptom:     Symptom:     Last week noticed that 6am blood sugars were low, ranging from , 8x in March that is was below 100 at the early AM blood sugar check    RN is wondering if a change in insulin is warranted    Fax was sent last week with this data    Same Day Visit Offered: Yes, declined    Additional comments: Calling from [facility], Chitra Carrington, RN   Phone: [phone]- nurse line for the floor that Pinky lives on    OK to leave message on voice mail? Yes    Primary language: English      needed? No    Call taken on April 1, 2022 at 9:57 AM by JACKELIN Anderson

## 2022-04-01 NOTE — TELEPHONE ENCOUNTER
RN spoke to Felipe VILLA at [facility] who took over for Gloria- relayed message below from Dr. Renner. He repeated back to me the plan to decrease lantus to 10 units at bedtime until patient's follow up on 4/12/22. Also reminded them to have patient bring her BG logs to this appointment.     Felipe requesting we fax updated order to them at [phone]. RN printed order and faxed as requested. Received fax confirmation at [phone].   Rosario Klein RN

## 2022-04-12 ENCOUNTER — OFFICE VISIT (OUTPATIENT)
Dept: FAMILY MEDICINE | Facility: CLINIC | Age: 72
End: 2022-04-12
Payer: COMMERCIAL

## 2022-04-12 VITALS
WEIGHT: 219.4 LBS | HEART RATE: 75 BPM | RESPIRATION RATE: 16 BRPM | TEMPERATURE: 98.1 F | DIASTOLIC BLOOD PRESSURE: 78 MMHG | OXYGEN SATURATION: 97 % | BODY MASS INDEX: 37.46 KG/M2 | SYSTOLIC BLOOD PRESSURE: 121 MMHG | HEIGHT: 64 IN

## 2022-04-12 DIAGNOSIS — R80.9 TYPE 2 DIABETES MELLITUS WITH MICROALBUMINURIA, WITH LONG-TERM CURRENT USE OF INSULIN (H): ICD-10-CM

## 2022-04-12 DIAGNOSIS — E03.8 OTHER SPECIFIED HYPOTHYROIDISM: ICD-10-CM

## 2022-04-12 DIAGNOSIS — Z12.11 COLON CANCER SCREENING: ICD-10-CM

## 2022-04-12 DIAGNOSIS — E78.1 HYPERTRIGLYCERIDEMIA: ICD-10-CM

## 2022-04-12 DIAGNOSIS — Z79.4 TYPE 2 DIABETES MELLITUS WITH MICROALBUMINURIA, WITH LONG-TERM CURRENT USE OF INSULIN (H): ICD-10-CM

## 2022-04-12 DIAGNOSIS — E11.29 TYPE 2 DIABETES MELLITUS WITH MICROALBUMINURIA, WITH LONG-TERM CURRENT USE OF INSULIN (H): ICD-10-CM

## 2022-04-12 DIAGNOSIS — Z00.00 ENCOUNTER FOR ANNUAL WELLNESS EXAM IN MEDICARE PATIENT: Primary | ICD-10-CM

## 2022-04-12 LAB
CHOLEST SERPL-MCNC: 117 MG/DL
CREAT UR-MCNC: 31 MG/DL
FASTING STATUS PATIENT QL REPORTED: ABNORMAL
HBA1C MFR BLD: 7.4 % (ref 0–5.6)
HDLC SERPL-MCNC: 48 MG/DL
LDLC SERPL CALC-MCNC: 55 MG/DL
MICROALBUMIN UR-MCNC: <5 MG/L
MICROALBUMIN/CREAT UR: NORMAL MG/G{CREAT}
NONHDLC SERPL-MCNC: 69 MG/DL
TRIGL SERPL-MCNC: 72 MG/DL
TSH SERPL DL<=0.005 MIU/L-ACNC: 0.61 MU/L (ref 0.4–4)

## 2022-04-12 PROCEDURE — 84443 ASSAY THYROID STIM HORMONE: CPT | Performed by: STUDENT IN AN ORGANIZED HEALTH CARE EDUCATION/TRAINING PROGRAM

## 2022-04-12 PROCEDURE — 83036 HEMOGLOBIN GLYCOSYLATED A1C: CPT | Performed by: STUDENT IN AN ORGANIZED HEALTH CARE EDUCATION/TRAINING PROGRAM

## 2022-04-12 PROCEDURE — 36415 COLL VENOUS BLD VENIPUNCTURE: CPT | Performed by: STUDENT IN AN ORGANIZED HEALTH CARE EDUCATION/TRAINING PROGRAM

## 2022-04-12 PROCEDURE — 99397 PER PM REEVAL EST PAT 65+ YR: CPT | Mod: GC | Performed by: STUDENT IN AN ORGANIZED HEALTH CARE EDUCATION/TRAINING PROGRAM

## 2022-04-12 PROCEDURE — 82043 UR ALBUMIN QUANTITATIVE: CPT | Performed by: STUDENT IN AN ORGANIZED HEALTH CARE EDUCATION/TRAINING PROGRAM

## 2022-04-12 PROCEDURE — 80061 LIPID PANEL: CPT | Performed by: STUDENT IN AN ORGANIZED HEALTH CARE EDUCATION/TRAINING PROGRAM

## 2022-04-12 RX ORDER — GLIPIZIDE 10 MG/1
TABLET ORAL
COMMUNITY
Start: 2022-03-30 | End: 2022-09-14

## 2022-04-12 NOTE — PROGRESS NOTES
Preceptor Attestation:    I discussed the patient with the resident and evaluated the patient in person. I have verified the content of the note, which accurately reflects my assessment of the patient and the plan of care.   Supervising Physician:  Nidia Sampson DO.

## 2022-04-12 NOTE — PATIENT INSTRUCTIONS
Patient Education   Here is the plan from today's visit    1. Other specified hypothyroidism  Labs today   - TSH with free T4 reflex; Future    2. Microalbuminuria due to type 2 diabetes mellitus (H)  Labs today     3. Type 2 diabetes mellitus with microalbuminuria, with long-term current use of insulin (H)  Your blood sugars have been fairly high in the evenings since the switch so we will increase your Lantus to 15 units at bedtime. We are also getting labs today.   - Lipid panel; Future  - Hemoglobin A1c; Future  - Albumin Random Urine Quantitative with Creat Ratio; Future  - insulin glargine (LANTUS PEN) 100 UNIT/ML pen; Inject 15 Units Subcutaneous At Bedtime  Dispense: 8 mL; Refill: 0    4. Hypertriglyceridemia  Labs today.   - Lipid panel; Future    5. Colon cancer screening  Referral placed today.   - Adult Gastro Ref - Procedure Only; Future    6. Encounter for annual wellness exam in Medicare patient  Get your hearing tested after issues with screening today.   - Adult Audiology Referral; Future          Please call or return to clinic if your symptoms don't go away.    Follow up plan  No follow-ups on file.    Thank you for coming to [facility] today.  Lab Testing:  **If you had lab testing today and your results are reassuring or normal they will be mailed to you or sent through [employer] e-solution within 7 days.   **If the lab tests need quick action we will call you with the results.  **If you are having labs done on a different day, please call [phone] to schedule at [facility] or [phone] for other [facility] locations. Labs do not offer walk-in appointments.  The phone number we will call with results is # [phone] (home) . If this is not the best number please call our clinic and change the number.  Medication Refills:  If you need any refills please call your pharmacy and they will contact us.   If you need to  your refill at a new pharmacy, please contact the  new pharmacy directly. The new pharmacy will help you get your medications transferred faster.   Scheduling:  If you have any concerns about today's visit or wish to schedule another appointment please call our office during normal business hours [phone] (8-5:00 M-F)  If a referral was made to an [facility] specialty provider and you do not get a call from central scheduling, please refer to directions on your visit summary or call our office during normal business hours for assistance.   If a Mammogram was ordered for you at the [facility] call [phone] to schedule or change your appointment.  If you had an XRay/CT/Ultrasound/MRI ordered the number is [phone] to schedule or change your radiology appointment.   [facility] has limited ultrasound appointments available on Wednesdays, if you would like your ultrasound at [facility], please call [phone] to schedule.   Medical Concerns:  If you have urgent medical concerns please call [phone] at any time of the day.    Tonia Goel MD       If you had an XRay/CT/Ultrasound/MRI ordered the number is [phone] to schedule or change your radiology appointment.   Medical Concerns:  If you have urgent medical concerns please call [phone] at any time of the day.    Tonia Goel MD  [facility] PERSONAL PREVENTIVE SERVICES PLAN - SERVICES     Review these tests with your doctor then decide which ones you want and take this page home for your reference  Immunization History   Administered Date(s) Administered    COVID-19,PF,Moderna 01/06/2021, 02/03/2021    COVID-19,PF,Pfizer (12+ Yrs) 11/11/2021    DTaP, Unspecified 03/13/2016    Flu 65+ Years 09/27/2016    Flu, Unspecified 10/30/1998    HepB-Adult 11/07/2019, 12/05/2019, 05/11/2021    Influenza (H1N1) 01/11/2010    Influenza (High Dose) 3 valent vaccine 09/16/2015, 10/16/2017    Influenza (IIV3) PF 10/21/2002, 11/10/2004, 11/04/2005, 11/02/2006, 11/01/2007,  10/22/2008, 09/01/2009, 10/05/2010, 10/13/2011, 09/18/2012, 09/30/2013    Influenza Vaccine Im 4yrs+ 4 Valent CCIIV4 09/21/2021    Influenza Vaccine, 6+MO IM (QUADRIVALENT W/PRESERVATIVES) 09/27/2018, 09/19/2019    Mantoux Tuberculin Skin Test 08/02/2002, 08/02/2002, 07/22/2003, 07/22/2003, 07/16/2004, 07/16/2004, 07/28/2005, 07/28/2005, 09/18/2011, 09/18/2012, 08/21/2014, 09/29/2015, 10/16/2017    Pneumo Conj 13-V (2010&after) 09/27/2016    Pneumococcal 23 valent 10/14/2005, 05/28/2015    TD (ADULT, 7+) 06/15/2004    TDAP Vaccine (Boostrix) 06/10/2014    Td (Adult), Adsorbed 06/15/2004    Tdap (Adacel,Boostrix) 03/13/2016, 05/11/2021    Zoster vaccine recombinant adjuvanted (SHINGRIX) 09/27/2018, 03/13/2019          IMMUNIZATIONS Description Recommend today?     Influenza (flu shot) Helps to prevent flu; you should get it every year No; is up to date.   PCV 13 Prevents pneumonia; given once No; is up to date.   PPSV 23 Prevents pneumonia; given once No; is up to date.   Tetanus Prevents tetanus; need every 10 years No; is up to date.   Herpes Zoster (shingles) Prevents or lessens symptoms from shingles; given once No; is up to date.   Hepatitis B  If you have any of the following risk factors you should be immunized for hepatitis B: severe kidney disease, people who live in the same house as a carrier of Hepatitis B virus, people who live in  institutions (e.g. nursing homes or group homes), homosexual men, patients with hemophilia who received Factor VIII or IX concentrates, abusers of illicit injectable drugs No; is up to date.           Health Maintenance   Topic Date Due    DEPRESSION ACTION PLAN  Never done    COLORECTAL CANCER SCREENING  07/07/2021    PHQ-9  02/11/2022    LIPID  03/16/2022    A1C  03/22/2022    MICROALBUMIN  03/23/2022    TSH W/FREE T4 REFLEX  03/16/2022    EYE EXAM  07/13/2022    FALL RISK ASSESSMENT  07/13/2022    BMP  10/08/2022    DIABETIC FOOT EXAM  12/27/2022    MEDICARE ANNUAL WELLNESS  VISIT  04/12/2023    MAMMO SCREENING  06/03/2023    ADVANCE CARE PLANNING  09/02/2026    DTAP/TDAP/TD IMMUNIZATION (4 - Td or Tdap) 05/11/2031    DEXA  06/02/2036    HEPATITIS C SCREENING  Completed    INFLUENZA VACCINE  Completed    Pneumococcal Vaccine: 65+ Years  Completed    URINALYSIS  Completed    ZOSTER IMMUNIZATION  Completed    COVID-19 Vaccine  Completed    IPV IMMUNIZATION  Aged Out    MENINGITIS IMMUNIZATION  Aged Out       SCREENING TESTS     Description   Year of Last Screening   Recommended Today?   Heart disease screening blood tests  Cholesterol level Reducing cholesterol can reduce your risk of heart attacks by 25%.  Screening is recommended yearly if you are at risk of heart disease otherwise every 4-5 years  Yes; Recommended and ordered.   Diabetes screening tests  Hemoglobin A1c blood test   Finding and treating diabetes early can reduce complications.  Screening recommended/covered yearly if you have high blood pressure, high cholesterol, obesity (BMI >30), or a history of high blood glucose tests; or 2 of the following: family history of diabetes, overweight (BMI >25 but <30), age 65 years or older, and a history of diabetes of pregnancy or gave birth to baby weighing more than 9 lbs.  Yes; Recommended and ordered.   Hepatitis B screening Finding hepatitis B early can reduce complications.  Screening is recommended for persons with selected risk factors.  No; is up to date.   Hepatitis C screening Finding hepatitis C early can reduce complications.  Screening is recommended for all persons born from 1945 through 1965 and for those with selected other risk factors.   No; is up to date.   HIV screening Finding HIV early can reduce complications.  Screening is recommended for persons with risk factors for HIV infection.  No; is up to date.   Glaucoma screening Early detection of glaucoma can prevent blindness.   Please talk to your eye doctor about this.         SCREENING TESTS     Description    Year of Last Screening   Recommended Today?   Colorectal cancer screening  Fecal occult blood test   Screening colonoscopy Screening for colon cancer has been shown to reduce death from colon cancer by 25-30%. Screening recommended to start at 50 years and continuing until age 75 years.    Yes; Recommended and ordered.   Breast Cancer Screening (women)  Mammogram Mammogram screening for breast cancer has been shown to reduce the risk of dying from breast cancer and prolong life. Screening is recommended every 1-2 years for women aged 50 to 74 years.   No: up to date   Cervical Cancer screening (women)  Pap Cervical pap smears can reduce cervical cancer. Screening is recommended annually if high risk (history of abnormal pap smears) otherwise every 2-3 years, stop screening at 65 years of age if history of normal paps.  No: is not indicated today.   Screening for Osteoporosis:  Bone mass measurements (women)  Dexa Scan Screening and treating Osteoporosis can reduce the risk of hip and spine fractures. Screening is recommended in women 65 years or older and in women and men at risk of osteoporosis.  No; is up to date.   Screening for Lung Cancer   Low-dose CT scanning Screening can reduce mortality in persons aged 55-80 who have smoked at least 30 pack-years and who are either still smoking or have quit in the past 15 years.  No: is not indicated today.   Abdominal Aortic Aneurysm (AAA) screening  Ultrasound (US)   An aneurysm treated before rupture is very safe -a ruptured aneurysm can be fatal.  Screening  by US for AAA is limited to patients who meet one of the following criteria:  Men who are 65-75 years old and have smoked more than 100 cigarettes in their lifetime  Anyone with a family history of abdominal aortic aneurysm  No: is not indicated today.       MEDICARE WELLNESS EXAM PATIENT INSTRUCTIONS    Yearly exam:   See your health care provider every year in order to review changes in your health, review  medicines that you take, and discuss preventive care needs such as immunizations and cancer screening.  Get a flu shot each year.     Advance Directive:  If you have not done so, you are encouraged to complete an advance directive. If you would like support with this, please contact the clinic  through the main clinic line. More information about advance directives can be found at:   [email or web address]    Nutrition:   Eat at least 5 servings of fruits and vegetables each day.   Eat whole-grain bread, whole-wheat pasta and brown rice instead of white grains and rice.   Talk to your doctor about Calcium and Vitamin D.     Lifestyle:  Exercise for at least 150 minutes a week (30 minutes a day, 5 days a week). This will help you control your weight and prevent disease.   Limit alcohol to one drink per day.   If you smoke, try to quit - your doctor will be happy to help.   Wear sunscreen to prevent skin cancer.   See your dentist every six months for an exam and cleaning.   See your eye doctor every 1 to 2 years to screen for conditions such as glaucoma, macular degeneration and cataracts.

## 2022-04-12 NOTE — LETTER
April 13, 2022      Pinky Page  [address] [facility] [address]        Dear Pinky,    Thank you for getting your care at [facility]. Please see below for your test results.    Resulted Orders   Lipid panel   Result Value Ref Range    Cholesterol 117 <200 mg/dL    Triglycerides 72 <150 mg/dL    Direct Measure HDL 48 (L) >=50 mg/dL    LDL Cholesterol Calculated 55 <=100 mg/dL    Non HDL Cholesterol 69 <130 mg/dL    Patient Fasting > 8hrs? Unknown     Narrative    Cholesterol  Desirable:  <200 mg/dL    Triglycerides  Normal:  Less than 150 mg/dL  Borderline High:  150-199 mg/dL  High:  200-499 mg/dL  Very High:  Greater than or equal to 500 mg/dL    Direct Measure HDL  Female:  Greater than or equal to 50 mg/dL   Male:  Greater than or equal to 40 mg/dL    LDL Cholesterol  Desirable:  <100mg/dL  Above Desirable:  100-129 mg/dL   Borderline High:  130-159 mg/dL   High:  160-189 mg/dL   Very High:  >= 190 mg/dL    Non HDL Cholesterol  Desirable:  130 mg/dL  Above Desirable:  130-159 mg/dL  Borderline High:  160-189 mg/dL  High:  190-219 mg/dL  Very High:  Greater than or equal to 220 mg/dL   Hemoglobin A1c   Result Value Ref Range    Hemoglobin A1C 7.4 (H) 0.0 - 5.6 %      Comment:      Normal <5.7%   Prediabetes 5.7-6.4%    Diabetes 6.5% or higher     Note: Adopted from ADA consensus guidelines.   TSH with free T4 reflex   Result Value Ref Range    TSH 0.61 0.40 - 4.00 mU/L   Albumin Random Urine Quantitative with Creat Ratio   Result Value Ref Range    Creatinine Urine mg/dL 31 mg/dL    Albumin Urine mg/L <5 mg/L    Albumin Urine mg/g Cr        Comment:      Unable to calculate:  Urine creatinine or albumin value below detectable level       Overall your labs look great! Your A1c is reasonable for someone your age, with a goal of <8 and all your other labs look excellent. If you have any concerns about these results please call and leave a message for me or send a [employer] message to  the clinic.    Sincerely,    Tonia Goel MD

## 2022-04-12 NOTE — PROGRESS NOTES
>64 year old Wellness Visit ( Medicare etc)         HPI       This 71 year old female presents as an established patient  Dr. Goel who presents for a  Annual Wellness Exam.    Other issues patient wants to address today:    none  Diabetes Follow-up    Patient is checking blood sugars: twice daily.    Blood sugar testing frequency justification: On insulin, frequency appropriate  and Adjustment of medication(s)  Results are as follows:         am -          bedtime -          -Last A1C was   Lab Results   Component Value Date    A1C 7.8 03/16/2021    A1C 6.6 09/16/2020    A1C 6.6 03/02/2020    A1C 6.6 08/06/2019    A1C 6.8 05/07/2019        Diabetic concerns: blood sugar frequently over 200 as trying to adjust insulin dose    Chest Pain or exercise related calf pain (claudication):no     Symptoms of hypoglycemia (low blood sugar): none     Paresthesias (numbness or burning in feet) or sores: No     Diabetic eye exam within the last year?: Yes - Seeing Dr. Lopez      Taking metformin, Victoza, and Lantus 10 units at bedtime   Adherence and Exercise  Medication side effects: no  How often is a medication missed? Never  Exercise: walking and riding stationary bike 6-7 days/week for an average of 30-45 minutes       Visual Acuity (while wearing corrective lenses):    Right Eye: 10/16   Left Eye: 10/16  Both Eyes: 10/16    Patient Active Problem List   Diagnosis     Allergic rhinitis due to pollen     Urge incontinence     Hypertension, essential     Cardiomegaly     Chronic constipation     Dry eye syndrome     Esophageal reflux     Exposure keratoconjunctivitis     DM ophthalmopathy (H)     Hypothyroidism     Senile cataract     ANUJ (obstructive sleep apnea)     Vaginal atrophy     Restless leg syndrome     Squamous blepharitis     Morbid obesity due to excess calories (H)     Personal history of breast cancer s/p L masectomy     Hypercholesteremia     Lives in group home     Extrapyramidal and movement  disorder     CKD (chronic kidney disease) stage 2, GFR 60-89 ml/min     Solitary kidney, congenital     S/P hysterectomy     Impingement syndrome of right shoulder     Hypertriglyceridemia     Candidiasis of skin     Generalized anxiety disorder     Carpal tunnel syndrome of left wrist     Schizoaffective disorder, depressive type (H)     Major depressive disorder, recurrent episode, moderate (H)     Psychological factors affecting medical condition     Hx of psychiatric care     Nail complaint     Microalbuminuria due to type 2 diabetes mellitus (H)     Type 2 diabetes mellitus with microalbuminuria, with long-term current use of insulin (H)     Conjunctivitis of both eyes     Corneal erosion of both eyes     Spastic entropion, right     Foot callus     Paroxysmal atrial fibrillation (H)     Squamous cell carcinoma in situ of skin of face     Chronic left shoulder pain     Rotator cuff injury, left, initial encounter       Past Medical History:   Diagnosis Date     Allergic rhinitis due to pollen     seasonal allergies      Anisometropia and aniseikonia      BMI greater than 40      Cardiomegaly      Chronic constipation      Congenital absence of one kidney      Cramp of limb      Dermatophytosis of the body      Dry eye syndrome      Esophageal reflux      Essential hypertension, benign      Gastro-oesophageal reflux disease      Hemorrhoids      Hypermetropia      Hypothyroidism      Incomplete Emptying of Bladder      Lactose intolerance      Major depressive disorder, recurrent episode, moderate (H)      Malignant neoplasm of breast (female), unspecified site     left mastectomy 1996      Mixed incontinence urge and stress (male)(female)      Moderate obstructive sleep apnea      Postmenopausal Atrophic Vaginitis      Presbyopia      Regular astigmatism      Restless leg syndrome      Senile nuclear sclerosis      Thyroid eye disease     mild     Tinnitus      Trigger finger (acquired)     right hand     Type  II or unspecified type diabetes mellitus without mention of complication, not stated as uncontrolled     on insulin since April 2006         Family History   Problem Relation Age of Onset     Heart Disease Father         1968     Melanoma Mother         malignant melanoma     Glaucoma No family hx of      Macular Degeneration No family hx of          Problem List, Family History and past Medical History reviewed and unchanged/updated.       Health risk Assessment/ Review of Systems:       Constitutional:   Fevers or night sweats?  NO      Eyes:   Vision problems?  Yes  - blurry vision when using eye drops        Hearing:  Do you feel you have hearing loss?  NO  Cardiovascular:  Chest pain, palpitations, or pain with walking?  Yes     Respiratory:   Breathing problems or cough?  NO    :   Difficulty controlling urination?  Yes- wet the bed about once a week and has some urgency issues. wears a brief most of the time. Taking Premarin for frequent UTIs twice a week. Has not had UTI since late February        Musculoskeletal:   Stiffness or sore joints?  Yes - getting PT for should and arm (left) after fall in Oct. Getting better with PT           Skin:   concerning lesions or moles?  Yes        Nervous System:   loss of strength or sensation,  numbness or tingling,  tremor,  dizziness,  headache?  Yes       Mental Health:   depression or anxiety,  sleep problems?  Yes - can't fall asleep at night (going on for years). Just got new cpap machine   Cognition:  Memory problems?  Yes     Weight Loss: Have you lost 10 or more pounds unintentionally in the previous year? Yes  Energy: How much of the time during the past 3 weeks did you feel tired?   (check one of the following):   [x] Some of the time      PHQ-2 Score:   PHQ-2 ( 1999 Pfizer) 4/12/2022 2/9/2022   Q1: Little interest or pleasure in doing things 1 1   Q2: Feeling down, depressed or hopeless 1 1   PHQ-2 Score 2 2   PHQ-2 Total Score (12-17 Years)- Positive if 3  or more points; Administer PHQ-A if positive - 2   Some encounter information is confidential and restricted. Go to Review Flowsheets activity to see all data.       PHQ-9 Score:   [facility] Follow-up to PHQ 7/16/2021 11/30/2021 1/11/2022   PHQ-9 9. Suicide Ideation past 2 weeks Not at all Not at all Not at all   Thoughts of suicide or self harm in past 2 weeks - - -   PHQ-9 Safety concerns? - - -   Some encounter information is confidential and restricted. Go to Review Flowsheets activity to see all data.     Medical Care:     What other specialists or organizations are involved in your medical care?       Patient Care Team       Relationship Specialty Notifications Start End    Tonia Goel MD PCP - General Student in organized health care education/training program  7/8/20     Phone: [phone] Fax: [phone]         [facility] [address] [facility] [address]    Dr Narendra Truong MD   5/31/12     Phone: [phone] Fax: [phone]        [address] [address]    Damon Gr, PhD LP  Psychology  4/30/15     Phone: [phone] Fax: [phone]         [address] [facility] 741 [facility] [address]    Dedra Dior MD MD Urology  7/20/15     Phone: [phone] Fax: [phone]         [address] [facility] 394 [facility] [address]    Analy Bush RN Clinic Care Coordinator Urology  7/20/15     Phone: [phone] Pager: [phone]        Matilde Ann MD Referring Physician   8/20/15     Phone: [phone] Fax: [phone]         [facility] [address] [address]    Narendra Truong    8/24/15     Phone: [phone] Fax: [phone]         [address] [address]    Nany Brown MD MD Ophthalmology  9/3/15     Phone: [phone] Fax: [phone]         [address] [facility] 911 [facility] [address]    Gaby Holliday MD MD Nephrology  5/18/16     Phone:  [phone] Fax: [phone]         [address] [facility] 1932 [facility] [address]    Allie Bauer, RN Registered Nurse  Admissions 2/26/18     [facility] Care Coordinator [phone]    Michael Lopez MD MD Ophthalmology  10/1/18     Phone: [phone] Fax: [phone]         420 [facility] [address]    María Shi MD Resident Student in [facility] health care education/training program Admissions 12/11/19     Phone: [phone] Fax: [phone]         [facility] [address] [facility] [address] [facility] [address]    Roxy Rubio MD MD Psychiatry  1/11/21     Attending    Phone: [phone] Fax: [phone]         [address] [place] [address]    Haile King MD Assigned Heart and Vascular Provider   4/4/21     Phone: [phone] Fax: [phone]         [address] [facility] 508 [facility] [address]    Tonia Goel MD Assigned PCP   6/6/21     Phone: [phone] Fax: [phone]         [facility] [address] [facility] [address]    Denise Dewey MD Resident Psychiatry  7/20/21     Phone: [phone] Fax: [phone]         [facility]. [address] [facility] [address]    Annie Gordon MD MD Psychiatry  7/20/21     Phone: [phone] Fax: [phone]         [facility] [address]    Brianda Reddy MD Assigned Pulmonology Provider   8/22/21     Phone: [phone] Fax: [phone]         [address] [facility] [address] [facility] [address]    Kelton Costa MD MD OB/Gyn  10/14/21     Phone: [phone] Fax: [phone]         [address] [facility] [address]    Sarah Renner DO MD Family Medicine  10/14/21     Phone: [phone] Fax: [phone]         [address] [place] [address]    Kelton Costa MD [facility] OBGYN Provider   11/14/21     Phone: [phone] Fax: [phone] [address] [facility] [address]    Damon Gr, PhD  ADRIAN Assigned Behavioral Health Provider   11/21/21     Phone: [phone] Fax: [phone]         420 [facility] 741 [facility] [address]    Kris Serna MD Assigned Surgical Provider   2/6/22     Phone: [phone] Fax: [phone] [address] [facility] [address]          Do you have an advanced directive? Yes      Social History / Home Safety     Social History     Tobacco Use     Smoking status: Never Smoker     Smokeless tobacco: Never Used   Substance Use Topics     Alcohol use: No     Alcohol/week: 0.0 standard drinks     Marital Status: Single  Who lives in your household? 2 residents  Does your home have any of the following safety concerns? Loose rugs in the hallway, no grab bars in the bathroom, no handrails on the stairs or have poorly lit areas?  No   Do you feel threatened or controlled by a partner, ex-partner or anyone in your life? No   Has anyone hurt you physically, for example by pushing, hitting, slapping or kicking you   or forcing you to have sex? No       Functional Status     Do you need help with dressing yourself, bathing, or walking? No   Do you need help with the phone, transportation, shopping, preparing meals, housework, laundry, medications or managing money? YES , medications or cell phone.  By yourself and not using aids, do you have any difficulty walking up 10 steps  without resting?  YES   By yourself and not using aids, do you have any difficulty walking several hundred yards?  YES                Risk Behaviors and Healthy Habits     History   Smoking Status     Never Smoker   Smokeless Tobacco     Never Used     How many servings of fruits and vegetables do you eat a day? 5  Exercise:walking  6-7 days/week for an average of 15-30 minutes  Do you frequently drive without a seatbelt? No   History   Smoking Status     Never Smoker   Smokeless Tobacco     Never Used     Do you use any other drugs? No       Do you use alcohol?No      Functional Ability   Was  the patient's timed Up & Go test (Get up from chair walk, 10 feet turn, return to chair and sit down) unsteady or longer than 10 seconds? No     Fall risk:     1. Have you fallen two or more times in the past year?  YES , On October. Was seen by ED and our clinic subsequently and getting PT related to arm and should injury from this fall.  2. Have you fallen and had an injury in the past year?   YES         Evaulation of Cognitive Function     Family member/caregiver input: Normal    1) Repeat 3 items (Leader, Season, Table)      2) Clock draw: NORMAL  3) 3 item recall:   Recalls 3 objects  Results: 3 items recalled: COGNITIVE IMPAIRMENT LESS LIKELY    Mini-CogTM Copyright S Paula. Licensed by the author for use in [facility]; reprinted with permission (gabriel@[email or web address]). All rights reserved.          Other Assessments:     CV Risk based on Pooled Cohort Risk (consider assessing every 4-6 years; consider statin in patients with 10-yr risk > 7.5%):   .smias  The ASCVD Risk score (Jamesmilli EATON Jr., et al., 2013) failed to calculate for the following reasons:    The valid total cholesterol range is 130 to 320 mg/dL    Advance Directives: Discussed with patient and family as appropriate.  Has patient completed advance directives? Yes, advance care planning is on file.        Immunization History   Administered Date(s) Administered     COVID-19,PF,Moderna 01/06/2021, 02/03/2021     COVID-19,PF,Pfizer (12+ Yrs) 11/11/2021     DTaP, Unspecified 03/13/2016     Flu 65+ Years 09/27/2016     Flu, Unspecified 10/30/1998     HepB-Adult 11/07/2019, 12/05/2019, 05/11/2021     Influenza (H1N1) 01/11/2010     Influenza (High Dose) 3 valent vaccine 09/16/2015, 10/16/2017     Influenza (IIV3) PF 10/21/2002, 11/10/2004, 11/04/2005, 11/02/2006, 11/01/2007, 10/22/2008, 09/01/2009, 10/05/2010, 10/13/2011, 09/18/2012, 09/30/2013     Influenza Vaccine Im 4yrs+ 4 Valent CCIIV4 09/21/2021     Influenza Vaccine, 6+MO IM (QUADRIVALENT  "W/PRESERVATIVES) 09/27/2018, 09/19/2019     Mantoux Tuberculin Skin Test 08/02/2002, 08/02/2002, 07/22/2003, 07/22/2003, 07/16/2004, 07/16/2004, 07/28/2005, 07/28/2005, 09/18/2011, 09/18/2012, 08/21/2014, 09/29/2015, 10/16/2017     Pneumo Conj 13-V (2010&after) 09/27/2016     Pneumococcal 23 valent 10/14/2005, 05/28/2015     TD (ADULT, 7+) 06/15/2004     TDAP Vaccine (Boostrix) 06/10/2014     Td (Adult), Adsorbed 06/15/2004     Tdap (Adacel,Boostrix) 03/13/2016, 05/11/2021     Zoster vaccine recombinant adjuvanted (SHINGRIX) 09/27/2018, 03/13/2019     Reviewed Immunization Record Today    Physical Exam     Vitals: BP (!) 142/84   Pulse 75   Temp 98.1  F (36.7  C) (Oral)   Resp 16   Ht 1.626 m (5' 4\")   Wt 99.5 kg (219 lb 6.4 oz)   LMP  (LMP Unknown)   SpO2 97%   BMI 37.66 kg/m    BMI= Body mass index is 37.66 kg/m .  GENERAL APPEARANCE: alert and no distress, resting tremor of right hand  HENT: ear canals patent and TM's normal; mouth without ulcers or lesions; and oral mucous membranes moist  Hearing loss screen:   Abnormal - unable to detect finger rub test bilaterally    DENTITION:  Good repair? Fair- several crowns and pulled teeth, awaiting partial dentures  RESP: decreased breath sounds at bases otherwise lungs clear to auscultation - no rales, rhonchi or wheezes  CV: irregular rate and rhythm, no murmur, click or rub, no peripheral edema and peripheral pulses strong  SKIN: no suspicious lesions or rashes- scar on left cheek from mohs surgery (squamous cell carcinoma lesion)  NEURO: Normal strength and tone  MENTAL STATUS EXAM  Appearance: appropriate  Attitude: cooperative  Behavior: normal  Eye Contact: normal  Speech: normal  Orientation: oreinted to person , place, time and situation  Mood:  good  Affect: Mood Congruent  Thought Process: clear    Diabetic foot exam: normal DP and PT pulses, no trophic changes or ulcerative lesions, normal sensory exam and callus along medial aspect of left great " toe. Slight malshape of toes (great toes bent laterallY)        Assessment and Plan         Welcome to Medicare Preventive Visit / Initial Medicare Annual Wellness Exam - reviewed Preventive Services and Plan form with patient as specified in Patient Instructions.    Pinky was seen today for medicare visit.    Diagnoses and all orders for this visit:    Encounter for annual wellness exam in Medicare patient  Abnormal CNVIII testing today. Pt also reports difficulty hearing in crowded spaces. Audiology referral to further assess hearing and possible deficits.   -     Adult Audiology Referral; Future    Other specified hypothyroidism  -     TSH with free T4 reflex    Type 2 diabetes mellitus with microalbuminuria, with long-term current use of insulin (H)  Evening BG levels have been significantly above goal since decreasing basal insulin from 20 units to 10 units at bedtime. Will increase to 15 units at bedtime with hopes for better fasting sugars without lows. Labs today. Follow up for diabetes in 1 month to review changes, or sooner if issues arise.   -     insulin glargine (LANTUS PEN) 100 UNIT/ML pen; Inject 15 Units Subcutaneous At Bedtime  -     Lipid panel  -     Hemoglobin A1c  -     Albumin Random Urine Quantitative with Creat Ratio    Hypertriglyceridemia  -     Lipid panel    Colon cancer screening  -     Adult Gastro Ref - Procedure Only; Future        Options for treatment and follow-up care were reviewed with the Pinky Sanderser and/or guardian engaged in the decision making process and verbalized understanding of the options discussed and agreed with the final plan.    Tonia Goel MD

## 2022-04-13 ENCOUNTER — TELEPHONE (OUTPATIENT)
Dept: FAMILY MEDICINE | Facility: CLINIC | Age: 72
End: 2022-04-13
Payer: COMMERCIAL

## 2022-04-13 ENCOUNTER — TELEPHONE (OUTPATIENT)
Dept: GASTROENTEROLOGY | Facility: CLINIC | Age: 72
End: 2022-04-13
Payer: COMMERCIAL

## 2022-04-13 ENCOUNTER — HOSPITAL ENCOUNTER (OUTPATIENT)
Facility: CLINIC | Age: 72
End: 2022-04-13
Attending: INTERNAL MEDICINE | Admitting: INTERNAL MEDICINE
Payer: COMMERCIAL

## 2022-04-13 DIAGNOSIS — Z11.59 ENCOUNTER FOR SCREENING FOR OTHER VIRAL DISEASES: Primary | ICD-10-CM

## 2022-04-13 NOTE — TELEPHONE ENCOUNTER
3:35p.m Forwarding note to PCP .      Alta Elizondo  Pronouns: She/Her/Hers  Care Coordinator  [facility]  [phone]

## 2022-04-13 NOTE — TELEPHONE ENCOUNTER
[facility] phone call message - order or referral request for patient:     Order or referral being requested: Adult Gastro      Additional Comments: The patient already have the referral for this procedure; Graciela from [facility] is requesting doctor recommendations for the procedure.    OK to leave a message on voice mail? Yes    Primary language: English      needed? No    Call taken on April 13, 2022 at 2:49 PM by Triny Jeffers

## 2022-04-14 ENCOUNTER — OFFICE VISIT (OUTPATIENT)
Dept: PSYCHIATRY | Facility: CLINIC | Age: 72
End: 2022-04-14
Attending: PSYCHIATRY & NEUROLOGY
Payer: COMMERCIAL

## 2022-04-14 VITALS
HEART RATE: 80 BPM | SYSTOLIC BLOOD PRESSURE: 143 MMHG | BODY MASS INDEX: 38.11 KG/M2 | WEIGHT: 222 LBS | DIASTOLIC BLOOD PRESSURE: 81 MMHG

## 2022-04-14 DIAGNOSIS — F25.1 SCHIZOAFFECTIVE DISORDER, DEPRESSIVE TYPE (H): ICD-10-CM

## 2022-04-14 DIAGNOSIS — G47.09 OTHER INSOMNIA: ICD-10-CM

## 2022-04-14 PROCEDURE — 99214 OFFICE O/P EST MOD 30 MIN: CPT | Mod: GC | Performed by: STUDENT IN AN ORGANIZED HEALTH CARE EDUCATION/TRAINING PROGRAM

## 2022-04-14 PROCEDURE — G0463 HOSPITAL OUTPT CLINIC VISIT: HCPCS

## 2022-04-14 RX ORDER — ZIPRASIDONE HYDROCHLORIDE 40 MG/1
40 CAPSULE ORAL 2 TIMES DAILY WITH MEALS
Qty: 60 CAPSULE | Refills: 0 | Status: SHIPPED | OUTPATIENT
Start: 2022-04-14 | End: 2022-07-11

## 2022-04-14 ASSESSMENT — PAIN SCALES - GENERAL: PAINLEVEL: NO PAIN (0)

## 2022-04-14 NOTE — PATIENT INSTRUCTIONS
**For crisis resources, please see the information at the end of this document**   Patient Education    Thank you for coming to the [facility] [place] [facility].    Lab Testing:  If you had lab testing today and your results are reassuring or normal they will be mailed to you or sent through [employer] within 7 days. If the lab tests need quick action we will call you with the results. The phone number we will call with results is # [phone]. If this is not the best number please call our clinic and change the number.     Medication Refills:  If you need any refills please call your pharmacy and they will contact us. Our fax number for refills is [phone]. Please allow three business days for refill processing.   If you need to change to a different pharmacy, please contact the new pharmacy directly. The new pharmacy will help you get your medications transferred.     Contact Us:  Please call [phone] during business hours (8-5:00 M-F).  If you have medication related questions after clinic hours, or on the weekend, please call [phone].    Financial Assistance [phone]  Medical Records [phone]       MENTAL HEALTH CRISIS RESOURCES:  For a emergency help, please call 911 or go to the nearest Emergency Department.     Emergency Walk-In Options:   EmPATH Unit @ [facility] ([place]): [phone] - Specialized mental health emergency area designed to be calming  [facility] ([place]): [phone]  [facility] ([place]): [phone]  [facility]): [phone]    County Crisis Information:   [place]: [phone]  [place]: [phone]  [place] ([place]) - Adult: [phone]     Child: [phone]  [place] - Adult: [phone]     Child: [phone]  [place]: [phone]  List of all [facility] resources:    https://[place].gov/dhs/people-we-serve/adults/health-care/mental-health/resources/crisis-contacts.jsp    National Crisis Information:   Crisis Text Line: Text  [place]  to 616393  National Suicide Prevention Lifeline: 0-661-103-TALK (1-461.303.2270)       For online chat options, visit https://suicidepreventionlifeline.org/chat/  Poison Control Center: 2-349-444-9239  Trans Lifeline: 8-550-318-6350 - Hotline for transgender people of all ages  The Bo Project: 4-494-667-4467 - Hotline for LGBT youth     For Non-Emergency Support:   Fast Tracker: Mental Health & Substance Use Disorder Resources -   https://www.[employer].org/

## 2022-04-14 NOTE — PROGRESS NOTES
[facility]  Psychiatry Clinic  MEDICAL PROGRESS NOTE     CARE TEAM: PCP- Cary Brownlee  (resident working at [facility] with Dr. Birmingham, attending)   Specialty Providers- no    Therapist- Dr. Gr PhD   [facility] Team- no. staff at [facility].        Pinky Page is a 71 year old female who prefers the name Pinky & pronouns she, her, hers.      DIAGNOSIS     Schizoaffective disorder, depressed type, in remission without psychotic features  Tardive dyskinesia      ASSESSMENT   Pinky Page is a 71 year old with history of schizoaffective disorder, depressed type who has been relatively stable taking ziprasidone and fluoxetine since 2016. TODAY Pinky returns for routine follow-up. Since her last visit, she had MOHS surgery and cancer is now treated, which has helped her anxiety significantly. She reports stable mood and collateral from [facility] indicates she is doing well. She is working on regulating sleep by using CPAP machine and reducing daytime napping. Her metabolic labs were ordered by her PCP and showed stably elevated A1C.     Future:  Repeat EKG if med changes    [place]MP was checked today:  Indicates taking controlled medication as prescribed.     PLAN                                                                                                                1) Meds-  - Continue ziprasidone 40 mg BID  - Continue fluoxetine 40 mg daily  - Continue melatonin 6mg at bedtime    (prescribed by PCP: gabapentin 900mg TID)    2) Psychotherapy- continue with Dr. Gr    3) Next due-  Labs- A1c and lipid profile Up to date - Next CBC and CMP due 9/2022  EKG- Last EKG 3/2021 without prolonged QTc (439)  Rating Scales- AIMS today 4.    4) Referrals-  PCP for follow up on elevated A1C    5) Dispo- 3 months or sooner if needed     PERTINENT BACKGROUND                           [most recent eval 07/19/21]     Pinky first experienced mental health issues in her 20s  or 30s and has received treatment for schizoaffective disorder and generalized anxiety. Notably, Bruce's symptoms of depression and psychosis have responded well to a combination of ECT and medication management. Bruce has a history of experiencing increased psychotic symptoms with past attempted antipsychotic tapers. No changes to antipsychotic or antidepressant since 2016. Only medication change in last 5 years has been lowering lorazepam.    In her 30s, she had her gallbladder removed and then suffered first episode of severe depression. She was experiencing psychotic symptoms - paranoia, AH, and suspiciousness. She was hospitalized in [place] in 1986 where she received ECT.  She was hospitalized again in 1987, again received ECT. Was hospitalized for depression in 1995, around the same time she was diagnosed with breast cancer.   She was hospitalized for about nine months this time. She received ECT maintence from 7456-0812. Patient was stable taking Seroquel 400mg, Prozac 40mg, and Ativan 1mg for many years. Seroquel was cross-tapered to ziprasidone due to metabolic side effects in 2016.     She has been seen at our clinic since 2013. She has lived at [facility] for many years.      Medical history  Notable for afib, DMT2, HTN, ANUJ, hypothyroid, breast cancer s/p mastectomy     Psych pertinent item history includes psychosis [sxs include disorganization, hallucinations, paranoia], mutiple psychotropic trials, psych hosp (3-5) and ECT.     SUBJECTIVE     Report from [facility]:  - Completed MOHS procedure- went well. Healing well. Was curative for the cancer. No further treatment needed.    Has ongoing sleep concerns -  Up until 2am or 3am some nights. Bruce is observed to be napping during the da for several hours a day, most days.   CPAP recently replaced and she is using CPAP again.       BRUCE:  - doing well  - continues to make art, participate in choir  - made easter cards  - Mood is good. No other  "symptoms.   - going to sister's house for [place]. Looking forward to this.  - Used CPAP for the first time last [facility]. \"Went to bed with it on and woke up with it off.\" Doesn't know how it came off.   - Has trouble sleeping. Working on it with CPAP. Trying not to nap during the day. Trying to take shorter naps.   - tremor continues. Saw Dr Goel and was told to keep an eye on it  - Feels like she is having trouble with memory - e.g.,  can't remember what she ate for breakfast or when she wrote a letter, can't remember recent activities. Not getting lost, not leaving stove on.     ----------  RECENT PSYCH ROS:   Depression:  Low energy, anhedonia  Elevated:  none  Psychosis:  none  Anxiety:  Worries about the future  Trauma Related:  none  Sleep: yes, see HPI  Other: N/A    Adverse Effects: right hand tremor- resting tremor, perioral involuntary movements, stiffness in UE. No chest pain. No shortness of breath. Some dry mouth.  Pertinent Negative Symptoms: No suicidal ideation, violent ideation, hallucinations or delusions  Recent Substance Use:     None reported     FAMILY and SOCIAL HISTORY                                 pt reported     Social Hx:  Financial/ Work- social security disability.   Partner/ - never  Children- None      Living situation- [facility]  Since 1998.  Social/ Spiritual Support- sisters (4), staff at [facility]    PSYCH and SUBSTANCE USE Critical Summary Points since July 2021 July- decrease lorazepam from 0.5mg to 0.375 (liquid)  August 30 - decrease lorazepam to 0.25mg due to difficulty drawing up correct liquid amount  10/25/2021- stop lorazepam.   1/18/2022 - no changes  4/14/2022- no changes      PAST MED TRIALS   sertaline   amitriptyline   lithium   risperidone   olanzapine   trazodone   clonazepam   quetiapine 400mg (weight gain/metabolic SEs)  Lorazepam   triazolam  clonazepam.       SUBSTANCE USE HISTORY     Past Use- none reported     MEDICAL HISTORY " and ALLERGY     ALLERGIES: Chlordiazepoxide hcl, Dimetapp dm cold-cough, Haldol, Ibuprofen, Lactose intolerance [beta-galactosidase], Milk products, and Propofol    Patient Active Problem List   Diagnosis     Allergic rhinitis due to pollen     Urge incontinence     Hypertension, essential     Cardiomegaly     Chronic constipation     Dry eye syndrome     Esophageal reflux     Exposure keratoconjunctivitis     DM ophthalmopathy (H)     Hypothyroidism     Senile cataract     ANUJ (obstructive sleep apnea)     Vaginal atrophy     Restless leg syndrome     Squamous blepharitis     Morbid obesity due to excess calories (H)     Personal history of breast cancer s/p L masectomy     Hypercholesteremia     Lives in group home     Extrapyramidal and movement disorder     CKD (chronic kidney disease) stage 2, GFR 60-89 ml/min     Solitary kidney, congenital     S/P hysterectomy     Impingement syndrome of right shoulder     Hypertriglyceridemia     Candidiasis of skin     Generalized anxiety disorder     Carpal tunnel syndrome of left wrist     Schizoaffective disorder, depressive type (H)     Major depressive disorder, recurrent episode, moderate (H)     Psychological factors affecting medical condition     Hx of psychiatric care     Nail complaint     Microalbuminuria due to type 2 diabetes mellitus (H)     Type 2 diabetes mellitus with microalbuminuria, with long-term current use of insulin (H)     Conjunctivitis of both eyes     Corneal erosion of both eyes     Spastic entropion, right     Foot callus     Paroxysmal atrial fibrillation (H)     Squamous cell carcinoma in situ of skin of face     Chronic left shoulder pain     Rotator cuff injury, left, initial encounter     Past Medical History:   Diagnosis Date     Allergic rhinitis due to pollen     seasonal allergies      Anisometropia and aniseikonia      BMI greater than 40      Cardiomegaly      Chronic constipation      Congenital absence of one kidney      Cramp of  limb      Dermatophytosis of the body      Dry eye syndrome      Esophageal reflux      Essential hypertension, benign      Gastro-oesophageal reflux disease      Hemorrhoids      Hypermetropia      Hypothyroidism      Incomplete Emptying of Bladder      Lactose intolerance      Major depressive disorder, recurrent episode, moderate (H)      Malignant neoplasm of breast (female), unspecified site     left mastectomy 1996      Mixed incontinence urge and stress (male)(female)      Moderate obstructive sleep apnea      Postmenopausal Atrophic Vaginitis      Presbyopia      Regular astigmatism      Restless leg syndrome      Senile nuclear sclerosis      Thyroid eye disease     mild     Tinnitus      Trigger finger (acquired)     right hand     Type II or unspecified type diabetes mellitus without mention of complication, not stated as uncontrolled     on insulin since April 2006         MEDICAL REVIEW OF SYSTEMS   Contraception- not needed    A comprehensive review of systems was performed and is negative other than noted in the HPI.     MEDICATIONS     Current Outpatient Medications   Medication Sig Dispense Refill     acetaminophen (TYLENOL) 500 MG tablet Take 2 tablets (1,000 mg) by mouth every 6 hours as needed 1 Bottle 2     alum & mag hydroxide-simethicone (MYLANTA/MAALOX) 200-200-20 MG/5ML SUSP suspension Take 30 mLs by mouth daily as needed for indigestion       amLODIPine (NORVASC) 10 MG tablet Take 1 tablet (10 mg) by mouth daily 90 tablet 0     apixaban ANTICOAGULANT (ELIQUIS) 5 MG tablet Take 1 tablet (5 mg) by mouth 2 times daily 180 tablet 0     artificial saliva (BIOTENE MT) AERS spray Take 1 spray by mouth 3 times daily as needed for dry mouth       artificial tears (GENTEAL) 0.1-0.2-0.3 % ophthalmic solution        atorvastatin (LIPITOR) 40 MG tablet Take 1 tablet (40 mg) by mouth daily 90 tablet 1     blood glucose (NO BRAND SPECIFIED) lancets standard Use to test blood sugar 2 times daily or as  directed. 100 each 11     blood glucose calibration (NO BRAND SPECIFIED) solution Use to calibrate blood glucose monitor as directed. 1 each 3     blood glucose monitoring (NO BRAND SPECIFIED) meter device kit Check Blood sugars twice a day. 1 kit 0     blood glucose monitoring (NO BRAND SPECIFIED) test strip Use to test blood sugar 3 times daily or as directed. 100 each 3     Calcium Carb-Cholecalciferol (CALCIUM-VITAMIN D3) 250-125 MG-UNIT TABS        Calcium Carbonate-Vitamin D (CALCIUM-VITAMIN D) 250-125 MG-UNIT TABS Take 1 tablet by mouth 2 times daily Calcium 250 mg/Vit D 125 IU       calcium polycarbophil (FIBERCON) 625 MG tablet Take 2 tablets by mouth daily       CLARITIN 10 MG OR TABS 1 TAB PO QD (Once per day) as needed for ALLERGY SYMPTOMS 30 11     conjugated estrogens (PREMARIN) 0.625 MG/GM vaginal cream Place 2 g vaginally daily for 2 weeks, followed by 2 g vaginally twice a week for vaginal atrophy and frequent UTIs 30 g 3     erythromycin (ROMYCIN) 5 MG/GM ophthalmic ointment Place 0.5 inches into both eyes At Bedtime 3.5 g 11     FLUoxetine (PROZAC) 20 MG capsule Take 2 capsules (40 mg) by mouth daily 60 capsule 3     fluticasone (FLONASE) 50 MCG/ACT nasal spray Spray 1 spray into both nostrils daily 16 g 3     furosemide (LASIX) 20 MG tablet Take 1 tablet (20 mg) by mouth 2 times daily 60 tablet 3     Gabapentin (NEURONTIN PO) Take 900 mg by mouth 3 times daily.       Hypromellose (ARTIFICIAL TEARS OP) Apply 1 drop to eye 4 times daily.       insulin glargine (LANTUS PEN) 100 UNIT/ML pen Inject 15 Units Subcutaneous At Bedtime 8 mL 0     lactase (LACTAID) 3000 UNIT tablet Take 1 tablet (3,000 Units) by mouth 3 times daily (with meals) 90 tablet 11     levothyroxine (SYNTHROID/LEVOTHROID) 175 MCG tablet Take 1 tablet (175 mcg) by mouth daily 90 tablet 3     lidocaine (XYLOCAINE) 2 % external gel Apply topically 3 times daily as needed for moderate pain 85 g 1     lifitegrast (XIIDRA) 5 % opthalmic  solution Place 1 drop into both eyes 2 times daily 60 each 11     liraglutide (VICTOZA) 18 MG/3ML solution Inject 1.8 mg Subcutaneous daily 9 mL 0     losartan (COZAAR) 100 MG tablet Take 0.5 tablets (50 mg) by mouth 2 times daily 90 tablet 1     Magnesium Hydroxide (MILK OF MAGNESIA PO) Take 30 mL as needed for constipation.       melatonin 3 MG CAPS Take 6 mg by mouth At Bedtime 60 capsule 3     metFORMIN (GLUCOPHAGE) 1000 MG tablet Take 1,000 mg by mouth 2 times daily (with meals)       NEW MED Prohydrate Moisturizing gel  Place 1 applicator vaginally 4 times a week 20 oz 3     nystatin (MYCOSTATIN) 329961 UNIT/GM POWD Apply topically 3 times daily as needed 60 g 1     ONETOUCH DELICA LANCETS 33G MISC        polyethylene glycol (MIRALAX/GLYCOLAX) powder Take 1 capful by mouth 2 times daily 17 GM PO BID       Saline 0.9 % SOLN Spray 2 sprays in nostril as needed.       senna-docusate (SENOKOT-S/PERICOLACE) 8.6-50 MG tablet Take 2 tablets by mouth At Bedtime.       simethicone (MYLICON) 125 MG chewable tablet Take 1 tablet (125 mg) by mouth 2 times daily 60 tablet 0     Skin Protectants, Misc. (EUCERIN) cream Apply topically as needed Apply to thigh PRN dry skin       SM LUBRICANT EYE DROPS 0.4-0.3 % SOLN ophthalmic solution        solifenacin (VESICARE) 10 MG tablet Take 1 tablet (10 mg) by mouth daily 30 tablet 6     ziprasidone (GEODON) 40 MG capsule Take 1 capsule (40 mg) by mouth 2 times daily (with meals) 60 capsule 0      VITALS   BP (!) 143/81 (BP Location: Right arm, Patient Position: Sitting, Cuff Size: Adult Large)   Pulse 80   Wt 100.7 kg (222 lb)   LMP  (LMP Unknown)   BMI 38.11 kg/m      MENTAL STATUS EXAM     Alertness: alert   Appearance: well groomed  Behavior/Demeanor: cooperative, with good  eye contact.  Speech: slowed,  Language: no problems  Psychomotor: tremor in Right hand, restlessness, some stiffness in upper extremities  Mood: description consistent with euthymia  Affect: full range  and appropriate; congruent to: mood- yes, content- yes  Thought Process/Associations: unremarkable  Thought Content:  Reports none;  Denies suicidal & violent ideation and delusions  Perception:  Reports none;  Denies hallucinations  Insight: adequate  Judgment: adequate for safety  Cognition: does  appear grossly intact; formal cognitive testing was not done  Gait and Station: remarkable for:  slowed, small steps , pivot turn     LABS and DATA     PHQ 10/26/2021 11/30/2021 1/11/2022   PHQ-9 Total Score 13 7 6   Q9: Thoughts of better off dead/self-harm past 2 weeks Not at all Not at all Not at all   F/U: Thoughts of suicide or self-harm - - -   F/U: Safety concerns - - -       Recent Labs   Lab Test 11/18/21  1617 10/08/21  2143 03/16/21  1537   CR 0.8 0.84 0.8   GFRESTIMATED >60 70 70.7       Recent Labs   Lab Test 04/12/22  1514 10/08/21  2143 03/16/21  1537   GLC  --  168* 204.0*   A1C 7.4*  --  7.8*     Recent Labs   Lab Test 04/12/22  1514 03/16/21  1537   CHOL 117 124   TRIG 72 136   LDL 55 53   HDL 48* 43*     Recent Labs   Lab Test 03/16/21  1537 06/26/18  0659   AST 7.9 16   ALT 12.8 21   ALKPHOS 78.9 111     Recent Labs   Lab Test 10/08/21  2143 03/16/21  1537 05/07/19  0650 10/25/18  1734   WBC 11.8*  --  10.06* 14.2*   ANEU  --  7.5  --  10.6*   HGB 13.0 13.7 13.3 14.0     --  292 275       EKG 3/2021 - showing atrial fibrillation. QtC 392.   EKG 10/8/2021- QtC 439     PSYCHOTROPIC DRUG INTERACTIONS     ZIPRASIDONE and fluoxetine may result in increased risk of QT-interval prolongation.  ZIPRASIDONE and SEROTONERGIC DRUGS THAT PROLONG QT INTERVAL may result in increased risk of QT-interval prolongation and increased risk of serotonin syndrome (hypertension, hyperthermia, myoclonus, mental status changes).  NSAID and SSRI may result in an increased risk of bleeding  ERYTHROMYCIN and FLUOXETINE may result in an increased risk of cardiotoxicity (QT prolongation, torsades de pointes, cardiac  arrest).  FLUOXETINE and INSULINS OR PRAMLINTIDE may result in increased risk of hypoglycemia.  GABAPENTIN and CNS DEPRESSANTS may result in respiratory depression.   GABAPENTIN and ANTACIDS may result in decreased gabapentin effectiveness.      MANAGEMENT:  Monitoring for adverse effects, periodic EKGs and patient is aware of risks     RISK STATEMENT for SAFETY     Pinky Page did not appear to be an imminent safety risk to self or others. Risk factors include chronic mental illness, chronic medical problems. Protective factors include supportive 24/7 residence staff, commitment to friends, no expressed SI.    TREATMENT RISK STATEMENT: The risks, benefits, alternatives and potential adverse effects have been discussed and are understood by the pt. The pt understands the risks of using street drugs or alcohol. There are no medical contraindications, the pt agrees to treatment with the ability to do so. The pt knows to call the clinic for any problems or to access emergency care if needed.  Medical and substance use concerns are documented above.  Psychotropic drug interaction check was done, including changes made today.    PROVIDER: Denise Dewey MD    Patient staffed in clinic with Dr. Gordon who will sign the note.  Supervisor is Dr. Gordon.

## 2022-04-18 ENCOUNTER — THERAPY VISIT (OUTPATIENT)
Dept: PHYSICAL THERAPY | Facility: CLINIC | Age: 72
End: 2022-04-18
Payer: COMMERCIAL

## 2022-04-18 DIAGNOSIS — S46.002A ROTATOR CUFF INJURY, LEFT, INITIAL ENCOUNTER: ICD-10-CM

## 2022-04-18 DIAGNOSIS — M25.512 CHRONIC LEFT SHOULDER PAIN: Primary | ICD-10-CM

## 2022-04-18 DIAGNOSIS — G89.29 CHRONIC LEFT SHOULDER PAIN: Primary | ICD-10-CM

## 2022-04-18 PROCEDURE — 97140 MANUAL THERAPY 1/> REGIONS: CPT | Mod: GP

## 2022-04-18 PROCEDURE — 97110 THERAPEUTIC EXERCISES: CPT | Mod: GP

## 2022-04-18 PROCEDURE — 97530 THERAPEUTIC ACTIVITIES: CPT | Mod: GP

## 2022-04-19 ENCOUNTER — TRANSFERRED RECORDS (OUTPATIENT)
Dept: MULTI SPECIALTY CLINIC | Facility: CLINIC | Age: 72
End: 2022-04-19

## 2022-04-19 NOTE — TELEPHONE ENCOUNTER
"4/19/22 Attempted to reach Graciela VILLA from [facility] to inform her that pr , \"any GI provider within the system is fine\". CC left detailed message and telephone number if questions. Patient is already scheduled to see  for colonoscopy on 5/11/22.      Alta Elizondo  Pronouns: She/Her/Hers  Care Coordinator  [facility]  [phone]    "

## 2022-04-22 ENCOUNTER — DOCUMENTATION ONLY (OUTPATIENT)
Dept: FAMILY MEDICINE | Facility: CLINIC | Age: 72
End: 2022-04-22

## 2022-04-27 ENCOUNTER — TELEPHONE (OUTPATIENT)
Dept: GASTROENTEROLOGY | Facility: CLINIC | Age: 72
End: 2022-04-27

## 2022-04-27 DIAGNOSIS — Z12.11 ENCOUNTER FOR SCREENING COLONOSCOPY: Primary | ICD-10-CM

## 2022-04-27 LAB
ALBUMIN (URINE) MG/SPEC: 8 MG/DL (ref 0.2–10)
ALBUMIN/CREATININE RATIO: 70 MG/G (ref 0–20)
CHOLESTEROL (EXTERNAL): 142 MG/DL
CREATININE (URINE): 115 MG/DL (ref 30–125)
HBA1C MFR BLD: 7.7 % (ref 4–6)
HDLC SERPL-MCNC: 43 MG/DL
LDL CHOLESTEROL CALCULATED (EXTERNAL): 76 MG/DL
NON HDL CHOLESTEROL (EXTERNAL): 99 MG/DL
TRIGLYCERIDES (EXTERNAL): 115 MG/DL

## 2022-04-27 RX ORDER — BISACODYL 5 MG/1
TABLET, DELAYED RELEASE ORAL
Qty: 2 TABLET | Refills: 0 | Status: ON HOLD | OUTPATIENT
Start: 2022-04-27 | End: 2022-05-12

## 2022-04-27 NOTE — TELEPHONE ENCOUNTER
Staff message received from endoscopy scheduling:    Pastora Augustine [phone] calling from [facility] about this patient's Rx for prep.  Could someone please call back to discuss?  She is hoping to get it sent today.     -----------------------------------------------------------------------  Extended prep w/o mg citrate d/t CKD 2 and fair prep in previous colonoscopy 7/7/2016 sent to [facility] - [place], [place] - [address]     Patient scheduled for colonoscopy on 5/11/22 at [facility] under MAC.     Upon review patient has moderate ANUJ. Will need to be rescheduled to a hospital setting.     Propofol allergy listed but does not state severity.    Staff message sent to endoscopy scheduling to reschedule patient at hospital setting and sedation as CS. Upon review patient has tolerate CS well in the past with colonoscopy in 2016    Patient is on Apixaban (Eliquis). Holding interval of 2 days.     Mona Arias, RN

## 2022-04-29 ENCOUNTER — DOCUMENTATION ONLY (OUTPATIENT)
Dept: FAMILY MEDICINE | Facility: CLINIC | Age: 72
End: 2022-04-29
Payer: COMMERCIAL

## 2022-05-02 ENCOUNTER — TELEPHONE (OUTPATIENT)
Dept: GASTROENTEROLOGY | Facility: CLINIC | Age: 72
End: 2022-05-02
Payer: COMMERCIAL

## 2022-05-02 NOTE — TELEPHONE ENCOUNTER
Caller: Nurse from nursing home    Procedure: colonoscopy    Date, Location, and Surgeon of Procedure Cancelled: 5/11, Hugo DOLAN    Ordering Provider:MELINDA Sampson    Reason for cancel (please be detailed, any staff messages or encounters to note?): pt needs to be at [facility] (ANUJ), Pt has allergy to Fentanyl so has to be CS not MAC.    This direction is from endo preassessment nurse: JR    Rescheduled: Y     If rescheduled:    Date: 5/12   Location: [facility]   Prep Resent: extended (changes to prep? N)   Covid Test Rescheduled: No, pt will get tested on 5/9 at nursing [place] [facility].    Note any change or update to original order/sedation: Was MAC, now CS. Was [facility], now [facility].    Preassessment nurse spoke appt details w/ nurse at nursing [place] - prep is extended.

## 2022-05-02 NOTE — TELEPHONE ENCOUNTER
Reschedule procedure to [facility] under CS.     Pre assessment questions completed for upcoming colonoscopy procedure scheduled on 5/12/22 with nurse Pastora from residence.     COVID test scheduled 5/9/22 - having completed at facility. Nurse acknowledged that it will be a PCR test.     Reviewed procedural arrival time 1200 and facility location [facility].    Designated  policy reviewed. Instructed to have someone stay 6 hours post procedure.     Per Pastora they need updated prep instructions that do not have mg citrate. Fax [phone]. Will send per request.     Anticoagulation/blood thinners? Apixaban (Eliquis) - hold 2 days. Per Pastora they will consult with provider prior to holding.     Pastora verbalized understanding and had no questions or concerns at this time.    Mona Arias RN

## 2022-05-05 ENCOUNTER — OFFICE VISIT (OUTPATIENT)
Dept: FAMILY MEDICINE | Facility: CLINIC | Age: 72
End: 2022-05-05
Payer: COMMERCIAL

## 2022-05-05 VITALS
TEMPERATURE: 98.5 F | OXYGEN SATURATION: 96 % | RESPIRATION RATE: 16 BRPM | SYSTOLIC BLOOD PRESSURE: 149 MMHG | BODY MASS INDEX: 38.11 KG/M2 | DIASTOLIC BLOOD PRESSURE: 80 MMHG | WEIGHT: 222 LBS | HEART RATE: 67 BPM

## 2022-05-05 DIAGNOSIS — K59.09 CHRONIC CONSTIPATION: ICD-10-CM

## 2022-05-05 DIAGNOSIS — Z01.818 PREOP GENERAL PHYSICAL EXAM: Primary | ICD-10-CM

## 2022-05-05 DIAGNOSIS — Z79.4 TYPE 2 DIABETES MELLITUS WITH MICROALBUMINURIA, WITH LONG-TERM CURRENT USE OF INSULIN (H): ICD-10-CM

## 2022-05-05 DIAGNOSIS — E66.01 MORBID OBESITY DUE TO EXCESS CALORIES (H): ICD-10-CM

## 2022-05-05 DIAGNOSIS — Z12.11 COLON CANCER SCREENING: ICD-10-CM

## 2022-05-05 DIAGNOSIS — I48.0 PAROXYSMAL ATRIAL FIBRILLATION (H): ICD-10-CM

## 2022-05-05 DIAGNOSIS — E11.29 TYPE 2 DIABETES MELLITUS WITH MICROALBUMINURIA, WITH LONG-TERM CURRENT USE OF INSULIN (H): ICD-10-CM

## 2022-05-05 DIAGNOSIS — I10 HYPERTENSION, ESSENTIAL: ICD-10-CM

## 2022-05-05 DIAGNOSIS — R80.9 TYPE 2 DIABETES MELLITUS WITH MICROALBUMINURIA, WITH LONG-TERM CURRENT USE OF INSULIN (H): ICD-10-CM

## 2022-05-05 PROCEDURE — 99215 OFFICE O/P EST HI 40 MIN: CPT | Performed by: FAMILY MEDICINE

## 2022-05-05 NOTE — PATIENT INSTRUCTIONS
Hold elliquis for 2 days prior to procedure  All other meds can be taken  Follow through with plans for bowel prep as directed by gastroenterology    No further testing needed at present    Preparing for Your Surgery  Getting started  A nurse will call you to review your health history and instructions. They will give you an arrival time based on your scheduled surgery time. Please be ready to share:  Your doctor's clinic name and phone number  Your medical, surgical and anesthesia history  A list of allergies and sensitivities  A list of medicines, including herbal treatments and over-the-counter drugs  Whether the patient has a legal guardian (ask how to send us the papers in advance)  Preparing for surgery  Within 30 days of surgery: Have a pre-op exam (sometimes called an H&P, or History and Physical). This can be done at a clinic or pre-operative center.  If you're having a , you may not need this exam. Talk to your care team.  At your pre-op exam, talk to your care team about all medicines you take. If you need to stop any medicines before surgery, ask when to start taking them again.  We do this for your safety. Many medicines can make you bleed too much during surgery. Some change how well surgery (anesthesia) drugs work.  Call your insurance company to let them know you're having surgery. (If you don't have insurance, call [phone].)  Call your clinic if there's any change in your health. This includes signs of a cold or flu (sore throat, runny nose, cough, rash, fever). It also includes a scrape or scratch near the surgery site.  If you have questions on the day of surgery, call your hospital or surgery center.  COVID testing  You may need to be tested for COVID-19 before having surgery. If so, we will give you instructions.  Eating and drinking guidelines  For your safety: Unless your surgeon tells you otherwise, follow the guidelines below.  Eat and drink as usual until 8 hours before  surgery. After that, no food or milk.  Drink clear liquids until 2 hours before surgery. These are liquids you can see through, like water, Gatorade and Propel Water. You may also have black coffee and tea (no cream or milk).  Nothing by mouth within 2 hours of surgery. This includes gum, candy and breath mints.  If you drink alcohol: Stop drinking it the night before surgery.  If your care team tells you to take medicine on the morning of surgery, it's okay to take it with a sip of water.  Preventing infection  Shower or bathe the night before and morning of your surgery. Follow the instructions your clinic gave you. (If no instructions, use regular soap.)  Don't shave or clip hair near your surgery site. We'll remove the hair if needed.  Don't smoke or vape the morning of surgery. You may chew nicotine gum up to 2 hours before surgery. A nicotine patch is okay.  Note: Some surgeries require you to completely quit smoking and nicotine. Check with your surgeon.  Your care team will make every effort to keep you safe from infection. We will:  Clean our hands often with soap and water (or an alcohol-based hand rub).  Clean the skin at your surgery site with a special soap that kills germs.  Give you a special gown to keep you warm. (Cold raises the risk of infection.)  Wear special hair covers, masks, gowns and gloves during surgery.  Give antibiotic medicine, if prescribed. Not all surgeries need antibiotics.  What to bring on the day of surgery  Photo ID and insurance card  Copy of your health care directive, if you have one  Glasses and hearing aides (bring cases)  You can't wear contacts during surgery  Inhaler and eye drops, if you use them (tell us about these when you arrive)  CPAP machine or breathing device, if you use them  A few personal items, if spending the night  If you have . . .  A pacemaker, ICD (cardiac defibrillator) or other implant: Bring the ID card.  An implanted stimulator: Bring the remote  control.  A legal guardian: Bring a copy of the certified (court-stamped) guardianship papers.  Please remove any jewelry, including body piercings. Leave jewelry and other valuables at home.  If you're going home the day of surgery  You must have a responsible adult drive you home. They should stay with you overnight as well.  If you don't have someone to stay with you, and you aren't safe to go home alone, we may keep you overnight. Insurance often won't pay for this.  After surgery  If it's hard to control your pain or you need more pain medicine, please call your surgeon's office.  Questions?   If you have any questions for your care team, list them here: _________________________________________________________________________________________________________________________________________________________________________ ____________________________________ ____________________________________ ____________________________________  For informational purposes only. Not to replace the advice of your health care provider. Copyright   2003, 2019 [facility]. All rights reserved. Clinically reviewed by Mirna Holloway MD. [employer] 718508 - REV 07/21.    Before Your Procedure or Hospital Admission  Testing for COVID-19 (Coronavirus)  Thank you for choosing [facility] for your health care needs. The COVID-19 pandemic is a very challenging time for everyone.   Our goal is to keep you and our team here at [facility] safe and healthy. We've taken many steps to make this happen. For example:  We test and screen our staff, care teams and patients for COVID-19.  Everyone at [facility] must wear a mask and stay 6 feet apart.  We are limiting hospital and clinic visitors.  Before you come in  If you test COVID-19 positive with any kind of test before your surgery date, call your surgeon's office. We need to know the date of your positive test. We may need to re-schedule your surgery.  Unless  "you've had a positive COVID-19 test within the past 90 days, you must get tested for COVID-19 even if you've been vaccinated. Your test needs to happen 2 to 4 days before you check in to the hospital or surgery site.  A clinic scheduler will call you about a week in advance to set up a testing time at one of our labs.  Note: If you have a test anywhere but [facility], be sure you get an RT-PCR or an NAAT (Nucleic Acid Amplification Test) test.  Do NOT get a \"rapid antigen\" test. Negative results from \"rapid antigen\" tests are NOT accepted before your surgery.  After the test, please stay at home and out of contact with other people. This will help prevent possible COVID-19 exposure before your treatment. Please follow all current safety guidelines, including:  Limit trips outside your home.  Limit the number of people you see.  Always wear a mask outside your home.  Use social distancing. Stay 6 feet away from others whenever you can.  Wash your hands often.  If your test shows you have COVID-19  If your test is positive, we'll let you know. A positive test means that you have the virus.   We'll probably have to postpone your admission, surgery or procedure. Your doctor will discuss this with you. After that, we'll let you know what to do and when you can re-schedule.   We may need to cancel your treatment on short notice for other reasons, too.  If your test shows you DON'T have COVID-19  Even if your test is negative, you can still get COVID-19. It's rare but, sometimes, the test result is wrong. You could also catch the virus after taking the test.   There's a very small chance that you could catch COVID-19 in the hospital or surgery center. [facility] has taken many steps to prevent this from happening.   Day of your surgery or procedure  Please come wearing a face covering that covers both your nose and mouth.  When you arrive, we'll ask you some questions to find out if you've had any exposures " to COVID-19, or have any signs of COVID-19.  Ask your care team if you can have visitors. All visitors must wear face coverings and will be screened for exposure to, or signs of, COVID-19.  Even if no visitors are allowed, you can still have with you:  Your legal guardian or legal decision maker  Someone to help you, if you are disabled  A parent and one other visitor, if you are younger than 18 years old  A partner and a , if you are in labor  We might need to teach you about taking care of yourself after surgery. If so, a visitor can come into the hospital to learn about it, too.  The rules for visitors change often, depending on how much the virus is spreading. To learn more, see Visiting a Loved One in the Hospital during the COVID-19 Outbreak.  Please call your care team, hospital or surgery center if you have any questions. We thank you for your understanding and for choosing [facility] for your care.   Possible surgery delay  Like you, we want your surgery to happen when it's scheduled. But sometimes the hospital is so full that it's not safe for you to have your surgery. This is especially true during the pandemic. Your surgery may need to be re-scheduled at a later date. If this happens, we will call and tell you.  Questions and answers  Does it matter where I get tested for COVID-19?  Yes. We urge you to get tested at one of our [facility] COVID-19 testing sites. These tests will be either RT-PCR or NAAT, and are accepted. We process these tests in our lab and can get the results quickly. Your [facility] care team needs to get your results before you check in.  What should I do if I can't get tested at [facility]?  You can get tested somewhere else, but you'll need to take these extra steps:   Contact your family doctor or clinic to arrange your test.  Take the test within 4 days of your surgery or procedure. We can't accept tests older than 4 days.  Make sure you're  "getting an RT-PCR test, or a NAAT. Some places use \"rapid antigen\" tests, but we do NOT accept negative results from those tests before your surgery.  Make sure your doctor or clinic faxes your results to [facility] at [phone]. Or take a photo of the results and upload it into [employer].  If we don't get your results in time, we may have to delay or cancel your treatment.  For informational purposes only. Not to replace the advice of your health care provider. Copyright   2020 [place]. All rights reserved. Clinically reviewed by [facility] and the [facility] COVID-19 Clinical Team. [employer] 263852 - Rev 02/01/22.    How to Take Your Medication Before Surgery  - HOLD (do not take) your elliquis for 2 days prior to surgery    "

## 2022-05-05 NOTE — PROGRESS NOTES
[address]  [address] [place] [id number]  Phone: [phone]  Fax: [phone]  Primary Provider: Tonia Goel  Pre-op Performing Provider: SENG THOMAS      PREOPERATIVE EVALUATION:  Today's date: 5/5/2022    Pinky Page is a 71 year old female who presents for a preoperative evaluation.    Surgical Information:  Surgery/Procedure: Colonoscopy under MAC (previously has had inadequate prep)  Surgery Location: [facility]  Surgeon: Chandra Castillo  Surgery Date: 05/12/22  Time of Surgery: 1200pm  Where patient plans to recover: Other: [facility]  Fax number for surgical facility: Note does not need to be faxed, will be available electronically in Epic.    Type of Anesthesia Anticipated: to be determined    Assessment & Plan     The proposed surgical procedure is considered LOW risk.    Preop general physical exam - colonoscopy  Colon cancer screening  Chronic constipation  See recommendations below  Low risk procedure, hold elliquis x 2 days  Ok to take other meds    Paroxysmal atrial fibrillation (H)  Stable, see above re: elliquis    Type 2 diabetes mellitus with microalbuminuria, with long-term current use of insulin (H)  stable    Hypertension, essential  Stable, controlled2    Morbid obesity due to excess calories (H)    Risks and Recommendations:  The patient has the following additional risks and recommendations for perioperative complications:   - Morbid obesity (BMI >40)  Diabetes:  - Patient is on insulin therapy; diabetic NPO guidelines provided and discussed.    Medication Instructions:  Hold elliquis x 2 days    RECOMMENDATION:  APPROVAL GIVEN to proceed with proposed procedure, without further diagnostic evaluation.      40 minutes spent on the date of the encounter doing chart review, history and exam, documentation and further activities per the note        Subjective     HPI related to upcoming procedure: colonoscopy    Preop  Questions 5/5/2022   1. Have you ever had a heart attack or stroke? No   2. Have you ever had surgery on your heart or blood vessels, such as a stent placement, a coronary artery bypass, or surgery on an artery in your head, neck, heart, or legs? No   3. Do you have chest pain with activity? No   4. Do you have a history of  heart failure? No   5. Do you currently have a cold, bronchitis or symptoms of other infection? No   6. Do you have a cough, shortness of breath, or wheezing? No   7. Do you or anyone in your family have previous history of blood clots? No   8. Do you or does anyone in your family have a serious bleeding problem such as prolonged bleeding following surgeries or cuts? No   9. Have you ever had problems with anemia or been told to take iron pills? No   10. Have you had any abnormal blood loss such as black, tarry or bloody stools, or abnormal vaginal bleeding? No   11. Have you ever had a blood transfusion? No   12. Are you willing to have a blood transfusion if it is medically needed before, during, or after your surgery? Yes   13. Have you or any of your relatives ever had problems with anesthesia? No   14. Do you have sleep apnea, excessive snoring or daytime drowsiness? YES - uses nasal CPAP   14a. Do you have a CPAP machine? Yes   15. Do you have any artifical heart valves or other implanted medical devices like a pacemaker, defibrillator, or continuous glucose monitor? No   16. Do you have artificial joints? No   17. Are you allergic to latex? No       Health Care Directive:  Patient has a Health Care Directive on file      Preoperative Review of :   reviewed - no record of controlled substances prescribed.      Status of Chronic Conditions:  A-FIB - Patient has a longstanding history of chronic A-fib currently rate controlled. Current treatment regimen includes elliquis for stroke prevention and denies significant symptoms of lightheadedness, palpitations or dyspnea.     DIABETES -  Patient has a longstanding history of DiabetesType Type II . Patient is being treated with oral agents and insulin injections and denies significant side effects. Control has been good. Complicating factors include but are not limited to: hypertension.     HYPERTENSION - Patient has longstanding history of HTN , currently denies any symptoms referable to elevated blood pressure. Specifically denies chest pain, palpitations, dyspnea, orthopnea, PND or peripheral edema. Blood pressure readings have been in normal range. Current medication regimen is as listed below. Patient denies any side effects of medication.       Review of Systems  Constitutional, neuro, ENT, endocrine, pulmonary, cardiac, gastrointestinal, genitourinary, musculoskeletal, integument and psychiatric systems are negative, except as otherwise noted.    Patient Active Problem List    Diagnosis Date Noted     Solitary kidney, congenital 06/07/2013     Priority: High     CKD (chronic kidney disease) stage 2, GFR 60-89 ml/min 03/01/2013     Priority: High     Lives in group home 02/27/2013     Priority: High     [place] residence, [place] >20 years.  Mantoux done in 9/2013 was negative.        Hypercholesteremia 10/05/2012     Priority: High     ASCVD  Risk Calculator (pooled cohort)   10 CV risk 17.5%   Recommended Therapy:High Intensity Statin (atorvastatin 40*-80 mg or rosuvastatin 20-40mg)               Morbid obesity due to excess calories (H) 10/02/2012     Priority: High     Follows with Dr. Marcos Magdaleno Q 2months for weight management.       Personal history of breast cancer s/p L masectomy 10/02/2012     Priority: High     May 24, 2013 S/p left mastectomy 1996; follows with Dr. Avila, last mammogram 5/2012 was benign. Rec annual mammogram.       Hypertension, essential      Priority: High     Goal under 150/90       Hypothyroidism      Priority: High     On replacement.       ANUJ (obstructive sleep apnea)      Priority: High      Refaby 2017 Moderate ANUJ, CPAP trial at 13 August 28, 2013   On 4L O2 at night given CPAP intolerant.  PSG (10/21/07): AHI 39 with oxygen saturations to 71%.        Chronic left shoulder pain 01/19/2022     Priority: Medium     Rotator cuff injury, left, initial encounter 01/19/2022     Priority: Medium     Squamous cell carcinoma in situ of skin of face 12/12/2021     Priority: Medium     Paroxysmal atrial fibrillation (H) 03/23/2021     Priority: Medium     Foot callus 03/08/2020     Priority: Medium     Spastic entropion, right 10/18/2018     Priority: Medium     Conjunctivitis of both eyes 10/03/2018     Priority: Medium     Last Assessment & Plan:   Clinical appearance c/w possible viral conjunctivitis in both eyes.  - Hygiene precautions discussed with patient  - Monitor       Corneal erosion of both eyes 10/03/2018     Priority: Medium     Last Assessment & Plan:   Secondary to involutional entropion.  She is s/p Quickert suture placement of both eyes (left eye 10/18, right eye 10/11).  Corneal surface well healed with small amount of residual central haze.    - BCL of left eye removed at slit lamp today  - Discontinue Vigamox    - Continue ATs QID both eyes  - Continue Erythromycin ointment QHS both eyes  - Return in 2 weeks for follow up  - Follow up with Dr. Driscoll as scheduled on 12/6       Microalbuminuria due to type 2 diabetes mellitus (H) 03/24/2017     Priority: Medium     3/21/17  Creatinine Urine 54   Albumin Urine mg/L 18   Albumin Urine mg/g Cr 32.47 (H)          Type 2 diabetes mellitus with microalbuminuria, with long-term current use of insulin (H) 03/24/2017     Priority: Medium     Nail complaint 02/03/2017     Priority: Medium     vertical nail ridges       Hx of psychiatric care 07/14/2016     Priority: Medium       PAST PSYCH MED TRIALS     sertaline   amitriptyline   lithium   risperidone   olanzapine   trazodone   clonazepam  Quetiapine (weight gain)       Psychological factors  affecting medical condition 04/18/2016     Priority: Medium     Major depressive disorder, recurrent episode, moderate (H) 11/16/2015     Priority: Medium     Schizoaffective disorder, depressive type (H) 06/08/2015     Priority: Medium     Carpal tunnel syndrome of left wrist 05/28/2015     Priority: Medium     Generalized anxiety disorder 12/04/2014     Priority: Medium     Diagnosis updated by automated process. Provider to review and confirm.       Candidiasis of skin 11/04/2014     Priority: Medium     Chronic candidiasis of groin and labia        Hypertriglyceridemia 09/10/2014     Priority: Medium     Impingement syndrome of right shoulder 06/10/2014     Priority: Medium     S/P hysterectomy 04/20/2014     Priority: Medium     Extrapyramidal and movement disorder 02/27/2013     Priority: Medium     Cardiomegaly      Priority: Medium     Chronic constipation      Priority: Medium     Dry eye syndrome      Priority: Medium     Esophageal reflux      Priority: Medium     Exposure keratoconjunctivitis      Priority: Medium     DM ophthalmopathy (H)      Priority: Medium     Senile cataract      Priority: Medium     Vaginal atrophy      Priority: Medium     Restless leg syndrome      Priority: Medium     Squamous blepharitis      Priority: Medium     Urge incontinence 04/19/2011     Priority: Medium     Allergic rhinitis due to pollen      Priority: Medium     seasonal allergies         Past Medical History:   Diagnosis Date     Allergic rhinitis due to pollen     seasonal allergies      Anisometropia and aniseikonia      BMI greater than 40      Cardiomegaly      Chronic constipation      Congenital absence of one kidney      Cramp of limb      Dermatophytosis of the body      Dry eye syndrome      Esophageal reflux      Essential hypertension, benign      Gastro-oesophageal reflux disease      Hemorrhoids      Hypermetropia      Hypothyroidism      Incomplete Emptying of Bladder      Lactose intolerance       Major depressive disorder, recurrent episode, moderate (H)      Malignant neoplasm of breast (female), unspecified site     left mastectomy 1996      Mixed incontinence urge and stress (male)(female)      Moderate obstructive sleep apnea      Postmenopausal Atrophic Vaginitis      Presbyopia      Regular astigmatism      Restless leg syndrome      Senile nuclear sclerosis      Thyroid eye disease     mild     Tinnitus      Trigger finger (acquired)     right hand     Type II or unspecified type diabetes mellitus without mention of complication, not stated as uncontrolled     on insulin since April 2006      Past Surgical History:   Procedure Laterality Date     APPENDECTOMY       C MASTECTOMY,SIMPLE  1996    Left mastectomy  [facility]. Had normal FU mammo 5/2007. Follow yearly.      CHOLECYSTECTOMY       COLONOSCOPY  5/2005    Complete Colonoscopy-had one small polyp removed 5/2005.      COLONOSCOPY N/A 3/31/2016    Procedure: COLONOSCOPY;  Surgeon: Cary Ring MD;  Location:  GI     COLONOSCOPY N/A 7/7/2016    Procedure: COLONOSCOPY;  Surgeon: Cary Ring MD;  Location:  GI     DILATION AND CURETTAGE       HYSTERECTOMY       MASTECTOMY      Left breast     ZZC TOTAL ABDOM HYSTERECTOMY  7/2003    Dr. Castanon, for abnormal bleeding. Removal of both tubes with BSO for myoma w - - -     Current Outpatient Medications   Medication Sig Dispense Refill     acetaminophen (TYLENOL) 500 MG tablet Take 2 tablets (1,000 mg) by mouth every 6 hours as needed 1 Bottle 2     alum & mag hydroxide-simethicone (MYLANTA/MAALOX) 200-200-20 MG/5ML SUSP suspension Take 30 mLs by mouth daily as needed for indigestion       amLODIPine (NORVASC) 10 MG tablet Take 1 tablet (10 mg) by mouth daily 90 tablet 0     apixaban ANTICOAGULANT (ELIQUIS) 5 MG tablet Take 1 tablet (5 mg) by mouth 2 times daily 180 tablet 0     artificial saliva (BIOTENE MT) AERS spray Take 1 spray by mouth 3 times  daily as needed for dry mouth       artificial tears (GENTEAL) 0.1-0.2-0.3 % ophthalmic solution        atorvastatin (LIPITOR) 40 MG tablet Take 1 tablet (40 mg) by mouth daily 90 tablet 1     bisacodyl (DULCOLAX) 5 MG EC tablet Take as directed. One day prior to exam at 10:00am take 2 tablets 2 tablet 0     blood glucose (NO BRAND SPECIFIED) lancets standard Use to test blood sugar 2 times daily or as directed. 100 each 11     blood glucose calibration (NO BRAND SPECIFIED) solution Use to calibrate blood glucose monitor as directed. 1 each 3     blood glucose monitoring (NO BRAND SPECIFIED) meter device kit Check Blood sugars twice a day. 1 kit 0     blood glucose monitoring (NO BRAND SPECIFIED) test strip Use to test blood sugar 3 times daily or as directed. 100 each 3     Calcium Carb-Cholecalciferol (CALCIUM-VITAMIN D3) 250-125 MG-UNIT TABS        Calcium Carbonate-Vitamin D (CALCIUM-VITAMIN D) 250-125 MG-UNIT TABS Take 1 tablet by mouth 2 times daily Calcium 250 mg/Vit D 125 IU       calcium polycarbophil (FIBERCON) 625 MG tablet Take 2 tablets by mouth daily       CLARITIN 10 MG OR TABS 1 TAB PO QD (Once per day) as needed for ALLERGY SYMPTOMS 30 11     conjugated estrogens (PREMARIN) 0.625 MG/GM vaginal cream Place 2 g vaginally daily for 2 weeks, followed by 2 g vaginally twice a week for vaginal atrophy and frequent UTIs 30 g 3     erythromycin (ROMYCIN) 5 MG/GM ophthalmic ointment Place 0.5 inches into both eyes At Bedtime 3.5 g 11     FLUoxetine (PROZAC) 20 MG capsule Take 2 capsules (40 mg) by mouth daily 60 capsule 3     fluticasone (FLONASE) 50 MCG/ACT nasal spray Spray 1 spray into both nostrils daily 16 g 3     furosemide (LASIX) 20 MG tablet Take 1 tablet (20 mg) by mouth 2 times daily 60 tablet 3     Gabapentin (NEURONTIN PO) Take 900 mg by mouth 3 times daily.       Hypromellose (ARTIFICIAL TEARS OP) Apply 1 drop to eye 4 times daily.       insulin glargine (LANTUS PEN) 100 UNIT/ML pen Inject 15  Units Subcutaneous At Bedtime 8 mL 0     lactase (LACTAID) 3000 UNIT tablet Take 1 tablet (3,000 Units) by mouth 3 times daily (with meals) 90 tablet 11     levothyroxine (SYNTHROID/LEVOTHROID) 175 MCG tablet Take 1 tablet (175 mcg) by mouth daily 90 tablet 3     lidocaine (XYLOCAINE) 2 % external gel Apply topically 3 times daily as needed for moderate pain 85 g 1     lifitegrast (XIIDRA) 5 % opthalmic solution Place 1 drop into both eyes 2 times daily 60 each 11     liraglutide (VICTOZA) 18 MG/3ML solution Inject 1.8 mg Subcutaneous daily 9 mL 0     losartan (COZAAR) 100 MG tablet Take 0.5 tablets (50 mg) by mouth 2 times daily 90 tablet 1     Magnesium Hydroxide (MILK OF MAGNESIA PO) Take 30 mL as needed for constipation.       melatonin 3 MG CAPS Take 6 mg by mouth At Bedtime 60 capsule 3     metFORMIN (GLUCOPHAGE) 1000 MG tablet Take 1,000 mg by mouth 2 times daily (with meals)       NEW MED Prohydrate Moisturizing gel  Place 1 applicator vaginally 4 times a week 20 oz 3     nystatin (MYCOSTATIN) 677856 UNIT/GM POWD Apply topically 3 times daily as needed 60 g 1     ONETOUCH DELICA LANCETS 33G MISC        polyethylene glycol (GOLYTELY) 236 g suspension Take as directed. One day before your exam fill the first container with water. Cover and shake until mixed well. At 3:00pm drink one 8oz glass every 10-15 minutes until half of the first container is empty. Store the remainder in the refrigerator. At 8:00pm drink the second half of the first container until it is gone. Before you go to bed mix the second container with water and put in refrigerator. Six hours before your check in time drink one 8oz glass every 10-15 minutes until half of container is empty. Discard the remainder of solution. 8000 mL 0     polyethylene glycol (MIRALAX/GLYCOLAX) powder Take 1 capful by mouth 2 times daily 17 GM PO BID       Saline 0.9 % SOLN Spray 2 sprays in nostril as needed.       senna-docusate (SENOKOT-S/PERICOLACE) 8.6-50 MG  tablet Take 2 tablets by mouth At Bedtime.       simethicone (MYLICON) 125 MG chewable tablet Take 1 tablet (125 mg) by mouth 2 times daily 60 tablet 0     Skin Protectants, Misc. (EUCERIN) cream Apply topically as needed Apply to thigh PRN dry skin       SM LUBRICANT EYE DROPS 0.4-0.3 % SOLN ophthalmic solution        solifenacin (VESICARE) 10 MG tablet Take 1 tablet (10 mg) by mouth daily 30 tablet 6     ziprasidone (GEODON) 40 MG capsule Take 1 capsule (40 mg) by mouth 2 times daily (with meals) 60 capsule 0       Allergies   Allergen Reactions     Chlordiazepoxide Hcl      Dimetapp Dm Cold-Cough      Cold/Congetion TABS     Haldol      Ibuprofen      TABS     Lactose Intolerance [Beta-Galactosidase]      CAPS     Milk Products      Propofol      EMUL        Social History     Tobacco Use     Smoking status: Never Smoker     Smokeless tobacco: Never Used   Substance Use Topics     Alcohol use: No     Alcohol/week: 0.0 standard drinks     No major FHx  History   Drug Use No         Objective     BP (!) 149/80   Pulse 67   Temp 98.5  F (36.9  C) (Oral)   Resp 16   Wt 100.7 kg (222 lb)   LMP  (LMP Unknown)   SpO2 96%   Breastfeeding No   BMI 38.11 kg/m      Physical Exam  GENERAL APPEARANCE: healthy, alert and no distress  HENT: ear canals and TM's normal and nose and mouth without ulcers or lesions  RESP: lungs clear to auscultation - no rales, rhonchi or wheezes  CV: regular rate and rhythm, normal S1 S2, no S3 or S4 and no murmur, click or rub   ABDOMEN: soft, nontender, no HSM or masses and bowel sounds normal  NEURO: Normal strength and tone, sensory exam grossly normal, mentation intact and speech normal    Recent Labs   Lab Test 04/12/22  1514 11/18/21  1617 10/08/21  2143 03/16/21  1537   HGB  --   --  13.0 13.7   PLT  --   --  271  --    NA  --   --  138 136.1   POTASSIUM  --   --  4.2 4.2   CR  --  0.8 0.84 0.8   A1C 7.4*  --   --  7.8*        Diagnostics:  No labs were ordered during this visit.    No EKG required for low risk surgery (cataract, skin procedure, breast biopsy, etc).    Revised Cardiac Risk Index (RCRI):  The patient has the following serious cardiovascular risks for perioperative complications:   - No serious cardiac risks = 0 points     RCRI Interpretation: 0 points: Class I (very low risk - 0.4% complication rate)           Signed Electronically by: Tamanna Naqvi MD  Copy of this evaluation report is provided to requesting physician.

## 2022-05-11 NOTE — PROGRESS NOTES
AUDIOLOGY REPORT    SUBJECTIVE:  Pinky Page is a 71 year old female who was seen on 5/26/2022 in the Audiology Clinic at the [facility] for audiologic evaluation, referred by Nidia Sampson D.O.  Clinic notes from her PCP on 4/12/22 state she was unable to detect finger rub test bilaterally.  The patient reports difficulty hearing the television and radio at times, but is able to communicate with others. She reports bilateral tinnitus. The patient reports imbalance at times, with a fall noted in October. Pinky reports that her primary care provider is aware of her balance concerns. She denies aural fullness, pain, drainage, ear surgeries, or significant noise exposure. The patient notes difficulty with communication in a few listening situations. They were not accompanied today by anyone.     OBJECTIVE:  Abuse Screening:  Do you feel unsafe at home or work/school? No  Do you feel threatened by someone? No  Does anyone try to keep you from having contact with others, or doing things outside of your home? No  Physical signs of abuse present? No     Fall Risk Screen:  1. Have you fallen two or more times in the past year? Yes  2. Have you fallen and had an injury in the past year? Yes   Primary care provider is aware of patient's fall history.     Otoscopic exam indicates ears are clear of cerumen bilaterally     Pure Tone Thresholds assessed using conventional audiometry with good  reliability from 250-8000 Hz bilaterally using insert earphones and circumaural headphones     RIGHT:  mild sloping to moderate/borderline moderately severe sensorineural hearing loss    LEFT:    mild sloping to moderate/borderline moderately severe sensorineural hearing loss    Tympanogram:    RIGHT: normal eardrum mobility    LEFT:   normal eardrum mobility    Reflexes (reported by stimulus ear):  RIGHT: Ipsilateral is absent at frequencies tested  RIGHT: Contralateral is absent at frequencies  tested  LEFT:   Ipsilateral is absent at frequencies tested  LEFT:   Contralateral is absent at frequencies tested      Speech Reception Threshold:    RIGHT: 40 dB HL    LEFT:   40 dB HL  Word Recognition Score:     RIGHT: 100% at 80 dB HL using NU-6 recorded word list.    LEFT:   100% at 80 dB HL using NU-6 recorded word list.    Time spent briefly discussing amplification options. Patient reports she is not interested in pursing this at this time. The 45 day trial period was explained to patient. The patient was given a copy of the [place] [employer] consumer brochure on purchasing hearing instruments.     Patient's documents from her living facility were signed. Joe (transportation contact) was called for .     ASSESSMENT: Today's results indicate a bilateral sensorineural hearing loss. There are no previous audiograms available for comparison. Today s results were discussed with the patient in detail. She was provided with a copy of her hearing test today.     PLAN:  Patient was counseled regarding hearing loss and impact on communication. Patient is a candidate for amplification at this time, pending motivation. Currently, patient is not interested in pursuing hearing aids at this time. Handout on good communication strategies, and hearing aid use was given to patient. It is recommended that the patient recheck hearing if changes are noted or concerns arise. If patient wishes to try amplification in the future, it is recommended she return for a hearing aid consultation.  Please call this clinic with questions regarding these results or recommendations.      Jasmyn Maldonado. CCC-A  Licensed Audiologist   [place] #[id number]       Nidia Sampson D.O.

## 2022-05-12 ENCOUNTER — HOSPITAL ENCOUNTER (OUTPATIENT)
Facility: CLINIC | Age: 72
Discharge: HOME | End: 2022-05-12
Attending: INTERNAL MEDICINE | Admitting: INTERNAL MEDICINE
Payer: COMMERCIAL

## 2022-05-12 VITALS
SYSTOLIC BLOOD PRESSURE: 163 MMHG | RESPIRATION RATE: 14 BRPM | DIASTOLIC BLOOD PRESSURE: 95 MMHG | TEMPERATURE: 98 F | HEART RATE: 75 BPM | OXYGEN SATURATION: 96 %

## 2022-05-12 LAB
COLONOSCOPY: NORMAL
GLUCOSE BLDC GLUCOMTR-MCNC: 139 MG/DL (ref 70–99)

## 2022-05-12 PROCEDURE — 82962 GLUCOSE BLOOD TEST: CPT

## 2022-05-12 PROCEDURE — G0500 MOD SEDAT ENDO SERVICE >5YRS: HCPCS | Performed by: INTERNAL MEDICINE

## 2022-05-12 PROCEDURE — 88305 TISSUE EXAM BY PATHOLOGIST: CPT | Mod: 26 | Performed by: PATHOLOGY

## 2022-05-12 PROCEDURE — 45380 COLONOSCOPY AND BIOPSY: CPT | Performed by: INTERNAL MEDICINE

## 2022-05-12 PROCEDURE — 45385 COLONOSCOPY W/LESION REMOVAL: CPT | Mod: PT | Performed by: INTERNAL MEDICINE

## 2022-05-12 PROCEDURE — 99153 MOD SED SAME PHYS/QHP EA: CPT | Performed by: INTERNAL MEDICINE

## 2022-05-12 PROCEDURE — 250N000011 HC RX IP 250 OP 636: Performed by: INTERNAL MEDICINE

## 2022-05-12 PROCEDURE — 88305 TISSUE EXAM BY PATHOLOGIST: CPT | Mod: TC | Performed by: INTERNAL MEDICINE

## 2022-05-12 RX ORDER — NALOXONE HYDROCHLORIDE 0.4 MG/ML
0.4 INJECTION, SOLUTION INTRAMUSCULAR; INTRAVENOUS; SUBCUTANEOUS
Status: DISCONTINUED | OUTPATIENT
Start: 2022-05-12 | End: 2022-05-12 | Stop reason: HOSPADM

## 2022-05-12 RX ORDER — ONDANSETRON 2 MG/ML
4 INJECTION INTRAMUSCULAR; INTRAVENOUS
Status: DISCONTINUED | OUTPATIENT
Start: 2022-05-12 | End: 2022-05-12 | Stop reason: HOSPADM

## 2022-05-12 RX ORDER — NALOXONE HYDROCHLORIDE 0.4 MG/ML
0.2 INJECTION, SOLUTION INTRAMUSCULAR; INTRAVENOUS; SUBCUTANEOUS
Status: DISCONTINUED | OUTPATIENT
Start: 2022-05-12 | End: 2022-05-12 | Stop reason: HOSPADM

## 2022-05-12 RX ORDER — ONDANSETRON 4 MG/1
4 TABLET, ORALLY DISINTEGRATING ORAL EVERY 6 HOURS PRN
Status: DISCONTINUED | OUTPATIENT
Start: 2022-05-12 | End: 2022-05-12 | Stop reason: HOSPADM

## 2022-05-12 RX ORDER — FLUMAZENIL 0.1 MG/ML
0.2 INJECTION, SOLUTION INTRAVENOUS
Status: DISCONTINUED | OUTPATIENT
Start: 2022-05-12 | End: 2022-05-12 | Stop reason: HOSPADM

## 2022-05-12 RX ORDER — PROCHLORPERAZINE MALEATE 5 MG
5 TABLET ORAL EVERY 6 HOURS PRN
Status: DISCONTINUED | OUTPATIENT
Start: 2022-05-12 | End: 2022-05-12 | Stop reason: HOSPADM

## 2022-05-12 RX ORDER — FENTANYL CITRATE 50 UG/ML
INJECTION, SOLUTION INTRAMUSCULAR; INTRAVENOUS PRN
Status: COMPLETED | OUTPATIENT
Start: 2022-05-12 | End: 2022-05-12

## 2022-05-12 RX ORDER — ONDANSETRON 2 MG/ML
4 INJECTION INTRAMUSCULAR; INTRAVENOUS EVERY 6 HOURS PRN
Status: DISCONTINUED | OUTPATIENT
Start: 2022-05-12 | End: 2022-05-12 | Stop reason: HOSPADM

## 2022-05-12 RX ORDER — LIDOCAINE 40 MG/G
CREAM TOPICAL
Status: DISCONTINUED | OUTPATIENT
Start: 2022-05-12 | End: 2022-05-12 | Stop reason: HOSPADM

## 2022-05-12 RX ADMIN — MIDAZOLAM 2 MG: 1 INJECTION INTRAMUSCULAR; INTRAVENOUS at 13:07

## 2022-05-12 RX ADMIN — FENTANYL CITRATE 100 MCG: 50 INJECTION, SOLUTION INTRAMUSCULAR; INTRAVENOUS at 13:07

## 2022-05-12 NOTE — H&P
Pinky Page  [id number]  female  71 year old      Reason for procedure/surgery: screening    Patient Active Problem List   Diagnosis     Allergic rhinitis due to pollen     Urge incontinence     Hypertension, essential     Cardiomegaly     Chronic constipation     Dry eye syndrome     Esophageal reflux     Exposure keratoconjunctivitis     DM ophthalmopathy (H)     Hypothyroidism     Senile cataract     ANUJ (obstructive sleep apnea)     Vaginal atrophy     Restless leg syndrome     Squamous blepharitis     Morbid obesity due to excess calories (H)     Personal history of breast cancer s/p L masectomy     Hypercholesteremia     Lives in group home     Extrapyramidal and movement disorder     CKD (chronic kidney disease) stage 2, GFR 60-89 ml/min     Solitary kidney, congenital     S/P hysterectomy     Impingement syndrome of right shoulder     Hypertriglyceridemia     Candidiasis of skin     Generalized anxiety disorder     Carpal tunnel syndrome of left wrist     Schizoaffective disorder, depressive type (H)     Major depressive disorder, recurrent episode, moderate (H)     Psychological factors affecting medical condition     Hx of psychiatric care     Nail complaint     Microalbuminuria due to type 2 diabetes mellitus (H)     Type 2 diabetes mellitus with microalbuminuria, with long-term current use of insulin (H)     Conjunctivitis of both eyes     Corneal erosion of both eyes     Spastic entropion, right     Foot callus     Paroxysmal atrial fibrillation (H)     Squamous cell carcinoma in situ of skin of face     Chronic left shoulder pain     Rotator cuff injury, left, initial encounter       Past Surgical History:    Past Surgical History:   Procedure Laterality Date     APPENDECTOMY       C MASTECTOMY,SIMPLE  1996    Left mastectomy  [facility]. Had normal FU mammo 5/2007. Follow yearly.      CHOLECYSTECTOMY       COLONOSCOPY  5/2005    Complete Colonoscopy-had one small polyp removed 5/2005.       COLONOSCOPY N/A 3/31/2016    Procedure: COLONOSCOPY;  Surgeon: Cary Ring MD;  Location: [facility]     COLONOSCOPY N/A 7/7/2016    Procedure: COLONOSCOPY;  Surgeon: Cary Ring MD;  Location: [facility]     DILATION AND CURETTAGE       HYSTERECTOMY       MASTECTOMY      Left breast     ZC TOTAL ABDOM HYSTERECTOMY  7/2003    Dr. Castanon, for abnormal bleeding. Removal of both tubes with BSO for myoma w - - -       Past Medical History:   Past Medical History:   Diagnosis Date     Allergic rhinitis due to pollen     seasonal allergies      Anisometropia and aniseikonia      BMI greater than 40      Cardiomegaly      Chronic constipation      Congenital absence of one kidney      Cramp of limb      Dermatophytosis of the body      Dry eye syndrome      Esophageal reflux      Essential hypertension, benign      Gastro-oesophageal reflux disease      Hemorrhoids      Hypermetropia      Hypothyroidism      Incomplete Emptying of Bladder      Lactose intolerance      Major depressive disorder, recurrent episode, moderate (H)      Malignant neoplasm of breast (female), unspecified site     left mastectomy 1996      Mixed incontinence urge and stress (male)(female)      Moderate obstructive sleep apnea      Postmenopausal Atrophic Vaginitis      Presbyopia      Regular astigmatism      Restless leg syndrome      Senile nuclear sclerosis      Thyroid eye disease     mild     Tinnitus      Trigger finger (acquired)     right hand     Type II or unspecified type diabetes mellitus without mention of complication, not stated as uncontrolled     on insulin since April 2006        Social History:   Social History     Tobacco Use     Smoking status: Never Smoker     Smokeless tobacco: Never Used   Substance Use Topics     Alcohol use: No     Alcohol/week: 0.0 standard drinks       Family History:   Family History   Problem Relation Age of Onset     Heart Disease Father         1968     Melanoma Mother          malignant melanoma     Glaucoma No family hx of      Macular Degeneration No family hx of        Allergies:   Allergies   Allergen Reactions     Chlordiazepoxide Hcl      Dimetapp Dm Cold-Cough      Cold/Congetion TABS     Haldol      Ibuprofen      TABS     Lactose Intolerance [Beta-Galactosidase]      CAPS     Milk Products      Propofol      EMUL       Active Medications:   No current outpatient medications on file.       Systemic Review:   CONSTITUTIONAL: NEGATIVE for fever, chills, change in weight  ENT/MOUTH: NEGATIVE for ear, mouth and throat problems  RESP: NEGATIVE for significant cough or SOB  CV: NEGATIVE for chest pain, palpitations or peripheral edema    Physical Examination:   Vital Signs: /70   Temp 98  F (36.7  C) (Oral)   Resp 14   LMP  (LMP Unknown)   SpO2 100%   GENERAL: healthy, alert and no distress  NECK: no adenopathy, no asymmetry, masses, or scars  RESP: lungs clear to auscultation - no rales, rhonchi or wheezes  CV: regular rate and rhythm, normal S1 S2, no S3 or S4, no murmur, click or rub, no peripheral edema and peripheral pulses strong  ABDOMEN: soft, nontender, no hepatosplenomegaly, no masses and bowel sounds normal  MS: no gross musculoskeletal defects noted, no edema    Plan: Appropriate to proceed as scheduled.      Jonathan Artis MD  5/12/2022    PCP:  Tonia Goel

## 2022-05-12 NOTE — OR NURSING
Patient had colonoscopy with polypectomiems.  Patient tolerated procedure under conscious sedation and 2 liters nasal cannula

## 2022-05-12 NOTE — LETTER
May 13, 2022      Pinky VELOZ Kaylee  [address] [address]        Dear MsGrace,    The pathology results returned from the polyps removed at your recent colonoscopy.    Nine of the polyp was pre-cancerous but showed no active evidence of cancer.      Due to the number of precancerous polyps removed, you will require closer monitoring in the future.    Current guidelines recommend that you undergo a follow-up colonoscopy in 3 years.    Sincerely,               Jonathan Artis MD   [facility], [place], ENDOSCOPY  [address] [place] [address]  Phone: [phone]

## 2022-05-13 LAB
PATH REPORT.COMMENTS IMP SPEC: NORMAL
PATH REPORT.COMMENTS IMP SPEC: NORMAL
PATH REPORT.FINAL DX SPEC: NORMAL
PATH REPORT.GROSS SPEC: NORMAL
PATH REPORT.MICROSCOPIC SPEC OTHER STN: NORMAL
PATH REPORT.RELEVANT HX SPEC: NORMAL
PHOTO IMAGE: NORMAL

## 2022-05-26 ENCOUNTER — OFFICE VISIT (OUTPATIENT)
Dept: AUDIOLOGY | Facility: CLINIC | Age: 72
End: 2022-05-26
Attending: FAMILY MEDICINE
Payer: COMMERCIAL

## 2022-05-26 DIAGNOSIS — Z00.00 ENCOUNTER FOR ANNUAL WELLNESS EXAM IN MEDICARE PATIENT: ICD-10-CM

## 2022-05-26 DIAGNOSIS — H90.3 SENSORINEURAL HEARING LOSS (SNHL) OF BOTH EARS: Primary | ICD-10-CM

## 2022-05-26 PROCEDURE — 92550 TYMPANOMETRY & REFLEX THRESH: CPT | Performed by: AUDIOLOGIST

## 2022-05-26 PROCEDURE — 92557 COMPREHENSIVE HEARING TEST: CPT | Performed by: AUDIOLOGIST

## 2022-05-31 ENCOUNTER — OFFICE VISIT (OUTPATIENT)
Dept: FAMILY MEDICINE | Facility: CLINIC | Age: 72
End: 2022-05-31
Payer: COMMERCIAL

## 2022-05-31 VITALS
OXYGEN SATURATION: 95 % | WEIGHT: 218.2 LBS | HEART RATE: 79 BPM | RESPIRATION RATE: 16 BRPM | DIASTOLIC BLOOD PRESSURE: 83 MMHG | TEMPERATURE: 98.6 F | BODY MASS INDEX: 37.45 KG/M2 | SYSTOLIC BLOOD PRESSURE: 132 MMHG

## 2022-05-31 DIAGNOSIS — E03.8 OTHER SPECIFIED HYPOTHYROIDISM: ICD-10-CM

## 2022-05-31 DIAGNOSIS — M79.651 PAIN OF RIGHT THIGH: ICD-10-CM

## 2022-05-31 DIAGNOSIS — Z86.0100 HISTORY OF COLONIC POLYPS: ICD-10-CM

## 2022-05-31 DIAGNOSIS — Z23 HIGH PRIORITY FOR 2019-NCOV VACCINE: Primary | ICD-10-CM

## 2022-05-31 DIAGNOSIS — R89.9 ABNORMAL LABORATORY TEST RESULT: ICD-10-CM

## 2022-05-31 LAB — TSH SERPL DL<=0.005 MIU/L-ACNC: 1.94 MU/L (ref 0.4–4)

## 2022-05-31 PROCEDURE — 0054A COVID-19,PF,PFIZER (12+ YRS): CPT | Performed by: STUDENT IN AN ORGANIZED HEALTH CARE EDUCATION/TRAINING PROGRAM

## 2022-05-31 PROCEDURE — 91305 COVID-19,PF,PFIZER (12+ YRS): CPT | Performed by: STUDENT IN AN ORGANIZED HEALTH CARE EDUCATION/TRAINING PROGRAM

## 2022-05-31 PROCEDURE — 99213 OFFICE O/P EST LOW 20 MIN: CPT | Mod: 25 | Performed by: STUDENT IN AN ORGANIZED HEALTH CARE EDUCATION/TRAINING PROGRAM

## 2022-05-31 PROCEDURE — 36415 COLL VENOUS BLD VENIPUNCTURE: CPT | Performed by: STUDENT IN AN ORGANIZED HEALTH CARE EDUCATION/TRAINING PROGRAM

## 2022-05-31 PROCEDURE — 84443 ASSAY THYROID STIM HORMONE: CPT | Performed by: STUDENT IN AN ORGANIZED HEALTH CARE EDUCATION/TRAINING PROGRAM

## 2022-05-31 RX ORDER — LIDOCAINE 50 MG/G
OINTMENT TOPICAL 3 TIMES DAILY PRN
Qty: 50 G | Refills: 1 | Status: SHIPPED | OUTPATIENT
Start: 2022-05-31 | End: 2023-12-21

## 2022-05-31 ASSESSMENT — PATIENT HEALTH QUESTIONNAIRE - PHQ9: SUM OF ALL RESPONSES TO PHQ QUESTIONS 1-9: 9

## 2022-05-31 NOTE — PROGRESS NOTES
03/24/20 0800   Team Meeting   Meeting Type Daily Rounds   Team Members Present   Team Members Present Physician;Nurse;; Other (Discipline and Name)   Physician Team Member Dr Abad Julio Team Member Priyank Perez RN   Care Management Team Member Brenda Posadas   Other (Discipline and Name) Khai Batista LCSW; SHANIKA Almeida     Patient attended groups but retreats to room in between groups  Slept  Preceptor Attestation:    I discussed the patient with the resident and evaluated the patient in person. I have verified the content of the note, which accurately reflects my assessment of the patient and the plan of care.   Supervising Physician:  Symone Reynolds MD.

## 2022-05-31 NOTE — PROGRESS NOTES
Assessment & Plan     High priority for 2019-nCoV vaccine  - COVID-19,PF,PFIZER (12+ Yrs GRAY LABEL)    Pain of right thigh  Right hip and thigh pain since 5/13. Pt had colonoscopy 5/12 and was told it was technically difficult, so current pain may be due to prolonged side-lying during procedure. Taking Tylenol which provides some relieve, heating pads also help. Offered physical therapy which has worked well for shoulder pain in the past but she will forego at this time as pain is improving. Will try lidocaine ointment for now, continue to monitor for improvement and reach out if pain worsens.  - lidocaine (XYLOCAINE) 5 % external ointment  Dispense: 50 g; Refill: 1    Other specified hypothyroidism  Abnormal laboratory test result  TSH last checked in this clinic on 4/12/22, wnl at that time. For unknown reasons TSH rechecked at pt's living facility on 4/27/22 with significant elevation at 6.88, with normal range T4 1.4. Assume this was some sort of lab error however TSH recheck today to ensure remains normal range, which it is at 1.94. No further changes recommended, recheck in 1 year.  - TSH with free T4 reflex/    History of colonic polyps  Reviewed recent colonoscopy results. Eleven polyps found (eight 1-10 mm polyps in ascending colon, one 3 mm polyp in transverse colon, two 1-2 mm polyps in descending colon), nine of which are pre-cancerous. Current recommendation is repeat colonoscopy in 3 years (~5/2025). Pt has no further questions at this time    No follow-ups on file.       Tonia Goel MD  [facility] MICHAEL Vance is a 71 year old who presents for right sided hip pain that has now progressed down top thigh pain for the past 2 weeks. Colonoscopy 2 weeks ago. Pain start 1 day after colonoscopy. Polyps found on colonoscopy, recommended follow up in 3 years. Had some constipation issues after the colonoscopy, went 8 days without a bowel movement. Took a suppository and  had good bowel movement and no issues since then. Has been taking Tylenol twice a day 650 mg which does helps leg pain. Also using hot packs which are helpful. Currently 4/10, was 8/10 at highest point.     Discussed option of physical therapy but ok with waiting for now    Review of Systems   ROS otherwise negative if not stated in HPI        Objective    /83   Pulse 79   Temp 98.6  F (37  C) (Oral)   Resp 16   Wt 99 kg (218 lb 3.2 oz)   LMP  (LMP Unknown)   SpO2 95%   BMI 37.45 kg/m    Body mass index is 37.45 kg/m .  Physical Exam   GENERAL: healthy, alert and no distress  RESP: lungs clear to auscultation - no rales, rhonchi or wheezes  CV: regular rate and rhythm, normal S1 S2, no S3 or S4, no murmur, click or rub, no peripheral edema and peripheral pulses strong  MS: no gross musculoskeletal defects noted, no edema, mild tenderness to palpation of right greater trochanter and quadriceps   NEURO: Normal strength and tone, mentation intact and speech normal    Results for orders placed or performed in visit on 05/31/22   TSH with free T4 reflex     Status: Normal   Result Value Ref Range    TSH 1.94 0.40 - 4.00 mU/L

## 2022-05-31 NOTE — LETTER
Date:Aleksandra 3, 2022      Provider requested that no letter be sent. Do not send.       [facility]

## 2022-05-31 NOTE — PATIENT INSTRUCTIONS
Patient Education   Here is the plan from today's visit    1. High priority for 2019-nCoV vaccine  - COVID-19,PF,PFIZER (12+ Yrs GRAY LABEL)    2. Pain of right thigh  I'm glad to hear your pain is improving! Let's continue to take Tylenol up to 4 times a day as needed for pain. You can also try this ointment up to 3 times a day. Spread it on the area that is painful. Don't use the heating pad on that area within 3-4 hours of application but otherwise ok to still use heat as needed to help with pain. If you are finding your pain is getting worse, I'd recommend starting physical therapy, but for now I think it's ok to wait and see if the pain continues to improve. Please reach out if needed if your pain worsens.   - lidocaine (XYLOCAINE) 5 % external ointment; Apply topically 3 times daily as needed for moderate pain  Dispense: 50 g; Refill: 1          Please call or return to clinic if your symptoms don't go away.    Follow up plan  No follow-ups on file.    Thank you for coming to [facility] today.  Lab Testing:  **If you had lab testing today and your results are reassuring or normal they will be mailed to you or sent through [employer] within 7 days.   **If the lab tests need quick action we will call you with the results.  **If you are having labs done on a different day, please call [phone] to schedule at [facility] or [phone] for other [facility] Outpatient Lab locations. Labs do not offer walk-in appointments.  The phone number we will call with results is # [phone] (home) . If this is not the best number please call our clinic and change the number.  Medication Refills:  If you need any refills please call your pharmacy and they will contact us.   If you need to  your refill at a new pharmacy, please contact the new pharmacy directly. The new pharmacy will help you get your medications transferred faster.   Scheduling:  If you have any concerns about today's visit or wish to  schedule another appointment please call our office during normal business hours [phone] (8-5:00 M-F)  If a referral was made to an [facility] specialty provider and you do not get a call from central scheduling, please refer to directions on your visit summary or call our office during normal business hours for assistance.   If a Mammogram was ordered for you at the Breast Center call [phone] to schedule or change your appointment.  If you had an XRay/CT/Ultrasound/MRI ordered the number is [phone] to schedule or change your radiology appointment.   [facility] has limited ultrasound appointments available on Wednesdays, if you would like your ultrasound at [facility], please call [phone] to schedule.   Medical Concerns:  If you have urgent medical concerns please call [phone] at any time of the day.    Tonia Goel MD

## 2022-05-31 NOTE — LETTER
June 2, 2022      Pinky Page  [address] [facility] [address]        Dear Pinky,    Thank you for getting your care at [facility]. Please see below for your test results.    Resulted Orders   TSH with free T4 reflex   Result Value Ref Range    TSH 1.94 0.40 - 4.00 mU/L       TSH in the normal range. No changes needed. If you have any concerns about these results please call and leave a message for me or send a [email or web address] message to the clinic.    Sincerely,    Tonia Goel MD

## 2022-05-31 NOTE — COMMUNITY RESOURCES LIST (ENGLISH)
05/31/2022   [facility] - Outpatient Clinics  Viridiana Powell  For questions about this resource list or additional care needs, please contact your primary care clinic or care manager.  Phone: [phone]   Email: N/A   Address: [facility] [place] [address]   Hours: N/A        Hotlines and Helplines       Hotline - Crisis help  1  [facility] Mother-Baby Program - Warm Line Distance: 0.45 miles      COVID-19 Status: Phone/Virtual   [address] [place] [address]  Language: English  Hours: Mon - Fri 8:00 AM - 4:00 PM   Phone: (687) 324-7607 Website: https://www.[facility].org/specialty/psychiatry/mother-baby-program/     2  [facility] 24/7 Mobile Crisis Services - Adult crisis (18+) Distance: 0.55 miles      COVID-19 Status: Phone/Virtual   [address] [place] [address]  Language: English  Hours: Mon - Sun Open 24 Hours   Phone: (745) 387-8061 Website: http://www.[place]./residents/emergencies/mental-health-emergencies          Mental Health       Individual counseling  3  [facility] Distance: 0.29 miles      COVID-19 Status: Service Unavailable   [address] [place] [address]  Language: English, Taiwanese, Djiboutian  Hours: Tue - Thu 8:00 AM - 12:00 PM  Fees: Free   Phone: (442) 947-6515 Email: info@[employer] Website: https://www.[place].org/[facility]/opportunity-center/     4  [facility] Adult Psychiatry Clinic Distance: 0.33 miles      COVID-19 Status: Regular Operations   [address] [facility], 1st [address] [place] [address]  Language: English  Hours: Mon - Fri 8:00 AM - 4:30 PM  Fees: Insurance, Self Pay   Phone: (867) 598-4135 Website: https://www.[facility] [place].org/specialty/psychiatry/adult-psychiatry-clinic-medication/     Mental health crisis care  5  [facility] Acute Psychiatry Services Distance: 0.39 miles       COVID-19 Status: Regular Operations   [address] [place] [address]  Language: English  Hours: Mon - Sun Open 24 Hours  Fees: Insurance, Self Pay, Sliding Fee   Phone: (558) 818-4851 Website: https://www.[facility].org/specialty/psychiatry/acute-psychiatry-services/     6  [facility] for Psychiatric Emergencies (COPE) Distance: 0.54 miles      COVID-19 Status: Regular Operations, COVID-19 Status: Phone/Virtual   [address] [place] [address]  Language: English, Honduran, Belgian  Hours: Mon - Sun Open 24 Hours  Fees: Free   Phone: (556) 391-4257 Email: [email or web address].team@[email or web address]. Website: http://www.[place]./residents/emergencies/mental-health-emergencies     Mental health support group  7  [facility] Distance: 0.38 miles      COVID-19 Status: Phone/Virtual   [address] [place] [address]  Language: English  Hours: Mon 9:00 AM - 5:00 PM , Tue 9:30 AM - 5:00 PM , Wed - Fri 9:00 AM - 5:00 PM  Fees: Insurance, Self Pay, Sliding Fee   Phone: (165) 610-3044 Email: [email or web address].[email or web address]     8  Pregnancy & Postpartum Support [facility] Distance: 0.78 miles      COVID-19 Status: Regular Operations   [address] [place] [address]  Language: English  Hours: Tue 7:30 PM - 9:30 PM  Fees: Free   Phone: (950) 799-1955 Email: jaden@[employer] Website: http://www.ppsupport[place].org/multiples          Important Numbers & Websites       Emergency Services   911  City Services   311  Poison Control   (155) 966-8318  Suicide Prevention Lifeline   (886) 142-7863 (TALK)  Child Abuse Hotline   (416) 219-1762 (4-A-Child)  Sexual Assault Hotline   (117) 269-8976 (HOPE)  National Runaway Safeline   (936) 256-7467 (RUNAWAY)  All-Options Talkline   (736) 306-6533  Substance Abuse Referral   (716) 854-6194 (HELP)

## 2022-06-01 ENCOUNTER — TELEPHONE (OUTPATIENT)
Dept: DERMATOLOGY | Facility: CLINIC | Age: 72
End: 2022-06-01
Payer: COMMERCIAL

## 2022-06-03 ENCOUNTER — MEDICAL CORRESPONDENCE (OUTPATIENT)
Dept: FAMILY MEDICINE | Facility: CLINIC | Age: 72
End: 2022-06-03

## 2022-06-03 ENCOUNTER — THERAPY VISIT (OUTPATIENT)
Dept: PHYSICAL THERAPY | Facility: CLINIC | Age: 72
End: 2022-06-03
Payer: COMMERCIAL

## 2022-06-03 DIAGNOSIS — M25.512 CHRONIC LEFT SHOULDER PAIN: Primary | ICD-10-CM

## 2022-06-03 DIAGNOSIS — G89.29 CHRONIC LEFT SHOULDER PAIN: Primary | ICD-10-CM

## 2022-06-03 DIAGNOSIS — S46.002A ROTATOR CUFF INJURY, LEFT, INITIAL ENCOUNTER: ICD-10-CM

## 2022-06-03 PROCEDURE — 97110 THERAPEUTIC EXERCISES: CPT | Mod: GP

## 2022-06-03 PROCEDURE — 97530 THERAPEUTIC ACTIVITIES: CPT | Mod: GP

## 2022-06-03 NOTE — PROGRESS NOTES
[facility]    OUTPATIENT Physical Therapy ORTHOPEDIC EVALUATION  PLAN OF TREATMENT FOR OUTPATIENT REHABILITATION  (COMPLETE FOR INITIAL CLAIMS ONLY)  Patient's Last Name, First Name, M.I.  YOB: 1950  KayleePinky  ROSELIA    Provider s Name:  [facility]   Medical Record No.  [id number]   Start of Care Date:  01/19/22   Onset Date:   01/11/22   Type:     _X__PT   ___OT Medical Diagnosis:    Encounter Diagnoses   Name Primary?    Chronic left shoulder pain Yes    Rotator cuff injury, left, initial encounter         Treatment Diagnosis:  L Shoulder        Goals:     06/03/22 0500   Body Part   Goals listed below are for L Shoulder   Goal #1   Goal #1 sleeping   Previous Functional Level No restrictions   Performance Level No limitations   Current Functional Level 2-3 hours without sleep per night   Performance Level wakes up 1-2x/night due to pain   STG Target Performance 1-2 hours without sleep per night   Performance Level waking 1x/night due to pain, reduced pain (<3/10 when laying on left side)   Rationale to establish restorative sleep pattern   Due Date 06/03/22   Date Goal Met 06/04/22   LTG Target Performance Sleep through the night w/o meds   Performance level able to sleep on left side    Rationale to establish restorative sleep pattern   Due Date 06/03/22   Date Goal Met 06/04/22       Therapy Frequency:  dc this visit  Predicted Duration of Therapy Intervention:       Maulik Orr, PT                 I CERTIFY THE NEED FOR THESE SERVICES FURNISHED UNDER        THIS PLAN OF TREATMENT AND WHILE UNDER MY CARE     (Physician attestation of this document indicates review and certification of the therapy plan).                     Certification Date From:  04/18/22   Certification Date To:  06/03/22    Referring Provider:  Aster Hernandez  Assessment        See Epic Evaluation SOC Date: 01/19/22

## 2022-06-03 NOTE — PROGRESS NOTES
PROGRESS/DISCHSRGE  REPORT    Pinky has been in therapy from 1/18/22 to 6/3/2022 for treatment of chronic left shoulder pain.    Therapist Impression:  Pinky has greatly improved over course of PT. Limited shoulder pain at this time.     Subjective:  Left shoulder continues to get better. Has pain at times but not very often at this point. Has been doing exercises in fitness center 3-4x/week.     Objective:  SHOULDER RANGE OF MOTION (*denotes pain with motion)    AROM Flexion Abduction Base ER Ext/IR level LAURIE level   Left 156 WFL 70 T9 T2   Right 162 WFL 70 T7 T2   *slight twinge with Ext/IR      SHOULDER STRENGTH  MMT: 5/5 in all planes    *able to perform 90 deg elevation with 2# x 20 reps    ASSESSMENT/PLAN  Updated problem list and treatment plan: The primary encounter diagnosis was Chronic left shoulder pain. A diagnosis of Rotator cuff injury, left, initial encounter was also pertinent to this visit. Pain - HEP  Decreased ROM/flexibility - HEP  Decreased function - HEP  Decreased joint mobility - HEP  STG/LTGs have been met or progress has been made towards goals:  Yes (See Goal flow sheet completed today.)  Assessment of Progress: Has met all goals for physical therapy   Self Management Plans:  Patient has been instructed in a home treatment program.  Patient is independent in a home treatment program.  PT intervention is no longer required to meet STG/LTG.    Recommendations:  This patient is ready to be discharged from therapy and continue their home treatment program.    Please refer to the daily flowsheet for treatment today, total treatment time and time spent performing 1:1 timed codes.

## 2022-06-06 ENCOUNTER — ANCILLARY PROCEDURE (OUTPATIENT)
Dept: MAMMOGRAPHY | Facility: CLINIC | Age: 72
End: 2022-06-06
Attending: SURGERY
Payer: COMMERCIAL

## 2022-06-06 DIAGNOSIS — Z12.31 VISIT FOR SCREENING MAMMOGRAM: ICD-10-CM

## 2022-06-06 PROCEDURE — 77067 SCR MAMMO BI INCL CAD: CPT | Mod: 52

## 2022-06-06 PROCEDURE — 77067 SCR MAMMO BI INCL CAD: CPT | Mod: 26

## 2022-06-08 PROBLEM — Z86.0100 HISTORY OF COLONIC POLYPS: Status: ACTIVE | Noted: 2022-06-08

## 2022-06-13 DIAGNOSIS — K59.09 CHRONIC CONSTIPATION: Primary | ICD-10-CM

## 2022-06-13 NOTE — TELEPHONE ENCOUNTER
"Request for medication refill:  calcium polycarbophil (FIBERCON) 625 MG tablet    Providers if patient needs an appointment and you are willing to give a one month supply please refill for one month and  send a letter/MyChart using \".SMILLIMITEDREFILL\" .smillimited and route chart to \"[facility] \" (Giving one month refill in non controlled medications is strongly recommended before denial)    If refill has been denied, meaning absolutely no refills without visit, please complete the smart phrase \".smirxrefuse\" and route it to the \"[facility] MED REFILLS\"  pool to inform the patient and the pharmacy.    Joana Delaney MA        "

## 2022-06-14 RX ORDER — CALCIUM POLYCARBOPHIL 625 MG 625 MG/1
2 TABLET ORAL DAILY
Qty: 180 TABLET | Refills: 3 | Status: SHIPPED | OUTPATIENT
Start: 2022-06-14

## 2022-06-22 ENCOUNTER — DOCUMENTATION ONLY (OUTPATIENT)
Dept: FAMILY MEDICINE | Facility: CLINIC | Age: 72
End: 2022-06-22

## 2022-06-29 NOTE — PROGRESS NOTES
Got a call saying that they are still waiting for this form to be signed. Said that it needs to be signed by Dr. Olivia because she was the one who put the referral in.

## 2022-07-05 ENCOUNTER — PRE VISIT (OUTPATIENT)
Dept: UROLOGY | Facility: CLINIC | Age: 72
End: 2022-07-05

## 2022-07-06 NOTE — TELEPHONE ENCOUNTER
Reason for visit: Follow up     Relevant information: Urge incontinence, recurrent UTI    Records/imaging/labs/orders: All records available    Pt called: N/A    At Rooming: Standard

## 2022-07-08 ENCOUNTER — OFFICE VISIT (OUTPATIENT)
Dept: PSYCHOLOGY | Facility: CLINIC | Age: 72
End: 2022-07-08
Payer: COMMERCIAL

## 2022-07-08 VITALS — BODY MASS INDEX: 38.05 KG/M2 | WEIGHT: 221.7 LBS

## 2022-07-08 DIAGNOSIS — F33.0 MAJOR DEPRESSIVE DISORDER, RECURRENT EPISODE, MILD (H): Primary | ICD-10-CM

## 2022-07-08 DIAGNOSIS — E66.9 OBESITY, UNSPECIFIED OBESITY SEVERITY, UNSPECIFIED OBESITY TYPE: ICD-10-CM

## 2022-07-08 DIAGNOSIS — F54 PSYCHOLOGICAL FACTORS AFFECTING MEDICAL CONDITION: ICD-10-CM

## 2022-07-08 DIAGNOSIS — F41.1 GENERALIZED ANXIETY DISORDER: ICD-10-CM

## 2022-07-08 PROCEDURE — 90837 PSYTX W PT 60 MINUTES: CPT | Performed by: PSYCHOLOGIST

## 2022-07-08 NOTE — PROGRESS NOTES
[facility]  Psychiatry Clinic  TRANSFER of CARE DIAGNOSTIC ASSESSMENT     CARE TEAM:  PCP- Tonia Goel    Psychotherapist- Dr Gr, PhD   Team: Staff at [facility]     This person is a 72 year old who uses the name Pinky and pronouns she, her.      DIAGNOSIS   Schizoaffective disorder, depressed type, in remission without psychotic features  Tardive dyskinesia      ASSESSMENT   Pinky Page is a 71 year old with history of schizoaffective disorder, depressed type who has been relatively stable taking ziprasidone and fluoxetine since 2016.    Pinky is doing well overall..   Issues addressed today are below.    -schizoaffective d/o: some chronic fluctuating depression and anxiety, denies psychosis symptoms, well managed on current medication regimen. Annual neuroleptic labs ordered to be drawn before next visit.     -tardive dyskinesia: has occasional shoulder pain that she attributes to this, chronic and unchanged in frequency. Bilat UE tremor not worsened, some days worse than others, does not notice anything that makes it worse or better except being nervous. AIMS today is 5/40 - some LE movements and UE movements, noticeable to patient but not particularly distressing. I will reach out to Pinky's PCP to discuss possibility of adding propranolol to regimen for tremor treatment if Dr Goel is comfortable with cardiovascular status. We discussed the possible adverse effects of this medication and Pinky expressed understanding.     Future considerations:  Adding propranolol or benadryl for tremor tx   Ingrezza for TD  Repeat EKG if med changes    MNPMP was checked today:  Indicates taking controlled medication as prescribed.     PLAN                                                                                                                1) Meds-  - Continue ziprasidone 40 mg BID  - Continue fluoxetine 40 mg daily  - Continue melatonin 6mg at  bedtime     (prescribed by PCP: gabapentin 900mg TID)    2) Psychotherapy- cont with Dr Gr every month     3) Next due-  Labs- CBC and CMP due 9/2022, ordered to be done prior to next appt with me  EKG- Last EKG 3/2021 without prolonged QTc (439)  Rating scales-  AIMS 7/11/2022 is 5/40 - some LE movements and UE movements, noticeable to patient but not particularly distressing.  AIMS: done 4/14/22 with total score of 4    4) Referrals-  none    5) Dispo- 3 months or sooner if needed      PERTINENT BACKGROUND                                                    [most recent eval 07/08/22]   The following section contains information copied from previous note by Dr Weller On 4/14/2022 and has been reviewed, edited, and verified by the patient.     Pinky first experienced mental health issues in her 20s or 30s and has received treatment for schizoaffective disorder and generalized anxiety. Notably, Pinky's symptoms of depression and psychosis have responded well to a combination of ECT and medication management. Pinky has a history of experiencing increased psychotic symptoms with past attempted antipsychotic tapers. No changes to antipsychotic or antidepressant since 2016. Only medication change in last 6 years has been lowering lorazepam.      In her 30s, she had her gallbladder removed and then suffered first episode of severe depression. She was experiencing psychotic symptoms - paranoia, AH, and suspiciousness. She was hospitalized in [place] in 1986 where she received ECT.  She was hospitalized again in 1987, again received ECT. Was hospitalized for depression in 1995, around the same time she was diagnosed with breast cancer.   She was hospitalized for about nine months this time. She received ECT maintence from 8828-4800. Patient was stable taking Seroquel 400mg, Prozac 40mg, and Ativan 1mg for many years. Seroquel was cross-tapered to ziprasidone due to metabolic side effects in 2016.      She has been  seen at our clinic since 2013. She has lived at [facility] for many years.      Medical history  Notable for afib, DMT2, HTN, ANUJ, hypothyroid, breast cancer s/p mastectomy     Pertinent Items Include: psychosis [sxs include disorganization, hallucinations, paranoia], mutiple psychotropic trials, psych hosp (3-5) and ECT.     SUBJECTIVE     Report from [facility]:   - notably worsening of tremor in arms bilat over past 6 weeks     Per Pinky:   - baseline anxiety; no major medical problems at this time, which she says correlate with worsening of mood and anxiety   - some trouble falling asleep, wears CPAP at night which has helped with sleep quality   - attributes discomfort of mask to insomnia, at baseline with sleep now     Recent Social History: see below    Recent Psych Symptoms:   Depression:  none   Elevated:  none  Psychosis:  none  Anxiety:  excessive worry about the future, chronic  Trauma Related:  none  Sleep: yes  Other: no    Recent Substance Use:     -alcohol: No   -cannabis: No   -tobacco: No  -caffeine:  No   -opioids: No   Narcan Kit currrent: No   -other: none    Pertinent Negatives: No suicidal or violent ideation, hallucinations and delusions  Adverse Effects: Right hand tremor resting, perioral involuntary movements, stiffness in upper extremity, denies chest pain and shortness of breath     FAMILY and SOCIAL HISTORY                                 pt reported     Family History: none known    Social History:    Financial/ Work- social security disability.   Partner/ - never  Children- None      Living situation- [facility]  Since 1998.  Social/ Spiritual Support- sisters (4), staff at [employer]  Early History/Education- Born into family of 8 children. 3 siblings passed away. Born in [place], [place].   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 Worked until got sick in her 30s.      PAST PSYCHIATRIC HISTORY     SIB- None  Suicide Attempt  [#, most recent]- None  Suicidal Ideation Hx- None  Violence/Aggression Hx- None  Psychosis Hx- paranoia, VH  Eating Disorder Hx- yes, in 30s-40s- stopped eating- possibly secondary to depression. No diagnosis of anorexia  Psych Hosp [#, most recent]- 5-6 times, 10+ years since last hospitalization.   Commitment- None  ECT- yes, 1986, 1987, 1996- 2005. (last 2005). Was very helpful.  Outpatient Programs - IOP (1987), eating disorder tx, 1987  Other - N/A     PAST MED TRIALS   sertaline   amitriptyline   lithium   risperidone   olanzapine   trazodone   clonazepam   quetiapine 400mg (weight gain/metabolic SEs)  Lorazepam   triazolam  clonazepam.    July- decrease lorazepam from 0.5mg to 0.375 (liquid)  August 30 - decrease lorazepam to 0.25mg due to difficulty drawing up correct liquid amount  10/25/2021- stop lorazepam.   1/18/2022 - no changes  4/14/2022- no changes     PAST SUBSTANCE USE HISTORY     Past Use- none reported   No CD treatment reported      MEDICAL HISTORY and ALLERGY     ALLERGIES: Chlordiazepoxide hcl, Dimetapp dm cold-cough, Haldol, Ibuprofen, Lactose intolerance [beta-galactosidase], Milk products, and Propofol    Patient Active Problem List   Diagnosis     Allergic rhinitis due to pollen     Urge incontinence     Hypertension, essential     Cardiomegaly     Chronic constipation     Dry eye syndrome     Esophageal reflux     Exposure keratoconjunctivitis     DM ophthalmopathy (H)     Hypothyroidism     Senile cataract     ANUJ (obstructive sleep apnea)     Vaginal atrophy     Restless leg syndrome     Squamous blepharitis     Morbid obesity due to excess calories (H)     Personal history of breast cancer s/p L masectomy     Hypercholesteremia     Lives in group home     Extrapyramidal and movement disorder     CKD (chronic kidney disease) stage 2, GFR 60-89 ml/min     Solitary kidney, congenital     S/P hysterectomy     Impingement syndrome of right shoulder     Hypertriglyceridemia     Candidiasis  of skin     Generalized anxiety disorder     Carpal tunnel syndrome of left wrist     Schizoaffective disorder, depressive type (H)     Major depressive disorder, recurrent episode, moderate (H)     Psychological factors affecting medical condition     Hx of psychiatric care     Nail complaint     Microalbuminuria due to type 2 diabetes mellitus (H)     Type 2 diabetes mellitus with microalbuminuria, with long-term current use of insulin (H)     Conjunctivitis of both eyes     Corneal erosion of both eyes     Spastic entropion, right     Foot callus     Paroxysmal atrial fibrillation (H)     Squamous cell carcinoma in situ of skin of face     History of colonic polyps        MEDICAL REVIEW OF SYSTEMS   Contraception-  No   Pregnant- N/A  none in addition to that documented above     MEDICATIONS     Current Outpatient Medications   Medication Sig Dispense Refill     acetaminophen (TYLENOL) 500 MG tablet Take 2 tablets (1,000 mg) by mouth every 6 hours as needed 1 Bottle 2     alum & mag hydroxide-simethicone (MYLANTA/MAALOX) 200-200-20 MG/5ML SUSP suspension Take 30 mLs by mouth daily as needed for indigestion       amLODIPine (NORVASC) 10 MG tablet Take 1 tablet (10 mg) by mouth daily 90 tablet 0     apixaban ANTICOAGULANT (ELIQUIS) 5 MG tablet Take 1 tablet (5 mg) by mouth 2 times daily 180 tablet 0     artificial saliva (BIOTENE MT) AERS spray Take 1 spray by mouth 3 times daily as needed for dry mouth       artificial tears (GENTEAL) 0.1-0.2-0.3 % ophthalmic solution        atorvastatin (LIPITOR) 40 MG tablet Take 1 tablet (40 mg) by mouth daily 90 tablet 1     blood glucose (NO BRAND SPECIFIED) lancets standard Use to test blood sugar 2 times daily or as directed. 100 each 11     blood glucose calibration (NO BRAND SPECIFIED) solution Use to calibrate blood glucose monitor as directed. 1 each 3     blood glucose monitoring (NO BRAND SPECIFIED) meter device kit Check Blood sugars twice a day. 1 kit 0     blood  glucose monitoring (NO BRAND SPECIFIED) test strip Use to test blood sugar 3 times daily or as directed. 100 each 3     Calcium Carb-Cholecalciferol (CALCIUM-VITAMIN D3) 250-125 MG-UNIT TABS        Calcium Carbonate-Vitamin D (CALCIUM-VITAMIN D) 250-125 MG-UNIT TABS Take 1 tablet by mouth 2 times daily Calcium 250 mg/Vit D 125 IU       calcium polycarbophil (FIBERCON) 625 MG tablet Take 2 tablets (1,250 mg) by mouth daily 180 tablet 3     CLARITIN 10 MG OR TABS 1 TAB PO QD (Once per day) as needed for ALLERGY SYMPTOMS 30 11     conjugated estrogens (PREMARIN) 0.625 MG/GM vaginal cream Place 2 g vaginally daily for 2 weeks, followed by 2 g vaginally twice a week for vaginal atrophy and frequent UTIs 30 g 3     erythromycin (ROMYCIN) 5 MG/GM ophthalmic ointment Place 0.5 inches into both eyes At Bedtime 3.5 g 11     FLUoxetine (PROZAC) 20 MG capsule Take 2 capsules (40 mg) by mouth daily 60 capsule 3     fluticasone (FLONASE) 50 MCG/ACT nasal spray Spray 1 spray into both nostrils daily 16 g 3     furosemide (LASIX) 20 MG tablet Take 1 tablet (20 mg) by mouth 2 times daily 60 tablet 3     Gabapentin (NEURONTIN PO) Take 900 mg by mouth 3 times daily.       Hypromellose (ARTIFICIAL TEARS OP) Apply 1 drop to eye 4 times daily.       insulin glargine (LANTUS PEN) 100 UNIT/ML pen Inject 15 Units Subcutaneous At Bedtime 8 mL 0     lactase (LACTAID) 3000 UNIT tablet Take 1 tablet (3,000 Units) by mouth 3 times daily (with meals) 90 tablet 11     levothyroxine (SYNTHROID/LEVOTHROID) 175 MCG tablet Take 1 tablet (175 mcg) by mouth daily 90 tablet 3     lidocaine (XYLOCAINE) 2 % external gel Apply topically 3 times daily as needed for moderate pain 85 g 1     lidocaine (XYLOCAINE) 5 % external ointment Apply topically 3 times daily as needed for moderate pain 50 g 1     lifitegrast (XIIDRA) 5 % opthalmic solution Place 1 drop into both eyes 2 times daily 60 each 11     liraglutide (VICTOZA) 18 MG/3ML solution Inject 1.8 mg  Subcutaneous daily 9 mL 0     losartan (COZAAR) 100 MG tablet Take 0.5 tablets (50 mg) by mouth 2 times daily 90 tablet 1     Magnesium Hydroxide (MILK OF MAGNESIA PO) Take 30 mL as needed for constipation.       melatonin 3 MG CAPS Take 6 mg by mouth At Bedtime 60 capsule 3     metFORMIN (GLUCOPHAGE) 1000 MG tablet Take 1,000 mg by mouth 2 times daily (with meals)       NEW MED Prohydrate Moisturizing gel  Place 1 applicator vaginally 4 times a week 20 oz 3     nystatin (MYCOSTATIN) 118139 UNIT/GM POWD Apply topically 3 times daily as needed 60 g 1     ONETOUCH DELICA LANCETS 33G MISC        polyethylene glycol (MIRALAX/GLYCOLAX) powder Take 1 capful by mouth 2 times daily 17 GM PO BID       Saline 0.9 % SOLN Spray 2 sprays in nostril as needed.       senna-docusate (SENOKOT-S/PERICOLACE) 8.6-50 MG tablet Take 2 tablets by mouth At Bedtime.       simethicone (MYLICON) 125 MG chewable tablet Take 1 tablet (125 mg) by mouth 2 times daily 60 tablet 0     Skin Protectants, Misc. (EUCERIN) cream Apply topically as needed Apply to thigh PRN dry skin       SM LUBRICANT EYE DROPS 0.4-0.3 % SOLN ophthalmic solution        solifenacin (VESICARE) 10 MG tablet Take 1 tablet (10 mg) by mouth daily 30 tablet 6     ziprasidone (GEODON) 40 MG capsule Take 1 capsule (40 mg) by mouth 2 times daily (with meals) 60 capsule 0      VITALS   LMP  (LMP Unknown)     MENTAL STATUS EXAM     Alertness: alert   Appearance: adequately groomed  Behavior/Demeanor: cooperative, pleasant and calm, with good  eye contact   Speech: slowed and regular rate and rhythm  Language: intact  Psychomotor: tremor  Mood: description consistent with euthymia  Affect: full range; congruent to: mood- yes, content- yes  Thought Process/Associations: unremarkable  Thought Content:  Reports none;  Denies suicidal & violent ideation and delusions  Perception:  Reports none;  Denies hallucinations and depersonalization  Insight: good  Judgment: good  Cognition: does   appear grossly intact; formal cognitive testing was not done  Gait and Station: remarkable for:  slightly reduced swing phase of gait, slowed gait       LABS and DATA     PHQ 11/30/2021 1/11/2022 5/31/2022   PHQ-9 Total Score 7 6 9   Q9: Thoughts of better off dead/self-harm past 2 weeks Not at all Not at all Not at all   F/U: Thoughts of suicide or self-harm - - -   F/U: Safety concerns - - -     Antipsychotic Labs:  Recent Labs   Lab Test 04/12/22  1514 03/16/21  1537 09/16/20  0000   CHOL 117 124 106   TRIG 72 136 89   LDL 55 53 46   HDL 48* 43* 42*     Recent Labs   Lab Test 05/12/22  1230 04/12/22  1514 10/08/21  2143 03/16/21  1537 09/16/20  0000   *  --  168* 204.0* 73   A1C  --  7.4*  --  7.8* 6.6*     Recent Labs   Lab Test 10/08/21  2143 03/16/21  1537 05/07/19  0650 10/25/18  1734 06/26/18  0659   WBC 11.8*  --  10.06* 14.2* 9.72   ANEU  --  7.5  --  10.6* 5.31   HGB 13.0 13.7 13.3 14.0 12.6     --  292 275 247       Recent Labs   Lab Test 11/18/21  1617 10/08/21  2143   CR 0.8 0.84   GFRESTIMATED >60 70     Recent Labs   Lab Test 03/16/21  1537 06/26/18  0659   AST 7.9 16   ALT 12.8 21   ALKPHOS 78.9 111       EKG 3/2021 - showing atrial fibrillation. QtC 392.   EKG 10/8/2021- QtC 439    Abnormal Involuntary Movement Scale (AIMS)    CODE 0=None    1=Minimal, may be extreme normal    2=Mild    3=Moderate    4-Severe       MOVEMENT RATINGS:  Rate highest severity observed. Rate  RATER    movements that occur upon activation one less than those observed     spontaneously.  [place] movement as well as code number that applies. Date        Facial and  1. Muscles of Facial Expression  0 1 2 3 4    Oral       e.g. movements of forehead, eyebrows, periorbital area,    Movements       cheeks, including frowning, blinking, smiling, grimacing     2. Lips and Perioral Area  0 1 2 3 4         e.g., puckering, pouting, smacking      3. Jaw e.g. biting, clenching, chewing, mouth opening, 0 1 2 3 4          lateral movement      4. Tongue Rate only increases in movement both in and out       of mouth.  NOT inability to sustain movement.  Darting in      and out of mouth. 0 1 2 3 4     5. Upper (arms, wrists,, hands, fingers)    Include choreic movements (i.e., rapid, objectively purposeless, irregular, spontaneous) athetoid movements (i.e., slow, irregular,    complex, serpentine).  DO NOT INCLUDE TREMOR   (i.e., repetitive, regular, rhythmic)  0 1 2 3 4    Extremity Movements                 6. Lower (legs, knees, ankles, toes)   e.g., lateral knee movement, foot tapping, heel dropping, foot squirming, inversion and eversion of foot.  0 1 2 3 4        Trunk  Movements 7. Neck, shoulders, hips e.g., rocking, twisting, squirming, pelvic  gyrations  0 1 2 3 4    Global  8. Severity of abnormal movements overall  0 1 2 3 4     9. Incapacitation due to abnormal movements 0 1 2 3 4    Judgments           10. Patient s awareness of abnormal movements     Rate only patient s report No awareness 0   Aware, no distress 1 0    Aware, mild distress 2 1     Aware, moderate distress 3 2    Aware, severe distress 4            3      4   Dental Status  11. Current problems with teeth and/or dentures? No   Yes    12. Are dentures usually worn? No  Yes         13. Edentia? No   Yes    14. Do movements disappear in sleep? No   Yes     Examination Procedure  Either before or after completing the examination procedure, observe the patient unobtrusively at rest (e.g., in the waiting room).   The chair to be used in this examination should be a hard, firm one without arms.  Have the person remove their shoes and socks.    1. Ask the patient whether there is anything in his or her mouth (such as gum or candy) and, if so, to remove it.   2. Ask about the *current* condition of the patient's teeth. Ask if he or she wears dentures. Ask whether teeth or dentures bother the patient *now*.   3. Ask whether the patient notices any movements in his  or her mouth, face, hands, or feet. If yes, ask the patient to describe them and to indicate to what extent they *currently* bother the patient or interfere with activities.   4. Have the patient sit in chair with hands on knees, legs slightly apart, and feet flat on floor. (Look at the entire body for movements while the patient is in this position.)   5. Ask the patient to sit with hands hanging unsupported -- if male, between his legs, if female and wearing a dress, hanging over her knees. (Observe hands and other body areas).   6. Ask the patient to open his or her mouth. (Observe the tongue at rest within the mouth.) Do this twice.   7. Ask the patient to protrude his or her tongue. (Observe abnormalities of tongue movement.) Do this twice.   8. Ask the patient to tap his or her thumb with each finger as rapidly as possible for 10 to 15 seconds, first with right hand, then with left hand. (Observe facial and leg movements.) [ activated]   9. Flex and extend the patient's left and right arms, one at a time.   10. Ask the patient to stand up. (Observe the patient in profile. Observe all body areas again, hips included.)   11. Ask the patient to extend both arms out in front, palms down. (Observe trunk, legs, and mouth.) [activated]   12. Have the patient walk a few paces, turn, and walk back to the chair. (Observe hands and gait.) Do this twice. [activated]     AIMS score 7/11/22: 5        PSYCHOTROPIC DRUG INTERACTIONS                                                       PSYCHCLINICDDI   ZIPRASIDONE and fluoxetine may result in increased risk of QT-interval prolongation.  ZIPRASIDONE and SEROTONERGIC DRUGS THAT PROLONG QT INTERVAL may result in increased risk of QT-interval prolongation and increased risk of serotonin syndrome (hypertension, hyperthermia, myoclonus, mental status changes).  NSAID and SSRI may result in an increased risk of bleeding  ERYTHROMYCIN and FLUOXETINE may result in an increased risk of  cardiotoxicity (QT prolongation, torsades de pointes, cardiac arrest).  FLUOXETINE and INSULINS OR PRAMLINTIDE may result in increased risk of hypoglycemia.  GABAPENTIN and CNS DEPRESSANTS may result in respiratory depression.   GABAPENTIN and ANTACIDS may result in decreased gabapentin effectiveness.      MANAGEMENT:  Monitoring for adverse effects, periodic EKGs and patient is aware of risks     RISK STATEMENT for SAFETY     Pinky did not appear to be an imminent safety risk to self or others.    TREATMENT RISK STATEMENT: The risks, benefits, alternatives and potential adverse effects have been discussed and are understood by the pt. The pt understands the risks of using street drugs or alcohol. There are no medical contraindications, the pt agrees to treatment with the ability to do so. The pt knows to call the clinic for any problems or to access emergency care if needed.  Medical and substance use concerns are documented above.  Psychotropic drug interaction check was done, including changes made today.     PROVIDER: Malachi Cassidy, DO    Patient staffed in clinic with Dr. Estrada who will sign the note.  Supervisor is Dr. Gordon.

## 2022-07-11 ENCOUNTER — OFFICE VISIT (OUTPATIENT)
Dept: PSYCHIATRY | Facility: CLINIC | Age: 72
End: 2022-07-11
Attending: PSYCHIATRY & NEUROLOGY
Payer: COMMERCIAL

## 2022-07-11 VITALS
DIASTOLIC BLOOD PRESSURE: 80 MMHG | BODY MASS INDEX: 39.31 KG/M2 | HEART RATE: 78 BPM | WEIGHT: 229 LBS | SYSTOLIC BLOOD PRESSURE: 143 MMHG

## 2022-07-11 DIAGNOSIS — G47.09 OTHER INSOMNIA: ICD-10-CM

## 2022-07-11 DIAGNOSIS — Z51.81 ENCOUNTER FOR THERAPEUTIC DRUG LEVEL MONITORING: Primary | ICD-10-CM

## 2022-07-11 DIAGNOSIS — F25.1 SCHIZOAFFECTIVE DISORDER, DEPRESSIVE TYPE (H): ICD-10-CM

## 2022-07-11 DIAGNOSIS — Z79.899 ENCOUNTER FOR LONG-TERM (CURRENT) USE OF MEDICATIONS: ICD-10-CM

## 2022-07-11 PROCEDURE — G0463 HOSPITAL OUTPT CLINIC VISIT: HCPCS

## 2022-07-11 PROCEDURE — 99215 OFFICE O/P EST HI 40 MIN: CPT | Mod: GC | Performed by: STUDENT IN AN ORGANIZED HEALTH CARE EDUCATION/TRAINING PROGRAM

## 2022-07-11 RX ORDER — ZIPRASIDONE HYDROCHLORIDE 40 MG/1
40 CAPSULE ORAL 2 TIMES DAILY WITH MEALS
Qty: 60 CAPSULE | Refills: 2 | Status: SHIPPED | OUTPATIENT
Start: 2022-07-11 | End: 2022-10-10

## 2022-07-11 ASSESSMENT — PAIN SCALES - GENERAL: PAINLEVEL: NO PAIN (0)

## 2022-07-11 NOTE — PATIENT INSTRUCTIONS
Treatment Plan Today:     1) Medications- no changes in medications; refills sent today.   - please get lab drawn before next visit at a [facility] lab or at [facility]    2) Follow-up appt with Dr Cassidy in 3 months on 10/10/22. Our  will help you find a time; please call our clinic if you are not sure of the time for this appointment.     3) Crisis numbers are below and clinic after hours number is [phone]           **For crisis resources, please see the information at the end of this document**   Patient Education    Thank you for coming to the [facility] [place] [facility].     Lab Testing:  If you had lab testing today and your results are reassuring or normal they will be mailed to you or sent through [facility] within 7 days. If the lab tests need quick action we will call you with the results. The phone number we will call with results is # [phone]. If this is not the best number please call our clinic and change the number.     Medication Refills:  If you need any refills please call your pharmacy and they will contact us. Our fax number for refills is [phone].   Three business days of notice are needed for general medication refill requests.   Five business days of notice are needed for controlled substance refill requests.   If you need to change to a different pharmacy, please contact the new pharmacy directly. The new pharmacy will help you get your medications transferred.     Contact Us:  Please call [phone] during business hours (8-5:00 M-F).   If you have medication related questions after clinic hours, or on the weekend, please call [phone].     Financial Assistance [phone]   Medical Records [phone]       MENTAL HEALTH CRISIS RESOURCES:  For a emergency help, please call 911 or go to the nearest Emergency Department.     Emergency Walk-In Options:   EmPATH Unit @ [facility]): [phone] - Specialized mental  health emergency area designed to be calming  [facility] ([place]): [phone]  [facility] ([place]): [phone]  [facility] ([place]): [phone]    [facility] Crisis Information:   Marcela: [phone]  Carlos: [phone]  Daniel (MOHIT) - Adult: [phone]     Child: [phone]  [place] - Adult: [phone]     Child: [phone]  [place]: [phone]  List of all [facility] resources:   https://[place].gov/dhs/people-we-serve/adults/health-care/mental-health/resources/crisis-contacts.jsp    National Crisis Information:   Crisis Text Line: Text  [place]  to [address]1007  National Suicide Prevention Lifeline: 9-629-502-TALK (1-277.221.4524)       For online chat options, visit https://suicidepreventionlifeline.org/chat/  Poison Control Center: 1-338.669.5625  Trans Lifeline: 1-330.689.8736 - Hotline for transgender people of all ages  The Bo Project: 8-235-635-3987 - Hotline for LGBT youth     For Non-Emergency Support:   Fast Tracker: Mental Health & Substance Use Disorder Resources -   https://www.[employer].org/

## 2022-07-14 ENCOUNTER — OFFICE VISIT (OUTPATIENT)
Dept: OPHTHALMOLOGY | Facility: CLINIC | Age: 72
End: 2022-07-14
Attending: OPHTHALMOLOGY
Payer: COMMERCIAL

## 2022-07-14 DIAGNOSIS — H25.813 MIXED TYPE AGE-RELATED CATARACT, BOTH EYES: Primary | ICD-10-CM

## 2022-07-14 DIAGNOSIS — R80.9 TYPE 2 DIABETES MELLITUS WITH MICROALBUMINURIA, WITH LONG-TERM CURRENT USE OF INSULIN (H): ICD-10-CM

## 2022-07-14 DIAGNOSIS — Z79.4 TYPE 2 DIABETES MELLITUS WITH MICROALBUMINURIA, WITH LONG-TERM CURRENT USE OF INSULIN (H): ICD-10-CM

## 2022-07-14 DIAGNOSIS — H04.123 DRY EYES: ICD-10-CM

## 2022-07-14 DIAGNOSIS — H40.033 ANATOMICAL NARROW ANGLE BORDERLINE GLAUCOMA OF BOTH EYES: ICD-10-CM

## 2022-07-14 DIAGNOSIS — E11.29 TYPE 2 DIABETES MELLITUS WITH MICROALBUMINURIA, WITH LONG-TERM CURRENT USE OF INSULIN (H): ICD-10-CM

## 2022-07-14 PROCEDURE — 99214 OFFICE O/P EST MOD 30 MIN: CPT | Mod: GC | Performed by: OPHTHALMOLOGY

## 2022-07-14 PROCEDURE — 92015 DETERMINE REFRACTIVE STATE: CPT

## 2022-07-14 PROCEDURE — G0463 HOSPITAL OUTPT CLINIC VISIT: HCPCS

## 2022-07-14 RX ORDER — FLUOROMETHOLONE 0.1 %
1 SUSPENSION, DROPS(FINAL DOSAGE FORM)(ML) OPHTHALMIC (EYE) 4 TIMES DAILY
Qty: 10 ML | Refills: 0 | Status: SHIPPED | OUTPATIENT
Start: 2022-07-14 | End: 2022-09-14

## 2022-07-14 ASSESSMENT — REFRACTION_MANIFEST
OS_AXIS: 045
OD_SPHERE: +2.25
OS_SPHERE: +1.50
OD_ADD: +2.75
OS_ADD: +2.75
OD_CYLINDER: +0.75
OD_AXIS: 170
OS_CYLINDER: +0.25

## 2022-07-14 ASSESSMENT — TONOMETRY
OS_IOP_MMHG: 20
IOP_METHOD: TONOPEN
OD_IOP_MMHG: 20

## 2022-07-14 ASSESSMENT — CUP TO DISC RATIO
OD_RATIO: 0.2
OS_RATIO: 0.2

## 2022-07-14 ASSESSMENT — REFRACTION_WEARINGRX
OS_SPHERE: +1.75
OS_ADD: +2.75
OD_SPHERE: +2.00
OD_AXIS: 061
OD_CYLINDER: +0.75
OS_CYLINDER: +0.25
SPECS_TYPE: PAL
OD_ADD: +2.75
OS_AXIS: 065

## 2022-07-14 ASSESSMENT — SLIT LAMP EXAM - LIDS
COMMENTS: POOR BLINK
COMMENTS: POOR BLINK

## 2022-07-14 ASSESSMENT — EXTERNAL EXAM - LEFT EYE: OS_EXAM: NORMAL

## 2022-07-14 ASSESSMENT — VISUAL ACUITY
OS_CC: 20/40
METHOD: SNELLEN - LINEAR
OD_CC: 20/30

## 2022-07-14 ASSESSMENT — EXTERNAL EXAM - RIGHT EYE: OD_EXAM: NORMAL

## 2022-07-14 ASSESSMENT — CONF VISUAL FIELD
OS_NORMAL: 1
OD_NORMAL: 1
METHOD: COUNTING FINGERS

## 2022-07-14 NOTE — NURSING NOTE
Chief Complaints and History of Present Illnesses   Patient presents with     Annual Eye Exam     Chief Complaint(s) and History of Present Illness(es)     Annual Eye Exam     Laterality: both eyes    Associated symptoms: dryness, eye pain and floaters.  Negative for flashes              Comments     Here for annual cataracts and diabetic exam. Vision has gradually worsened in both eyes since last visit. About 1 month ago, patient started noticing intermittent floaters in both eyes without flashes. She uses lubricating drops as needed for dryness. Intermittent pain.     Last BS: 135 at 4P yesterday   Lab Results       Component                Value               Date                       A1C                      7.4                 04/12/2022                 A1C                      7.8                 03/16/2021                 A1C                      6.6                 09/16/2020                 A1C                      6.6                 03/02/2020                 A1C                      6.6                 08/06/2019                 A1C                      6.8                 05/07/2019              Sherwin David COT 12:40 PM July 14, 2022

## 2022-07-14 NOTE — PROGRESS NOTES
Chief Complaint(s) and History of Present Illness(es)     Annual Eye Exam     Laterality: both eyes    Associated symptoms: dryness, eye pain and floaters.  Negative for   flashes              Comments     Here for annual cataracts and diabetic exam. Vision has gradually   worsened in both eyes since last visit. About 1 month ago, patient started   noticing intermittent floaters in both eyes without flashes. She uses   lubricating drops as needed for dryness. Intermittent pain.     Last BS: 135 at 4P yesterday   Lab Results       Component                Value               Date                       A1C                      7.4                 04/12/2022                 A1C                      7.8                 03/16/2021                 A1C                      6.6                 09/16/2020                 A1C                      6.6                 03/02/2020                 A1C                      6.6                 08/06/2019                 A1C                      6.8                 05/07/2019              Sherwin Joann COT 12:40 PM July 14, 2022               Review of systems for the eyes was negative other than the pertinent positives/negatives listed in the HPI.      Assessment & Plan      Pinky Page is a 72 year old female with the following diagnoses:   1. Mixed type age-related cataract, both eyes    2. Anatomical narrow angle borderline glaucoma of both eyes    3. Dry eyes    4. Type 2 diabetes mellitus with microalbuminuria, with long-term current use of insulin (H)       Patient notes new floaters in both eyes for the past month. Floaters are intermittent. No flashes, loss of vision. Feels like vision has been worsening for past 2-3 months as well - more blurry. No problems with glare or photophobia.    Single DBH in right eye, no other signs of retinopathy  Stressed good glycemic and hypertensive control  Monitor yearly      Cataract left eye > right eye  May be visually significant at this  time, will try treating dry eye sx before pursuing surgery  Monitor     Discussed contributing factors to vision loss  Recommend lid hygiene and warm compresses  Continue artifical tears four times a day    Continuing xiidra twice a day both eyes   ESS marissa at bedtime as needed   Start FML QID in both eyes     Refraction done today  Increase dry eye treatment before considering cataract surgery       Patient disposition:   Return in about 7 weeks (around 9/1/2022) for VT with Dr. Lopez.          Zandra Maria MD  Resident Physician, PGY-2  Department of Ophthalmology    Attending Physician Attestation:  Complete documentation of historical and exam elements from today's encounter can be found in the full encounter summary report (not reduplicated in this progress note).  I personally obtained the chief complaint(s) and history of present illness.  I confirmed and edited as necessary the review of systems, past medical/surgical history, family history, social history, and examination findings as documented by others; and I examined the patient myself.  I personally reviewed the relevant tests, images, and reports as documented above.  I formulated and edited as necessary the assessment and plan and discussed the findings and management plan with the patient and family. . - Michael Lopez MD

## 2022-07-15 ENCOUNTER — TELEPHONE (OUTPATIENT)
Dept: OPHTHALMOLOGY | Facility: CLINIC | Age: 72
End: 2022-07-15

## 2022-07-15 ENCOUNTER — OFFICE VISIT (OUTPATIENT)
Dept: UROLOGY | Facility: CLINIC | Age: 72
End: 2022-07-15
Payer: COMMERCIAL

## 2022-07-15 VITALS — DIASTOLIC BLOOD PRESSURE: 82 MMHG | SYSTOLIC BLOOD PRESSURE: 142 MMHG | HEART RATE: 77 BPM

## 2022-07-15 DIAGNOSIS — N39.0 RECURRENT UTI: Primary | ICD-10-CM

## 2022-07-15 PROCEDURE — 99213 OFFICE O/P EST LOW 20 MIN: CPT | Performed by: OBSTETRICS & GYNECOLOGY

## 2022-07-15 ASSESSMENT — PAIN SCALES - GENERAL: PAINLEVEL: NO PAIN (0)

## 2022-07-15 NOTE — LETTER
7/15/2022       RE: Pinky Page  [address] [facility]  [facility] [address]     Dear Colleague,    Thank you for referring your patient, Pinky Page, to the [facility] UROLOG[facility] [address] [facility]. Please see a copy of my visit note below.    July 15, 2022    Return visit    Patient returns today for f/u of recurrent UTIs. Since her last visit she has done well with only 1 UTI in Feb, treated with Keflex (nitrofurantoin resistant). Still using the estrogen cream.    BP (!) 142/82   Pulse 77   LMP  (LMP Unknown)   She is comfortable, in no distress, non-labored breathing.      A/P: 72 year old F with recurrent UTIs, none since Feb    F/U PRN    I spent a total of 20 minutes with  Ms. Page  on the date of the encounter in chart review, face to face patient visit, review of tests, documentation and/or discussion with other providers about the issues documented above.    Kelton Costa MD  Professor, OB/GYN  Urogynecologist    CC  Patient Care Team:  Tonia Goel MD as PCP - General (Student in organized health care education/training program)  Dr Mackey Residence as Damon Abad, PhD LP (Psychology)  Dedra Dior MD as MD (Urology)  Analy Bush, ALLEN as Clinic Care Coordinator (Urology)  Matilde Ann MD as Referring Physician  Residence, Nany Blackmon MD as MD (Ophthalmology)  Gaby Holliday MD as MD (Nephrology)  Allie Bauer, RN as Registered Nurse  Michael Lopez MD as MD (Ophthalmology)  María Shi MD as Resident (Student in organized health care education/training program)  Roxy Rubio MD as MD (Psychiatry)  Haile King MD as Assigned Heart and Vascular Provider  Tonia Goel MD as Assigned PCP  Denise Dewey MD as Resident (Psychiatry)  Annie Gordon MD as MD (Psychiatry)  Brianda Reddy MD as Assigned  Pulmonology Provider  Kelton Costa MD as MD (OB/Gyn)  Sarah Renner DO as MD (Family Medicine)  Kelton Costa MD as Assigned OBGYN Provider  Damon Gr, PhD  as Assigned Behavioral Health Provider  Kris Serna MD as Assigned Surgical Provider  Rachna Reilly AuD as Audiologist (Audiology)  Rachna Reilly AuD as Audiologist (Audiology)  Gloria Cano AuD as Audiologist (Audiology)  Adriana Robbins MD as MD (Dermatology)  SELF, REFERRED

## 2022-07-15 NOTE — PROGRESS NOTES
July 15, 2022    Return visit    Patient returns today for f/u of recurrent UTIs. Since her last visit she has done well with only 1 UTI in Feb, treated with Keflex (nitrofurantoin resistant). Still using the estrogen cream.    BP (!) 142/82   Pulse 77   LMP  (LMP Unknown)   She is comfortable, in no distress, non-labored breathing.      A/P: 72 year old F with recurrent UTIs, none since Feb    F/U PRN    I spent a total of 20 minutes with  Ms. Page  on the date of the encounter in chart review, face to face patient visit, review of tests, documentation and/or discussion with other providers about the issues documented above.    Kelton Costa MD  Professor, OB/GYN  Urogynecologist    CC  Patient Care Team:  Tonia Goel MD as PCP - General (Student in organized health care education/training program)  Dr Mackey Residence as Damon Abad, PhD LP (Psychology)  Dedra Dior MD as MD (Urology)  Analy Bush, ALLEN as Clinic Care Coordinator (Urology)  Matilde Ann MD as Referring Physician  Residence, Nany Blackmon MD as MD (Ophthalmology)  Gaby Holliday MD as MD (Nephrology)  Allie Bauer, RN as Registered Nurse  Michael Lopez MD as MD (Ophthalmology)  María Shi MD as Resident (Student in organized health care education/training program)  Roxy Rubio MD as MD (Psychiatry)  Haile King MD as Assigned Heart and Vascular Provider  Tonia Goel MD as Assigned PCP  Denise Dewey MD as Resident (Psychiatry)  Annie Gordon MD as MD (Psychiatry)  Brianda Reddy MD as Assigned Pulmonology Provider  Kelton Costa MD as MD (OB/Gyn)  Sarah Renner DO as MD (Family Medicine)  Kelton Costa MD as Assigned OBGYN Provider  Damon Gr, PhD LP as Assigned Behavioral Health Provider  Kris Serna MD as Assigned Surgical Provider  Rachna Reilly AuD as Audiologist (Audiology)  Rachna Reilly  Marc BLEDSOE as Audiologist (Audiology)  Gloria Cano AuD as Audiologist (Audiology)  Adriana Robbins MD as MD (Dermatology)  SELF, REFERRED

## 2022-07-15 NOTE — NURSING NOTE
Chief Complaint   Patient presents with     Follow Up       Blood pressure (!) 142/82, pulse 77, not currently breastfeeding. There is no height or weight on file to calculate BMI.    Patient Active Problem List   Diagnosis     Allergic rhinitis due to pollen     Urge incontinence     Hypertension, essential     Cardiomegaly     Chronic constipation     Dry eye syndrome     Esophageal reflux     Exposure keratoconjunctivitis     DM ophthalmopathy (H)     Hypothyroidism     Senile cataract     ANUJ (obstructive sleep apnea)     Vaginal atrophy     Restless leg syndrome     Squamous blepharitis     Morbid obesity due to excess calories (H)     Personal history of breast cancer s/p L masectomy     Hypercholesteremia     Lives in group home     Extrapyramidal and movement disorder     CKD (chronic kidney disease) stage 2, GFR 60-89 ml/min     Solitary kidney, congenital     S/P hysterectomy     Impingement syndrome of right shoulder     Hypertriglyceridemia     Candidiasis of skin     Generalized anxiety disorder     Carpal tunnel syndrome of left wrist     Schizoaffective disorder, depressive type (H)     Major depressive disorder, recurrent episode, moderate (H)     Psychological factors affecting medical condition     Hx of psychiatric care     Nail complaint     Microalbuminuria due to type 2 diabetes mellitus (H)     Type 2 diabetes mellitus with microalbuminuria, with long-term current use of insulin (H)     Conjunctivitis of both eyes     Corneal erosion of both eyes     Spastic entropion, right     Foot callus     Paroxysmal atrial fibrillation (H)     Squamous cell carcinoma in situ of skin of face     History of colonic polyps       Allergies   Allergen Reactions     Chlordiazepoxide Hcl      Dimetapp Dm Cold-Cough      Cold/Congetion TABS     Haldol      Ibuprofen      TABS     Lactose Intolerance [Beta-Galactosidase]      CAPS     Milk Products      Propofol      EMUL       Current Outpatient Medications    Medication Sig Dispense Refill     acetaminophen (TYLENOL) 500 MG tablet Take 2 tablets (1,000 mg) by mouth every 6 hours as needed 1 Bottle 2     alum & mag hydroxide-simethicone (MYLANTA/MAALOX) 200-200-20 MG/5ML SUSP suspension Take 30 mLs by mouth daily as needed for indigestion       amLODIPine (NORVASC) 10 MG tablet Take 1 tablet (10 mg) by mouth daily 90 tablet 0     apixaban ANTICOAGULANT (ELIQUIS) 5 MG tablet Take 1 tablet (5 mg) by mouth 2 times daily 180 tablet 0     artificial saliva (BIOTENE MT) AERS spray Take 1 spray by mouth 3 times daily as needed for dry mouth       artificial tears (GENTEAL) 0.1-0.2-0.3 % ophthalmic solution        atorvastatin (LIPITOR) 40 MG tablet Take 1 tablet (40 mg) by mouth daily 90 tablet 1     blood glucose (NO BRAND SPECIFIED) lancets standard Use to test blood sugar 2 times daily or as directed. 100 each 11     blood glucose calibration (NO BRAND SPECIFIED) solution Use to calibrate blood glucose monitor as directed. 1 each 3     blood glucose monitoring (NO BRAND SPECIFIED) meter device kit Check Blood sugars twice a day. 1 kit 0     blood glucose monitoring (NO BRAND SPECIFIED) test strip Use to test blood sugar 3 times daily or as directed. 100 each 3     Calcium Carb-Cholecalciferol (CALCIUM-VITAMIN D3) 250-125 MG-UNIT TABS        Calcium Carbonate-Vitamin D (CALCIUM-VITAMIN D) 250-125 MG-UNIT TABS Take 1 tablet by mouth 2 times daily Calcium 250 mg/Vit D 125 IU       calcium polycarbophil (FIBERCON) 625 MG tablet Take 2 tablets (1,250 mg) by mouth daily 180 tablet 3     CLARITIN 10 MG OR TABS 1 TAB PO QD (Once per day) as needed for ALLERGY SYMPTOMS 30 11     conjugated estrogens (PREMARIN) 0.625 MG/GM vaginal cream Place 2 g vaginally daily for 2 weeks, followed by 2 g vaginally twice a week for vaginal atrophy and frequent UTIs 30 g 3     erythromycin (ROMYCIN) 5 MG/GM ophthalmic ointment Place 0.5 inches into both eyes At Bedtime 3.5 g 11     fluorometholone  (FML LIQUIFILM) 0.1 % ophthalmic suspension Place 1 drop into both eyes 4 times daily 10 mL 0     FLUoxetine (PROZAC) 20 MG capsule Take 2 capsules (40 mg) by mouth daily 60 capsule 3     fluticasone (FLONASE) 50 MCG/ACT nasal spray Spray 1 spray into both nostrils daily 16 g 3     furosemide (LASIX) 20 MG tablet Take 1 tablet (20 mg) by mouth 2 times daily 60 tablet 3     Gabapentin (NEURONTIN PO) Take 900 mg by mouth 3 times daily.       Hypromellose (ARTIFICIAL TEARS OP) Apply 1 drop to eye 4 times daily.       insulin glargine (LANTUS PEN) 100 UNIT/ML pen Inject 15 Units Subcutaneous At Bedtime 8 mL 0     lactase (LACTAID) 3000 UNIT tablet Take 1 tablet (3,000 Units) by mouth 3 times daily (with meals) 90 tablet 11     levothyroxine (SYNTHROID/LEVOTHROID) 175 MCG tablet Take 1 tablet (175 mcg) by mouth daily 90 tablet 3     lidocaine (XYLOCAINE) 2 % external gel Apply topically 3 times daily as needed for moderate pain 85 g 1     lidocaine (XYLOCAINE) 5 % external ointment Apply topically 3 times daily as needed for moderate pain 50 g 1     lifitegrast (XIIDRA) 5 % opthalmic solution Place 1 drop into both eyes 2 times daily 60 each 11     liraglutide (VICTOZA) 18 MG/3ML solution Inject 1.8 mg Subcutaneous daily 9 mL 0     losartan (COZAAR) 100 MG tablet Take 0.5 tablets (50 mg) by mouth 2 times daily 90 tablet 1     Magnesium Hydroxide (MILK OF MAGNESIA PO) Take 30 mL as needed for constipation.       melatonin 3 MG CAPS Take 6 mg by mouth At Bedtime 60 capsule 3     metFORMIN (GLUCOPHAGE) 1000 MG tablet Take 1,000 mg by mouth 2 times daily (with meals)       NEW MED Prohydrate Moisturizing gel  Place 1 applicator vaginally 4 times a week 20 oz 3     nystatin (MYCOSTATIN) 428406 UNIT/GM POWD Apply topically 3 times daily as needed 60 g 1     ONETOUCH DELICA LANCETS 33G MISC        polyethylene glycol (MIRALAX/GLYCOLAX) powder Take 1 capful by mouth 2 times daily 17 GM PO BID       Saline 0.9 % SOLN Spray 2  sprays in nostril as needed.       senna-docusate (SENOKOT-S/PERICOLACE) 8.6-50 MG tablet Take 2 tablets by mouth At Bedtime.       simethicone (MYLICON) 125 MG chewable tablet Take 1 tablet (125 mg) by mouth 2 times daily 60 tablet 0     Skin Protectants, Misc. (EUCERIN) cream Apply topically as needed Apply to thigh PRN dry skin       SM LUBRICANT EYE DROPS 0.4-0.3 % SOLN ophthalmic solution        solifenacin (VESICARE) 10 MG tablet Take 1 tablet (10 mg) by mouth daily 30 tablet 6     ziprasidone (GEODON) 40 MG capsule Take 1 capsule (40 mg) by mouth 2 times daily (with meals) 60 capsule 2       Social History     Tobacco Use     Smoking status: Never Smoker     Smokeless tobacco: Never Used   Vaping Use     Vaping Use: Never used   Substance Use Topics     Alcohol use: No     Alcohol/week: 0.0 standard drinks     Drug use: No       Mazin Monica  7/15/2022  2:13 PM

## 2022-07-15 NOTE — TELEPHONE ENCOUNTER
[phone]    Reviewed one drop in each eye for each dose    Reviewed no special order for eye drops when taking around same time-- reviewed main goal to wait about 5 minutes between eye drops.  Reviewed may benefit from tears first to reduce possible sting/irriation with medicated eye drops afterwards    Nursing verbally demonstrated understanding    Dez Vega RN 2:19 PM 07/15/22            [facility] Call Center    Phone Message    May a detailed message be left on voicemail: yes     Reason for Call: Medication Question or concern regarding medication   Prescription Clarification  Name of Medication: fluorometholone (FML LIQUIFILM) 0.1 % ophthalmic suspension  Prescribing Provider: Dr. Lopez   Pharmacy: [facility] - [place] [id number]   What on the order needs clarification? Martha with [facility] has a couple questions regarding drops. She would like clarification on how many drops are supposed to be used for this Rx. She also states pt is on 3 other drops for dry eyes and is wondering if there is a particular sequence in when and how the drops should be administered. Please call Martha at  # [phone]. Thank you.      Action Taken: Message routed to:  [facility] EYE    Travel Screening: Not Applicable

## 2022-08-03 ENCOUNTER — MEDICAL CORRESPONDENCE (OUTPATIENT)
Dept: HEALTH INFORMATION MANAGEMENT | Facility: CLINIC | Age: 72
End: 2022-08-03

## 2022-08-19 ENCOUNTER — OFFICE VISIT (OUTPATIENT)
Dept: PSYCHOLOGY | Facility: CLINIC | Age: 72
End: 2022-08-19
Payer: COMMERCIAL

## 2022-08-19 VITALS — BODY MASS INDEX: 38.67 KG/M2 | WEIGHT: 225.3 LBS

## 2022-08-19 DIAGNOSIS — F54 PSYCHOLOGICAL FACTORS AFFECTING MEDICAL CONDITION: ICD-10-CM

## 2022-08-19 DIAGNOSIS — E66.9 OBESITY, UNSPECIFIED OBESITY SEVERITY, UNSPECIFIED OBESITY TYPE: ICD-10-CM

## 2022-08-19 DIAGNOSIS — F33.0 MAJOR DEPRESSIVE DISORDER, RECURRENT EPISODE, MILD (H): Primary | ICD-10-CM

## 2022-08-19 DIAGNOSIS — F41.1 GENERALIZED ANXIETY DISORDER: ICD-10-CM

## 2022-08-19 PROCEDURE — 90837 PSYTX W PT 60 MINUTES: CPT | Performed by: PSYCHOLOGIST

## 2022-08-19 NOTE — PROGRESS NOTES
Health Psychology                    Department of Medicine  Keri Arnett, Ph.D., L.P. [phone]                         [facility] Madelaine Ingram, Ph.D., L.P. [phone]                     [place] Mail Code 870   Elise Dillarder, Ph.D. [phone]      [address] [place] [address] Rae Eugene, Ph.D., A.B.P.P., L.P. [phone]              [place] [address]           Damon Gr, Ph.D., A.B.P.P., L.P. [phone]      Kimber Morales, Ph.D., L.P. [phone]  [facility]   Lilia Burroughs, Ph.D., A.B.P.P., L.P. 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 T She is getting better at setting it up with help from [place] staff.  Intake with Health Psychology was in the 1980s.  I began to see her in the early 1990s.    Mood: Her mood is okay today..   She rates herself at a 3 on 10-point scale of depression.      Exercise:  She had been biking 5x/week for 15-20 minutes.   She was also walking about 15-20 minutes, down from 25-30 minutes/day. Little outdoor walking.    She realizes she should increase somewhat given that she is not consistently losing  weight.      Health:  She got her booster for COVID and will get flu shot in September.   She feels she reovered fully from COVID.  Right now Narendra is free of COVID.  She completed physical therapy for  her shoulder arthritis and she still does exercises.       Weight: Her weight increased 7o 225.3 from  221.7 on clinic scale;  She has cut down on minutes of exercise.  Encouraged incease.    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  We calculated by May 2023, she will have put up approximately  400 bulletin boards.       We attempted a walk outside during session, but it began to drizzle so aborted it  She occasionally hums during session.  Walking is slower.     She participates fully, ventilated feelings appropriately.  She appears to derive benefit from the support.     Current Outpatient Medications   Medication     acetaminophen (TYLENOL) 500 MG tablet     alum & mag hydroxide-simethicone (MYLANTA/MAALOX) 200-200-20 MG/5ML SUSP suspension     amLODIPine (NORVASC) 10 MG tablet     apixaban ANTICOAGULANT (ELIQUIS) 5 MG tablet     artificial saliva (BIOTENE MT) AERS spray     artificial tears (GENTEAL) 0.1-0.2-0.3 % ophthalmic solution     atorvastatin (LIPITOR) 40 MG tablet     blood glucose (NO BRAND SPECIFIED) lancets standard     blood glucose calibration (NO BRAND SPECIFIED) solution     blood glucose monitoring (NO BRAND SPECIFIED) meter device kit     blood glucose monitoring (NO BRAND SPECIFIED) test strip     Calcium Carb-Cholecalciferol (CALCIUM-VITAMIN D3) 250-125 MG-UNIT TABS     Calcium Carbonate-Vitamin D (CALCIUM-VITAMIN D) 250-125 MG-UNIT TABS     calcium polycarbophil (FIBERCON) 625 MG tablet     CLARITIN 10 MG OR TABS     conjugated estrogens (PREMARIN) 0.625 MG/GM vaginal cream     erythromycin (ROMYCIN) 5 MG/GM ophthalmic ointment     fluorometholone (FML LIQUIFILM) 0.1 % ophthalmic suspension     FLUoxetine (PROZAC) 20 MG capsule     fluticasone (FLONASE) 50 MCG/ACT nasal spray     furosemide (LASIX) 20 MG tablet     Gabapentin (NEURONTIN PO)     Hypromellose (ARTIFICIAL TEARS OP)     insulin glargine (LANTUS PEN) 100 UNIT/ML pen     lactase (LACTAID) 3000 UNIT tablet     levothyroxine (SYNTHROID/LEVOTHROID) 175 MCG tablet     lidocaine (XYLOCAINE) 2 % external gel     lidocaine (XYLOCAINE) 5 % external ointment     lifitegrast (XIIDRA) 5 % opthalmic solution     liraglutide (VICTOZA) 18 MG/3ML solution     losartan (COZAAR) 100 MG tablet     Magnesium Hydroxide (MILK OF MAGNESIA PO)      melatonin 3 MG CAPS     metFORMIN (GLUCOPHAGE) 1000 MG tablet     NEW MED     nystatin (MYCOSTATIN) 825737 UNIT/GM POWD     ONETOUCH DELICA LANCETS 33G MISC     polyethylene glycol (MIRALAX/GLYCOLAX) powder     Saline 0.9 % SOLN     senna-docusate (SENOKOT-S/PERICOLACE) 8.6-50 MG tablet     simethicone (MYLICON) 125 MG chewable tablet     Skin Protectants, Misc. (EUCERIN) cream     SM LUBRICANT EYE DROPS 0.4-0.3 % SOLN ophthalmic solution     solifenacin (VESICARE) 10 MG tablet     ziprasidone (GEODON) 40 MG capsule     No current facility-administered medications for this visit.     Wt Readings from Last 4 Encounters:   07/11/22 103.9 kg (229 lb)   07/08/22 100.6 kg (221 lb 11.2 oz)   05/31/22 99 kg (218 lb 3.2 oz)   05/05/22 100.7 kg (222 lb)   There is no height or weight on file to calculate BMI.    Time service started: 2:02  Time service ended:  2:55    Diagnosis:   Major Depression, recurrent mild (F33.0)   Psychological Factors Affecting Morbid Obesity (F54)  Generalized anxiety (F41.1)       Plan: Return for psychotherapy visit in-person/masked 8/19 @ 2 due to the serious and persistent nature of her depression and anxiety consistent with treatment plan.  Last treatment plan signed: 1/13/22  Treatment plan due: 1/13/23                  Preference for future meetings:           X   In-person, even if both wearing masks                Remote                 Either          Damon Gr, PhD LP,  A.B.P.P.  Director, Health Psychology  [phone]

## 2022-08-26 ENCOUNTER — TELEPHONE (OUTPATIENT)
Dept: FAMILY MEDICINE | Facility: CLINIC | Age: 72
End: 2022-08-26

## 2022-08-26 ENCOUNTER — OFFICE VISIT (OUTPATIENT)
Dept: DERMATOLOGY | Facility: CLINIC | Age: 72
End: 2022-08-26
Payer: COMMERCIAL

## 2022-08-26 DIAGNOSIS — Z85.828 HISTORY OF NONMELANOMA SKIN CANCER: ICD-10-CM

## 2022-08-26 DIAGNOSIS — D22.9 MULTIPLE BENIGN NEVI: ICD-10-CM

## 2022-08-26 DIAGNOSIS — D18.01 CHERRY ANGIOMA: ICD-10-CM

## 2022-08-26 DIAGNOSIS — L82.1 SEBORRHEIC KERATOSIS: ICD-10-CM

## 2022-08-26 DIAGNOSIS — D49.2 NEOPLASM OF UNSPECIFIED BEHAVIOR OF BONE, SOFT TISSUE, AND SKIN: Primary | ICD-10-CM

## 2022-08-26 DIAGNOSIS — L81.4 SOLAR LENTIGO: ICD-10-CM

## 2022-08-26 PROCEDURE — 88305 TISSUE EXAM BY PATHOLOGIST: CPT | Mod: 26 | Performed by: DERMATOLOGY

## 2022-08-26 PROCEDURE — 88305 TISSUE EXAM BY PATHOLOGIST: CPT | Mod: TC | Performed by: DERMATOLOGY

## 2022-08-26 PROCEDURE — 11102 TANGNTL BX SKIN SINGLE LES: CPT | Performed by: DERMATOLOGY

## 2022-08-26 PROCEDURE — 11103 TANGNTL BX SKIN EA SEP/ADDL: CPT | Performed by: DERMATOLOGY

## 2022-08-26 PROCEDURE — 99213 OFFICE O/P EST LOW 20 MIN: CPT | Mod: 25 | Performed by: DERMATOLOGY

## 2022-08-26 ASSESSMENT — PAIN SCALES - GENERAL: PAINLEVEL: NO PAIN (0)

## 2022-08-26 NOTE — PATIENT INSTRUCTIONS
Wound Care After a Biopsy    What is a skin biopsy?  A skin biopsy allows the doctor to examine a very small piece of tissue under the microscope to determine the diagnosis and the best treatment for the skin condition. A local anesthetic (numbing medicine)  is injected with a very small needle into the skin area to be tested. A small piece of skin is taken from the area. Sometimes a suture (stitch) is used.     What are the risks of a skin biopsy?  I will experience scar, bleeding, swelling, pain, crusting and redness. I may experience incomplete removal or recurrence. Risks of this procedure are excessive bleeding, bruising, infection, nerve damage, numbness, thick (hypertrophic or keloidal) scar and non-diagnostic biopsy.    How should I care for my wound for the first 24 hours?  Keep the wound dry and covered for 24 hours  If it bleeds, hold direct pressure on the area for 15 minutes. If bleeding does not stop then go to the emergency room  Avoid strenuous exercise the first 1-2 days or as your doctor instructs you    How should I care for the wound after 24 hours?  After 24 hours, remove the bandage  You may bathe or shower as normal  If you had a scalp biopsy, you can shampoo as usual and can use shower water to clean the biopsy site daily  Clean the wound twice a day with gentle soap and water  Do not scrub, be gentle  Apply white petroleum/Vaseline after cleaning the wound with a cotton swab or a clean finger, and keep the site covered with a Bandaid /bandage. Bandages are not necessary with a scalp biopsy  If you are unable to cover the site with a Bandaid /bandage, re-apply ointment 2-3 times a day to keep the site moist. Moisture will help with healing  Avoid strenuous activity for first 1-2 days  Avoid lakes, rivers, pools, and oceans until the stitches are removed or the site is healed    How do I clean my wound?  Wash hands thoroughly with soap or use hand  before all wound care  Clean the  wound with gentle soap and water  Apply white petroleum/Vaseline  to wound after it is clean  Replace the Bandaid /bandage to keep the wound covered for the first few days or as instructed by your doctor  If you had a scalp biopsy, warm shower water to the area on a daily basis should suffice    What should I use to clean my wound?   Cotton-tipped applicators (Qtips )  White petroleum jelly (Vaseline ). Use a clean new container and use Q-tips to apply.  Bandaids   as needed  Gentle soap     How should I care for my wound long term?  Do not get your wound dirty  Keep up with wound care for one week or until the area is healed.  A small scab will form and fall off by itself when the area is completely healed. The area will be red and will become pink in color as it heals. Sun protection is very important for how your scar will turn out. Sunscreen with an SPF 30 or greater is recommended once the area is healed.  You should have some soreness but it should be mild and slowly go away over several days. Talk to your doctor about using tylenol for pain,    When should I call my doctor?  If you have increased:   Pain or swelling  Pus or drainage (clear or slightly yellow drainage is ok)  Temperature over 100F  Spreading redness or warmth around wound    When will I hear about my results?  The biopsy results can take 2 weeks to come back.  Your results will automatically release to [employer] before your provider has even reviewed them.  The clinic will call you with the results, send you a [employer] message, or have you schedule a follow-up clinic or phone time to discuss the results.  Contact our clinics if you do not hear from us in 2 weeks.    Who should I call with questions?  [facility]: [phone]  [facility]: [phone]  For urgent needs outside of business hours call the [facility] at [phone] and ask for the dermatology resident on call      Patient Education     Checking for Skin Cancer  You can find cancer early by checking your skin each month. There are 3 kinds of skin cancer. They are melanoma, basal cell carcinoma, and squamous cell carcinoma. Doing monthly skin checks is the best way to find new marks or skin changes. Follow the instructions below for checking your skin.   The ABCDEs of checking moles for melanoma   Check your moles or growths for signs of melanoma using ABCDE:   Asymmetry: the sides of the mole or growth don t match  Border: the edges are ragged, notched, or blurred  Color: the color within the mole or growth varies  Diameter: the mole or growth is larger than 6 mm (size of a pencil eraser)  Evolving: the size, shape, or color of the mole or growth is changing (evolving is not shown in the images below)    Checking for other types of skin cancer  Basal cell carcinoma or squamous cell carcinoma have symptoms such as:     A spot or mole that looks different from all other marks on your skin  Changes in how an area feels, such as itching, tenderness, or pain  Changes in the skin's surface, such as oozing, bleeding, or scaliness  A sore that does not heal  New swelling or redness beyond the border of a mole    Who s at risk?  Anyone can get skin cancer. But you are at greater risk if you have:   Fair skin, light-colored hair, or light-colored eyes  Many moles or abnormal moles on your skin  A history of sunburns from sunlight or tanning beds  A family history of skin cancer  A history of exposure to radiation or chemicals  A weakened immune system  If you have had skin cancer in the past, you are at risk for recurring skin cancer.   How to check your skin  Do your monthly skin checkups in front of a full-length mirror. Check all parts of your body, including your:   Head (ears, face, neck, and scalp)  Torso (front, back, and sides)  Arms (tops, undersides, upper, and lower armpits)  Hands (palms, backs, and fingers, including  under the nails)  Buttocks and genitals  Legs (front, back, and sides)  Feet (tops, soles, toes, including under the nails, and between toes)  If you have a lot of moles, take digital photos of them each month. Make sure to take photos both up close and from a distance. These can help you see if any moles change over time.   Most skin changes are not cancer. But if you see any changes in your skin, call your doctor right away. Only he or she can diagnose a problem. If you have skin cancer, seeing your doctor can be the first step toward getting the treatment that could save your life.   [employer] last reviewed this educational content on 4/1/2019 2000-2020 [employer]. [address] [place] [address], [place] [address]. All rights reserved. This information is not intended as a substitute for professional medical care. Always follow your healthcare professional's instructions.       When should I call my doctor?  If you are worsening or not improving, please, contact us or seek urgent care as noted below.     Who should I call with questions (adults)?  [facility] (adult and pediatric): [phone]  [facility] (adult): [phone]  For urgent needs outside of business hours call the [facility] at [phone] and ask for the dermatology resident on call to be paged  If this is a medical emergency and you are unable to reach an ER, Call [phone]    Who should I call with questions (pediatric)?  [facility]- Pediatric Dermatology  Dr. Ariadna Sparks, Dr. Carina Puente, Dr. Sophie Stafford, SALTY North, Dr. Rosalia Maldonado, Dr. Carly Osuna & Dr. Galo Cast  Non-urgent nurse triage line; [phone]- Mitali and Laila [facility] (/Complex ) [phone]    If you need a prescription refill, please contact your pharmacy. Refills are approved or denied by our  Physicians during normal business hours, Monday through Fridays  Per office policy, refills will not be granted if you have not been seen within the past year (or sooner depending on your child's condition)    Scheduling Information:  Pediatric Appointment Scheduling and Call Center [phone]  Radiology Scheduling- [phone]  Sedation Unit Scheduling- [phone]  [place] Scheduling- General [phone]; Pediatric Dermatology [phone]  Main  Services: [phone]  Occitan: [phone]  Emirati: [phone]  Hmong/Armenian/Nepali: [phone]  Preadmission Nursing Department Fax Number: [phone] (Fax all pre-operative paperwork to this number)    For urgent matters arising during evenings, weekends, or holidays that cannot wait for normal business hours please call [phone] and ask for the dermatology resident on call to be paged.

## 2022-08-26 NOTE — NURSING NOTE
Lidocaine-epinephrine 1-1:271641 % injection   2mL once for one use, starting 8/26/2022 ending 8/26/2022,  2mL disp, R-0, injection  Injected by Shamika Anne CMA

## 2022-08-26 NOTE — TELEPHONE ENCOUNTER
Prior Authorization Retail Medication Request    Medication/Dose: conjugated estrogens (PREMARIN) 0.625 MG/GM vaginal cream  ICD code (if different than what is on RX):    Recurrent UTI [N39.0]  - Primary       Vaginal atrophy [N95.2]           Previously Tried and Failed:  See chart  Rationale:  See chart    Insurance Name:  [facility]  Insurance ID:  [id number]      Pharmacy Information (if different than what is on RX)  Name:  [facility]    Phone:  [phone]

## 2022-08-26 NOTE — PROGRESS NOTES
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/22/22    # NUB, right shin, s/p shave bx 8/26/2022  # NUB, right medial shin, s/p shave bx 8/26/2022  ____________________________________________    Assessment & Plan:    # NUB, right shin, biopsy ddx BCC vs dermatofibroma  - Shave biopsy today, see procedure below    # NUB, right medial shin, biopsy ddx BCC  - Shave biopsy today, see procedure below    # Cherry angiomas  # Seborrheic keratoses  - Reassured of benign nature, no treatment necessary    # Multiple benign nevi  # Solar lentigines  - No concerning lesions today  - Monitor for ABCDEs of melanoma   - Continue sun protection - recommend SPF 30 or higher with frequent application   - Return sooner if noticing changing or symptomatic lesions     # History of NMSC. No evidence of recurrent disease.  - Continue photoprotection - recommend SPF 30 or higher with frequent reapplication  - Continue yearly skin exams  - Advised to monitor for changing, non-healing, bleeding, painful, changing, or otherwise symptomatic lesions      Procedures Performed:   - Shave biopsy procedure note, location(s): see above. After discussion of benefits and risks including but not limited to bleeding, infection, scar, incomplete removal, recurrence, and non-diagnostic biopsy, written consent and photographs were obtained. The area was cleaned with isopropyl alcohol. 0.5mL of 1% lidocaine with epinephrine was injected to obtain adequate anesthesia of lesion(s). Shave biopsy at site(s) performed. Hemostasis was achieved with aluminium chloride. Petrolatum ointment and a sterile dressing were applied. The patient tolerated the procedure and no complications were noted. The patient was provided with verbal and written post care instructions.     Follow-up: 1 year(s) in-person, or earlier for new or changing  lesions    Staff and Scribe:     Scribe Disclosure:  I, Drew Gillwendybrians, am serving as a scribe to document services personally performed by Adriana Robbins MD based on data collection and the provider's statements to me.     Provider Disclosure:   The documentation recorded by the scribe accurately reflects the services I personally performed and the decisions made by me.    Adriana Robbins MD    Department of Dermatology  [facility]: Phone: [phone], Fax: [phone]  8/26/2022     ____________________________________________    CC: Skin Check (Pinky is here for a full body skin check.  Has a spot on her right leg.  )    HPI:  Ms. Pinky Page is a(n) 72 year old female who presents today as a return patient for FBSE. Last seen in dermatology on 3/8/2022 by Dr. Serna, at which time it was noted that wound was healing appropriately.    Today, patient reports a spot on her right leg that she would like examined. The spot has been there for 5-6 years. She denies associated symptoms.     Patient is otherwise feeling well, without additional skin concerns.    Labs Reviewed:  N/A    Physical Exam:  Vitals: LMP  (LMP Unknown)   SKIN: Total skin excluding the undergarment areas was performed. The exam included the head/face, neck, both arms, chest, back, abdomen, both legs, digits and/or nails.   - Right shin, 6 mm shiny atrophic papule.  - Right medial shin, 4 mm shiny macule.  - There are dome shaped bright red papules on the trunk and extremities.   - Multiple regular brown pigmented macules and papules are identified on the trunk and extremities.   - Scattered brown macules on sun exposed areas.  - There are waxy stuck on tan to brown papules on the trunk and extremities.  - No other lesions of concern on areas examined.     Medications:  Current Outpatient Medications   Medication     acetaminophen (TYLENOL) 500  MG tablet     alum & mag hydroxide-simethicone (MYLANTA/MAALOX) 200-200-20 MG/5ML SUSP suspension     amLODIPine (NORVASC) 10 MG tablet     apixaban ANTICOAGULANT (ELIQUIS) 5 MG tablet     artificial saliva (BIOTENE MT) AERS spray     artificial tears (GENTEAL) 0.1-0.2-0.3 % ophthalmic solution     atorvastatin (LIPITOR) 40 MG tablet     blood glucose (NO BRAND SPECIFIED) lancets standard     blood glucose calibration (NO BRAND SPECIFIED) solution     blood glucose monitoring (NO BRAND SPECIFIED) meter device kit     blood glucose monitoring (NO BRAND SPECIFIED) test strip     Calcium Carb-Cholecalciferol (CALCIUM-VITAMIN D3) 250-125 MG-UNIT TABS     Calcium Carbonate-Vitamin D (CALCIUM-VITAMIN D) 250-125 MG-UNIT TABS     calcium polycarbophil (FIBERCON) 625 MG tablet     CLARITIN 10 MG OR TABS     conjugated estrogens (PREMARIN) 0.625 MG/GM vaginal cream     erythromycin (ROMYCIN) 5 MG/GM ophthalmic ointment     fluorometholone (FML LIQUIFILM) 0.1 % ophthalmic suspension     FLUoxetine (PROZAC) 20 MG capsule     fluticasone (FLONASE) 50 MCG/ACT nasal spray     furosemide (LASIX) 20 MG tablet     Gabapentin (NEURONTIN PO)     Hypromellose (ARTIFICIAL TEARS OP)     insulin glargine (LANTUS PEN) 100 UNIT/ML pen     lactase (LACTAID) 3000 UNIT tablet     levothyroxine (SYNTHROID/LEVOTHROID) 175 MCG tablet     lidocaine (XYLOCAINE) 2 % external gel     lidocaine (XYLOCAINE) 5 % external ointment     lifitegrast (XIIDRA) 5 % opthalmic solution     liraglutide (VICTOZA) 18 MG/3ML solution     losartan (COZAAR) 100 MG tablet     Magnesium Hydroxide (MILK OF MAGNESIA PO)     melatonin 3 MG CAPS     metFORMIN (GLUCOPHAGE) 1000 MG tablet     NEW MED     nystatin (MYCOSTATIN) 240624 UNIT/GM POWD     ONETOUCH DELICA LANCETS 33G MISC     polyethylene glycol (MIRALAX/GLYCOLAX) powder     Saline 0.9 % SOLN     senna-docusate (SENOKOT-S/PERICOLACE) 8.6-50 MG tablet     simethicone (MYLICON) 125 MG chewable tablet     Skin  Protectants, Misc. (EUCERIN) cream     SM LUBRICANT EYE DROPS 0.4-0.3 % SOLN ophthalmic solution     solifenacin (VESICARE) 10 MG tablet     ziprasidone (GEODON) 40 MG capsule     No current facility-administered medications for this visit.      Past Medical History:   Patient Active Problem List   Diagnosis     Allergic rhinitis due to pollen     Urge incontinence     Hypertension, essential     Cardiomegaly     Chronic constipation     Dry eye syndrome     Esophageal reflux     Exposure keratoconjunctivitis     DM ophthalmopathy (H)     Hypothyroidism     Senile cataract     ANUJ (obstructive sleep apnea)     Vaginal atrophy     Restless leg syndrome     Squamous blepharitis     Morbid obesity due to excess calories (H)     Personal history of breast cancer s/p L masectomy     Hypercholesteremia     Lives in group home     Extrapyramidal and movement disorder     CKD (chronic kidney disease) stage 2, GFR 60-89 ml/min     Solitary kidney, congenital     S/P hysterectomy     Impingement syndrome of right shoulder     Hypertriglyceridemia     Candidiasis of skin     Generalized anxiety disorder     Carpal tunnel syndrome of left wrist     Schizoaffective disorder, depressive type (H)     Major depressive disorder, recurrent episode, moderate (H)     Psychological factors affecting medical condition     Hx of psychiatric care     Nail complaint     Microalbuminuria due to type 2 diabetes mellitus (H)     Type 2 diabetes mellitus with microalbuminuria, with long-term current use of insulin (H)     Conjunctivitis of both eyes     Corneal erosion of both eyes     Spastic entropion, right     Foot callus     Paroxysmal atrial fibrillation (H)     Squamous cell carcinoma in situ of skin of face     History of colonic polyps     Past Medical History:   Diagnosis Date     Allergic rhinitis due to pollen     seasonal allergies      Anisometropia and aniseikonia      BMI greater than 40      Cardiomegaly      Chronic  constipation      Congenital absence of one kidney      Cramp of limb      Dermatophytosis of the body      Dry eye syndrome      Esophageal reflux      Essential hypertension, benign      Gastro-oesophageal reflux disease      Hemorrhoids      Hypermetropia      Hypothyroidism      Incomplete Emptying of Bladder      Lactose intolerance      Major depressive disorder, recurrent episode, moderate (H)      Malignant neoplasm of breast (female), unspecified site     left mastectomy 1996      Mixed incontinence urge and stress (male)(female)      Moderate obstructive sleep apnea      Postmenopausal Atrophic Vaginitis      Presbyopia      Regular astigmatism      Restless leg syndrome      Senile nuclear sclerosis      Thyroid eye disease     mild     Tinnitus      Trigger finger (acquired)     right hand     Type II or unspecified type diabetes mellitus without mention of complication, not stated as uncontrolled     on insulin since April 2006         CC Referred Self, MD  No address on file on close of this encounter.

## 2022-08-26 NOTE — TELEPHONE ENCOUNTER
Central Prior Authorization Team   Phone: [phone]      PA Initiation    Medication: conjugated estrogens (PREMARIN) 0.625 MG/GM vaginal cream - INITIATED  Insurance Company: [employer] - Phone [phone] Fax [phone]  Pharmacy Filling the Rx: [facility] - [place], [place] - [address]  Filling Pharmacy Phone: [phone]  Filling Pharmacy Fax:    Start Date: 8/26/2022

## 2022-08-26 NOTE — NURSING NOTE
Dermatology Rooming Note    Pinky Page's goals for this visit include:   Chief Complaint   Patient presents with     Skin Check     Pinky is here for a full body skin check.  Has a spot on her right leg.       Shamika Anne CMA

## 2022-08-26 NOTE — TELEPHONE ENCOUNTER
Prior Authorization Approval    Authorization Effective Date: 7/27/2022  Authorization Expiration Date: 8/26/2023  Medication: conjugated estrogens (PREMARIN) 0.625 MG/GM vaginal cream - APPROVED  Insurance Company: [employer] - Phone [phone] Fax [phone]  Which Pharmacy is filling the prescription (Not needed for infusion/clinic administered): [facility] - [place], [place] - [address]  Pharmacy Notified: Yes  Patient Notified: Yes (pharmacy will deliver to patient when ready)

## 2022-08-26 NOTE — LETTER
8/26/2022       RE: Pinky Page  [address] [facility]  [facility] [facility] [address]     Dear Colleague,    Thank you for referring your patient, Pinky Page, to the [facility]ERMAT[facility] [place] at [facility]. Please see a copy of my visit note below.    [facility] Dermatology Note  Encounter Date: Aug 26, 2022  Office Visit     Dermatology Problem List:  # SCCIS, left cheek. S/p Mohs and linear repair 2/22/22    # NUB, right shin, s/p shave bx 8/26/2022  # NUB, right medial shin, s/p shave bx 8/26/2022  ____________________________________________    Assessment & Plan:    # NUB, right shin, biopsy ddx BCC vs dermatofibroma  - Shave biopsy today, see procedure below    # NUB, right medial shin, biopsy ddx BCC  - Shave biopsy today, see procedure below    # Cherry angiomas  # Seborrheic keratoses  - Reassured of benign nature, no treatment necessary    # Multiple benign nevi  # Solar lentigines  - No concerning lesions today  - Monitor for ABCDEs of melanoma   - Continue sun protection - recommend SPF 30 or higher with frequent application   - Return sooner if noticing changing or symptomatic lesions     # History of NMSC. No evidence of recurrent disease.  - Continue photoprotection - recommend SPF 30 or higher with frequent reapplication  - Continue yearly skin exams  - Advised to monitor for changing, non-healing, bleeding, painful, changing, or otherwise symptomatic lesions      Procedures Performed:   - Shave biopsy procedure note, location(s): see above. After discussion of benefits and risks including but not limited to bleeding, infection, scar, incomplete removal, recurrence, and non-diagnostic biopsy, written consent and photographs were obtained. The area was cleaned with isopropyl alcohol. 0.5mL of 1% lidocaine with epinephrine was injected to obtain adequate anesthesia of lesion(s). Shave biopsy at site(s)  performed. Hemostasis was achieved with aluminium chloride. Petrolatum ointment and a sterile dressing were applied. The patient tolerated the procedure and no complications were noted. The patient was provided with verbal and written post care instructions.     Follow-up: 1 year(s) in-person, or earlier for new or changing lesions    Staff and Scribe:     Scribe Disclosure:  I, Drew Alia, am serving as a scribe to document services personally performed by Adriana Robbins MD based on data collection and the provider's statements to me.     Provider Disclosure:   The documentation recorded by the scribe accurately reflects the services I personally performed and the decisions made by me.    Adriana Robbins MD    Department of Dermatology  [facility]: Phone: [phone], Fax: [phone]  8/26/2022     ____________________________________________    CC: Skin Check (Pinky is here for a full body skin check.  Has a spot on her right leg.  )    HPI:  Ms. Pinky Page is a(n) 72 year old female who presents today as a return patient for FBSE. Last seen in dermatology on 3/8/2022 by Dr. Serna, at which time it was noted that wound was healing appropriately.    Today, patient reports a spot on her right leg that she would like examined. The spot has been there for 5-6 years. She denies associated symptoms.     Patient is otherwise feeling well, without additional skin concerns.    Labs Reviewed:  N/A    Physical Exam:  Vitals: LMP  (LMP Unknown)   SKIN: Total skin excluding the undergarment areas was performed. The exam included the head/face, neck, both arms, chest, back, abdomen, both legs, digits and/or nails.   - Right shin, 6 mm shiny atrophic papule.  - Right medial shin, 4 mm shiny macule.  - There are dome shaped bright red papules on the trunk and extremities.   - Multiple regular brown pigmented macules  and papules are identified on the trunk and extremities.   - Scattered brown macules on sun exposed areas.  - There are waxy stuck on tan to brown papules on the trunk and extremities.  - No other lesions of concern on areas examined.     Medications:  Current Outpatient Medications   Medication     acetaminophen (TYLENOL) 500 MG tablet     alum & mag hydroxide-simethicone (MYLANTA/MAALOX) 200-200-20 MG/5ML SUSP suspension     amLODIPine (NORVASC) 10 MG tablet     apixaban ANTICOAGULANT (ELIQUIS) 5 MG tablet     artificial saliva (BIOTENE MT) AERS spray     artificial tears (GENTEAL) 0.1-0.2-0.3 % ophthalmic solution     atorvastatin (LIPITOR) 40 MG tablet     blood glucose (NO BRAND SPECIFIED) lancets standard     blood glucose calibration (NO BRAND SPECIFIED) solution     blood glucose monitoring (NO BRAND SPECIFIED) meter device kit     blood glucose monitoring (NO BRAND SPECIFIED) test strip     Calcium Carb-Cholecalciferol (CALCIUM-VITAMIN D3) 250-125 MG-UNIT TABS     Calcium Carbonate-Vitamin D (CALCIUM-VITAMIN D) 250-125 MG-UNIT TABS     calcium polycarbophil (FIBERCON) 625 MG tablet     CLARITIN 10 MG OR TABS     conjugated estrogens (PREMARIN) 0.625 MG/GM vaginal cream     erythromycin (ROMYCIN) 5 MG/GM ophthalmic ointment     fluorometholone (FML LIQUIFILM) 0.1 % ophthalmic suspension     FLUoxetine (PROZAC) 20 MG capsule     fluticasone (FLONASE) 50 MCG/ACT nasal spray     furosemide (LASIX) 20 MG tablet     Gabapentin (NEURONTIN PO)     Hypromellose (ARTIFICIAL TEARS OP)     insulin glargine (LANTUS PEN) 100 UNIT/ML pen     lactase (LACTAID) 3000 UNIT tablet     levothyroxine (SYNTHROID/LEVOTHROID) 175 MCG tablet     lidocaine (XYLOCAINE) 2 % external gel     lidocaine (XYLOCAINE) 5 % external ointment     lifitegrast (XIIDRA) 5 % opthalmic solution     liraglutide (VICTOZA) 18 MG/3ML solution     losartan (COZAAR) 100 MG tablet     Magnesium Hydroxide (MILK OF MAGNESIA PO)     melatonin 3 MG CAPS      metFORMIN (GLUCOPHAGE) 1000 MG tablet     NEW MED     nystatin (MYCOSTATIN) 454741 UNIT/GM POWD     ONETOUCH DELICA LANCETS 33G MISC     polyethylene glycol (MIRALAX/GLYCOLAX) powder     Saline 0.9 % SOLN     senna-docusate (SENOKOT-S/PERICOLACE) 8.6-50 MG tablet     simethicone (MYLICON) 125 MG chewable tablet     Skin Protectants, Misc. (EUCERIN) cream     SM LUBRICANT EYE DROPS 0.4-0.3 % SOLN ophthalmic solution     solifenacin (VESICARE) 10 MG tablet     ziprasidone (GEODON) 40 MG capsule     No current facility-administered medications for this visit.      Past Medical History:   Patient Active Problem List   Diagnosis     Allergic rhinitis due to pollen     Urge incontinence     Hypertension, essential     Cardiomegaly     Chronic constipation     Dry eye syndrome     Esophageal reflux     Exposure keratoconjunctivitis     DM ophthalmopathy (H)     Hypothyroidism     Senile cataract     ANUJ (obstructive sleep apnea)     Vaginal atrophy     Restless leg syndrome     Squamous blepharitis     Morbid obesity due to excess calories (H)     Personal history of breast cancer s/p L masectomy     Hypercholesteremia     Lives in group home     Extrapyramidal and movement disorder     CKD (chronic kidney disease) stage 2, GFR 60-89 ml/min     Solitary kidney, congenital     S/P hysterectomy     Impingement syndrome of right shoulder     Hypertriglyceridemia     Candidiasis of skin     Generalized anxiety disorder     Carpal tunnel syndrome of left wrist     Schizoaffective disorder, depressive type (H)     Major depressive disorder, recurrent episode, moderate (H)     Psychological factors affecting medical condition     Hx of psychiatric care     Nail complaint     Microalbuminuria due to type 2 diabetes mellitus (H)     Type 2 diabetes mellitus with microalbuminuria, with long-term current use of insulin (H)     Conjunctivitis of both eyes     Corneal erosion of both eyes     Spastic entropion, right     Foot callus      Paroxysmal atrial fibrillation (H)     Squamous cell carcinoma in situ of skin of face     History of colonic polyps     Past Medical History:   Diagnosis Date     Allergic rhinitis due to pollen     seasonal allergies      Anisometropia and aniseikonia      BMI greater than 40      Cardiomegaly      Chronic constipation      Congenital absence of one kidney      Cramp of limb      Dermatophytosis of the body      Dry eye syndrome      Esophageal reflux      Essential hypertension, benign      Gastro-oesophageal reflux disease      Hemorrhoids      Hypermetropia      Hypothyroidism      Incomplete Emptying of Bladder      Lactose intolerance      Major depressive disorder, recurrent episode, moderate (H)      Malignant neoplasm of breast (female), unspecified site     left mastectomy 1996      Mixed incontinence urge and stress (male)(female)      Moderate obstructive sleep apnea      Postmenopausal Atrophic Vaginitis      Presbyopia      Regular astigmatism      Restless leg syndrome      Senile nuclear sclerosis      Thyroid eye disease     mild     Tinnitus      Trigger finger (acquired)     right hand     Type II or unspecified type diabetes mellitus without mention of complication, not stated as uncontrolled     on insulin since April 2006         CC Referred Self, MD  No address on file on close of this encounter.

## 2022-08-30 LAB
PATH REPORT.COMMENTS IMP SPEC: NORMAL
PATH REPORT.FINAL DX SPEC: NORMAL
PATH REPORT.GROSS SPEC: NORMAL
PATH REPORT.MICROSCOPIC SPEC OTHER STN: NORMAL
PATH REPORT.RELEVANT HX SPEC: NORMAL

## 2022-09-01 ENCOUNTER — MEDICAL CORRESPONDENCE (OUTPATIENT)
Dept: HEALTH INFORMATION MANAGEMENT | Facility: CLINIC | Age: 72
End: 2022-09-01

## 2022-09-02 ENCOUNTER — MEDICAL CORRESPONDENCE (OUTPATIENT)
Dept: HEALTH INFORMATION MANAGEMENT | Facility: CLINIC | Age: 72
End: 2022-09-02

## 2022-09-08 NOTE — TELEPHONE ENCOUNTER
Central Prior Authorization Team   [phone]    PA Initiation    Medication: conjugated estrogens (PREMARIN) 0.625 MG/GM vaginal cream  Insurance Company: [employer] - Phone [phone] Fax [phone]  Pharmacy Filling the Rx: [facility] - [place], [place] - [address]  Filling Pharmacy Phone: [phone]  Filling Pharmacy Fax: [phone]  Start Date: 5/31/2019           Treatment Time In Min (Optional): 0.43

## 2022-09-14 ENCOUNTER — MEDICAL CORRESPONDENCE (OUTPATIENT)
Dept: HEALTH INFORMATION MANAGEMENT | Facility: CLINIC | Age: 72
End: 2022-09-14

## 2022-09-14 DIAGNOSIS — R14.2 FLATULENCE, ERUCTATION AND GAS PAIN: ICD-10-CM

## 2022-09-14 DIAGNOSIS — R68.2 DRY MOUTH: Primary | ICD-10-CM

## 2022-09-14 DIAGNOSIS — Z91.89 NEED FOR DENTAL CARE: ICD-10-CM

## 2022-09-14 DIAGNOSIS — H04.123 DRY EYE SYNDROME OF BOTH EYES: ICD-10-CM

## 2022-09-14 DIAGNOSIS — R14.1 FLATULENCE, ERUCTATION AND GAS PAIN: ICD-10-CM

## 2022-09-14 DIAGNOSIS — J30.2 SEASONAL ALLERGIC RHINITIS, UNSPECIFIED TRIGGER: ICD-10-CM

## 2022-09-14 DIAGNOSIS — R14.3 FLATULENCE, ERUCTATION AND GAS PAIN: ICD-10-CM

## 2022-09-14 DIAGNOSIS — G47.09 OTHER INSOMNIA: ICD-10-CM

## 2022-09-14 RX ORDER — SIMETHICONE 125 MG
125 TABLET,CHEWABLE ORAL 2 TIMES DAILY
Qty: 60 TABLET | Refills: 0 | Status: SHIPPED | OUTPATIENT
Start: 2022-09-14 | End: 2022-10-03

## 2022-09-14 RX ORDER — FLUTICASONE PROPIONATE 50 MCG
1 SPRAY, SUSPENSION (ML) NASAL DAILY
Qty: 16 G | Refills: 3 | Status: SHIPPED | OUTPATIENT
Start: 2022-09-14

## 2022-09-14 RX ORDER — GLIPIZIDE 10 MG/1
2 TABLET ORAL 4 TIMES DAILY
Qty: 45 UNITS | Refills: 3 | Status: SHIPPED | OUTPATIENT
Start: 2022-09-14 | End: 2022-09-15

## 2022-09-14 RX ORDER — SODIUM CHLORIDE 0.65 %
2 AEROSOL, SPRAY (ML) NASAL 2 TIMES DAILY
COMMUNITY
Start: 2022-08-06

## 2022-09-14 RX ORDER — FLUOROMETHOLONE 0.1 %
1 SUSPENSION, DROPS(FINAL DOSAGE FORM)(ML) OPHTHALMIC (EYE) 4 TIMES DAILY
Qty: 20 UNITS | Refills: 0 | Status: SHIPPED | OUTPATIENT
Start: 2022-09-14 | End: 2022-09-15

## 2022-09-14 RX ORDER — LIFITEGRAST 50 MG/ML
1 SOLUTION/ DROPS OPHTHALMIC 2 TIMES DAILY
Qty: 22.5 UNITS | Refills: 11 | Status: SHIPPED | OUTPATIENT
Start: 2022-09-14 | End: 2022-09-15

## 2022-09-15 ENCOUNTER — DOCUMENTATION ONLY (OUTPATIENT)
Dept: FAMILY MEDICINE | Facility: CLINIC | Age: 72
End: 2022-09-15

## 2022-09-15 ENCOUNTER — OFFICE VISIT (OUTPATIENT)
Dept: OPHTHALMOLOGY | Facility: CLINIC | Age: 72
End: 2022-09-15
Attending: OPHTHALMOLOGY
Payer: COMMERCIAL

## 2022-09-15 DIAGNOSIS — R80.9 TYPE 2 DIABETES MELLITUS WITH MICROALBUMINURIA, WITH LONG-TERM CURRENT USE OF INSULIN (H): ICD-10-CM

## 2022-09-15 DIAGNOSIS — H04.123 DRY EYES: Primary | ICD-10-CM

## 2022-09-15 DIAGNOSIS — H25.813 MIXED TYPE AGE-RELATED CATARACT, BOTH EYES: ICD-10-CM

## 2022-09-15 DIAGNOSIS — E11.29 TYPE 2 DIABETES MELLITUS WITH MICROALBUMINURIA, WITH LONG-TERM CURRENT USE OF INSULIN (H): ICD-10-CM

## 2022-09-15 DIAGNOSIS — H40.033 ANATOMICAL NARROW ANGLE BORDERLINE GLAUCOMA OF BOTH EYES: ICD-10-CM

## 2022-09-15 DIAGNOSIS — Z79.4 TYPE 2 DIABETES MELLITUS WITH MICROALBUMINURIA, WITH LONG-TERM CURRENT USE OF INSULIN (H): ICD-10-CM

## 2022-09-15 DIAGNOSIS — H04.123 DRY EYE SYNDROME OF BOTH EYES: ICD-10-CM

## 2022-09-15 PROCEDURE — 99213 OFFICE O/P EST LOW 20 MIN: CPT | Mod: GC | Performed by: OPHTHALMOLOGY

## 2022-09-15 PROCEDURE — G0463 HOSPITAL OUTPT CLINIC VISIT: HCPCS | Mod: 25

## 2022-09-15 PROCEDURE — 92015 DETERMINE REFRACTIVE STATE: CPT

## 2022-09-15 RX ORDER — LIFITEGRAST 50 MG/ML
1 SOLUTION/ DROPS OPHTHALMIC 2 TIMES DAILY
Qty: 5 EACH | Refills: 7 | Status: SHIPPED | OUTPATIENT
Start: 2022-09-15 | End: 2022-10-19

## 2022-09-15 RX ORDER — GLIPIZIDE 10 MG/1
2 TABLET ORAL 4 TIMES DAILY
Qty: 15 ML | Refills: 3 | Status: SHIPPED | OUTPATIENT
Start: 2022-09-15 | End: 2023-03-27

## 2022-09-15 RX ORDER — FLUOROMETHOLONE 0.1 %
1 SUSPENSION, DROPS(FINAL DOSAGE FORM)(ML) OPHTHALMIC (EYE) 2 TIMES DAILY
Qty: 10 ML | Refills: 3 | Status: SHIPPED | OUTPATIENT
Start: 2022-09-15 | End: 2023-03-10

## 2022-09-15 ASSESSMENT — REFRACTION_MANIFEST
OS_AXIS: 059
OS_SPHERE: +1.50
OD_ADD: +2.75
OD_AXIS: 065
OS_CYLINDER: +0.25
OS_ADD: +2.75
OD_CYLINDER: +0.75
OD_SPHERE: +1.75

## 2022-09-15 ASSESSMENT — REFRACTION_WEARINGRX
OD_SPHERE: +2.00
OD_ADD: +2.75
OS_AXIS: 065
OS_SPHERE: +1.75
OD_CYLINDER: +0.75
OS_CYLINDER: +0.25
SPECS_TYPE: PAL
OD_AXIS: 061
OS_ADD: +2.75

## 2022-09-15 ASSESSMENT — CONF VISUAL FIELD
OS_NORMAL: 1
OD_NORMAL: 1
METHOD: COUNTING FINGERS

## 2022-09-15 ASSESSMENT — TONOMETRY
IOP_METHOD: TONOPEN
OS_IOP_MMHG: 15
OD_IOP_MMHG: 17

## 2022-09-15 ASSESSMENT — EXTERNAL EXAM - LEFT EYE: OS_EXAM: NORMAL

## 2022-09-15 ASSESSMENT — VISUAL ACUITY
OS_CC: 20/40
METHOD: SNELLEN - LINEAR
OD_CC: 20/30

## 2022-09-15 ASSESSMENT — SLIT LAMP EXAM - LIDS
COMMENTS: POOR BLINK
COMMENTS: POOR BLINK

## 2022-09-15 ASSESSMENT — EXTERNAL EXAM - RIGHT EYE: OD_EXAM: NORMAL

## 2022-09-15 NOTE — NURSING NOTE
Chief Complaints and History of Present Illnesses   Patient presents with     Dry Eye Syndrome Follow Up     Chief Complaint(s) and History of Present Illness(es)     Dry Eye Syndrome Follow Up     Laterality: both eyes              Comments     Pinky is here to continue care for dry eye syndrome as well as Mixed type age-related cataract, both eyes. She states her eyes feels less dry than last visit. She used Genteal at 12:15p today.    Eduardo Durán COT 12:46 PM September 15, 2022

## 2022-09-15 NOTE — PROGRESS NOTES
"When opening a documentation only encounter, be sure to enter in \"Chief Complaint\" Forms and in \" Comments\" Title of form, description if needed.    Pinky is a 72 year old  female  Form received via: Fax  Form now resides in: Provider Tom Washington MA                  "

## 2022-09-15 NOTE — PROGRESS NOTES
Chief Complaint(s) and History of Present Illness(es)     Dry Eye Syndrome Follow Up     Laterality: both eyes              Comments     Pinky is here to continue care for dry eye syndrome as well as Mixed type   age-related cataract, both eyes. She states her eyes feels less dry than   last visit. She used Genteal at 12:15p today.    Eduardo Durán COT 12:46 PM September 15, 2022                   Review of systems for the eyes was negative other than the pertinent positives/negatives listed in the HPI.    Ocular Meds: artificial tears QID, Xiidra BID, erythromycin ointment at bedtime, FML QID    Ocular Hx:     FOHx: no family history of glaucoma or blindness    Assessment & Plan      Pinky Page is a 72 year old female with the following diagnoses:    1. Dry eyes    2. Mixed type age-related cataract, both eyes    3. Anatomical narrow angle borderline glaucoma of both eyes    4. Type 2 diabetes mellitus with microalbuminuria, with long-term current use of insulin (H)      #Dry eyes    7 week follow up today. Pt symptomatically improved with FML drops.  Recommend lid hygiene and warm compresses  Continue artifical tears four times a day    Continuing xiidra twice a day both eyes   ESS marissa at bedtime as needed   Decrease FML to BID in both eyes    #Cataracts left eye> right eye     May be visually significant at this time, will try treating dry eye sx before pursuing surgery  Refractive options reviewed  Refraction given   Monitor until the Spring        Patient disposition:   Return in about 6 months (around 3/15/2023) for Follow Up, VTD, refraction.         Dominga Johnson MD  PGY2 Ophthalmology    Attending Physician Attestation:  Complete documentation of historical and exam elements from today's encounter can be found in the full encounter summary report (not reduplicated in this progress note).  I personally obtained the chief complaint(s) and history of present illness.  I confirmed and edited as necessary  the review of systems, past medical/surgical history, family history, social history, and examination findings as documented by others; and I examined the patient myself.  I personally reviewed the relevant tests, images, and reports as documented above.  I formulated and edited as necessary the assessment and plan and discussed the findings and management plan with the patient and family. . - Michael Lopez MD

## 2022-09-16 ENCOUNTER — OFFICE VISIT (OUTPATIENT)
Dept: PSYCHOLOGY | Facility: CLINIC | Age: 72
End: 2022-09-16
Payer: COMMERCIAL

## 2022-09-16 DIAGNOSIS — E66.9 OBESITY, UNSPECIFIED OBESITY SEVERITY, UNSPECIFIED OBESITY TYPE: ICD-10-CM

## 2022-09-16 DIAGNOSIS — F33.0 MAJOR DEPRESSIVE DISORDER, RECURRENT EPISODE, MILD (H): Primary | ICD-10-CM

## 2022-09-16 DIAGNOSIS — F54 PSYCHOLOGICAL FACTORS AFFECTING MEDICAL CONDITION: ICD-10-CM

## 2022-09-16 DIAGNOSIS — F41.1 GENERALIZED ANXIETY DISORDER: ICD-10-CM

## 2022-09-16 PROCEDURE — 90837 PSYTX W PT 60 MINUTES: CPT | Performed by: PSYCHOLOGIST

## 2022-09-16 NOTE — PROGRESS NOTES
Health Psychology                    Department of Medicine  Keri Arnett, Ph.D., L.P. [phone]                         [facility] Madelaine Ingram, Ph.D., L.P. [phone]                     [place] Mail Code 679   Dennis Dillard, Ph.D. [phone]      [address] [place] [address] Rae Eugene, Ph.D., A.B.P.P., L.P. [phone]              [place] [address]           Damon Gr, Ph.D., A.B.P.P., L.P. [phone]      Kimber Morales, Ph.D., L.P. [phone]  [facility]   Lilia Burroughs, Ph.D., A.B.P.P., L.P. 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 She is getting better at setting it up with help from [facility] staff.  Intake with Health Psychology was in the 1980s.  I began to see her in the early 1990s.    In the interval, she was hospitalized for dizziness x 6 days at [facility].   She is now using a walker.  She was having inner ear problem.   She saw Rickey Reynoso MD from Psychiatry while at [facility].    Mood: Her mood is okay today, but a bit down relative to baseline given that she doesn't like feeling sick.     Exercise:  She had been biking 5x/week for 15-20 minutes.   She was also walking about 15-20 minutes, down from 25-30 minutes/day. However ,since the hospitalization she stopped walking and biking  It is not clear to me whether she still needs the  Walker, so I have asked  Jaime to evaluate as it is a barrier currently to her exercise and to her job in the dining room.     Health:  She got her booster for COVID and will get flu shot in September.   She feels she reovered fully from COVID. During the interval [facility] had 27 cases of COVID; no hospitalizations..      Weight: Her weight  was not taken today.     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  However, she states she can't while using walker.   I questioned [facility] staff about that.    She participates fully, ventilated feelings appropriately.  She appears to derive benefit from the support.     Current Outpatient Medications   Medication     acetaminophen (TYLENOL) 500 MG tablet     alum & mag hydroxide-simethicone (MYLANTA/MAALOX) 200-200-20 MG/5ML SUSP suspension     amLODIPine (NORVASC) 10 MG tablet     apixaban ANTICOAGULANT (ELIQUIS) 5 MG tablet     artificial saliva (BIOTENE MT) AERS spray     artificial tears (GENTEAL) 0.1-0.2-0.3 % ophthalmic solution     atorvastatin (LIPITOR) 40 MG tablet     blood glucose (NO BRAND SPECIFIED) lancets standard     blood glucose calibration (NO BRAND SPECIFIED) solution     blood glucose monitoring (NO BRAND SPECIFIED) meter device kit     blood glucose monitoring (NO BRAND SPECIFIED) test strip     Calcium Carb-Cholecalciferol (CALCIUM-VITAMIN D3) 250-125 MG-UNIT TABS     Calcium Carbonate-Vitamin D (CALCIUM-VITAMIN D) 250-125 MG-UNIT TABS     calcium polycarbophil (FIBERCON) 625 MG tablet     CLARITIN 10 MG OR TABS     conjugated estrogens (PREMARIN) 0.625 MG/GM vaginal cream     erythromycin (ROMYCIN) 5 MG/GM ophthalmic ointment     fluorometholone (FML LIQUIFILM) 0.1 % ophthalmic suspension     FLUoxetine (PROZAC) 20 MG capsule     fluticasone (FLONASE) 50 MCG/ACT nasal spray     furosemide (LASIX) 20 MG tablet     Gabapentin (NEURONTIN PO)     Hypromellose (ARTIFICIAL TEARS OP)     insulin glargine (LANTUS PEN) 100 UNIT/ML pen     lactase (LACTAID) 3000 UNIT tablet     levothyroxine (SYNTHROID/LEVOTHROID) 175 MCG tablet     lidocaine (XYLOCAINE) 2 % external gel     lidocaine (XYLOCAINE) 5 % external ointment     lifitegrast (XIIDRA) 5 % opthalmic solution     liraglutide (VICTOZA) 18  MG/3ML solution     losartan (COZAAR) 100 MG tablet     Magnesium Hydroxide (MILK OF MAGNESIA PO)     melatonin 3 MG CAPS     metFORMIN (GLUCOPHAGE) 1000 MG tablet     NEW MED     nystatin (MYCOSTATIN) 720867 UNIT/GM POWD     ONETOUCH DELICA LANCETS 33G MISC     polyethylene glycol (MIRALAX/GLYCOLAX) powder     Saline 0.9 % SOLN     SALINE MIST 0.65 % nasal spray     senna-docusate (SENOKOT-S/PERICOLACE) 8.6-50 MG tablet     simethicone (MYLICON) 125 MG chewable tablet     Skin Protectants, Misc. (EUCERIN) cream     SM LUBRICANT EYE DROPS 0.4-0.3 % SOLN ophthalmic solution     sodium fluoride 1.1 % PSTE dental paste     solifenacin (VESICARE) 10 MG tablet     ziprasidone (GEODON) 40 MG capsule     No current facility-administered medications for this visit.     Wt Readings from Last 4 Encounters:   08/19/22 102.2 kg (225 lb 4.8 oz)   07/11/22 103.9 kg (229 lb)   07/08/22 100.6 kg (221 lb 11.2 oz)   05/31/22 99 kg (218 lb 3.2 oz)   There is no height or weight on file to calculate BMI.    Time service started: 2:01  Time service ended:  2:54    Diagnosis:   Major Depression, recurrent mild (F33.0)   Psychological Factors Affecting Morbid Obesity (F54)  Generalized anxiety (F41.1)       Plan: Return for psychotherapy visit in-person/masked 11/4 @ 2 due to the serious and persistent nature of her depression and anxiety consistent with treatment plan.  Last treatment plan signed: 1/13/22  Treatment plan due: 1/13/23                  Preference for future meetings:      In-person, even if both wearing masks                Damon Gr, PhD LP,  A.B.P.P.  Director, Health Psychology  [phone]

## 2022-09-20 ENCOUNTER — DOCUMENTATION ONLY (OUTPATIENT)
Dept: FAMILY MEDICINE | Facility: CLINIC | Age: 72
End: 2022-09-20

## 2022-09-22 ENCOUNTER — TELEPHONE (OUTPATIENT)
Dept: PSYCHIATRY | Facility: CLINIC | Age: 72
End: 2022-09-22

## 2022-09-22 NOTE — TELEPHONE ENCOUNTER
Writer called 3rd floor nursing at [facility] [phone] and spoke to nurse Guillory to confirm 10./10/22 appointment with Dr Cassidy and she agreed that forms can be sent once that appointment is completed. Writer agreed to her plan.Nataliya Doyle LPN Lead    6 pages was received from [facility] requesting review and provider signature on current Physician Orders. The forms are being held in Psych until next appt on 10/10/22 with Dr Cassidy.  Nataliya Doyle LPN

## 2022-10-03 ENCOUNTER — OFFICE VISIT (OUTPATIENT)
Dept: FAMILY MEDICINE | Facility: CLINIC | Age: 72
End: 2022-10-03
Payer: COMMERCIAL

## 2022-10-03 VITALS
DIASTOLIC BLOOD PRESSURE: 80 MMHG | HEIGHT: 64 IN | RESPIRATION RATE: 16 BRPM | HEART RATE: 62 BPM | TEMPERATURE: 98.7 F | WEIGHT: 219.4 LBS | OXYGEN SATURATION: 96 % | SYSTOLIC BLOOD PRESSURE: 122 MMHG | BODY MASS INDEX: 37.46 KG/M2

## 2022-10-03 DIAGNOSIS — R14.2 FLATULENCE, ERUCTATION AND GAS PAIN: ICD-10-CM

## 2022-10-03 DIAGNOSIS — Z23 HIGH PRIORITY FOR 2019-NCOV VACCINE: ICD-10-CM

## 2022-10-03 DIAGNOSIS — E11.9 DIABETES MELLITUS TYPE 2, INSULIN DEPENDENT (H): ICD-10-CM

## 2022-10-03 DIAGNOSIS — H81.10 BENIGN PAROXYSMAL POSITIONAL VERTIGO, UNSPECIFIED LATERALITY: ICD-10-CM

## 2022-10-03 DIAGNOSIS — G47.33 OSA (OBSTRUCTIVE SLEEP APNEA): ICD-10-CM

## 2022-10-03 DIAGNOSIS — Z09 HOSPITAL DISCHARGE FOLLOW-UP: Primary | ICD-10-CM

## 2022-10-03 DIAGNOSIS — F25.1 SCHIZOAFFECTIVE DISORDER, DEPRESSIVE TYPE (H): ICD-10-CM

## 2022-10-03 DIAGNOSIS — I10 HYPERTENSION, ESSENTIAL: ICD-10-CM

## 2022-10-03 DIAGNOSIS — R53.81 PHYSICAL DECONDITIONING: ICD-10-CM

## 2022-10-03 DIAGNOSIS — R14.3 FLATULENCE, ERUCTATION AND GAS PAIN: ICD-10-CM

## 2022-10-03 DIAGNOSIS — R14.1 FLATULENCE, ERUCTATION AND GAS PAIN: ICD-10-CM

## 2022-10-03 DIAGNOSIS — R60.0 LOWER LEG EDEMA: ICD-10-CM

## 2022-10-03 DIAGNOSIS — Z79.4 DIABETES MELLITUS TYPE 2, INSULIN DEPENDENT (H): ICD-10-CM

## 2022-10-03 LAB
ANION GAP SERPL CALCULATED.3IONS-SCNC: 9 MMOL/L (ref 7–15)
BUN SERPL-MCNC: 15.6 MG/DL (ref 8–23)
CALCIUM SERPL-MCNC: 9.6 MG/DL (ref 8.8–10.2)
CHLORIDE SERPL-SCNC: 99 MMOL/L (ref 98–107)
CREAT SERPL-MCNC: 0.83 MG/DL (ref 0.51–0.95)
DEPRECATED HCO3 PLAS-SCNC: 30 MMOL/L (ref 22–29)
GFR SERPL CREATININE-BSD FRML MDRD: 74 ML/MIN/1.73M2
GLUCOSE SERPL-MCNC: 134 MG/DL (ref 70–99)
HBA1C MFR BLD: 7.6 % (ref 0–5.6)
POTASSIUM SERPL-SCNC: 4.1 MMOL/L (ref 3.4–5.3)
SODIUM SERPL-SCNC: 138 MMOL/L (ref 136–145)

## 2022-10-03 PROCEDURE — 99214 OFFICE O/P EST MOD 30 MIN: CPT | Mod: 25 | Performed by: STUDENT IN AN ORGANIZED HEALTH CARE EDUCATION/TRAINING PROGRAM

## 2022-10-03 PROCEDURE — 80048 BASIC METABOLIC PNL TOTAL CA: CPT | Performed by: STUDENT IN AN ORGANIZED HEALTH CARE EDUCATION/TRAINING PROGRAM

## 2022-10-03 PROCEDURE — 0124A COVID-19,PF,PFIZER BOOSTER BIVALENT: CPT | Performed by: STUDENT IN AN ORGANIZED HEALTH CARE EDUCATION/TRAINING PROGRAM

## 2022-10-03 PROCEDURE — 83036 HEMOGLOBIN GLYCOSYLATED A1C: CPT | Performed by: STUDENT IN AN ORGANIZED HEALTH CARE EDUCATION/TRAINING PROGRAM

## 2022-10-03 PROCEDURE — 91312 COVID-19,PF,PFIZER BOOSTER BIVALENT: CPT | Performed by: STUDENT IN AN ORGANIZED HEALTH CARE EDUCATION/TRAINING PROGRAM

## 2022-10-03 PROCEDURE — 36415 COLL VENOUS BLD VENIPUNCTURE: CPT | Performed by: STUDENT IN AN ORGANIZED HEALTH CARE EDUCATION/TRAINING PROGRAM

## 2022-10-03 RX ORDER — SIMETHICONE 125 MG
125 TABLET,CHEWABLE ORAL 2 TIMES DAILY
Qty: 180 TABLET | Refills: 3 | Status: SHIPPED | OUTPATIENT
Start: 2022-10-03

## 2022-10-03 RX ORDER — LOSARTAN POTASSIUM 100 MG/1
100 TABLET ORAL DAILY
Qty: 90 TABLET | Refills: 1
Start: 2022-10-03

## 2022-10-03 RX ORDER — FUROSEMIDE 20 MG
20 TABLET ORAL DAILY
Qty: 60 TABLET | Refills: 3
Start: 2022-10-03

## 2022-10-03 NOTE — PROGRESS NOTES
Preceptor Attestation:   Patient seen, evaluated and discussed with the resident. I have verified the content of the note, which accurately reflects my assessment of the patient and the plan of care.   Supervising Physician:  Deacon Birmingham MD

## 2022-10-03 NOTE — LETTER
October 4, 2022      Pinky Nevareznter  [address] [facility] [address]        Dear ,    We are writing to inform you of your test results.    Overall labs are looking appropriate. Your A1c is a little higher but still within our goal of 7-8. Continue working on your physical activity and building back up your stamina. Please reach out if you have other questions or concerns.     Resulted Orders   Basic metabolic panel   Result Value Ref Range    Sodium 138 136 - 145 mmol/L    Potassium 4.1 3.4 - 5.3 mmol/L    Chloride 99 98 - 107 mmol/L    Carbon Dioxide (CO2) 30 (H) 22 - 29 mmol/L    Anion Gap 9 7 - 15 mmol/L    Urea Nitrogen 15.6 8.0 - 23.0 mg/dL    Creatinine 0.83 0.51 - 0.95 mg/dL    Calcium 9.6 8.8 - 10.2 mg/dL    Glucose 134 (H) 70 - 99 mg/dL    GFR Estimate 74 >60 mL/min/1.73m2      Comment:      Effective December 21, 2021 eGFRcr in adults is calculated using the 2021 CKD-EPI creatinine equation which includes age and gender (Klaus et al., NEJM, DOI: 10.1056/ACRFxc8175074)   Hemoglobin A1c   Result Value Ref Range    Hemoglobin A1C 7.6 (H) 0.0 - 5.6 %      Comment:      Normal <5.7%   Prediabetes 5.7-6.4%    Diabetes 6.5% or higher     Note: Adopted from ADA consensus guidelines.       If you have any questions or concerns, please call the clinic at the number listed above.       Sincerely,      Tonia Goel MD

## 2022-10-03 NOTE — PROGRESS NOTES
Assessment & Plan     Hospital discharge follow-up  BPPV  Physical deconditioning  Recent hospitalization for weakness and dizziness and initial concern for stroke, which was later ruled out. After thorough work up including evaluation by cardiology and inpatient PT it was determined that symptoms were most consistent with variant of BPPV. She was given exercises to do at home, which she has been diligently performing and finding symptoms have improved since discharge. She did become significantly deconditioned over the course of her hospitalization, such that she went from walking independently to requiring a 2 wheel walker for mobilization. Since discharge, she has been walking more, attempting to increase the distance she can travel without her walker, but she is working on this independently. Given persistent deconditioning and higher fall risk Pinky is someone who would benefit from outpatient PT to assist with regaining strength and improving deconditioning. Could also consider further work with PT on BPPV exercises.   - Physical Therapy Referral    Diabetes mellitus type 2, insulin dependent (H)  Due for lab check, last A1c at goal 7-8. No changes in medications and overall feeling well.   - Hemoglobin A1c    Hypertension, essential  Stable blood pressures at goal. No evidence of hypotension as cause of reported dizziness during recent hospitalization.   - Basic metabolic panel  - losartan (COZAAR) 100 MG tablet  Dispense: 90 tablet; Refill: 1    ANUJ (obstructive sleep apnea)  Previously diagnosed and using cpap machine at night and requesting equipment order for replacement parts for cpap machine. Last saw sleep medicine on 8/13/21 at which time it was recommended she discontinue use of cpap due to manufacture recall. There is no further documentation indicated that she was to restart use, however Pinky reports that she was contacted by sleep medicine and told to restart use. Given somewhat unclear picture,  "recommend a follow up visit with sleep medicine to determine whether or not cpap use is appropriate with current machine and what if any parts need replacement if continued cpap use is recommended.   - Adult Sleep Eval & Management Referral    Schizoaffective disorder, depressive type (H)  Medication records adjusted to reflect hospital changes  - FLUoxetine (PROZAC) 20 MG capsule  Dispense: 90 capsule; Refill: 3    Flatulence, eructation and gas pain  Prescription sent to pharmacy   - simethicone (MYLICON) 125 MG chewable tablet  Dispense: 180 tablet; Refill: 3    Lower leg edema  Medication records adjusted to reflect hospital changes  - furosemide (LASIX) 20 MG tablet  Dispense: 60 tablet; Refill: 3    High priority for 2019-nCoV vaccine  - COVID-19,PF,PFIZER BOOSTER BIVALENT 12+Yrs      Return in about 6 months (around 4/3/2023).    Tonia Goel MD  [facility] MICHAEL Vance is a 72 year old presenting for the following health issues:  Hospital F/U (Following up from ED visit due to dizziness. ), Dme (Supplies for CPAP), Forms (Patient present with referral forms), Other (Wants to work on walking without her walker ), and Imm/Inj (COVID-19 VACCINE)      HPI       Hospital Follow-up Visit:    Hospital/Nursing Home/IP Rehab Facility: [facility]  Date of Admission: 8/27/22  Date of Discharge: 9/1/2022  Reason(s) for Admission: Dizziness. \"inner ear problem\"     Was your hospitalization related to COVID-19? No   Problems taking medications regularly:  None  Medication changes since discharge: gabapentin decreased from 900 mg TID to 600 mg TID and fluoxetine decreased from 40 mg daily to 20 mg daily  Problems adhering to non-medication therapy:  None    Summary of hospitalization:  CareEverywhere information obtained and reviewed  Diagnostic Tests/Treatments reviewed.  Follow up needed: none  Other Healthcare Providers Involved in Patient s Care:         staff at [facility] including " "RNs, TR, Mental health workers and social workers  Update since discharge: improved. Using two wheel walker for balance since discharge. Working on her own (not with PT) on reducing reliance. Walk around [place] without walker; was having continued dizziness. Did exercises in bed each morning and found dizziness improved     Last A1c 7.7 in April 2022.  BMP Aug 2022 with glucose 144 all other values wnl.         Review of Systems   ROS otherwise negative if not stated in HPI        Objective    /80   Pulse 62   Temp 98.7  F (37.1  C) (Oral)   Resp 16   Ht 1.626 m (5' 4\")   Wt 99.5 kg (219 lb 6.4 oz)   LMP  (LMP Unknown)   SpO2 96%   BMI 37.66 kg/m    Body mass index is 37.66 kg/m .  Physical Exam   GENERAL: alert, no distress, elderly, fatigued and walking with 2 wheel walker  NECK: no adenopathy, no asymmetry, masses, or scars and thyroid normal to palpation  RESP: lungs clear to auscultation - no rales, rhonchi or wheezes  CV: irregularly irregular rhythm, no murmur, click or rub and peripheral pulses strong  MS: no gross musculoskeletal defects noted, no edema  NEURO: tremor of right hand, mentation intact, speech normal and gait abnormal: walking slower with 2 wheel walker  PSYCH: mentation appears normal, affect normal/bright    Results for orders placed or performed in visit on 10/03/22   Basic metabolic panel     Status: Abnormal   Result Value Ref Range    Sodium 138 136 - 145 mmol/L    Potassium 4.1 3.4 - 5.3 mmol/L    Chloride 99 98 - 107 mmol/L    Carbon Dioxide (CO2) 30 (H) 22 - 29 mmol/L    Anion Gap 9 7 - 15 mmol/L    Urea Nitrogen 15.6 8.0 - 23.0 mg/dL    Creatinine 0.83 0.51 - 0.95 mg/dL    Calcium 9.6 8.8 - 10.2 mg/dL    Glucose 134 (H) 70 - 99 mg/dL    GFR Estimate 74 >60 mL/min/1.73m2   Hemoglobin A1c     Status: Abnormal   Result Value Ref Range    Hemoglobin A1C 7.6 (H) 0.0 - 5.6 %           "

## 2022-10-03 NOTE — PATIENT INSTRUCTIONS
Patient Education   Here is the plan from today's visit    1. Hospital discharge follow-up  I'm glad you're dizziness is improving since you were hospitalized. Please follow up with PT to work on balance and regaining your strength.   - Physical Therapy Referral; Future    2. High priority for 2019-nCoV vaccine  - COVID-19,PF,PFIZER BOOSTER BIVALENT 12+Yrs    3. Diabetes mellitus type 2, insulin dependent (H)  - Hemoglobin A1c; Future    4. Hypertension, essential  - Basic metabolic panel; Future  - losartan (COZAAR) 100 MG tablet; Take 1 tablet (100 mg) by mouth daily  Dispense: 90 tablet; Refill: 1    5. Schizoaffective disorder, depressive type (H)  - FLUoxetine (PROZAC) 20 MG capsule; Take 1 capsule (20 mg) by mouth daily  Dispense: 90 capsule; Refill: 3    6. Physical deconditioning  - Physical Therapy Referral; Future    7. Flatulence, eructation and gas pain  - simethicone (MYLICON) 125 MG chewable tablet; Take 1 tablet (125 mg) by mouth 2 times daily  Dispense: 180 tablet; Refill: 3    8. Lower leg edema  - furosemide (LASIX) 20 MG tablet; Take 1 tablet (20 mg) by mouth daily  Dispense: 60 tablet; Refill: 3          Please call or return to clinic if your symptoms don't go away.    Follow up plan  Return in about 6 months (around 4/3/2023).    Thank you for coming to [facility] today.  Lab Testing:  **If you had lab testing today and your results are reassuring or normal they will be mailed to you or sent through [employer] within 7 days.   **If the lab tests need quick action we will call you with the results.  **If you are having labs done on a different day, please call [phone] to schedule at [facility] or [phone] for other [facility] locations. Labs do not offer walk-in appointments.  The phone number we will call with results is # [phone] (home) . If this is not the best number please call our clinic and change the number.  Medication Refills:  If you need any  refills please call your pharmacy and they will contact us.   If you need to  your refill at a new pharmacy, please contact the new pharmacy directly. The new pharmacy will help you get your medications transferred faster.   Scheduling:  If you have any concerns about today's visit or wish to schedule another appointment please call our office during normal business hours [phone] (8-5:00 M-F)  If a referral was made to an [facility] specialty provider and you do not get a call from central scheduling, please refer to directions on your visit summary or call our office during normal business hours for assistance.   If a Mammogram was ordered for you at the [facility] Center call [phone] to schedule or change your appointment.  If you had an XRay/CT/Ultrasound/MRI ordered the number is [phone] to schedule or change your radiology appointment.   [facility] has limited ultrasound appointments available on Wednesdays, if you would like your ultrasound at [facility], please call [phone] to schedule.   Medical Concerns:  If you have urgent medical concerns please call [phone] at any time of the day.    Tonia Goel MD

## 2022-10-10 ENCOUNTER — OFFICE VISIT (OUTPATIENT)
Dept: PSYCHIATRY | Facility: CLINIC | Age: 72
End: 2022-10-10
Attending: PSYCHIATRY & NEUROLOGY
Payer: COMMERCIAL

## 2022-10-10 VITALS — DIASTOLIC BLOOD PRESSURE: 80 MMHG | HEART RATE: 68 BPM | SYSTOLIC BLOOD PRESSURE: 133 MMHG

## 2022-10-10 DIAGNOSIS — F25.1 SCHIZOAFFECTIVE DISORDER, DEPRESSIVE TYPE (H): ICD-10-CM

## 2022-10-10 PROCEDURE — G0463 HOSPITAL OUTPT CLINIC VISIT: HCPCS

## 2022-10-10 PROCEDURE — 99214 OFFICE O/P EST MOD 30 MIN: CPT | Mod: GC | Performed by: STUDENT IN AN ORGANIZED HEALTH CARE EDUCATION/TRAINING PROGRAM

## 2022-10-10 RX ORDER — ZIPRASIDONE HYDROCHLORIDE 40 MG/1
40 CAPSULE ORAL 2 TIMES DAILY WITH MEALS
Qty: 60 CAPSULE | Refills: 2 | Status: SHIPPED | OUTPATIENT
Start: 2022-10-10 | End: 2023-01-09

## 2022-10-10 ASSESSMENT — PAIN SCALES - GENERAL: PAINLEVEL: MILD PAIN (2)

## 2022-10-10 NOTE — Clinical Note
Twan Randall, I am taking care of Pinky for her mental health (PGY-3 psychiatry resident at the [facility]). Concerns of her tremor was brought up by nursing staff at her group home. I also noticed that she was found to have an arrhythmia during her recent hospitalization. Ziprasidone is likely contributing most to the tremor (AIMS score today:4) and could also contribute to possible arrhythmia. However this treatment plan has managed her schizoaffective d/o extremely well (hx of requiring ECT and multiple psych hospitalizations). I've discussed this with the attendings here and am partial to keeping the Geodon at it's current dose but could also try reducing the Geodon (maybe reducing the morning dose to 20 mg), but I wanted to check in with you first to see if you feel that it might be beneficial to consult cardiology (arrhythmia and dizziness) or neurology (is there and underlying neurological etiology for these movement symptoms?) first. No changes today to her meds.   Magno Cassidy

## 2022-10-10 NOTE — PATIENT INSTRUCTIONS
**For crisis resources, please see the information at the end of this document**   Patient Education    Thank you for coming to the [facility].     Lab Testing:  If you had lab testing today and your results are reassuring or normal they will be mailed to you or sent through [employer] within 7 days. If the lab tests need quick action we will call you with the results. The phone number we will call with results is # [phone]. If this is not the best number please call our clinic and change the number.     Medication Refills:  If you need any refills please call your pharmacy and they will contact us. Our fax number for refills is [phone].   Three business days of notice are needed for general medication refill requests.   Five business days of notice are needed for controlled substance refill requests.   If you need to change to a different pharmacy, please contact the new pharmacy directly. The new pharmacy will help you get your medications transferred.     Contact Us:  Please call [phone] during business hours (8-5:00 M-F).   If you have medication related questions after clinic hours, or on the weekend, please call [phone].     Financial Assistance [phone]   Medical Records [phone]       MENTAL HEALTH CRISIS RESOURCES:  For a emergency help, please call 911 or go to the nearest Emergency Department.     Emergency Walk-In Options:   [facility] Unit @ [facility] ([place]): [phone] - Specialized mental health emergency area designed to be calming  [facility] ([place]): [phone]  [facility] ([place]): [phone]  [facility]): [phone]    [facility] Crisis Information:   [place]: [phone]  [place]: [phone]  [place] (MOHIT) - Adult: [phone]     Child: [phone]  [place] - Adult: [phone]     Child: [phone]  [place]:  [phone]  List of all [facility] resources:   https://[place].gov/dhs/people-we-serve/adults/health-care/mental-health/resources/crisis-contacts.jsp    National Crisis Information:   Crisis Text Line: Text  [place]  to 905764  Suicide & Crisis Lifeline: 988  National Suicide Prevention Lifeline: 3-145-877-TALK (1-929.728.8283)       For online chat options, visit https://suicidepreventionlifeline.org/chat/  Poison Control Center: 2-732-049-4215  Trans Lifeline: 1-262-267-6417 - Hotline for transgender people of all ages  The Bo Project: 7-184-329-0363 - Hotline for LGBT youth     For Non-Emergency Support:   Fast Tracker: Mental Health & Substance Use Disorder Resources -   https://www.[employer]ck[employer]n.org/

## 2022-10-10 NOTE — TELEPHONE ENCOUNTER
Writer confirmed no med changes at today's visit. Printed current med list and indicated that only ziprasidone was last filled by psychiatry clinic, other psych meds have been filled by PCP. Also noted that patient is being seen by new resident. Writer faxed 10 pages back Attn: 3rd floor nursing at [facility] at [phone]. Forms sent to scanning and a hard copy is being held in nurse triage until scanning is confirmed. Bianka Burns, EMT

## 2022-10-10 NOTE — PROGRESS NOTES
[facility]  Psychiatry Clinic  MEDICAL PROGRESS NOTE     CARE TEAM:  PCP- Tonia Goel    Psychotherapist- Dr Gr, PhD   Team: Staff at [facility]     This person is a 72 year old who uses the name Pinky and pronouns she, her.      DIAGNOSIS   Schizoaffective disorder, depressed type, in remission without psychotic features  Tardive dyskinesia      ASSESSMENT   Pinky Page is a 71 year old with history of schizoaffective disorder, depressed type who has been relatively stable taking ziprasidone and fluoxetine since 2016.    Pinky is doing well overall..   Issues addressed today are below.    -schizoaffective d/o: some chronic fluctuating depression and anxiety, denies psychosis symptoms, well managed on current medication regimen. Per      -tardive dyskinesia: has occasional shoulder pain that she attributes to this, chronic and unchanged in frequency. Bilat UE tremor not worsened, some days worse than others, does not notice anything that makes it worse or better except being nervous. AIMS today is 5/40 - some LE movements and UE movements, noticeable to patient but not particularly distressing. I will reach out to Pinky's PCP to discuss possibility of adding propranolol to regimen for tremor treatment if Dr Goel is comfortable with cardiovascular status. We discussed the possible adverse effects of this medication and Pinky expressed understanding.     Future considerations:  Consider decreasing or cross-titrating off of ziprasidone in favor of another atypical neuroleptic (in the case that it is causing/contributing to her movement abnormalities).  Adding propranolol or benadryl for tremor tx   Ingrezza for TD  Repeat EKG if med changes    [place]NPMP was checked today:  Indicates taking controlled medication as prescribed.     PLAN                                                                                                                1) Meds-  - Continue  ziprasidone 40 mg BID   - Continue fluoxetine 40 mg daily  - Continue melatonin 6mg at bedtime     (prescribed by PCP: gabapentin 900mg TID)     2) Psychotherapy- cont with Dr Gr every month     3) Next due-  Labs- CBC and CMP due 9/2022, ordered to be done prior to next appt with me  EKG- Last EKG 3/2021 without prolonged QTc (439)  Rating scales-  AIMS 7/11/2022 is 5/40 - some LE movements and UE movements, noticeable to patient but not particularly distressing.  AIMS: done 4/14/22 with total score of 4    4) Referrals-  none    5) Dispo- 3 months       PSYCH and SUBSTANCE USE Critical Summary Points since July 2022   None       PERTINENT BACKGROUND                                                    [most recent eval 07/08/22]     Pinky first experienced mental health issues in her 20s or 30s and has received treatment for schizoaffective disorder and generalized anxiety. Notably, Pinky's symptoms of depression and psychosis have responded well to a combination of ECT and medication management. Pinky has a history of experiencing increased psychotic symptoms with past attempted antipsychotic tapers. No changes to antipsychotic or antidepressant since 2016. Only medication change in last 6 years has been lowering lorazepam.      In her 30s, she had her gallbladder removed and then suffered first episode of severe depression. She was experiencing psychotic symptoms - paranoia, AH, and suspiciousness. She was hospitalized in [place] in 1986 where she received ECT.  She was hospitalized again in 1987, again received ECT. Was hospitalized for depression in 1995, around the same time she was diagnosed with breast cancer.   She was hospitalized for about nine months this time. She received ECT maintence from 3585-9106. Patient was stable taking Seroquel 400mg, Prozac 40mg, and Ativan 1mg for many years. Seroquel was cross-tapered to ziprasidone due to metabolic side effects in 2016.      She has been seen at our  clinic since 2013. She has lived at [facility] for many years.      Medical history  Notable for afib, DMT2, HTN, ANUJ, hypothyroid, breast cancer s/p mastectomy     Pertinent Items Include: psychosis [sxs include disorganization, hallucinations, paranoia], mutiple psychotropic trials, psych hosp (3-5) and ECT.     SUBJECTIVE     Report from [facility]:   - was hospitalized for physical concerns since last appointment   - she says tremor is a little better today than last time she was here   - recounted her time in the hospital and that her PCP wanted to let me know that the ziprasidone might be causing the tremor, which we reviewed     Per Pinky:   - baseline anxiety; no major medical problems at this time, which she says correlate with worsening of mood and anxiety   - some trouble falling asleep, wears CPAP at night which has helped with sleep quality   - attributes discomfort of mask to insomnia, at baseline with sleep now     Recent Social History: see below    Recent Psych Symptoms:   Depression:  none   Elevated:  none  Psychosis:  none  Anxiety:  excessive worry about the future, chronic  Trauma Related:  none  Sleep: yes  Other: no    Recent Substance Use:     -alcohol: No   -cannabis: No   -tobacco: No  -caffeine:  No   -opioids: No   Narcan Kit currrent: No   -other: none    Pertinent Negatives: No suicidal or violent ideation, hallucinations and delusions  Adverse Effects: Right hand tremor resting, perioral involuntary movements, stiffness in upper extremity, denies chest pain and shortness of breath       PAST MED TRIALS   sertaline   amitriptyline   lithium   risperidone   olanzapine   trazodone   clonazepam   quetiapine 400mg (weight gain/metabolic SEs)  Lorazepam   triazolam  clonazepam.    July- decrease lorazepam from 0.5mg to 0.375 (liquid)  August 30 - decrease lorazepam to 0.25mg due to difficulty drawing up correct liquid amount  10/25/2021- stop lorazepam.   1/18/2022 - no  changes  4/14/2022- no changes     MEDICAL HISTORY and ALLERGY     ALLERGIES: Chlordiazepoxide hcl, Dimetapp dm cold-cough, Haldol, Ibuprofen, Lactose intolerance [beta-galactosidase], Milk products, and Propofol    Patient Active Problem List   Diagnosis     Allergic rhinitis due to pollen     Urge incontinence     Hypertension, essential     Cardiomegaly     Chronic constipation     Dry eye syndrome     Esophageal reflux     Exposure keratoconjunctivitis     DM ophthalmopathy (H)     Hypothyroidism     Senile cataract     ANUJ (obstructive sleep apnea)     Vaginal atrophy     Restless leg syndrome     Squamous blepharitis     Morbid obesity due to excess calories (H)     Personal history of breast cancer s/p L masectomy     Hypercholesteremia     Lives in group home     Extrapyramidal and movement disorder     CKD (chronic kidney disease) stage 2, GFR 60-89 ml/min     Solitary kidney, congenital     S/P hysterectomy     Impingement syndrome of right shoulder     Hypertriglyceridemia     Candidiasis of skin     Generalized anxiety disorder     Carpal tunnel syndrome of left wrist     Schizoaffective disorder, depressive type (H)     Major depressive disorder, recurrent episode, moderate (H)     Psychological factors affecting medical condition     Hx of psychiatric care     Nail complaint     Microalbuminuria due to type 2 diabetes mellitus (H)     Type 2 diabetes mellitus with microalbuminuria, with long-term current use of insulin (H)     Conjunctivitis of both eyes     Corneal erosion of both eyes     Spastic entropion, right     Foot callus     Paroxysmal atrial fibrillation (H)     Squamous cell carcinoma in situ of skin of face     History of colonic polyps        MEDICAL REVIEW OF SYSTEMS   Contraception-  No   Pregnant- N/A  none in addition to that documented above     MEDICATIONS     Current Outpatient Medications   Medication Sig Dispense Refill     acetaminophen (TYLENOL) 500 MG tablet Take 2 tablets  (1,000 mg) by mouth every 6 hours as needed 1 Bottle 2     alum & mag hydroxide-simethicone (MYLANTA/MAALOX) 200-200-20 MG/5ML SUSP suspension Take 30 mLs by mouth daily as needed for indigestion       amLODIPine (NORVASC) 10 MG tablet Take 1 tablet (10 mg) by mouth daily 90 tablet 0     apixaban ANTICOAGULANT (ELIQUIS) 5 MG tablet Take 1 tablet (5 mg) by mouth 2 times daily 180 tablet 0     artificial saliva (BIOTENE MT) AERS spray Take 1 spray by mouth 3 times daily as needed for dry mouth 115 mL 3     artificial tears (GENTEAL) 0.1-0.2-0.3 % ophthalmic solution Place 2 drops into both eyes 4 times daily 15 mL 3     atorvastatin (LIPITOR) 40 MG tablet Take 1 tablet (40 mg) by mouth daily 90 tablet 1     blood glucose (NO BRAND SPECIFIED) lancets standard Use to test blood sugar 2 times daily or as directed. 100 each 11     blood glucose calibration (NO BRAND SPECIFIED) solution Use to calibrate blood glucose monitor as directed. 1 each 3     blood glucose monitoring (NO BRAND SPECIFIED) meter device kit Check Blood sugars twice a day. 1 kit 0     blood glucose monitoring (NO BRAND SPECIFIED) test strip Use to test blood sugar 3 times daily or as directed. 100 each 3     Calcium Carbonate-Vitamin D (CALCIUM-VITAMIN D) 250-125 MG-UNIT TABS Take 1 tablet by mouth 2 times daily Calcium 250 mg/Vit D 125 IU       calcium polycarbophil (FIBERCON) 625 MG tablet Take 2 tablets (1,250 mg) by mouth daily 180 tablet 3     CLARITIN 10 MG OR TABS 1 TAB PO QD (Once per day) as needed for ALLERGY SYMPTOMS 30 11     conjugated estrogens (PREMARIN) 0.625 MG/GM vaginal cream Place 2 g vaginally daily for 2 weeks, followed by 2 g vaginally twice a week for vaginal atrophy and frequent UTIs 30 g 3     erythromycin (ROMYCIN) 5 MG/GM ophthalmic ointment Place 0.5 inches into both eyes At Bedtime 3.5 g 11     fluorometholone (FML LIQUIFILM) 0.1 % ophthalmic suspension Place 1 drop into both eyes 2 times daily 10 mL 3     FLUoxetine  (PROZAC) 20 MG capsule Take 1 capsule (20 mg) by mouth daily 90 capsule 3     fluticasone (FLONASE) 50 MCG/ACT nasal spray Spray 1 spray into both nostrils daily 16 g 3     furosemide (LASIX) 20 MG tablet Take 1 tablet (20 mg) by mouth daily 60 tablet 3     Gabapentin (NEURONTIN PO) Take 600 mg by mouth 3 times daily       Hypromellose (ARTIFICIAL TEARS OP) Apply 1 drop to eye 4 times daily.       insulin glargine (LANTUS PEN) 100 UNIT/ML pen Inject 15 Units Subcutaneous At Bedtime 8 mL 0     lactase (LACTAID) 3000 UNIT tablet Take 1 tablet (3,000 Units) by mouth 3 times daily (with meals) 90 tablet 11     levothyroxine (SYNTHROID/LEVOTHROID) 175 MCG tablet Take 1 tablet (175 mcg) by mouth daily 90 tablet 3     lidocaine (XYLOCAINE) 2 % external gel Apply topically 3 times daily as needed for moderate pain 85 g 1     lidocaine (XYLOCAINE) 5 % external ointment Apply topically 3 times daily as needed for moderate pain 50 g 1     lifitegrast (XIIDRA) 5 % opthalmic solution Place 1 drop into both eyes 2 times daily 5 each 7     liraglutide (VICTOZA) 18 MG/3ML solution Inject 1.8 mg Subcutaneous daily 9 mL 3     losartan (COZAAR) 100 MG tablet Take 1 tablet (100 mg) by mouth daily 90 tablet 1     Magnesium Hydroxide (MILK OF MAGNESIA PO) Take 30 mL as needed for constipation.       melatonin 3 MG CAPS Take 6 mg by mouth At Bedtime 60 capsule 3     metFORMIN (GLUCOPHAGE) 1000 MG tablet Take 1,000 mg by mouth 2 times daily (with meals)       NEW MED Prohydrate Moisturizing gel  Place 1 applicator vaginally 4 times a week 20 oz 3     nystatin (MYCOSTATIN) 304309 UNIT/GM POWD Apply topically 3 times daily as needed 60 g 1     ONETOUCH DELICA LANCETS 33G MISC        polyethylene glycol (MIRALAX/GLYCOLAX) powder Take 1 capful by mouth 2 times daily 17 GM PO BID       Saline 0.9 % SOLN Spray 2 sprays in nostril as needed.       SALINE MIST 0.65 % nasal spray Spray 2 sprays in nostril 2 times daily       senna-docusate  (SENOKOT-S/PERICOLACE) 8.6-50 MG tablet Take 2 tablets by mouth At Bedtime.       simethicone (MYLICON) 125 MG chewable tablet Take 1 tablet (125 mg) by mouth 2 times daily 180 tablet 3     Skin Protectants, Misc. (EUCERIN) cream Apply topically as needed Apply to thigh PRN dry skin       SM LUBRICANT EYE DROPS 0.4-0.3 % SOLN ophthalmic solution        sodium fluoride 1.1 % PSTE dental paste Apply 2 mLs to affected area daily 100 mL 1     solifenacin (VESICARE) 10 MG tablet Take 1 tablet (10 mg) by mouth daily 30 tablet 6     ziprasidone (GEODON) 40 MG capsule Take 1 capsule (40 mg) by mouth 2 times daily (with meals) 60 capsule 2      VITALS   LMP  (LMP Unknown)     MENTAL STATUS EXAM     Alertness: alert   Appearance: adequately groomed  Behavior/Demeanor: cooperative, pleasant and calm, with good  eye contact   Speech: slowed and regular rate and rhythm  Language: intact  Psychomotor: tremor  Mood: description consistent with euthymia  Affect: full range; congruent to: mood- yes, content- yes  Thought Process/Associations: unremarkable  Thought Content:  Reports none;  Denies suicidal & violent ideation and delusions  Perception:  Reports none;  Denies hallucinations and depersonalization  Insight: good  Judgment: good  Cognition: does  appear grossly intact; formal cognitive testing was not done  Gait and Station: remarkable for:  slightly reduced swing phase of gait, slowed gait       LABS and DATA     PHQ 11/30/2021 1/11/2022 5/31/2022   PHQ-9 Total Score 7 6 9   Q9: Thoughts of better off dead/self-harm past 2 weeks Not at all Not at all Not at all   F/U: Thoughts of suicide or self-harm - - -   F/U: Safety concerns - - -     Antipsychotic Labs:  Recent Labs   Lab Test 04/27/22  0725 04/12/22  1514 03/16/21  1537 09/16/20  0000   CHOL  --  117 124 106   TRIG 115 72 136 89   LDL  --  55 53 46   HDL  --  48* 43* 42*     Recent Labs   Lab Test 10/03/22  1423 05/12/22  1230 04/12/22  1514 10/08/21  214  03/16/21  1537   * 139*  --  168* 204.0*   A1C 7.6*  --  7.4*  --  7.8*     Recent Labs   Lab Test 10/08/21  2143 03/16/21  1537 05/07/19  0650 10/25/18  1734 06/26/18  0659   WBC 11.8*  --  10.06* 14.2* 9.72   ANEU  --  7.5  --  10.6* 5.31   HGB 13.0 13.7 13.3 14.0 12.6     --  292 275 247       Recent Labs   Lab Test 10/03/22  1423 11/18/21  1617   CR 0.83 0.8   GFRESTIMATED 74 >60     Recent Labs   Lab Test 03/16/21  1537 06/26/18  0659   AST 7.9 16   ALT 12.8 21   ALKPHOS 78.9 111       EKG 3/2021 - showing atrial fibrillation. QtC 392.   EKG 10/8/2021- QtC 439    Abnormal Involuntary Movement Scale (AIMS)    CODE 0=None    1=Minimal, may be extreme normal    2=Mild    3=Moderate    4-Severe       MOVEMENT RATINGS:  Rate highest severity observed. Rate  RATER    movements that occur upon activation one less than those observed     spontaneously.  Huntly movement as well as code number that applies. Date        Facial and  1. Muscles of Facial Expression  0 1 2 3 4    Oral       e.g. movements of forehead, eyebrows, periorbital area,    Movements       cheeks, including frowning, blinking, smiling, grimacing     2. Lips and Perioral Area  0 1 2 3 4         e.g., puckering, pouting, smacking      3. Jaw e.g. biting, clenching, chewing, mouth opening, 0 1 2 3 4         lateral movement      4. Tongue Rate only increases in movement both in and out       of mouth.  NOT inability to sustain movement.  Darting in      and out of mouth. 0 1 2 3 4     5. Upper (arms, wrists,, hands, fingers)    Include choreic movements (i.e., rapid, objectively purposeless, irregular, spontaneous) athetoid movements (i.e., slow, irregular,    complex, serpentine).  DO NOT INCLUDE TREMOR   (i.e., repetitive, regular, rhythmic)  0 1 2 3 4    Extremity Movements                 6. Lower (legs, knees, ankles, toes)   e.g., lateral knee movement, foot tapping, heel dropping, foot squirming, inversion and eversion of foot.  0 1  2 3 4        Trunk  Movements 7. Neck, shoulders, hips e.g., rocking, twisting, squirming, pelvic  gyrations  0 1 2 3 4    Global  8. Severity of abnormal movements overall  0 1 2 3 4     9. Incapacitation due to abnormal movements 0 1 2 3 4    Judgments           10. Patient s awareness of abnormal movements     Rate only patient s report No awareness 0   Aware, no distress 1 0    Aware, mild distress 2 1     Aware, moderate distress 3 2    Aware, severe distress 4            3      4   Dental Status  11. Current problems with teeth and/or dentures? No   Yes    12. Are dentures usually worn? No  Yes         13. Edentia? No   Yes    14. Do movements disappear in sleep? No   Yes     Examination Procedure  Either before or after completing the examination procedure, observe the patient unobtrusively at rest (e.g., in the waiting room).   The chair to be used in this examination should be a hard, firm one without arms.  Have the person remove their shoes and socks.    1. Ask the patient whether there is anything in his or her mouth (such as gum or candy) and, if so, to remove it.   2. Ask about the *current* condition of the patient's teeth. Ask if he or she wears dentures. Ask whether teeth or dentures bother the patient *now*.   3. Ask whether the patient notices any movements in his or her mouth, face, hands, or feet. If yes, ask the patient to describe them and to indicate to what extent they *currently* bother the patient or interfere with activities.   4. Have the patient sit in chair with hands on knees, legs slightly apart, and feet flat on floor. (Look at the entire body for movements while the patient is in this position.)   5. Ask the patient to sit with hands hanging unsupported -- if male, between his legs, if female and wearing a dress, hanging over her knees. (Observe hands and other body areas).   6. Ask the patient to open his or her mouth. (Observe the tongue at rest within the mouth.) Do this twice.    7. Ask the patient to protrude his or her tongue. (Observe abnormalities of tongue movement.) Do this twice.   8. Ask the patient to tap his or her thumb with each finger as rapidly as possible for 10 to 15 seconds, first with right hand, then with left hand. (Observe facial and leg movements.) [ activated]   9. Flex and extend the patient's left and right arms, one at a time.   10. Ask the patient to stand up. (Observe the patient in profile. Observe all body areas again, hips included.)   11. Ask the patient to extend both arms out in front, palms down. (Observe trunk, legs, and mouth.) [activated]   12. Have the patient walk a few paces, turn, and walk back to the chair. (Observe hands and gait.) Do this twice. [activated]     AIMS score 7/11/22: 5        PSYCHOTROPIC DRUG INTERACTIONS                                                       PSYCHCLINICDDI   ZIPRASIDONE and fluoxetine may result in increased risk of QT-interval prolongation.  ZIPRASIDONE and SEROTONERGIC DRUGS THAT PROLONG QT INTERVAL may result in increased risk of QT-interval prolongation and increased risk of serotonin syndrome (hypertension, hyperthermia, myoclonus, mental status changes).  NSAID and SSRI may result in an increased risk of bleeding  ERYTHROMYCIN and FLUOXETINE may result in an increased risk of cardiotoxicity (QT prolongation, torsades de pointes, cardiac arrest).  FLUOXETINE and INSULINS OR PRAMLINTIDE may result in increased risk of hypoglycemia.  GABAPENTIN and CNS DEPRESSANTS may result in respiratory depression.   GABAPENTIN and ANTACIDS may result in decreased gabapentin effectiveness.      MANAGEMENT:  Monitoring for adverse effects, periodic EKGs and patient is aware of risks     RISK STATEMENT for SAFETY     Pinky did not appear to be an imminent safety risk to self or others.    TREATMENT RISK STATEMENT: The risks, benefits, alternatives and potential adverse effects have been discussed and are understood by the pt.  The pt understands the risks of using street drugs or alcohol. There are no medical contraindications, the pt agrees to treatment with the ability to do so. The pt knows to call the clinic for any problems or to access emergency care if needed.  Medical and substance use concerns are documented above.  Psychotropic drug interaction check was done, including changes made today.      MEDICAL DECISION MAKING        (SmartPhrase .PSYCHBILLMDM)   Acuity:   - At least 1 chronic problem that is not stable  Risk:   - Engaged in prescription drug management during visit (discussed any medication benefits, side effects, alternatives, etc.)      PROVIDER: Malachi Cassidy,     Patient staffed in clinic with Dr. Estrada who will sign the note.  Supervisor is Dr. Gordon.

## 2022-10-12 ENCOUNTER — TELEPHONE (OUTPATIENT)
Dept: FAMILY MEDICINE | Facility: CLINIC | Age: 72
End: 2022-10-12

## 2022-10-12 PROBLEM — H81.10 BENIGN PAROXYSMAL POSITIONAL VERTIGO, UNSPECIFIED LATERALITY: Status: ACTIVE | Noted: 2022-10-12

## 2022-10-12 NOTE — TELEPHONE ENCOUNTER
[facility] phone call message- patient requesting to speak with PCP or provider:    PCP: Tonia Goel    Additional Comments:  of  [employer] seeking medical clearance to perform a tooth extraction.    Is a call back needed? Yes    Patient informed that it may take up to 2 business days to hear back from PCP:Yes    OK to leave a message on voice mail? Yes    Primary language: English      needed? No    Call taken on October 12, 2022 at 2:06 PM by Dominic Rodriguez

## 2022-10-19 ENCOUNTER — TELEPHONE (OUTPATIENT)
Dept: OPHTHALMOLOGY | Facility: CLINIC | Age: 72
End: 2022-10-19

## 2022-10-19 DIAGNOSIS — H04.123 DRY EYE SYNDROME OF BOTH EYES: ICD-10-CM

## 2022-10-19 RX ORDER — LIFITEGRAST 50 MG/ML
1 SOLUTION/ DROPS OPHTHALMIC 2 TIMES DAILY
Qty: 60 EACH | Refills: 11 | Status: SHIPPED | OUTPATIENT
Start: 2022-10-19 | End: 2023-09-19

## 2022-10-19 NOTE — TELEPHONE ENCOUNTER
Pt last seen in September    Rx sent for Xiidra with 11 refills    Dez Vega, RN 8:45 AM 10/19/22          [facility] Call Center    Phone Message    May a detailed message be left on voicemail: yes     Reason for Call: Medication Question or concern regarding medication   Prescription Clarification  Name of Medication: XIIDRA, 1 drop in each eye twice a day  Prescribing Provider: JERO   Pharmacy: [facility] - [place], [place] - [address]   What on the order needs clarification? Renae nurse from [facility] states that pharmacy has sent refill request with no response. Please refill rx to pharmacy above or call Renae back to advise. Thank you.           Action Taken: Message routed to:  [facility]    Travel Screening: Not Applicable

## 2022-10-24 ENCOUNTER — THERAPY VISIT (OUTPATIENT)
Dept: PHYSICAL THERAPY | Facility: CLINIC | Age: 72
End: 2022-10-24
Payer: COMMERCIAL

## 2022-10-24 DIAGNOSIS — R53.81 PHYSICAL DECONDITIONING: Primary | ICD-10-CM

## 2022-10-24 PROCEDURE — 97112 NEUROMUSCULAR REEDUCATION: CPT | Mod: GP | Performed by: PHYSICAL THERAPIST

## 2022-10-24 PROCEDURE — 97161 PT EVAL LOW COMPLEX 20 MIN: CPT | Mod: GP | Performed by: PHYSICAL THERAPIST

## 2022-10-24 NOTE — PROGRESS NOTES
Physical Therapy Initial Evaluation  Subjective:  The history is provided by the patient. No  was used.   Therapist Generated HPI Evaluation  Problem details: DOI: august 28, 2022 onset of dizziness and fell back into bed.   She was hospitalized for 6 days and discharged with use of walker. She preesntly is not working and would like to return to 1 1/2 hours duties one day per week at place that she resides. Duties includes water plants, dusting wall lights, wiping counters, emptying garabage, & wiping radiators at [facility]       Previous medical history:  Breast Cancer- mascetomy, depression, high blood pressure, incontinence, mental illness - anxiety, sleep apnea, thryoid problems, severe dizziness- has resolved, kidney disease- has one kidney, resting tremor right hand   .     Pinky Page is a 72 year old female with deconditioning condition which occurred with .   This is a new condition.  Where injured: [facility] residence.      Barriers to performance: none.    Site of Pain: none. Pain quality: na. Pain frequency: none. Radiates to: none.    Associated symptoms:  Loss of strength and loss of balance.  Symptoms are exacerbated by nothing  and relieved by nothing.                      Since onset symptoms are unchanged.  Imaging testing: none.  Past treatment: none.  Improved with treatment: na.     Work activity restrictions: not working presently.    Barriers include:  None as reported by patient.                        Objective:  System    Physical Exam        General Evaluation:        Lower Extremity Strength:  Strength wnl lower extremity general: mmt: quadriceps: 5-/5 (B), hamstrings: 5-/5 (B) Gastrocnemius: 3-/5 (B), hip extensors 3-/5 (B)                                                               Transitions from sit to stand with excessive trunk flexion and momentum, Gait: Ambulates with standard walker with decreased stride length, increased trunk flexion and  icervical flexion  Single leg Balance: 0 seconds (B)- Pinky demonstrates ability to weight bear single leg with holding on to table     ROS    Assessment/Plan:    Patient is a 72 year old female with physical deconditioning complaints.    Patient has the following significant findings with corresponding treatment plan.                Diagnosis 1:  Physical conditioning  Pain -  hot/cold therapy, manual therapy, self management, education, directional preference exercise and home program  Decreased strength - therapeutic exercise, therapeutic activities and home program  Impaired balance - neuro re-education, therapeutic activities and home program  Impaired muscle performance - neuro re-education and home program  Decreased function - therapeutic activities and home program    Therapy Evaluation Codes:   1) History comprised of:   Personal factors that impact the plan of care:      .  2) Clinical presentation characteristics are:   Stable/Uncomplicated.  3) Decision-Making    Low complexity using standardized patient assessment instrument and/or measureable assessment of functional outcome.  Cumulative Therapy Evaluation is: Low complexity.    Previous and current functional limitations:  (See Goal Flow Sheet for this information)    Short term and Long term goals: (See Goal Flow Sheet for this information)     Communication ability:  Patient appears to be able to clearly communicate and understand verbal and written communication and follow directions correctly.  Treatment Explanation - The following has been discussed with the patient:   RX ordered/plan of care  Anticipated outcomes  Possible risks and side effects  This patient would benefit from PT intervention to resume normal activities.   Rehab potential is good.    Frequency:  1 X week, once daily  Duration:  for 6 weeks  Discharge Plan:  Achieve all LTG.  Independent in home treatment program.  Reach maximal therapeutic benefit.    Please refer to the daily  flowsheet for treatment today, total treatment time and time spent performing 1:1 timed codes.

## 2022-10-26 LAB
CHOLESTEROL (EXTERNAL): 148 MG/DL
HDLC SERPL-MCNC: 48 MG/DL
LDL CHOLESTEROL CALCULATED (EXTERNAL): 79 MG/DL
NON HDL CHOLESTEROL (EXTERNAL): 100 MG/DL
TRIGLYCERIDES (EXTERNAL): 103 MG/DL

## 2022-11-03 ENCOUNTER — THERAPY VISIT (OUTPATIENT)
Dept: PHYSICAL THERAPY | Facility: CLINIC | Age: 72
End: 2022-11-03
Payer: COMMERCIAL

## 2022-11-03 DIAGNOSIS — R53.81 PHYSICAL DECONDITIONING: Primary | ICD-10-CM

## 2022-11-03 PROCEDURE — 97112 NEUROMUSCULAR REEDUCATION: CPT | Mod: GP | Performed by: PHYSICAL THERAPIST

## 2022-11-03 PROCEDURE — 97530 THERAPEUTIC ACTIVITIES: CPT | Mod: GP | Performed by: PHYSICAL THERAPIST

## 2022-11-04 ENCOUNTER — OFFICE VISIT (OUTPATIENT)
Dept: PSYCHOLOGY | Facility: CLINIC | Age: 72
End: 2022-11-04
Payer: COMMERCIAL

## 2022-11-04 VITALS — WEIGHT: 221.6 LBS | BODY MASS INDEX: 38.04 KG/M2

## 2022-11-04 DIAGNOSIS — F33.0 MAJOR DEPRESSIVE DISORDER, RECURRENT EPISODE, MILD (H): Primary | ICD-10-CM

## 2022-11-04 DIAGNOSIS — F41.1 GENERALIZED ANXIETY DISORDER: ICD-10-CM

## 2022-11-04 DIAGNOSIS — F54 PSYCHOLOGICAL FACTORS AFFECTING MEDICAL CONDITION: ICD-10-CM

## 2022-11-04 DIAGNOSIS — E66.9 OBESITY, UNSPECIFIED OBESITY SEVERITY, UNSPECIFIED OBESITY TYPE: ICD-10-CM

## 2022-11-04 PROCEDURE — 90834 PSYTX W PT 45 MINUTES: CPT | Performed by: PSYCHOLOGIST

## 2022-11-04 NOTE — PROGRESS NOTES
Health Psychology                    Department of Medicine  Keri Arnett, Ph.D., L.P. [phone]                         [facility] Madelaine Ingram, Ph.D., L.P. [phone]                     [place] Mail Code 826   Dennis Dillard, Ph.D. [phone]      [address] [place] [address] Rae Eugene, Ph.D., A.B.P.P., L.P. [phone]              [place] [address]           Damon Gr, Ph.D., A.B.P.P., L.P. [phone]      Kimber Morales, Ph.D., L.P. [phone]  [facility]   Lilia Burroughs, Ph.D., A.B.P.P., L.P. 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 She is getting better at setting it up with help from [place] staff.  Intake with Health Psychology was in the 1980s.  I began to see her in the early 1990s.    In the interval,   She is still using a walker, getting PT; for 6 sessions, had two of them so far.  She was having inner ear problem.   She was leased by negative results in report from the dermatologist.    Mood: Her mood is okay today, but a bit down relative to baseline given that she doesn't like feeling sick.     Exercise:  She had been biking 5x/week for 20 minutes.   She was also walking about 10-15 minutes, down from 25-30 minutes/day. However  since the hospitalization she stopped walking and biking, and is now resuming it.      Health:  She got her booster for COVID and got flu shot in September.  She got her bottom partial.  During the interval [facility] had 4 cases of COVID including two staff and one patient with hospitalizations..     Weight: Her weight was 221.6 today.    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      She participates fully, ventilated feelings appropriately.  She appears to derive benefit from the support.     Current Outpatient Medications   Medication     acetaminophen (TYLENOL) 500 MG tablet     alum & mag hydroxide-simethicone (MYLANTA/MAALOX) 200-200-20 MG/5ML SUSP suspension     amLODIPine (NORVASC) 10 MG tablet     apixaban ANTICOAGULANT (ELIQUIS) 5 MG tablet     artificial saliva (BIOTENE MT) AERS spray     artificial tears (GENTEAL) 0.1-0.2-0.3 % ophthalmic solution     atorvastatin (LIPITOR) 40 MG tablet     blood glucose (NO BRAND SPECIFIED) lancets standard     blood glucose calibration (NO BRAND SPECIFIED) solution     blood glucose monitoring (NO BRAND SPECIFIED) meter device kit     blood glucose monitoring (NO BRAND SPECIFIED) test strip     Calcium Carbonate-Vitamin D (CALCIUM-VITAMIN D) 250-125 MG-UNIT TABS     calcium polycarbophil (FIBERCON) 625 MG tablet     CLARITIN 10 MG OR TABS     conjugated estrogens (PREMARIN) 0.625 MG/GM vaginal cream     erythromycin (ROMYCIN) 5 MG/GM ophthalmic ointment     fluorometholone (FML LIQUIFILM) 0.1 % ophthalmic suspension     FLUoxetine (PROZAC) 20 MG capsule     fluticasone (FLONASE) 50 MCG/ACT nasal spray     furosemide (LASIX) 20 MG tablet     Gabapentin (NEURONTIN PO)     Hypromellose (ARTIFICIAL TEARS OP)     insulin glargine (LANTUS PEN) 100 UNIT/ML pen     lactase (LACTAID) 3000 UNIT tablet     levothyroxine (SYNTHROID/LEVOTHROID) 175 MCG tablet     lidocaine (XYLOCAINE) 2 % external gel     lidocaine (XYLOCAINE) 5 % external ointment     lifitegrast (XIIDRA) 5 % opthalmic solution     liraglutide (VICTOZA) 18 MG/3ML solution     losartan (COZAAR) 100 MG tablet     Magnesium Hydroxide (MILK OF MAGNESIA PO)     melatonin 3 MG CAPS     metFORMIN (GLUCOPHAGE) 1000 MG tablet     NEW MED     nystatin (MYCOSTATIN) 533838 UNIT/GM POWD     ONETOUCH DELICA LANCETS 33G MISC      polyethylene glycol (MIRALAX/GLYCOLAX) powder     Saline 0.9 % SOLN     SALINE MIST 0.65 % nasal spray     senna-docusate (SENOKOT-S/PERICOLACE) 8.6-50 MG tablet     simethicone (MYLICON) 125 MG chewable tablet     Skin Protectants, Misc. (EUCERIN) cream     SM LUBRICANT EYE DROPS 0.4-0.3 % SOLN ophthalmic solution     sodium fluoride 1.1 % PSTE dental paste     solifenacin (VESICARE) 10 MG tablet     ziprasidone (GEODON) 40 MG capsule     No current facility-administered medications for this visit.     Wt Readings from Last 4 Encounters:   11/04/22 100.5 kg (221 lb 9.6 oz)   10/03/22 99.5 kg (219 lb 6.4 oz)   08/19/22 102.2 kg (225 lb 4.8 oz)   07/11/22 103.9 kg (229 lb)   Body mass index is 38.04 kg/m .    Time service started: 2:01  Time service ended:  2:45    Diagnosis:   Major Depression, recurrent mild (F33.0)   Psychological Factors Affecting Morbid Obesity (F54)  Generalized anxiety (F41.1)       Plan: Return for psychotherapy visit in-person/masked 12/16 @ 2 due to the serious and persistent nature of her depression and anxiety consistent with treatment plan.  Last treatment plan signed: 1/13/22  Treatment plan due: 1/13/23                  Preference for future meetings:      In-person, even if both wearing masks                Damon Gr, PhD LP,  A.B.P.P.  Director, Health Psychology  [phone]

## 2022-11-08 ENCOUNTER — THERAPY VISIT (OUTPATIENT)
Dept: PHYSICAL THERAPY | Facility: CLINIC | Age: 72
End: 2022-11-08
Payer: COMMERCIAL

## 2022-11-08 DIAGNOSIS — R53.81 PHYSICAL DECONDITIONING: Primary | ICD-10-CM

## 2022-11-08 PROCEDURE — 97116 GAIT TRAINING THERAPY: CPT | Mod: GP | Performed by: PHYSICAL THERAPIST

## 2022-11-08 PROCEDURE — 97112 NEUROMUSCULAR REEDUCATION: CPT | Mod: GP | Performed by: PHYSICAL THERAPIST

## 2022-11-11 NOTE — TELEPHONE ENCOUNTER
Please call patient and let her know that antibiotics are still not indicated. She is 3 days into her illness, and most cases of congestion and upper respiratory symptoms in adults are related to viral infections that will not be improved with antibiotics. Strep testing was negative. It is not abnormal for symptoms to get worse before getting better. When discussing in clinic, it was recommended that we give it 5-7 days before considering antibiotics. Symptomatic management with tylenol, humidified air, rest, fluids, over the counter losenges for sore throat, and over the counter decongestants if desired. Thank you.     Williams  Spoke with Narendra Truong and will fax dental referral. Fax# [phone] Phone# [phone].

## 2022-11-15 ENCOUNTER — DOCUMENTATION ONLY (OUTPATIENT)
Dept: FAMILY MEDICINE | Facility: CLINIC | Age: 72
End: 2022-11-15

## 2022-11-15 ENCOUNTER — THERAPY VISIT (OUTPATIENT)
Dept: PHYSICAL THERAPY | Facility: CLINIC | Age: 72
End: 2022-11-15
Payer: COMMERCIAL

## 2022-11-15 DIAGNOSIS — R53.81 PHYSICAL DECONDITIONING: Primary | ICD-10-CM

## 2022-11-15 PROCEDURE — 97112 NEUROMUSCULAR REEDUCATION: CPT | Mod: GP | Performed by: PHYSICAL THERAPIST

## 2022-11-15 PROCEDURE — 97110 THERAPEUTIC EXERCISES: CPT | Mod: GP | Performed by: PHYSICAL THERAPIST

## 2022-11-15 PROCEDURE — 97530 THERAPEUTIC ACTIVITIES: CPT | Mod: GP | Performed by: PHYSICAL THERAPIST

## 2022-11-15 NOTE — PROGRESS NOTES
"Form has been completed by provider.     Form sent out via: Fax to [phone] at Fax Number: Narendra Truong  Patient informed: No  Output date: November 22, 2022    Anne Rossi RN      **Please close the encounter**    When opening a documentation only encounter, be sure to enter in \"Chief Complaint\" Forms and in \" Comments\" Title of form, description if needed.    Pinky is a 72 year old  female  Form received via: Fax  Form now resides in: Provider Ready    Anne Rossi RN                  "

## 2022-11-22 ENCOUNTER — THERAPY VISIT (OUTPATIENT)
Dept: PHYSICAL THERAPY | Facility: CLINIC | Age: 72
End: 2022-11-22
Payer: COMMERCIAL

## 2022-11-22 DIAGNOSIS — R53.81 PHYSICAL DECONDITIONING: Primary | ICD-10-CM

## 2022-11-22 PROCEDURE — 97112 NEUROMUSCULAR REEDUCATION: CPT | Mod: GP | Performed by: PHYSICAL THERAPIST

## 2022-11-22 PROCEDURE — 97110 THERAPEUTIC EXERCISES: CPT | Mod: GP | Performed by: PHYSICAL THERAPIST

## 2022-11-29 ENCOUNTER — THERAPY VISIT (OUTPATIENT)
Dept: PHYSICAL THERAPY | Facility: CLINIC | Age: 72
End: 2022-11-29
Payer: COMMERCIAL

## 2022-11-29 DIAGNOSIS — R53.81 PHYSICAL DECONDITIONING: Primary | ICD-10-CM

## 2022-11-29 PROCEDURE — 97110 THERAPEUTIC EXERCISES: CPT | Mod: GP | Performed by: PHYSICAL THERAPIST

## 2022-11-29 PROCEDURE — 97112 NEUROMUSCULAR REEDUCATION: CPT | Mod: GP | Performed by: PHYSICAL THERAPIST

## 2022-12-07 NOTE — TELEPHONE ENCOUNTER
----- Message from Nidia Strange sent at 3/1/2018  2:42 PM CST -----  Regarding: labs  Contact: [phone]  Tia, an RN at [facility], is the caller. At the pt's last appt Haley ordered labs at [facility], but they actually do labs at [facility] and she was wondering if it's okay if they draw them there. When calling back, if Tia is not available, you can ask to speak with any 3rd floor nursing staff.     No

## 2022-12-08 ENCOUNTER — THERAPY VISIT (OUTPATIENT)
Dept: PHYSICAL THERAPY | Facility: CLINIC | Age: 72
End: 2022-12-08
Payer: COMMERCIAL

## 2022-12-08 DIAGNOSIS — R53.81 PHYSICAL DECONDITIONING: Primary | ICD-10-CM

## 2022-12-08 PROCEDURE — 97112 NEUROMUSCULAR REEDUCATION: CPT | Mod: GP | Performed by: PHYSICAL THERAPIST

## 2022-12-08 PROCEDURE — 97110 THERAPEUTIC EXERCISES: CPT | Mod: GP | Performed by: PHYSICAL THERAPIST

## 2022-12-13 ENCOUNTER — THERAPY VISIT (OUTPATIENT)
Dept: PHYSICAL THERAPY | Facility: CLINIC | Age: 72
End: 2022-12-13
Payer: COMMERCIAL

## 2022-12-13 DIAGNOSIS — R53.81 PHYSICAL DECONDITIONING: Primary | ICD-10-CM

## 2022-12-13 PROCEDURE — 97112 NEUROMUSCULAR REEDUCATION: CPT | Mod: GP | Performed by: PHYSICAL THERAPIST

## 2022-12-13 PROCEDURE — 97110 THERAPEUTIC EXERCISES: CPT | Mod: GP | Performed by: PHYSICAL THERAPIST

## 2022-12-16 ENCOUNTER — OFFICE VISIT (OUTPATIENT)
Dept: PSYCHOLOGY | Facility: CLINIC | Age: 72
End: 2022-12-16
Payer: COMMERCIAL

## 2022-12-16 VITALS — WEIGHT: 219.3 LBS | BODY MASS INDEX: 37.64 KG/M2

## 2022-12-16 DIAGNOSIS — E66.9 OBESITY, UNSPECIFIED OBESITY SEVERITY, UNSPECIFIED OBESITY TYPE: ICD-10-CM

## 2022-12-16 DIAGNOSIS — F33.0 MAJOR DEPRESSIVE DISORDER, RECURRENT EPISODE, MILD (H): Primary | ICD-10-CM

## 2022-12-16 DIAGNOSIS — F54 PSYCHOLOGICAL FACTORS AFFECTING MEDICAL CONDITION: ICD-10-CM

## 2022-12-16 DIAGNOSIS — F41.1 GENERALIZED ANXIETY DISORDER: ICD-10-CM

## 2022-12-16 PROCEDURE — 90837 PSYTX W PT 60 MINUTES: CPT | Performed by: PSYCHOLOGIST

## 2022-12-16 NOTE — PROGRESS NOTES
Health Psychology                    Department of Medicine  Keri Arnett, Ph.D., L.P. [phone]                         [facility] Madelaine Ingram, Ph.D., L.P. [phone]                     [place] Mail Code 306   Dennis Dillard, Ph.D. [phone]      [address] [place] [address] Rae Eugene, Ph.D., A.B.P.P., L.P. [phone]              [place] [address]           Damon Gr, Ph.D., A.B.P.P., L.P. [phone]      Kimber Morales, Ph.D., L.P. [phone]  [facility]   Lilia Burorughs, Ph.D., A.B.P.P., L.P. 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 She is getting better at setting it up with help from [facility] staff.  Intake with Health Psychology was in the .  I began to see her in the early .     Stressors:  She is getting a new  as Chitra is getting a new job at [employer]. Not currently able to work due to balance issues.      Mood: Her mood is okay today, 3/10 today.  Enjoying the holidays, but this time of year is usually difficult:  father, brother, brother-in-law,  and two aunts  in December.  Not as depressed as usual for December.    Health:  She got her booster for COVID and got flu shot in September.  She got her bottom partial.  During the past 2 weeks no new COVID cases at [facility].In the interval,   She is still using a walker, starting to use a cane, still getting PT for 2 more sessions. She was having inner ear problem, but it seems to be improving.     She continues to have shaking, presumably TD.  She is not sure if the sx are stable, increasing or decreasing.     Psychoactive Medications:  - Continue ziprasidone 40 mg BID  - Continue fluoxetine 40 mg daily  -  Continue melatonin 6mg at bedtime    Exercise:   She increased biking to 25 min/day 5-7 days/week.   She was also walking about 10-15 minutes, down from 25-30 minutes/day. However  since the hospitalization she stopped walking and biking, and is now resuming it.      Weight: Her weight was  219.3 22 (a 2 lb  in a little more than one month).     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      She participates fully, ventilated feelings appropriately.  She appears to derive benefit from the support.     Current Outpatient Medications   Medication     acetaminophen (TYLENOL) 500 MG tablet     alum & mag hydroxide-simethicone (MYLANTA/MAALOX) 200-200-20 MG/5ML SUSP suspension     amLODIPine (NORVASC) 10 MG tablet     apixaban ANTICOAGULANT (ELIQUIS) 5 MG tablet     artificial saliva (BIOTENE MT) AERS spray     artificial tears (GENTEAL) 0.1-0.2-0.3 % ophthalmic solution     atorvastatin (LIPITOR) 40 MG tablet     blood glucose (NO BRAND SPECIFIED) lancets standard     blood glucose calibration (NO BRAND SPECIFIED) solution     blood glucose monitoring (NO BRAND SPECIFIED) meter device kit     blood glucose monitoring (NO BRAND SPECIFIED) test strip     Calcium Carbonate-Vitamin D (CALCIUM-VITAMIN D) 250-125 MG-UNIT TABS     calcium polycarbophil (FIBERCON) 625 MG tablet     CLARITIN 10 MG OR TABS     conjugated estrogens (PREMARIN) 0.625 MG/GM vaginal cream     erythromycin (ROMYCIN) 5 MG/GM ophthalmic ointment     fluorometholone (FML LIQUIFILM) 0.1 % ophthalmic suspension     FLUoxetine (PROZAC) 20 MG capsule     fluticasone (FLONASE) 50 MCG/ACT nasal spray     furosemide (LASIX) 20 MG tablet     Gabapentin (NEURONTIN PO)     Hypromellose (ARTIFICIAL TEARS OP)     insulin glargine (LANTUS PEN) 100 UNIT/ML pen     lactase (LACTAID) 3000 UNIT tablet     levothyroxine  (SYNTHROID/LEVOTHROID) 175 MCG tablet     lidocaine (XYLOCAINE) 2 % external gel     lidocaine (XYLOCAINE) 5 % external ointment     lifitegrast (XIIDRA) 5 % opthalmic solution     liraglutide (VICTOZA) 18 MG/3ML solution     losartan (COZAAR) 100 MG tablet     Magnesium Hydroxide (MILK OF MAGNESIA PO)     melatonin 3 MG CAPS     metFORMIN (GLUCOPHAGE) 1000 MG tablet     NEW MED     nystatin (MYCOSTATIN) 964389 UNIT/GM POWD     ONETOUCH DELICA LANCETS 33G MISC     polyethylene glycol (MIRALAX/GLYCOLAX) powder     Saline 0.9 % SOLN     SALINE MIST 0.65 % nasal spray     senna-docusate (SENOKOT-S/PERICOLACE) 8.6-50 MG tablet     simethicone (MYLICON) 125 MG chewable tablet     Skin Protectants, Misc. (EUCERIN) cream     SM LUBRICANT EYE DROPS 0.4-0.3 % SOLN ophthalmic solution     sodium fluoride 1.1 % PSTE dental paste     solifenacin (VESICARE) 10 MG tablet     ziprasidone (GEODON) 40 MG capsule     No current facility-administered medications for this visit.     Past Medical History:   Diagnosis Date     Allergic rhinitis due to pollen     seasonal allergies      Anisometropia and aniseikonia      BMI greater than 40      Cardiomegaly      Chronic constipation      Congenital absence of one kidney      Cramp of limb      Dermatophytosis of the body      Dry eye syndrome      Esophageal reflux      Essential hypertension, benign      Gastro-oesophageal reflux disease      Hemorrhoids      Hypermetropia      Hypothyroidism      Incomplete Emptying of Bladder      Lactose intolerance      Major depressive disorder, recurrent episode, moderate (H)      Malignant neoplasm of breast (female), unspecified site     left mastectomy 1996      Mixed incontinence urge and stress (male)(female)      Moderate obstructive sleep apnea      Postmenopausal Atrophic Vaginitis      Presbyopia      Regular astigmatism      Restless leg syndrome      Senile nuclear sclerosis      Thyroid eye disease     mild     Tinnitus      Trigger  finger (acquired)     right hand     Type II or unspecified type diabetes mellitus without mention of complication, not stated as uncontrolled     on insulin since April 2006      Past Surgical History:   Procedure Laterality Date     APPENDECTOMY       C MASTECTOMY,SIMPLE  1996    Left mastectomy  [facility]. Had normal FU mammo 5/2007. Follow yearly.      CHOLECYSTECTOMY       COLONOSCOPY  5/2005    Complete Colonoscopy-had one small polyp removed 5/2005.      COLONOSCOPY N/A 3/31/2016    Procedure: COLONOSCOPY;  Surgeon: Cary Ring MD;  Location: [facility]     COLONOSCOPY N/A 7/7/2016    Procedure: COLONOSCOPY;  Surgeon: Cary Ring MD;  Location: [facility]     COLONOSCOPY N/A 5/12/2022    Procedure: COLONOSCOPY, WITH POLYPECTOMY AND BIOPSY;  Surgeon: Jonathan Madden MD;  Location: [facility]     DILATION AND CURETTAGE       HYSTERECTOMY       MASTECTOMY      Left breast     ZC TOTAL ABDOM HYSTERECTOMY  7/2003    Dr. Castanon, for abnormal bleeding. Removal of both tubes with BSO for myoma w - - -     Wt Readings from Last 4 Encounters:   12/16/22 99.5 kg (219 lb 4.8 oz)   11/04/22 100.5 kg (221 lb 9.6 oz)   10/03/22 99.5 kg (219 lb 6.4 oz)   08/19/22 102.2 kg (225 lb 4.8 oz)   Body mass index is 37.64 kg/m .    Time service started: 2:05  Time service ended:  3:05    Diagnosis:   Major Depression, recurrent mild (F33.0)   Psychological Factors Affecting Morbid Obesity (F54)  Generalized anxiety (F41.1)       Plan: Return for psychotherapy visit in-person/masked 1/27 @ 3 due to the serious and persistent nature of her depression and anxiety consistent with treatment plan.  Last treatment plan signed: 1/13/22  Treatment plan due: 1/13/23     (next session)              Preference for future meetings:      In-person, even if both wearing masks                Damon Gr, PhD LP,  A.B.P.P.  Director, Health Psychology  [phone]

## 2022-12-20 ENCOUNTER — THERAPY VISIT (OUTPATIENT)
Dept: PHYSICAL THERAPY | Facility: CLINIC | Age: 72
End: 2022-12-20
Payer: COMMERCIAL

## 2022-12-20 DIAGNOSIS — R53.81 PHYSICAL DECONDITIONING: Primary | ICD-10-CM

## 2022-12-20 PROCEDURE — 97112 NEUROMUSCULAR REEDUCATION: CPT | Mod: GP | Performed by: PHYSICAL THERAPIST

## 2022-12-20 PROCEDURE — 97110 THERAPEUTIC EXERCISES: CPT | Mod: GP | Performed by: PHYSICAL THERAPIST

## 2023-01-09 ENCOUNTER — THERAPY VISIT (OUTPATIENT)
Dept: PHYSICAL THERAPY | Facility: CLINIC | Age: 73
End: 2023-01-09
Payer: COMMERCIAL

## 2023-01-09 ENCOUNTER — OFFICE VISIT (OUTPATIENT)
Dept: PSYCHIATRY | Facility: CLINIC | Age: 73
End: 2023-01-09
Attending: PSYCHIATRY & NEUROLOGY
Payer: COMMERCIAL

## 2023-01-09 VITALS
SYSTOLIC BLOOD PRESSURE: 137 MMHG | BODY MASS INDEX: 37.56 KG/M2 | WEIGHT: 218.8 LBS | DIASTOLIC BLOOD PRESSURE: 81 MMHG | HEART RATE: 88 BPM

## 2023-01-09 DIAGNOSIS — G47.09 OTHER INSOMNIA: ICD-10-CM

## 2023-01-09 DIAGNOSIS — F25.1 SCHIZOAFFECTIVE DISORDER, DEPRESSIVE TYPE (H): ICD-10-CM

## 2023-01-09 DIAGNOSIS — R53.81 PHYSICAL DECONDITIONING: Primary | ICD-10-CM

## 2023-01-09 PROCEDURE — G0463 HOSPITAL OUTPT CLINIC VISIT: HCPCS | Performed by: STUDENT IN AN ORGANIZED HEALTH CARE EDUCATION/TRAINING PROGRAM

## 2023-01-09 PROCEDURE — 99214 OFFICE O/P EST MOD 30 MIN: CPT | Mod: GC | Performed by: STUDENT IN AN ORGANIZED HEALTH CARE EDUCATION/TRAINING PROGRAM

## 2023-01-09 PROCEDURE — 97110 THERAPEUTIC EXERCISES: CPT | Mod: GP | Performed by: PHYSICAL THERAPIST

## 2023-01-09 PROCEDURE — 97112 NEUROMUSCULAR REEDUCATION: CPT | Mod: GP | Performed by: PHYSICAL THERAPIST

## 2023-01-09 RX ORDER — ZIPRASIDONE HYDROCHLORIDE 40 MG/1
40 CAPSULE ORAL 2 TIMES DAILY WITH MEALS
Qty: 60 CAPSULE | Refills: 2 | Status: SHIPPED | OUTPATIENT
Start: 2023-01-09 | End: 2023-04-03

## 2023-01-09 ASSESSMENT — PAIN SCALES - GENERAL: PAINLEVEL: NO PAIN (0)

## 2023-01-09 NOTE — PATIENT INSTRUCTIONS
**For crisis resources, please see the information at the end of this document**   Patient Education    Thank you for coming to the [facility] [place] [facility].     Lab Testing:  If you had lab testing today and your results are reassuring or normal they will be mailed to you or sent through [employer] within 7 days. If the lab tests need quick action we will call you with the results. The phone number we will call with results is # [phone]. If this is not the best number please call our clinic and change the number.     Medication Refills:  If you need any refills please call your pharmacy and they will contact us. Our fax number for refills is [phone].   Three business days of notice are needed for general medication refill requests.   Five business days of notice are needed for controlled substance refill requests.   If you need to change to a different pharmacy, please contact the new pharmacy directly. The new pharmacy will help you get your medications transferred.     Contact Us:  Please call [phone] during business hours (8-5:00 M-F).   If you have medication related questions after clinic hours, or on the weekend, please call [phone].     Financial Assistance [phone]   Medical Records [phone]       MENTAL HEALTH CRISIS RESOURCES:  For a emergency help, please call 911 or go to the nearest Emergency Department.     Emergency Walk-In Options:   Em[facility] Unit @ [facility] ([place]): [phone] - Specialized mental health emergency area designed to be calming  [facility] ([place]): [phone]  [facility] ([place]): [phone]  [facility]): [phone]    [facility] Crisis Information:   [place]: [phone]  [place]: [phone]  [place] ([place]) - Adult: [phone]     Child: [phone]  [place] - Adult: [phone]     Child: [phone]  [place]:  [phone]  List of all [facility] resources:   https://[place].gov/dhs/people-we-serve/adults/health-care/mental-health/resources/crisis-contacts.jsp    National Crisis Information:   Crisis Text Line: Text  [place]  to 760639  Suicide & Crisis Lifeline: 988  National Suicide Prevention Lifeline: 0-598-254-TALK (1-135.877.9606)       For online chat options, visit https://suicidepreventionlifeline.org/chat/  Poison Control Center: 8-327-686-9579  Trans Lifeline: 8-625-043-0261 - Hotline for transgender people of all ages  The Bo Project: 4-383-314-8343 - Hotline for LGBT youth     For Non-Emergency Support:   Fast Tracker: Mental Health & Substance Use Disorder Resources -   https://www.[facility].org/

## 2023-01-09 NOTE — PROGRESS NOTES
[facility]  Psychiatry Clinic  MEDICAL PROGRESS NOTE     CARE TEAM:  PCP- Tonia Goel    Psychotherapist- Dr Gr, PhD   Team: Staff at [facility]     This person is a 72 year old who uses the name Pinky and pronouns she, her.      DIAGNOSIS   Schizoaffective disorder, depressed type, in remission without psychotic features  Tardive dyskinesia      ASSESSMENT   Pinky Page is a .72 year old female with history of schizoaffective disorder, depressed type who has been relatively stable taking ziprasidone and fluoxetine since 2016.    Pinky is doing well overall..   Issues addressed today are below.    -schizoaffective d/o: Stable, some chronic fluctuating depression and anxiety, denies psychosis symptoms, well managed on current medication regimen. Anniversary of brother and father's deaths around [place] which was challenging but is doing much better and back to baseline. No new AE of meds, no changes today.     -tardive dyskinesia: has occasional shoulder pain that she attributes to this, chronic and unchanged in frequency. Bilat UE tremor not worsened, some days worse than others, does not notice anything that makes it worse or better except being nervous. AIMS today is 5/40 - some LE movements and UE movements, noticeable to patient but not particularly distressing. I previously sent Epic message out to Pinky's PCP to discuss possibility of adding propranolol to regimen for tremor treatment if Dr Goel is comfortable with cardiovascular status. No changes today.     Future considerations:  -Decreasing or cross-titrating off of ziprasidone in favor of another atypical neuroleptic (in the case that it is causing/contributing to her movement abnormalities).  -Adding propranolol or benadryl for tremor tx   -Ingrezza for TD  -Repeat EKG if med changes    MNPMP was checked today:  Indicates taking controlled medication as prescribed.     PLAN                                                                                                                 1) Meds-  - Continue ziprasidone 40 mg BID   - Continue fluoxetine 40 mg daily  - Continue melatonin 6mg at bedtime     (prescribed by PCP: gabapentin 900mg TID)     2) Psychotherapy- cont with Dr Gr every month     3) Next due-  Labs- CBC and CMP due 9/2022, ordered to be done prior to next appt with me  EKG- Last EKG 3/2021 without prolonged QTc (439)  Rating scales-  AIMS 7/11/2022 is 5/40 - some LE movements and UE movements, noticeable to patient but not particularly distressing.  AIMS: done 4/14/22 with total score of 4    4) Referrals-  none    5) Dispo- 3 months       PSYCH and SUBSTANCE USE Critical Summary Points since July 2022   None     PERTINENT BACKGROUND                                                    [most recent eval 07/08/22]     Pinky first experienced mental health issues in her 20s or 30s and has received treatment for schizoaffective disorder and generalized anxiety. Notably, Pinky's symptoms of depression and psychosis have responded well to a combination of ECT and medication management. Pinky has a history of experiencing increased psychotic symptoms with past attempted antipsychotic tapers. No changes to antipsychotic or antidepressant since 2016. Only medication change in last 6 years has been lowering lorazepam.      In her 30s, she had her gallbladder removed and then suffered first episode of severe depression. She was experiencing psychotic symptoms - paranoia, AH, and suspiciousness. She was hospitalized in [place] in 1986 where she received ECT.  She was hospitalized again in 1987, again received ECT. Was hospitalized for depression in 1995, around the same time she was diagnosed with breast cancer.   She was hospitalized for about nine months this time. She received ECT maintence from [id number]. Patient was stable taking Seroquel 400mg, Prozac 40mg, and Ativan 1mg for many years. Seroquel  "was cross-tapered to ziprasidone due to metabolic side effects in 2016.      She has been seen at our clinic since 2013. She has lived at [facility] for many years.      Medical history  Notable for afib, DMT2, HTN, ANUJ, hypothyroid, breast cancer s/p mastectomy     Pertinent Items Include: psychosis [sxs include disorganization, hallucinations, paranoia], mutiple psychotropic trials, psych hosp (3-5) and ECT.     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   - does not use CPAP regularly, agreed that this should be used regularly \"you can say whatever you want about it\" and laughed   - feeling safe, cares being met at her home     Recent Social History: see below    Recent Psych Symptoms:   Depression:  none   Elevated:  none  Psychosis:  none  Anxiety:  excessive worry about the future, chronic  Trauma Related:  none  Sleep: yes  Other: no    Recent Substance Use:     -alcohol: No   -cannabis: No   -tobacco: No  -caffeine:  No   -opioids: No   Narcan Kit currrent: No   -other: none    Pertinent Negatives: No suicidal or violent ideation, hallucinations and delusions  Adverse Effects: Right hand tremor resting, perioral involuntary movements, stiffness in upper extremity, denies chest pain and shortness of breath       PAST MED TRIALS   sertaline   amitriptyline   lithium   risperidone   olanzapine   trazodone   clonazepam   quetiapine 400mg (weight gain/metabolic SEs)  Lorazepam   triazolam  clonazepam.    July- decrease lorazepam from 0.5mg to 0.375 (liquid)  August 30 - " decrease lorazepam to 0.25mg due to difficulty drawing up correct liquid amount  10/25/2021- stop lorazepam.   1/18/2022 - no changes  4/14/2022- no changes     ALLERGY     ALLERGIES: Chlordiazepoxide hcl, Dimetapp dm cold-cough, Haldol, Ibuprofen, Lactose intolerance [beta-galactosidase], Milk products, and Propofol       MEDICAL REVIEW OF SYSTEMS   Contraception-  No   Pregnant- N/A  none in addition to that documented above     MEDICATIONS     Current Outpatient Medications   Medication Sig Dispense Refill     acetaminophen (TYLENOL) 500 MG tablet Take 2 tablets (1,000 mg) by mouth every 6 hours as needed 1 Bottle 2     alum & mag hydroxide-simethicone (MYLANTA/MAALOX) 200-200-20 MG/5ML SUSP suspension Take 30 mLs by mouth daily as needed for indigestion       amLODIPine (NORVASC) 10 MG tablet Take 1 tablet (10 mg) by mouth daily 90 tablet 0     apixaban ANTICOAGULANT (ELIQUIS) 5 MG tablet Take 1 tablet (5 mg) by mouth 2 times daily 180 tablet 0     artificial saliva (BIOTENE MT) AERS spray Take 1 spray by mouth 3 times daily as needed for dry mouth 115 mL 3     artificial tears (GENTEAL) 0.1-0.2-0.3 % ophthalmic solution Place 2 drops into both eyes 4 times daily 15 mL 3     atorvastatin (LIPITOR) 40 MG tablet Take 1 tablet (40 mg) by mouth daily 90 tablet 1     blood glucose (NO BRAND SPECIFIED) lancets standard Use to test blood sugar 2 times daily or as directed. 100 each 11     blood glucose calibration (NO BRAND SPECIFIED) solution Use to calibrate blood glucose monitor as directed. 1 each 3     blood glucose monitoring (NO BRAND SPECIFIED) meter device kit Check Blood sugars twice a day. 1 kit 0     blood glucose monitoring (NO BRAND SPECIFIED) test strip Use to test blood sugar 3 times daily or as directed. 100 each 3     Calcium Carbonate-Vitamin D (CALCIUM-VITAMIN D) 250-125 MG-UNIT TABS Take 1 tablet by mouth 2 times daily Calcium 250 mg/Vit D 125 IU       calcium polycarbophil (FIBERCON) 625 MG tablet  Take 2 tablets (1,250 mg) by mouth daily 180 tablet 3     CLARITIN 10 MG OR TABS 1 TAB PO QD (Once per day) as needed for ALLERGY SYMPTOMS 30 11     conjugated estrogens (PREMARIN) 0.625 MG/GM vaginal cream Place 2 g vaginally daily for 2 weeks, followed by 2 g vaginally twice a week for vaginal atrophy and frequent UTIs 30 g 3     erythromycin (ROMYCIN) 5 MG/GM ophthalmic ointment Place 0.5 inches into both eyes At Bedtime 3.5 g 11     fluorometholone (FML LIQUIFILM) 0.1 % ophthalmic suspension Place 1 drop into both eyes 2 times daily 10 mL 3     FLUoxetine (PROZAC) 20 MG capsule Take 1 capsule (20 mg) by mouth daily 90 capsule 3     fluticasone (FLONASE) 50 MCG/ACT nasal spray Spray 1 spray into both nostrils daily 16 g 3     furosemide (LASIX) 20 MG tablet Take 1 tablet (20 mg) by mouth daily 60 tablet 3     Gabapentin (NEURONTIN PO) Take 600 mg by mouth 3 times daily       Hypromellose (ARTIFICIAL TEARS OP) Apply 1 drop to eye 4 times daily.       insulin glargine (LANTUS PEN) 100 UNIT/ML pen Inject 15 Units Subcutaneous At Bedtime 8 mL 0     lactase (LACTAID) 3000 UNIT tablet Take 1 tablet (3,000 Units) by mouth 3 times daily (with meals) 90 tablet 11     levothyroxine (SYNTHROID/LEVOTHROID) 175 MCG tablet Take 1 tablet (175 mcg) by mouth daily 90 tablet 3     lidocaine (XYLOCAINE) 2 % external gel Apply topically 3 times daily as needed for moderate pain 85 g 1     lidocaine (XYLOCAINE) 5 % external ointment Apply topically 3 times daily as needed for moderate pain 50 g 1     lifitegrast (XIIDRA) 5 % opthalmic solution Place 1 drop into both eyes 2 times daily 60 each 11     liraglutide (VICTOZA) 18 MG/3ML solution Inject 1.8 mg Subcutaneous daily 9 mL 3     losartan (COZAAR) 100 MG tablet Take 1 tablet (100 mg) by mouth daily 90 tablet 1     Magnesium Hydroxide (MILK OF MAGNESIA PO) Take 30 mL as needed for constipation.       melatonin 3 MG CAPS Take 6 mg by mouth At Bedtime 60 capsule 3     metFORMIN  (GLUCOPHAGE) 1000 MG tablet Take 1,000 mg by mouth 2 times daily (with meals)       NEW MED Prohydrate Moisturizing gel  Place 1 applicator vaginally 4 times a week 20 oz 3     nystatin (MYCOSTATIN) 985452 UNIT/GM POWD Apply topically 3 times daily as needed 60 g 1     ONETOUCH DELICA LANCETS 33G MISC        polyethylene glycol (MIRALAX/GLYCOLAX) powder Take 1 capful by mouth 2 times daily 17 GM PO BID       Saline 0.9 % SOLN Spray 2 sprays in nostril as needed.       SALINE MIST 0.65 % nasal spray Spray 2 sprays in nostril 2 times daily       senna-docusate (SENOKOT-S/PERICOLACE) 8.6-50 MG tablet Take 2 tablets by mouth At Bedtime.       simethicone (MYLICON) 125 MG chewable tablet Take 1 tablet (125 mg) by mouth 2 times daily 180 tablet 3     Skin Protectants, Misc. (EUCERIN) cream Apply topically as needed Apply to thigh PRN dry skin       SM LUBRICANT EYE DROPS 0.4-0.3 % SOLN ophthalmic solution        sodium fluoride 1.1 % PSTE dental paste Apply 2 mLs to affected area daily 100 mL 1     solifenacin (VESICARE) 10 MG tablet Take 1 tablet (10 mg) by mouth daily 30 tablet 6     ziprasidone (GEODON) 40 MG capsule Take 1 capsule (40 mg) by mouth 2 times daily (with meals) 60 capsule 2      VITALS   LMP  (LMP Unknown)     MENTAL STATUS EXAM     Alertness: alert   Appearance: adequately groomed  Behavior/Demeanor: cooperative, pleasant and calm, with good  eye contact   Speech: slowed and regular rate and rhythm  Language: intact  Psychomotor: tremor  Mood: description consistent with euthymia  Affect: full range; congruent to: mood- yes, content- yes  Thought Process/Associations: unremarkable  Thought Content:  Reports none;  Denies suicidal & violent ideation and delusions  Perception:  Reports none;  Denies hallucinations and depersonalization  Insight: good  Judgment: good  Cognition: does  appear grossly intact; formal cognitive testing was not done  Gait and Station: remarkable for:  slightly reduced swing  phase of gait, slowed gait       LABS and DATA     PHQ 11/30/2021 1/11/2022 5/31/2022   PHQ-9 Total Score 7 6 9   Q9: Thoughts of better off dead/self-harm past 2 weeks Not at all Not at all Not at all   F/U: Thoughts of suicide or self-harm - - -   F/U: Safety concerns - - -     Antipsychotic Labs:  Recent Labs   Lab Test 04/27/22  0725 04/12/22  1514 03/16/21  1537 09/16/20  0000   CHOL  --  117 124 106   TRIG 115 72 136 89   LDL  --  55 53 46   HDL  --  48* 43* 42*     Recent Labs   Lab Test 10/03/22  1423 05/12/22  1230 04/12/22  1514 10/08/21  2143 03/16/21  1537   * 139*  --  168* 204.0*   A1C 7.6*  --  7.4*  --  7.8*     Recent Labs   Lab Test 10/08/21  2143 03/16/21  1537 05/07/19  0650 10/25/18  1734 06/26/18  0659   WBC 11.8*  --  10.06* 14.2* 9.72   ANEU  --  7.5  --  10.6* 5.31   HGB 13.0 13.7 13.3 14.0 12.6     --  292 275 247       Recent Labs   Lab Test 10/03/22  1423 11/18/21  1617   CR 0.83 0.8   GFRESTIMATED 74 >60     Recent Labs   Lab Test 03/16/21  1537 06/26/18  0659   AST 7.9 16   ALT 12.8 21   ALKPHOS 78.9 111       EKG 3/2021 - showing atrial fibrillation. QtC 392.   EKG 10/8/2021- QtC 439    Abnormal Involuntary Movement Scale (AIMS)    CODE 0=None    1=Minimal, may be extreme normal    2=Mild    3=Moderate    4-Severe       MOVEMENT RATINGS:  Rate highest severity observed. Rate  RATER    movements that occur upon activation one less than those observed     spontaneously.  [place] movement as well as code number that applies. Date        Facial and  1. Muscles of Facial Expression  0 1 2 3 4    Oral       e.g. movements of forehead, eyebrows, periorbital area,    Movements       cheeks, including frowning, blinking, smiling, grimacing     2. Lips and Perioral Area  0 1 2 3 4         e.g., puckering, pouting, smacking      3. Jaw e.g. biting, clenching, chewing, mouth opening, 0 1 2 3 4         lateral movement      4. Tongue Rate only increases in movement both in and out        of mouth.  NOT inability to sustain movement.  Darting in      and out of mouth. 0 1 2 3 4     5. Upper (arms, wrists,, hands, fingers)    Include choreic movements (i.e., rapid, objectively purposeless, irregular, spontaneous) athetoid movements (i.e., slow, irregular,    complex, serpentine).  DO NOT INCLUDE TREMOR   (i.e., repetitive, regular, rhythmic)  0 1 2 3 4    Extremity Movements                 6. Lower (legs, knees, ankles, toes)   e.g., lateral knee movement, foot tapping, heel dropping, foot squirming, inversion and eversion of foot.  0 1 2 3 4        Trunk  Movements 7. Neck, shoulders, hips e.g., rocking, twisting, squirming, pelvic  gyrations  0 1 2 3 4    Global  8. Severity of abnormal movements overall  0 1 2 3 4     9. Incapacitation due to abnormal movements 0 1 2 3 4    Judgments           10. Patient s awareness of abnormal movements     Rate only patient s report No awareness 0   Aware, no distress 1 0    Aware, mild distress 2 1     Aware, moderate distress 3 2    Aware, severe distress 4            3      4   Dental Status  11. Current problems with teeth and/or dentures? No   Yes    12. Are dentures usually worn? No  Yes         13. Edentia? No   Yes    14. Do movements disappear in sleep? No   Yes     Examination Procedure  Either before or after completing the examination procedure, observe the patient unobtrusively at rest (e.g., in the waiting room).   The chair to be used in this examination should be a hard, firm one without arms.  Have the person remove their shoes and socks.    1. Ask the patient whether there is anything in his or her mouth (such as gum or candy) and, if so, to remove it.   2. Ask about the *current* condition of the patient's teeth. Ask if he or she wears dentures. Ask whether teeth or dentures bother the patient *now*.   3. Ask whether the patient notices any movements in his or her mouth, face, hands, or feet. If yes, ask the patient to describe them and to  indicate to what extent they *currently* bother the patient or interfere with activities.   4. Have the patient sit in chair with hands on knees, legs slightly apart, and feet flat on floor. (Look at the entire body for movements while the patient is in this position.)   5. Ask the patient to sit with hands hanging unsupported -- if male, between his legs, if female and wearing a dress, hanging over her knees. (Observe hands and other body areas).   6. Ask the patient to open his or her mouth. (Observe the tongue at rest within the mouth.) Do this twice.   7. Ask the patient to protrude his or her tongue. (Observe abnormalities of tongue movement.) Do this twice.   8. Ask the patient to tap his or her thumb with each finger as rapidly as possible for 10 to 15 seconds, first with right hand, then with left hand. (Observe facial and leg movements.) [ activated]   9. Flex and extend the patient's left and right arms, one at a time.   10. Ask the patient to stand up. (Observe the patient in profile. Observe all body areas again, hips included.)   11. Ask the patient to extend both arms out in front, palms down. (Observe trunk, legs, and mouth.) [activated]   12. Have the patient walk a few paces, turn, and walk back to the chair. (Observe hands and gait.) Do this twice. [activated]     AIMS score 7/11/22: 5        PSYCHOTROPIC DRUG INTERACTIONS                                                       PSYCHCLINICDDI   ZIPRASIDONE and fluoxetine may result in increased risk of QT-interval prolongation.  ZIPRASIDONE and SEROTONERGIC DRUGS THAT PROLONG QT INTERVAL may result in increased risk of QT-interval prolongation and increased risk of serotonin syndrome (hypertension, hyperthermia, myoclonus, mental status changes).  NSAID and SSRI may result in an increased risk of bleeding  ERYTHROMYCIN and FLUOXETINE may result in an increased risk of cardiotoxicity (QT prolongation, torsades de pointes, cardiac arrest).  FLUOXETINE  and INSULINS OR PRAMLINTIDE may result in increased risk of hypoglycemia.  GABAPENTIN and CNS DEPRESSANTS may result in respiratory depression.   GABAPENTIN and ANTACIDS may result in decreased gabapentin effectiveness.      MANAGEMENT:  Monitoring for adverse effects, periodic EKGs and patient is aware of risks     RISK STATEMENT for SAFETY     Pinky did not appear to be an imminent safety risk to self or others.    TREATMENT RISK STATEMENT: The risks, benefits, alternatives and potential adverse effects have been discussed and are understood by the pt. The pt understands the risks of using street drugs or alcohol. There are no medical contraindications, the pt agrees to treatment with the ability to do so. The pt knows to call the clinic for any problems or to access emergency care if needed.  Medical and substance use concerns are documented above.  Psychotropic drug interaction check was done, including changes made today.      MEDICAL DECISION MAKING        (SmartPhrase .[id number])   Acuity:   - At least 1 chronic problem that is not stable  Risk:   - Engaged in prescription drug management during visit (discussed any medication benefits, side effects, alternatives, etc.)      PROVIDER: Malachi Cassidy DO    Patient staffed in clinic with Dr. Estrada who will sign the note.  Supervisor is Dr. Gordon.

## 2023-01-09 NOTE — Clinical Note
Dr. Fuller,  Pinky made excellent progress in therapy and walked independently today with confidence. She is discharged to home program and refer to discharge summary for details.  Shamika

## 2023-01-10 NOTE — PROGRESS NOTES
[facility]    OUTPATIENT Physical Therapy ORTHOPEDIC EVALUATION  PLAN OF TREATMENT FOR OUTPATIENT REHABILITATION  (COMPLETE FOR INITIAL CLAIMS ONLY)  Patient's Last Name, First Name, M.I.  YOB: 1950  KayleePinky VELOZ    Provider s Name:  [facility]   Medical Record No.  [id number]   Start of Care Date:  01/09/23   Onset Date:   08/28/22   Treatment Diagnosis:  physical deconditioning Medical Diagnosis:  Data Unavailable       Goals:     01/09/23 0754   Goal #1   Goal #1 ambulation   Previous Functional Level   (walker 20 min tulio)   Current Functional Level   (walking tolerance 30 minutes)   STG [employer]   (walker 30 min tulio)   Rationale for safe work place ambulation   Due Date 12/29/22   Date Goal Met 01/09/23    LTG [employer]   (ambulate with quad cane)   Rationale for safe work place ambulation   Due Date 02/21/23         Therapy Frequency:  one time per week  Predicted Duration of Therapy Intervention:  6 weeks    Shamika Orozco, PT                 I CERTIFY THE NEED FOR THESE SERVICES FURNISHED UNDER        THIS PLAN OF TREATMENT AND WHILE UNDER MY CARE     (Physician attestation of this document indicates review and certification of the therapy plan).                     Certification Date From:  01/09/23   Certification Date To:  03/26/23    Referring Provider:  Deacon Birmingham    Initial Assessment        See Epic Evaluation SOC Date: 01/09/23

## 2023-01-10 NOTE — PROGRESS NOTES
[facility]    OUTPATIENT Physical Therapy ORTHOPEDIC EVALUATION  PLAN OF TREATMENT FOR OUTPATIENT REHABILITATION  (COMPLETE FOR INITIAL CLAIMS ONLY)  Patient's Last Name, First Name, M.I.  YOB: 1950  KayleePinky  RO[facility]IA    Provider s Name:  [facility]   Medical Record No.  [id number]   Start of Care Date:  10/24/22   Onset Date:   08/28/22   Treatment Diagnosis:  physical deconditioning Medical Diagnosis:  Physical deconditioning       Goals:     10/24/22 0500   Goal #1   Goal #1 ambulation   Previous Functional Level   (walker 20 min tulio)   STG [employer]   (walker 30 min tulio)   Rationale for safe work place ambulation   Due Date 11/23/22    LTG [employer]   (ambulate with quad cane)   Rationale for safe work place ambulation   Due Date 12/28/22       Therapy Frequency:  one time per week  Predicted Duration of Therapy Intervention:  6 weeks    Shamika Orozco, PT                 I CERTIFY THE NEED FOR THESE SERVICES FURNISHED UNDER        THIS PLAN OF TREATMENT AND WHILE UNDER MY CARE     (Physician attestation of this document indicates review and certification of the therapy plan).                     Certification Date From:  10/24/22   Certification Date To:  12/30/22    Referring Provider:  Deacon Birmingham    Initial Assessment        See Epic Evaluation SOC Date: 10/24/22

## 2023-01-10 NOTE — PROGRESS NOTES
Subjective:  HPI  Physical Exam                    Objective:  System    Physical Exam    General     ROS    Assessment/Plan:    DISCHARGE REPORT    Progress reporting period is from  October 24, 2022 to January 9, 2023.       SUBJECTIVE  Subjective changes noted by patient:  Patient reports that she is ambulating with cane most of the time and much more stable and confident with walking. Exercise program is going well.        Current pain level is 0/10  Initial Pain level: 0/10.   Changes in function:  Yes (See Goal flowsheet attached for changes in current functional level)  Adverse reaction to treatment or activity: None    OBJECTIVE  Changes noted in objective findings:  Lower Extremity Strength:  Strength wnl lower extremity general: mmt: quadriceps: 5/5 (B), hamstrings: 5/5 (B) Gastrocnemius: 3-/5-3/5 (B), hip extensors 3-/5-3/5 (B)                                Transitions from sit to stand with good postural awareness, Gait: Ambulates  Independently with normal stride length and trevon  Single leg Balance: 0 seconds (B) independently, single leg balance with support of table 30 seconds each side with good trunk stability and confidence    ASSESSMENT/PLAN  Updated problem list and treatment plan: Diagnosis 1:  Physical conditioning  Pain -  hot/cold therapy, manual therapy, self management, education, directional preference exercise and home program  Decreased strength - therapeutic exercise, therapeutic activities and home program  Impaired balance - neuro re-education, therapeutic activities and home program  Impaired muscle performance - neuro re-education and home program  Decreased function - therapeutic activities and home program      STG/LTGs have been met or progress has been made towards goals:  Yes (See Goal flow sheet completed today.)  Assessment of Progress: The patient's condition is improving.  Self Management Plans:  Patient has been instructed in a home treatment program.  Patient is  independent in a home treatment program.  Patient  has been instructed in self management of symptoms.  Patient is independent in self management of symptoms.  I have re-evaluated this patient and find that the nature, scope, duration and intensity of the therapy is appropriate for the medical condition of the patient.  Pinky continues to require the following intervention to meet STG and LTG's:  PT intervention is no longer required to meet STG/LTG.    Recommendations:  This patient is ready to be discharged from therapy and continue their home treatment program.    Please refer to the daily flowsheet for treatment today, total treatment time and time spent performing 1:1 timed codes.

## 2023-01-12 DIAGNOSIS — Z91.89 NEED FOR DENTAL CARE: ICD-10-CM

## 2023-01-12 NOTE — TELEPHONE ENCOUNTER
[facility] phone call message- patient requesting a refill:    Full Medication Name: sodium fluoride 1.1 % PSTE dental paste        Pharmacy confirmed as     [facility] - [place], [place] - [address]  [facility] [address]  Phone: [phone] Fax: [phone]    : Yes    Additional Comments: Pharmacy called in prescription.     OK to leave a message on voice mail? Yes    Primary language: English      needed? No    Call taken on January 12, 2023 at 8:27 AM by Triny Jeffers

## 2023-01-16 NOTE — TELEPHONE ENCOUNTER
"Last seen 10/3/22    Request for medication refill:  sodium fluoride 1.1 % PSTE dental paste  Providers if patient needs an appointment and you are willing to give a one month supply please refill for one month and  send a letter/MyChart using \".SMILLIMITEDREFILL\" .smillimited and route chart to \"P SMI \" (Giving one month refill in non controlled medications is strongly recommended before denial)    If refill has been denied, meaning absolutely no refills without visit, please complete the smart phrase \".smirxrefuse\" and route it to the \"P SMI MED REFILLS\"  pool to inform the patient and the pharmacy.    Anne Rossi RN        "

## 2023-01-27 ENCOUNTER — OFFICE VISIT (OUTPATIENT)
Dept: PSYCHOLOGY | Facility: CLINIC | Age: 73
End: 2023-01-27
Payer: COMMERCIAL

## 2023-01-27 VITALS — BODY MASS INDEX: 37.9 KG/M2 | WEIGHT: 220.8 LBS

## 2023-01-27 DIAGNOSIS — E66.9 OBESITY, UNSPECIFIED OBESITY SEVERITY, UNSPECIFIED OBESITY TYPE: ICD-10-CM

## 2023-01-27 DIAGNOSIS — F54 PSYCHOLOGICAL FACTORS AFFECTING MEDICAL CONDITION: ICD-10-CM

## 2023-01-27 DIAGNOSIS — F41.1 GENERALIZED ANXIETY DISORDER: ICD-10-CM

## 2023-01-27 DIAGNOSIS — F33.0 MAJOR DEPRESSIVE DISORDER, RECURRENT EPISODE, MILD (H): Primary | ICD-10-CM

## 2023-01-27 PROCEDURE — 90834 PSYTX W PT 45 MINUTES: CPT | Performed by: PSYCHOLOGIST

## 2023-01-27 ASSESSMENT — PAIN SCALES - GENERAL: PAINLEVEL: NO PAIN (0)

## 2023-01-27 NOTE — PROGRESS NOTES
Health Psychology                    Department of Medicine  Keri Arnett, Ph.D., L.P. [phone]                         [facility] Madelaine Ingram, Ph.D., L.P. [phone]                     [place] Mail Code 357   Dennis Dillard, Ph.D. [phone]      [address] [place] [address] Rae Eugene, Ph.D., A.B.P.P., L.P. [phone]              [place] [address]           Damon Gr, Ph.D., A.B.P.P., L.P. [phone]      Kimber Morales, Ph.D., L.P. [phone]  [facility]   Lilia Burroughs, Ph.D., A.B.P.P., L.P. 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 She is getting better at setting it up with help from [place] staff.  Intake with Health Psychology was in the 1980s.  I began to see her in the early 1990s.     Stressors:  She is getting a new  Gayle Peterjoseph is getting a new job at [employer]. Not currently able to work due to balance issues.      Mood: Her mood is okay today, 3-4/10 today.  Enjoyedthe holidays.    Health:  She got her booster for COVID and got flu shot in September.  She got her bottom partial in October, 2022.      During the past month no new COVID cases at [facility] .In the interval,.  She is now using a cane, stopped walker earlier this month.  She discontinued PT after 10 sessions. She was having inner ear problem, but  it improved.  She is nervous about recent and  upcoming dental bridgework.     She continues to have shaking in her hands, presumably TD.  She thinks  the sx are stable.    Psychoactive Medications:  - Continue ziprasidone 40 mg BID  - Continue fluoxetine 40 mg daily  - Continue melatonin 6mg at bedtime    Exercise:   She increased biking to 25 min/day 7  days/week (1 day 30 minutes).Willing to consider increasing to 30.  She was also walking about 10-15 minutes, down from 25-30 minutes/day. She hopes to increase it and sees it as a New Year resolution.    Weight: Her weight was 220.8 in clinic today.    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  She has worked x3 (since no longer using walker). 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    She is pleased to be back.    She participates fully, ventilated feelings appropriately.  She appears to derive benefit from the support.     A treatment plan was completed.    Current Outpatient Medications   Medication     acetaminophen (TYLENOL) 500 MG tablet     alum & mag hydroxide-simethicone (MYLANTA/MAALOX) 200-200-20 MG/5ML SUSP suspension     amLODIPine (NORVASC) 10 MG tablet     apixaban ANTICOAGULANT (ELIQUIS) 5 MG tablet     artificial saliva (BIOTENE MT) AERS spray     artificial tears (GENTEAL) 0.1-0.2-0.3 % ophthalmic solution     atorvastatin (LIPITOR) 40 MG tablet     blood glucose (NO BRAND SPECIFIED) lancets standard     blood glucose calibration (NO BRAND SPECIFIED) solution     blood glucose monitoring (NO BRAND SPECIFIED) meter device kit     blood glucose monitoring (NO BRAND SPECIFIED) test strip     Calcium Carbonate-Vitamin D (CALCIUM-VITAMIN D) 250-125 MG-UNIT TABS     calcium polycarbophil (FIBERCON) 625 MG tablet     CLARITIN 10 MG OR TABS     conjugated estrogens (PREMARIN) 0.625 MG/GM vaginal cream     erythromycin (ROMYCIN) 5 MG/GM ophthalmic ointment     fluorometholone (FML LIQUIFILM) 0.1 % ophthalmic suspension     FLUoxetine (PROZAC) 20 MG capsule     fluticasone (FLONASE) 50 MCG/ACT nasal spray     furosemide (LASIX) 20 MG tablet     Gabapentin (NEURONTIN PO)     Hypromellose (ARTIFICIAL TEARS OP)     insulin glargine (LANTUS PEN) 100 UNIT/ML pen     lactase (LACTAID) 3000 UNIT tablet     levothyroxine (SYNTHROID/LEVOTHROID) 175 MCG  tablet     lidocaine (XYLOCAINE) 2 % external gel     lidocaine (XYLOCAINE) 5 % external ointment     lifitegrast (XIIDRA) 5 % opthalmic solution     liraglutide (VICTOZA) 18 MG/3ML solution     losartan (COZAAR) 100 MG tablet     Magnesium Hydroxide (MILK OF MAGNESIA PO)     melatonin 3 MG CAPS     metFORMIN (GLUCOPHAGE) 1000 MG tablet     NEW MED     nystatin (MYCOSTATIN) 265071 UNIT/GM POWD     ONETOUCH DELICA LANCETS 33G MISC     polyethylene glycol (MIRALAX/GLYCOLAX) powder     Saline 0.9 % SOLN     SALINE MIST 0.65 % nasal spray     senna-docusate (SENOKOT-S/PERICOLACE) 8.6-50 MG tablet     simethicone (MYLICON) 125 MG chewable tablet     Skin Protectants, Misc. (EUCERIN) cream     SM LUBRICANT EYE DROPS 0.4-0.3 % SOLN ophthalmic solution     sodium fluoride 1.1 % PSTE dental paste     solifenacin (VESICARE) 10 MG tablet     ziprasidone (GEODON) 40 MG capsule     No current facility-administered medications for this visit.     Past Medical History:   Diagnosis Date     Allergic rhinitis due to pollen     seasonal allergies      Anisometropia and aniseikonia      BMI greater than 40      Cardiomegaly      Chronic constipation      Congenital absence of one kidney      Cramp of limb      Dermatophytosis of the body      Dry eye syndrome      Esophageal reflux      Essential hypertension, benign      Gastro-oesophageal reflux disease      Hemorrhoids      Hypermetropia      Hypothyroidism      Incomplete Emptying of Bladder      Lactose intolerance      Major depressive disorder, recurrent episode, moderate (H)      Malignant neoplasm of breast (female), unspecified site     left mastectomy 1996      Mixed incontinence urge and stress (male)(female)      Moderate obstructive sleep apnea      Postmenopausal Atrophic Vaginitis      Presbyopia      Regular astigmatism      Restless leg syndrome      Senile nuclear sclerosis      Thyroid eye disease     mild     Tinnitus      Trigger finger (acquired)     right hand      Type II or unspecified type diabetes mellitus without mention of complication, not stated as uncontrolled     on insulin since April 2006      Past Surgical History:   Procedure Laterality Date     APPENDECTOMY       C MASTECTOMY,SIMPLE  1996    Left mastectomy  [facility]. Had normal FU mammo 5/2007. Follow yearly.      CHOLECYSTECTOMY       COLONOSCOPY  5/2005    Complete Colonoscopy-had one small polyp removed 5/2005.      COLONOSCOPY N/A 3/31/2016    Procedure: COLONOSCOPY;  Surgeon: Cary Ring MD;  Location: [facility]     COLONOSCOPY N/A 7/7/2016    Procedure: COLONOSCOPY;  Surgeon: Cary Ring MD;  Location: [facility]     COLONOSCOPY N/A 5/12/2022    Procedure: COLONOSCOPY, WITH POLYPECTOMY AND BIOPSY;  Surgeon: Jonathan Madden MD;  Location: [facility]     DILATION AND CURETTAGE       HYSTERECTOMY       MASTECTOMY      Left breast     ZZC TOTAL ABDOM HYSTERECTOMY  7/2003    Dr. Castanon, for abnormal bleeding. Removal of both tubes with BSO for myoma w - - -     Wt Readings from Last 4 Encounters:   01/27/23 100.2 kg (220 lb 12.8 oz)   01/09/23 99.2 kg (218 lb 12.8 oz)   12/16/22 99.5 kg (219 lb 4.8 oz)   11/04/22 100.5 kg (221 lb 9.6 oz)   Body mass index is 37.9 kg/m .    Time service started: 3:08  Time service ended:  3:55    Diagnosis:   Major Depression, recurrent mild (F33.0)   Psychological Factors Affecting Morbid Obesity (F54)  Generalized anxiety (F41.1)       Plan: Return for psychotherapy visit in-person/masked 3/3 @ 1 due to the serious and persistent nature of her depression and anxiety consistent with treatment plan.  Last treatment plan signed: 1/27/23  Treatment plan due: 1/27/24     Preference for future meetings:      In-person, even if both wearing masks        Damon Gr, PhD LP,  A.B.P.P.  Director, Health Psychology  [phone]

## 2023-02-09 ENCOUNTER — DOCUMENTATION ONLY (OUTPATIENT)
Dept: FAMILY MEDICINE | Facility: CLINIC | Age: 73
End: 2023-02-09
Payer: COMMERCIAL

## 2023-02-09 NOTE — PROGRESS NOTES
"When opening a documentation only encounter, be sure to enter in \"Chief Complaint\" Forms and in \" Comments\" Title of form, description if needed.    Pinky is a 72 year old  female  Form received via: Fax  Form now resides in: Provider Ready    Josi Washington MA              Form has been completed by provider.     Form sent out via: Fax to Narendra Truong at Fax Number: [phone]  Patient informed: N/A  Output date: March 1, 2023    Josi Washington MA      **Please close the encounter**        "

## 2023-02-28 ENCOUNTER — DOCUMENTATION ONLY (OUTPATIENT)
Dept: FAMILY MEDICINE | Facility: CLINIC | Age: 73
End: 2023-02-28
Payer: COMMERCIAL

## 2023-03-01 ENCOUNTER — TELEPHONE (OUTPATIENT)
Dept: FAMILY MEDICINE | Facility: CLINIC | Age: 73
End: 2023-03-01
Payer: COMMERCIAL

## 2023-03-01 NOTE — TELEPHONE ENCOUNTER
Attempted to reach Pastora VILLA at [phone], went right to voicemail at [facility]. I do not see any lab results in the chart. Forwarding to provider    Anne Rossi RN

## 2023-03-01 NOTE — TELEPHONE ENCOUNTER
[facility] phone call message-patient reporting a symptom:     Symptom: Diagnosed with a UTI per Pastora (RN)    Same Day Visit Offered: N/A    Additional comments: Pastora is requesting antibiotics for the patient.    OK to leave message on voice mail? Yes    Primary language: English      needed? No    Call taken on March 1, 2023 at 8:47 AM by Triny Jeffers

## 2023-03-02 ENCOUNTER — DOCUMENTATION ONLY (OUTPATIENT)
Dept: FAMILY MEDICINE | Facility: CLINIC | Age: 73
End: 2023-03-02
Payer: COMMERCIAL

## 2023-03-02 NOTE — TELEPHONE ENCOUNTER
Dr. Sampson,  Can you please take a look in Care Everywhere at the results from [facility], I updated the records.     Nurse from [facility] is requesting antibiotics, looks like pharmacy is:    [facility] - [place], [place] - [address]    Thank you  Anne Rossi RN

## 2023-03-02 NOTE — TELEPHONE ENCOUNTER
"Results reviewed:     Grew 10-50,000 EColi, resistant to multiple agents. This is a lower level of bacteria and may not need treatment. I need more information regarding the symptoms that prompted her to have a UA checked.      If she has systemic symptoms of illness would consider treating with oral cephalosporin. If only dysuria and clear symptoms of UTI could consider macrobid.     Will route message to Triage RN to try and reach facility to find out symptoms (there is a faxed order that we received on 2/28 stating \"dysuria\" in Dr. Goel's inbox).     Will also route to Dr. Goel for further direction and if patient needs clinic visit to discuss.       "

## 2023-03-03 ENCOUNTER — TELEPHONE (OUTPATIENT)
Dept: PSYCHOLOGY | Facility: CLINIC | Age: 73
End: 2023-03-03
Payer: COMMERCIAL

## 2023-03-03 ENCOUNTER — TELEPHONE (OUTPATIENT)
Dept: FAMILY MEDICINE | Facility: CLINIC | Age: 73
End: 2023-03-03
Payer: COMMERCIAL

## 2023-03-03 ENCOUNTER — TELEPHONE (OUTPATIENT)
Dept: UROLOGY | Facility: CLINIC | Age: 73
End: 2023-03-03
Payer: COMMERCIAL

## 2023-03-03 NOTE — TELEPHONE ENCOUNTER
[facility] phone call message- patient requesting a refill:    Full Medication Name: Antibiotic for UTI      Pharmacy confirmed as   [facility] - [place], [place] - [address]  [address]  [facility] [address]  Phone: [phone] Fax: [phone]  : Yes    Additional Comments: Narendra Truong called requesting antibiotics for the pt. The pt has an UTI. Allie [phone]    OK to leave a message on voice mail? Yes    Primary language: English      needed? No    Call taken on March 3, 2023 at 2:54 PM by Bernabe Neal

## 2023-03-03 NOTE — TELEPHONE ENCOUNTER
Spoke to nurse. She states they received a call back and that pt would not be treated unless symptoms worsened. Advised to follow those instructions. No other questions noted.     Danna Lackey MSN RN

## 2023-03-03 NOTE — TELEPHONE ENCOUNTER
Health Call Center    Phone Message    May a detailed message be left on voicemail: yes     Reason for Call: Other: ALLEN Kelley is calling stating that Pinky had UA done 2/26 and that they are still waiting on her PCP to put in medication orders and is asking if Dr. Costa can do it any sooner. lab UA results from 2/26 are in chart. Please contact ALLEN Kelley with any questions, thanks.     Action Taken: Message routed to:  [facility] uro    Travel Screening: Not Applicable

## 2023-03-03 NOTE — TELEPHONE ENCOUNTER
Called and spoke with Faye at [facility] relayed provider message:    Discussed with PCP on the phone. She is in agreement of holding off on treatment unless patient has systemic symptoms of illness to low level of bacteria in her urine.      Will route to triage RN to try and contact her facility.      Nidia Sampson, DO    She said she will note in the chart and continue to monitor.    Anne Rossi RN

## 2023-03-03 NOTE — TELEPHONE ENCOUNTER
Discussed with PCP on the phone. She is in agreement of holding off on treatment unless patient has systemic symptoms of illness to low level of bacteria in her urine.     Will route to triage RN to try and contact her facility.     Nidia Sampson,

## 2023-03-03 NOTE — TELEPHONE ENCOUNTER
Health Psychology                     Department of Medicine  Keri Arnett, Ph.D., L.P. [phone]                          [facility] Madelaine Ingram, Ph.D., L.P. [phone]                   [place] Mail [address]   Dennis Dillard, Ph.D., L.P. [phone]       [address] [place] [address] Rae Eugene, Ph.D., A.B.P.P., L.P. [phone]         [place] [address]      Damon Gr, Ph.D., A.B.P.P., L.P. [phone]     Kimber Morales, Ph.D., L.P. [phone]  [facility]   Lilia Burroughs, Ph.D., A.B.P.P., L.P. [phone]       Telephone Note      Pt failed appointment today.  I called Narendra Truong.  She was sick.  Staff had inadvertently not called to inform.  She is now rescheduled for in-person consultation 4/14 @ 2.    Damon Gr, PhD LP,  A.B.P.P.  Director, Health Psychology  [phone]

## 2023-03-06 NOTE — LETTER
1/18/2022       RE: Pinky Page  [address] [facility] [address]     Dear Colleague,    Thank you for referring your patient, Pikny Page, to the [facility]ERMAT[facility] [place] at [facility]. Please see a copy of my visit note below.    [facility] Dermatology Note  Encounter Date: Jan 18, 2022  Store-and-Forward and Telephone ([phone] ). Location of teledermatologist: [facility] [place].  Start time: 1237. End time: 1248.    Dermatology Problem List:  1. SCCIS, L cheek, scheduled Mohs 2/22/22    ____________________________________________    Assessment & Plan:     # SCCIS, L cheek. Mohs surgery scheduled 2/22/22.   The nature, risks, benefits, and alternatives to Mohs surgery were discussed. The patient would like to proceed with Mohs surgery.  She takes Eliquis, ok to continue anticoagulation.   She has no indications for pre-op antibiotics.     She will have a  home.     Procedures Performed:    None    Follow-up: Mohs Surgery 2/22/22    Staff:     Kris Serna DO    Department of Dermatology  [facility]: Phone: [phone], Fax:[phone]  [facility]: Phone: [phone], Fax: [phone]      ____________________________________________    CC: Squamous Cell Carcinoma in situ, left cheek. Mohs consultati    HPI:  Ms. Pinky Page is a(n) 71 year old female who presents today as a new patient for Mohs surgery consultation to treat SCCIS on the left cheek. Her primary care doctor did the biopsy and referred for Mohs. She has no experience with Mohs or skin cancer.     She lives in a care home with nurses, mental health workers, and social workers. If wound care assistance is needed, it is available to her.      Patient is otherwise feeling well, without additional skin concerns.    Labs Reviewed:  Dermpath report from 11/30/21 was reviewed.     Physical Exam:  Vitals: LMP  (LMP Unknown)   SKIN: Teledermatology photos were reviewed; image quality and interpretability: acceptable. Image date: 11/30/21.  - pink pedunculated papule on the left upper cheek, ~8mm.   - No other lesions of concern on areas examined.     Medications:  Current Outpatient Medications   Medication     acetaminophen (TYLENOL) 500 MG tablet     alum & mag hydroxide-simethicone (MYLANTA/MAALOX) 200-200-20 MG/5ML SUSP suspension     amLODIPine (NORVASC) 10 MG tablet     apixaban ANTICOAGULANT (ELIQUIS) 5 MG tablet     artificial saliva (BIOTENE MT) AERS spray     atorvastatin (LIPITOR) 40 MG tablet     blood glucose (NO BRAND SPECIFIED) lancets standard     blood glucose calibration (NO BRAND SPECIFIED) solution     blood glucose monitoring (NO BRAND SPECIFIED) meter device kit     blood glucose monitoring (NO BRAND SPECIFIED) test strip     Calcium Carbonate-Vitamin D (CALCIUM-VITAMIN D) 250-125 MG-UNIT TABS     calcium polycarbophil (FIBERCON) 625 MG tablet     CLARITIN 10 MG OR TABS     erythromycin (ROMYCIN) 5 MG/GM ophthalmic ointment     FLUoxetine (PROZAC) 20 MG capsule     fluticasone (FLONASE) 50 MCG/ACT nasal spray     furosemide (LASIX) 20 MG tablet     Gabapentin (NEURONTIN PO)     Hypromellose (ARTIFICIAL TEARS OP)     insulin glargine (LANTUS PEN) 100 UNIT/ML pen     lactase (LACTAID) 3000 UNIT tablet     levothyroxine (SYNTHROID/LEVOTHROID) 175 MCG tablet     lidocaine (XYLOCAINE) 2 % external gel     lifitegrast (XIIDRA) 5 % opthalmic solution     liraglutide (VICTOZA) 18 MG/3ML solution     losartan (COZAAR) 100 MG tablet     Magnesium Hydroxide (MILK OF MAGNESIA PO)     melatonin 3 MG CAPS     metFORMIN (GLUCOPHAGE) 1000 MG tablet     NEW MED     nitroFURantoin macrocrystal-monohydrate (MACROBID) 100 MG capsule     nystatin  (MYCOSTATIN) 141740 UNIT/GM POWD     ONETOUCH DELICA LANCETS 33G MISC     polyethylene glycol (MIRALAX/GLYCOLAX) powder     Saline 0.9 % SOLN     senna-docusate (SENNA S) 8.6-50 MG per tablet     simethicone (MYLICON) 125 MG chewable tablet     Skin Protectants, Misc. (EUCERIN) cream     SM LUBRICANT EYE DROPS 0.4-0.3 % SOLN ophthalmic solution     solifenacin (VESICARE) 10 MG tablet     Vaginal Moisturizer (VAGISIL INTIMATE MOISTURIZER) LOTN     ziprasidone (GEODON) 40 MG capsule     No current facility-administered medications for this visit.      Past Medical/Surgical History:   Patient Active Problem List   Diagnosis     Allergic rhinitis due to pollen     Urge incontinence     Hypertension, essential     Cardiomegaly     Chronic constipation     Dry eye syndrome     Esophageal reflux     Exposure keratoconjunctivitis     DM ophthalmopathy (H)     Hypothyroidism     Senile cataract     ANUJ (obstructive sleep apnea)     Vaginal atrophy     Restless leg syndrome     Squamous blepharitis     Morbid obesity due to excess calories (H)     Personal history of breast cancer s/p L masectomy     Hypercholesteremia     Lives in group home     Extrapyramidal and movement disorder     CKD (chronic kidney disease) stage 2, GFR 60-89 ml/min     Solitary kidney, congenital     S/P hysterectomy     Impingement syndrome of right shoulder     Hypertriglyceridemia     Candidiasis of skin     Generalized anxiety disorder     Carpal tunnel syndrome of left wrist     Schizoaffective disorder, depressive type (H)     Major depressive disorder, recurrent episode, moderate (H)     Psychological factors affecting medical condition     Hx of psychiatric care     Nail complaint     Microalbuminuria due to type 2 diabetes mellitus (H)     Type 2 diabetes mellitus with microalbuminuria, with long-term current use of insulin (H)     Conjunctivitis of both eyes     Corneal erosion of both eyes     Spastic entropion, right     Foot callus      Paroxysmal atrial fibrillation (H)     Squamous cell carcinoma in situ of skin of face     Chronic left shoulder pain     Rotator cuff injury, left, initial encounter     Past Medical History:   Diagnosis Date     Allergic rhinitis due to pollen     seasonal allergies      Anisometropia and aniseikonia      BMI greater than 40      Cardiomegaly      Chronic constipation      Congenital absence of one kidney      Cramp of limb      Dermatophytosis of the body      Dry eye syndrome      Esophageal reflux      Essential hypertension, benign      Gastro-oesophageal reflux disease      Hemorrhoids      Hypermetropia      Hypothyroidism      Incomplete Emptying of Bladder      Lactose intolerance      Major depressive disorder, recurrent episode, moderate (H)      Malignant neoplasm of breast (female), unspecified site     left mastectomy 1996      Mixed incontinence urge and stress (male)(female)      Moderate obstructive sleep apnea      Postmenopausal Atrophic Vaginitis      Presbyopia      Regular astigmatism      Restless leg syndrome      Senile nuclear sclerosis      Thyroid eye disease     mild     Tinnitus      Trigger finger (acquired)     right hand     Type II or unspecified type diabetes mellitus without mention of complication, not stated as uncontrolled     on insulin since April 2006        CC Paulino El MD and Eriberto Painter MD [facility]     none

## 2023-03-06 NOTE — TELEPHONE ENCOUNTER
Called and spoke with [facility] on 3/3/23 and relayed provider message, she expressed understanding and they will continue to monitor.    Anne Rossi RN

## 2023-03-10 DIAGNOSIS — H04.123 DRY EYE SYNDROME OF BOTH EYES: ICD-10-CM

## 2023-03-10 NOTE — TELEPHONE ENCOUNTER
fluorometholone (FML LIQUIFILM) 0.1 % ophthalmic suspension  Last Written Prescription Date:   9/15/2022  Last Fill Quantity: 10,   # refills: 3  Last Office Visit :  9/15/2022  Future Office visit:   3/16/2023   Michael Lopez MD  Ophthalmology       Assessment & Plan         Pinky Page is a 72 year old female with the following diagnoses:     1. Dry eyes    2. Mixed type age-related cataract, both eyes    3. Anatomical narrow angle borderline glaucoma of both eyes    4. Type 2 diabetes mellitus with microalbuminuria, with long-term current use of insulin (H)       #Dry eyes     7 week follow up today. Pt symptomatically improved with FML drops.  Recommend lid hygiene and warm compresses  Continue artifical tears four times a day    Continuing xiidra twice a day both eyes   ESS marissa at bedtime as needed   Decrease FML to BID in both eyes     #Cataracts left eye> right eye      May be visually significant at this time, will try treating dry eye sx before pursuing surgery  Refractive options reviewed  Refraction given   Monitor until the Spring           Patient disposition:   Return in about 6 months (around 3/15/2023) for Follow Up, VTD, refraction.        Dominga Johnson MD  PGY2 Ophthalmology      Routing refill request to provider for review/approval because:  Drug not on the [facility], [facility] or  Health refill protocol or controlled substan ce      Lindsey Davis RN  Central Triage Red Flags/Med Refills

## 2023-03-13 RX ORDER — FLUOROMETHOLONE 0.1 %
1 SUSPENSION, DROPS(FINAL DOSAGE FORM)(ML) OPHTHALMIC (EYE) 2 TIMES DAILY
Qty: 10 ML | Refills: 0 | Status: SHIPPED | OUTPATIENT
Start: 2023-03-13 | End: 2023-09-19

## 2023-03-16 ENCOUNTER — OFFICE VISIT (OUTPATIENT)
Dept: OPHTHALMOLOGY | Facility: CLINIC | Age: 73
End: 2023-03-16
Attending: OPHTHALMOLOGY
Payer: COMMERCIAL

## 2023-03-16 DIAGNOSIS — H25.813 MIXED TYPE AGE-RELATED CATARACT, BOTH EYES: ICD-10-CM

## 2023-03-16 DIAGNOSIS — R80.9 TYPE 2 DIABETES MELLITUS WITH MICROALBUMINURIA, WITH LONG-TERM CURRENT USE OF INSULIN (H): ICD-10-CM

## 2023-03-16 DIAGNOSIS — Z79.4 TYPE 2 DIABETES MELLITUS WITH MICROALBUMINURIA, WITH LONG-TERM CURRENT USE OF INSULIN (H): ICD-10-CM

## 2023-03-16 DIAGNOSIS — E11.29 TYPE 2 DIABETES MELLITUS WITH MICROALBUMINURIA, WITH LONG-TERM CURRENT USE OF INSULIN (H): ICD-10-CM

## 2023-03-16 DIAGNOSIS — H04.123 DRY EYE SYNDROME OF BOTH EYES: Primary | ICD-10-CM

## 2023-03-16 DIAGNOSIS — H40.033 ANATOMICAL NARROW ANGLE BORDERLINE GLAUCOMA OF BOTH EYES: ICD-10-CM

## 2023-03-16 PROCEDURE — 92014 COMPRE OPH EXAM EST PT 1/>: CPT | Performed by: OPHTHALMOLOGY

## 2023-03-16 PROCEDURE — G0463 HOSPITAL OUTPT CLINIC VISIT: HCPCS | Performed by: OPHTHALMOLOGY

## 2023-03-16 ASSESSMENT — EXTERNAL EXAM - RIGHT EYE: OD_EXAM: NORMAL

## 2023-03-16 ASSESSMENT — CONF VISUAL FIELD
OD_NORMAL: 1
OS_SUPERIOR_NASAL_RESTRICTION: 0
OD_INFERIOR_NASAL_RESTRICTION: 0
OD_SUPERIOR_TEMPORAL_RESTRICTION: 0
OS_INFERIOR_NASAL_RESTRICTION: 0
METHOD: COUNTING FINGERS
OS_SUPERIOR_TEMPORAL_RESTRICTION: 0
OS_NORMAL: 1
OD_SUPERIOR_NASAL_RESTRICTION: 0
OD_INFERIOR_TEMPORAL_RESTRICTION: 0
OS_INFERIOR_TEMPORAL_RESTRICTION: 0

## 2023-03-16 ASSESSMENT — TONOMETRY
OD_IOP_MMHG: 16
IOP_METHOD: TONOPEN
OS_IOP_MMHG: 15

## 2023-03-16 ASSESSMENT — REFRACTION_MANIFEST
OS_AXIS: 042
OS_SPHERE: +2.00
OD_CYLINDER: SPHERE
OS_CYLINDER: +0.50
OD_SPHERE: +2.00

## 2023-03-16 ASSESSMENT — REFRACTION_WEARINGRX
OS_ADD: +2.75
OS_AXIS: 059
OD_ADD: +2.75
OD_SPHERE: +1.75
SPECS_TYPE: BIFOCAL
OS_CYLINDER: +0.25
OS_SPHERE: +1.50
OD_CYLINDER: +0.75
OD_AXIS: 065

## 2023-03-16 ASSESSMENT — VISUAL ACUITY
OS_CC+: -1
OS_CC: 20/30
METHOD_MR: DIAGNOSTIC
CORRECTION_TYPE: GLASSES
OD_CC: 20/25
METHOD: SNELLEN - LINEAR

## 2023-03-16 ASSESSMENT — SLIT LAMP EXAM - LIDS
COMMENTS: POOR BLINK
COMMENTS: POOR BLINK

## 2023-03-16 ASSESSMENT — CUP TO DISC RATIO
OS_RATIO: 0.2
OD_RATIO: 0.2

## 2023-03-16 ASSESSMENT — EXTERNAL EXAM - LEFT EYE: OS_EXAM: NORMAL

## 2023-03-16 NOTE — NURSING NOTE
Chief Complaints and History of Present Illnesses   Patient presents with     Dry Eye Syndrome Follow Up     Chief Complaint(s) and History of Present Illness(es)     Dry Eye Syndrome Follow Up            Laterality: both eyes    Associated symptoms: dryness, burning and blurred vision.  Negative for eye pain and photophobia    Duration: months    Pain scale: 0/10          Comments    Pt has blurry vision after aking her drops.  No new vision complaints at this time.     Ocular Meds: artificial tears QID, Xiidra BID, erythromycin ointment at bedtime, FML QID    GARRICK RIVAS March 16, 2023 1:06 PM

## 2023-03-16 NOTE — PROGRESS NOTES
Chief Complaint(s) and History of Present Illness(es)     Dry Eye Syndrome Follow Up            Laterality: both eyes    Associated symptoms: dryness, burning and blurred vision.  Negative for   eye pain and photophobia    Duration: months    Pain scale: 0/10          Comments    Pt has blurry vision after aking her drops.  No new vision complaints at this time.     Ocular Meds: artificial tears QID, Xiidra BID, erythromycin ointment at   bedtime, FML QID    GARRICK RIVAS March 16, 2023 1:06 PM                Review of systems for the eyes was negative other than the pertinent positives/negatives listed in the HPI.      Assessment & Plan      Pinky Page is a 72 year old female with the following diagnoses:   No diagnosis found.     Doing good with eye comfort   Some blur for ~10 min after xiidra and genteal instillation   Dryness doing well through the winter  Not bothered by cataracts at this time    No retinopathy  Stressed good glycemic and hypertensive control  Monitor yearly     Continue lid hygiene and warm compresses  Continue artifical tears four times a day    Continuing xiidra twice a day both eyes   ESS marissa at bedtime as needed   Continue FML twice a day in both eyes     Refractive options reviewed  Refraction given     Patient disposition:   Return in about 6 months (around 9/16/2023) for VT only, Gonio .           Attending Physician Attestation:  Complete documentation of historical and exam elements from today's encounter can be found in the full encounter summary report (not reduplicated in this progress note).  I personally obtained the chief complaint(s) and history of present illness.  I confirmed and edited as necessary the review of systems, past medical/surgical history, family history, social history, and examination findings as documented by others; and I examined the patient myself.  I personally reviewed the relevant tests, images, and reports as documented above.  I formulated  and edited as necessary the assessment and plan and discussed the findings and management plan with the patient and family. . - Michael Lopez MD

## 2023-03-20 ENCOUNTER — TELEPHONE (OUTPATIENT)
Dept: FAMILY MEDICINE | Facility: CLINIC | Age: 73
End: 2023-03-20
Payer: COMMERCIAL

## 2023-03-20 DIAGNOSIS — Z79.4 TYPE 2 DIABETES MELLITUS WITH MICROALBUMINURIA, WITH LONG-TERM CURRENT USE OF INSULIN (H): ICD-10-CM

## 2023-03-20 DIAGNOSIS — E11.29 TYPE 2 DIABETES MELLITUS WITH MICROALBUMINURIA, WITH LONG-TERM CURRENT USE OF INSULIN (H): ICD-10-CM

## 2023-03-20 DIAGNOSIS — R80.9 TYPE 2 DIABETES MELLITUS WITH MICROALBUMINURIA, WITH LONG-TERM CURRENT USE OF INSULIN (H): ICD-10-CM

## 2023-03-20 NOTE — TELEPHONE ENCOUNTER
[facility] phone call message- medication clarification/question:    Question: Seeking the following diabetic supplies; onetouch ultra testing strip     Pharmacy confirmed as [facility] - [place], [place] - [address]: Yes    OK to leave a message on voice mail? Yes    Primary language: English      needed? No    Call taken on March 20, 2023 at 12:27 PM by Dominic Rodriguez

## 2023-03-21 NOTE — TELEPHONE ENCOUNTER
"Last seen 10/3/22    Request for medication refill:  blood glucose monitoring (NO BRAND SPECIFIED) test strip  Providers if patient needs an appointment and you are willing to give a one month supply please refill for one month and  send a letter/MyChart using \".SMILLIMITEDREFILL\" .smillimited and route chart to \"P SMI \" (Giving one month refill in non controlled medications is strongly recommended before denial)    If refill has been denied, meaning absolutely no refills without visit, please complete the smart phrase \".smirxrefuse\" and route it to the \"P SMI MED REFILLS\"  pool to inform the patient and the pharmacy.    Anne Rossi RN        "

## 2023-03-27 DIAGNOSIS — H04.123 DRY EYE SYNDROME OF BOTH EYES: ICD-10-CM

## 2023-03-27 RX ORDER — GLIPIZIDE 10 MG/1
2 TABLET ORAL 4 TIMES DAILY
Qty: 15 ML | Refills: 3 | Status: SHIPPED | OUTPATIENT
Start: 2023-03-27 | End: 2023-07-25

## 2023-03-27 NOTE — TELEPHONE ENCOUNTER
"Request for medication refill:  artificial tears (GENTEAL) 0.1-0.2-0.3 % ophthalmic solution    Providers if patient needs an appointment and you are willing to give a one month supply please refill for one month and  send a letter/MyChart using \".SMILLIMITEDREFILL\" .smillimited and route chart to \"P SMI \" (Giving one month refill in non controlled medications is strongly recommended before denial)    If refill has been denied, meaning absolutely no refills without visit, please complete the smart phrase \".smirxrefuse\" and route it to the \"P SMI MED REFILLS\"  pool to inform the patient and the pharmacy.    Josi [place] MA        "

## 2023-04-03 ENCOUNTER — OFFICE VISIT (OUTPATIENT)
Dept: PSYCHIATRY | Facility: CLINIC | Age: 73
End: 2023-04-03
Attending: PSYCHIATRY & NEUROLOGY
Payer: COMMERCIAL

## 2023-04-03 VITALS
WEIGHT: 218.8 LBS | SYSTOLIC BLOOD PRESSURE: 152 MMHG | HEART RATE: 77 BPM | DIASTOLIC BLOOD PRESSURE: 76 MMHG | BODY MASS INDEX: 37.56 KG/M2

## 2023-04-03 DIAGNOSIS — G47.09 OTHER INSOMNIA: ICD-10-CM

## 2023-04-03 DIAGNOSIS — F25.1 SCHIZOAFFECTIVE DISORDER, DEPRESSIVE TYPE (H): ICD-10-CM

## 2023-04-03 PROCEDURE — 99214 OFFICE O/P EST MOD 30 MIN: CPT | Mod: GC | Performed by: STUDENT IN AN ORGANIZED HEALTH CARE EDUCATION/TRAINING PROGRAM

## 2023-04-03 PROCEDURE — G0463 HOSPITAL OUTPT CLINIC VISIT: HCPCS | Performed by: STUDENT IN AN ORGANIZED HEALTH CARE EDUCATION/TRAINING PROGRAM

## 2023-04-03 RX ORDER — ZIPRASIDONE HYDROCHLORIDE 40 MG/1
40 CAPSULE ORAL 2 TIMES DAILY WITH MEALS
Qty: 60 CAPSULE | Refills: 2 | Status: SHIPPED | OUTPATIENT
Start: 2023-04-03 | End: 2023-06-12

## 2023-04-03 ASSESSMENT — PATIENT HEALTH QUESTIONNAIRE - PHQ9
SUM OF ALL RESPONSES TO PHQ QUESTIONS 1-9: 9
SUM OF ALL RESPONSES TO PHQ QUESTIONS 1-9: 9
10. IF YOU CHECKED OFF ANY PROBLEMS, HOW DIFFICULT HAVE THESE PROBLEMS MADE IT FOR YOU TO DO YOUR WORK, TAKE CARE OF THINGS AT HOME, OR GET ALONG WITH OTHER PEOPLE: SOMEWHAT DIFFICULT

## 2023-04-03 ASSESSMENT — PAIN SCALES - GENERAL: PAINLEVEL: NO PAIN (0)

## 2023-04-03 NOTE — PATIENT INSTRUCTIONS
**For crisis resources, please see the information at the end of this document**   Patient Education    Thank you for coming to the [facility] [place] [facility].     Lab Testing:  If you had lab testing today and your results are reassuring or normal they will be mailed to you or sent through [employer] within 7 days. If the lab tests need quick action we will call you with the results. The phone number we will call with results is # [phone]. If this is not the best number please call our clinic and change the number.     Medication Refills:  If you need any refills please call your pharmacy and they will contact us. Our fax number for refills is [phone].   Three business days of notice are needed for general medication refill requests.   Five business days of notice are needed for controlled substance refill requests.   If you need to change to a different pharmacy, please contact the new pharmacy directly. The new pharmacy will help you get your medications transferred.     Contact Us:  Please call [phone] during business hours (8-5:00 M-F).   If you have medication related questions after clinic hours, or on the weekend, please call [phone].     Financial Assistance [phone]   Medical Records [phone]       MENTAL HEALTH CRISIS RESOURCES:  For a emergency help, please call 911 or go to the nearest Emergency Department.     Emergency Walk-In Options:   Em[facility] Unit @ [facility] ([place]): [phone] - Specialized mental health emergency area designed to be calming  [facility] ([place]): [phone]  [facility] ([place]): [phone]  [facility]): [phone]    [facility] Crisis Information:   [place]: [phone]  [place]: [phone]  [place] ([place]) - Adult: [phone]     Child: [phone]  [place] - Adult: [phone]     Child: [phone]  [place]:  [phone]  List of all [facility] resources:   https://[place].gov/dhs/people-we-serve/adults/health-care/mental-health/resources/crisis-contacts.jsp    National Crisis Information:   Crisis Text Line: Text  [place]  to 906212  Suicide & Crisis Lifeline: 988  National Suicide Prevention Lifeline: 6-579-035-TALK (1-473.746.4621)       For online chat options, visit https://suicidepreventionlifeline.org/chat/  Poison Control Center: 4-116-535-8710  Trans Lifeline: 0-095-084-6249 - Hotline for transgender people of all ages  The Bo Project: 7-303-350-8891 - Hotline for LGBT youth     For Non-Emergency Support:   Fast Tracker: Mental Health & Substance Use Disorder Resources -   https://www.[employer]ures[employer]Mems-IDn.org/

## 2023-04-03 NOTE — PROGRESS NOTES
[facility]  Psychiatry Clinic  MEDICAL PROGRESS NOTE     CARE TEAM:  PCP- Tonia Goel    Psychotherapist- Dr Gr, PhD   Team: Staff at [facility]; Rosario is her new  at [facility]   This person is a 72 year old who uses the name Pinky and pronouns she, her.      DIAGNOSIS   Schizoaffective disorder, depressed type, in remission without psychotic features  Tardive dyskinesia      ASSESSMENT   Pinky Page is a .72 year old female with history of schizoaffective disorder, depressed type who has been relatively stable taking ziprasidone and fluoxetine since 2016.    Pinky is doing well overall..   Issues addressed today are below.    -schizoaffective d/o: Stable, some chronic fluctuating depression and anxiety, denies psychosis symptoms, well managed on current medication regimen. Anniversary of brother and father's deaths around [place] which was challenging but is doing much better and back to baseline. No new AE of meds, no changes today.     -tardive dyskinesia: has occasional shoulder pain that she attributes to this, chronic and unchanged in frequency. Bilat UE tremor not worsened, some days worse than others, does not notice anything that makes it worse or better except being nervous. AIMS today is 5/40 - some LE movements and UE movements, noticeable to patient but not particularly distressing. I previously sent Epic message out to Pinky's PCP to discuss possibility of adding propranolol to regimen for tremor treatment if Dr Goel is comfortable with cardiovascular status. No changes today.     Future considerations:  -Decreasing or cross-titrating off of ziprasidone in favor of another atypical neuroleptic (in the case that it is causing/contributing to her movement abnormalities).  -Adding propranolol or benadryl for tremor tx   -Ingrezza for TD  -Repeat EKG if med changes    MNPMP was checked today:  Indicates taking controlled  medication as prescribed.     PLAN                                                                                                                1) Meds-  - Continue ziprasidone 40 mg BID   - Continue fluoxetine 20 mg daily  - Continue melatonin 6mg at bedtime     2) Psychotherapy- cont with Dr Gr every month     3) Next due-  Labs- annual AP labs due 10/24   EKG- Last EKG 3/2021 without prolonged QTc (439)   Rating scales-  AIMS 7/11/2022 is 5/40 - some LE movements and UE movements, noticeable to patient but not particularly distressing.  AIMS: done 4/14/22 with total score of 4    4) Referrals-  none    5) Dispo- 3 months       PSYCH and SUBSTANCE USE Critical Summary Points since July 2022   None     PERTINENT BACKGROUND                                                    [most recent eval 07/08/22]     Pinky first experienced mental health issues in her 20s or 30s and has received treatment for schizoaffective disorder and generalized anxiety. Notably, Pinky's symptoms of depression and psychosis have responded well to a combination of ECT and medication management. Pinky has a history of experiencing increased psychotic symptoms with past attempted antipsychotic tapers. No changes to antipsychotic or antidepressant since 2016. Only medication change in last 6 years has been lowering lorazepam.      In her 30s, she had her gallbladder removed and then suffered first episode of severe depression. She was experiencing psychotic symptoms - paranoia, AH, and suspiciousness. She was hospitalized in [place] in 1986 where she received ECT.  She was hospitalized again in 1987, again received ECT. Was hospitalized for depression in 1995, around the same time she was diagnosed with breast cancer.   She was hospitalized for about nine months this time. She received ECT maintence from 7149-5857. Patient was stable taking Seroquel 400mg, Prozac 40mg, and Ativan 1mg for many years. Seroquel was cross-tapered to  ziprasidone due to metabolic side effects in 2016.      She has been seen at our clinic since 2013. She has lived at [facility] for many years.      Medical history  Notable for afib, DMT2, HTN, ANUJ, hypothyroid, breast cancer s/p mastectomy     Pertinent Items Include: psychosis [sxs include disorganization, hallucinations, paranoia], mutiple psychotropic trials, psych hosp (3-5) and ECT.     SUBJECTIVE     Since last visit:  - she says tremor is unchanged since last visit, doesn't really bother her too much   - got GI illness with vomiting for a week in Feb otherwise healthy   - completed PT, her goal is to not need a cane anymore   - UTI got treated no issues since   - mood has been good lately, enjoying the warmer weather   - feels safe at home   - still frustrated that the CPAP machine isn't comfortable throughout the night but uses it occasionally     Recent Social History: see below    Recent Psych Symptoms:   Depression:  none   Elevated:  none  Psychosis:  none  Anxiety:  excessive worry about the future, unchanged   Trauma Related:  none  Sleep: yes  Other: no    Recent Substance Use:     -alcohol: No   -cannabis: No   -tobacco: No  -caffeine:  No   -opioids: No   Narcan Kit currrent: No   -other: none    Pertinent Negatives: No suicidal or violent ideation, hallucinations and delusions  Adverse Effects: Right hand tremor resting, perioral involuntary movements, stiffness in upper extremity, denies chest pain and shortness of breath       PAST MED TRIALS   sertaline   amitriptyline   lithium   risperidone   olanzapine   trazodone   clonazepam   quetiapine 400mg (weight gain/metabolic SEs)  Lorazepam   triazolam  clonazepam.    July- decrease lorazepam from 0.5mg to 0.375 (liquid)  August 30 - decrease lorazepam to 0.25mg due to difficulty drawing up correct liquid amount  10/25/2021- stop lorazepam.   1/18/2022 - no changes  4/14/2022- no changes     ALLERGY     ALLERGIES: Chlordiazepoxide hcl,  Dimetapp dm cold-cough, Haldol, Ibuprofen, Lactose intolerance [beta-galactosidase], Milk products, and Propofol       MEDICAL REVIEW OF SYSTEMS   Contraception-  No   Pregnant- N/A  none in addition to that documented above     MEDICATIONS     Current Outpatient Medications   Medication Sig Dispense Refill     acetaminophen (TYLENOL) 500 MG tablet Take 2 tablets (1,000 mg) by mouth every 6 hours as needed 1 Bottle 2     alum & mag hydroxide-simethicone (MYLANTA/MAALOX) 200-200-20 MG/5ML SUSP suspension Take 30 mLs by mouth daily as needed for indigestion       amLODIPine (NORVASC) 10 MG tablet Take 1 tablet (10 mg) by mouth daily 90 tablet 0     apixaban ANTICOAGULANT (ELIQUIS) 5 MG tablet Take 1 tablet (5 mg) by mouth 2 times daily 180 tablet 0     artificial saliva (BIOTENE MT) AERS spray Take 1 spray by mouth 3 times daily as needed for dry mouth 115 mL 3     artificial tears (GENTEAL) 0.1-0.2-0.3 % ophthalmic solution Place 2 drops into both eyes 4 times daily 15 mL 3     atorvastatin (LIPITOR) 40 MG tablet Take 1 tablet (40 mg) by mouth daily 90 tablet 1     blood glucose (NO BRAND SPECIFIED) lancets standard Use to test blood sugar 2 times daily or as directed. 100 each 11     blood glucose (NO BRAND SPECIFIED) test strip Use to test blood sugar 3 times daily or as directed. 100 strip 3     blood glucose calibration (NO BRAND SPECIFIED) solution Use to calibrate blood glucose monitor as directed. 1 each 3     blood glucose monitoring (NO BRAND SPECIFIED) meter device kit Check Blood sugars twice a day. 1 kit 0     Calcium Carbonate-Vitamin D (CALCIUM-VITAMIN D) 250-125 MG-UNIT TABS Take 1 tablet by mouth 2 times daily Calcium 250 mg/Vit D 125 IU       calcium polycarbophil (FIBERCON) 625 MG tablet Take 2 tablets (1,250 mg) by mouth daily 180 tablet 3     CLARITIN 10 MG OR TABS 1 TAB PO QD (Once per day) as needed for ALLERGY SYMPTOMS 30 11     conjugated estrogens (PREMARIN) 0.625 MG/GM vaginal cream Place 2  g vaginally daily for 2 weeks, followed by 2 g vaginally twice a week for vaginal atrophy and frequent UTIs 30 g 3     erythromycin (ROMYCIN) 5 MG/GM ophthalmic ointment Place 0.5 inches into both eyes At Bedtime 3.5 g 11     fluorometholone (FML LIQUIFILM) 0.1 % ophthalmic suspension Place 1 drop into both eyes 2 times daily 10 mL 0     FLUoxetine (PROZAC) 20 MG capsule Take 1 capsule (20 mg) by mouth daily 90 capsule 3     fluticasone (FLONASE) 50 MCG/ACT nasal spray Spray 1 spray into both nostrils daily 16 g 3     furosemide (LASIX) 20 MG tablet Take 1 tablet (20 mg) by mouth daily 60 tablet 3     Gabapentin (NEURONTIN PO) Take 600 mg by mouth 3 times daily       Hypromellose (ARTIFICIAL TEARS OP) Apply 1 drop to eye 4 times daily.       insulin glargine (LANTUS PEN) 100 UNIT/ML pen Inject 15 Units Subcutaneous At Bedtime 8 mL 0     lactase (LACTAID) 3000 UNIT tablet Take 1 tablet (3,000 Units) by mouth 3 times daily (with meals) 90 tablet 11     levothyroxine (SYNTHROID/LEVOTHROID) 175 MCG tablet Take 1 tablet (175 mcg) by mouth daily 90 tablet 3     lidocaine (XYLOCAINE) 2 % external gel Apply topically 3 times daily as needed for moderate pain 85 g 1     lidocaine (XYLOCAINE) 5 % external ointment Apply topically 3 times daily as needed for moderate pain 50 g 1     lifitegrast (XIIDRA) 5 % opthalmic solution Place 1 drop into both eyes 2 times daily 60 each 11     liraglutide (VICTOZA) 18 MG/3ML solution Inject 1.8 mg Subcutaneous daily 9 mL 3     losartan (COZAAR) 100 MG tablet Take 1 tablet (100 mg) by mouth daily 90 tablet 1     Magnesium Hydroxide (MILK OF MAGNESIA PO) Take 30 mL as needed for constipation.       melatonin 3 MG CAPS Take 6 mg by mouth At Bedtime 60 capsule 3     metFORMIN (GLUCOPHAGE) 1000 MG tablet Take 1,000 mg by mouth 2 times daily (with meals)       NEW MED Prohydrate Moisturizing gel  Place 1 applicator vaginally 4 times a week 20 oz 3     nystatin (MYCOSTATIN) 947368 UNIT/GM POWD  Apply topically 3 times daily as needed 60 g 1     ONETOUCH DELICA LANCETS 33G MISC        polyethylene glycol (MIRALAX/GLYCOLAX) powder Take 1 capful by mouth 2 times daily 17 GM PO BID       Saline 0.9 % SOLN Spray 2 sprays in nostril as needed.       SALINE MIST 0.65 % nasal spray Spray 2 sprays in nostril 2 times daily       senna-docusate (SENOKOT-S/PERICOLACE) 8.6-50 MG tablet Take 2 tablets by mouth At Bedtime.       simethicone (MYLICON) 125 MG chewable tablet Take 1 tablet (125 mg) by mouth 2 times daily 180 tablet 3     Skin Protectants, Misc. (EUCERIN) cream Apply topically as needed Apply to thigh PRN dry skin       SM LUBRICANT EYE DROPS 0.4-0.3 % SOLN ophthalmic solution        sodium fluoride 1.1 % PSTE dental paste Apply 2 mLs to affected area daily 100 mL 1     solifenacin (VESICARE) 10 MG tablet Take 1 tablet (10 mg) by mouth daily 30 tablet 6     ziprasidone (GEODON) 40 MG capsule Take 1 capsule (40 mg) by mouth 2 times daily (with meals) 60 capsule 2      VITALS   LMP  (LMP Unknown)     MENTAL STATUS EXAM     Alertness: alert   Appearance: adequately groomed  Behavior/Demeanor: cooperative, pleasant and calm, with good  eye contact   Speech: slowed and regular rate and rhythm  Language: intact  Psychomotor: tremor  Mood: description consistent with euthymia  Affect: full range; congruent to: mood- yes, content- yes  Thought Process/Associations: unremarkable  Thought Content:  Reports none;  Denies suicidal & violent ideation and delusions  Perception:  Reports none;  Denies hallucinations and depersonalization  Insight: good  Judgment: good  Cognition: does  appear grossly intact; formal cognitive testing was not done  Gait and Station: remarkable for:  slightly reduced swing phase of gait, slowed gait       LABS and DATA         1/11/2022     9:52 AM 5/31/2022     3:03 PM 4/3/2023     1:27 PM   PHQ   PHQ-9 Total Score 6 9 9   Q9: Thoughts of better off dead/self-harm past 2 weeks Not at all Not  at all Not at all     Antipsychotic Labs:  Recent Labs   Lab Test 04/27/22  0725 04/12/22  1514 03/16/21  1537 09/16/20  0000   CHOL  --  117 124 106   TRIG 115 72 136 89   LDL  --  55 53 46   HDL  --  48* 43* 42*     Recent Labs   Lab Test 10/03/22  1423 05/12/22  1230 04/12/22  1514 10/08/21  2143 03/16/21  1537   * 139*  --  168* 204.0*   A1C 7.6*  --  7.4*  --  7.8*     Recent Labs   Lab Test 10/08/21  2143 03/16/21  1537 05/07/19  0650 10/25/18  1734 06/26/18  0659   WBC 11.8*  --  10.06* 14.2* 9.72   ANEU  --  7.5  --  10.6* 5.31   HGB 13.0 13.7 13.3 14.0 12.6     --  292 275 247       Recent Labs   Lab Test 10/03/22  1423 11/18/21  1617   CR 0.83 0.8   GFRESTIMATED 74 >60     Recent Labs   Lab Test 03/16/21  1537 06/26/18  0659   AST 7.9 16   ALT 12.8 21   ALKPHOS 78.9 111       EKG 3/2021 - showing atrial fibrillation. QtC 392.   EKG 10/8/2021- QtC 439    Abnormal Involuntary Movement Scale (AIMS) score 7/11/22: 5        PSYCHOTROPIC DRUG INTERACTIONS                                                       PSYCHCLINICDDI   ZIPRASIDONE and fluoxetine may result in increased risk of QT-interval prolongation.  ZIPRASIDONE and SEROTONERGIC DRUGS THAT PROLONG QT INTERVAL may result in increased risk of QT-interval prolongation and increased risk of serotonin syndrome (hypertension, hyperthermia, myoclonus, mental status changes).  NSAID and SSRI may result in an increased risk of bleeding  ERYTHROMYCIN and FLUOXETINE may result in an increased risk of cardiotoxicity (QT prolongation, torsades de pointes, cardiac arrest).  FLUOXETINE and INSULINS OR PRAMLINTIDE may result in increased risk of hypoglycemia.  GABAPENTIN and CNS DEPRESSANTS may result in respiratory depression.   GABAPENTIN and ANTACIDS may result in decreased gabapentin effectiveness.      MANAGEMENT:  Monitoring for adverse effects, periodic EKGs and patient is aware of risks     RISK STATEMENT for SAFETY     Pinky did not appear to be  an imminent safety risk to self or others.    TREATMENT RISK STATEMENT: The risks, benefits, alternatives and potential adverse effects have been discussed and are understood by the pt. The pt understands the risks of using street drugs or alcohol. There are no medical contraindications, the pt agrees to treatment with the ability to do so. The pt knows to call the clinic for any problems or to access emergency care if needed.  Medical and substance use concerns are documented above.  Psychotropic drug interaction check was done, including changes made today.      MEDICAL DECISION MAKING        (SmartPhrase .PSYCHBILLMDM)   Acuity:   - At least 1 chronic problem that is not stable  Risk:   - Engaged in prescription drug management during visit (discussed any medication benefits, side effects, alternatives, etc.)      PROVIDER: Malachi Cassidy,     Patient staffed in clinic with Dr. Estrada who will sign the note.  Supervisor is Dr. Gordon.

## 2023-04-14 ENCOUNTER — OFFICE VISIT (OUTPATIENT)
Dept: PSYCHOLOGY | Facility: CLINIC | Age: 73
End: 2023-04-14
Payer: COMMERCIAL

## 2023-04-14 VITALS — BODY MASS INDEX: 37.16 KG/M2 | WEIGHT: 216.5 LBS

## 2023-04-14 DIAGNOSIS — F54 PSYCHOLOGICAL FACTORS AFFECTING MEDICAL CONDITION: ICD-10-CM

## 2023-04-14 DIAGNOSIS — E66.9 OBESITY, UNSPECIFIED OBESITY SEVERITY, UNSPECIFIED OBESITY TYPE: ICD-10-CM

## 2023-04-14 DIAGNOSIS — F41.1 GENERALIZED ANXIETY DISORDER: ICD-10-CM

## 2023-04-14 DIAGNOSIS — F33.0 MAJOR DEPRESSIVE DISORDER, RECURRENT EPISODE, MILD (H): Primary | ICD-10-CM

## 2023-04-14 PROCEDURE — 90837 PSYTX W PT 60 MINUTES: CPT | Performed by: PSYCHOLOGIST

## 2023-04-14 NOTE — PROGRESS NOTES
Health Psychology                    Department of Medicine  Keri Arnett, Ph.D., L.P. [phone]                         [facility] Madelaine Ingram, Ph.D., L.P. [phone]                     [place] Mail Code 952   Dennis Dillard, Ph.D. [phone]      [address] [place] [address] Rae Eugene, Ph.D., A.B.P.P., L.P. [phone]              [place] [address]           Damon Gr, Ph.D., A.B.P.P., L.P. [phone]      Kimber Morales, Ph.D., L.P. [phone]  [facility]   Lilia Burroughs, Ph.D., A.B.P.P., L.P. 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 She is getting better at setting it up with help from [place] staff.  Intake with Health Psychology was in the 1980s.  I began to see her in the early 1990s.     Stressors:  She has a new  Gayle Crump which is going well.  Was ill for last scheduled appointment, now recovered from UTI.Not currently able to work due to balance issues.      Mood: Her mood is okay today, 3-4/10 today.  Enjoyed Easter with family.     Health:  She got her booster for COVID and got flu shot in September.  Will lookinto next COVID vaccine.   Lowest weight in 10 years!    During the 2 weekw no new COVID cases at [facility] .In the interval.  She is now using a cane, stopped walker earlier this month.  We walked outside for about 20 minutes despite the heat of 81 degrees.  She did well, but is slower.     She continues to have shaking in her hands, presumably TD.  She continues to sing under her breath at times.  She thinks  the sx are stable.    Psychoactive Medications:  - Continue ziprasidone 40 mg BID  - Continue fluoxetine 40 mg daily  - Continue melatonin 6mg  at bedtime    Exercise:   Stopped while sick.  Needing to build up.     Weight: Her weight was 220.8 in clinic today.    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    She is pleased to be back.    She participates fully, ventilated feelings appropriately.  She appears to derive benefit from the support.     Current Outpatient Medications   Medication     acetaminophen (TYLENOL) 500 MG tablet     alum & mag hydroxide-simethicone (MYLANTA/MAALOX) 200-200-20 MG/5ML SUSP suspension     amLODIPine (NORVASC) 10 MG tablet     apixaban ANTICOAGULANT (ELIQUIS) 5 MG tablet     artificial saliva (BIOTENE MT) AERS spray     artificial tears (GENTEAL) 0.1-0.2-0.3 % ophthalmic solution     atorvastatin (LIPITOR) 40 MG tablet     blood glucose (NO BRAND SPECIFIED) lancets standard     blood glucose (NO BRAND SPECIFIED) test strip     blood glucose calibration (NO BRAND SPECIFIED) solution     blood glucose monitoring (NO BRAND SPECIFIED) meter device kit     Calcium Carbonate-Vitamin D (CALCIUM-VITAMIN D) 250-125 MG-UNIT TABS     calcium polycarbophil (FIBERCON) 625 MG tablet     CLARITIN 10 MG OR TABS     conjugated estrogens (PREMARIN) 0.625 MG/GM vaginal cream     erythromycin (ROMYCIN) 5 MG/GM ophthalmic ointment     fluorometholone (FML LIQUIFILM) 0.1 % ophthalmic suspension     FLUoxetine (PROZAC) 20 MG capsule     fluticasone (FLONASE) 50 MCG/ACT nasal spray     furosemide (LASIX) 20 MG tablet     Gabapentin (NEURONTIN PO)     Hypromellose (ARTIFICIAL TEARS OP)     insulin glargine (LANTUS PEN) 100 UNIT/ML pen     lactase (LACTAID) 3000 UNIT tablet     levothyroxine (SYNTHROID/LEVOTHROID) 175 MCG tablet     lidocaine (XYLOCAINE) 2 % external gel     lidocaine (XYLOCAINE) 5 % external ointment     lifitegrast (XIIDRA) 5 % opthalmic solution     liraglutide (VICTOZA) 18 MG/3ML solution     losartan (COZAAR) 100 MG tablet      Magnesium Hydroxide (MILK OF MAGNESIA PO)     melatonin 3 MG CAPS     metFORMIN (GLUCOPHAGE) 1000 MG tablet     NEW MED     nystatin (MYCOSTATIN) 135322 UNIT/GM POWD     ONETOUCH DELICA LANCETS 33G MISC     polyethylene glycol (MIRALAX/GLYCOLAX) powder     Saline 0.9 % SOLN     SALINE MIST 0.65 % nasal spray     senna-docusate (SENOKOT-S/PERICOLACE) 8.6-50 MG tablet     simethicone (MYLICON) 125 MG chewable tablet     Skin Protectants, Misc. (EUCERIN) cream     SM LUBRICANT EYE DROPS 0.4-0.3 % SOLN ophthalmic solution     sodium fluoride 1.1 % PSTE dental paste     solifenacin (VESICARE) 10 MG tablet     ziprasidone (GEODON) 40 MG capsule     No current facility-administered medications for this visit.     Past Medical History:   Diagnosis Date     Allergic rhinitis due to pollen     seasonal allergies      Anisometropia and aniseikonia      BMI greater than 40      Cardiomegaly      Chronic constipation      Congenital absence of one kidney      Cramp of limb      Dermatophytosis of the body      Dry eye syndrome      Esophageal reflux      Essential hypertension, benign      Gastro-oesophageal reflux disease      Hemorrhoids      Hypermetropia      Hypothyroidism      Incomplete Emptying of Bladder      Lactose intolerance      Major depressive disorder, recurrent episode, moderate (H)      Malignant neoplasm of breast (female), unspecified site     left mastectomy 1996      Mixed incontinence urge and stress (male)(female)      Moderate obstructive sleep apnea      Postmenopausal Atrophic Vaginitis      Presbyopia      Regular astigmatism      Restless leg syndrome      Senile nuclear sclerosis      Thyroid eye disease     mild     Tinnitus      Trigger finger (acquired)     right hand     Type II or unspecified type diabetes mellitus without mention of complication, not stated as uncontrolled     on insulin since April 2006      Past Surgical History:   Procedure Laterality Date     APPENDECTOMY       C  MASTECTOMY,SIMPLE  1996    Left mastectomy  [facility]. Had normal FU mammo 5/2007. Follow yearly.      CHOLECYSTECTOMY       COLONOSCOPY  5/2005    Complete Colonoscopy-had one small polyp removed 5/2005.      COLONOSCOPY N/A 3/31/2016    Procedure: COLONOSCOPY;  Surgeon: Cary Ring MD;  Location: [facility]     COLONOSCOPY N/A 7/7/2016    Procedure: COLONOSCOPY;  Surgeon: Cary Ring MD;  Location: [facility]     COLONOSCOPY N/A 5/12/2022    Procedure: COLONOSCOPY, WITH POLYPECTOMY AND BIOPSY;  Surgeon: Jonathan Madden MD;  Location: [facility]     DILATION AND CURETTAGE       HYSTERECTOMY       MASTECTOMY      Left breast     ZZC TOTAL ABDOM HYSTERECTOMY  7/2003    Dr. Castanon, for abnormal bleeding. Removal of both tubes with BSO for myoma w - - -     Wt Readings from Last 4 Encounters:   04/14/23 98.2 kg (216 lb 8 oz)   04/03/23 99.2 kg (218 lb 12.8 oz)   01/27/23 100.2 kg (220 lb 12.8 oz)   01/09/23 99.2 kg (218 lb 12.8 oz)   Body mass index is 37.16 kg/m .    Time service started: 2:02  Time service ended:  2:55    Diagnosis:   Major Depression, recurrent mild (F33.0)   Psychological Factors Affecting Morbid Obesity (F54)  Generalized anxiety (F41.1)       Plan: Return for psychotherapy visit in-person/masked 5/12 @ 2 due to the serious and persistent nature of her depression and anxiety consistent with treatment plan.  Last treatment plan signed: 1/27/23  Treatment plan due: 1/27/24     Preference for future meetings:      In-person, even if both wearing masks        Damon Gr, PhD LP,  A.B.P.P.  Director, Health Psychology  [phone]

## 2023-04-19 ENCOUNTER — TRANSFERRED RECORDS (OUTPATIENT)
Dept: HEALTH INFORMATION MANAGEMENT | Facility: CLINIC | Age: 73
End: 2023-04-19
Payer: COMMERCIAL

## 2023-04-19 LAB — HBA1C MFR BLD: 7.3 % (ref 4–5.6)

## 2023-04-24 ENCOUNTER — DOCUMENTATION ONLY (OUTPATIENT)
Dept: FAMILY MEDICINE | Facility: CLINIC | Age: 73
End: 2023-04-24
Payer: COMMERCIAL

## 2023-04-24 NOTE — PROGRESS NOTES
"When opening a documentation only encounter, be sure to enter in \"Chief Complaint\" Forms and in \" Comments\" Title of form, description if needed.    Pinky is a 72 year old  female  Form received via: Fax  Form now resides in: Provider Ready    Sherrill Lafleur      Form has been completed by provider.     Form sent out via: Fax to Narendra Truong  at Fax Number:   Patient informed: n/a  Output date: April 24, 2023    Sherrill Lafleur      **Please close the encounter**                "

## 2023-05-12 ENCOUNTER — OFFICE VISIT (OUTPATIENT)
Dept: PSYCHOLOGY | Facility: CLINIC | Age: 73
End: 2023-05-12
Payer: COMMERCIAL

## 2023-05-12 VITALS — WEIGHT: 218.4 LBS | BODY MASS INDEX: 37.49 KG/M2

## 2023-05-12 DIAGNOSIS — F33.0 MAJOR DEPRESSIVE DISORDER, RECURRENT EPISODE, MILD (H): Primary | ICD-10-CM

## 2023-05-12 DIAGNOSIS — F54 PSYCHOLOGICAL FACTORS AFFECTING MEDICAL CONDITION: ICD-10-CM

## 2023-05-12 DIAGNOSIS — F41.1 GENERALIZED ANXIETY DISORDER: ICD-10-CM

## 2023-05-12 DIAGNOSIS — E66.9 OBESITY, UNSPECIFIED OBESITY SEVERITY, UNSPECIFIED OBESITY TYPE: ICD-10-CM

## 2023-05-12 PROCEDURE — 90834 PSYTX W PT 45 MINUTES: CPT | Performed by: PSYCHOLOGIST

## 2023-05-12 NOTE — PROGRESS NOTES
Health Psychology                    Department of Medicine  Keri Arnett, Ph.D., L.P. [phone]                         [facility] Madelaine Ingram, Ph.D., L.P. [phone]                     [place] Mail Code 350   Dennis Dillard, Ph.D. [phone]      [address] [place] [address] Rae Eugene, Ph.D., A.B.P.P., L.P. [phone]              [place] [address]           Damon Gr, Ph.D., A.B.P.P., L.P. [phone]      Kimber Morales, Ph.D., L.P. [phone]  [facility]   Lilia Burroughs, Ph.D., A.B.P.P., L.P. 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 She is getting better at setting it up with help from [place] staff.  Intake with Health Psychology was in the 1980s.  I began to see her in the early 1990s.     Stressors:  She has a new  Gayle Crump which is going well.  She likes her manager.  We walked 3 laps around the building (1 more since last session).  She is going faster.     Mood: Her depression is at baseline today, 3-4/10 today.     Health:  She got her booster for COVID and got flu shot in September.  Will look into next COVID vaccine.  Got her new partial denture, and can smile again.    Yesterday a new COVID cases at [facility].  Prior to that, not needing to wear a mask for two wees.  She is now using a cane.    We walked outside for about 20 minutes despite the heat of 81 degrees.  She did well, but is slower.     She continues to have shaking in her hands, presumably TD.  She continues to sing under her breath at times.  She thinks  the sx are stable.    Psychoactive Medications:  - Continue ziprasidone 40 mg BID  - Continue fluoxetine 40 mg daily  - Continue melatonin 6mg  at bedtime    Exercise: Back to walking after she eats for 10-15 minutes.  Biking 25-30 minutes daily.  Will discuss increasing to 30-35 next session.     Weight: Her weight was 218.4 in clinic today. Gained weight since last session.    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    She is pleased to be back.    She participates fully, ventilated feelings appropriately.  She appears to derive benefit from the support.     Current Outpatient Medications   Medication     acetaminophen (TYLENOL) 500 MG tablet     alum & mag hydroxide-simethicone (MYLANTA/MAALOX) 200-200-20 MG/5ML SUSP suspension     amLODIPine (NORVASC) 10 MG tablet     apixaban ANTICOAGULANT (ELIQUIS) 5 MG tablet     artificial saliva (BIOTENE MT) AERS spray     artificial tears (GENTEAL) 0.1-0.2-0.3 % ophthalmic solution     atorvastatin (LIPITOR) 40 MG tablet     blood glucose (NO BRAND SPECIFIED) lancets standard     blood glucose (NO BRAND SPECIFIED) test strip     blood glucose calibration (NO BRAND SPECIFIED) solution     blood glucose monitoring (NO BRAND SPECIFIED) meter device kit     Calcium Carbonate-Vitamin D (CALCIUM-VITAMIN D) 250-125 MG-UNIT TABS     calcium polycarbophil (FIBERCON) 625 MG tablet     CLARITIN 10 MG OR TABS     conjugated estrogens (PREMARIN) 0.625 MG/GM vaginal cream     erythromycin (ROMYCIN) 5 MG/GM ophthalmic ointment     fluorometholone (FML LIQUIFILM) 0.1 % ophthalmic suspension     FLUoxetine (PROZAC) 20 MG capsule     fluticasone (FLONASE) 50 MCG/ACT nasal spray     furosemide (LASIX) 20 MG tablet     Gabapentin (NEURONTIN PO)     Hypromellose (ARTIFICIAL TEARS OP)     insulin glargine (LANTUS PEN) 100 UNIT/ML pen     lactase (LACTAID) 3000 UNIT tablet     levothyroxine (SYNTHROID/LEVOTHROID) 175 MCG tablet     lidocaine (XYLOCAINE) 2 % external gel     lidocaine (XYLOCAINE) 5 % external ointment     lifitegrast  (XIIDRA) 5 % opthalmic solution     liraglutide (VICTOZA) 18 MG/3ML solution     losartan (COZAAR) 100 MG tablet     Magnesium Hydroxide (MILK OF MAGNESIA PO)     melatonin 3 MG CAPS     metFORMIN (GLUCOPHAGE) 1000 MG tablet     NEW MED     nystatin (MYCOSTATIN) 718609 UNIT/GM POWD     ONETOUCH DELICA LANCETS 33G MISC     polyethylene glycol (MIRALAX/GLYCOLAX) powder     Saline 0.9 % SOLN     SALINE MIST 0.65 % nasal spray     senna-docusate (SENOKOT-S/PERICOLACE) 8.6-50 MG tablet     simethicone (MYLICON) 125 MG chewable tablet     Skin Protectants, Misc. (EUCERIN) cream     SM LUBRICANT EYE DROPS 0.4-0.3 % SOLN ophthalmic solution     sodium fluoride 1.1 % PSTE dental paste     solifenacin (VESICARE) 10 MG tablet     ziprasidone (GEODON) 40 MG capsule     No current facility-administered medications for this visit.     Past Medical History:   Diagnosis Date     Allergic rhinitis due to pollen     seasonal allergies      Anisometropia and aniseikonia      BMI greater than 40      Cardiomegaly      Chronic constipation      Congenital absence of one kidney      Cramp of limb      Dermatophytosis of the body      Dry eye syndrome      Esophageal reflux      Essential hypertension, benign      Gastro-oesophageal reflux disease      Hemorrhoids      Hypermetropia      Hypothyroidism      Incomplete Emptying of Bladder      Lactose intolerance      Major depressive disorder, recurrent episode, moderate (H)      Malignant neoplasm of breast (female), unspecified site     left mastectomy 1996      Mixed incontinence urge and stress (male)(female)      Moderate obstructive sleep apnea      Postmenopausal Atrophic Vaginitis      Presbyopia      Regular astigmatism      Restless leg syndrome      Senile nuclear sclerosis      Thyroid eye disease     mild     Tinnitus      Trigger finger (acquired)     right hand     Type II or unspecified type diabetes mellitus without mention of complication, not stated as uncontrolled      on insulin since April 2006      Past Surgical History:   Procedure Laterality Date     APPENDECTOMY       C MASTECTOMY,SIMPLE  1996    Left mastectomy  [facility]. Had normal FU mammo 5/2007. Follow yearly.      CHOLECYSTECTOMY       COLONOSCOPY  5/2005    Complete Colonoscopy-had one small polyp removed 5/2005.      COLONOSCOPY N/A 3/31/2016    Procedure: COLONOSCOPY;  Surgeon: Cary Ring MD;  Location: [facility]     COLONOSCOPY N/A 7/7/2016    Procedure: COLONOSCOPY;  Surgeon: Cary Ring MD;  Location: [facility]     COLONOSCOPY N/A 5/12/2022    Procedure: COLONOSCOPY, WITH POLYPECTOMY AND BIOPSY;  Surgeon: Jonathan Madden MD;  Location: [facility]     DILATION AND CURETTAGE       HYSTERECTOMY       MASTECTOMY      Left breast     ZZC TOTAL ABDOM HYSTERECTOMY  7/2003    Dr. Castanon, for abnormal bleeding. Removal of both tubes with BSO for myoma w - - -     Wt Readings from Last 4 Encounters:   05/12/23 99.1 kg (218 lb 6.4 oz)   04/14/23 98.2 kg (216 lb 8 oz)   04/03/23 99.2 kg (218 lb 12.8 oz)   01/27/23 100.2 kg (220 lb 12.8 oz)   Body mass index is 37.49 kg/m .    Time service started: 2:05  Time service ended:  2:55    Diagnosis:   Major Depression, recurrent mild (F33.0)   Psychological Factors Affecting Morbid Obesity (F54)  Generalized anxiety (F41.1)       Plan: Return for psychotherapy visit in-person/masked 6/23 @ 3 due to the serious and persistent nature of her depression and anxiety consistent with treatment plan.  Last treatment plan signed: 1/27/23  Treatment plan due: 1/27/24     Preference for future meetings:      In-person, even if both wearing masks        Damon Gr, PhD LP,  A.B.P.P.  Director, Health Psychology  [phone]

## 2023-06-12 ENCOUNTER — OFFICE VISIT (OUTPATIENT)
Dept: PSYCHIATRY | Facility: CLINIC | Age: 73
End: 2023-06-12
Attending: PSYCHIATRY & NEUROLOGY
Payer: COMMERCIAL

## 2023-06-12 VITALS
DIASTOLIC BLOOD PRESSURE: 72 MMHG | SYSTOLIC BLOOD PRESSURE: 150 MMHG | HEART RATE: 71 BPM | WEIGHT: 217 LBS | BODY MASS INDEX: 37.25 KG/M2

## 2023-06-12 DIAGNOSIS — F25.1 SCHIZOAFFECTIVE DISORDER, DEPRESSIVE TYPE (H): Primary | ICD-10-CM

## 2023-06-12 DIAGNOSIS — G24.01 TARDIVE DYSKINESIA: ICD-10-CM

## 2023-06-12 DIAGNOSIS — G47.09 OTHER INSOMNIA: ICD-10-CM

## 2023-06-12 PROCEDURE — G0463 HOSPITAL OUTPT CLINIC VISIT: HCPCS | Performed by: STUDENT IN AN ORGANIZED HEALTH CARE EDUCATION/TRAINING PROGRAM

## 2023-06-12 PROCEDURE — 99214 OFFICE O/P EST MOD 30 MIN: CPT | Mod: GC | Performed by: STUDENT IN AN ORGANIZED HEALTH CARE EDUCATION/TRAINING PROGRAM

## 2023-06-12 RX ORDER — ZIPRASIDONE HYDROCHLORIDE 40 MG/1
40 CAPSULE ORAL 2 TIMES DAILY WITH MEALS
Qty: 60 CAPSULE | Refills: 2 | Status: SHIPPED | OUTPATIENT
Start: 2023-06-12 | End: 2023-07-06

## 2023-06-12 ASSESSMENT — PAIN SCALES - GENERAL: PAINLEVEL: NO PAIN (0)

## 2023-06-12 NOTE — PATIENT INSTRUCTIONS
**For crisis resources, please see the information at the end of this document**   Patient Education    Thank you for coming to the [facility] [place] [facility].     Lab Testing:  If you had lab testing today and your results are reassuring or normal they will be mailed to you or sent through [employer] within 7 days. If the lab tests need quick action we will call you with the results. The phone number we will call with results is # [phone]. If this is not the best number please call our clinic and change the number.     Medication Refills:  If you need any refills please call your pharmacy and they will contact us. Our fax number for refills is [phone].   Three business days of notice are needed for general medication refill requests.   Five business days of notice are needed for controlled substance refill requests.   If you need to change to a different pharmacy, please contact the new pharmacy directly. The new pharmacy will help you get your medications transferred.     Contact Us:  Please call [phone] during business hours (8-5:00 M-F).   If you have medication related questions after clinic hours, or on the weekend, please call [phone].     Financial Assistance [phone]   Medical Records [phone]       MENTAL HEALTH CRISIS RESOURCES:  For a emergency help, please call 911 or go to the nearest Emergency Department.     Emergency Walk-In Options:   [facility] Unit @ [facility] ([place]): [phone] - Specialized mental health emergency area designed to be calming  [facility] ([place]): [phone]  [facility] ([place]): [phone]  [facility]): [phone]    [facility] Crisis Information:   [place]: [phone]  [place]: [phone]  [place] ([place]) - Adult: [phone]     Child: [phone]  [place] - Adult: [phone]     Child: [phone]  [place]:  [phone]  List of all [facility] resources:   https://[place].gov/dhs/people-we-serve/adults/health-care/mental-health/resources/crisis-contacts.jsp    National Crisis Information:   Crisis Text Line: Text  [place]  to 753944  Suicide & Crisis Lifeline: 988  National Suicide Prevention Lifeline: 9-581-645-TALK (1-631.410.2214)       For online chat options, visit https://suicidepreventionlifeline.org/chat/  Poison Control Center: 6-755-782-9872  Trans Lifeline: 8-799-478-1120 - Hotline for transgender people of all ages  The [employer] Project: 5-848-545-2572 - Hotline for LGBT youth     For Non-Emergency Support:   Fast Tracker: Mental Health & Substance Use Disorder Resources -   https://www.[employer]2SMEn.org/

## 2023-06-12 NOTE — PROGRESS NOTES
[facility]  Psychiatry Clinic  MEDICAL PROGRESS NOTE     CARE TEAM:  PCP- Tonia Goel    Psychotherapist- Dr Gr, PhD   Team: Staff at [facility]; Rosario is her new  at [facility]   This person is a 72 year old who uses the name Pinky and pronouns she, her.      DIAGNOSIS   Schizoaffective disorder, depressed type, in remission without psychotic features  Tardive dyskinesia      ASSESSMENT   Pinky Page is a 72 year old female with history of schizoaffective disorder, depressed type who has been relatively stable taking ziprasidone and fluoxetine since 2016.    Pinky is doing well overall.   Issues addressed today are below.    -schizoaffective d/o: Stable, some chronic fluctuating depression and anxiety, denies psychosis symptoms, well managed on current medication regimen. Anniversary of brother and father's deaths around [place] which was challenging but is doing much better and back to baseline. No new AE of meds, no changes today.     -tardive dyskinesia: has occasional shoulder pain that she attributes to this, chronic and unchanged in frequency. Bilat UE tremor not worsened, some days worse than others, does not notice anything that makes it worse or better except being nervous. AIMS today is 5/40 - some LE movements and UE movements, noticeable to patient but not particularly distressing. No changes today.     Future considerations:  - Decreasing ziprasidone as it is likely contributing to her movement abnormalities  - Adding propranolol or benadryl for tremor tx   - Ingrezza for TD  - Repeat EKG if med changes    MNPMP was checked today:  Indicates taking controlled medication as prescribed.     PLAN                                                                                                                1) Meds-  - Continue ziprasidone 40 mg BID   - Continue fluoxetine 20 mg daily  - Continue melatonin 6mg at bedtime     2)  Psychotherapy- cont with Dr Gr every month     3) Next due-  Labs- annual AP labs due 10/24   EKG- Last EKG 3/2021 without prolonged QTc (439)   Rating scales-  AIMS 7/11/2022 is 5/40 - some LE movements and UE movements, noticeable to patient but not particularly distressing.  AIMS: done 4/14/22 with total score of 4    4) Referrals-  none    5) Dispo- 3 months       PSYCH and SUBSTANCE USE Critical Summary Points since July 2022   None     PERTINENT BACKGROUND                                                    [most recent eval 07/08/22]     Pinky first experienced mental health issues in her 20s or 30s and has received treatment for schizoaffective disorder and generalized anxiety. Notably, Pinky's symptoms of depression and psychosis have responded well to a combination of ECT and medication management. Pinky has a history of experiencing increased psychotic symptoms with past attempted antipsychotic tapers. No changes to antipsychotic or antidepressant since 2016. Only medication change in last 6 years has been lowering lorazepam.      In her 30s, she had her gallbladder removed and then suffered first episode of severe depression. She was experiencing psychotic symptoms - paranoia, AH, and suspiciousness. She was hospitalized in [place] in 1986 where she received ECT.  She was hospitalized again in 1987, again received ECT. Was hospitalized for depression in 1995, around the same time she was diagnosed with breast cancer.   She was hospitalized for about nine months this time. She received ECT maintence from [id number]. Patient was stable taking Seroquel 400mg, Prozac 40mg, and Ativan 1mg for many years. Seroquel was cross-tapered to ziprasidone due to metabolic side effects in 2016.      She has been seen at our clinic since 2013. She has lived at [facility] for many years.      Medical history  Notable for afib, DMT2, HTN, ANUJ, hypothyroid, breast cancer s/p mastectomy     Pertinent Items Include:  "psychosis [sxs include disorganization, hallucinations, paranoia], mutiple psychotropic trials, psych hosp (3-5) and ECT.     SUBJECTIVE     Since last visit:  - she says she has noticed a gradual problem with memory over the last year; has been forgetting people's names and also appointment times   - says this feels \"more simple\" than the memory loss that she had from ECT   - says tremor unchanged from last visit   - says her new , Rosario, meets with her once a week - [employer]; won first place in an art show recently   - going out to eat with her sister for her family   - no issues with ambulating     Recent Social History: see below    Recent Psych Symptoms:   Depression:  none   Elevated:  none  Psychosis:  none  Anxiety:  excessive worry about the future, unchanged   Trauma Related:  none  Sleep: yes  Other: no    Recent Substance Use:     -alcohol: No   -cannabis: No   -tobacco: No  -caffeine:  No   -opioids: No   Narcan Kit currrent: No   -other: none    Pertinent Negatives: No suicidal or violent ideation, hallucinations and delusions  Adverse Effects: Right hand tremor resting, perioral involuntary movements, stiffness in upper extremity, denies chest pain and shortness of breath       PAST MED TRIALS   sertaline   amitriptyline   lithium   risperidone   olanzapine   trazodone   clonazepam   quetiapine 400mg (weight gain/metabolic SEs)  Lorazepam   triazolam  clonazepam.    July- decrease lorazepam from 0.5mg to 0.375 (liquid)  August 30 - decrease lorazepam to 0.25mg due to difficulty drawing up correct liquid amount  10/25/2021- stop lorazepam.   1/18/2022 - no changes  4/14/2022- no changes     ALLERGY   ALLERGIES: Chlordiazepoxide hcl, Dimetapp dm cold-cough, Haloperidol, Ibuprofen, Lactose intolerance [beta-galactosidase], Milk products, and Propofol     MEDICAL REVIEW OF SYSTEMS   Contraception-  No   Pregnant- N/A  none in addition to that documented above     MEDICATIONS "     Current Outpatient Medications   Medication Sig Dispense Refill     acetaminophen (TYLENOL) 500 MG tablet Take 2 tablets (1,000 mg) by mouth every 6 hours as needed 1 Bottle 2     alum & mag hydroxide-simethicone (MYLANTA/MAALOX) 200-200-20 MG/5ML SUSP suspension Take 30 mLs by mouth daily as needed for indigestion       amLODIPine (NORVASC) 10 MG tablet Take 1 tablet (10 mg) by mouth daily 90 tablet 0     apixaban ANTICOAGULANT (ELIQUIS) 5 MG tablet Take 1 tablet (5 mg) by mouth 2 times daily 180 tablet 0     artificial saliva (BIOTENE MT) AERS spray Take 1 spray by mouth 3 times daily as needed for dry mouth 115 mL 3     artificial tears (GENTEAL) 0.1-0.2-0.3 % ophthalmic solution Place 2 drops into both eyes 4 times daily 15 mL 3     atorvastatin (LIPITOR) 40 MG tablet Take 1 tablet (40 mg) by mouth daily 90 tablet 1     blood glucose (NO BRAND SPECIFIED) lancets standard Use to test blood sugar 2 times daily or as directed. 100 each 11     blood glucose (NO BRAND SPECIFIED) test strip Use to test blood sugar 3 times daily or as directed. 100 strip 3     blood glucose calibration (NO BRAND SPECIFIED) solution Use to calibrate blood glucose monitor as directed. 1 each 3     blood glucose monitoring (NO BRAND SPECIFIED) meter device kit Check Blood sugars twice a day. 1 kit 0     Calcium Carbonate-Vitamin D (CALCIUM-VITAMIN D) 250-125 MG-UNIT TABS Take 1 tablet by mouth 2 times daily Calcium 250 mg/Vit D 125 IU       calcium polycarbophil (FIBERCON) 625 MG tablet Take 2 tablets (1,250 mg) by mouth daily 180 tablet 3     CLARITIN 10 MG OR TABS 1 TAB PO QD (Once per day) as needed for ALLERGY SYMPTOMS 30 11     conjugated estrogens (PREMARIN) 0.625 MG/GM vaginal cream Place 2 g vaginally daily for 2 weeks, followed by 2 g vaginally twice a week for vaginal atrophy and frequent UTIs 30 g 3     erythromycin (ROMYCIN) 5 MG/GM ophthalmic ointment Place 0.5 inches into both eyes At Bedtime 3.5 g 11     fluorometholone  (FML LIQUIFILM) 0.1 % ophthalmic suspension Place 1 drop into both eyes 2 times daily 10 mL 0     FLUoxetine (PROZAC) 20 MG capsule Take 1 capsule (20 mg) by mouth daily 90 capsule 3     fluticasone (FLONASE) 50 MCG/ACT nasal spray Spray 1 spray into both nostrils daily 16 g 3     furosemide (LASIX) 20 MG tablet Take 1 tablet (20 mg) by mouth daily 60 tablet 3     Gabapentin (NEURONTIN PO) Take 600 mg by mouth 3 times daily       Hypromellose (ARTIFICIAL TEARS OP) Apply 1 drop to eye 4 times daily.       insulin glargine (LANTUS PEN) 100 UNIT/ML pen Inject 15 Units Subcutaneous At Bedtime 8 mL 0     lactase (LACTAID) 3000 UNIT tablet Take 1 tablet (3,000 Units) by mouth 3 times daily (with meals) 90 tablet 11     levothyroxine (SYNTHROID/LEVOTHROID) 175 MCG tablet Take 1 tablet (175 mcg) by mouth daily 90 tablet 3     lidocaine (XYLOCAINE) 2 % external gel Apply topically 3 times daily as needed for moderate pain 85 g 1     lidocaine (XYLOCAINE) 5 % external ointment Apply topically 3 times daily as needed for moderate pain 50 g 1     lifitegrast (XIIDRA) 5 % opthalmic solution Place 1 drop into both eyes 2 times daily 60 each 11     liraglutide (VICTOZA) 18 MG/3ML solution Inject 1.8 mg Subcutaneous daily 9 mL 3     losartan (COZAAR) 100 MG tablet Take 1 tablet (100 mg) by mouth daily 90 tablet 1     Magnesium Hydroxide (MILK OF MAGNESIA PO) Take 30 mL as needed for constipation.       melatonin 3 MG CAPS Take 6 mg by mouth At Bedtime 60 capsule 3     metFORMIN (GLUCOPHAGE) 1000 MG tablet Take 1,000 mg by mouth 2 times daily (with meals)       NEW MED Prohydrate Moisturizing gel  Place 1 applicator vaginally 4 times a week 20 oz 3     nystatin (MYCOSTATIN) 531898 UNIT/GM POWD Apply topically 3 times daily as needed 60 g 1     ONETOUCH DELICA LANCETS 33G MISC        polyethylene glycol (MIRALAX/GLYCOLAX) powder Take 1 capful by mouth 2 times daily 17 GM PO BID       Saline 0.9 % SOLN Spray 2 sprays in nostril as  needed.       SALINE MIST 0.65 % nasal spray Spray 2 sprays in nostril 2 times daily       senna-docusate (SENOKOT-S/PERICOLACE) 8.6-50 MG tablet Take 2 tablets by mouth At Bedtime.       simethicone (MYLICON) 125 MG chewable tablet Take 1 tablet (125 mg) by mouth 2 times daily 180 tablet 3     Skin Protectants, Misc. (EUCERIN) cream Apply topically as needed Apply to thigh PRN dry skin       SM LUBRICANT EYE DROPS 0.4-0.3 % SOLN ophthalmic solution        sodium fluoride 1.1 % PSTE dental paste Apply 2 mLs to affected area daily 100 mL 1     solifenacin (VESICARE) 10 MG tablet Take 1 tablet (10 mg) by mouth daily 30 tablet 6     ziprasidone (GEODON) 40 MG capsule Take 1 capsule (40 mg) by mouth 2 times daily (with meals) 60 capsule 2      VITALS   BP (!) 150/72 (BP Location: Right arm, Patient Position: Sitting, Cuff Size: Adult Large)   Pulse 71   Wt 98.4 kg (217 lb)   LMP  (LMP Unknown)   BMI 37.25 kg/m      MENTAL STATUS EXAM     Alertness: alert   Appearance: adequately groomed  Behavior/Demeanor: cooperative, pleasant and calm, with good  eye contact   Speech: slowed and regular rate and rhythm  Language: intact  Psychomotor: tremor  Mood: description consistent with euthymia  Affect: full range; congruent to: mood- yes, content- yes  Thought Process/Associations: unremarkable  Thought Content:  Reports none;  Denies suicidal & violent ideation and delusions  Perception:  Reports none;  Denies hallucinations and depersonalization  Insight: good  Judgment: good  Cognition: does  appear grossly intact; formal cognitive testing was not done  Gait and Station: remarkable for:  slightly reduced swing phase of gait, slowed gait       LABS and DATA         1/11/2022     9:52 AM 5/31/2022     3:03 PM 4/3/2023     1:27 PM   PHQ   PHQ-9 Total Score 6 9 9   Q9: Thoughts of better off dead/self-harm past 2 weeks Not at all Not at all Not at all     Antipsychotic Labs:  Recent Labs   Lab Test 04/27/22  0779  04/12/22  1514 03/16/21  1537 09/16/20  0000   CHOL  --  117 124 106   TRIG 115 72 136 89   LDL  --  55 53 46   HDL  --  48* 43* 42*     Recent Labs   Lab Test 10/03/22  1423 05/12/22  1230 04/12/22  1514 10/08/21  2143 03/16/21  1537   * 139*  --  168* 204.0*   A1C 7.6*  --  7.4*  --  7.8*     Recent Labs   Lab Test 10/08/21  2143 03/16/21  1537 05/07/19  0650 10/25/18  1734 06/26/18  0659   WBC 11.8*  --  10.06* 14.2* 9.72   ANEU  --  7.5  --  10.6* 5.31   HGB 13.0 13.7 13.3 14.0 12.6     --  292 275 247       Recent Labs   Lab Test 10/03/22  1423 11/18/21  1617   CR 0.83 0.8   GFRESTIMATED 74 >60     Recent Labs   Lab Test 03/16/21  1537 06/26/18  0659   AST 7.9 16   ALT 12.8 21   ALKPHOS 78.9 111     EKG 3/2021 - showing atrial fibrillation. QtC 392.   EKG 10/8/2021- QtC 439    Abnormal Involuntary Movement Scale (AIMS) score 7/11/22: 5      PSYCHOTROPIC DRUG INTERACTIONS                                                       PSYCHCLINICDDI   ZIPRASIDONE and fluoxetine may result in increased risk of QT-interval prolongation.  ZIPRASIDONE and SEROTONERGIC DRUGS THAT PROLONG QT INTERVAL may result in increased risk of QT-interval prolongation and increased risk of serotonin syndrome (hypertension, hyperthermia, myoclonus, mental status changes).  NSAID and SSRI may result in an increased risk of bleeding  ERYTHROMYCIN and FLUOXETINE may result in an increased risk of cardiotoxicity (QT prolongation, torsades de pointes, cardiac arrest).  FLUOXETINE and INSULINS OR PRAMLINTIDE may result in increased risk of hypoglycemia.  GABAPENTIN and CNS DEPRESSANTS may result in respiratory depression.   GABAPENTIN and ANTACIDS may result in decreased gabapentin effectiveness.      MANAGEMENT:  Monitoring for adverse effects, periodic EKGs and patient is aware of risks     RISK STATEMENT for SAFETY     Pinky did not appear to be an imminent safety risk to self or others.    TREATMENT RISK STATEMENT: The risks,  benefits, alternatives and potential adverse effects have been discussed and are understood by the pt. The pt understands the risks of using street drugs or alcohol. There are no medical contraindications, the pt agrees to treatment with the ability to do so. The pt knows to call the clinic for any problems or to access emergency care if needed.  Medical and substance use concerns are documented above.  Psychotropic drug interaction check was done, including changes made today.      MEDICAL DECISION MAKING        (SmartPhrase .PSYCHBILLMDM)   Acuity:   - At least 1 chronic problem that is not stable  Risk:   - Engaged in prescription drug management during visit (discussed any medication benefits, side effects, alternatives, etc.)      PROVIDER: Malachi Cassidy, DO    Patient staffed in clinic with Dr. Estrada who will sign the note.  Supervisor is Dr. Gordon.

## 2023-06-15 NOTE — PROGRESS NOTES
"When opening a documentation only encounter, be sure to enter in \"Chief Complaint\" Forms and in \" Comments\" Title of form, description if needed.    Pinky is a 72 year old  female  Form received via: Fax  Form now resides in: Provider Ready    Tamanna Lin MA                  "
Form has been completed by provider.     Form sent out via: Fax to Narendra Truong at Fax Number: [phone]  Patient informed: N/A  Output date: March 8, 2023    Josi Washington MA      **Please close the encounter**      
altered mental status

## 2023-06-23 ENCOUNTER — OFFICE VISIT (OUTPATIENT)
Dept: PSYCHOLOGY | Facility: CLINIC | Age: 73
End: 2023-06-23
Payer: COMMERCIAL

## 2023-06-23 VITALS — WEIGHT: 218.1 LBS | BODY MASS INDEX: 37.44 KG/M2

## 2023-06-23 DIAGNOSIS — F41.1 GENERALIZED ANXIETY DISORDER: ICD-10-CM

## 2023-06-23 DIAGNOSIS — F54 PSYCHOLOGICAL FACTORS AFFECTING MEDICAL CONDITION: ICD-10-CM

## 2023-06-23 DIAGNOSIS — E66.9 OBESITY, UNSPECIFIED OBESITY SEVERITY, UNSPECIFIED OBESITY TYPE: ICD-10-CM

## 2023-06-23 DIAGNOSIS — F33.0 MAJOR DEPRESSIVE DISORDER, RECURRENT EPISODE, MILD (H): Primary | ICD-10-CM

## 2023-06-23 PROCEDURE — 90834 PSYTX W PT 45 MINUTES: CPT | Performed by: PSYCHOLOGIST

## 2023-06-23 NOTE — PROGRESS NOTES
Health Psychology                    Department of Medicine  Keri Arnett, Ph.D., L.P. [phone]                         [facility] Madelaine Ingram, Ph.D., L.P. [phone]                     [place] [address]   Dennis Dillard, Ph.D. [phone]      [address] [place] [address] Rae Eugene, Ph.D., A.B.P.P., L.P. [phone]              [place] [address]           Damon Gr, Ph.D., A.B.P.P., L.P. [phone]      Kimber Morales, Ph.D., L.P. [phone]  [facility]   Lilia Burroughs, Ph.D., A.B.P.P., L.P. 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 She is getting better at setting it up with help from [facility] staff.  Intake with Health Psychology was in the 1980s.  I began to see her in the early 1990s.     Pleased to have gone to a great niece's HS graduation.  Stayed in [place] over night. Getting family support.    Stressors:  She has a new  Gayle Crump which is going very well She is trying to do some extra volunteering with her. She likes her manager.      Mood: Her depression is at baseline today, 4/10 today.     Health:  She got her booster for COVID and got flu shot in September.      NO COVID cases at [facility].      She continues to have shaking in her hands, presumably TD.  She continues to sing under her breath at times.  She thinks  the sx are stable.    Psychoactive Medications:  - Continue ziprasidone 40 mg BID  - Continue fluoxetine 40 mg daily  - Continue melatonin 6mg at bedtime    Exercise: Back to walking after she eats for 10-15 minutes.  Biking 25-30 minutes daily.  Will discuss increasing to 30-35 next session.  Walking with cane, trying to decrease dependence on  it.    Weight: Her weight was 218.1 in clinic today. Gained weight since last session.    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    She participates fully, ventilated feelings appropriately.  She appears to derive benefit from the support.     Current Outpatient Medications   Medication     acetaminophen (TYLENOL) 500 MG tablet     alum & mag hydroxide-simethicone (MYLANTA/MAALOX) 200-200-20 MG/5ML SUSP suspension     amLODIPine (NORVASC) 10 MG tablet     apixaban ANTICOAGULANT (ELIQUIS) 5 MG tablet     artificial saliva (BIOTENE MT) AERS spray     artificial tears (GENTEAL) 0.1-0.2-0.3 % ophthalmic solution     atorvastatin (LIPITOR) 40 MG tablet     blood glucose (NO BRAND SPECIFIED) lancets standard     blood glucose (NO BRAND SPECIFIED) test strip     blood glucose calibration (NO BRAND SPECIFIED) solution     blood glucose monitoring (NO BRAND SPECIFIED) meter device kit     Calcium Carbonate-Vitamin D (CALCIUM-VITAMIN D) 250-125 MG-UNIT TABS     calcium polycarbophil (FIBERCON) 625 MG tablet     CLARITIN 10 MG OR TABS     conjugated estrogens (PREMARIN) 0.625 MG/GM vaginal cream     erythromycin (ROMYCIN) 5 MG/GM ophthalmic ointment     fluorometholone (FML LIQUIFILM) 0.1 % ophthalmic suspension     FLUoxetine (PROZAC) 20 MG capsule     fluticasone (FLONASE) 50 MCG/ACT nasal spray     furosemide (LASIX) 20 MG tablet     Gabapentin (NEURONTIN PO)     Hypromellose (ARTIFICIAL TEARS OP)     insulin glargine (LANTUS PEN) 100 UNIT/ML pen     lactase (LACTAID) 3000 UNIT tablet     levothyroxine (SYNTHROID/LEVOTHROID) 175 MCG tablet     lidocaine (XYLOCAINE) 2 % external gel     lidocaine (XYLOCAINE) 5 % external ointment     lifitegrast (XIIDRA) 5 % opthalmic solution     liraglutide (VICTOZA) 18 MG/3ML solution     losartan (COZAAR) 100 MG tablet     Magnesium Hydroxide (MILK OF MAGNESIA PO)     melatonin 3 MG CAPS     metFORMIN (GLUCOPHAGE) 1000 MG tablet     NEW MED      nystatin (MYCOSTATIN) 451433 UNIT/GM POWD     ONETOUCH DELICA LANCETS 33G MISC     polyethylene glycol (MIRALAX/GLYCOLAX) powder     Saline 0.9 % SOLN     SALINE MIST 0.65 % nasal spray     senna-docusate (SENOKOT-S/PERICOLACE) 8.6-50 MG tablet     simethicone (MYLICON) 125 MG chewable tablet     Skin Protectants, Misc. (EUCERIN) cream     SM LUBRICANT EYE DROPS 0.4-0.3 % SOLN ophthalmic solution     sodium fluoride 1.1 % PSTE dental paste     solifenacin (VESICARE) 10 MG tablet     ziprasidone (GEODON) 40 MG capsule     No current facility-administered medications for this visit.     Past Medical History:   Diagnosis Date     Allergic rhinitis due to pollen     seasonal allergies      Anisometropia and aniseikonia      BMI greater than 40      Cardiomegaly      Chronic constipation      Congenital absence of one kidney      Cramp of limb      Dermatophytosis of the body      Dry eye syndrome      Esophageal reflux      Essential hypertension, benign      Gastro-oesophageal reflux disease      Hemorrhoids      Hypermetropia      Hypothyroidism      Incomplete Emptying of Bladder      Lactose intolerance      Major depressive disorder, recurrent episode, moderate (H)      Malignant neoplasm of breast (female), unspecified site     left mastectomy 1996      Mixed incontinence urge and stress (male)(female)      Moderate obstructive sleep apnea      Postmenopausal Atrophic Vaginitis      Presbyopia      Regular astigmatism      Restless leg syndrome      Senile nuclear sclerosis      Thyroid eye disease     mild     Tinnitus      Trigger finger (acquired)     right hand     Type II or unspecified type diabetes mellitus without mention of complication, not stated as uncontrolled     on insulin since April 2006      Past Surgical History:   Procedure Laterality Date     APPENDECTOMY       C MASTECTOMY,SIMPLE  1996    Left mastectomy  [facility]. Had normal FU mammo 5/2007. Follow yearly.       CHOLECYSTECTOMY       COLONOSCOPY  5/2005    Complete Colonoscopy-had one small polyp removed 5/2005.      COLONOSCOPY N/A 3/31/2016    Procedure: COLONOSCOPY;  Surgeon: Cary Ring MD;  Location: [facility]     COLONOSCOPY N/A 7/7/2016    Procedure: COLONOSCOPY;  Surgeon: Cary Ring MD;  Location: [facility]     COLONOSCOPY N/A 5/12/2022    Procedure: COLONOSCOPY, WITH POLYPECTOMY AND BIOPSY;  Surgeon: Jonathan Madden MD;  Location: [facility]     DILATION AND CURETTAGE       HYSTERECTOMY       MASTECTOMY      Left breast     ZZC TOTAL ABDOM HYSTERECTOMY  7/2003    Dr. Castanon, for abnormal bleeding. Removal of both tubes with BSO for myoma w - - -     Wt Readings from Last 4 Encounters:   06/23/23 98.9 kg (218 lb 1.6 oz)   06/12/23 98.4 kg (217 lb)   05/12/23 99.1 kg (218 lb 6.4 oz)   04/14/23 98.2 kg (216 lb 8 oz)   Body mass index is 37.44 kg/m .    Time service started: 3:14  Time service ended:  3:51    Diagnosis:   Major Depression, recurrent mild (F33.0)   Psychological Factors Affecting Morbid Obesity (F54)  Generalized anxiety (F41.1)       Plan: Return for psychotherapy visit in-person/masked 8/25 @ 2 due to the serious and persistent nature of her depression and anxiety consistent with treatment plan.  Last treatment plan signed: 1/27/23  Treatment plan due: 1/27/24     Damon Gr, PhD LP,  A.B.P.P.  Director, Health Psychology  [phone]

## 2023-06-27 ENCOUNTER — ANCILLARY PROCEDURE (OUTPATIENT)
Dept: MAMMOGRAPHY | Facility: CLINIC | Age: 73
End: 2023-06-27
Payer: COMMERCIAL

## 2023-06-27 DIAGNOSIS — Z12.31 ENCOUNTER FOR SCREENING MAMMOGRAM FOR MALIGNANT NEOPLASM OF BREAST: ICD-10-CM

## 2023-06-27 PROCEDURE — 77067 SCR MAMMO BI INCL CAD: CPT | Mod: 26 | Performed by: RADIOLOGY

## 2023-06-27 PROCEDURE — 77067 SCR MAMMO BI INCL CAD: CPT | Mod: 52

## 2023-07-05 DIAGNOSIS — Z91.89 NEED FOR DENTAL CARE: Primary | ICD-10-CM

## 2023-07-05 RX ORDER — SODIUM FLUORIDE 1.1 G/100G
CREAM ORAL
COMMUNITY
Start: 2023-01-16 | End: 2023-07-05

## 2023-07-05 NOTE — TELEPHONE ENCOUNTER
"Request for medication refill:DENTA 5000 PLUS 1.1 % CREA    LV- 10/03/2022        Providers if patient needs an appointment and you are willing to give a one month supply please refill for one month and  send a letter/MyChart using \".SMILLIMITEDREFILL\" .smillimited and route chart to \"P SMI \" (Giving one month refill in non controlled medications is strongly recommended before denial)    If refill has been denied, meaning absolutely no refills without visit, please complete the smart phrase \".smirxrefuse\" and route it to the \"P SMI MED REFILLS\"  pool to inform the patient and the pharmacy.    Deacon Castro CMA        "

## 2023-07-06 ENCOUNTER — OFFICE VISIT (OUTPATIENT)
Dept: PSYCHIATRY | Facility: CLINIC | Age: 73
End: 2023-07-06
Attending: PSYCHIATRY & NEUROLOGY
Payer: COMMERCIAL

## 2023-07-06 VITALS
DIASTOLIC BLOOD PRESSURE: 71 MMHG | WEIGHT: 215.8 LBS | SYSTOLIC BLOOD PRESSURE: 126 MMHG | HEART RATE: 77 BPM | BODY MASS INDEX: 37.04 KG/M2

## 2023-07-06 DIAGNOSIS — R25.1 TREMOR: Primary | ICD-10-CM

## 2023-07-06 DIAGNOSIS — F25.1 SCHIZOAFFECTIVE DISORDER, DEPRESSIVE TYPE (H): ICD-10-CM

## 2023-07-06 DIAGNOSIS — G47.09 OTHER INSOMNIA: ICD-10-CM

## 2023-07-06 PROCEDURE — 90792 PSYCH DIAG EVAL W/MED SRVCS: CPT

## 2023-07-06 RX ORDER — ZIPRASIDONE HYDROCHLORIDE 40 MG/1
40 CAPSULE ORAL 2 TIMES DAILY WITH MEALS
Qty: 60 CAPSULE | Refills: 2 | Status: SHIPPED | OUTPATIENT
Start: 2023-07-06 | End: 2023-09-11

## 2023-07-06 ASSESSMENT — PATIENT HEALTH QUESTIONNAIRE - PHQ9
SUM OF ALL RESPONSES TO PHQ QUESTIONS 1-9: 7
SUM OF ALL RESPONSES TO PHQ QUESTIONS 1-9: 7
10. IF YOU CHECKED OFF ANY PROBLEMS, HOW DIFFICULT HAVE THESE PROBLEMS MADE IT FOR YOU TO DO YOUR WORK, TAKE CARE OF THINGS AT HOME, OR GET ALONG WITH OTHER PEOPLE: SOMEWHAT DIFFICULT

## 2023-07-06 ASSESSMENT — PAIN SCALES - GENERAL: PAINLEVEL: NO PAIN (0)

## 2023-07-06 NOTE — PROGRESS NOTES
[facility]  Psychiatry Clinic  TRANSFER of CARE DIAGNOSTIC ASSESSMENT     CARE TEAM:  PCP- Physician No Ref-Primary    Psychotherapist- Dr Gr, PhD   Team: Staff at [facility]     This person is a 73 year old who uses the name Pinky and pronouns she, her.      DIAGNOSIS   #Schizoaffective disorder, depressed type, in remission  #Tardive dyskinesia      ASSESSMENT   Pinky Page is a 73 year old female with history of schizoaffective disorder, depressed type who has been relatively stable taking ziprasidone and fluoxetine since 2016.       #Schizoaffective Disorder, Depressed type: Stable, some chronic fluctuating depression and anxiety, denies psychosis symptoms, well managed on current medication regimen.   No new AE of meds, no changes today.       #Tardive dyskinesia:   It has been more than 5 years since the start of her symptoms. She is noted to have facial/oral movements all day throughout the day with no fluctuation or a specific pattern. Lip smacking, lip wetting, twitching.    Next AIMS is due in September 2023. AIMS done (6/12) was 5/40. Ingrezza was discussed with patient. Given the side effect for balance problems it is advised to hold off on it until input from neurology is received.    #Tremor  #lack of balance  #Constipation    There is a right-sided low frequency resting tremor evident on the visit. It is not present throughout the day it comes on and off with no identifiable trigger. No changes in hand-writing noted. Patient complains of constipation that requires three classes of laxatives with residual symptoms still present. The clinical picture is concerning for PD vs drug-induced Parkinsonism. Neurology referral for movement disorders is placed.    Future considerations:  - Decreasing ziprasidone as it is likely contributing to her movement abnormalities  - Ingrezza for TD  - Repeat EKG if med changes    [place]NPMP was checked today:  Indicates taking  controlled medication as prescribed.     PLAN                                                                                                                1) Meds-  - Continue ziprasidone 40 mg BID  - Continue fluoxetine 40 mg daily  - Continue melatonin 6mg at bedtime     (prescribed by PCP: gabapentin 900mg TID)    2) Psychotherapy- cont with Dr Gr every month     3) Next due-  Labs- CBC and CMP due 9/2022, ordered to be done prior to next appt with me  EKG- Last EKG 3/2021 without prolonged QTc (439)  Rating scales-  AIMS 6/12/2022 is 5/40 - some LE movements and UE movements, noticeable to patient but not particularly distressing.  AIMS: done 4/14/22 with total score of 4    4) Referrals-  neurology consult for assessing the tremor    5) Dispo- RTC in 2 months     PERTINENT BACKGROUND                                                    [most recent eval 07/08/22]   The following section contains information copied from previous note by Dr Weller On 4/14/2022 and has been reviewed, edited, and verified by the patient.     Pinky first experienced mental health issues in her 20s or 30s and has received treatment for schizoaffective disorder and generalized anxiety. Notably, Pinky's symptoms of depression and psychosis have responded well to a combination of ECT and medication management. Pinky has a history of experiencing increased psychotic symptoms with past attempted antipsychotic tapers. No changes to antipsychotic or antidepressant since 2016. Only medication change in last 6 years has been lowering lorazepam.      In her 30s, she had her gallbladder removed and then suffered first episode of severe depression. She was experiencing psychotic symptoms - paranoia, AH, and suspiciousness. She was hospitalized in [place] in 1986 where she received ECT.  She was hospitalized again in 1987, again received ECT. Was hospitalized for depression in 1995, around the same time she was diagnosed with breast  "cancer.   She was hospitalized for about nine months this time. She received ECT maintence from [id number]. Patient was stable taking Seroquel 400mg, Prozac 40mg, and Ativan 1mg for many years. Seroquel was cross-tapered to ziprasidone due to metabolic side effects in 2016.      She has been seen at our clinic since 2013. She has lived at [facility] many years.      Medical history  Notable for afib, DMT2, HTN, ANUJ, hypothyroid, breast cancer s/p mastectomy     Pertinent Items Include: psychosis [sxs include disorganization, hallucinations, paranoia], mutiple psychotropic trials, psych hosp (3-5) and ECT.     SUBJECTIVE     Report from [facility]:  -stable mental health symptoms.  -patient has been more active, using the stationary bike 3 to 5 times a week.  -had a bout of illness which resolved.    Per Pikny:   Pinky Page was last seen in clinic on 6/12/23. Since last visit:    -Updates:  --spent her birthday last week with sister. They went out and had brunch.She spent time with her nephew and enjoyed the time she had.  -continues to c/o the hand tremor. It developed a few years ago and has been the same for the past year.  --difficulty with dry mouth and constipation. Has not had a bm for 5 days today.  --Walk everyday. Stationary bike 30 mins.    -Mood: \"good mood\"  -Depression: under control.  -Anxiety:   --worried about being able to get up on time. Not being able to finish the job.  --anxious about going out. Believes people have bat intentions towards her when she is out in the community.  -SI/HI: No  -Social: No updates  -Substances: No    -Physical symptoms:   --uses a cane. Switched form walkers to a cane last January after completing PT. She had a hospitalization in 8/2022 for dizziness and lack of balance. She is worried about falling.  --Constipation  --Ddry outh  -Therapy:  -Medications: medication help with   Dry " mouth  -------------------------------------------------------------------  Recent Social History: see below    Recent Psych Symptoms:   Depression:  none   Elevated:  none  Psychosis:  none  Anxiety:  excessive worry about the future, chronic  Trauma Related:  none  Sleep: yes  Other: no    Recent Substance Use:     -alcohol: No   -cannabis: No   -tobacco: No  -caffeine:  No   -opioids: No   Narcan Kit currrent: No   -other: none    Pertinent Negatives: No suicidal or violent ideation, hallucinations and delusions  Adverse Effects: Right hand tremor resting, perioral involuntary movements, stiffness in upper extremity, denies chest pain and shortness of breath     FAMILY and SOCIAL HISTORY                                 pt reported     Family History: none known    Social History:    Financial/ Work- social security disability.   Partner/ - never  Children- None      Living situation- [facility]  Since 1998.  Social/ Spiritual Support- sisters (4), staff at [employer] [facility]  Early History/Education- Born into family of 8 children. 3 siblings passed away. Born in [place], [place].   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 Worked until got sick in her 30s.      PAST PSYCHIATRIC HISTORY     SIB- None  Suicide Attempt [#, most recent]- None  Suicidal Ideation Hx- None  Violence/Aggression Hx- None  Psychosis Hx- paranoia, VH  Eating Disorder Hx- yes, in 30s-40s- stopped eating- possibly secondary to depression. No diagnosis of anorexia  Psych Hosp [#, most recent]- 5-6 times, 10+ years since last hospitalization.   Commitment- None  ECT- yes, 1986, 1987, 1996- 2005. (last 2005). Was very helpful.  Outpatient Programs - IOP (1987), eating disorder tx, 1987  Other - N/A     PAST MED TRIALS   sertaline   amitriptyline   lithium   risperidone   olanzapine   trazodone   clonazepam   quetiapine 400mg (weight gain/metabolic SEs)  Lorazepam   triazolam  clonazepam.    July-  decrease lorazepam from 0.5mg to 0.375 (liquid)  August 30 - decrease lorazepam to 0.25mg due to difficulty drawing up correct liquid amount  10/25/2021- stop lorazepam.   1/18/2022 - no changes  4/14/2022- no changes     PAST SUBSTANCE USE HISTORY     Past Use- none reported   No CD treatment reported      MEDICAL HISTORY and ALLERGY     ALLERGIES: Chlordiazepoxide hcl, Dimetapp dm cold-cough, Haloperidol, Ibuprofen, Lactose intolerance [beta-galactosidase], Milk products, and Propofol    Patient Active Problem List   Diagnosis     Allergic rhinitis due to pollen     Urge incontinence     Hypertension, essential     Cardiomegaly     Chronic constipation     Dry eye syndrome     Esophageal reflux     Exposure keratoconjunctivitis     DM ophthalmopathy (H)     Hypothyroidism     Senile cataract     ANUJ (obstructive sleep apnea)     Vaginal atrophy     Restless leg syndrome     Squamous blepharitis     Morbid obesity due to excess calories (H)     Personal history of breast cancer s/p L masectomy     Hypercholesteremia     Lives in group home     Extrapyramidal and movement disorder     CKD (chronic kidney disease) stage 2, GFR 60-89 ml/min     Solitary kidney, congenital     S/P hysterectomy     Impingement syndrome of right shoulder     Hypertriglyceridemia     Candidiasis of skin     Generalized anxiety disorder     Carpal tunnel syndrome of left wrist     Schizoaffective disorder, depressive type (H)     Major depressive disorder, recurrent episode, moderate (H)     Psychological factors affecting medical condition     Hx of psychiatric care     Nail complaint     Microalbuminuria due to type 2 diabetes mellitus (H)     Type 2 diabetes mellitus with microalbuminuria, with long-term current use of insulin (H)     Conjunctivitis of both eyes     Corneal erosion of both eyes     Spastic entropion, right     Foot callus     Paroxysmal atrial fibrillation (H)     Squamous cell carcinoma in situ of skin of face      History of colonic polyps     Benign paroxysmal positional vertigo, unspecified laterality        MEDICAL REVIEW OF SYSTEMS   Contraception-  No   Pregnant- N/A  none in addition to that documented above     MEDICATIONS     Current Outpatient Medications   Medication Sig Dispense Refill     acetaminophen (TYLENOL) 500 MG tablet Take 2 tablets (1,000 mg) by mouth every 6 hours as needed 1 Bottle 2     alum & mag hydroxide-simethicone (MYLANTA/MAALOX) 200-200-20 MG/5ML SUSP suspension Take 30 mLs by mouth daily as needed for indigestion       amLODIPine (NORVASC) 10 MG tablet Take 1 tablet (10 mg) by mouth daily 90 tablet 0     apixaban ANTICOAGULANT (ELIQUIS) 5 MG tablet Take 1 tablet (5 mg) by mouth 2 times daily 180 tablet 0     artificial saliva (BIOTENE MT) AERS spray Take 1 spray by mouth 3 times daily as needed for dry mouth 115 mL 3     artificial tears (GENTEAL) 0.1-0.2-0.3 % ophthalmic solution Place 2 drops into both eyes 4 times daily 15 mL 3     atorvastatin (LIPITOR) 40 MG tablet Take 1 tablet (40 mg) by mouth daily 90 tablet 1     blood glucose (NO BRAND SPECIFIED) lancets standard Use to test blood sugar 2 times daily or as directed. 100 each 11     blood glucose (NO BRAND SPECIFIED) test strip Use to test blood sugar 3 times daily or as directed. 100 strip 3     blood glucose calibration (NO BRAND SPECIFIED) solution Use to calibrate blood glucose monitor as directed. 1 each 3     blood glucose monitoring (NO BRAND SPECIFIED) meter device kit Check Blood sugars twice a day. 1 kit 0     Calcium Carbonate-Vitamin D (CALCIUM-VITAMIN D) 250-125 MG-UNIT TABS Take 1 tablet by mouth 2 times daily Calcium 250 mg/Vit D 125 IU       calcium polycarbophil (FIBERCON) 625 MG tablet Take 2 tablets (1,250 mg) by mouth daily 180 tablet 3     CLARITIN 10 MG OR TABS 1 TAB PO QD (Once per day) as needed for ALLERGY SYMPTOMS 30 11     conjugated estrogens (PREMARIN) 0.625 MG/GM vaginal cream Place 2 g vaginally daily for  2 weeks, followed by 2 g vaginally twice a week for vaginal atrophy and frequent UTIs 30 g 3     DENTA 5000 PLUS 1.1 % CREA        erythromycin (ROMYCIN) 5 MG/GM ophthalmic ointment Place 0.5 inches into both eyes At Bedtime 3.5 g 11     fluorometholone (FML LIQUIFILM) 0.1 % ophthalmic suspension Place 1 drop into both eyes 2 times daily 10 mL 0     FLUoxetine (PROZAC) 20 MG capsule Take 1 capsule (20 mg) by mouth daily 90 capsule 3     fluticasone (FLONASE) 50 MCG/ACT nasal spray Spray 1 spray into both nostrils daily 16 g 3     furosemide (LASIX) 20 MG tablet Take 1 tablet (20 mg) by mouth daily 60 tablet 3     Gabapentin (NEURONTIN PO) Take 600 mg by mouth 3 times daily       Hypromellose (ARTIFICIAL TEARS OP) Apply 1 drop to eye 4 times daily.       insulin glargine (LANTUS PEN) 100 UNIT/ML pen Inject 15 Units Subcutaneous At Bedtime 8 mL 0     lactase (LACTAID) 3000 UNIT tablet Take 1 tablet (3,000 Units) by mouth 3 times daily (with meals) 90 tablet 11     levothyroxine (SYNTHROID/LEVOTHROID) 175 MCG tablet Take 1 tablet (175 mcg) by mouth daily 90 tablet 3     lidocaine (XYLOCAINE) 2 % external gel Apply topically 3 times daily as needed for moderate pain 85 g 1     lidocaine (XYLOCAINE) 5 % external ointment Apply topically 3 times daily as needed for moderate pain 50 g 1     lifitegrast (XIIDRA) 5 % opthalmic solution Place 1 drop into both eyes 2 times daily 60 each 11     liraglutide (VICTOZA) 18 MG/3ML solution Inject 1.8 mg Subcutaneous daily 9 mL 3     losartan (COZAAR) 100 MG tablet Take 1 tablet (100 mg) by mouth daily 90 tablet 1     Magnesium Hydroxide (MILK OF MAGNESIA PO) Take 30 mL as needed for constipation.       melatonin 3 MG CAPS Take 6 mg by mouth At Bedtime 60 capsule 3     metFORMIN (GLUCOPHAGE) 1000 MG tablet Take 1,000 mg by mouth 2 times daily (with meals)       NEW MED Prohydrate Moisturizing gel  Place 1 applicator vaginally 4 times a week 20 oz 3     nystatin (MYCOSTATIN) 165939  UNIT/GM POWD Apply topically 3 times daily as needed 60 g 1     ONETOUCH DELICA LANCETS 33G MISC        polyethylene glycol (MIRALAX/GLYCOLAX) powder Take 1 capful by mouth 2 times daily 17 GM PO BID       Saline 0.9 % SOLN Spray 2 sprays in nostril as needed.       SALINE MIST 0.65 % nasal spray Spray 2 sprays in nostril 2 times daily       senna-docusate (SENOKOT-S/PERICOLACE) 8.6-50 MG tablet Take 2 tablets by mouth At Bedtime.       simethicone (MYLICON) 125 MG chewable tablet Take 1 tablet (125 mg) by mouth 2 times daily 180 tablet 3     Skin Protectants, Misc. (EUCERIN) cream Apply topically as needed Apply to thigh PRN dry skin       SM LUBRICANT EYE DROPS 0.4-0.3 % SOLN ophthalmic solution        sodium fluoride 1.1 % PSTE dental paste Apply 2 mLs to affected area daily 100 mL 1     solifenacin (VESICARE) 10 MG tablet Take 1 tablet (10 mg) by mouth daily 30 tablet 6     ziprasidone (GEODON) 40 MG capsule Take 1 capsule (40 mg) by mouth 2 times daily (with meals) 60 capsule 2      VITALS   LMP  (LMP Unknown)     MENTAL STATUS EXAM     Alertness: alert   Appearance: adequately groomed  Behavior/Demeanor: cooperative, pleasant and calm, with good  eye contact   Speech: slowed and regular rate and rhythm  Language: intact  Psychomotor: chewing/lip smacking motions and right hand low frequency pill rolling resting tremor  Mood: description consistent with euthymia  Affect: restricted, blunted and appropriate; congruent to: mood- yes, content- yes  Thought Process/Associations: response delay  Thought Content:  Reports none;  Denies suicidal & violent ideation and delusions  Perception:  Reports none;  Denies hallucinations and depersonalization  Insight: good  Judgment: good  Cognition: does  appear grossly intact; formal cognitive testing was not done  Gait and Station: remarkable for:  slightly reduced swing phase of gait, slowed gait       LABS and DATA         1/11/2022     9:52 AM 5/31/2022     3:03 PM  4/3/2023     1:27 PM   PHQ   PHQ-9 Total Score 6 9 9   Q9: Thoughts of better off dead/self-harm past 2 weeks Not at all Not at all Not at all     Antipsychotic Labs:  Recent Labs   Lab Test 04/27/22  0725 04/12/22  1514 03/16/21  1537 09/16/20  0000   CHOL  --  117 124 106   TRIG 115 72 136 89   LDL  --  55 53 46   HDL  --  48* 43* 42*     Recent Labs   Lab Test 10/03/22  1423 05/12/22  1230 04/12/22  1514 10/08/21  2143 03/16/21  1537   * 139*  --  168* 204.0*   A1C 7.6*  --  7.4*  --  7.8*     Recent Labs   Lab Test 10/08/21  2143 03/16/21  1537 05/07/19  0650 10/25/18  1734 06/26/18  0659   WBC 11.8*  --  10.06* 14.2* 9.72   ANEU  --  7.5  --  10.6* 5.31   HGB 13.0 13.7 13.3 14.0 12.6     --  292 275 247       Recent Labs   Lab Test 10/03/22  1423 11/18/21  1617   CR 0.83 0.8   GFRESTIMATED 74 >60     Recent Labs   Lab Test 03/16/21  1537 06/26/18  0659   AST 7.9 16   ALT 12.8 21   ALKPHOS 78.9 111       EKG 3/2021 - showing atrial fibrillation. QtC 392.   EKG 10/8/2021- QtC 439  AIMS score 7/11/22: 5        PSYCHOTROPIC DRUG INTERACTIONS                                                       PSYCHCLINICDDI   ZIPRASIDONE and fluoxetine may result in increased risk of QT-interval prolongation.  ZIPRASIDONE and SEROTONERGIC DRUGS THAT PROLONG QT INTERVAL may result in increased risk of QT-interval prolongation and increased risk of serotonin syndrome (hypertension, hyperthermia, myoclonus, mental status changes).  NSAID and SSRI may result in an increased risk of bleeding  ERYTHROMYCIN and FLUOXETINE may result in an increased risk of cardiotoxicity (QT prolongation, torsades de pointes, cardiac arrest).  FLUOXETINE and INSULINS OR PRAMLINTIDE may result in increased risk of hypoglycemia.  GABAPENTIN and CNS DEPRESSANTS may result in respiratory depression.   GABAPENTIN and ANTACIDS may result in decreased gabapentin effectiveness.      MANAGEMENT:  Monitoring for adverse effects, periodic EKGs and  patient is aware of risks     RISK STATEMENT for SAFETY     Pinky did not appear to be an imminent safety risk to self or others.    TREATMENT RISK STATEMENT: The risks, benefits, alternatives and potential adverse effects have been discussed and are understood by the pt. The pt understands the risks of using street drugs or alcohol. There are no medical contraindications, the pt agrees to treatment with the ability to do so. The pt knows to call the clinic for any problems or to access emergency care if needed.  Medical and substance use concerns are documented above.  Psychotropic drug interaction check was done, including changes made today.     PROVIDER: Gail Adkins MD    Patient staffed in clinic with Dr. Gordon who will sign the note.  Supervisor is Dr. Estrada.

## 2023-07-06 NOTE — Clinical Note
Twan Mendoza,   Good job with the note.   I did remove the AIMS instructions as you do not need to include them in the note, just the score and any descriptive findings.   Also for future notes please plan to add a treatment plan and code for psychotherapy.   Thank you Annie

## 2023-07-07 RX ORDER — SODIUM FLUORIDE 1.1 G/100G
1 CREAM ORAL 2 TIMES DAILY
Qty: 51 G | Refills: 1 | Status: SHIPPED | OUTPATIENT
Start: 2023-07-07

## 2023-07-07 NOTE — TELEPHONE ENCOUNTER
Records Requested     July 7, 2023 3:34 PM   [id number]   Facility  [facility]   Outcome 3:37pm Sent request for imaging to be pushed to PACS. -JA     Action 7/10/23 MV 10.38am   Action Taken Images resolved in PACS         RECORDS RECEIVED FROM: Care Everywhere   REASON FOR VISIT: 73 F with schizoaffective disorder and Tardive Dyskinesia with long-term use of dopamine-blockers who has developed a right-sided resting tremor over the past few years. Please assess for Parkinson's Disease vs drug-induced Parkinsonism   Date of Appt: 7/12/23 9:40am    NOTES (FOR ALL VISITS) STATUS DETAILS   OFFICE NOTE from referring provider Internal 7/6/23 Gail Arias MD @ [facility]     OFFICE NOTE from other specialist Internal 6/12/23 Malachi Cassidy DO @ [facility]       DISCHARGE SUMMARY from hospital Care Everywhere 8/27/22-9/1/22  Sebas Zarco MD  @ [facility]     MEDICATION LIST Internal    IMAGING  (FOR ALL VISITS)     MRI (HEAD, NECK, SPINE) PACS [facility]  8/27/22 MR Brain     CT (HEAD, NECK, SPINE) PACS [facility]  8/27/22 CT Head Stroke    [facility]  10/8/21 CT Head

## 2023-07-12 ENCOUNTER — PRE VISIT (OUTPATIENT)
Dept: NEUROLOGY | Facility: CLINIC | Age: 73
End: 2023-07-12

## 2023-07-12 ENCOUNTER — OFFICE VISIT (OUTPATIENT)
Dept: NEUROLOGY | Facility: CLINIC | Age: 73
End: 2023-07-12
Payer: COMMERCIAL

## 2023-07-12 VITALS — OXYGEN SATURATION: 98 % | SYSTOLIC BLOOD PRESSURE: 169 MMHG | HEART RATE: 98 BPM | DIASTOLIC BLOOD PRESSURE: 69 MMHG

## 2023-07-12 DIAGNOSIS — R25.1 TREMOR: ICD-10-CM

## 2023-07-12 DIAGNOSIS — R26.9 GAIT DIFFICULTY: ICD-10-CM

## 2023-07-12 DIAGNOSIS — R26.89 BALANCE PROBLEMS: ICD-10-CM

## 2023-07-12 DIAGNOSIS — G24.01 TARDIVE DYSKINESIA: Primary | ICD-10-CM

## 2023-07-12 PROCEDURE — 99205 OFFICE O/P NEW HI 60 MIN: CPT | Performed by: NURSE PRACTITIONER

## 2023-07-12 PROCEDURE — 99417 PROLNG OP E/M EACH 15 MIN: CPT | Performed by: NURSE PRACTITIONER

## 2023-07-12 RX ORDER — LANOLIN ALCOHOL/MO/W.PET/CERES
CREAM (GRAM) TOPICAL
COMMUNITY
Start: 2023-07-06 | End: 2023-10-13

## 2023-07-12 RX ORDER — CALCIUM CARBONATE-CHOLECALCIFEROL TAB 250 MG-125 UNIT 250-125 MG-UNIT
TAB ORAL
COMMUNITY
Start: 2023-06-08 | End: 2023-10-13

## 2023-07-12 RX ORDER — GABAPENTIN 600 MG/1
600 TABLET ORAL 3 TIMES DAILY
COMMUNITY
Start: 2023-06-16

## 2023-07-12 ASSESSMENT — ACTIVITIES OF DAILY LIVING (ADL)
FEEDING_WITH_A_SPOON: (1) SLIGHTLY ABNORMAL. TREMOR IS PRESENT BUT DOES NOT INTERFERE WITH FEEDING WITH A SPOON.
DRINKING_FROM_A_GLASS: (3) MODERATELY ABNORMAL. SPILLS A LOT OR CHANGES STRATEGY TO COMPLETE TASK SUCH AS USING TWO HANDS OR LEANING OVER.
WORKING: (3) MODERATELY ABNORMAL. UNABLE TO CONTINUE WORKING WITHOUT USING STRATEGIES SUCH AS CHANGING JOBS OR USING SPECIAL EQUIPMENT.
WRITING: (3) MODERATELY ABNORMAL. CANNOT WRITE WITHOUT USING STRATEGIES SUCH AS HOLDING THE WRITING HAND WITH THE OTHER HAND, HOLDING PEN DIFFERENTLY OR USING LARGE PEN.
CARRYING_FOOD_TRAYS_PLATES_OR_SIMILAR_ITEMS: (3) MODERATELY ABNORMAL. USES STRATEGIES SUCH AS HOLDING TIGHTLY AGAINST BODY TO CARRY.
POURING: (1) SLIGHTLY ABNORMAL. TREMOR IS PRESENT BUT DOES NOT INTERFERE WITH POURING.
DRESS: (3) MODERATELY ABNORMAL. UNABLE TO DO MOST DRESSING UNLESS USES STRATEGY SUCH AS USING VELCRO, BUTTONING SHIRT BEFORE PUTTING IT ON OR AVOIDING SHOES WITH LACES.
TOTAL_SCORE: 26
OVERALL_DISABILITY_WITH_THE_MOST_AFFECTED_TASK: (3) MODERATELY ABNORMAL. CAN DO TASK BUT MUST USE STRATEGIES.
USING_KEYS: (3) MODERATELY ABNORMAL. NEEDS TO USE TWO HANDS OR OTHER STRATEGIES TO PUT KEY IN LOCK.
SPEAKING: (0) NORMAL
SOCIAL_IMPACT: (1) AWARE OF TREMOR, BUT IT DOES NOT AFFECT LIFESTYLE OR PROFESSIONAL LIFE.
HYGIENE: (2) MILDLY ABNORMAL. SOME DIFFICULTY BUT CAN COMPLETE TASK.

## 2023-07-12 ASSESSMENT — UNIFIED PARKINSONS DISEASE RATING SCALE (UPDRS)
AMPLITUDE_RUE: (2) MILD: > 1 CM BUT < 3 CM IN MAXIMAL AMPLITUDE.
FACIAL_EXPRESSION: (0) NORMAL: NORMAL FACIAL EXPRESSION.
RIGIDITY_NECK: (1) SLIGHT: RIGIDITY ONLY DETECTED WITH ACTIVATION MANEUVER.
TOTAL_SCORE: 17
HANDMOVEMENTS_LEFT: (2) MILD: ANY OF THE FOLLOWING: A) 3 TO 5 INTERRUPTIONS DURING TAPPING  B) MILD SLOWING  C) THE AMPLITUDE DECREMENTS MIDWAY IN THE 10-MOVEMENT SEQUENCE.
TOETAPPING_RIGHT: (1) SLIGHT: ANY OF THE FOLLOWING: A) THE REGULAR RHYTHM IS BROKEN WITH ONE WITH ONE OR TWO INTERRUPTIONS OR HESITATIONS OF THE MOVEMENT  B) SLIGHT SLOWING  C) THE AMPLITUDE DECREMENTS NEAR THE END OF THE 10 MOVEMENTS.
FINGER_TAPPING_LEFT: (2) MILD: ANY OF THE FOLLOWING: A) 3 TO 5 INTERRUPTIONS DURING TAPPING  B) MILD SLOWING  C) THE AMPLITUDE DECREMENTS MIDWAY IN THE 10-MOVEMENT SEQUENCE.
TOTAL_SCORE: 42
GAIT: (3) MODERATE: REQUIRES AN ASSISTANCE DEVICE FOR SAFE WALKING (WALKING STICK, WALKER) BUT NOT A PERSON.
RIGIDITY_RUE: (2) MILD: RIGIDITY DETECTED WITHOUT THE ACTIVATION MANEUVER. FULL RANGE OF MOTION IS EASILY ACHIEVED.
SPONTANEITY_OF_MOVEMENT: (2) MILD: MILD GLOBAL SLOWNESS AND POVERTY OF SPONTANEOUS MOVEMENTS.
SPEECH: (0) NORMAL: NO SPEECH PROBLEMS.
AXIAL_SCORE: 11
PARKINSONS_MEDS: OFF
MOVEMENTS_INTERFERE_WITH_RATINGS: NO
HANDMOVEMENTS_RIGHT: (2) MILD: ANY OF THE FOLLOWING: A) 3 TO 5 INTERRUPTIONS DURING TAPPING  B) MILD SLOWING  C) THE AMPLITUDE DECREMENTS MIDWAY IN THE 10-MOVEMENT SEQUENCE.
POSTURAL_STABILITY: (3) MODERATE: STANDS SAFELY, BUT WITH ABSENCE OF POSTURAL RESPONSE, FALLS IF NOT CAUGHT BY EXAMINER.
ARISING_CHAIR: (1) SLIGHT: ARISING IS SLOWER THAN NORMAL, OR MAY NEED MORE THAN ONE ATTEMPT, OR MAY NEED TO MOVE FORWARD IN THE CHAIR TO ARISE. NO NEED TO USE THE ARMS OF THE CHAIR.
PRONATION_SUPINATION_RIGHT: (2) MILD: ANY OF THE FOLLOWING: A) 3 TO 5 INTERRUPTIONS DURING TAPPING  B) MILD SLOWING  C) THE AMPLITUDE DECREMENTS MIDWAY IN THE 10-MOVEMENT SEQUENCE.
AMPLITUDE_LLE: (0) NORMAL: NO TREMOR.
LEG_AGILITY_LEFT: (2) MILD: ANY OF THE FOLLOWING: A) 3 TO 5 INTERRUPTIONS DURING TAPPING  B) MILD SLOWING  C) THE AMPLITUDE DECREMENTS MIDWAY IN THE 10-MOVEMENT SEQUENCE.
CONSTANCY_TREMOR_ATREST: (2) MILD: TREMOR AT REST IS PRESENT 25-50% OF THE ENTIRE EXAMINATION PERIOD.
LEG_AGILITY_RIGHT: (2) MILD: ANY OF THE FOLLOWING: A) 3 TO 5 INTERRUPTIONS DURING TAPPING  B) MILD SLOWING  C) THE AMPLITUDE DECREMENTS MIDWAY IN THE 10-MOVEMENT SEQUENCE.
AMPLITUDE_LUE: (1) SLIGHT: < 1 CM IN MAXIMAL AMPLITUDE.
AMPLITUDE_RLE: (0) NORMAL: NO TREMOR.
POSTURE: (1) SLIGHT: NOT QUITE ERECT, BUT COULD BE NORMAL FOR OLDER PERSONS.
RIGIDITY_RLE: (0) NORMAL: NO RIGIDITY.
TOTAL_SCORE_LEFT: 12
RIGIDITY_LLE: (0) NORMAL: NO RIGIDITY.
RIGIDITY_LUE: (1) SLIGHT: RIGIDITY ONLY DETECTED WITH ACTIVATION MANEUVER.
PRONATION_SUPINATION_LEFT: (1) SLIGHT: ANY OF THE FOLLOWING: A) THE REGULAR RHYTHM IS BROKEN WITH ONE WITH ONE OR TWO INTERRUPTIONS OR HESITATIONS OF THE MOVEMENT  B) SLIGHT SLOWING  C) THE AMPLITUDE DECREMENTS NEAR THE END OF THE 10 MOVEMENTS.
DYSKINESIAS_PRESENT: YES
FINGER_TAPPING_RIGHT: (2) MILD: ANY OF THE FOLLOWING: A) 3 TO 5 INTERRUPTIONS DURING TAPPING  B) MILD SLOWING  C) THE AMPLITUDE DECREMENTS MIDWAY IN THE 10-MOVEMENT SEQUENCE.
TOETAPPING_LEFT: (1) SLIGHT: ANY OF THE FOLLOWING: A) THE REGULAR RHYTHM IS BROKEN WITH ONE WITH ONE OR TWO INTERRUPTIONS OR HESITATIONS OF THE MOVEMENT  B) SLIGHT SLOWING  C) THE AMPLITUDE DECREMENTS NEAR THE END OF THE 10 MOVEMENTS.
AMPLITUDE_LIP_JAW: (0) NORMAL: NO TREMOR.
FREEZING_GAIT: (0) NORMAL: NO FREEZING.

## 2023-07-12 NOTE — LETTER
7/12/2023       RE: Pinky Page  [address] [facility] [address]     Dear Colleague,    Thank you for referring your patient, Pinky Page, to the [facility] NEUROLOGY CLINIC [place] at [facility]. Please see a copy of my visit note below.      ASSESSMENT:    Tremor:  Ms. Page is a 73 year old right - handed  female with a longstanding history of schizoaffective disorder and depression, and long term use of psychotropic medications. Today's exam shows mixed action and resting tremors and some parkinsonian features including bradykinesia, stiffness, and gait and balance difficulty.     Her symptoms could be drug-induced from Ziprasidone or parkinson's disease. The only way to differentiate the two is by getting a Dopamine Scant (DATscan). Since selective serotonin reuptake inhibitor interfere with the DATscan result and show false positive results, it is recommended for patients to go off certain medications, including selective serotonin reuptake inhibitor. At this point, since the patient's mood has been stable, I don't think it is worth taking the risk of stopping the medication.  This was extensively discussed with the pt.    For now, it might be better to treat the tremors conservatively as pt is managing her ADLs by choosing times that she is not shaking.    Tardive dyskinesias:  Most likely induced by antipsychotic medication, Ziprasidone. This is not bothersome to patient.       PLAN:    The differential diagnosis of tremors and treatments discussed. All questions addressed and the following patient instructions provided.     __  We'll update your Primacy Care Provider: Dr. Tonia Goel.    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 It could be from the medications that you take for mood or from Parkinson's disease.  It's hard to know the difference on exam.  Dopamine Scan is used to know the difference.   However, your mood medications interfere with the accuracy of the result and you have to be off of these medications to get the scan.  As we have discussed, it's best not to do the Dopamine Scan at this time.     __  You have options to start medications to help the tremors, like Propranolol, Primidone or Sinemet. These medications might improve your tremors but not get rid of them.  There are various side effects associated with these medications that we've briefly discussed.  Also, it's good to know the goal of treatment and to treat bothersome symptoms.    __  Since the Tardive Dyskinesias (mouth movements) are not bothersome, we'll not treat this.     __  If you ever want to try any of the medications, please let me know. We'll work with your mental health care providers.     __  Occupational Therapy is a good option to explore device options like wrist weight, to improve tremors.  I'll refer you to OT and they will call you to schedule the appointment.     __  Continue to exercise regularly, including the Balance Exercise.     __  Return in 4 months for a follow up. You may return sooner as needed.           MOVEMENT DISORDERS CLINIC           REFERRING PROVIDER:  Dr. Tami Adkins, Psychiatrist.    PATIENT: Pinky Page    : 1950    CORINNE: 2023    REASON FOR VISIT: Evaluation of Tremor.     HPI: Ms. Pinky Page is a 73 year old right - handed  female who came to the [facility] neurology clinic by herself for evaluation of tremors as referred by her psychiatrist, Dr. Tami Adkins. She came alone using [employer].    She has a longstanding Hx of schizoaffective disorder and depression. She reports that her first psychiatric hospitalization was in . Has received ECT. Mood is stable on Ziprasidone and Fluoxetine since 2016. She follows closely with a psychiatrist and psychologist.     Never . No children. Has been living at [facility] since .     No family  Hx of movement disorders or mood disorders.     She is on Gabapentin 600 mg TID. Pt is not sure why she takes it.  (Pain? RLS?)    ROS: -     MOTOR FUNCTION   Tremor: Right hand. Tremor is present at rest and with action.   Bradykinesia: No.  Speech: No.   Rigidity: No.   Gait: Walks daily and rides a stationary bike. She walks with a cane to keep her balance. Due to dizziness, she was in ED at [facility] and was discharged on a walker. She did PT. She is a resident at 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 She use the cart for balance when she is working.   Falls: No. Last fall Oct 2021.   Dyskinesia: Tardive dyskinesias (TD) have been present for 5 years. Pt is not bothered by mouth and some leg movements. Her psychiatrist has brought up treating TD with vesicular monoamine transporter type 2 (VMAT2) inhibitors, like Ingrezza. But, held off.   Dystonia: No.  Pain: No.  Numbness: No.   Tingling: No.     AUTONOMIC FUNCTION   Orthostatic Hypotension: No.   Constipation: Yes. On medications, if no BM for 3 days, she uses prune.   Urinary symptoms: Has frequency and urgency and incontinency. Uses brief.       MENTATION, BEHAVIOR, MOOD   Depression, anxiety: As above.   Fatigue: No.   Memory problems: She reports having difficulty remembering things and people's name. She has word finding problems.  She is a good historian and provided her history well.   Confusion, hallucinations: No.    Sleep Problems:  She has difficulty falling asleep and staying asleep. She takes Melatonin, which is helping.     DATA REVIEW:   Brain MRI -- 8/27/2022:     Impression:   1. No evidence of acute infarction with limited MRI imaging.   2. No mass effect or acute intracranial hemorrhage.     Head CT: Stroke series: 8/27/2022:    Impression:     1. Non-contrast head CT demonstrates no evidence of intracranial hemorrhage, mass effect, or hydrocephalus.   2. Neck CT angiogram demonstrates  no significant stenosis of the major cervical arteries.   3. Head CT angiogram demonstrates no aneurysm or stenosis of the major intracranial arteries.     Cardiac US: 8/27/2022:    Impression:   The left ventricular ejection fraction appears: Grossly preserved   No pericardial effusion identified.   RV Dilation present/absent: No significant right ventricular dilation   appreciated     A-Line predominance consistent with normal lung aeration   Findings suggest euvolemia      MEDICATIONS:  Outpatient Medications Marked as Taking for the 7/12/23 encounter (Office Visit) with Bethel Lozada APRN CNP   Medication Sig    acetaminophen (TYLENOL) 500 MG tablet Take 2 tablets (1,000 mg) by mouth every 6 hours as needed    alum & mag hydroxide-simethicone (MYLANTA/MAALOX) 200-200-20 MG/5ML SUSP suspension Take 30 mLs by mouth daily as needed for indigestion    amLODIPine (NORVASC) 10 MG tablet Take 1 tablet (10 mg) by mouth daily    apixaban ANTICOAGULANT (ELIQUIS) 5 MG tablet Take 1 tablet (5 mg) by mouth 2 times daily    artificial saliva (BIOTENE MT) AERS spray Take 1 spray by mouth 3 times daily as needed for dry mouth    artificial tears (GENTEAL) 0.1-0.2-0.3 % ophthalmic solution Place 2 drops into both eyes 4 times daily    atorvastatin (LIPITOR) 40 MG tablet Take 1 tablet (40 mg) by mouth daily    blood glucose (NO BRAND SPECIFIED) lancets standard Use to test blood sugar 2 times daily or as directed.    blood glucose (NO BRAND SPECIFIED) test strip Use to test blood sugar 3 times daily or as directed.    blood glucose calibration (NO BRAND SPECIFIED) solution Use to calibrate blood glucose monitor as directed.    blood glucose monitoring (NO BRAND SPECIFIED) meter device kit Check Blood sugars twice a day.    Calcium Carb-Cholecalciferol (CALCIUM-VITAMIN D3) 250-3.125 MG-MCG TABS     Calcium Carbonate-Vitamin D (CALCIUM-VITAMIN D) 250-125 MG-UNIT TABS Take 1 tablet by mouth 2 times daily Calcium 250 mg/Vit D  125 IU    calcium polycarbophil (FIBERCON) 625 MG tablet Take 2 tablets (1,250 mg) by mouth daily    CLARITIN 10 MG OR TABS 1 TAB PO QD (Once per day) as needed for ALLERGY SYMPTOMS    conjugated estrogens (PREMARIN) 0.625 MG/GM vaginal cream Place 2 g vaginally daily for 2 weeks, followed by 2 g vaginally twice a week for vaginal atrophy and frequent UTIs    DENTA 5000 PLUS 1.1 % CREA 1 applicator (1 g) by Oral or Feeding Tube route 2 times daily    erythromycin (ROMYCIN) 5 MG/GM ophthalmic ointment Place 0.5 inches into both eyes At Bedtime    fluorometholone (FML LIQUIFILM) 0.1 % ophthalmic suspension Place 1 drop into both eyes 2 times daily    FLUoxetine (PROZAC) 20 MG capsule Take 1 capsule (20 mg) by mouth daily    fluticasone (FLONASE) 50 MCG/ACT nasal spray Spray 1 spray into both nostrils daily    furosemide (LASIX) 20 MG tablet Take 1 tablet (20 mg) by mouth daily    gabapentin (NEURONTIN) 600 MG tablet     Hypromellose (ARTIFICIAL TEARS OP) Apply 1 drop to eye 4 times daily.    insulin glargine (LANTUS PEN) 100 UNIT/ML pen Inject 15 Units Subcutaneous At Bedtime    lactase (LACTAID) 3000 UNIT tablet Take 1 tablet (3,000 Units) by mouth 3 times daily (with meals)    levothyroxine (SYNTHROID/LEVOTHROID) 175 MCG tablet Take 1 tablet (175 mcg) by mouth daily    lidocaine (XYLOCAINE) 2 % external gel Apply topically 3 times daily as needed for moderate pain    lidocaine (XYLOCAINE) 5 % external ointment Apply topically 3 times daily as needed for moderate pain    lifitegrast (XIIDRA) 5 % opthalmic solution Place 1 drop into both eyes 2 times daily    liraglutide (VICTOZA) 18 MG/3ML solution Inject 1.8 mg Subcutaneous daily    losartan (COZAAR) 100 MG tablet Take 1 tablet (100 mg) by mouth daily    Magnesium Hydroxide (MILK OF MAGNESIA PO) Take 30 mL as needed for constipation.    melatonin 3 MG CAPS Take 6 mg by mouth At Bedtime    melatonin 3 MG tablet     metFORMIN (GLUCOPHAGE) 1000 MG tablet Take 1,000 mg  by mouth 2 times daily (with meals)    NEW MED Prohydrate Moisturizing gel  Place 1 applicator vaginally 4 times a week    nystatin (MYCOSTATIN) 575075 UNIT/GM POWD Apply topically 3 times daily as needed    polyethylene glycol (MIRALAX/GLYCOLAX) powder Take 1 capful by mouth 2 times daily 17 GM PO BID    SALINE MIST 0.65 % nasal spray Spray 2 sprays in nostril 2 times daily    senna-docusate (SENOKOT-S/PERICOLACE) 8.6-50 MG tablet Take 2 tablets by mouth At Bedtime.    simethicone (MYLICON) 125 MG chewable tablet Take 1 tablet (125 mg) by mouth 2 times daily    Skin Protectants, Misc. (EUCERIN) cream Apply topically as needed Apply to thigh PRN dry skin    SM LUBRICANT EYE DROPS 0.4-0.3 % SOLN ophthalmic solution     sodium fluoride 1.1 % PSTE dental paste Apply 2 mLs to affected area daily    solifenacin (VESICARE) 10 MG tablet Take 1 tablet (10 mg) by mouth daily    ziprasidone (GEODON) 40 MG capsule Take 1 capsule (40 mg) by mouth 2 times daily (with meals)    [DISCONTINUED] Gabapentin (NEURONTIN PO) Take 600 mg by mouth 3 times daily       ALLERGIES: Chlordiazepoxide hcl, Dimetapp dm cold-cough, Haloperidol, Ibuprofen, Lactose intolerance [beta-galactosidase], Milk products, and Propofol    PMH:   Past Medical History:   Diagnosis Date    Allergic rhinitis due to pollen     seasonal allergies     Anisometropia and aniseikonia     BMI greater than 40     Cardiomegaly     Chronic constipation     Congenital absence of one kidney     Cramp of limb     Dermatophytosis of the body     Dry eye syndrome     Esophageal reflux     Essential hypertension, benign     Gastro-oesophageal reflux disease     Hemorrhoids     Hypermetropia     Hypothyroidism     Incomplete Emptying of Bladder     Lactose intolerance     Major depressive disorder, recurrent episode, moderate (H)     Malignant neoplasm of breast (female), unspecified site     left mastectomy 1996     Mixed incontinence urge and stress (male)(female)     Moderate  obstructive sleep apnea     Postmenopausal Atrophic Vaginitis     Presbyopia     Regular astigmatism     Restless leg syndrome     Senile nuclear sclerosis     Thyroid eye disease     mild    Tinnitus     Trigger finger (acquired)     right hand    Type II or unspecified type diabetes mellitus without mention of complication, not stated as uncontrolled     on insulin since April 2006        PSH:   Past Surgical History:   Procedure Laterality Date    APPENDECTOMY      C MASTECTOMY,SIMPLE  1996    Left mastectomy  [facility]. Had normal FU mammo 5/2007. Follow yearly.     CHOLECYSTECTOMY      COLONOSCOPY  5/2005    Complete Colonoscopy-had one small polyp removed 5/2005.     COLONOSCOPY N/A 3/31/2016    Procedure: COLONOSCOPY;  Surgeon: Cary Ring MD;  Location: [facility]    COLONOSCOPY N/A 7/7/2016    Procedure: COLONOSCOPY;  Surgeon: Cary Ring MD;  Location: [facility]    COLONOSCOPY N/A 5/12/2022    Procedure: COLONOSCOPY, WITH POLYPECTOMY AND BIOPSY;  Surgeon: Jonathan Madden MD;  Location: [facility]    DILATION AND CURETTAGE      HYSTERECTOMY      MASTECTOMY      Left breast    ZZC TOTAL ABDOM HYSTERECTOMY  7/2003    Dr. Castanon, for abnormal bleeding. Removal of both tubes with BSO for myoma w - - -       FH:   Family History   Problem Relation Age of Onset    Melanoma Mother         malignant melanoma    Heart Disease Father         1968    Breast Cancer Sister         in her 50s.    Breast Cancer Sister         in her 70s.    Glaucoma No family hx of     Macular Degeneration No family hx of     Skin Cancer No family hx of     Movement disorder No family hx of        SH:   Social History     Socioeconomic History    Marital status: Single     Spouse name: None    Number of children: None    Years of education: None    Highest education level: None   Tobacco Use    Smoking status: Never    Smokeless tobacco: Never   Vaping Use    Vaping Use: Never used   Substance  and Sexual Activity    Alcohol use: No     Alcohol/week: 0.0 standard drinks of alcohol    Drug use: No    Sexual activity: Not Currently   Social History Narrative    Pinky Page is a resident at [facility].       PHYSICAL EXAM:     Vital Signs: BP (!) 169/69   Pulse 98   LMP  (LMP Unknown)   SpO2 98%      General: Ms. Page is a pleasant  female who is well-groomed, well-developed and overweight sitting comfortably in the exam room without any distress. Affect is appropriate.    Mental Status: Alert, oriented x3 with normal attention, concentration, language, memory and fund of knowledge.     7/12/2023  10:00 AM   Tremor ADL Scale    1. Speaking 0    2. Feeding (spoon) 1    3. Drinking (glass) 3    4. Hygiene 2    5. Dressing 3    6. Pouring 1    7. Carry plate, tray 3    8. Use keys 3    9. Writing 3    10. Work or housework 3    11a. Overall disability 3    11b. Most affected task Doing crafts.    12. Social impact 1    ADL TOTAL 26          Parkinson's disease Assessment:   MDS UPDRS III   7/12/2023  10:30 AM   UPDRS Motor Scale    Time: 10:30    Medication Off    R Brain DBS: None    L Brain DBS: None    Dyskinesia (LID) Yes    Did LID interfere No    Speech 0    Facial Expression 0    Rigidity Neck 1    Rigidity RUE 2    Rigidity LUE 1    Rigidity RLE 0    Rigidity LLE 0    Finger Taps R 2    Finger Taps L 2    Hand Mvt R 2    Hand Mvt L 2    Pron-/Supinate R 2    Pron-/Supinate L 1    Toe Tap R 1    Toe Tap L 1    Leg Agility R 2    Leg Agility L 2    Arise From Chair 1    Gait 3    Gait Freezing 0    Postural Stability 3    Posture 1    Global Spont Mvt 2    Postural Tremor RUE 2    Postural Tremor LUE 1    Kinetic Tremor RUE 2    Kinetic Tremor LUE 1    Rest Tremor RUE 2    Rest Tremor LUE 1    Rest Tremor RLE 0    Rest Tremor LLE 0    Rest Tremor Lip/Jaw 0    Rest Tremor Constancy 2    Total Right 17    Total Left 12    Axial Total 11    Total 42      Today I spent 90 minutes caring for the  patient. Time was spent with reviewing records, meeting with the patient, answering questions, examining, placing referral, and documentation.      Again, thank you for allowing me to participate in the care of your patient.      Sincerely,    SAL Atkins CNP

## 2023-07-12 NOTE — PROGRESS NOTES
ASSESSMENT:    Tremor:  Ms. Page is a 73 year old right - handed  female with a longstanding history of schizoaffective disorder and depression, and long term use of psychotropic medications. Today's exam shows mixed action and resting tremors and some parkinsonian features including bradykinesia, stiffness, and gait and balance difficulty.     Her symptoms could be drug-induced from Ziprasidone or parkinson's disease. The only way to differentiate the two is by getting a Dopamine Scant (DATscan). Since selective serotonin reuptake inhibitor interfere with the DATscan result and show false positive results, it is recommended for patients to go off certain medications, including selective serotonin reuptake inhibitor. At this point, since the patient's mood has been stable, I don't think it is worth taking the risk of stopping the medication.  This was extensively discussed with the pt.    For now, it might be better to treat the tremors conservatively as pt is managing her ADLs by choosing times that she is not shaking.    Tardive dyskinesias:  Most likely induced by antipsychotic medication, Ziprasidone. This is not bothersome to patient.       PLAN:    The differential diagnosis of tremors and treatments discussed. All questions addressed and the following patient instructions provided.     __  We'll update your Primacy Care Provider: Dr. Tonia Goel.    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 It could be from the medications that you take for mood or from Parkinson's disease.  It's hard to know the difference on exam.  Dopamine Scan is used to know the difference.  However, your mood medications interfere with the accuracy of the result and you have to be off of these medications to get the scan.  As we have discussed, it's best not to do the Dopamine Scan at this time.     __  You have options to start medications to help the tremors, like Propranolol, Primidone or Sinemet. These medications  might improve your tremors but not get rid of them.  There are various side effects associated with these medications that we've briefly discussed.  Also, it's good to know the goal of treatment and to treat bothersome symptoms.    __  Since the Tardive Dyskinesias (mouth movements) are not bothersome, we'll not treat this.     __  If you ever want to try any of the medications, please let me know. We'll work with your mental health care providers.     __  Occupational Therapy is a good option to explore device options like wrist weight, to improve tremors.  I'll refer you to OT and they will call you to schedule the appointment.     __  Continue to exercise regularly, including the Balance Exercise.     __  Return in 4 months for a follow up. You may return sooner as needed.           MOVEMENT DISORDERS CLINIC           REFERRING PROVIDER:  Dr. Tami Adkins, Psychiatrist.    PATIENT: Pinky Page    : 1950    CORINNE: 2023    REASON FOR VISIT: Evaluation of Tremor.     HPI: Ms. Pinky Page is a 73 year old right - handed  female who came to the [facility] neurology clinic by herself for evaluation of tremors as referred by her psychiatrist, Dr. Tami Adkins. She came alone using [employer].    She has a longstanding Hx of schizoaffective disorder and depression. She reports that her first psychiatric hospitalization was in . Has received ECT. Mood is stable on Ziprasidone and Fluoxetine since 2016. She follows closely with a psychiatrist and psychologist.     Never . No children. Has been living at [facility] since .     No family Hx of movement disorders or mood disorders.     She is on Gabapentin 600 mg TID. Pt is not sure why she takes it.  (Pain? RLS?)    ROS: -     MOTOR FUNCTION   Tremor: Right hand. Tremor is present at rest and with action.   Bradykinesia: No.  Speech: No.   Rigidity: No.   Gait: Walks daily and rides a stationary bike. She walks  with a cane to keep her balance. Due to dizziness, she was in ED at [facility] and was discharged on a walker. She did PT. 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 She use the cart for balance when she is working.   Falls: No. Last fall Oct 2021.   Dyskinesia: Tardive dyskinesias (TD) have been present for 5 years. Pt is not bothered by mouth and some leg movements. Her psychiatrist has brought up treating TD with vesicular monoamine transporter type 2 (VMAT2) inhibitors, like Ingrezza. But, held off.   Dystonia: No.  Pain: No.  Numbness: No.   Tingling: No.     AUTONOMIC FUNCTION   Orthostatic Hypotension: No.   Constipation: Yes. On medications, if no BM for 3 days, she uses prune.   Urinary symptoms: Has frequency and urgency and incontinency. Uses brief.       MENTATION, BEHAVIOR, MOOD   Depression, anxiety: As above.   Fatigue: No.   Memory problems: She reports having difficulty remembering things and people's name. She has word finding problems.  She is a good historian and provided her history well.   Confusion, hallucinations: No.    Sleep Problems:  She has difficulty falling asleep and staying asleep. She takes Melatonin, which is helping.     DATA REVIEW:   Brain MRI -- 8/27/2022:     Impression:   1. No evidence of acute infarction with limited MRI imaging.   2. No mass effect or acute intracranial hemorrhage.     Head CT: Stroke series: 8/27/2022:    Impression:     1. Non-contrast head CT demonstrates no evidence of intracranial hemorrhage, mass effect, or hydrocephalus.   2. Neck CT angiogram demonstrates no significant stenosis of the major cervical arteries.   3. Head CT angiogram demonstrates no aneurysm or stenosis of the major intracranial arteries.     Cardiac US: 8/27/2022:    Impression:   The left ventricular ejection fraction appears: Grossly preserved   No pericardial effusion identified.   RV Dilation present/absent:  No significant right ventricular dilation   appreciated     A-Line predominance consistent with normal lung aeration   Findings suggest euvolemia      MEDICATIONS:  Outpatient Medications Marked as Taking for the 7/12/23 encounter (Office Visit) with Bethel Lozada APRN CNP   Medication Sig     acetaminophen (TYLENOL) 500 MG tablet Take 2 tablets (1,000 mg) by mouth every 6 hours as needed     alum & mag hydroxide-simethicone (MYLANTA/MAALOX) 200-200-20 MG/5ML SUSP suspension Take 30 mLs by mouth daily as needed for indigestion     amLODIPine (NORVASC) 10 MG tablet Take 1 tablet (10 mg) by mouth daily     apixaban ANTICOAGULANT (ELIQUIS) 5 MG tablet Take 1 tablet (5 mg) by mouth 2 times daily     artificial saliva (BIOTENE MT) AERS spray Take 1 spray by mouth 3 times daily as needed for dry mouth     artificial tears (GENTEAL) 0.1-0.2-0.3 % ophthalmic solution Place 2 drops into both eyes 4 times daily     atorvastatin (LIPITOR) 40 MG tablet Take 1 tablet (40 mg) by mouth daily     blood glucose (NO BRAND SPECIFIED) lancets standard Use to test blood sugar 2 times daily or as directed.     blood glucose (NO BRAND SPECIFIED) test strip Use to test blood sugar 3 times daily or as directed.     blood glucose calibration (NO BRAND SPECIFIED) solution Use to calibrate blood glucose monitor as directed.     blood glucose monitoring (NO BRAND SPECIFIED) meter device kit Check Blood sugars twice a day.     Calcium Carb-Cholecalciferol (CALCIUM-VITAMIN D3) 250-3.125 MG-MCG TABS      Calcium Carbonate-Vitamin D (CALCIUM-VITAMIN D) 250-125 MG-UNIT TABS Take 1 tablet by mouth 2 times daily Calcium 250 mg/Vit D 125 IU     calcium polycarbophil (FIBERCON) 625 MG tablet Take 2 tablets (1,250 mg) by mouth daily     CLARITIN 10 MG OR TABS 1 TAB PO QD (Once per day) as needed for ALLERGY SYMPTOMS     conjugated estrogens (PREMARIN) 0.625 MG/GM vaginal cream Place 2 g vaginally daily for 2 weeks, followed by 2 g vaginally twice  a week for vaginal atrophy and frequent UTIs     DENTA 5000 PLUS 1.1 % CREA 1 applicator (1 g) by Oral or Feeding Tube route 2 times daily     erythromycin (ROMYCIN) 5 MG/GM ophthalmic ointment Place 0.5 inches into both eyes At Bedtime     fluorometholone (FML LIQUIFILM) 0.1 % ophthalmic suspension Place 1 drop into both eyes 2 times daily     FLUoxetine (PROZAC) 20 MG capsule Take 1 capsule (20 mg) by mouth daily     fluticasone (FLONASE) 50 MCG/ACT nasal spray Spray 1 spray into both nostrils daily     furosemide (LASIX) 20 MG tablet Take 1 tablet (20 mg) by mouth daily     gabapentin (NEURONTIN) 600 MG tablet      Hypromellose (ARTIFICIAL TEARS OP) Apply 1 drop to eye 4 times daily.     insulin glargine (LANTUS PEN) 100 UNIT/ML pen Inject 15 Units Subcutaneous At Bedtime     lactase (LACTAID) 3000 UNIT tablet Take 1 tablet (3,000 Units) by mouth 3 times daily (with meals)     levothyroxine (SYNTHROID/LEVOTHROID) 175 MCG tablet Take 1 tablet (175 mcg) by mouth daily     lidocaine (XYLOCAINE) 2 % external gel Apply topically 3 times daily as needed for moderate pain     lidocaine (XYLOCAINE) 5 % external ointment Apply topically 3 times daily as needed for moderate pain     lifitegrast (XIIDRA) 5 % opthalmic solution Place 1 drop into both eyes 2 times daily     liraglutide (VICTOZA) 18 MG/3ML solution Inject 1.8 mg Subcutaneous daily     losartan (COZAAR) 100 MG tablet Take 1 tablet (100 mg) by mouth daily     Magnesium Hydroxide (MILK OF MAGNESIA PO) Take 30 mL as needed for constipation.     melatonin 3 MG CAPS Take 6 mg by mouth At Bedtime     melatonin 3 MG tablet      metFORMIN (GLUCOPHAGE) 1000 MG tablet Take 1,000 mg by mouth 2 times daily (with meals)     NEW MED Prohydrate Moisturizing gel  Place 1 applicator vaginally 4 times a week     nystatin (MYCOSTATIN) 356642 UNIT/GM POWD Apply topically 3 times daily as needed     polyethylene glycol (MIRALAX/GLYCOLAX) powder Take 1 capful by mouth 2 times daily  17 GM PO BID     SALINE MIST 0.65 % nasal spray Spray 2 sprays in nostril 2 times daily     senna-docusate (SENOKOT-S/PERICOLACE) 8.6-50 MG tablet Take 2 tablets by mouth At Bedtime.     simethicone (MYLICON) 125 MG chewable tablet Take 1 tablet (125 mg) by mouth 2 times daily     Skin Protectants, Misc. (EUCERIN) cream Apply topically as needed Apply to thigh PRN dry skin     SM LUBRICANT EYE DROPS 0.4-0.3 % SOLN ophthalmic solution      sodium fluoride 1.1 % PSTE dental paste Apply 2 mLs to affected area daily     solifenacin (VESICARE) 10 MG tablet Take 1 tablet (10 mg) by mouth daily     ziprasidone (GEODON) 40 MG capsule Take 1 capsule (40 mg) by mouth 2 times daily (with meals)     [DISCONTINUED] Gabapentin (NEURONTIN PO) Take 600 mg by mouth 3 times daily       ALLERGIES: Chlordiazepoxide hcl, Dimetapp dm cold-cough, Haloperidol, Ibuprofen, Lactose intolerance [beta-galactosidase], Milk products, and Propofol    PMH:   Past Medical History:   Diagnosis Date     Allergic rhinitis due to pollen     seasonal allergies      Anisometropia and aniseikonia      BMI greater than 40      Cardiomegaly      Chronic constipation      Congenital absence of one kidney      Cramp of limb      Dermatophytosis of the body      Dry eye syndrome      Esophageal reflux      Essential hypertension, benign      Gastro-oesophageal reflux disease      Hemorrhoids      Hypermetropia      Hypothyroidism      Incomplete Emptying of Bladder      Lactose intolerance      Major depressive disorder, recurrent episode, moderate (H)      Malignant neoplasm of breast (female), unspecified site     left mastectomy 1996      Mixed incontinence urge and stress (male)(female)      Moderate obstructive sleep apnea      Postmenopausal Atrophic Vaginitis      Presbyopia      Regular astigmatism      Restless leg syndrome      Senile nuclear sclerosis      Thyroid eye disease     mild     Tinnitus      Trigger finger (acquired)     right hand      Type II or unspecified type diabetes mellitus without mention of complication, not stated as uncontrolled     on insulin since April 2006        PSH:   Past Surgical History:   Procedure Laterality Date     APPENDECTOMY       C MASTECTOMY,SIMPLE  1996    Left mastectomy  [facility]. Had normal FU mammo 5/2007. Follow yearly.      CHOLECYSTECTOMY       COLONOSCOPY  5/2005    Complete Colonoscopy-had one small polyp removed 5/2005.      COLONOSCOPY N/A 3/31/2016    Procedure: COLONOSCOPY;  Surgeon: Cary Ring MD;  Location: [facility]     COLONOSCOPY N/A 7/7/2016    Procedure: COLONOSCOPY;  Surgeon: Cary Ring MD;  Location: [facility]     COLONOSCOPY N/A 5/12/2022    Procedure: COLONOSCOPY, WITH POLYPECTOMY AND BIOPSY;  Surgeon: Jonathan Madden MD;  Location: [facility]     DILATION AND CURETTAGE       HYSTERECTOMY       MASTECTOMY      Left breast     ZZC TOTAL ABDOM HYSTERECTOMY  7/2003    Dr. Castanon, for abnormal bleeding. Removal of both tubes with BSO for myoma w - - -       FH:   Family History   Problem Relation Age of Onset     Melanoma Mother         malignant melanoma     Heart Disease Father         1968     Breast Cancer Sister         in her 50s.     Breast Cancer Sister         in her 70s.     Glaucoma No family hx of      Macular Degeneration No family hx of      Skin Cancer No family hx of      Movement disorder No family hx of        SH:   Social History     Socioeconomic History     Marital status: Single     Spouse name: None     Number of children: None     Years of education: None     Highest education level: None   Tobacco Use     Smoking status: Never     Smokeless tobacco: Never   Vaping Use     Vaping Use: Never used   Substance and Sexual Activity     Alcohol use: No     Alcohol/week: 0.0 standard drinks of alcohol     Drug use: No     Sexual activity: Not Currently   Social History Narrative    Pinky Page is a resident at [facility].        PHYSICAL EXAM:     Vital Signs: BP (!) 169/69   Pulse 98   LMP  (LMP Unknown)   SpO2 98%      General: Ms. Page is a pleasant  female who is well-groomed, well-developed and overweight sitting comfortably in the exam room without any distress. Affect is appropriate.    Mental Status: Alert, oriented x3 with normal attention, concentration, language, memory and fund of knowledge.     7/12/2023  10:00 AM   Tremor ADL Scale    1. Speaking 0    2. Feeding (spoon) 1    3. Drinking (glass) 3    4. Hygiene 2    5. Dressing 3    6. Pouring 1    7. Carry plate, tray 3    8. Use keys 3    9. Writing 3    10. Work or housework 3    11a. Overall disability 3    11b. Most affected task Doing crafts.    12. Social impact 1    ADL TOTAL 26          Parkinson's disease Assessment:   MDS UPDRS III   7/12/2023  10:30 AM   UPDRS Motor Scale    Time: 10:30    Medication Off    R Brain DBS: None    L Brain DBS: None    Dyskinesia (LID) Yes    Did LID interfere No    Speech 0    Facial Expression 0    Rigidity Neck 1    Rigidity RUE 2    Rigidity LUE 1    Rigidity RLE 0    Rigidity LLE 0    Finger Taps R 2    Finger Taps L 2    Hand Mvt R 2    Hand Mvt L 2    Pron-/Supinate R 2    Pron-/Supinate L 1    Toe Tap R 1    Toe Tap L 1    Leg Agility R 2    Leg Agility L 2    Arise From Chair 1    Gait 3    Gait Freezing 0    Postural Stability 3    Posture 1    Global Spont Mvt 2    Postural Tremor RUE 2    Postural Tremor LUE 1    Kinetic Tremor RUE 2    Kinetic Tremor LUE 1    Rest Tremor RUE 2    Rest Tremor LUE 1    Rest Tremor RLE 0    Rest Tremor LLE 0    Rest Tremor Lip/Jaw 0    Rest Tremor Constancy 2    Total Right 17    Total Left 12    Axial Total 11    Total 42      Today I spent 90 minutes caring for the patient. Time was spent with reviewing records, meeting with the patient, answering questions, examining, placing referral, and documentation.    SAL Benavidez, BLAISE  [facility]

## 2023-07-12 NOTE — NURSING NOTE
Chief Complaint   Patient presents with     Consult For     Movement disorder        Connor Howell

## 2023-07-13 ENCOUNTER — OFFICE VISIT (OUTPATIENT)
Dept: FAMILY MEDICINE | Facility: CLINIC | Age: 73
End: 2023-07-13
Payer: COMMERCIAL

## 2023-07-13 VITALS
SYSTOLIC BLOOD PRESSURE: 133 MMHG | DIASTOLIC BLOOD PRESSURE: 91 MMHG | TEMPERATURE: 97.8 F | OXYGEN SATURATION: 97 % | HEART RATE: 70 BPM | BODY MASS INDEX: 35.92 KG/M2 | RESPIRATION RATE: 16 BRPM | WEIGHT: 215.6 LBS | HEIGHT: 65 IN

## 2023-07-13 DIAGNOSIS — G31.84 MILD COGNITIVE IMPAIRMENT: ICD-10-CM

## 2023-07-13 DIAGNOSIS — R80.9 TYPE 2 DIABETES MELLITUS WITH MICROALBUMINURIA, WITH LONG-TERM CURRENT USE OF INSULIN (H): ICD-10-CM

## 2023-07-13 DIAGNOSIS — E11.29 TYPE 2 DIABETES MELLITUS WITH MICROALBUMINURIA, WITH LONG-TERM CURRENT USE OF INSULIN (H): ICD-10-CM

## 2023-07-13 DIAGNOSIS — Z79.4 TYPE 2 DIABETES MELLITUS WITH MICROALBUMINURIA, WITH LONG-TERM CURRENT USE OF INSULIN (H): ICD-10-CM

## 2023-07-13 DIAGNOSIS — G24.01 TARDIVE DYSKINESIA: ICD-10-CM

## 2023-07-13 DIAGNOSIS — I48.0 PAROXYSMAL ATRIAL FIBRILLATION (H): ICD-10-CM

## 2023-07-13 DIAGNOSIS — Z00.00 ENCOUNTER FOR ANNUAL WELLNESS EXAM IN MEDICARE PATIENT: Primary | ICD-10-CM

## 2023-07-13 DIAGNOSIS — E66.01 MORBID (SEVERE) OBESITY DUE TO EXCESS CALORIES (H): ICD-10-CM

## 2023-07-13 PROBLEM — H57.02 PHYSIOLOGIC ANISOCORIA: Status: ACTIVE | Noted: 2022-08-27

## 2023-07-13 LAB
CHOLEST SERPL-MCNC: 137 MG/DL
HBA1C MFR BLD: 7.3 % (ref 0–5.6)
HDLC SERPL-MCNC: 42 MG/DL
LDLC SERPL CALC-MCNC: 79 MG/DL
NONHDLC SERPL-MCNC: 95 MG/DL
TRIGL SERPL-MCNC: 82 MG/DL
TSH SERPL DL<=0.005 MIU/L-ACNC: 1.14 UIU/ML (ref 0.3–4.2)

## 2023-07-13 PROCEDURE — 80061 LIPID PANEL: CPT | Performed by: STUDENT IN AN ORGANIZED HEALTH CARE EDUCATION/TRAINING PROGRAM

## 2023-07-13 PROCEDURE — 36415 COLL VENOUS BLD VENIPUNCTURE: CPT | Performed by: STUDENT IN AN ORGANIZED HEALTH CARE EDUCATION/TRAINING PROGRAM

## 2023-07-13 PROCEDURE — 84443 ASSAY THYROID STIM HORMONE: CPT | Performed by: STUDENT IN AN ORGANIZED HEALTH CARE EDUCATION/TRAINING PROGRAM

## 2023-07-13 PROCEDURE — 83036 HEMOGLOBIN GLYCOSYLATED A1C: CPT | Performed by: STUDENT IN AN ORGANIZED HEALTH CARE EDUCATION/TRAINING PROGRAM

## 2023-07-13 PROCEDURE — G0439 PPPS, SUBSEQ VISIT: HCPCS | Mod: GC | Performed by: STUDENT IN AN ORGANIZED HEALTH CARE EDUCATION/TRAINING PROGRAM

## 2023-07-13 ASSESSMENT — ENCOUNTER SYMPTOMS
HEMATOCHEZIA: 0
CONSTIPATION: 1
JOINT SWELLING: 0
ARTHRALGIAS: 1
NAUSEA: 0
HEADACHES: 0
CHILLS: 0
PALPITATIONS: 0
DIARRHEA: 1
WEAKNESS: 0
HEMATURIA: 0
FEVER: 0
SORE THROAT: 1
PARESTHESIAS: 0
MYALGIAS: 0
COUGH: 0
EYE PAIN: 1
HEARTBURN: 0
SHORTNESS OF BREATH: 0
NERVOUS/ANXIOUS: 1
DYSURIA: 0
FREQUENCY: 1
BREAST MASS: 0
DIZZINESS: 0
ABDOMINAL PAIN: 0

## 2023-07-13 ASSESSMENT — PATIENT HEALTH QUESTIONNAIRE - PHQ9
10. IF YOU CHECKED OFF ANY PROBLEMS, HOW DIFFICULT HAVE THESE PROBLEMS MADE IT FOR YOU TO DO YOUR WORK, TAKE CARE OF THINGS AT HOME, OR GET ALONG WITH OTHER PEOPLE: VERY DIFFICULT
SUM OF ALL RESPONSES TO PHQ QUESTIONS 1-9: 5
SUM OF ALL RESPONSES TO PHQ QUESTIONS 1-9: 5

## 2023-07-13 ASSESSMENT — ACTIVITIES OF DAILY LIVING (ADL): CURRENT_FUNCTION: MEDICATION ADMINISTRATION REQUIRES ASSISTANCE

## 2023-07-13 NOTE — PROGRESS NOTES
"SUBJECTIVE:   Pinky is a 73 year old who presents for Preventive Visit.      7/13/2023     9:11 AM   Additional Questions   Roomed by Tamanna   Accompanied by self     Are you in the first 12 months of your Medicare coverage?  No    Healthy Habits:     In general, how would you rate your overall health?  Good    Frequency of exercise:  6-7 days/week    Duration of exercise:  45-60 minutes    Do you usually eat at least 4 servings of fruit and vegetables a day, include whole grains    & fiber and avoid regularly eating high fat or \"junk\" foods?  Yes    Taking medications regularly:  Yes    Medication side effects:  Not applicable    Ability to successfully perform activities of daily living:  Medication administration requires assistance    Home Safety:  No safety concerns identified    Hearing Impairment:  Difficulty understanding soft or whispered speech    In the past 6 months, have you been bothered by leaking of urine? Yes    In general, how would you rate your overall mental or emotional health?  Good    Additional concerns today:  Yes    Rides the stationary bike for 30 minutes, walks 20-25 minutes inside.     Have you ever done Advance Care Planning? (For example, a Health Directive, POLST, or a discussion with a medical provider or your loved ones about your wishes): No, advance care planning information given to patient to review.  Patient plans to discuss their wishes with loved ones or provider.        Groups - Goes to [employer] group, goes to choir group, goes to the memory group (cognitive exercises), word search puzzles, free art         Cognitive Screening   1) Repeat 3 items (Leader, Season, Table)    2) Clock draw: ABNORMAL wrong time but clock is good  3) 3 item recall: Recalls 2 objects   Results: ABNORMAL clock, 1-2 items recalled: PROBABLE COGNITIVE IMPAIRMENT, **INFORM PROVIDER**    Mini-CogTM Copyright STEFFANY Mitchell. Licensed by the author for use in [facility]; reprinted with permission " ([email or web address]). All rights reserved.      Do you have sleep apnea, excessive snoring or daytime drowsiness?: yes    Reviewed and updated as needed this visit by clinical staff   Tobacco  Allergies  Meds  Problems  Med Hx  Surg Hx  Fam Hx          Reviewed and updated as needed this visit by Provider   Tobacco  Allergies  Meds  Problems  Med Hx  Surg Hx  Fam Hx         Social History     Tobacco Use     Smoking status: Never     Passive exposure: Current     Smokeless tobacco: Never   Substance Use Topics     Alcohol use: No     Alcohol/week: 0.0 standard drinks of alcohol           7/13/2023     9:11 AM   Alcohol Use   Prescreen: >3 drinks/day or >7 drinks/week? No     Do you have a current opioid prescription? No  Do you use any other controlled substances or medications that are not prescribed by a provider? None    Checking sugars twice a day - only 84 this AM.   - Occasionally over 200 (bag lunches) Never over 300  - Lowest she's had in the last 3 months is 84          Current providers sharing in care for this patient include:   Patient Care Team:  No Ref-Primary, Physician as PCP - General  Dr Mackey Residence as Damon Abad, PhD LP (Psychology)  Dedra Dior MD as MD (Urology)  Analy Bush, RN as Clinic Care Coordinator (Urology)  Matilde Ann MD as Referring Physician  Residence, Nany Blackmon MD as MD (Ophthalmology)  Gaby Holliday MD as MD (Nephrology)  Allie Bauer, RN as Registered Nurse  Michael Lopez MD as MD (Ophthalmology)  María Shi MD as Resident (Student in organized health care education/training program)  Roxy Rubio MD as MD (Psychiatry)  Tonia Goel MD as Assigned PCP  Denise Dewey MD as Resident (Psychiatry)  Annie Gordon MD as MD (Psychiatry)  Kelton Costa MD as MD (OB/Gyn)  Sarah Renner DO as MD (Family Medicine)  Kelton Costa MD as Assigned OBGYN  Provider  Damon Gr, PhD LP as Assigned Behavioral Health Provider  Rachna Reilly AuD as Audiologist (Audiology)  Rachna Reilly AuD as Audiologist (Audiology)  Gloria Cano AuD as Audiologist (Audiology)  Adriana Robbins MD as MD (Dermatology)  Michael Lopez MD as Assigned Surgical Provider  Bethel Lozada APRN CNP as Nurse Practitioner (Neurology)    The following health maintenance items are reviewed in Epic and correct as of today:  Health Maintenance   Topic Date Due     DEPRESSION ACTION PLAN  Never done     COVID-19 Vaccine (6 - Moderna series) 02/03/2023     DIABETIC FOOT EXAM  04/12/2023     LIPID  04/27/2023     MICROALBUMIN  04/27/2023     TSH W/FREE T4 REFLEX  05/31/2023     PHQ-9  08/13/2023     INFLUENZA VACCINE (1) 09/01/2023     BMP  10/03/2023     A1C  01/13/2024     EYE EXAM  03/16/2024     FALL RISK ASSESSMENT  07/12/2024     MEDICARE ANNUAL WELLNESS VISIT  07/13/2024     COLORECTAL CANCER SCREENING  05/12/2025     MAMMO SCREENING  06/27/2025     ADVANCE CARE PLANNING  07/13/2028     DTAP/TDAP/TD IMMUNIZATION (5 - Td or Tdap) 05/11/2031     DEXA  06/02/2036     HEPATITIS C SCREENING  Completed     Pneumococcal Vaccine: 65+ Years  Completed     URINALYSIS  Completed     ZOSTER IMMUNIZATION  Completed     IPV IMMUNIZATION  Aged Out     MENINGITIS IMMUNIZATION  Aged Out               Review of Systems   Constitutional: Negative for chills and fever.   HENT: Positive for hearing loss and sore throat. Negative for congestion and ear pain.    Eyes: Positive for pain. Negative for visual disturbance.   Respiratory: Negative for cough and shortness of breath.    Cardiovascular: Positive for peripheral edema. Negative for chest pain and palpitations.   Gastrointestinal: Positive for constipation and diarrhea. Negative for abdominal pain, heartburn, hematochezia and nausea.   Breasts:  Positive for tenderness. Negative for breast mass and discharge.   Genitourinary: Positive  "for frequency and urgency. Negative for dysuria, genital sores, hematuria, pelvic pain, vaginal bleeding and vaginal discharge.   Musculoskeletal: Positive for arthralgias. Negative for joint swelling and myalgias.   Skin: Negative for rash.   Neurological: Negative for dizziness, weakness, headaches and paresthesias.   Psychiatric/Behavioral: Positive for mood changes. The patient is nervous/anxious.    Problems above chronic and stable.      OBJECTIVE:   BP (!) 133/91   Pulse 70   Temp 97.8  F (36.6  C) (Oral)   Resp 16   Ht 1.64 m (5' 4.57\")   Wt 97.8 kg (215 lb 9.6 oz)   LMP  (LMP Unknown)   SpO2 97%   BMI 36.36 kg/m   Estimated body mass index is 36.36 kg/m  as calculated from the following:    Height as of this encounter: 1.64 m (5' 4.57\").    Weight as of this encounter: 97.8 kg (215 lb 9.6 oz).  Physical Exam  GENERAL: healthy, alert and no distress  RESP: lungs clear to auscultation - no rales, rhonchi or wheezes  CV: regular rate and rhythm, normal S1 S2, no S3 or S4, no murmur, click or rub, no peripheral edema and peripheral pulses strong  ABDOMEN: soft, nontender, no hepatosplenomegaly, no masses and bowel sounds normal  MS: no gross musculoskeletal defects noted, no edema  SKIN: no suspicious lesions or rashes  NEURO: Normal strength and tone, mentation intact and speech normal  BACK: no CVA tenderness, no paralumbar tenderness  PSYCH: mentation appears normal, affect normal/bright    Diagnostic Test Results:  Labs reviewed in Epic    ASSESSMENT / PLAN:   Pinky was seen today for annual visit.    Diagnoses and all orders for this visit:    Encounter for annual wellness exam in Medicare patient  Patient lives in [facility], engages in exercise and many groups there. Really enjoys it and has been living there for a long time. No falls over the past year, walks confidently with a cane. Does not use stairs, elevators only at her residence. Able to walk ~25 minutes without assistance. Has " "advanced care directives on file at [place], not in our records. Asked her to bring them next time. Filled out her annual physical paperwork for her residence, added Mild Cognitive Impairment and Tardive dyskinesia to her diagnosis list. Physical exam benign. Problems on ROS chronic and stable, requested no changes in medications. Did discuss trying to ramp down her medications over the next few years, starting with her statin next year possibly.   -     Lipid panel  -     TSH with free T4 reflex  -     Hemoglobin A1c    Paroxysmal atrial fibrillation (H)  Continue Eliquis, currently in aFib on exam.   [id number] Score: 4 points, which represents a 4.8% annual risk of major embolic event, without anti-coagulation or an LAAO device.    Morbid (severe) obesity due to excess calories (H)  Regular physical exercise at residence. Urged to continue.     Mild cognitive impairment  Abnormal clock draw and item recall on mini-cog. Patient aware that she has previously not done well on the mini cog and clock specifically. Probably that this is a chronic condition and not new today. Would assess on next visit and consider SLUMS on next wellness visit.     Tardive dyskinesia  Noted primarily in left upper extremity, stable.     Type 2 diabetes mellitus with microalbuminuria, with long-term current use of insulin (H)  A1C today to assess medication effectiveness. No changes in medications today. 3 month follow up recommended.           [id number] Score: 4 points, which represents a 4.8% annual risk of major embolic event, without anti-coagulation or an LAAO device. {    COUNSELING:  Reviewed preventive health counseling, as reflected in patient instructions       Regular exercise       Healthy diet/nutrition       Fall risk prevention       Colon cancer screening       Advanced Planning       BMI:   Estimated body mass index is 36.36 kg/m  as calculated from the following:    Height as of this encounter: 1.64 m (5' 4.57\").   "  Weight as of this encounter: 97.8 kg (215 lb 9.6 oz).   Weight management plan: Discussed healthy diet and exercise guidelines      She reports that she has never smoked. She has been exposed to tobacco smoke. She has never used smokeless tobacco.      Appropriate preventive services were discussed with this patient, including applicable screening as appropriate for cardiovascular disease, diabetes, osteopenia/osteoporosis, and glaucoma.  As appropriate for age/gender, discussed screening for colorectal cancer, prostate cancer, breast cancer, and cervical cancer. Checklist reviewing preventive services available has been given to the patient.    Reviewed patients plan of care and provided an AVS. The Basic Care Plan (routine screening as documented in Health Maintenance) for Pinky meets the Care Plan requirement. This Care Plan has been established and reviewed with the Patient.      Allie Sweeney DO  [facility]    Identified Health Risks:    I have reviewed Opioid Use Disorder and Substance Use Disorder risk factors and made any needed referrals.     Answers for HPI/ROS submitted by the patient on 7/13/2023  If you checked off any problems, how difficult have these problems made it for you to do your work, take care of things at home, or get along with other people?: Very difficult  PHQ9 TOTAL SCORE: 5

## 2023-07-13 NOTE — PATIENT INSTRUCTIONS
If you had an XRay/CT/Ultrasound/MRI ordered the number is [phone] to schedule or change your radiology appointment.   Medical Concerns:  If you have urgent medical concerns please call [phone] at any time of the day.    DO FABRICIO Padron MEDICARE PERSONAL PREVENTIVE SERVICES PLAN - SERVICES     Review these tests with your doctor then decide which ones you want and take this page home for your reference  Immunization History   Administered Date(s) Administered    COVID-19 Bivalent 12+ (Pfizer) 10/03/2022    COVID-19 MONOVALENT 12+ (Pfizer) 11/11/2021    COVID-19 Monovalent 12+ (Pfizer 2022) 05/31/2022    COVID-19 Monovalent 18+ (Moderna) 01/06/2021, 02/03/2021    DTaP, Unspecified 03/13/2016    Flu 65+ Years 09/27/2016    Flu, Unspecified 10/30/1998    Hepatitis B, Adult 11/07/2019, 12/05/2019, 05/11/2021    Influenza (H1N1) 01/11/2010    Influenza (High Dose) 3 valent vaccine 09/16/2015, 10/16/2017    Influenza (IIV3) PF 10/21/2002, 11/10/2004, 11/04/2005, 11/02/2006, 11/01/2007, 10/22/2008, 09/01/2009, 10/05/2010, 10/13/2011, 09/18/2012, 09/30/2013    Influenza Vaccine Im 4yrs+ 4 Valent CCIIV4 09/21/2021    Influenza Vaccine, 6+MO IM (QUADRIVALENT W/PRESERVATIVES) 09/27/2018, 09/19/2019    Mantoux Tuberculin Skin Test 08/02/2002, 08/02/2002, 07/22/2003, 07/22/2003, 07/16/2004, 07/16/2004, 07/28/2005, 07/28/2005, 09/18/2011, 09/18/2012, 08/21/2014, 09/29/2015, 10/16/2017    Pneumo Conj 13-V (2010&after) 09/27/2016    Pneumococcal 23 valent 10/14/2005, 05/28/2015    TD,PF 7+ (Tenivac) 06/15/2004    TDAP (Adacel,Boostrix) 03/13/2016, 05/11/2021    TDAP Vaccine (Boostrix) 06/10/2014    Td (Adult), Adsorbed 06/15/2004    Zoster recombinant adjuvanted (SHINGRIX) 09/27/2018, 03/13/2019          IMMUNIZATIONS Description Recommend today?     Influenza (flu shot) Helps to prevent flu; you should get it every year No; is up to date.   PCV 13 Prevents pneumonia; given once No; is up to date.   PPSV 23  Prevents pneumonia; given once No; is up to date.   Tetanus Prevents tetanus; need every 10 years No; is up to date.   Herpes Zoster (shingles) Prevents or lessens symptoms from shingles; given once No; is up to date.   Hepatitis B  If you have any of the following risk factors you should be immunized for hepatitis B: severe kidney disease, people who live in the same house as a carrier of Hepatitis B virus, people who live in  institutions (e.g. nursing homes or group homes), homosexual men, patients with hemophilia who received Factor VIII or IX concentrates, abusers of illicit injectable drugs No; is up to date.           Health Maintenance   Topic Date Due    DEPRESSION ACTION PLAN  Never done    COVID-19 Vaccine (6 - Moderna series) 02/03/2023    A1C  04/03/2023    MEDICARE ANNUAL WELLNESS VISIT  04/12/2023    DIABETIC FOOT EXAM  04/12/2023    LIPID  04/27/2023    MICROALBUMIN  04/27/2023    TSH W/FREE T4 REFLEX  05/31/2023    PHQ-9  08/13/2023    INFLUENZA VACCINE (1) 09/01/2023    BMP  10/03/2023    EYE EXAM  03/16/2024    FALL RISK ASSESSMENT  07/12/2024    COLORECTAL CANCER SCREENING  05/12/2025    MAMMO SCREENING  06/27/2025    ADVANCE CARE PLANNING  07/13/2028    DTAP/TDAP/TD IMMUNIZATION (5 - Td or Tdap) 05/11/2031    DEXA  06/02/2036    HEPATITIS C SCREENING  Completed    Pneumococcal Vaccine: 65+ Years  Completed    URINALYSIS  Completed    ZOSTER IMMUNIZATION  Completed    IPV IMMUNIZATION  Aged Out    MENINGITIS IMMUNIZATION  Aged Out       SCREENING TESTS     Description   Year of Last Screening   Recommended Today?   Heart disease screening blood tests  Cholesterol level Reducing cholesterol can reduce your risk of heart attacks by 25%.  Screening is recommended yearly if you are at risk of heart disease otherwise every 4-5 years  Yes; Recommended and ordered.   Diabetes screening tests  Hemoglobin A1c blood test   Finding and treating diabetes early can reduce complications.  Screening  recommended/covered yearly if you have high blood pressure, high cholesterol, obesity (BMI >30), or a history of high blood glucose tests; or 2 of the following: family history of diabetes, overweight (BMI >25 but <30), age 65 years or older, and a history of diabetes of pregnancy or gave birth to baby weighing more than 9 lbs.  Yes; Recommended and ordered.   Hepatitis B screening Finding hepatitis B early can reduce complications.  Screening is recommended for persons with selected risk factors.  No; is up to date.   Hepatitis C screening Finding hepatitis C early can reduce complications.  Screening is recommended for all persons born from 1945 through 1965 and for those with selected other risk factors.   No; is up to date.   HIV screening Finding HIV early can reduce complications.  Screening is recommended for persons with risk factors for HIV infection.  No; is up to date.   Glaucoma screening Early detection of glaucoma can prevent blindness.   Please talk to your eye doctor about this.           SCREENING TESTS     Description   Year of Last Screening   Recommended Today?   Colorectal cancer screening  Fecal occult blood test   Screening colonoscopy Screening for colon cancer has been shown to reduce death from colon cancer by 25-30%. Screening recommended to start at 50 years and continuing until age 75 years.    No; is up to date.   Breast Cancer Screening (women)  Mammogram Mammogram screening for breast cancer has been shown to reduce the risk of dying from breast cancer and prolong life. Screening is recommended every 1-2 years for women aged 50 to 74 years.   No; is up to date.   Cervical Cancer screening (women)  Pap Cervical pap smears can reduce cervical cancer. Screening is recommended annually if high risk (history of abnormal pap smears) otherwise every 2-3 years, stop screening at 65 years of age if history of normal paps.  No: is not indicated today.   Screening for Osteoporosis:  Bone mass  measurements (women)  Dexa Scan Screening and treating Osteoporosis can reduce the risk of hip and spine fractures. Screening is recommended in women 65 years or older and in women and men at risk of osteoporosis.  No; is up to date.   Screening for Lung Cancer   Low-dose CT scanning Screening can reduce mortality in persons aged 55-80 who have smoked at least 30 pack-years and who are either still smoking or have quit in the past 15 years.  No: is not indicated today.   Abdominal Aortic Aneurysm (AAA) screening  Ultrasound (US)   An aneurysm treated before rupture is very safe -a ruptured aneurysm can be fatal.  Screening  by US for AAA is limited to patients who meet one of the following criteria:  Men who are 65-75 years old and have smoked more than 100 cigarettes in their lifetime  Anyone with a family history of abdominal aortic aneurysm  No: is not indicated today.       MEDICARE WELLNESS EXAM PATIENT INSTRUCTIONS    Yearly exam:   See your health care provider every year in order to review changes in your health, review medicines that you take, and discuss preventive care needs such as immunizations and cancer screening.  Get a flu shot each year.     Advance Directive:  If you have not done so, you are encouraged to complete an advance directive. If you would like support with this, please contact the clinic  through the main clinic line. More information about advance directives can be found at:   [email or web address]    Nutrition:   Eat at least 5 servings of fruits and vegetables each day.   Eat whole-grain bread, whole-wheat pasta and brown rice instead of white grains and rice.   Talk to your doctor about Calcium and Vitamin D.     Lifestyle:  Exercise for at least 150 minutes a week (30 minutes a day, 5 days a week). This will help you control your weight and prevent disease.   Limit alcohol to one drink per day.   If you smoke, try to quit -  your doctor will be happy to help.   Wear sunscreen to prevent skin cancer.   See your dentist every six months for an exam and cleaning.   See your eye doctor every 1 to 2 years to screen for conditions such as glaucoma, macular degeneration and cataracts.

## 2023-07-18 DIAGNOSIS — H04.123 DRY EYE SYNDROME OF BOTH EYES: ICD-10-CM

## 2023-07-21 ENCOUNTER — TELEPHONE (OUTPATIENT)
Dept: PHARMACY | Facility: OTHER | Age: 73
End: 2023-07-21
Payer: COMMERCIAL

## 2023-07-21 NOTE — TELEPHONE ENCOUNTER
MTM referral from: Patient's insurance     MTM referral outreach attempt #1 on July 21, 2023      Outcome: left voicemail     Penny Martínez, PharmD   Medication Therapy Management

## 2023-07-25 RX ORDER — GLIPIZIDE 10 MG/1
2 TABLET ORAL 4 TIMES DAILY
Qty: 15 ML | Refills: 11 | Status: SHIPPED | OUTPATIENT
Start: 2023-07-25

## 2023-07-31 ENCOUNTER — DOCUMENTATION ONLY (OUTPATIENT)
Dept: FAMILY MEDICINE | Facility: CLINIC | Age: 73
End: 2023-07-31
Payer: COMMERCIAL

## 2023-07-31 NOTE — PROGRESS NOTES
"When opening a documentation only encounter, be sure to enter in \"Chief Complaint\" Forms and in \" Comments\" Title of form, description if needed.    Pinky is a 73 year old  female  Form received via: Fax  Form now resides in: Provider Ready    Marcos Anderson, [facility]              "

## 2023-08-08 ENCOUNTER — TELEPHONE (OUTPATIENT)
Dept: FAMILY MEDICINE | Facility: CLINIC | Age: 73
End: 2023-08-08
Payer: COMMERCIAL

## 2023-08-08 NOTE — TELEPHONE ENCOUNTER
[facility] phone call message - order or referral request for patient:     Order or referral being requested: Order (lab)      Additional Comments: Pastora is requesting a call back to discuss the patient's med list and to get some annual lab orders.    OK to leave a message on voice mail? Yes    Primary language: English      needed? No    Call taken on August 8, 2023 at 1:54 PM by Triny Jeffers

## 2023-08-11 NOTE — MR AVS SNAPSHOT
After Visit Summary   8/10/2017    Pinky Page    MRN: [id number]           Patient Information     Date Of Birth          1950        Visit Information        Provider Department      8/10/2017 11:30 AM Brianda Reddy MD [facility], [place], Sleep Study        Care Instructions    Relaxing bedtime routing with coloring, reading, avoid TV  Don't go to bed until sleepy-11-11:30, keep regular wake up time.  Work with [facility] to fix mask leak fit  Avoid falling asleep during the day-use alarm to keep naps short  Your BMI is Body mass index is 41.37 kg/(m^2).  Weight management is a personal decision.  If you are interested in exploring weight loss strategies, the following discussion covers the approaches that may be successful. Body mass index (BMI) is one way to tell whether you are at a healthy weight, overweight, or obese. It measures your weight in relation to your height.  A BMI of 18.5 to 24.9 is in the healthy range. A person with a BMI of 25 to 29.9 is considered overweight, and someone with a BMI of 30 or greater is considered obese. More than two-thirds of American adults are considered overweight or obese.  Being overweight or obese increases the risk for further weight gain. Excess weight may lead to heart disease and diabetes.  Creating and following plans for healthy eating and physical activity may help you improve your health.  Weight control is part of healthy lifestyle and includes exercise, emotional health, and healthy eating habits. Careful eating habits lifelong are the mainstay of weight control. Though there are significant health benefits from weight loss, long-term weight loss with diet alone may be very difficult to achieve- studies show long-term success with dietary management in less than 10% of people. Attaining a healthy weight may be especially difficult to achieve in those with severe obesity. In some cases, medications, devices and surgical management  might be considered.  What can you do?  If you are overweight or obese and are interested in methods for weight loss, you should discuss this with your provider.     Consider reducing daily calorie intake by 500 calories.     Keep a food journal.     Avoiding skipping meals, consider cutting portions instead.    Diet combined with exercise helps maintain muscle while optimizing fat loss. Strength training is particularly important for building and maintaining muscle mass. Exercise helps reduce stress, increase energy, and improves fitness. Increasing exercise without diet control, however, may not burn enough calories to loose weight.       Start walking three days a week 10-20 minutes at a time    Work towards walking thirty minutes five days a week     Eventually, increase the speed of your walking for 1-2 minutes at time    In addition, we recommend that you review healthy lifestyles and methods for weight loss available through the National Institutes of Health patient information sites:  http://win.niddk.nih.gov/publications/index.htm    And look into health and wellness programs that may be available through your health insurance provider, employer, local community center, or wally club.    Weight management plan: Patient was referred to their PCP to discuss a diet and exercise plan.     Your blood pressure was checked while you were in clinic today.  Please read the guidelines below about what these numbers mean and what you should do about them.  Your systolic blood pressure is the top number.  This is the pressure when the heart is pumping.  Your diastolic blood pressure is the bottom number.  This is the pressure in between beats.  If your systolic blood pressure is less than 120 and your diastolic blood pressure is less than 80, then your blood pressure is normal. There is nothing more that you need to do about it  If your systolic blood pressure is 120-139 or your diastolic blood pressure is 80-89, your  blood pressure may be higher than it should be.  You should have your blood pressure re-checked within a year by a primary care provider.  If your systolic blood pressure is 140 or greater or your diastolic blood pressure is 90 or greater, you may have high blood pressure.  High blood pressure is treatable, but if left untreated over time it can put you at risk for heart attack, stroke, or kidney failure.  You should have your blood pressure re-checked by a primary care provider within the next four weeks.                Follow-ups after your visit        Follow-up notes from your care team     Return in about 9 months (around 5/10/2018).      Your next 10 appointments already scheduled     Aug 28, 2017 11:30 AM CDT   (Arrive by 11:15 AM)   Return Visit with Marcos Magdaleno MD   [facility] ([facility])    [address] [place] [address]  [address] [facility] [address]   [phone]            Aug 28, 2017  1:00 PM CDT   (Arrive by 12:45 PM)   Return Visit with Damon Gr, PhD [facility] ([facility])    [address] [facility]  [address] [facility] [address]   [phone]            Sep 12, 2017  1:40 PM CDT   Return Visit with Shamika Kelley MD   [facility] ([facility])    [address] [place] [address],  [address]  [facility] [address]   [phone]            Sep 14, 2017  2:15 PM CDT   Adult Med Follow UP with Joana De Leon MD   Psychiatry Clinic ([facility])    [address]  [address] [facility] [address]   [phone]            Nov 14, 2017  1:30 PM CST   NEW GENERAL with Michael Lopez MD   Eye Clinic ([facility])    [facility]  [address] [facility]  [address] [address]   [phone]            May 10, 2018 11:30 AM CDT   Return Sleep Patient with Brianda Reddy MD  "  [facility]    [address] [place] [address] [phone]              Who to contact     If you have questions or need follow up information about today's clinic visit or your schedule please contact [facility] directly at [phone].  Normal or non-critical lab and imaging results will be communicated to you by MyChart, letter or phone within 4 business days after the clinic has received the results. If you do not hear from us within 7 days, please contact the clinic through MyChart or phone. If you have a critical or abnormal lab result, we will notify you by phone as soon as possible.  Submit refill requests through [facility] or call your pharmacy and they will forward the refill request to us. Please allow 3 business days for your refill to be completed.          Additional Information About Your Visit        [employer] Information     [facility] lets you send messages to your doctor, view your test results, renew your prescriptions, schedule appointments and more. To sign up, go to [email or web address] . Click on \"Log in\" on the left side of the screen, which will take you to the Welcome page. Then click on \"Sign up Now\" on the right side of the page.     You will be asked to enter the access code listed below, as well as some personal information. Please follow the directions to create your username and password.     Your access code is: [email or web address]  Expires: 2017  7:15 PM     Your access code will  in 90 days. If you need help or a new code, please call your [place] clinic or [phone].        Care EveryWhere ID     This is your Care EveryWhere ID. This could be used by other organizations to access your [place] medical records  [id number]        Your Vitals Were     Pulse Respirations Height Pulse Oximetry BMI (Body Mass Index)       73 18 1.626 m (5' 4\") 97% 41.37 kg/m2        Blood " Pressure from Last 3 Encounters:   08/10/17 169/60   07/26/17 133/81   06/22/17 150/78    Weight from Last 3 Encounters:   08/10/17 109.3 kg (241 lb)   07/26/17 108.7 kg (239 lb 9.6 oz)   06/22/17 111.6 kg (246 lb)              Today, you had the following     No orders found for display         Today's Medication Changes          These changes are accurate as of: 8/10/17 12:21 PM.  If you have any questions, ask your nurse or doctor.               These medicines have changed or have updated prescriptions.        Dose/Directions    * METFORMIN HCL PO   This may have changed:  Another medication with the same name was changed. Make sure you understand how and when to take each.        Dose:  1000 mg   Take 1,000 mg by mouth 2 times daily (with meals)   Refills:  0       * metFORMIN 500 MG 24 hr tablet   Commonly known as:  GLUCOPHAGE-XR   This may have changed:    - how much to take  - when to take this   Used for:  Diabetes mellitus, type 2 (H)        Dose:  1000 mg   Take 2 tablets (1,000 mg) by mouth 2 times daily (with meals)   Quantity:  90 tablet   Refills:  3       * Notice:  This list has 2 medication(s) that are the same as other medications prescribed for you. Read the directions carefully, and ask your doctor or other care provider to review them with you.             Primary Care Provider Office Phone # Fax #    Shamika Ileana Kelley [phone]       [facility] [address] [facility] [address]        Equal Access to Services     JAYDEN STUART AH: Hadsaira Dietrich, waaxda eligio, qaybta kaalmada daxa, waxay lisa josé. So [facility] [phone].    ATENCIÓN: Si habla español, tiene a deluca disposición servicios gratuitos de asistencia lingüística. Llame al [phone].    We comply with applicable federal civil rights laws and [place] laws. We do not discriminate on the basis of race, color, national origin, age, disability sex,  sexual orientation or gender identity.            Thank you!     Thank you for choosing [facility] [place], SLEEP STUDY  for your care. Our goal is always to provide you with excellent care. Hearing back from our patients is one way we can continue to improve our services. Please take a few minutes to complete the written survey that you may receive in the mail after your visit with us. Thank you!             Your Updated Medication List - Protect others around you: Learn how to safely use, store and throw away your medicines at www.disposemymeds.org.          This list is accurate as of: 8/10/17 12:21 PM.  Always use your most recent med list.                   Brand Name Dispense Instructions for use Diagnosis    acetaminophen 500 MG tablet    TYLENOL    1 Bottle    Take 2 tablets (1,000 mg) by mouth every 6 hours as needed    Sprain of left ankle, unspecified ligament, initial encounter       alum & mag hydroxide-simethicone 200-200-20 MG/5ML Susp suspension    MYLANTA/MAALOX     Take 30 mLs by mouth daily as needed for indigestion        amLODIPine 5 MG tablet    NORVASC    30 tablet    Take 2 tablets (10 mg) by mouth daily    Essential hypertension with goal blood pressure less than 130/85       artificial saliva Aers spray      Take 1 spray by mouth 3 times daily as needed for dry mouth        ARTIFICIAL TEARS OP      Apply 1 drop to eye 4 times daily.        aspirin 81 MG tablet     100    Take 1 tablet by mouth daily. ENTERIC COATED.        atorvastatin 40 MG tablet    LIPITOR    90 tablet    Take 1 tablet (40 mg) by mouth daily    Hyperlipidemia LDL goal <100       blood glucose monitoring test strip    no brand specified    100 each    Use to test blood sugar 3 times daily or as directed.    Type 2 diabetes mellitus with microalbuminuria, with long-term current use of insulin (H)       calcium polycarbophil 625 MG tablet    FIBERCON     Take 2 tablets by mouth daily        calcium-vitamin D 250-125 MG-UNIT  Tabs      Take 1 tablet by mouth 2 times daily. Calcium 250 mg/Vit D 125 IU        CLARITIN 10 MG tablet   Generic drug:  loratadine     30    1 TAB PO QD (Once per day) as needed for ALLERGY SYMPTOMS        clotrimazole 1 % cream    LOTRIMIN    50 g    Apply topically 2 times daily as needed    Dermatophytosis       eucerin cream      Apply  topically as needed. Apply to thigh PRN dry skin        exenatide 10 MCG/0.04ML injection    BYETTA    1 Syringe    Inject 10 mcg Subcutaneous 2 times daily (before meals)    Type 2 diabetes mellitus with microalbuminuria, with long-term current use of insulin (H)       FLUoxetine 40 MG capsule    PROzac    30 capsule    Take 1 capsule (40 mg) by mouth daily    Schizoaffective disorder, depressive type (H)       fluticasone 50 MCG/ACT spray    FLONASE    16 g    Spray 1 spray into both nostrils daily    Seasonal allergic rhinitis       furosemide 20 MG tablet    LASIX    60 tablet    Take 1 tablet (20 mg) by mouth 2 times daily    Lower leg edema       GEL-SASHA DENTINBLOC DT      Apply  to affected area. GEL. Apply to 2nd molar on bottom right daily at bedtime.        GLUCAGON EMERGENCY 1 MG kit   Generic drug:  glucagon      Inject  as directed. 1 mL as needed for signs of hypoglycemia. May repeat as needed in 15 minutes.        HYDROcodone-acetaminophen 5-325 MG per tablet    NORCO     Take 1 tablet by mouth every 6 hours as needed 1-2 tabs        Incontinence Brief Large Misc     60 Units    2 Units 2 times daily    Urge incontinence, Nocturnal enuresis       insulin glargine 100 UNIT/ML injection    LANTUS    8 mL    Inject 24 Units Subcutaneous At Bedtime    Type 2 diabetes mellitus with microalbuminuria, with long-term current use of insulin (H)       LACRI-LUBE OP      Apply  to eye. Place 0.5 inches into both eyes at bedtime        LACTAID 3000 UNIT tablet   Generic drug:  lactase      Take 4 tabs daily with meals.        levothyroxine 175 MCG tablet     SYNTHROID/LEVOTHROID    30 tablet    Take 1 tablet (175 mcg) by mouth daily Give on empty stomach    Other specified hypothyroidism       LORazepam 1 MG tablet    ATIVAN    30 tablet    Take 1 tablet (1 mg) by mouth At Bedtime .  May take additional 1mg daily PRN for agitation.    Generalized anxiety disorder       losartan 100 MG tablet    COZAAR    90 tablet    Take 1 tablet by mouth daily.    Hypertension goal BP (blood pressure) < 130/80, Diabetes mellitus type 2, insulin dependent (H), CKD (chronic kidney disease) stage 2, GFR 60-89 ml/min       * METFORMIN HCL PO      Take 1,000 mg by mouth 2 times daily (with meals)        * metFORMIN 500 MG 24 hr tablet    GLUCOPHAGE-XR    90 tablet    Take 2 tablets (1,000 mg) by mouth 2 times daily (with meals)    Diabetes mellitus, type 2 (H)       MILK OF MAGNESIA PO      Take  by mouth. Take 30 mL as needed for constipation.        NEURONTIN PO      Take 900 mg by mouth 3 times daily.        nystatin 564598 UNIT/GM Powd    MYCOSTATIN    60 g    Apply topically 3 times daily as needed    Candidiasis of skin       polyethylene glycol powder    MIRALAX/GLYCOLAX     Take 1 capful by mouth 2 times daily. 17 GM PO BID        PREVIDENT 5000 PLUS 1.1 % Crea   Generic drug:  Sodium Fluoride      Apply  to affected area every evening.        saline 0.9 % Soln      Spray 2 sprays in nostril as needed.        SENNA S 8.6-50 MG per tablet   Generic drug:  senna-docusate      Take 2 tablets by mouth At Bedtime.        solifenacin 10 MG tablet    VESICARE    30 tablet    Take 1 tablet (10 mg) by mouth daily    Urge incontinence       ziprasidone 40 MG capsule    GEODON    60 capsule    Take 1 capsule (40 mg) by mouth 2 times daily (with meals)    Schizoaffective disorder, depressive type (H)       * Notice:  This list has 2 medication(s) that are the same as other medications prescribed for you. Read the directions carefully, and ask your doctor or other care provider to review them  with you.       101

## 2023-08-21 ENCOUNTER — OFFICE VISIT (OUTPATIENT)
Dept: FAMILY MEDICINE | Facility: CLINIC | Age: 73
End: 2023-08-21
Payer: COMMERCIAL

## 2023-08-21 VITALS
BODY MASS INDEX: 36.43 KG/M2 | SYSTOLIC BLOOD PRESSURE: 148 MMHG | DIASTOLIC BLOOD PRESSURE: 82 MMHG | HEART RATE: 79 BPM | RESPIRATION RATE: 19 BRPM | OXYGEN SATURATION: 99 % | WEIGHT: 216 LBS

## 2023-08-21 DIAGNOSIS — Z78.9 HEALTH MAINTENANCE ALTERATION: ICD-10-CM

## 2023-08-21 DIAGNOSIS — Z79.4 TYPE 2 DIABETES MELLITUS WITH MICROALBUMINURIA, WITH LONG-TERM CURRENT USE OF INSULIN (H): Primary | ICD-10-CM

## 2023-08-21 DIAGNOSIS — R80.9 TYPE 2 DIABETES MELLITUS WITH MICROALBUMINURIA, WITH LONG-TERM CURRENT USE OF INSULIN (H): Primary | ICD-10-CM

## 2023-08-21 DIAGNOSIS — E11.29 TYPE 2 DIABETES MELLITUS WITH MICROALBUMINURIA, WITH LONG-TERM CURRENT USE OF INSULIN (H): Primary | ICD-10-CM

## 2023-08-21 LAB
CREAT UR-MCNC: 31.2 MG/DL
MICROALBUMIN UR-MCNC: <12 MG/L
MICROALBUMIN/CREAT UR: NORMAL MG/G{CREAT}

## 2023-08-21 PROCEDURE — 0124A COVID-19 BIVALENT 12+ (PFIZER): CPT

## 2023-08-21 PROCEDURE — 91312 COVID-19 BIVALENT 12+ (PFIZER): CPT

## 2023-08-21 PROCEDURE — 99213 OFFICE O/P EST LOW 20 MIN: CPT | Mod: 25

## 2023-08-21 PROCEDURE — 82043 UR ALBUMIN QUANTITATIVE: CPT

## 2023-08-21 PROCEDURE — 82570 ASSAY OF URINE CREATININE: CPT

## 2023-08-21 ASSESSMENT — ENCOUNTER SYMPTOMS: CONSTIPATION: 1

## 2023-08-21 NOTE — LETTER
September 6, 2023      Pinky Page  [address] [facility] [address]        Dear ,    We are writing to inform you of your test results.    Your urine testing was normal and reassuring. It did now show protein in the urine. We will continue to check this every year due to your diabetes.     Resulted Orders   Albumin Random Urine Quantitative with Creat Ratio   Result Value Ref Range    Creatinine Urine mg/dL 31.2 mg/dL      Comment:      The reference ranges have not been established in urine creatinine. The results should be integrated into the clinical context for interpretation.    Albumin Urine mg/L <12.0 mg/L      Comment:      The reference ranges have not been established in urine albumin. The results should be integrated into the clinical context for interpretation.    Albumin Urine mg/g Cr        Comment:      Unable to calculate, urine albumin and/or urine creatinine is outside detectable limits.  Microalbuminuria is defined as an albumin:creatinine ratio of 17 to 299 for males and 25 to 299 for females. A ratio of albumin:creatinine of 300 or higher is indicative of overt proteinuria.  Due to biologic variability, positive results should be confirmed by a second, first-morning random or 24-hour timed urine specimen. If there is discrepancy, a third specimen is recommended. When 2 out of 3 results are in the microalbuminuria range, this is evidence for incipient nephropathy and warrants increased efforts at glucose control, blood pressure control, and institution of therapy with an angiotensin-converting-enzyme (ACE) inhibitor (if the patient can tolerate it).         If you have any questions or concerns, please call the clinic at the number listed above.       Sincerely,      Deacon Birmingham MD

## 2023-08-21 NOTE — LETTER
August 31, 2023      Pinky Page  [address] [facility] [address]        Dear ,    We are writing to inform you of your test results.    {results letter list:[id number]}    Resulted Orders   Albumin Random Urine Quantitative with Creat Ratio   Result Value Ref Range    Creatinine Urine mg/dL 31.2 mg/dL      Comment:      The reference ranges have not been established in urine creatinine. The results should be integrated into the clinical context for interpretation.    Albumin Urine mg/L <12.0 mg/L      Comment:      The reference ranges have not been established in urine albumin. The results should be integrated into the clinical context for interpretation.    Albumin Urine mg/g Cr        Comment:      Unable to calculate, urine albumin and/or urine creatinine is outside detectable limits.  Microalbuminuria is defined as an albumin:creatinine ratio of 17 to 299 for males and 25 to 299 for females. A ratio of albumin:creatinine of 300 or higher is indicative of overt proteinuria.  Due to biologic variability, positive results should be confirmed by a second, first-morning random or 24-hour timed urine specimen. If there is discrepancy, a third specimen is recommended. When 2 out of 3 results are in the microalbuminuria range, this is evidence for incipient nephropathy and warrants increased efforts at glucose control, blood pressure control, and institution of therapy with an angiotensin-converting-enzyme (ACE) inhibitor (if the patient can tolerate it).         If you have any questions or concerns, please call the clinic at the number listed above.       Sincerely,      Deacon Birmingham MD

## 2023-08-21 NOTE — LETTER
[facility]  [address]  [address]  [facility] [address]  [phone]       August 21, 2023      Pinky Page  [address] [address]      To Whom It May Concern,     Ms. Page was seen in our clinic for labs (A1c, TSH, and creatinine) today. She has had all of these done recently at her annual check up 7/13/23 and does not need to have these repeated. Her A1c had improved and was within goal at 7.3%. Her thyroid testing and kidney function were normal. Please contact our clinic with any questions.     Allie Ramirez MD

## 2023-08-21 NOTE — PROGRESS NOTES
Assessment & Plan     Type 2 diabetes mellitus with microalbuminuria, with long-term current use of insulin (H)  At goal with last A1c 7.3% 7/13/2023. Foot exam and microalbumin updated. Otherwise UTD on care.   - Continue Lantus 15U PM, Metformin 1000 mg BID, Victoza 1.8mg subQ daily   - Continue Losartan 100 mg daily   - Continue Atorvastatin 40 mg daily      Health maintenance alteration  Vaccines updated.   - COVID-19 BIVALENT 12+ (PFIZER)    Allie Ramirez MD  [facility] MICHAEL Vance is a 73 year old, presenting for the following health issues:    HPI     Diabetes follow-up:  - Last A1c: 7.3% 7/13/23   - Current medications: Lantus 15U PM, Metformin 1000 mg BID, Victoza 1.8mg subQ daily  - Problems taking medications: None   - BG values: Checks BID. 0600 (fasting; 103) and 1630 (pre-prandial 175).   - Diet: Eats variety. Lives at [facility]   - Exercise: Ride stationary bike and walks every day   - Last DM foot exam: Due  - Last DM eye exam: UTD.   - Last urine microalbumin: Due. On ARB.   - Preventative medications:   ASA - Not indicated for primary prevention. On Eliquis for AF.     ACEI/ARB - Prescribed Losartan 100 mg daily     Statin - Prescribed Atorvastatin 40 mg daily     Labs:  - Told she needed to get TSH and Cr. These were done recently. Needs letter to show facility     HTN:  - Blood pressure slightly high today   - Took BP medications   - States she was walking in when they took it   - Checks at facility and usually <140   - High every time she is at doctor and dentist     Review of Systems   Gastrointestinal:  Positive for constipation.   All other systems reviewed and are negative.         Objective    BP (!) 148/82 (BP Location: Right arm, Patient Position: Sitting, Cuff Size: Adult Regular)   Pulse 79   Resp 19   Wt 98 kg (216 lb)   LMP  (LMP Unknown)   SpO2 99%   BMI 36.43 kg/m    Body mass index is 36.43 kg/m .  Physical  Exam  Vitals reviewed.   Constitutional:       General: She is not in acute distress.     Appearance: Normal appearance.   HENT:      Head: Normocephalic and atraumatic.   Cardiovascular:      Rate and Rhythm: Normal rate and regular rhythm.      Heart sounds: No murmur heard.  Pulmonary:      Effort: Pulmonary effort is normal. No respiratory distress.      Breath sounds: No wheezing.   Musculoskeletal:      Right lower leg: No edema.      Left lower leg: No edema.   Feet:      Right foot:      Protective Sensation: 10 sites tested.  10 sites sensed.      Skin integrity: Skin integrity normal.      Toenail Condition: Right toenails are normal.      Left foot:      Protective Sensation: 10 sites tested.  10 sites sensed.      Skin integrity: Skin integrity normal.      Toenail Condition: Left toenails are normal.   Skin:     General: Skin is warm and dry.      Capillary Refill: Capillary refill takes less than 2 seconds.   Neurological:      General: No focal deficit present.      Mental Status: She is alert.   Psychiatric:         Mood and Affect: Mood normal.         Behavior: Behavior normal.          Diabetic Foot Screen:  Any complaints of increased pain or numbness ? No   Is there a foot ulcer now or a history of foot ulcer? No   Does the foot have an abnormal shape? No   Are the nails thick, too long or ingrown? No   Are there any redness or open areas? No            Sensation Testing done at all points on the diagram with monofilament     Right Foot: Sensation Absent at the following points  and 9  Left Foot: Sensation Normal at all points     Risk Category: 1- Loss of protective sensation with normal appearing foot (no weakness, deformity, callous, pre-ulcer or h/o ulceration)  Performed by Allie Ramirez MD    ----- Service Performed and Documented by Resident or Fellow ------

## 2023-08-21 NOTE — PATIENT INSTRUCTIONS
Patient Education   Here is the plan from today's visit    URINE TEST TODAY FOR DIABETES- WILL MAIL LETTER WITH RESULT     Please call or return to clinic if your symptoms don't go away.    Follow up plan  Return in about 2 months (around 10/21/2023).    Thank you for coming to [facility] today.  Lab Testing:  **If you had lab testing today and your results are reassuring or normal they will be mailed to you or sent through Scirra within 7 days.   **If the lab tests need quick action we will call you with the results.  **If you are having labs done on a different day, please call [phone] to schedule at [facility] or [phone] for other [facility] Outpatient Lab locations. Labs do not offer walk-in appointments.  The phone number we will call with results is # [phone] (home) . If this is not the best number please call our clinic and change the number.  Medication Refills:  If you need any refills please call your pharmacy and they will contact us.   If you need to  your refill at a new pharmacy, please contact the new pharmacy directly. The new pharmacy will help you get your medications transferred faster.   Scheduling:  If you have any concerns about today's visit or wish to schedule another appointment please call our office during normal business hours [phone] (8-5:00 M-F). If you can no longer make a scheduled visit, please cancel via Scirra or call us to cancel.   If a referral was made to an [facility] specialty provider and you do not get a call from central scheduling, please refer to directions on your visit summary or call our office during normal business hours for assistance.   If a Mammogram was ordered for you at the Breast Center call [phone] to schedule or change your appointment.  If you had an XRay/CT/Ultrasound/MRI ordered the number is [phone] to schedule or change your radiology appointment.   [facility] has limited ultrasound  appointments available on Wednesdays, if you would like your ultrasound at [facility], please call [phone] to schedule.   Medical Concerns:  If you have urgent medical concerns please call [phone] at any time of the day.    Allie Ramirez MD

## 2023-08-24 ENCOUNTER — THERAPY VISIT (OUTPATIENT)
Dept: OCCUPATIONAL THERAPY | Facility: CLINIC | Age: 73
End: 2023-08-24
Attending: NURSE PRACTITIONER
Payer: COMMERCIAL

## 2023-08-24 DIAGNOSIS — Z74.09 IMPAIRED MOBILITY AND ADLS: Primary | ICD-10-CM

## 2023-08-24 DIAGNOSIS — Z78.9 IMPAIRED MOBILITY AND ADLS: Primary | ICD-10-CM

## 2023-08-24 DIAGNOSIS — R25.1 TREMOR: ICD-10-CM

## 2023-08-24 PROCEDURE — 97535 SELF CARE MNGMENT TRAINING: CPT | Mod: GO | Performed by: OCCUPATIONAL THERAPIST

## 2023-08-24 PROCEDURE — 97165 OT EVAL LOW COMPLEX 30 MIN: CPT | Mod: GO | Performed by: OCCUPATIONAL THERAPIST

## 2023-08-24 NOTE — PROGRESS NOTES
OCCUPATIONAL THERAPY EVALUATION  Type of Visit: Evaluation    See electronic medical record for Abuse and Falls Screening details.    Subjective      Presenting condition or subjective complaint:    Date of onset: 07/12/23 (order) 7/12/23- order   Relevant medical history:   73 year old right - handed  female with a longstanding history of schizoaffective disorder and depression, and long term use of psychotropic medications. Today's exam shows mixed action and resting tremors and some parkinsonian features including bradykinesia, stiffness, and gait and balance difficulty.     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, cleaning the dining room   Hobbies/Interests:  walking, cards    Patient goals for therapy:  tremor mgmt    Pain assessment: shoulder from covid shot     Objective     Cognitive Status Examination  Orientation: Oriented to person, place and time   Level of Consciousness: Alert  Executive Function:  impairment noted in chart and per patient from ECT , appropriate today    VISUAL SKILLS  Visual Acuity: Wears glasses    SENSATION: UE Sensation WNL    POSTURE:  forward head  RANGE OF MOTION: UE AROM WNL, UE AROM WFL  STRENGTH: UE Strength WNL, UE Strength WFL  MUSCLE TONE: WNL  COORDINATION: Tremors: Resting  Left Hand, Nine Hole Peg Test (sec): 37.6  Right Hand, Nine Hole Peg Test (sec): 40.13    FUNCTIONAL MOBILITY  Assistive Device(s): Cane (single end)    BED MOBILITY: WNL    TRANSFERS: WNL    BATHING: Independent  Equipment: Grab bar    UPPER BODY DRESSING: Independent  Some challenges with buttons    LOWER BODY DRESSING: Independent  Will just wait tremor out and attempt to tie shoes    TOILETING:  Independent  Equipment: Grab bar    GROOMING: Independent    EATING/SELF FEEDING: Independent   Equipment:  uses two hands    ACTIVITY TOLERANCE: daily naps    INSTRUMENTAL ACTIVITIES OF DAILY LIVING (IADL): Lives at [facility] whom gives her meds  Meal Planning/Prep: 3 meals provided  Home/Financial Management: Reports independence  Communication/Computer Use: smart phone, will use her L hand  Community Mobility: medical ride or staff gives rides    Assessment & Plan   CLINICAL IMPRESSIONS  Medical Diagnosis: tremor    Treatment Diagnosis: impaired participaition in adls and iadls    Impression/Assessment: Pt is a 73 year old female presenting to Occupational Therapy due to tremors.  The following significant findings have been identified: Impaired coordination.  These identified deficits interfere with their ability to perform self care tasks, work tasks, recreational activities, and community mobility as compared to previous level of function.   Patient's limitations or Problem List includes: Decreased coordination and Decreased dexterity of the bilateral hand which interferes with the patient's ability to perform Self Care Tasks (dressing, eating) and Work Tasks as compared to previous level of function.    Clinical Decision Making (Complexity):  Assessment of Occupational Performance: 1-3 Performance Deficits  Occupational Performance Limitations: dressing, feeding, and writing  Clinical Decision Making (Complexity): Low complexity    PLAN OF CARE  Treatment Interventions:  Interventions: Self-Care/Home Management    Long Term Goals   OT Goal 1  Goal Identifier: Tremor mgmt  Goal Description: Will demo 3-5 new ways to manage tremor to increase adl independence and ease of writing  Rationale: In order to maximize safety and independence with performance of self-care activities  Goal Progress: training completed today  Target Date: 08/24/23  Date Met: 08/24/23      Frequency of Treatment: once  Duration of  Treatment: once     Recommended Referrals to Other Professionals:  na  Education Assessment: Learner/Method: Patient     Risks and benefits of evaluation/treatment have been explained.   Patient/Family/caregiver agrees with Plan of Care.     Evaluation Time:    OT Eval, Low Complexity Minutes (48229): 30    Signing Clinician: VIDAL Rg [facility]                                                                                   OUTPATIENT OCCUPATIONAL THERAPY      PLAN OF TREATMENT FOR OUTPATIENT REHABILITATION   Patient's Last Name, First Name, Pinky Franco YOB: 1950   Provider's Name   [facility]   Medical Record No.  [id number]     Onset Date: 07/12/23 (order) Start of Care Date:  8/24/23     Medical Diagnosis:  tremor      OT Treatment Diagnosis:  impaired participaition in adls and iadls Plan of Treatment  Frequency/Duration:once/once    Certification date from 8/24/24   To          See note for plan of treatment details and functional goals     Justine Dyson OT                         I CERTIFY THE NEED FOR THESE SERVICES FURNISHED UNDER        THIS PLAN OF TREATMENT AND WHILE UNDER MY CARE     (Physician attestation of this document indicates review and certification of the therapy plan).                Referring Provider:  Fabio Lozada      Initial Assessment  See Epic Evaluation-

## 2023-09-01 ENCOUNTER — OFFICE VISIT (OUTPATIENT)
Dept: PSYCHOLOGY | Facility: CLINIC | Age: 73
End: 2023-09-01
Payer: COMMERCIAL

## 2023-09-01 VITALS — WEIGHT: 217.8 LBS | BODY MASS INDEX: 36.73 KG/M2

## 2023-09-01 DIAGNOSIS — F41.1 GENERALIZED ANXIETY DISORDER: ICD-10-CM

## 2023-09-01 DIAGNOSIS — F33.0 MAJOR DEPRESSIVE DISORDER, RECURRENT EPISODE, MILD (H): Primary | ICD-10-CM

## 2023-09-01 DIAGNOSIS — E66.9 OBESITY, UNSPECIFIED OBESITY SEVERITY, UNSPECIFIED OBESITY TYPE: ICD-10-CM

## 2023-09-01 DIAGNOSIS — F54 PSYCHOLOGICAL FACTORS AFFECTING MEDICAL CONDITION: ICD-10-CM

## 2023-09-01 PROCEDURE — 90837 PSYTX W PT 60 MINUTES: CPT | Performed by: PSYCHOLOGIST

## 2023-09-01 NOTE — PROGRESS NOTES
Health Psychology                    Department of Medicine  Keri Arnett, Ph.D., L.P. [phone]                         [facility] Madelaine Ingram, Ph.D., L.P. [phone]                     [place] Mail [address]   Dennis Dillard, Ph.D. [phone]      [address] [place] [address] Rae Eugene, Ph.D., A.B.P.P., L.P. [phone]              [place] [address]           Damon Gr, Ph.D., A.B.P.P., L.P. [phone]      Kimber Morales, Ph.D., L.P. [phone]  [facility]   Lilia Burroughs, Ph.D., A.B.P.P., L.P. 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 She is getting better at setting it up with help from [facility] staff.  Intake with Health Psychology was in the 1980s.  I began to see her in the early 1990s.     She has had a great month.  She went to the [employer], a [employer], a day trip to [place], and took [employer] with her roommate to [facility].    Stressors:  She is trying to do some extra volunteering with her. She likes her manager.      Mood: Her depression is better than baseline today, 2-3/10 today.     Health:  Leg is improving; still sing cane.    NO COVID cases for patients, though one staff is positive at [facility].      She continues to have shaking in her hands,  Saw Neurology.  Has developed some shakiness in her left leg.  She continues to sing under her breath at times.    Psychoactive Medications:  - Continue ziprasidone 40 mg BID  - Continue fluoxetine 40 mg daily  - Continue melatonin 6mg at bedtime    Exercise: Back to walking after she eats for 10-15 minutes.  Sydkhy79 minutes daily.  Will discuss increasing to 40next session.  Walking with cane, trying to decrease dependence on  "Medications Prior to Admission   Medication Sig Dispense Refill Last Dose    acetaminophen (TYLENOL) 500 MG tablet Take 1,000 mg by mouth every 6 (six) hours as needed for Pain.       cephALEXin (KEFLEX) 500 MG capsule Take 1 capsule (500 mg total) by mouth every evening. Start after you complete the cipro. (Patient not taking: No sig reported) 30 capsule 0     collagenase (SANTYL) ointment Apply topically once daily. (Patient not taking: No sig reported)  0     ferrous sulfate (FEOSOL) 325 mg (65 mg iron) Tab tablet Take 325 mg by mouth 2 (two) times daily.       ibuprofen (ADVIL,MOTRIN) 200 MG tablet Take 200 mg by mouth every 6 (six) hours as needed for Pain.       levETIRAcetam (KEPPRA) 500 MG Tab Take 1 tablet (500 mg total) by mouth 2 (two) times daily. 60 tablet 11        Review of patient's allergies indicates:   Allergen Reactions    Latex, natural rubber Anaphylaxis and Other (See Comments)     angioedema    Zofran [ondansetron hcl (pf)] Swelling     Hives, "throat closes up"    Gardasil  [h papillomavirus vac,qval (pf)]      Other reaction(s): Shortness of breath    Menactra  [mening vac a,c,y,w135 dip (pf)]      Other reaction(s): Shortness of breath    Nitrofurantoin      Other reaction(s): Difficulty breathing    Sulfa (sulfonamide antibiotics)     Tramadol Hives    Levaquin [levofloxacin] Rash     Spots on face    Macrobid [nitrofurantoin monohyd/m-cryst] Rash     Sppots/rash on face       Past Medical History:   Diagnosis Date    Anemia     Arnold-Chiari malformation, type II     Encounter for blood transfusion     Hydrocephalus     Neurogenic bladder     self catheterizes every 3 hours    Seizures     only w/ shunt malfunction    Spinal bifida, closed     paralysis at T7     Past Surgical History:   Procedure Laterality Date    AMPUTATION      right 4th and 5th toes; left 5th toe and portion of left great toe    BACK SURGERY      BLADDER SURGERY      BREAST SURGERY  2015    Reduction    COLON " SURGERY      flush site for bowel movements from appendix    CYSTOSTOMY      DEBRIDEMENT OF FOOT Right 1/8/2021    Procedure: DEBRIDEMENT, FOOT;  Surgeon: Abdi Bedoya DPM;  Location: [facility];  Service: Podiatry;  Laterality: Right;    EYE SURGERY      x 5    FLUOROSCOPIC URODYNAMIC STUDY N/A 3/12/2019    Procedure: URODYNAMIC STUDY, FLUOROSCOPIC;  Surgeon: Kenzie Charles MD;  Location: [facility] 1ST FLR;  Service: Urology;  Laterality: N/A;  1 hour    FLUOROSCOPIC URODYNAMIC STUDY N/A 4/4/2019    Procedure: URODYNAMIC STUDY, FLUOROSCOPIC;  Surgeon: Kenzie Charles MD;  Location: [facility] OR 1ST FLR;  Service: Urology;  Laterality: N/A;  1 hour    FLUOROSCOPIC URODYNAMIC STUDY N/A 5/11/2022    Procedure: URODYNAMIC STUDY, FLUOROSCOPIC;  Surgeon: Kenzie Charles MD;  [address];  Service: Urology;  Laterality: N/A;  90min    FOOT AMPUTATION THROUGH METATARSAL Right 10/28/2019    Procedure: AMPUTATION, FOOT, TRANSMETATARSAL;  Surgeon: Abdi Bedoya DPM;  Location: [facility];  Service: Podiatry;  Laterality: Right;    FOOT AMPUTATION THROUGH METATARSAL Right 3/27/2020    Procedure: AMPUTATION, FOOT, TRANSMETATARSAL;  Surgeon: Abdi Bedoya DPM;  Location: [facility];  Service: Podiatry;  Laterality: Right;  REVISION    MYELOMININGOCELE REPAIR      NEPHRECTOMY Right 11/4/2021    Procedure: Nephrectomy/ OPEN;  Surgeon: Dash Rosenthal MD;  Location: [facility] OR 2ND FLR;  Service: Urology;  Laterality: Right;  4HRS    NEPHROSTOMY Right 8/2/2021    Procedure: Nephrostomy and perinephric abscess drain;  Surgeon: Lillian Surgeon;  Location: [facility];  Service: Anesthesiology;  Laterality: Right;    REVISION OF VENTRICULOPERITONEAL SHUNT Left 9/21/2022    Procedure: REVISION, SHUNT, VENTRICULOPERITONEAL;  Surgeon: Maynor Calero MD;  Location: [facility] OR 2ND FLR;  Service: Neurosurgery;  Laterality: Left;    STRABISMUS SURGERY      ou dr peña    VENTRICULOPERITONEAL SHUNT      WOUND DEBRIDEMENT   it.    Weight: Her weight was 217.8 down from  218.1 last session.    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    She participates fully, ventilated feelings appropriately.  She appears to derive benefit from the support.     Current Outpatient Medications   Medication    acetaminophen (TYLENOL) 500 MG tablet    alum & mag hydroxide-simethicone (MYLANTA/MAALOX) 200-200-20 MG/5ML SUSP suspension    amLODIPine (NORVASC) 10 MG tablet    apixaban ANTICOAGULANT (ELIQUIS) 5 MG tablet    artificial saliva (BIOTENE MT) AERS spray    artificial tears (GENTEAL) 0.1-0.2-0.3 % ophthalmic solution    atorvastatin (LIPITOR) 40 MG tablet    blood glucose (NO BRAND SPECIFIED) lancets standard    blood glucose (NO BRAND SPECIFIED) test strip    blood glucose calibration (NO BRAND SPECIFIED) solution    blood glucose monitoring (NO BRAND SPECIFIED) meter device kit    Calcium Carb-Cholecalciferol (CALCIUM-VITAMIN D3) 250-3.125 MG-MCG TABS    Calcium Carbonate-Vitamin D (CALCIUM-VITAMIN D) 250-125 MG-UNIT TABS    calcium polycarbophil (FIBERCON) 625 MG tablet    CLARITIN 10 MG OR TABS    conjugated estrogens (PREMARIN) 0.625 MG/GM vaginal cream    DENTA 5000 PLUS 1.1 % CREA    erythromycin (ROMYCIN) 5 MG/GM ophthalmic ointment    fluorometholone (FML LIQUIFILM) 0.1 % ophthalmic suspension    FLUoxetine (PROZAC) 20 MG capsule    fluticasone (FLONASE) 50 MCG/ACT nasal spray    furosemide (LASIX) 20 MG tablet    gabapentin (NEURONTIN) 600 MG tablet    Hypromellose (ARTIFICIAL TEARS OP)    insulin glargine (LANTUS PEN) 100 UNIT/ML pen    lactase (LACTAID) 3000 UNIT tablet    levothyroxine (SYNTHROID/LEVOTHROID) 175 MCG tablet    lidocaine (XYLOCAINE) 2 % external gel    lidocaine (XYLOCAINE) 5 % external ointment    lifitegrast (XIIDRA) 5 % opthalmic solution    liraglutide (VICTOZA) 18 MG/3ML solution    losartan (COZAAR) 100 MG tablet    Magnesium Hydroxide (MILK OF MAGNESIA PO)    melatonin 3  3/27/2020    Procedure: DEBRIDEMENT, WOUND;  Surgeon: Abdi Bedoya DPM;  Location: [facility];  Service: Podiatry;;     Family History       Problem Relation (Age of Onset)    Breast cancer Mother    Cataracts Maternal Grandmother    Gout Father    Myocarditis Sister          Tobacco Use    Smoking status: Never    Smokeless tobacco: Never   Substance and Sexual Activity    Alcohol use: Not Currently    Drug use: Never    Sexual activity: Never     Review of Systems   Reason unable to perform ROS: patient intubated and sedated.   Objective:     Weight: 57.6 kg (126 lb 15.8 oz)  Body mass index is 28.47 kg/m².  Vital Signs (Most Recent):  Temp: 98.5 °F (36.9 °C) (09/22/22 1202)  Pulse: (!) 128 (09/22/22 1202)  Resp: (!) 24 (09/22/22 0855)  BP: 121/73 (09/22/22 1202)  SpO2: 100 % (09/22/22 1202)   Vital Signs (24h Range):  Temp:  [94.1 °F (34.5 °C)-99.7 °F (37.6 °C)] 98.5 °F (36.9 °C)  Pulse:  [] 128  Resp:  [24-32] 24  SpO2:  [100 %] 100 %  BP: (110-142)/(70-96) 121/73     Date 09/22/22 0700 - 09/23/22 0659   Shift 4620-3292 9886-7743 9285-6985 24 Hour Total   INTAKE   I.V.(mL/kg) 193.8(3.4)   193.8(3.4)   IV Piggyback 0   0   Shift Total(mL/kg) 193.8(3.4)   193.8(3.4)   OUTPUT   Shift Total(mL/kg)       Weight (kg) 57.6 57.6 57.6 57.6              Vent Mode: SPONT-PS  Oxygen Concentration (%):  [21-40] 21  Resp Rate Total:  [14 br/min-35 br/min] 29 br/min  Vt Set:  [310 mL-325 mL] 310 mL  PEEP/CPAP:  [5 cmH20] 5 cmH20  Pressure Support:  [5 cmH20] 5 cmH20  Mean Airway Pressure:  [6.8 cmH20-9.5 cmH20] 6.8 cmH20         NG/OG Tube 09/20/22 2052 orogastric 16 Fr. Center mouth (Active)            Colostomy Other (see comments) RLQ (Active)            Urethral Catheter 09/21/22 0030 Non-latex;Temperature probe (Active)   Output (mL) 1100 mL 09/21/22 0200       Physical Exam  General: Sedated, intubated  Head: Non-traumatic, normocephalic  Eyes: Pupils equal, EOMI  Neck: Supple, normal ROM, no tenderness to  MG CAPS    melatonin 3 MG tablet    metFORMIN (GLUCOPHAGE) 1000 MG tablet    NEW MED    nystatin (MYCOSTATIN) 025715 UNIT/GM POWD    polyethylene glycol (MIRALAX/GLYCOLAX) powder    SALINE MIST 0.65 % nasal spray    senna-docusate (SENOKOT-S/PERICOLACE) 8.6-50 MG tablet    simethicone (MYLICON) 125 MG chewable tablet    Skin Protectants, Misc. (EUCERIN) cream    SM LUBRICANT EYE DROPS 0.4-0.3 % SOLN ophthalmic solution    sodium fluoride 1.1 % PSTE dental paste    solifenacin (VESICARE) 10 MG tablet    ziprasidone (GEODON) 40 MG capsule     No current facility-administered medications for this visit.     Past Medical History:   Diagnosis Date    Allergic rhinitis due to pollen     seasonal allergies     Anisometropia and aniseikonia     BMI greater than 40     Cardiomegaly     Chronic constipation     Congenital absence of one kidney     Cramp of limb     Dermatophytosis of the body     Dry eye syndrome     Esophageal reflux     Essential hypertension, benign     Gastro-oesophageal reflux disease     Hemorrhoids     Hypermetropia     Hypothyroidism     Incomplete Emptying of Bladder     Lactose intolerance     Major depressive disorder, recurrent episode, moderate (H)     Malignant neoplasm of breast (female), unspecified site     left mastectomy 1996     Mixed incontinence urge and stress (male)(female)     Moderate obstructive sleep apnea     Postmenopausal Atrophic Vaginitis     Presbyopia     Regular astigmatism     Restless leg syndrome     Senile nuclear sclerosis     Thyroid eye disease     mild    Tinnitus     Trigger finger (acquired)     right hand    Type II or unspecified type diabetes mellitus without mention of complication, not stated as uncontrolled     on insulin since April 2006      Past Surgical History:   Procedure Laterality Date    APPENDECTOMY      C MASTECTOMY,SIMPLE  1996    Left mastectomy  [facility]. Had normal FU mammo 5/2007. Follow yearly.     CHOLECYSTECTOMY       COLONOSCOPY  5/2005    Complete Colonoscopy-had one small polyp removed 5/2005.     COLONOSCOPY N/A 3/31/2016    Procedure: COLONOSCOPY;  Surgeon: Cary Ring MD;  Location: [facility]    COLONOSCOPY N/A 7/7/2016    Procedure: COLONOSCOPY;  Surgeon: Cary Ring MD;  Location: [facility]    COLONOSCOPY N/A 5/12/2022    Procedure: COLONOSCOPY, WITH POLYPECTOMY AND BIOPSY;  Surgeon: Jonathan Madden MD;  Location: [facility]    DILATION AND CURETTAGE      HYSTERECTOMY      MASTECTOMY      Left breast    ZZC TOTAL ABDOM HYSTERECTOMY  7/2003    Dr. Castanon, for abnormal bleeding. Removal of both tubes with BSO for myoma w - - -     Wt Readings from Last 4 Encounters:   08/21/23 98 kg (216 lb)   07/13/23 97.8 kg (215 lb 9.6 oz)   07/06/23 97.9 kg (215 lb 12.8 oz)   06/23/23 98.9 kg (218 lb 1.6 oz)   There is no height or weight on file to calculate BMI.    Time service started: 12:03  Time service ended:  12:56    Diagnosis:   Major Depression, recurrent mild (F33.0)   Psychological Factors Affecting Morbid Obesity (F54)  Generalized anxiety (F41.1)       Plan: Return for psychotherapy visit in-htewir76/20 @ 1 due to the serious and persistent nature of her depression and anxiety consistent with treatment plan.  Last treatment plan signed: 1/27/23  Treatment plan due: 1/27/24     Damon Gr, PhD LP,  A.B.P.P.  Director, Health Psychology  [phone]   palpation  CVS: Normal rate and rhythm  Pulm: No respiratory distress  GI: Abdomen nondistended, nontender  Skin: Dry, intact  Psych: unable to assess    Neurosurgery Physical Exam  Sedated, intubated  [id number]  PERRL, +corneals/cough/gag  FC BUE  No movement BLE (baseline)    Significant Labs:  Recent Labs   Lab 09/20/22 1949 09/21/22  0436 09/22/22  0327   * 93 76    138 138   K 3.8 3.9 3.7    109 112*   CO2 18* 21* 16*   BUN 18 10 7   CREATININE 0.4* 0.5 0.6   CALCIUM 8.8 8.4* 8.7   MG 1.9 1.9 2.1       Recent Labs   Lab 09/20/22 1949 09/21/22  0440 09/22/22  0327   WBC 21.99* 13.97* 12.24   HGB 13.4 13.2 12.9   HCT 41.9 40.6 39.8    253 316       Recent Labs   Lab 09/21/22 0440   INR 1.0   APTT 22.5     Microbiology Results (last 7 days)       Procedure Component Value Units Date/Time    CSF culture [[id number]] Collected: 09/21/22 0330    Order Status: Completed Specimen: CSF (Spinal Fluid) from CSF Shunt Updated: 09/22/22 0725     CSF CULTURE No Growth to date     Gram Stain Result No WBC's      No organisms seen    Blood culture [[id number]] Collected: 09/21/22 0435    Order Status: Completed Specimen: Blood from Peripheral, Forearm, Left Updated: 09/22/22 0613     Blood Culture, Routine No Growth to date      No Growth to date    Narrative:      Blood cultures from 2 different sites. 4 bottles total.  Please draw before starting antibiotics.    Blood culture [[id number]] Collected: 09/21/22 0423    Order Status: Completed Specimen: Blood from Peripheral, Upper Arm, Left Updated: 09/22/22 0613     Blood Culture, Routine No Growth to date      No Growth to date    Narrative:      Blood cultures x 2 different sites. 4 bottles total. Please  draw cultures before administering antibiotics.    Culture, Respiratory with Gram Stain [[id number]] Collected: 09/21/22 0522    Order Status: Completed Specimen: Respiratory from Endotracheal Aspirate Updated: 09/21/22 0817     Gram Stain  (Respiratory) Rare WBC's     Gram Stain (Respiratory) No organisms seen    Narrative:      Mini-BAL.          All pertinent labs from the last 24 hours have been reviewed.    Significant Diagnostics:  Echoencephalography: No results found in the last 24 hours.  I have reviewed all pertinent imaging results/findings within the past 24 hours.

## 2023-09-11 ENCOUNTER — OFFICE VISIT (OUTPATIENT)
Dept: PSYCHIATRY | Facility: CLINIC | Age: 73
End: 2023-09-11
Attending: PSYCHIATRY & NEUROLOGY
Payer: COMMERCIAL

## 2023-09-11 VITALS
SYSTOLIC BLOOD PRESSURE: 143 MMHG | HEART RATE: 71 BPM | DIASTOLIC BLOOD PRESSURE: 84 MMHG | BODY MASS INDEX: 36.56 KG/M2 | WEIGHT: 216.8 LBS

## 2023-09-11 DIAGNOSIS — F25.1 SCHIZOAFFECTIVE DISORDER, DEPRESSIVE TYPE (H): ICD-10-CM

## 2023-09-11 DIAGNOSIS — G47.09 OTHER INSOMNIA: ICD-10-CM

## 2023-09-11 DIAGNOSIS — G24.01 TARDIVE DYSKINESIA: Primary | ICD-10-CM

## 2023-09-11 PROCEDURE — 99214 OFFICE O/P EST MOD 30 MIN: CPT | Mod: GC

## 2023-09-11 PROCEDURE — G0463 HOSPITAL OUTPT CLINIC VISIT: HCPCS

## 2023-09-11 RX ORDER — VALBENAZINE 40 MG/1
40 CAPSULE ORAL DAILY
Qty: 30 CAPSULE | Refills: 1 | Status: SHIPPED | OUTPATIENT
Start: 2023-09-11 | End: 2023-10-18

## 2023-09-11 RX ORDER — ZIPRASIDONE HYDROCHLORIDE 40 MG/1
40 CAPSULE ORAL 2 TIMES DAILY WITH MEALS
Qty: 60 CAPSULE | Refills: 2 | Status: SHIPPED | OUTPATIENT
Start: 2023-09-11 | End: 2023-10-18

## 2023-09-11 ASSESSMENT — PATIENT HEALTH QUESTIONNAIRE - PHQ9
SUM OF ALL RESPONSES TO PHQ QUESTIONS 1-9: 5
SUM OF ALL RESPONSES TO PHQ QUESTIONS 1-9: 5

## 2023-09-11 ASSESSMENT — PAIN SCALES - GENERAL: PAINLEVEL: NO PAIN (0)

## 2023-09-11 NOTE — PATIENT INSTRUCTIONS
Thank you for coming to the [facility] [facility].     We discussed starting a new medication for the involuntary tongue and mouth movements called Ingrezza. The risks and benefits were reviewed and we started you on Ingrezza 40 mg every day. Looking forward to seeing you in 4 weeks.    **For crisis resources, please see the information at the end of this document**   Patient Education        Lab Testing:  If you had lab testing today and your results are reassuring or normal they will be mailed to you or sent through [facility] within 7 days. If the lab tests need quick action we will call you with the results. The phone number we will call with results is # [phone]. If this is not the best number please call our clinic and change the number.     Medication Refills:  If you need any refills please call your pharmacy and they will contact us. Our fax number for refills is [phone].   Three business days of notice are needed for general medication refill requests.   Five business days of notice are needed for controlled substance refill requests.   If you need to change to a different pharmacy, please contact the new pharmacy directly. The new pharmacy will help you get your medications transferred.     Contact Us:  Please call [phone] during business hours (8-5:00 M-F).   If you have medication related questions after clinic hours, or on the weekend, please call [phone].     Financial Assistance [phone]   Medical Records [phone]       MENTAL HEALTH CRISIS RESOURCES:  For a emergency help, please call 911 or go to the nearest Emergency Department.     Emergency Walk-In Options:   EmPATH Unit @ [facility] ([place]): [phone] - Specialized mental health emergency area designed to be calming  [facility] ([place]): [phone]  [facility] ([place]): [phone]  [facility]  [place]): [phone]    [facility] Crisis Information:   [place]: [phone]  [place]: [phone]  [place] ([place]) - Adult: [phone]     Child: [phone]  [place] - Adult: [phone]     Child: [phone]  [place]: [phone]  List of all [facility] resources:   [email or web address]://[email or web address]/dhs/people-we-serve/adults/health-care/mental-health/resources/crisis-contacts.jsp    National Crisis Information:   Crisis Text Line: Text  [place]  to 503563  Suicide & Crisis Lifeline: 988  National Suicide Prevention Lifeline: 2-407-709-TALK (1-293.150.9699)       For online chat options, visit https://suicidepreventionlifeline.org/chat/  Poison Control Center: 1-728.647.3603  Trans Lifeline: 1-837.556.4221 - Hotline for transgender people of all ages  The [employer] Project: 2-212-603-8163 - Hotline for LGBT youth     For Non-Emergency Support:   Fast Tracker: Mental Health & Substance Use Disorder Resources -   https://www.[employer].org/

## 2023-09-11 NOTE — PROGRESS NOTES
[facility]  Psychiatry Clinic  MEDICAL PROGRESS NOTE     CARE TEAM:  PCP- Allie Sweeney    Psychotherapist- Dr Gr, PhD   Team: Staff at [facility]     This person is a 73 year old who uses the name Pinky and pronouns she, her.      DIAGNOSIS   #Schizoaffective disorder, depressed type, in remission  #Tardive dyskinesia      ASSESSMENT   Pinky Page is a 73 year old female with history of schizoaffective disorder, depressed type who has been relatively stable taking ziprasidone and fluoxetine since 2016.       #Schizoaffective Disorder, Depressed type: Stable, some chronic fluctuating depression and anxiety, denies psychosis symptoms, well managed on current medication regimen.   No new AE of meds, no changes today.       #Tardive dyskinesia:   It has been more than 5 years since the start of her symptoms. She is noted to have facial/oral movements all day throughout the day with no fluctuation or a specific pattern. Lip smacking, lip wetting, twitching.    Next AIMS is due in September 2023. AIMS done (6/12) was 5/40. Ingrezza was discussed with patient and she agrees to do a trial of it. The risks and benefits of the medication is reviewed.    #Tremor  #lack of balance  #Constipation    There is a right-sided low frequency resting tremor evident on the visit. It is not present throughout the day it comes on and off with no identifiable trigger. No changes in hand-writing noted. Patient complains of constipation that requires three classes of laxatives with residual symptoms still present. The clinical picture is concerning for PD vs drug-induced Parkinsonism. Neurology referral for movement disorders was placed. Per neurology note the presentation is consistent with medication-induced EPS. Neurology recommended putting on weight on her right arm also weighted silverware. Propranolol was recommended but not started. Will continue monitoring.     Future  considerations:  - Decreasing ziprasidone as it is likely contributing to her movement abnormalities  - Repeat EKG if med changes    [place] was checked today:  Indicates taking controlled medication as prescribed.     PLAN                                                                                                                1) Meds-  - Start Ingrezza 40 mg daily  - Continue ziprasidone 40 mg BID  - Continue fluoxetine 40 mg daily  - Continue melatonin 6mg at bedtime     (prescribed by PCP: gabapentin 900mg TID)    2) Psychotherapy- cont with Dr Gr every month     3) Next due-  Labs- next due 7/2024  EKG- not indicated at this time.  Rating scales-  AIMS 6/12/2022 is 5/40    4) Referrals-  None    5) Dispo- RTC in 4 weeks.     PERTINENT BACKGROUND                                                    [most recent eval 07/08/22]   The following section contains information copied from previous note by Dr Weller On 4/14/2022 and has been reviewed, edited, and verified by the patient.     Pinky first experienced mental health issues in her 20s or 30s and has received treatment for schizoaffective disorder and generalized anxiety. Notably, Pinky's symptoms of depression and psychosis have responded well to a combination of ECT and medication management. Pinky has a history of experiencing increased psychotic symptoms with past attempted antipsychotic tapers. No changes to antipsychotic or antidepressant since 2016. Only medication change in last 6 years has been lowering lorazepam.      In her 30s, she had her gallbladder removed and then suffered first episode of severe depression. She was experiencing psychotic symptoms - paranoia, AH, and suspiciousness. She was hospitalized in [place] in 1986 where she received ECT.  She was hospitalized again in 1987, again received ECT. Was hospitalized for depression in 1995, around the same time she was diagnosed with breast cancer.   She was hospitalized for about nine  "months this time. She received ECT maintence from 3739-8033. Patient was stable taking Seroquel 400mg, Prozac 40mg, and Ativan 1mg for many years. Seroquel was cross-tapered to ziprasidone due to metabolic side effects in 2016.      She has been seen at our clinic since 2013. She has lived at [facility] for many years.      Medical history  Notable for afib, DMT2, HTN, ANUJ, hypothyroid, breast cancer s/p mastectomy     Pertinent Items Include: psychosis [sxs include disorganization, hallucinations, paranoia], mutiple psychotropic trials, psych hosp (3-5) and ECT.     SUBJECTIVE     Report from [facility]:  -stable mental health  -no behavioral issues  -continues to be active, using the stationary bike every day  -adherent to medication regimen    Per Pinky:  -Mood: \"really great\"  -Depression: denies  -Anxiety: denies  -Psychosis: denies AVH  -SI/HI: No  -Social: No updates  -Substances: No      Answers submitted by the patient for this visit:  Patient Health Questionnaire (Submitted on 9/11/2023)  PHQ9 TOTAL SCORE: 5    -------------------------------------------------------------------  Recent Social History: see below    Recent Psych Symptoms:   Depression:   none   Elevated:  none  Psychosis:  none  Anxiety:  excessive worry about the future, chronic  Trauma Related:  none  Sleep: yes  Other: no    Recent Substance Use:     -alcohol: No   -cannabis: No   -tobacco: No  -caffeine:  No   -opioids: No   Narcan Kit currrent: No   -other: none    Pertinent Negatives: No suicidal or violent ideation, hallucinations and delusions  Adverse Effects: Right hand tremor resting, perioral involuntary movements, stiffness in upper extremity, denies chest pain and shortness of breath     PAST PSYCHIATRIC HISTORY     SIB- None  Suicide Attempt [#, most recent]- None  Suicidal Ideation Hx- None  Violence/Aggression Hx- None  Psychosis Hx- paranoia, VH  Eating Disorder Hx- yes, in 30s-40s- stopped eating- possibly " secondary to depression. No diagnosis of anorexia  Psych Hosp [#, most recent]- 5-6 times, 10+ years since last hospitalization.   Commitment- None  ECT- yes, 1986, 1987, 1996- 2005. (last 2005). Was very helpful.  Outpatient Programs - IOP (1987), eating disorder tx, 1987  Other - N/A     MEDICAL HISTORY and ALLERGY     ALLERGIES: Chlordiazepoxide hcl, Dimetapp dm cold-cough, Haloperidol, Ibuprofen, Lactose intolerance [beta-galactosidase], Milk products, and Propofol    Patient Active Problem List   Diagnosis    Allergic rhinitis due to pollen    Urge incontinence    Hypertension, essential    Cardiomegaly    Chronic constipation    Dry eye syndrome    Esophageal reflux    Exposure keratoconjunctivitis    DM ophthalmopathy (H)    Hypothyroidism    Senile cataract    ANUJ (obstructive sleep apnea)    Vaginal atrophy    Restless leg syndrome    Squamous blepharitis    Morbid obesity due to excess calories (H)    Personal history of breast cancer s/p L masectomy    Hypercholesteremia    Lives in group home    Extrapyramidal and movement disorder    CKD (chronic kidney disease) stage 2, GFR 60-89 ml/min    Solitary kidney, congenital    S/P hysterectomy    Impingement syndrome of right shoulder    Hypertriglyceridemia    Candidiasis of skin    Generalized anxiety disorder    Carpal tunnel syndrome of left wrist    Schizoaffective disorder, depressive type (H)    Major depressive disorder, recurrent episode, moderate (H)    Psychological factors affecting medical condition    Hx of psychiatric care    Nail complaint    Microalbuminuria due to type 2 diabetes mellitus (H)    Type 2 diabetes mellitus with microalbuminuria, with long-term current use of insulin (H)    Conjunctivitis of both eyes    Corneal erosion of both eyes    Spastic entropion, right    Foot callus    Paroxysmal atrial fibrillation (H)    Squamous cell carcinoma in situ of skin of face    History of colonic polyps    Benign paroxysmal positional  vertigo, unspecified laterality    Mild cognitive impairment    Tardive dyskinesia    Physiologic anisocoria        MEDICAL REVIEW OF SYSTEMS   Contraception-  No   Pregnant- no  none in addition to that documented above     MEDICATIONS     Current Outpatient Medications   Medication Sig Dispense Refill    acetaminophen (TYLENOL) 500 MG tablet Take 2 tablets (1,000 mg) by mouth every 6 hours as needed 1 Bottle 2    alum & mag hydroxide-simethicone (MYLANTA/MAALOX) 200-200-20 MG/5ML SUSP suspension Take 30 mLs by mouth daily as needed for indigestion      amLODIPine (NORVASC) 10 MG tablet Take 1 tablet (10 mg) by mouth daily 90 tablet 0    apixaban ANTICOAGULANT (ELIQUIS) 5 MG tablet Take 1 tablet (5 mg) by mouth 2 times daily 180 tablet 0    artificial saliva (BIOTENE MT) AERS spray Take 1 spray by mouth 3 times daily as needed for dry mouth 115 mL 3    artificial tears (GENTEAL) 0.1-0.2-0.3 % ophthalmic solution Place 2 drops into both eyes 4 times daily 15 mL 11    atorvastatin (LIPITOR) 40 MG tablet Take 1 tablet (40 mg) by mouth daily 90 tablet 1    blood glucose (NO BRAND SPECIFIED) lancets standard Use to test blood sugar 2 times daily or as directed. 100 each 11    blood glucose (NO BRAND SPECIFIED) test strip Use to test blood sugar 3 times daily or as directed. 100 strip 3    blood glucose calibration (NO BRAND SPECIFIED) solution Use to calibrate blood glucose monitor as directed. 1 each 3    blood glucose monitoring (NO BRAND SPECIFIED) meter device kit Check Blood sugars twice a day. 1 kit 0    Calcium Carb-Cholecalciferol (CALCIUM-VITAMIN D3) 250-3.125 MG-MCG TABS       Calcium Carbonate-Vitamin D (CALCIUM-VITAMIN D) 250-125 MG-UNIT TABS Take 1 tablet by mouth 2 times daily Calcium 250 mg/Vit D 125 IU      calcium polycarbophil (FIBERCON) 625 MG tablet Take 2 tablets (1,250 mg) by mouth daily 180 tablet 3    CLARITIN 10 MG OR TABS 1 TAB PO QD (Once per day) as needed for ALLERGY SYMPTOMS 30 11     conjugated estrogens (PREMARIN) 0.625 MG/GM vaginal cream Place 2 g vaginally daily for 2 weeks, followed by 2 g vaginally twice a week for vaginal atrophy and frequent UTIs 30 g 3    DENTA 5000 PLUS 1.1 % CREA 1 applicator (1 g) by Oral or Feeding Tube route 2 times daily 51 g 1    erythromycin (ROMYCIN) 5 MG/GM ophthalmic ointment Place 0.5 inches into both eyes At Bedtime 3.5 g 11    fluorometholone (FML LIQUIFILM) 0.1 % ophthalmic suspension Place 1 drop into both eyes 2 times daily 10 mL 0    FLUoxetine (PROZAC) 20 MG capsule Take 1 capsule (20 mg) by mouth daily 30 capsule 2    fluticasone (FLONASE) 50 MCG/ACT nasal spray Spray 1 spray into both nostrils daily 16 g 3    furosemide (LASIX) 20 MG tablet Take 1 tablet (20 mg) by mouth daily 60 tablet 3    gabapentin (NEURONTIN) 600 MG tablet       Hypromellose (ARTIFICIAL TEARS OP) Apply 1 drop to eye 4 times daily.      insulin glargine (LANTUS PEN) 100 UNIT/ML pen Inject 15 Units Subcutaneous At Bedtime 8 mL 0    lactase (LACTAID) 3000 UNIT tablet Take 1 tablet (3,000 Units) by mouth 3 times daily (with meals) 90 tablet 11    levothyroxine (SYNTHROID/LEVOTHROID) 175 MCG tablet Take 1 tablet (175 mcg) by mouth daily 90 tablet 3    lidocaine (XYLOCAINE) 2 % external gel Apply topically 3 times daily as needed for moderate pain 85 g 1    lidocaine (XYLOCAINE) 5 % external ointment Apply topically 3 times daily as needed for moderate pain 50 g 1    lifitegrast (XIIDRA) 5 % opthalmic solution Place 1 drop into both eyes 2 times daily 60 each 11    liraglutide (VICTOZA) 18 MG/3ML solution Inject 1.8 mg Subcutaneous daily 9 mL 3    losartan (COZAAR) 100 MG tablet Take 1 tablet (100 mg) by mouth daily 90 tablet 1    Magnesium Hydroxide (MILK OF MAGNESIA PO) Take 30 mL as needed for constipation.      melatonin 3 MG CAPS Take 6 mg by mouth At Bedtime 60 capsule 2    melatonin 3 MG tablet       metFORMIN (GLUCOPHAGE) 1000 MG tablet Take 1,000 mg by mouth 2 times daily  (with meals)      NEW MED Prohydrate Moisturizing gel  Place 1 applicator vaginally 4 times a week 20 oz 3    nystatin (MYCOSTATIN) 297258 UNIT/GM POWD Apply topically 3 times daily as needed 60 g 1    polyethylene glycol (MIRALAX/GLYCOLAX) powder Take 1 capful by mouth 2 times daily 17 GM PO BID      SALINE MIST 0.65 % nasal spray Spray 2 sprays in nostril 2 times daily      senna-docusate (SENOKOT-S/PERICOLACE) 8.6-50 MG tablet Take 2 tablets by mouth At Bedtime.      simethicone (MYLICON) 125 MG chewable tablet Take 1 tablet (125 mg) by mouth 2 times daily 180 tablet 3    Skin Protectants, Misc. (EUCERIN) cream Apply topically as needed Apply to thigh PRN dry skin      SM LUBRICANT EYE DROPS 0.4-0.3 % SOLN ophthalmic solution       sodium fluoride 1.1 % PSTE dental paste Apply 2 mLs to affected area daily 100 mL 1    solifenacin (VESICARE) 10 MG tablet Take 1 tablet (10 mg) by mouth daily 30 tablet 6    ziprasidone (GEODON) 40 MG capsule Take 1 capsule (40 mg) by mouth 2 times daily (with meals) 60 capsule 2      VITALS     Pulse Readings from Last 3 Encounters:   09/11/23 71   08/21/23 79   07/13/23 70     Wt Readings from Last 3 Encounters:   09/11/23 98.3 kg (216 lb 12.8 oz)   09/01/23 98.8 kg (217 lb 12.8 oz)   08/21/23 98 kg (216 lb)     BP Readings from Last 3 Encounters:   09/11/23 (!) 143/84   08/21/23 (!) 148/82   07/13/23 (!) 133/91       MENTAL STATUS EXAM     Alertness: alert   Appearance: adequately groomed  Behavior/Demeanor: cooperative, pleasant and calm, with good  eye contact   Speech: slowed and regular rate and rhythm  Language: intact  Psychomotor: chewing/lip smacking motions and right hand low frequency pill rolling resting tremor  Mood: description consistent with euthymia  Affect: restricted, blunted and appropriate; congruent to: mood- yes, content- yes  Thought Process/Associations: response delay  Thought Content:  Reports none;  Denies suicidal & violent ideation and  delusions  Perception:  Reports none;  Denies hallucinations and depersonalization  Insight: fair  Judgment: good  Cognition: does  appear grossly intact; formal cognitive testing was not done  Gait and Station: remarkable for:  using a cane, slightly reduced swing phase of gait, slowed gait       LABS and DATA         4/3/2023     1:27 PM 7/6/2023    12:55 PM 7/13/2023     9:06 AM   PHQ   PHQ-9 Total Score 9 7 5   Q9: Thoughts of better off dead/self-harm past 2 weeks Not at all Not at all Not at all     Antipsychotic Labs:  Recent Labs   Lab Test 07/13/23  1003 10/26/22  0710 04/27/22  0725 04/12/22  1514 03/16/21  1537   CHOL 137  --   --  117 124   TRIG 82 103 115 72 136   LDL 79  --   --  55 53   HDL 42*  --   --  48* 43*     Recent Labs   Lab Test 07/13/23  1003 10/03/22  1423 05/12/22  1230 04/12/22  1514 10/08/21  2143   GLC  --  134* 139*  --  168*   A1C 7.3* 7.6*  --  7.4*  --      Recent Labs   Lab Test 10/08/21  2143 03/16/21  1537 05/07/19  0650 10/25/18  1734 06/26/18  0659   WBC 11.8*  --  10.06* 14.2* 9.72   ANEU  --  7.5  --  10.6* 5.31   HGB 13.0 13.7 13.3 14.0 12.6     --  292 275 247       Recent Labs   Lab Test 10/03/22  1423 11/18/21  1617   CR 0.83 0.8   GFRESTIMATED 74 >60     Recent Labs   Lab Test 03/16/21  1537 06/26/18  0659   AST 7.9 16   ALT 12.8 21   ALKPHOS 78.9 111       EKG 3/2021 - showing atrial fibrillation. QtC 392.   EKG 10/8/2021- QtC 439  AIMS score 7/11/22: 5        PSYCHOTROPIC DRUG INTERACTIONS                                                       PSYCHCLINICDDI   ZIPRASIDONE and fluoxetine may result in increased risk of QT-interval prolongation.  ZIPRASIDONE and SEROTONERGIC DRUGS THAT PROLONG QT INTERVAL may result in increased risk of QT-interval prolongation and increased risk of serotonin syndrome (hypertension, hyperthermia, myoclonus, mental status changes).  NSAID and SSRI may result in an increased risk of bleeding  ERYTHROMYCIN and FLUOXETINE may result  in an increased risk of cardiotoxicity (QT prolongation, torsades de pointes, cardiac arrest).  FLUOXETINE and INSULINS OR PRAMLINTIDE may result in increased risk of hypoglycemia.  GABAPENTIN and CNS DEPRESSANTS may result in respiratory depression.   GABAPENTIN and ANTACIDS may result in decreased gabapentin effectiveness.      MANAGEMENT:  Monitoring for adverse effects, periodic EKGs and patient is aware of risks     RISK STATEMENT for SAFETY     Pinky did not appear to be an imminent safety risk to self or others.    TREATMENT RISK STATEMENT: The risks, benefits, alternatives and potential adverse effects have been discussed and are understood by the pt. The pt understands the risks of using street drugs or alcohol. There are no medical contraindications, the pt agrees to treatment with the ability to do so. The pt knows to call the clinic for any problems or to access emergency care if needed.  Medical and substance use concerns are documented above.  Psychotropic drug interaction check was done, including changes made today.     PROVIDER: Gail Adkins MD    Patient staffed in clinic with Dr. Estrada who will sign the note.  Supervisor is Dr. Estrada.

## 2023-09-12 ENCOUNTER — TELEPHONE (OUTPATIENT)
Dept: PSYCHIATRY | Facility: CLINIC | Age: 73
End: 2023-09-12
Payer: COMMERCIAL

## 2023-09-12 NOTE — TELEPHONE ENCOUNTER
PA Initiation    Medication: INGREZZA 40 MG PO CAPS  Insurance Company: [employer] - Phone [phone] Fax [phone]  Pharmacy Filling the Rx: [facility] - [place], [place] - [address]  Filling Pharmacy Phone:    Filling Pharmacy Fax:    Start Date: 9/12/2023          Thank you,    Jennifer Srinivasan [facility]  Specialty Pharmacy Clinic Liaison - CardiologyNeurologyMultiple [facility] Surgery [address]  3rd [place] [address]  Ph: [phone] Fax: [phone]  [email or web address]

## 2023-09-12 NOTE — TELEPHONE ENCOUNTER
Health Call Center    Phone Message    May a detailed message be left on voicemail: yes     Reason for Call: Other: The patient's nurse at [facility] is looking for clarification on a medication that was ordered for the patient at 20MG. Ingrezza. Nurse stated that the pharmacy informed them that the medication only comes in 40MG and 80MG tablets. Nurse states that the pharmacist informed her that typically patients start at a once daily 40MG tablet.     Please call back to give clarification at [phone].     Action Taken: Other: Nurses.     Travel Screening: Not Applicable

## 2023-09-12 NOTE — CONFIDENTIAL NOTE
Writer returned call to [facility] 3rd floor nurses station.  RN indicated that a 20 mg Ingrezza prescription was sent with the patient, handwritten.  [facility] indicates that starting dose is 40 mg, no 20 mg option.    Writer indicted that a 40 mg prescription was sent to [facility] yesterday afternoon.

## 2023-09-13 NOTE — CONFIDENTIAL NOTE
Writer followed up with [facility].  They are aware that the Ingrezza dose if 40 mg q day,  however they do not yet have the medication from pharmacy.    Writer spoke with pharmacist at [facility], who took a verbal authorization to revise the Ingrezza 20 mg prescription to 40 mg (lowest dose available).  Pharmacist confirmed they will get the medication to patient.

## 2023-09-19 ENCOUNTER — OFFICE VISIT (OUTPATIENT)
Dept: OPHTHALMOLOGY | Facility: CLINIC | Age: 73
End: 2023-09-19
Attending: OPHTHALMOLOGY
Payer: COMMERCIAL

## 2023-09-19 DIAGNOSIS — H25.813 MIXED TYPE AGE-RELATED CATARACT, BOTH EYES: ICD-10-CM

## 2023-09-19 DIAGNOSIS — R80.9 TYPE 2 DIABETES MELLITUS WITH MICROALBUMINURIA, WITH LONG-TERM CURRENT USE OF INSULIN (H): ICD-10-CM

## 2023-09-19 DIAGNOSIS — H04.123 DRY EYES: ICD-10-CM

## 2023-09-19 DIAGNOSIS — H04.123 DRY EYE SYNDROME OF BOTH EYES: ICD-10-CM

## 2023-09-19 DIAGNOSIS — E11.29 TYPE 2 DIABETES MELLITUS WITH MICROALBUMINURIA, WITH LONG-TERM CURRENT USE OF INSULIN (H): ICD-10-CM

## 2023-09-19 DIAGNOSIS — Z79.4 TYPE 2 DIABETES MELLITUS WITH MICROALBUMINURIA, WITH LONG-TERM CURRENT USE OF INSULIN (H): ICD-10-CM

## 2023-09-19 DIAGNOSIS — H40.033 ANATOMICAL NARROW ANGLE BORDERLINE GLAUCOMA OF BOTH EYES: Primary | ICD-10-CM

## 2023-09-19 PROCEDURE — 99213 OFFICE O/P EST LOW 20 MIN: CPT | Performed by: OPHTHALMOLOGY

## 2023-09-19 PROCEDURE — 99207 PR GONIOSCOPY: CPT | Mod: 59 | Performed by: OPHTHALMOLOGY

## 2023-09-19 PROCEDURE — 92020 GONIOSCOPY: CPT | Performed by: OPHTHALMOLOGY

## 2023-09-19 PROCEDURE — G0463 HOSPITAL OUTPT CLINIC VISIT: HCPCS | Performed by: OPHTHALMOLOGY

## 2023-09-19 RX ORDER — LIFITEGRAST 50 MG/ML
1 SOLUTION/ DROPS OPHTHALMIC 2 TIMES DAILY
Qty: 60 EACH | Refills: 11 | Status: SHIPPED | OUTPATIENT
Start: 2023-09-19

## 2023-09-19 RX ORDER — FLUOROMETHOLONE 0.1 %
1 SUSPENSION, DROPS(FINAL DOSAGE FORM)(ML) OPHTHALMIC (EYE) 2 TIMES DAILY
Qty: 10 ML | Refills: 3 | Status: SHIPPED | OUTPATIENT
Start: 2023-09-19

## 2023-09-19 RX ORDER — ERYTHROMYCIN 5 MG/G
0.5 OINTMENT OPHTHALMIC AT BEDTIME
Qty: 7 G | Refills: 11 | Status: SHIPPED | OUTPATIENT
Start: 2023-09-19

## 2023-09-19 ASSESSMENT — TONOMETRY
OD_IOP_MMHG: 17
IOP_METHOD: TONOPEN
OS_IOP_MMHG: 17

## 2023-09-19 ASSESSMENT — CONF VISUAL FIELD
OS_NORMAL: 1
OS_INFERIOR_TEMPORAL_RESTRICTION: 0
OS_SUPERIOR_TEMPORAL_RESTRICTION: 0
METHOD: COUNTING FINGERS
OS_INFERIOR_NASAL_RESTRICTION: 0
OS_SUPERIOR_NASAL_RESTRICTION: 0

## 2023-09-19 ASSESSMENT — VISUAL ACUITY
OS_SC: 20/30
OD_SC: 20/25
METHOD: SNELLEN - LINEAR

## 2023-09-19 NOTE — PROGRESS NOTES
Chief Complaint(s) and History of Present Illness(es)     Follow Up            Comments: Dry eye syndrome of both eyes           Comments    Pt states no change in VA since last visit  Pt states eyes feel pretty good  States no flashes, floaters , eye pain or redness  Using:  artifical tears four times a day    xiidra twice a day both eyes   ESS marissa at bedtime as needed   FML twice a day in both eyes    Shyla Lopez COT 12:56 PM September 19, 2023          Review of systems for the eyes was negative other than the pertinent positives/negatives listed in the HPI.      Assessment & Plan      Pinky Page is a 73 year old female with the following diagnoses:   1. Anatomical narrow angle borderline glaucoma of both eyes    2. Mixed type age-related cataract, both eyes    3. Type 2 diabetes mellitus with microalbuminuria, with long-term current use of insulin (H)    4. Dry eyes    5. Dry eye syndrome of both eyes         Here for angle recheck   Doing good with eye comfort   Not bothered by cataracts at this time     Gonio open 360 both eyes with steep insertion  Intraocular pressure remains within normal limits   Healthy nerves    Continue lid hygiene and warm compresses  Continue artifical tears four times a day    Continuing xiidra twice a day both eyes   ESS marissa at bedtime as needed   Continue FML twice a day in both eyes     Refractive options reviewed  Refraction given       Patient disposition:   Return in about 6 months (around 3/19/2024) for DFE.           Attending Physician Attestation:  Complete documentation of historical and exam elements from today's encounter can be found in the full encounter summary report (not reduplicated in this progress note).  I personally obtained the chief complaint(s) and history of present illness.  I confirmed and edited as necessary the review of systems, past medical/surgical history, family history, social history, and examination findings as documented by others; and I  examined the patient myself.  I personally reviewed the relevant tests, images, and reports as documented above.  I formulated and edited as necessary the assessment and plan and discussed the findings and management plan with the patient and family. . - Michael Lopez MD

## 2023-09-19 NOTE — NURSING NOTE
Chief Complaints and History of Present Illnesses   Patient presents with    Follow Up     Dry eye syndrome of both eyes      Chief Complaint(s) and History of Present Illness(es)       Follow Up              Comments: Dry eye syndrome of both eyes               Comments    Pt states no change in VA since last visit  Pt states eyes feel pretty good  States no flashes, floaters , eye pain or redness  Using:  artifical tears four times a day    xiidra twice a day both eyes   ESS marissa at bedtime as needed   FML twice a day in both eyes    Shyla Lopez COT 12:56 PM September 19, 2023

## 2023-10-09 ENCOUNTER — OFFICE VISIT (OUTPATIENT)
Dept: PSYCHIATRY | Facility: CLINIC | Age: 73
End: 2023-10-09
Attending: PSYCHIATRY & NEUROLOGY
Payer: COMMERCIAL

## 2023-10-09 VITALS
HEART RATE: 89 BPM | BODY MASS INDEX: 36.29 KG/M2 | SYSTOLIC BLOOD PRESSURE: 139 MMHG | WEIGHT: 215.2 LBS | DIASTOLIC BLOOD PRESSURE: 84 MMHG

## 2023-10-09 DIAGNOSIS — F25.1 SCHIZOAFFECTIVE DISORDER, DEPRESSIVE TYPE (H): Primary | ICD-10-CM

## 2023-10-09 DIAGNOSIS — G47.09 OTHER INSOMNIA: ICD-10-CM

## 2023-10-09 DIAGNOSIS — G24.01 TARDIVE DYSKINESIA: ICD-10-CM

## 2023-10-09 DIAGNOSIS — R25.1 TREMOR: ICD-10-CM

## 2023-10-09 DIAGNOSIS — Z79.899 ENCOUNTER FOR LONG-TERM (CURRENT) USE OF MEDICATIONS: ICD-10-CM

## 2023-10-09 PROCEDURE — 99214 OFFICE O/P EST MOD 30 MIN: CPT | Mod: GC

## 2023-10-09 PROCEDURE — G0463 HOSPITAL OUTPT CLINIC VISIT: HCPCS

## 2023-10-09 ASSESSMENT — PAIN SCALES - GENERAL: PAINLEVEL: NO PAIN (0)

## 2023-10-09 NOTE — PATIENT INSTRUCTIONS
Thank you for coming to the [facility] [place] [facility].     Today we decreased the Ziprasidone FROM 40 MG twice a day to 40 mg in the morning and 20 mg in the evening. No other medication changes.    Looking forward to seeing you in 4 weeks.    **For crisis resources, please see the information at the end of this document**   Patient Education      Lab Testing:  If you had lab testing today and your results are reassuring or normal they will be mailed to you or sent through [employer] within 7 days. If the lab tests need quick action we will call you with the results. The phone number we will call with results is # [phone]. If this is not the best number please call our clinic and change the number.     Medication Refills:  If you need any refills please call your pharmacy and they will contact us. Our fax number for refills is [phone].   Three business days of notice are needed for general medication refill requests.   Five business days of notice are needed for controlled substance refill requests.   If you need to change to a different pharmacy, please contact the new pharmacy directly. The new pharmacy will help you get your medications transferred.     Contact Us:  Please call [phone] during business hours (8-5:00 M-F).   If you have medication related questions after clinic hours, or on the weekend, please call [phone].     Financial Assistance [phone]   Medical Records [phone]       MENTAL HEALTH CRISIS RESOURCES:  For a emergency help, please call 911 or go to the nearest Emergency Department.     Emergency Walk-In Options:   EmPATH Unit @ [facility] ([place]): [phone] - Specialized mental health emergency area designed to be calming  [facility] ([place]): [phone]  [facility] ([place]): [phone]  [facility] ([place]): [phone]    [facility] Crisis Information:    [place]: [phone]  [place]: [phone]  [place] ([place]) - Adult: [phone]     Child: [phone]  [place] - Adult: [phone]     Child: [phone]  [place]: [phone]  List of all [facility] resources:   https://[place].gov/dhs/people-we-serve/adults/health-care/mental-health/resources/crisis-contacts.jsp    National Crisis Information:   Crisis Text Line: Text  [place]  to 916675  Suicide & Crisis Lifeline: 988  National Suicide Prevention Lifeline: 2-640-225-TALK (1-645.332.1218)       For online chat options, visit https://suicidepreventionlifeline.org/chat/  Poison Control Center: 1-421-756-6516  Trans Lifeline: 1-893.149.6588 - Hotline for transgender people of all ages  The [employer] Project: 4-183-186-8461 - Hotline for LGBT youth     For Non-Emergency Support:   Fast Tracker: Mental Health & Substance Use Disorder Resources -   https://www.[place].org/

## 2023-10-09 NOTE — PROGRESS NOTES
[facility]  Psychiatry Clinic  MEDICAL PROGRESS NOTE     CARE TEAM:  PCP- Allie Sweeney    Psychotherapist- Dr Gr, PhD   Team: Staff at [facility]     This person is a 73 year old who uses the name Pinky and pronouns she, her.      DIAGNOSIS   -Schizoaffective disorder, depressed type, in remission  -Tardive dyskinesia     Pertinent medical diagnoses  -T2DM  -ANUJ  -Hypothyroid  -Atrial fibrillation  -Breast cancer s/p mastectomy      ASSESSMENT   Pinky Page is a 73 year old female with history of schizoaffective disorder, depressed type who has been relatively stable taking ziprasidone and fluoxetine since 2016.       #Schizoaffective Disorder, Depressed type: Stable, some chronic fluctuating depression and anxiety, denies psychosis symptoms, well managed on current medication regimen. She has been having difficulty with right hand tremor which is most likely medication-induced. Pinky is interested in reducing the daily Ziprasidone dose from 80 mg to 60 mg and assess improvement in tremor. Will continue monitoring symptoms.       #Tardive dyskinesia:   It has been more than 5 years since the start of her symptoms. She is noted to have facial/oral movements all day throughout the day with no fluctuation or a specific pattern. Lip smacking, lip wetting, twitching. She was started on Ingrezza in September 2023. She denies any side effect. No increased sedation. She has noticed improvement in her facial movements and having less trouble with her partial denture coming out of her mouth. Last AIMS done (6/12) was 5/40. Today AIMS= 6. Will plan to increase Ingrezza over the next few appointments - noted that Ingrezza level is increased via CYT P450 interactions with fluoxetine.    #Tremor  #lack of balance  #Constipation  There is a right-sided low frequency resting tremor evident on the visit. It is not present throughout the day it comes on and off with no identifiable  trigger. No changes in hand-writing noted. Patient complains of constipation that requires three classes of laxatives with residual symptoms still present. The clinical picture is concerning for PD vs drug-induced Parkinsonism. Per neurology note the presentation is consistent with medication-induced EPS. Neurology recommended putting on weight on her right arm which she was wearing today. Propranolol was recommended but not started. We decided to decrease Ziprasidone and monitor effects on tremor.     Future considerations:  - Repeat EKG    MNPMP was checked today:  Indicates taking controlled medication as prescribed.     PLAN                                                                                                                1) Meds-  - Decrease ziprasidone from 40 mg BID to 40 mg qAM and 20 mg qPM  - Continue fluoxetine 40 mg daily  - Continue Ingrezza 40 mg daily [Ingrezza level is increased via CYT P450 interactions with fluoxetine]  - Continue melatonin 6mg at bedtime     (prescribed by PCP: gabapentin 900mg TID)    2) Psychotherapy- cont with Dr Gr every month     3) Next due-  Labs- next due 7/2024  EKG- not indicated at this time.  Rating scales-  AIMS =6 (10/9/23) next due 10/2024    4) Referrals-  None    5) Dispo- RTC in 4 weeks.     PERTINENT BACKGROUND                                                    [most recent eval 07/08/22]   The following section contains information copied from previous note by Dr Weller On 4/14/2022 and has been reviewed, edited, and verified by the patient.     Pinky first experienced mental health issues in her 20s or 30s and has received treatment for schizoaffective disorder and generalized anxiety. Notably, Pinky's symptoms of depression and psychosis have responded well to a combination of ECT and medication management. Pinky has a history of experiencing increased psychotic symptoms with past attempted antipsychotic tapers. No changes to antipsychotic or  "antidepressant since 2016. Only medication change in last 6 years has been lowering lorazepam.      In her 30s, she had her gallbladder removed and then suffered first episode of severe depression. She was experiencing psychotic symptoms - paranoia, AH, and suspiciousness. She was hospitalized in [place] in 1986 where she received ECT.  She was hospitalized again in 1987, again received ECT. Was hospitalized for depression in 1995, around the same time she was diagnosed with breast cancer.   She was hospitalized for about nine months this time. She received ECT maintence from 1859-8710. Patient was stable taking Seroquel 400mg, Prozac 40mg, and Ativan 1mg for many years. Seroquel was cross-tapered to ziprasidone due to metabolic side effects in 2016.      She has been seen at our clinic since 2013. She has lived at [facility] for many years.       Pertinent Items Include: psychosis [sxs include disorganization, hallucinations, paranoia], mutiple psychotropic trials, psych hosp (3-5) and ECT.     SUBJECTIVE     Report from [facility]:  -stable mental health  -no behavioral issues  -continues to be active, using the stationary bike every day  -adherent to medication regimen    Per Pinky:  -Mood: \"great\"  -Depression: denies  -Anxiety: denies  -Psychosis: denies AVH  -SI/HI: No  -Social: No updates  -Substances: No    -------------------------------------------------------------------  Recent Social History: see below    Recent Psych Symptoms:   Depression:   none   Elevated:  none  Psychosis:  none  Anxiety:  excessive worry about the future, chronic  Trauma Related:  none  Sleep: yes  Other: no    Recent Substance Use:     -alcohol: No   -cannabis: No   -tobacco: No  -caffeine:  No   -opioids: No   Narcan Kit currrent: No   -other: none    Pertinent Negatives: No suicidal or violent ideation, hallucinations and delusions  Adverse Effects: Right hand tremor resting, perioral involuntary movements, stiffness " in upper extremity, denies chest pain and shortness of breath     PAST PSYCHIATRIC HISTORY     SIB- None  Suicide Attempt [#, most recent]- None  Suicidal Ideation Hx- None  Violence/Aggression Hx- None  Psychosis Hx- paranoia, VH  Eating Disorder Hx- yes, in 30s-40s- stopped eating- possibly secondary to depression. No diagnosis of anorexia  Psych Hosp [#, most recent]- 5-6 times, 10+ years since last hospitalization.   Commitment- None  ECT- yes, 1986, 1987, 1996- 2005. (last 2005). Was very helpful.  Outpatient Programs - IOP (1987), eating disorder tx, 1987  Other - N/A     MEDICAL HISTORY and ALLERGY     ALLERGIES: Chlordiazepoxide hcl, Dimetapp dm cold-cough, Haloperidol, Ibuprofen, Lactose intolerance [beta-galactosidase], Milk products, and Propofol    Patient Active Problem List   Diagnosis    Allergic rhinitis due to pollen    Urge incontinence    Hypertension, essential    Cardiomegaly    Chronic constipation    Dry eye syndrome    Esophageal reflux    Exposure keratoconjunctivitis    DM ophthalmopathy (H)    Hypothyroidism    Senile cataract    ANUJ (obstructive sleep apnea)    Vaginal atrophy    Restless leg syndrome    Squamous blepharitis    Morbid obesity due to excess calories (H)    Personal history of breast cancer s/p L masectomy    Hypercholesteremia    Lives in group home    Extrapyramidal and movement disorder    CKD (chronic kidney disease) stage 2, GFR 60-89 ml/min    Solitary kidney, congenital    S/P hysterectomy    Impingement syndrome of right shoulder    Hypertriglyceridemia    Candidiasis of skin    Generalized anxiety disorder    Carpal tunnel syndrome of left wrist    Schizoaffective disorder, depressive type (H)    Major depressive disorder, recurrent episode, moderate (H)    Psychological factors affecting medical condition    Hx of psychiatric care    Nail complaint    Microalbuminuria due to type 2 diabetes mellitus (H)    Type 2 diabetes mellitus with microalbuminuria, with  long-term current use of insulin (H)    Conjunctivitis of both eyes    Corneal erosion of both eyes    Spastic entropion, right    Foot callus    Paroxysmal atrial fibrillation (H)    Squamous cell carcinoma in situ of skin of face    History of colonic polyps    Benign paroxysmal positional vertigo, unspecified laterality    Mild cognitive impairment    Tardive dyskinesia    Physiologic anisocoria        MEDICAL REVIEW OF SYSTEMS   Contraception-  No   Pregnant- no  none in addition to that documented above     MEDICATIONS     Current Outpatient Medications   Medication Sig Dispense Refill    acetaminophen (TYLENOL) 500 MG tablet Take 2 tablets (1,000 mg) by mouth every 6 hours as needed 1 Bottle 2    alum & mag hydroxide-simethicone (MYLANTA/MAALOX) 200-200-20 MG/5ML SUSP suspension Take 30 mLs by mouth daily as needed for indigestion      amLODIPine (NORVASC) 10 MG tablet Take 1 tablet (10 mg) by mouth daily 90 tablet 0    apixaban ANTICOAGULANT (ELIQUIS) 5 MG tablet Take 1 tablet (5 mg) by mouth 2 times daily 180 tablet 0    artificial saliva (BIOTENE MT) AERS spray Take 1 spray by mouth 3 times daily as needed for dry mouth 115 mL 3    artificial tears (GENTEAL) 0.1-0.2-0.3 % ophthalmic solution Place 2 drops into both eyes 4 times daily 15 mL 11    atorvastatin (LIPITOR) 40 MG tablet Take 1 tablet (40 mg) by mouth daily 90 tablet 1    blood glucose (NO BRAND SPECIFIED) lancets standard Use to test blood sugar 2 times daily or as directed. 100 each 11    blood glucose (NO BRAND SPECIFIED) test strip Use to test blood sugar 3 times daily or as directed. 100 strip 3    blood glucose calibration (NO BRAND SPECIFIED) solution Use to calibrate blood glucose monitor as directed. 1 each 3    blood glucose monitoring (NO BRAND SPECIFIED) meter device kit Check Blood sugars twice a day. 1 kit 0    Calcium Carb-Cholecalciferol (CALCIUM-VITAMIN D3) 250-3.125 MG-MCG TABS       Calcium Carbonate-Vitamin D (CALCIUM-VITAMIN  D) 250-125 MG-UNIT TABS Take 1 tablet by mouth 2 times daily Calcium 250 mg/Vit D 125 IU      calcium polycarbophil (FIBERCON) 625 MG tablet Take 2 tablets (1,250 mg) by mouth daily 180 tablet 3    CLARITIN 10 MG OR TABS 1 TAB PO QD (Once per day) as needed for ALLERGY SYMPTOMS 30 11    conjugated estrogens (PREMARIN) 0.625 MG/GM vaginal cream Place 2 g vaginally daily for 2 weeks, followed by 2 g vaginally twice a week for vaginal atrophy and frequent UTIs 30 g 3    DENTA 5000 PLUS 1.1 % CREA 1 applicator (1 g) by Oral or Feeding Tube route 2 times daily 51 g 1    erythromycin (ROMYCIN) 5 MG/GM ophthalmic ointment Place 0.5 inches into both eyes At Bedtime 7 g 11    fluorometholone (FML LIQUIFILM) 0.1 % ophthalmic suspension Place 1 drop into both eyes 2 times daily 10 mL 3    FLUoxetine (PROZAC) 20 MG capsule Take 1 capsule (20 mg) by mouth daily 30 capsule 2    fluticasone (FLONASE) 50 MCG/ACT nasal spray Spray 1 spray into both nostrils daily 16 g 3    furosemide (LASIX) 20 MG tablet Take 1 tablet (20 mg) by mouth daily 60 tablet 3    gabapentin (NEURONTIN) 600 MG tablet       Hypromellose (ARTIFICIAL TEARS OP) Apply 1 drop to eye 4 times daily.      insulin glargine (LANTUS PEN) 100 UNIT/ML pen Inject 15 Units Subcutaneous At Bedtime 8 mL 0    lactase (LACTAID) 3000 UNIT tablet Take 1 tablet (3,000 Units) by mouth 3 times daily (with meals) 90 tablet 11    levothyroxine (SYNTHROID/LEVOTHROID) 175 MCG tablet Take 1 tablet (175 mcg) by mouth daily 90 tablet 3    lidocaine (XYLOCAINE) 2 % external gel Apply topically 3 times daily as needed for moderate pain 85 g 1    lidocaine (XYLOCAINE) 5 % external ointment Apply topically 3 times daily as needed for moderate pain 50 g 1    lifitegrast (XIIDRA) 5 % opthalmic solution Place 1 drop into both eyes 2 times daily 60 each 11    liraglutide (VICTOZA) 18 MG/3ML solution Inject 1.8 mg Subcutaneous daily 9 mL 3    losartan (COZAAR) 100 MG tablet Take 1 tablet (100 mg)  by mouth daily 90 tablet 1    Magnesium Hydroxide (MILK OF MAGNESIA PO) Take 30 mL as needed for constipation.      melatonin 3 MG CAPS Take 6 mg by mouth At Bedtime 60 capsule 2    melatonin 3 MG tablet       metFORMIN (GLUCOPHAGE) 1000 MG tablet Take 1,000 mg by mouth 2 times daily (with meals)      NEW MED Prohydrate Moisturizing gel  Place 1 applicator vaginally 4 times a week 20 oz 3    nystatin (MYCOSTATIN) 442979 UNIT/GM POWD Apply topically 3 times daily as needed 60 g 1    polyethylene glycol (MIRALAX/GLYCOLAX) powder Take 1 capful by mouth 2 times daily 17 GM PO BID      SALINE MIST 0.65 % nasal spray Spray 2 sprays in nostril 2 times daily      senna-docusate (SENOKOT-S/PERICOLACE) 8.6-50 MG tablet Take 2 tablets by mouth At Bedtime.      simethicone (MYLICON) 125 MG chewable tablet Take 1 tablet (125 mg) by mouth 2 times daily 180 tablet 3    Skin Protectants, Misc. (EUCERIN) cream Apply topically as needed Apply to thigh PRN dry skin      SM LUBRICANT EYE DROPS 0.4-0.3 % SOLN ophthalmic solution       sodium fluoride 1.1 % PSTE dental paste Apply 2 mLs to affected area daily 100 mL 1    solifenacin (VESICARE) 10 MG tablet Take 1 tablet (10 mg) by mouth daily 30 tablet 6    valbenazine (INGREZZA) 40 MG capsule Take 1 capsule (40 mg) by mouth daily 30 capsule 1    ziprasidone (GEODON) 40 MG capsule Take 1 capsule (40 mg) by mouth 2 times daily (with meals) 60 capsule 2      VITALS     Pulse Readings from Last 3 Encounters:   10/09/23 89   09/11/23 71   08/21/23 79     Wt Readings from Last 3 Encounters:   10/09/23 97.6 kg (215 lb 3.2 oz)   09/11/23 98.3 kg (216 lb 12.8 oz)   09/01/23 98.8 kg (217 lb 12.8 oz)     BP Readings from Last 3 Encounters:   10/09/23 139/84   09/11/23 (!) 143/84   08/21/23 (!) 148/82       MENTAL STATUS EXAM     Alertness: alert   Appearance: adequately groomed  Behavior/Demeanor: cooperative, pleasant and calm, with good  eye contact   Speech: slowed and regular rate and  rhythm  Language: intact  Psychomotor: chewing/lip smacking motions and right hand low frequency pill rolling resting tremor  Mood: description consistent with euthymia  Affect: restricted, blunted and appropriate; congruent to: mood- yes, content- yes  Thought Process/Associations: response delay  Thought Content:  Reports none;  Denies suicidal & violent ideation and delusions  Perception:  Reports none;  Denies hallucinations and depersonalization  Insight: fair  Judgment: good  Cognition: does  appear grossly intact; formal cognitive testing was not done  Gait and Station: remarkable for:  using a cane, slightly reduced swing phase of gait, slowed gait    AIMS= 6     LABS and DATA         7/6/2023    12:55 PM 7/13/2023     9:06 AM 9/11/2023     1:46 PM   PHQ   PHQ-9 Total Score 7 5 5   Q9: Thoughts of better off dead/self-harm past 2 weeks Not at all Not at all Not at all     Antipsychotic Labs:  Recent Labs   Lab Test 07/13/23  1003 10/26/22  0710 04/27/22  0725 04/12/22  1514 03/16/21  1537   CHOL 137  --   --  117 124   TRIG 82 103 115 72 136   LDL 79  --   --  55 53   HDL 42*  --   --  48* 43*     Recent Labs   Lab Test 07/13/23  1003 10/03/22  1423 05/12/22  1230 04/12/22  1514 10/08/21  2143   GLC  --  134* 139*  --  168*   A1C 7.3* 7.6*  --  7.4*  --      Recent Labs   Lab Test 10/08/21  2143 03/16/21  1537 05/07/19  0650 10/25/18  1734 06/26/18  0659   WBC 11.8*  --  10.06* 14.2* 9.72   ANEU  --  7.5  --  10.6* 5.31   HGB 13.0 13.7 13.3 14.0 12.6     --  292 275 247       Recent Labs   Lab Test 10/03/22  1423 11/18/21  1617   CR 0.83 0.8   GFRESTIMATED 74 >60     Recent Labs   Lab Test 03/16/21  1537 06/26/18  0659   AST 7.9 16   ALT 12.8 21   ALKPHOS 78.9 111       EKG 3/2021 - showing atrial fibrillation. QtC 392.   EKG 10/8/2021- QtC 439  AIMS score 7/11/22: 5        PSYCHOTROPIC DRUG INTERACTIONS                                                       PSYCHCLINICDDI   ZIPRASIDONE, fluoxetine and  Ingrezza may result in increased risk of QT-interval prolongation.  ZIPRASIDONE and SEROTONERGIC DRUGS THAT PROLONG QT INTERVAL may result in increased risk of QT-interval prolongation and increased risk of serotonin syndrome (hypertension, hyperthermia, myoclonus, mental status changes).  NSAID and SSRI may result in an increased risk of bleeding  ERYTHROMYCIN and FLUOXETINE may result in an increased risk of cardiotoxicity (QT prolongation, torsades de pointes, cardiac arrest).  FLUOXETINE and INSULINS OR PRAMLINTIDE may result in increased risk of hypoglycemia.  GABAPENTIN and CNS DEPRESSANTS may result in respiratory depression.   GABAPENTIN and ANTACIDS may result in decreased gabapentin effectiveness.   Ingrezza level is increased via CYT P450 interactions with fluoxetine.     MANAGEMENT:  Monitoring for adverse effects, periodic EKGs and patient is aware of risks     RISK STATEMENT for SAFETY     Pinky did not appear to be an imminent safety risk to self or others.    TREATMENT RISK STATEMENT: The risks, benefits, alternatives and potential adverse effects have been discussed and are understood by the pt. The pt understands the risks of using street drugs or alcohol. There are no medical contraindications, the pt agrees to treatment with the ability to do so. The pt knows to call the clinic for any problems or to access emergency care if needed.  Medical and substance use concerns are documented above.  Psychotropic drug interaction check was done, including changes made today.       Level of Medical Decision Making:   - At least 1 chronic problem that is not stable  - Engaged in prescription drug management during visit (discussed any medication benefits, side effects, alternatives, etc.)          PROVIDER: Reji Garrett MD      Patient staffed in clinic with Dr. Estrada who will sign the note.  Supervisor is Dr. Estrada.

## 2023-10-12 NOTE — PROGRESS NOTES
Assessment & Plan     Type 2 diabetes mellitus with microalbuminuria, with long-term current use of insulin (H)  Patient A1c at goal and due for recheck today. He has been checking her blood sugars twice a day and can de-escalate to once a day.  Having some moderately high blood sugars for her in the morning, will go up to 17 units of Lantus daily from 15 units daily. Return visit in 3 months  - Hemoglobin A1c  - Basic metabolic panel  - insulin glargine (LANTUS PEN) 100 UNIT/ML pen  Dispense: 8 mL; Refill: 4    Hypertension, essential  Tardive dyskinesia  Patient currently taking 100 mg of losartan and 10 mg of amlodipine daily for hypertension.  Blood pressure uncontrolled last 2 visits.  We will add spironolactone 25 mg today and obtain a BMP and recheck on return visit.  Would monitor potassium closely.  We will obtain baseline EKG today for both hypertension and psychiatrist request.  - spironolactone (ALDACTONE) 25 MG tablet  Dispense: 90 tablet; Refill: 0  - EKG 12-lead complete w/read - Clinics    Need for vaccination against respiratory syncytial virus  Recta to the pharmacy for vaccination        No follow-ups on file.    Allie Sweeney, [facility] MICHAEL Vance is a 73 year old, presenting for the following health issues:  Follow Up (Diabetes/)    HPI     Diabetes  Diabetes Medication(s)       Biguanides       metFORMIN (GLUCOPHAGE) 1000 MG tablet    Take 1,000 mg by mouth 2 times daily (with meals)      Insulin       insulin glargine (LANTUS PEN) 100 UNIT/ML pen    Inject 17 Units Subcutaneous at bedtime      Incretin Mimetic Agents       liraglutide (VICTOZA) 18 MG/3ML solution    Inject 1.8 mg Subcutaneous daily          Last A1C, 7.3, at goal    Blood sugars - 2x a day, do we need to check that often?   Fastings a little high    Add dimple?   HTN  - taking losartan and amlodipine    Increase Lantus? By 2 unit(s)    Spot on left supper arm   - noticed it yesterday  -  "itching yesterday  - not itching today  - nurse outlined it         Objective    BP (!) 161/86   Pulse 76   Temp 98.1  F (36.7  C)   Resp 17   Ht 1.638 m (5' 4.5\")   Wt 97.2 kg (214 lb 3.2 oz)   LMP  (LMP Unknown)   SpO2 96%   BMI 36.20 kg/m    Body mass index is 36.2 kg/m .  Physical Exam   Constitutional: No acute distress, sitting comfortably  Eyes: EOMI, no eye discharge, no icterus, injection or swelling.  Ears, nose, mouth, throat: Normocephalic, no nasal drainage, speaks clearly, no lip cracking.   Neck: Normal range of motion  CV: Extremeties well perfused, irregular rhythm   Respiratory: No audible wheezing, strido, cough, or other respiratory distress. Speaking in full sentences, CTAB  GI: Nondistended abdomen  MSK: Normal digits, moving extremeties well, symmetric strength visible throughout.  Skin: No visible rashes, bruising or other skin lesions.   Neuro: AOx3  Psych: Cooperative, mood, thought and judgement appropriate to situation.      Diabetic foot exam: normal DP and PT pulses, no trophic changes or ulcerative lesions, and normal sensory exam     EKG - Reviewed and interpreted by me atrial fibrillation, rate 79, comparable to previous 10/08/21                "

## 2023-10-13 ENCOUNTER — OFFICE VISIT (OUTPATIENT)
Dept: FAMILY MEDICINE | Facility: CLINIC | Age: 73
End: 2023-10-13
Payer: COMMERCIAL

## 2023-10-13 ENCOUNTER — OFFICE VISIT (OUTPATIENT)
Dept: PHARMACY | Facility: CLINIC | Age: 73
End: 2023-10-13
Payer: COMMERCIAL

## 2023-10-13 VITALS
RESPIRATION RATE: 17 BRPM | HEART RATE: 76 BPM | WEIGHT: 214.2 LBS | DIASTOLIC BLOOD PRESSURE: 86 MMHG | OXYGEN SATURATION: 96 % | TEMPERATURE: 98.1 F | SYSTOLIC BLOOD PRESSURE: 161 MMHG | BODY MASS INDEX: 35.69 KG/M2 | HEIGHT: 65 IN

## 2023-10-13 DIAGNOSIS — E11.29 TYPE 2 DIABETES MELLITUS WITH MICROALBUMINURIA, WITH LONG-TERM CURRENT USE OF INSULIN (H): ICD-10-CM

## 2023-10-13 DIAGNOSIS — J30.2 SEASONAL ALLERGIES: ICD-10-CM

## 2023-10-13 DIAGNOSIS — E11.29 TYPE 2 DIABETES MELLITUS WITH MICROALBUMINURIA, WITH LONG-TERM CURRENT USE OF INSULIN (H): Primary | ICD-10-CM

## 2023-10-13 DIAGNOSIS — Z29.11 NEED FOR VACCINATION AGAINST RESPIRATORY SYNCYTIAL VIRUS: ICD-10-CM

## 2023-10-13 DIAGNOSIS — I10 HYPERTENSION, ESSENTIAL: ICD-10-CM

## 2023-10-13 DIAGNOSIS — I48.0 PAROXYSMAL ATRIAL FIBRILLATION (H): ICD-10-CM

## 2023-10-13 DIAGNOSIS — J30.1 ALLERGIC RHINITIS DUE TO POLLEN, UNSPECIFIED SEASONALITY: Primary | ICD-10-CM

## 2023-10-13 DIAGNOSIS — R80.9 TYPE 2 DIABETES MELLITUS WITH MICROALBUMINURIA, WITH LONG-TERM CURRENT USE OF INSULIN (H): Primary | ICD-10-CM

## 2023-10-13 DIAGNOSIS — N39.41 URGE INCONTINENCE: ICD-10-CM

## 2023-10-13 DIAGNOSIS — R80.9 TYPE 2 DIABETES MELLITUS WITH MICROALBUMINURIA, WITH LONG-TERM CURRENT USE OF INSULIN (H): ICD-10-CM

## 2023-10-13 DIAGNOSIS — G24.01 TARDIVE DYSKINESIA: ICD-10-CM

## 2023-10-13 DIAGNOSIS — Z79.4 TYPE 2 DIABETES MELLITUS WITH MICROALBUMINURIA, WITH LONG-TERM CURRENT USE OF INSULIN (H): ICD-10-CM

## 2023-10-13 DIAGNOSIS — F25.1 SCHIZOAFFECTIVE DISORDER, DEPRESSIVE TYPE (H): ICD-10-CM

## 2023-10-13 DIAGNOSIS — Z79.4 TYPE 2 DIABETES MELLITUS WITH MICROALBUMINURIA, WITH LONG-TERM CURRENT USE OF INSULIN (H): Primary | ICD-10-CM

## 2023-10-13 LAB — HBA1C MFR BLD: 7.8 % (ref 0–5.6)

## 2023-10-13 PROCEDURE — 80048 BASIC METABOLIC PNL TOTAL CA: CPT | Performed by: STUDENT IN AN ORGANIZED HEALTH CARE EDUCATION/TRAINING PROGRAM

## 2023-10-13 PROCEDURE — 36415 COLL VENOUS BLD VENIPUNCTURE: CPT | Performed by: STUDENT IN AN ORGANIZED HEALTH CARE EDUCATION/TRAINING PROGRAM

## 2023-10-13 PROCEDURE — 99605 MTMS BY PHARM NP 15 MIN: CPT | Performed by: PHARMACIST

## 2023-10-13 PROCEDURE — 93000 ELECTROCARDIOGRAM COMPLETE: CPT | Mod: GC | Performed by: STUDENT IN AN ORGANIZED HEALTH CARE EDUCATION/TRAINING PROGRAM

## 2023-10-13 PROCEDURE — 99214 OFFICE O/P EST MOD 30 MIN: CPT | Mod: GC | Performed by: STUDENT IN AN ORGANIZED HEALTH CARE EDUCATION/TRAINING PROGRAM

## 2023-10-13 PROCEDURE — 83036 HEMOGLOBIN GLYCOSYLATED A1C: CPT | Performed by: STUDENT IN AN ORGANIZED HEALTH CARE EDUCATION/TRAINING PROGRAM

## 2023-10-13 PROCEDURE — 99607 MTMS BY PHARM ADDL 15 MIN: CPT | Performed by: PHARMACIST

## 2023-10-13 RX ORDER — SPIRONOLACTONE 25 MG/1
25 TABLET ORAL DAILY
Qty: 90 TABLET | Refills: 0 | Status: SHIPPED | OUTPATIENT
Start: 2023-10-13

## 2023-10-13 RX ORDER — RESPIRATORY SYNCYTIAL VIRUS VACCINE 120MCG/0.5
0.5 KIT INTRAMUSCULAR ONCE
Qty: 1 EACH | Refills: 0 | Status: CANCELLED | OUTPATIENT
Start: 2023-10-13 | End: 2023-10-13

## 2023-10-13 ASSESSMENT — PAIN SCALES - GENERAL: PAINLEVEL: NO PAIN (0)

## 2023-10-13 NOTE — LETTER
"Recommended To-Do List      Prepared on: 10/20/2023       You can get the best results from your medications by completing the items on this \"To-Do List.\"      Bring your To-Do List when you go to your doctor. And, share it with your family or caregivers.    My To-Do List:  What we talked about: What I should do:   A new medication that may be of benefit to you    Start taking spironolactone (ALDACTONE)          What we talked about: What I should do:                     "

## 2023-10-13 NOTE — LETTER
_  Medication List        Prepared on: 10/20/2023     Bring your Medication List when you go to the doctor, hospital, or   emergency room. And, share it with your family or caregivers.     Note any changes to how you take your medications.  Cross out medications when you no longer use them.    Medication How I take it Why I use it Prescriber   acetaminophen (TYLENOL) 500 MG tablet Take 2 tablets (1,000 mg) by mouth every 6 hours as needed Sprain of left ankle, unspecified ligament, initial encounter Jaqui Gay, APRN CNP   alum & mag hydroxide-simethicone (MYLANTA/MAALOX) 200-200-20 MG/5ML SUSP suspension Take 30 mLs by mouth daily as needed for indigestion  Acid reflux Patient Reported   amLODIPine (NORVASC) 10 MG tablet Take 1 tablet (10 mg) by mouth daily Hypertension, Essential Symone Reynolds MD   apixaban ANTICOAGULANT (ELIQUIS) 5 MG tablet Take 1 tablet (5 mg) by mouth 2 times daily Paroxysmal Atrial Fibrillation (H) Mona Nava MD   artificial saliva (BIOTENE MT) AERS spray Take 1 spray by mouth 3 times daily as needed for dry mouth Dry Mouth Symone Reynolds MD   artificial tears (GENTEAL) 0.1-0.2-0.3 % ophthalmic solution Place 2 drops into both eyes 4 times daily Dry eye syndrome of both eyes Nidia Sampson DO   atorvastatin (LIPITOR) 40 MG tablet Take 1 tablet (40 mg) by mouth daily Hyperlipidemia LDL Goal <100 Deacon Birmingham MD   blood glucose (NO BRAND SPECIFIED) lancets standard Use to test blood sugar 2 times daily or as directed. Type 2 diabetes mellitus with microalbuminuria, with long-term current use of insulin (H) Madelaine Linares MD   blood glucose (NO BRAND SPECIFIED) test strip Use to test blood sugar 3 times daily or as directed. Type 2 diabetes mellitus with microalbuminuria, with long-term current use of insulin (H) Symone Reynolds MD   blood glucose calibration (NO BRAND SPECIFIED) solution Use to calibrate blood glucose monitor as directed. Type 2  diabetes mellitus with microalbuminuria, with long-term current use of insulin (H) Shamika Kelley MD   blood glucose monitoring (NO BRAND SPECIFIED) meter device kit Check Blood sugars twice a day. Type 2 diabetes mellitus with microalbuminuria, with long-term current use of insulin (H) Madelaine Linares MD   calcium polycarbophil (FIBERCON) 625 MG tablet Take 2 tablets (1,250 mg) by mouth daily Chronic Constipation Symone Reynolds MD   conjugated estrogens (PREMARIN) 0.625 MG/GM vaginal cream Place 2 g vaginally daily for 2 weeks, followed by 2 g vaginally twice a week for vaginal atrophy and frequent UTIs Recurrent UTI; Vaginal Atrophy Deacon Birmingham MD   DENTA 5000 PLUS 1.1 % CREA 1 applicator (1 g) by Oral or Feeding Tube route 2 times daily Need for dental care Deacon Birmingham MD   erythromycin (ROMYCIN) 5 MG/GM ophthalmic ointment Place 0.5 inches into both eyes At Bedtime Dry Eyes Michael Lopez MD   fluorometholone (FML LIQUIFILM) 0.1 % ophthalmic suspension Place 1 drop into both eyes 2 times daily Dry eye syndrome of both eyes Michael Lopez MD   FLUoxetine (PROZAC) 20 MG capsule Take 2 capsules (40 mg) by mouth daily Schizoaffective Disorder, Depressive Type (H) Cortez Estrada MD   fluticasone (FLONASE) 50 MCG/ACT nasal spray Spray 1 spray into both nostrils daily Seasonal allergic rhinitis, unspecified trigger Symone Reynolds MD   furosemide (LASIX) 20 MG tablet Take 1 tablet (20 mg) by mouth daily Lower leg edema Deacon Birmingham MD   insulin glargine (LANTUS PEN) 100 UNIT/ML pen Inject 17 Units Subcutaneous at bedtime Type 2 diabetes mellitus with microalbuminuria, with long-term current use of insulin (H) Nidia Sampson DO   levothyroxine (SYNTHROID/LEVOTHROID) 175 MCG tablet Take 1 tablet (175 mcg) by mouth daily Other specified hypothyroidism Symone Reynolds MD   lidocaine (XYLOCAINE) 2 % external gel Apply topically 3 times daily as needed for moderate pain  Strain of left triceps muscle, subsequent encounter Gautam Tejeda MD   liraglutide (VICTOZA) 18 MG/3ML solution Inject 1.8 mg Subcutaneous daily Diabetes mellitus type 2, insulin dependent (H) Symone Reynolds MD   losartan (COZAAR) 100 MG tablet Take 1 tablet (100 mg) by mouth daily Hypertension, Essential Deacon Birmingham MD   Magnesium Hydroxide (MILK OF MAGNESIA PO) Take 30 mL as needed for constipation.  GERD Patient Reported   melatonin 3 MG CAPS Take 6 mg by mouth at bedtime Other insomnia Cortez Estrada MD   metFORMIN (GLUCOPHAGE) 1000 MG tablet Take 1,000 mg by mouth 2 times daily (with meals)  Diabetes Patient Reported   polyethylene glycol (MIRALAX/GLYCOLAX) powder Take 1 capful by mouth 2 times daily 17 GM PO BID  Constipation  Patient Reported   SALINE MIST 0.65 % nasal spray Spray 2 sprays in nostril 2 times daily  Allergies  Patient Reported   simethicone (MYLICON) 125 MG chewable tablet Take 1 tablet (125 mg) by mouth 2 times daily Flatulence, Eructation and Gas Pain Deacon Birmingham MD   sodium fluoride 1.1 % PSTE dental paste Apply 2 mLs to affected area daily Need for dental care Danika Abbott DO   solifenacin (VESICARE) 10 MG tablet Take 1 tablet (10 mg) by mouth daily Urge Incontinence Dedra See Carlitos Dior MD   spironolactone (ALDACTONE) 25 MG tablet Take 1 tablet (25 mg) by mouth daily Hypertension, Essential Nidia Sampson DO   valbenazine (INGREZZA) 40 MG capsule Take 1 capsule (40 mg) by mouth daily Tardive Dyskinesia Cortez Estrada MD   ziprasidone (GEODON) 20 MG capsule Take 1 capsule (20 mg) by mouth daily (with dinner) Schizoaffective Disorder, Depressive Type (H) Cortez Estrada MD   ziprasidone (GEODON) 40 MG capsule Take 1 capsule (40 mg) by mouth daily (with breakfast) Schizoaffective Disorder, Depressive Type (H) Cortez Estrada MD         Add new medications, over-the-counter drugs, herbals, vitamins, or  minerals in the blank rows below.    Medication How I  take it Why I use it Prescriber                                      Allergies:      chlordiazepoxide hcl; dimetapp dm cold-cough; haloperidol; ibuprofen; lactose intolerance [beta-galactosidase]; milk products; propofol        Side effects I have had:               Other Information:              My notes and questions:

## 2023-10-13 NOTE — LETTER
October 20, 2023  Pinky VELOZ Kaylee  [address] [facility] [address]    Dear Ms. Page, ROSELIA [facility] MICHAEL     Thank you for talking with me on Oct 13, 2023 about your health and medications. As a follow-up to our conversation, I have included two documents:      Your Recommended To-Do List has steps you should take to get the best results from your medications.  Your Medication List will help you keep track of your medications and how to take them.    If you want to talk about these documents, please call Hong Pérez RPH at phone: [phone], Monday-Friday 8-4:30pm.    I look forward to working with you and your doctors to make sure your medications work well for you.    Sincerely,  Hong Pérez RPH  [facility] Pharmacist, [facility]

## 2023-10-13 NOTE — PATIENT INSTRUCTIONS
It was great to see you today! Thanks for coming to [facility]!      Here is the plan from today's visit    Add 25 mg spironolactone daily.  Increase Lantus to 17 units daily.  Okay to only check blood sugar once a day.  Come back in 3 months for recheck.      Thank you for coming to [facility] today.  Lab Testing:  **If you had lab testing today and your results are reassuring or normal they will be mailed to you or sent through [employer] within 7 days.   **If the lab tests need quick action we will call you with the results.  **If you are having labs done on a different day, please call [phone] to schedule at [facility] or [phone] for other [facility] Outpatient Lab locations. Labs do not offer walk-in appointments.  The phone number we will call with results is # [phone] (home) . If this is not the best number please call our clinic and change the number.    Medication Refills:  If you need any refills please call your pharmacy and they will contact us.   If you need to  your refill at a new pharmacy, please contact the new pharmacy directly. The new pharmacy will help you get your medications transferred faster.       Scheduling, Urgent Medical Concerns (24 hours a day!), or ultrasound [phone] (8-5:00 M-F)    Referrals: If a referral was made to an [facility] specialty provider and you do not get a call from central scheduling, please refer to directions on your visit summary or call our office during normal business hours for assistance.     If a Mammogram was ordered for you at the Breast Center call [phone] to schedule or change your appointment.  If you had an XRay/CT/Ultrasound/MRI ordered the number is [phone] to schedule or change your radiology appointment.       Allie Sweeney, DO  Pronouns: she/her/hers  Resident Physician  [facility]/ [facility]    Department of Family Medicine and Community Health

## 2023-10-13 NOTE — PROGRESS NOTES
Medication Therapy Management (MTM) Encounter    ASSESSMENT:                            Medication Adherence/Access: No issues identified    DM:   Controlled  Currently taking 3 agents and A1c is below 8%.  Recommend continuing current therapy.      Hypertension:   Blood sugars are above goal of 130/80. Electrolytes are appropriate as well as renal function.   Recommend initiation of spironolactone.      Depression :   Controlled    Incontinence:   Controlled    Seasonal allergies:   Controlled    Anticoagulation for A. Fib  Controlled    Hyperlipidemia:  Controlled    PLAN:                            Begin taking spironolactone 25 mg once daily for hypertension    Follow-up: 1 month with PCP to review HTN results     SUBJECTIVE/OBJECTIVE:                          Pinky Page is a 73 year old female coming in for a co-visit with Dr. Sweeney.     Reason for visit: Medication review.    Pinky presents today with a list of her medications as well as readings done in her facility.    Allergies/ADRs: Reviewed in chart  Past Medical History: Reviewed in chart  Tobacco: She reports that she has never smoked. She has been exposed to tobacco smoke. She has never used smokeless tobacco.  Alcohol: not discussed     Medication Adherence/Access: no issues reported    Diabetes    Lantus 15 untis daily   Victoza 1.8 mg daily   Metformin 1000 mg twice daily           Patient is not experiencing side effects.  Blood sugar monitorin time(s) daily; Ranges: (see below )   Current diabetes symptoms: none  Diet/Exercise: modest     Eye exam is up to date  Foot exam is up to date  Urine Albumin:   Lab Results   Component Value Date    UMALCR  2023      Comment:      Unable to calculate, urine albumin and/or urine creatinine is outside detectable limits.  Microalbuminuria is defined as an albumin:creatinine ratio of 17 to 299 for males and 25 to 299 for females. A ratio of albumin:creatinine of 300 or higher is indicative of overt  proteinuria.  Due to biologic variability, positive results should be confirmed by a second, first-morning random or 24-hour timed urine specimen. If there is discrepancy, a third specimen is recommended. When 2 out of 3 results are in the microalbuminuria range, this is evidence for incipient nephropathy and warrants increased efforts at glucose control, blood pressure control, and institution of therapy with an angiotensin-converting-enzyme (ACE) inhibitor (if the patient can tolerate it).        Lab Results   Component Value Date    A1C 7.8 (H) 10/13/2023     Blood sugar readings:  AM:  146  102  161  131  143  146  145  121  148  165  155  134  161    5PM:  177  164  200  187  222  244  194  221  149  224  164  222        Hypertension:   Amlodipine 10 mg daily  Losartan 100 mg daily   Furosemide 20 mg daily.      Patient reports no current medication side effects.  Patient self-monitors blood pressure.  Home BP monitorin/70, 152/78, 120/70, 132/72, 162/84, 132/86    ..    BP Readings from Last 3 Encounters:   10/13/23 (!) 161/86   23 (!) 148/82   23 (!) 133/91     Pulse Readings from Last 3 Encounters:   10/13/23 76   23 79   23 70         Depression :   Valbenazine 40 mg daily   Ziprasidone 40 mg with breakfast, and 20 mg with dinner.     Incontinence:   Takes Solifenacin  Uses a diaper garment daily and not sure how effective the medication is.      Seasonal allergies:   Taking fluticasone nasal spray as needed      Anticoagulation:   Taking apixaban 5 mg twice daily.  Denies adverse effects.  Has been compliant with medication and does not report any drug interruptions.    Hyperlipidemia:  Atorvastatin 40 mg once daily  Denies adverse effects      Today's Vitals:   BP Readings from Last 1 Encounters:   10/13/23 (!) 161/86     Pulse Readings from Last 1 Encounters:   10/13/23 76     Wt Readings from Last 1 Encounters:   10/13/23 214 lb 3.2 oz (97.2 kg)     Ht Readings from Last 1  "Encounters:   10/13/23 5' 4.5\" (1.638 m)     Estimated body mass index is 36.03 kg/m  as calculated from the following:    Height as of an earlier encounter on 10/13/23: 5' 4.5\" (1.638 m).    Weight as of 10/20/23: 213 lb 3.2 oz (96.7 kg).    Temp Readings from Last 1 Encounters:   10/13/23 98.1  F (36.7  C)     ----------------      I spent 20 minutes with this patient today. Dr. Sweeney (resident physician) was provided the recommendations above  in clinic today and Dr. Sampson is the authorizing prescriber for this visit through the pharmacist collaborative practice agreement.. A copy of the visit note was provided to the patient's provider(s).    A summary of these recommendations was given to the patient (see AVS from today's appointment with Dr. Sweeney).    Hong Pérez, Pharm.D.       Medication Therapy Recommendations  Hypertension, essential    Current Medication: spironolactone (ALDACTONE) 25 MG tablet   Rationale: Synergistic therapy - Needs additional medication therapy - Indication   Recommendation: Start Medication - spironolactone 25 MG tablet   Status: Accepted per Provider                  "

## 2023-10-14 LAB
ANION GAP SERPL CALCULATED.3IONS-SCNC: 10 MMOL/L (ref 7–15)
BUN SERPL-MCNC: 14.3 MG/DL (ref 8–23)
CALCIUM SERPL-MCNC: 9.9 MG/DL (ref 8.8–10.2)
CHLORIDE SERPL-SCNC: 99 MMOL/L (ref 98–107)
CREAT SERPL-MCNC: 0.76 MG/DL (ref 0.51–0.95)
DEPRECATED HCO3 PLAS-SCNC: 29 MMOL/L (ref 22–29)
EGFRCR SERPLBLD CKD-EPI 2021: 82 ML/MIN/1.73M2
GLUCOSE SERPL-MCNC: 206 MG/DL (ref 70–99)
POTASSIUM SERPL-SCNC: 4.1 MMOL/L (ref 3.4–5.3)
SODIUM SERPL-SCNC: 138 MMOL/L (ref 135–145)

## 2023-10-18 RX ORDER — VALBENAZINE 40 MG/1
40 CAPSULE ORAL DAILY
Qty: 30 CAPSULE | Refills: 1 | Status: SHIPPED | OUTPATIENT
Start: 2023-10-18 | End: 2023-11-16

## 2023-10-18 RX ORDER — ZIPRASIDONE HYDROCHLORIDE 40 MG/1
40 CAPSULE ORAL
Qty: 30 CAPSULE | Refills: 2 | Status: SHIPPED | OUTPATIENT
Start: 2023-10-18 | End: 2023-11-16

## 2023-10-18 RX ORDER — ZIPRASIDONE HYDROCHLORIDE 20 MG/1
20 CAPSULE ORAL
Qty: 30 CAPSULE | Refills: 2 | Status: SHIPPED | OUTPATIENT
Start: 2023-10-18 | End: 2023-11-16

## 2023-10-20 ENCOUNTER — OFFICE VISIT (OUTPATIENT)
Dept: PSYCHOLOGY | Facility: CLINIC | Age: 73
End: 2023-10-20
Payer: COMMERCIAL

## 2023-10-20 VITALS — WEIGHT: 213.2 LBS | BODY MASS INDEX: 36.03 KG/M2

## 2023-10-20 DIAGNOSIS — F33.0 MAJOR DEPRESSIVE DISORDER, RECURRENT EPISODE, MILD (H): Primary | ICD-10-CM

## 2023-10-20 DIAGNOSIS — F54 PSYCHOLOGICAL FACTORS AFFECTING MEDICAL CONDITION: ICD-10-CM

## 2023-10-20 DIAGNOSIS — F41.1 GENERALIZED ANXIETY DISORDER: ICD-10-CM

## 2023-10-20 PROCEDURE — 90837 PSYTX W PT 60 MINUTES: CPT | Performed by: PSYCHOLOGIST

## 2023-10-20 NOTE — PROGRESS NOTES
Health Psychology                    Department of Medicine  Keri Arnett, Ph.D., L.P. [phone]                         [facility] Madelaine Ingram, Ph.D., L.P. [phone]                     [place] Mail Code 049   Dennis Dillard, Ph.D. [phone]      [address] [place] [address] Rae Eugene, Ph.D., A.B.P.P., L.P. [phone]              [place] [address]           Damon Gr, Ph.D., A.B.P.P., L.P. [phone]      Kimber Morales, Ph.D., L.P. [phone]  [facility]   Lilia Burroughs, Ph.D., A.B.P.P., L.P. 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  Intake with Health Psychology was in the .  I began to see her in the early .     She has had a good month in terms of weight loss, an is now the lowest she has been in > 10 years, or probably much longer.     She thinks her right hand tremor might be worse, possibly TD or Parkinsons.   She is now followed in Neurology; next visit pending November.  Experimenting with wrist weight and may try weighted utensils to minimize movement..    No new stressors. Discussed family and roommate, whose father  in the interval.     Mood: Her depression is baseline today, around 4/10.    Health:  Leg is improving; still using cane to prevent falling.    She continues to sing under her breath at times.    We walked for > half the session.     Psychoactive Medications:  - ziprasidone 40 mg qam 20 g qpm  - fluoxetine 40 mg daily  - Ingrezza 40 mg  - melatonin 6mg at bedtime  PCP prescribes gabapentin    Exercise: Back to walking after she eats for 10-15 minutes.  Biking 35 minutes daily.  Will discuss increasing to 40 next session.  Walking with cane, trying to  decrease dependence on it.    Weight: Her weight was 213.2 down from 217.8 down from  218.1 last session.    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    She participates fully, ventilated feelings appropriately.  She appears to derive benefit from the support.     Current Outpatient Medications   Medication    acetaminophen (TYLENOL) 500 MG tablet    alum & mag hydroxide-simethicone (MYLANTA/MAALOX) 200-200-20 MG/5ML SUSP suspension    amLODIPine (NORVASC) 10 MG tablet    apixaban ANTICOAGULANT (ELIQUIS) 5 MG tablet    artificial saliva (BIOTENE MT) AERS spray    artificial tears (GENTEAL) 0.1-0.2-0.3 % ophthalmic solution    atorvastatin (LIPITOR) 40 MG tablet    blood glucose (NO BRAND SPECIFIED) lancets standard    blood glucose (NO BRAND SPECIFIED) test strip    blood glucose calibration (NO BRAND SPECIFIED) solution    blood glucose monitoring (NO BRAND SPECIFIED) meter device kit    Calcium Carbonate-Vitamin D (CALCIUM-VITAMIN D) 250-125 MG-UNIT TABS    calcium polycarbophil (FIBERCON) 625 MG tablet    conjugated estrogens (PREMARIN) 0.625 MG/GM vaginal cream    DENTA 5000 PLUS 1.1 % CREA    erythromycin (ROMYCIN) 5 MG/GM ophthalmic ointment    fluorometholone (FML LIQUIFILM) 0.1 % ophthalmic suspension    FLUoxetine (PROZAC) 20 MG capsule    fluticasone (FLONASE) 50 MCG/ACT nasal spray    furosemide (LASIX) 20 MG tablet    gabapentin (NEURONTIN) 600 MG tablet    insulin glargine (LANTUS PEN) 100 UNIT/ML pen    levothyroxine (SYNTHROID/LEVOTHROID) 175 MCG tablet    lidocaine (XYLOCAINE) 2 % external gel    lidocaine (XYLOCAINE) 5 % external ointment    lifitegrast (XIIDRA) 5 % opthalmic solution    liraglutide (VICTOZA) 18 MG/3ML solution    losartan (COZAAR) 100 MG tablet    Magnesium Hydroxide (MILK OF MAGNESIA PO)    melatonin 3 MG CAPS    metFORMIN (GLUCOPHAGE) 1000 MG tablet    NEW MED    nystatin (MYCOSTATIN) 121197 UNIT/GM POWD    polyethylene glycol  (MIRALAX/GLYCOLAX) powder    SALINE MIST 0.65 % nasal spray    senna-docusate (SENOKOT-S/PERICOLACE) 8.6-50 MG tablet    simethicone (MYLICON) 125 MG chewable tablet    Skin Protectants, Misc. (EUCERIN) cream    SM LUBRICANT EYE DROPS 0.4-0.3 % SOLN ophthalmic solution    sodium fluoride 1.1 % PSTE dental paste    solifenacin (VESICARE) 10 MG tablet    spironolactone (ALDACTONE) 25 MG tablet    valbenazine (INGREZZA) 40 MG capsule    ziprasidone (GEODON) 20 MG capsule    ziprasidone (GEODON) 40 MG capsule     No current facility-administered medications for this visit.     Past Medical History:   Diagnosis Date    Allergic rhinitis due to pollen     seasonal allergies     Anisometropia and aniseikonia     BMI greater than 40     Cardiomegaly     Chronic constipation     Congenital absence of one kidney     Cramp of limb     Dermatophytosis of the body     Dry eye syndrome     Esophageal reflux     Essential hypertension, benign     Gastro-oesophageal reflux disease     Hemorrhoids     Hypermetropia     Hypothyroidism     Incomplete Emptying of Bladder     Lactose intolerance     Major depressive disorder, recurrent episode, moderate (H)     Malignant neoplasm of breast (female), unspecified site     left mastectomy 1996     Mixed incontinence urge and stress (male)(female)     Moderate obstructive sleep apnea     Postmenopausal Atrophic Vaginitis     Presbyopia     Regular astigmatism     Restless leg syndrome     Senile nuclear sclerosis     Thyroid eye disease     mild    Tinnitus     Trigger finger (acquired)     right hand    Type II or unspecified type diabetes mellitus without mention of complication, not stated as uncontrolled     on insulin since April 2006      Past Surgical History:   Procedure Laterality Date    APPENDECTOMY      C MASTECTOMY,SIMPLE  1996    Left mastectomy  [facility]. Had normal FU mammo 5/2007. Follow yearly.     CHOLECYSTECTOMY      COLONOSCOPY  5/2005    Complete  Colonoscopy-had one small polyp removed 5/2005.     COLONOSCOPY N/A 3/31/2016    Procedure: COLONOSCOPY;  Surgeon: Cary Ring MD;  Location: [facility]    COLONOSCOPY N/A 7/7/2016    Procedure: COLONOSCOPY;  Surgeon: Cary Ring MD;  Location: [facility]    COLONOSCOPY N/A 5/12/2022    Procedure: COLONOSCOPY, WITH POLYPECTOMY AND BIOPSY;  Surgeon: Jonathan Madden MD;  Location: [facility]    DILATION AND CURETTAGE      HYSTERECTOMY      MASTECTOMY      Left breast    ZZC TOTAL ABDOM HYSTERECTOMY  7/2003    Dr. Castanon, for abnormal bleeding. Removal of both tubes with BSO for myoma w - - -     Wt Readings from Last 4 Encounters:   10/20/23 96.7 kg (213 lb 3.2 oz)   10/13/23 97.2 kg (214 lb 3.2 oz)   10/09/23 97.6 kg (215 lb 3.2 oz)   09/11/23 98.3 kg (216 lb 12.8 oz)     Time service started: 1:01  Time service ended:  1:54  Extended session due to complexity of case and length of interval.    Diagnosis:   Major Depression, recurrent mild (F33.0)   Psychological Factors Affecting Morbid Obesity (F54)  Generalized anxiety (F41.1)       Plan: Return for psychotherapy visit in-person 12/8 @ 1 due to the serious and persistent nature of her depression and anxiety consistent with treatment plan.  Last treatment plan signed: 1/27/23  Treatment plan due: 1/27/24     Damon Gr, PhD LP,  A.B.P.P.  Director, Health Psychology  [phone]

## 2023-10-28 ENCOUNTER — TELEPHONE (OUTPATIENT)
Dept: FAMILY MEDICINE | Facility: CLINIC | Age: 73
End: 2023-10-28
Payer: COMMERCIAL

## 2023-10-28 DIAGNOSIS — I48.0 PAROXYSMAL ATRIAL FIBRILLATION (H): ICD-10-CM

## 2023-10-28 DIAGNOSIS — N39.41 URGE INCONTINENCE: ICD-10-CM

## 2023-10-28 DIAGNOSIS — U07.1 INFECTION DUE TO 2019 NOVEL CORONAVIRUS: Primary | ICD-10-CM

## 2023-10-28 PROCEDURE — 99207 PR NO CHARGE LOS: CPT

## 2023-10-28 RX ORDER — SOLIFENACIN SUCCINATE 10 MG/1
5 TABLET, FILM COATED ORAL DAILY
Qty: 4 TABLET | Refills: 0 | Status: SHIPPED | OUTPATIENT
Start: 2023-10-28 | End: 2023-12-08

## 2023-10-28 NOTE — TELEPHONE ENCOUNTER
Telephone Message   10/28/2023  6:30 PM    Call returned by Juarez Bradford MD    Patient: Pinky Page  Callback number (nursing station desk - [phone])      [facility]  [facility]    Situation: Pinky Page is a 73 year old female. This call is regarding positive test for Covid.    Background: Faye reports that patient started having symptoms including nasal and head congestion as well as sore throat today. Pt tested positive for Covid-19 today with home antigen test. Faye evaluated patient and reports she does not have fever, lung sounds are clear and oxygen saturation is normal. She is calling to ask if this patient would be a candidate for Paxlovid.    Assessment: 73 year old female on day 1 of symptoms, tested positive for Covid-19 today 10/28/23. Given her age alone, she is an appropriate candidate for Paxlovid, though she is taking several medications that interact with Paxlovid. Discussed with our inpatient pharmacist for directions regarding this patient's medication interactions with paxlovid.    Recommendation/Plan  - Send prescription for Paxlovid 300mg/100mg BID x 5 days  - To address medication interactions:  - Hold atorvastatin while taking Paxlovid, resume 3 days after last dose of paxlovid  - Halve dose of apixaban to 2.5 mg BID, resume full dose of 5mg 3 days after last dose of paxlovid  - Halve dose of solifenacin to 5 mg daily, then resume full dose of 10mg 3 days after last dose   - Reviewed these medication changes based on PharmD recs with [facility]. Per inpatient pharmacist, the outpatient pharmacy that fills this patient's prescription will also be reviewing medication interactions and review appropriate directions for medication adjustments.  - RN requested short term prescriptions of apixaban and solifenacin with updated dosing for the 8 days during which the dosing will be halved because she requires an order to  change these medications. Also sent these short term prescriptions to pharmacy.    Juarez Bradford MD

## 2023-10-29 NOTE — PATIENT INSTRUCTIONS
For informational purposes only. Not to replace the advice of your health care provider. Copyright   2022 [facility]. All rights reserved. Clinically reviewed by Carolyn Foy, PharmD, BCACP. [place] [id number] - REV 06/23.  COVID-19 Outpatient Treatments  Your care team can help you find the best treatments for COVID-19. Talk to a health care provider or refer to the FDA medicine fact sheets below.  Paxlovid (nirmatrelvir and ritonavir): https://www.paxlovid.Voicendo/resources  Molnupiravir (Lagevrio): https://www.fda.gov/media/518130/download  Important: We can only prescribe Paxlovid or Molnupiravir when it can be started within 5 days of first having symptoms.  Paxlovid (nimatrelvir and ritonavir)  How it works  Two medicines (nirmatrelvir and ritonavir) are taken together. They stop the virus from growing. Less amount of virus is easier for your body to fight.  Benefits  Lowers risk of a hospital stay or death from COVID-19.  How to take  Medicine comes in a daily container with both medicine tablets. Take by mouth twice daily (once in the morning, once at night) for 5 days.  The number of tablets to take varies by patient.  Don't chew or break capsules. Swallow whole.  When to take  Take it as soon as possible and within 5 days of your first symptoms.  Who can take it  Patients must be 12 years or older weigh at least 88 pounds. Paxlovid is the preferred treatment for pregnant patients.  Possible side effects  Can cause altered sense of taste, diarrhea (loose, watery stools), high blood pressure, muscle aches.  Medicine conflicts  Some medicines may conflict with Paxlovid and may cause serious side effects.  Tell your care team about all the medicines you take, including prescription and over-the-counter medicines, vitamins, and herbal supplements.  Your care team will review your medicines to make sure that you can safely take Paxlovid.  Cautions  Paxlovid is not advised for patients with severe  kidney or liver disease. If you have kidney or liver problems, the dose may need to be changed.  If you're pregnant or breastfeeding, talk to your care team about your options.  If you take hormonal birth control (such as the Pill), then you or your partner should also use a non-hormonal form of birth control (such as a condom). Keep doing this for 1 menstrual cycle after your last dose of Paxlovid.  Molnupiravir (lagevrio)  How it works  Stops the virus from growing. Less amount of virus is easier for your body to fight.  Benefits  Lowers risk of a hospital stay or death from COVID-19.  How to take  Take 4 capsules by mouth every 12 hours (4 in the morning and 4 at night) for 5 days. Don't chew or break capsules. Swallow whole.  When to take  Take as soon as possible and within 5 days of your first symptoms.  Who can take it  Patients must be 18 years or older.   Possible side effects  Diarrhea (loose, watery stools), nausea (feeling sick to your stomach), dizziness, headaches.  Medicine conflicts  Right now, there are no known conflicts with other drugs. But tell your care team about all medicines you take.  Cautions  This medicine is not advised for patients who are pregnant.  If you are someone who could become pregnant, use trusted birth control until 4 days after your last dose of molnupiravir.  If your partner could become pregnant, you should use trusted birth control until 3 months after your last dose of molnupiravir.

## 2023-10-31 ENCOUNTER — PATIENT OUTREACH (OUTPATIENT)
Dept: GASTROENTEROLOGY | Facility: CLINIC | Age: 73
End: 2023-10-31
Payer: COMMERCIAL

## 2023-11-02 ENCOUNTER — DOCUMENTATION ONLY (OUTPATIENT)
Dept: FAMILY MEDICINE | Facility: CLINIC | Age: 73
End: 2023-11-02
Payer: COMMERCIAL

## 2023-11-02 NOTE — PROGRESS NOTES
"When opening a documentation only encounter, be sure to enter in \"Chief Complaint\" Forms and in \" Comments\" Title of form, description if needed.    Pinky is a 73 year old  female  Form received via: Fax  Form now resides in: Provider Ready    Kajal Soler      Form has been completed by provider.     Form sent out via: Fax to gustavo Thornton at Fax Number: [phone]  Patient informed: No, Reason:  Output date: November 6, 2023    Kajal Soler      **Please close the encounter**                    "

## 2023-11-06 DIAGNOSIS — I48.0 PAROXYSMAL ATRIAL FIBRILLATION (H): ICD-10-CM

## 2023-11-06 NOTE — TELEPHONE ENCOUNTER
"Request for medication refill: apixaban ANTICOAGULANT (ELIQUIS) 2.5 MG tablet     Providers if patient needs an appointment and you are willing to give a one month supply please refill for one month and  send a letter/MyChart using \".SMILLIMITEDREFILL\" .smillimited and route chart to \"P [facility] \" (Giving one month refill in non controlled medications is strongly recommended before denial)    If refill has been denied, meaning absolutely no refills without visit, please complete the smart phrase \".smirxrefuse\" and route it to the \"P [facility] MED REFILLS\"  pool to inform the patient and the pharmacy.    Deacon Castro CMA    [facility]- 10/13/2023  "

## 2023-11-14 ENCOUNTER — OFFICE VISIT (OUTPATIENT)
Dept: NEUROLOGY | Facility: CLINIC | Age: 73
End: 2023-11-14
Payer: COMMERCIAL

## 2023-11-14 VITALS
OXYGEN SATURATION: 99 % | DIASTOLIC BLOOD PRESSURE: 73 MMHG | HEIGHT: 63 IN | HEART RATE: 85 BPM | BODY MASS INDEX: 37.51 KG/M2 | RESPIRATION RATE: 16 BRPM | SYSTOLIC BLOOD PRESSURE: 112 MMHG | WEIGHT: 211.7 LBS

## 2023-11-14 DIAGNOSIS — G24.01 TARDIVE DYSKINESIA: ICD-10-CM

## 2023-11-14 DIAGNOSIS — R25.1 TREMOR: Primary | ICD-10-CM

## 2023-11-14 PROCEDURE — 99214 OFFICE O/P EST MOD 30 MIN: CPT | Performed by: NURSE PRACTITIONER

## 2023-11-14 ASSESSMENT — PAIN SCALES - GENERAL: PAINLEVEL: NO PAIN (0)

## 2023-11-14 NOTE — LETTER
2023       RE: Pinky Page  [address] [facility]  [place] [id number]     Dear Colleague,    Thank you for referring your patient, Pinky Page, to the [facility] NEUROLOGY [facility]INIC [place] at [facility]. Please see a copy of my visit note below.      ASSESSMENT:    Tremor:  Using wrist weight. Opted to wait on medication.     Tardive Dyskinesias: Improved on Ingrezza.      PLAN:    __  The following patient instructions provided: -     __  We have discussed and agreed no medication to treat tremors at this time.     __  Continue using the wrist weight and if needed the weighted utensils.     __  Continue to walk in the hallways.     __ Return in May or  for a follow up. You may return sooner as needed.           MOVEMENT DISORDERS CLINIC           PATIENT: Pinky Page    : 1950    CORINNE: 2023    REASON FOR VISIT: Tremor follow up.    HPI: Ms. Pinky Page is a 73 year old who came to the [facility] neurology clinic by herself for a follow up visit. She came via [employer].  Her insurance pays for the ride.    I was her last in the clinic on 2023 for evaluation of tremor.  During that visit, the following were discussed: -    ASSESSMENT:     Tremor:  Ms. Page is a 73 year old right - handed  female with a longstanding history of schizoaffective disorder and depression, and long term use of psychotropic medications. Today's exam shows mixed action and resting tremors and some parkinsonian features including bradykinesia, stiffness, and gait and balance difficulty.      Her symptoms could be drug-induced from Ziprasidone or parkinson's disease. The only way to differentiate the two is by getting a Dopamine Scant (DATscan). Since selective serotonin reuptake inhibitor interfere with the DATscan result and show false positive results, it is recommended for patients to go off certain medications,  including selective serotonin reuptake inhibitor. At this point, since the patient's mood has been stable, I don't think it is worth taking the risk of stopping the medication.  This was extensively discussed with the pt.     For now, it might be better to treat the tremors conservatively as pt is managing her ADLs by choosing times that she is not shaking.     Tardive dyskinesias:  Most likely induced by antipsychotic medication, Ziprasidone. This is not bothersome to patient.         PLAN:     The differential diagnosis of tremors and treatments discussed. All questions addressed and the following patient instructions provided.      __  We'll update your Primacy Care Provider: Dr. Tonia Goel.     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 It could be from the medications that you take for mood or from Parkinson's disease.  It's hard to know the difference on exam.  Dopamine Scan is used to know the difference.  However, your mood medications interfere with the accuracy of the result and you have to be off of these medications to get the scan.  As we have discussed, it's best not to do the Dopamine Scan at this time.      __  You have options to start medications to help the tremors, like Propranolol, Primidone or Sinemet. These medications might improve your tremors but not get rid of them.  There are various side effects associated with these medications that we've briefly discussed.  Also, it's good to know the goal of treatment and to treat bothersome symptoms.     __  Since the Tardive Dyskinesias (mouth movements) are not bothersome, we'll not treat this.      __  If you ever want to try any of the medications, please let me know. We'll work with your mental health care providers.      __  Occupational Therapy is a good option to explore device options like wrist weight, to improve tremors.  I'll refer you to OT and they will call you to schedule the appointment.      __  Continue to exercise regularly,  "including the Balance Exercise.      __  Return in 4 months for a follow up. You may return sooner as needed.        She lives at [facility]. Pt had COVID virus infection on 10/28. She was in a quarantine. About 54 residents were sick. No hospitalization. Pt was symptomatic with sore throat, cough, and fever. She has fully recovered. Her tremors were worse during her illness and now back to baseline.    Since her last visit, she was seen  by OT. Per OT's recommendation patient's supervisor has order wrist weight and weighted utensils from [employer] that the pt paid for.  Pt uses the wrist weight, but hasn't used the weighted utensils yet. The wrist weight has helped suppressing tremors. Pt is independent in feeding and dressing herself.    She has continued working for 1.5 hrs weekly.    Mood is okay. \"Not too depressed.\"    Tardive dyskinesias have improved on Ingrezza.     MEDICATIONS:   Outpatient Medications Marked as Taking for the 11/14/23 encounter (Office Visit) with Bethel Lozada APRN CNP   Medication Sig    acetaminophen (TYLENOL) 500 MG tablet Take 2 tablets (1,000 mg) by mouth every 6 hours as needed    alum & mag hydroxide-simethicone (MYLANTA/MAALOX) 200-200-20 MG/5ML SUSP suspension Take 30 mLs by mouth daily as needed for indigestion    amLODIPine (NORVASC) 10 MG tablet Take 1 tablet (10 mg) by mouth daily    apixaban ANTICOAGULANT (ELIQUIS) 2.5 MG tablet Take 1 tablet (2.5 mg) by mouth 2 times daily    artificial saliva (BIOTENE MT) AERS spray Take 1 spray by mouth 3 times daily as needed for dry mouth    artificial tears (GENTEAL) 0.1-0.2-0.3 % ophthalmic solution Place 2 drops into both eyes 4 times daily    atorvastatin (LIPITOR) 40 MG tablet Take 1 tablet (40 mg) by mouth daily    blood glucose (NO BRAND SPECIFIED) lancets standard Use to test blood sugar 2 times daily or as directed.    blood glucose (NO BRAND SPECIFIED) test strip Use to test blood sugar 3 times daily or as directed. "    blood glucose calibration (NO BRAND SPECIFIED) solution Use to calibrate blood glucose monitor as directed.    blood glucose monitoring (NO BRAND SPECIFIED) meter device kit Check Blood sugars twice a day.    Calcium Carbonate-Vitamin D (CALCIUM-VITAMIN D) 250-125 MG-UNIT TABS Take 1 tablet by mouth 2 times daily Calcium 250 mg/Vit D 125 IU    calcium polycarbophil (FIBERCON) 625 MG tablet Take 2 tablets (1,250 mg) by mouth daily    conjugated estrogens (PREMARIN) 0.625 MG/GM vaginal cream Place 2 g vaginally daily for 2 weeks, followed by 2 g vaginally twice a week for vaginal atrophy and frequent UTIs    DENTA 5000 PLUS 1.1 % CREA 1 applicator (1 g) by Oral or Feeding Tube route 2 times daily    erythromycin (ROMYCIN) 5 MG/GM ophthalmic ointment Place 0.5 inches into both eyes At Bedtime    fluorometholone (FML LIQUIFILM) 0.1 % ophthalmic suspension Place 1 drop into both eyes 2 times daily    FLUoxetine (PROZAC) 20 MG capsule Take 2 capsules (40 mg) by mouth daily    fluticasone (FLONASE) 50 MCG/ACT nasal spray Spray 1 spray into both nostrils daily    furosemide (LASIX) 20 MG tablet Take 1 tablet (20 mg) by mouth daily    gabapentin (NEURONTIN) 600 MG tablet     insulin glargine (LANTUS PEN) 100 UNIT/ML pen Inject 17 Units Subcutaneous at bedtime    levothyroxine (SYNTHROID/LEVOTHROID) 175 MCG tablet Take 1 tablet (175 mcg) by mouth daily    lidocaine (XYLOCAINE) 2 % external gel Apply topically 3 times daily as needed for moderate pain    lidocaine (XYLOCAINE) 5 % external ointment Apply topically 3 times daily as needed for moderate pain    lifitegrast (XIIDRA) 5 % opthalmic solution Place 1 drop into both eyes 2 times daily    liraglutide (VICTOZA) 18 MG/3ML solution Inject 1.8 mg Subcutaneous daily    losartan (COZAAR) 100 MG tablet Take 1 tablet (100 mg) by mouth daily    Magnesium Hydroxide (MILK OF MAGNESIA PO) Take 30 mL as needed for constipation.    melatonin 3 MG CAPS Take 6 mg by mouth at bedtime  "   metFORMIN (GLUCOPHAGE) 1000 MG tablet Take 1,000 mg by mouth 2 times daily (with meals)    NEW MED Prohydrate Moisturizing gel  Place 1 applicator vaginally 4 times a week    nystatin (MYCOSTATIN) 796887 UNIT/GM POWD Apply topically 3 times daily as needed    polyethylene glycol (MIRALAX/GLYCOLAX) powder Take 1 capful by mouth 2 times daily 17 GM PO BID    SALINE MIST 0.65 % nasal spray Spray 2 sprays in nostril 2 times daily    senna-docusate (SENOKOT-S/PERICOLACE) 8.6-50 MG tablet Take 2 tablets by mouth At Bedtime.    simethicone (MYLICON) 125 MG chewable tablet Take 1 tablet (125 mg) by mouth 2 times daily    Skin Protectants, Misc. (EUCERIN) cream Apply topically as needed Apply to thigh PRN dry skin    SM LUBRICANT EYE DROPS 0.4-0.3 % SOLN ophthalmic solution     sodium fluoride 1.1 % PSTE dental paste Apply 2 mLs to affected area daily    solifenacin (VESICARE) 10 MG tablet Take 0.5 tablets (5 mg) by mouth daily    spironolactone (ALDACTONE) 25 MG tablet Take 1 tablet (25 mg) by mouth daily    valbenazine (INGREZZA) 40 MG capsule Take 1 capsule (40 mg) by mouth daily    ziprasidone (GEODON) 20 MG capsule Take 1 capsule (20 mg) by mouth daily (with dinner)    ziprasidone (GEODON) 40 MG capsule Take 1 capsule (40 mg) by mouth daily (with breakfast)       PHYSICAL EXAM:    VITAL SIGNS:  Blood pressure 112/73, pulse 85, resp. rate 16, height 1.6 m (5' 3\"), weight 96 kg (211 lb 11.2 oz), SpO2 99%.  Body mass index is 37.5 kg/m .    GENERAL:  Ms. Page is a pleasant  female who is well-groomed, well-developed, and overweight sitting comfortably in the exam room without any distress. Affect is appropriate.    MOVEMENT DISORDERS ASSESSMENT:   Speech: Normal.  Facial Expression: Minimal hypomimia. Slight to mild oromandibular tardive dyskinesias (improved from last time.)   Rest Tremor: Mind in Rt hand. Absent in other extremities.  Action/Postural Tremor: Mild in Rt; Absent in Lt.    Action Tremor: Mild " in Rt; Slight in Lt.   Rigidity: Mild in Rt; Slight in Lt.  Finger Taps: Slight bilaterally.   Hand opening & closing: Mild bilaterally.    Hand pronation & supination:  Mild bilaterally.    Toe Tapping:  Slight in Rt; Mild in Lt.   Leg Agility:  Slight in Rt; Mild in Lt.   Arising from chair - arms folded across chest: Pushes self up from arms of seat.  Posture: Mild.   Gait: Used cane for balance. She walks slowly, with stooped posture, reduced arm sing in left hand.  Freezing of gait:  Absent.    Postural Stability: Moderate.  Body Bradykinesia: Mild.   Dyskinesias:  Absent.       Today I spent 30 minutes caring for the patient. Time was spent with reviewing records, meeting with the patient, answering questions, examining, and documentation.        Again, thank you for allowing me to participate in the care of your patient.      Sincerely,    SAL Atkins CNP

## 2023-11-14 NOTE — NURSING NOTE
"Chief Complaint   Patient presents with    RECHECK     /73 (BP Location: Right arm, Patient Position: Sitting, Cuff Size: Adult Large)   Pulse 85   Resp 16   Ht 1.6 m (5' 3\")   Wt 96 kg (211 lb 11.2 oz)   LMP  (LMP Unknown)   SpO2 99%   BMI 37.50 kg/m      JANES SUE    "

## 2023-11-14 NOTE — PATIENT INSTRUCTIONS
Dear Ms. Pinky Nevarezntcarolyn,    Thank you for coming today.  During your visit, we have discussed the following:     __  We have discussed and agreed no medication to treat tremors at this time.     __  Continue using the wrist weight and if needed the weighted utensils.     __  Continue to walk in the hallways.     __ Return in May or June for a follow up. You may return sooner as needed.       For questions, you may send us a [employer] message or call [phone]    Fax number: [phone]    To make an appointment with one of our pharmacists, (Helena Price, Pharm.D. or Ben HernandezD), call [phone].    Radha Lozada DNP, APRN  [facility]

## 2023-11-14 NOTE — PROGRESS NOTES
ASSESSMENT:    Tremor:  Using wrist weight. Opted to wait on medication.     Tardive Dyskinesias: Improved on Ingrezza.      PLAN:    __  The following patient instructions provided: -     __  We have discussed and agreed no medication to treat tremors at this time.     __  Continue using the wrist weight and if needed the weighted utensils.     __  Continue to walk in the hallways.     __ Return in May or  for a follow up. You may return sooner as needed.           MOVEMENT DISORDERS CLINIC           PATIENT: Pinky Page    : 1950    CORINNE: 2023    REASON FOR VISIT: Tremor follow up.    HPI: Ms. Pinky Page is a 73 year old who came to the [facility] neurology clinic by herself for a follow up visit. She came via [employer].  Her insurance pays for the ride.    I was her last in the clinic on 2023 for evaluation of tremor.  During that visit, the following were discussed: -    ASSESSMENT:     Tremor:  Ms. Page is a 73 year old right - handed  female with a longstanding history of schizoaffective disorder and depression, and long term use of psychotropic medications. Today's exam shows mixed action and resting tremors and some parkinsonian features including bradykinesia, stiffness, and gait and balance difficulty.      Her symptoms could be drug-induced from Ziprasidone or parkinson's disease. The only way to differentiate the two is by getting a Dopamine Scant (DATscan). Since selective serotonin reuptake inhibitor interfere with the DATscan result and show false positive results, it is recommended for patients to go off certain medications, including selective serotonin reuptake inhibitor. At this point, since the patient's mood has been stable, I don't think it is worth taking the risk of stopping the medication.  This was extensively discussed with the pt.     For now, it might be better to treat the tremors conservatively as pt is managing her ADLs by choosing  times that she is not shaking.     Tardive dyskinesias:  Most likely induced by antipsychotic medication, Ziprasidone. This is not bothersome to patient.         PLAN:     The differential diagnosis of tremors and treatments discussed. All questions addressed and the following patient instructions provided.      __  We'll update your Primacy Care Provider: Dr. Tonia Goel.     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 It could be from the medications that you take for mood or from Parkinson's disease.  It's hard to know the difference on exam.  Dopamine Scan is used to know the difference.  However, your mood medications interfere with the accuracy of the result and you have to be off of these medications to get the scan.  As we have discussed, it's best not to do the Dopamine Scan at this time.      __  You have options to start medications to help the tremors, like Propranolol, Primidone or Sinemet. These medications might improve your tremors but not get rid of them.  There are various side effects associated with these medications that we've briefly discussed.  Also, it's good to know the goal of treatment and to treat bothersome symptoms.     __  Since the Tardive Dyskinesias (mouth movements) are not bothersome, we'll not treat this.      __  If you ever want to try any of the medications, please let me know. We'll work with your mental health care providers.      __  Occupational Therapy is a good option to explore device options like wrist weight, to improve tremors.  I'll refer you to OT and they will call you to schedule the appointment.      __  Continue to exercise regularly, including the Balance Exercise.      __  Return in 4 months for a follow up. You may return sooner as needed.        She lives at [facility]. Pt had COVID virus infection on 10/28. She was in a quarantine. About 54 residents were sick. No hospitalization. Pt was symptomatic with sore throat, cough, and fever. She has  "fully recovered. Her tremors were worse during her illness and now back to baseline.    Since her last visit, she was seen  by OT. Per OT's recommendation patient's supervisor has order wrist weight and weighted utensils from [employer] that the pt paid for.  Pt uses the wrist weight, but hasn't used the weighted utensils yet. The wrist weight has helped suppressing tremors. Pt is independent in feeding and dressing herself.    She has continued working for 1.5 hrs weekly.    Mood is okay. \"Not too depressed.\"    Tardive dyskinesias have improved on Ingrezza.     MEDICATIONS:   Outpatient Medications Marked as Taking for the 11/14/23 encounter (Office Visit) with Bethel Lozada APRN CNP   Medication Sig    acetaminophen (TYLENOL) 500 MG tablet Take 2 tablets (1,000 mg) by mouth every 6 hours as needed    alum & mag hydroxide-simethicone (MYLANTA/MAALOX) 200-200-20 MG/5ML SUSP suspension Take 30 mLs by mouth daily as needed for indigestion    amLODIPine (NORVASC) 10 MG tablet Take 1 tablet (10 mg) by mouth daily    apixaban ANTICOAGULANT (ELIQUIS) 2.5 MG tablet Take 1 tablet (2.5 mg) by mouth 2 times daily    artificial saliva (BIOTENE MT) AERS spray Take 1 spray by mouth 3 times daily as needed for dry mouth    artificial tears (GENTEAL) 0.1-0.2-0.3 % ophthalmic solution Place 2 drops into both eyes 4 times daily    atorvastatin (LIPITOR) 40 MG tablet Take 1 tablet (40 mg) by mouth daily    blood glucose (NO BRAND SPECIFIED) lancets standard Use to test blood sugar 2 times daily or as directed.    blood glucose (NO BRAND SPECIFIED) test strip Use to test blood sugar 3 times daily or as directed.    blood glucose calibration (NO BRAND SPECIFIED) solution Use to calibrate blood glucose monitor as directed.    blood glucose monitoring (NO BRAND SPECIFIED) meter device kit Check Blood sugars twice a day.    Calcium Carbonate-Vitamin D (CALCIUM-VITAMIN D) 250-125 MG-UNIT TABS Take 1 tablet by mouth 2 times daily " Calcium 250 mg/Vit D 125 IU    calcium polycarbophil (FIBERCON) 625 MG tablet Take 2 tablets (1,250 mg) by mouth daily    conjugated estrogens (PREMARIN) 0.625 MG/GM vaginal cream Place 2 g vaginally daily for 2 weeks, followed by 2 g vaginally twice a week for vaginal atrophy and frequent UTIs    DENTA 5000 PLUS 1.1 % CREA 1 applicator (1 g) by Oral or Feeding Tube route 2 times daily    erythromycin (ROMYCIN) 5 MG/GM ophthalmic ointment Place 0.5 inches into both eyes At Bedtime    fluorometholone (FML LIQUIFILM) 0.1 % ophthalmic suspension Place 1 drop into both eyes 2 times daily    FLUoxetine (PROZAC) 20 MG capsule Take 2 capsules (40 mg) by mouth daily    fluticasone (FLONASE) 50 MCG/ACT nasal spray Spray 1 spray into both nostrils daily    furosemide (LASIX) 20 MG tablet Take 1 tablet (20 mg) by mouth daily    gabapentin (NEURONTIN) 600 MG tablet     insulin glargine (LANTUS PEN) 100 UNIT/ML pen Inject 17 Units Subcutaneous at bedtime    levothyroxine (SYNTHROID/LEVOTHROID) 175 MCG tablet Take 1 tablet (175 mcg) by mouth daily    lidocaine (XYLOCAINE) 2 % external gel Apply topically 3 times daily as needed for moderate pain    lidocaine (XYLOCAINE) 5 % external ointment Apply topically 3 times daily as needed for moderate pain    lifitegrast (XIIDRA) 5 % opthalmic solution Place 1 drop into both eyes 2 times daily    liraglutide (VICTOZA) 18 MG/3ML solution Inject 1.8 mg Subcutaneous daily    losartan (COZAAR) 100 MG tablet Take 1 tablet (100 mg) by mouth daily    Magnesium Hydroxide (MILK OF MAGNESIA PO) Take 30 mL as needed for constipation.    melatonin 3 MG CAPS Take 6 mg by mouth at bedtime    metFORMIN (GLUCOPHAGE) 1000 MG tablet Take 1,000 mg by mouth 2 times daily (with meals)    NEW MED Prohydrate Moisturizing gel  Place 1 applicator vaginally 4 times a week    nystatin (MYCOSTATIN) 785347 UNIT/GM POWD Apply topically 3 times daily as needed    polyethylene glycol (MIRALAX/GLYCOLAX) powder Take 1  "capful by mouth 2 times daily 17 GM PO BID    SALINE MIST 0.65 % nasal spray Spray 2 sprays in nostril 2 times daily    senna-docusate (SENOKOT-S/PERICOLACE) 8.6-50 MG tablet Take 2 tablets by mouth At Bedtime.    simethicone (MYLICON) 125 MG chewable tablet Take 1 tablet (125 mg) by mouth 2 times daily    Skin Protectants, Misc. (EUCERIN) cream Apply topically as needed Apply to thigh PRN dry skin    SM LUBRICANT EYE DROPS 0.4-0.3 % SOLN ophthalmic solution     sodium fluoride 1.1 % PSTE dental paste Apply 2 mLs to affected area daily    solifenacin (VESICARE) 10 MG tablet Take 0.5 tablets (5 mg) by mouth daily    spironolactone (ALDACTONE) 25 MG tablet Take 1 tablet (25 mg) by mouth daily    valbenazine (INGREZZA) 40 MG capsule Take 1 capsule (40 mg) by mouth daily    ziprasidone (GEODON) 20 MG capsule Take 1 capsule (20 mg) by mouth daily (with dinner)    ziprasidone (GEODON) 40 MG capsule Take 1 capsule (40 mg) by mouth daily (with breakfast)       PHYSICAL EXAM:    VITAL SIGNS:  Blood pressure 112/73, pulse 85, resp. rate 16, height 1.6 m (5' 3\"), weight 96 kg (211 lb 11.2 oz), SpO2 99%.  Body mass index is 37.5 kg/m .    GENERAL:  Ms. Page is a pleasant  female who is well-groomed, well-developed, and overweight sitting comfortably in the exam room without any distress. Affect is appropriate.    MOVEMENT DISORDERS ASSESSMENT:   Speech: Normal.  Facial Expression: Minimal hypomimia. Slight to mild oromandibular tardive dyskinesias (improved from last time.)   Rest Tremor: Mind in Rt hand. Absent in other extremities.  Action/Postural Tremor: Mild in Rt; Absent in Lt.    Action Tremor: Mild in Rt; Slight in Lt.   Rigidity: Mild in Rt; Slight in Lt.  Finger Taps: Slight bilaterally.   Hand opening & closing: Mild bilaterally.    Hand pronation & supination:  Mild bilaterally.    Toe Tapping:  Slight in Rt; Mild in Lt.   Leg Agility:  Slight in Rt; Mild in Lt.   Arising from chair - arms folded across " chest: Pushes self up from arms of seat.  Posture: Mild.   Gait: Used cane for balance. She walks slowly, with stooped posture, reduced arm sing in left hand.  Freezing of gait:  Absent.    Postural Stability: Moderate.  Body Bradykinesia: Mild.   Dyskinesias:  Absent.     Today I spent 30 minutes caring for the patient. Time was spent with reviewing records, meeting with the patient, answering questions, examining, and documentation.    Radha Lozada DNP, APRN  [facility]

## 2023-11-16 ENCOUNTER — OFFICE VISIT (OUTPATIENT)
Dept: PSYCHIATRY | Facility: CLINIC | Age: 73
End: 2023-11-16
Attending: PSYCHIATRY & NEUROLOGY
Payer: COMMERCIAL

## 2023-11-16 ENCOUNTER — TELEPHONE (OUTPATIENT)
Dept: PSYCHIATRY | Facility: CLINIC | Age: 73
End: 2023-11-16
Payer: COMMERCIAL

## 2023-11-16 VITALS
BODY MASS INDEX: 36.92 KG/M2 | SYSTOLIC BLOOD PRESSURE: 134 MMHG | DIASTOLIC BLOOD PRESSURE: 77 MMHG | WEIGHT: 208.4 LBS | HEART RATE: 82 BPM

## 2023-11-16 DIAGNOSIS — G24.01 TARDIVE DYSKINESIA: ICD-10-CM

## 2023-11-16 DIAGNOSIS — F25.1 SCHIZOAFFECTIVE DISORDER, DEPRESSIVE TYPE (H): ICD-10-CM

## 2023-11-16 DIAGNOSIS — G47.09 OTHER INSOMNIA: ICD-10-CM

## 2023-11-16 PROCEDURE — 99214 OFFICE O/P EST MOD 30 MIN: CPT | Mod: GC

## 2023-11-16 PROCEDURE — G0463 HOSPITAL OUTPT CLINIC VISIT: HCPCS

## 2023-11-16 RX ORDER — ZIPRASIDONE HYDROCHLORIDE 40 MG/1
40 CAPSULE ORAL
Qty: 30 CAPSULE | Refills: 3 | Status: SHIPPED | OUTPATIENT
Start: 2023-11-16 | End: 2024-01-18

## 2023-11-16 RX ORDER — ZIPRASIDONE HYDROCHLORIDE 20 MG/1
20 CAPSULE ORAL
Qty: 30 CAPSULE | Refills: 3 | Status: SHIPPED | OUTPATIENT
Start: 2023-11-16 | End: 2024-01-18

## 2023-11-16 RX ORDER — VALBENAZINE 40 MG/1
40 CAPSULE ORAL DAILY
Qty: 30 CAPSULE | Refills: 3 | Status: SHIPPED | OUTPATIENT
Start: 2023-11-16 | End: 2023-12-26 | Stop reason: SINTOL

## 2023-11-16 ASSESSMENT — PAIN SCALES - GENERAL: PAINLEVEL: NO PAIN (0)

## 2023-11-16 NOTE — TELEPHONE ENCOUNTER
On 11/16/23, the patient signed a Consent to Communicate authorizing Person to Person communication with [facility] Staff for scheduling, and medical information, and to  items. I scanned this document into the patient's chart on 11/16/23. Hugo GILMAN

## 2023-11-16 NOTE — NURSING NOTE
Chief Complaint   Patient presents with    Recheck Medication     Schizoaffective disorder, depressive type     Pt complained of fatigue to the writer, stating she did not sleep well last night and this has been happening often.    - Star Mackay, Visit Facilitator

## 2023-11-16 NOTE — PROGRESS NOTES
[facility]  Psychiatry Clinic  MEDICAL PROGRESS NOTE     CARE TEAM:  PCP- Allie Sweeney    Psychotherapist- Dr Gr, PhD   Team: Staff at [facility]      This person is a 73 year old who uses the name Pinky and pronouns she, her.      DIAGNOSIS     Schizoaffective disorder, depressed type, in remission  Tardive dyskinesia     Pertinent medical diagnoses:  -T2DM  -ANUJ  -Hypothyroid  -Atrial fibrillation  -Breast cancer s/p mastectomy      ASSESSMENT   Pinky Page is a 73 year old female with history of schizoaffective disorder, depressed type who has been relatively stable taking ziprasidone and fluoxetine since 2016.       #Schizoaffective Disorder, Depressed type  On the last visit we decreased Ziprasidone dose from 80 mg to 60 mg. She has been tolerating the taper down well. No mood or anxiety symptoms, denies psychosis symptoms, well managed on current medication regimen. She has been having difficulty with right hand tremor which is most likely medication-induced. The goal is to see any improvement in tremor with the lower antipsychotic dose. Will continue monitoring symptoms.       #Tardive dyskinesia:    She was started on Ingrezza in September 2023 and is currently on 40 mg. She denies any side effect. No increased sedation. She has noticed improvement in her facial movements and having less trouble with her partial denture coming out of her mouth. Today subjectively feels relieved from the TD burden. on visual observation the abnormal movements are less noticeable.     It has been more than 5 years since the start of her symptoms. She is noted to have facial/oral movements all day throughout the day with no fluctuation or a specific pattern. Lip smacking, lip wetting, twitching.     It's worth noting that Ingrezza level is increased via CYT P450 interactions with fluoxetine.    #Tremor  #lack of balance  #Constipation  There is a right-sided low frequency resting  tremor evident on the visit. It is not present throughout the day it comes on and off with no identifiable trigger. No changes in hand-writing noted. Patient complains of constipation that requires three classes of laxatives with residual symptoms still present. The clinical picture is concerning for PD vs drug-induced Parkinsonism. Per neurology note the presentation is consistent with medication-induced EPS. Neurology recommended putting on weight on her right arm which she was wearing today. Propranolol was recommended but not started.     Ziprasidone dose was  by 25% on the last visit. Will continue monitoring symptoms.    Future considerations:  - Repeat EKG    MNPMP was checked today:  Indicates taking controlled medication as prescribed.     PLAN                                                                                                                1) Meds-  - Continue ziprasidone 40 mg qAM and 20 mg qPM  - Continue fluoxetine 40 mg daily  - Continue Ingrezza 40 mg daily [Ingrezza level is increased via CYT P450 interactions with fluoxetine]  - Continue melatonin 6mg at bedtime     Prescribed by PCP  Gabapentin 900mg TID    2) Psychotherapy- cont with Dr Gr every month     3) Next due-  Labs- next due 2024  EKG- not indicated at this time.  Rating scales-  AIMS =6 (10/9/23) next due 10/2024    4) Referrals-  None    5) Dispo- RTC in 8-10 weeks.     PERTINENT BACKGROUND                                                    [most recent eval 22]   The following section contains information copied from previous note by Dr Weller On 2022 and has been reviewed, edited, and verified by the patient.     Pinky first experienced mental health issues in her 20s or 30s and has received treatment for schizoaffective disorder and generalized anxiety. Notably, Pinky's symptoms of depression and psychosis have responded well to a combination of ECT and medication management. Pinky has a history  "of experiencing increased psychotic symptoms with past attempted antipsychotic tapers. No changes to antipsychotic or antidepressant since 2016. Only medication change in last 6 years has been lowering lorazepam.      In her 30s, she had her gallbladder removed and then suffered first episode of severe depression. She was experiencing psychotic symptoms - paranoia, AH, and suspiciousness. She was hospitalized in [place] in 1986 where she received ECT.  She was hospitalized again in 1987, again received ECT. Was hospitalized for depression in 1995, around the same time she was diagnosed with breast cancer.   She was hospitalized for about nine months this time. She received ECT maintence from 3119-1561. Patient was stable taking Seroquel 400mg, Prozac 40mg, and Ativan 1mg for many years. Seroquel was cross-tapered to ziprasidone due to metabolic side effects in 2016.      She has been seen at our clinic since 2013. She has lived at [facility] for many years.       Pertinent Items Include: psychosis [sxs include disorganization, hallucinations, paranoia], mutiple psychotropic trials, psych hosp (3-5) and ECT.     SUBJECTIVE     Report from [facility]:    -stable mental health  -no behavioral issues  -less active due to being quarantined for covid  -adherent to medication regimen    Per Pinky:  -Got covid and had to be quarantined for 10 days. Could not use the bike or work in the kitchen. Has less energy, lower stamina but is getting better.  -Mood: \"good\"  -Depression: denies  -Anxiety: denies  -Psychosis: denies AV  -SI/HI: No  -Social: No updates  -------------------------------------------------------------------  Recent Substance Use:     -alcohol: No   -cannabis: No   -tobacco: No  -caffeine:  No   -opioids: No   Narcan Kit currrent: No   -other: none    Pertinent Negatives: No suicidal or violent ideation, hallucinations and delusions  Adverse Effects: Right hand tremor resting, perioral involuntary " movements, stiffness in upper extremity, denies chest pain and shortness of breath     PAST PSYCHIATRIC HISTORY     SIB- None  Suicide Attempt [#, most recent]- None  Suicidal Ideation Hx- None  Violence/Aggression Hx- None  Psychosis Hx- paranoia, VH  Eating Disorder Hx- yes, in 30s-40s- stopped eating- possibly secondary to depression. No diagnosis of anorexia  Psych Hosp [#, most recent]- 5-6 times, 10+ years since last hospitalization.   Commitment- None  ECT- yes, 1986, 1987, 1996- 2005. (last 2005). Was very helpful.  Outpatient Programs - IOP (1987), eating disorder tx, 1987  Other - N/A     MEDICAL HISTORY and ALLERGY     ALLERGIES: Chlordiazepoxide hcl, Dimetapp dm cold-cough, Haloperidol, Ibuprofen, Lactose intolerance [beta-galactosidase], Milk products, and Propofol    Patient Active Problem List   Diagnosis    Allergic rhinitis due to pollen    Urge incontinence    Hypertension, essential    Cardiomegaly    Chronic constipation    Dry eye syndrome    Esophageal reflux    Exposure keratoconjunctivitis    DM ophthalmopathy (H)    Hypothyroidism    Senile cataract    ANUJ (obstructive sleep apnea)    Vaginal atrophy    Restless leg syndrome    Squamous blepharitis    Morbid obesity due to excess calories (H)    Personal history of breast cancer s/p L masectomy    Hypercholesteremia    Lives in group home    Extrapyramidal and movement disorder    CKD (chronic kidney disease) stage 2, GFR 60-89 ml/min    Solitary kidney, congenital    S/P hysterectomy    Impingement syndrome of right shoulder    Hypertriglyceridemia    Candidiasis of skin    Generalized anxiety disorder    Carpal tunnel syndrome of left wrist    Schizoaffective disorder, depressive type (H)    Major depressive disorder, recurrent episode, moderate (H)    Psychological factors affecting medical condition    Hx of psychiatric care    Nail complaint    Microalbuminuria due to type 2 diabetes mellitus (H)    Type 2 diabetes mellitus with  microalbuminuria, with long-term current use of insulin (H)    Conjunctivitis of both eyes    Corneal erosion of both eyes    Spastic entropion, right    Foot callus    Paroxysmal atrial fibrillation (H)    Squamous cell carcinoma in situ of skin of face    History of colonic polyps    Benign paroxysmal positional vertigo, unspecified laterality    Mild cognitive impairment    Tardive dyskinesia    Physiologic anisocoria        MEDICAL REVIEW OF SYSTEMS   Contraception-  No   Pregnant- no  none in addition to that documented above     MEDICATIONS     Current Outpatient Medications   Medication Sig Dispense Refill    acetaminophen (TYLENOL) 500 MG tablet Take 2 tablets (1,000 mg) by mouth every 6 hours as needed 1 Bottle 2    alum & mag hydroxide-simethicone (MYLANTA/MAALOX) 200-200-20 MG/5ML SUSP suspension Take 30 mLs by mouth daily as needed for indigestion      amLODIPine (NORVASC) 10 MG tablet Take 1 tablet (10 mg) by mouth daily 90 tablet 0    apixaban ANTICOAGULANT (ELIQUIS) 2.5 MG tablet Take 1 tablet (2.5 mg) by mouth 2 times daily 180 tablet 3    artificial saliva (BIOTENE MT) AERS spray Take 1 spray by mouth 3 times daily as needed for dry mouth 115 mL 3    artificial tears (GENTEAL) 0.1-0.2-0.3 % ophthalmic solution Place 2 drops into both eyes 4 times daily 15 mL 11    atorvastatin (LIPITOR) 40 MG tablet Take 1 tablet (40 mg) by mouth daily 90 tablet 1    blood glucose (NO BRAND SPECIFIED) lancets standard Use to test blood sugar 2 times daily or as directed. 100 each 11    blood glucose (NO BRAND SPECIFIED) test strip Use to test blood sugar 3 times daily or as directed. 100 strip 3    blood glucose calibration (NO BRAND SPECIFIED) solution Use to calibrate blood glucose monitor as directed. 1 each 3    blood glucose monitoring (NO BRAND SPECIFIED) meter device kit Check Blood sugars twice a day. 1 kit 0    Calcium Carbonate-Vitamin D (CALCIUM-VITAMIN D) 250-125 MG-UNIT TABS Take 1 tablet by mouth 2  times daily Calcium 250 mg/Vit D 125 IU      calcium polycarbophil (FIBERCON) 625 MG tablet Take 2 tablets (1,250 mg) by mouth daily 180 tablet 3    conjugated estrogens (PREMARIN) 0.625 MG/GM vaginal cream Place 2 g vaginally daily for 2 weeks, followed by 2 g vaginally twice a week for vaginal atrophy and frequent UTIs 30 g 3    DENTA 5000 PLUS 1.1 % CREA 1 applicator (1 g) by Oral or Feeding Tube route 2 times daily 51 g 1    erythromycin (ROMYCIN) 5 MG/GM ophthalmic ointment Place 0.5 inches into both eyes At Bedtime 7 g 11    fluorometholone (FML LIQUIFILM) 0.1 % ophthalmic suspension Place 1 drop into both eyes 2 times daily 10 mL 3    FLUoxetine (PROZAC) 20 MG capsule Take 2 capsules (40 mg) by mouth daily 60 capsule 2    fluticasone (FLONASE) 50 MCG/ACT nasal spray Spray 1 spray into both nostrils daily 16 g 3    furosemide (LASIX) 20 MG tablet Take 1 tablet (20 mg) by mouth daily 60 tablet 3    gabapentin (NEURONTIN) 600 MG tablet       insulin glargine (LANTUS PEN) 100 UNIT/ML pen Inject 17 Units Subcutaneous at bedtime 8 mL 4    levothyroxine (SYNTHROID/LEVOTHROID) 175 MCG tablet Take 1 tablet (175 mcg) by mouth daily 90 tablet 3    lidocaine (XYLOCAINE) 2 % external gel Apply topically 3 times daily as needed for moderate pain 85 g 1    lidocaine (XYLOCAINE) 5 % external ointment Apply topically 3 times daily as needed for moderate pain 50 g 1    lifitegrast (XIIDRA) 5 % opthalmic solution Place 1 drop into both eyes 2 times daily 60 each 11    liraglutide (VICTOZA) 18 MG/3ML solution Inject 1.8 mg Subcutaneous daily 9 mL 3    losartan (COZAAR) 100 MG tablet Take 1 tablet (100 mg) by mouth daily 90 tablet 1    Magnesium Hydroxide (MILK OF MAGNESIA PO) Take 30 mL as needed for constipation.      melatonin 3 MG CAPS Take 6 mg by mouth at bedtime 60 capsule 2    metFORMIN (GLUCOPHAGE) 1000 MG tablet Take 1,000 mg by mouth 2 times daily (with meals)      NEW MED Prohydrate Moisturizing gel  Place 1  applicator vaginally 4 times a week 20 oz 3    nystatin (MYCOSTATIN) 898395 UNIT/GM POWD Apply topically 3 times daily as needed 60 g 1    polyethylene glycol (MIRALAX/GLYCOLAX) powder Take 1 capful by mouth 2 times daily 17 GM PO BID      SALINE MIST 0.65 % nasal spray Spray 2 sprays in nostril 2 times daily      senna-docusate (SENOKOT-S/PERICOLACE) 8.6-50 MG tablet Take 2 tablets by mouth At Bedtime.      simethicone (MYLICON) 125 MG chewable tablet Take 1 tablet (125 mg) by mouth 2 times daily 180 tablet 3    Skin Protectants, Misc. (EUCERIN) cream Apply topically as needed Apply to thigh PRN dry skin      SM LUBRICANT EYE DROPS 0.4-0.3 % SOLN ophthalmic solution       sodium fluoride 1.1 % PSTE dental paste Apply 2 mLs to affected area daily 100 mL 1    solifenacin (VESICARE) 10 MG tablet Take 0.5 tablets (5 mg) by mouth daily 4 tablet 0    spironolactone (ALDACTONE) 25 MG tablet Take 1 tablet (25 mg) by mouth daily 90 tablet 0    valbenazine (INGREZZA) 40 MG capsule Take 1 capsule (40 mg) by mouth daily 30 capsule 1    ziprasidone (GEODON) 20 MG capsule Take 1 capsule (20 mg) by mouth daily (with dinner) 30 capsule 2    ziprasidone (GEODON) 40 MG capsule Take 1 capsule (40 mg) by mouth daily (with breakfast) 30 capsule 2      VITALS     Pulse Readings from Last 3 Encounters:   11/16/23 82   11/14/23 85   10/13/23 76     Wt Readings from Last 3 Encounters:   11/16/23 94.5 kg (208 lb 6.4 oz)   11/14/23 96 kg (211 lb 11.2 oz)   10/20/23 96.7 kg (213 lb 3.2 oz)     BP Readings from Last 3 Encounters:   11/16/23 134/77   11/14/23 112/73   10/13/23 (!) 161/86       MENTAL STATUS EXAM     Alertness: alert   Appearance: adequately groomed  Behavior/Demeanor: cooperative, pleasant and calm, with good  eye contact   Speech: slowed and regular rate and rhythm  Language: intact  Psychomotor: chewing/lip smacking motions and right hand low frequency pill rolling resting tremor  Mood: description consistent with  euthymia  Affect: restricted, blunted and appropriate; congruent to: mood- yes, content- yes  Thought Process/Associations: response delay  Thought Content:  Reports none;  Denies suicidal & violent ideation and delusions  Perception:  Reports none;  Denies hallucinations and depersonalization  Insight: fair  Judgment: good  Cognition: does  appear grossly intact; formal cognitive testing was not done  Gait and Station: remarkable for:  using a cane, slightly reduced swing phase of gait, slowed gait       LABS and DATA         7/6/2023    12:55 PM 7/13/2023     9:06 AM 9/11/2023     1:46 PM   PHQ   PHQ-9 Total Score 7 5 5   Q9: Thoughts of better off dead/self-harm past 2 weeks Not at all Not at all Not at all     Antipsychotic Labs:  Recent Labs   Lab Test 07/13/23  1003 10/26/22  0710 04/27/22  0725 04/12/22  1514 03/16/21  1537   CHOL 137  --   --  117 124   TRIG 82 103 115 72 136   LDL 79  --   --  55 53   HDL 42*  --   --  48* 43*     Recent Labs   Lab Test 10/13/23  1452 07/13/23  1003 10/03/22  1423 05/12/22  1230   *  --  134* 139*   A1C 7.8* 7.3* 7.6*  --      Recent Labs   Lab Test 10/08/21  2143 03/16/21  1537 05/07/19  0650 10/25/18  1734 06/26/18  0659   WBC 11.8*  --  10.06* 14.2* 9.72   ANEU  --  7.5  --  10.6* 5.31   HGB 13.0 13.7 13.3 14.0 12.6     --  292 275 247       Recent Labs   Lab Test 10/13/23  1452 10/03/22  1423   CR 0.76 0.83   GFRESTIMATED 82 74     Recent Labs   Lab Test 03/16/21  1537 06/26/18  0659   AST 7.9 16   ALT 12.8 21   ALKPHOS 78.9 111       EKG 3/2021 - showing atrial fibrillation. QtC 392.   EKG 10/8/2021- QtC 439  AIMS score 7/11/22: 5        PSYCHOTROPIC DRUG INTERACTIONS                                                       PSYCHCLINICDDI   ZIPRASIDONE, fluoxetine and Ingrezza may result in increased risk of QT-interval prolongation.  ZIPRASIDONE and SEROTONERGIC DRUGS THAT PROLONG QT INTERVAL may result in increased risk of QT-interval prolongation and  increased risk of serotonin syndrome (hypertension, hyperthermia, myoclonus, mental status changes).  NSAID and SSRI may result in an increased risk of bleeding  ERYTHROMYCIN and FLUOXETINE may result in an increased risk of cardiotoxicity (QT prolongation, torsades de pointes, cardiac arrest).  FLUOXETINE and INSULINS OR PRAMLINTIDE may result in increased risk of hypoglycemia.  GABAPENTIN and CNS DEPRESSANTS may result in respiratory depression.   GABAPENTIN and ANTACIDS may result in decreased gabapentin effectiveness.   Ingrezza level is increased via CYT P450 interactions with fluoxetine.     MANAGEMENT:  Monitoring for adverse effects, periodic EKGs and patient is aware of risks     RISK STATEMENT for SAFETY     Pinky did not appear to be an imminent safety risk to self or others.    TREATMENT RISK STATEMENT: The risks, benefits, alternatives and potential adverse effects have been discussed and are understood by the pt. The pt understands the risks of using street drugs or alcohol. There are no medical contraindications, the pt agrees to treatment with the ability to do so. The pt knows to call the clinic for any problems or to access emergency care if needed.  Medical and substance use concerns are documented above.  Psychotropic drug interaction check was done, including changes made today.       Level of Medical Decision Making:   - At least 1 chronic problem that is not stable  - Engaged in prescription drug management during visit (discussed any medication benefits, side effects, alternatives, etc.)         PROVIDER: Reji Garrett MD      Patient staffed in clinic with Dr. Estrada who will sign the note.  Supervisor is Dr. Estrada.

## 2023-11-16 NOTE — PATIENT INSTRUCTIONS
**For crisis resources, please see the information at the end of this document**   Patient Education    Thank you for coming to the [facility].     Lab Testing:  If you had lab testing today and your results are reassuring or normal they will be mailed to you or sent through [employer] within 7 days. If the lab tests need quick action we will call you with the results. The phone number we will call with results is # [phone]. If this is not the best number please call our clinic and change the number.     Medication Refills:  If you need any refills please call your pharmacy and they will contact us. Our fax number for refills is [phone].   Three business days of notice are needed for general medication refill requests.   Five business days of notice are needed for controlled substance refill requests.   If you need to change to a different pharmacy, please contact the new pharmacy directly. The new pharmacy will help you get your medications transferred.     Contact Us:  Please call [phone] during business hours (8-5:00 M-F).   If you have medication related questions after clinic hours, or on the weekend, please call [phone].     Financial Assistance [phone]   Medical Records [phone]       MENTAL HEALTH CRISIS RESOURCES:  For a emergency help, please call 911 or go to the nearest Emergency Department.     Emergency Walk-In Options:   EmPATH Unit @ [facility] ([place]): [phone] - Specialized mental health emergency area designed to be calming  [facility] ([place]): [phone]  [facility] ([place]): [phone]  [facility]): [phone]    [facility] Crisis Information:   [place]: [phone]  [place]: [phone]  [place] ([place]) - Adult: [phone]     Child: [phone]  [place] - Adult: [phone]     Child: [phone]  [place]:  [phone]  List of all [facility] resources:   https://[place].gov/dhs/people-we-serve/adults/health-care/mental-health/resources/crisis-contacts.jsp    National Crisis Information:   Crisis Text Line: Text  [place]  to 876937  Suicide & Crisis Lifeline: 988  National Suicide Prevention Lifeline: 5-442-777-TALK (1-528.281.8571)       For online chat options, visit https://suicidepreventionlifeline.org/chat/  Poison Control Center: 2-530-142-1628  Trans Lifeline: 3-834-947-9719 - Hotline for transgender people of all ages  The [employer] Project: 4-148-851-7031 - Hotline for LGBT youth     For Non-Emergency Support:   Fast Tracker: Mental Health & Substance Use Disorder Resources -   https://www.[employer] Clinicaln.org/

## 2023-12-05 ENCOUNTER — TELEPHONE (OUTPATIENT)
Dept: CARDIOLOGY | Facility: CLINIC | Age: 73
End: 2023-12-05
Payer: COMMERCIAL

## 2023-12-05 DIAGNOSIS — I48.0 PAROXYSMAL ATRIAL FIBRILLATION (H): ICD-10-CM

## 2023-12-05 NOTE — TELEPHONE ENCOUNTER
Attempted to call Sveta back to relay provider message below. No answer, lmtcb.    Chuyita Camarena RN

## 2023-12-05 NOTE — TELEPHONE ENCOUNTER
Chart reviewed.   Patient had temporary reduction of apixiban to 2.5mg while taking Paxlovid, should now be back on 5mg BID.     New Rx sent. Message sent to triage RN to notify patient/caregiver.     Nidia Sampson DO

## 2023-12-05 NOTE — TELEPHONE ENCOUNTER
[facility] Call Center    Phone Message    May a detailed message be left on voicemail: yes     Reason for Call: Medication Question or concern regarding medication   Prescription Clarification  Name of Medication: apixaban ANTICOAGULANT (ELIQUIS) 2.5 MG tablet   Prescribing Provider: n/a   Pharmacy: n/a   What on the order needs clarification? Sveta is stating there is some confusion going on with the eliquis medication. They are aware family medicine was the last prescribing department but they are wondering if cardiology can confirm dosage as they believe dosing may have changed over time because they had it written differently and would like us to fax over a new copy once cardiology has confirmed this is the dosing they would like patient to take. Please fax to Fax# [phone]       Action Taken: Other: Cardiology    Travel Screening: Not Applicable           Thank you!  [facility]

## 2023-12-06 NOTE — TELEPHONE ENCOUNTER
Spoke with Sveta who stated someone already called yesterday and clarified the dose. Confirmed she is on 5mg of Eliquis BID. No further questions.   Rae Jiménez RN

## 2023-12-08 ENCOUNTER — OFFICE VISIT (OUTPATIENT)
Dept: PSYCHOLOGY | Facility: CLINIC | Age: 73
End: 2023-12-08
Payer: COMMERCIAL

## 2023-12-08 VITALS — WEIGHT: 211.4 LBS | BODY MASS INDEX: 37.45 KG/M2

## 2023-12-08 DIAGNOSIS — N39.41 URGE INCONTINENCE: ICD-10-CM

## 2023-12-08 DIAGNOSIS — F41.1 GENERALIZED ANXIETY DISORDER: ICD-10-CM

## 2023-12-08 DIAGNOSIS — F33.0 MAJOR DEPRESSIVE DISORDER, RECURRENT EPISODE, MILD (H): Primary | ICD-10-CM

## 2023-12-08 DIAGNOSIS — F54 PSYCHOLOGICAL FACTORS AFFECTING MEDICAL CONDITION: ICD-10-CM

## 2023-12-08 DIAGNOSIS — E66.9 OBESITY, UNSPECIFIED OBESITY SEVERITY, UNSPECIFIED OBESITY TYPE: ICD-10-CM

## 2023-12-08 PROCEDURE — 90837 PSYTX W PT 60 MINUTES: CPT | Performed by: PSYCHOLOGIST

## 2023-12-08 NOTE — TELEPHONE ENCOUNTER
[facility] phone call message- patient requesting a refill:    Full Medication Name: solifenacin (VESICARE) 10 MG tablet         Pharmacy confirmed as     [facility] - [place], [place] - [address]  [facility] [address]  Phone: [phone] Fax: [phone]    : Yes    Additional Comments: The patient is out of this medication.    OK to leave a message on voice mail? Yes    Primary language: English      needed? No    Call taken on December 8, 2023 at 3:19 PM by Triny Jeffers

## 2023-12-08 NOTE — TELEPHONE ENCOUNTER
"Last seen 10/13/23    Request for medication refill:      solifenacin (VESICARE) 10 MG tablet       Providers if patient needs an appointment and you are willing to give a one month supply please refill for one month and  send a letter/MyChart using \".SMILLIMITEDREFILL\" .smillimited and route chart to \"P SMI \" (Giving one month refill in non controlled medications is strongly recommended before denial)    If refill has been denied, meaning absolutely no refills without visit, please complete the smart phrase \".smirxrefuse\" and route it to the \"P SMI MED REFILLS\"  pool to inform the patient and the pharmacy.    Rae Jiménez RN      "

## 2023-12-08 NOTE — PROGRESS NOTES
"  Health Psychology                    Department of Medicine  Keri Arnett, Ph.D., L.P. [phone]                         [facility] Madelaine Ingram, Ph.D., L.P. [phone]                     [place] Mail [address]   Dennis Dillard, Ph.D. [phone]      [address] [place] [address] Rae Eugene, Ph.D., A.B.P.P., L.P. [phone]              [place] [address]           Damon Gr, Ph.D., A.B.P.P., L.P. [phone]      Kimber Morales, Ph.D., L.P. [phone]  [facility]   Lilia Burroughs, Ph.D., A.B.P.P., L.P. 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  Intake with Health Psychology was in the .  I began to see her in the early .         She thinks her right hand tremor might be worse, possibly TD or Parkinsons.   She is now followed in Neurology; next visit pending November.  Experimenting with wrist weight and may try weighted utensils to minimize movement..    New stressors. Brother-in-law   in the interval; sister was hospitalized for 1 week with pneumonia; Pinky had COVID- still recovering.     Mood: Her depression is baseline today, around 4/10.    Health:  Leg is improving; still using cane to prevent falling. Using a brace to help her right arm shake less. She had COVID for second time 10/28 was diagnosed. \"It was terrible.\"  She had a cough, cold, runny nose.\"Still pooped out- getting better.\"  63 residents got it in the past month.       Psychoactive Medications:  - ziprasidone 40 mg qam 20 g qpm  - fluoxetine 40 mg daily  - Ingrezza 40 mg  - melatonin 6mg at bedtime  PCP prescribes gabapentin    Exercise: Back to walking after she eats for 10-15 minutes.  Biking 15 minutes daily; planning " to increase.  No pain.   Will discuss increasing to 40 in future..  Walking with cane, trying to decrease dependence on it.    Weight: She is getting back to working on weight loss.  Her weight was 211.4 down from 213.2.    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    She participates fully, ventilated feelings appropriately.  She appears to derive benefit from the support.     Current Outpatient Medications   Medication    acetaminophen (TYLENOL) 500 MG tablet    alum & mag hydroxide-simethicone (MYLANTA/MAALOX) 200-200-20 MG/5ML SUSP suspension    amLODIPine (NORVASC) 10 MG tablet    apixaban ANTICOAGULANT (ELIQUIS) 5 MG tablet    artificial saliva (BIOTENE MT) AERS spray    artificial tears (GENTEAL) 0.1-0.2-0.3 % ophthalmic solution    atorvastatin (LIPITOR) 40 MG tablet    blood glucose (NO BRAND SPECIFIED) lancets standard    blood glucose (NO BRAND SPECIFIED) test strip    blood glucose calibration (NO BRAND SPECIFIED) solution    blood glucose monitoring (NO BRAND SPECIFIED) meter device kit    Calcium Carbonate-Vitamin D (CALCIUM-VITAMIN D) 250-125 MG-UNIT TABS    calcium polycarbophil (FIBERCON) 625 MG tablet    conjugated estrogens (PREMARIN) 0.625 MG/GM vaginal cream    DENTA 5000 PLUS 1.1 % CREA    erythromycin (ROMYCIN) 5 MG/GM ophthalmic ointment    fluorometholone (FML LIQUIFILM) 0.1 % ophthalmic suspension    FLUoxetine (PROZAC) 20 MG capsule    fluticasone (FLONASE) 50 MCG/ACT nasal spray    furosemide (LASIX) 20 MG tablet    gabapentin (NEURONTIN) 600 MG tablet    insulin glargine (LANTUS PEN) 100 UNIT/ML pen    levothyroxine (SYNTHROID/LEVOTHROID) 175 MCG tablet    lidocaine (XYLOCAINE) 2 % external gel    lidocaine (XYLOCAINE) 5 % external ointment    lifitegrast (XIIDRA) 5 % opthalmic solution    liraglutide (VICTOZA) 18 MG/3ML solution    losartan (COZAAR) 100 MG tablet    Magnesium Hydroxide (MILK OF MAGNESIA PO)    melatonin 3 MG CAPS    metFORMIN  (GLUCOPHAGE) 1000 MG tablet    NEW MED    nystatin (MYCOSTATIN) 994333 UNIT/GM POWD    polyethylene glycol (MIRALAX/GLYCOLAX) powder    SALINE MIST 0.65 % nasal spray    senna-docusate (SENOKOT-S/PERICOLACE) 8.6-50 MG tablet    simethicone (MYLICON) 125 MG chewable tablet    Skin Protectants, Misc. (EUCERIN) cream    SM LUBRICANT EYE DROPS 0.4-0.3 % SOLN ophthalmic solution    sodium fluoride 1.1 % PSTE dental paste    solifenacin (VESICARE) 10 MG tablet    spironolactone (ALDACTONE) 25 MG tablet    valbenazine (INGREZZA) 40 MG capsule    ziprasidone (GEODON) 20 MG capsule    ziprasidone (GEODON) 40 MG capsule     No current facility-administered medications for this visit.     Past Medical History:   Diagnosis Date    Allergic rhinitis due to pollen     seasonal allergies     Anisometropia and aniseikonia     BMI greater than 40     Cardiomegaly     Chronic constipation     Congenital absence of one kidney     Cramp of limb     Dermatophytosis of the body     Dry eye syndrome     Esophageal reflux     Essential hypertension, benign     Gastro-oesophageal reflux disease     Hemorrhoids     Hypermetropia     Hypothyroidism     Incomplete Emptying of Bladder     Lactose intolerance     Major depressive disorder, recurrent episode, moderate (H)     Malignant neoplasm of breast (female), unspecified site     left mastectomy 1996     Mixed incontinence urge and stress (male)(female)     Moderate obstructive sleep apnea     Postmenopausal Atrophic Vaginitis     Presbyopia     Regular astigmatism     Restless leg syndrome     Senile nuclear sclerosis     Thyroid eye disease     mild    Tinnitus     Trigger finger (acquired)     right hand    Type II or unspecified type diabetes mellitus without mention of complication, not stated as uncontrolled     on insulin since April 2006      Past Surgical History:   Procedure Laterality Date    APPENDECTOMY      C MASTECTOMY,SIMPLE  1996    Left mastectomy  [facility].  Had normal FU mammo 5/2007. Follow yearly.     CHOLECYSTECTOMY      COLONOSCOPY  5/2005    Complete Colonoscopy-had one small polyp removed 5/2005.     COLONOSCOPY N/A 3/31/2016    Procedure: COLONOSCOPY;  Surgeon: Cary Ring MD;  [facility]    COLONOSCOPY N/A 7/7/2016    Procedure: COLONOSCOPY;  Surgeon: Cary Ring MD;  [facility]    COLONOSCOPY N/A 5/12/2022    Procedure: COLONOSCOPY, WITH POLYPECTOMY AND BIOPSY;  Surgeon: Jonathan Madden MD;  [facility]    DILATION AND CURETTAGE      HYSTERECTOMY      MASTECTOMY      Left breast    ZZC TOTAL ABDOM HYSTERECTOMY  7/2003    Dr. Castanon, for abnormal bleeding. Removal of both tubes with BSO for myoma w - - -     Wt Readings from Last 4 Encounters:   12/08/23 95.9 kg (211 lb 6.4 oz)   11/16/23 94.5 kg (208 lb 6.4 oz)   11/14/23 96 kg (211 lb 11.2 oz)   10/20/23 96.7 kg (213 lb 3.2 oz)     Time service started: 1:00  Time service ended:  1:53  Extended session due to complexity of case and length of interval.    Diagnosis:   Major Depression, recurrent mild (F33.0)   Psychological Factors Affecting Morbid Obesity (F54)  Generalized anxiety (F41.1)       Plan: Return for psychotherapy visit in-person 1/26 @ 1 due to the serious and persistent nature of her depression and anxiety consistent with treatment plan.  Last treatment plan signed: 1/27/23  Treatment plan due: 1/27/24     Damon Gr, PhD LP,  A.B.P.P.  Director, Health Psychology  [phone]

## 2023-12-12 ENCOUNTER — TELEPHONE (OUTPATIENT)
Dept: NEUROLOGY | Facility: CLINIC | Age: 73
End: 2023-12-12
Payer: COMMERCIAL

## 2023-12-12 DIAGNOSIS — R25.1 TREMOR: Primary | ICD-10-CM

## 2023-12-12 NOTE — TELEPHONE ENCOUNTER
Health Call Center    Phone Message    May a detailed message be left on voicemail: yes     Reason for Call: Other: YARI Baez at [facility] called to speak to care team to report patients tremors. LNP reports that patient have more tremors predominantly on the right hand and arms. If any questions please call [phone]      Action Taken: Message routed to:  [facility] ([facility] neurology    Travel Screening: Not Applicable

## 2023-12-12 NOTE — TELEPHONE ENCOUNTER
Sasha notes Pinky has reported more tremors to staff, she is not liking it and not happy about it, she is frustrated. She is still on the fence about starting medications. She has a wrist weights that helps 25-50%. She would not say if the tremors were one sided of if stiffness.slowness is worse. I can call Pinky at [phone].    Spoke to Pinky, she is using wrist weights and weighted silverware as recommended by OT. It helps 50%. Tremors are worse at rest and when using her right hand. No increased tremors on the left side. Her right wrist and fingers are more stiff. She would like to do something about it or take medications if this is the next step.

## 2023-12-12 NOTE — TELEPHONE ENCOUNTER
I would recommend working with Dr. Ortiz, PharmD as she has the experience with the psychotropic medications.    I have discussed with Mrs. Page that Ziprasidone contributing to her parkinsonian symptoms.  Her psychiatrist has reduced her dose from 80 mg/day to 60 mg to minimize tardive dyskinesias.  Also, in September, pt was started on Ingrezza 40 mg, which might also worsen her parkinsonian symptoms.     She is a good candidate for a comprehensive med review and ongoing follow up for med management if we start medication like Carbidopa/Levodopa to treat tremors. Also, we will need to work closely with her psychiatrist if we make medication changes.    I've placed a referral for MTM.

## 2023-12-13 NOTE — TELEPHONE ENCOUNTER
Spoke to YARI Baez and informed her of the plan. She notes they do set up her medical visits. The home/primary number on file for Pinky is the number scheduling should call. Sasha was appreciative of the call back.

## 2023-12-18 RX ORDER — SOLIFENACIN SUCCINATE 10 MG/1
5 TABLET, FILM COATED ORAL DAILY
Qty: 45 TABLET | Refills: 3 | Status: SHIPPED | OUTPATIENT
Start: 2023-12-18

## 2023-12-21 ENCOUNTER — VIRTUAL VISIT (OUTPATIENT)
Dept: NEUROLOGY | Facility: CLINIC | Age: 73
End: 2023-12-21
Attending: PHARMACIST
Payer: COMMERCIAL

## 2023-12-21 DIAGNOSIS — E11.29 TYPE 2 DIABETES MELLITUS WITH MICROALBUMINURIA, WITH LONG-TERM CURRENT USE OF INSULIN (H): ICD-10-CM

## 2023-12-21 DIAGNOSIS — N39.41 URGE INCONTINENCE: ICD-10-CM

## 2023-12-21 DIAGNOSIS — R68.2 DRY MOUTH: ICD-10-CM

## 2023-12-21 DIAGNOSIS — R52 PAIN: ICD-10-CM

## 2023-12-21 DIAGNOSIS — N89.8 VAGINAL DRYNESS: ICD-10-CM

## 2023-12-21 DIAGNOSIS — J30.1 ALLERGIC RHINITIS DUE TO POLLEN, UNSPECIFIED SEASONALITY: ICD-10-CM

## 2023-12-21 DIAGNOSIS — G24.01 TARDIVE DYSKINESIA: ICD-10-CM

## 2023-12-21 DIAGNOSIS — Z79.4 TYPE 2 DIABETES MELLITUS WITH MICROALBUMINURIA, WITH LONG-TERM CURRENT USE OF INSULIN (H): ICD-10-CM

## 2023-12-21 DIAGNOSIS — R80.9 TYPE 2 DIABETES MELLITUS WITH MICROALBUMINURIA, WITH LONG-TERM CURRENT USE OF INSULIN (H): ICD-10-CM

## 2023-12-21 DIAGNOSIS — K59.00 CONSTIPATION, UNSPECIFIED CONSTIPATION TYPE: ICD-10-CM

## 2023-12-21 DIAGNOSIS — H04.123 DRY EYE SYNDROME OF BOTH EYES: ICD-10-CM

## 2023-12-21 DIAGNOSIS — G47.00 INSOMNIA, UNSPECIFIED TYPE: ICD-10-CM

## 2023-12-21 DIAGNOSIS — R25.1 TREMOR: ICD-10-CM

## 2023-12-21 DIAGNOSIS — B37.2 CANDIDIASIS OF SKIN: ICD-10-CM

## 2023-12-21 DIAGNOSIS — F25.0 SCHIZOAFFECTIVE DISORDER, BIPOLAR TYPE (H): Primary | ICD-10-CM

## 2023-12-21 DIAGNOSIS — E03.8 OTHER SPECIFIED HYPOTHYROIDISM: ICD-10-CM

## 2023-12-21 DIAGNOSIS — Z78.9 TAKES DIETARY SUPPLEMENTS: ICD-10-CM

## 2023-12-21 DIAGNOSIS — I10 HYPERTENSION, ESSENTIAL: ICD-10-CM

## 2023-12-21 DIAGNOSIS — I48.0 PAROXYSMAL ATRIAL FIBRILLATION (H): ICD-10-CM

## 2023-12-21 DIAGNOSIS — E78.00 HYPERCHOLESTEREMIA: ICD-10-CM

## 2023-12-21 RX ORDER — LANOLIN ALCOHOL/MO/W.PET/CERES
6 CREAM (GRAM) TOPICAL AT BEDTIME
COMMUNITY
Start: 2023-12-05 | End: 2024-01-17

## 2023-12-21 RX ORDER — LORATADINE 10 MG/1
10 TABLET ORAL DAILY
COMMUNITY

## 2023-12-21 RX ORDER — CALCIUM CARBONATE-CHOLECALCIFEROL TAB 250 MG-125 UNIT 250-125 MG-UNIT
1 TAB ORAL 2 TIMES DAILY
COMMUNITY
Start: 2023-12-19 | End: 2024-01-17

## 2023-12-21 NOTE — Clinical Note
Maxi; here is my completed note. Thank you for your help.  Dr. Garrett- since I haven't heard back from you yet, I won't send her any updates. I told her I'd send a message with next steps or she could talk to you about it on 1/18. She was hoping to stop sooner than later, so please reach out to her if you're ok with her stopping before your next visit. Thanks!

## 2023-12-21 NOTE — PROGRESS NOTES
Medication Therapy Management (MTM) Encounter    ASSESSMENT:                            Medication Adherence/Access: No issues identified    Schizoaffective Disorder:    Discussed that change in fatigue/energy could be from illness. They make check for UTI with the acute change. Monitor mood and reach out if needed. As already identified, ziprasidone could be contributing to dyskinetic mouth movements, especially since they have improved with either ziprasidone dose reduction or initiation of Ingrezza. Plans to continue current meds and sees psychiatry 1/18/23.     Tardive Dyskinesia/Tremor:    As patient would rather handle the mouth movements than tremor, she wants to consider stopping the Ingrezza since tremor has been noticeably worse since starting the med. Will discuss with psychiatry and determine if stopping the med allows tremor to return to baseline and be tolerable.   Discussed with Radha Lozada DNP APRN, who agreed and wonders about propranolol or carbidopa/levodopa in the future if tremor treatment is needed. Would prefer propranolol if possible, as carbidopa/levodopa may complicate schizoaffective treatment. Will work with PCP to see about adjusting BP meds for using propranolol in relevant in the future.    Hypothyroidism   Stable. Continue current medication.    Diabetes   Continue current medication. Plan in place for follow up with PCP.    Hypertension   Stable. Continue current medication.    Hyperlipidemia   Stable. Continue current medication.    AFIb:    Stable. Continue current medication.    Vaginal dryness:    Stable. Continue current medication.    Urinary:   Continue current medication.    Constipation/GI:  Stable. Continue current medication.    Allergic Rhinitis:   Stable. Continue current medication.    Dry eyes:    Stable. Continue current medication.    .Dry mouth:    Stable. Continue current medication.      Fungal rash/skin:    Stable. Continue current medication.    Supplements:  "  Stable. Continue current medication.    Pain:    Stable. Continue current medication.    Sleep:    Stable. Continue current medication.    PLAN:                            -Continue current medication.  -I will talk with your doctor about stopping Ingrezza. You meet with Dr. Garrett on 1/18/24 to review further.    Follow-up: 3- 6 months or sooner if needed    SUBJECTIVE/OBJECTIVE:                          Pinky Page is a 73 year old female called for an initial visit. She was referred to me from Neurology. Patient was accompanied by ALLEN Bangura at [facility].     Reason for visit: med review.    Allergies/ADRs: Reviewed in chart  Past Medical History: Reviewed in chart  Tobacco: She reports that she has never smoked. She has been exposed to tobacco smoke. She has never used smokeless tobacco.  Alcohol: not currently using    Medication Adherence/Access: no issues reported    Schizoaffective Disorder:    -fluoxetine 20mg -take 2 tabs (40mg) daily  -ziprasidone 40mg every morning, 20mg every evening (does not take with food)--reduced from 40mg BID on 10/9/23    Pt reported that mood is pretty good. Denied any concerns. Doesn't think she's had any change in her mood since reducing ziprasidone 2 months ago.  They note that she has seemed more lethargic just this week, not wanting to go down for meals, so they have been bringing them to her room. She described \"just feeling lousy,\" noting her body feels different and so doesn't want to do much. Pinky reported that she may feel a little more foggy. Denied feeling sad, hopeless, depressed and reiterated that it is more like fatigue than depression. Denied any VH/AH. Not feeling more agitation or irritability.  ALLEN Bangura stated that Pinky doesn't seem confused, just that everything seems to have slowed down a little bit.     She had started taking Tamiflu on 12/16 x 14 day course per [facility] protocol. She denied having any respiratory symptoms/coughing, but did have " one episode of higher temperature, but resolved within the day without treatment.    Tardive Dyskinesia/Tremor:    -Ingrezza 40mg daily (started on 23)    Pt described having a baseline tremor and has regular movements in lip and tongue (multiple times per hour). Denied any lip smacking or other facial movements. Tremor is pretty constant, only in right hand, which is much more significant in the last 4-6 weeks than it had been previously. Using eating utensils can be difficult. They are trying to get weighted utensils, but she hasn't used them yet. Her handwriting is more messy, but still legible.   She reported that she only had a mild tremor, but significantly increased in November.   She sometimes feels stiff, which she thinks started around the same time as the tremor.     Thinks tongue and lip movements have improved, though unsure if it was since starting Ingrezza or reducing ziprasidone. She would rather deal with tremor than dyskinesia.    Hypothyroidism     Levothyroxine 175 mcg daily.   Patient is having the following symptoms: none.      TSH   Date Value Ref Range Status   2023 1.14 0.30 - 4.20 uIU/mL Final   2022 1.94 0.40 - 4.00 mU/L Final   2021 1.08 0.40 - 4.00 mU/L Final       Diabetes   Lantus 17 units daily   Victoza 1.8 mg every morning   Metformin 1000 mg twice daily   Patient is not experiencing side effects.  Blood sugar monitorin-2 time(s) daily; Ranges: 120s-130s  Current diabetes symptoms: none  Diet/Exercise: modest     Eye exam is up to date  Foot exam is up to date  Urine Albumin:   Lab Results   Component Value Date    UMALCR  2023      Comment:      Unable to calculate, urine albumin and/or urine creatinine is outside detectable limits.  Microalbuminuria is defined as an albumin:creatinine ratio of 17 to 299 for males and 25 to 299 for females. A ratio of albumin:creatinine of 300 or higher is indicative of overt proteinuria.  Due to biologic  variability, positive results should be confirmed by a second, first-morning random or 24-hour timed urine specimen. If there is discrepancy, a third specimen is recommended. When 2 out of 3 results are in the microalbuminuria range, this is evidence for incipient nephropathy and warrants increased efforts at glucose control, blood pressure control, and institution of therapy with an angiotensin-converting-enzyme (ACE) inhibitor (if the patient can tolerate it).        Lab Results   Component Value Date    A1C 7.8 (H) 10/13/2023     Hypertension   -amlodipine 10mg daily  -losartan 100mg daily  -spironolactone 25mg daily  -furosemide 20mg daily  Patient reports no current medication side effects  Patient self monitors blood pressure.  Home BP monitoring 130s/80s .       BP Readings from Last 3 Encounters:   12/22/23 (!) 150/85   11/16/23 134/77   11/14/23 112/73     Pulse Readings from Last 3 Encounters:   12/22/23 84   11/16/23 82   11/14/23 85     Hyperlipidemia     atorvastatin 40mg daily  Patient reports no significant myalgias or other side effects.  The 10-year ASCVD risk score (Lesly HUNG, et al., 2019) is: 41.6%    Values used to calculate the score:      Age: 73 years      Sex: Female      Is Non- : No      Diabetic: Yes      Tobacco smoker: No      Systolic Blood Pressure: 150 mmHg      Is BP treated: Yes      HDL Cholesterol: 42 mg/dL      Total Cholesterol: 137 mg/dL       Recent Labs   Lab Test 07/13/23  1003 10/26/22  0710 04/27/22  0725 04/12/22  1514 03/21/17  1023 02/22/16  1028   CHOL 137  --   --  117   < > 162.0   HDL 42*  --   --  48*   < > 36.6*   LDL 79  --   --  55   < > 95   TRIG 82 103   < > 72   < > 151.9*   CHOLHDLRATIO  --   --   --   --   --  4.4    < > = values in this interval not displayed.       AFIb:    -apixaban 5mg BID  No issues with bruising/bleeding.    Vaginal dryness:    -estrogen cream twice weekly  -vaginal moisturizing gel as needed  Helpful for  "dryness. No issues.    Urinary:   -Vesicare 5mg daily  Pt feels med is helpful, though wakes to urinate 2-3 times during the night. She is able to get back to sleep without much issue.    Constipation/GI:  -fibercon 1250mg daily  -senna/docusate 2 tablets once daily  -Miralax 17g once daily  -simethicone 125mg BID--helpful  -milk of magnesium- 30mL as needed (takes if no BM x 3 days--usually takes once per month)  -Maalox prn -rarely    BM frequency: daily  Straining: no  Water intake: 6-7 glasses per day    Allergic Rhinitis:   Flonase (fluticasone) nasal spray - 1 spray(s) each nostril twice daily  -cetirizine 10mg daily  -saline nasal spray twice daily  Patient reports no current medication side effects.     Patient feels that current therapy is effective.     Dry eyes:    -Refresh lubricant drop four times daily  -fluorometholone 1 drop twice daily  -Xiidra 1 drop each eye twice daily  -erythryomycin each eye at bedtime  Helpful. No issues.    .Dry mouth:    -Biotene mouthwash every morning  Very helpful.        Fungal rash/skin:    -nystatin powder as needed--uses about once monthly; helpful when needed.   -Eucerin cream prn (Rare)  No concerns.       Supplements:   -calcium 250mg/Vitamin 125units BID  No reported issues at this time.     Pain:    -acetaminophen 650mg Q4h prn as needed --about once a week  -gabapentin 600mg TID  Helpful when needed. No issues . Gabapentin has been quite helpful for neuropathy.    Sleep:    -melatonin 6mg every evening  Pt reported that sleep is usually \"pretty good.\" She usually falls asleep without issue. Does wake to urinate, but able to get back to sleep pretty well.     ----------------      I spent 60 minutes with this patient today.. A copy of the visit note was provided to the patient's provider(s) via staff message.    A summary of these recommendations was sent via clinic portal.    Marizol Ortiz PharmD  Medication Therapy Management Pharmacist  [facility]    Telemedicine Visit Details  Type of service:  Telephone visit  Start Time:  11:00am  End Time:  12:00pm     Medication Therapy Recommendations  Tardive dyskinesia    Current Medication: valbenazine (INGREZZA) 40 MG capsule   Rationale: Undesirable effect - Adverse medication event - Safety   Recommendation: Discontinue Medication - consider stopping   Status: Contact Provider - Awaiting Response

## 2023-12-22 ENCOUNTER — OFFICE VISIT (OUTPATIENT)
Dept: FAMILY MEDICINE | Facility: CLINIC | Age: 73
End: 2023-12-22
Payer: COMMERCIAL

## 2023-12-22 VITALS
TEMPERATURE: 98.7 F | SYSTOLIC BLOOD PRESSURE: 150 MMHG | DIASTOLIC BLOOD PRESSURE: 85 MMHG | OXYGEN SATURATION: 98 % | HEART RATE: 84 BPM | WEIGHT: 208.3 LBS | HEIGHT: 64 IN | BODY MASS INDEX: 35.56 KG/M2

## 2023-12-22 DIAGNOSIS — B37.2 CANDIDIASIS OF SKIN: ICD-10-CM

## 2023-12-22 DIAGNOSIS — R53.81 MALAISE: Primary | ICD-10-CM

## 2023-12-22 LAB
ALBUMIN UR-MCNC: NEGATIVE MG/DL
APPEARANCE UR: CLEAR
BACTERIA #/AREA URNS HPF: ABNORMAL /HPF
BILIRUB UR QL STRIP: NEGATIVE
COLOR UR AUTO: YELLOW
GLUCOSE UR STRIP-MCNC: NEGATIVE MG/DL
HGB UR QL STRIP: ABNORMAL
KETONES UR STRIP-MCNC: ABNORMAL MG/DL
LEUKOCYTE ESTERASE UR QL STRIP: ABNORMAL
NITRATE UR QL: NEGATIVE
PH UR STRIP: 5 [PH] (ref 5–8)
RBC #/AREA URNS AUTO: ABNORMAL /HPF
SP GR UR STRIP: 1.01 (ref 1–1.03)
SQUAMOUS #/AREA URNS AUTO: ABNORMAL /LPF
UROBILINOGEN UR STRIP-ACNC: 0.2 E.U./DL
WBC #/AREA URNS AUTO: ABNORMAL /HPF

## 2023-12-22 PROCEDURE — 99214 OFFICE O/P EST MOD 30 MIN: CPT | Performed by: FAMILY MEDICINE

## 2023-12-22 PROCEDURE — 87086 URINE CULTURE/COLONY COUNT: CPT | Performed by: FAMILY MEDICINE

## 2023-12-22 PROCEDURE — 84443 ASSAY THYROID STIM HORMONE: CPT | Performed by: FAMILY MEDICINE

## 2023-12-22 PROCEDURE — 80048 BASIC METABOLIC PNL TOTAL CA: CPT | Performed by: FAMILY MEDICINE

## 2023-12-22 PROCEDURE — 87186 SC STD MICRODIL/AGAR DIL: CPT | Performed by: FAMILY MEDICINE

## 2023-12-22 PROCEDURE — 36415 COLL VENOUS BLD VENIPUNCTURE: CPT | Performed by: FAMILY MEDICINE

## 2023-12-22 PROCEDURE — 81001 URINALYSIS AUTO W/SCOPE: CPT | Performed by: FAMILY MEDICINE

## 2023-12-22 RX ORDER — NYSTATIN 100000 [USP'U]/G
POWDER TOPICAL 3 TIMES DAILY PRN
Qty: 60 G | Refills: 1 | Status: SHIPPED | OUTPATIENT
Start: 2023-12-22

## 2023-12-22 ASSESSMENT — PATIENT HEALTH QUESTIONNAIRE - PHQ9: SUM OF ALL RESPONSES TO PHQ QUESTIONS 1-9: 7

## 2023-12-22 NOTE — PATIENT INSTRUCTIONS
"Recommendations from today's MTM visit:                                                    MTM (medication therapy management) is a service provided by a clinical pharmacist designed to help you get the most of out of your medicines.   Today we reviewed what your medicines are for, how to know if they are working, that your medicines are safe and how to make your medicine regimen as easy as possible.      -Continue current medication.  -I will talk with your doctor about stopping Ingrezza. You meet with Dr. Garrett on 1/18/24 to review further.    Follow-up: 3- 6 months or sooner if needed    It was great speaking with you today.  I value your experience and would be very thankful for your time in providing feedback in our clinic survey. In the next few days, you may receive an email or text message from [employer] with a link to a survey related to your  clinical pharmacist.\"     To schedule another MTM appointment, please call the clinic directly or you may call the MTM scheduling line at [phone] or toll-free at [phone].     My Clinical Pharmacist's contact information:                                                      Please feel free to contact me with any questions or concerns you have.      Marizol Ortiz, PharmD  Medication Therapy Management Pharmacist  [facility]    "

## 2023-12-22 NOTE — PROGRESS NOTES
"  Assessment & Plan       Malaise  Possible UTI, no other localizing symtpoms. Blood sugars have been normal. UA borderline, will wait for culture given hx of MDRO.  - Basic metabolic panel; Future  - Urine Macroscopic with reflex to Microscopic; Future  - Urine Culture Aerobic Bacterial; Future  - TSH; Future  - Basic metabolic panel  - Urine Macroscopic with reflex to Microscopic  - Urine Culture Aerobic Bacterial  - TSH  - Urine Microscopic Exam    Candidiasis of skin  Rash under L panus, previously tx with nystatin.  - nystatin (MYCOSTATIN) 829571 UNIT/GM external powder; Apply topically 3 times daily as needed for other (rash)      No follow-ups on file.    Sarah Renner DO  [facility] MICHAEL Vance is a 73 year old, presenting for the following health issues:  Fatigue (Pt reports feeling very fatigue w/ night sweats )        12/22/2023     2:28 PM   Additional Questions   Roomed by Sally SALDANA     Patient presenting with ~1 week of increased malaise/fatigue  Has been sleeping or staying in bed more often, not wanting to eat meals with others and staying in her room instead. Reports generalized fatigue. Possibly urinating more frequently but no other positives on ROS. Has had UTIs before.  Reports no contacts in her group home with COVID.  Blood sugars stable, last was 131 yesterday.  BP elevated today, pt does report normal Bps ~120-130/70s at group home.  No recent changes to medications, saw MTM yesterday.      Review of Systems   Constitutional, HEENT, cardiovascular, pulmonary, gi and gu systems are negative, except as otherwise noted.      Objective    BP (!) 150/85   Pulse 84   Temp 98.7  F (37.1  C)   Ht 1.626 m (5' 4\")   Wt 94.5 kg (208 lb 4.8 oz)   LMP  (LMP Unknown)   SpO2 98%   BMI 35.75 kg/m    Body mass index is 35.75 kg/m .  Physical Exam   GENERAL: healthy, alert and no distress  NECK: no adenopathy, no asymmetry, masses, or scars and thyroid normal " to palpation  SKIN: erythematous, candidal rash under L panus  RESP: lungs clear to auscultation - no rales, rhonchi or wheezes  CV: regular rate and rhythm, normal S1 S2, no S3 or S4, no murmur, click or rub, no peripheral edema and peripheral pulses strong  ABDOMEN: soft, nontender, no hepatosplenomegaly, no masses and bowel sounds normal  MS: no gross musculoskeletal defects noted, no edema  NEURO: Normal strength and tone. Tremor of RUE.  PSYCH: affect flat but mentation appears normal, good insight and reasoning

## 2023-12-23 LAB
ANION GAP SERPL CALCULATED.3IONS-SCNC: 14 MMOL/L (ref 7–15)
BACTERIA UR CULT: ABNORMAL
BUN SERPL-MCNC: 20.3 MG/DL (ref 8–23)
CALCIUM SERPL-MCNC: 10 MG/DL (ref 8.8–10.2)
CHLORIDE SERPL-SCNC: 96 MMOL/L (ref 98–107)
CREAT SERPL-MCNC: 0.82 MG/DL (ref 0.51–0.95)
DEPRECATED HCO3 PLAS-SCNC: 26 MMOL/L (ref 22–29)
EGFRCR SERPLBLD CKD-EPI 2021: 75 ML/MIN/1.73M2
GLUCOSE SERPL-MCNC: 199 MG/DL (ref 70–99)
POTASSIUM SERPL-SCNC: 4.6 MMOL/L (ref 3.4–5.3)
SODIUM SERPL-SCNC: 136 MMOL/L (ref 135–145)
TSH SERPL DL<=0.005 MIU/L-ACNC: 0.87 UIU/ML (ref 0.3–4.2)

## 2023-12-24 ENCOUNTER — TELEPHONE (OUTPATIENT)
Dept: FAMILY MEDICINE | Facility: CLINIC | Age: 73
End: 2023-12-24
Payer: COMMERCIAL

## 2023-12-24 DIAGNOSIS — N39.0 UTI (URINARY TRACT INFECTION), UNCOMPLICATED: Primary | ICD-10-CM

## 2023-12-24 DIAGNOSIS — Q60.0 SOLITARY KIDNEY, CONGENITAL: ICD-10-CM

## 2023-12-24 DIAGNOSIS — I48.0 PAROXYSMAL ATRIAL FIBRILLATION (H): ICD-10-CM

## 2023-12-24 RX ORDER — SULFAMETHOXAZOLE/TRIMETHOPRIM 800-160 MG
1 TABLET ORAL 2 TIMES DAILY
Qty: 6 TABLET | Refills: 0 | Status: SHIPPED | OUTPATIENT
Start: 2023-12-24 | End: 2024-01-17

## 2023-12-24 RX ORDER — SULFAMETHOXAZOLE/TRIMETHOPRIM 800-160 MG
1 TABLET ORAL 2 TIMES DAILY
Qty: 6 TABLET | Refills: 0 | Status: SHIPPED | OUTPATIENT
Start: 2023-12-24 | End: 2023-12-24

## 2023-12-25 DIAGNOSIS — N30.00 ACUTE CYSTITIS WITHOUT HEMATURIA: Primary | ICD-10-CM

## 2023-12-25 RX ORDER — SULFAMETHOXAZOLE/TRIMETHOPRIM 800-160 MG
1 TABLET ORAL 2 TIMES DAILY
Qty: 10 TABLET | Refills: 0 | Status: SHIPPED | OUTPATIENT
Start: 2023-12-25 | End: 2023-12-30

## 2023-12-25 NOTE — TELEPHONE ENCOUNTER
Telephone Message     12/24/2023  6:32 PM    Call returned by Juany Reynoso MD    Patient: Pinky Page   Phone number-  [phone] (home)       return their call  Phone conversation with:  Narendra Mckinney Resident  care facility, on call nurse    Situation: Pinky Page  Is a 73 year old  female   This call is regarding  irregular heart beat and increased malaise, frequent urination    Background :   hxo paroxysmal a fib, T2DM on insulin, CKD stage 2, congenital solitary kidney  Seen in clinic recently 12/22/23 for malaise, UA obtained, see below    CC: Decreased energy, not interested in usual activities. Feels weak.     ROS   Denies depression.   Increased frequency of urination  4/7/2021 last cardiology appointment  No back pain, nausea, no chest pain, no heart palpitations but not sure if she would be able to describe that symptom given cognitive function    For chronic paroxysmal afib:  On eliquis 2.5mg on 12/6/2023  Not on metoprolol or any beta blocker      158/74  HR 71-88  Irregular heart beat on pulse palpation    12/22/2023  TSH normal  BMP normal  UA, reflex to culture: few bacteria, trace LE, 5-10 WBC. Culture: 50-100k E. Coli.   Susceptible to ampicillin, ampsul, cefazolin, cefepime, cefoxitin, nitrofurantoin, bactrim. Resistant to: cipro, levo    Previous UA 2021: Resistant to Bactrim, levofloxacin, ciprofloxacin, ampicillin. Susceptible to cefazolin, cefoxitin, nitrofurantoin  Previous UA 2022: Resistant to ampicillin, cipro, levo, nitrofurantoin, bactrim. Susceptible to: cefazolin, cefoxitin      Assessment:   # Uncomplicated UTI  # Paroxysmal afib without RVR (no tachycardia) likely 2/2 UTI  # Solitary kidney  Initially wanted to avoid abx given history of MDR UTI, however now clinically worsened (worse malaise, afib) and susceptibilities are back and susceptible to ampicillin, ampsul, cefazolin, cefepime, cefoxitin, nitrofurantoin, bactrim. GFR 75 12/22/23, no need to renally dose. No  s/s pyelo at the moment but will have LTAC RN monitor closely.     Recommendation/Plan  - Bactrim /800 mg BID x3 days (will fax to [facility] per RN request)  - ED if fever, persistent tachycardia at rest, back pain, worsening malaise (c/f sepsis, pyelo)  - ED if symptomatic afib, tachycardia, hypotension (may need rate control in that case)  - Already on anticoagulation      Faye RN understands and agrees with plan.    Discussed w/ attending on call Dr. Roe Love MD  12/24/2023  7:31 PM

## 2023-12-25 NOTE — Clinical Note
February 8, 2017    Dear Pinky Page:     I reviewed the overnight oximetry testing results from 2/3/2017 done on room air and with head of bed elevated.  Analysis time:  7 Hours 32 Minutes.  Time at or below 88% oxygen saturation 50 minutes.  Lowest oxygen sat 82%.  Pattern consistent with REM versus positional hypoxemia.  I recommend weight control, continue sleeping with head of bed elevated, and supplemental oxygen at 2L/m minute with sleep.     Unfortunately there is a change that your insurance will not cover the oxygen given the history of ANUJ.      Thank You,     Brianda Reddy MD        If you have any other questions please contact the clinic at [phone].        
Patient requests all Lab, Cardiology, and Radiology Results on their Discharge Instructions

## 2023-12-26 ENCOUNTER — TELEPHONE (OUTPATIENT)
Dept: FAMILY MEDICINE | Facility: CLINIC | Age: 73
End: 2023-12-26
Payer: COMMERCIAL

## 2023-12-26 ENCOUNTER — TELEPHONE (OUTPATIENT)
Dept: PSYCHIATRY | Facility: CLINIC | Age: 73
End: 2023-12-26
Payer: COMMERCIAL

## 2023-12-26 NOTE — TELEPHONE ENCOUNTER
----- Message from Sarah Renner DO sent at 12/25/2023  8:59 AM CST -----  Please contact [facility]:    Pinky was seen in clinic on Friday, her urine culture came back today growing E. Coli. I am sending a prescription to [facility] for an antibiotic called bactrim for 5 days.    Thanks! -Dr Renner

## 2023-12-26 NOTE — LETTER
12/26/2023       RE: Pinky Page  [address] [facility] [address]       Prescription for Ingrezza has been discontinued by Dr. Garrett due to worsening tremors.     Please stop this medication effective immediately.    DISCONTINUED  valbenazine (INGREZZA) 40 MG capsule 30 capsule 3 11/16/2023 -- No   Sig - Route: Take 1 capsule (40 mg) by mouth daily - Oral       Please call the clinic to confirm receipt of this medication change at [phone].    Thank you.

## 2023-12-26 NOTE — CONFIDENTIAL NOTE
Writer confirmed receipt of Ingrezza discontinuation with [facility] at [facility]  _________________    Writer LVM for third floor nursing desk at [facility] [phone] indicating that Ingrezza has been discontinued by Dr. Garrett due to worsening tremors.    Faxed discontinuation letter to [facility] at [phone], listed as the nurse's station fax (via [employer]).,    Asked for a call back to main clinic number to verify receipt.    Attempted to reach patient at [phone] and [phone] - no answer, no option to LVM.    ________________  Jacinda Felipe, Klever Su, [facility]; Marizol Ortiz, PharmD; P Psychiatry Nurse-[facility],    Since psychiatry manages this medication they will need to let her facility know and send a discontinue order for Ingrezza from Dr. Tami Adkins to the facility.    Jacinda          Previous Messages       ----- Message -----  From: Klever Obrien [facility]  Sent: 12/26/2023   9:18 AM CST  To: Marizol Ortiz, PharmD; *  Subject: FW: Ingrezza prescribing                        Hi folks -- covering for Marizol today -- would you be able to let this patient know that Dr. Tami Adkins is ok with stopping Ingrezza? Marizol will return 1/2/24    Klever Melgar, PharmD, [facility]  Endocrine & Diabetes [facility] Pharmacist  [address] [place] [id number]      ----- Message -----  From: Gail Arias MD  Sent: 12/24/2023   9:48 PM CST  To: SAL Tong CNP; *  Subject: RE: Ingrezza prescribing                        Hi,  It is OK to stop Ingrezza. It is unfortunate she is having more tremor with it. Thanks for the message.    Reji

## 2023-12-28 ENCOUNTER — TELEPHONE (OUTPATIENT)
Dept: FAMILY MEDICINE | Facility: CLINIC | Age: 73
End: 2023-12-28
Payer: COMMERCIAL

## 2023-12-28 NOTE — TELEPHONE ENCOUNTER
[facility] phone call message- medication clarification/question:    Full Medication Name: sulfamethoxazole-trimethoprim (BACTRIM DS) 800-160 MG table       Question: The patient recv'd this medication by two different providers with different instructions. Ambar states the patient have taken the first medication prescribed and want to know if the patient should continue with the other prescription.     Pharmacy confirmed as [facility] - [place], [place] - [address]: Yes    OK to leave a message on voice mail? Yes    Primary language: English      needed? No    Call taken on December 28, 2023 at 8:56 AM by Triny Jeffers

## 2023-12-28 NOTE — TELEPHONE ENCOUNTER
Sarah Renner, DO  You2 minutes ago (9:39 AM)     CC  Please advise them to use the first prescription, which was for 3 days of bactrim twice a day. The second prescription was sent in error, thanks.  -Dr Renner

## 2023-12-29 ENCOUNTER — TRANSFERRED RECORDS (OUTPATIENT)
Dept: HEALTH INFORMATION MANAGEMENT | Facility: CLINIC | Age: 73
End: 2023-12-29

## 2024-01-01 NOTE — Clinical Note
Would you please reach out to pt to see if her symptoms have resolved? If they have she should still be seen for a blood pressure check in the next week or two, and should bring the list of recent blood pressures with her. If her symptoms are still present please schedule her for a follow up appointment with me or another provider, again within the next week or two
Patient's first and last name, , procedure, and correct site confirmed prior to the start of procedure.

## 2024-01-16 ENCOUNTER — OFFICE VISIT (OUTPATIENT)
Dept: NEUROLOGY | Facility: CLINIC | Age: 74
End: 2024-01-16
Payer: COMMERCIAL

## 2024-01-16 VITALS
DIASTOLIC BLOOD PRESSURE: 72 MMHG | RESPIRATION RATE: 20 BRPM | SYSTOLIC BLOOD PRESSURE: 117 MMHG | HEART RATE: 67 BPM | OXYGEN SATURATION: 99 %

## 2024-01-16 DIAGNOSIS — R25.1 TREMOR: ICD-10-CM

## 2024-01-16 DIAGNOSIS — F25.0 SCHIZOAFFECTIVE DISORDER, BIPOLAR TYPE (H): Primary | ICD-10-CM

## 2024-01-16 DIAGNOSIS — G24.01 TARDIVE DYSKINESIA: ICD-10-CM

## 2024-01-16 PROCEDURE — 99215 OFFICE O/P EST HI 40 MIN: CPT | Performed by: NURSE PRACTITIONER

## 2024-01-16 ASSESSMENT — PAIN SCALES - GENERAL: PAINLEVEL: NO PAIN (0)

## 2024-01-16 NOTE — PROGRESS NOTES
ASSESSMENT:    Tremor:  Using wrist weight. She would like to start medication.     Patient asked to call the nurse at [facility] with new Rx: [phone]  Fax: [phone]  Patient is on the 3d floor.    Tardive Dyskinesias:  Objective assessment showed worsened oromandibular TD symptoms; however, pt reports the mouth movements were unchanged and not bothersome.     Schizoaffective Disorder, bipolar type: Following with Dr. Garrett in psychiatry and Dr. Gr in psychology.     PLAN:      The following patient instructions provided: -     __  I'll discuss with Dr. Ortiz, PharmD if adding propranolol 10 mg twice daily would be okay - either at 8 am, & 1 pm or 8 am & 4 pm.    __ One option is to switch one of your blood pressure medications with propranolol, another option is to add the propanolol to your current regimen. Either way, we'll have the nursing staff check monitor your pressure.     __  Use the wrist weight and weighted utensils when eating.  You don't have to wear the wrist weight all the time.     __  Continue to walk in the hallways daily with the walker.     __   See Dr. Ortiz in 1 - 2 weeks or her soonest appointment.     __ Return in 3 months for a follow up. You may return sooner as needed.           MOVEMENT DISORDERS CLINIC           PATIENT: Pinky Pgae    : 1950    CORINNE:  2024    REASON FOR VISIT: Tremor follow up.    HPI: Ms. Pinky Page is a 73 year old who came to the [facility] neurology clinic by herself for a follow up visit. She came via [employer].  Her insurance pays for the ride.    I was her last in the clinic on 2023 for evaluation of tremor.  During that visit, the following were discussed: -       pertinent Tremor Hx:  Ms. Page is a 73 year old right - handed  female with a longstanding history of schizoaffective disorder and depression, and long term use of psychotropic medications. She has mixed action and resting tremors and  "some parkinsonian features including bradykinesia, stiffness, and gait and balance difficulty. Her symptoms could be drug-induced Parkinsonism from Ziprasidone (currently taking) or Idiopathic Parkinson's disease. The only way to differentiate the two is by getting a Dopamine Scan (DATscan). Since selective serotonin reuptake inhibitor interfere with the DATscan result and show false positive results, it is recommended for patients to go off certain medications. At this point, since the patient's mood has been stable, and patient didn't want to stop her psychotropic medication, DATscan hasn't been done.      Tardive dyskinesias:  Most likely induced by antipsychotic medication, Ziprasidone. Was on Ingrezza and had improved symptoms. However, due to tremors getting worse on Ingrezza this was stopped.     Since her last visit, pt has stopped the Ingrezza on Dec 25th. Since then, pt reports the mouth tardive dyskinesias (TD) didn't get worse. Today, in the clinic, the TD symptoms looked much worse. I asked pt if she was chewing a gum or if there was something in her mouth and learned that she wasn't chewing a gum. Due to her tongue movements, her partial teeth was  moving in her mouth.     She is wearing Rt arm wrist stabilizer to help tremors. As of January 2nd, she has retired from working as a  due to tremors.  Pt reports that the tremors have improved after stopping the Ingrezza; however, they're still bothersome primarily when she eats. She usually eats banana for breakfast and eats lunch & dinner - 12 pm & 6 pm.    She was seen by her PCP on 12/22/2023 due to having vaginal burning and itching.  She had labs done, and was treated for UTI as the urine culture grew e-coli.     She generally takes her other medications around at 8 am, 1 pm, 4 pm, & 8 pm.     Pt has fully recovered from the COVID.      Mood is \"It's alright.\"    Recent Results (from the past 720 hour(s))   Basic metabolic panel "    Collection Time: 12/22/23  2:47 PM   Result Value Ref Range    Sodium 136 135 - 145 mmol/L    Potassium 4.6 3.4 - 5.3 mmol/L    Chloride 96 (L) 98 - 107 mmol/L    Carbon Dioxide (CO2) 26 22 - 29 mmol/L    Anion Gap 14 7 - 15 mmol/L    Urea Nitrogen 20.3 8.0 - 23.0 mg/dL    Creatinine 0.82 0.51 - 0.95 mg/dL    GFR Estimate 75 >60 mL/min/1.73m2    Calcium 10.0 8.8 - 10.2 mg/dL    Glucose 199 (H) 70 - 99 mg/dL   Urine Macroscopic with reflex to Microscopic    Collection Time: 12/22/23  2:47 PM   Result Value Ref Range    Color Urine Yellow Colorless, Straw, Light Yellow, Yellow    Appearance Urine Clear Clear    Glucose Urine Negative Negative mg/dL    Bilirubin Urine Negative Negative    Ketones Urine Trace (A) Negative mg/dL    Specific Gravity Urine 1.015 1.005 - 1.030    Blood Urine Trace (A) Negative    pH Urine 5.0 5.0 - 8.0    Protein Albumin Urine Negative Negative mg/dL    Urobilinogen Urine 0.2 0.2, 1.0 E.U./dL    Nitrite Urine Negative Negative    Leukocyte Esterase Urine Trace (A) Negative   Urine Culture Aerobic Bacterial    Collection Time: 12/22/23  2:47 PM    Specimen: Urine, Midstream   Result Value Ref Range    Culture 50,000-100,000 CFU/mL Escherichia coli (A)        Susceptibility    Escherichia coli - LIZA     Ampicillin <=2 Susceptible ug/mL     Ampicillin/ Sulbactam <=2 Susceptible ug/mL     Piperacillin/Tazobactam <=4 Susceptible ug/mL     Cefazolin* <=4 Susceptible ug/mL      * Cefazolin LIZA breakpoints are for the treatment of uncomplicated urinary tract infections. For the treatment of systemic infections, please contact the laboratory for additional testing.     Cefoxitin <=4 Susceptible ug/mL     Ceftazidime <=1 Susceptible ug/mL     Ceftriaxone <=1 Susceptible ug/mL     Cefepime <=1 Susceptible ug/mL     Gentamicin <=1 Susceptible ug/mL     Tobramycin <=1 Susceptible ug/mL     Ciprofloxacin >=4 Resistant ug/mL     Levofloxacin 4 Resistant ug/mL     Nitrofurantoin <=16 Susceptible ug/mL      Trimethoprim/Sulfamethoxazole <=1/19 Susceptible ug/mL   TSH    Collection Time: 12/22/23  2:47 PM   Result Value Ref Range    TSH 0.87 0.30 - 4.20 uIU/mL   Urine Microscopic Exam    Collection Time: 12/22/23  2:47 PM   Result Value Ref Range    Bacteria Urine Few (A) None Seen /HPF    RBC Urine None Seen 0-2 /HPF /HPF    WBC Urine 5-10 (A) 0-5 /HPF /HPF    Squamous Epithelials Urine Few (A) None Seen /LPF          MEDICATIONS:   Outpatient Medications Marked as Taking for the 1/16/24 encounter (Office Visit) with Bethel Lozada APRN CNP   Medication Sig    acetaminophen (TYLENOL) 500 MG tablet Take 2 tablets (1,000 mg) by mouth every 6 hours as needed    alum & mag hydroxide-simethicone (MYLANTA/MAALOX) 200-200-20 MG/5ML SUSP suspension Take 30 mLs by mouth daily as needed for indigestion    amLODIPine (NORVASC) 10 MG tablet Take 1 tablet (10 mg) by mouth daily    apixaban ANTICOAGULANT (ELIQUIS) 5 MG tablet Take 1 tablet (5 mg) by mouth 2 times daily    artificial saliva (BIOTENE MT) AERS spray Take 1 spray by mouth 3 times daily as needed for dry mouth    artificial tears (GENTEAL) 0.1-0.2-0.3 % ophthalmic solution Place 2 drops into both eyes 4 times daily    atorvastatin (LIPITOR) 40 MG tablet Take 1 tablet (40 mg) by mouth daily    blood glucose (NO BRAND SPECIFIED) lancets standard Use to test blood sugar 2 times daily or as directed.    blood glucose (NO BRAND SPECIFIED) test strip Use to test blood sugar 3 times daily or as directed.    blood glucose calibration (NO BRAND SPECIFIED) solution Use to calibrate blood glucose monitor as directed.    blood glucose monitoring (NO BRAND SPECIFIED) meter device kit Check Blood sugars twice a day.    Calcium Carb-Cholecalciferol (CALCIUM-VITAMIN D3) 250-3.125 MG-MCG TABS Take 1 tablet by mouth 2 times daily    calcium polycarbophil (FIBERCON) 625 MG tablet Take 2 tablets (1,250 mg) by mouth daily    conjugated estrogens (PREMARIN) 0.625 MG/GM vaginal cream  Place 2 g vaginally daily for 2 weeks, followed by 2 g vaginally twice a week for vaginal atrophy and frequent UTIs    DENTA 5000 PLUS 1.1 % CREA 1 applicator (1 g) by Oral or Feeding Tube route 2 times daily    erythromycin (ROMYCIN) 5 MG/GM ophthalmic ointment Place 0.5 inches into both eyes At Bedtime    fluorometholone (FML LIQUIFILM) 0.1 % ophthalmic suspension Place 1 drop into both eyes 2 times daily    FLUoxetine (PROZAC) 20 MG capsule Take 2 capsules (40 mg) by mouth daily    fluticasone (FLONASE) 50 MCG/ACT nasal spray Spray 1 spray into both nostrils daily    furosemide (LASIX) 20 MG tablet Take 1 tablet (20 mg) by mouth daily    gabapentin (NEURONTIN) 600 MG tablet Take 600 mg by mouth 3 times daily    insulin glargine (LANTUS PEN) 100 UNIT/ML pen Inject 17 Units Subcutaneous at bedtime    levothyroxine (SYNTHROID/LEVOTHROID) 175 MCG tablet Take 1 tablet (175 mcg) by mouth daily    lifitegrast (XIIDRA) 5 % opthalmic solution Place 1 drop into both eyes 2 times daily    liraglutide (VICTOZA) 18 MG/3ML solution Inject 1.8 mg Subcutaneous daily    loratadine (CLARITIN) 10 MG tablet Take 10 mg by mouth daily    losartan (COZAAR) 100 MG tablet Take 1 tablet (100 mg) by mouth daily    Magnesium Hydroxide (MILK OF MAGNESIA PO) Take 30 mL as needed for constipation.    melatonin 3 MG CAPS Take 6 mg by mouth at bedtime    melatonin 3 MG tablet Take 6 mg by mouth at bedtime    metFORMIN (GLUCOPHAGE) 1000 MG tablet Take 1,000 mg by mouth 2 times daily (with meals)    nystatin (MYCOSTATIN) 355320 UNIT/GM external powder Apply topically 3 times daily as needed for other (rash)    polyethylene glycol (MIRALAX/GLYCOLAX) powder Take 1 capful by mouth 2 times daily 17 GM PO BID    SALINE MIST 0.65 % nasal spray Spray 2 sprays in nostril 2 times daily    senna-docusate (SENOKOT-S/PERICOLACE) 8.6-50 MG tablet Take 2 tablets by mouth At Bedtime.    simethicone (MYLICON) 125 MG chewable tablet Take 1 tablet (125 mg) by mouth  2 times daily    Skin Protectants, Misc. (EUCERIN) cream Apply topically as needed Apply to thigh PRN dry skin    solifenacin (VESICARE) 10 MG tablet Take 0.5 tablets (5 mg) by mouth daily    spironolactone (ALDACTONE) 25 MG tablet Take 1 tablet (25 mg) by mouth daily    sulfamethoxazole-trimethoprim (BACTRIM DS) 800-160 MG tablet Take 1 tablet by mouth 2 times daily    ziprasidone (GEODON) 20 MG capsule Take 1 capsule (20 mg) by mouth daily (with dinner)    ziprasidone (GEODON) 40 MG capsule Take 1 capsule (40 mg) by mouth daily (with breakfast)       PHYSICAL EXAM:    VITAL SIGNS:  Blood pressure 117/72, pulse 67, resp. rate 20, SpO2 99%, not currently breastfeeding.  There is no height or weight on file to calculate BMI.      GENERAL:  Ms. Page is a pleasant  female who is well-groomed, well-developed, and overweight sitting comfortably in the exam room without any distress. Affect is appropriate.    MOVEMENT DISORDERS ASSESSMENT:   Speech: Normal.  Facial Expression: Minimal hypomimia.   Tardive Dyskinesias: Looks worse today. Mild mouth movements present above 90% of the time and becomes worse with mental activation. Mouth movements include chewing like movements with intermittent tongue protrusion, and intermittent grunting sounds. Also with mental activation both legs have dyskinetic movements Lt > Rt.   Rest Tremor: Mid in both hands; Rt > Lt. Absent in legs.   Action/Postural Tremor: Mild in Rt; Absent in Lt.    Action Tremor: Mild in Rt; Slight in Lt.   Rigidity: Mild in Rt; Slight in Lt.  Finger Taps: Mild bilaterally.   Hand opening & closing: Mild bilaterally.    Hand pronation & supination:  Mild bilaterally.    Toe Tapping:  Slight in Rt; Mild in Lt.   Leg Agility:  Slight in Rt; Mild in Lt.   Arising from chair - arms folded across chest: Pushes self up from arms of seat.  Posture: Mild.   Gait: Used cane for balance. She walks slowly, with stooped posture, reduced arm sing in left  hand.  Freezing of gait:  Absent.    Postural Stability: Moderate.  Body Bradykinesia: Mild.       Today I spent 45 minutes caring for the patient. Time was spent with reviewing records, meeting with the patient, answering questions, examining, and documentation.    Radha Lozada DNP, APRN  [facility]

## 2024-01-16 NOTE — NURSING NOTE
Chief Complaint   Patient presents with    RECHECK     /72 (BP Location: Right arm, Patient Position: Sitting, Cuff Size: Adult Large)   Pulse 67   Resp 20   LMP  (LMP Unknown)   SpO2 99%     JANES SUE

## 2024-01-16 NOTE — LETTER
2024       RE: Pinky Page  [address] [facility] [address]       Dear Colleague,    Thank you for referring your patient, Pinky Page, to the [facility] [place] at [facility]. Please see a copy of my visit note below.      ASSESSMENT:    Tremor:  Using wrist weight. She would like to start medication.     Patient asked to call the nurse at [facility] with new Rx: [phone]  Fax: [phone]  Patient is on the 3d floor.    Tardive Dyskinesias:  Objective assessment showed worsened oromandibular TD symptoms; however, pt reports the mouth movements were unchanged and not bothersome.     Schizoaffective Disorder, bipolar type: Following with Dr. Garrett in psychiatry and Dr. Gr in psychology.     PLAN:      The following patient instructions provided: -     __  I'll discuss with Dr. Ortiz, PharmD if adding propranolol 10 mg twice daily would be okay - either at 8 am, & 1 pm or 8 am & 4 pm.    __ One option is to switch one of your blood pressure medications with propranolol, another option is to add the propanolol to your current regimen. Either way, we'll have the nursing staff check monitor your pressure.     __  Use the wrist weight and weighted utensils when eating.  You don't have to wear the wrist weight all the time.     __  Continue to walk in the hallways daily with the walker.     __   See Dr. Ortiz in 1 - 2 weeks or her soonest appointment.     __ Return in 3 months for a follow up. You may return sooner as needed.           MOVEMENT DISORDERS CLINIC           PATIENT: Pinky Page    : 1950    CORINNE:  2024    REASON FOR VISIT: Tremor follow up.    HPI: Ms. Pinky Page is a 73 year old who came to the [facility] neurology clinic by herself for a follow up visit. She came via [employer].  Her insurance pays for the ride.    I was her last in the clinic on 2023  for evaluation of tremor.  During that visit, the following were discussed: -       pertinent Tremor Hx:  Ms. Page is a 73 year old right - handed  female with a longstanding history of schizoaffective disorder and depression, and long term use of psychotropic medications. She has mixed action and resting tremors and some parkinsonian features including bradykinesia, stiffness, and gait and balance difficulty. Her symptoms could be drug-induced Parkinsonism from Ziprasidone (currently taking) or Idiopathic Parkinson's disease. The only way to differentiate the two is by getting a Dopamine Scan (DATscan). Since selective serotonin reuptake inhibitor interfere with the DATscan result and show false positive results, it is recommended for patients to go off certain medications. At this point, since the patient's mood has been stable, and patient didn't want to stop her psychotropic medication, DATscan hasn't been done.      Tardive dyskinesias:  Most likely induced by antipsychotic medication, Ziprasidone. Was on Ingrezza and had improved symptoms. However, due to tremors getting worse on Ingrezza this was stopped.     Since her last visit, pt has stopped the Ingrezza on Dec 25th. Since then, pt reports the mouth tardive dyskinesias (TD) didn't get worse. Today, in the clinic, the TD symptoms looked much worse. I asked pt if she was chewing a gum or if there was something in her mouth and learned that she wasn't chewing a gum. Due to her tongue movements, her partial teeth was  moving in her mouth.     She is wearing Rt arm wrist stabilizer to help tremors. As of January 2nd, she has retired from working as a  due to tremors.  Pt reports that the tremors have improved after stopping the Ingrezza; however, they're still bothersome primarily when she eats. She usually eats banana for breakfast and eats lunch & dinner - 12 pm & 6 pm.    She was seen by her PCP on 12/22/2023 due to having  "vaginal burning and itching.  She had labs done, and was treated for UTI as the urine culture grew e-coli.     She generally takes her other medications around at 8 am, 1 pm, 4 pm, & 8 pm.     Pt has fully recovered from the COVID.      Mood is \"It's alright.\"    Recent Results (from the past 720 hour(s))   Basic metabolic panel    Collection Time: 12/22/23  2:47 PM   Result Value Ref Range    Sodium 136 135 - 145 mmol/L    Potassium 4.6 3.4 - 5.3 mmol/L    Chloride 96 (L) 98 - 107 mmol/L    Carbon Dioxide (CO2) 26 22 - 29 mmol/L    Anion Gap 14 7 - 15 mmol/L    Urea Nitrogen 20.3 8.0 - 23.0 mg/dL    Creatinine 0.82 0.51 - 0.95 mg/dL    GFR Estimate 75 >60 mL/min/1.73m2    Calcium 10.0 8.8 - 10.2 mg/dL    Glucose 199 (H) 70 - 99 mg/dL   Urine Macroscopic with reflex to Microscopic    Collection Time: 12/22/23  2:47 PM   Result Value Ref Range    Color Urine Yellow Colorless, Straw, Light Yellow, Yellow    Appearance Urine Clear Clear    Glucose Urine Negative Negative mg/dL    Bilirubin Urine Negative Negative    Ketones Urine Trace (A) Negative mg/dL    Specific Gravity Urine 1.015 1.005 - 1.030    Blood Urine Trace (A) Negative    pH Urine 5.0 5.0 - 8.0    Protein Albumin Urine Negative Negative mg/dL    Urobilinogen Urine 0.2 0.2, 1.0 E.U./dL    Nitrite Urine Negative Negative    Leukocyte Esterase Urine Trace (A) Negative   Urine Culture Aerobic Bacterial    Collection Time: 12/22/23  2:47 PM    Specimen: Urine, Midstream   Result Value Ref Range    Culture 50,000-100,000 CFU/mL Escherichia coli (A)        Susceptibility    Escherichia coli - LIZA     Ampicillin <=2 Susceptible ug/mL     Ampicillin/ Sulbactam <=2 Susceptible ug/mL     Piperacillin/Tazobactam <=4 Susceptible ug/mL     Cefazolin* <=4 Susceptible ug/mL      * Cefazolin LIZA breakpoints are for the treatment of uncomplicated urinary tract infections. For the treatment of systemic infections, please contact the laboratory for additional testing.     " Cefoxitin <=4 Susceptible ug/mL     Ceftazidime <=1 Susceptible ug/mL     Ceftriaxone <=1 Susceptible ug/mL     Cefepime <=1 Susceptible ug/mL     Gentamicin <=1 Susceptible ug/mL     Tobramycin <=1 Susceptible ug/mL     Ciprofloxacin >=4 Resistant ug/mL     Levofloxacin 4 Resistant ug/mL     Nitrofurantoin <=16 Susceptible ug/mL     Trimethoprim/Sulfamethoxazole <=1/19 Susceptible ug/mL   TSH    Collection Time: 12/22/23  2:47 PM   Result Value Ref Range    TSH 0.87 0.30 - 4.20 uIU/mL   Urine Microscopic Exam    Collection Time: 12/22/23  2:47 PM   Result Value Ref Range    Bacteria Urine Few (A) None Seen /HPF    RBC Urine None Seen 0-2 /HPF /HPF    WBC Urine 5-10 (A) 0-5 /HPF /HPF    Squamous Epithelials Urine Few (A) None Seen /LPF          MEDICATIONS:   Outpatient Medications Marked as Taking for the 1/16/24 encounter (Office Visit) with Bethel Lozada APRN CNP   Medication Sig    acetaminophen (TYLENOL) 500 MG tablet Take 2 tablets (1,000 mg) by mouth every 6 hours as needed    alum & mag hydroxide-simethicone (MYLANTA/MAALOX) 200-200-20 MG/5ML SUSP suspension Take 30 mLs by mouth daily as needed for indigestion    amLODIPine (NORVASC) 10 MG tablet Take 1 tablet (10 mg) by mouth daily    apixaban ANTICOAGULANT (ELIQUIS) 5 MG tablet Take 1 tablet (5 mg) by mouth 2 times daily    artificial saliva (BIOTENE MT) AERS spray Take 1 spray by mouth 3 times daily as needed for dry mouth    artificial tears (GENTEAL) 0.1-0.2-0.3 % ophthalmic solution Place 2 drops into both eyes 4 times daily    atorvastatin (LIPITOR) 40 MG tablet Take 1 tablet (40 mg) by mouth daily    blood glucose (NO BRAND SPECIFIED) lancets standard Use to test blood sugar 2 times daily or as directed.    blood glucose (NO BRAND SPECIFIED) test strip Use to test blood sugar 3 times daily or as directed.    blood glucose calibration (NO BRAND SPECIFIED) solution Use to calibrate blood glucose monitor as directed.    blood glucose monitoring  (NO BRAND SPECIFIED) meter device kit Check Blood sugars twice a day.    Calcium Carb-Cholecalciferol (CALCIUM-VITAMIN D3) 250-3.125 MG-MCG TABS Take 1 tablet by mouth 2 times daily    calcium polycarbophil (FIBERCON) 625 MG tablet Take 2 tablets (1,250 mg) by mouth daily    conjugated estrogens (PREMARIN) 0.625 MG/GM vaginal cream Place 2 g vaginally daily for 2 weeks, followed by 2 g vaginally twice a week for vaginal atrophy and frequent UTIs    DENTA 5000 PLUS 1.1 % CREA 1 applicator (1 g) by Oral or Feeding Tube route 2 times daily    erythromycin (ROMYCIN) 5 MG/GM ophthalmic ointment Place 0.5 inches into both eyes At Bedtime    fluorometholone (FML LIQUIFILM) 0.1 % ophthalmic suspension Place 1 drop into both eyes 2 times daily    FLUoxetine (PROZAC) 20 MG capsule Take 2 capsules (40 mg) by mouth daily    fluticasone (FLONASE) 50 MCG/ACT nasal spray Spray 1 spray into both nostrils daily    furosemide (LASIX) 20 MG tablet Take 1 tablet (20 mg) by mouth daily    gabapentin (NEURONTIN) 600 MG tablet Take 600 mg by mouth 3 times daily    insulin glargine (LANTUS PEN) 100 UNIT/ML pen Inject 17 Units Subcutaneous at bedtime    levothyroxine (SYNTHROID/LEVOTHROID) 175 MCG tablet Take 1 tablet (175 mcg) by mouth daily    lifitegrast (XIIDRA) 5 % opthalmic solution Place 1 drop into both eyes 2 times daily    liraglutide (VICTOZA) 18 MG/3ML solution Inject 1.8 mg Subcutaneous daily    loratadine (CLARITIN) 10 MG tablet Take 10 mg by mouth daily    losartan (COZAAR) 100 MG tablet Take 1 tablet (100 mg) by mouth daily    Magnesium Hydroxide (MILK OF MAGNESIA PO) Take 30 mL as needed for constipation.    melatonin 3 MG CAPS Take 6 mg by mouth at bedtime    melatonin 3 MG tablet Take 6 mg by mouth at bedtime    metFORMIN (GLUCOPHAGE) 1000 MG tablet Take 1,000 mg by mouth 2 times daily (with meals)    nystatin (MYCOSTATIN) 963336 UNIT/GM external powder Apply topically 3 times daily as needed for other (rash)     polyethylene glycol (MIRALAX/GLYCOLAX) powder Take 1 capful by mouth 2 times daily 17 GM PO BID    SALINE MIST 0.65 % nasal spray Spray 2 sprays in nostril 2 times daily    senna-docusate (SENOKOT-S/PERICOLACE) 8.6-50 MG tablet Take 2 tablets by mouth At Bedtime.    simethicone (MYLICON) 125 MG chewable tablet Take 1 tablet (125 mg) by mouth 2 times daily    Skin Protectants, Misc. (EUCERIN) cream Apply topically as needed Apply to thigh PRN dry skin    solifenacin (VESICARE) 10 MG tablet Take 0.5 tablets (5 mg) by mouth daily    spironolactone (ALDACTONE) 25 MG tablet Take 1 tablet (25 mg) by mouth daily    sulfamethoxazole-trimethoprim (BACTRIM DS) 800-160 MG tablet Take 1 tablet by mouth 2 times daily    ziprasidone (GEODON) 20 MG capsule Take 1 capsule (20 mg) by mouth daily (with dinner)    ziprasidone (GEODON) 40 MG capsule Take 1 capsule (40 mg) by mouth daily (with breakfast)       PHYSICAL EXAM:    VITAL SIGNS:  Blood pressure 117/72, pulse 67, resp. rate 20, SpO2 99%, not currently breastfeeding.  There is no height or weight on file to calculate BMI.      GENERAL:  Ms. Page is a pleasant  female who is well-groomed, well-developed, and overweight sitting comfortably in the exam room without any distress. Affect is appropriate.    MOVEMENT DISORDERS ASSESSMENT:   Speech: Normal.  Facial Expression: Minimal hypomimia.   Tardive Dyskinesias: Looks worse today. Mild mouth movements present above 90% of the time and becomes worse with mental activation. Mouth movements include chewing like movements with intermittent tongue protrusion, and intermittent grunting sounds. Also with mental activation both legs have dyskinetic movements Lt > Rt.   Rest Tremor: Mid in both hands; Rt > Lt. Absent in legs.   Action/Postural Tremor: Mild in Rt; Absent in Lt.    Action Tremor: Mild in Rt; Slight in Lt.   Rigidity: Mild in Rt; Slight in Lt.  Finger Taps: Mild bilaterally.   Hand opening & closing: Mild  bilaterally.    Hand pronation & supination:  Mild bilaterally.    Toe Tapping:  Slight in Rt; Mild in Lt.   Leg Agility:  Slight in Rt; Mild in Lt.   Arising from chair - arms folded across chest: Pushes self up from arms of seat.  Posture: Mild.   Gait: Used cane for balance. She walks slowly, with stooped posture, reduced arm sing in left hand.  Freezing of gait:  Absent.    Postural Stability: Moderate.  Body Bradykinesia: Mild.       Today I spent 45 minutes caring for the patient. Time was spent with reviewing records, meeting with the patient, answering questions, examining, and documentation.        Again, thank you for allowing me to participate in the care of your patient.      Sincerely,    SAL Atkins CNP     PAST MEDICAL HISTORY:  HLD (hyperlipidemia)     HTN (hypertension)

## 2024-01-16 NOTE — PATIENT INSTRUCTIONS
Dear Ms. Pinky Page,    Thank you for coming today.  During your visit, we have discussed the following:     __  I'll discuss with Dr. Ortiz, the pharmacist, if adding Propranolol 10 mg twice daily would be okay - either at 8 am, & 1 pm or 8 am & 4 pm.  We'll let you know.     __ One option is to switch one of your blood pressure medications with propranolol, or another option is to add the propanolol to your current medication regimen. Either way, we'll have the nursing staff monitor your pressure daily after we make the change at lease for a week or two.     __  Use the wrist weight and the weighted utensils when eating.  You don't have to wear the wrist weight all the time, just when eating.     __  Continue to walk in the hallways daily with the walker.     __   See Dr. Ortiz in 1 - 2 weeks or her soonest appointment.     __ Return in 3 months for a follow up. You may return sooner as needed.       For questions, you may send us a Yipit message or call [phone]    Fax number: [phone]    To make an appointment with one of our pharmacists, (Dr. Price, Pharm.D. or Dr. Ortiz, PharmD), call [phone].    Radha Lozada DNP, APRN  [facility]

## 2024-01-17 NOTE — PROGRESS NOTES
Referring provider:Mona Nava MD    Chief complaint:     HPI: Ms. Pinky Page is a 70 year old  female with PMH significant for   -type 2 diabetes on insulin  -Paroxysmal atrial fibrillation on Eliquis  -Shizoaffective disorder  -Obstructive sleep apnea noncompliant with CPAP  -Morbid obesity with body mass index at 30.9.5  -Hypertension  -Hyperlipidemia    Patient lives at long-term care facility (nursing home).  She was noted to have irregular rhythm on 3/13/2021 by the staff.  At that time she was asymptomatic.  Subsequently she was seen by her primary care physician.  Patient was started on Eliquis 5 mg twice daily.  Patient's [id number] score is 4.  Of note she has no prior history of atrial fibrillation.  EKG 2019 shows sinus rhythm.  Patient is currently asymptomatic from cardiac standpoint.The patient denies a history of chest discomfort, dyspnea, PND, orthopnea, pedal edema, palpitations, lightheadedness, or syncope.  She tells me that she walks for 25 minutes and rides her stationary bike for 20 minutes almost every day.  Denies symptoms during exercise.  Lifetime non-smoker.  No alcohol abuse.    Echocardiogram in 2015 showed a structurally normal heart.  EKG in clinic today shows atrial fibrillation heart rate is well controlled at 80 bpm.  Current medications are insulin, liraglutide, Lasix 20 mg twice daily, atorvastatin 40, Eliquis 5 mg twice daily amlodipine 10 mg, losartan 100 mg.  Medications, personal, family, and social history reviewed with patient and revised.    Interval history 1/22/2024:  Patient is being seen today for 3-year follow-up.  I have seen her last time in 2021 for persistent atrial fibrillation.  Patient lives at a facility.  Referred to us for follow-up on A-fib.  Has been in A-fib since.  Asymptomatic from A-fib.  She tells me that she is overall feeling well.  Denies chest pain, shortness of breath, dizziness, syncope or palpitations.  She is currently on  Eliquis 5 mg twice daily, Lasix 20 mg daily, amlodipine 10 mg and losartan 100 mg.  Blood pressure is well-controlled.  She tells me that she has hand tremor and her provider might start her on propranolol.  She is not on this medication yet.    PAST MEDICAL HISTORY:  Past Medical History:   Diagnosis Date    Allergic rhinitis due to pollen     seasonal allergies     Anisometropia and aniseikonia     BMI greater than 40     Cardiomegaly     Chronic constipation     Congenital absence of one kidney     Cramp of limb     Dermatophytosis of the body     Dry eye syndrome     Esophageal reflux     Essential hypertension, benign     Gastro-oesophageal reflux disease     Hemorrhoids     Hypermetropia     Hypothyroidism     Incomplete Emptying of Bladder     Lactose intolerance     Major depressive disorder, recurrent episode, moderate (H)     Malignant neoplasm of breast (female), unspecified site     left mastectomy 1996     Mixed incontinence urge and stress (male)(female)     Moderate obstructive sleep apnea     Postmenopausal Atrophic Vaginitis     Presbyopia     Regular astigmatism     Restless leg syndrome     Senile nuclear sclerosis     Thyroid eye disease     mild    Tinnitus     Trigger finger (acquired)     right hand    Type II or unspecified type diabetes mellitus without mention of complication, not stated as uncontrolled     on insulin since April 2006        CURRENT MEDICATIONS:  Current Outpatient Medications   Medication Sig Dispense Refill    acetaminophen (TYLENOL) 500 MG tablet Take 2 tablets (1,000 mg) by mouth every 6 hours as needed 1 Bottle 2    alum & mag hydroxide-simethicone (MYLANTA/MAALOX) 200-200-20 MG/5ML SUSP suspension Take 30 mLs by mouth daily as needed for indigestion      amLODIPine (NORVASC) 10 MG tablet Take 1 tablet (10 mg) by mouth daily 90 tablet 0    apixaban ANTICOAGULANT (ELIQUIS) 5 MG tablet Take 1 tablet (5 mg) by mouth 2 times daily 180 tablet 1    artificial saliva (BIOTENE  MT) AERS spray Take 1 spray by mouth 3 times daily as needed for dry mouth 115 mL 3    artificial tears (GENTEAL) 0.1-0.2-0.3 % ophthalmic solution Place 2 drops into both eyes 4 times daily 15 mL 11    atorvastatin (LIPITOR) 40 MG tablet Take 1 tablet (40 mg) by mouth daily 90 tablet 1    blood glucose (NO BRAND SPECIFIED) lancets standard Use to test blood sugar 2 times daily or as directed. 100 each 11    blood glucose (NO BRAND SPECIFIED) test strip Use to test blood sugar 3 times daily or as directed. 100 strip 3    blood glucose calibration (NO BRAND SPECIFIED) solution Use to calibrate blood glucose monitor as directed. 1 each 3    blood glucose monitoring (NO BRAND SPECIFIED) meter device kit Check Blood sugars twice a day. 1 kit 0    Calcium Carb-Cholecalciferol (CALCIUM-VITAMIN D3) 250-3.125 MG-MCG TABS Take 1 tablet by mouth 2 times daily (Patient not taking: Reported on 1/16/2024)      Calcium Carbonate-Vitamin D (CALCIUM-VITAMIN D) 250-125 MG-UNIT TABS Take 1 tablet by mouth 2 times daily Calcium 250 mg/Vit D 125 IU      calcium polycarbophil (FIBERCON) 625 MG tablet Take 2 tablets (1,250 mg) by mouth daily 180 tablet 3    conjugated estrogens (PREMARIN) 0.625 MG/GM vaginal cream Place 2 g vaginally daily for 2 weeks, followed by 2 g vaginally twice a week for vaginal atrophy and frequent UTIs 30 g 3    DENTA 5000 PLUS 1.1 % CREA 1 applicator (1 g) by Oral or Feeding Tube route 2 times daily 51 g 1    erythromycin (ROMYCIN) 5 MG/GM ophthalmic ointment Place 0.5 inches into both eyes At Bedtime 7 g 11    fluorometholone (FML LIQUIFILM) 0.1 % ophthalmic suspension Place 1 drop into both eyes 2 times daily 10 mL 3    FLUoxetine (PROZAC) 20 MG capsule Take 2 capsules (40 mg) by mouth daily 60 capsule 3    fluticasone (FLONASE) 50 MCG/ACT nasal spray Spray 1 spray into both nostrils daily 16 g 3    furosemide (LASIX) 20 MG tablet Take 1 tablet (20 mg) by mouth daily 60 tablet 3    gabapentin (NEURONTIN) 600  MG tablet Take 600 mg by mouth 3 times daily      insulin glargine (LANTUS PEN) 100 UNIT/ML pen Inject 17 Units Subcutaneous at bedtime 8 mL 4    levothyroxine (SYNTHROID/LEVOTHROID) 175 MCG tablet Take 1 tablet (175 mcg) by mouth daily 90 tablet 3    lifitegrast (XIIDRA) 5 % opthalmic solution Place 1 drop into both eyes 2 times daily 60 each 11    liraglutide (VICTOZA) 18 MG/3ML solution Inject 1.8 mg Subcutaneous daily 9 mL 3    loratadine (CLARITIN) 10 MG tablet Take 10 mg by mouth daily      losartan (COZAAR) 100 MG tablet Take 1 tablet (100 mg) by mouth daily 90 tablet 1    Magnesium Hydroxide (MILK OF MAGNESIA PO) Take 30 mL as needed for constipation.      melatonin 3 MG CAPS Take 6 mg by mouth at bedtime 60 capsule 3    melatonin 3 MG tablet Take 6 mg by mouth at bedtime      metFORMIN (GLUCOPHAGE) 1000 MG tablet Take 1,000 mg by mouth 2 times daily (with meals)      nystatin (MYCOSTATIN) 404850 UNIT/GM external powder Apply topically 3 times daily as needed for other (rash) 60 g 1    polyethylene glycol (MIRALAX/GLYCOLAX) powder Take 1 capful by mouth 2 times daily 17 GM PO BID      SALINE MIST 0.65 % nasal spray Spray 2 sprays in nostril 2 times daily      senna-docusate (SENOKOT-S/PERICOLACE) 8.6-50 MG tablet Take 2 tablets by mouth At Bedtime.      simethicone (MYLICON) 125 MG chewable tablet Take 1 tablet (125 mg) by mouth 2 times daily 180 tablet 3    Skin Protectants, Misc. (EUCERIN) cream Apply topically as needed Apply to thigh PRN dry skin      solifenacin (VESICARE) 10 MG tablet Take 0.5 tablets (5 mg) by mouth daily 45 tablet 3    spironolactone (ALDACTONE) 25 MG tablet Take 1 tablet (25 mg) by mouth daily 90 tablet 0    sulfamethoxazole-trimethoprim (BACTRIM DS) 800-160 MG tablet Take 1 tablet by mouth 2 times daily (Patient not taking: Reported on 1/16/2024) 6 tablet 0    ziprasidone (GEODON) 20 MG capsule Take 1 capsule (20 mg) by mouth daily (with dinner) 30 capsule 3    ziprasidone (GEODON)  40 MG capsule Take 1 capsule (40 mg) by mouth daily (with breakfast) 30 capsule 3       PAST SURGICAL HISTORY:  Past Surgical History:   Procedure Laterality Date    APPENDECTOMY      C MASTECTOMY,SIMPLE  1996    Left mastectomy  [facility]. Had normal FU mammo 5/2007. Follow yearly.     CHOLECYSTECTOMY      COLONOSCOPY  5/2005    Complete Colonoscopy-had one small polyp removed 5/2005.     COLONOSCOPY N/A 3/31/2016    Procedure: COLONOSCOPY;  Surgeon: Cary Ring MD;  Location: [facility]    COLONOSCOPY N/A 7/7/2016    Procedure: COLONOSCOPY;  Surgeon: Cary Ring MD;  Location: [facility]    COLONOSCOPY N/A 5/12/2022    Procedure: COLONOSCOPY, WITH POLYPECTOMY AND BIOPSY;  Surgeon: Jonathan Madden MD;  Location: [facility]    DILATION AND CURETTAGE      HYSTERECTOMY      MASTECTOMY      Left breast    ZZC TOTAL ABDOM HYSTERECTOMY  7/2003    Dr. Castanon, for abnormal bleeding. Removal of both tubes with BSO for myoma w - - -       ALLERGIES:     Allergies   Allergen Reactions    Chlordiazepoxide Hcl     Dimetapp Dm Cold-Cough      Cold/Congetion TABS    Haloperidol     Ibuprofen      TABS    Lactose Intolerance [Beta-Galactosidase]      CAPS    Milk Products     Propofol      EMUL       FAMILY HISTORY:  Family History   Problem Relation Age of Onset    Melanoma Mother         malignant melanoma    Heart Disease Father         1968    Breast Cancer Sister         in her 50s.    Breast Cancer Sister         in her 70s.    Glaucoma No family hx of     Macular Degeneration No family hx of     Skin Cancer No family hx of     Movement disorder No family hx of          SOCIAL HISTORY:  Social History     Tobacco Use    Smoking status: Never     Passive exposure: Current    Smokeless tobacco: Never   Vaping Use    Vaping Use: Never used   Substance Use Topics    Alcohol use: No     Alcohol/week: 0.0 standard drinks of alcohol    Drug use: No       ROS:   Constitutional: No fever,  chills, or sweats. Weight stable.   Cardiovascular: As per HPI.       Exam:  /74 (BP Location: Right arm, Patient Position: Chair, Cuff Size: Adult Regular)   Pulse 80   Wt 95.4 kg (210 lb 6.4 oz)   LMP  (LMP Unknown)   SpO2 100%   BMI 36.12 kg/m    GENERAL APPEARANCE: alert and no distress  HEENT: no icterus, no central cyanosis  LYMPH/NECK: no adenopathy, no asymmetry, JVP not elevated  RESPIRATORY: lungs clear to auscultation - no rales, rhonchi or wheezes, no use of accessory muscles, no retractions, respirations are unlabored, normal respiratory rate  CARDIOVASCULAR: irregular rhythm, normal S1, S2, no S3 or S4 and no murmur, click or rub, precordium quiet with normal PMI.  GI: soft, non tender  EXTREMITIES: 1+ pretibial edema on the right, no edema on the left.    NEURO: alert, normal speech,and affect  SKIN: no ecchymoses, no rashes     I have reviewed the labs and personally reviewed the imaging below and made my comment in the assessment and plan.    Labs:  CBC RESULTS:   Lab Results   Component Value Date    WBC 11.8 (H) 10/08/2021    WBC 10.06 (H) 05/07/2019    RBC 4.41 10/08/2021    RBC 4.46 05/07/2019    HGB 13.0 10/08/2021    HGB 13.7 03/16/2021    HCT 40.0 10/08/2021    HCT 42.7 03/16/2021    MCV 91 10/08/2021    MCV 89.3 03/16/2021    MCH 29.5 10/08/2021    MCH 28.7 03/16/2021    MCHC 32.5 10/08/2021    MCHC 32.1 03/16/2021    RDW 13.7 10/08/2021    RDW 14.4 03/16/2021     10/08/2021     05/07/2019     10/25/2018       BMP RESULTS:  Lab Results   Component Value Date     12/22/2023    .1 03/16/2021    POTASSIUM 4.6 12/22/2023    POTASSIUM 4.2 10/08/2021    POTASSIUM 4.2 03/16/2021    CHLORIDE 96 (L) 12/22/2023    CHLORIDE 104 10/08/2021    CHLORIDE 96.2 (L) 03/16/2021    CO2 26 12/22/2023    CO2 26 10/08/2021    CO2 28.9 03/16/2021    ANIONGAP 14 12/22/2023    ANIONGAP 8 10/08/2021    ANIONGAP 11 05/07/2019     (H) 12/22/2023     (H)  05/12/2022     (H) 10/08/2021    .0 (H) 03/16/2021    BUN 20.3 12/22/2023    BUN 18 10/08/2021    BUN 12.8 03/16/2021    CR 0.82 12/22/2023    CR 0.8 03/16/2021    GFRESTIMATED 75 12/22/2023    GFRESTIMATED >60 11/18/2021    GFRESTIMATED 70.7 03/16/2021    GFRESTBLACK 85.5 03/16/2021    ASHLEY 10.0 12/22/2023    ASHLEY 9.2 03/16/2021        INR RESULTS:  Lab Results   Component Value Date    INR 1.00 07/04/2014    INR 1.03 10/26/2011    INR 1.04 08/11/2008         Echocardiogram 11/18/2015  Technically difficult study.  Left ventricular function, chamber size, wall motion, and wall thickness are  normal.The EF is 55-60%.  Mild diastolic dysfunction, with no evidence of high filling pressures. Cause  of edema unlikely to be cardiac.      EKG 3/16/2021 atrial fibrillation.      Assessment and Plan:   Patient is being seen today for follow-up on permanent atrial fibrillation.  She lives at a facility.  She is on Eliquis 5 mg twice daily given [id number] score of 4.  She is not on any rate controlling agent as her resting heart rate is normal.  He is euvolemic on exam.  Has no specific cardiac symptoms.  Patient uses an mercy on her computer to communicate but a lot of times she did not need that and was able to understand directly.  Patient has mouth dystonia.    # Permanent atrial fibrillation  -She can be started on propranolol for tremor if needed.    -Recommend to continue Eliquis 5 mg twice daily  -Continue Lasix 20 mg daily.  She is euvolemic.    #Hypertension, well controlled  -Continue current medications with amlodipine 10, losartan 100 mg.    #Diabetes type 2  -Hemoglobin A1c is elevated at 7.8  -On insulin and liraglutide.  -Follows with primary care physician    Plan:  -No medication changes today.  -Recommend to continue current treatment  -She can be started on propranolol if needed.  -Follow-up in cardiology as needed    Total time spent today for this visit is 18 minutes including precharting,  face-to-face clinic visit, review of labs/imaging and medical documentation.    Haile GARCIA MD  [facility] Division of Cardiology  Pager [phone]

## 2024-01-18 ENCOUNTER — OFFICE VISIT (OUTPATIENT)
Dept: PSYCHIATRY | Facility: CLINIC | Age: 74
End: 2024-01-18
Attending: PSYCHIATRY & NEUROLOGY
Payer: COMMERCIAL

## 2024-01-18 VITALS
WEIGHT: 211.8 LBS | SYSTOLIC BLOOD PRESSURE: 133 MMHG | HEART RATE: 87 BPM | DIASTOLIC BLOOD PRESSURE: 72 MMHG | BODY MASS INDEX: 36.36 KG/M2

## 2024-01-18 DIAGNOSIS — F25.0 SCHIZOAFFECTIVE DISORDER, BIPOLAR TYPE (H): Primary | ICD-10-CM

## 2024-01-18 DIAGNOSIS — G24.01 TARDIVE DYSKINESIA: ICD-10-CM

## 2024-01-18 DIAGNOSIS — F25.1 SCHIZOAFFECTIVE DISORDER, DEPRESSIVE TYPE (H): ICD-10-CM

## 2024-01-18 DIAGNOSIS — G47.09 OTHER INSOMNIA: ICD-10-CM

## 2024-01-18 PROCEDURE — 99214 OFFICE O/P EST MOD 30 MIN: CPT | Mod: GC

## 2024-01-18 PROCEDURE — G0463 HOSPITAL OUTPT CLINIC VISIT: HCPCS

## 2024-01-18 RX ORDER — ZIPRASIDONE HYDROCHLORIDE 40 MG/1
40 CAPSULE ORAL
Qty: 60 CAPSULE | Refills: 2 | Status: SHIPPED | OUTPATIENT
Start: 2024-01-18

## 2024-01-18 RX ORDER — ZIPRASIDONE HYDROCHLORIDE 20 MG/1
20 CAPSULE ORAL
Qty: 60 CAPSULE | Refills: 2 | Status: SHIPPED | OUTPATIENT
Start: 2024-01-18

## 2024-01-18 ASSESSMENT — PAIN SCALES - GENERAL: PAINLEVEL: NO PAIN (0)

## 2024-01-18 NOTE — PROGRESS NOTES
[facility]  Psychiatry Clinic  MEDICAL PROGRESS NOTE     CARE TEAM:  PCP- Allie Sweeney    Psychotherapist- Dr Gr, PhD   Team: Staff at [facility]      This person is a 73 year old who uses the name Pinky and pronouns she, her.      DIAGNOSIS     Schizoaffective disorder, depressed type, in remission  Tardive dyskinesia     Pertinent medical diagnoses:  -T2DM  -ANUJ  -Hypothyroid  -Atrial fibrillation  -Breast cancer s/p mastectomy      ASSESSMENT   Pinky Page is a 73 year old female with history of schizoaffective disorder, depressed type who has been relatively stable taking ziprasidone and fluoxetine since 2016.       #Schizoaffective Disorder, Depressed type  Stable on Ziprasidone. Since decreasing the evening dose from 40 to 20 mg she has not had any rebound psychosis. The goal is to see any improvement in tremor with the lower antipsychotic dose. She has been tolerating the taper down well. Mood and anxiety symptoms well managed on current medication regimen.       #Tardive dyskinesia:    She was started on Ingrezza in September 2023 and as titrated up to 40 mg. Medication was helpful in controlling the involuntary facial movements. Neurology is following her for tremor and brought up a concern about Ingrezza having worsened the tremors. We agreed to discontinue Ingrezza. She has been off of it for a few weeks. TD is back to baseline and per visual observation the abnormal movements are pronounced and noticeable. She states she is not bothered by the facial and tongue movements and it is not giving her a hard time even when eating of drinking. No medication changes today. We may consider restarting Ingrezza if Propranolol is found to be helpful for controlling tremors.      #Tremor  #lack of balance  #Constipation  There is a right-sided low frequency resting tremor evident on the visit. It is not present throughout the day it comes on and off with no  identifiable trigger. No changes in hand-writing noted. Patient complains of constipation that requires three classes of laxatives with residual symptoms still present. The clinical picture is concerning for PD vs drug-induced Parkinsonism. Per neurology note the presentation is consistent with medication-induced EPS. Per neurology recommendations the Ingrezza was discontinued due to concern for having increased tremor. She has been using weighted bracelet and silverware to help with feeding herself. Per most recent neurology visit Propranolol was recommended and she is going to be started on it after seeing pharmacy team.      Future considerations:  - Repeat EKG    MNPMP was checked today:  Indicates taking controlled medication as prescribed.     PLAN                                                                                                                1) Meds-  - Ingrezza 40 mg daily discontinued by other provider due to tremor   - Continue ziprasidone 40 mg qAM and 20 mg qPM  - Continue fluoxetine 40 mg daily  - Continue melatonin 6mg at bedtime     Prescribed by PCP  Gabapentin 900mg TID    2) Psychotherapy- cont with Dr Gr every month     3) Next due-  Labs- next due 7/2024  EKG- not indicated at this time.  Rating scales-  AIMS =6 (10/9/23) next due 10/2024    4) Referrals-  None    5) Dispo- RTC in 6 weeks.     PERTINENT BACKGROUND                                                    [most recent eval 07/08/22]   The following section contains information copied from previous note by Dr Weller On 4/14/2022 and has been reviewed, edited, and verified by the patient.     Pinky first experienced mental health issues in her 20s or 30s and has received treatment for schizoaffective disorder and generalized anxiety. Notably, Pinky's symptoms of depression and psychosis have responded well to a combination of ECT and medication management. Pinky has a history of experiencing increased psychotic symptoms  with past attempted antipsychotic tapers. No changes to antipsychotic or antidepressant since 2016. Only medication change in last 6 years has been lowering lorazepam.      In her 30s, she had her gallbladder removed and then suffered first episode of severe depression. She was experiencing psychotic symptoms - paranoia, AH, and suspiciousness. She was hospitalized in [place] in 1986 where she received ECT.  She was hospitalized again in 1987, again received ECT. Was hospitalized for depression in 1995, around the same time she was diagnosed with breast cancer.   She was hospitalized for about nine months this time. She received ECT maintence from 6994-1983. Patient was stable taking Seroquel 400mg, Prozac 40mg, and Ativan 1mg for many years. Seroquel was cross-tapered to ziprasidone due to metabolic side effects in 2016.      She has been seen at our clinic since 2013. She has lived at [facility] for many years.       Pertinent Items Include: psychosis [sxs include disorganization, hallucinations, paranoia], mutiple psychotropic trials, psych hosp (3-5) and ECT.     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 Fell to the floor when dark and hurt her knee. No HT. Denies any chest discomfort, fluttering, lightheadedness, dizziness  -She has an appointment with cardiology for her atrial flutter and has been anxious about it.  -Skips breakfast but eats large portions for lunch and dinner. No change in appetite  -No change in sleep  Denies SI/SIB/HI/AVH  -Reports forgetting staff and other residents'  names.    -------------------------------------------------------------------  Recent Substance Use:     -alcohol: No   -cannabis: No   -tobacco: No  -caffeine:  No   -opioids: No   Narcan Kit currrent: No   -other: none    Pertinent Negatives: No suicidal or violent ideation, hallucinations and delusions  Adverse Effects: Right hand tremor resting, perioral involuntary movements, stiffness in upper extremity, denies chest pain and shortness of breath     MEDICAL HISTORY and ALLERGY     ALLERGIES: Chlordiazepoxide hcl, Dimetapp dm cold-cough, Haloperidol, Ibuprofen, Lactose intolerance [beta-galactosidase], Milk products, and Propofol    Patient Active Problem List   Diagnosis    Allergic rhinitis due to pollen    Urge incontinence    Hypertension, essential    Cardiomegaly    Chronic constipation    Dry eye syndrome    Esophageal reflux    Exposure keratoconjunctivitis    DM ophthalmopathy (H)    Hypothyroidism    Senile cataract    ANUJ (obstructive sleep apnea)    Vaginal atrophy    Restless leg syndrome    Squamous blepharitis    Morbid obesity due to excess calories (H)    Personal history of breast cancer s/p L masectomy    Hypercholesteremia    Lives in group home    Extrapyramidal and movement disorder    CKD (chronic kidney disease) stage 2, GFR 60-89 ml/min    Solitary kidney, congenital    S/P hysterectomy    Impingement syndrome of right shoulder    Hypertriglyceridemia    Candidiasis of skin    Generalized anxiety disorder    Carpal tunnel syndrome of left wrist    Schizoaffective disorder, depressive type (H)    Major depressive disorder, recurrent episode, moderate (H)    Psychological factors affecting medical condition    Hx of psychiatric care    Nail complaint    Microalbuminuria due to type 2 diabetes mellitus (H)    Type 2 diabetes mellitus with microalbuminuria, with long-term current use of insulin (H)    Conjunctivitis of both eyes    Corneal erosion of both eyes    Spastic entropion, right     Foot callus    Paroxysmal atrial fibrillation (H)    Squamous cell carcinoma in situ of skin of face    History of colonic polyps    Benign paroxysmal positional vertigo, unspecified laterality    Mild cognitive impairment    Tardive dyskinesia    Physiologic anisocoria        MEDICAL REVIEW OF SYSTEMS   Contraception-  No   Pregnant- no  none in addition to that documented above     MEDICATIONS     Current Outpatient Medications   Medication Sig Dispense Refill    acetaminophen (TYLENOL) 500 MG tablet Take 2 tablets (1,000 mg) by mouth every 6 hours as needed 1 Bottle 2    alum & mag hydroxide-simethicone (MYLANTA/MAALOX) 200-200-20 MG/5ML SUSP suspension Take 30 mLs by mouth daily as needed for indigestion      amLODIPine (NORVASC) 10 MG tablet Take 1 tablet (10 mg) by mouth daily 90 tablet 0    apixaban ANTICOAGULANT (ELIQUIS) 5 MG tablet Take 1 tablet (5 mg) by mouth 2 times daily 180 tablet 1    artificial saliva (BIOTENE MT) AERS spray Take 1 spray by mouth 3 times daily as needed for dry mouth 115 mL 3    artificial tears (GENTEAL) 0.1-0.2-0.3 % ophthalmic solution Place 2 drops into both eyes 4 times daily 15 mL 11    atorvastatin (LIPITOR) 40 MG tablet Take 1 tablet (40 mg) by mouth daily 90 tablet 1    blood glucose (NO BRAND SPECIFIED) lancets standard Use to test blood sugar 2 times daily or as directed. 100 each 11    blood glucose (NO BRAND SPECIFIED) test strip Use to test blood sugar 3 times daily or as directed. 100 strip 3    blood glucose calibration (NO BRAND SPECIFIED) solution Use to calibrate blood glucose monitor as directed. 1 each 3    blood glucose monitoring (NO BRAND SPECIFIED) meter device kit Check Blood sugars twice a day. 1 kit 0    Calcium Carbonate-Vitamin D (CALCIUM-VITAMIN D) 250-125 MG-UNIT TABS Take 1 tablet by mouth 2 times daily Calcium 250 mg/Vit D 125 IU      calcium polycarbophil (FIBERCON) 625 MG tablet Take 2 tablets (1,250 mg) by mouth daily 180 tablet 3    conjugated  estrogens (PREMARIN) 0.625 MG/GM vaginal cream Place 2 g vaginally daily for 2 weeks, followed by 2 g vaginally twice a week for vaginal atrophy and frequent UTIs 30 g 3    DENTA 5000 PLUS 1.1 % CREA 1 applicator (1 g) by Oral or Feeding Tube route 2 times daily 51 g 1    erythromycin (ROMYCIN) 5 MG/GM ophthalmic ointment Place 0.5 inches into both eyes At Bedtime 7 g 11    fluorometholone (FML LIQUIFILM) 0.1 % ophthalmic suspension Place 1 drop into both eyes 2 times daily 10 mL 3    FLUoxetine (PROZAC) 20 MG capsule Take 2 capsules (40 mg) by mouth daily 60 capsule 3    fluticasone (FLONASE) 50 MCG/ACT nasal spray Spray 1 spray into both nostrils daily 16 g 3    furosemide (LASIX) 20 MG tablet Take 1 tablet (20 mg) by mouth daily 60 tablet 3    gabapentin (NEURONTIN) 600 MG tablet Take 600 mg by mouth 3 times daily      insulin glargine (LANTUS PEN) 100 UNIT/ML pen Inject 17 Units Subcutaneous at bedtime 8 mL 4    levothyroxine (SYNTHROID/LEVOTHROID) 175 MCG tablet Take 1 tablet (175 mcg) by mouth daily 90 tablet 3    lifitegrast (XIIDRA) 5 % opthalmic solution Place 1 drop into both eyes 2 times daily 60 each 11    liraglutide (VICTOZA) 18 MG/3ML solution Inject 1.8 mg Subcutaneous daily 9 mL 3    loratadine (CLARITIN) 10 MG tablet Take 10 mg by mouth daily      losartan (COZAAR) 100 MG tablet Take 1 tablet (100 mg) by mouth daily 90 tablet 1    Magnesium Hydroxide (MILK OF MAGNESIA PO) Take 30 mL as needed for constipation.      melatonin 3 MG CAPS Take 6 mg by mouth at bedtime 60 capsule 3    metFORMIN (GLUCOPHAGE) 1000 MG tablet Take 1,000 mg by mouth 2 times daily (with meals)      nystatin (MYCOSTATIN) 780086 UNIT/GM external powder Apply topically 3 times daily as needed for other (rash) 60 g 1    polyethylene glycol (MIRALAX/GLYCOLAX) powder Take 1 capful by mouth 2 times daily 17 GM PO BID      SALINE MIST 0.65 % nasal spray Spray 2 sprays in nostril 2 times daily      senna-docusate  (SENOKOT-S/PERICOLACE) 8.6-50 MG tablet Take 2 tablets by mouth At Bedtime.      simethicone (MYLICON) 125 MG chewable tablet Take 1 tablet (125 mg) by mouth 2 times daily 180 tablet 3    Skin Protectants, Misc. (EUCERIN) cream Apply topically as needed Apply to thigh PRN dry skin      solifenacin (VESICARE) 10 MG tablet Take 0.5 tablets (5 mg) by mouth daily 45 tablet 3    spironolactone (ALDACTONE) 25 MG tablet Take 1 tablet (25 mg) by mouth daily 90 tablet 0    ziprasidone (GEODON) 20 MG capsule Take 1 capsule (20 mg) by mouth daily (with dinner) 30 capsule 3    ziprasidone (GEODON) 40 MG capsule Take 1 capsule (40 mg) by mouth daily (with breakfast) 30 capsule 3      VITALS     Pulse Readings from Last 3 Encounters:   01/16/24 67   12/22/23 84   11/16/23 82     Wt Readings from Last 3 Encounters:   12/22/23 94.5 kg (208 lb 4.8 oz)   12/08/23 95.9 kg (211 lb 6.4 oz)   11/16/23 94.5 kg (208 lb 6.4 oz)     BP Readings from Last 3 Encounters:   01/16/24 117/72   12/22/23 (!) 150/85   11/16/23 134/77       MENTAL STATUS EXAM     Alertness: alert   Appearance: adequately groomed  Behavior/Demeanor: cooperative, pleasant and calm, with good  eye contact   Speech: slowed and regular rate and rhythm  Language: intact  Psychomotor: chewing/lip smacking motions and right hand low frequency pill rolling resting tremor  Mood: description consistent with euthymia  Affect: restricted, blunted and appropriate; congruent to: mood- yes, content- yes  Thought Process/Associations: response delay  Thought Content:  Reports none;  Denies suicidal & violent ideation and delusions  Perception:  Reports none;  Denies hallucinations and depersonalization  Insight: fair  Judgment: good  Cognition: does  appear grossly intact; formal cognitive testing was not done  Gait and Station: remarkable for:  using a cane, slightly reduced swing phase of gait, slowed gait       LABS and DATA         7/13/2023     9:06 AM 9/11/2023     1:46 PM  12/22/2023     2:31 PM   PHQ   PHQ-9 Total Score 5 5 7   Q9: Thoughts of better off dead/self-harm past 2 weeks Not at all Not at all Not at all     Antipsychotic Labs:  Recent Labs   Lab Test 07/13/23  1003 10/26/22  0710 04/27/22  0725 04/12/22  1514 03/16/21  1537   CHOL 137  --   --  117 124   TRIG 82 103 115 72 136   LDL 79  --   --  55 53   HDL 42*  --   --  48* 43*     Recent Labs   Lab Test 12/22/23  1447 10/13/23  1452 07/13/23  1003 10/03/22  1423   * 206*  --  134*   A1C  --  7.8* 7.3* 7.6*     Recent Labs   Lab Test 10/08/21  2143 03/16/21  1537 05/07/19  0650 10/25/18  1734 06/26/18  0659   WBC 11.8*  --  10.06* 14.2* 9.72   ANEU  --  7.5  --  10.6* 5.31   HGB 13.0 13.7 13.3 14.0 12.6     --  292 275 247       Recent Labs   Lab Test 12/22/23  1447 10/13/23  1452   CR 0.82 0.76   GFRESTIMATED 75 82     Recent Labs   Lab Test 03/16/21  1537 06/26/18  0659   AST 7.9 16   ALT 12.8 21   ALKPHOS 78.9 111       EKG 3/2021 - showing atrial fibrillation. QtC 392.   EKG 10/8/2021- QtC 439  AIMS score 7/11/22: 5        PSYCHOTROPIC DRUG INTERACTIONS                                                       PSYCHCLINICDDI   ZIPRASIDONE, fluoxetine and Ingrezza may result in increased risk of QT-interval prolongation.  ZIPRASIDONE and SEROTONERGIC DRUGS THAT PROLONG QT INTERVAL may result in increased risk of QT-interval prolongation and increased risk of serotonin syndrome (hypertension, hyperthermia, myoclonus, mental status changes).  NSAID and SSRI may result in an increased risk of bleeding  ERYTHROMYCIN and FLUOXETINE may result in an increased risk of cardiotoxicity (QT prolongation, torsades de pointes, cardiac arrest).  FLUOXETINE and INSULINS OR PRAMLINTIDE may result in increased risk of hypoglycemia.  GABAPENTIN and CNS DEPRESSANTS may result in respiratory depression.   GABAPENTIN and ANTACIDS may result in decreased gabapentin effectiveness.   Ingrezza level is increased via CYT P450  interactions with fluoxetine.     MANAGEMENT:  Monitoring for adverse effects, periodic EKGs and patient is aware of risks     RISK STATEMENT for SAFETY     Pinky did not appear to be an imminent safety risk to self or others.    TREATMENT RISK STATEMENT: The risks, benefits, alternatives and potential adverse effects have been discussed and are understood by the pt. The pt understands the risks of using street drugs or alcohol. There are no medical contraindications, the pt agrees to treatment with the ability to do so. The pt knows to call the clinic for any problems or to access emergency care if needed.  Medical and substance use concerns are documented above.  Psychotropic drug interaction check was done, including changes made today.       Level of Medical Decision Making:   - At least 1 chronic problem that is not stable  - Engaged in prescription drug management during visit (discussed any medication benefits, side effects, alternatives, etc.)         PROVIDER: Reji Garrett MD      Patient staffed in clinic with Dr. Estrada who will sign the note.  Supervisor is Dr. Estrada.

## 2024-01-18 NOTE — NURSING NOTE
Chief Complaint   Patient presents with    Recheck Medication     Schizoaffective disorder, depressive type     - Star Mackay, Visit Facilitator

## 2024-01-18 NOTE — PATIENT INSTRUCTIONS
**For crisis resources, please see the information at the end of this document**   Patient Education    Thank you for coming to the [facility] [place] [facility].     Lab Testing:  If you had lab testing today and your results are reassuring or normal they will be mailed to you or sent through [employer] within 7 days. If the lab tests need quick action we will call you with the results. The phone number we will call with results is # [phone]. If this is not the best number please call our clinic and change the number.     Medication Refills:  If you need any refills please call your pharmacy and they will contact us. Our fax number for refills is [phone].   Three business days of notice are needed for general medication refill requests.   Five business days of notice are needed for controlled substance refill requests.   If you need to change to a different pharmacy, please contact the new pharmacy directly. The new pharmacy will help you get your medications transferred.     Contact Us:  Please call [phone] during business hours (8-5:00 M-F).   If you have medication related questions after clinic hours, or on the weekend, please call [phone].     Financial Assistance [phone]   Medical Records [phone]       MENTAL HEALTH CRISIS RESOURCES:  For a emergency help, please call 911 or go to the nearest Emergency Department.     Emergency Walk-In Options:   EmPATH Unit @ [facility] ([place]): [phone] - Specialized mental health emergency area designed to be calming  [facility] ([place]): [phone]  [facility] ([place]): [phone]  [facility]): [phone]    [facility] Crisis Information:   [place]: [phone]  [place]: [phone]  [place] ([place]) - Adult: [phone]     Child: [phone]  [place] - Adult: [phone]     Child: [phone]  [place]:  [phone]  List of all [facility] resources:   https://[place].gov/dhs/people-we-serve/adults/health-care/mental-health/resources/crisis-contacts.jsp    National Crisis Information:   Crisis Text Line: Text  [place]  to 496758  Suicide & Crisis Lifeline: 988  National Suicide Prevention Lifeline: 9-059-040-TALK (1-872.339.1574)       For online chat options, visit https://suicidepreventionlifeline.org/chat/  Poison Control Center: 9-159-941-5270  Trans Lifeline: 6-184-665-5946 - Hotline for transgender people of all ages  [employer]: 4-176-821-6995 - Hotline for LGBT youth     For Non-Emergency Support:   Fast Tracker: Mental Health & Substance Use Disorder Resources -   https://www.[employer].org/

## 2024-01-19 ENCOUNTER — VIRTUAL VISIT (OUTPATIENT)
Dept: NEUROLOGY | Facility: CLINIC | Age: 74
End: 2024-01-19
Attending: NURSE PRACTITIONER
Payer: COMMERCIAL

## 2024-01-19 DIAGNOSIS — G24.01 TARDIVE DYSKINESIA: ICD-10-CM

## 2024-01-19 DIAGNOSIS — R25.1 TREMOR: ICD-10-CM

## 2024-01-19 DIAGNOSIS — F25.0 SCHIZOAFFECTIVE DISORDER, BIPOLAR TYPE (H): Primary | ICD-10-CM

## 2024-01-19 NOTE — Clinical Note
Farzaneh- could you please fax the propranolol prescription to her residence? Coordinators- could you please schedule a follow up 30min MTM in department [facility] (Thurs or Fri) in about 3-4 weeks? Thank you!

## 2024-01-19 NOTE — Clinical Note
Hello, I meet with this patient for MTM per Neurology and Psychiatry. She recently tried Ingrezza for tardive dyskinesia, but worsened her tremor so was stopped. We are considering trying propranolol for tremor, but I wanted to reach out to you first. She is on multiple BP meds, though BP seems somewhat stable. Are you ok with us starting propranolol 10mg BID and monitoring from there or do you think a different BP med should be reduced at the same time? They do check her BP at her residence three days per week. Thanks for your thoughts! Marizol Ortiz, PharmD Medication Therapy Management Pharmacist [facility] [place] [facility]

## 2024-01-19 NOTE — LETTER
"1/19/2024       RE: Pinky Page  [address] [facility] [address]  [place] [facility] [address]     Dear Colleague,    Thank you for referring your patient, Pinky Page, to the [facility] [place] at [facility]. Please see a copy of my visit note below.    Medication Therapy Management (MTM) Encounter    ASSESSMENT:                            Medication Adherence/Access: No issues identified    Schizoaffective Disorder:    Stable. Continue current medication.    Tardive Dyskinesia/Tremor:    Propranolol does not directly interact with her medications, but can mask symptoms of hypoglycemia and contribute to some lightheadedness. Discussed these with her today. She very rarely has experiences hypoglycemia. Blood pressure is somewhat variable and she is already taking multiple antihypertensives, so would like to reach out to PCP to finalize plan. As noted by Dr. Lozada in Neurology, would consider propranolol 10mg twice daily to start.     PLAN:                            - I will talk with Dr. Sweeney about starting propranolol.     Follow-up: MTM not yet scheduled  2/29 Psychiatry  4/9 Neurology    SUBJECTIVE/OBJECTIVE:                          Pinky aPge is a 73 year old female called for a follow-up visit from 12/21/23.       Reason for visit: med check.    Allergies/ADRs: Reviewed in chart  Past Medical History: Reviewed in chart  Tobacco: She reports that she has never smoked. She has been exposed to tobacco smoke. She has never used smokeless tobacco.  Alcohol: not currently using    Medication Adherence/Access: no issues reported    Schizoaffective Disorder:    -fluoxetine 20mg -take 2 tabs (40mg) daily  -ziprasidone 40mg every morning, 20mg every evening (does not take with food)--reduced from 40mg BID on 10/9/23    At last visit, she described \"feeling lousy,\" more lethargic, but not depressed. Recommended she check in with PCP " and was found to have UTI, which was treated.  She has just seen psychiatry yesterday, at which time there were no medication changes made.   Mood continues to be stable. Denied any agitation, irritability, or hallucinations.    Tardive Dyskinesia/Tremor:    -Ingrezza 40mg daily (started on 9/11/23)    At last visit, patient reported that tremor had worsened since starting Ingrezza and she wished to stop. She did stop on 12/25 and recently had follow up with Neurology earlier this week and psychiatry just yesterday. Both providers noted that tardive dyskinesia was notably worse, with more tongue protrusion and mouth movements. At that time, she reported that these movements were not bothersome, but she was interested in treating tremor and propranolol was considered, but not prescribed.  Today, she reported that tremor is feeling less frequent and less severe, but she is still interested in trying a different medication. It is not as bothersome with writing. Still having some trouble with eating, but weighted utensils and wrist weights while eating, which are helpful.  Denied any lightheadedness. States that she's has episodes of hypoglycemia, but very infrequent (maybe 1-2 times per year)    BP Readings from Last 3 Encounters:   01/18/24 133/72   01/16/24 117/72   12/22/23 (!) 150/85     Pulse Readings from Last 3 Encounters:   01/18/24 87   01/16/24 67   12/22/23 84     Gets BP checked on Tuesday, Wednesday, and Friday at her residence.    ----------------    I spent 15 minutes with this patient today. All changes were made via collaborative practice agreement with Bethel Lozada. A copy of the visit note was provided to the patient's provider(s).    A summary of these recommendations was sent via clinic portal.     Medication Therapy Recommendations  Tardive dyskinesia    Current Medication: valbenazine (INGREZZA) 40 MG capsule (Discontinued)   Rationale: Undesirable effect - Adverse medication event - Safety    Recommendation: Discontinue Medication - consider stopping   Status: Accepted per Provider         Tremor    Rationale: Untreated condition - Needs additional medication therapy - Indication   Recommendation: Start Medication - consider starting propranolol 10 mg BID   Status: Contact Provider - Awaiting Response                Again, thank you for allowing me to participate in the care of your patient.      Sincerely,    Marizol Ortiz, PharmD

## 2024-01-19 NOTE — PROGRESS NOTES
"Medication Therapy Management (MTM) Encounter    ASSESSMENT:                            Medication Adherence/Access: No issues identified    Schizoaffective Disorder:    Stable. Continue current medication.    Tardive Dyskinesia/Tremor:    Propranolol does not directly interact with her medications, but can mask symptoms of hypoglycemia and contribute to some lightheadedness. Discussed these with her today. She very rarely has experiences hypoglycemia. Blood pressure is somewhat variable and she is already taking multiple antihypertensives, so would like to reach out to PCP to finalize plan. As noted by Dr. Lozada in Neurology, would consider propranolol 10mg twice daily to start.     PLAN:                            - I will talk with Dr. Sweeney about starting propranolol.     Follow-up: MTM not yet scheduled  2/29 Psychiatry  4/9 Neurology    SUBJECTIVE/OBJECTIVE:                          Pinky Page is a 73 year old female called for a follow-up visit from 12/21/23.       Reason for visit: med check.    Allergies/ADRs: Reviewed in chart  Past Medical History: Reviewed in chart  Tobacco: She reports that she has never smoked. She has been exposed to tobacco smoke. She has never used smokeless tobacco.  Alcohol: not currently using    Medication Adherence/Access: no issues reported    Schizoaffective Disorder:    -fluoxetine 20mg -take 2 tabs (40mg) daily  -ziprasidone 40mg every morning, 20mg every evening (does not take with food)--reduced from 40mg BID on 10/9/23    At last visit, she described \"feeling lousy,\" more lethargic, but not depressed. Recommended she check in with PCP and was found to have UTI, which was treated.  She has just seen psychiatry yesterday, at which time there were no medication changes made.   Mood continues to be stable. Denied any agitation, irritability, or hallucinations.    Tardive Dyskinesia/Tremor:    -Ingrezza 40mg daily (started on 9/11/23)    At last visit, patient reported " that tremor had worsened since starting Ingrezza and she wished to stop. She did stop on 12/25 and recently had follow up with Neurology earlier this week and psychiatry just yesterday. Both providers noted that tardive dyskinesia was notably worse, with more tongue protrusion and mouth movements. At that time, she reported that these movements were not bothersome, but she was interested in treating tremor and propranolol was considered, but not prescribed.  Today, she reported that tremor is feeling less frequent and less severe, but she is still interested in trying a different medication. It is not as bothersome with writing. Still having some trouble with eating, but weighted utensils and wrist weights while eating, which are helpful.  Denied any lightheadedness. States that she's has episodes of hypoglycemia, but very infrequent (maybe 1-2 times per year)    BP Readings from Last 3 Encounters:   01/18/24 133/72   01/16/24 117/72   12/22/23 (!) 150/85     Pulse Readings from Last 3 Encounters:   01/18/24 87   01/16/24 67   12/22/23 84     Gets BP checked on Tuesday, Wednesday, and Friday at her residence.    ----------------    I spent 15 minutes with this patient today. All changes were made via collaborative practice agreement with Bethel Lozada. A copy of the visit note was provided to the patient's provider(s).    A summary of these recommendations was sent via clinic portal.    Marizol Ortiz, PharmD  Medication Therapy Management Pharmacist  [facility]    Telemedicine Visit Details  Type of service:  Telephone visit  Start Time:  3:00pm  End Time:  3:15pm     Medication Therapy Recommendations  Tardive dyskinesia    Current Medication: valbenazine (INGREZZA) 40 MG capsule (Discontinued)   Rationale: Undesirable effect - Adverse medication event - Safety   Recommendation: Discontinue Medication - consider stopping   Status: Accepted per Provider         Tremor     Rationale: Untreated condition - Needs additional medication therapy - Indication   Recommendation: Start Medication - consider starting propranolol 10 mg BID   Status: Contact Provider - Awaiting Response

## 2024-01-19 NOTE — Clinical Note
Hipolito- talked to Pinky today. She stated that tremor feels improving as the week has processed, but still wants to try a med. I think propranolol is a fine choice, monitoring for lightheadedness. May also mask symptoms of hypoglycemia, but she stated this happens very infrequently. I did send a message to PCP to get their blessing, but her BP isn't too low now, so I think it will be fine. I'll give you all an update when I hear back.  -Marizol

## 2024-01-22 ENCOUNTER — OFFICE VISIT (OUTPATIENT)
Dept: CARDIOLOGY | Facility: CLINIC | Age: 74
End: 2024-01-22
Attending: INTERNAL MEDICINE
Payer: COMMERCIAL

## 2024-01-22 VITALS
HEART RATE: 80 BPM | OXYGEN SATURATION: 100 % | DIASTOLIC BLOOD PRESSURE: 74 MMHG | BODY MASS INDEX: 36.12 KG/M2 | SYSTOLIC BLOOD PRESSURE: 130 MMHG | WEIGHT: 210.4 LBS

## 2024-01-22 DIAGNOSIS — I48.21 PERMANENT ATRIAL FIBRILLATION (H): Primary | ICD-10-CM

## 2024-01-22 DIAGNOSIS — E11.9 TYPE 2 DIABETES, HBA1C GOAL < 7% (H): ICD-10-CM

## 2024-01-22 DIAGNOSIS — I10 PRIMARY HYPERTENSION: ICD-10-CM

## 2024-01-22 PROCEDURE — 99212 OFFICE O/P EST SF 10 MIN: CPT | Performed by: INTERNAL MEDICINE

## 2024-01-22 PROCEDURE — G0463 HOSPITAL OUTPT CLINIC VISIT: HCPCS | Performed by: INTERNAL MEDICINE

## 2024-01-22 ASSESSMENT — PAIN SCALES - GENERAL: PAINLEVEL: NO PAIN (0)

## 2024-01-22 NOTE — LETTER
1/22/2024      RE: Pinky Page  [address] [facility] [address]  [place] [facility] [address]       Dear Colleague,    Thank you for the opportunity to participate in the care of your patient, Pinky Page, at the [facility] [place] at [facility]. Please see a copy of my visit note below.    Referring provider:Mona Nava MD    Chief complaint:     HPI: Ms. Pinky Page is a 70 year old  female with PMH significant for   -type 2 diabetes on insulin  -Paroxysmal atrial fibrillation on Eliquis  -Shizoaffective disorder  -Obstructive sleep apnea noncompliant with CPAP  -Morbid obesity with body mass index at 30.9.5  -Hypertension  -Hyperlipidemia    Patient lives at long-term care facility (nursing home).  She was noted to have irregular rhythm on 3/13/2021 by the staff.  At that time she was asymptomatic.  Subsequently she was seen by her primary care physician.  Patient was started on Eliquis 5 mg twice daily.  Patient's [id number] score is 4.  Of note she has no prior history of atrial fibrillation.  EKG 2019 shows sinus rhythm.  Patient is currently asymptomatic from cardiac standpoint.The patient denies a history of chest discomfort, dyspnea, PND, orthopnea, pedal edema, palpitations, lightheadedness, or syncope.  She tells me that she walks for 25 minutes and rides her stationary bike for 20 minutes almost every day.  Denies symptoms during exercise.  Lifetime non-smoker.  No alcohol abuse.    Echocardiogram in 2015 showed a structurally normal heart.  EKG in clinic today shows atrial fibrillation heart rate is well controlled at 80 bpm.  Current medications are insulin, liraglutide, Lasix 20 mg twice daily, atorvastatin 40, Eliquis 5 mg twice daily amlodipine 10 mg, losartan 100 mg.  Medications, personal, family, and social history reviewed with patient and revised.    Interval history 1/22/2024:  Patient is being seen today for  3-year follow-up.  I have seen her last time in 2021 for persistent atrial fibrillation.  Patient lives at a facility.  Referred to us for follow-up on A-fib.  Has been in A-fib since.  Asymptomatic from A-fib.  She tells me that she is overall feeling well.  Denies chest pain, shortness of breath, dizziness, syncope or palpitations.  She is currently on Eliquis 5 mg twice daily, Lasix 20 mg daily, amlodipine 10 mg and losartan 100 mg.  Blood pressure is well-controlled.  She tells me that she has hand tremor and her provider might start her on propranolol.  She is not on this medication yet.    PAST MEDICAL HISTORY:  Past Medical History:   Diagnosis Date     Allergic rhinitis due to pollen     seasonal allergies      Anisometropia and aniseikonia      BMI greater than 40      Cardiomegaly      Chronic constipation      Congenital absence of one kidney      Cramp of limb      Dermatophytosis of the body      Dry eye syndrome      Esophageal reflux      Essential hypertension, benign      Gastro-oesophageal reflux disease      Hemorrhoids      Hypermetropia      Hypothyroidism      Incomplete Emptying of Bladder      Lactose intolerance      Major depressive disorder, recurrent episode, moderate (H)      Malignant neoplasm of breast (female), unspecified site     left mastectomy 1996      Mixed incontinence urge and stress (male)(female)      Moderate obstructive sleep apnea      Postmenopausal Atrophic Vaginitis      Presbyopia      Regular astigmatism      Restless leg syndrome      Senile nuclear sclerosis      Thyroid eye disease     mild     Tinnitus      Trigger finger (acquired)     right hand     Type II or unspecified type diabetes mellitus without mention of complication, not stated as uncontrolled     on insulin since April 2006        CURRENT MEDICATIONS:  Current Outpatient Medications   Medication Sig Dispense Refill     acetaminophen (TYLENOL) 500 MG tablet Take 2 tablets (1,000 mg) by mouth every 6  hours as needed 1 Bottle 2     alum & mag hydroxide-simethicone (MYLANTA/MAALOX) 200-200-20 MG/5ML SUSP suspension Take 30 mLs by mouth daily as needed for indigestion       amLODIPine (NORVASC) 10 MG tablet Take 1 tablet (10 mg) by mouth daily 90 tablet 0     apixaban ANTICOAGULANT (ELIQUIS) 5 MG tablet Take 1 tablet (5 mg) by mouth 2 times daily 180 tablet 1     artificial saliva (BIOTENE MT) AERS spray Take 1 spray by mouth 3 times daily as needed for dry mouth 115 mL 3     artificial tears (GENTEAL) 0.1-0.2-0.3 % ophthalmic solution Place 2 drops into both eyes 4 times daily 15 mL 11     atorvastatin (LIPITOR) 40 MG tablet Take 1 tablet (40 mg) by mouth daily 90 tablet 1     blood glucose (NO BRAND SPECIFIED) lancets standard Use to test blood sugar 2 times daily or as directed. 100 each 11     blood glucose (NO BRAND SPECIFIED) test strip Use to test blood sugar 3 times daily or as directed. 100 strip 3     blood glucose calibration (NO BRAND SPECIFIED) solution Use to calibrate blood glucose monitor as directed. 1 each 3     blood glucose monitoring (NO BRAND SPECIFIED) meter device kit Check Blood sugars twice a day. 1 kit 0     Calcium Carb-Cholecalciferol (CALCIUM-VITAMIN D3) 250-3.125 MG-MCG TABS Take 1 tablet by mouth 2 times daily (Patient not taking: Reported on 1/16/2024)       Calcium Carbonate-Vitamin D (CALCIUM-VITAMIN D) 250-125 MG-UNIT TABS Take 1 tablet by mouth 2 times daily Calcium 250 mg/Vit D 125 IU       calcium polycarbophil (FIBERCON) 625 MG tablet Take 2 tablets (1,250 mg) by mouth daily 180 tablet 3     conjugated estrogens (PREMARIN) 0.625 MG/GM vaginal cream Place 2 g vaginally daily for 2 weeks, followed by 2 g vaginally twice a week for vaginal atrophy and frequent UTIs 30 g 3     DENTA 5000 PLUS 1.1 % CREA 1 applicator (1 g) by Oral or Feeding Tube route 2 times daily 51 g 1     erythromycin (ROMYCIN) 5 MG/GM ophthalmic ointment Place 0.5 inches into both eyes At Bedtime 7 g 11      fluorometholone (FML LIQUIFILM) 0.1 % ophthalmic suspension Place 1 drop into both eyes 2 times daily 10 mL 3     FLUoxetine (PROZAC) 20 MG capsule Take 2 capsules (40 mg) by mouth daily 60 capsule 3     fluticasone (FLONASE) 50 MCG/ACT nasal spray Spray 1 spray into both nostrils daily 16 g 3     furosemide (LASIX) 20 MG tablet Take 1 tablet (20 mg) by mouth daily 60 tablet 3     gabapentin (NEURONTIN) 600 MG tablet Take 600 mg by mouth 3 times daily       insulin glargine (LANTUS PEN) 100 UNIT/ML pen Inject 17 Units Subcutaneous at bedtime 8 mL 4     levothyroxine (SYNTHROID/LEVOTHROID) 175 MCG tablet Take 1 tablet (175 mcg) by mouth daily 90 tablet 3     lifitegrast (XIIDRA) 5 % opthalmic solution Place 1 drop into both eyes 2 times daily 60 each 11     liraglutide (VICTOZA) 18 MG/3ML solution Inject 1.8 mg Subcutaneous daily 9 mL 3     loratadine (CLARITIN) 10 MG tablet Take 10 mg by mouth daily       losartan (COZAAR) 100 MG tablet Take 1 tablet (100 mg) by mouth daily 90 tablet 1     Magnesium Hydroxide (MILK OF MAGNESIA PO) Take 30 mL as needed for constipation.       melatonin 3 MG CAPS Take 6 mg by mouth at bedtime 60 capsule 3     melatonin 3 MG tablet Take 6 mg by mouth at bedtime       metFORMIN (GLUCOPHAGE) 1000 MG tablet Take 1,000 mg by mouth 2 times daily (with meals)       nystatin (MYCOSTATIN) 396710 UNIT/GM external powder Apply topically 3 times daily as needed for other (rash) 60 g 1     polyethylene glycol (MIRALAX/GLYCOLAX) powder Take 1 capful by mouth 2 times daily 17 GM PO BID       SALINE MIST 0.65 % nasal spray Spray 2 sprays in nostril 2 times daily       senna-docusate (SENOKOT-S/PERICOLACE) 8.6-50 MG tablet Take 2 tablets by mouth At Bedtime.       simethicone (MYLICON) 125 MG chewable tablet Take 1 tablet (125 mg) by mouth 2 times daily 180 tablet 3     Skin Protectants, Misc. (EUCERIN) cream Apply topically as needed Apply to thigh PRN dry skin       solifenacin (VESICARE) 10 MG  tablet Take 0.5 tablets (5 mg) by mouth daily 45 tablet 3     spironolactone (ALDACTONE) 25 MG tablet Take 1 tablet (25 mg) by mouth daily 90 tablet 0     sulfamethoxazole-trimethoprim (BACTRIM DS) 800-160 MG tablet Take 1 tablet by mouth 2 times daily (Patient not taking: Reported on 1/16/2024) 6 tablet 0     ziprasidone (GEODON) 20 MG capsule Take 1 capsule (20 mg) by mouth daily (with dinner) 30 capsule 3     ziprasidone (GEODON) 40 MG capsule Take 1 capsule (40 mg) by mouth daily (with breakfast) 30 capsule 3       PAST SURGICAL HISTORY:  Past Surgical History:   Procedure Laterality Date     APPENDECTOMY       C MASTECTOMY,SIMPLE  1996    Left mastectomy  [facility]. Had normal FU mammo 5/2007. Follow yearly.      CHOLECYSTECTOMY       COLONOSCOPY  5/2005    Complete Colonoscopy-had one small polyp removed 5/2005.      COLONOSCOPY N/A 3/31/2016    Procedure: COLONOSCOPY;  Surgeon: Cary Ring MD;  Location: [facility]     COLONOSCOPY N/A 7/7/2016    Procedure: COLONOSCOPY;  Surgeon: Cary Ring MD;  Location: [facility]     COLONOSCOPY N/A 5/12/2022    Procedure: COLONOSCOPY, WITH POLYPECTOMY AND BIOPSY;  Surgeon: Jonathan Madden MD;  Location: [facility]     DILATION AND CURETTAGE       HYSTERECTOMY       MASTECTOMY      Left breast     ZZC TOTAL ABDOM HYSTERECTOMY  7/2003    Dr. Castanon, for abnormal bleeding. Removal of both tubes with BSO for myoma w - - -       ALLERGIES:     Allergies   Allergen Reactions     Chlordiazepoxide Hcl      Dimetapp Dm Cold-Cough      Cold/Congetion TABS     Haloperidol      Ibuprofen      TABS     Lactose Intolerance [Beta-Galactosidase]      CAPS     Milk Products      Propofol      EMUL       FAMILY HISTORY:  Family History   Problem Relation Age of Onset     Melanoma Mother         malignant melanoma     Heart Disease Father         1968     Breast Cancer Sister         in her 50s.     Breast Cancer Sister         in her 70s.      Glaucoma No family hx of      Macular Degeneration No family hx of      Skin Cancer No family hx of      Movement disorder No family hx of          SOCIAL HISTORY:  Social History     Tobacco Use     Smoking status: Never     Passive exposure: Current     Smokeless tobacco: Never   Vaping Use     Vaping Use: Never used   Substance Use Topics     Alcohol use: No     Alcohol/week: 0.0 standard drinks of alcohol     Drug use: No       ROS:   Constitutional: No fever, chills, or sweats. Weight stable.   Cardiovascular: As per HPI.       Exam:  /74 (BP Location: Right arm, Patient Position: Chair, Cuff Size: Adult Regular)   Pulse 80   Wt 95.4 kg (210 lb 6.4 oz)   LMP  (LMP Unknown)   SpO2 100%   BMI 36.12 kg/m    GENERAL APPEARANCE: alert and no distress  HEENT: no icterus, no central cyanosis  LYMPH/NECK: no adenopathy, no asymmetry, JVP not elevated  RESPIRATORY: lungs clear to auscultation - no rales, rhonchi or wheezes, no use of accessory muscles, no retractions, respirations are unlabored, normal respiratory rate  CARDIOVASCULAR: irregular rhythm, normal S1, S2, no S3 or S4 and no murmur, click or rub, precordium quiet with normal PMI.  GI: soft, non tender  EXTREMITIES: 1+ pretibial edema on the right, no edema on the left.    NEURO: alert, normal speech,and affect  SKIN: no ecchymoses, no rashes     I have reviewed the labs and personally reviewed the imaging below and made my comment in the assessment and plan.    Labs:  CBC RESULTS:   Lab Results   Component Value Date    WBC 11.8 (H) 10/08/2021    WBC 10.06 (H) 05/07/2019    RBC 4.41 10/08/2021    RBC 4.46 05/07/2019    HGB 13.0 10/08/2021    HGB 13.7 03/16/2021    HCT 40.0 10/08/2021    HCT 42.7 03/16/2021    MCV 91 10/08/2021    MCV 89.3 03/16/2021    MCH 29.5 10/08/2021    MCH 28.7 03/16/2021    MCHC 32.5 10/08/2021    MCHC 32.1 03/16/2021    RDW 13.7 10/08/2021    RDW 14.4 03/16/2021     10/08/2021     05/07/2019      10/25/2018       BMP RESULTS:  Lab Results   Component Value Date     12/22/2023    .1 03/16/2021    POTASSIUM 4.6 12/22/2023    POTASSIUM 4.2 10/08/2021    POTASSIUM 4.2 03/16/2021    CHLORIDE 96 (L) 12/22/2023    CHLORIDE 104 10/08/2021    CHLORIDE 96.2 (L) 03/16/2021    CO2 26 12/22/2023    CO2 26 10/08/2021    CO2 28.9 03/16/2021    ANIONGAP 14 12/22/2023    ANIONGAP 8 10/08/2021    ANIONGAP 11 05/07/2019     (H) 12/22/2023     (H) 05/12/2022     (H) 10/08/2021    .0 (H) 03/16/2021    BUN 20.3 12/22/2023    BUN 18 10/08/2021    BUN 12.8 03/16/2021    CR 0.82 12/22/2023    CR 0.8 03/16/2021    GFRESTIMATED 75 12/22/2023    GFRESTIMATED >60 11/18/2021    GFRESTIMATED 70.7 03/16/2021    GFRESTBLACK 85.5 03/16/2021    ASHLEY 10.0 12/22/2023    ASHLEY 9.2 03/16/2021        INR RESULTS:  Lab Results   Component Value Date    INR 1.00 07/04/2014    INR 1.03 10/26/2011    INR 1.04 08/11/2008         Echocardiogram 11/18/2015  Technically difficult study.  Left ventricular function, chamber size, wall motion, and wall thickness are  normal.The EF is 55-60%.  Mild diastolic dysfunction, with no evidence of high filling pressures. Cause  of edema unlikely to be cardiac.      EKG 3/16/2021 atrial fibrillation.      Assessment and Plan:   Patient is being seen today for follow-up on permanent atrial fibrillation.  She lives at a facility.  She is on Eliquis 5 mg twice daily given [id number] score of 4.  She is not on any rate controlling agent as her resting heart rate is normal.  He is euvolemic on exam.  Has no specific cardiac symptoms.  Patient uses an mercy on her computer to communicate but a lot of times she did not need that and was able to understand directly.  Patient has mouth dystonia.    # Permanent atrial fibrillation  -She can be started on propranolol for tremor if needed.    -Recommend to continue Eliquis 5 mg twice daily  -Continue Lasix 20 mg daily.  She is  euvolemic.    #Hypertension, well controlled  -Continue current medications with amlodipine 10, losartan 100 mg.    #Diabetes type 2  -Hemoglobin A1c is elevated at 7.8  -On insulin and liraglutide.  -Follows with primary care physician    Plan:  -No medication changes today.  -Recommend to continue current treatment  -She can be started on propranolol if needed.  -Follow-up in cardiology as needed    Total time spent today for this visit is 18 minutes including precharting, face-to-face clinic visit, review of labs/imaging and medical documentation.    Haile GARCIA MD  [facility] Division of Cardiology  Pager [phone]      Please do not hesitate to contact me if you have any questions/concerns.     Sincerely,     Haile Garcia MD

## 2024-01-22 NOTE — NURSING NOTE
Chief Complaint   Patient presents with    Follow Up     Return Cardiology- Follow up. Last seen nearly 3 years prior. Last echocardiogram in 2021.     Vitals were taken and medications reconciled.    CUBA Brice  12:02 PM

## 2024-01-24 ENCOUNTER — TELEPHONE (OUTPATIENT)
Dept: NEUROLOGY | Facility: CLINIC | Age: 74
End: 2024-01-24
Payer: COMMERCIAL

## 2024-01-24 RX ORDER — PROPRANOLOL HYDROCHLORIDE 10 MG/1
TABLET ORAL
Qty: 60 TABLET | Refills: 1 | Status: SHIPPED | OUTPATIENT
Start: 2024-01-24

## 2024-01-24 NOTE — PROGRESS NOTES
Per PCP and Cardiology, ok to try propranolol 10mg BID with continued blood pressure monitoring.  Since she is also taking fluoxetine, which can increase the propranolol serum concentration via CYP2D6 inhibition, would recommend that she first start with once daily dosing and ok to increase to twice daily after one week.  Rx placed and routed for orders to be faxed to her facility.  Will reach out to schedule follow up.    Ben HernandezD  Medication Therapy Management Pharmacist  [facility]

## 2024-01-24 NOTE — TELEPHONE ENCOUNTER
[facility] Call Center    Phone Message    May a detailed message be left on voicemail: yes     Reason for Call: Medication Question or concern regarding medication   Prescription Clarification  Name of Medication: propranolol 10 mg   Prescribing Provider: Bethel HUANG CNP   Pharmacy: [facility] - [place], [place] - [address]   What on the order needs clarification? Pastora calling to inquire if Pinky is to start taking this medication. If she is, they are going to need orders at [facility] and if not, Pastora is requesting a call back with that information.    Action Taken: Message routed to:  [facility] ([facility]    Travel Screening: Not Applicable

## 2024-01-26 ENCOUNTER — OFFICE VISIT (OUTPATIENT)
Dept: PSYCHOLOGY | Facility: CLINIC | Age: 74
End: 2024-01-26
Payer: COMMERCIAL

## 2024-01-26 VITALS — WEIGHT: 209.4 LBS | BODY MASS INDEX: 35.94 KG/M2

## 2024-01-26 DIAGNOSIS — F33.0 MAJOR DEPRESSIVE DISORDER, RECURRENT EPISODE, MILD (H): Primary | ICD-10-CM

## 2024-01-26 DIAGNOSIS — F54 PSYCHOLOGICAL FACTORS AFFECTING MEDICAL CONDITION: ICD-10-CM

## 2024-01-26 DIAGNOSIS — F41.1 GENERALIZED ANXIETY DISORDER: ICD-10-CM

## 2024-01-26 PROCEDURE — 90837 PSYTX W PT 60 MINUTES: CPT | Performed by: PSYCHOLOGIST

## 2024-01-26 NOTE — PROGRESS NOTES
Health Psychology                    Department of Medicine  Keri Arnett, Ph.D., L.P. [phone]                         [facility] Madelaine Ingram, Ph.D., L.P. [phone]                     [place] Mail Code 409   Elise Dillarder, Ph.D. [phone]      [address] [place] [address] Rae Eugene, Ph.D., A.B.P.P., L.P. [phone]              [place] [address]           Damon Gr, Ph.D., A.B.P.P., L.P. [phone]      Kimber Morales, Ph.D., L.P. [phone]  [facility]   Lilia Burroughs, Ph.D., A.B.P.P., L.P. 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  Intake with Health Psychology was in the 1980s.  I began to see her in the early 1990s.     She thinks her right hand tremor might be worse, possibly TD or Parkinsons.   She is now followed in Neurology; next visit pending November.  Experimenting with wrist weight and may try weighted utensils to minimize movement. She was on Ingrezza 9/23-12/23, ut stopped due to her arm seeming to shake more.   Also previously on Cogentin.  Not sure when she stopped.  She is waiting to start Propalnolol to see if it helps.    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  She is ambivalent but so far thinks it was  the right decision.  She cried when discussing it with  Tevin Landa, her boss.   She got a 24 year pin to honor her work.  She is encouraged to increase her other exercise to make up for the loss of activity.  Reviewed the ways it has been a good thing for her.     New stressors.Pinky had COVID in November still recovering.  Still  "tired.    Mood: Her depression is baseline today.    Health: Still using cane to prevent falling. Using a brace to help her right arm shake less. She had COVID for second time 10/28 was diagnosed. \"  1 resident currently has COVID.      Psychoactive Medications:  - ziprasidone 40 mg qam 20 g qpm  - fluoxetine 40 mg daily  - Ingrezza 40 mg  - melatonin 6mg at bedtime  PCP prescribes gabapentin  (Previously tried  Cogentin and Ingrezza)    Exercise: Back to walking after she eats for 10-15 minutes.  Biking 15 minutes daily; planning to increase.  No pain.   Will discuss increasing to 40 in future..  Walking with cane, trying to decrease dependence on it.    Weight: She is getting back to working on weight loss.  Her weight was 209.4 down from 211.4.    She participates fully, ventilated feelings appropriately.  She appears to derive benefit from the support.     A treatment plan was completed.      Current Outpatient Medications   Medication    acetaminophen (TYLENOL) 500 MG tablet    alum & mag hydroxide-simethicone (MYLANTA/MAALOX) 200-200-20 MG/5ML SUSP suspension    amLODIPine (NORVASC) 10 MG tablet    apixaban ANTICOAGULANT (ELIQUIS) 5 MG tablet    artificial saliva (BIOTENE MT) AERS spray    artificial tears (GENTEAL) 0.1-0.2-0.3 % ophthalmic solution    atorvastatin (LIPITOR) 40 MG tablet    blood glucose (NO BRAND SPECIFIED) lancets standard    blood glucose (NO BRAND SPECIFIED) test strip    blood glucose calibration (NO BRAND SPECIFIED) solution    blood glucose monitoring (NO BRAND SPECIFIED) meter device kit    Calcium Carbonate-Vitamin D (CALCIUM-VITAMIN D) 250-125 MG-UNIT TABS    calcium polycarbophil (FIBERCON) 625 MG tablet    conjugated estrogens (PREMARIN) 0.625 MG/GM vaginal cream    DENTA 5000 PLUS 1.1 % CREA    erythromycin (ROMYCIN) 5 MG/GM ophthalmic ointment    fluorometholone (FML LIQUIFILM) 0.1 % ophthalmic suspension    FLUoxetine (PROZAC) 20 MG capsule    fluticasone (FLONASE) 50 MCG/ACT " nasal spray    furosemide (LASIX) 20 MG tablet    gabapentin (NEURONTIN) 600 MG tablet    insulin glargine (LANTUS PEN) 100 UNIT/ML pen    levothyroxine (SYNTHROID/LEVOTHROID) 175 MCG tablet    lifitegrast (XIIDRA) 5 % opthalmic solution    liraglutide (VICTOZA) 18 MG/3ML solution    loratadine (CLARITIN) 10 MG tablet    losartan (COZAAR) 100 MG tablet    Magnesium Hydroxide (MILK OF MAGNESIA PO)    melatonin 3 MG CAPS    metFORMIN (GLUCOPHAGE) 1000 MG tablet    nystatin (MYCOSTATIN) 351228 UNIT/GM external powder    polyethylene glycol (MIRALAX/GLYCOLAX) powder    propranolol (INDERAL) 10 MG tablet    SALINE MIST 0.65 % nasal spray    senna-docusate (SENOKOT-S/PERICOLACE) 8.6-50 MG tablet    simethicone (MYLICON) 125 MG chewable tablet    Skin Protectants, Misc. (EUCERIN) cream    solifenacin (VESICARE) 10 MG tablet    spironolactone (ALDACTONE) 25 MG tablet    ziprasidone (GEODON) 20 MG capsule    ziprasidone (GEODON) 40 MG capsule     No current facility-administered medications for this visit.     Past Medical History:   Diagnosis Date    Allergic rhinitis due to pollen     seasonal allergies     Anisometropia and aniseikonia     BMI greater than 40     Cardiomegaly     Chronic constipation     Congenital absence of one kidney     Cramp of limb     Dermatophytosis of the body     Dry eye syndrome     Esophageal reflux     Essential hypertension, benign     Gastro-oesophageal reflux disease     Hemorrhoids     Hypermetropia     Hypothyroidism     Incomplete Emptying of Bladder     Lactose intolerance     Major depressive disorder, recurrent episode, moderate (H)     Malignant neoplasm of breast (female), unspecified site     left mastectomy 1996     Mixed incontinence urge and stress (male)(female)     Moderate obstructive sleep apnea     Postmenopausal Atrophic Vaginitis     Presbyopia     Regular astigmatism     Restless leg syndrome     Senile nuclear sclerosis     Thyroid eye disease     mild    Tinnitus      Trigger finger (acquired)     right hand    Type II or unspecified type diabetes mellitus without mention of complication, not stated as uncontrolled     on insulin since April 2006      Past Surgical History:   Procedure Laterality Date    APPENDECTOMY      C MASTECTOMY,SIMPLE  1996    Left mastectomy  [facility]. Had normal FU mammo 5/2007. Follow yearly.     CHOLECYSTECTOMY      COLONOSCOPY  5/2005    Complete Colonoscopy-had one small polyp removed 5/2005.     COLONOSCOPY N/A 3/31/2016    Procedure: COLONOSCOPY;  Surgeon: Cary Ring MD;  [facility]: [facility]    COLONOSCOPY N/A 7/7/2016    Procedure: COLONOSCOPY;  Surgeon: Cary Ring MD;  Location: [facility]    COLONOSCOPY N/A 5/12/2022    Procedure: COLONOSCOPY, WITH POLYPECTOMY AND BIOPSY;  Surgeon: Jonathan Madden MD;  Location: [facility]    DILATION AND CURETTAGE      HYSTERECTOMY      MASTECTOMY      Left breast    ZC TOTAL ABDOM HYSTERECTOMY  7/2003    Dr. Castanon, for abnormal bleeding. Removal of both tubes with BSO for myoma w - - -     Wt Readings from Last 4 Encounters:   01/26/24 95 kg (209 lb 6.4 oz)   01/22/24 95.4 kg (210 lb 6.4 oz)   01/18/24 96.1 kg (211 lb 12.8 oz)   12/22/23 94.5 kg (208 lb 4.8 oz)     Time service started: 1:00  Time service ended:  1:54  Extended session due to complexity of case and length of interval.    Diagnosis:   Major Depression, recurrent mild (F33.0)   Psychological Factors Affecting Morbid Obesity (F54)  Generalized anxiety (F41.1)       Plan: Return for psychotherapy visit in-person 3/8 @ 1 due to the serious and persistent nature of her depression and anxiety consistent with treatment plan.  Last treatment plan signed: 1/26/24  Treatment plan due: 1/26/25    Damon Gr, PhD LP,  A.B.P.P.  Director, Health Psychology  [phone]

## 2024-02-22 ENCOUNTER — TELEPHONE (OUTPATIENT)
Dept: NEUROLOGY | Facility: CLINIC | Age: 74
End: 2024-02-22
Payer: COMMERCIAL

## 2024-02-22 NOTE — TELEPHONE ENCOUNTER
Called [facility] for scheduled MTM appointment, though they reported that patient is currently hospitalized. No other details given.  Will check in regarding discharge in a couple weeks.    Marizol Ortiz, PharmD  Medication Therapy Management Pharmacist  [facility]

## 2024-03-05 ENCOUNTER — MEDICAL CORRESPONDENCE (OUTPATIENT)
Dept: HEALTH INFORMATION MANAGEMENT | Facility: CLINIC | Age: 74
End: 2024-03-05

## 2024-03-06 ENCOUNTER — DOCUMENTATION ONLY (OUTPATIENT)
Dept: FAMILY MEDICINE | Facility: CLINIC | Age: 74
End: 2024-03-06
Payer: COMMERCIAL

## 2024-03-22 ENCOUNTER — VIRTUAL VISIT (OUTPATIENT)
Dept: PSYCHOLOGY | Facility: CLINIC | Age: 74
End: 2024-03-22
Payer: COMMERCIAL

## 2024-03-22 DIAGNOSIS — F33.0 MAJOR DEPRESSIVE DISORDER, RECURRENT EPISODE, MILD (H): Primary | ICD-10-CM

## 2024-03-22 DIAGNOSIS — F54 PSYCHOLOGICAL FACTORS AFFECTING MEDICAL CONDITION: ICD-10-CM

## 2024-03-22 DIAGNOSIS — F41.1 GENERALIZED ANXIETY DISORDER: ICD-10-CM

## 2024-03-22 PROCEDURE — 90832 PSYTX W PT 30 MINUTES: CPT | Mod: 93 | Performed by: PSYCHOLOGIST

## 2024-03-22 NOTE — PROGRESS NOTES
"    Health Psychology                    Department of Medicine  Keri Arnett, Ph.D., L.P. [phone]                         [facility] Madelaine Ingram, Ph.D., L.P. [phone]                     [place] Mail [address]   Dennis Dillard, Ph.D. [phone]      [address] [place] [address] Damon Gr, Ph.D., A.B.P.P., L.P. [phone]              [place] [address]           Kibmer Morales, Ph.D., L.P. [phone]     Lilia Burroughs, Ph.D., A.B.P.P., L.P. [phone]    [facility]   Sanket Larose, Ph.D. 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  Intake with Health Psychology was in the 1980s.  I began to see her in the early 1990s.     New stressors.Pinky had COVID in November.  She was hospitalized  at [facility] she tells me for a stomach surgery.  She is vague regarding what happened.  I called [facility] and was told she was at a [facility].  She she is now at [facility] on [facility], aware of her surroundings.  She is working on walking with PT.  She is using a walker and a wheelchair.  She reports pain in both LEs.   She is hoping to return to [facility].  When I called [facility], they shepherd not been sure wehther she would be returnig.  I thik it would be the best placement for her if it is ossible.    Mood: Her depression is increased.   She feels \"down in the dumps\".  She says she was probably down in the dumps while in [facility].      Psychoactive Medications: (not addressed today)  - ziprasidone 40 mg qam 20 g qpm  - fluoxetine 40 mg daily  - Ingrezza 40 mg  - melatonin 6mg at bedtime  PCP prescribes gabapentin  (Previously tried  Cogentin and Ingrezza)    Weight: She is getting back to working on " weight loss.  Her weight was 209.4 down from 211.4.  Not discussed today.    She participates fully, ventilated feelings appropriately.  Voice quality seems a bit off.  She knew who I ws and was pleased I called her, felt cheered up by the call.  She appears to derive benefit from the support.     Current Outpatient Medications   Medication    acetaminophen (TYLENOL) 500 MG tablet    alum & mag hydroxide-simethicone (MYLANTA/MAALOX) 200-200-20 MG/5ML SUSP suspension    amLODIPine (NORVASC) 10 MG tablet    apixaban ANTICOAGULANT (ELIQUIS) 5 MG tablet    artificial saliva (BIOTENE MT) AERS spray    artificial tears (GENTEAL) 0.1-0.2-0.3 % ophthalmic solution    atorvastatin (LIPITOR) 40 MG tablet    blood glucose (NO BRAND SPECIFIED) lancets standard    blood glucose (NO BRAND SPECIFIED) test strip    blood glucose calibration (NO BRAND SPECIFIED) solution    blood glucose monitoring (NO BRAND SPECIFIED) meter device kit    Calcium Carbonate-Vitamin D (CALCIUM-VITAMIN D) 250-125 MG-UNIT TABS    calcium polycarbophil (FIBERCON) 625 MG tablet    conjugated estrogens (PREMARIN) 0.625 MG/GM vaginal cream    DENTA 5000 PLUS 1.1 % CREA    erythromycin (ROMYCIN) 5 MG/GM ophthalmic ointment    fluorometholone (FML LIQUIFILM) 0.1 % ophthalmic suspension    FLUoxetine (PROZAC) 20 MG capsule    fluticasone (FLONASE) 50 MCG/ACT nasal spray    furosemide (LASIX) 20 MG tablet    gabapentin (NEURONTIN) 600 MG tablet    insulin glargine (LANTUS PEN) 100 UNIT/ML pen    levothyroxine (SYNTHROID/LEVOTHROID) 175 MCG tablet    lifitegrast (XIIDRA) 5 % opthalmic solution    liraglutide (VICTOZA) 18 MG/3ML solution    loratadine (CLARITIN) 10 MG tablet    losartan (COZAAR) 100 MG tablet    Magnesium Hydroxide (MILK OF MAGNESIA PO)    melatonin 3 MG CAPS    metFORMIN (GLUCOPHAGE) 1000 MG tablet    nystatin (MYCOSTATIN) 125908 UNIT/GM external powder    polyethylene glycol (MIRALAX/GLYCOLAX) powder    propranolol (INDERAL) 10 MG tablet     SALINE MIST 0.65 % nasal spray    senna-docusate (SENOKOT-S/PERICOLACE) 8.6-50 MG tablet    simethicone (MYLICON) 125 MG chewable tablet    Skin Protectants, Misc. (EUCERIN) cream    solifenacin (VESICARE) 10 MG tablet    spironolactone (ALDACTONE) 25 MG tablet    ziprasidone (GEODON) 20 MG capsule    ziprasidone (GEODON) 40 MG capsule     No current facility-administered medications for this visit.     Past Medical History:   Diagnosis Date    Allergic rhinitis due to pollen     seasonal allergies     Anisometropia and aniseikonia     BMI greater than 40     Cardiomegaly     Chronic constipation     Congenital absence of one kidney     Cramp of limb     Dermatophytosis of the body     Dry eye syndrome     Esophageal reflux     Essential hypertension, benign     Gastro-oesophageal reflux disease     Hemorrhoids     Hypermetropia     Hypothyroidism     Incomplete Emptying of Bladder     Lactose intolerance     Major depressive disorder, recurrent episode, moderate (H)     Malignant neoplasm of breast (female), unspecified site     left mastectomy 1996     Mixed incontinence urge and stress (male)(female)     Moderate obstructive sleep apnea     Postmenopausal Atrophic Vaginitis     Presbyopia     Regular astigmatism     Restless leg syndrome     Senile nuclear sclerosis     Thyroid eye disease     mild    Tinnitus     Trigger finger (acquired)     right hand    Type II or unspecified type diabetes mellitus without mention of complication, not stated as uncontrolled     on insulin since April 2006      Past Surgical History:   Procedure Laterality Date    APPENDECTOMY      C MASTECTOMY,SIMPLE  1996    Left mastectomy  [facility]. Had normal FU mammo 5/2007. Follow yearly.     CHOLECYSTECTOMY      COLONOSCOPY  5/2005    Complete Colonoscopy-had one small polyp removed 5/2005.     COLONOSCOPY N/A 3/31/2016    Procedure: COLONOSCOPY;  Surgeon: Cary Ring MD;  Location:  GI    COLONOSCOPY  "N/A 7/7/2016    Procedure: COLONOSCOPY;  Surgeon: Cary Ring MD;  Location:  GI    COLONOSCOPY N/A 5/12/2022    Procedure: COLONOSCOPY, WITH POLYPECTOMY AND BIOPSY;  Surgeon: Jonathan Madden MD;  [facility]    DILATION AND CURETTAGE      HYSTERECTOMY      MASTECTOMY      Left breast    ZZC TOTAL ABDOM HYSTERECTOMY  7/2003    Dr. Castanon, for abnormal bleeding. Removal of both tubes with BSO for myoma w - - -     Wt Readings from Last 4 Encounters:   01/26/24 95 kg (209 lb 6.4 oz)   01/22/24 95.4 kg (210 lb 6.4 oz)   01/18/24 96.1 kg (211 lb 12.8 oz)   12/22/23 94.5 kg (208 lb 4.8 oz)     This telephone service is appropriate and effective for delivering services.    Patient has agreed to receiving telephone services after being informed about it: Yes    Patient prefers video invitation/information to be sent by:   email    Time service started: 1:10  Time service ended:1:30    The patient has been notified of the following:    \"This telephone visit will be conducted via a call between you and your provider.  We have found that certain health care needs can be provided without the need for an in-person consultation.  Telephone visits may be billed at different rates depending on your insurance coverage.  Please reach out to your insurance provider with any questions.Phone visits may be billed at different rates depending on insurance coverage.  Patients are advised to please contact their insurance provider with any questions about their health insurance coverage. If during the course of a call the psychologist feels a phone visit is not appropriate, patients will not be charged for this service.     If during the course of the call the psychologist feels a telephone visit is not appropriate you will not be charged for this service.\"    Location of originating:  [facility]  [phone] is her direct dieal    Distance site:  [facility]      Diagnosis:   Major Depression, " recurrent mild (F33.0)   Psychological Factors Affecting Morbid Obesity (F54)  Generalized anxiety (F41.1)       Plan: She would like to continue.  I will call her when I have a cancellation.  She  will ask staff at [facility], if she returns there, to call me to reschedule.   Last treatment plan signed: 1/26/24  Treatment plan due: 1/26/25    Damon Gr, PhD LP,  A.B.P.P.  Director, Health Psychology  [phone]

## 2024-04-24 ENCOUNTER — TELEPHONE (OUTPATIENT)
Dept: PSYCHOLOGY | Facility: CLINIC | Age: 74
End: 2024-04-24
Payer: COMMERCIAL

## 2024-04-24 NOTE — TELEPHONE ENCOUNTER
Pinky is currently at [facility] [phone]

## 2024-06-10 NOTE — PATIENT INSTRUCTIONS
Dear Ms. Pinky Page,    Thank you for coming today.  During your visit, we have discussed the following:     __  We'll update your Primacy Care Provider: Dr. Tonia Goel.    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 It could be from the medications that ou take for mood or from Parkinson's disease.  It's hard to know the difference on exam.  Dopamine Scan is used to know the difference.  However, your mood medications interfere with the accuracy of the result and you have to be off of these medications to get the scan.  As we have discussed, it's best not to do the Dopamine Scan at this time.     __  You have options to start medications to help the tremors, like Propranolol, Primidone or Sinemet. These medications might improve your tremors but not get rid of them.  There are various side effects associated with these medications that we've briefly discussed.  Also, it's good to know the goal of treatment and to treat bothersome symptoms.    __  Since the Tardive Dyskinesias (mouth movements) are not bothersome, we'll not treat this.     __  If you ever want to try any of the medications, please let me know. We'll work with your mental health care providers.     __  Occupational Therapy is a good option to explore device options like wrist weight, to improve tremors.  I'll refer you to OT and they will call you to schedule the appointment.     __  Continue to exercise regularly, including the Balance Exercise.     __  Return in 4 months for a follow up. You may return sooner as needed.       For questions, you may send us a [employer] message or call [phone]    Fax number: [phone]    To make an appointment with one of our pharmacists, (Helena Price, Pharm.D. or Ben HernandezD), call [phone].    SAL Benavidez, CNP  [facility]       
Spontaneous

## 2024-06-26 ENCOUNTER — LAB REQUISITION (OUTPATIENT)
Dept: LAB | Facility: CLINIC | Age: 74
End: 2024-06-26
Payer: COMMERCIAL

## 2024-06-26 DIAGNOSIS — E08.21 DIABETES MELLITUS DUE TO UNDERLYING CONDITION WITH DIABETIC NEPHROPATHY (H): ICD-10-CM

## 2024-06-26 DIAGNOSIS — R60.9 EDEMA, UNSPECIFIED: ICD-10-CM

## 2024-06-26 DIAGNOSIS — I48.20 CHRONIC ATRIAL FIBRILLATION, UNSPECIFIED (H): ICD-10-CM

## 2024-06-27 LAB
ANION GAP SERPL CALCULATED.3IONS-SCNC: 10 MMOL/L (ref 7–15)
BUN SERPL-MCNC: 17.3 MG/DL (ref 8–23)
CALCIUM SERPL-MCNC: 9.4 MG/DL (ref 8.8–10.2)
CHLORIDE SERPL-SCNC: 102 MMOL/L (ref 98–107)
CREAT SERPL-MCNC: 0.83 MG/DL (ref 0.51–0.95)
DEPRECATED HCO3 PLAS-SCNC: 26 MMOL/L (ref 22–29)
EGFRCR SERPLBLD CKD-EPI 2021: 73 ML/MIN/1.73M2
GLUCOSE SERPL-MCNC: 184 MG/DL (ref 70–99)
HBA1C MFR BLD: 9.7 %
MAGNESIUM SERPL-MCNC: 1.7 MG/DL (ref 1.7–2.3)
POTASSIUM SERPL-SCNC: 4.9 MMOL/L (ref 3.4–5.3)
SODIUM SERPL-SCNC: 138 MMOL/L (ref 135–145)

## 2024-06-27 PROCEDURE — P9604 ONE-WAY ALLOW PRORATED TRIP: HCPCS | Mod: ORL | Performed by: NURSE PRACTITIONER

## 2024-06-27 PROCEDURE — 36415 COLL VENOUS BLD VENIPUNCTURE: CPT | Mod: ORL | Performed by: NURSE PRACTITIONER

## 2024-06-27 PROCEDURE — 83735 ASSAY OF MAGNESIUM: CPT | Mod: ORL | Performed by: NURSE PRACTITIONER

## 2024-06-27 PROCEDURE — 80048 BASIC METABOLIC PNL TOTAL CA: CPT | Mod: ORL | Performed by: NURSE PRACTITIONER

## 2024-06-27 PROCEDURE — 83036 HEMOGLOBIN GLYCOSYLATED A1C: CPT | Mod: ORL | Performed by: NURSE PRACTITIONER

## 2024-09-09 ENCOUNTER — LAB REQUISITION (OUTPATIENT)
Dept: LAB | Facility: CLINIC | Age: 74
End: 2024-09-09
Payer: COMMERCIAL

## 2024-09-09 DIAGNOSIS — R53.1 WEAKNESS: ICD-10-CM

## 2024-09-09 DIAGNOSIS — F41.9 ANXIETY DISORDER, UNSPECIFIED: ICD-10-CM

## 2024-09-09 DIAGNOSIS — R41.82 ALTERED MENTAL STATUS, UNSPECIFIED: ICD-10-CM

## 2024-09-09 LAB
BACTERIA #/AREA URNS HPF: ABNORMAL /HPF
MUCOUS THREADS #/AREA URNS LPF: PRESENT /LPF
RBC URINE: 1 /HPF
SQUAMOUS EPITHELIAL: 22 /HPF
TRANSITIONAL EPI: <1 /HPF
WBC CLUMPS #/AREA URNS HPF: PRESENT /HPF
WBC URINE: 145 /HPF

## 2024-09-09 PROCEDURE — 87086 URINE CULTURE/COLONY COUNT: CPT | Mod: ORL | Performed by: NURSE PRACTITIONER

## 2024-09-09 PROCEDURE — 87186 SC STD MICRODIL/AGAR DIL: CPT | Mod: ORL | Performed by: NURSE PRACTITIONER

## 2024-09-09 PROCEDURE — 81015 MICROSCOPIC EXAM OF URINE: CPT | Mod: ORL | Performed by: NURSE PRACTITIONER

## 2024-09-10 LAB
ANION GAP SERPL CALCULATED.3IONS-SCNC: 14 MMOL/L (ref 7–15)
BASOPHILS # BLD AUTO: 0.1 10E3/UL (ref 0–0.2)
BASOPHILS NFR BLD AUTO: 1 %
BUN SERPL-MCNC: 20.1 MG/DL (ref 8–23)
CALCIUM SERPL-MCNC: 9.9 MG/DL (ref 8.8–10.4)
CHLORIDE SERPL-SCNC: 101 MMOL/L (ref 98–107)
CREAT SERPL-MCNC: 0.9 MG/DL (ref 0.51–0.95)
EGFRCR SERPLBLD CKD-EPI 2021: 66 ML/MIN/1.73M2
EOSINOPHIL # BLD AUTO: 0.1 10E3/UL (ref 0–0.7)
EOSINOPHIL NFR BLD AUTO: 1 %
ERYTHROCYTE [DISTWIDTH] IN BLOOD BY AUTOMATED COUNT: 19.5 % (ref 10–15)
GLUCOSE SERPL-MCNC: 203 MG/DL (ref 70–99)
HCO3 SERPL-SCNC: 23 MMOL/L (ref 22–29)
HCT VFR BLD AUTO: 46.2 % (ref 35–47)
HGB BLD-MCNC: 14.7 G/DL (ref 11.7–15.7)
IMM GRANULOCYTES # BLD: 0 10E3/UL
IMM GRANULOCYTES NFR BLD: 0 %
LYMPHOCYTES # BLD AUTO: 3.6 10E3/UL (ref 0.8–5.3)
LYMPHOCYTES NFR BLD AUTO: 31 %
MCH RBC QN AUTO: 27.8 PG (ref 26.5–33)
MCHC RBC AUTO-ENTMCNC: 31.8 G/DL (ref 31.5–36.5)
MCV RBC AUTO: 88 FL (ref 78–100)
MONOCYTES # BLD AUTO: 0.7 10E3/UL (ref 0–1.3)
MONOCYTES NFR BLD AUTO: 6 %
NEUTROPHILS # BLD AUTO: 6.9 10E3/UL (ref 1.6–8.3)
NEUTROPHILS NFR BLD AUTO: 61 %
NRBC # BLD AUTO: 0 10E3/UL
NRBC BLD AUTO-RTO: 0 /100
PLATELET # BLD AUTO: 333 10E3/UL (ref 150–450)
POTASSIUM SERPL-SCNC: 4.2 MMOL/L (ref 3.4–5.3)
RBC # BLD AUTO: 5.28 10E6/UL (ref 3.8–5.2)
SODIUM SERPL-SCNC: 138 MMOL/L (ref 135–145)
TSH SERPL DL<=0.005 MIU/L-ACNC: 4.23 UIU/ML (ref 0.3–4.2)
WBC # BLD AUTO: 11.4 10E3/UL (ref 4–11)

## 2024-09-10 PROCEDURE — 80048 BASIC METABOLIC PNL TOTAL CA: CPT | Mod: ORL | Performed by: NURSE PRACTITIONER

## 2024-09-10 PROCEDURE — 84443 ASSAY THYROID STIM HORMONE: CPT | Mod: ORL | Performed by: NURSE PRACTITIONER

## 2024-09-10 PROCEDURE — 36415 COLL VENOUS BLD VENIPUNCTURE: CPT | Mod: ORL | Performed by: NURSE PRACTITIONER

## 2024-09-10 PROCEDURE — 85025 COMPLETE CBC W/AUTO DIFF WBC: CPT | Mod: ORL | Performed by: NURSE PRACTITIONER

## 2024-09-10 PROCEDURE — P9604 ONE-WAY ALLOW PRORATED TRIP: HCPCS | Mod: ORL | Performed by: NURSE PRACTITIONER

## 2024-09-11 LAB
BACTERIA UR CULT: ABNORMAL
BACTERIA UR CULT: ABNORMAL

## 2024-10-31 ENCOUNTER — TELEPHONE (OUTPATIENT)
Dept: PSYCHIATRY | Facility: CLINIC | Age: 74
End: 2024-10-31
Payer: COMMERCIAL

## 2024-10-31 NOTE — TELEPHONE ENCOUNTER
[facility] Call Center    Phone Message    May a detailed message be left on voicemail: yes     Reason for Call: Other: Pt is currently admitted at [facility]. Call received from Dr.Claire Peralta requesting to speak with  as soon as possible.  plans to send consent to communicate via fax. The writer informed  that the pt is no longer with our clinic, but she was still hoping to speak with a doctor or nurse.     Action Taken: Other: [facility] pool and     Travel Screening: Not Applicable     Date of Service:

## 2024-12-25 ENCOUNTER — LAB REQUISITION (OUTPATIENT)
Dept: LAB | Facility: CLINIC | Age: 74
End: 2024-12-25
Payer: COMMERCIAL

## 2024-12-25 DIAGNOSIS — D72.0 GENETIC ANOMALIES OF LEUKOCYTES (H): ICD-10-CM

## 2024-12-25 DIAGNOSIS — E08.21 DIABETES MELLITUS DUE TO UNDERLYING CONDITION WITH DIABETIC NEPHROPATHY (H): ICD-10-CM

## 2024-12-26 LAB
ALBUMIN SERPL BCG-MCNC: 3.5 G/DL (ref 3.5–5.2)
ALP SERPL-CCNC: 118 U/L (ref 40–150)
ALT SERPL W P-5'-P-CCNC: 14 U/L (ref 0–50)
ANION GAP SERPL CALCULATED.3IONS-SCNC: 13 MMOL/L (ref 7–15)
AST SERPL W P-5'-P-CCNC: 21 U/L (ref 0–45)
BASOPHILS # BLD AUTO: 0.1 10E3/UL (ref 0–0.2)
BASOPHILS NFR BLD AUTO: 1 %
BILIRUB DIRECT SERPL-MCNC: <0.2 MG/DL (ref 0–0.3)
BILIRUB SERPL-MCNC: 0.6 MG/DL
BUN SERPL-MCNC: 8.3 MG/DL (ref 8–23)
CALCIUM SERPL-MCNC: 9.2 MG/DL (ref 8.8–10.4)
CHLORIDE SERPL-SCNC: 101 MMOL/L (ref 98–107)
CHOLEST SERPL-MCNC: 124 MG/DL
CREAT SERPL-MCNC: 0.7 MG/DL (ref 0.51–0.95)
EGFRCR SERPLBLD CKD-EPI 2021: 90 ML/MIN/1.73M2
EOSINOPHIL # BLD AUTO: 0 10E3/UL (ref 0–0.7)
EOSINOPHIL NFR BLD AUTO: 0 %
ERYTHROCYTE [DISTWIDTH] IN BLOOD BY AUTOMATED COUNT: 14 % (ref 10–15)
EST. AVERAGE GLUCOSE BLD GHB EST-MCNC: 186 MG/DL
FASTING STATUS PATIENT QL REPORTED: ABNORMAL
FASTING STATUS PATIENT QL REPORTED: ABNORMAL
GLUCOSE SERPL-MCNC: 147 MG/DL (ref 70–99)
HBA1C MFR BLD: 8.1 %
HCO3 SERPL-SCNC: 22 MMOL/L (ref 22–29)
HCT VFR BLD AUTO: 41.6 % (ref 35–47)
HDLC SERPL-MCNC: 33 MG/DL
HGB BLD-MCNC: 13.6 G/DL (ref 11.7–15.7)
IMM GRANULOCYTES # BLD: 0 10E3/UL
IMM GRANULOCYTES NFR BLD: 0 %
LDLC SERPL CALC-MCNC: 69 MG/DL
LYMPHOCYTES # BLD AUTO: 1.4 10E3/UL (ref 0.8–5.3)
LYMPHOCYTES NFR BLD AUTO: 15 %
MCH RBC QN AUTO: 29.8 PG (ref 26.5–33)
MCHC RBC AUTO-ENTMCNC: 32.7 G/DL (ref 31.5–36.5)
MCV RBC AUTO: 91 FL (ref 78–100)
MONOCYTES # BLD AUTO: 0.9 10E3/UL (ref 0–1.3)
MONOCYTES NFR BLD AUTO: 10 %
NEUTROPHILS # BLD AUTO: 6.7 10E3/UL (ref 1.6–8.3)
NEUTROPHILS NFR BLD AUTO: 74 %
NONHDLC SERPL-MCNC: 91 MG/DL
NRBC # BLD AUTO: 0 10E3/UL
NRBC BLD AUTO-RTO: 0 /100
PLATELET # BLD AUTO: 205 10E3/UL (ref 150–450)
POTASSIUM SERPL-SCNC: 4.2 MMOL/L (ref 3.4–5.3)
PROT SERPL-MCNC: 6.6 G/DL (ref 6.4–8.3)
RBC # BLD AUTO: 4.57 10E6/UL (ref 3.8–5.2)
SODIUM SERPL-SCNC: 136 MMOL/L (ref 135–145)
TRIGL SERPL-MCNC: 109 MG/DL
WBC # BLD AUTO: 9.1 10E3/UL (ref 4–11)

## 2024-12-26 PROCEDURE — 82248 BILIRUBIN DIRECT: CPT | Mod: ORL | Performed by: NURSE PRACTITIONER

## 2024-12-26 PROCEDURE — 36415 COLL VENOUS BLD VENIPUNCTURE: CPT | Mod: ORL | Performed by: NURSE PRACTITIONER

## 2024-12-26 PROCEDURE — P9604 ONE-WAY ALLOW PRORATED TRIP: HCPCS | Mod: ORL | Performed by: NURSE PRACTITIONER

## 2024-12-26 PROCEDURE — 80053 COMPREHEN METABOLIC PANEL: CPT | Mod: ORL | Performed by: NURSE PRACTITIONER

## 2024-12-26 PROCEDURE — 83036 HEMOGLOBIN GLYCOSYLATED A1C: CPT | Mod: ORL | Performed by: NURSE PRACTITIONER

## 2024-12-26 PROCEDURE — 80061 LIPID PANEL: CPT | Mod: ORL | Performed by: NURSE PRACTITIONER

## 2024-12-26 PROCEDURE — 85025 COMPLETE CBC W/AUTO DIFF WBC: CPT | Mod: ORL | Performed by: NURSE PRACTITIONER

## 2025-02-12 ENCOUNTER — PATIENT OUTREACH (OUTPATIENT)
Dept: GASTROENTEROLOGY | Facility: CLINIC | Age: 75
End: 2025-02-12
Payer: COMMERCIAL

## 2025-04-22 ENCOUNTER — TELEPHONE (OUTPATIENT)
Dept: PHARMACY | Facility: CLINIC | Age: 75
End: 2025-04-22
Payer: COMMERCIAL

## 2025-04-22 NOTE — TELEPHONE ENCOUNTER
[facility] Recruitment: [facility] insurance     Referral outreach attempt #1 on April 22, 2025      Outcome: patient opted out in a nursing home    Roxanna Mckeon [facility]  -[facility]  [phone]

## 2025-04-30 ENCOUNTER — LAB REQUISITION (OUTPATIENT)
Dept: LAB | Facility: CLINIC | Age: 75
End: 2025-04-30
Payer: COMMERCIAL

## 2025-05-01 ENCOUNTER — LAB REQUISITION (OUTPATIENT)
Dept: LAB | Facility: CLINIC | Age: 75
End: 2025-05-01
Payer: COMMERCIAL

## 2025-05-02 LAB — TSH SERPL DL<=0.005 MIU/L-ACNC: 0.56 UIU/ML (ref 0.3–4.2)

## 2025-05-02 PROCEDURE — 36415 COLL VENOUS BLD VENIPUNCTURE: CPT | Mod: ORL | Performed by: NURSE PRACTITIONER

## 2025-05-02 PROCEDURE — P9604 ONE-WAY ALLOW PRORATED TRIP: HCPCS | Mod: ORL | Performed by: NURSE PRACTITIONER

## 2025-05-02 PROCEDURE — 84443 ASSAY THYROID STIM HORMONE: CPT | Mod: ORL | Performed by: NURSE PRACTITIONER

## 2025-05-04 ENCOUNTER — LAB REQUISITION (OUTPATIENT)
Dept: LAB | Facility: CLINIC | Age: 75
End: 2025-05-04
Payer: COMMERCIAL

## 2025-05-04 DIAGNOSIS — R53.1 WEAKNESS: ICD-10-CM

## 2025-05-05 LAB
ANION GAP SERPL CALCULATED.3IONS-SCNC: 14 MMOL/L (ref 7–15)
BUN SERPL-MCNC: 13.8 MG/DL (ref 8–23)
CALCIUM SERPL-MCNC: 9.8 MG/DL (ref 8.8–10.4)
CHLORIDE SERPL-SCNC: 99 MMOL/L (ref 98–107)
CREAT SERPL-MCNC: 0.72 MG/DL (ref 0.51–0.95)
EGFRCR SERPLBLD CKD-EPI 2021: 87 ML/MIN/1.73M2
ERYTHROCYTE [DISTWIDTH] IN BLOOD BY AUTOMATED COUNT: 14.3 % (ref 10–15)
GLUCOSE SERPL-MCNC: 348 MG/DL (ref 70–99)
HCO3 SERPL-SCNC: 25 MMOL/L (ref 22–29)
HCT VFR BLD AUTO: 45.8 % (ref 35–47)
HGB BLD-MCNC: 14.4 G/DL (ref 11.7–15.7)
MCH RBC QN AUTO: 29.8 PG (ref 26.5–33)
MCHC RBC AUTO-ENTMCNC: 31.4 G/DL (ref 31.5–36.5)
MCV RBC AUTO: 95 FL (ref 78–100)
PLATELET # BLD AUTO: 292 10E3/UL (ref 150–450)
POTASSIUM SERPL-SCNC: 4.4 MMOL/L (ref 3.4–5.3)
RBC # BLD AUTO: 4.84 10E6/UL (ref 3.8–5.2)
SODIUM SERPL-SCNC: 138 MMOL/L (ref 135–145)
WBC # BLD AUTO: 9.5 10E3/UL (ref 4–11)

## 2025-05-05 PROCEDURE — 36415 COLL VENOUS BLD VENIPUNCTURE: CPT | Mod: ORL | Performed by: NURSE PRACTITIONER

## 2025-05-05 PROCEDURE — 80048 BASIC METABOLIC PNL TOTAL CA: CPT | Mod: ORL | Performed by: NURSE PRACTITIONER

## 2025-05-05 PROCEDURE — P9604 ONE-WAY ALLOW PRORATED TRIP: HCPCS | Mod: ORL | Performed by: NURSE PRACTITIONER

## 2025-05-05 PROCEDURE — 85027 COMPLETE CBC AUTOMATED: CPT | Mod: ORL | Performed by: NURSE PRACTITIONER

## 2025-05-28 NOTE — PROGRESS NOTES
Health Psychology                    Department of Medicine  Keri Arnett, Ph.D., L.P. [phone]                         [facility] Madelaine Ingram, Ph.D., L.P. [phone]                     [place] Mail Code 801   Dennis Dillard, Ph.D. [phone]      [address] [place] [address] Rae Eugene, Ph.D., A.B.P.P., L.P. [phone]              [place] [address]           Damon Gr, Ph.D., A.B.P.P., L.P. [phone]      Kimber Morales, Ph.D., L.P. [phone]  [facility]   Lilia Burroughs, Ph.D., A.B.P.P., L.P. 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 T She is getting better at setting it up with help from [facility] staff.  Intake with Health Psychology was in the .  I began to see her in the early .    Mood: Her mood is okay today..   She rates herself at a 3 on 10-point scale of depression.  She felt sad when another resident she has known since them id-90s .  She couldn't cry.     Exercise:  She had been biking 5x/week for 15-20 minutes down  7x/week for 20 minutes/day; some days 25 minutes so as to get a break.   She was also walking about 25-30 minutes/day, up from 20 minutes indoors. No outdoor walking.    She realizes she should increase somewhat given that she is not consistently losing  weight.      Health:  She got her booster for COVID and flu shot.   She feels she reovered fully from COVID.  Right now Narendra is free of Covid.  A few weeks ago 9 residents had it.   She completed getting physical therapy for  her shoulder arthritis.  She still does exercises.   She injured it when she fell in .    I noticed a tremor in her right hand, which she says began in April.  I  Wonder what further investigation  is necessary (e.g., to r/o TD).  I contacted her PCP and the new psychiatric resident she will be seeing next week, Dr. Cassidy.     She didn't work for 3 weeks following her colonoscopy.  She had no bowel movements x 8 days by her report.  She had hip and right leg pain.    COVIID She feels she recovered full from her COVID, and is hoping to avoid COVID.  At [facility], they are no longer fully quarantined.  They still are 1 at a table for meals.  All residents and staff have recovered from COVID. There were 4 deaths due to COVID at the beginning of the pandemic 1 in 50s, other in 40s..    Weight: Her weight increased  to 221.7 on clinic scale;  She didn't exercise x 3 weeks while in pain.      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       She participates fully, ventilated feelings appropriately.  She appears to derive benefit from the support.     Current Outpatient Medications   Medication     acetaminophen (TYLENOL) 500 MG tablet     alum & mag hydroxide-simethicone (MYLANTA/MAALOX) 200-200-20 MG/5ML SUSP suspension     amLODIPine (NORVASC) 10 MG tablet     apixaban ANTICOAGULANT (ELIQUIS) 5 MG tablet     artificial saliva (BIOTENE MT) AERS spray     artificial tears (GENTEAL) 0.1-0.2-0.3 % ophthalmic solution     atorvastatin (LIPITOR) 40 MG tablet     blood glucose (NO BRAND SPECIFIED) lancets standard     blood glucose calibration (NO BRAND SPECIFIED) solution     blood glucose monitoring (NO BRAND SPECIFIED) meter device kit     blood glucose monitoring (NO BRAND SPECIFIED) test strip     Calcium Carb-Cholecalciferol (CALCIUM-VITAMIN D3) 250-125 MG-UNIT TABS     Calcium Carbonate-Vitamin D (CALCIUM-VITAMIN D) 250-125 MG-UNIT TABS     calcium polycarbophil (FIBERCON) 625 MG tablet     CLARITIN 10 MG OR TABS     conjugated  estrogens (PREMARIN) 0.625 MG/GM vaginal cream     erythromycin (ROMYCIN) 5 MG/GM ophthalmic ointment     FLUoxetine (PROZAC) 20 MG capsule     fluticasone (FLONASE) 50 MCG/ACT nasal spray     furosemide (LASIX) 20 MG tablet     Gabapentin (NEURONTIN PO)     Hypromellose (ARTIFICIAL TEARS OP)     insulin glargine (LANTUS PEN) 100 UNIT/ML pen     lactase (LACTAID) 3000 UNIT tablet     levothyroxine (SYNTHROID/LEVOTHROID) 175 MCG tablet     lidocaine (XYLOCAINE) 2 % external gel     lidocaine (XYLOCAINE) 5 % external ointment     lifitegrast (XIIDRA) 5 % opthalmic solution     liraglutide (VICTOZA) 18 MG/3ML solution     losartan (COZAAR) 100 MG tablet     Magnesium Hydroxide (MILK OF MAGNESIA PO)     melatonin 3 MG CAPS     metFORMIN (GLUCOPHAGE) 1000 MG tablet     NEW MED     nystatin (MYCOSTATIN) 841417 UNIT/GM POWD     ONETOUCH DELICA LANCETS 33G MISC     polyethylene glycol (MIRALAX/GLYCOLAX) powder     Saline 0.9 % SOLN     senna-docusate (SENOKOT-S/PERICOLACE) 8.6-50 MG tablet     simethicone (MYLICON) 125 MG chewable tablet     Skin Protectants, Misc. (EUCERIN) cream     SM LUBRICANT EYE DROPS 0.4-0.3 % SOLN ophthalmic solution     solifenacin (VESICARE) 10 MG tablet     ziprasidone (GEODON) 40 MG capsule     No current facility-administered medications for this visit.     Wt Readings from Last 4 Encounters:   05/31/22 99 kg (218 lb 3.2 oz)   05/05/22 100.7 kg (222 lb)   04/14/22 100.7 kg (222 lb)   04/12/22 99.5 kg (219 lb 6.4 oz)   There is no height or weight on file to calculate BMI.      Time service started: 1:05  Time service ended:  1:58    Diagnosis:   Major Depression, recurrent mild (F33.0)   Psychological Factors Affecting Morbid Obesity (F54)  Generalized anxiety (F41.1)       Plan: Return for psychotherapy visit in-person/masked 8/19 @ 2 due to the serious and persistent nature of her depression and anxiety consistent with treatment plan.  Last treatment plan signed: 1/13/22  Treatment plan due:  no change 1/13/23                  Preference for future meetings:           X   In-person, even if both wearing masks                Remote                 Either          Damon Gr, PhD LP,  A.B.P.P.  Director, Health Psychology  [phone]   No

## 2025-05-29 LAB — TSH SERPL DL<=0.005 MIU/L-ACNC: 0.49 UIU/ML (ref 0.3–4.2)

## 2025-05-29 PROCEDURE — 36415 COLL VENOUS BLD VENIPUNCTURE: CPT | Mod: ORL | Performed by: NURSE PRACTITIONER

## 2025-05-29 PROCEDURE — 84443 ASSAY THYROID STIM HORMONE: CPT | Mod: ORL | Performed by: NURSE PRACTITIONER

## 2025-05-29 PROCEDURE — P9604 ONE-WAY ALLOW PRORATED TRIP: HCPCS | Mod: ORL | Performed by: NURSE PRACTITIONER

## 2025-06-12 ENCOUNTER — LAB REQUISITION (OUTPATIENT)
Dept: LAB | Facility: CLINIC | Age: 75
End: 2025-06-12
Payer: COMMERCIAL

## 2025-06-12 DIAGNOSIS — E08.21 DIABETES MELLITUS DUE TO UNDERLYING CONDITION WITH DIABETIC NEPHROPATHY (H): ICD-10-CM

## 2025-06-13 LAB
EST. AVERAGE GLUCOSE BLD GHB EST-MCNC: 197 MG/DL
HBA1C MFR BLD: 8.5 %

## 2025-06-13 PROCEDURE — 83036 HEMOGLOBIN GLYCOSYLATED A1C: CPT | Mod: ORL | Performed by: NURSE PRACTITIONER

## 2025-06-13 PROCEDURE — 36415 COLL VENOUS BLD VENIPUNCTURE: CPT | Mod: ORL | Performed by: NURSE PRACTITIONER

## 2025-06-13 PROCEDURE — P9603 ONE-WAY ALLOW PRORATED MILES: HCPCS | Mod: ORL | Performed by: NURSE PRACTITIONER

## 2025-06-16 ENCOUNTER — LAB REQUISITION (OUTPATIENT)
Dept: LAB | Facility: CLINIC | Age: 75
End: 2025-06-16
Payer: COMMERCIAL

## 2025-06-16 DIAGNOSIS — E03.9 HYPOTHYROIDISM, UNSPECIFIED: ICD-10-CM

## 2025-06-17 LAB — TSH SERPL DL<=0.005 MIU/L-ACNC: 1.31 UIU/ML (ref 0.3–4.2)

## 2025-07-01 ENCOUNTER — LAB REQUISITION (OUTPATIENT)
Dept: LAB | Facility: CLINIC | Age: 75
End: 2025-07-01
Payer: COMMERCIAL

## 2025-07-01 DIAGNOSIS — N39.0 URINARY TRACT INFECTION, SITE NOT SPECIFIED: ICD-10-CM

## 2025-07-01 LAB
AMORPH CRY #/AREA URNS HPF: ABNORMAL /HPF
MUCOUS THREADS #/AREA URNS LPF: PRESENT /LPF
RBC URINE: 0 /HPF
SQUAMOUS EPITHELIAL: 5 /HPF
WBC URINE: 0 /HPF

## (undated) RX ORDER — FENTANYL CITRATE 50 UG/ML
INJECTION, SOLUTION INTRAMUSCULAR; INTRAVENOUS
Status: DISPENSED
Start: 2022-05-12